# Patient Record
Sex: MALE | Race: WHITE | NOT HISPANIC OR LATINO | Employment: OTHER | URBAN - METROPOLITAN AREA
[De-identification: names, ages, dates, MRNs, and addresses within clinical notes are randomized per-mention and may not be internally consistent; named-entity substitution may affect disease eponyms.]

---

## 2017-03-09 ENCOUNTER — LAB (OUTPATIENT)
Dept: LAB | Facility: MEDICAL CENTER | Age: 77
End: 2017-03-09
Payer: MEDICARE

## 2017-03-09 ENCOUNTER — TRANSCRIBE ORDERS (OUTPATIENT)
Dept: ADMINISTRATIVE | Facility: HOSPITAL | Age: 77
End: 2017-03-09

## 2017-03-09 DIAGNOSIS — Z85.07 PERSONAL HISTORY OF MALIGNANT NEOPLASM OF PANCREAS: ICD-10-CM

## 2017-03-09 DIAGNOSIS — E11.9 DIABETES MELLITUS WITHOUT COMPLICATION (HCC): Primary | ICD-10-CM

## 2017-03-09 DIAGNOSIS — E11.9 DIABETES MELLITUS WITHOUT COMPLICATION (HCC): ICD-10-CM

## 2017-03-09 LAB
ALBUMIN SERPL BCP-MCNC: 3.5 G/DL (ref 3.5–5)
ALP SERPL-CCNC: 124 U/L (ref 46–116)
ALT SERPL W P-5'-P-CCNC: 20 U/L (ref 12–78)
ANION GAP SERPL CALCULATED.3IONS-SCNC: 6 MMOL/L (ref 4–13)
AST SERPL W P-5'-P-CCNC: 15 U/L (ref 5–45)
BILIRUB SERPL-MCNC: 0.61 MG/DL (ref 0.2–1)
BUN SERPL-MCNC: 29 MG/DL (ref 5–25)
CALCIUM SERPL-MCNC: 9.1 MG/DL (ref 8.3–10.1)
CHLORIDE SERPL-SCNC: 105 MMOL/L (ref 100–108)
CHOLEST SERPL-MCNC: 127 MG/DL (ref 50–200)
CO2 SERPL-SCNC: 29 MMOL/L (ref 21–32)
CREAT SERPL-MCNC: 1.38 MG/DL (ref 0.6–1.3)
EST. AVERAGE GLUCOSE BLD GHB EST-MCNC: 166 MG/DL
GFR SERPL CREATININE-BSD FRML MDRD: 50.2 ML/MIN/1.73SQ M
GLUCOSE SERPL-MCNC: 83 MG/DL (ref 65–140)
HBA1C MFR BLD: 7.4 % (ref 4.2–6.3)
HDLC SERPL-MCNC: 48 MG/DL (ref 40–60)
LDLC SERPL CALC-MCNC: 64 MG/DL (ref 0–100)
POTASSIUM SERPL-SCNC: 4.3 MMOL/L (ref 3.5–5.3)
PROT SERPL-MCNC: 6.5 G/DL (ref 6.4–8.2)
SODIUM SERPL-SCNC: 140 MMOL/L (ref 136–145)
TRIGL SERPL-MCNC: 75 MG/DL

## 2017-03-09 PROCEDURE — 80061 LIPID PANEL: CPT

## 2017-03-09 PROCEDURE — 80053 COMPREHEN METABOLIC PANEL: CPT

## 2017-03-09 PROCEDURE — 36415 COLL VENOUS BLD VENIPUNCTURE: CPT

## 2017-03-09 PROCEDURE — 83036 HEMOGLOBIN GLYCOSYLATED A1C: CPT

## 2017-03-27 ENCOUNTER — HOSPITAL ENCOUNTER (OUTPATIENT)
Dept: RADIOLOGY | Age: 77
Discharge: HOME | End: 2017-03-27
Payer: MEDICARE

## 2017-03-27 DIAGNOSIS — Z85.07 PERSONAL HISTORY OF MALIGNANT NEOPLASM OF PANCREAS: ICD-10-CM

## 2017-03-27 PROCEDURE — 74177 CT ABD & PELVIS W/CONTRAST: CPT

## 2017-03-27 RX ADMIN — IODIXANOL 100 ML: 320 INJECTION, SOLUTION INTRAVASCULAR at 09:40

## 2017-04-06 ENCOUNTER — TRANSCRIBE ORDERS (OUTPATIENT)
Dept: ADMINISTRATIVE | Facility: HOSPITAL | Age: 77
End: 2017-04-06

## 2017-04-06 ENCOUNTER — ALLSCRIPTS OFFICE VISIT (OUTPATIENT)
Dept: OTHER | Facility: OTHER | Age: 77
End: 2017-04-06

## 2017-04-06 DIAGNOSIS — Z85.07 PERSONAL HISTORY OF MALIGNANT NEOPLASM OF PANCREAS: Primary | ICD-10-CM

## 2017-04-06 DIAGNOSIS — N28.1 ACQUIRED CYST OF KIDNEY: ICD-10-CM

## 2017-04-11 ENCOUNTER — HOSPITAL ENCOUNTER (OUTPATIENT)
Dept: ULTRASOUND IMAGING | Facility: HOSPITAL | Age: 77
Discharge: HOME | End: 2017-04-11
Attending: SURGERY
Payer: MEDICARE

## 2017-04-11 DIAGNOSIS — N28.1 ACQUIRED CYST OF KIDNEY: ICD-10-CM

## 2017-04-11 PROCEDURE — 76770 US EXAM ABDO BACK WALL COMP: CPT

## 2017-04-12 ENCOUNTER — TRANSCRIBE ORDERS (OUTPATIENT)
Dept: ADMINISTRATIVE | Facility: HOSPITAL | Age: 77
End: 2017-04-12

## 2017-04-12 ENCOUNTER — ALLSCRIPTS OFFICE VISIT (OUTPATIENT)
Dept: OTHER | Facility: OTHER | Age: 77
End: 2017-04-12

## 2017-04-12 DIAGNOSIS — N28.1 ACQUIRED CYST OF KIDNEY: Primary | ICD-10-CM

## 2017-04-12 LAB
CLARITY UR: NORMAL
COLOR UR: YELLOW
GLUCOSE (HISTORICAL): NORMAL
HGB UR QL STRIP.AUTO: NORMAL
KETONES UR STRIP-MCNC: NORMAL MG/DL
LEUKOCYTE ESTERASE UR QL STRIP: NORMAL
NITRITE UR QL STRIP: NORMAL
PH UR STRIP.AUTO: 5 [PH]
PROT UR STRIP-MCNC: NORMAL MG/DL
SP GR UR STRIP.AUTO: 1.02

## 2017-06-13 ENCOUNTER — APPOINTMENT (EMERGENCY)
Dept: RADIOLOGY | Facility: HOSPITAL | Age: 77
DRG: 178 | End: 2017-06-13
Payer: MEDICARE

## 2017-06-13 ENCOUNTER — HOSPITAL ENCOUNTER (INPATIENT)
Facility: HOSPITAL | Age: 77
LOS: 3 days | Discharge: HOME | DRG: 178 | End: 2017-06-16
Attending: EMERGENCY MEDICINE | Admitting: GENERAL PRACTICE
Payer: MEDICARE

## 2017-06-13 DIAGNOSIS — J18.9 PNEUMONIA: Primary | ICD-10-CM

## 2017-06-13 DIAGNOSIS — R09.02 HYPOXIA: ICD-10-CM

## 2017-06-13 DIAGNOSIS — I48.20 CHRONIC ATRIAL FIBRILLATION (HCC): ICD-10-CM

## 2017-06-13 PROBLEM — N18.9 ACUTE-ON-CHRONIC KIDNEY INJURY (HCC): Status: ACTIVE | Noted: 2017-06-13

## 2017-06-13 PROBLEM — I10 HYPERTENSION: Status: ACTIVE | Noted: 2017-06-13

## 2017-06-13 PROBLEM — R06.02 SHORTNESS OF BREATH: Status: ACTIVE | Noted: 2017-06-13

## 2017-06-13 PROBLEM — E11.9 DIABETES MELLITUS (HCC): Status: ACTIVE | Noted: 2017-06-13

## 2017-06-13 PROBLEM — N17.9 ACUTE-ON-CHRONIC KIDNEY INJURY (HCC): Status: ACTIVE | Noted: 2017-06-13

## 2017-06-13 LAB
ALBUMIN SERPL BCP-MCNC: 3.5 G/DL (ref 3.5–5)
ALP SERPL-CCNC: 143 U/L (ref 46–116)
ALT SERPL W P-5'-P-CCNC: 14 U/L (ref 12–78)
ANION GAP SERPL CALCULATED.3IONS-SCNC: 9 MMOL/L (ref 4–13)
APTT PPP: 36 SECONDS (ref 23–35)
AST SERPL W P-5'-P-CCNC: 22 U/L (ref 5–45)
BASOPHILS # BLD AUTO: 0.03 THOUSANDS/ΜL (ref 0–0.1)
BASOPHILS NFR BLD AUTO: 0 % (ref 0–1)
BILIRUB SERPL-MCNC: 1 MG/DL (ref 0.2–1)
BUN SERPL-MCNC: 31 MG/DL (ref 5–25)
CALCIUM SERPL-MCNC: 9.2 MG/DL (ref 8.3–10.1)
CHLORIDE SERPL-SCNC: 101 MMOL/L (ref 100–108)
CO2 SERPL-SCNC: 28 MMOL/L (ref 21–32)
CREAT SERPL-MCNC: 1.69 MG/DL (ref 0.6–1.3)
EOSINOPHIL # BLD AUTO: 0.03 THOUSAND/ΜL (ref 0–0.61)
EOSINOPHIL NFR BLD AUTO: 0 % (ref 0–6)
ERYTHROCYTE [DISTWIDTH] IN BLOOD BY AUTOMATED COUNT: 12.9 % (ref 11.6–15.1)
GFR SERPL CREATININE-BSD FRML MDRD: 39.8 ML/MIN/1.73SQ M
GLUCOSE SERPL-MCNC: 182 MG/DL (ref 65–140)
GLUCOSE SERPL-MCNC: 203 MG/DL (ref 65–140)
HCT VFR BLD AUTO: 40.8 % (ref 36.5–49.3)
HGB BLD-MCNC: 13.1 G/DL (ref 12–17)
INR PPP: 1.13 (ref 0.86–1.16)
LACTATE SERPL-SCNC: 0.9 MMOL/L (ref 0.5–2)
LYMPHOCYTES # BLD AUTO: 0.65 THOUSANDS/ΜL (ref 0.6–4.47)
LYMPHOCYTES NFR BLD AUTO: 5 % (ref 14–44)
MCH RBC QN AUTO: 30.7 PG (ref 26.8–34.3)
MCHC RBC AUTO-ENTMCNC: 32.1 G/DL (ref 31.4–37.4)
MCV RBC AUTO: 96 FL (ref 82–98)
MONOCYTES # BLD AUTO: 1.42 THOUSAND/ΜL (ref 0.17–1.22)
MONOCYTES NFR BLD AUTO: 12 % (ref 4–12)
NEUTROPHILS # BLD AUTO: 10.05 THOUSANDS/ΜL (ref 1.85–7.62)
NEUTS SEG NFR BLD AUTO: 82 % (ref 43–75)
NRBC BLD AUTO-RTO: 0 /100 WBCS
PLATELET # BLD AUTO: 224 THOUSANDS/UL (ref 149–390)
PMV BLD AUTO: 9.7 FL (ref 8.9–12.7)
POTASSIUM SERPL-SCNC: 4.3 MMOL/L (ref 3.5–5.3)
PROT SERPL-MCNC: 6.9 G/DL (ref 6.4–8.2)
PROTHROMBIN TIME: 14.8 SECONDS (ref 12.1–14.4)
RBC # BLD AUTO: 4.27 MILLION/UL (ref 3.88–5.62)
SODIUM SERPL-SCNC: 138 MMOL/L (ref 136–145)
WBC # BLD AUTO: 12.22 THOUSAND/UL (ref 4.31–10.16)

## 2017-06-13 PROCEDURE — 82948 REAGENT STRIP/BLOOD GLUCOSE: CPT

## 2017-06-13 PROCEDURE — 87798 DETECT AGENT NOS DNA AMP: CPT | Performed by: NURSE PRACTITIONER

## 2017-06-13 PROCEDURE — 94640 AIRWAY INHALATION TREATMENT: CPT

## 2017-06-13 PROCEDURE — 71020 HB CHEST X-RAY 2VW FRONTAL&LATL: CPT

## 2017-06-13 PROCEDURE — 87205 SMEAR GRAM STAIN: CPT | Performed by: NURSE PRACTITIONER

## 2017-06-13 PROCEDURE — 85730 THROMBOPLASTIN TIME PARTIAL: CPT | Performed by: EMERGENCY MEDICINE

## 2017-06-13 PROCEDURE — 36415 COLL VENOUS BLD VENIPUNCTURE: CPT | Performed by: EMERGENCY MEDICINE

## 2017-06-13 PROCEDURE — 99285 EMERGENCY DEPT VISIT HI MDM: CPT

## 2017-06-13 PROCEDURE — 93005 ELECTROCARDIOGRAM TRACING: CPT

## 2017-06-13 PROCEDURE — 87081 CULTURE SCREEN ONLY: CPT | Performed by: NURSE PRACTITIONER

## 2017-06-13 PROCEDURE — 87040 BLOOD CULTURE FOR BACTERIA: CPT | Performed by: EMERGENCY MEDICINE

## 2017-06-13 PROCEDURE — 87070 CULTURE OTHR SPECIMN AEROBIC: CPT | Performed by: NURSE PRACTITIONER

## 2017-06-13 PROCEDURE — 85025 COMPLETE CBC W/AUTO DIFF WBC: CPT | Performed by: EMERGENCY MEDICINE

## 2017-06-13 PROCEDURE — 87184 SC STD DISK METHOD PER PLATE: CPT | Performed by: NURSE PRACTITIONER

## 2017-06-13 PROCEDURE — 80053 COMPREHEN METABOLIC PANEL: CPT | Performed by: EMERGENCY MEDICINE

## 2017-06-13 PROCEDURE — 87077 CULTURE AEROBIC IDENTIFY: CPT | Performed by: NURSE PRACTITIONER

## 2017-06-13 PROCEDURE — 87185 SC STD ENZYME DETCJ PER NZM: CPT | Performed by: NURSE PRACTITIONER

## 2017-06-13 PROCEDURE — 93005 ELECTROCARDIOGRAM TRACING: CPT | Performed by: EMERGENCY MEDICINE

## 2017-06-13 PROCEDURE — 85610 PROTHROMBIN TIME: CPT | Performed by: EMERGENCY MEDICINE

## 2017-06-13 PROCEDURE — 94760 N-INVAS EAR/PLS OXIMETRY 1: CPT

## 2017-06-13 PROCEDURE — 94664 DEMO&/EVAL PT USE INHALER: CPT

## 2017-06-13 PROCEDURE — 83605 ASSAY OF LACTIC ACID: CPT | Performed by: EMERGENCY MEDICINE

## 2017-06-13 RX ORDER — POTASSIUM CHLORIDE 600 MG/1
8 CAPSULE, EXTENDED RELEASE ORAL DAILY
Status: DISCONTINUED | OUTPATIENT
Start: 2017-06-14 | End: 2017-06-13 | Stop reason: ALTCHOICE

## 2017-06-13 RX ORDER — ASPIRIN 325 MG
325 TABLET ORAL DAILY
Status: DISCONTINUED | OUTPATIENT
Start: 2017-06-14 | End: 2017-06-16 | Stop reason: HOSPADM

## 2017-06-13 RX ORDER — AMPICILLIN TRIHYDRATE 250 MG
1000 CAPSULE ORAL DAILY
COMMUNITY

## 2017-06-13 RX ORDER — FAMOTIDINE 10 MG
10 TABLET ORAL DAILY
COMMUNITY
End: 2018-04-19 | Stop reason: SDUPTHER

## 2017-06-13 RX ORDER — PREDNISONE 1 MG/1
1 TABLET ORAL DAILY
Status: DISCONTINUED | OUTPATIENT
Start: 2017-06-14 | End: 2017-06-16 | Stop reason: HOSPADM

## 2017-06-13 RX ORDER — MELATONIN
2000 DAILY
COMMUNITY

## 2017-06-13 RX ORDER — MELATONIN
2000 DAILY
Status: DISCONTINUED | OUTPATIENT
Start: 2017-06-14 | End: 2017-06-16 | Stop reason: HOSPADM

## 2017-06-13 RX ORDER — AMPICILLIN TRIHYDRATE 250 MG
1000 CAPSULE ORAL DAILY
Status: DISCONTINUED | OUTPATIENT
Start: 2017-06-14 | End: 2017-06-14

## 2017-06-13 RX ORDER — ACETAMINOPHEN 325 MG/1
650 TABLET ORAL ONCE
Status: COMPLETED | OUTPATIENT
Start: 2017-06-13 | End: 2017-06-13

## 2017-06-13 RX ORDER — FAMOTIDINE 20 MG/1
10 TABLET, FILM COATED ORAL DAILY
Status: DISCONTINUED | OUTPATIENT
Start: 2017-06-14 | End: 2017-06-16 | Stop reason: HOSPADM

## 2017-06-13 RX ORDER — PRAVASTATIN SODIUM 40 MG
40 TABLET ORAL DAILY
Status: DISCONTINUED | OUTPATIENT
Start: 2017-06-14 | End: 2017-06-16 | Stop reason: HOSPADM

## 2017-06-13 RX ORDER — ASPIRIN 325 MG
325 TABLET ORAL DAILY
COMMUNITY
End: 2018-04-19 | Stop reason: SDUPTHER

## 2017-06-13 RX ORDER — HYDROXYCHLOROQUINE SULFATE 200 MG/1
200 TABLET, FILM COATED ORAL 2 TIMES DAILY
Status: DISCONTINUED | OUTPATIENT
Start: 2017-06-14 | End: 2017-06-16 | Stop reason: HOSPADM

## 2017-06-13 RX ORDER — POTASSIUM CHLORIDE 750 MG/1
10 TABLET, EXTENDED RELEASE ORAL DAILY
Status: DISCONTINUED | OUTPATIENT
Start: 2017-06-14 | End: 2017-06-16 | Stop reason: HOSPADM

## 2017-06-13 RX ORDER — INSULIN GLARGINE 100 [IU]/ML
41 INJECTION, SOLUTION SUBCUTANEOUS
Status: DISCONTINUED | OUTPATIENT
Start: 2017-06-13 | End: 2017-06-16 | Stop reason: HOSPADM

## 2017-06-13 RX ORDER — TORSEMIDE 10 MG/1
10 TABLET ORAL DAILY
Status: DISCONTINUED | OUTPATIENT
Start: 2017-06-14 | End: 2017-06-16 | Stop reason: HOSPADM

## 2017-06-13 RX ORDER — ALBUTEROL SULFATE 2.5 MG/3ML
5 SOLUTION RESPIRATORY (INHALATION) ONCE
Status: COMPLETED | OUTPATIENT
Start: 2017-06-13 | End: 2017-06-13

## 2017-06-13 RX ORDER — LOSARTAN POTASSIUM 50 MG/1
100 TABLET ORAL DAILY
Status: DISCONTINUED | OUTPATIENT
Start: 2017-06-14 | End: 2017-06-16 | Stop reason: HOSPADM

## 2017-06-13 RX ORDER — ALBUTEROL SULFATE 2.5 MG/3ML
2.5 SOLUTION RESPIRATORY (INHALATION) ONCE
Status: COMPLETED | OUTPATIENT
Start: 2017-06-13 | End: 2017-06-13

## 2017-06-13 RX ADMIN — ALBUTEROL SULFATE 5 MG: 2.5 SOLUTION RESPIRATORY (INHALATION) at 18:21

## 2017-06-13 RX ADMIN — ACETAMINOPHEN 650 MG: 325 TABLET ORAL at 23:43

## 2017-06-13 RX ADMIN — INSULIN GLARGINE 41 UNITS: 100 INJECTION, SOLUTION SUBCUTANEOUS at 23:42

## 2017-06-13 RX ADMIN — ALBUTEROL SULFATE 2.5 MG: 2.5 SOLUTION RESPIRATORY (INHALATION) at 23:26

## 2017-06-13 RX ADMIN — CEFTRIAXONE 1000 MG: 1 INJECTION, POWDER, FOR SOLUTION INTRAMUSCULAR; INTRAVENOUS at 21:14

## 2017-06-14 ENCOUNTER — APPOINTMENT (INPATIENT)
Dept: CT IMAGING | Facility: HOSPITAL | Age: 77
DRG: 178 | End: 2017-06-14
Payer: MEDICARE

## 2017-06-14 ENCOUNTER — APPOINTMENT (INPATIENT)
Dept: NON INVASIVE DIAGNOSTICS | Facility: HOSPITAL | Age: 77
DRG: 178 | End: 2017-06-14
Payer: MEDICARE

## 2017-06-14 PROBLEM — I48.91 A-FIB (HCC): Status: ACTIVE | Noted: 2017-06-14

## 2017-06-14 LAB
ATRIAL RATE: 107 BPM
ATRIAL RATE: 214 BPM
EST. AVERAGE GLUCOSE BLD GHB EST-MCNC: 131 MG/DL
FLUAV AG SPEC QL: NORMAL
FLUBV AG SPEC QL: NORMAL
GLUCOSE SERPL-MCNC: 150 MG/DL (ref 65–140)
GLUCOSE SERPL-MCNC: 158 MG/DL (ref 65–140)
GLUCOSE SERPL-MCNC: 185 MG/DL (ref 65–140)
GLUCOSE SERPL-MCNC: 200 MG/DL (ref 65–140)
HBA1C MFR BLD: 6.2 % (ref 4.2–6.3)
LACTATE SERPL-SCNC: 1.2 MMOL/L (ref 0.5–2)
QRS AXIS: 85 DEGREES
QRS AXIS: 87 DEGREES
QRSD INTERVAL: 106 MS
QRSD INTERVAL: 106 MS
QT INTERVAL: 352 MS
QT INTERVAL: 366 MS
QTC INTERVAL: 432 MS
QTC INTERVAL: 462 MS
RSV B RNA SPEC QL NAA+PROBE: NORMAL
T WAVE AXIS: 258 DEGREES
T WAVE AXIS: 264 DEGREES
VENTRICULAR RATE: 91 BPM
VENTRICULAR RATE: 96 BPM

## 2017-06-14 PROCEDURE — 94668 MNPJ CHEST WALL SBSQ: CPT

## 2017-06-14 PROCEDURE — 94640 AIRWAY INHALATION TREATMENT: CPT

## 2017-06-14 PROCEDURE — 83036 HEMOGLOBIN GLYCOSYLATED A1C: CPT | Performed by: NURSE PRACTITIONER

## 2017-06-14 PROCEDURE — 82948 REAGENT STRIP/BLOOD GLUCOSE: CPT

## 2017-06-14 PROCEDURE — 71250 CT THORAX DX C-: CPT

## 2017-06-14 PROCEDURE — 93306 TTE W/DOPPLER COMPLETE: CPT

## 2017-06-14 PROCEDURE — 94760 N-INVAS EAR/PLS OXIMETRY 1: CPT

## 2017-06-14 RX ORDER — GUAIFENESIN 600 MG
600 TABLET, EXTENDED RELEASE 12 HR ORAL EVERY 12 HOURS SCHEDULED
Status: DISCONTINUED | OUTPATIENT
Start: 2017-06-14 | End: 2017-06-16 | Stop reason: HOSPADM

## 2017-06-14 RX ORDER — LEVALBUTEROL 1.25 MG/.5ML
1.25 SOLUTION, CONCENTRATE RESPIRATORY (INHALATION)
Status: DISCONTINUED | OUTPATIENT
Start: 2017-06-14 | End: 2017-06-16 | Stop reason: HOSPADM

## 2017-06-14 RX ORDER — BENZONATATE 100 MG/1
100 CAPSULE ORAL 3 TIMES DAILY PRN
Status: DISCONTINUED | OUTPATIENT
Start: 2017-06-14 | End: 2017-06-16 | Stop reason: HOSPADM

## 2017-06-14 RX ORDER — ALBUTEROL SULFATE 2.5 MG/3ML
2.5 SOLUTION RESPIRATORY (INHALATION) EVERY 4 HOURS
Status: DISCONTINUED | OUTPATIENT
Start: 2017-06-14 | End: 2017-06-14

## 2017-06-14 RX ORDER — ALBUTEROL SULFATE 2.5 MG/3ML
2.5 SOLUTION RESPIRATORY (INHALATION) ONCE
Status: COMPLETED | OUTPATIENT
Start: 2017-06-14 | End: 2017-06-14

## 2017-06-14 RX ADMIN — INSULIN LISPRO 2 UNITS: 100 INJECTION, SOLUTION INTRAVENOUS; SUBCUTANEOUS at 00:49

## 2017-06-14 RX ADMIN — AZITHROMYCIN MONOHYDRATE 500 MG: 500 INJECTION, POWDER, LYOPHILIZED, FOR SOLUTION INTRAVENOUS at 11:00

## 2017-06-14 RX ADMIN — ALBUTEROL SULFATE 2.5 MG: 2.5 SOLUTION RESPIRATORY (INHALATION) at 00:57

## 2017-06-14 RX ADMIN — LEVALBUTEROL HYDROCHLORIDE 1.25 MG: 1.25 SOLUTION, CONCENTRATE RESPIRATORY (INHALATION) at 19:46

## 2017-06-14 RX ADMIN — GUAIFENESIN 600 MG: 600 TABLET, EXTENDED RELEASE ORAL at 08:13

## 2017-06-14 RX ADMIN — IPRATROPIUM BROMIDE 0.5 MG: 0.5 SOLUTION RESPIRATORY (INHALATION) at 09:15

## 2017-06-14 RX ADMIN — LEVALBUTEROL HYDROCHLORIDE 1.25 MG: 1.25 SOLUTION, CONCENTRATE RESPIRATORY (INHALATION) at 14:12

## 2017-06-14 RX ADMIN — IPRATROPIUM BROMIDE 0.5 MG: 0.5 SOLUTION RESPIRATORY (INHALATION) at 00:57

## 2017-06-14 RX ADMIN — INSULIN LISPRO 2 UNITS: 100 INJECTION, SOLUTION INTRAVENOUS; SUBCUTANEOUS at 22:07

## 2017-06-14 RX ADMIN — CEFTRIAXONE 1000 MG: 1 INJECTION, POWDER, FOR SOLUTION INTRAMUSCULAR; INTRAVENOUS at 09:58

## 2017-06-14 RX ADMIN — ALBUTEROL SULFATE 2.5 MG: 2.5 SOLUTION RESPIRATORY (INHALATION) at 04:29

## 2017-06-14 RX ADMIN — FAMOTIDINE 10 MG: 20 TABLET, FILM COATED ORAL at 08:09

## 2017-06-14 RX ADMIN — POTASSIUM CHLORIDE 10 MEQ: 750 TABLET, EXTENDED RELEASE ORAL at 08:10

## 2017-06-14 RX ADMIN — GUAIFENESIN 600 MG: 600 TABLET, EXTENDED RELEASE ORAL at 00:49

## 2017-06-14 RX ADMIN — GUAIFENESIN 600 MG: 600 TABLET, EXTENDED RELEASE ORAL at 20:39

## 2017-06-14 RX ADMIN — ALBUTEROL SULFATE 2.5 MG: 2.5 SOLUTION RESPIRATORY (INHALATION) at 09:15

## 2017-06-14 RX ADMIN — IPRATROPIUM BROMIDE 0.5 MG: 0.5 SOLUTION RESPIRATORY (INHALATION) at 19:46

## 2017-06-14 RX ADMIN — ASPIRIN 325 MG: 325 TABLET ORAL at 08:09

## 2017-06-14 RX ADMIN — PRAVASTATIN SODIUM 40 MG: 40 TABLET ORAL at 08:13

## 2017-06-14 RX ADMIN — PREDNISONE 1 MG: 1 TABLET ORAL at 08:13

## 2017-06-14 RX ADMIN — HYDROXYCHLOROQUINE SULFATE 200 MG: 200 TABLET, FILM COATED ORAL at 09:58

## 2017-06-14 RX ADMIN — HYDROXYCHLOROQUINE SULFATE 200 MG: 200 TABLET, FILM COATED ORAL at 18:16

## 2017-06-14 RX ADMIN — VITAMIN D, TAB 1000IU (100/BT) 2000 UNITS: 25 TAB at 08:10

## 2017-06-14 RX ADMIN — FLUTICASONE PROPIONATE AND SALMETEROL 1 PUFF: 50; 250 POWDER RESPIRATORY (INHALATION) at 20:33

## 2017-06-14 RX ADMIN — INSULIN GLARGINE 41 UNITS: 100 INJECTION, SOLUTION SUBCUTANEOUS at 22:07

## 2017-06-14 RX ADMIN — TORSEMIDE 10 MG: 10 TABLET ORAL at 08:08

## 2017-06-14 RX ADMIN — METOPROLOL TARTRATE 25 MG: 25 TABLET ORAL at 08:10

## 2017-06-14 RX ADMIN — LOSARTAN POTASSIUM 100 MG: 50 TABLET, FILM COATED ORAL at 08:10

## 2017-06-14 RX ADMIN — IPRATROPIUM BROMIDE 0.5 MG: 0.5 SOLUTION RESPIRATORY (INHALATION) at 14:12

## 2017-06-14 RX ADMIN — FLUTICASONE PROPIONATE AND SALMETEROL 1 PUFF: 50; 250 POWDER RESPIRATORY (INHALATION) at 16:13

## 2017-06-15 LAB
ANION GAP SERPL CALCULATED.3IONS-SCNC: 7 MMOL/L (ref 4–13)
BUN SERPL-MCNC: 33 MG/DL (ref 5–25)
CALCIUM SERPL-MCNC: 9.2 MG/DL (ref 8.3–10.1)
CHLORIDE SERPL-SCNC: 104 MMOL/L (ref 100–108)
CO2 SERPL-SCNC: 28 MMOL/L (ref 21–32)
CREAT SERPL-MCNC: 1.57 MG/DL (ref 0.6–1.3)
ERYTHROCYTE [DISTWIDTH] IN BLOOD BY AUTOMATED COUNT: 12.9 % (ref 11.6–15.1)
GFR SERPL CREATININE-BSD FRML MDRD: 43.3 ML/MIN/1.73SQ M
GLUCOSE SERPL-MCNC: 146 MG/DL (ref 65–140)
GLUCOSE SERPL-MCNC: 146 MG/DL (ref 65–140)
GLUCOSE SERPL-MCNC: 162 MG/DL (ref 65–140)
GLUCOSE SERPL-MCNC: 185 MG/DL (ref 65–140)
GLUCOSE SERPL-MCNC: 210 MG/DL (ref 65–140)
HCT VFR BLD AUTO: 35.2 % (ref 36.5–49.3)
HGB BLD-MCNC: 11.3 G/DL (ref 12–17)
MCH RBC QN AUTO: 31 PG (ref 26.8–34.3)
MCHC RBC AUTO-ENTMCNC: 32.1 G/DL (ref 31.4–37.4)
MCV RBC AUTO: 96 FL (ref 82–98)
MRSA NOSE QL CULT: NORMAL
PLATELET # BLD AUTO: 174 THOUSANDS/UL (ref 149–390)
PMV BLD AUTO: 9.5 FL (ref 8.9–12.7)
POTASSIUM SERPL-SCNC: 4.5 MMOL/L (ref 3.5–5.3)
RBC # BLD AUTO: 3.65 MILLION/UL (ref 3.88–5.62)
SODIUM SERPL-SCNC: 139 MMOL/L (ref 136–145)
WBC # BLD AUTO: 10.05 THOUSAND/UL (ref 4.31–10.16)

## 2017-06-15 PROCEDURE — 94668 MNPJ CHEST WALL SBSQ: CPT

## 2017-06-15 PROCEDURE — 82948 REAGENT STRIP/BLOOD GLUCOSE: CPT

## 2017-06-15 PROCEDURE — 94640 AIRWAY INHALATION TREATMENT: CPT

## 2017-06-15 PROCEDURE — 94760 N-INVAS EAR/PLS OXIMETRY 1: CPT

## 2017-06-15 PROCEDURE — 85027 COMPLETE CBC AUTOMATED: CPT | Performed by: FAMILY MEDICINE

## 2017-06-15 PROCEDURE — 80048 BASIC METABOLIC PNL TOTAL CA: CPT | Performed by: FAMILY MEDICINE

## 2017-06-15 RX ADMIN — HYDROXYCHLOROQUINE SULFATE 200 MG: 200 TABLET, FILM COATED ORAL at 18:21

## 2017-06-15 RX ADMIN — INSULIN GLARGINE 41 UNITS: 100 INJECTION, SOLUTION SUBCUTANEOUS at 22:00

## 2017-06-15 RX ADMIN — INSULIN LISPRO 1 UNITS: 100 INJECTION, SOLUTION INTRAVENOUS; SUBCUTANEOUS at 22:04

## 2017-06-15 RX ADMIN — LEVALBUTEROL HYDROCHLORIDE 1.25 MG: 1.25 SOLUTION, CONCENTRATE RESPIRATORY (INHALATION) at 14:05

## 2017-06-15 RX ADMIN — LEVALBUTEROL HYDROCHLORIDE 1.25 MG: 1.25 SOLUTION, CONCENTRATE RESPIRATORY (INHALATION) at 08:21

## 2017-06-15 RX ADMIN — TORSEMIDE 10 MG: 10 TABLET ORAL at 08:21

## 2017-06-15 RX ADMIN — FAMOTIDINE 10 MG: 20 TABLET, FILM COATED ORAL at 08:22

## 2017-06-15 RX ADMIN — PRAVASTATIN SODIUM 40 MG: 40 TABLET ORAL at 08:22

## 2017-06-15 RX ADMIN — IPRATROPIUM BROMIDE 0.5 MG: 0.5 SOLUTION RESPIRATORY (INHALATION) at 08:21

## 2017-06-15 RX ADMIN — FLUTICASONE PROPIONATE AND SALMETEROL 1 PUFF: 50; 250 POWDER RESPIRATORY (INHALATION) at 22:00

## 2017-06-15 RX ADMIN — FLUTICASONE PROPIONATE AND SALMETEROL 1 PUFF: 50; 250 POWDER RESPIRATORY (INHALATION) at 09:55

## 2017-06-15 RX ADMIN — LOSARTAN POTASSIUM 100 MG: 50 TABLET, FILM COATED ORAL at 08:21

## 2017-06-15 RX ADMIN — HYDROXYCHLOROQUINE SULFATE 200 MG: 200 TABLET, FILM COATED ORAL at 09:55

## 2017-06-15 RX ADMIN — VITAMIN D, TAB 1000IU (100/BT) 2000 UNITS: 25 TAB at 08:21

## 2017-06-15 RX ADMIN — GUAIFENESIN 600 MG: 600 TABLET, EXTENDED RELEASE ORAL at 08:21

## 2017-06-15 RX ADMIN — AZITHROMYCIN MONOHYDRATE 500 MG: 500 INJECTION, POWDER, LYOPHILIZED, FOR SOLUTION INTRAVENOUS at 10:44

## 2017-06-15 RX ADMIN — ASPIRIN 325 MG: 325 TABLET ORAL at 08:22

## 2017-06-15 RX ADMIN — IPRATROPIUM BROMIDE 0.5 MG: 0.5 SOLUTION RESPIRATORY (INHALATION) at 14:05

## 2017-06-15 RX ADMIN — LEVALBUTEROL HYDROCHLORIDE 1.25 MG: 1.25 SOLUTION, CONCENTRATE RESPIRATORY (INHALATION) at 20:32

## 2017-06-15 RX ADMIN — METOPROLOL TARTRATE 25 MG: 25 TABLET ORAL at 08:21

## 2017-06-15 RX ADMIN — CEFTRIAXONE 1000 MG: 1 INJECTION, POWDER, FOR SOLUTION INTRAMUSCULAR; INTRAVENOUS at 09:55

## 2017-06-15 RX ADMIN — POTASSIUM CHLORIDE 10 MEQ: 750 TABLET, EXTENDED RELEASE ORAL at 08:22

## 2017-06-15 RX ADMIN — GUAIFENESIN 600 MG: 600 TABLET, EXTENDED RELEASE ORAL at 22:00

## 2017-06-15 RX ADMIN — IPRATROPIUM BROMIDE 0.5 MG: 0.5 SOLUTION RESPIRATORY (INHALATION) at 20:32

## 2017-06-15 RX ADMIN — PREDNISONE 1 MG: 1 TABLET ORAL at 08:21

## 2017-06-16 VITALS
HEART RATE: 92 BPM | DIASTOLIC BLOOD PRESSURE: 70 MMHG | WEIGHT: 210.76 LBS | SYSTOLIC BLOOD PRESSURE: 144 MMHG | RESPIRATION RATE: 18 BRPM | BODY MASS INDEX: 26.21 KG/M2 | OXYGEN SATURATION: 96 % | TEMPERATURE: 97.8 F | HEIGHT: 75 IN

## 2017-06-16 PROBLEM — R06.02 SHORTNESS OF BREATH: Status: RESOLVED | Noted: 2017-06-13 | Resolved: 2017-06-16

## 2017-06-16 LAB
ANION GAP SERPL CALCULATED.3IONS-SCNC: 6 MMOL/L (ref 4–13)
BASOPHILS # BLD AUTO: 0.02 THOUSANDS/ΜL (ref 0–0.1)
BASOPHILS NFR BLD AUTO: 0 % (ref 0–1)
BUN SERPL-MCNC: 30 MG/DL (ref 5–25)
CALCIUM SERPL-MCNC: 9.1 MG/DL (ref 8.3–10.1)
CHLORIDE SERPL-SCNC: 102 MMOL/L (ref 100–108)
CO2 SERPL-SCNC: 30 MMOL/L (ref 21–32)
CREAT SERPL-MCNC: 1.38 MG/DL (ref 0.6–1.3)
EOSINOPHIL # BLD AUTO: 0.19 THOUSAND/ΜL (ref 0–0.61)
EOSINOPHIL NFR BLD AUTO: 2 % (ref 0–6)
ERYTHROCYTE [DISTWIDTH] IN BLOOD BY AUTOMATED COUNT: 13.1 % (ref 11.6–15.1)
GFR SERPL CREATININE-BSD FRML MDRD: 50.2 ML/MIN/1.73SQ M
GLUCOSE SERPL-MCNC: 111 MG/DL (ref 65–140)
GLUCOSE SERPL-MCNC: 125 MG/DL (ref 65–140)
HCT VFR BLD AUTO: 34.1 % (ref 36.5–49.3)
HGB BLD-MCNC: 11.1 G/DL (ref 12–17)
LYMPHOCYTES # BLD AUTO: 0.9 THOUSANDS/ΜL (ref 0.6–4.47)
LYMPHOCYTES NFR BLD AUTO: 9 % (ref 14–44)
MCH RBC QN AUTO: 31.3 PG (ref 26.8–34.3)
MCHC RBC AUTO-ENTMCNC: 32.6 G/DL (ref 31.4–37.4)
MCV RBC AUTO: 96 FL (ref 82–98)
MONOCYTES # BLD AUTO: 1.33 THOUSAND/ΜL (ref 0.17–1.22)
MONOCYTES NFR BLD AUTO: 13 % (ref 4–12)
NEUTROPHILS # BLD AUTO: 7.7 THOUSANDS/ΜL (ref 1.85–7.62)
NEUTS SEG NFR BLD AUTO: 76 % (ref 43–75)
NRBC BLD AUTO-RTO: 0 /100 WBCS
PLATELET # BLD AUTO: 180 THOUSANDS/UL (ref 149–390)
PMV BLD AUTO: 9.3 FL (ref 8.9–12.7)
POTASSIUM SERPL-SCNC: 4.3 MMOL/L (ref 3.5–5.3)
RBC # BLD AUTO: 3.55 MILLION/UL (ref 3.88–5.62)
SODIUM SERPL-SCNC: 138 MMOL/L (ref 136–145)
WBC # BLD AUTO: 10.19 THOUSAND/UL (ref 4.31–10.16)

## 2017-06-16 PROCEDURE — 94760 N-INVAS EAR/PLS OXIMETRY 1: CPT

## 2017-06-16 PROCEDURE — 82948 REAGENT STRIP/BLOOD GLUCOSE: CPT

## 2017-06-16 PROCEDURE — 94640 AIRWAY INHALATION TREATMENT: CPT

## 2017-06-16 PROCEDURE — 94668 MNPJ CHEST WALL SBSQ: CPT

## 2017-06-16 PROCEDURE — 85025 COMPLETE CBC W/AUTO DIFF WBC: CPT | Performed by: NURSE PRACTITIONER

## 2017-06-16 PROCEDURE — 80048 BASIC METABOLIC PNL TOTAL CA: CPT | Performed by: NURSE PRACTITIONER

## 2017-06-16 RX ORDER — BENZONATATE 100 MG/1
100 CAPSULE ORAL 3 TIMES DAILY PRN
Qty: 20 CAPSULE | Refills: 0 | Status: SHIPPED | OUTPATIENT
Start: 2017-06-16 | End: 2018-04-19 | Stop reason: SDUPTHER

## 2017-06-16 RX ORDER — LEVOFLOXACIN 500 MG/1
500 TABLET, FILM COATED ORAL DAILY
Qty: 7 TABLET | Refills: 0 | Status: SHIPPED | OUTPATIENT
Start: 2017-06-16 | End: 2017-06-23

## 2017-06-16 RX ORDER — GUAIFENESIN 600 MG
600 TABLET, EXTENDED RELEASE 12 HR ORAL EVERY 12 HOURS SCHEDULED
Qty: 30 TABLET | Refills: 0 | Status: SHIPPED | OUTPATIENT
Start: 2017-06-16 | End: 2018-04-19 | Stop reason: SDUPTHER

## 2017-06-16 RX ORDER — ALBUTEROL SULFATE 90 UG/1
2 AEROSOL, METERED RESPIRATORY (INHALATION) EVERY 6 HOURS PRN
Qty: 1 INHALER | Refills: 0 | Status: SHIPPED | OUTPATIENT
Start: 2017-06-16 | End: 2018-04-19 | Stop reason: SDUPTHER

## 2017-06-16 RX ADMIN — LEVALBUTEROL HYDROCHLORIDE 1.25 MG: 1.25 SOLUTION, CONCENTRATE RESPIRATORY (INHALATION) at 08:54

## 2017-06-16 RX ADMIN — HYDROXYCHLOROQUINE SULFATE 200 MG: 200 TABLET, FILM COATED ORAL at 08:17

## 2017-06-16 RX ADMIN — PRAVASTATIN SODIUM 40 MG: 40 TABLET ORAL at 08:19

## 2017-06-16 RX ADMIN — IPRATROPIUM BROMIDE 0.5 MG: 0.5 SOLUTION RESPIRATORY (INHALATION) at 08:54

## 2017-06-16 RX ADMIN — FAMOTIDINE 10 MG: 20 TABLET, FILM COATED ORAL at 08:17

## 2017-06-16 RX ADMIN — POTASSIUM CHLORIDE 10 MEQ: 750 TABLET, EXTENDED RELEASE ORAL at 08:19

## 2017-06-16 RX ADMIN — GUAIFENESIN 600 MG: 600 TABLET, EXTENDED RELEASE ORAL at 08:17

## 2017-06-16 RX ADMIN — AZITHROMYCIN MONOHYDRATE 500 MG: 500 INJECTION, POWDER, LYOPHILIZED, FOR SOLUTION INTRAVENOUS at 09:24

## 2017-06-16 RX ADMIN — FLUTICASONE PROPIONATE AND SALMETEROL 1 PUFF: 50; 250 POWDER RESPIRATORY (INHALATION) at 08:21

## 2017-06-16 RX ADMIN — LOSARTAN POTASSIUM 100 MG: 50 TABLET, FILM COATED ORAL at 08:19

## 2017-06-16 RX ADMIN — CEFTRIAXONE 1000 MG: 1 INJECTION, POWDER, FOR SOLUTION INTRAMUSCULAR; INTRAVENOUS at 08:26

## 2017-06-16 RX ADMIN — TORSEMIDE 10 MG: 10 TABLET ORAL at 08:17

## 2017-06-16 RX ADMIN — ASPIRIN 325 MG: 325 TABLET ORAL at 08:17

## 2017-06-16 RX ADMIN — VITAMIN D, TAB 1000IU (100/BT) 2000 UNITS: 25 TAB at 08:17

## 2017-06-16 RX ADMIN — PREDNISONE 1 MG: 1 TABLET ORAL at 08:19

## 2017-06-16 RX ADMIN — METOPROLOL TARTRATE 25 MG: 25 TABLET ORAL at 08:19

## 2017-06-18 LAB
BACTERIA BLD CULT: NORMAL
BACTERIA BLD CULT: NORMAL

## 2017-06-19 LAB
BACTERIA SPT RESP CULT: ABNORMAL
BACTERIA SPT RESP CULT: ABNORMAL
GRAM STN SPEC: ABNORMAL

## 2017-07-24 ENCOUNTER — TRANSCRIBE ORDERS (OUTPATIENT)
Dept: ADMINISTRATIVE | Facility: HOSPITAL | Age: 77
End: 2017-07-24

## 2017-07-24 ENCOUNTER — APPOINTMENT (OUTPATIENT)
Dept: LAB | Facility: HOSPITAL | Age: 77
End: 2017-07-24
Payer: MEDICARE

## 2017-07-24 DIAGNOSIS — E78.5 HYPERLIPIDEMIA, UNSPECIFIED HYPERLIPIDEMIA TYPE: ICD-10-CM

## 2017-07-24 DIAGNOSIS — E13.9 DIABETES MELLITUS OF OTHER TYPE WITHOUT COMPLICATION, UNSPECIFIED LONG TERM INSULIN USE STATUS: ICD-10-CM

## 2017-07-24 DIAGNOSIS — E13.9 DIABETES MELLITUS OF OTHER TYPE WITHOUT COMPLICATION, UNSPECIFIED LONG TERM INSULIN USE STATUS: Primary | ICD-10-CM

## 2017-07-24 LAB
ALBUMIN SERPL BCP-MCNC: 3.8 G/DL (ref 3.5–5)
ALP SERPL-CCNC: 133 U/L (ref 46–116)
ALT SERPL W P-5'-P-CCNC: 21 U/L (ref 12–78)
ANION GAP SERPL CALCULATED.3IONS-SCNC: 8 MMOL/L (ref 4–13)
AST SERPL W P-5'-P-CCNC: 22 U/L (ref 5–45)
BASOPHILS # BLD AUTO: 0.05 THOUSANDS/ΜL (ref 0–0.1)
BASOPHILS NFR BLD AUTO: 1 % (ref 0–1)
BILIRUB SERPL-MCNC: 0.9 MG/DL (ref 0.2–1)
BUN SERPL-MCNC: 31 MG/DL (ref 5–25)
CALCIUM SERPL-MCNC: 9.4 MG/DL (ref 8.3–10.1)
CHLORIDE SERPL-SCNC: 103 MMOL/L (ref 100–108)
CO2 SERPL-SCNC: 30 MMOL/L (ref 21–32)
CREAT SERPL-MCNC: 1.8 MG/DL (ref 0.6–1.3)
CREAT UR-MCNC: 169 MG/DL
EOSINOPHIL # BLD AUTO: 0.18 THOUSAND/ΜL (ref 0–0.61)
EOSINOPHIL NFR BLD AUTO: 2 % (ref 0–6)
ERYTHROCYTE [DISTWIDTH] IN BLOOD BY AUTOMATED COUNT: 13.7 % (ref 11.6–15.1)
EST. AVERAGE GLUCOSE BLD GHB EST-MCNC: 148 MG/DL
GFR SERPL CREATININE-BSD FRML MDRD: 36 ML/MIN/1.73SQ M
GLUCOSE P FAST SERPL-MCNC: 100 MG/DL (ref 65–99)
HBA1C MFR BLD: 6.8 % (ref 4.2–6.3)
HCT VFR BLD AUTO: 43.4 % (ref 36.5–49.3)
HGB BLD-MCNC: 13.6 G/DL (ref 12–17)
LYMPHOCYTES # BLD AUTO: 1.38 THOUSANDS/ΜL (ref 0.6–4.47)
LYMPHOCYTES NFR BLD AUTO: 17 % (ref 14–44)
MCH RBC QN AUTO: 30.4 PG (ref 26.8–34.3)
MCHC RBC AUTO-ENTMCNC: 31.3 G/DL (ref 31.4–37.4)
MCV RBC AUTO: 97 FL (ref 82–98)
MICROALBUMIN UR-MCNC: 63.1 MG/L (ref 0–20)
MICROALBUMIN/CREAT 24H UR: 37 MG/G CREATININE (ref 0–30)
MONOCYTES # BLD AUTO: 0.9 THOUSAND/ΜL (ref 0.17–1.22)
MONOCYTES NFR BLD AUTO: 11 % (ref 4–12)
NEUTROPHILS # BLD AUTO: 5.75 THOUSANDS/ΜL (ref 1.85–7.62)
NEUTS SEG NFR BLD AUTO: 69 % (ref 43–75)
NRBC BLD AUTO-RTO: 0 /100 WBCS
PLATELET # BLD AUTO: 205 THOUSANDS/UL (ref 149–390)
PMV BLD AUTO: 9.3 FL (ref 8.9–12.7)
POTASSIUM SERPL-SCNC: 5.2 MMOL/L (ref 3.5–5.3)
PROT SERPL-MCNC: 7.1 G/DL (ref 6.4–8.2)
RBC # BLD AUTO: 4.48 MILLION/UL (ref 3.88–5.62)
SODIUM SERPL-SCNC: 141 MMOL/L (ref 136–145)
WBC # BLD AUTO: 8.29 THOUSAND/UL (ref 4.31–10.16)

## 2017-07-24 PROCEDURE — 82043 UR ALBUMIN QUANTITATIVE: CPT | Performed by: INTERNAL MEDICINE

## 2017-07-24 PROCEDURE — 82570 ASSAY OF URINE CREATININE: CPT | Performed by: INTERNAL MEDICINE

## 2017-07-24 PROCEDURE — 36415 COLL VENOUS BLD VENIPUNCTURE: CPT

## 2017-07-24 PROCEDURE — 80053 COMPREHEN METABOLIC PANEL: CPT

## 2017-07-24 PROCEDURE — 83036 HEMOGLOBIN GLYCOSYLATED A1C: CPT

## 2017-07-24 PROCEDURE — 85025 COMPLETE CBC W/AUTO DIFF WBC: CPT

## 2017-09-26 ENCOUNTER — TRANSCRIBE ORDERS (OUTPATIENT)
Dept: ADMINISTRATIVE | Facility: HOSPITAL | Age: 77
End: 2017-09-26

## 2017-09-26 DIAGNOSIS — M85.80 OSTEOPENIA, UNSPECIFIED LOCATION: ICD-10-CM

## 2017-09-26 DIAGNOSIS — Z79.52 LONG TERM CURRENT USE OF SYSTEMIC STEROIDS: Primary | ICD-10-CM

## 2017-09-28 ENCOUNTER — LAB (OUTPATIENT)
Dept: LAB | Facility: MEDICAL CENTER | Age: 77
End: 2017-09-28
Payer: MEDICARE

## 2017-09-28 ENCOUNTER — HOSPITAL ENCOUNTER (OUTPATIENT)
Dept: RADIOLOGY | Facility: MEDICAL CENTER | Age: 77
Discharge: HOME | End: 2017-09-28
Payer: MEDICARE

## 2017-09-28 ENCOUNTER — TRANSCRIBE ORDERS (OUTPATIENT)
Dept: ADMINISTRATIVE | Facility: HOSPITAL | Age: 77
End: 2017-09-28

## 2017-09-28 DIAGNOSIS — M35.3 POLYMYALGIA RHEUMATICA (HCC): Primary | ICD-10-CM

## 2017-09-28 DIAGNOSIS — Z79.52 LONG TERM CURRENT USE OF SYSTEMIC STEROIDS: ICD-10-CM

## 2017-09-28 DIAGNOSIS — M85.80 OSTEOPENIA, UNSPECIFIED LOCATION: ICD-10-CM

## 2017-09-28 LAB — CRP SERPL QL: 6.5 MG/L

## 2017-09-28 PROCEDURE — 36415 COLL VENOUS BLD VENIPUNCTURE: CPT | Performed by: INTERNAL MEDICINE

## 2017-09-28 PROCEDURE — 77080 DXA BONE DENSITY AXIAL: CPT

## 2017-09-28 PROCEDURE — 86140 C-REACTIVE PROTEIN: CPT | Performed by: INTERNAL MEDICINE

## 2017-10-31 ENCOUNTER — HOSPITAL ENCOUNTER (OUTPATIENT)
Dept: ULTRASOUND IMAGING | Facility: HOSPITAL | Age: 77
Discharge: HOME | End: 2017-10-31
Attending: UROLOGY
Payer: MEDICARE

## 2017-10-31 DIAGNOSIS — N28.1 ACQUIRED CYST OF KIDNEY: ICD-10-CM

## 2017-10-31 PROCEDURE — 76770 US EXAM ABDO BACK WALL COMP: CPT

## 2017-11-08 ENCOUNTER — ALLSCRIPTS OFFICE VISIT (OUTPATIENT)
Dept: OTHER | Facility: OTHER | Age: 77
End: 2017-11-08

## 2017-11-09 NOTE — PROGRESS NOTES
Assessment    1  Renal cyst, acquired (593 2) (N28 1)    Plan  Renal cyst, acquired    · 1910 [facility] 157; Status:Hold For - Scheduling; Requestedfor:[id number]; Perform:[facility] Radiology; ISP:[id number];EWXWPPE;EQXM, acquired; Ordered By:Casey Carroll;   · Follow-up visit in 1 year Evaluation and Treatment  Follow-up  Status: Hold For -Scheduling  Requested for: [id number]   Ordered;Renal cyst, acquired; Ordered By: Emma Perez Performed:  Due: [id number]    Discussion/Summary  Discussion Summary:   My impression is stable right renal cysts  appear stable at this time  I do recommend repeating a retroperitoneal ultrasound in 1 year  If this demonstrates persistent stability we discussed discontinuing continued imaging  His last PSA from 2014 is noted to be 0 7  Based on his age and other medical comorbidities there is no indication to continue routine prostate cancer screening at this time based on AUA guidelines  Chief Complaint  Chief Complaint Free Text Note Form: Right renal Cyst      History of Present Illness  HPI: Yola Cuellar is a 81-year-old male who returns for follow-up regarding a right renal cyst  He previously had a CT scan of his abdomen and pelvis performed to ensure stability of his pancreas status post resection  He was found to have a proteinaceous cyst in the right kidney and this has been managed conservatively  On his recent CT scan a mildly hyperdense cyst adjacent to the main cyst has increased in size from 1 6 cm up to 2 4 cm  This CT scan was performed in March of 2016  He returns in follow-up today with a surveillance ultrasound of the kidneys which reveals stable right renal cyst at this time  He denies any lower urinary tract symptoms or hematuria  Review of Systems  Complete-Male Urology:  Constitutional: No fever or chills, feels well, no tiredness, no recent weight gain or weight loss    Respiratory: No complaints of shortness of breath, no wheezing, no cough, no SOB on exertion, no orthopnea or PND  Cardiovascular: No complaints of slow heart rate, no fast heart rate, no chest pain, no palpitations, no leg claudication, no lower extremity  Gastrointestinal: No complaints of abdominal pain, no constipation, no nausea or vomiting, no diarrhea or bloody stools  Genitourinary: stream varies,-- Empty sensation,-- feelings of urinary urgency-- and-- stream quality fair, but-- no urinary hesitancy,-- no hematuria-- and-- no incontinence--   The patient presents with complaints of nocturia (2x)  Musculoskeletal: No complaints of arthralgia, no myalgias, no joint swelling or stiffness, no limb pain or swelling  Integumentary: No complaints of skin rash or skin lesions, no itching, no skin wound, no dry skin  Hematologic/Lymphatic: No complaints of swollen glands, no swollen glands in the neck, does not bleed easily, no easy bruising  Neurological: No compliants of headache, no confusion, no convulsions, no numbness or tingling, no dizziness or fainting, no limb weakness, no difficulty walking  Active Problems  1  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)   2  Aneurysm of abdominal aorta (441 4) (I71 4)   3  Aneurysm of thoracic aorta (441 2) (I71 2)   4  Cardiac Neoplasm Location (239 89)   5  Chest pain (786 50) (R07 9)   6  Diabetes Mellitus   7  History of malignant neoplasm of pancreas (V10 09) (Z85 07)   8  Hyperlipidemia (272 4) (E78 5)   9  Hypertension (401 9) (I10)   10  Organic impotence (607 84) (N52 9)   11  Renal cyst, acquired (593 2) (N28 1)    Past Medical History    1  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)   2  History of Ankle Joint Swelling (719 07)   3  History of Arthritis (V13 4)   4  History of CT Pancreas Mass Body 2 cm   5  History of abdominal aortic aneurysm (V12 59) (Z86 79)   6  History of backache (V13 59) (Z87 39)   7  History of malignant neoplasm of pancreas (V10 09) (Z85 07)   8   History of rheumatic fever (V12 09) (Z86 79)   9  History of Inguinal hernia (550 90) (K40 90)   10  History of Nonspecific abnormal findings on radiological and examination of lung field  (793 19) (R91 8)   11  History of Reported A Previous Heart Murmur   12  History of Special screening examination for neoplasm of prostate (V76 44) (Z12 5)    Surgical History    1  History of Biopsy Pancreas   2  History of Cholecystectomy Laparoscopic   3  History of Heart Valve Replacement   4  History of Removal Of Lesion    Family History  Mother    1  Family history of Cancer   2  Family history of Carcinoma Of The Stomach (V16 0)   3  Family history of Colon Cancer (V16 0)   4  Family history of Heart Disease (V17 49)  Father    5  Family history of Heart Disease (V17 49)   6  Family history of Stroke Syndrome (V17 1)  Sister    7  Family history of Cancer   8  Family history of Colon Cancer (V16 0)   9  Family history of Diabetes Mellitus (V18 0)   10  Family history of Heart Disease (V17 49)  Brother    6  Family history of Stroke Syndrome (V17 1)    Social History     · Denied: History of Alcohol Use (History)   · Denied: History of Drug Use   · Former smoker (V15 82) (E64 797)   · Marital History - Currently     Current Meds   1  Aspirin 325 MG Oral Tablet; Take 1 tablet daily; Therapy: (Recorded:02Mar2015) to Recorded   2  [employer]; Therapy: 52YGT6537 to (Evaluate:16Oct2015)  Requested for: 81VEK5234 Recorded   3  Cara Microlet Lancets Miscellaneous; Therapy: 09IVP5708 to (Evaluate:16Oct2015)  Requested for: 91Yfs9145 Recorded   4  BD Pen Needle Mini U/F 31G X 5 MM Miscellaneous; Therapy: 86TAX6849 to (Evaluate:16Oct2015)  Requested for: 97Vir7309 Recorded   5  Cinnamon 500 MG Oral Capsule; Therapy: (Recorded:12Apr2017) to Recorded   6  [employer] Vitamin D 2000 UNIT CAPS; Therapy: (Recorded:12Apr2017) to Recorded   7  Lantus SoloStar SOPN; Therapy: 72KLO5735 to (KZNMIDYP:70CTI3145)  Requested for: 97PPR9532 Recorded   8  Losartan Potassium 100 MG Oral Tablet; 1 at hs; Therapy: (Recorded:42Rya3988) to Recorded   9  MetFORMIN HCl - 500 MG Oral Tablet; Therapy: 07KYS1569 to ()  Requested for: 39OOF4172 Recorded   10  Metoprolol Succinate ER 25 MG Oral Tablet Extended Release 24 Hour; Therapy: 53CQU9524 to ()  Requested for: 55ABG9012 Recorded   11  Multaq 400 MG Oral Tablet; Therapy: ([phone]) to Recorded   12  Potassium Chloride Sara ER 10 MEQ Oral Tablet Extended Release; Therapy: 43GZM0053 to ([facility])  Requested for: 21PPG5557 Recorded   13  Pravastatin Sodium 40 MG Oral Tablet; Therapy: 56CQS2451 to ()  Requested for: 96RRM0709 Recorded   14  Torsemide 20 MG Oral Tablet; Therapy: 16GAT0287 to ([facility])  Requested for: 88MDC9264 Recorded   15  Xarelto 20 MG Oral Tablet; Therapy: (Recorded:08Nov2017) to Recorded    Allergies  1  No Known Drug Allergies    Vitals  Vital Signs    Recorded: 91CQY2742 11:09AM   Heart Rate 88   Systolic 825   Diastolic 80   Height 6 ft 3 in   Weight 209 lb    BMI Calculated 26 12   BSA Calculated 2 24       Physical Exam   Additional Exam:  On examination he is in no acute distress  His abdomen is soft nontender nondistended   examination reveals no CVA tenderness  Results/Data  US KIDNEY AND BLADDER 31Oct2017 08:48AM Tucker Gonzalez     Test Name Result Flag Reference   US KIDNEY AND BLADDER (Report)       RENAL ULTRASOUND   INDICATION: Follow-up renal cyst    COMPARISON: Renal ultrasound 4/11/2017; CT abdomen pelvis 3/27/2017  TECHNIQUE:  Ultrasound of the retroperitoneum was performed with a curvilinear transducer utilizing volumetric sweeps and still imaging techniques  FINDINGS:   KIDNEYS:  Symmetric and normal size  Right kidney: 12 2 x 6 1 cm  The renal parenchyma is diffusely echogenic consistent with medical renal disease  No suspicious masses detected   A simple exophytic midpole cyst is stable measuring 2 5 cm  A 2nd adjacent cyst measuring 2 5 cm demonstrates a small complete septation  No hydronephrosis  No shadowing calculi  No perinephric fluid collections  Left kidney: 10 2 x 4 8 cm  The renal parenchyma is diffusely echogenic consistent with medical renal disease  No suspicious masses detected  No hydronephrosis  No shadowing calculi  No perinephric fluid collections  URETERS:  Nonvisualized  BLADDER:   Normally distended  No focal thickening or mass lesions  IMPRESSION:    1  Stable right renal cysts  2  Echogenic kidneys consistent with medical renal disease  Workstation performed: [id number]   Signed by:  Sybil Rebolledo MD  11/2/17     Future Appointments    Date/Time Provider Specialty Site   04/19/2018 09:30 AM ANABEL Flores   Surgical [facility] [address]       Signatures   Electronically signed by : Lindell Pallas, MD; Nov 8 2017 11:55AM EST                       (Author)

## 2017-11-24 ENCOUNTER — TRANSCRIBE ORDERS (OUTPATIENT)
Dept: ADMINISTRATIVE | Facility: HOSPITAL | Age: 77
End: 2017-11-24

## 2017-11-24 ENCOUNTER — LAB (OUTPATIENT)
Dept: LAB | Facility: MEDICAL CENTER | Age: 77
End: 2017-11-24
Payer: MEDICARE

## 2017-11-24 DIAGNOSIS — E11.9 TYPE 2 DIABETES MELLITUS WITHOUT COMPLICATION, UNSPECIFIED LONG TERM INSULIN USE STATUS: Primary | ICD-10-CM

## 2017-11-24 DIAGNOSIS — M89.9 DISORDER OF BONE AND CARTILAGE: ICD-10-CM

## 2017-11-24 DIAGNOSIS — M89.9 DISORDER OF BONE AND CARTILAGE: Primary | ICD-10-CM

## 2017-11-24 DIAGNOSIS — M94.9 DISORDER OF BONE AND CARTILAGE: Primary | ICD-10-CM

## 2017-11-24 DIAGNOSIS — E11.9 TYPE 2 DIABETES MELLITUS WITHOUT COMPLICATION, UNSPECIFIED LONG TERM INSULIN USE STATUS: ICD-10-CM

## 2017-11-24 DIAGNOSIS — M85.89 OSTEOPENIA OF MULTIPLE SITES: ICD-10-CM

## 2017-11-24 DIAGNOSIS — E78.2 MIXED HYPERLIPIDEMIA: ICD-10-CM

## 2017-11-24 DIAGNOSIS — M94.9 DISORDER OF BONE AND CARTILAGE: ICD-10-CM

## 2017-11-24 LAB
25(OH)D3 SERPL-MCNC: 41.1 NG/ML (ref 30–100)
ALBUMIN SERPL BCP-MCNC: 3.2 G/DL (ref 3.5–5)
ALP SERPL-CCNC: 144 U/L (ref 46–116)
ALT SERPL W P-5'-P-CCNC: 16 U/L (ref 12–78)
ANION GAP SERPL CALCULATED.3IONS-SCNC: 5 MMOL/L (ref 4–13)
AST SERPL W P-5'-P-CCNC: 17 U/L (ref 5–45)
BASOPHILS # BLD AUTO: 0.03 THOUSANDS/ΜL (ref 0–0.1)
BASOPHILS NFR BLD AUTO: 0 % (ref 0–1)
BILIRUB SERPL-MCNC: 0.64 MG/DL (ref 0.2–1)
BUN SERPL-MCNC: 31 MG/DL (ref 5–25)
CALCIUM SERPL-MCNC: 8.7 MG/DL (ref 8.3–10.1)
CHLORIDE SERPL-SCNC: 108 MMOL/L (ref 100–108)
CHOLEST SERPL-MCNC: 103 MG/DL (ref 50–200)
CO2 SERPL-SCNC: 27 MMOL/L (ref 21–32)
CREAT SERPL-MCNC: 1.68 MG/DL (ref 0.6–1.3)
EOSINOPHIL # BLD AUTO: 0.37 THOUSAND/ΜL (ref 0–0.61)
EOSINOPHIL NFR BLD AUTO: 5 % (ref 0–6)
ERYTHROCYTE [DISTWIDTH] IN BLOOD BY AUTOMATED COUNT: 13.2 % (ref 11.6–15.1)
EST. AVERAGE GLUCOSE BLD GHB EST-MCNC: 143 MG/DL
GFR SERPL CREATININE-BSD FRML MDRD: 39 ML/MIN/1.73SQ M
GLUCOSE P FAST SERPL-MCNC: 107 MG/DL (ref 65–99)
HBA1C MFR BLD: 6.6 % (ref 4.2–6.3)
HCT VFR BLD AUTO: 39.1 % (ref 36.5–49.3)
HDLC SERPL-MCNC: 44 MG/DL (ref 40–60)
HGB BLD-MCNC: 12.5 G/DL (ref 12–17)
LDLC SERPL CALC-MCNC: 48 MG/DL (ref 0–100)
LYMPHOCYTES # BLD AUTO: 0.98 THOUSANDS/ΜL (ref 0.6–4.47)
LYMPHOCYTES NFR BLD AUTO: 14 % (ref 14–44)
MCH RBC QN AUTO: 30.4 PG (ref 26.8–34.3)
MCHC RBC AUTO-ENTMCNC: 32 G/DL (ref 31.4–37.4)
MCV RBC AUTO: 95 FL (ref 82–98)
MONOCYTES # BLD AUTO: 0.92 THOUSAND/ΜL (ref 0.17–1.22)
MONOCYTES NFR BLD AUTO: 13 % (ref 4–12)
NEUTROPHILS # BLD AUTO: 4.93 THOUSANDS/ΜL (ref 1.85–7.62)
NEUTS SEG NFR BLD AUTO: 68 % (ref 43–75)
NRBC BLD AUTO-RTO: 0 /100 WBCS
PLATELET # BLD AUTO: 267 THOUSANDS/UL (ref 149–390)
PMV BLD AUTO: 9.2 FL (ref 8.9–12.7)
POTASSIUM SERPL-SCNC: 4.9 MMOL/L (ref 3.5–5.3)
PROT SERPL-MCNC: 6.6 G/DL (ref 6.4–8.2)
RBC # BLD AUTO: 4.11 MILLION/UL (ref 3.88–5.62)
SODIUM SERPL-SCNC: 140 MMOL/L (ref 136–145)
TESTOST SERPL-MCNC: 344.8 NG/DL (ref 241–827)
TRIGL SERPL-MCNC: 57 MG/DL
WBC # BLD AUTO: 7.25 THOUSAND/UL (ref 4.31–10.16)

## 2017-11-24 PROCEDURE — 36415 COLL VENOUS BLD VENIPUNCTURE: CPT | Performed by: INTERNAL MEDICINE

## 2017-11-24 PROCEDURE — 80061 LIPID PANEL: CPT

## 2017-11-24 PROCEDURE — 85025 COMPLETE CBC W/AUTO DIFF WBC: CPT

## 2017-11-24 PROCEDURE — 82306 VITAMIN D 25 HYDROXY: CPT

## 2017-11-24 PROCEDURE — 80053 COMPREHEN METABOLIC PANEL: CPT

## 2017-11-24 PROCEDURE — 84403 ASSAY OF TOTAL TESTOSTERONE: CPT

## 2017-11-24 PROCEDURE — 83036 HEMOGLOBIN GLYCOSYLATED A1C: CPT | Performed by: INTERNAL MEDICINE

## 2017-11-24 PROCEDURE — 84165 PROTEIN E-PHORESIS SERUM: CPT

## 2017-11-27 LAB
ALBUMIN SERPL ELPH-MCNC: 3.56 G/DL (ref 3.5–5)
ALBUMIN SERPL ELPH-MCNC: 58.4 % (ref 52–65)
ALPHA1 GLOB SERPL ELPH-MCNC: 0.32 G/DL (ref 0.1–0.4)
ALPHA1 GLOB SERPL ELPH-MCNC: 5.2 % (ref 2.5–5)
ALPHA2 GLOB SERPL ELPH-MCNC: 0.79 G/DL (ref 0.4–1.2)
ALPHA2 GLOB SERPL ELPH-MCNC: 13 % (ref 7–13)
BETA GLOB ABNORMAL SERPL ELPH-MCNC: 0.42 G/DL (ref 0.4–0.8)
BETA1 GLOB SERPL ELPH-MCNC: 6.9 % (ref 5–13)
BETA2 GLOB SERPL ELPH-MCNC: 5.3 % (ref 2–8)
BETA2+GAMMA GLOB SERPL ELPH-MCNC: 0.32 G/DL (ref 0.2–0.5)
GAMMA GLOB ABNORMAL SERPL ELPH-MCNC: 0.68 G/DL (ref 0.5–1.6)
GAMMA GLOB SERPL ELPH-MCNC: 11.2 % (ref 12–22)
IGG/ALB SER: 1.4 {RATIO} (ref 1.1–1.8)
PROT PATTERN SERPL ELPH-IMP: ABNORMAL
PROT SERPL-MCNC: 6.1 G/DL (ref 6.4–8.2)

## 2018-01-13 VITALS
WEIGHT: 209 LBS | HEART RATE: 88 BPM | BODY MASS INDEX: 25.99 KG/M2 | HEIGHT: 75 IN | SYSTOLIC BLOOD PRESSURE: 140 MMHG | DIASTOLIC BLOOD PRESSURE: 80 MMHG

## 2018-01-14 VITALS
WEIGHT: 216 LBS | HEART RATE: 81 BPM | BODY MASS INDEX: 26.86 KG/M2 | OXYGEN SATURATION: 97 % | SYSTOLIC BLOOD PRESSURE: 146 MMHG | TEMPERATURE: 97.6 F | DIASTOLIC BLOOD PRESSURE: 82 MMHG | HEIGHT: 75 IN | RESPIRATION RATE: 17 BRPM

## 2018-01-14 VITALS
BODY MASS INDEX: 26.61 KG/M2 | WEIGHT: 214 LBS | DIASTOLIC BLOOD PRESSURE: 80 MMHG | HEART RATE: 76 BPM | SYSTOLIC BLOOD PRESSURE: 164 MMHG | HEIGHT: 75 IN

## 2018-03-23 ENCOUNTER — TRANSCRIBE ORDERS (OUTPATIENT)
Dept: ADMINISTRATIVE | Facility: HOSPITAL | Age: 78
End: 2018-03-23

## 2018-03-23 ENCOUNTER — LAB (OUTPATIENT)
Dept: LAB | Facility: HOSPITAL | Age: 78
End: 2018-03-23
Attending: SURGERY
Payer: MEDICARE

## 2018-03-23 DIAGNOSIS — E13.9 DIABETES MELLITUS OF OTHER TYPE WITHOUT COMPLICATION, UNSPECIFIED LONG TERM INSULIN USE STATUS: ICD-10-CM

## 2018-03-23 DIAGNOSIS — M25.50 PAIN IN JOINT, MULTIPLE SITES: Primary | ICD-10-CM

## 2018-03-23 DIAGNOSIS — Z85.07 PERSONAL HISTORY OF MALIGNANT NEOPLASM OF PANCREAS: ICD-10-CM

## 2018-03-23 DIAGNOSIS — M25.50 PAIN IN JOINT, MULTIPLE SITES: ICD-10-CM

## 2018-03-23 LAB
BASOPHILS # BLD AUTO: 0.05 THOUSANDS/ΜL (ref 0–0.1)
BASOPHILS NFR BLD AUTO: 1 % (ref 0–1)
BUN SERPL-MCNC: 35 MG/DL (ref 5–25)
CREAT SERPL-MCNC: 2.04 MG/DL (ref 0.6–1.3)
EOSINOPHIL # BLD AUTO: 0.25 THOUSAND/ΜL (ref 0–0.61)
EOSINOPHIL NFR BLD AUTO: 3 % (ref 0–6)
ERYTHROCYTE [DISTWIDTH] IN BLOOD BY AUTOMATED COUNT: 13.5 % (ref 11.6–15.1)
ERYTHROCYTE [SEDIMENTATION RATE] IN BLOOD: 28 MM/HOUR (ref 0–10)
EST. AVERAGE GLUCOSE BLD GHB EST-MCNC: 151 MG/DL
GFR SERPL CREATININE-BSD FRML MDRD: 31 ML/MIN/1.73SQ M
HBA1C MFR BLD: 6.9 % (ref 4.2–6.3)
HCT VFR BLD AUTO: 40 % (ref 36.5–49.3)
HGB BLD-MCNC: 12.3 G/DL (ref 12–17)
LYMPHOCYTES # BLD AUTO: 1.28 THOUSANDS/ΜL (ref 0.6–4.47)
LYMPHOCYTES NFR BLD AUTO: 14 % (ref 14–44)
MCH RBC QN AUTO: 28.6 PG (ref 26.8–34.3)
MCHC RBC AUTO-ENTMCNC: 30.8 G/DL (ref 31.4–37.4)
MCV RBC AUTO: 93 FL (ref 82–98)
MONOCYTES # BLD AUTO: 0.98 THOUSAND/ΜL (ref 0.17–1.22)
MONOCYTES NFR BLD AUTO: 11 % (ref 4–12)
NEUTROPHILS # BLD AUTO: 6.34 THOUSANDS/ΜL (ref 1.85–7.62)
NEUTS SEG NFR BLD AUTO: 71 % (ref 43–75)
NRBC BLD AUTO-RTO: 0 /100 WBCS
PLATELET # BLD AUTO: 230 THOUSANDS/UL (ref 149–390)
PMV BLD AUTO: 8.9 FL (ref 8.9–12.7)
RBC # BLD AUTO: 4.3 MILLION/UL (ref 3.88–5.62)
WBC # BLD AUTO: 8.94 THOUSAND/UL (ref 4.31–10.16)

## 2018-03-23 PROCEDURE — 84520 ASSAY OF UREA NITROGEN: CPT

## 2018-03-23 PROCEDURE — 85025 COMPLETE CBC W/AUTO DIFF WBC: CPT

## 2018-03-23 PROCEDURE — 83036 HEMOGLOBIN GLYCOSYLATED A1C: CPT

## 2018-03-23 PROCEDURE — 36415 COLL VENOUS BLD VENIPUNCTURE: CPT

## 2018-03-23 PROCEDURE — 85652 RBC SED RATE AUTOMATED: CPT

## 2018-03-23 PROCEDURE — 82565 ASSAY OF CREATININE: CPT

## 2018-03-27 DIAGNOSIS — C25.9 MALIGNANT NEOPLASM OF PANCREAS, UNSPECIFIED LOCATION OF MALIGNANCY (HCC): Primary | ICD-10-CM

## 2018-04-02 DIAGNOSIS — Z85.07 PERSONAL HISTORY OF MALIGNANT NEOPLASM OF PANCREAS: ICD-10-CM

## 2018-04-06 ENCOUNTER — HOSPITAL ENCOUNTER (OUTPATIENT)
Dept: RADIOLOGY | Age: 78
Discharge: HOME | End: 2018-04-06
Attending: SURGERY
Payer: MEDICARE

## 2018-04-06 DIAGNOSIS — Z85.07 PERSONAL HISTORY OF MALIGNANT NEOPLASM OF PANCREAS: ICD-10-CM

## 2018-04-06 DIAGNOSIS — C25.9 MALIGNANT NEOPLASM OF PANCREAS, UNSPECIFIED LOCATION OF MALIGNANCY (HCC): ICD-10-CM

## 2018-04-06 PROCEDURE — 74176 CT ABD & PELVIS W/O CONTRAST: CPT

## 2018-04-19 ENCOUNTER — OFFICE VISIT (OUTPATIENT)
Dept: SURGICAL ONCOLOGY | Facility: CLINIC | Age: 78
End: 2018-04-19
Payer: MEDICARE

## 2018-04-19 VITALS
BODY MASS INDEX: 25.24 KG/M2 | HEART RATE: 62 BPM | HEIGHT: 75 IN | DIASTOLIC BLOOD PRESSURE: 64 MMHG | SYSTOLIC BLOOD PRESSURE: 150 MMHG | WEIGHT: 203 LBS | TEMPERATURE: 98.4 F | RESPIRATION RATE: 14 BRPM

## 2018-04-19 DIAGNOSIS — D3A.8 NEUROENDOCRINE TUMOR: Primary | ICD-10-CM

## 2018-04-19 PROCEDURE — 99214 OFFICE O/P EST MOD 30 MIN: CPT | Performed by: SURGERY

## 2018-04-19 RX ORDER — RIVAROXABAN 20 MG/1
TABLET, FILM COATED ORAL
Refills: 0 | COMMUNITY
Start: 2018-03-21

## 2018-04-19 RX ORDER — AMOXICILLIN 500 MG/1
2000 CAPSULE ORAL AS NEEDED
Refills: 0 | COMMUNITY
Start: 2018-03-16 | End: 2022-05-05 | Stop reason: ALTCHOICE

## 2018-04-19 RX ORDER — PEN NEEDLE, DIABETIC 31 GX5/16"
NEEDLE, DISPOSABLE MISCELLANEOUS
Refills: 0 | COMMUNITY
Start: 2018-03-11

## 2018-04-19 RX ORDER — DRONEDARONE 400 MG/1
TABLET, FILM COATED ORAL
Refills: 0 | COMMUNITY
Start: 2018-03-12 | End: 2022-05-05 | Stop reason: ALTCHOICE

## 2018-04-19 NOTE — PROGRESS NOTES
Surgical Oncology Follow Up       [facility] SURGICAL ONCOLOGY [facility] [address]  1940  [id number]  [facility] SURGICAL ONCOLOGY [facility] [address]  Diagnoses and all orders for this visit:    Neuroendocrine tumor  -     CT abdomen pelvis w contrast; Future  -     BUN; Future  -     Creatinine, serum; Future    Other orders  -     amoxicillin (AMOXIL) 500 mg capsule; Take 2,000 mg by mouth as needed 1 hour prior to dental appointment  -     Trivedi Lame 400 MG tablet;   -     EUSEBIO CONTOUR TEST test strip; 3 (three) times a day Test  -     B-D UF III MINI PEN NEEDLES 31G X 5 MM MISC;   -     XARELTO 20 MG tablet; Chief Complaint   Patient presents with    Follow-up     No issues, had CT scan done 2 weeks ago          No history exists  Diagnosis and Staging: 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   Treatment History: Distal pancreatectomy July 2011   Current Therapy: Observation   Disease Status: MOISES     History of Present Illness:   Patient returns in followup of his pancreatic endocrine tumor  He is doing well  No complaints  He denies any abdominal pain, nausea, vomiting, back pain, bone pain, headaches, or cough  There is no flushing, palpitations, or sweats  His blood sugars are under good control  He does have diarrhea every other day  He may have 2-3 bowel movements a day when this occurs  There is no sign of pancreas insufficiency  His most recent CT from April 6, 2018 revealed no evidence of recurrence  This was a noncontrast CT since his creatinine was now over 2  I personally reviewed his films  He is following up with his primary care physician regarding his rising creatinine           Review of Systems  Complete ROS Surg Onc:   Complete ROS Surg Onc:   Constitutional: The patient denies new or recent history of general fatigue, no recent weight loss, no change in appetite  Eyes: No complaints of visual problems, no scleral icterus  ENT: no complaints of ear pain, no hoarseness, no difficulty swallowing,  no tinnitus and no new masses in head, oral cavity, or neck  Cardiovascular: No complaints of chest pain, no palpitations, no ankle edema  Respiratory: No complaints of shortness of breath, no cough  Gastrointestinal: No complaints of jaundice, no bloody stools, no pale stools  Genitourinary: No complaints of dysuria, no hematuria, no nocturia, no frequent urination, no urethral discharge  Musculoskeletal: No complaints of weakness, paralysis, joint stiffness or arthralgias  Integumentary: No complaints of rash, no new lesions  Neurological: No complaints of convulsions, no seizures, no dizziness  Hematologic/Lymphatic: No complaints of easy bruising  Endocrine:  No hot or cold intolerance  No polydipsia, polyphagia, or polyuria  Allergy/immunology:  No environmental allergies  No food allergies  Not immunocompromised  Skin:  No pallor or rash  No wound  Patient Active Problem List   Diagnosis    Pneumonia    Diabetes mellitus ([place] [address] )    Hypertension    Acute-on-chronic kidney injury ([place] [address] )    A-fib ([place] [address] )    Aneurysm of thoracic aorta (HCC)    Aneurysm of abdominal aorta (HCC)    Hyperlipidemia    Inflammatory polyarthropathy (HCC)    Osteoarthrosis    Polymyalgia rheumatica (HCC)     Past Medical History:   Diagnosis Date    Cancer ([place] [address] )     Pancreatic    Diabetes mellitus ([facility] )     Hypertension      Past Surgical History:   Procedure Laterality Date    APPENDECTOMY      CARDIAC SURGERY      Aortic Valve Replacement, Ascending Aorta    GALLBLADDER SURGERY      PANCREATICODUODENECTOMY       No family history on file    Social History     Social History    Marital status: /Civil Union     Spouse name: N/A    Number of children: N/A    Years of education: N/A     Occupational History    Not on file       Social History Main Topics    Smoking status: Never Smoker    Smokeless tobacco: Not on file    Alcohol use No    Drug use: No    Sexual activity: Not on file     Other Topics Concern    Not on file     Social History Narrative    No narrative on file       Current Outpatient Prescriptions:     albuterol (PROVENTIL HFA,VENTOLIN HFA) 90 mcg/act inhaler, Inhale 2 puffs every 6 (six) hours as needed for wheezing, Disp: 1 Inhaler, Rfl: 0    amoxicillin (AMOXIL) 500 mg capsule, Take 2,000 mg by mouth as needed 1 hour prior to dental appointment, Disp: , Rfl: 0    aspirin 325 mg tablet, Take 325 mg by mouth daily, Disp: , Rfl:     B-D UF III MINI PEN NEEDLES 31G X 5 MM MISC, , Disp: , Rfl: 0    EUSEBIO CONTOUR TEST test strip, 3 (three) times a day Test, Disp: , Rfl: 9    benzonatate (TESSALON PERLES) 100 mg capsule, Take 1 capsule by mouth 3 (three) times a day as needed for cough, Disp: 20 capsule, Rfl: 0    cholecalciferol (VITAMIN D3) 1,000 units tablet, Take 2,000 Units by mouth daily, Disp: , Rfl:     Cinnamon 500 MG capsule, Take 1,000 mg by mouth daily, Disp: , Rfl:     famotidine (PEPCID) 10 mg tablet, Take 10 mg by mouth daily, Disp: , Rfl:     fluticasone-salmeterol (ADVAIR) 250-50 mcg/dose inhaler, Inhale 1 puff every 12 (twelve) hours, Disp: 1 Inhaler, Rfl: 0    guaiFENesin (MUCINEX) 600 mg 12 hr tablet, Take 1 tablet by mouth every 12 (twelve) hours, Disp: 30 tablet, Rfl: 0    hydroxychloroquine (PLAQUENIL) 200 mg tablet, Take 200 mg by mouth 2 (two) times a day, Disp: , Rfl:     insulin glargine (LANTUS) 100 units/mL subcutaneous injection, Inject 41 Units under the skin daily at bedtime  , Disp: , Rfl:     losartan (COZAAR) 100 MG tablet, Take 100 mg by mouth daily, Disp: , Rfl:     metFORMIN (GLUCOPHAGE) 500 mg tablet, Take 500 mg by mouth 2 (two) times a day with meals, Disp: , Rfl:     metoprolol tartrate (LOPRESSOR) 25 mg tablet, Take 25 mg by mouth daily  , Disp: , Rfl:     MULTAQ 400 MG tablet, , Disp: , Rfl: 0    potassium chloride (MICRO-K) 8 mEq CR capsule, Take 8 mEq by mouth daily  , Disp: , Rfl:     pravastatin (PRAVACHOL) 40 mg tablet, Take 40 mg by mouth daily, Disp: , Rfl:     predniSONE 2 5 mg tablet, Take 1 mg by mouth daily  , Disp: , Rfl:     torsemide (DEMADEX) 10 mg tablet, Take 10 mg by mouth daily, Disp: , Rfl:     XARELTO 20 MG tablet, , Disp: , Rfl: 0  No Known Allergies  There were no vitals filed for this visit  Physical Exam  Constitutional: General appearance: The Patient is well-developed and well-nourished who appears the stated age in no acute distress  Patient is pleasant and talkative  HEENT:  Normocephalic  Sclerae are anicteric  Mucous membranes are moist  Neck is supple without adenopathy  No JVD  Chest: The lungs are clear to auscultation  Cardiac: Heart is regular rate  Abdomen: Abdomen is soft, non-tender, non-distended and without masses  Extremities: There is no clubbing or cyanosis  There is no edema  Symmetric  Neuro: Grossly nonfocal  Gait is normal      Lymphatic: No evidence of cervical adenopathy bilaterally  No evidence of axillary adenopathy bilaterally  No evidence of inguinal adenopathy bilaterally  Skin: Warm, anicteric  Psych:  Patient is pleasant and talkative  Breasts:        Pathology:      Labs:      Imaging  Ct Abdomen Pelvis Wo Contrast    Result Date: 4/10/2018  Narrative: CT ABDOMEN AND PELVIS WITHOUT IV CONTRAST INDICATION:   Patient status post distal pancreatectomy for a nonfunctioning endocrine tumor  Follow-up examination  Patient also has a history of a right renal cyst  COMPARISON: CT abdomen pelvis 3/27/2017 and 3/29/2016  TECHNIQUE:  CT examination of the abdomen and pelvis was performed without intravenous contrast   Axial, sagittal, and coronal 2D reformatted images were created from the source data and submitted for interpretation   Radiation dose length product (DLP) for this visit:  725 mGy-cm   This examination, like all CT scans performed in the [facility], was performed utilizing techniques to minimize radiation dose exposure, including the use of iterative reconstruction and automated exposure control  Enteric contrast was administered  FINDINGS: ABDOMEN LOWER CHEST:  Stable scarring and right basilar rounded atelectasis with a chronic small right basilar pleural effusion unchanged since 3/29/2016  Mild bibasilar pulmonary scarring  No new lung base findings  Rosella Goldmann LIVER/BILIARY TREE:  The previously described caudate lobe hypodensity is not well visualized on this noncontrast study  Unremarkable appearance of the liver  GALLBLADDER:  Gallbladder is surgically absent  SPLEEN:  Unremarkable  PANCREAS:  Status post partial pancreatectomy  No signs of residual or recurrent pancreatic mass  ADRENAL GLANDS:  Unremarkable  KIDNEYS/URETERS:  Stable hyperdense partially exophytic 24 mm right leg renal lesion with the appearance of a benign cyst on prior ultrasound from 10/31/2017  Simple 28 mm exophytic upper pole right renal cyst  STOMACH AND BOWEL:  Advanced distal colonic diverticulosis  No acute bowel findings  Mild scarring adjacent to the proximal sigmoid colon stable from the prior study likely sequelae of prior diverticulitis  APPENDIX:  No findings to suggest appendicitis  ABDOMINOPELVIC CAVITY:  No ascites or free intraperitoneal air  No lymphadenopathy  VESSELS:  Stable atherosclerotic changes  PELVIS REPRODUCTIVE ORGANS:  Stable appearing prostamegaly  URINARY BLADDER:  Unremarkable  ABDOMINAL WALL/INGUINAL REGIONS:  Bilateral fat-containing inguinal hernias more prominent on the left  Anterior abdominal wall hernia repair clips  OSSEOUS STRUCTURES:  No acute fracture or destructive osseous lesion       Impression: No signs of recurrent, residual or metastatic malignancy in this patient status post distal pancreatectomy for nonfunctioning endocrine tumor  A stable hyperdense 2 4 cm predominantly exophytic right renal lesion has the appearance of a benign cyst on a prior ultrasound  Workstation performed: [id number]     I reviewed the above laboratory and imaging data  Discussion/Summary:   59-year-old male status post distal pancreatectomy for a nonfunctioning endocrine tumor of the pancreas  This was T1N0M0  Is clinically MOISES at nearly 7 years  His CT is stable  I will see him again in one year with a repeat CT  If he has any new abdominal symptomatology he will contact us  In regard to the kidney lesion, he will follow-up with Dr Shani Morocho  In regard to his rising creatinine, he will continue to follow up with his primary care physician or nephrology if needed  He is agreeable to this  All his questions were answered

## 2018-04-19 NOTE — LETTER
April 19, 2018     Dudley Pollard [address] [facility]    Patient: Michael Canada   YOB: 1940   Date of Visit: 4/19/2018       Dear Dr Igor Garcia:    Thank you for referring Michael Canada to me for evaluation  Below are my notes for this consultation  If you have questions, please do not hesitate to call me  I look forward to following your patient along with you  Sincerely,        Taty Metcalf MD        CC: Nowell Crigler, MD Nikki Shearer, MD  4/19/2018 10:05 AM  Sign at close encounter               Surgical Oncology Follow Up       [facility] Sue [address]  1940  [id number]  [facility]  [facility]GICAL ONCOL[facility], [place], operated by [facility] [id number]  Diagnoses and all orders for this visit:    Neuroendocrine tumor  -     CT abdomen pelvis w contrast; Future  -     BUN; Future  -     Creatinine, serum; Future    Other orders  -     amoxicillin (AMOXIL) 500 mg capsule; Take 2,000 mg by mouth as needed 1 hour prior to dental appointment  -     Ankur Cifuentes 400 MG tablet;   -     EUSEBIO CONTOUR TEST test strip; 3 (three) times a day Test  -     B-D UF III MINI PEN NEEDLES 31G X 5 MM MISC;   -     XARELTO 20 MG tablet; Chief Complaint   Patient presents with    Follow-up     No issues, had CT scan done 2 weeks ago          No history exists  Diagnosis and Staging: 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   Treatment History: Distal pancreatectomy July 2011   Current Therapy: Observation   Disease Status: MOISES     History of Present Illness:   Patient returns in followup of his pancreatic endocrine tumor  He is doing well  No complaints  He denies any abdominal pain, nausea, vomiting, back pain, bone pain, headaches, or cough  There is no flushing, palpitations, or sweats  His blood sugars are under good control   He does have diarrhea every other day  He may have 2-3 bowel movements a day when this occurs  There is no sign of pancreas insufficiency  His most recent CT from April 6, 2018 revealed no evidence of recurrence  This was a noncontrast CT since his creatinine was now over 2  I personally reviewed his films  He is following up with his primary care physician regarding his rising creatinine  Review of Systems  Complete ROS Surg Onc:   Complete ROS Surg Onc:   Constitutional: The patient denies new or recent history of general fatigue, no recent weight loss, no change in appetite  Eyes: No complaints of visual problems, no scleral icterus  ENT: no complaints of ear pain, no hoarseness, no difficulty swallowing,  no tinnitus and no new masses in head, oral cavity, or neck  Cardiovascular: No complaints of chest pain, no palpitations, no ankle edema  Respiratory: No complaints of shortness of breath, no cough  Gastrointestinal: No complaints of jaundice, no bloody stools, no pale stools  Genitourinary: No complaints of dysuria, no hematuria, no nocturia, no frequent urination, no urethral discharge  Musculoskeletal: No complaints of weakness, paralysis, joint stiffness or arthralgias  Integumentary: No complaints of rash, no new lesions  Neurological: No complaints of convulsions, no seizures, no dizziness  Hematologic/Lymphatic: No complaints of easy bruising  Endocrine:  No hot or cold intolerance  No polydipsia, polyphagia, or polyuria  Allergy/immunology:  No environmental allergies  No food allergies  Not immunocompromised  Skin:  No pallor or rash  No wound          Patient Active Problem List   Diagnosis    Pneumonia    Diabetes mellitus ([facility] )    Hypertension    Acute-on-chronic kidney injury ([facility] )    A-fib ([facility] )    Aneurysm of thoracic aorta ([facility] )    Aneurysm of abdominal aorta (HCC)    Hyperlipidemia    Inflammatory polyarthropathy (HCC)    Osteoarthrosis    Polymyalgia rheumatica ([facility] )     Past Medical History:   Diagnosis Date    Cancer [facility])     Pancreatic    Diabetes mellitus ([facility] )     Hypertension      Past Surgical History:   Procedure Laterality Date    APPENDECTOMY      CARDIAC SURGERY      Aortic Valve Replacement, Ascending Aorta    GALLBLADDER SURGERY      PANCREATICODUODENECTOMY       No family history on file  Social History     Social History    Marital status: /Civil Union     Spouse name: N/A    Number of children: N/A    Years of education: N/A     Occupational History    Not on file       Social History Main Topics    Smoking status: Never Smoker    Smokeless tobacco: Not on file    Alcohol use No    Drug use: No    Sexual activity: Not on file     Other Topics Concern    Not on file     Social History Narrative    No narrative on file       Current Outpatient Prescriptions:     albuterol (PROVENTIL HFA,VENTOLIN HFA) 90 mcg/act inhaler, Inhale 2 puffs every 6 (six) hours as needed for wheezing, Disp: 1 Inhaler, Rfl: 0    amoxicillin (AMOXIL) 500 mg capsule, Take 2,000 mg by mouth as needed 1 hour prior to dental appointment, Disp: , Rfl: 0    aspirin 325 mg tablet, Take 325 mg by mouth daily, Disp: , Rfl:     B-D UF III MINI PEN NEEDLES 31G X 5 MM MISC, , Disp: , Rfl: 0    EUSEBIO CONTOUR TEST test strip, 3 (three) times a day Test, Disp: , Rfl: 9    benzonatate (TESSALON PERLES) 100 mg capsule, Take 1 capsule by mouth 3 (three) times a day as needed for cough, Disp: 20 capsule, Rfl: 0    cholecalciferol (VITAMIN D3) 1,000 units tablet, Take 2,000 Units by mouth daily, Disp: , Rfl:     Cinnamon 500 MG capsule, Take 1,000 mg by mouth daily, Disp: , Rfl:     famotidine (PEPCID) 10 mg tablet, Take 10 mg by mouth daily, Disp: , Rfl:     fluticasone-salmeterol (ADVAIR) 250-50 mcg/dose inhaler, Inhale 1 puff every 12 (twelve) hours, Disp: 1 Inhaler, Rfl: 0    guaiFENesin (MUCINEX) 600 mg 12 hr tablet, Take 1 tablet by mouth every 12 (twelve) hours, Disp: 30 tablet, Rfl: 0    hydroxychloroquine (PLAQUENIL) 200 mg tablet, Take 200 mg by mouth 2 (two) times a day, Disp: , Rfl:     insulin glargine (LANTUS) 100 units/mL subcutaneous injection, Inject 41 Units under the skin daily at bedtime  , Disp: , Rfl:     losartan (COZAAR) 100 MG tablet, Take 100 mg by mouth daily, Disp: , Rfl:     metFORMIN (GLUCOPHAGE) 500 mg tablet, Take 500 mg by mouth 2 (two) times a day with meals, Disp: , Rfl:     metoprolol tartrate (LOPRESSOR) 25 mg tablet, Take 25 mg by mouth daily  , Disp: , Rfl:     MULTAQ 400 MG tablet, , Disp: , Rfl: 0    potassium chloride (MICRO-K) 8 mEq CR capsule, Take 8 mEq by mouth daily  , Disp: , Rfl:     pravastatin (PRAVACHOL) 40 mg tablet, Take 40 mg by mouth daily, Disp: , Rfl:     predniSONE 2 5 mg tablet, Take 1 mg by mouth daily  , Disp: , Rfl:     torsemide (DEMADEX) 10 mg tablet, Take 10 mg by mouth daily, Disp: , Rfl:     XARELTO 20 MG tablet, , Disp: , Rfl: 0  No Known Allergies  There were no vitals filed for this visit  Physical Exam  Constitutional: General appearance: The Patient is well-developed and well-nourished who appears the stated age in no acute distress  Patient is pleasant and talkative  HEENT:  Normocephalic  Sclerae are anicteric  Mucous membranes are moist  Neck is supple without adenopathy  No JVD  Chest: The lungs are clear to auscultation  Cardiac: Heart is regular rate  Abdomen: Abdomen is soft, non-tender, non-distended and without masses  Extremities: There is no clubbing or cyanosis  There is no edema  Symmetric  Neuro: Grossly nonfocal  Gait is normal      Lymphatic: No evidence of cervical adenopathy bilaterally  No evidence of axillary adenopathy bilaterally  No evidence of inguinal adenopathy bilaterally  Skin: Warm, anicteric  Psych:  Patient is pleasant and talkative    Breasts:        Pathology:      Labs:      Imaging  Ct Abdomen Pelvis Wo Contrast    Result Date: 4/10/2018  Narrative: CT ABDOMEN AND PELVIS WITHOUT IV CONTRAST INDICATION:   Patient status post distal pancreatectomy for a nonfunctioning endocrine tumor  Follow-up examination  Patient also has a history of a right renal cyst  COMPARISON: CT abdomen pelvis 3/27/2017 and 3/29/2016  TECHNIQUE:  CT examination of the abdomen and pelvis was performed without intravenous contrast   Axial, sagittal, and coronal 2D reformatted images were created from the source data and submitted for interpretation  Radiation dose length product (DLP) for this visit:  725 mGy-cm   This examination, like all CT scans performed in the [facility], was performed utilizing techniques to minimize radiation dose exposure, including the use of iterative reconstruction and automated exposure control  Enteric contrast was administered  FINDINGS: ABDOMEN LOWER CHEST:  Stable scarring and right basilar rounded atelectasis with a chronic small right basilar pleural effusion unchanged since 3/29/2016  Mild bibasilar pulmonary scarring  No new lung base findings  Lynita Ped LIVER/BILIARY TREE:  The previously described caudate lobe hypodensity is not well visualized on this noncontrast study  Unremarkable appearance of the liver  GALLBLADDER:  Gallbladder is surgically absent  SPLEEN:  Unremarkable  PANCREAS:  Status post partial pancreatectomy  No signs of residual or recurrent pancreatic mass  ADRENAL GLANDS:  Unremarkable  KIDNEYS/URETERS:  Stable hyperdense partially exophytic 24 mm right leg renal lesion with the appearance of a benign cyst on prior ultrasound from 10/31/2017  Simple 28 mm exophytic upper pole right renal cyst  STOMACH AND BOWEL:  Advanced distal colonic diverticulosis  No acute bowel findings  Mild scarring adjacent to the proximal sigmoid colon stable from the prior study likely sequelae of prior diverticulitis  APPENDIX:  No findings to suggest appendicitis   ABDOMINOPELVIC CAVITY: No ascites or free intraperitoneal air  No lymphadenopathy  VESSELS:  Stable atherosclerotic changes  PELVIS REPRODUCTIVE ORGANS:  Stable appearing prostamegaly  URINARY BLADDER:  Unremarkable  ABDOMINAL WALL/INGUINAL REGIONS:  Bilateral fat-containing inguinal hernias more prominent on the left  Anterior abdominal wall hernia repair clips  OSSEOUS STRUCTURES:  No acute fracture or destructive osseous lesion  Impression: No signs of recurrent, residual or metastatic malignancy in this patient status post distal pancreatectomy for nonfunctioning endocrine tumor  A stable hyperdense 2 4 cm predominantly exophytic right renal lesion has the appearance of a benign cyst on a prior ultrasound  Workstation performed: [id number]     I reviewed the above laboratory and imaging data  Discussion/Summary:   43-year-old male status post distal pancreatectomy for a nonfunctioning endocrine tumor of the pancreas  This was T1N0M0  Is clinically MOISES at nearly 7 years  His CT is stable  I will see him again in one year with a repeat CT  If he has any new abdominal symptomatology he will contact us  In regard to the kidney lesion, he will follow-up with Dr Hays Presume  In regard to his rising creatinine, he will continue to follow up with his primary care physician or nephrology if needed  He is agreeable to this  All his questions were answered

## 2018-05-10 ENCOUNTER — APPOINTMENT (EMERGENCY)
Dept: RADIOLOGY | Facility: HOSPITAL | Age: 78
End: 2018-05-10
Payer: MEDICARE

## 2018-05-10 ENCOUNTER — HOSPITAL ENCOUNTER (EMERGENCY)
Facility: HOSPITAL | Age: 78
Discharge: HOME | End: 2018-05-10
Attending: EMERGENCY MEDICINE | Admitting: EMERGENCY MEDICINE
Payer: MEDICARE

## 2018-05-10 VITALS
RESPIRATION RATE: 20 BRPM | WEIGHT: 213.6 LBS | SYSTOLIC BLOOD PRESSURE: 164 MMHG | DIASTOLIC BLOOD PRESSURE: 72 MMHG | OXYGEN SATURATION: 96 % | BODY MASS INDEX: 26.7 KG/M2 | HEART RATE: 62 BPM | TEMPERATURE: 98.6 F

## 2018-05-10 DIAGNOSIS — N28.9 RENAL INSUFFICIENCY: ICD-10-CM

## 2018-05-10 DIAGNOSIS — R79.89 ELEVATED BRAIN NATRIURETIC PEPTIDE (BNP) LEVEL: Primary | ICD-10-CM

## 2018-05-10 LAB
ALBUMIN SERPL BCP-MCNC: 3.7 G/DL (ref 3.5–5)
ALP SERPL-CCNC: 176 U/L (ref 46–116)
ALT SERPL W P-5'-P-CCNC: 20 U/L (ref 12–78)
ANION GAP SERPL CALCULATED.3IONS-SCNC: 7 MMOL/L (ref 4–13)
AST SERPL W P-5'-P-CCNC: 15 U/L (ref 5–45)
ATRIAL RATE: 394 BPM
BASOPHILS # BLD AUTO: 0.03 THOUSANDS/ΜL (ref 0–0.1)
BASOPHILS NFR BLD AUTO: 0 % (ref 0–1)
BILIRUB SERPL-MCNC: 0.8 MG/DL (ref 0.2–1)
BUN SERPL-MCNC: 30 MG/DL (ref 5–25)
CALCIUM SERPL-MCNC: 9.1 MG/DL (ref 8.3–10.1)
CHLORIDE SERPL-SCNC: 105 MMOL/L (ref 100–108)
CO2 SERPL-SCNC: 28 MMOL/L (ref 21–32)
CREAT SERPL-MCNC: 1.79 MG/DL (ref 0.6–1.3)
EOSINOPHIL # BLD AUTO: 0.14 THOUSAND/ΜL (ref 0–0.61)
EOSINOPHIL NFR BLD AUTO: 2 % (ref 0–6)
ERYTHROCYTE [DISTWIDTH] IN BLOOD BY AUTOMATED COUNT: 13.6 % (ref 11.6–15.1)
GFR SERPL CREATININE-BSD FRML MDRD: 36 ML/MIN/1.73SQ M
GLUCOSE SERPL-MCNC: 58 MG/DL (ref 65–140)
HCT VFR BLD AUTO: 38.6 % (ref 36.5–49.3)
HGB BLD-MCNC: 12 G/DL (ref 12–17)
INR PPP: 2.97 (ref 0.86–1.16)
LYMPHOCYTES # BLD AUTO: 1.34 THOUSANDS/ΜL (ref 0.6–4.47)
LYMPHOCYTES NFR BLD AUTO: 16 % (ref 14–44)
MCH RBC QN AUTO: 28.8 PG (ref 26.8–34.3)
MCHC RBC AUTO-ENTMCNC: 31.1 G/DL (ref 31.4–37.4)
MCV RBC AUTO: 93 FL (ref 82–98)
MONOCYTES # BLD AUTO: 0.93 THOUSAND/ΜL (ref 0.17–1.22)
MONOCYTES NFR BLD AUTO: 11 % (ref 4–12)
NEUTROPHILS # BLD AUTO: 5.74 THOUSANDS/ΜL (ref 1.85–7.62)
NEUTS SEG NFR BLD AUTO: 70 % (ref 43–75)
NRBC BLD AUTO-RTO: 0 /100 WBCS
NT-PROBNP SERPL-MCNC: 2308 PG/ML
PLATELET # BLD AUTO: 248 THOUSANDS/UL (ref 149–390)
PMV BLD AUTO: 8.9 FL (ref 8.9–12.7)
POTASSIUM SERPL-SCNC: 4.6 MMOL/L (ref 3.5–5.3)
PROT SERPL-MCNC: 7.1 G/DL (ref 6.4–8.2)
PROTHROMBIN TIME: 31.8 SECONDS (ref 12.1–14.4)
QRS AXIS: 60 DEGREES
QRSD INTERVAL: 98 MS
QT INTERVAL: 408 MS
QTC INTERVAL: 431 MS
RBC # BLD AUTO: 4.16 MILLION/UL (ref 3.88–5.62)
SODIUM SERPL-SCNC: 140 MMOL/L (ref 136–145)
T WAVE AXIS: 54 DEGREES
TROPONIN I SERPL-MCNC: <0.02 NG/ML
VENTRICULAR RATE: 67 BPM
WBC # BLD AUTO: 8.22 THOUSAND/UL (ref 4.31–10.16)

## 2018-05-10 PROCEDURE — 80053 COMPREHEN METABOLIC PANEL: CPT | Performed by: EMERGENCY MEDICINE

## 2018-05-10 PROCEDURE — 99285 EMERGENCY DEPT VISIT HI MDM: CPT

## 2018-05-10 PROCEDURE — 96360 HYDRATION IV INFUSION INIT: CPT

## 2018-05-10 PROCEDURE — 93005 ELECTROCARDIOGRAM TRACING: CPT

## 2018-05-10 PROCEDURE — 85025 COMPLETE CBC W/AUTO DIFF WBC: CPT | Performed by: EMERGENCY MEDICINE

## 2018-05-10 PROCEDURE — 71046 X-RAY EXAM CHEST 2 VIEWS: CPT

## 2018-05-10 PROCEDURE — 83880 ASSAY OF NATRIURETIC PEPTIDE: CPT | Performed by: EMERGENCY MEDICINE

## 2018-05-10 PROCEDURE — 84484 ASSAY OF TROPONIN QUANT: CPT | Performed by: EMERGENCY MEDICINE

## 2018-05-10 PROCEDURE — 85610 PROTHROMBIN TIME: CPT | Performed by: EMERGENCY MEDICINE

## 2018-05-10 PROCEDURE — 93010 ELECTROCARDIOGRAM REPORT: CPT | Performed by: INTERNAL MEDICINE

## 2018-05-10 PROCEDURE — 36415 COLL VENOUS BLD VENIPUNCTURE: CPT

## 2018-05-10 RX ADMIN — SODIUM CHLORIDE 500 ML: 0.9 INJECTION, SOLUTION INTRAVENOUS at 13:42

## 2018-05-10 NOTE — ED PROVIDER NOTES
History  Chief Complaint   Patient presents with    Shortness of Breath     pt c/o feeling SOB when walking since yesterday  states that his last kidney function tests were also bad  denies any chest pain     65-year-old male patient presents emergency department for evaluation of lack of urination  The patient is on torsemide, is supposed to take it daily, yesterday he did not take his torsemide because he had a long trip to taken did not want have to stop good the bathroom repetitively  Of note, the patient has no dyspnea on exertion, he does have his normal amount of peripheral edema, he states feeling otherwise well, he was concerned that he would have some sort of worsening renal function over the weekend and would require medical care would not be able to find because we can  Because the patient did miss a dose of his normal diuretic, the patient be evaluated with a differential diagnosis to include but not be limited to fluid overload, congestive heart failure, STEMI  EKG was done, reviewed, interpreted by me primarily, shows atrial fibrillation  EKG was done at 1244 hours  The patient's ventricular rate of 67 pain is controlled atrial fibrillation with no other noted ectopy  The patient and I reviewed his labs, I told him of my concerns, the patient was given the option of admission to the hospital but deferred admission  He will follow up his primary care physician          History provided by:  Patient   used: No    Fatigue   Severity:  Mild  Onset quality:  Gradual  Timing:  Constant  Progression:  Worsening  Chronicity:  New  Context: not alcohol use, not change in medication, not drug use and not pinched nerve    Relieved by:  Nothing  Worsened by:  Nothing  Ineffective treatments:  None tried  Associated symptoms: no arthralgias, no chest pain, no frequency, no hematochezia and no seizures        Prior to Admission Medications   Prescriptions Last Dose Informant Patient Reported? Taking? B-D UF III MINI PEN NEEDLES 31G X 5 MM MISC   Yes No   EUSEBIO CONTOUR TEST test strip   Yes No   Sig: 3 (three) times a day Test   Cinnamon 500 MG capsule   Yes No   Sig: Take 1,000 mg by mouth daily   MULTAQ 400 MG tablet   Yes No   XARELTO 20 MG tablet   Yes No   amoxicillin (AMOXIL) 500 mg capsule   Yes No   Sig: Take 2,000 mg by mouth as needed 1 hour prior to dental appointment   cholecalciferol (VITAMIN D3) 1,000 units tablet   Yes No   Sig: Take 2,000 Units by mouth daily   insulin glargine (LANTUS) 100 units/mL subcutaneous injection   Yes No   Sig: Inject 41 Units under the skin daily at bedtime     losartan (COZAAR) 100 MG tablet  Self Yes No   Sig: Take 100 mg by mouth daily   metFORMIN (GLUCOPHAGE) 500 mg tablet   Yes No   Sig: Take 500 mg by mouth 2 (two) times a day with meals   metoprolol tartrate (LOPRESSOR) 25 mg tablet   Yes No   Sig: Take 25 mg by mouth daily     potassium chloride (MICRO-K) 8 mEq CR capsule   Yes No   Sig: Take 8 mEq by mouth daily     pravastatin (PRAVACHOL) 40 mg tablet   Yes No   Sig: Take 40 mg by mouth daily   torsemide (DEMADEX) 10 mg tablet   Yes No   Sig: Take 10 mg by mouth daily      Facility-Administered Medications: None       Past Medical History:   Diagnosis Date    Cancer ([facility] )     Pancreatic    Diabetes mellitus ([facility] )     Hypertension        Past Surgical History:   Procedure Laterality Date    APPENDECTOMY      CARDIAC SURGERY      Aortic Valve Replacement, Ascending Aorta    GALLBLADDER SURGERY      PANCREATICODUODENECTOMY         History reviewed  No pertinent family history  I have reviewed and agree with the history as documented  Social History   Substance Use Topics    Smoking status: Never Smoker    Smokeless tobacco: Never Used    Alcohol use No        Review of Systems   Constitutional: Positive for fatigue  Cardiovascular: Negative for chest pain  Gastrointestinal: Negative for hematochezia     Genitourinary: Negative for frequency  Musculoskeletal: Negative for arthralgias  Neurological: Negative for seizures  All other systems reviewed and are negative  Physical Exam  ED Triage Vitals [05/10/18 1240]   Temperature Pulse Respirations Blood Pressure SpO2   98 6 °F (37 °C) 72 17 142/69 97 %      Temp Source Heart Rate Source Patient Position - Orthostatic VS BP Location FiO2 (%)   Oral Monitor Sitting Right arm --      Pain Score       No Pain           Orthostatic Vital Signs  Vitals:    05/10/18 1240 05/10/18 1345 05/10/18 1545   BP: 142/69 (!) 177/79 164/72   Pulse: 72 62 62   Patient Position - Orthostatic VS: Sitting Sitting        Physical Exam   Constitutional: He is oriented to person, place, and time  He appears well-developed and well-nourished  No distress  HENT:   Head: Normocephalic and atraumatic  Right Ear: External ear normal    Left Ear: External ear normal    Eyes: Conjunctivae and EOM are normal  Right eye exhibits no discharge  Left eye exhibits no discharge  No scleral icterus  Neck: Normal range of motion  Neck supple  No JVD present  No tracheal deviation present  No thyromegaly present  Cardiovascular: Normal rate and regular rhythm  Pulmonary/Chest: Effort normal and breath sounds normal  No stridor  No respiratory distress  He has no wheezes  He has no rales  Abdominal: Soft  Bowel sounds are normal  He exhibits no distension  There is no tenderness  Musculoskeletal: Normal range of motion  He exhibits no edema, tenderness or deformity  Neurological: He is alert and oriented to person, place, and time  No cranial nerve deficit  Coordination normal    Skin: Skin is warm and dry  He is not diaphoretic  Psychiatric: He has a normal mood and affect  His behavior is normal    Nursing note and vitals reviewed        ED Medications  Medications   sodium chloride 0 9 % bolus 500 mL (0 mL Intravenous Stopped 5/10/18 1442)       Diagnostic Studies  Results Reviewed     Procedure Component Value Units Date/Time    BNP [[id number]]  (Abnormal) Collected:  05/10/18 1252    Lab Status:  Final result Specimen:  Blood from Arm, Right Updated:  05/10/18 1437     NT-proBNP 2,308 (H) pg/mL     Protime-INR [[id number]]  (Abnormal) Collected:  05/10/18 1252    Lab Status:  Final result Specimen:  Blood from Arm, Right Updated:  05/10/18 1357     Protime 31 8 (H) seconds      INR 2 97 (H)    Troponin I [[id number]]  (Normal) Collected:  05/10/18 1252    Lab Status:  Final result Specimen:  Blood from Arm, Right Updated:  05/10/18 1355     Troponin I <0 02 ng/mL     Narrative:         [employer] Chemistry analyzer 99% cutoff is > 0 04 ng/mL in network labs    o cTnI 99% cutoff is useful only when applied to patients in the clinical setting of myocardial ischemia  o cTnI 99% cutoff should be interpreted in the context of clinical history, ECG findings and possibly cardiac imaging to establish correct diagnosis  o cTnI 99% cutoff may be suggestive but clearly not indicative of a coronary event without the clinical setting of myocardial ischemia  Comprehensive metabolic panel [[id number]]  (Abnormal) Collected:  05/10/18 1252    Lab Status:  Final result Specimen:  Blood from Arm, Right Updated:  05/10/18 1351     Sodium 140 mmol/L      Potassium 4 6 mmol/L      Chloride 105 mmol/L      CO2 28 mmol/L      Anion Gap 7 mmol/L      BUN 30 (H) mg/dL      Creatinine 1 79 (H) mg/dL      Glucose 58 (L) mg/dL      Calcium 9 1 mg/dL      AST 15 U/L      ALT 20 U/L      Alkaline Phosphatase 176 (H) U/L      Total Protein 7 1 g/dL      Albumin 3 7 g/dL      Total Bilirubin 0 80 mg/dL      eGFR 36 ml/min/1 73sq m     Narrative:         National Kidney Disease Education Program recommendations are as follows:  GFR calculation is accurate only with a steady state creatinine  Chronic Kidney disease less than 60 ml/min/1 73 sq  meters  Kidney failure less than 15 ml/min/1 73 sq  meters      CBC and differential [[id number]] (Abnormal) Collected:  05/10/18 1252    Lab Status:  Final result Specimen:  Blood from Arm, Right Updated:  05/10/18 1332     WBC 8 22 Thousand/uL      RBC 4 16 Million/uL      Hemoglobin 12 0 g/dL      Hematocrit 38 6 %      MCV 93 fL      MCH 28 8 pg      MCHC 31 1 (L) g/dL      RDW 13 6 %      MPV 8 9 fL      Platelets 044 Thousands/uL      nRBC 0 /100 WBCs      Neutrophils Relative 70 %      Lymphocytes Relative 16 %      Monocytes Relative 11 %      Eosinophils Relative 2 %      Basophils Relative 0 %      Neutrophils Absolute 5 74 Thousands/µL      Lymphocytes Absolute 1 34 Thousands/µL      Monocytes Absolute 0 93 Thousand/µL      Eosinophils Absolute 0 14 Thousand/µL      Basophils Absolute 0 03 Thousands/µL                  XR chest 2 views   Final Result by Grover Hobson DO (05/10 1401)      No acute cardiopulmonary disease  Scarring in the lungs bilaterally with pleural thickening at the right lung base unchanged from prior              Workstation performed: [id number]                    Procedures  Procedures       Phone Contacts  ED Phone Contact    ED Course                               MDM  Number of Diagnoses or Management Options  Elevated brain natriuretic peptide (BNP) level: new and requires workup  Renal insufficiency: new and requires workup     Amount and/or Complexity of Data Reviewed  Clinical lab tests: ordered and reviewed  Tests in the radiology section of CPT®: reviewed and ordered  Decide to obtain previous medical records or to obtain history from someone other than the patient: yes  Review and summarize past medical records: yes  Independent visualization of images, tracings, or specimens: yes    Patient Progress  Patient progress: stable    CritCare Time    Disposition  Final diagnoses:   Elevated brain natriuretic peptide (BNP) level   Renal insufficiency     Time reflects when diagnosis was documented in both MDM as applicable and the Disposition within this note Time User Action Codes Description Comment    5/10/2018  3:36 PM [facility] Add [R79 89] Elevated brain natriuretic peptide (BNP) level     5/10/2018  3:36 PM Catherine Lab Add [N28 9] Renal insufficiency       ED Disposition     ED Disposition Condition Comment    Discharge  Ascencion Rivero discharge to home/self care  Condition at discharge: Good        Follow-up Information     Follow up With Specialties Details Why Lonnie Mendenhall MD Internal Medicine   [address] [facility]  [phone]          Discharge Medication List as of 5/10/2018  3:37 PM      CONTINUE these medications which have NOT CHANGED    Details   amoxicillin (AMOXIL) 500 mg capsule Take 2,000 mg by mouth as needed 1 hour prior to dental appointment, Starting Fri 3/16/2018, Historical Med      B-D UF III MINI PEN NEEDLES 31G X 5 MM MISC Starting Sun 3/11/2018, Historical Med      EUSEBIO CONTOUR TEST test strip 3 (three) times a day Test, Starting Thu 2/1/2018, Historical Med      cholecalciferol (VITAMIN D3) 1,000 units tablet Take 2,000 Units by mouth daily, Historical Med      Cinnamon 500 MG capsule Take 1,000 mg by mouth daily, Historical Med      insulin glargine (LANTUS) 100 units/mL subcutaneous injection Inject 41 Units under the skin daily at bedtime  , Historical Med      losartan (COZAAR) 100 MG tablet Take 100 mg by mouth daily, Historical Med      metFORMIN (GLUCOPHAGE) 500 mg tablet Take 500 mg by mouth 2 (two) times a day with meals, Until Discontinued, Historical Med      metoprolol tartrate (LOPRESSOR) 25 mg tablet Take 25 mg by mouth daily  , Historical Med      MULTAQ 400 MG tablet Starting Mon 3/12/2018, Historical Med      potassium chloride (MICRO-K) 8 mEq CR capsule Take 8 mEq by mouth daily  , Historical Med      pravastatin (PRAVACHOL) 40 mg tablet Take 40 mg by mouth daily, Until Discontinued, Historical Med      torsemide (DEMADEX) 10 mg tablet Take 10 mg by mouth daily, Until Discontinued, Historical Med      XARELTO 20 MG tablet Starting Wed 3/21/2018, Historical Med           No discharge procedures on file      ED Provider  Electronically Signed by           Alisia Holloway DO  05/15/18 4173

## 2018-05-10 NOTE — DISCHARGE INSTRUCTIONS
Impaired Kidney Function   WHAT YOU NEED TO KNOW:   Impaired kidney function is when your kidneys are not working as well as they should  Normally, kidneys remove fluid, chemicals, and waste from your blood  These wastes are removed from your body in the urine made by your kidneys  If impaired kidney function is not treated or gets worse, it may lead to long-term kidney disease or kidney failure  DISCHARGE INSTRUCTIONS:   Return to the emergency department if:   · You have fluid buildup in your legs  · You have trouble breathing  · You urinate less than you normally do  · You have dark colored urine  Contact your healthcare provider if:   · You have a fever  · You have abdominal or low back pain  · Your skin is itchy or you have a rash  · You have nausea, vomit repeatedly, or have severe diarrhea  · You have fatigue or muscle weakness  · You have hiccups that will not stop  · You have questions or concerns about your condition or care  Follow up with your healthcare provider as directed: You will need to return for tests to find the cause of your impaired kidney function  Write down your questions so you remember to ask them during your visits  Support kidney function:   · Manage other health conditions  such as diabetes, high blood pressure, or heart disease  These conditions stress your kidneys  · Talk to your healthcare provider before you take over-the-counter-medicine  NSAIDs, stomach medicine, or laxatives may harm your kidneys  · Limit alcohol  Ask how much alcohol is safe for you to drink  A drink of alcohol is 12 ounces of beer, 5 ounces of wine, or 1½ ounces of liquor  · Do not smoke  Nicotine can damage blood vessels and make it more difficult to manage your impaired kidney function  Smoking also harms your kidneys  Do not use e-cigarettes or smokeless tobacco in place of cigarettes or to help you quit  They still contain nicotine   Ask your healthcare provider for information if you currently smoke and need help quitting  © 2017 [address] [employer] Information is for End User's use only and may not be sold, redistributed or otherwise used for commercial purposes  All illustrations and images included in CareNotes® are the copyrighted property of [employer]  or [employer]ola Bravo  The above information is an  only  It is not intended as medical advice for individual conditions or treatments  Talk to your doctor, nurse or pharmacist before following any medical regimen to see if it is safe and effective for you

## 2018-07-25 ENCOUNTER — LAB (OUTPATIENT)
Dept: LAB | Facility: MEDICAL CENTER | Age: 78
End: 2018-07-25
Payer: MEDICARE

## 2018-07-25 ENCOUNTER — TRANSCRIBE ORDERS (OUTPATIENT)
Dept: ADMINISTRATIVE | Facility: HOSPITAL | Age: 78
End: 2018-07-25

## 2018-07-25 DIAGNOSIS — E11.9 DIABETES MELLITUS WITHOUT COMPLICATION (HCC): Primary | ICD-10-CM

## 2018-07-25 LAB
ALBUMIN SERPL BCP-MCNC: 3.7 G/DL (ref 3.5–5)
ALP SERPL-CCNC: 175 U/L (ref 46–116)
ALT SERPL W P-5'-P-CCNC: 27 U/L (ref 12–78)
ANION GAP SERPL CALCULATED.3IONS-SCNC: 4 MMOL/L (ref 4–13)
AST SERPL W P-5'-P-CCNC: 22 U/L (ref 5–45)
BASOPHILS # BLD AUTO: 0.03 THOUSANDS/ΜL (ref 0–0.1)
BASOPHILS NFR BLD AUTO: 0 % (ref 0–1)
BILIRUB SERPL-MCNC: 0.86 MG/DL (ref 0.2–1)
BUN SERPL-MCNC: 33 MG/DL (ref 5–25)
CALCIUM SERPL-MCNC: 9.1 MG/DL (ref 8.3–10.1)
CHLORIDE SERPL-SCNC: 106 MMOL/L (ref 100–108)
CHOLEST SERPL-MCNC: 117 MG/DL (ref 50–200)
CO2 SERPL-SCNC: 28 MMOL/L (ref 21–32)
CREAT SERPL-MCNC: 1.9 MG/DL (ref 0.6–1.3)
EOSINOPHIL # BLD AUTO: 0.17 THOUSAND/ΜL (ref 0–0.61)
EOSINOPHIL NFR BLD AUTO: 2 % (ref 0–6)
ERYTHROCYTE [DISTWIDTH] IN BLOOD BY AUTOMATED COUNT: 14.2 % (ref 11.6–15.1)
EST. AVERAGE GLUCOSE BLD GHB EST-MCNC: 148 MG/DL
GFR SERPL CREATININE-BSD FRML MDRD: 33 ML/MIN/1.73SQ M
GLUCOSE P FAST SERPL-MCNC: 73 MG/DL (ref 65–99)
HBA1C MFR BLD: 6.8 % (ref 4.2–6.3)
HCT VFR BLD AUTO: 40.8 % (ref 36.5–49.3)
HDLC SERPL-MCNC: 41 MG/DL (ref 40–60)
HGB BLD-MCNC: 12.6 G/DL (ref 12–17)
IMM GRANULOCYTES # BLD AUTO: 0.04 THOUSAND/UL (ref 0–0.2)
IMM GRANULOCYTES NFR BLD AUTO: 0 % (ref 0–2)
LDLC SERPL CALC-MCNC: 62 MG/DL (ref 0–100)
LYMPHOCYTES # BLD AUTO: 1.34 THOUSANDS/ΜL (ref 0.6–4.47)
LYMPHOCYTES NFR BLD AUTO: 15 % (ref 14–44)
MCH RBC QN AUTO: 29.7 PG (ref 26.8–34.3)
MCHC RBC AUTO-ENTMCNC: 30.9 G/DL (ref 31.4–37.4)
MCV RBC AUTO: 96 FL (ref 82–98)
MONOCYTES # BLD AUTO: 0.87 THOUSAND/ΜL (ref 0.17–1.22)
MONOCYTES NFR BLD AUTO: 9 % (ref 4–12)
NEUTROPHILS # BLD AUTO: 6.82 THOUSANDS/ΜL (ref 1.85–7.62)
NEUTS SEG NFR BLD AUTO: 74 % (ref 43–75)
NONHDLC SERPL-MCNC: 76 MG/DL
NRBC BLD AUTO-RTO: 0 /100 WBCS
PLATELET # BLD AUTO: 245 THOUSANDS/UL (ref 149–390)
PMV BLD AUTO: 9.7 FL (ref 8.9–12.7)
POTASSIUM SERPL-SCNC: 4.9 MMOL/L (ref 3.5–5.3)
PROT SERPL-MCNC: 7.1 G/DL (ref 6.4–8.2)
RBC # BLD AUTO: 4.24 MILLION/UL (ref 3.88–5.62)
SODIUM SERPL-SCNC: 138 MMOL/L (ref 136–145)
TRIGL SERPL-MCNC: 72 MG/DL
WBC # BLD AUTO: 9.27 THOUSAND/UL (ref 4.31–10.16)

## 2018-07-25 PROCEDURE — 85025 COMPLETE CBC W/AUTO DIFF WBC: CPT | Performed by: INTERNAL MEDICINE

## 2018-07-25 PROCEDURE — 80061 LIPID PANEL: CPT | Performed by: INTERNAL MEDICINE

## 2018-07-25 PROCEDURE — 80053 COMPREHEN METABOLIC PANEL: CPT | Performed by: INTERNAL MEDICINE

## 2018-07-25 PROCEDURE — 36415 COLL VENOUS BLD VENIPUNCTURE: CPT | Performed by: INTERNAL MEDICINE

## 2018-07-25 PROCEDURE — 83036 HEMOGLOBIN GLYCOSYLATED A1C: CPT | Performed by: INTERNAL MEDICINE

## 2018-10-25 ENCOUNTER — HOSPITAL ENCOUNTER (OUTPATIENT)
Dept: ULTRASOUND IMAGING | Facility: HOSPITAL | Age: 78
Discharge: HOME | End: 2018-10-25
Attending: UROLOGY
Payer: MEDICARE

## 2018-10-25 DIAGNOSIS — N28.1 ACQUIRED CYST OF KIDNEY: ICD-10-CM

## 2018-10-25 PROCEDURE — 76770 US EXAM ABDO BACK WALL COMP: CPT

## 2018-11-08 ENCOUNTER — OFFICE VISIT (OUTPATIENT)
Dept: UROLOGY | Facility: CLINIC | Age: 78
End: 2018-11-08
Payer: MEDICARE

## 2018-11-08 VITALS
SYSTOLIC BLOOD PRESSURE: 138 MMHG | HEART RATE: 64 BPM | BODY MASS INDEX: 25.94 KG/M2 | WEIGHT: 208.6 LBS | HEIGHT: 75 IN | DIASTOLIC BLOOD PRESSURE: 74 MMHG

## 2018-11-08 DIAGNOSIS — N28.1 RENAL CYST: Primary | ICD-10-CM

## 2018-11-08 PROCEDURE — 99213 OFFICE O/P EST LOW 20 MIN: CPT | Performed by: PHYSICIAN ASSISTANT

## 2018-11-08 RX ORDER — TORSEMIDE 10 MG/1
TABLET ORAL DAILY
COMMUNITY
End: 2022-05-05 | Stop reason: ALTCHOICE

## 2018-11-08 NOTE — PROGRESS NOTES
1  Renal cyst  US kidney and bladder     Assessment and plan:       1  Right renal cyst - managed by Dr Irina Jj  - renal cysts continues to grow in size  We did discuss continued monitoring with a repeat ultrasound approximately 1 year's time  He is aware to contact us anytime in any urinary concerns  All questions answered  - routine prostate cancer screening safely discontinued at this time given age  Michael Duenas PA-C      Chief Complaint   Follow-up renal cyst    History of Present Illness     Coralee Postal is a 70 y o  male patient of Dr Irina Jj with a history of right renal cyst presenting for 1 year follow-up  Patient had been found to have a proteinaceous cyst of the right kidney  This is been managed conservatively  Patient's most recent ultrasound of the kidney and bladder does reveal increased size of the complex renal cyst, previously 2 7 cm in April 2018, and most recently 3 2 cm in October 20018  Patient is unable to receive IV contrast given renal function  Routine prostate cancer screening has been discontinued with his last PSA of 0 8 in 2015  Patient is overall comfortable with urination  He feels like has a fair stream, feels empty after urination, has nocturia 3 times nightly  Denies any dysuria, gross hematuria, hesitancy incontinence, suprapubic pressure, flank pain today pain, weight loss  Laboratory     Lab Results   Component Value Date    CREATININE 1 90 (H) 07/25/2018       Lab Results   Component Value Date    PSA 0 8 08/05/2015    PSA 0 7 07/31/2014     Review of Systems     Review of Systems   Constitutional: Negative for activity change, appetite change, chills, diaphoresis, fatigue, fever and unexpected weight change  Respiratory: Negative for chest tightness and shortness of breath  Cardiovascular: Negative for chest pain, palpitations and leg swelling     Gastrointestinal: Negative for abdominal distention, abdominal pain, constipation, diarrhea, nausea and vomiting  Genitourinary: Negative for decreased urine volume, difficulty urinating, dysuria, enuresis, flank pain, frequency, genital sores, hematuria and urgency  Musculoskeletal: Negative for back pain, gait problem and myalgias  Skin: Negative for color change, pallor, rash and wound  Psychiatric/Behavioral: Negative for behavioral problems  The patient is not nervous/anxious  Allergies     Allergies   Allergen Reactions    Other        Physical Exam     Physical Exam   Constitutional: He is oriented to person, place, and time  He appears well-developed and well-nourished  No distress  HENT:   Head: Normocephalic and atraumatic  Eyes: Conjunctivae are normal    Neck: Normal range of motion  No tracheal deviation present  Pulmonary/Chest: Effort normal    Musculoskeletal: Normal range of motion  He exhibits no edema or deformity  Neurological: He is alert and oriented to person, place, and time  Skin: Skin is warm and dry  No rash noted  He is not diaphoretic  No erythema  Psychiatric: He has a normal mood and affect   His behavior is normal          Vital Signs     Vitals:    11/08/18 1026   BP: 138/74   BP Location: Left arm   Patient Position: Sitting   Cuff Size: Standard   Pulse: 64   Weight: 94 6 kg (208 lb 9 6 oz)   Height: 6' 3" (1 905 m)         Current Medications       Current Outpatient Prescriptions:     amoxicillin (AMOXIL) 500 mg capsule, Take 2,000 mg by mouth as needed 1 hour prior to dental appointment, Disp: , Rfl: 0    B-D UF III MINI PEN NEEDLES 31G X 5 MM MISC, , Disp: , Rfl: 0    EUSEBIO CONTOUR TEST test strip, 3 (three) times a day Test, Disp: , Rfl: 9    cholecalciferol (VITAMIN D3) 1,000 units tablet, Take 2,000 Units by mouth daily, Disp: , Rfl:     Cinnamon 500 MG capsule, Take 1,000 mg by mouth daily, Disp: , Rfl:     insulin glargine (LANTUS) 100 units/mL subcutaneous injection, Inject 41 Units under the skin daily at bedtime  , Disp: , Rfl:     losartan (COZAAR) 100 MG tablet, Take 100 mg by mouth daily, Disp: , Rfl:     metFORMIN (GLUCOPHAGE) 500 mg tablet, Take 500 mg by mouth 2 (two) times a day with meals, Disp: , Rfl:     metoprolol tartrate (LOPRESSOR) 25 mg tablet, Take 25 mg by mouth daily  , Disp: , Rfl:     MULTAQ 400 MG tablet, , Disp: , Rfl: 0    potassium chloride (MICRO-K) 8 mEq CR capsule, Take 8 mEq by mouth daily  , Disp: , Rfl:     pravastatin (PRAVACHOL) 40 mg tablet, Take 40 mg by mouth daily, Disp: , Rfl:     torsemide (DEMADEX) 10 mg tablet, Take 10 mg by mouth daily, Disp: , Rfl:     XARELTO 20 MG tablet, , Disp: , Rfl: 0    torsemide (DEMADEX) 10 mg tablet, Daily, Disp: , Rfl:       Active Problems     Patient Active Problem List   Diagnosis    Pneumonia    Diabetes mellitus ([facility]    Hypertension    Acute-on-chronic kidney injury ([facility]    [facility]    Aneurysm of thoracic aorta ([facility]    Aneurysm of abdominal aorta ([facility]    Hyperlipidemia    Inflammatory polyarthropathy ([facility]    Osteoarthrosis    Polymyalgia rheumatica ([facility]       Past Medical History     Past Medical History:   Diagnosis Date    Cancer ([facility]     Pancreatic    Diabetes mellitus ([facility]     Hypertension        Surgical History     Past Surgical History:   Procedure Laterality Date    APPENDECTOMY      CARDIAC SURGERY      Aortic Valve Replacement, Ascending Aorta    GALLBLADDER SURGERY      PANCREATICODUODENECTOMY         Family History     History reviewed  No pertinent family history        Social History     Social History       Radiology

## 2018-11-30 ENCOUNTER — TRANSCRIBE ORDERS (OUTPATIENT)
Dept: ADMINISTRATIVE | Facility: HOSPITAL | Age: 78
End: 2018-11-30

## 2018-11-30 ENCOUNTER — LAB (OUTPATIENT)
Dept: LAB | Facility: MEDICAL CENTER | Age: 78
End: 2018-11-30
Payer: MEDICARE

## 2018-11-30 DIAGNOSIS — E11.9 DIABETES MELLITUS WITHOUT COMPLICATION (HCC): Primary | ICD-10-CM

## 2018-11-30 DIAGNOSIS — E78.2 MIXED HYPERLIPIDEMIA: ICD-10-CM

## 2018-11-30 DIAGNOSIS — I10 ESSENTIAL HYPERTENSION, BENIGN: ICD-10-CM

## 2018-11-30 LAB
ALBUMIN SERPL BCP-MCNC: 3.5 G/DL (ref 3.5–5)
ALP SERPL-CCNC: 159 U/L (ref 46–116)
ALT SERPL W P-5'-P-CCNC: 22 U/L (ref 12–78)
ANION GAP SERPL CALCULATED.3IONS-SCNC: 4 MMOL/L (ref 4–13)
AST SERPL W P-5'-P-CCNC: 16 U/L (ref 5–45)
BACTERIA UR QL AUTO: NORMAL /HPF
BASOPHILS # BLD AUTO: 0.03 THOUSANDS/ΜL (ref 0–0.1)
BASOPHILS NFR BLD AUTO: 0 % (ref 0–1)
BILIRUB SERPL-MCNC: 0.78 MG/DL (ref 0.2–1)
BILIRUB UR QL STRIP: NEGATIVE
BUN SERPL-MCNC: 34 MG/DL (ref 5–25)
CALCIUM SERPL-MCNC: 9.7 MG/DL (ref 8.3–10.1)
CHLORIDE SERPL-SCNC: 103 MMOL/L (ref 100–108)
CHOLEST SERPL-MCNC: 117 MG/DL (ref 50–200)
CLARITY UR: CLEAR
CO2 SERPL-SCNC: 27 MMOL/L (ref 21–32)
COLOR UR: YELLOW
CREAT SERPL-MCNC: 2.13 MG/DL (ref 0.6–1.3)
CREAT UR-MCNC: 141 MG/DL
EOSINOPHIL # BLD AUTO: 0.17 THOUSAND/ΜL (ref 0–0.61)
EOSINOPHIL NFR BLD AUTO: 2 % (ref 0–6)
ERYTHROCYTE [DISTWIDTH] IN BLOOD BY AUTOMATED COUNT: 13.2 % (ref 11.6–15.1)
EST. AVERAGE GLUCOSE BLD GHB EST-MCNC: 160 MG/DL
GFR SERPL CREATININE-BSD FRML MDRD: 29 ML/MIN/1.73SQ M
GLUCOSE P FAST SERPL-MCNC: 79 MG/DL (ref 65–99)
GLUCOSE UR STRIP-MCNC: NEGATIVE MG/DL
HBA1C MFR BLD: 7.2 % (ref 4.2–6.3)
HCT VFR BLD AUTO: 40.8 % (ref 36.5–49.3)
HDLC SERPL-MCNC: 39 MG/DL (ref 40–60)
HGB BLD-MCNC: 12.5 G/DL (ref 12–17)
HGB UR QL STRIP.AUTO: NEGATIVE
HYALINE CASTS #/AREA URNS LPF: NORMAL /LPF
IMM GRANULOCYTES # BLD AUTO: 0.03 THOUSAND/UL (ref 0–0.2)
IMM GRANULOCYTES NFR BLD AUTO: 0 % (ref 0–2)
KETONES UR STRIP-MCNC: NEGATIVE MG/DL
LDLC SERPL CALC-MCNC: 63 MG/DL (ref 0–100)
LEUKOCYTE ESTERASE UR QL STRIP: ABNORMAL
LYMPHOCYTES # BLD AUTO: 1.25 THOUSANDS/ΜL (ref 0.6–4.47)
LYMPHOCYTES NFR BLD AUTO: 14 % (ref 14–44)
MCH RBC QN AUTO: 30 PG (ref 26.8–34.3)
MCHC RBC AUTO-ENTMCNC: 30.6 G/DL (ref 31.4–37.4)
MCV RBC AUTO: 98 FL (ref 82–98)
MICROALBUMIN UR-MCNC: 68.8 MG/L (ref 0–20)
MICROALBUMIN/CREAT 24H UR: 49 MG/G CREATININE (ref 0–30)
MONOCYTES # BLD AUTO: 0.9 THOUSAND/ΜL (ref 0.17–1.22)
MONOCYTES NFR BLD AUTO: 10 % (ref 4–12)
NEUTROPHILS # BLD AUTO: 6.48 THOUSANDS/ΜL (ref 1.85–7.62)
NEUTS SEG NFR BLD AUTO: 74 % (ref 43–75)
NITRITE UR QL STRIP: NEGATIVE
NON-SQ EPI CELLS URNS QL MICRO: NORMAL /HPF
NONHDLC SERPL-MCNC: 78 MG/DL
NRBC BLD AUTO-RTO: 0 /100 WBCS
PH UR STRIP.AUTO: 6 [PH] (ref 4.5–8)
PLATELET # BLD AUTO: 213 THOUSANDS/UL (ref 149–390)
PMV BLD AUTO: 9.9 FL (ref 8.9–12.7)
POTASSIUM SERPL-SCNC: 5.3 MMOL/L (ref 3.5–5.3)
PROT SERPL-MCNC: 7 G/DL (ref 6.4–8.2)
PROT UR STRIP-MCNC: ABNORMAL MG/DL
RBC # BLD AUTO: 4.17 MILLION/UL (ref 3.88–5.62)
RBC #/AREA URNS AUTO: NORMAL /HPF
SODIUM SERPL-SCNC: 134 MMOL/L (ref 136–145)
SP GR UR STRIP.AUTO: 1.02 (ref 1–1.03)
TRIGL SERPL-MCNC: 77 MG/DL
TSH SERPL DL<=0.05 MIU/L-ACNC: 1.61 UIU/ML (ref 0.36–3.74)
UROBILINOGEN UR QL STRIP.AUTO: 0.2 E.U./DL
WBC # BLD AUTO: 8.86 THOUSAND/UL (ref 4.31–10.16)
WBC #/AREA URNS AUTO: NORMAL /HPF

## 2018-11-30 PROCEDURE — 81001 URINALYSIS AUTO W/SCOPE: CPT | Performed by: INTERNAL MEDICINE

## 2018-11-30 PROCEDURE — 80061 LIPID PANEL: CPT | Performed by: INTERNAL MEDICINE

## 2018-11-30 PROCEDURE — 82570 ASSAY OF URINE CREATININE: CPT | Performed by: INTERNAL MEDICINE

## 2018-11-30 PROCEDURE — 80053 COMPREHEN METABOLIC PANEL: CPT | Performed by: INTERNAL MEDICINE

## 2018-11-30 PROCEDURE — 82043 UR ALBUMIN QUANTITATIVE: CPT | Performed by: INTERNAL MEDICINE

## 2018-11-30 PROCEDURE — 36415 COLL VENOUS BLD VENIPUNCTURE: CPT | Performed by: INTERNAL MEDICINE

## 2018-11-30 PROCEDURE — 85025 COMPLETE CBC W/AUTO DIFF WBC: CPT | Performed by: INTERNAL MEDICINE

## 2018-11-30 PROCEDURE — 84443 ASSAY THYROID STIM HORMONE: CPT | Performed by: INTERNAL MEDICINE

## 2018-11-30 PROCEDURE — 83036 HEMOGLOBIN GLYCOSYLATED A1C: CPT | Performed by: INTERNAL MEDICINE

## 2019-03-28 ENCOUNTER — TELEPHONE (OUTPATIENT)
Dept: SURGICAL ONCOLOGY | Facility: CLINIC | Age: 79
End: 2019-03-28

## 2019-03-28 DIAGNOSIS — D3A.8 NEUROENDOCRINE TUMOR: Primary | ICD-10-CM

## 2019-03-28 NOTE — TELEPHONE ENCOUNTER
----- Message from Rosemary Jean RN sent at 3/28/2019  3:54 PM EDT -----  Please call pt and remind him to get labs done for his CT

## 2019-03-29 ENCOUNTER — TRANSCRIBE ORDERS (OUTPATIENT)
Dept: ADMINISTRATIVE | Facility: HOSPITAL | Age: 79
End: 2019-03-29

## 2019-03-29 ENCOUNTER — LAB (OUTPATIENT)
Dept: LAB | Facility: MEDICAL CENTER | Age: 79
End: 2019-03-29
Payer: MEDICARE

## 2019-03-29 DIAGNOSIS — E11.9 TYPE 2 DIABETES MELLITUS WITHOUT COMPLICATION, UNSPECIFIED WHETHER LONG TERM INSULIN USE (HCC): ICD-10-CM

## 2019-03-29 DIAGNOSIS — E11.9 TYPE 2 DIABETES MELLITUS WITHOUT COMPLICATION, UNSPECIFIED WHETHER LONG TERM INSULIN USE (HCC): Primary | ICD-10-CM

## 2019-03-29 DIAGNOSIS — E78.2 MIXED HYPERLIPIDEMIA: ICD-10-CM

## 2019-03-29 LAB
ALBUMIN SERPL BCP-MCNC: 3.9 G/DL (ref 3.5–5)
ALP SERPL-CCNC: 174 U/L (ref 46–116)
ALT SERPL W P-5'-P-CCNC: 16 U/L (ref 12–78)
ANION GAP SERPL CALCULATED.3IONS-SCNC: 1 MMOL/L (ref 4–13)
AST SERPL W P-5'-P-CCNC: 17 U/L (ref 5–45)
BASOPHILS # BLD AUTO: 0.04 THOUSANDS/ΜL (ref 0–0.1)
BASOPHILS NFR BLD AUTO: 1 % (ref 0–1)
BILIRUB SERPL-MCNC: 0.81 MG/DL (ref 0.2–1)
BUN SERPL-MCNC: 27 MG/DL (ref 5–25)
CALCIUM SERPL-MCNC: 9.3 MG/DL (ref 8.3–10.1)
CHLORIDE SERPL-SCNC: 104 MMOL/L (ref 100–108)
CHOLEST SERPL-MCNC: 130 MG/DL (ref 50–200)
CO2 SERPL-SCNC: 30 MMOL/L (ref 21–32)
CREAT SERPL-MCNC: 1.7 MG/DL (ref 0.6–1.3)
EOSINOPHIL # BLD AUTO: 0.19 THOUSAND/ΜL (ref 0–0.61)
EOSINOPHIL NFR BLD AUTO: 2 % (ref 0–6)
ERYTHROCYTE [DISTWIDTH] IN BLOOD BY AUTOMATED COUNT: 14.4 % (ref 11.6–15.1)
EST. AVERAGE GLUCOSE BLD GHB EST-MCNC: 151 MG/DL
GFR SERPL CREATININE-BSD FRML MDRD: 38 ML/MIN/1.73SQ M
GLUCOSE P FAST SERPL-MCNC: 127 MG/DL (ref 65–99)
HBA1C MFR BLD: 6.9 % (ref 4.2–6.3)
HCT VFR BLD AUTO: 40.6 % (ref 36.5–49.3)
HDLC SERPL-MCNC: 41 MG/DL (ref 40–60)
HGB BLD-MCNC: 12.3 G/DL (ref 12–17)
IMM GRANULOCYTES # BLD AUTO: 0.03 THOUSAND/UL (ref 0–0.2)
IMM GRANULOCYTES NFR BLD AUTO: 0 % (ref 0–2)
LDLC SERPL CALC-MCNC: 73 MG/DL (ref 0–100)
LYMPHOCYTES # BLD AUTO: 1.17 THOUSANDS/ΜL (ref 0.6–4.47)
LYMPHOCYTES NFR BLD AUTO: 15 % (ref 14–44)
MCH RBC QN AUTO: 30.1 PG (ref 26.8–34.3)
MCHC RBC AUTO-ENTMCNC: 30.3 G/DL (ref 31.4–37.4)
MCV RBC AUTO: 99 FL (ref 82–98)
MONOCYTES # BLD AUTO: 0.86 THOUSAND/ΜL (ref 0.17–1.22)
MONOCYTES NFR BLD AUTO: 11 % (ref 4–12)
NEUTROPHILS # BLD AUTO: 5.62 THOUSANDS/ΜL (ref 1.85–7.62)
NEUTS SEG NFR BLD AUTO: 71 % (ref 43–75)
NONHDLC SERPL-MCNC: 89 MG/DL
NRBC BLD AUTO-RTO: 0 /100 WBCS
PLATELET # BLD AUTO: 194 THOUSANDS/UL (ref 149–390)
PMV BLD AUTO: 9.5 FL (ref 8.9–12.7)
POTASSIUM SERPL-SCNC: 5.3 MMOL/L (ref 3.5–5.3)
PROT SERPL-MCNC: 6.9 G/DL (ref 6.4–8.2)
RBC # BLD AUTO: 4.09 MILLION/UL (ref 3.88–5.62)
SODIUM SERPL-SCNC: 135 MMOL/L (ref 136–145)
TRIGL SERPL-MCNC: 80 MG/DL
WBC # BLD AUTO: 7.91 THOUSAND/UL (ref 4.31–10.16)

## 2019-03-29 PROCEDURE — 36415 COLL VENOUS BLD VENIPUNCTURE: CPT

## 2019-03-29 PROCEDURE — 80053 COMPREHEN METABOLIC PANEL: CPT

## 2019-03-29 PROCEDURE — 85025 COMPLETE CBC W/AUTO DIFF WBC: CPT

## 2019-03-29 PROCEDURE — 83036 HEMOGLOBIN GLYCOSYLATED A1C: CPT

## 2019-03-29 PROCEDURE — 80061 LIPID PANEL: CPT

## 2019-04-08 ENCOUNTER — TRANSCRIBE ORDERS (OUTPATIENT)
Dept: ADMINISTRATIVE | Facility: HOSPITAL | Age: 79
End: 2019-04-08

## 2019-04-08 ENCOUNTER — APPOINTMENT (OUTPATIENT)
Dept: RADIOLOGY | Facility: MEDICAL CENTER | Age: 79
End: 2019-04-08
Payer: MEDICARE

## 2019-04-08 DIAGNOSIS — R05.9 COUGH: ICD-10-CM

## 2019-04-08 DIAGNOSIS — R05.9 COUGH: Primary | ICD-10-CM

## 2019-04-08 PROCEDURE — 71046 X-RAY EXAM CHEST 2 VIEWS: CPT

## 2019-04-15 ENCOUNTER — HOSPITAL ENCOUNTER (OUTPATIENT)
Dept: RADIOLOGY | Age: 79
Discharge: HOME | End: 2019-04-15
Payer: MEDICARE

## 2019-04-15 DIAGNOSIS — D3A.8 NEUROENDOCRINE TUMOR: ICD-10-CM

## 2019-04-15 PROCEDURE — 74176 CT ABD & PELVIS W/O CONTRAST: CPT

## 2019-04-22 ENCOUNTER — TELEPHONE (OUTPATIENT)
Dept: CARDIOLOGY CLINIC | Facility: CLINIC | Age: 79
End: 2019-04-22

## 2019-05-01 PROBLEM — D3A.8 PRIMARY PANCREATIC NEUROENDOCRINE TUMOR: Status: ACTIVE | Noted: 2019-05-01

## 2019-05-01 PROBLEM — D3A.8 PRIMARY PANCREATIC NEUROENDOCRINE TUMOR: Status: RESOLVED | Noted: 2019-05-01 | Resolved: 2019-05-01

## 2019-05-01 RX ORDER — PREGABALIN 75 MG/1
CAPSULE ORAL EVERY 12 HOURS
COMMUNITY
End: 2022-05-05 | Stop reason: ALTCHOICE

## 2019-05-01 RX ORDER — PREDNISONE 1 MG/1
4 TABLET ORAL DAILY
COMMUNITY

## 2019-05-01 RX ORDER — ASPIRIN 325 MG
TABLET ORAL DAILY
COMMUNITY
End: 2022-05-05 | Stop reason: ALTCHOICE

## 2019-05-01 RX ORDER — HYDROXYCHLOROQUINE SULFATE 200 MG/1
TABLET, FILM COATED ORAL EVERY 12 HOURS
COMMUNITY
End: 2022-05-05 | Stop reason: ALTCHOICE

## 2019-05-01 RX ORDER — FAMOTIDINE 10 MG
10 TABLET ORAL DAILY PRN
COMMUNITY
End: 2022-05-05 | Stop reason: ALTCHOICE

## 2019-05-02 ENCOUNTER — OFFICE VISIT (OUTPATIENT)
Dept: SURGICAL ONCOLOGY | Facility: CLINIC | Age: 79
End: 2019-05-02
Payer: MEDICARE

## 2019-05-02 VITALS
HEART RATE: 77 BPM | RESPIRATION RATE: 18 BRPM | BODY MASS INDEX: 25.36 KG/M2 | DIASTOLIC BLOOD PRESSURE: 90 MMHG | WEIGHT: 204 LBS | SYSTOLIC BLOOD PRESSURE: 124 MMHG | TEMPERATURE: 97.7 F | HEIGHT: 75 IN

## 2019-05-02 DIAGNOSIS — D3A.8 PRIMARY PANCREATIC NEUROENDOCRINE TUMOR: Primary | ICD-10-CM

## 2019-05-02 PROCEDURE — 99213 OFFICE O/P EST LOW 20 MIN: CPT | Performed by: SURGERY

## 2019-08-05 ENCOUNTER — LAB (OUTPATIENT)
Dept: LAB | Facility: MEDICAL CENTER | Age: 79
End: 2019-08-05
Payer: MEDICARE

## 2019-08-05 ENCOUNTER — TRANSCRIBE ORDERS (OUTPATIENT)
Dept: ADMINISTRATIVE | Facility: HOSPITAL | Age: 79
End: 2019-08-05

## 2019-08-05 DIAGNOSIS — E78.2 MIXED HYPERLIPIDEMIA: ICD-10-CM

## 2019-08-05 DIAGNOSIS — E11.9 TYPE 2 DIABETES MELLITUS WITHOUT COMPLICATION, WITH LONG-TERM CURRENT USE OF INSULIN (HCC): Primary | ICD-10-CM

## 2019-08-05 DIAGNOSIS — Z79.4 TYPE 2 DIABETES MELLITUS WITHOUT COMPLICATION, WITH LONG-TERM CURRENT USE OF INSULIN (HCC): Primary | ICD-10-CM

## 2019-08-05 LAB
ALBUMIN SERPL BCP-MCNC: 3.8 G/DL (ref 3.5–5)
ALP SERPL-CCNC: 145 U/L (ref 46–116)
ALT SERPL W P-5'-P-CCNC: 16 U/L (ref 12–78)
ANION GAP SERPL CALCULATED.3IONS-SCNC: 5 MMOL/L (ref 4–13)
AST SERPL W P-5'-P-CCNC: 19 U/L (ref 5–45)
BASOPHILS # BLD AUTO: 0.03 THOUSANDS/ΜL (ref 0–0.1)
BASOPHILS NFR BLD AUTO: 0 % (ref 0–1)
BILIRUB SERPL-MCNC: 0.73 MG/DL (ref 0.2–1)
BUN SERPL-MCNC: 33 MG/DL (ref 5–25)
CALCIUM SERPL-MCNC: 9.4 MG/DL (ref 8.3–10.1)
CHLORIDE SERPL-SCNC: 106 MMOL/L (ref 100–108)
CHOLEST SERPL-MCNC: 122 MG/DL (ref 50–200)
CO2 SERPL-SCNC: 30 MMOL/L (ref 21–32)
CREAT SERPL-MCNC: 1.8 MG/DL (ref 0.6–1.3)
EOSINOPHIL # BLD AUTO: 0.22 THOUSAND/ΜL (ref 0–0.61)
EOSINOPHIL NFR BLD AUTO: 3 % (ref 0–6)
ERYTHROCYTE [DISTWIDTH] IN BLOOD BY AUTOMATED COUNT: 13.7 % (ref 11.6–15.1)
EST. AVERAGE GLUCOSE BLD GHB EST-MCNC: 143 MG/DL
GFR SERPL CREATININE-BSD FRML MDRD: 36 ML/MIN/1.73SQ M
GLUCOSE P FAST SERPL-MCNC: 91 MG/DL (ref 65–99)
HBA1C MFR BLD: 6.6 % (ref 4.2–6.3)
HCT VFR BLD AUTO: 43.3 % (ref 36.5–49.3)
HDLC SERPL-MCNC: 44 MG/DL (ref 40–60)
HGB BLD-MCNC: 13.2 G/DL (ref 12–17)
IMM GRANULOCYTES # BLD AUTO: 0.01 THOUSAND/UL (ref 0–0.2)
IMM GRANULOCYTES NFR BLD AUTO: 0 % (ref 0–2)
LDLC SERPL CALC-MCNC: 64 MG/DL (ref 0–100)
LYMPHOCYTES # BLD AUTO: 1.27 THOUSANDS/ΜL (ref 0.6–4.47)
LYMPHOCYTES NFR BLD AUTO: 17 % (ref 14–44)
MCH RBC QN AUTO: 30.3 PG (ref 26.8–34.3)
MCHC RBC AUTO-ENTMCNC: 30.5 G/DL (ref 31.4–37.4)
MCV RBC AUTO: 100 FL (ref 82–98)
MONOCYTES # BLD AUTO: 0.88 THOUSAND/ΜL (ref 0.17–1.22)
MONOCYTES NFR BLD AUTO: 12 % (ref 4–12)
NEUTROPHILS # BLD AUTO: 5.27 THOUSANDS/ΜL (ref 1.85–7.62)
NEUTS SEG NFR BLD AUTO: 68 % (ref 43–75)
NONHDLC SERPL-MCNC: 78 MG/DL
NRBC BLD AUTO-RTO: 0 /100 WBCS
PLATELET # BLD AUTO: 204 THOUSANDS/UL (ref 149–390)
PMV BLD AUTO: 9.7 FL (ref 8.9–12.7)
POTASSIUM SERPL-SCNC: 5.1 MMOL/L (ref 3.5–5.3)
PROT SERPL-MCNC: 6.8 G/DL (ref 6.4–8.2)
RBC # BLD AUTO: 4.35 MILLION/UL (ref 3.88–5.62)
SODIUM SERPL-SCNC: 141 MMOL/L (ref 136–145)
TRIGL SERPL-MCNC: 68 MG/DL
WBC # BLD AUTO: 7.68 THOUSAND/UL (ref 4.31–10.16)

## 2019-08-05 PROCEDURE — 83036 HEMOGLOBIN GLYCOSYLATED A1C: CPT | Performed by: INTERNAL MEDICINE

## 2019-08-05 PROCEDURE — 85025 COMPLETE CBC W/AUTO DIFF WBC: CPT | Performed by: INTERNAL MEDICINE

## 2019-08-05 PROCEDURE — 80053 COMPREHEN METABOLIC PANEL: CPT | Performed by: INTERNAL MEDICINE

## 2019-08-05 PROCEDURE — 80061 LIPID PANEL: CPT | Performed by: INTERNAL MEDICINE

## 2019-08-05 PROCEDURE — 36415 COLL VENOUS BLD VENIPUNCTURE: CPT | Performed by: INTERNAL MEDICINE

## 2019-11-08 ENCOUNTER — HOSPITAL ENCOUNTER (OUTPATIENT)
Dept: RADIOLOGY | Facility: MEDICAL CENTER | Age: 79
Discharge: HOME | End: 2019-11-08
Payer: MEDICARE

## 2019-11-08 DIAGNOSIS — N28.1 RENAL CYST: ICD-10-CM

## 2019-11-08 PROCEDURE — 76770 US EXAM ABDO BACK WALL COMP: CPT

## 2019-12-03 ENCOUNTER — LAB (OUTPATIENT)
Dept: LAB | Facility: MEDICAL CENTER | Age: 79
End: 2019-12-03
Payer: MEDICARE

## 2019-12-03 ENCOUNTER — TRANSCRIBE ORDERS (OUTPATIENT)
Dept: ADMINISTRATIVE | Facility: HOSPITAL | Age: 79
End: 2019-12-03

## 2019-12-03 DIAGNOSIS — E78.2 MIXED HYPERLIPIDEMIA: ICD-10-CM

## 2019-12-03 DIAGNOSIS — E11.9 DIABETES MELLITUS WITHOUT COMPLICATION (HCC): Primary | ICD-10-CM

## 2019-12-03 LAB
ALBUMIN SERPL BCP-MCNC: 4.2 G/DL (ref 3.5–5)
ALP SERPL-CCNC: 130 U/L (ref 46–116)
ALT SERPL W P-5'-P-CCNC: 15 U/L (ref 12–78)
ANION GAP SERPL CALCULATED.3IONS-SCNC: 3 MMOL/L (ref 4–13)
AST SERPL W P-5'-P-CCNC: 16 U/L (ref 5–45)
BASOPHILS # BLD AUTO: 0.02 THOUSANDS/ΜL (ref 0–0.1)
BASOPHILS NFR BLD AUTO: 0 % (ref 0–1)
BILIRUB SERPL-MCNC: 0.98 MG/DL (ref 0.2–1)
BUN SERPL-MCNC: 33 MG/DL (ref 5–25)
CALCIUM SERPL-MCNC: 9.7 MG/DL (ref 8.3–10.1)
CHLORIDE SERPL-SCNC: 107 MMOL/L (ref 100–108)
CHOLEST SERPL-MCNC: 139 MG/DL (ref 50–200)
CO2 SERPL-SCNC: 31 MMOL/L (ref 21–32)
CREAT SERPL-MCNC: 1.68 MG/DL (ref 0.6–1.3)
EOSINOPHIL # BLD AUTO: 0.13 THOUSAND/ΜL (ref 0–0.61)
EOSINOPHIL NFR BLD AUTO: 2 % (ref 0–6)
ERYTHROCYTE [DISTWIDTH] IN BLOOD BY AUTOMATED COUNT: 13.5 % (ref 11.6–15.1)
EST. AVERAGE GLUCOSE BLD GHB EST-MCNC: 137 MG/DL
GFR SERPL CREATININE-BSD FRML MDRD: 39 ML/MIN/1.73SQ M
GLUCOSE P FAST SERPL-MCNC: 86 MG/DL (ref 65–99)
HBA1C MFR BLD: 6.4 % (ref 4.2–6.3)
HCT VFR BLD AUTO: 43.6 % (ref 36.5–49.3)
HDLC SERPL-MCNC: 49 MG/DL
HGB BLD-MCNC: 13.3 G/DL (ref 12–17)
IMM GRANULOCYTES # BLD AUTO: 0.04 THOUSAND/UL (ref 0–0.2)
IMM GRANULOCYTES NFR BLD AUTO: 1 % (ref 0–2)
LDLC SERPL CALC-MCNC: 78 MG/DL (ref 0–100)
LYMPHOCYTES # BLD AUTO: 1.14 THOUSANDS/ΜL (ref 0.6–4.47)
LYMPHOCYTES NFR BLD AUTO: 14 % (ref 14–44)
MCH RBC QN AUTO: 30.4 PG (ref 26.8–34.3)
MCHC RBC AUTO-ENTMCNC: 30.5 G/DL (ref 31.4–37.4)
MCV RBC AUTO: 100 FL (ref 82–98)
MONOCYTES # BLD AUTO: 0.83 THOUSAND/ΜL (ref 0.17–1.22)
MONOCYTES NFR BLD AUTO: 10 % (ref 4–12)
NEUTROPHILS # BLD AUTO: 5.83 THOUSANDS/ΜL (ref 1.85–7.62)
NEUTS SEG NFR BLD AUTO: 73 % (ref 43–75)
NONHDLC SERPL-MCNC: 90 MG/DL
NRBC BLD AUTO-RTO: 0 /100 WBCS
PLATELET # BLD AUTO: 201 THOUSANDS/UL (ref 149–390)
PMV BLD AUTO: 9.7 FL (ref 8.9–12.7)
POTASSIUM SERPL-SCNC: 4.5 MMOL/L (ref 3.5–5.3)
PROT SERPL-MCNC: 7.1 G/DL (ref 6.4–8.2)
RBC # BLD AUTO: 4.37 MILLION/UL (ref 3.88–5.62)
SODIUM SERPL-SCNC: 141 MMOL/L (ref 136–145)
TRIGL SERPL-MCNC: 58 MG/DL
WBC # BLD AUTO: 7.99 THOUSAND/UL (ref 4.31–10.16)

## 2019-12-03 PROCEDURE — 36415 COLL VENOUS BLD VENIPUNCTURE: CPT | Performed by: INTERNAL MEDICINE

## 2019-12-03 PROCEDURE — 80061 LIPID PANEL: CPT | Performed by: INTERNAL MEDICINE

## 2019-12-03 PROCEDURE — 80053 COMPREHEN METABOLIC PANEL: CPT | Performed by: INTERNAL MEDICINE

## 2019-12-03 PROCEDURE — 83036 HEMOGLOBIN GLYCOSYLATED A1C: CPT | Performed by: INTERNAL MEDICINE

## 2019-12-03 PROCEDURE — 85025 COMPLETE CBC W/AUTO DIFF WBC: CPT | Performed by: INTERNAL MEDICINE

## 2019-12-26 ENCOUNTER — OFFICE VISIT (OUTPATIENT)
Dept: UROLOGY | Facility: AMBULATORY SURGERY CENTER | Age: 79
End: 2019-12-26
Payer: MEDICARE

## 2019-12-26 VITALS
WEIGHT: 207 LBS | HEART RATE: 80 BPM | HEIGHT: 75 IN | SYSTOLIC BLOOD PRESSURE: 140 MMHG | BODY MASS INDEX: 25.74 KG/M2 | DIASTOLIC BLOOD PRESSURE: 82 MMHG

## 2019-12-26 DIAGNOSIS — N18.30 CKD (CHRONIC KIDNEY DISEASE) STAGE 3, GFR 30-59 ML/MIN (HCC): ICD-10-CM

## 2019-12-26 DIAGNOSIS — N28.1 RENAL CYST: Primary | ICD-10-CM

## 2019-12-26 PROCEDURE — 99213 OFFICE O/P EST LOW 20 MIN: CPT | Performed by: UROLOGY

## 2019-12-26 NOTE — PROGRESS NOTES
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, right renal cyst x2 (complex), chronic kidney disease, phimosis      Discussion  we discussed his recent creatinine of 1 68  I recommend a referral to Nephrology for evaluation of his chronic kidney disease and to help minimize potential future renal compromise  We discussed his last PSA level of 0 8 from 2015  Based on current AUA guidelines he does not require additional PSA testing  He does not have strong family history of prostate cancer  We discussed his renal cysts which appear stable  Follow-up in 1 year with a repeat retroperitoneal ultrasound  We discussed his phimosis and the patient defers circumcision or dorsal slit procedure at this time  History of Present Illness  70 y o  male with a history of pancreatic neuroendocrine tumor status post partial pancreatectomy  During his evaluation and surveillance of his pancreas he was found to have complex right renal cyst x2  He returns in follow-up today  A repeat retroperitoneal ultrasound shows that the cysts are stable without significant change in echo texture or size  His wife is present with him in the office today  He complains of difficulty retracting the foreskin  He denies any lower urinary tract symptoms  He has a recent creatinine of 1 68  He has not been evaluated by Nephrology  He believes that his elevated creatinine is secondary to prior administration of contrast           AUA Symptom Score  AUA SYMPTOM SCORE      Most Recent Value   AUA SYMPTOM SCORE   How often have you had a sensation of not emptying your bladder completely after you finished urinating? 1   How often have you had to urinate again less than two hours after you finished urinating? 4   How often have you found you stopped and started again several times when you urinate?   1   How often have you found it difficult to postpone urination? 3   How often have you had a weak urinary stream?  3   How often have you had to push or strain to begin urination? 2   How many times did you most typically get up to urinate from the time you went to bed at night until the time you got up in the morning? 3   Quality of Life: If you were to spend the rest of your life with your urinary condition just the way it is now, how would you feel about that?  2   AUA SYMPTOM SCORE  17          Review of Systems  Review of Systems   Constitutional: Negative  HENT: Negative  Eyes: Negative  Respiratory: Negative  Cardiovascular: Negative  Gastrointestinal: Negative  Endocrine: Negative  Genitourinary:        Per HPI   Musculoskeletal: Negative  Skin: Negative  Allergic/Immunologic: Negative  Neurological: Negative  Hematological: Negative  Psychiatric/Behavioral: Negative  Past Medical History  Past Medical History:   Diagnosis Date    [facility]     Hypertension        Past Social History  Past Surgical History:   Procedure Laterality Date    APPENDECTOMY      CARDIAC SURGERY      Aortic Valve Replacement, Ascending Aorta    GALLBLADDER SURGERY      PANCREATICODUODENECTOMY         Past Family History  History reviewed  No pertinent family history      Past Social history  Social History     Socioeconomic History    Marital status: /Civil Union     Spouse name: Not on file    Number of children: Not on file    Years of education: Not on file    Highest education level: Not on file   Occupational History    Not on file   Social Needs    Financial resource strain: Not on file    Food insecurity:     Worry: Not on file     Inability: Not on file    Transportation needs:     Medical: Not on file     Non-medical: Not on file   Tobacco Use    Smoking status: Never Smoker    Smokeless tobacco: Never Used   Substance and Sexual Activity    Alcohol use: No    Drug use: No    Sexual activity: Yes     Partners: Female   Lifestyle    Physical activity:     Days per week: Not on file     Minutes per session: Not on file    Stress: Not on file   Relationships    Social connections:     Talks on phone: Not on file     Gets together: Not on file     Attends Hoahaoism service: Not on file     Active member of club or organization: Not on file     Attends meetings of clubs or organizations: Not on file     Relationship status: Not on file    Intimate partner violence:     Fear of current or ex partner: Not on file     Emotionally abused: Not on file     Physically abused: Not on file     Forced sexual activity: Not on file   Other Topics Concern    Not on file   Social History Narrative    Not on file       Current Medications  Current Outpatient Medications   Medication Sig Dispense Refill    B-D UF III MINI PEN NEEDLES 31G X 5 MM MISC   0    EUSEBIO CONTOUR TEST test strip 3 (three) times a day Test  9    cholecalciferol (VITAMIN D3) 1,000 units tablet Take 2,000 Units by mouth daily      Cinnamon 500 MG capsule Take 1,000 mg by mouth daily      insulin glargine (LANTUS) 100 units/mL subcutaneous injection Inject 41 Units under the skin daily at bedtime        losartan (COZAAR) 100 MG tablet Take 100 mg by mouth daily      metFORMIN (GLUCOPHAGE) 500 mg tablet Take 500 mg by mouth 2 (two) times a day with meals      metoprolol tartrate (LOPRESSOR) 25 mg tablet Take 25 mg by mouth daily        MULTAQ 400 MG tablet   0    pravastatin (PRAVACHOL) 40 mg tablet Take 40 mg by mouth daily      torsemide (DEMADEX) 10 mg tablet Take 10 mg by mouth daily      XARELTO 20 MG tablet   0    amoxicillin (AMOXIL) 500 mg capsule Take 2,000 mg by mouth as needed 1 hour prior to dental appointment  0    apixaban (ELIQUIS) 5 mg Every 12 hours      aspirin 325 mg tablet Daily      famotidine (PEPCID) 10 mg tablet Take 10 mg by mouth daily as needed      hydroxychloroquine (PLAQUENIL) 200 mg tablet Every 12 hours      potassium chloride (MICRO-K) 8 mEq CR capsule Take 8 mEq by mouth daily        predniSONE 1 mg tablet prednisone   1 po qd      pregabalin (LYRICA) 75 mg capsule Every 12 hours      torsemide (DEMADEX) 10 mg tablet Daily       No current facility-administered medications for this visit  Allergies  Allergies   Allergen Reactions    Other        Past Medical History, Social History, Family History, medications and allergies were reviewed  Vitals  Vitals:    12/26/19 1003   BP: 140/82   BP Location: Left arm   Patient Position: Sitting   Cuff Size: Adult   Pulse: 80   Weight: 93 9 kg (207 lb)   Height: 6' 3" (1 905 m)       Physical Exam  Physical Exam    On examination he is in no acute distress  Gait normal   Affect normal   The patient defers examination of his foreskin which was offered today  Results  Lab Results   Component Value Date    PSA 0 8 08/05/2015    PSA 0 7 07/31/2014     Lab Results   Component Value Date    GLUCOSE 101 06/08/2015    CALCIUM 9 7 12/03/2019     06/08/2015    K 4 5 12/03/2019    CO2 31 12/03/2019     12/03/2019    BUN 33 (H) 12/03/2019    CREATININE 1 68 (H) 12/03/2019     Lab Results   Component Value Date    WBC 7 99 12/03/2019    HGB 13 3 12/03/2019    HCT 43 6 12/03/2019     (H) 12/03/2019     12/03/2019         Office Urine Dip  No results found for this or any previous visit (from the past 1 hour(s))  ]      Total visit time was 15 minutes of which over 50% was spent on counseling

## 2020-02-11 ENCOUNTER — TELEPHONE (OUTPATIENT)
Dept: NEPHROLOGY | Facility: CLINIC | Age: 80
End: 2020-02-11

## 2020-02-11 ENCOUNTER — TELEPHONE (OUTPATIENT)
Dept: UROLOGY | Facility: MEDICAL CENTER | Age: 80
End: 2020-02-11

## 2020-02-11 DIAGNOSIS — N28.1 RENAL CYST: Primary | ICD-10-CM

## 2020-02-11 DIAGNOSIS — N18.1 STAGE 1 CHRONIC KIDNEY DISEASE: Primary | ICD-10-CM

## 2020-02-11 NOTE — TELEPHONE ENCOUNTER
This is a patient of Dr Flory Clifton in [facility] called and said patient is canceling his appointment with them  He does not want to see a specialist and is going to be taken care of by his primary doctor  If there are any questions Lolita De La Vega can be reached at [phone]

## 2020-02-11 NOTE — TELEPHONE ENCOUNTER
Patient of Dr Marely Bonner called stating that he was canceling his appointment for Thursday 2/13/2020 Nephrology Consult ref by Dr Hayley Menard for CKD 3  The patient said that he is being treated by his PCP doctor and does not see the need to see a specialist  The patient did not want to reschedule at this time  I did call over to Dr Hayley Menard office and spoke to Adriel Wang and explained to her that the patient was being ref by Dr Hayley Menard and that he called to cancel his appointment and did not want to reschedule at this is time  Adriel Wang said that she was going to let Dr Hayley Menard know that the patient has cancel hs appointment    [place] Rowley,

## 2020-05-15 ENCOUNTER — LAB (OUTPATIENT)
Dept: LAB | Facility: MEDICAL CENTER | Age: 80
End: 2020-05-15
Payer: MEDICARE

## 2020-05-15 ENCOUNTER — TRANSCRIBE ORDERS (OUTPATIENT)
Dept: ADMINISTRATIVE | Facility: HOSPITAL | Age: 80
End: 2020-05-15

## 2020-05-15 DIAGNOSIS — E11.9 TYPE 2 DIABETES MELLITUS WITHOUT COMPLICATION, WITH LONG-TERM CURRENT USE OF INSULIN (HCC): ICD-10-CM

## 2020-05-15 DIAGNOSIS — E78.5 HYPERLIPIDEMIA, UNSPECIFIED HYPERLIPIDEMIA TYPE: Primary | ICD-10-CM

## 2020-05-15 DIAGNOSIS — Z79.4 TYPE 2 DIABETES MELLITUS WITHOUT COMPLICATION, WITH LONG-TERM CURRENT USE OF INSULIN (HCC): ICD-10-CM

## 2020-05-15 LAB
ALBUMIN SERPL BCP-MCNC: 3.9 G/DL (ref 3.5–5)
ALP SERPL-CCNC: 135 U/L (ref 46–116)
ALT SERPL W P-5'-P-CCNC: 20 U/L (ref 12–78)
ANION GAP SERPL CALCULATED.3IONS-SCNC: 2 MMOL/L (ref 4–13)
AST SERPL W P-5'-P-CCNC: 18 U/L (ref 5–45)
BASOPHILS # BLD AUTO: 0.04 THOUSANDS/ΜL (ref 0–0.1)
BASOPHILS NFR BLD AUTO: 1 % (ref 0–1)
BILIRUB SERPL-MCNC: 0.72 MG/DL (ref 0.2–1)
BUN SERPL-MCNC: 31 MG/DL (ref 5–25)
CALCIUM SERPL-MCNC: 9.2 MG/DL (ref 8.3–10.1)
CHLORIDE SERPL-SCNC: 103 MMOL/L (ref 100–108)
CHOLEST SERPL-MCNC: 137 MG/DL (ref 50–200)
CO2 SERPL-SCNC: 32 MMOL/L (ref 21–32)
CREAT SERPL-MCNC: 1.61 MG/DL (ref 0.6–1.3)
EOSINOPHIL # BLD AUTO: 0.19 THOUSAND/ΜL (ref 0–0.61)
EOSINOPHIL NFR BLD AUTO: 3 % (ref 0–6)
ERYTHROCYTE [DISTWIDTH] IN BLOOD BY AUTOMATED COUNT: 13.3 % (ref 11.6–15.1)
EST. AVERAGE GLUCOSE BLD GHB EST-MCNC: 131 MG/DL
GFR SERPL CREATININE-BSD FRML MDRD: 40 ML/MIN/1.73SQ M
GLUCOSE P FAST SERPL-MCNC: 74 MG/DL (ref 65–99)
HBA1C MFR BLD: 6.2 %
HCT VFR BLD AUTO: 43.6 % (ref 36.5–49.3)
HDLC SERPL-MCNC: 47 MG/DL
HGB BLD-MCNC: 13.4 G/DL (ref 12–17)
IMM GRANULOCYTES # BLD AUTO: 0.02 THOUSAND/UL (ref 0–0.2)
IMM GRANULOCYTES NFR BLD AUTO: 0 % (ref 0–2)
LDLC SERPL CALC-MCNC: 77 MG/DL (ref 0–100)
LYMPHOCYTES # BLD AUTO: 1.3 THOUSANDS/ΜL (ref 0.6–4.47)
LYMPHOCYTES NFR BLD AUTO: 19 % (ref 14–44)
MCH RBC QN AUTO: 31.2 PG (ref 26.8–34.3)
MCHC RBC AUTO-ENTMCNC: 30.7 G/DL (ref 31.4–37.4)
MCV RBC AUTO: 102 FL (ref 82–98)
MONOCYTES # BLD AUTO: 0.81 THOUSAND/ΜL (ref 0.17–1.22)
MONOCYTES NFR BLD AUTO: 12 % (ref 4–12)
NEUTROPHILS # BLD AUTO: 4.59 THOUSANDS/ΜL (ref 1.85–7.62)
NEUTS SEG NFR BLD AUTO: 65 % (ref 43–75)
NONHDLC SERPL-MCNC: 90 MG/DL
NRBC BLD AUTO-RTO: 0 /100 WBCS
PLATELET # BLD AUTO: 198 THOUSANDS/UL (ref 149–390)
PMV BLD AUTO: 9.8 FL (ref 8.9–12.7)
POTASSIUM SERPL-SCNC: 4.6 MMOL/L (ref 3.5–5.3)
PROT SERPL-MCNC: 6.8 G/DL (ref 6.4–8.2)
RBC # BLD AUTO: 4.29 MILLION/UL (ref 3.88–5.62)
SODIUM SERPL-SCNC: 137 MMOL/L (ref 136–145)
TRIGL SERPL-MCNC: 66 MG/DL
WBC # BLD AUTO: 6.95 THOUSAND/UL (ref 4.31–10.16)

## 2020-05-15 PROCEDURE — 80053 COMPREHEN METABOLIC PANEL: CPT | Performed by: INTERNAL MEDICINE

## 2020-05-15 PROCEDURE — 83036 HEMOGLOBIN GLYCOSYLATED A1C: CPT | Performed by: INTERNAL MEDICINE

## 2020-05-15 PROCEDURE — 85025 COMPLETE CBC W/AUTO DIFF WBC: CPT | Performed by: INTERNAL MEDICINE

## 2020-05-15 PROCEDURE — 36415 COLL VENOUS BLD VENIPUNCTURE: CPT | Performed by: INTERNAL MEDICINE

## 2020-05-15 PROCEDURE — 80061 LIPID PANEL: CPT | Performed by: INTERNAL MEDICINE

## 2020-06-09 ENCOUNTER — HOSPITAL ENCOUNTER (OUTPATIENT)
Dept: RADIOLOGY | Facility: MEDICAL CENTER | Age: 80
Discharge: HOME | End: 2020-06-09
Attending: SURGERY
Payer: MEDICARE

## 2020-06-09 DIAGNOSIS — D3A.8 PRIMARY PANCREATIC NEUROENDOCRINE TUMOR: ICD-10-CM

## 2020-06-09 PROCEDURE — 74176 CT ABD & PELVIS W/O CONTRAST: CPT

## 2020-06-16 ENCOUNTER — TELEPHONE (OUTPATIENT)
Dept: SURGICAL ONCOLOGY | Facility: CLINIC | Age: 80
End: 2020-06-16

## 2020-06-18 ENCOUNTER — OFFICE VISIT (OUTPATIENT)
Dept: SURGICAL ONCOLOGY | Facility: CLINIC | Age: 80
End: 2020-06-18
Payer: MEDICARE

## 2020-06-18 VITALS
SYSTOLIC BLOOD PRESSURE: 132 MMHG | HEIGHT: 75 IN | WEIGHT: 199.3 LBS | DIASTOLIC BLOOD PRESSURE: 90 MMHG | BODY MASS INDEX: 24.78 KG/M2 | HEART RATE: 64 BPM | TEMPERATURE: 98.1 F

## 2020-06-18 DIAGNOSIS — D3A.8 PRIMARY PANCREATIC NEUROENDOCRINE TUMOR: Primary | ICD-10-CM

## 2020-06-18 PROCEDURE — 99213 OFFICE O/P EST LOW 20 MIN: CPT | Performed by: SURGERY

## 2020-09-19 ENCOUNTER — LAB (OUTPATIENT)
Dept: LAB | Facility: MEDICAL CENTER | Age: 80
End: 2020-09-19
Payer: MEDICARE

## 2020-09-19 ENCOUNTER — TRANSCRIBE ORDERS (OUTPATIENT)
Dept: ADMINISTRATIVE | Facility: HOSPITAL | Age: 80
End: 2020-09-19

## 2020-09-19 DIAGNOSIS — E78.5 HYPERLIPIDEMIA, UNSPECIFIED HYPERLIPIDEMIA TYPE: ICD-10-CM

## 2020-09-19 DIAGNOSIS — R73.01 IMPAIRED FASTING GLUCOSE: ICD-10-CM

## 2020-09-19 DIAGNOSIS — R73.01 IMPAIRED FASTING GLUCOSE: Primary | ICD-10-CM

## 2020-09-19 LAB
ALBUMIN SERPL BCP-MCNC: 4 G/DL (ref 3.5–5)
ALP SERPL-CCNC: 116 U/L (ref 46–116)
ALT SERPL W P-5'-P-CCNC: 21 U/L (ref 12–78)
ANION GAP SERPL CALCULATED.3IONS-SCNC: 3 MMOL/L (ref 4–13)
AST SERPL W P-5'-P-CCNC: 17 U/L (ref 5–45)
BASOPHILS # BLD AUTO: 0.04 THOUSANDS/ΜL (ref 0–0.1)
BASOPHILS NFR BLD AUTO: 0 % (ref 0–1)
BILIRUB SERPL-MCNC: 0.98 MG/DL (ref 0.2–1)
BUN SERPL-MCNC: 40 MG/DL (ref 5–25)
CALCIUM SERPL-MCNC: 10 MG/DL (ref 8.3–10.1)
CHLORIDE SERPL-SCNC: 104 MMOL/L (ref 100–108)
CHOLEST SERPL-MCNC: 161 MG/DL (ref 50–200)
CO2 SERPL-SCNC: 31 MMOL/L (ref 21–32)
CREAT SERPL-MCNC: 1.7 MG/DL (ref 0.6–1.3)
EOSINOPHIL # BLD AUTO: 0.15 THOUSAND/ΜL (ref 0–0.61)
EOSINOPHIL NFR BLD AUTO: 1 % (ref 0–6)
ERYTHROCYTE [DISTWIDTH] IN BLOOD BY AUTOMATED COUNT: 13.2 % (ref 11.6–15.1)
EST. AVERAGE GLUCOSE BLD GHB EST-MCNC: 134 MG/DL
GFR SERPL CREATININE-BSD FRML MDRD: 38 ML/MIN/1.73SQ M
GLUCOSE P FAST SERPL-MCNC: 92 MG/DL (ref 65–99)
HBA1C MFR BLD: 6.3 %
HCT VFR BLD AUTO: 46.7 % (ref 36.5–49.3)
HDLC SERPL-MCNC: 56 MG/DL
HGB BLD-MCNC: 14.6 G/DL (ref 12–17)
IMM GRANULOCYTES # BLD AUTO: 0.07 THOUSAND/UL (ref 0–0.2)
IMM GRANULOCYTES NFR BLD AUTO: 1 % (ref 0–2)
LDLC SERPL CALC-MCNC: 95 MG/DL (ref 0–100)
LYMPHOCYTES # BLD AUTO: 1.5 THOUSANDS/ΜL (ref 0.6–4.47)
LYMPHOCYTES NFR BLD AUTO: 14 % (ref 14–44)
MCH RBC QN AUTO: 31.6 PG (ref 26.8–34.3)
MCHC RBC AUTO-ENTMCNC: 31.3 G/DL (ref 31.4–37.4)
MCV RBC AUTO: 101 FL (ref 82–98)
MONOCYTES # BLD AUTO: 1 THOUSAND/ΜL (ref 0.17–1.22)
MONOCYTES NFR BLD AUTO: 9 % (ref 4–12)
NEUTROPHILS # BLD AUTO: 8.17 THOUSANDS/ΜL (ref 1.85–7.62)
NEUTS SEG NFR BLD AUTO: 75 % (ref 43–75)
NONHDLC SERPL-MCNC: 105 MG/DL
NRBC BLD AUTO-RTO: 0 /100 WBCS
PLATELET # BLD AUTO: 210 THOUSANDS/UL (ref 149–390)
PMV BLD AUTO: 9.9 FL (ref 8.9–12.7)
POTASSIUM SERPL-SCNC: 5.2 MMOL/L (ref 3.5–5.3)
PROT SERPL-MCNC: 7 G/DL (ref 6.4–8.2)
RBC # BLD AUTO: 4.62 MILLION/UL (ref 3.88–5.62)
SODIUM SERPL-SCNC: 138 MMOL/L (ref 136–145)
TRIGL SERPL-MCNC: 52 MG/DL
WBC # BLD AUTO: 10.93 THOUSAND/UL (ref 4.31–10.16)

## 2020-09-19 PROCEDURE — 36415 COLL VENOUS BLD VENIPUNCTURE: CPT

## 2020-09-19 PROCEDURE — 80061 LIPID PANEL: CPT

## 2020-09-19 PROCEDURE — 85025 COMPLETE CBC W/AUTO DIFF WBC: CPT

## 2020-09-19 PROCEDURE — 80053 COMPREHEN METABOLIC PANEL: CPT

## 2020-09-19 PROCEDURE — 83036 HEMOGLOBIN GLYCOSYLATED A1C: CPT

## 2020-12-18 ENCOUNTER — TRANSCRIBE ORDERS (OUTPATIENT)
Dept: ADMINISTRATIVE | Facility: HOSPITAL | Age: 80
End: 2020-12-18

## 2020-12-18 ENCOUNTER — LAB (OUTPATIENT)
Dept: LAB | Facility: MEDICAL CENTER | Age: 80
End: 2020-12-18
Payer: MEDICARE

## 2020-12-18 DIAGNOSIS — E11.9 TYPE 2 DIABETES MELLITUS WITHOUT COMPLICATION, WITH LONG-TERM CURRENT USE OF INSULIN (HCC): ICD-10-CM

## 2020-12-18 DIAGNOSIS — Z79.4 TYPE 2 DIABETES MELLITUS WITHOUT COMPLICATION, WITH LONG-TERM CURRENT USE OF INSULIN (HCC): ICD-10-CM

## 2020-12-18 DIAGNOSIS — Z79.4 TYPE 2 DIABETES MELLITUS WITHOUT COMPLICATION, WITH LONG-TERM CURRENT USE OF INSULIN (HCC): Primary | ICD-10-CM

## 2020-12-18 DIAGNOSIS — E78.5 HYPERLIPIDEMIA, UNSPECIFIED HYPERLIPIDEMIA TYPE: ICD-10-CM

## 2020-12-18 DIAGNOSIS — E11.9 TYPE 2 DIABETES MELLITUS WITHOUT COMPLICATION, WITH LONG-TERM CURRENT USE OF INSULIN (HCC): Primary | ICD-10-CM

## 2020-12-18 LAB
ALBUMIN SERPL BCP-MCNC: 3.7 G/DL (ref 3.5–5)
ALP SERPL-CCNC: 134 U/L (ref 46–116)
ALT SERPL W P-5'-P-CCNC: 20 U/L (ref 12–78)
ANION GAP SERPL CALCULATED.3IONS-SCNC: 5 MMOL/L (ref 4–13)
AST SERPL W P-5'-P-CCNC: 19 U/L (ref 5–45)
BASOPHILS # BLD AUTO: 0.04 THOUSANDS/ΜL (ref 0–0.1)
BASOPHILS NFR BLD AUTO: 1 % (ref 0–1)
BILIRUB SERPL-MCNC: 0.98 MG/DL (ref 0.2–1)
BUN SERPL-MCNC: 34 MG/DL (ref 5–25)
CALCIUM SERPL-MCNC: 10 MG/DL (ref 8.3–10.1)
CHLORIDE SERPL-SCNC: 108 MMOL/L (ref 100–108)
CHOLEST SERPL-MCNC: 139 MG/DL (ref 50–200)
CO2 SERPL-SCNC: 28 MMOL/L (ref 21–32)
CREAT SERPL-MCNC: 1.69 MG/DL (ref 0.6–1.3)
EOSINOPHIL # BLD AUTO: 0.15 THOUSAND/ΜL (ref 0–0.61)
EOSINOPHIL NFR BLD AUTO: 2 % (ref 0–6)
ERYTHROCYTE [DISTWIDTH] IN BLOOD BY AUTOMATED COUNT: 13.2 % (ref 11.6–15.1)
EST. AVERAGE GLUCOSE BLD GHB EST-MCNC: 126 MG/DL
GFR SERPL CREATININE-BSD FRML MDRD: 38 ML/MIN/1.73SQ M
GLUCOSE P FAST SERPL-MCNC: 93 MG/DL (ref 65–99)
HBA1C MFR BLD: 6 %
HCT VFR BLD AUTO: 45.7 % (ref 36.5–49.3)
HDLC SERPL-MCNC: 48 MG/DL
HGB BLD-MCNC: 13.6 G/DL (ref 12–17)
IMM GRANULOCYTES # BLD AUTO: 0.03 THOUSAND/UL (ref 0–0.2)
IMM GRANULOCYTES NFR BLD AUTO: 0 % (ref 0–2)
LDLC SERPL CALC-MCNC: 78 MG/DL (ref 0–100)
LYMPHOCYTES # BLD AUTO: 1.17 THOUSANDS/ΜL (ref 0.6–4.47)
LYMPHOCYTES NFR BLD AUTO: 15 % (ref 14–44)
MCH RBC QN AUTO: 30.4 PG (ref 26.8–34.3)
MCHC RBC AUTO-ENTMCNC: 29.8 G/DL (ref 31.4–37.4)
MCV RBC AUTO: 102 FL (ref 82–98)
MONOCYTES # BLD AUTO: 0.79 THOUSAND/ΜL (ref 0.17–1.22)
MONOCYTES NFR BLD AUTO: 10 % (ref 4–12)
NEUTROPHILS # BLD AUTO: 5.86 THOUSANDS/ΜL (ref 1.85–7.62)
NEUTS SEG NFR BLD AUTO: 72 % (ref 43–75)
NONHDLC SERPL-MCNC: 91 MG/DL
NRBC BLD AUTO-RTO: 0 /100 WBCS
PLATELET # BLD AUTO: 185 THOUSANDS/UL (ref 149–390)
PMV BLD AUTO: 9.9 FL (ref 8.9–12.7)
POTASSIUM SERPL-SCNC: 5.1 MMOL/L (ref 3.5–5.3)
PROT SERPL-MCNC: 6.7 G/DL (ref 6.4–8.2)
RBC # BLD AUTO: 4.47 MILLION/UL (ref 3.88–5.62)
SODIUM SERPL-SCNC: 141 MMOL/L (ref 136–145)
TRIGL SERPL-MCNC: 67 MG/DL
WBC # BLD AUTO: 8.04 THOUSAND/UL (ref 4.31–10.16)

## 2020-12-18 PROCEDURE — 85025 COMPLETE CBC W/AUTO DIFF WBC: CPT

## 2020-12-18 PROCEDURE — 80053 COMPREHEN METABOLIC PANEL: CPT

## 2020-12-18 PROCEDURE — 83036 HEMOGLOBIN GLYCOSYLATED A1C: CPT

## 2020-12-18 PROCEDURE — 80061 LIPID PANEL: CPT

## 2020-12-18 PROCEDURE — 36415 COLL VENOUS BLD VENIPUNCTURE: CPT

## 2020-12-22 ENCOUNTER — HOSPITAL ENCOUNTER (OUTPATIENT)
Dept: RADIOLOGY | Facility: MEDICAL CENTER | Age: 80
Discharge: HOME | End: 2020-12-22
Payer: MEDICARE

## 2020-12-22 DIAGNOSIS — N28.1 RENAL CYST: ICD-10-CM

## 2020-12-22 PROCEDURE — 76770 US EXAM ABDO BACK WALL COMP: CPT

## 2020-12-29 NOTE — TELEPHONE ENCOUNTER
Patient called because he did not get a reminder call for his appointment and was checking to see if he was still on the schedule  I advised that Dr Perdomo Loss office called to cancel appointment as per his request he advised that he wanted to cancel their appointment not ours  Patient would like the results of US Kidney and Bladder  Please advise

## 2020-12-30 NOTE — TELEPHONE ENCOUNTER
Call placed to patient, advised US was stable  Per provider, patient can follow up in one year with US ptv as long as he is not symptomatic  Order placed in [address] [place] [address] Rd  Patient verbalized understanding and agrees with plan  Patient states he will call back to schedule for next year

## 2020-12-30 NOTE — TELEPHONE ENCOUNTER
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

## 2021-02-12 ENCOUNTER — TRANSCRIBE ORDERS (OUTPATIENT)
Dept: ADMINISTRATIVE | Facility: HOSPITAL | Age: 81
End: 2021-02-12

## 2021-02-12 ENCOUNTER — LAB (OUTPATIENT)
Dept: LAB | Facility: MEDICAL CENTER | Age: 81
End: 2021-02-12
Payer: COMMERCIAL

## 2021-02-12 DIAGNOSIS — M35.3 POLYMYALGIA RHEUMATICA (HCC): Primary | ICD-10-CM

## 2021-02-12 DIAGNOSIS — M35.3 POLYMYALGIA RHEUMATICA (HCC): ICD-10-CM

## 2021-02-12 LAB
CK SERPL-CCNC: 40 U/L (ref 39–308)
CRP SERPL QL: <3 MG/L
ERYTHROCYTE [SEDIMENTATION RATE] IN BLOOD: 10 MM/HOUR (ref 0–19)

## 2021-02-12 PROCEDURE — 85652 RBC SED RATE AUTOMATED: CPT | Performed by: INTERNAL MEDICINE

## 2021-02-12 PROCEDURE — 84165 PROTEIN E-PHORESIS SERUM: CPT | Performed by: PATHOLOGY

## 2021-02-12 PROCEDURE — 86430 RHEUMATOID FACTOR TEST QUAL: CPT

## 2021-02-12 PROCEDURE — 84165 PROTEIN E-PHORESIS SERUM: CPT

## 2021-02-12 PROCEDURE — 36415 COLL VENOUS BLD VENIPUNCTURE: CPT | Performed by: INTERNAL MEDICINE

## 2021-02-12 PROCEDURE — 86140 C-REACTIVE PROTEIN: CPT | Performed by: INTERNAL MEDICINE

## 2021-02-12 PROCEDURE — 82550 ASSAY OF CK (CPK): CPT | Performed by: INTERNAL MEDICINE

## 2021-02-15 LAB
ALBUMIN SERPL ELPH-MCNC: 4.47 G/DL (ref 3.5–5)
ALBUMIN SERPL ELPH-MCNC: 63.9 % (ref 52–65)
ALPHA1 GLOB SERPL ELPH-MCNC: 0.27 G/DL (ref 0.1–0.4)
ALPHA1 GLOB SERPL ELPH-MCNC: 3.9 % (ref 2.5–5)
ALPHA2 GLOB SERPL ELPH-MCNC: 0.76 G/DL (ref 0.4–1.2)
ALPHA2 GLOB SERPL ELPH-MCNC: 10.9 % (ref 7–13)
BETA GLOB ABNORMAL SERPL ELPH-MCNC: 0.45 G/DL (ref 0.4–0.8)
BETA1 GLOB SERPL ELPH-MCNC: 6.4 % (ref 5–13)
BETA2 GLOB SERPL ELPH-MCNC: 4.4 % (ref 2–8)
BETA2+GAMMA GLOB SERPL ELPH-MCNC: 0.31 G/DL (ref 0.2–0.5)
GAMMA GLOB ABNORMAL SERPL ELPH-MCNC: 0.74 G/DL (ref 0.5–1.6)
GAMMA GLOB SERPL ELPH-MCNC: 10.5 % (ref 12–22)
IGG/ALB SER: 1.77 {RATIO} (ref 1.1–1.8)
PROT PATTERN SERPL ELPH-IMP: ABNORMAL
PROT SERPL-MCNC: 7 G/DL (ref 6.4–8.2)
RHEUMATOID FACT SER QL LA: NEGATIVE

## 2021-04-20 ENCOUNTER — APPOINTMENT (OUTPATIENT)
Dept: LAB | Facility: MEDICAL CENTER | Age: 81
End: 2021-04-20
Payer: COMMERCIAL

## 2021-04-20 ENCOUNTER — TRANSCRIBE ORDERS (OUTPATIENT)
Dept: ADMINISTRATIVE | Facility: HOSPITAL | Age: 81
End: 2021-04-20

## 2021-04-20 DIAGNOSIS — E11.9 TYPE 2 DIABETES MELLITUS WITHOUT COMPLICATION, WITH LONG-TERM CURRENT USE OF INSULIN (HCC): ICD-10-CM

## 2021-04-20 DIAGNOSIS — E78.5 HYPERLIPIDEMIA, UNSPECIFIED HYPERLIPIDEMIA TYPE: ICD-10-CM

## 2021-04-20 DIAGNOSIS — M35.3 POLYMYALGIA RHEUMATICA (HCC): ICD-10-CM

## 2021-04-20 DIAGNOSIS — Z79.4 TYPE 2 DIABETES MELLITUS WITHOUT COMPLICATION, WITH LONG-TERM CURRENT USE OF INSULIN (HCC): ICD-10-CM

## 2021-04-20 DIAGNOSIS — M19.90 INFLAMMATORY ARTHRITIS: ICD-10-CM

## 2021-04-20 DIAGNOSIS — E11.9 TYPE 2 DIABETES MELLITUS WITHOUT COMPLICATION, WITH LONG-TERM CURRENT USE OF INSULIN (HCC): Primary | ICD-10-CM

## 2021-04-20 DIAGNOSIS — M19.90 INFLAMMATORY ARTHRITIS: Primary | ICD-10-CM

## 2021-04-20 DIAGNOSIS — Z79.4 TYPE 2 DIABETES MELLITUS WITHOUT COMPLICATION, WITH LONG-TERM CURRENT USE OF INSULIN (HCC): Primary | ICD-10-CM

## 2021-04-20 LAB
ALBUMIN SERPL BCP-MCNC: 3.7 G/DL (ref 3.5–5)
ALP SERPL-CCNC: 108 U/L (ref 46–116)
ALT SERPL W P-5'-P-CCNC: 20 U/L (ref 12–78)
ANION GAP SERPL CALCULATED.3IONS-SCNC: 2 MMOL/L (ref 4–13)
AST SERPL W P-5'-P-CCNC: 16 U/L (ref 5–45)
BASOPHILS # BLD AUTO: 0.04 THOUSANDS/ΜL (ref 0–0.1)
BASOPHILS NFR BLD AUTO: 1 % (ref 0–1)
BILIRUB SERPL-MCNC: 0.72 MG/DL (ref 0.2–1)
BUN SERPL-MCNC: 37 MG/DL (ref 5–25)
CALCIUM SERPL-MCNC: 10.1 MG/DL (ref 8.3–10.1)
CHLORIDE SERPL-SCNC: 108 MMOL/L (ref 100–108)
CHOLEST SERPL-MCNC: 142 MG/DL (ref 50–200)
CO2 SERPL-SCNC: 31 MMOL/L (ref 21–32)
CREAT SERPL-MCNC: 1.78 MG/DL (ref 0.6–1.3)
CRP SERPL QL: <3 MG/L
EOSINOPHIL # BLD AUTO: 0.21 THOUSAND/ΜL (ref 0–0.61)
EOSINOPHIL NFR BLD AUTO: 2 % (ref 0–6)
ERYTHROCYTE [DISTWIDTH] IN BLOOD BY AUTOMATED COUNT: 14.1 % (ref 11.6–15.1)
EST. AVERAGE GLUCOSE BLD GHB EST-MCNC: 148 MG/DL
GFR SERPL CREATININE-BSD FRML MDRD: 35 ML/MIN/1.73SQ M
GLUCOSE P FAST SERPL-MCNC: 95 MG/DL (ref 65–99)
HBA1C MFR BLD: 6.8 %
HCT VFR BLD AUTO: 42.4 % (ref 36.5–49.3)
HDLC SERPL-MCNC: 62 MG/DL
HGB BLD-MCNC: 13.3 G/DL (ref 12–17)
IMM GRANULOCYTES # BLD AUTO: 0.07 THOUSAND/UL (ref 0–0.2)
IMM GRANULOCYTES NFR BLD AUTO: 1 % (ref 0–2)
LDLC SERPL CALC-MCNC: 68 MG/DL (ref 0–100)
LYMPHOCYTES # BLD AUTO: 1.63 THOUSANDS/ΜL (ref 0.6–4.47)
LYMPHOCYTES NFR BLD AUTO: 19 % (ref 14–44)
MCH RBC QN AUTO: 32.1 PG (ref 26.8–34.3)
MCHC RBC AUTO-ENTMCNC: 31.4 G/DL (ref 31.4–37.4)
MCV RBC AUTO: 102 FL (ref 82–98)
MONOCYTES # BLD AUTO: 0.92 THOUSAND/ΜL (ref 0.17–1.22)
MONOCYTES NFR BLD AUTO: 11 % (ref 4–12)
NEUTROPHILS # BLD AUTO: 5.81 THOUSANDS/ΜL (ref 1.85–7.62)
NEUTS SEG NFR BLD AUTO: 66 % (ref 43–75)
NONHDLC SERPL-MCNC: 80 MG/DL
NRBC BLD AUTO-RTO: 0 /100 WBCS
PLATELET # BLD AUTO: 189 THOUSANDS/UL (ref 149–390)
PMV BLD AUTO: 9.9 FL (ref 8.9–12.7)
POTASSIUM SERPL-SCNC: 5.2 MMOL/L (ref 3.5–5.3)
PROT SERPL-MCNC: 6.6 G/DL (ref 6.4–8.2)
RBC # BLD AUTO: 4.14 MILLION/UL (ref 3.88–5.62)
SODIUM SERPL-SCNC: 141 MMOL/L (ref 136–145)
TRIGL SERPL-MCNC: 61 MG/DL
WBC # BLD AUTO: 8.68 THOUSAND/UL (ref 4.31–10.16)

## 2021-04-20 PROCEDURE — 85025 COMPLETE CBC W/AUTO DIFF WBC: CPT

## 2021-04-20 PROCEDURE — 80053 COMPREHEN METABOLIC PANEL: CPT

## 2021-04-20 PROCEDURE — 83036 HEMOGLOBIN GLYCOSYLATED A1C: CPT

## 2021-04-20 PROCEDURE — 86140 C-REACTIVE PROTEIN: CPT

## 2021-04-20 PROCEDURE — 86200 CCP ANTIBODY: CPT

## 2021-04-20 PROCEDURE — 80061 LIPID PANEL: CPT

## 2021-04-21 LAB — CCP AB SER IA-ACNC: 6.8

## 2021-06-17 ENCOUNTER — APPOINTMENT (OUTPATIENT)
Dept: RADIOLOGY | Facility: MEDICAL CENTER | Age: 81
End: 2021-06-17
Payer: COMMERCIAL

## 2021-06-17 DIAGNOSIS — R06.02 SHORTNESS OF BREATH: ICD-10-CM

## 2021-06-17 PROCEDURE — 71046 X-RAY EXAM CHEST 2 VIEWS: CPT

## 2021-06-22 ENCOUNTER — OFFICE VISIT (OUTPATIENT)
Dept: PULMONOLOGY | Facility: CLINIC | Age: 81
End: 2021-06-22
Payer: COMMERCIAL

## 2021-06-22 VITALS
OXYGEN SATURATION: 98 % | HEIGHT: 75 IN | WEIGHT: 197.4 LBS | RESPIRATION RATE: 18 BRPM | SYSTOLIC BLOOD PRESSURE: 158 MMHG | TEMPERATURE: 97.7 F | DIASTOLIC BLOOD PRESSURE: 90 MMHG | BODY MASS INDEX: 24.54 KG/M2 | HEART RATE: 85 BPM

## 2021-06-22 DIAGNOSIS — J84.9 ILD (INTERSTITIAL LUNG DISEASE) (HCC): Primary | ICD-10-CM

## 2021-06-22 PROCEDURE — 94618 PULMONARY STRESS TESTING: CPT | Performed by: INTERNAL MEDICINE

## 2021-06-22 PROCEDURE — 99204 OFFICE O/P NEW MOD 45 MIN: CPT | Performed by: INTERNAL MEDICINE

## 2021-06-22 RX ORDER — METOPROLOL SUCCINATE 25 MG/1
TABLET, EXTENDED RELEASE ORAL
COMMUNITY
Start: 2021-04-22

## 2021-06-22 NOTE — PROGRESS NOTES
Pulmonary Initial Visit  Radha Sheridan 80 y o  male MRN: [id number]  @ Encounter: [id number]      Impressions/Recommendations:     Patient is a 81-year-old male with past medical history significant for pancreatic cancer status post Whipple, history tobacco abuse who presents to establish pulmonary care  The patient reports that his breathing has worsened over the past year or so  He notes regular activities have become much more difficult  He has a significant cardiac history post intervention after his Whipple but this was approximately 10 years ago  He reports his breathing was doing well for many years post interventions and is only recently changed  Given his underlying autoimmune disorder of PMR,  Would recommend checking high-resolution CT scan  Patient also benefit from  Complete pulmonary function testing  Patient underwent in office walk test with no evidence of desaturation  Concern for ILD given hx of PMR  -  Check HRCT  -  Check complete pulmonary function testing    Cough  -  Unclear cause  -  Trial guaifenasin     Weight loss  -  F/u CT scan      Patient may follow up in 2 months  History of Present Illness   HPI:  Radha Sheridan is a 80 y o  male with pmhx sig for diabetes, HTN who presents to establish pulmonary care  The patient reports his breathing has gradually gotten worse  He notes difficulty taking care of his property given shortness of breath  He does try and exercise to keep control of his diabetes  He is on insulin  He feels his breathing is "half of what it was" last year  He will notice shortness of breath with bending over  He is unsure if his breathing was better on the higher dose steroids  He did trial albuterol in the past after his pneumonia with benefit  The patient reports that 5 years ago his breathing was perfect  He fully recovered from the surgery  He does not feel like his breathing has fully recovered from pneumonia      He denies history of sleep apnea  He quit smoking a pipe   He retired in   He has a history of pancreatic cancer s/p Whipple  The cancer was incidental finding on imaging  He has a history of aortic valve replacement in   The patient had multiple bouts with pneumonia in 2019  This was thought to be related to Multaq  He notes phlegm through out the day  He will cough throughout the day  He has lost 9lbs unintentionally  He is eating and drinking without difficulty  Review of systems:  Review of systems was completed and was otherwise negative except as listed in HPI      Historical Information   Past Medical History:   Diagnosis Date    Diabetes mellitus ([facility] [address] )     Hypertension      Past Surgical History:   Procedure Laterality Date    APPENDECTOMY      CARDIAC SURGERY      Aortic Valve Replacement, Ascending Aorta    GALLBLADDER SURGERY      PANCREATICODUODENECTOMY       Family History   Problem Relation Age of Onset    Cancer Mother     Stroke Father     Cancer Sister     Diabetes Sister     Stroke Brother      Social History     Socioeconomic History    Marital status: /Civil Union     Spouse name: None    Number of children: None    Years of education: None    Highest education level: None   Occupational History    None   Tobacco Use    Smoking status: Former Smoker     Years: 40 00     Types: Pipe     Quit date:      Years since quittin 4    Smokeless tobacco: Never Used   Vaping Use    Vaping Use: Never used   Substance and Sexual Activity    Alcohol use: No    Drug use: No    Sexual activity: Yes     Partners: Female   Other Topics Concern    None   Social History Narrative    None     Social Determinants of Health     Financial Resource Strain:     Difficulty of Paying Living Expenses:    Food Insecurity:     Worried About Running Out of Food in the Last Year:     Joseph of Food in the Last Year:    Transportation Needs:     Lack of Transportation (Medical):  Lack of Transportation (Non-Medical):    Physical Activity:     Days of Exercise per Week:     Minutes of Exercise per Session:    Stress:     Feeling of Stress :    Social Connections:     Frequency of Communication with Friends and Family:     Frequency of Social Gatherings with Friends and Family:     Attends Yarsanism Services:     Active Member of Clubs or Organizations:     Attends Club or Organization Meetings:     Marital Status:    Intimate Partner Violence:     Fear of Current or Ex-Partner:     Emotionally Abused:     Physically Abused:     Sexually Abused:        Meds/Allergies   No current facility-administered medications for this visit  (Not in a hospital admission)    Allergies   Allergen Reactions    Other        Vitals: Blood pressure 158/90, pulse 85, temperature 97 7 °F (36 5 °C), temperature source Tympanic, resp  rate 18, height 6' 3" (1 905 m), weight 89 5 kg (197 lb 6 4 oz), SpO2 98 % , RA, Body mass index is 24 67 kg/m²  Physical Exam  General: Pleasant, Awake alert and oriented x 3, conversant without conversational dyspnea, NAD, normal affect  HEENT:  PERRL, Sclera noninjected, nonicteric OU, Nares patent, no nasal flaring, no nasal drainage, Mucous membranes, moist, no oral lesions, normal dentition  NECK: Trachea midline, no accessory muscle use, no stridor, no cervical or supraclavicular adenopathy, JVP not elevated  CARDIAC: Reg, single s1/S2, no m/r/g  PULM: CTA b/l  CHEST: No gross deformities, equal chest expansion on inspiration bilaterally  ABD: Normoactive bowel sounds, soft nontender, nondistended, no rebound, no rigidity, no guarding  EXT: No cyanosis, no clubbing, no edema, normal capillary refill  SKIN:  No rashes, no lesions  NEURO: no focal neurologic deficits, AAOx3, moving all extremities appropriately    Labs: I have personally reviewed pertinent lab results    Lab Results   Component Value Date    SODIUM 141 04/20/2021 K 5 2 04/20/2021     04/20/2021    CO2 31 04/20/2021    BUN 37 (H) 04/20/2021    CREATININE 1 78 (H) 04/20/2021    GLUC 58 (L) 05/10/2018    CALCIUM 10 1 04/20/2021     Lab Results   Component Value Date    WBC 8 68 04/20/2021    HGB 13 3 04/20/2021    HCT 42 4 04/20/2021     (H) 04/20/2021     04/20/2021     Imaging and other studies: I have personally reviewed pertinent reports  and I have personally reviewed pertinent films in PACS  CXR 6/2021:  FINDINGS:  There are median sternotomy wires indicating prior cardiac surgery  Prosthetic aortic valve noted  Surgical clips are seen on the right below the clavicle  Stable cardiomediastinal silhouette  Aortic ectasia  No cardiomegaly  Bilateral pulmonary scarring  Chronic right pleural thickening  No evidence for effusion or pneumothorax  No acute osseous abnormalities  Pulmonary function testing:    No pulmonary function testing available for review  Echocardiogram: I have personally reviewed pertinent reports  and I have personally reviewed pertinent films in PACS  6/14/2017:  LEFT VENTRICLE: Size was normal  Systolic function was normal  Ejection fraction was estimated in the range of 55 % to 65 %  There were no regional wall motion abnormalities  Wall thickness was normal  DOPPLER: Features were consistent  with a pseudonormal left ventricular filling pattern, with concomitant abnormal relaxation and increased filling pressure (grade 2 diastolic dysfunction)  RIGHT VENTRICLE: The size was normal  There were no regional wall motion abnormalities  LEFT ATRIUM: Size was normal   RIGHT ATRIUM: Size was normal     EKG, Pathology, and Other Studies: I have personally reviewed pertinent reports  and I have personally reviewed pertinent films in PACS  5/10/2018 - Atrial fibrillation    [facility]

## 2021-06-22 NOTE — PATIENT INSTRUCTIONS
1   Chest CT scan - hopefully with the abdominal one   2   Check pulmonary function testing  3  Try the nebulizer 1-4x/daily - let me know how this works  4   For the cough, may take guaifenesin (mucinex)

## 2021-06-23 ENCOUNTER — APPOINTMENT (OUTPATIENT)
Dept: LAB | Facility: MEDICAL CENTER | Age: 81
End: 2021-06-23
Payer: COMMERCIAL

## 2021-06-23 ENCOUNTER — HOSPITAL ENCOUNTER (OUTPATIENT)
Dept: RADIOLOGY | Facility: MEDICAL CENTER | Age: 81
Discharge: HOME | End: 2021-06-23
Payer: COMMERCIAL

## 2021-06-23 DIAGNOSIS — D3A.8 PRIMARY PANCREATIC NEUROENDOCRINE TUMOR: ICD-10-CM

## 2021-06-23 DIAGNOSIS — J84.9 ILD (INTERSTITIAL LUNG DISEASE) (HCC): ICD-10-CM

## 2021-06-23 PROCEDURE — 74176 CT ABD & PELVIS W/O CONTRAST: CPT

## 2021-06-23 PROCEDURE — 71250 CT THORAX DX C-: CPT

## 2021-06-23 PROCEDURE — G1004 CDSM NDSC: HCPCS

## 2021-06-30 ENCOUNTER — TELEPHONE (OUTPATIENT)
Dept: PULMONOLOGY | Facility: CLINIC | Age: 81
End: 2021-06-30

## 2021-06-30 NOTE — TELEPHONE ENCOUNTER
----- Message from Miguelina Rdz MD sent at 6/30/2021  1:41 PM EDT -----  I have reviewed the results of your CT scan  They are similar to what we discussed the office based off previous imaging  There is no evidence of interstitial lung disease  Please follow-up as previously scheduled

## 2021-07-13 PROBLEM — Z85.9 HISTORY OF MALIGNANT NEUROENDOCRINE TUMOR: Status: ACTIVE | Noted: 2021-07-13

## 2021-07-15 ENCOUNTER — OFFICE VISIT (OUTPATIENT)
Dept: SURGICAL ONCOLOGY | Facility: CLINIC | Age: 81
End: 2021-07-15
Payer: COMMERCIAL

## 2021-07-15 VITALS
BODY MASS INDEX: 24.99 KG/M2 | WEIGHT: 201 LBS | TEMPERATURE: 97.5 F | OXYGEN SATURATION: 98 % | DIASTOLIC BLOOD PRESSURE: 100 MMHG | RESPIRATION RATE: 18 BRPM | SYSTOLIC BLOOD PRESSURE: 160 MMHG | HEART RATE: 68 BPM | HEIGHT: 75 IN

## 2021-07-15 DIAGNOSIS — Z85.9 HISTORY OF MALIGNANT NEUROENDOCRINE TUMOR: Primary | ICD-10-CM

## 2021-07-15 PROCEDURE — 99213 OFFICE O/P EST LOW 20 MIN: CPT | Performed by: SURGERY

## 2021-07-15 NOTE — PROGRESS NOTES
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   Treatment History: Distal pancreatectomy July 2011   Current Therapy: Observation   Disease Status: MOISES     History of Present Illness:  Patient returns in follow-up of his neuroendocrine tumor  He is doing well at this time with no complaints except for some worsening shortness of breath  He denies any abdominal pain, nausea or vomiting  No flushing, diarrhea, palpitations, headaches or sweats  CT from June 23rd reveals no evidence of recurrence  I personally reviewed the films  His wife states he has had a little bit of unintentional weight loss, but this may be due to his worsening shortness of breath and his rheumatoid condition  Review of Systems  Complete ROS Surg Onc:   Complete ROS Surg Onc:   Constitutional: The patient denies new or recent history of general fatigue, no recent weight loss, no change in appetite  Eyes: No complaints of visual problems, no scleral icterus  ENT: no complaints of ear pain, no hoarseness, no difficulty swallowing,  no tinnitus and no new masses in head, oral cavity, or neck  Cardiovascular: No complaints of chest pain, no palpitations, no ankle edema  Respiratory: No complaints of shortness of breath, no cough  Gastrointestinal: No complaints of jaundice, no bloody stools, no pale stools     Genitourinary: No complaints of dysuria, no hematuria, no nocturia, no frequent urination, no urethral discharge  Musculoskeletal: No complaints of weakness, paralysis, joint stiffness or arthralgias  Integumentary: No complaints of rash, no new lesions  Neurological: No complaints of convulsions, no seizures, no dizziness  Hematologic/Lymphatic: No complaints of easy bruising  Endocrine:  No hot or cold intolerance  No polydipsia, polyphagia, or polyuria  Allergy/immunology:  No environmental allergies  No food allergies  Not immunocompromised  Skin:  No pallor or rash  No wound          Patient Active Problem List   Diagnosis    Pneumonia    Diabetes mellitus ([place] [address] )    Hypertension    Acute-on-chronic kidney injury ([place] [address] )    A-fib ([place] [address] )    Aneurysm of thoracic aorta (HCC)    Aneurysm of abdominal aorta (HCC)    Hyperlipidemia    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[address] )    Osteoarthrosis    Polymyalgia rheumatica (HCC)    History of malignant neuroendocrine tumor     Past Medical History:   Diagnosis Date    Diabetes mellitus ([place] [address] )     Hypertension      Past Surgical History:   Procedure Laterality Date    APPENDECTOMY      CARDIAC SURGERY      Aortic Valve Replacement, Ascending Aorta    GALLBLADDER SURGERY      PANCREATICODUODENECTOMY       Family History   Problem Relation Age of Onset    Cancer Mother     Stroke Father     Cancer Sister     Diabetes Sister     Stroke Brother      Social History     Socioeconomic History    Marital status: /Civil Union     Spouse name: Not on file    Number of children: Not on file    Years of education: Not on file    Highest education level: Not on file   Occupational History    Not on file   Tobacco Use    Smoking status: Former Smoker     Years: 40 00     Types: Pipe     Quit date:      Years since quittin 5    Smokeless tobacco: Never Used   Vaping Use    Vaping Use: Never used   Substance and Sexual Activity    Alcohol use: No    Drug use: No    Sexual activity: Yes     Partners: Female   Other Topics Concern    Not on file   Social History Narrative    Not on file     Social Determinants of Health     Financial Resource Strain:     Difficulty of Paying Living Expenses:    Food Insecurity:     Worried About Running Out of Food in the Last Year:     [address] in the Last Year:    Transportation Needs:     Lack of Transportation (Medical):      Lack of Transportation (Non-Medical):    Physical Activity:     Days of Exercise per Week:     Minutes of Exercise per Session:    Stress:     Feeling of Stress :    Social Connections:     Frequency of Communication with Friends and Family:     Frequency of Social Gatherings with Friends and Family:     Attends Temple Services:     Active Member of Clubs or Organizations:     Attends Club or Organization Meetings:     Marital Status:    Intimate Partner Violence:     Fear of Current or Ex-Partner:     Emotionally Abused:     Physically Abused:     Sexually Abused:        Current Outpatient Medications:     B-D UF III MINI PEN NEEDLES 31G X 5 MM MISC, , Disp: , Rfl: 0    EUSEBIO CONTOUR TEST test strip, 3 (three) times a day Test, Disp: , Rfl: 9    cholecalciferol (VITAMIN D3) 1,000 units tablet, Take 2,000 Units by mouth daily, Disp: , Rfl:     Cinnamon 500 MG capsule, Take 1,000 mg by mouth daily, Disp: , Rfl:     insulin glargine (LANTUS) 100 units/mL subcutaneous injection, Inject 41 Units under the skin daily at bedtime  , Disp: , Rfl:     losartan (COZAAR) 100 MG tablet, Take 100 mg by mouth daily, Disp: , Rfl:     metFORMIN (GLUCOPHAGE) 500 mg tablet, Take 500 mg by mouth 2 (two) times a day with meals, Disp: , Rfl:     metoprolol succinate (TOPROL-XL) 25 mg 24 hr tablet, , Disp: , Rfl:     metoprolol tartrate (LOPRESSOR) 25 mg tablet, Take 25 mg by mouth daily  , Disp: , Rfl:     potassium chloride (MICRO-K) 8 mEq CR capsule, Take 8 mEq by mouth daily  , Disp: , Rfl:    pravastatin (PRAVACHOL) 40 mg tablet, Take 40 mg by mouth daily, Disp: , Rfl:     predniSONE 1 mg tablet, Take 4 mg by mouth daily , Disp: , Rfl:     torsemide (DEMADEX) 10 mg tablet, Take 10 mg by mouth daily, Disp: , Rfl:     XARELTO 20 MG tablet, , Disp: , Rfl: 0    amoxicillin (AMOXIL) 500 mg capsule, Take 2,000 mg by mouth as needed 1 hour prior to dental appointment (Patient not taking: Reported on 6/22/2021), Disp: , Rfl: 0    apixaban (ELIQUIS) 5 mg, Every 12 hours (Patient not taking: Reported on 6/22/2021), Disp: , Rfl:     aspirin 325 mg tablet, Daily (Patient not taking: Reported on 6/22/2021), Disp: , Rfl:     famotidine (PEPCID) 10 mg tablet, Take 10 mg by mouth daily as needed (Patient not taking: Reported on 6/22/2021), Disp: , Rfl:     hydroxychloroquine (PLAQUENIL) 200 mg tablet, Every 12 hours (Patient not taking: Reported on 6/22/2021), Disp: , Rfl:     MULTAQ 400 MG tablet, , Disp: , Rfl: 0    pregabalin (LYRICA) 75 mg capsule, Every 12 hours (Patient not taking: Reported on 6/22/2021), Disp: , Rfl:     torsemide (DEMADEX) 10 mg tablet, Daily, Disp: , Rfl:   Allergies   Allergen Reactions    Other      Vitals:    07/15/21 0947   BP: 160/100   Pulse: 68   Resp: 18   Temp: 97 5 °F (36 4 °C)   SpO2: 98%       Physical Exam  Constitutional: General appearance: The Patient is well-developed and well-nourished who appears the stated age in no acute distress  Patient is pleasant and talkative  HEENT:  Normocephalic  Sclerae are anicteric  Mucous membranes are moist  Neck is supple without adenopathy  No JVD  Chest: The lungs are clear to auscultation  Cardiac: Heart is regular rate  Abdomen: Abdomen is soft, non-tender, non-distended and without masses  Extremities: There is no clubbing or cyanosis  There is no edema  Symmetric  Neuro: Grossly nonfocal  Gait is normal      Lymphatic: No evidence of cervical adenopathy bilaterally     No evidence of axillary adenopathy bilaterally  Skin: Warm, anicteric  Psych:  Patient is pleasant and talkative  Breasts:        Pathology:  [unfilled]    Labs:      Imaging  CT abdomen pelvis wo contrast    Result Date: 6/30/2021  Narrative: CT ABDOMEN AND PELVIS WITHOUT IV CONTRAST INDICATION:   D3A 8: Other benign neuroendocrine tumors  COMPARISON:  CT dated 6/9/2020 TECHNIQUE:  CT examination of the abdomen and pelvis was performed without intravenous contrast   Axial, sagittal, and coronal 2D reformatted images were created from the source data and submitted for interpretation  Radiation dose length product (DLP) for this visit:  588 mGy-cm   This examination, like all CT scans performed in the [facility], was performed utilizing techniques to minimize radiation dose exposure, including the use of iterative reconstruction and automated exposure control  Enteric contrast was not administered in accordance with physician request  FINDINGS: ABDOMEN The lack of intravenous contrast limits evaluation of certain processes, particularly infectious, inflammatory and neoplastic processes  LOWER CHEST:  Again seen is pleural-based rounded density at the right lung base, which was previously characterized as round atelectasis  This has not significant changed since the previous study from 6/9/2020  Again seen is trace amount of right pleural effusion and thickening, also unchanged  LIVER/BILIARY TREE:  Unremarkable  GALLBLADDER:  Gallbladder is surgically absent  SPLEEN:  Unremarkable  PANCREAS:  Status post possible resection of the pancreas with resection of the pancreatic body and tail  ADRENAL GLANDS:  Unremarkable  KIDNEYS/URETERS:  Stable 3 9 cm exophytic right upper pole cyst, unchanged compared to the previous study    Stable appearing 2 7 cm hyperdense lesion at the right interpolar region, measuring higher in density than simple fluid and compatible with a hemorrhagic cyst or cyst containing proteinaceous material; this has not significantly changed since the previous study  Again seen are areas of cortical scarring in the left kidney, similar to previous study  No hydronephrosis  STOMACH AND BOWEL:  Diffuse colonic diverticulosis  APPENDIX:  No findings to suggest appendicitis  ABDOMINOPELVIC CAVITY:  No ascites  No pneumoperitoneum  No lymphadenopathy  VESSELS:  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 in the left hemiabdomen  This is unchanged  PELVIS REPRODUCTIVE ORGANS:  Unremarkable for patient's age  URINARY BLADDER:  Unremarkable  ABDOMINAL WALL/INGUINAL REGIONS:  Postsurgical changes of ventral abdominal wall status post hernia repair  OSSEOUS STRUCTURES:  No acute fracture or destructive osseous lesion  Impression: 1  Status post partial pancreectomy  No evidence of recurrent or metastatic disease in the abdomen or pelvis within the limit of evaluation of this unenhanced CT  2   Stable appearing rounded density at the right lung base, previously characterized as round atelectasis with trace right pleural effusion  3   Diffuse colonic diverticulosis  4   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  Workstation performed: [id number]     XR chest pa & lateral    Result Date: 6/17/2021  Narrative: CHEST INDICATION:   R06 02: Shortness of breath  COMPARISON:  4/8/2019 EXAM PERFORMED/VIEWS:  XR CHEST PA & LATERAL  The frontal view was performed utilizing dual energy radiographic technique  Images: 5 FINDINGS:  There are median sternotomy wires indicating prior cardiac surgery  Prosthetic aortic valve noted  Surgical clips are seen on the right below the clavicle  Stable cardiomediastinal silhouette  Aortic ectasia  No cardiomegaly  Bilateral pulmonary scarring  Chronic right pleural thickening  No evidence for effusion or pneumothorax  No acute osseous abnormalities  Impression: No acute cardiopulmonary disease   No significant interval change  Workstation performed: [id number]     CT chest high resolution    Result Date: 6/28/2021  Narrative: CT CHEST WITH HIGH RESOLUTION SLICES - WITHOUT CONTRAST INDICATION:   J84 9: Interstitial pulmonary disease, unspecified  History of polymyalgia rheumatica and pancreatic cancer status post Whipple procedure with worsening shortness of breath over the past year  Evaluate for interstitial lung disease  Former smoker  COMPARISON:  Chest CT from 6/14/2017, 2014, and 10/28/2009  TECHNIQUE: CT of the chest without intravenous contrast   Contiguous 1 25 mm axial images and 1 x 10 mm transverse images in supine position  Thin section images at 10 mm intervals, supine and prone in inspiration and supine in expiration  Sagittal and  coronal reformats  Transverse and coronal MIP  Coronal minIP  Radiation dose length product (DLP):  461 mGy-cm   Radiation dose exposure minimized using iterative reconstruction and automated exposure control  FINDINGS: LUNGS:  Nothing to suggest interstitial lung disease with no reticulation, traction bronchiectasis/atelectasis, groundglass, or honeycombing   (A focal cluster of honeycomb cysts in the paramediastinal right upper lobe (601/101) is due to parenchymal destruction at the site of pneumonia in 2017 )  No significant air trapping on expiration  Mild paraseptal emphysema  Chronic rounded atelectasis in the right lower lobe, present since 2009  Chronic benign bilateral scar  5 mm paramediastinal right upper lobe nodule (4/60), stable since at least 2017 and benign  Benign calcified granulomas  AIRWAYS: No significant filling defects  PLEURA:  Trace loculated right pleural effusion and right pleural thickening, stable since 2014  HEART/GREAT VESSELS:  Mild cardiomegaly  AVR and ascending aorta repair  MEDIASTINUM AND KHRIS:  Unremarkable  CHEST WALL AND LOWER NECK: Unremarkable  UPPER ABDOMEN:  Cholecystectomy and Whipple procedure   OSSEOUS STRUCTURES: Mild degenerative disease in the spine  Impression: No interstitial lung disease  Chronic rounded atelectasis in the right lower lobe with chronic trace loculated right pleural effusion and right pleural thickening  Mild emphysema  Mild bilateral scar  Workstation performed: [id number]     I reviewed the above laboratory and imaging data  Discussion/Summary: 68-year-old male status post distal pancreatectomy for a nonfunctioning endocrine tumor of the pancreas  This was T1N0M0  Is clinically MOISES at nearly 10 years  His CT is stable  Since he is 10 years out, he will contact us if he develops any new abdominal symptomatology, change in appetite, unintentional weight loss, flushing, diarrhea, palpitations, headaches, or sweats  He will monitor the weight loss   He is agreeable to this   All his questions were answered

## 2021-07-19 ENCOUNTER — HOSPITAL ENCOUNTER (OUTPATIENT)
Dept: PULMONOLOGY | Facility: HOSPITAL | Age: 81
Discharge: HOME | End: 2021-07-19
Attending: INTERNAL MEDICINE
Payer: COMMERCIAL

## 2021-07-19 DIAGNOSIS — J84.9 ILD (INTERSTITIAL LUNG DISEASE) (HCC): ICD-10-CM

## 2021-07-19 PROCEDURE — 94729 DIFFUSING CAPACITY: CPT

## 2021-07-19 PROCEDURE — 94727 GAS DIL/WSHOT DETER LNG VOL: CPT

## 2021-07-19 PROCEDURE — 94760 N-INVAS EAR/PLS OXIMETRY 1: CPT

## 2021-07-19 PROCEDURE — 94729 DIFFUSING CAPACITY: CPT | Performed by: INTERNAL MEDICINE

## 2021-07-19 PROCEDURE — 94060 EVALUATION OF WHEEZING: CPT

## 2021-07-19 PROCEDURE — 94727 GAS DIL/WSHOT DETER LNG VOL: CPT | Performed by: INTERNAL MEDICINE

## 2021-07-19 PROCEDURE — 94060 EVALUATION OF WHEEZING: CPT | Performed by: INTERNAL MEDICINE

## 2021-07-19 RX ORDER — ALBUTEROL SULFATE 2.5 MG/3ML
2.5 SOLUTION RESPIRATORY (INHALATION) ONCE
Status: COMPLETED | OUTPATIENT
Start: 2021-07-19 | End: 2021-07-19

## 2021-07-19 RX ADMIN — ALBUTEROL SULFATE 2.5 MG: 2.5 SOLUTION RESPIRATORY (INHALATION) at 08:53

## 2021-07-26 ENCOUNTER — TELEPHONE (OUTPATIENT)
Dept: PULMONOLOGY | Facility: HOSPITAL | Age: 81
End: 2021-07-26

## 2021-07-26 NOTE — TELEPHONE ENCOUNTER
----- Message from Arlen Deleon MD sent at 7/25/2021  1:53 PM EDT -----    I have reviewed the results of your pulmonary function testing  The results are consistent with severe obstruction  There was a significant response to bronchodilator therefore which trial Trelegy and a PRN albuterol inhaler to see if there is any clinical improvement in cough or shortness of breath  Please follow up in the clinic in the next 1-2 months or sooner as necessary

## 2021-08-14 ENCOUNTER — APPOINTMENT (OUTPATIENT)
Dept: LAB | Facility: MEDICAL CENTER | Age: 81
End: 2021-08-14
Payer: COMMERCIAL

## 2021-08-14 DIAGNOSIS — E11.9 TYPE 2 DIABETES MELLITUS WITHOUT COMPLICATION, WITH LONG-TERM CURRENT USE OF INSULIN (HCC): ICD-10-CM

## 2021-08-14 DIAGNOSIS — Z00.00 ROUTINE GENERAL MEDICAL EXAMINATION AT A HEALTH CARE FACILITY: ICD-10-CM

## 2021-08-14 DIAGNOSIS — Z79.4 TYPE 2 DIABETES MELLITUS WITHOUT COMPLICATION, WITH LONG-TERM CURRENT USE OF INSULIN (HCC): ICD-10-CM

## 2021-08-14 DIAGNOSIS — E78.5 HYPERLIPIDEMIA, UNSPECIFIED HYPERLIPIDEMIA TYPE: ICD-10-CM

## 2021-08-14 LAB
ALBUMIN SERPL BCP-MCNC: 3.6 G/DL (ref 3.5–5)
ALP SERPL-CCNC: 104 U/L (ref 46–116)
ALT SERPL W P-5'-P-CCNC: 24 U/L (ref 12–78)
ANION GAP SERPL CALCULATED.3IONS-SCNC: 5 MMOL/L (ref 4–13)
AST SERPL W P-5'-P-CCNC: 19 U/L (ref 5–45)
BASOPHILS # BLD AUTO: 0.05 THOUSANDS/ΜL (ref 0–0.1)
BASOPHILS NFR BLD AUTO: 1 % (ref 0–1)
BILIRUB SERPL-MCNC: 0.87 MG/DL (ref 0.2–1)
BUN SERPL-MCNC: 39 MG/DL (ref 5–25)
CALCIUM SERPL-MCNC: 9.8 MG/DL (ref 8.3–10.1)
CHLORIDE SERPL-SCNC: 104 MMOL/L (ref 100–108)
CHOLEST SERPL-MCNC: 156 MG/DL (ref 50–200)
CO2 SERPL-SCNC: 29 MMOL/L (ref 21–32)
CREAT SERPL-MCNC: 1.74 MG/DL (ref 0.6–1.3)
EOSINOPHIL # BLD AUTO: 0.22 THOUSAND/ΜL (ref 0–0.61)
EOSINOPHIL NFR BLD AUTO: 3 % (ref 0–6)
ERYTHROCYTE [DISTWIDTH] IN BLOOD BY AUTOMATED COUNT: 13.7 % (ref 11.6–15.1)
EST. AVERAGE GLUCOSE BLD GHB EST-MCNC: 140 MG/DL
GFR SERPL CREATININE-BSD FRML MDRD: 36 ML/MIN/1.73SQ M
GLUCOSE P FAST SERPL-MCNC: 99 MG/DL (ref 65–99)
HBA1C MFR BLD: 6.5 %
HCT VFR BLD AUTO: 45.3 % (ref 36.5–49.3)
HDLC SERPL-MCNC: 55 MG/DL
HGB BLD-MCNC: 13.9 G/DL (ref 12–17)
IMM GRANULOCYTES # BLD AUTO: 0.06 THOUSAND/UL (ref 0–0.2)
IMM GRANULOCYTES NFR BLD AUTO: 1 % (ref 0–2)
LDLC SERPL CALC-MCNC: 84 MG/DL (ref 0–100)
LYMPHOCYTES # BLD AUTO: 1.66 THOUSANDS/ΜL (ref 0.6–4.47)
LYMPHOCYTES NFR BLD AUTO: 19 % (ref 14–44)
MCH RBC QN AUTO: 31.7 PG (ref 26.8–34.3)
MCHC RBC AUTO-ENTMCNC: 30.7 G/DL (ref 31.4–37.4)
MCV RBC AUTO: 103 FL (ref 82–98)
MONOCYTES # BLD AUTO: 1.07 THOUSAND/ΜL (ref 0.17–1.22)
MONOCYTES NFR BLD AUTO: 12 % (ref 4–12)
NEUTROPHILS # BLD AUTO: 5.91 THOUSANDS/ΜL (ref 1.85–7.62)
NEUTS SEG NFR BLD AUTO: 64 % (ref 43–75)
NONHDLC SERPL-MCNC: 101 MG/DL
NRBC BLD AUTO-RTO: 0 /100 WBCS
PLATELET # BLD AUTO: 167 THOUSANDS/UL (ref 149–390)
PMV BLD AUTO: 9.9 FL (ref 8.9–12.7)
POTASSIUM SERPL-SCNC: 4.3 MMOL/L (ref 3.5–5.3)
PROT SERPL-MCNC: 6.6 G/DL (ref 6.4–8.2)
RBC # BLD AUTO: 4.38 MILLION/UL (ref 3.88–5.62)
SODIUM SERPL-SCNC: 138 MMOL/L (ref 136–145)
TRIGL SERPL-MCNC: 86 MG/DL
WBC # BLD AUTO: 8.97 THOUSAND/UL (ref 4.31–10.16)

## 2021-08-14 PROCEDURE — 80061 LIPID PANEL: CPT

## 2021-08-14 PROCEDURE — 36415 COLL VENOUS BLD VENIPUNCTURE: CPT

## 2021-08-14 PROCEDURE — 85025 COMPLETE CBC W/AUTO DIFF WBC: CPT

## 2021-08-14 PROCEDURE — 83036 HEMOGLOBIN GLYCOSYLATED A1C: CPT

## 2021-08-14 PROCEDURE — 80053 COMPREHEN METABOLIC PANEL: CPT

## 2021-08-26 RX ORDER — POTASSIUM CHLORIDE 600 MG/1
TABLET, FILM COATED, EXTENDED RELEASE ORAL
COMMUNITY
Start: 2021-07-21

## 2021-08-26 RX ORDER — INSULIN GLARGINE 100 [IU]/ML
INJECTION, SOLUTION SUBCUTANEOUS
COMMUNITY
Start: 2021-07-13

## 2021-08-27 ENCOUNTER — OFFICE VISIT (OUTPATIENT)
Dept: PULMONOLOGY | Facility: CLINIC | Age: 81
End: 2021-08-27
Payer: COMMERCIAL

## 2021-08-27 VITALS
RESPIRATION RATE: 18 BRPM | OXYGEN SATURATION: 94 % | DIASTOLIC BLOOD PRESSURE: 90 MMHG | WEIGHT: 201.6 LBS | TEMPERATURE: 96.8 F | HEIGHT: 75 IN | BODY MASS INDEX: 25.07 KG/M2 | HEART RATE: 63 BPM | SYSTOLIC BLOOD PRESSURE: 170 MMHG

## 2021-08-27 DIAGNOSIS — J43.2 CENTRILOBULAR EMPHYSEMA (HCC): Primary | ICD-10-CM

## 2021-08-27 DIAGNOSIS — R05.9 COUGH: ICD-10-CM

## 2021-08-27 DIAGNOSIS — M35.3 POLYMYALGIA RHEUMATICA (HCC): ICD-10-CM

## 2021-08-27 PROBLEM — J18.9 PNEUMONIA: Status: RESOLVED | Noted: 2017-06-13 | Resolved: 2021-08-27

## 2021-08-27 PROCEDURE — 99214 OFFICE O/P EST MOD 30 MIN: CPT | Performed by: INTERNAL MEDICINE

## 2021-08-27 RX ORDER — PREDNISONE 1 MG/1
5 TABLET ORAL DAILY
COMMUNITY
Start: 2021-08-24 | End: 2022-05-05 | Stop reason: ALTCHOICE

## 2021-08-27 RX ORDER — HYDROXYCHLOROQUINE SULFATE 200 MG/1
200 TABLET, FILM COATED ORAL 2 TIMES DAILY WITH MEALS
COMMUNITY

## 2021-08-27 NOTE — PROGRESS NOTES
Progress Note - Pulmonary   Mahad Care 80 y o  male MRN: [id number]   Encounter: [id number]      Assessment/Plan:  Patient is a 31-year-old male with past medical history significant for pancreatic cancer status post Whipple, history of tobacco abuse who presents for routine follow-up  The patient reports overall he is doing better as compared to last visit  He reports the Mucinex has significantly helped with his cough and congestion  The patient underwent pulmonary function testing which was consistent with severe obstruction and bronchodilator response  He was previously taking albuterol nebulizer without any significant benefit  He may trialed Trelegy for the next 4 weeks  If this helps his breathing, may provide prescription for him  For his PMR, he was started on Plaquenil and had his prednisone increased to 6 mg  He is tolerating this dose without difficulty  The patient had no evidence of ILD on his CT scan  He has received his COVID-19 booster vaccination  Severe COPD  -  trial trelegy; may call for prescription if improvement  -  PRN albuterol  -  No indication for lung cancer screening (age)     Cough  -  resolved  -  PRN guaifenasin      PMR  -  managed by rheum  -  Plaquenil/prednisone 6mg    Patient may follow up in 4 months or sooner as necessary  Orders:  -  Sample of trelegy provided    Subjective: The patient reports he is doing relatively well  He was recently started on plaquenil and is currently taking 6mg of prednisone which is an increase from 4mg for his PMR  He feels the mucinex has provided benefit  He is taking the medication twice daily with improvement in cough  He denies fevers, chills, nausea or vomiting  He has received his COVID-19 booster vaccination  He was using his albuterol inhaler but would not notice any benefit  He never smoked cigarettes but did smoke a pipe        Inhaler Regimen:  Albuterol nebulizer - 1-2x/week    Remainder of review of systems negative except as described in HPI  The following portions of the patient's history were reviewed and updated as appropriate: allergies, current medications, past family history, past medical history, past social history, past surgical history and problem list      Objective:   Vitals: Blood pressure 170/90, pulse 63, temperature (!) 96 8 °F (36 °C), temperature source Tympanic, resp  rate 18, height 6' 3" (1 905 m), weight 91 4 kg (201 lb 9 6 oz), SpO2 94 % , RA, Body mass index is 25 2 kg/m²  Physical Exam  Gen: Pleasant, awake, alert, oriented x 3, no acute distress  HEENT: Mucous membranes moist, no oral lesions, no thrush  NECK: No accessory muscle use, JVP not elevated  Cardiac: RRR, single S1, single S2, no murmurs, no rubs, no gallops  Lungs: CTA b/l  Abdomen: normoactive bowel sounds, soft nontender, nondistended, no rebound or rigidity, no guarding  Extremities: no cyanosis, no clubbing, no LE edema  MSK:  Strength equal in all extremities  Derm:  No rashes/lesions noted  Neuro:  Appropriate mood/affect    Labs: I have personally reviewed pertinent lab results  Lab Results   Component Value Date    WBC 8 97 08/14/2021    WBC 7 03 06/08/2015    HGB 13 9 08/14/2021    HGB 13 0 06/08/2015     08/14/2021     06/08/2015     Lab Results   Component Value Date    CREATININE 1 74 (H) 08/14/2021    CREATININE 1 90 (H) 08/20/2015        Imaging and other studies: I have personally reviewed pertinent reports  and I have personally reviewed pertinent films in PACS  HRCT 6/23/21  My interpretation:  Paraseptal emphysema      Radiology findings:  LUNGS:  Nothing to suggest interstitial lung disease with no reticulation, traction bronchiectasis/atelectasis, groundglass, or honeycombing   (A focal cluster of honeycomb cysts in the paramediastinal right upper lobe (601/101) is due to parenchymal   destruction at the site of pneumonia in 2017 )  No significant air trapping on expiration  Mild paraseptal emphysema  Chronic rounded atelectasis in the right lower lobe, present since 2009  Chronic benign bilateral scar  5 mm paramediastinal right upper lobe nodule (4/60), stable since at least 2017 and benign  Benign calcified   granulomas  AIRWAYS: No significant filling defects  PLEURA:  Trace loculated right pleural effusion and right pleural thickening, stable since 2014  HEART/GREAT VESSELS:  Mild cardiomegaly  AVR and ascending aorta repair  MEDIASTINUM AND KHRIS:  Unremarkable  Pulmonary Function Testing: I have personally reviewed pertinent reports  and I have personally reviewed pertinent films in PACS  7/19/21:  Severe obstruction with mild diffusion defect w/ + bronchodilator response  FEV1/FVC Ratio: 42 %  Forced Vital Capacity: 2 89 L    64 % predicted  FEV1: 1 22 L     37 % predicted     Lung volumes by N2 washout:   Total Lung Capacity 84 % predicted   Residual volume 121 % predicted     DLCO corrected for patients hemoglobin level: 63 %    Anshul Wilsonite, [facility]

## 2021-09-03 DIAGNOSIS — J43.2 CENTRILOBULAR EMPHYSEMA (HCC): Primary | ICD-10-CM

## 2021-09-03 RX ORDER — FLUTICASONE FUROATE, UMECLIDINIUM BROMIDE AND VILANTEROL TRIFENATATE 100; 62.5; 25 UG/1; UG/1; UG/1
1 POWDER RESPIRATORY (INHALATION) DAILY
Qty: 60 BLISTER | Refills: 0 | Status: SHIPPED | OUTPATIENT
Start: 2021-09-03 | End: 2021-09-08 | Stop reason: SDUPTHER

## 2021-09-08 DIAGNOSIS — J43.2 CENTRILOBULAR EMPHYSEMA (HCC): ICD-10-CM

## 2021-09-08 RX ORDER — FLUTICASONE FUROATE, UMECLIDINIUM BROMIDE AND VILANTEROL TRIFENATATE 100; 62.5; 25 UG/1; UG/1; UG/1
1 POWDER RESPIRATORY (INHALATION) DAILY
Qty: 180 BLISTER | Refills: 3 | Status: SHIPPED | OUTPATIENT
Start: 2021-09-08

## 2021-11-20 ENCOUNTER — APPOINTMENT (OUTPATIENT)
Dept: LAB | Facility: MEDICAL CENTER | Age: 81
End: 2021-11-20
Payer: COMMERCIAL

## 2021-11-20 DIAGNOSIS — Z00.00 ROUTINE GENERAL MEDICAL EXAMINATION AT A HEALTH CARE FACILITY: ICD-10-CM

## 2021-11-20 DIAGNOSIS — E78.5 HYPERLIPIDEMIA, UNSPECIFIED HYPERLIPIDEMIA TYPE: ICD-10-CM

## 2021-11-20 DIAGNOSIS — R73.01 IMPAIRED FASTING GLUCOSE: ICD-10-CM

## 2021-11-20 LAB
ALBUMIN SERPL BCP-MCNC: 3.7 G/DL (ref 3.5–5)
ALP SERPL-CCNC: 87 U/L (ref 46–116)
ALT SERPL W P-5'-P-CCNC: 28 U/L (ref 12–78)
ANION GAP SERPL CALCULATED.3IONS-SCNC: 3 MMOL/L (ref 4–13)
AST SERPL W P-5'-P-CCNC: 22 U/L (ref 5–45)
BASOPHILS # BLD AUTO: 0.04 THOUSANDS/ΜL (ref 0–0.1)
BASOPHILS NFR BLD AUTO: 1 % (ref 0–1)
BILIRUB SERPL-MCNC: 1.02 MG/DL (ref 0.2–1)
BUN SERPL-MCNC: 38 MG/DL (ref 5–25)
CALCIUM SERPL-MCNC: 9.9 MG/DL (ref 8.3–10.1)
CHLORIDE SERPL-SCNC: 105 MMOL/L (ref 100–108)
CHOLEST SERPL-MCNC: 150 MG/DL
CO2 SERPL-SCNC: 31 MMOL/L (ref 21–32)
CREAT SERPL-MCNC: 1.82 MG/DL (ref 0.6–1.3)
EOSINOPHIL # BLD AUTO: 0.16 THOUSAND/ΜL (ref 0–0.61)
EOSINOPHIL NFR BLD AUTO: 2 % (ref 0–6)
ERYTHROCYTE [DISTWIDTH] IN BLOOD BY AUTOMATED COUNT: 13.6 % (ref 11.6–15.1)
EST. AVERAGE GLUCOSE BLD GHB EST-MCNC: 134 MG/DL
GFR SERPL CREATININE-BSD FRML MDRD: 35 ML/MIN/1.73SQ M
GLUCOSE P FAST SERPL-MCNC: 85 MG/DL (ref 65–99)
HBA1C MFR BLD: 6.3 %
HCT VFR BLD AUTO: 43.6 % (ref 36.5–49.3)
HDLC SERPL-MCNC: 64 MG/DL
HGB BLD-MCNC: 13.2 G/DL (ref 12–17)
IMM GRANULOCYTES # BLD AUTO: 0.07 THOUSAND/UL (ref 0–0.2)
IMM GRANULOCYTES NFR BLD AUTO: 1 % (ref 0–2)
LDLC SERPL CALC-MCNC: 74 MG/DL (ref 0–100)
LYMPHOCYTES # BLD AUTO: 1.46 THOUSANDS/ΜL (ref 0.6–4.47)
LYMPHOCYTES NFR BLD AUTO: 17 % (ref 14–44)
MCH RBC QN AUTO: 31.6 PG (ref 26.8–34.3)
MCHC RBC AUTO-ENTMCNC: 30.3 G/DL (ref 31.4–37.4)
MCV RBC AUTO: 104 FL (ref 82–98)
MONOCYTES # BLD AUTO: 0.96 THOUSAND/ΜL (ref 0.17–1.22)
MONOCYTES NFR BLD AUTO: 11 % (ref 4–12)
NEUTROPHILS # BLD AUTO: 6.06 THOUSANDS/ΜL (ref 1.85–7.62)
NEUTS SEG NFR BLD AUTO: 68 % (ref 43–75)
NONHDLC SERPL-MCNC: 86 MG/DL
NRBC BLD AUTO-RTO: 0 /100 WBCS
PLATELET # BLD AUTO: 174 THOUSANDS/UL (ref 149–390)
PMV BLD AUTO: 9.6 FL (ref 8.9–12.7)
POTASSIUM SERPL-SCNC: 4.7 MMOL/L (ref 3.5–5.3)
PROT SERPL-MCNC: 6.7 G/DL (ref 6.4–8.2)
RBC # BLD AUTO: 4.18 MILLION/UL (ref 3.88–5.62)
SODIUM SERPL-SCNC: 139 MMOL/L (ref 136–145)
TRIGL SERPL-MCNC: 60 MG/DL
WBC # BLD AUTO: 8.75 THOUSAND/UL (ref 4.31–10.16)

## 2021-11-20 PROCEDURE — 85025 COMPLETE CBC W/AUTO DIFF WBC: CPT

## 2021-11-20 PROCEDURE — 80053 COMPREHEN METABOLIC PANEL: CPT

## 2021-11-20 PROCEDURE — 83036 HEMOGLOBIN GLYCOSYLATED A1C: CPT

## 2021-11-20 PROCEDURE — 80061 LIPID PANEL: CPT

## 2021-11-20 PROCEDURE — 36415 COLL VENOUS BLD VENIPUNCTURE: CPT

## 2021-12-22 ENCOUNTER — OFFICE VISIT (OUTPATIENT)
Dept: PULMONOLOGY | Facility: CLINIC | Age: 81
End: 2021-12-22
Payer: COMMERCIAL

## 2021-12-22 VITALS
WEIGHT: 202 LBS | OXYGEN SATURATION: 98 % | TEMPERATURE: 97.9 F | HEIGHT: 75 IN | DIASTOLIC BLOOD PRESSURE: 82 MMHG | BODY MASS INDEX: 25.12 KG/M2 | SYSTOLIC BLOOD PRESSURE: 140 MMHG | RESPIRATION RATE: 18 BRPM | HEART RATE: 78 BPM

## 2021-12-22 DIAGNOSIS — J43.2 CENTRILOBULAR EMPHYSEMA (HCC): Primary | ICD-10-CM

## 2021-12-22 DIAGNOSIS — M35.3 POLYMYALGIA RHEUMATICA (HCC): ICD-10-CM

## 2021-12-22 PROCEDURE — 99214 OFFICE O/P EST MOD 30 MIN: CPT | Performed by: INTERNAL MEDICINE

## 2021-12-22 RX ORDER — CEPHALEXIN 500 MG/1
CAPSULE ORAL EVERY 8 HOURS
COMMUNITY
Start: 2021-10-04 | End: 2022-05-05 | Stop reason: ALTCHOICE

## 2021-12-22 RX ORDER — ALBUTEROL SULFATE 90 UG/1
2 AEROSOL, METERED RESPIRATORY (INHALATION) EVERY 6 HOURS PRN
Qty: 18 G | Refills: 1 | Status: SHIPPED | OUTPATIENT
Start: 2021-12-22 | End: 2022-05-05 | Stop reason: SDUPTHER

## 2022-03-05 ENCOUNTER — APPOINTMENT (OUTPATIENT)
Dept: LAB | Facility: MEDICAL CENTER | Age: 82
End: 2022-03-05
Payer: COMMERCIAL

## 2022-03-05 DIAGNOSIS — E11.9 TYPE 2 DIABETES MELLITUS WITHOUT COMPLICATION, WITH LONG-TERM CURRENT USE OF INSULIN (HCC): ICD-10-CM

## 2022-03-05 DIAGNOSIS — Z00.00 ROUTINE GENERAL MEDICAL EXAMINATION AT A HEALTH CARE FACILITY: ICD-10-CM

## 2022-03-05 DIAGNOSIS — Z79.4 TYPE 2 DIABETES MELLITUS WITHOUT COMPLICATION, WITH LONG-TERM CURRENT USE OF INSULIN (HCC): ICD-10-CM

## 2022-03-05 DIAGNOSIS — E78.5 HYPERLIPIDEMIA, UNSPECIFIED HYPERLIPIDEMIA TYPE: ICD-10-CM

## 2022-03-05 LAB
ALBUMIN SERPL BCP-MCNC: 4.3 G/DL (ref 3.5–5)
ALP SERPL-CCNC: 111 U/L (ref 46–116)
ALT SERPL W P-5'-P-CCNC: 26 U/L (ref 12–78)
ANION GAP SERPL CALCULATED.3IONS-SCNC: 3 MMOL/L (ref 4–13)
AST SERPL W P-5'-P-CCNC: 16 U/L (ref 5–45)
BASOPHILS # BLD AUTO: 0.06 THOUSANDS/ΜL (ref 0–0.1)
BASOPHILS NFR BLD AUTO: 1 % (ref 0–1)
BILIRUB SERPL-MCNC: 0.69 MG/DL (ref 0.2–1)
BUN SERPL-MCNC: 33 MG/DL (ref 5–25)
CALCIUM SERPL-MCNC: 10.1 MG/DL (ref 8.3–10.1)
CHLORIDE SERPL-SCNC: 105 MMOL/L (ref 100–108)
CHOLEST SERPL-MCNC: 156 MG/DL
CO2 SERPL-SCNC: 31 MMOL/L (ref 21–32)
CREAT SERPL-MCNC: 1.75 MG/DL (ref 0.6–1.3)
EOSINOPHIL # BLD AUTO: 0.15 THOUSAND/ΜL (ref 0–0.61)
EOSINOPHIL NFR BLD AUTO: 2 % (ref 0–6)
ERYTHROCYTE [DISTWIDTH] IN BLOOD BY AUTOMATED COUNT: 13.6 % (ref 11.6–15.1)
EST. AVERAGE GLUCOSE BLD GHB EST-MCNC: 143 MG/DL
GFR SERPL CREATININE-BSD FRML MDRD: 35 ML/MIN/1.73SQ M
GLUCOSE P FAST SERPL-MCNC: 103 MG/DL (ref 65–99)
HBA1C MFR BLD: 6.6 %
HCT VFR BLD AUTO: 44.1 % (ref 36.5–49.3)
HDLC SERPL-MCNC: 59 MG/DL
HGB BLD-MCNC: 13.7 G/DL (ref 12–17)
IMM GRANULOCYTES # BLD AUTO: 0.06 THOUSAND/UL (ref 0–0.2)
IMM GRANULOCYTES NFR BLD AUTO: 1 % (ref 0–2)
LDLC SERPL CALC-MCNC: 84 MG/DL (ref 0–100)
LYMPHOCYTES # BLD AUTO: 1.39 THOUSANDS/ΜL (ref 0.6–4.47)
LYMPHOCYTES NFR BLD AUTO: 16 % (ref 14–44)
MCH RBC QN AUTO: 31.3 PG (ref 26.8–34.3)
MCHC RBC AUTO-ENTMCNC: 31.1 G/DL (ref 31.4–37.4)
MCV RBC AUTO: 101 FL (ref 82–98)
MONOCYTES # BLD AUTO: 0.95 THOUSAND/ΜL (ref 0.17–1.22)
MONOCYTES NFR BLD AUTO: 11 % (ref 4–12)
NEUTROPHILS # BLD AUTO: 6.1 THOUSANDS/ΜL (ref 1.85–7.62)
NEUTS SEG NFR BLD AUTO: 69 % (ref 43–75)
NONHDLC SERPL-MCNC: 97 MG/DL
NRBC BLD AUTO-RTO: 0 /100 WBCS
PLATELET # BLD AUTO: 177 THOUSANDS/UL (ref 149–390)
PMV BLD AUTO: 9.9 FL (ref 8.9–12.7)
POTASSIUM SERPL-SCNC: 4.5 MMOL/L (ref 3.5–5.3)
PROT SERPL-MCNC: 7.2 G/DL (ref 6.4–8.2)
RBC # BLD AUTO: 4.38 MILLION/UL (ref 3.88–5.62)
SODIUM SERPL-SCNC: 139 MMOL/L (ref 136–145)
TRIGL SERPL-MCNC: 65 MG/DL
WBC # BLD AUTO: 8.71 THOUSAND/UL (ref 4.31–10.16)

## 2022-03-05 PROCEDURE — 80053 COMPREHEN METABOLIC PANEL: CPT

## 2022-03-05 PROCEDURE — 83036 HEMOGLOBIN GLYCOSYLATED A1C: CPT

## 2022-03-05 PROCEDURE — 36415 COLL VENOUS BLD VENIPUNCTURE: CPT

## 2022-03-05 PROCEDURE — 80061 LIPID PANEL: CPT

## 2022-03-05 PROCEDURE — 85025 COMPLETE CBC W/AUTO DIFF WBC: CPT

## 2022-04-06 ENCOUNTER — HOSPITAL ENCOUNTER (OUTPATIENT)
Dept: RADIOLOGY | Facility: MEDICAL CENTER | Age: 82
Discharge: HOME | End: 2022-04-06

## 2022-04-06 DIAGNOSIS — Z79.52 LONG TERM (CURRENT) USE OF SYSTEMIC STEROIDS: ICD-10-CM

## 2022-04-06 DIAGNOSIS — M85.80 OSTEOPENIA, UNSPECIFIED LOCATION: ICD-10-CM

## 2022-04-07 ENCOUNTER — HOSPITAL ENCOUNTER (OUTPATIENT)
Dept: RADIOLOGY | Facility: MEDICAL CENTER | Age: 82
Discharge: HOME | End: 2022-04-07
Payer: COMMERCIAL

## 2022-04-07 DIAGNOSIS — Z13.820 SCREENING FOR OSTEOPOROSIS: ICD-10-CM

## 2022-04-07 DIAGNOSIS — Z79.52 LONG TERM (CURRENT) USE OF SYSTEMIC STEROIDS: ICD-10-CM

## 2022-04-07 DIAGNOSIS — M85.80 OSTEOPENIA, UNSPECIFIED LOCATION: ICD-10-CM

## 2022-04-07 PROCEDURE — 77080 DXA BONE DENSITY AXIAL: CPT

## 2022-05-05 ENCOUNTER — OFFICE VISIT (OUTPATIENT)
Dept: PULMONOLOGY | Facility: CLINIC | Age: 82
End: 2022-05-05
Payer: COMMERCIAL

## 2022-05-05 VITALS
WEIGHT: 203.8 LBS | HEART RATE: 78 BPM | DIASTOLIC BLOOD PRESSURE: 98 MMHG | HEIGHT: 75 IN | TEMPERATURE: 98.6 F | BODY MASS INDEX: 25.34 KG/M2 | OXYGEN SATURATION: 97 % | SYSTOLIC BLOOD PRESSURE: 160 MMHG

## 2022-05-05 DIAGNOSIS — J43.2 CENTRILOBULAR EMPHYSEMA (HCC): ICD-10-CM

## 2022-05-05 DIAGNOSIS — M35.3 POLYMYALGIA RHEUMATICA (HCC): Primary | ICD-10-CM

## 2022-05-05 PROCEDURE — 99213 OFFICE O/P EST LOW 20 MIN: CPT | Performed by: INTERNAL MEDICINE

## 2022-05-05 RX ORDER — ALBUTEROL SULFATE 90 UG/1
2 AEROSOL, METERED RESPIRATORY (INHALATION) EVERY 6 HOURS PRN
Qty: 18 G | Refills: 5 | Status: SHIPPED | OUTPATIENT
Start: 2022-05-05

## 2022-05-05 NOTE — PROGRESS NOTES
Progress Note - Pulmonary   [place] Lights [de-identified] y o  male MRN: [id number]   Encounter: [id number]      Assessment/Plan:  Patient is an 82-year-old male with past medical history significant for severe Chronic Obstructive Pulmonary Disease, polymyalgia rheumatica, history of tobacco abuse who presents for routine follow-up  The patient notes his cough has slowly improved  He reports his breathing is approximately stable  He notes he is being weaned off of his prednisone via his rheumatologist     Severe COPD  -  continue trelegy  -  may use albuterol prior to activity   -  Albuterol refilled     Tobacco abuse  -  Quit smoking in 2001  -  No indication for lung cancer screening     Cough  -  slowly improving   -  PRN guaifenasin       PMR  -  managed by rheum  -  Prednisone being tritrated - 3/4mg  -  Recently increased plaquenil to 200mg daily    1  Centrilobular emphysema (HCC)  -     albuterol (Ventolin HFA) 90 mcg/act inhaler; Inhale 2 puffs every 6 (six) hours as needed for wheezing    Patient may follow up in 12 months or sooner as necessary  Orders:  No orders of the defined types were placed in this encounter  Subjective: The patient notes that he is overall doing well  He has no specific concerns  He is being weaned off of his prednisone  He has no flares with the current wean  The cough has persisted but overall improved  It is directly affected by the weather  He is taking mucinex  He notes a small wheeze prior to taking trelegy  He notes he is getting exercise with spring chores  Inhaler Regimen:  Trelegy  Albuterol - 3x/week    Remainder of review of systems negative except as described in HPI        The following portions of the patient's history were reviewed and updated as appropriate: allergies, current medications, past family history, past medical history, past social history, past surgical history and problem list      Objective:   Vitals: Blood pressure 160/98, pulse 78, temperature 98 6 °F (37 °C), height 6' 3" (1 905 m), weight 92 4 kg (203 lb 12 8 oz), SpO2 97 % , RA, Body mass index is 25 47 kg/m²  Physical Exam  Gen: Pleasent, awake, alert, oriented x 3, no acute distress  HEENT: Mucous membranes moist, no oral lesions, no thrush  NECK: No accessory muscle use, JVP not elevated  Cardiac:RRR, single S1, single S2, no murmurs, no rubs, no gallops  Lungs: slightly decreased; no wheezing  Abdomen: normoactive bowel sounds, soft nontender, nondistended, no rebound or rigidity, no guarding  Extremities: no cyanosis, no clubbing, no LE edema  MSK:  Strength equal in all extremities; chronic skin changes  Derm:  No rashes/lesions noted  Neuro:  Appropriate mood/affect    Labs: I have personally reviewed pertinent lab results  Lab Results   Component Value Date    WBC 8 71 03/05/2022    WBC 7 03 06/08/2015    HGB 13 7 03/05/2022    HGB 13 0 06/08/2015     03/05/2022     06/08/2015     Lab Results   Component Value Date    CREATININE 1 75 (H) 03/05/2022    CREATININE 1 90 (H) 08/20/2015     Imaging and other studies: I have personally reviewed pertinent reports  HRCT 6/23/21  Radiology findings:  LUNGS:  Nothing to suggest interstitial lung disease with no reticulation, traction bronchiectasis/atelectasis, groundglass, or honeycombing   (A focal cluster of honeycomb cysts in the paramediastinal right upper lobe (601/101) is due to parenchymal destruction at the site of pneumonia in 2017 )  No significant air trapping on expiration  Mild paraseptal emphysema  Chronic rounded atelectasis in the right lower lobe, present since 2009  Chronic benign bilateral scar  5 mm paramediastinal right upper lobe nodule (4/60), stable since at least 2017 and benign  Benign calcified   granulomas  AIRWAYS: No significant filling defects  PLEURA:  Trace loculated right pleural effusion and right pleural thickening, stable since 2014    HEART/GREAT VESSELS:  Mild cardiomegaly  AVR and ascending aorta repair  MEDIASTINUM AND KHRIS:  Unremarkable  Pulmonary Function Testing: I have personally reviewed pertinent reports  and I have personally reviewed pertinent films in PACS  7/19/21:  Severe obstruction w/ mild diffusion defect  FEV1/FVC Ratio: 42 %  Forced Vital Capacity: 2 89 L    64 % predicted  FEV1: 1 22 L     37 % predicted  Lung volumes by N2 washout:   Total Lung Capacity 84 % predicted   Residual volume 121 % predicted  DLCO corrected for patients hemoglobin level: 63 %    Yanira Moise

## 2022-06-04 ENCOUNTER — APPOINTMENT (OUTPATIENT)
Dept: LAB | Facility: MEDICAL CENTER | Age: 82
End: 2022-06-04
Payer: COMMERCIAL

## 2022-06-04 DIAGNOSIS — E11.9 DIABETES MELLITUS WITHOUT COMPLICATION (HCC): ICD-10-CM

## 2022-06-04 DIAGNOSIS — Z00.8 HEALTH EXAMINATION IN POPULATION SURVEY: ICD-10-CM

## 2022-06-04 DIAGNOSIS — E78.5 HYPERLIPIDEMIA, UNSPECIFIED HYPERLIPIDEMIA TYPE: ICD-10-CM

## 2022-06-04 LAB
ALBUMIN SERPL BCP-MCNC: 3.9 G/DL (ref 3.5–5)
ALP SERPL-CCNC: 103 U/L (ref 46–116)
ALT SERPL W P-5'-P-CCNC: 30 U/L (ref 12–78)
ANION GAP SERPL CALCULATED.3IONS-SCNC: 4 MMOL/L (ref 4–13)
AST SERPL W P-5'-P-CCNC: 36 U/L (ref 5–45)
BILIRUB SERPL-MCNC: 0.89 MG/DL (ref 0.2–1)
BUN SERPL-MCNC: 36 MG/DL (ref 5–25)
CALCIUM SERPL-MCNC: 10.4 MG/DL (ref 8.3–10.1)
CHLORIDE SERPL-SCNC: 106 MMOL/L (ref 100–108)
CO2 SERPL-SCNC: 30 MMOL/L (ref 21–32)
CREAT SERPL-MCNC: 2.01 MG/DL (ref 0.6–1.3)
GFR SERPL CREATININE-BSD FRML MDRD: 30 ML/MIN/1.73SQ M
GLUCOSE P FAST SERPL-MCNC: 84 MG/DL (ref 65–99)
POTASSIUM SERPL-SCNC: 4.6 MMOL/L (ref 3.5–5.3)
PROT SERPL-MCNC: 6.7 G/DL (ref 6.4–8.2)
SODIUM SERPL-SCNC: 140 MMOL/L (ref 136–145)

## 2022-06-04 PROCEDURE — 80053 COMPREHEN METABOLIC PANEL: CPT

## 2022-07-02 ENCOUNTER — HOSPITAL ENCOUNTER (OUTPATIENT)
Facility: HOSPITAL | Age: 82
Setting detail: OBSERVATION
Discharge: HOME | End: 2022-07-03
Attending: EMERGENCY MEDICINE | Admitting: INTERNAL MEDICINE
Payer: COMMERCIAL

## 2022-07-02 ENCOUNTER — APPOINTMENT (EMERGENCY)
Dept: CT IMAGING | Facility: HOSPITAL | Age: 82
End: 2022-07-02
Payer: COMMERCIAL

## 2022-07-02 DIAGNOSIS — N18.9 CHRONIC KIDNEY DISEASE: ICD-10-CM

## 2022-07-02 DIAGNOSIS — J18.9 MULTIFOCAL PNEUMONIA: Primary | ICD-10-CM

## 2022-07-02 PROBLEM — J44.9 COPD (CHRONIC OBSTRUCTIVE PULMONARY DISEASE) (HCC): Status: ACTIVE | Noted: 2022-07-02

## 2022-07-02 PROBLEM — A41.9 SEPSIS DUE TO PNEUMONIA (HCC): Status: ACTIVE | Noted: 2022-07-02

## 2022-07-02 LAB
2HR DELTA HS TROPONIN: -2 NG/L
4HR DELTA HS TROPONIN: 0 NG/L
ALBUMIN SERPL BCP-MCNC: 3.4 G/DL (ref 3.5–5)
ALP SERPL-CCNC: 97 U/L (ref 46–116)
ALT SERPL W P-5'-P-CCNC: 20 U/L (ref 12–78)
ANION GAP SERPL CALCULATED.3IONS-SCNC: 7 MMOL/L (ref 4–13)
APTT PPP: 42 SECONDS (ref 23–37)
AST SERPL W P-5'-P-CCNC: 18 U/L (ref 5–45)
BACTERIA UR QL AUTO: ABNORMAL /HPF
BASE EX.OXY STD BLDV CALC-SCNC: 91.8 % (ref 60–80)
BASE EXCESS BLDV CALC-SCNC: 1.7 MMOL/L
BASOPHILS # BLD AUTO: 0.02 THOUSANDS/ΜL (ref 0–0.1)
BASOPHILS NFR BLD AUTO: 0 % (ref 0–1)
BETA-HYDROXYBUTYRATE: 0.2 MMOL/L
BILIRUB DIRECT SERPL-MCNC: 0.37 MG/DL (ref 0–0.2)
BILIRUB SERPL-MCNC: 1.47 MG/DL (ref 0.2–1)
BILIRUB UR QL STRIP: NEGATIVE
BUN SERPL-MCNC: 44 MG/DL (ref 5–25)
CALCIUM SERPL-MCNC: 9.5 MG/DL (ref 8.3–10.1)
CARDIAC TROPONIN I PNL SERPL HS: 22 NG/L
CARDIAC TROPONIN I PNL SERPL HS: 24 NG/L
CARDIAC TROPONIN I PNL SERPL HS: 24 NG/L
CHLORIDE SERPL-SCNC: 102 MMOL/L (ref 100–108)
CLARITY UR: CLEAR
CO2 SERPL-SCNC: 28 MMOL/L (ref 21–32)
COLOR UR: YELLOW
CREAT SERPL-MCNC: 2.02 MG/DL (ref 0.6–1.3)
EOSINOPHIL # BLD AUTO: 0.01 THOUSAND/ΜL (ref 0–0.61)
EOSINOPHIL NFR BLD AUTO: 0 % (ref 0–6)
ERYTHROCYTE [DISTWIDTH] IN BLOOD BY AUTOMATED COUNT: 13.4 % (ref 11.6–15.1)
FLUAV RNA RESP QL NAA+PROBE: NEGATIVE
FLUBV RNA RESP QL NAA+PROBE: NEGATIVE
GFR SERPL CREATININE-BSD FRML MDRD: 30 ML/MIN/1.73SQ M
GLUCOSE SERPL-MCNC: 180 MG/DL (ref 65–140)
GLUCOSE UR STRIP-MCNC: NEGATIVE MG/DL
HCO3 BLDV-SCNC: 25.6 MMOL/L (ref 24–30)
HCT VFR BLD AUTO: 41.4 % (ref 36.5–49.3)
HGB BLD-MCNC: 13.2 G/DL (ref 12–17)
HGB UR QL STRIP.AUTO: NEGATIVE
IMM GRANULOCYTES # BLD AUTO: 0.08 THOUSAND/UL (ref 0–0.2)
IMM GRANULOCYTES NFR BLD AUTO: 1 % (ref 0–2)
INR PPP: 2.28 (ref 0.84–1.19)
KETONES UR STRIP-MCNC: NEGATIVE MG/DL
LACTATE SERPL-SCNC: 1.3 MMOL/L (ref 0.5–2)
LEUKOCYTE ESTERASE UR QL STRIP: NEGATIVE
LYMPHOCYTES # BLD AUTO: 0.54 THOUSANDS/ΜL (ref 0.6–4.47)
LYMPHOCYTES NFR BLD AUTO: 3 % (ref 14–44)
MCH RBC QN AUTO: 31.7 PG (ref 26.8–34.3)
MCHC RBC AUTO-ENTMCNC: 31.9 G/DL (ref 31.4–37.4)
MCV RBC AUTO: 99 FL (ref 82–98)
MONOCYTES # BLD AUTO: 1.28 THOUSAND/ΜL (ref 0.17–1.22)
MONOCYTES NFR BLD AUTO: 8 % (ref 4–12)
NEUTROPHILS # BLD AUTO: 14.28 THOUSANDS/ΜL (ref 1.85–7.62)
NEUTS SEG NFR BLD AUTO: 88 % (ref 43–75)
NITRITE UR QL STRIP: NEGATIVE
NON-SQ EPI CELLS URNS QL MICRO: ABNORMAL /HPF
NRBC BLD AUTO-RTO: 0 /100 WBCS
O2 CT BLDV-SCNC: 18.1 ML/DL
PCO2 BLDV: 38 MM HG (ref 42–50)
PH BLDV: 7.45 [PH] (ref 7.3–7.4)
PH UR STRIP.AUTO: 5.5 [PH]
PLATELET # BLD AUTO: 147 THOUSANDS/UL (ref 149–390)
PMV BLD AUTO: 9.5 FL (ref 8.9–12.7)
PO2 BLDV: 67 MM HG (ref 35–45)
POTASSIUM SERPL-SCNC: 4.6 MMOL/L (ref 3.5–5.3)
PROCALCITONIN SERPL-MCNC: 0.43 NG/ML
PROT SERPL-MCNC: 6.3 G/DL (ref 6.4–8.2)
PROT UR STRIP-MCNC: ABNORMAL MG/DL
PROTHROMBIN TIME: 24 SECONDS (ref 11.6–14.5)
RBC # BLD AUTO: 4.17 MILLION/UL (ref 3.88–5.62)
RBC #/AREA URNS AUTO: ABNORMAL /HPF
RSV RNA RESP QL NAA+PROBE: NEGATIVE
SARS-COV-2 RNA RESP QL NAA+PROBE: NEGATIVE
SODIUM SERPL-SCNC: 137 MMOL/L (ref 136–145)
SP GR UR STRIP.AUTO: 1.02 (ref 1–1.03)
UROBILINOGEN UR QL STRIP.AUTO: 0.2 E.U./DL
WBC # BLD AUTO: 16.21 THOUSAND/UL (ref 4.31–10.16)
WBC #/AREA URNS AUTO: ABNORMAL /HPF

## 2022-07-02 PROCEDURE — 36415 COLL VENOUS BLD VENIPUNCTURE: CPT | Performed by: PHYSICIAN ASSISTANT

## 2022-07-02 PROCEDURE — 99285 EMERGENCY DEPT VISIT HI MDM: CPT

## 2022-07-02 PROCEDURE — 83605 ASSAY OF LACTIC ACID: CPT | Performed by: PHYSICIAN ASSISTANT

## 2022-07-02 PROCEDURE — 96365 THER/PROPH/DIAG IV INF INIT: CPT

## 2022-07-02 PROCEDURE — 84145 PROCALCITONIN (PCT): CPT | Performed by: PHYSICIAN ASSISTANT

## 2022-07-02 PROCEDURE — 80076 HEPATIC FUNCTION PANEL: CPT | Performed by: PHYSICIAN ASSISTANT

## 2022-07-02 PROCEDURE — 81001 URINALYSIS AUTO W/SCOPE: CPT | Performed by: PHYSICIAN ASSISTANT

## 2022-07-02 PROCEDURE — 82010 KETONE BODYS QUAN: CPT | Performed by: PHYSICIAN ASSISTANT

## 2022-07-02 PROCEDURE — 85610 PROTHROMBIN TIME: CPT | Performed by: PHYSICIAN ASSISTANT

## 2022-07-02 PROCEDURE — 82948 REAGENT STRIP/BLOOD GLUCOSE: CPT

## 2022-07-02 PROCEDURE — 87449 NOS EACH ORGANISM AG IA: CPT | Performed by: PHYSICIAN ASSISTANT

## 2022-07-02 PROCEDURE — 87040 BLOOD CULTURE FOR BACTERIA: CPT | Performed by: PHYSICIAN ASSISTANT

## 2022-07-02 PROCEDURE — 84484 ASSAY OF TROPONIN QUANT: CPT | Performed by: PHYSICIAN ASSISTANT

## 2022-07-02 PROCEDURE — 85730 THROMBOPLASTIN TIME PARTIAL: CPT | Performed by: PHYSICIAN ASSISTANT

## 2022-07-02 PROCEDURE — 85025 COMPLETE CBC W/AUTO DIFF WBC: CPT | Performed by: PHYSICIAN ASSISTANT

## 2022-07-02 PROCEDURE — 99220 PR INITIAL OBSERVATION CARE/DAY 70 MINUTES: CPT | Performed by: PHYSICIAN ASSISTANT

## 2022-07-02 PROCEDURE — 71250 CT THORAX DX C-: CPT

## 2022-07-02 PROCEDURE — 99285 EMERGENCY DEPT VISIT HI MDM: CPT | Performed by: PHYSICIAN ASSISTANT

## 2022-07-02 PROCEDURE — 93005 ELECTROCARDIOGRAM TRACING: CPT

## 2022-07-02 PROCEDURE — 80048 BASIC METABOLIC PNL TOTAL CA: CPT | Performed by: PHYSICIAN ASSISTANT

## 2022-07-02 PROCEDURE — 0241U HB NFCT DS VIR RESP RNA 4 TRGT: CPT | Performed by: PHYSICIAN ASSISTANT

## 2022-07-02 PROCEDURE — 96361 HYDRATE IV INFUSION ADD-ON: CPT

## 2022-07-02 PROCEDURE — 82805 BLOOD GASES W/O2 SATURATION: CPT | Performed by: PHYSICIAN ASSISTANT

## 2022-07-02 PROCEDURE — 74176 CT ABD & PELVIS W/O CONTRAST: CPT

## 2022-07-02 RX ORDER — INSULIN LISPRO 100 [IU]/ML
1-6 INJECTION, SOLUTION INTRAVENOUS; SUBCUTANEOUS
Status: DISCONTINUED | OUTPATIENT
Start: 2022-07-02 | End: 2022-07-03 | Stop reason: HOSPADM

## 2022-07-02 RX ORDER — PREDNISONE 1 MG/1
3 TABLET ORAL DAILY
Status: DISCONTINUED | OUTPATIENT
Start: 2022-07-03 | End: 2022-07-03 | Stop reason: HOSPADM

## 2022-07-02 RX ORDER — METOPROLOL SUCCINATE 25 MG/1
25 TABLET, EXTENDED RELEASE ORAL DAILY
Status: DISCONTINUED | OUTPATIENT
Start: 2022-07-03 | End: 2022-07-03 | Stop reason: HOSPADM

## 2022-07-02 RX ORDER — AZITHROMYCIN 500 MG/1
500 TABLET, FILM COATED ORAL EVERY 24 HOURS
Status: DISCONTINUED | OUTPATIENT
Start: 2022-07-02 | End: 2022-07-02

## 2022-07-02 RX ORDER — ACETAMINOPHEN 325 MG/1
650 TABLET ORAL EVERY 6 HOURS PRN
Status: DISCONTINUED | OUTPATIENT
Start: 2022-07-02 | End: 2022-07-03 | Stop reason: HOSPADM

## 2022-07-02 RX ORDER — ALBUTEROL SULFATE 90 UG/1
2 AEROSOL, METERED RESPIRATORY (INHALATION) EVERY 6 HOURS PRN
Status: DISCONTINUED | OUTPATIENT
Start: 2022-07-02 | End: 2022-07-03 | Stop reason: HOSPADM

## 2022-07-02 RX ORDER — DOXYCYCLINE HYCLATE 100 MG/1
100 CAPSULE ORAL EVERY 12 HOURS SCHEDULED
Status: DISCONTINUED | OUTPATIENT
Start: 2022-07-02 | End: 2022-07-03 | Stop reason: HOSPADM

## 2022-07-02 RX ORDER — PRAVASTATIN SODIUM 40 MG
40 TABLET ORAL DAILY
Status: DISCONTINUED | OUTPATIENT
Start: 2022-07-03 | End: 2022-07-03 | Stop reason: HOSPADM

## 2022-07-02 RX ORDER — SODIUM CHLORIDE, SODIUM GLUCONATE, SODIUM ACETATE, POTASSIUM CHLORIDE, MAGNESIUM CHLORIDE, SODIUM PHOSPHATE, DIBASIC, AND POTASSIUM PHOSPHATE .53; .5; .37; .037; .03; .012; .00082 G/100ML; G/100ML; G/100ML; G/100ML; G/100ML; G/100ML; G/100ML
75 INJECTION, SOLUTION INTRAVENOUS CONTINUOUS
Status: DISCONTINUED | OUTPATIENT
Start: 2022-07-02 | End: 2022-07-03 | Stop reason: HOSPADM

## 2022-07-02 RX ORDER — MELATONIN
2000 DAILY
Status: DISCONTINUED | OUTPATIENT
Start: 2022-07-03 | End: 2022-07-03 | Stop reason: HOSPADM

## 2022-07-02 RX ORDER — GUAIFENESIN 600 MG/1
600 TABLET, EXTENDED RELEASE ORAL 2 TIMES DAILY
Status: DISCONTINUED | OUTPATIENT
Start: 2022-07-02 | End: 2022-07-03 | Stop reason: HOSPADM

## 2022-07-02 RX ORDER — HYDROXYCHLOROQUINE SULFATE 200 MG/1
200 TABLET, FILM COATED ORAL
Status: DISCONTINUED | OUTPATIENT
Start: 2022-07-03 | End: 2022-07-03 | Stop reason: HOSPADM

## 2022-07-02 RX ORDER — INSULIN GLARGINE 100 [IU]/ML
40 INJECTION, SOLUTION SUBCUTANEOUS
Status: DISCONTINUED | OUTPATIENT
Start: 2022-07-02 | End: 2022-07-03 | Stop reason: HOSPADM

## 2022-07-02 RX ORDER — LOSARTAN POTASSIUM 50 MG/1
100 TABLET ORAL DAILY
Status: DISCONTINUED | OUTPATIENT
Start: 2022-07-03 | End: 2022-07-03 | Stop reason: HOSPADM

## 2022-07-02 RX ADMIN — DOXYCYCLINE 100 MG: 100 CAPSULE ORAL at 22:28

## 2022-07-02 RX ADMIN — INSULIN LISPRO 2 UNITS: 100 INJECTION, SOLUTION INTRAVENOUS; SUBCUTANEOUS at 22:32

## 2022-07-02 RX ADMIN — SODIUM CHLORIDE, SODIUM GLUCONATE, SODIUM ACETATE, POTASSIUM CHLORIDE, MAGNESIUM CHLORIDE, SODIUM PHOSPHATE, DIBASIC, AND POTASSIUM PHOSPHATE 75 ML/HR: .53; .5; .37; .037; .03; .012; .00082 INJECTION, SOLUTION INTRAVENOUS at 23:19

## 2022-07-02 RX ADMIN — SODIUM CHLORIDE 1000 ML: 0.9 INJECTION, SOLUTION INTRAVENOUS at 18:52

## 2022-07-02 RX ADMIN — CEFTRIAXONE SODIUM 1000 MG: 10 INJECTION, POWDER, FOR SOLUTION INTRAVENOUS at 19:54

## 2022-07-02 RX ADMIN — GUAIFENESIN 600 MG: 600 TABLET ORAL at 22:28

## 2022-07-02 NOTE — ED PROVIDER NOTES
History  Chief Complaint   Patient presents with    Flu Symptoms     Pt c/o body aches, cough and chills, states " I feel like I have COVID"     Dori Torres is an 82-year-old male with past medical history including atrial fibrillation on Xarelto, pancreatic cancer, insulin-dependent diabetes, polymyalgia rheumatica, chronic kidney disease, arriving to the emergency department for evaluation with chief complaint of URI symptoms  Patient reports the onset of "chest rattling" last night which was associated with dry cough and nasal congestion  He states that this was followed by upper abdominal pain and chills  He reports temperature of 99° F at home with similar reading upon hospital presentation  He reports that he became nauseous with multiple episodes of non-bloody, non-bilious emesis, stating that he felt better after vomiting  He presently denies nausea noting that he has since been able to tolerate oral intake today  He had eaten toast with peanut butter this morning without issue  He denies headache, sore throat, ear pain, chest pain, abdominal pain, or back pain  He does note prior history of pneumonia, finding symptoms are somewhat similar  He expresses concern for possibly having contracted COVID as he has been recently traveling  Denies any known sick contact  He is fully vaccinated and boosted  He offers no other complaints or concerns at this time  Prior to Admission Medications   Prescriptions Last Dose Informant Patient Reported? Taking?    B-D UF III MINI PEN NEEDLES 31G X 5 MM MISC  Self Yes No   EUSEBIO CONTOUR TEST test strip  Self Yes No   Sig: 3 (three) times a day Test   Cinnamon 500 MG capsule  Self Yes No   Sig: Take 1,000 mg by mouth daily   Lantus SoloStar 100 units/mL injection pen   Yes No   XARELTO 20 MG tablet  Self Yes No   albuterol (Ventolin HFA) 90 mcg/act inhaler   No No   Sig: Inhale 2 puffs every 6 (six) hours as needed for wheezing   cholecalciferol (VITAMIN D3) 1,000 units tablet  Self Yes No   Sig: Take 2,000 Units by mouth daily   fluticasone-umeclidinium-vilanterol (Trelegy Ellipta) 100-62 5-25 MCG/INH inhaler   No No   Sig: Inhale 1 puff daily Rinse mouth after use    hydroxychloroquine (PLAQUENIL) 200 mg tablet   Yes No   Sig: Take 200 mg by mouth 2 (two) times a day with meals   insulin glargine (LANTUS) 100 units/mL subcutaneous injection  Self Yes No   Sig: Inject 41 Units under the skin daily at bedtime     losartan (COZAAR) 100 MG tablet  Self Yes No   Sig: Take 100 mg by mouth daily   metFORMIN (GLUCOPHAGE) 500 mg tablet  Self Yes No   Sig: Take 500 mg by mouth 2 (two) times a day with meals   metoprolol succinate (TOPROL-XL) 25 mg 24 hr tablet   Yes No   potassium chloride (KLOR-CON) 8 MEQ tablet   Yes No   pravastatin (PRAVACHOL) 40 mg tablet  Self Yes No   Sig: Take 40 mg by mouth daily   predniSONE 1 mg tablet  Self Yes No   Sig: Take 4 mg by mouth daily Currently down to 3 1/2 mg daily 05/05/22    torsemide (DEMADEX) 10 mg tablet  Self Yes No   Sig: Take 10 mg by mouth daily      Facility-Administered Medications: None       Past Medical History:   Diagnosis Date    [facility]     Hypertension     Pneumonia 6/13/2017    Right middle and lower lobe        Past Surgical History:   Procedure Laterality Date    APPENDECTOMY      CARDIAC SURGERY      Aortic Valve Replacement, Ascending Aorta    GALLBLADDER SURGERY      PANCREATICODUODENECTOMY         Family History   Problem Relation Age of Onset    Cancer Mother     Stroke Father     Cancer Sister     Diabetes Sister     Stroke Brother      I have reviewed and agree with the history as documented      E-Cigarette/Vaping    E-Cigarette Use Never User      E-Cigarette/Vaping Substances    Nicotine No     THC No     CBD No     Flavoring No     Other No     Unknown No      Social History     Tobacco Use    Smoking status: Former Smoker     Years: 40 00     Types: Pipe     Quit date:      Years since quittin 5    Smokeless tobacco: Never Used   Vaping Use    Vaping Use: Never used   Substance Use Topics    Alcohol use: No    Drug use: No       Review of Systems   Constitutional: Positive for chills  HENT: Positive for congestion  Respiratory: Positive for cough and shortness of breath  Cardiovascular: Negative for chest pain  Gastrointestinal: Positive for nausea and vomiting  Negative for abdominal pain  Skin: Negative for rash  Neurological: Negative for dizziness, weakness and headaches  All other systems reviewed and are negative  Physical Exam  Physical Exam  Vitals and nursing note reviewed  Constitutional:       General: He is not in acute distress  Appearance: Normal appearance  He is well-developed  He is not ill-appearing, toxic-appearing or diaphoretic  HENT:      Head: Normocephalic and atraumatic  Right Ear: External ear normal       Left Ear: External ear normal    Eyes:      Conjunctiva/sclera: Conjunctivae normal    Cardiovascular:      Rate and Rhythm: Normal rate and regular rhythm  Pulses: Normal pulses  Pulmonary:      Effort: Pulmonary effort is normal  No respiratory distress  Breath sounds: Normal breath sounds  No wheezing, rhonchi or rales  Chest:      Chest wall: No tenderness  Abdominal:      General: There is no distension  Palpations: Abdomen is soft  Tenderness: There is no abdominal tenderness  There is no guarding or rebound  Musculoskeletal:         General: Normal range of motion  Cervical back: Normal range of motion and neck supple  Right lower leg: No edema  Left lower leg: No edema  Skin:     General: Skin is warm and dry  Capillary Refill: Capillary refill takes less than 2 seconds  Neurological:      Mental Status: He is alert  Motor: Motor function is intact  No weakness  Gait: Gait is intact     Psychiatric:         Mood and Affect: Mood normal  Vital Signs  ED Triage Vitals [07/02/22 1704]   Temperature Pulse Respirations Blood Pressure SpO2   99 5 °F (37 5 °C) 78 18 122/73 98 %      Temp Source Heart Rate Source Patient Position - Orthostatic VS BP Location FiO2 (%)   Tympanic Monitor Lying Right arm --      Pain Score       --           Vitals:    07/02/22 1704   BP: 122/73   Pulse: 78   Patient Position - Orthostatic VS: Lying         Visual Acuity      ED Medications  Medications - No data to display    Diagnostic Studies  Results Reviewed     Procedure Component Value Units Date/Time    HS Troponin I 2hr [[id number]]     Lab Status: No result Specimen: Blood     HS Troponin 0hr (reflex protocol) [[id number]]  (Normal) Collected: 07/02/22 1753    Lab Status: Final result Specimen: Blood from Arm, Right Updated: 07/02/22 1835     hs TnI 0hr 24 ng/L     Lactic acid [[id number]]  (Normal) Collected: 07/02/22 1753    Lab Status: Final result Specimen: Blood from Arm, Right Updated: 07/02/22 1830     LACTIC ACID 1 3 mmol/L     Narrative:      Result may be elevated if tourniquet was used during collection      Protime-INR [[id number]]  (Abnormal) Collected: 07/02/22 1753    Lab Status: Final result Specimen: Blood from Arm, Right Updated: 07/02/22 1830     Protime 24 0 seconds      INR 2 28    APTT [[id number]]  (Abnormal) Collected: 07/02/22 1753    Lab Status: Final result Specimen: Blood from Arm, Right Updated: 07/02/22 1830     PTT 42 seconds     Basic metabolic panel [[id number]]  (Abnormal) Collected: 07/02/22 1753    Lab Status: Final result Specimen: Blood from Arm, Right Updated: 07/02/22 1828     Sodium 137 mmol/L      Potassium 4 6 mmol/L      Chloride 102 mmol/L      CO2 28 mmol/L      ANION GAP 7 mmol/L      BUN 44 mg/dL      Creatinine 2 02 mg/dL      Glucose 180 mg/dL      Calcium 9 5 mg/dL      eGFR 30 ml/min/1 73sq m     Narrative:      [place] guidelines for Chronic Kidney Disease (CKD):     Stage 1 with normal or high GFR (GFR > 90 mL/min/1 73 square meters)    Stage 2 Mild CKD (GFR = 60-89 mL/min/1 73 square meters)    Stage 3A Moderate CKD (GFR = 45-59 mL/min/1 73 square meters)    Stage 3B Moderate CKD (GFR = 30-44 mL/min/1 73 square meters)    Stage 4 Severe CKD (GFR = 15-29 mL/min/1 73 square meters)    Stage 5 End Stage CKD (GFR <15 mL/min/1 73 square meters)  Note: GFR calculation is accurate only with a steady state creatinine    Hepatic function panel [[id number]]  (Abnormal) Collected: 07/02/22 1753    Lab Status: Final result Specimen: Blood from Arm, Right Updated: 07/02/22 1828     Total Bilirubin 1 47 mg/dL      Bilirubin, Direct 0 37 mg/dL      Alkaline Phosphatase 97 U/L      AST 18 U/L      ALT 20 U/L      Total Protein 6 3 g/dL      Albumin 3 4 g/dL     Beta Hydroxybutyrate [[id number]]  (Normal) Collected: 07/02/22 1753    Lab Status: Final result Specimen: Blood from Arm, Right Updated: 07/02/22 1814     BETA-HYDROXYBUTYRATE 0 2 mmol/L     CBC and differential [[id number]]  (Abnormal) Collected: 07/02/22 1753    Lab Status: Final result Specimen: Blood from Arm, Right Updated: 07/02/22 1812     WBC 16 21 Thousand/uL      RBC 4 17 Million/uL      Hemoglobin 13 2 g/dL      Hematocrit 41 4 %      MCV 99 fL      MCH 31 7 pg      MCHC 31 9 g/dL      RDW 13 4 %      MPV 9 5 fL      Platelets 092 Thousands/uL      nRBC 0 /100 WBCs      Neutrophils Relative 88 %      Immat GRANS % 1 %      Lymphocytes Relative 3 %      Monocytes Relative 8 %      Eosinophils Relative 0 %      Basophils Relative 0 %      Neutrophils Absolute 14 28 Thousands/µL      Immature Grans Absolute 0 08 Thousand/uL      Lymphocytes Absolute 0 54 Thousands/µL      Monocytes Absolute 1 28 Thousand/µL      Eosinophils Absolute 0 01 Thousand/µL      Basophils Absolute 0 02 Thousands/µL     Blood gas, venous [[id number]]  (Abnormal) Collected: 07/02/22 1753    Lab Status: Final result Specimen: Blood from Arm, Right Updated: 07/02/22 1810     pH, Antoni 7 447     pCO2, Antoni 38 0 mm Hg      pO2, Antoni 67 0 mm Hg      HCO3, Antoni 25 6 mmol/L      Base Excess, Antoni 1 7 mmol/L      O2 Content, Antoni 18 1 ml/dL      O2 HGB, VENOUS 91 8 %     COVID/FLU/RSV - 2 hour TAT [[id number]] Collected: 07/02/22 1753    Lab Status: In process Specimen: Nares from Nose Updated: 07/02/22 1806    Blood culture #2 [[id number]] Collected: 07/02/22 1753    Lab Status: In process Specimen: Blood from Arm, Right Updated: 07/02/22 1805    Blood culture #1 [[id number]] Collected: 07/02/22 1753    Lab Status: In process Specimen: Blood from Arm, Right Updated: 07/02/22 1805                 CT chest abdomen pelvis wo contrast   Final Result by Clementine Archuleta MD (07/02 1902)      Multifocal pneumonia most pronounced in the left lower lobe  Recommend follow-up to ensure resolution  No acute intra-abdominal pathology  Stable nonemergent findings as below  Workstation performed: [id number]                    Procedures  ECG 12 Lead Documentation Only    Date/Time: 7/2/2022 5:46 PM  Performed by: Dayron Tripathi PA-C  Authorized by: Dayron Tripathi PA-C     Indications / Diagnosis:  Sob  Patient location:  ED  Interpretation:     Interpretation: abnormal    Rate:     ECG rate:  79  Rhythm:     Rhythm: atrial fibrillation    Ectopy:     Ectopy: none    QRS:     QRS axis:  Normal  Conduction:     Conduction: abnormal    ST segments:     ST segments:  Non-specific  T waves:     T waves: non-specific               ED Course  ED Course as of 07/02/22 1924   Sat Jul 02, 2022   1848 Creatinine(!): 2 02  C/w baseline               Identification of Seniors at [facility] Most Recent Value   (ISAR) Identification of Seniors at Risk    Before the illness or injury that brought you to the Emergency, did you need someone to help you on a regular basis?  0 Filed at: 07/02/2022 1705   In the last 24 hours, have you needed more help than usual? 0 Filed at: 07/02/2022 1705   Have you been hospitalized for one or more nights during the past 6 months? 0 Filed at: 07/02/2022 1705   In general, do you see well? 0 Filed at: 07/02/2022 1705   In general, do you have serious problems with your memory? 0 Filed at: 07/02/2022 1705   Do you take more than three different medications every day? 1 Filed at: 07/02/2022 1705   ISAR Score 1 Filed at: 07/02/2022 1705                      SBIRT 22yo+    Flowsheet Row Most Recent Value   SBIRT (25 yo +)    In order to provide better care to our patients, we are screening all of our patients for alcohol and drug use  Would it be okay to ask you these screening questions? Yes Filed at: 07/02/2022 1759   Initial Alcohol Screen: US AUDIT-C     1  How often do you have a drink containing alcohol? 0 Filed at: 07/02/2022 1759   2  How many drinks containing alcohol do you have on a typical day you are drinking? 0 Filed at: 07/02/2022 1759   3a  Male UNDER 65: How often do you have five or more drinks on one occasion? 0 Filed at: 07/02/2022 1759   3b  FEMALE Any Age, or MALE 65+: How often do you have 4 or more drinks on one occassion? 0 Filed at: 07/02/2022 1759   Audit-C Score 0 Filed at: 07/02/2022 1759   ALEXEY: How many times in the past year have you    Used an illegal drug or used a prescription medication for non-medical reasons? Never Filed at: 07/02/2022 1759                    MDM  Number of Diagnoses or Management Options  Chronic kidney disease  Multifocal pneumonia: new and requires workup  Diagnosis management comments: This is an 82-year-old male presenting for evaluation of URI symptoms x 1 day  Reports chest congestion, dry cough, chills  Concerned for COVID      Differential diagnosis includes but is not limited to: pneumonia, copd, acs, arrhythmia, gerd, enteritis, colitis, viral uri, covid/flu, dka, TARA, electrolyte derangements, sepsis    Initial ED plan: labs, imaging, dispo pending    Final ED Assessment: Vital signs stable on ED presentation, examination as above  All labs and imaging independently reviewed with imaging interpreted by the Radiologist  Lab work demonstrate leukocytosis of 16  Renal function similar to prior values  CT reports multifocal pneumonia  PORT score Risk Class IV  Rocephin was started in the emergency department  All testing results reviewed with the patient and spouse at bedside  I had recommended hospital admission to continue iv antibiotics and close observation which patient and spouse are understanding of and agreeable with  The case was discussed with [facility], slim, patient accepted to the medicine service  He is stable for hospital admission  Bridging orders placed  Amount and/or Complexity of Data Reviewed  Clinical lab tests: ordered and reviewed  Tests in the radiology section of CPT®: ordered and reviewed  Review and summarize past medical records: yes  Independent visualization of images, tracings, or specimens: yes    Risk of Complications, Morbidity, and/or Mortality  Presenting problems: moderate  Diagnostic procedures: moderate  Management options: moderate    Patient Progress  Patient progress: stable      Disposition  Final diagnoses:   Multifocal pneumonia   Chronic kidney disease     Time reflects when diagnosis was documented in both MDM as applicable and the Disposition within this note     Time User Action Codes Description Comment    7/2/2022  8:17 PM Mariluz Solano Add [J18 9] Multifocal pneumonia     7/2/2022  8:18 PM Mariluz Solano Add [N18 9] Chronic kidney disease       ED Disposition     ED Disposition   Admit    Condition   Stable    Date/Time   Sat Jul 2, 2022  8:17 PM    Comment   Case was discussed with [facility] and the patient's admission status was agreed to be Admission Status: observation status to the service of Dr Nicole Bello              Follow-up Information    None         Patient's Medications   Discharge Prescriptions    No medications on file       No discharge procedures on file      PDMP Review     None          ED Provider  Electronically Signed by           Sade Rebolledo PA-C  07/03/22 0112

## 2022-07-03 VITALS
WEIGHT: 203 LBS | RESPIRATION RATE: 18 BRPM | OXYGEN SATURATION: 95 % | HEIGHT: 75 IN | TEMPERATURE: 97.9 F | DIASTOLIC BLOOD PRESSURE: 74 MMHG | BODY MASS INDEX: 25.24 KG/M2 | HEART RATE: 61 BPM | SYSTOLIC BLOOD PRESSURE: 142 MMHG

## 2022-07-03 LAB
ALBUMIN SERPL BCP-MCNC: 2.8 G/DL (ref 3.5–5)
ALP SERPL-CCNC: 78 U/L (ref 46–116)
ALT SERPL W P-5'-P-CCNC: 13 U/L (ref 12–78)
ANION GAP SERPL CALCULATED.3IONS-SCNC: 8 MMOL/L (ref 4–13)
AST SERPL W P-5'-P-CCNC: 17 U/L (ref 5–45)
ATRIAL RATE: 111 BPM
BASOPHILS # BLD AUTO: 0.02 THOUSANDS/ΜL (ref 0–0.1)
BASOPHILS NFR BLD AUTO: 0 % (ref 0–1)
BILIRUB SERPL-MCNC: 1.17 MG/DL (ref 0.2–1)
BUN SERPL-MCNC: 41 MG/DL (ref 5–25)
CALCIUM ALBUM COR SERPL-MCNC: 10.2 MG/DL (ref 8.3–10.1)
CALCIUM SERPL-MCNC: 9.2 MG/DL (ref 8.3–10.1)
CHLORIDE SERPL-SCNC: 105 MMOL/L (ref 100–108)
CO2 SERPL-SCNC: 28 MMOL/L (ref 21–32)
CREAT SERPL-MCNC: 1.93 MG/DL (ref 0.6–1.3)
EOSINOPHIL # BLD AUTO: 0.12 THOUSAND/ΜL (ref 0–0.61)
EOSINOPHIL NFR BLD AUTO: 1 % (ref 0–6)
ERYTHROCYTE [DISTWIDTH] IN BLOOD BY AUTOMATED COUNT: 13.4 % (ref 11.6–15.1)
GFR SERPL CREATININE-BSD FRML MDRD: 31 ML/MIN/1.73SQ M
GLUCOSE P FAST SERPL-MCNC: 95 MG/DL (ref 65–99)
GLUCOSE SERPL-MCNC: 206 MG/DL (ref 65–140)
GLUCOSE SERPL-MCNC: 214 MG/DL (ref 65–140)
GLUCOSE SERPL-MCNC: 280 MG/DL (ref 65–140)
GLUCOSE SERPL-MCNC: 91 MG/DL (ref 65–140)
GLUCOSE SERPL-MCNC: 95 MG/DL (ref 65–140)
HCT VFR BLD AUTO: 37.3 % (ref 36.5–49.3)
HGB BLD-MCNC: 11.7 G/DL (ref 12–17)
IMM GRANULOCYTES # BLD AUTO: 0.04 THOUSAND/UL (ref 0–0.2)
IMM GRANULOCYTES NFR BLD AUTO: 0 % (ref 0–2)
L PNEUMO1 AG UR QL IA.RAPID: NEGATIVE
LYMPHOCYTES # BLD AUTO: 0.84 THOUSANDS/ΜL (ref 0.6–4.47)
LYMPHOCYTES NFR BLD AUTO: 9 % (ref 14–44)
MCH RBC QN AUTO: 31.5 PG (ref 26.8–34.3)
MCHC RBC AUTO-ENTMCNC: 31.4 G/DL (ref 31.4–37.4)
MCV RBC AUTO: 101 FL (ref 82–98)
MONOCYTES # BLD AUTO: 1.01 THOUSAND/ΜL (ref 0.17–1.22)
MONOCYTES NFR BLD AUTO: 11 % (ref 4–12)
NEUTROPHILS # BLD AUTO: 7.15 THOUSANDS/ΜL (ref 1.85–7.62)
NEUTS SEG NFR BLD AUTO: 79 % (ref 43–75)
NRBC BLD AUTO-RTO: 0 /100 WBCS
PLATELET # BLD AUTO: 133 THOUSANDS/UL (ref 149–390)
PMV BLD AUTO: 9.8 FL (ref 8.9–12.7)
POTASSIUM SERPL-SCNC: 4.5 MMOL/L (ref 3.5–5.3)
PROCALCITONIN SERPL-MCNC: 0.46 NG/ML
PROT SERPL-MCNC: 5.4 G/DL (ref 6.4–8.2)
QRS AXIS: 74 DEGREES
QRSD INTERVAL: 104 MS
QT INTERVAL: 408 MS
QTC INTERVAL: 467 MS
RBC # BLD AUTO: 3.71 MILLION/UL (ref 3.88–5.62)
S PNEUM AG UR QL: NEGATIVE
SODIUM SERPL-SCNC: 141 MMOL/L (ref 136–145)
T WAVE AXIS: 168 DEGREES
VENTRICULAR RATE: 79 BPM
WBC # BLD AUTO: 9.18 THOUSAND/UL (ref 4.31–10.16)

## 2022-07-03 PROCEDURE — 82948 REAGENT STRIP/BLOOD GLUCOSE: CPT

## 2022-07-03 PROCEDURE — 93010 ELECTROCARDIOGRAM REPORT: CPT | Performed by: INTERNAL MEDICINE

## 2022-07-03 PROCEDURE — 85025 COMPLETE CBC W/AUTO DIFF WBC: CPT | Performed by: PHYSICIAN ASSISTANT

## 2022-07-03 PROCEDURE — 84145 PROCALCITONIN (PCT): CPT | Performed by: PHYSICIAN ASSISTANT

## 2022-07-03 PROCEDURE — 99217 PR OBSERVATION CARE DISCHARGE MANAGEMENT: CPT | Performed by: STUDENT IN AN ORGANIZED HEALTH CARE EDUCATION/TRAINING PROGRAM

## 2022-07-03 PROCEDURE — 80053 COMPREHEN METABOLIC PANEL: CPT | Performed by: PHYSICIAN ASSISTANT

## 2022-07-03 RX ORDER — GUAIFENESIN 600 MG/1
600 TABLET, EXTENDED RELEASE ORAL 2 TIMES DAILY
Qty: 28 TABLET | Refills: 0 | Status: SHIPPED | OUTPATIENT
Start: 2022-07-03 | End: 2022-07-17

## 2022-07-03 RX ORDER — CEFDINIR 300 MG/1
300 CAPSULE ORAL EVERY 12 HOURS SCHEDULED
Qty: 10 CAPSULE | Refills: 0 | Status: SHIPPED | OUTPATIENT
Start: 2022-07-03 | End: 2022-07-08

## 2022-07-03 RX ADMIN — PRAVASTATIN SODIUM 40 MG: 40 TABLET ORAL at 08:09

## 2022-07-03 RX ADMIN — HYDROXYCHLOROQUINE SULFATE 200 MG: 200 TABLET, FILM COATED ORAL at 08:15

## 2022-07-03 RX ADMIN — Medication 2000 UNITS: at 08:09

## 2022-07-03 RX ADMIN — GUAIFENESIN 600 MG: 600 TABLET ORAL at 08:08

## 2022-07-03 RX ADMIN — DOXYCYCLINE 100 MG: 100 CAPSULE ORAL at 08:09

## 2022-07-03 RX ADMIN — METOPROLOL SUCCINATE 25 MG: 25 TABLET, EXTENDED RELEASE ORAL at 08:10

## 2022-07-03 RX ADMIN — LOSARTAN POTASSIUM 100 MG: 50 TABLET, FILM COATED ORAL at 08:10

## 2022-07-03 RX ADMIN — INSULIN LISPRO 2 UNITS: 100 INJECTION, SOLUTION INTRAVENOUS; SUBCUTANEOUS at 11:38

## 2022-07-03 RX ADMIN — PREDNISONE 3 MG: 1 TABLET ORAL at 08:08

## 2022-07-03 RX ADMIN — RIVAROXABAN 15 MG: 15 TABLET, FILM COATED ORAL at 08:09

## 2022-07-03 NOTE — ASSESSMENT & PLAN NOTE
Lab Results   Component Value Date    HGBA1C 6 2 (H) 06/04/2022     · Well controlled   · Continue home regimen on discharge

## 2022-07-03 NOTE — RESPIRATORY THERAPY NOTE
RT Protocol Note  Lonnie Day [de-identified] y o  male MRN: [id number]  Unit/Bed#: -01 Encounter: [id number]    Assessment    Principal Problem:    Multifocal pneumonia  Active Problems:    Diabetes mellitus ([facility] )    Hypertension    Atrial fibrillation (HCC)    Hyperlipidemia    COPD (chronic obstructive pulmonary disease) (HCC)    Sepsis due to pneumonia (HCC)    CKD (chronic kidney disease)      Home Pulmonary Medications:    Home Devices/Therapy: (P) Other (Comment) (trelegy daily albuterol inh prn)    Past Medical History:   Diagnosis Date    Diabetes mellitus ([facility] 75 )     Hypertension     Pneumonia 2017    Right middle and lower lobe      Social History     Socioeconomic History    Marital status: /Civil Union     Spouse name: None    Number of children: None    Years of education: None    Highest education level: None   Occupational History    None   Tobacco Use    Smoking status: Former Smoker     Years: 40      Types: Pipe     Quit date:      Years since quittin 5    Smokeless tobacco: Never Used   Vaping Use    Vaping Use: Never used   Substance and Sexual Activity    Alcohol use: No    Drug use: No    Sexual activity: Yes     Partners: Female   Other Topics Concern    None   Social History Narrative    None     Social Determinants of Health     Financial Resource Strain: Not on file   Food Insecurity: Not on file   Transportation Needs: Not on file   Physical Activity: Not on file   Stress: Not on file   Social Connections: Not on file   Intimate Partner Violence: Not on file   Housing Stability: Not on file       Subjective         Objective    Physical Exam:   Assessment Type: (P) Assess only  General Appearance: (P) Awake, Alert  Respiratory Pattern: (P) Normal  Chest Assessment: (P) Chest expansion symmetrical  Bilateral Breath Sounds: (P) Clear    Vitals:  Blood pressure 152/82, pulse 76, temperature 97 7 °F (36 5 °C), resp   rate (!) 27, height 6' 3" (1 905 m), weight 92 1 kg (203 lb), SpO2 96 %  Imaging and other studies: I have personally reviewed pertinent reports  Plan    Respiratory Plan: (P) Home Bronchodilator Patient pathway        Resp Comments: (P) pt appeared comfortably no distress  lungs are clear spo2 97% on room air  pt is aware of prn inhaler

## 2022-07-03 NOTE — UTILIZATION REVIEW
Initial Clinical Review    Admission: Date/Time/Statement:   Admission Orders (From admission, onward)     Ordered        07/02/22 2017  Place in Observation  Once                      Orders Placed This Encounter   Procedures    Place in Observation     Standing Status:   Standing     Number of Occurrences:   1     Order Specific Question:   Level of Care     Answer:   Med Surg [16]     ED Arrival Information     Expected   -    Arrival   7/2/2022 16:55    Acuity   Urgent            Means of arrival   Walk-In    Escorted by   Spouse    Service   Hospitalist    Admission type   Urgent            Arrival complaint   Flu Like Symptoms & SOB           Chief Complaint   Patient presents with    Flu Symptoms     Pt c/o body aches, cough and chills, states " I feel like I have COVID"       Initial Presentation: [de-identified] y o  male PMH of T2DM, COPD, HTN, PMR who presents with shortness of breath and productive cough that began last evening  Had 6-8 episodes of vomiting today, mostly mucus  Denies chest pain or tightness  Symptoms improved today after vomiting  Patient had low grade fever of 99 5 PTA with chills  In ED + heart murmur, lungs rales,leukocytosis wbc 16 21, bun/cr 44/2 02, t bili 1 47, glucose 180, ine 2 28   CT chest abd pelvis showing multifocal pneumonia left lower lobe   Covid/flu  negative  continue empirically IV Ceftriaxone , add doxycycline ,IS guafenisin ,   CKD s/p 1L fluid bolus      Anticipated Length of Stay: Patient will be admitted on an observation basis with an anticipated length of stay of less than 2 midnights secondary to PNA      Date: 7/3/22   Day 2:     ED Triage Vitals   Temperature Pulse Respirations Blood Pressure SpO2   07/02/22 1704 07/02/22 1704 07/02/22 1704 07/02/22 1704 07/02/22 1704   99 5 °F (37 5 °C) 78 18 122/73 98 %      Temp Source Heart Rate Source Patient Position - Orthostatic VS BP Location FiO2 (%)   07/02/22 1704 07/02/22 1704 07/02/22 1704 07/02/22 1704 --   Tympanic Monitor Lying Right arm       Pain Score       07/02/22 2044       No Pain          Wt Readings from Last 1 Encounters:   07/02/22 92 1 kg (203 lb)     Additional Vital Signs:   07/03/22 07:13:47 97 9 °F (36 6 °C) 61 18 142/74 97 95 % None (Room air) --   07/02/22 23:26:12 98 °F (36 7 °C) 63 16 141/73 96 95 % -- Lying   07/02/22 20:49:45 97 7 °F (36 5 °C) 76 -- 152/82 105 96 % -- --   07/02/22 20:49:29 97 7 °F (36 5 °C) 82 -- 152/82 105 95 %         Pertinent Labs/Diagnostic Test Results:   CT chest abdomen pelvis wo contrast   Final Result by Fox Morrow MD (07/02 1902)      Multifocal pneumonia most pronounced in the left lower lobe  Recommend follow-up to ensure resolution  No acute intra-abdominal pathology  Stable nonemergent findings as below              Workstation performed: [id number]           Results from last 7 days   Lab Units 07/02/22  1753   SARS-COV-2  Negative     Results from last 7 days   Lab Units 07/03/22  0456 07/02/22  1753   WBC Thousand/uL 9 18 16 21*   HEMOGLOBIN g/dL 11 7* 13 2   HEMATOCRIT % 37 3 41 4   PLATELETS Thousands/uL 133* 147*   NEUTROS ABS Thousands/µL 7 15 14 28*     Results from last 7 days   Lab Units 07/03/22  0456 07/02/22  1753   SODIUM mmol/L 141 137   POTASSIUM mmol/L 4 5 4 6   CHLORIDE mmol/L 105 102   CO2 mmol/L 28 28   ANION GAP mmol/L 8 7   BUN mg/dL 41* 44*   CREATININE mg/dL 1 93* 2 02*   EGFR ml/min/1 73sq m 31 30   CALCIUM mg/dL 9 2 9 5     Results from last 7 days   Lab Units 07/03/22  0456 07/02/22  1753   AST U/L 17 18   ALT U/L 13 20   ALK PHOS U/L 78 97   TOTAL PROTEIN g/dL 5 4* 6 3*   ALBUMIN g/dL 2 8* 3 4*   TOTAL BILIRUBIN mg/dL 1 17* 1 47*   BILIRUBIN DIRECT mg/dL  --  0 37*     Results from last 7 days   Lab Units 07/03/22  0726 07/02/22 2231 07/02/22  2059   POC GLUCOSE mg/dl 91 214* 280*     Results from last 7 days   Lab Units 07/03/22  0456 07/02/22  1753   GLUCOSE RANDOM mg/dL 95 180*     BETA-HYDROXYBUTYRATE   Date Value Ref Range Status 07/02/2022 0 2 <0 6 mmol/L Final      Results from last 7 days   Lab Units 07/02/22  1753   PH VICTORINA  7 447*   PCO2 VICTORINA mm Hg 38 0*   PO2 VICTORINA mm Hg 67 0*   HCO3 VICTORINA mmol/L 25 6   BASE EXC VICTORINA mmol/L 1 7   O2 CONTENT VICTORINA ml/dL 18 1   O2 HGB, VENOUS % 91 8*     Results from last 7 days   Lab Units 07/02/22  2241 07/02/22 1954 07/02/22  1753   HS TNI 0HR ng/L  --   --  24   HS TNI 2HR ng/L  --  22  --    HSTNI D2 ng/L  --  -2  --    HS TNI 4HR ng/L 24  --   --    HSTNI D4 ng/L 0  --   --      Results from last 7 days   Lab Units 07/02/22  1753   PROTIME seconds 24 0*   INR  2 28*   PTT seconds 42*     Results from last 7 days   Lab Units 07/03/22  0456 07/02/22  2241   PROCALCITONIN ng/ml 0 46* 0 43*     Results from last 7 days   Lab Units 07/02/22  1753   LACTIC ACID mmol/L 1 3     Results from last 7 days   Lab Units 07/02/22 2120   CLARITY UA  Clear   COLOR UA  Yellow   SPEC GRAV UA  1 020   PH UA  5 5   GLUCOSE UA mg/dl Negative   KETONES UA mg/dl Negative   BLOOD UA  Negative   PROTEIN UA mg/dl Trace*   NITRITE UA  Negative   BILIRUBIN UA  Negative   UROBILINOGEN UA E U /dl 0 2   LEUKOCYTES UA  Negative   WBC UA /hpf 0-1*   RBC UA /hpf None Seen   BACTERIA UA /hpf None Seen   EPITHELIAL CELLS WET PREP /hpf None Seen     Results from last 7 days   Lab Units 07/02/22 2120 07/02/22 2116 07/02/22  1753   STREP PNEUMONIAE ANTIGEN, URINE  Negative  --   --    LEGIONELLA URINARY ANTIGEN   --  Negative  --    INFLUENZA A PCR   --   --  Negative   INFLUENZA B PCR   --   --  Negative   RSV PCR   --   --  Negative     Results from last 7 days   Lab Units 07/02/22  1753   BLOOD CULTURE  Received in Microbiology Lab  Culture in Progress  Received in Microbiology Lab  Culture in Progress         ED Treatment:   Medication Administration from 07/02/2022 1655 to 07/02/2022 2038       Date/Time Order Dose Route Action Action by Comments     07/02/2022 1852 sodium chloride 0 9 % bolus 1,000 mL 1,000 mL Intravenous New Bag Shanice Awad RN      07/02/2022 1954 ceftriaxone (ROCEPHIN) 1 g/50 mL in dextrose IVPB 1,000 mg Intravenous New Bag Shanice Awad, [place]         Past Medical History:   Diagnosis Date    Diabetes mellitus ([facility] [address] )     Hypertension     Pneumonia 6/13/2017    Right middle and lower lobe      Present on Admission:   Hyperlipidemia   Hypertension   Diabetes mellitus ([facility] [address] )   Polymyalgia rheumatica (HCC)      Admitting Diagnosis: Chronic kidney disease [N18 9]  Flu-like symptoms [R68 89]  Multifocal pneumonia [J18 9]  Age/Sex: [de-identified] y o  male  Admission Orders:  gmf  Sputum g scs  IS  I/O  Scheduled Medications:  cefTRIAXone, 1,000 mg, Intravenous, Q24H  cholecalciferol, 2,000 Units, Oral, Daily  doxycycline hyclate, 100 mg, Oral, Q12H [address]  fluticasone-vilanterol, 1 puff, Inhalation, Daily  guaiFENesin, 600 mg, Oral, BID  hydroxychloroquine, 200 mg, Oral, Daily With Breakfast  insulin glargine, 40 Units, Subcutaneous, HS  insulin lispro, 1-6 Units, Subcutaneous, 4x Daily (AC & HS)  losartan, 100 mg, Oral, Daily  metoprolol succinate, 25 mg, Oral, Daily  pravastatin, 40 mg, Oral, Daily  predniSONE, 3 mg, Oral, Daily  rivaroxaban, 15 mg, Oral, Daily With Breakfast  umeclidinium bromide, 1 puff, Inhalation, Daily      Continuous IV Infusions:  multi-electrolyte, 75 mL/hr, Intravenous, Continuous      PRN Meds:  acetaminophen, 650 mg, Oral, Q6H PRN  albuterol, 2 puff, Inhalation, Q6H PRN        None    Network Utilization Review Department  ATTENTION: Please call with any questions or concerns to [phone] and carefully listen to the prompts so that you are directed to the right person  All voicemails are confidential   Asim Iqbal all requests for admission clinical reviews, approved or denied determinations and any other requests to dedicated fax number below belonging to the campus where the patient is receiving treatment   List of dedicated fax numbers for the Facilities:  FACILITY NAME UR FAX NUMBER   ADMISSION DENIALS (Administrative/Medical Necessity) [phone]   [address] (Maternity/NICU/Pediatrics) 261 [facility] Floor [facility] 40 125 [facility]  [phone] [address] Medical [place] [address] [address] [place] [address] Margarita Serrano 14[address] [facility] [address] [facility] [phone]

## 2022-07-03 NOTE — DISCHARGE SUMMARY
[address] [facility]  Discharge- Jonodo Christiansonws 1940, [de-identified] y o  male MRN: [id number]  Unit/Bed#: -Minh Encounter: [id number]  Primary Care Provider: Bo Tan MD   Date and time admitted to hospital: 7/2/2022  5:22 PM    * Multifocal pneumonia  Assessment & Plan  · CXR shows multifocal pneumonia   · PORT class V assoc with 0-29% mortality risk  · COVID/RSV/flu negtive   · Given dose of ceftriaxone overnight  · Currently much improved, no oxygen requirements, afebrile, clinically non toxic appearing  · Transition to PO antibiotics, stable for discharge  · Will notify patient if any of the infectious work up results as +    CKD (chronic kidney disease)  Assessment & Plan  Cr 2 0, stable from 1m ago however prior baseline 1 7-1 8   · Likely progression of CKD rather than TARA  · Stable for discharge     COPD (chronic obstructive pulmonary disease) ([facility] [address] )  Assessment & Plan  · May have have mild exac due to PNA  currently not in exacerbation  · Continue home inhalers    Atrial fibrillation [facility])  Assessment & Plan  EKG shows afib, rate controlled   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  · Continue home metoprolol and xarelto (dose reduced to 15mg for GFR 30)   · Stable for discharge on home regimen    Diabetes mellitus [facility])  Assessment & Plan  Lab Results   Component Value Date    HGBA1C 6 2 (H) 06/04/2022     · Well controlled   · Continue home regimen on discharge         Medical Problems             Resolved Problems  Date Reviewed: 7/2/2022   None               Discharging Physician / Practitioner: Simone Lo MD  PCP: Bo Tan MD  Admission Date:   Admission Orders (From admission, onward)     Ordered        07/02/22 2017  Place in Observation  Once                      Discharge Date: 07/03/22    Consultations During Hospital Stay:  · None  ·     Procedures Performed:   · imaging    Significant Findings / Test Results:   Principal Problem: Multifocal pneumonia  Active Problems:    Diabetes mellitus ([facility] [address] )    Hypertension    Atrial fibrillation (HCC)    Hyperlipidemia    Polymyalgia rheumatica (HCC)    COPD (chronic obstructive pulmonary disease) (HCC)    Sepsis due to pneumonia (HCC)    CKD (chronic kidney disease)  Resolved Problems:    * No resolved hospital problems  *  ·   ·     Incidental Findings:   · See above      Test Results Pending at Discharge (will require follow up): · Remaining infectious work up- blood cultures, urine antigens      Outpatient Tests Requested:  · None     Complications:  None     Reason for Admission: shortness of breath     Hospital Course:   Candy Wong is a [de-identified] y o  male patient who originally presented to the hospital on 7/2/2022 due to shortness of breath secondary to suspected pneumonia, now much improved overnight and stable for discharge with PO antibiotics  Condition at Discharge: good    Discharge Day Visit / Exam:   * Please refer to separate progress note for these details *    Discussion with Family: Updated  (wife) at bedside  Discharge instructions/Information to patient and family:   See after visit summary for information provided to patient and family  Provisions for Follow-Up Care:  See after visit summary for information related to follow-up care and any pertinent home health orders  Disposition:   Home    Planned Readmission: none      Discharge Statement:  I spent 30+ minutes discharging the patient  This time was spent on the day of discharge  I had direct contact with the patient on the day of discharge  Greater than 50% of the total time was spent examining patient, answering all patient questions, arranging and discussing plan of care with patient as well as directly providing post-discharge instructions  Additional time then spent on discharge activities      Discharge Medications:  See after visit summary for reconciled discharge medications provided to patient and/or family        **Please Note: This note may have been constructed using a voice recognition system**

## 2022-07-03 NOTE — ASSESSMENT & PLAN NOTE
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

## 2022-07-03 NOTE — ASSESSMENT & PLAN NOTE
· CXR shows multifocal pneumonia   · PORT class V assoc with 0-29% mortality risk  · COVID/RSV/flu negtive   · Check sputum cx, urine strep and legionella studies  · Empirically started on IV ceftriaxone, will continue  · Add doxycycline for atypical coverage (qtc prolongation)  · IS, guafenisin, supportive care

## 2022-07-03 NOTE — H&P
[address] [facility]  H&P- Luisana [place] 1940, [de-identified] y o  male MRN: [id number]  Unit/Bed#: MS Junior-Minh Encounter: [id number]  Primary Care Provider: Armaan Langston MD   Date and time admitted to hospital: 7/2/2022  5:22 PM    * Multifocal pneumonia  Assessment & Plan  · CXR shows multifocal pneumonia   · PORT class V assoc with 0-29% mortality risk  · COVID/RSV/flu negtive   · Check sputum cx, urine strep and legionella studies  · Empirically started on IV ceftriaxone, will continue  · Add doxycycline for atypical coverage (qtc prolongation)  · IS, guafenisin, supportive care     CKD (chronic kidney disease)  Assessment & Plan  Cr 2 0, stable from 1m ago however prior baseline 1 7-1 8   · Hold home torsemide as appears hypovolemic   · continue gentle IVFs  · Avoid nephrotoxins, monitor I/O   · Trend bmp    Sepsis due to pneumonia [facility])  Assessment & Plan  Meeting sepsis criteria for leukocytosis, tachypnea in setting of sepsis   · LA normal, Obtain procalcitonin   · Blood cultures pending   · S/p 1L fluid bolus   · Empiric IV atb as above   · Monitor hemodynamics     COPD (chronic obstructive pulmonary disease) ([facility] [address] )  Assessment & Plan  · May have have mild exac due to PNA  currently not in exacerbation  · Continue home inhalers  · Respiratory protocol     Polymyalgia rheumatica ([facility] [address] )  Assessment & Plan  · continue prednisone po 3mg daily   · Continue plaquenil po 200mg daily     Hyperlipidemia  Assessment & Plan  · continue statin    Atrial fibrillation [facility])  Assessment & Plan  EKG shows afib, rate controlled   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  · Continue home metoprolol and xarelto (dose reduced to 15mg for GFR 30)   · Monitor HR, vitals q4hr    Hypertension  Assessment & Plan  · continue home losaratn, metoprolol     Diabetes mellitus [facility])  Assessment & Plan  Lab Results   Component Value Date    HGBA1C 6 2 (H) 06/04/2022     · Well controlled   · Hold home metformin, continue home lantus 40U qhs   · humalog SSI, glucochecks ac and hs   · Monitor for hypoglycemia   · CCD    VTE Pharmacologic Prophylaxis: VTE Score: 7 High Risk (Score >/= 5) - Pharmacological DVT Prophylaxis Ordered: rivaroxaban (Xarelto)  Sequential Compression Devices Ordered  Code Status: Level 1 - Full Code   Discussion with family: Patient declined call to   Anticipated Length of Stay: Patient will be admitted on an observation basis with an anticipated length of stay of less than 2 midnights secondary to PNA  Total Time for Visit, including Counseling / Coordination of Care: 60 minutes Greater than 50% of this total time spent on direct patient counseling and coordination of care  Chief Complaint:   Chief Complaint   Patient presents with    Flu Symptoms     Pt c/o body aches, cough and chills, states " I feel like I have COVID"         History of Present Illness:  Elle Hernandez is a [de-identified] y o  male with a PMH of T2DM, COPD, HTN, PMR who presents with shortness of breath and productive cough that began last evening  Had 6-8 episodes of vomiting today, mostly mucus  Denies chest pain or tightness  Symptoms improved today after vomiting  Patient had low grade fever of 99 5 PTA with chills  No exposure to sick contact  Vaccinated against COVID including 2 booster shots  Denies hx of DVT  No abdominal pain, dysuria, or diarrhea  Review of Systems:  A 10-point review of systems was obtained  Pertinent positives and negatives are outlined in the HPI above  Remainder of review of systems are otherwise negative         Past Medical and Surgical History:   Past Medical History:   Diagnosis Date    Diabetes mellitus ([facility] [address] )     Hypertension     Pneumonia 6/13/2017    Right middle and lower lobe        Past Surgical History:   Procedure Laterality Date    APPENDECTOMY      CARDIAC SURGERY      Aortic Valve Replacement, Ascending Aorta    GALLBLADDER SURGERY      PANCREATICODUODENECTOMY         Meds/Allergies:  Prior to Admission medications    Medication Sig Start Date End Date Taking? Authorizing Provider   albuterol (Ventolin HFA) 90 mcg/act inhaler Inhale 2 puffs every 6 (six) hours as needed for wheezing 5/5/22   Guadelupe Gaucher, MD B-D UF III MINI PEN NEEDLES 31G X 5 MM MISC  3/11/18   Historical Provider, MD   EUSEBIO CONTOUR TEST test strip 3 (three) times a day Test 2/1/18   Historical Provider, MD   cholecalciferol (VITAMIN D3) 1,000 units tablet Take 2,000 Units by mouth daily    Historical Provider, MD   Cinnamon 500 MG capsule Take 1,000 mg by mouth daily    Historical Provider, MD   fluticasone-umeclidinium-vilanterol (Trelegy Ellipta) 100-62 5-25 MCG/INH inhaler Inhale 1 puff daily Rinse mouth after use   9/8/21   Julius Alejo PA-C   hydroxychloroquine (PLAQUENIL) 200 mg tablet Take 200 mg by mouth 2 (two) times a day with meals    Historical Provider, MD   insulin glargine (LANTUS) 100 units/mL subcutaneous injection Inject 41 Units under the skin daily at bedtime      Historical Provider, MD   Lantus SoloStar 100 units/mL injection pen  7/13/21   Historical Provider, MD   losartan (COZAAR) 100 MG tablet Take 100 mg by mouth daily    Historical Provider, MD   metFORMIN (GLUCOPHAGE) 500 mg tablet Take 500 mg by mouth 2 (two) times a day with meals    Historical Provider, MD   metoprolol succinate (TOPROL-XL) 25 mg 24 hr tablet  4/22/21   Historical Provider, MD   potassium chloride (KLOR-CON) 8 MEQ tablet  7/21/21   Historical Provider, MD   pravastatin (PRAVACHOL) 40 mg tablet Take 40 mg by mouth daily    Historical Provider, MD   predniSONE 1 mg tablet Take 4 mg by mouth daily Currently down to 3 1/2 mg daily 05/05/22     Historical Provider, MD   torsemide (DEMADEX) 10 mg tablet Take 10 mg by mouth daily    Historical Provider, MD [place] 20 MG tablet  3/21/18   Historical Provider, MD SCHMIDT have reviewed home medications with patient personally  Allergies: Allergies   Allergen Reactions    Contrast Dye [Iodinated Diagnostic Agents] Other (See Comments)     Pt states kidney dysfunction  Paulette Day Other        Social History:  Marital Status: /Civil Union   Occupation:   Patient Pre-hospital Living Situation: Home, With spouse  Patient Pre-hospital Level of Mobility: walks  Patient Pre-hospital Diet Restrictions:   Substance Use History:   Social History     Substance and Sexual Activity   Alcohol Use No     Social History     Tobacco Use   Smoking Status Former Smoker    Years: 40 00    Types: Pipe    Quit date:     Years since quittin 5   Smokeless Tobacco Never Used     Social History     Substance and Sexual Activity   Drug Use No       Family History:  Family History   Problem Relation Age of Onset    Cancer Mother     Stroke Father     Cancer Sister     Diabetes Sister     Stroke Brother        Physical Exam:     Vitals:   Blood Pressure: 152/82 (22)  Pulse: 76 (22)  Temperature: 97 7 °F (36 5 °C) (22)  Temp Source: Tympanic (22)  Respirations: (!) 27 (22)  Height: 6' 3" (190 5 cm) (22)  Weight - Scale: 92 1 kg (203 lb) (22)  SpO2: 96 % (22)    Physical Exam  Vitals and nursing note reviewed  Constitutional:       Appearance: He is well-developed  HENT:      Head: Normocephalic and atraumatic  Eyes:      General: No scleral icterus  Extraocular Movements: Extraocular movements intact  Conjunctiva/sclera: Conjunctivae normal       Pupils: Pupils are equal, round, and reactive to light  Cardiovascular:      Rate and Rhythm: Normal rate and regular rhythm  Heart sounds: Murmur heard  No friction rub  Pulmonary:      Effort: Pulmonary effort is normal  No respiratory distress  Breath sounds: Rales present  Abdominal:      General: Abdomen is flat  There is no distension        Palpations: Abdomen is soft       Tenderness: There is no abdominal tenderness  Musculoskeletal:         General: Normal range of motion  Cervical back: Neck supple  Skin:     General: Skin is warm and dry  Neurological:      General: No focal deficit present  Mental Status: He is alert  Psychiatric:         Mood and Affect: Mood normal          Additional Data:     Lab Results:  Results from last 7 days   Lab Units 07/02/22  1753   WBC Thousand/uL 16 21*   HEMOGLOBIN g/dL 13 2   HEMATOCRIT % 41 4   PLATELETS Thousands/uL 147*   NEUTROS PCT % 88*   LYMPHS PCT % 3*   MONOS PCT % 8   EOS PCT % 0     Results from last 7 days   Lab Units 07/02/22  1753   SODIUM mmol/L 137   POTASSIUM mmol/L 4 6   CHLORIDE mmol/L 102   CO2 mmol/L 28   BUN mg/dL 44*   CREATININE mg/dL 2 02*   ANION GAP mmol/L 7   CALCIUM mg/dL 9 5   ALBUMIN g/dL 3 4*   TOTAL BILIRUBIN mg/dL 1 47*   ALK PHOS U/L 97   ALT U/L 20   AST U/L 18   GLUCOSE RANDOM mg/dL 180*     Results from last 7 days   Lab Units 07/02/22  1753   INR  2 28*             Results from last 7 days   Lab Units 07/02/22  1753   LACTIC ACID mmol/L 1 3       Imaging: Reviewed radiology reports from this admission including: chest CT scan  CT chest abdomen pelvis wo contrast   Final Result by Suzy Bateman MD (07/02 1902)      Multifocal pneumonia most pronounced in the left lower lobe  Recommend follow-up to ensure resolution  No acute intra-abdominal pathology  Stable nonemergent findings as below  Workstation performed: [id number]             EKG and Other Studies Reviewed on Admission:   · EKG: Atrial fibrillation  HR 79  qtc 467  ** Please Note: This note has been constructed using a voice recognition system   **

## 2022-07-03 NOTE — ASSESSMENT & PLAN NOTE
· CXR shows multifocal pneumonia   · PORT class V assoc with 0-29% mortality risk  · COVID/RSV/flu negtive   · Given dose of ceftriaxone overnight  · Currently much improved, no oxygen requirements, afebrile, clinically non toxic appearing  · Transition to PO antibiotics, stable for discharge  · Will notify patient if any of the infectious work up results as +

## 2022-07-03 NOTE — ASSESSMENT & PLAN NOTE
· May have have mild exac due to PNA  currently not in exacerbation     · Continue home inhalers  · Respiratory protocol

## 2022-07-03 NOTE — ASSESSMENT & PLAN NOTE
Cr 2 0, stable from 1m ago however prior baseline 1 7-1 8   · Hold home torsemide as appears hypovolemic   · continue gentle IVFs  · Avoid nephrotoxins, monitor I/O   · Trend bmp

## 2022-07-03 NOTE — ASSESSMENT & PLAN NOTE
Cr 2 0, stable from 1m ago however prior baseline 1 7-1 8   · Likely progression of CKD rather than TARA  · Stable for discharge

## 2022-07-03 NOTE — ASSESSMENT & PLAN NOTE
Meeting sepsis criteria for leukocytosis, tachypnea in setting of sepsis   · LA normal, Obtain procalcitonin   · Blood cultures pending   · S/p 1L fluid bolus   · Empiric IV atb as above   · Monitor hemodynamics

## 2022-07-03 NOTE — ASSESSMENT & PLAN NOTE
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

## 2022-07-03 NOTE — PLAN OF CARE
Problem: INFECTION - ADULT  Goal: Absence or prevention of progression during hospitalization  Description: INTERVENTIONS:  - Assess and monitor for signs and symptoms of infection  - Monitor lab/diagnostic results  - Monitor all insertion sites, i e  indwelling lines, tubes, and drains  - Monitor endotracheal if appropriate and nasal secretions for changes in amount and color  - [place] appropriate cooling/warming therapies per order  - Administer medications as ordered  - Instruct and encourage patient and family to use good hand hygiene technique  - Identify and instruct in appropriate isolation precautions for identified infection/condition  Outcome: Progressing  Goal: Absence of fever/infection during neutropenic period  Description: INTERVENTIONS:  - Monitor WBC    Outcome: Progressing

## 2022-07-03 NOTE — ASSESSMENT & PLAN NOTE
Lab Results   Component Value Date    HGBA1C 6 2 (H) 06/04/2022     · Well controlled   · Hold home metformin, continue home lantus 40U qhs   · humalog SSI, glucochecks ac and hs   · Monitor for hypoglycemia   · CCD

## 2022-07-08 LAB
BACTERIA BLD CULT: NORMAL
BACTERIA BLD CULT: NORMAL

## 2022-08-30 DIAGNOSIS — I73.9 CLAUDICATION (HCC): Primary | ICD-10-CM

## 2022-09-06 ENCOUNTER — APPOINTMENT (OUTPATIENT)
Dept: LAB | Facility: MEDICAL CENTER | Age: 82
End: 2022-09-06
Payer: COMMERCIAL

## 2022-09-15 ENCOUNTER — HOSPITAL ENCOUNTER (OUTPATIENT)
Dept: RADIOLOGY | Facility: MEDICAL CENTER | Age: 82
Discharge: HOME | End: 2022-09-15
Payer: COMMERCIAL

## 2022-09-15 DIAGNOSIS — J18.9 UNRESOLVED PNEUMONIA: ICD-10-CM

## 2022-09-15 PROCEDURE — 71250 CT THORAX DX C-: CPT

## 2022-09-15 PROCEDURE — G1004 CDSM NDSC: HCPCS

## 2022-09-21 ENCOUNTER — TELEPHONE (OUTPATIENT)
Dept: HEMATOLOGY ONCOLOGY | Facility: CLINIC | Age: 82
End: 2022-09-21

## 2022-09-21 NOTE — TELEPHONE ENCOUNTER
Scheduling Appointment SEND TO [facility]    Who Is Calling to Schedule wife   Doctor Marco Hernandez   Location [facility]   Date and Time 9/29 1:15PM   Reason for scheduling appointment Weight loss significant   Patient verbalized understanding    yes

## 2022-09-29 ENCOUNTER — OFFICE VISIT (OUTPATIENT)
Dept: SURGICAL ONCOLOGY | Facility: CLINIC | Age: 82
End: 2022-09-29
Payer: COMMERCIAL

## 2022-09-29 ENCOUNTER — TELEPHONE (OUTPATIENT)
Dept: GASTROENTEROLOGY | Facility: CLINIC | Age: 82
End: 2022-09-29

## 2022-09-29 VITALS
OXYGEN SATURATION: 97 % | DIASTOLIC BLOOD PRESSURE: 82 MMHG | HEIGHT: 75 IN | RESPIRATION RATE: 18 BRPM | WEIGHT: 198 LBS | SYSTOLIC BLOOD PRESSURE: 140 MMHG | HEART RATE: 77 BPM | TEMPERATURE: 97.3 F | BODY MASS INDEX: 24.62 KG/M2

## 2022-09-29 DIAGNOSIS — C25.2 MALIGNANT NEOPLASM OF TAIL OF PANCREAS (HCC): ICD-10-CM

## 2022-09-29 DIAGNOSIS — Z85.9 HISTORY OF MALIGNANT NEUROENDOCRINE TUMOR: Primary | ICD-10-CM

## 2022-09-29 PROCEDURE — 99214 OFFICE O/P EST MOD 30 MIN: CPT | Performed by: SURGERY

## 2022-09-29 RX ORDER — FUROSEMIDE 20 MG/1
20 TABLET ORAL DAILY
COMMUNITY
Start: 2022-09-08

## 2022-09-29 NOTE — TELEPHONE ENCOUNTER
----- Message from Lyudmila Armas MD sent at 9/29/2022  1:37 PM EDT -----  Please put him in to see either myself or Thuy Lowe as soon as possible for EGD for dysphagia and weight loss  ----- Message -----  From: Michael Andrade MD  Sent: 9/29/2022   1:30 PM EDT  To: Lyudmila Armas MD    Can you do an EGD on him  He is having some dysphagia, although this is longstanding  He has also had a new 10-15 lb weight loss over the last several months    Thanks,  Yanelis Alfaro

## 2022-09-29 NOTE — LETTER
September 29, 2022     Rex Oconnor MD  [address] [facility]    Patient: Jung Postal   YOB: 1940   Date of Visit: 9/29/2022       Dear Dr Cristhian Saez:    Thank you for referring Jung Postal to me for evaluation  Below are my notes for this consultation  If you have questions, please do not hesitate to call me  I look forward to following your patient along with you  Sincerely,        Janiya Taylor MD        CC: MD Lavern Ca MD Dottie Spring, MD  9/29/2022  1:38 PM  Incomplete               Surgical Oncology Follow Up       [facility] [address] [facility]  [address] [facility] [address]  [facility] [address]    Alejandra Postal  1940  [id number]  [facility] [address]  [facility] [place]  [address] [facility] [address]  [facility] [address] [address]  [phone]    Diagnoses and all orders for this visit:    History of malignant neuroendocrine tumor  -     Ambulatory referral to Gastroenterology; Future    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)    Other orders  -     furosemide (LASIX) 20 mg tablet; Take 20 mg by mouth daily        Chief Complaint   Patient presents with    Follow-up     Follow-up for significant weight loss       No follow-ups on file  Oncology History    No history exists  Diagnosis and Staging: 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   Treatment History: Distal pancreatectomy July 2011   Current Therapy: Observation   Disease Status: MOISES     History of Present Illness:  30-year-old male with a history of pancreatic neuroendocrine tumor  Over the last several months, he has lost between 10-15 lb  He has gained a small portion of this back  He is concerned about this weight loss  He also complains that food is getting stuck    He this occurs mostly with solids and occasionally with liquids  He states this has been going on for a couple years, without any improvement  Noncontrast CT of his chest, abdomen and pelvis on July 2, 2022 revealed multifocal pneumonia  There was no evidence of any intra-abdominal malignancy  CT of the chest on September 15, 2022 revealed that this had resolved  I personally reviewed the films  He is unable to get contrast because of his kidney disease  He also has significant claudication with leg swelling varicose veins  Review of Systems  Complete ROS Surg Onc:   Complete ROS Surg Onc:   Constitutional: The patient denies new or recent history of general fatigue, no recent weight loss, no change in appetite  Eyes: No complaints of visual problems, no scleral icterus  ENT: no complaints of ear pain, no hoarseness, no difficulty swallowing,  no tinnitus and no new masses in head, oral cavity, or neck  Cardiovascular: No complaints of chest pain, no palpitations, no ankle edema  Respiratory: No complaints of shortness of breath, no cough  Gastrointestinal: No complaints of jaundice, no bloody stools, no pale stools  Genitourinary: No complaints of dysuria, no hematuria, no nocturia, no frequent urination, no urethral discharge  Musculoskeletal: No complaints of weakness, paralysis, joint stiffness or arthralgias  Integumentary: No complaints of rash, no new lesions  Neurological: No complaints of convulsions, no seizures, no dizziness  Hematologic/Lymphatic: No complaints of easy bruising  Endocrine:  No hot or cold intolerance  No polydipsia, polyphagia, or polyuria  Allergy/immunology:  No environmental allergies  No food allergies  Not immunocompromised  Skin:  No pallor or rash  No wound          Patient Active Problem List   Diagnosis    Multifocal pneumonia    Diabetes mellitus ([address]    Hypertension    Acute-on-chronic kidney injury ([address] )    Atrial fibrillation ([address] )    Aneurysm of thoracic aorta ([address] )    Aneurysm of abdominal aorta (HCC)    Hyperlipidemia    Inflammatory polyarthropathy (HCC)    Osteoarthrosis    Polymyalgia rheumatica (HCC)    History of malignant neuroendocrine tumor    Centrilobular emphysema (HCC)    COPD (chronic obstructive pulmonary disease) (HCC)    Sepsis due to pneumonia (HCC)    CKD (chronic kidney disease)    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)     Past Medical History:   Diagnosis Date    [facility] mellitus [facility]     Hypertension     Pneumonia 2017    Right middle and lower lobe      Past Surgical History:   Procedure Laterality Date    APPENDECTOMY      CARDIAC SURGERY      Aortic Valve Replacement, Ascending Aorta    GALLBLADDER SURGERY      PANCREATICODUODENECTOMY       Family History   Problem Relation Age of Onset    Cancer Mother     Stroke Father     Cancer Sister     Diabetes Sister     Stroke Brother      Social History     Socioeconomic History    Marital status: /Civil Union     Spouse name: Not on file    Number of children: Not on file    Years of education: Not on file    Highest education level: Not on file   Occupational History    Not on file   Tobacco Use    Smoking status: Former Smoker     Years: 40 00     Types: Pipe     Quit date:      Years since quittin 7    Smokeless tobacco: Never Used   Vaping Use    Vaping Use: Never used   Substance and Sexual Activity    Alcohol use: Never    Drug use: No    Sexual activity: Yes     Partners: Female   Other Topics Concern    Not on file   Social History Narrative    Not on file     Social Determinants of Health     Financial Resource Strain: Not on file   Food Insecurity: Not on file   Transportation Needs: Not on file   Physical Activity: Not on file   Stress: Not on file   Social Connections: Not on file   Intimate Partner Violence: Not on file   Housing Stability: Not on file       Current Outpatient Medications:     albuterol (Ventolin HFA) 90 mcg/act inhaler, Inhale 2 puffs every 6 (six) hours as needed for wheezing, Disp: 18 g, Rfl: 5    B-D UF III MINI PEN NEEDLES 31G X 5 MM MISC, , Disp: , Rfl: 0    EUSEBIO CONTOUR TEST test strip, 3 (three) times a day Test, Disp: , Rfl: 9    cholecalciferol (VITAMIN D3) 1,000 units tablet, Take 2,000 Units by mouth daily, Disp: , Rfl:     Cinnamon 500 MG capsule, Take 1,000 mg by mouth daily, Disp: , Rfl:     fluticasone-umeclidinium-vilanterol (Trelegy Ellipta) 100-62 5-25 MCG/INH inhaler, Inhale 1 puff daily Rinse mouth after use , Disp: 180 blister, Rfl: 3    furosemide (LASIX) 20 mg tablet, Take 20 mg by mouth daily, Disp: , Rfl:     hydroxychloroquine (PLAQUENIL) 200 mg tablet, Take 200 mg by mouth 2 (two) times a day with meals, Disp: , Rfl:     insulin glargine (LANTUS) 100 units/mL subcutaneous injection, Inject 41 Units under the skin daily at bedtime  , Disp: , Rfl:     Lantus SoloStar 100 units/mL injection pen, , Disp: , Rfl:     losartan (COZAAR) 100 MG tablet, Take 100 mg by mouth daily, Disp: , Rfl:     metoprolol succinate (TOPROL-XL) 25 mg 24 hr tablet, , Disp: , Rfl:     potassium chloride (KLOR-CON) 8 MEQ tablet, , Disp: , Rfl:     pravastatin (PRAVACHOL) 40 mg tablet, Take 40 mg by mouth daily, Disp: , Rfl:     predniSONE 1 mg tablet, Take 4 mg by mouth daily Currently down to 3 1/2 mg daily 05/05/22 , Disp: , Rfl:     XARELTO 20 MG tablet, , Disp: , Rfl: 0    metFORMIN (GLUCOPHAGE) 500 mg tablet, Take 500 mg by mouth 2 (two) times a day with meals (Patient not taking: Reported on 9/29/2022), Disp: , Rfl:     torsemide (DEMADEX) 10 mg tablet, Take 10 mg by mouth daily, Disp: , Rfl:   Allergies   Allergen Reactions    Contrast Dye [Iodinated Diagnostic Agents] Other (See Comments)     Pt states kidney dysfunction        Other      Vitals:    09/29/22 1305   BP: 140/82   Pulse: 77   Resp: 18   Temp: (!) 97 3 °F (36 3 °C)   SpO2: 97%       Physical Exam  Constitutional: General appearance:  The Patient is well-developed and well-nourished who appears the stated age in no acute distress  Patient is pleasant and talkative  HEENT:  Normocephalic  Sclerae are anicteric  Mucous membranes are moist  Neck is supple without adenopathy  No JVD  Chest: The lungs are clear to auscultation  Cardiac: Heart is regular rate  Abdomen: Abdomen is soft, non-tender, non-distended and without masses  Extremities: There is no clubbing or cyanosis  There is no edema  Symmetric  Neuro: Grossly nonfocal  Gait is normal      Lymphatic: No evidence of cervical adenopathy bilaterally  No evidence of axillary adenopathy bilaterally  Skin: Warm, anicteric  Psych:  Patient is pleasant and talkative  Breasts:        Pathology:  [unfilled]    Labs:      Imaging  CT chest wo contrast    Result Date: 9/20/2022  Narrative: CT CHEST WITHOUT IV CONTRAST INDICATION:   J18 9: Pneumonia, unspecified organism  COMPARISON:  CT chest/abdomen/pelvis 7/2/2022  TECHNIQUE: CT examination of the chest was performed without intravenous contrast  Axial, sagittal, and coronal 2D reformatted images were created from the source data and submitted for interpretation  Radiation dose length product (DLP) for this visit:  291 68 mGy-cm   This examination, like all CT scans performed in the [facility], was performed utilizing techniques to minimize radiation dose exposure, including the use of iterative  reconstruction and automated exposure control  FINDINGS: LUNGS:  Mild emphysema  Interval resolution of previously seen multifocal pneumonia  Stable right lower lobe round atelectasis and bilateral linear scarring  No new areas of consolidation or suspicious pulmonary nodules  There is no tracheal or endobronchial lesion  PLEURA:  Small bilateral pleural effusions with stable chronic right pleural thickening and punctate calcifications  HEART/GREAT VESSELS: Stable mild cardiomegaly    Stable aortic valve replacement and ascending thoracic aorta repair  MEDIASTINUM AND KHRIS:  Unremarkable  CHEST WALL AND LOWER NECK:  Unremarkable  VISUALIZED STRUCTURES IN THE UPPER ABDOMEN:  Unremarkable  OSSEOUS STRUCTURES:  Spinal degenerative changes are noted  No acute fracture or destructive osseous lesion  Impression: 1  Resolution of previously seen multifocal pneumonia  No new consolidation  2   Small bilateral pleural effusions with stable chronic right pleural thickening and right lower lobe round atelectasis  Workstation performed: [id number]     I reviewed the above laboratory and imaging data  Discussion/Summary:  [de-identified] male 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  This was T1 N0 M0  I do not believe he has recurrence of this although his imaging was noncontrast   His weight loss, may just be related to age as well as his underlying heart and vascular disease  I am more concerned about his dysphagia  Coupled with weight loss this could be related to an underlying malignant process, although this has been somewhat longstanding  I have recommended that he have an EGD  We will get him set up with Gastroenterology locally  Assuming this shows no abnormality, he will follow-up as needed  If there is an underlying malignancy, I will see him back  He also asked for the names of vascular surgeons here, I have given him the name of Dr Sukhi Aguirre locally as well  All of his questions were answered            Nicholas Evans MD  9/29/2022  1:26 PM  Incomplete               Surgical Oncology Follow Up       [facility] [address] [facility] [address] [place] [address]  [facility] [address]    Juliann Padillas  1940  [id number]  [facility]  [facility] [place] [address]  [address]  [phone]    Diagnoses and all orders for this visit:    History of malignant neuroendocrine tumor        Chief Complaint   Patient presents with    Follow-up     Follow-up for significant weight loss       No follow-ups on file  Oncology History    No history exists  Diagnosis and Staging: 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   Treatment History: Distal pancreatectomy July 2011   Current Therapy: Observation   Disease Status: MOISES     History of Present Illness: *** Noncontrast CT of his chest, abdomen and pelvis on July 2, 2022 revealed multifocal pneumonia  There was no evidence of any intra-abdominal malignancy  CT of the chest on September 15, 2022 revealed that this had resolved  I personally reviewed the films  Review of Systems  Complete ROS Surg Onc:   Complete ROS Surg Onc:   Constitutional: The patient denies new or recent history of general fatigue, no recent weight loss, no change in appetite  Eyes: No complaints of visual problems, no scleral icterus  ENT: no complaints of ear pain, no hoarseness, no difficulty swallowing,  no tinnitus and no new masses in head, oral cavity, or neck  Cardiovascular: No complaints of chest pain, no palpitations, no ankle edema  Respiratory: No complaints of shortness of breath, no cough  Gastrointestinal: No complaints of jaundice, no bloody stools, no pale stools  Genitourinary: No complaints of dysuria, no hematuria, no nocturia, no frequent urination, no urethral discharge  Musculoskeletal: No complaints of weakness, paralysis, joint stiffness or arthralgias  Integumentary: No complaints of rash, no new lesions  Neurological: No complaints of convulsions, no seizures, no dizziness  Hematologic/Lymphatic: No complaints of easy bruising  Endocrine:  No hot or cold intolerance  No polydipsia, polyphagia, or polyuria  Allergy/immunology:  No environmental allergies  No food allergies  Not immunocompromised  Skin:  No pallor or rash    No wound          Patient Active Problem List   Diagnosis    Multifocal pneumonia    Diabetes mellitus ([facility] )    Hypertension    Acute-on-chronic kidney injury ([facility] )    Atrial fibrillation ([facility] )    Aneurysm of thoracic aorta (HCC)    Aneurysm of abdominal aorta (HCC)    Hyperlipidemia    Inflammatory polyarthropathy (HCC)    Osteoarthrosis    Polymyalgia rheumatica (HCC)    History of malignant neuroendocrine tumor    Centrilobular emphysema (HCC)    COPD (chronic obstructive pulmonary disease) (HCC)    Sepsis due to pneumonia ([facility] )    CKD (chronic kidney disease)     Past Medical History:   Diagnosis Date    Diabetes mellitus ([facility] )     Hypertension     Pneumonia 2017    Right middle and lower lobe      Past Surgical History:   Procedure Laterality Date    APPENDECTOMY      CARDIAC SURGERY      Aortic Valve Replacement, Ascending Aorta    GALLBLADDER SURGERY      PANCREATICODUODENECTOMY       Family History   Problem Relation Age of Onset    Cancer Mother     Stroke Father     Cancer Sister     Diabetes Sister     Stroke Brother      Social History     Socioeconomic History    Marital status: /Civil Union     Spouse name: Not on file    Number of children: Not on file    Years of education: Not on file    Highest education level: Not on file   Occupational History    Not on file   Tobacco Use    Smoking status: Former Smoker     Years: 40 00     Types: Pipe     Quit date:      Years since quittin 7    Smokeless tobacco: Never Used   Vaping Use    Vaping Use: Never used   Substance and Sexual Activity    Alcohol use: Never    Drug use: No    Sexual activity: Yes     Partners: Female   Other Topics Concern    Not on file   Social History Narrative    Not on file     Social Determinants of Health     Financial Resource Strain: Not on file   Food Insecurity: Not on file   Transportation Needs: Not on file   Physical Activity: Not on file   Stress: Not on file Social Connections: Not on file   Intimate Partner Violence: Not on file   Housing Stability: Not on file       Current Outpatient Medications:     albuterol (Ventolin HFA) 90 mcg/act inhaler, Inhale 2 puffs every 6 (six) hours as needed for wheezing, Disp: 18 g, Rfl: 5    B-D UF III MINI PEN NEEDLES 31G X 5 MM MISC, , Disp: , Rfl: 0    EUSEBIO CONTOUR TEST test strip, 3 (three) times a day Test, Disp: , Rfl: 9    cholecalciferol (VITAMIN D3) 1,000 units tablet, Take 2,000 Units by mouth daily, Disp: , Rfl:     Cinnamon 500 MG capsule, Take 1,000 mg by mouth daily, Disp: , Rfl:     fluticasone-umeclidinium-vilanterol (Trelegy Ellipta) 100-62 5-25 MCG/INH inhaler, Inhale 1 puff daily Rinse mouth after use , Disp: 180 blister, Rfl: 3    hydroxychloroquine (PLAQUENIL) 200 mg tablet, Take 200 mg by mouth 2 (two) times a day with meals, Disp: , Rfl:     insulin glargine (LANTUS) 100 units/mL subcutaneous injection, Inject 41 Units under the skin daily at bedtime  , Disp: , Rfl:     Lantus SoloStar 100 units/mL injection pen, , Disp: , Rfl:     losartan (COZAAR) 100 MG tablet, Take 100 mg by mouth daily, Disp: , Rfl:     metFORMIN (GLUCOPHAGE) 500 mg tablet, Take 500 mg by mouth 2 (two) times a day with meals, Disp: , Rfl:     metoprolol succinate (TOPROL-XL) 25 mg 24 hr tablet, , Disp: , Rfl:     potassium chloride (KLOR-CON) 8 MEQ tablet, , Disp: , Rfl:     pravastatin (PRAVACHOL) 40 mg tablet, Take 40 mg by mouth daily, Disp: , Rfl:     predniSONE 1 mg tablet, Take 4 mg by mouth daily Currently down to 3 1/2 mg daily 05/05/22 , Disp: , Rfl:     torsemide (DEMADEX) 10 mg tablet, Take 10 mg by mouth daily, Disp: , Rfl:     XARELTO 20 MG tablet, , Disp: , Rfl: 0  Allergies   Allergen Reactions    Contrast Dye [Iodinated Diagnostic Agents] Other (See Comments)     Pt states kidney dysfunction  Lise Cockayne Other      There were no vitals filed for this visit      Physical Exam  Constitutional: General appearance: The Patient is well-developed and well-nourished who appears the stated age in no acute distress  Patient is pleasant and talkative  HEENT:  Normocephalic  Sclerae are anicteric  Mucous membranes are moist  Neck is supple without adenopathy  No JVD  Chest: The lungs are clear to auscultation  Cardiac: Heart is regular rate  Abdomen: Abdomen is soft, non-tender, non-distended and without masses  Extremities: There is no clubbing or cyanosis  There is no edema  Symmetric  Neuro: Grossly nonfocal  Gait is normal      Lymphatic: No evidence of cervical adenopathy bilaterally  No evidence of axillary adenopathy bilaterally  No evidence of inguinal adenopathy bilaterally  Skin: Warm, anicteric  Psych:  Patient is pleasant and talkative  Breasts:        Pathology:  [unfilled]    Labs:      Imaging  CT chest wo contrast    Result Date: 9/20/2022  Narrative: CT CHEST WITHOUT IV CONTRAST INDICATION:   J18 9: Pneumonia, unspecified organism  COMPARISON:  CT chest/abdomen/pelvis 7/2/2022  TECHNIQUE: CT examination of the chest was performed without intravenous contrast  Axial, sagittal, and coronal 2D reformatted images were created from the source data and submitted for interpretation  Radiation dose length product (DLP) for this visit:  291 68 mGy-cm   This examination, like all CT scans performed in the [facility], was performed utilizing techniques to minimize radiation dose exposure, including the use of iterative  reconstruction and automated exposure control  FINDINGS: LUNGS:  Mild emphysema  Interval resolution of previously seen multifocal pneumonia  Stable right lower lobe round atelectasis and bilateral linear scarring  No new areas of consolidation or suspicious pulmonary nodules  There is no tracheal or endobronchial lesion  PLEURA:  Small bilateral pleural effusions with stable chronic right pleural thickening and punctate calcifications   HEART/GREAT VESSELS: Stable mild cardiomegaly  Stable aortic valve replacement and ascending thoracic aorta repair  MEDIASTINUM AND KHRIS:  Unremarkable  CHEST WALL AND LOWER NECK:  Unremarkable  VISUALIZED STRUCTURES IN THE UPPER ABDOMEN:  Unremarkable  OSSEOUS STRUCTURES:  Spinal degenerative changes are noted  No acute fracture or destructive osseous lesion  Impression: 1  Resolution of previously seen multifocal pneumonia  No new consolidation  2   Small bilateral pleural effusions with stable chronic right pleural thickening and right lower lobe round atelectasis  Workstation performed: [id number]     I reviewed the above laboratory and imaging data      Discussion/Summary: ***

## 2022-09-29 NOTE — LETTER
September 29, 2022     Sukhwinder Martin MD  [address] [facility]    Patient: Kusum Red   YOB: 1940   Date of Visit: 9/29/2022       Dear Dr Ivelisse Kaplan:    Thank you for referring Kusum Red to me for evaluation  Below are my notes for this consultation  If you have questions, please do not hesitate to call me  I look forward to following your patient along with you  Sincerely,        Brook Hammer MD        CC: MD Misti Villegas MD Kaylene Citizen, MD  9/29/2022  1:39 PM  Sign when Signing Visit               Surgical Oncology Follow Up       2018 [facility] [address] [place] [address] 68    Kusum Red  1940  [id number]  [facility] [address] [facility] [address] [place] [address]  [phone]    Diagnoses and all orders for this visit:    History of malignant neuroendocrine tumor  -     Ambulatory referral to Gastroenterology; Future    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)    Other orders  -     furosemide (LASIX) 20 mg tablet; Take 20 mg by mouth daily        Chief Complaint   Patient presents with    Follow-up     Follow-up for significant weight loss       No follow-ups on file  Oncology History    No history exists  Diagnosis and Staging: 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   Treatment History: Distal pancreatectomy July 2011   Current Therapy: Observation   Disease Status: MOISES     History of Present Illness:  81-year-old male with a history of pancreatic neuroendocrine tumor  Over the last several months, he has lost between 10-15 lb  He has gained a small portion of this back  He is concerned about this weight loss  He also complains that food is getting stuck    He this occurs mostly with solids and occasionally with liquids  He states this has been going on for a couple years, without any improvement  Noncontrast CT of his chest, abdomen and pelvis on July 2, 2022 revealed multifocal pneumonia  There was no evidence of any intra-abdominal malignancy  CT of the chest on September 15, 2022 revealed that this had resolved  I personally reviewed the films  He is unable to get contrast because of his kidney disease  He also has significant claudication with leg swelling varicose veins  Review of Systems  Complete ROS Surg Onc:   Complete ROS Surg Onc:   Constitutional: The patient denies new or recent history of general fatigue, no recent weight loss, no change in appetite  Eyes: No complaints of visual problems, no scleral icterus  ENT: no complaints of ear pain, no hoarseness, no difficulty swallowing,  no tinnitus and no new masses in head, oral cavity, or neck  Cardiovascular: No complaints of chest pain, no palpitations, no ankle edema  Respiratory: No complaints of shortness of breath, no cough  Gastrointestinal: No complaints of jaundice, no bloody stools, no pale stools  Genitourinary: No complaints of dysuria, no hematuria, no nocturia, no frequent urination, no urethral discharge  Musculoskeletal: No complaints of weakness, paralysis, joint stiffness or arthralgias  Integumentary: No complaints of rash, no new lesions  Neurological: No complaints of convulsions, no seizures, no dizziness  Hematologic/Lymphatic: No complaints of easy bruising  Endocrine:  No hot or cold intolerance  No polydipsia, polyphagia, or polyuria  Allergy/immunology:  No environmental allergies  No food allergies  Not immunocompromised  Skin:  No pallor or rash  No wound          Patient Active Problem List   Diagnosis    Multifocal pneumonia    Diabetes mellitus [facility]    Hypertension    Acute-on-chronic kidney injury ([facility] )    Atrial fibrillation ([facility] )    Aneurysm of thoracic aorta ([facility] )  Aneurysm of abdominal aorta (HCC)    Hyperlipidemia    Inflammatory polyarthropathy (HCC)    Osteoarthrosis    Polymyalgia rheumatica (HCC)    History of malignant neuroendocrine tumor    Centrilobular emphysema (HCC)    COPD (chronic obstructive pulmonary disease) (HCC)    Sepsis due to pneumonia (HCC)    CKD (chronic kidney disease)    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)     Past Medical History:   Diagnosis Date    [facility] [address] [facility]     Hypertension     Pneumonia 2017    Right middle and lower lobe      Past Surgical History:   Procedure Laterality Date    APPENDECTOMY      CARDIAC SURGERY      Aortic Valve Replacement, Ascending Aorta    GALLBLADDER SURGERY      PANCREATICODUODENECTOMY       Family History   Problem Relation Age of Onset    Cancer Mother     Stroke Father     Cancer Sister     Diabetes Sister     Stroke Brother      Social History     Socioeconomic History    Marital status: /Civil Union     Spouse name: Not on file    Number of children: Not on file    Years of education: Not on file    Highest education level: Not on file   Occupational History    Not on file   Tobacco Use    Smoking status: Former Smoker     Years: 40 00     Types: Pipe     Quit date:      Years since quittin 7    Smokeless tobacco: Never Used   Vaping Use    Vaping Use: Never used   Substance and Sexual Activity    Alcohol use: Never    Drug use: No    Sexual activity: Yes     Partners: Female   Other Topics Concern    Not on file   Social History Narrative    Not on file     Social Determinants of Health     Financial Resource Strain: Not on file   Food Insecurity: Not on file   Transportation Needs: Not on file   Physical Activity: Not on file   Stress: Not on file   Social Connections: Not on file   Intimate Partner Violence: Not on file   Housing Stability: Not on file       Current Outpatient Medications:     albuterol (Ventolin HFA) 90 mcg/act inhaler, Inhale 2 puffs every 6 (six) hours as needed for wheezing, Disp: 18 g, Rfl: 5    B-D UF III MINI PEN NEEDLES 31G X 5 MM MISC, , Disp: , Rfl: 0    EUSEBIO CONTOUR TEST test strip, 3 (three) times a day Test, Disp: , Rfl: 9    cholecalciferol (VITAMIN D3) 1,000 units tablet, Take 2,000 Units by mouth daily, Disp: , Rfl:     Cinnamon 500 MG capsule, Take 1,000 mg by mouth daily, Disp: , Rfl:     fluticasone-umeclidinium-vilanterol (Trelegy Ellipta) 100-62 5-25 MCG/INH inhaler, Inhale 1 puff daily Rinse mouth after use , Disp: 180 blister, Rfl: 3    furosemide (LASIX) 20 mg tablet, Take 20 mg by mouth daily, Disp: , Rfl:     hydroxychloroquine (PLAQUENIL) 200 mg tablet, Take 200 mg by mouth 2 (two) times a day with meals, Disp: , Rfl:     insulin glargine (LANTUS) 100 units/mL subcutaneous injection, Inject 41 Units under the skin daily at bedtime  , Disp: , Rfl:     Lantus SoloStar 100 units/mL injection pen, , Disp: , Rfl:     losartan (COZAAR) 100 MG tablet, Take 100 mg by mouth daily, Disp: , Rfl:     metoprolol succinate (TOPROL-XL) 25 mg 24 hr tablet, , Disp: , Rfl:     potassium chloride (KLOR-CON) 8 MEQ tablet, , Disp: , Rfl:     pravastatin (PRAVACHOL) 40 mg tablet, Take 40 mg by mouth daily, Disp: , Rfl:     predniSONE 1 mg tablet, Take 4 mg by mouth daily Currently down to 3 1/2 mg daily 05/05/22 , Disp: , Rfl:     XARELTO 20 MG tablet, , Disp: , Rfl: 0    metFORMIN (GLUCOPHAGE) 500 mg tablet, Take 500 mg by mouth 2 (two) times a day with meals (Patient not taking: Reported on 9/29/2022), Disp: , Rfl:     torsemide (DEMADEX) 10 mg tablet, Take 10 mg by mouth daily, Disp: , Rfl:   Allergies   Allergen Reactions    Contrast Dye [Iodinated Diagnostic Agents] Other (See Comments)     Pt states kidney dysfunction        Other      Vitals:    09/29/22 1305   BP: 140/82   Pulse: 77   Resp: 18   Temp: (!) 97 3 °F (36 3 °C)   SpO2: 97%       Physical Exam  Constitutional: General appearance: The Patient is well-developed and well-nourished who appears the stated age in no acute distress  Patient is pleasant and talkative  HEENT:  Normocephalic  Sclerae are anicteric  Mucous membranes are moist  Neck is supple without adenopathy  No JVD  Chest: The lungs are clear to auscultation  Cardiac: Heart is regular rate  Abdomen: Abdomen is soft, non-tender, non-distended and without masses  Extremities: There is no clubbing or cyanosis  There is no edema  Symmetric  Neuro: Grossly nonfocal  Gait is normal      Lymphatic: No evidence of cervical adenopathy bilaterally  No evidence of axillary adenopathy bilaterally  Skin: Warm, anicteric  Psych:  Patient is pleasant and talkative  Breasts:        Pathology:  [unfilled]    Labs:      Imaging  CT chest wo contrast    Result Date: 9/20/2022  Narrative: CT CHEST WITHOUT IV CONTRAST INDICATION:   J18 9: Pneumonia, unspecified organism  COMPARISON:  CT chest/abdomen/pelvis 7/2/2022  TECHNIQUE: CT examination of the chest was performed without intravenous contrast  Axial, sagittal, and coronal 2D reformatted images were created from the source data and submitted for interpretation  Radiation dose length product (DLP) for this visit:  291 68 mGy-cm   This examination, like all CT scans performed in the [facility], was performed utilizing techniques to minimize radiation dose exposure, including the use of iterative  reconstruction and automated exposure control  FINDINGS: LUNGS:  Mild emphysema  Interval resolution of previously seen multifocal pneumonia  Stable right lower lobe round atelectasis and bilateral linear scarring  No new areas of consolidation or suspicious pulmonary nodules  There is no tracheal or endobronchial lesion  PLEURA:  Small bilateral pleural effusions with stable chronic right pleural thickening and punctate calcifications  HEART/GREAT VESSELS: Stable mild cardiomegaly    Stable aortic valve replacement and ascending thoracic aorta repair  MEDIASTINUM AND KHRIS:  Unremarkable  CHEST WALL AND LOWER NECK:  Unremarkable  VISUALIZED STRUCTURES IN THE UPPER ABDOMEN:  Unremarkable  OSSEOUS STRUCTURES:  Spinal degenerative changes are noted  No acute fracture or destructive osseous lesion  Impression: 1  Resolution of previously seen multifocal pneumonia  No new consolidation  2   Small bilateral pleural effusions with stable chronic right pleural thickening and right lower lobe round atelectasis  Workstation performed: [id number]     I reviewed the above laboratory and imaging data  Discussion/Summary:  74-year-old male 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  This was T1 N0 M0  I do not believe he has recurrence of this although his imaging was noncontrast   His weight loss, may just be related to age as well as his underlying heart and vascular disease  I am more concerned about his dysphagia  Coupled with weight loss this could be related to an underlying malignant process, although this has been somewhat longstanding  I have recommended that he have an EGD  We will get him set up with Gastroenterology locally  Assuming this shows no abnormality, he will follow-up as needed  If there is an underlying malignancy, I will see him back  He also asked for the names of vascular surgeons here, I have given him the name of Dr Wilmer Arteaga locally as well  All of his questions were answered

## 2022-09-29 NOTE — PROGRESS NOTES
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)    Other orders  -     furosemide (LASIX) 20 mg tablet; Take 20 mg by mouth daily        Chief Complaint   Patient presents with    Follow-up     Follow-up for significant weight loss       No follow-ups on file  Oncology History    No history exists  Diagnosis and Staging: 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   Treatment History: Distal pancreatectomy July 2011   Current Therapy: Observation   Disease Status: MOISES     History of Present Illness:  73-year-old male with a history of pancreatic neuroendocrine tumor  Over the last several months, he has lost between 10-15 lb  He has gained a small portion of this back  He is concerned about this weight loss  He also complains that food is getting stuck  He this occurs mostly with solids and occasionally with liquids  He states this has been going on for a couple years, without any improvement  Noncontrast CT of his chest, abdomen and pelvis on July 2, 2022 revealed multifocal pneumonia  There was no evidence of any intra-abdominal malignancy  CT of the chest on September 15, 2022 revealed that this had resolved  I personally reviewed the films  He is unable to get contrast because of his kidney disease  He also has significant claudication with leg swelling varicose veins      Review of Systems  Complete ROS Surg Onc:   Complete ROS Surg Onc: Constitutional: The patient denies new or recent history of general fatigue, no recent weight loss, no change in appetite  Eyes: No complaints of visual problems, no scleral icterus  ENT: no complaints of ear pain, no hoarseness, no difficulty swallowing,  no tinnitus and no new masses in head, oral cavity, or neck  Cardiovascular: No complaints of chest pain, no palpitations, no ankle edema  Respiratory: No complaints of shortness of breath, no cough  Gastrointestinal: No complaints of jaundice, no bloody stools, no pale stools  Genitourinary: No complaints of dysuria, no hematuria, no nocturia, no frequent urination, no urethral discharge  Musculoskeletal: No complaints of weakness, paralysis, joint stiffness or arthralgias  Integumentary: No complaints of rash, no new lesions  Neurological: No complaints of convulsions, no seizures, no dizziness  Hematologic/Lymphatic: No complaints of easy bruising  Endocrine:  No hot or cold intolerance  No polydipsia, polyphagia, or polyuria  Allergy/immunology:  No environmental allergies  No food allergies  Not immunocompromised  Skin:  No pallor or rash  No wound          Patient Active Problem List   Diagnosis    Multifocal pneumonia    Diabetes mellitus ([facility] [address] [facility]    Hypertension    Acute-on-chronic kidney injury ([address] )    Atrial fibrillation ([address] )    Aneurysm of thoracic aorta (HCC)    Aneurysm of abdominal aorta (HCC)    Hyperlipidemia    Inflammatory polyarthropathy (HCC)    Osteoarthrosis    Polymyalgia rheumatica (HCC)    History of malignant neuroendocrine tumor    Centrilobular emphysema (HCC)    COPD (chronic obstructive pulmonary disease) ([address] )    Sepsis due to pneumonia (HCC)    CKD (chronic kidney disease)    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)     Past Medical History:   Diagnosis Date    Diabetes mellitus ([address] )     Hypertension     Pneumonia 6/13/2017    Right middle and lower lobe      Past Surgical History: Procedure Laterality Date    APPENDECTOMY      CARDIAC SURGERY      Aortic Valve Replacement, Ascending Aorta    GALLBLADDER SURGERY      PANCREATICODUODENECTOMY       Family History   Problem Relation Age of Onset    Cancer Mother     Stroke Father     Cancer Sister     Diabetes Sister     Stroke Brother      Social History     Socioeconomic History    Marital status: /Civil Union     Spouse name: Not on file    Number of children: Not on file    Years of education: Not on file    Highest education level: Not on file   Occupational History    Not on file   Tobacco Use    Smoking status: Former Smoker     Years: 40 00     Types: Pipe     Quit date:      Years since quittin 7    Smokeless tobacco: Never Used   Vaping Use    Vaping Use: Never used   Substance and Sexual Activity    Alcohol use: Never    Drug use: No    Sexual activity: Yes     Partners: Female   Other Topics Concern    Not on file   Social History Narrative    Not on file     Social Determinants of Health     Financial Resource Strain: Not on file   Food Insecurity: Not on file   Transportation Needs: Not on file   Physical Activity: Not on file   Stress: Not on file   Social Connections: Not on file   Intimate Partner Violence: Not on file   Housing Stability: Not on file       Current Outpatient Medications:     albuterol (Ventolin HFA) 90 mcg/act inhaler, Inhale 2 puffs every 6 (six) hours as needed for wheezing, Disp: 18 g, Rfl: 5    B-D UF III MINI PEN NEEDLES 31G X 5 MM MISC, , Disp: , Rfl: 0    EUSEBIO CONTOUR TEST test strip, 3 (three) times a day Test, Disp: , Rfl: 9    cholecalciferol (VITAMIN D3) 1,000 units tablet, Take 2,000 Units by mouth daily, Disp: , Rfl:     Cinnamon 500 MG capsule, Take 1,000 mg by mouth daily, Disp: , Rfl:     fluticasone-umeclidinium-vilanterol (Trelegy Ellipta) 100-62 5-25 MCG/INH inhaler, Inhale 1 puff daily Rinse mouth after use , Disp: 180 blister, Rfl: 3   furosemide (LASIX) 20 mg tablet, Take 20 mg by mouth daily, Disp: , Rfl:     hydroxychloroquine (PLAQUENIL) 200 mg tablet, Take 200 mg by mouth 2 (two) times a day with meals, Disp: , Rfl:     insulin glargine (LANTUS) 100 units/mL subcutaneous injection, Inject 41 Units under the skin daily at bedtime  , Disp: , Rfl:     Lantus SoloStar 100 units/mL injection pen, , Disp: , Rfl:     losartan (COZAAR) 100 MG tablet, Take 100 mg by mouth daily, Disp: , Rfl:     metoprolol succinate (TOPROL-XL) 25 mg 24 hr tablet, , Disp: , Rfl:     potassium chloride (KLOR-CON) 8 MEQ tablet, , Disp: , Rfl:     pravastatin (PRAVACHOL) 40 mg tablet, Take 40 mg by mouth daily, Disp: , Rfl:     predniSONE 1 mg tablet, Take 4 mg by mouth daily Currently down to 3 1/2 mg daily 05/05/22 , Disp: , Rfl:     XARELTO 20 MG tablet, , Disp: , Rfl: 0    metFORMIN (GLUCOPHAGE) 500 mg tablet, Take 500 mg by mouth 2 (two) times a day with meals (Patient not taking: Reported on 9/29/2022), Disp: , Rfl:     torsemide (DEMADEX) 10 mg tablet, Take 10 mg by mouth daily, Disp: , Rfl:   Allergies   Allergen Reactions    Contrast Dye [Iodinated Diagnostic Agents] Other (See Comments)     Pt states kidney dysfunction        Other      Vitals:    09/29/22 1305   BP: 140/82   Pulse: 77   Resp: 18   Temp: (!) 97 3 °F (36 3 °C)   SpO2: 97%       Physical Exam  Constitutional: General appearance: The Patient is well-developed and well-nourished who appears the stated age in no acute distress  Patient is pleasant and talkative  HEENT:  Normocephalic  Sclerae are anicteric  Mucous membranes are moist  Neck is supple without adenopathy  No JVD  Chest: The lungs are clear to auscultation  Cardiac: Heart is regular rate  Abdomen: Abdomen is soft, non-tender, non-distended and without masses  Extremities: There is no clubbing or cyanosis  There is no edema  Symmetric    Neuro: Grossly nonfocal  Gait is normal      Lymphatic: No evidence of cervical adenopathy bilaterally  No evidence of axillary adenopathy bilaterally  Skin: Warm, anicteric  Psych:  Patient is pleasant and talkative  Breasts:        Pathology:  [unfilled]    Labs:      Imaging  CT chest wo contrast    Result Date: 9/20/2022  Narrative: CT CHEST WITHOUT IV CONTRAST INDICATION:   J18 9: Pneumonia, unspecified organism  COMPARISON:  CT chest/abdomen/pelvis 7/2/2022  TECHNIQUE: CT examination of the chest was performed without intravenous contrast  Axial, sagittal, and coronal 2D reformatted images were created from the source data and submitted for interpretation  Radiation dose length product (DLP) for this visit:  291 68 mGy-cm   This examination, like all CT scans performed in the [facility], was performed utilizing techniques to minimize radiation dose exposure, including the use of iterative  reconstruction and automated exposure control  FINDINGS: LUNGS:  Mild emphysema  Interval resolution of previously seen multifocal pneumonia  Stable right lower lobe round atelectasis and bilateral linear scarring  No new areas of consolidation or suspicious pulmonary nodules  There is no tracheal or endobronchial lesion  PLEURA:  Small bilateral pleural effusions with stable chronic right pleural thickening and punctate calcifications  HEART/GREAT VESSELS: Stable mild cardiomegaly  Stable aortic valve replacement and ascending thoracic aorta repair  MEDIASTINUM AND KHRIS:  Unremarkable  CHEST WALL AND LOWER NECK:  Unremarkable  VISUALIZED STRUCTURES IN THE UPPER ABDOMEN:  Unremarkable  OSSEOUS STRUCTURES:  Spinal degenerative changes are noted  No acute fracture or destructive osseous lesion  Impression: 1  Resolution of previously seen multifocal pneumonia  No new consolidation  2   Small bilateral pleural effusions with stable chronic right pleural thickening and right lower lobe round atelectasis   Workstation performed: [id number]     I reviewed the above laboratory and imaging data  Discussion/Summary:  61-year-old male 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  This was T1 N0 M0  I do not believe he has recurrence of this although his imaging was noncontrast   His weight loss, may just be related to age as well as his underlying heart and vascular disease  I am more concerned about his dysphagia  Coupled with weight loss this could be related to an underlying malignant process, although this has been somewhat longstanding  I have recommended that he have an EGD  We will get him set up with Gastroenterology locally  Assuming this shows no abnormality, he will follow-up as needed  If there is an underlying malignancy, I will see him back  He also asked for the names of vascular surgeons here, I have given him the name of Dr Sukhi Aguirre locally as well  All of his questions were answered

## 2022-10-04 ENCOUNTER — TELEPHONE (OUTPATIENT)
Dept: GASTROENTEROLOGY | Facility: CLINIC | Age: 82
End: 2022-10-04

## 2022-10-04 ENCOUNTER — OFFICE VISIT (OUTPATIENT)
Dept: GASTROENTEROLOGY | Facility: CLINIC | Age: 82
End: 2022-10-04
Payer: COMMERCIAL

## 2022-10-04 VITALS
SYSTOLIC BLOOD PRESSURE: 142 MMHG | HEIGHT: 75 IN | BODY MASS INDEX: 24.74 KG/M2 | DIASTOLIC BLOOD PRESSURE: 90 MMHG | WEIGHT: 199 LBS

## 2022-10-04 DIAGNOSIS — Z86.010 HISTORY OF COLON POLYPS: ICD-10-CM

## 2022-10-04 DIAGNOSIS — R13.19 ESOPHAGEAL DYSPHAGIA: Primary | ICD-10-CM

## 2022-10-04 DIAGNOSIS — Z85.9 HISTORY OF MALIGNANT NEUROENDOCRINE TUMOR: ICD-10-CM

## 2022-10-04 PROCEDURE — 99204 OFFICE O/P NEW MOD 45 MIN: CPT | Performed by: INTERNAL MEDICINE

## 2022-10-04 NOTE — PATIENT INSTRUCTIONS
Scheduled date of EGD/colonoscopy (as of today): 12/6/22  Physician performing EGD/colonoscopy: Fady Gómez  Location of EGD/colonoscopy: [facility]  Desired bowel prep reviewed with patient: Miralax  Instructions reviewed with patient by: Kimberly TORRES  Clearances: Xarelto Hold

## 2022-10-04 NOTE — PROGRESS NOTES
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  He has a fairly significant cardiac history  He does not have any painful swallowing  Although he takes anticoagulants he has no melena or hematochezia  He has no change in the consistency of his skin although he says his skin has become much thinner  He does not have any Raynaud's  The patient denies any abdominal pain, change in bowel habits or stool caliber, hematochezia or rectal bleeding  No tenesmus  He had originally lost approximately 12 lb during this entire episode but has regained 6 or 7 lb at this point  According to the wife and the patient he has a history of significant colon polyps but has not had a colonoscopy in more than 10 years  The wife in the patient both request that I do this procedure as well  He has no other GI complaints at this point of any sort  He does have exertional shortness of breath  He is treated for polymyalgia rheumatica and has diffuse joint issues  He has no peripheral swelling  He has no other complaints         Review of Systems:   Constitutional: No fever or chills, feels well, no tiredness, no recent weight gain or weight loss  HENT: No complaints of earache, no hearing loss, no nosebleeds, no nasal discharge, no sore throat, no hoarseness  Eyes: No complaints of eye pain, no red eyes, no discharge from eyes, no itchy eyes    Cardiovascular: No complaints of slow heart rate, no fast heart rate, no chest pain, no palpitations, no leg claudication, no lower extremity edema  Respiratory: No complaints of shortness of breath, no wheezing, no cough, positive  SOB on exertion, no orthopnea  Gastrointestinal: As noted in HPI  Genitourinary: No complaints of dysuria, no incontinence, no hesitancy, no nocturia  Musculoskeletal:  Positive arthralgia, positive diffuse myalgias, no joint swelling or stiffness, no limb pain or swelling  Neurological: No complaints of headache, no confusion, no convulsions, no numbness or tingling, no dizziness or fainting, no limb weakness, no difficulty walking  Skin:  Thin skin, easy bruising  Hematological/Lymphatic: No complaints of swollen glands, does not bleed easy  Allergic/Immunologic: No immunocompromised state  Endocrine:  No complaints of polyuria, no polydipsia  Psychiatric/Behavioral: is not suicidal, no sleep disturbances, no anxiety or depression, no change in personality, no emotional problems         Historical Information   Past Medical History:   Diagnosis Date    Diabetes mellitus ([facility] [address] )     Hypertension     Pneumonia 2017    Right middle and lower lobe      Past Surgical History:   Procedure Laterality Date    APPENDECTOMY      CARDIAC SURGERY      Aortic Valve Replacement, Ascending Aorta    GALLBLADDER SURGERY      PANCREATICODUODENECTOMY       Social History   Social History     Substance and Sexual Activity   Alcohol Use Never     Social History     Substance and Sexual Activity   Drug Use No     Social History     Tobacco Use   Smoking Status Former Smoker    Years: 40 00    Types: Pipe    Quit date:     Years since quittin 7   Smokeless Tobacco Never Used     Family History   Problem Relation Age of Onset    Cancer Mother     Stroke Father     Cancer Sister     Diabetes Sister     Stroke Brother          Current Medications: has a current medication list which includes the following prescription(s): albuterol, b-d uf iii mini pen needles, nicolás contour test, cholecalciferol, cinnamon, trelegy ellipta, furosemide, hydroxychloroquine, insulin glargine, lantus solostar, losartan, metoprolol succinate, potassium chloride, pravastatin, prednisone, xarelto, metformin, and torsemide  Vital Signs: /90   Ht 6' 3" (1 905 m)   Wt 90 3 kg (199 lb)   BMI 24 87 kg/m²     Physical Exam:   Constitutional  General Appearance: No acute distress, well appearing and well nourished  Head  Normocephalic  Eyes  Conjunctivae and lids: No swelling, erythema, or discharge  Pupils and irises: Equal, round and reactive to light  Ears, Nose, Mouth, and Throat  External inspection of ears and nose: Normal  Nasal mucosa, septum and turbinates: Normal without edema or erythema/   Oropharynx: Normal with no erythema, edema, exudate or lesions  Neck  Normal range of motion  Neck supple  Cardiovascular  Auscultation of the heart: Normal rate and rhythm, irregularly irregular S1 and S2 without murmurs  Examination of the extremities for edema and/or varicosities: Normal  Pulmonary/Chest  Respiratory effort: No increased work of breathing or signs of respiratory distress  Auscultation of lungs: Clear to auscultation, equal breath sounds bilaterally, no wheezes, rales, no rhonchi  Abdomen  Abdomen: Non-tender, no masses  Liver and spleen: No hepatomegaly or splenomegaly  Musculoskeletal  Gait and station: normal   Digits and Nails: normal without clubbing or cyanosis  Inspection/palpation of joints, bones, and muscles: Normal  Neurological  No nystagmus or asterixis  Skin  Diffuse ecchymoses of the upper extremities  Lymphatic  Palpation of the lymph nodes in neck: No lymphadenopathy     Psychiatric  Orientation to person, place and time: Normal   Mood and affect: Normal          Labs:   Lab Results   Component Value Date    ALT 27 09/06/2022    AST 20 09/06/2022    BUN 28 (H) 09/06/2022    CALCIUM 9 6 09/06/2022  09/06/2022    CHOL 124 10/06/2015    CO2 30 09/06/2022    CREATININE 1 73 (H) 09/06/2022    HDL 53 09/06/2022    HCT 43 9 09/06/2022    HGB 13 4 09/06/2022    HGBA1C 5 9 (H) 09/06/2022     09/06/2022    K 4 8 09/06/2022    PSA 0 8 08/05/2015     06/08/2015    TRIG 52 09/06/2022    WBC 8 44 09/06/2022         X-Rays & Procedures:   No orders to display         ______________________________________________________________________      Assessment & Plan:      Diagnoses and all orders for this visit:    Esophageal dysphagia  -     EGD; Future    History of malignant neuroendocrine tumor  -     Ambulatory referral to Gastroenterology    History of colon polyps  -     Colonoscopy; Future          I have taken liberty of scheduling the patient for both EGD and colonoscopy  In the meanwhile he will have a mechanically soft food and may have glucerna ntermittently if needed  The fact that he has regained 7 lb of the 12 that he lost is good news  I will be happy to inform you of his results and further recommendations  I would like to thank you for allowing me to participate in his care            With warmest regards,    Donta Gorman MD, [facility]

## 2022-10-04 NOTE — LETTER
October 4, 2022     Daren Gordon MD  [address] [facility]  [address] [facility] [address] [facility]    Patient: Lubna Espinosa   YOB: 1940   Date of Visit: 10/4/2022       Dear Dr Ramesh Syed:    Thank you for referring Lubna Espinosa to me for evaluation  Below are my notes for this consultation  If you have questions, please do not hesitate to call me  I look forward to following your patient along with you  Sincerely,        Ankush Ambrocio MD        CC: No Recipients  Ankush Ambrocio MD  10/4/2022  2:28 PM  Incomplete  [facility]    Dear Hector Robertson,     I had the pleasure of seeing your patient Lubna Espinosa in the office today and I thank you for this kind referral        Chief Complaint:  Swallowing problems      HPI:  Lubna Espinosa is a [de-identified] y o  male who presents with swallowing issues going back 2 years  According to the patient he feels food gets stuck low behind the esophagus and then build up  He does not have any vomiting  It eventually goes down  He has no cough  No chest pain  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  He has a fairly significant cardiac history  He does not have any painful swallowing  Although he takes anticoagulants he has no melena or hematochezia  He has no change in the consistency of his skin although he says his skin has become much thinner  He does not have any Raynaud's  The patient denies any abdominal pain, change in bowel habits or stool caliber, hematochezia or rectal bleeding  No tenesmus  He had originally lost approximately 12 lb during this entire episode but has regained 6 or 7 lb at this point  According to the wife and the patient he has a history of significant colon polyps but has not had a colonoscopy in more than 10 years  The wife in the patient both request that I do this procedure as well  He has no other GI complaints at this point of any sort    He does have exertional shortness of breath  He is treated for polymyalgia rheumatica and has diffuse joint issues  He has no peripheral swelling  He has no other complaints         Review of Systems:   Constitutional: No fever or chills, feels well, no tiredness, no recent weight gain or weight loss  HENT: No complaints of earache, no hearing loss, no nosebleeds, no nasal discharge, no sore throat, no hoarseness  Eyes: No complaints of eye pain, no red eyes, no discharge from eyes, no itchy eyes  Cardiovascular: No complaints of slow heart rate, no fast heart rate, no chest pain, no palpitations, no leg claudication, no lower extremity edema  Respiratory: No complaints of shortness of breath, no wheezing, no cough, positive  SOB on exertion, no orthopnea  Gastrointestinal: As noted in HPI  Genitourinary: No complaints of dysuria, no incontinence, no hesitancy, no nocturia  Musculoskeletal:  Positive arthralgia, positive diffuse myalgias, no joint swelling or stiffness, no limb pain or swelling  Neurological: No complaints of headache, no confusion, no convulsions, no numbness or tingling, no dizziness or fainting, no limb weakness, no difficulty walking  Skin:  Thin skin, easy bruising  Hematological/Lymphatic: No complaints of swollen glands, does not bleed easy  Allergic/Immunologic: No immunocompromised state  Endocrine:  No complaints of polyuria, no polydipsia  Psychiatric/Behavioral: is not suicidal, no sleep disturbances, no anxiety or depression, no change in personality, no emotional problems         Historical Information   Past Medical History:   Diagnosis Date    Diabetes mellitus ([facility] [address] )     Hypertension     Pneumonia 6/13/2017    Right middle and lower lobe      Past Surgical History:   Procedure Laterality Date    APPENDECTOMY      CARDIAC SURGERY      Aortic Valve Replacement, Ascending Aorta    GALLBLADDER SURGERY      PANCREATICODUODENECTOMY       Social History   Social History     Substance and Sexual Activity   Alcohol Use Never     Social History     Substance and Sexual Activity   Drug Use No     Social History     Tobacco Use   Smoking Status Former Smoker    Years: 40 00    Types: Pipe    Quit date:     Years since quittin 7   Smokeless Tobacco Never Used     Family History   Problem Relation Age of Onset    Cancer Mother     Stroke Father     Cancer Sister     Diabetes Sister     Stroke Brother          Current Medications: has a current medication list which includes the following prescription(s): albuterol, b-d uf iii mini pen needles, nicolás contour test, cholecalciferol, cinnamon, trelegy ellipta, furosemide, hydroxychloroquine, insulin glargine, lantus solostar, losartan, metoprolol succinate, potassium chloride, pravastatin, prednisone, xarelto, metformin, and torsemide  Vital Signs: /90   Ht 6' 3" (1 905 m)   Wt 90 3 kg (199 lb)   BMI 24 87 kg/m²     Physical Exam:   Constitutional  General Appearance: No acute distress, well appearing and well nourished  Head  Normocephalic  Eyes  Conjunctivae and lids: No swelling, erythema, or discharge  Pupils and irises: Equal, round and reactive to light  Ears, Nose, Mouth, and Throat  External inspection of ears and nose: Normal  Nasal mucosa, septum and turbinates: Normal without edema or erythema/   Oropharynx: Normal with no erythema, edema, exudate or lesions  Neck  Normal range of motion  Neck supple  Cardiovascular  Auscultation of the heart: Normal rate and rhythm, irregularly irregular S1 and S2 without murmurs  Examination of the extremities for edema and/or varicosities: Normal  Pulmonary/Chest  Respiratory effort: No increased work of breathing or signs of respiratory distress  Auscultation of lungs: Clear to auscultation, equal breath sounds bilaterally, no wheezes, rales, no rhonchi  Abdomen  Abdomen: Non-tender, no masses  Liver and spleen: No hepatomegaly or splenomegaly  Musculoskeletal  Gait and station: normal   Digits and Nails: normal without clubbing or cyanosis  Inspection/palpation of joints, bones, and muscles: Normal  Neurological  No nystagmus or asterixis  Skin  Diffuse ecchymoses of the upper extremities  Lymphatic  Palpation of the lymph nodes in neck: No lymphadenopathy  Psychiatric  Orientation to person, place and time: Normal   Mood and affect: Normal          Labs:   Lab Results   Component Value Date    ALT 27 09/06/2022    AST 20 09/06/2022    BUN 28 (H) 09/06/2022    CALCIUM 9 6 09/06/2022     09/06/2022    CHOL 124 10/06/2015    CO2 30 09/06/2022    CREATININE 1 73 (H) 09/06/2022    HDL 53 09/06/2022    HCT 43 9 09/06/2022    HGB 13 4 09/06/2022    HGBA1C 5 9 (H) 09/06/2022     09/06/2022    K 4 8 09/06/2022    PSA 0 8 08/05/2015     06/08/2015    TRIG 52 09/06/2022    WBC 8 44 09/06/2022         X-Rays & Procedures:   No orders to display         ______________________________________________________________________      Assessment & Plan:      Diagnoses and all orders for this visit:    Esophageal dysphagia  -     EGD; Future    History of malignant neuroendocrine tumor  -     Ambulatory referral to Gastroenterology    History of colon polyps  -     Colonoscopy; Future          I have taken liberty of scheduling the patient for both EGD and colonoscopy  In the meanwhile he will have a mechanically soft food and may have glucerna ntermittently if needed  The fact that he has regained 7 lb of the 12 that he lost is good news  I will be happy to inform you of his results and further recommendations  I would like to thank you for allowing me to participate in his care            With warmest regards,    Pio Holland MD, [facility]

## 2022-10-11 PROBLEM — J18.9 SEPSIS DUE TO PNEUMONIA (HCC): Status: RESOLVED | Noted: 2022-07-02 | Resolved: 2022-10-11

## 2022-10-11 PROBLEM — A41.9 SEPSIS DUE TO PNEUMONIA (HCC): Status: RESOLVED | Noted: 2022-07-02 | Resolved: 2022-10-11

## 2022-10-11 PROBLEM — J18.9 MULTIFOCAL PNEUMONIA: Status: RESOLVED | Noted: 2017-06-13 | Resolved: 2022-10-11

## 2022-10-24 NOTE — TELEPHONE ENCOUNTER
ptn has been having mini strokes and was switched to eliquis because of it  Dr Lianet Baptiste wants him to continue taking it and remind him the morning of that he continued the Eliquis   I communicated this to the patient and he understood

## 2022-11-09 ENCOUNTER — APPOINTMENT (OUTPATIENT)
Dept: LAB | Facility: MEDICAL CENTER | Age: 82
End: 2022-11-09

## 2022-11-09 DIAGNOSIS — D51.1 MEGALOBLASTIC ANEMIA DUE TO VITAMIN B12 MALABSORPTION WITH PROTEINURIA: ICD-10-CM

## 2022-11-09 DIAGNOSIS — E51.9 THIAMIN DEFICIENCY: ICD-10-CM

## 2022-11-09 DIAGNOSIS — E03.9 HYPOTHYROIDISM, UNSPECIFIED TYPE: ICD-10-CM

## 2022-11-09 DIAGNOSIS — E53.8 BIOTIN-(PROPIONYL-COA-CARBOXYLASE) LIGASE DEFICIENCY: ICD-10-CM

## 2022-11-09 LAB — FOLATE SERPL-MCNC: 9.8 NG/ML (ref 3.1–17.5)

## 2022-11-11 DIAGNOSIS — J43.2 CENTRILOBULAR EMPHYSEMA (HCC): ICD-10-CM

## 2022-11-11 RX ORDER — FLUTICASONE FUROATE, UMECLIDINIUM BROMIDE AND VILANTEROL TRIFENATATE 100; 62.5; 25 UG/1; UG/1; UG/1
POWDER RESPIRATORY (INHALATION)
Qty: 360 BLISTER | Refills: 3 | Status: SHIPPED | OUTPATIENT
Start: 2022-11-11

## 2022-11-13 LAB — VIT B1 BLD-SCNC: 142.2 NMOL/L (ref 66.5–200)

## 2022-12-06 ENCOUNTER — ANESTHESIA (OUTPATIENT)
Dept: GASTROENTEROLOGY | Facility: HOSPITAL | Age: 82
End: 2022-12-06

## 2022-12-06 ENCOUNTER — HOSPITAL ENCOUNTER (OUTPATIENT)
Dept: GASTROENTEROLOGY | Facility: HOSPITAL | Age: 82
Setting detail: OUTPATIENT SURGERY
Discharge: HOME | End: 2022-12-06
Attending: INTERNAL MEDICINE

## 2022-12-06 ENCOUNTER — ANESTHESIA EVENT (OUTPATIENT)
Dept: GASTROENTEROLOGY | Facility: HOSPITAL | Age: 82
End: 2022-12-06

## 2022-12-06 VITALS
OXYGEN SATURATION: 96 % | TEMPERATURE: 97.4 F | BODY MASS INDEX: 22.94 KG/M2 | SYSTOLIC BLOOD PRESSURE: 142 MMHG | HEIGHT: 75 IN | HEART RATE: 75 BPM | RESPIRATION RATE: 18 BRPM | DIASTOLIC BLOOD PRESSURE: 84 MMHG | WEIGHT: 184.53 LBS

## 2022-12-06 DIAGNOSIS — Z86.010 HISTORY OF COLON POLYPS: ICD-10-CM

## 2022-12-06 DIAGNOSIS — R13.19 ESOPHAGEAL DYSPHAGIA: ICD-10-CM

## 2022-12-06 LAB — GLUCOSE SERPL-MCNC: 124 MG/DL (ref 65–140)

## 2022-12-06 RX ORDER — PROPOFOL 10 MG/ML
INJECTION, EMULSION INTRAVENOUS AS NEEDED
Status: DISCONTINUED | OUTPATIENT
Start: 2022-12-06 | End: 2022-12-06

## 2022-12-06 RX ORDER — AMLODIPINE BESYLATE 10 MG/1
10 TABLET ORAL DAILY
COMMUNITY

## 2022-12-06 RX ORDER — LIDOCAINE HYDROCHLORIDE 20 MG/ML
INJECTION, SOLUTION EPIDURAL; INFILTRATION; INTRACAUDAL; PERINEURAL AS NEEDED
Status: DISCONTINUED | OUTPATIENT
Start: 2022-12-06 | End: 2022-12-06

## 2022-12-06 RX ADMIN — PROPOFOL 20 MG: 10 INJECTION, EMULSION INTRAVENOUS at 08:07

## 2022-12-06 RX ADMIN — PROPOFOL 20 MG: 10 INJECTION, EMULSION INTRAVENOUS at 07:56

## 2022-12-06 RX ADMIN — PROPOFOL 60 MG: 10 INJECTION, EMULSION INTRAVENOUS at 07:50

## 2022-12-06 RX ADMIN — PROPOFOL 20 MG: 10 INJECTION, EMULSION INTRAVENOUS at 08:10

## 2022-12-06 RX ADMIN — PROPOFOL 20 MG: 10 INJECTION, EMULSION INTRAVENOUS at 08:13

## 2022-12-06 RX ADMIN — PROPOFOL 20 MG: 10 INJECTION, EMULSION INTRAVENOUS at 08:03

## 2022-12-06 RX ADMIN — PROPOFOL 20 MG: 10 INJECTION, EMULSION INTRAVENOUS at 07:58

## 2022-12-06 RX ADMIN — PROPOFOL 20 MG: 10 INJECTION, EMULSION INTRAVENOUS at 07:52

## 2022-12-06 RX ADMIN — PROPOFOL 20 MG: 10 INJECTION, EMULSION INTRAVENOUS at 08:01

## 2022-12-06 RX ADMIN — PROPOFOL 10 MG: 10 INJECTION, EMULSION INTRAVENOUS at 07:54

## 2022-12-06 RX ADMIN — PROPOFOL 20 MG: 10 INJECTION, EMULSION INTRAVENOUS at 07:51

## 2022-12-06 RX ADMIN — PROPOFOL 20 MG: 10 INJECTION, EMULSION INTRAVENOUS at 08:05

## 2022-12-06 RX ADMIN — LIDOCAINE HYDROCHLORIDE 100 MG: 20 INJECTION, SOLUTION EPIDURAL; INFILTRATION; INTRACAUDAL at 07:50

## 2022-12-06 NOTE — ANESTHESIA PREPROCEDURE EVALUATION
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)      /RENAL   (+) Acute-on-chronic kidney injury (HCC)   (+) CKD (chronic kidney disease)      MUSCULOSKELETAL   (+) Inflammatory polyarthropathy (HCC)   (+) Osteoarthrosis      NEURO/PSYCH   (+) History of malignant neuroendocrine tumor      PULMONARY   (+) COPD (chronic obstructive pulmonary disease) (HCC)   (+) Centrilobular emphysema (HCC)      Endocrine   (+) Diabetes mellitus ([address] )      Other   (+) Polymyalgia rheumatica (HCC)     On eliquis  Per GI:  Jono Schwab is a [de-identified] y o  male who presents with swallowing issues going back 2 years  According to the patient he feels food gets stuck low behind the esophagus and then build up  He does not have any vomiting  It eventually goes down  He has no cough  No chest pain  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  He has a fairly significant cardiac history  He does not have any painful swallowing  Although he takes anticoagulants he has no melena or hematochezia  He has no change in the consistency of his skin although he says his skin has become much thinner  He does not have any Raynaud's  The patient denies any abdominal pain, change in bowel habits or stool caliber, hematochezia or rectal bleeding  No tenesmus  He had originally lost approximately 12 lb during this entire episode but has regained 6 or 7 lb at this point  According to the wife and the patient he has a history of significant colon polyps but has not had a colonoscopy in more than 10 years  The wife in the patient both request that I do this procedure as well  He has no other GI complaints at this point of any sort  He does have exertional shortness of breath    He is treated for polymyalgia rheumatica and has diffuse joint issues  He has no peripheral swelling  He has no other complaints       Recent heart cath negative, no intervention per pt  Was done by Modesto Du, no records available for review  Physical Exam    Airway    Mallampati score: II  TM Distance: >3 FB  Neck ROM: full     Dental   Comment: Denies loose teeth,     Cardiovascular  Rhythm: irregular, Cardiovascular exam normal    Pulmonary  Pulmonary exam normal     Other Findings  Portions of exam deferred due to low yield and/or unknown COVID status      Anesthesia Plan  ASA Score- 3     Anesthesia Type- IV sedation with anesthesia with ASA Monitors  Additional Monitors:   Airway Plan:           Plan Factors-Exercise tolerance (METS): >4 METS  Chart reviewed  Existing labs reviewed  Patient summary reviewed  Patient is not a current smoker  Induction- intravenous  Postoperative Plan-     Informed Consent- Anesthetic plan and risks discussed with patient  I personally reviewed this patient with the CRNA  Discussed and agreed on the Anesthesia Plan with the CRNA  Justen Colby

## 2022-12-06 NOTE — ANESTHESIA POSTPROCEDURE EVALUATION
Post-Op Assessment Note    CV Status:  Stable  Pain Score: 0    Pain management: adequate     Mental Status:  Alert and awake   Hydration Status:  Euvolemic   PONV Controlled:  Controlled   Airway Patency:  Patent      Post Op Vitals Reviewed: Yes      Staff: CRNA         No notable events documented      /60 (12/06/22 0821)    Temp (!) 97 4 °F (36 3 °C) (12/06/22 0821)    Pulse 75 (12/06/22 0821)   Resp 20 (12/06/22 0821)    SpO2 96 % (12/06/22 0821)

## 2022-12-06 NOTE — H&P
History and Physical -  [facility]  Doroteo Toribio [de-identified] y o  male MRN: [id number]                  HPI: Doroteo Toribio is a [de-identified]y o  year old male who presents for EGD and colonoscopy for dysphagia, weight loss, history of neuroendocrine tumor, history of colon polyps  Last colonoscopy more than 10 years ago  REVIEW OF SYSTEMS: Per the HPI, and otherwise unremarkable  Historical Information   Past Medical History:   Diagnosis Date   • Diabetes mellitus ([facility] [address] )    • Hypertension    • Pneumonia 2017    Right middle and lower lobe      Past Surgical History:   Procedure Laterality Date   • APPENDECTOMY     • CARDIAC SURGERY      Aortic Valve Replacement, Ascending Aorta   • GALLBLADDER SURGERY     • PANCREATICODUODENECTOMY       Social History   Social History     Substance and Sexual Activity   Alcohol Use Never     Social History     Substance and Sexual Activity   Drug Use No     Social History     Tobacco Use   Smoking Status Former   • Types: Pipe   • Quit date:    • Years since quittin 9   Smokeless Tobacco Never     Family History   Problem Relation Age of Onset   • Cancer Mother    • Stroke Father    • Cancer Sister    • Diabetes Sister    • Stroke Brother        Meds/Allergies     (Not in a hospital admission)      Allergies   Allergen Reactions   • Contrast Dye [Iodinated Diagnostic Agents] Other (See Comments)     Pt states kidney dysfunction       • Other        Objective     Blood pressure (!) 172/90, pulse 95, temperature 97 5 °F (36 4 °C), temperature source Temporal, resp  rate 13, height 6' 3" (1 905 m), weight 83 7 kg (184 lb 8 4 oz), SpO2 95 %        PHYSICAL EXAM    Gen: NAD  CV: RRR  CHEST: Clear  ABD: soft, NT/ND  EXT: no edema  Neuro: AAO      ASSESSMENT/PLAN:  This is a [de-identified]y o  year old male here for dysphagia, weight loss history history polyps    PLAN:   Procedure:  EGD and colonoscopy

## 2022-12-09 ENCOUNTER — APPOINTMENT (OUTPATIENT)
Dept: LAB | Facility: MEDICAL CENTER | Age: 82
End: 2022-12-09

## 2022-12-09 DIAGNOSIS — Z00.00 ROUTINE GENERAL MEDICAL EXAMINATION AT A HEALTH CARE FACILITY: ICD-10-CM

## 2022-12-09 DIAGNOSIS — E11.9 DIABETES MELLITUS WITHOUT COMPLICATION (HCC): ICD-10-CM

## 2022-12-09 DIAGNOSIS — E78.2 MIXED HYPERLIPIDEMIA: ICD-10-CM

## 2022-12-09 LAB
ALBUMIN SERPL BCP-MCNC: 3.7 G/DL (ref 3.5–5)
ALP SERPL-CCNC: 163 U/L (ref 46–116)
ALT SERPL W P-5'-P-CCNC: 27 U/L (ref 12–78)
ANION GAP SERPL CALCULATED.3IONS-SCNC: 2 MMOL/L (ref 4–13)
AST SERPL W P-5'-P-CCNC: 28 U/L (ref 5–45)
BASOPHILS # BLD AUTO: 0.04 THOUSANDS/ÂΜL (ref 0–0.1)
BASOPHILS NFR BLD AUTO: 1 % (ref 0–1)
BILIRUB SERPL-MCNC: 1.13 MG/DL (ref 0.2–1)
BUN SERPL-MCNC: 22 MG/DL (ref 5–25)
CALCIUM SERPL-MCNC: 10 MG/DL (ref 8.3–10.1)
CHLORIDE SERPL-SCNC: 107 MMOL/L (ref 96–108)
CHOLEST SERPL-MCNC: 115 MG/DL
CK SERPL-CCNC: 60 U/L (ref 39–308)
CO2 SERPL-SCNC: 29 MMOL/L (ref 21–32)
CREAT SERPL-MCNC: 1.49 MG/DL (ref 0.6–1.3)
EOSINOPHIL # BLD AUTO: 0.18 THOUSAND/ÂΜL (ref 0–0.61)
EOSINOPHIL NFR BLD AUTO: 3 % (ref 0–6)
ERYTHROCYTE [DISTWIDTH] IN BLOOD BY AUTOMATED COUNT: 13.2 % (ref 11.6–15.1)
GFR SERPL CREATININE-BSD FRML MDRD: 43 ML/MIN/1.73SQ M
GLUCOSE P FAST SERPL-MCNC: 97 MG/DL (ref 65–99)
HCT VFR BLD AUTO: 42.3 % (ref 36.5–49.3)
HDLC SERPL-MCNC: 50 MG/DL
HGB BLD-MCNC: 13.2 G/DL (ref 12–17)
IMM GRANULOCYTES # BLD AUTO: 0.03 THOUSAND/UL (ref 0–0.2)
IMM GRANULOCYTES NFR BLD AUTO: 1 % (ref 0–2)
LDLC SERPL CALC-MCNC: 52 MG/DL (ref 0–100)
LYMPHOCYTES # BLD AUTO: 1.08 THOUSANDS/ÂΜL (ref 0.6–4.47)
LYMPHOCYTES NFR BLD AUTO: 16 % (ref 14–44)
MCH RBC QN AUTO: 30.7 PG (ref 26.8–34.3)
MCHC RBC AUTO-ENTMCNC: 31.2 G/DL (ref 31.4–37.4)
MCV RBC AUTO: 98 FL (ref 82–98)
MONOCYTES # BLD AUTO: 0.68 THOUSAND/ÂΜL (ref 0.17–1.22)
MONOCYTES NFR BLD AUTO: 10 % (ref 4–12)
NEUTROPHILS # BLD AUTO: 4.6 THOUSANDS/ÂΜL (ref 1.85–7.62)
NEUTS SEG NFR BLD AUTO: 69 % (ref 43–75)
NONHDLC SERPL-MCNC: 65 MG/DL
NRBC BLD AUTO-RTO: 0 /100 WBCS
PLATELET # BLD AUTO: 188 THOUSANDS/UL (ref 149–390)
PMV BLD AUTO: 9.5 FL (ref 8.9–12.7)
POTASSIUM SERPL-SCNC: 4.3 MMOL/L (ref 3.5–5.3)
PROT SERPL-MCNC: 6.6 G/DL (ref 6.4–8.4)
RBC # BLD AUTO: 4.3 MILLION/UL (ref 3.88–5.62)
SODIUM SERPL-SCNC: 138 MMOL/L (ref 135–147)
TRIGL SERPL-MCNC: 66 MG/DL
WBC # BLD AUTO: 6.61 THOUSAND/UL (ref 4.31–10.16)

## 2022-12-10 LAB
EST. AVERAGE GLUCOSE BLD GHB EST-MCNC: 126 MG/DL
HBA1C MFR BLD: 6 %

## 2022-12-12 LAB — ALDOLASE SERPL-CCNC: 6.1 U/L (ref 3.3–10.3)

## 2022-12-27 ENCOUNTER — TELEPHONE (OUTPATIENT)
Dept: GASTROENTEROLOGY | Facility: CLINIC | Age: 82
End: 2022-12-27

## 2022-12-27 NOTE — TELEPHONE ENCOUNTER
Patients GI provider:  Dr Kim[facility]    Number to return call: [phone]    Reason for call: Pt calling requesting to go over his results from his colonoscopy and EGD      Scheduled procedure/appointment date if applicable: N/A

## 2023-02-14 ENCOUNTER — HOSPITAL ENCOUNTER (EMERGENCY)
Facility: HOSPITAL | Age: 83
Discharge: HOME | End: 2023-02-14
Attending: EMERGENCY MEDICINE

## 2023-02-14 ENCOUNTER — APPOINTMENT (EMERGENCY)
Dept: CT IMAGING | Facility: HOSPITAL | Age: 83
End: 2023-02-14

## 2023-02-14 ENCOUNTER — HOSPITAL ENCOUNTER (INPATIENT)
Facility: HOSPITAL | Age: 83
LOS: 1 days | Discharge: HOME | End: 2023-02-16
Attending: EMERGENCY MEDICINE | Admitting: INTERNAL MEDICINE

## 2023-02-14 ENCOUNTER — APPOINTMENT (EMERGENCY)
Dept: RADIOLOGY | Facility: HOSPITAL | Age: 83
End: 2023-02-14

## 2023-02-14 VITALS
DIASTOLIC BLOOD PRESSURE: 81 MMHG | OXYGEN SATURATION: 99 % | HEART RATE: 79 BPM | RESPIRATION RATE: 20 BRPM | SYSTOLIC BLOOD PRESSURE: 161 MMHG | TEMPERATURE: 96.7 F

## 2023-02-14 DIAGNOSIS — R41.82 AMS (ALTERED MENTAL STATUS): Primary | ICD-10-CM

## 2023-02-14 DIAGNOSIS — R41.0 CONFUSION: ICD-10-CM

## 2023-02-14 DIAGNOSIS — R53.1 GENERALIZED WEAKNESS: ICD-10-CM

## 2023-02-14 DIAGNOSIS — Z79.52 ON CORTICOSTEROID THERAPY: ICD-10-CM

## 2023-02-14 DIAGNOSIS — J21.0 RSV (ACUTE BRONCHIOLITIS DUE TO RESPIRATORY SYNCYTIAL VIRUS): ICD-10-CM

## 2023-02-14 DIAGNOSIS — J18.9 PNEUMONIA OF RIGHT LUNG DUE TO INFECTIOUS ORGANISM, UNSPECIFIED PART OF LUNG: Primary | ICD-10-CM

## 2023-02-14 LAB
2HR DELTA HS TROPONIN: -2 NG/L
ANION GAP SERPL CALCULATED.3IONS-SCNC: 6 MMOL/L (ref 4–13)
ANION GAP SERPL CALCULATED.3IONS-SCNC: 6 MMOL/L (ref 4–13)
APTT PPP: 27 SECONDS (ref 23–37)
BASOPHILS # BLD AUTO: 0.02 THOUSANDS/ÂΜL (ref 0–0.1)
BASOPHILS # BLD AUTO: 0.03 THOUSANDS/ÂΜL (ref 0–0.1)
BASOPHILS NFR BLD AUTO: 0 % (ref 0–1)
BASOPHILS NFR BLD AUTO: 0 % (ref 0–1)
BUN SERPL-MCNC: 32 MG/DL (ref 5–25)
BUN SERPL-MCNC: 36 MG/DL (ref 5–25)
CALCIUM SERPL-MCNC: 10.7 MG/DL (ref 8.4–10.2)
CALCIUM SERPL-MCNC: 9.8 MG/DL (ref 8.4–10.2)
CARDIAC TROPONIN I PNL SERPL HS: 25 NG/L
CARDIAC TROPONIN I PNL SERPL HS: 27 NG/L
CARDIAC TROPONIN I PNL SERPL HS: 29 NG/L
CHLORIDE SERPL-SCNC: 101 MMOL/L (ref 96–108)
CHLORIDE SERPL-SCNC: 102 MMOL/L (ref 96–108)
CO2 SERPL-SCNC: 27 MMOL/L (ref 21–32)
CO2 SERPL-SCNC: 30 MMOL/L (ref 21–32)
CREAT SERPL-MCNC: 1.68 MG/DL (ref 0.6–1.3)
CREAT SERPL-MCNC: 1.81 MG/DL (ref 0.6–1.3)
EOSINOPHIL # BLD AUTO: 0.05 THOUSAND/ÂΜL (ref 0–0.61)
EOSINOPHIL # BLD AUTO: 0.14 THOUSAND/ÂΜL (ref 0–0.61)
EOSINOPHIL NFR BLD AUTO: 0 % (ref 0–6)
EOSINOPHIL NFR BLD AUTO: 1 % (ref 0–6)
ERYTHROCYTE [DISTWIDTH] IN BLOOD BY AUTOMATED COUNT: 13.4 % (ref 11.6–15.1)
ERYTHROCYTE [DISTWIDTH] IN BLOOD BY AUTOMATED COUNT: 13.5 % (ref 11.6–15.1)
FLUAV RNA RESP QL NAA+PROBE: NEGATIVE
FLUBV RNA RESP QL NAA+PROBE: NEGATIVE
GFR SERPL CREATININE-BSD FRML MDRD: 34 ML/MIN/1.73SQ M
GFR SERPL CREATININE-BSD FRML MDRD: 37 ML/MIN/1.73SQ M
GLUCOSE SERPL-MCNC: 135 MG/DL (ref 65–140)
GLUCOSE SERPL-MCNC: 151 MG/DL (ref 65–140)
GLUCOSE SERPL-MCNC: 207 MG/DL (ref 65–140)
HCT VFR BLD AUTO: 38.1 % (ref 36.5–49.3)
HCT VFR BLD AUTO: 40.6 % (ref 36.5–49.3)
HGB BLD-MCNC: 12.4 G/DL (ref 12–17)
HGB BLD-MCNC: 12.9 G/DL (ref 12–17)
IMM GRANULOCYTES # BLD AUTO: 0.12 THOUSAND/UL (ref 0–0.2)
IMM GRANULOCYTES # BLD AUTO: 0.22 THOUSAND/UL (ref 0–0.2)
IMM GRANULOCYTES NFR BLD AUTO: 1 % (ref 0–2)
IMM GRANULOCYTES NFR BLD AUTO: 2 % (ref 0–2)
INR PPP: 1.3 (ref 0.84–1.19)
LACTATE SERPL-SCNC: 0.9 MMOL/L (ref 0.5–2)
LYMPHOCYTES # BLD AUTO: 0.91 THOUSANDS/ÂΜL (ref 0.6–4.47)
LYMPHOCYTES # BLD AUTO: 1.2 THOUSANDS/ÂΜL (ref 0.6–4.47)
LYMPHOCYTES NFR BLD AUTO: 7 % (ref 14–44)
LYMPHOCYTES NFR BLD AUTO: 9 % (ref 14–44)
MCH RBC QN AUTO: 29.9 PG (ref 26.8–34.3)
MCH RBC QN AUTO: 30.8 PG (ref 26.8–34.3)
MCHC RBC AUTO-ENTMCNC: 31.8 G/DL (ref 31.4–37.4)
MCHC RBC AUTO-ENTMCNC: 32.5 G/DL (ref 31.4–37.4)
MCV RBC AUTO: 94 FL (ref 82–98)
MCV RBC AUTO: 95 FL (ref 82–98)
MONOCYTES # BLD AUTO: 0.78 THOUSAND/ÂΜL (ref 0.17–1.22)
MONOCYTES # BLD AUTO: 1.01 THOUSAND/ÂΜL (ref 0.17–1.22)
MONOCYTES NFR BLD AUTO: 6 % (ref 4–12)
MONOCYTES NFR BLD AUTO: 8 % (ref 4–12)
NEUTROPHILS # BLD AUTO: 10.46 THOUSANDS/ÂΜL (ref 1.85–7.62)
NEUTROPHILS # BLD AUTO: 11.84 THOUSANDS/ÂΜL (ref 1.85–7.62)
NEUTS SEG NFR BLD AUTO: 81 % (ref 43–75)
NEUTS SEG NFR BLD AUTO: 85 % (ref 43–75)
NRBC BLD AUTO-RTO: 0 /100 WBCS
NRBC BLD AUTO-RTO: 0 /100 WBCS
PLATELET # BLD AUTO: 195 THOUSANDS/UL (ref 149–390)
PLATELET # BLD AUTO: 220 THOUSANDS/UL (ref 149–390)
PMV BLD AUTO: 8.8 FL (ref 8.9–12.7)
PMV BLD AUTO: 9 FL (ref 8.9–12.7)
POTASSIUM SERPL-SCNC: 4.5 MMOL/L (ref 3.5–5.3)
POTASSIUM SERPL-SCNC: 4.9 MMOL/L (ref 3.5–5.3)
PROTHROMBIN TIME: 15.9 SECONDS (ref 11.6–14.5)
RBC # BLD AUTO: 4.03 MILLION/UL (ref 3.88–5.62)
RBC # BLD AUTO: 4.31 MILLION/UL (ref 3.88–5.62)
RSV RNA RESP QL NAA+PROBE: POSITIVE
SARS-COV-2 RNA RESP QL NAA+PROBE: NEGATIVE
SODIUM SERPL-SCNC: 135 MMOL/L (ref 135–147)
SODIUM SERPL-SCNC: 137 MMOL/L (ref 135–147)
WBC # BLD AUTO: 12.95 THOUSAND/UL (ref 4.31–10.16)
WBC # BLD AUTO: 13.83 THOUSAND/UL (ref 4.31–10.16)

## 2023-02-14 RX ORDER — INSULIN LISPRO 100 [IU]/ML
1-6 INJECTION, SOLUTION INTRAVENOUS; SUBCUTANEOUS
Status: DISCONTINUED | OUTPATIENT
Start: 2023-02-14 | End: 2023-02-16 | Stop reason: HOSPADM

## 2023-02-14 RX ORDER — FLUTICASONE FUROATE AND VILANTEROL 100; 25 UG/1; UG/1
1 POWDER RESPIRATORY (INHALATION) DAILY
Status: DISCONTINUED | OUTPATIENT
Start: 2023-02-15 | End: 2023-02-16 | Stop reason: HOSPADM

## 2023-02-14 RX ORDER — CEFUROXIME AXETIL 500 MG/1
500 TABLET ORAL EVERY 12 HOURS SCHEDULED
Qty: 10 TABLET | Refills: 0 | Status: SHIPPED | OUTPATIENT
Start: 2023-02-14 | End: 2023-02-19

## 2023-02-14 RX ORDER — CEFUROXIME AXETIL 250 MG/1
500 TABLET ORAL EVERY 12 HOURS SCHEDULED
Status: DISCONTINUED | OUTPATIENT
Start: 2023-02-15 | End: 2023-02-16 | Stop reason: HOSPADM

## 2023-02-14 RX ORDER — AZITHROMYCIN 500 MG/1
250 TABLET, FILM COATED ORAL EVERY 24 HOURS
Status: DISCONTINUED | OUTPATIENT
Start: 2023-02-15 | End: 2023-02-16 | Stop reason: HOSPADM

## 2023-02-14 RX ORDER — ACETAMINOPHEN 325 MG/1
650 TABLET ORAL EVERY 6 HOURS PRN
Status: DISCONTINUED | OUTPATIENT
Start: 2023-02-14 | End: 2023-02-16 | Stop reason: HOSPADM

## 2023-02-14 RX ORDER — FUROSEMIDE 40 MG/1
20 TABLET ORAL DAILY
Status: DISCONTINUED | OUTPATIENT
Start: 2023-02-15 | End: 2023-02-14

## 2023-02-14 RX ORDER — HYDROXYCHLOROQUINE SULFATE 200 MG/1
200 TABLET, FILM COATED ORAL 2 TIMES DAILY WITH MEALS
Status: DISCONTINUED | OUTPATIENT
Start: 2023-02-15 | End: 2023-02-16 | Stop reason: HOSPADM

## 2023-02-14 RX ORDER — INSULIN GLARGINE 100 [IU]/ML
16 INJECTION, SOLUTION SUBCUTANEOUS
Status: DISCONTINUED | OUTPATIENT
Start: 2023-02-14 | End: 2023-02-16 | Stop reason: HOSPADM

## 2023-02-14 RX ORDER — AZITHROMYCIN 250 MG/1
TABLET, FILM COATED ORAL
Qty: 6 TABLET | Refills: 0 | Status: SHIPPED | OUTPATIENT
Start: 2023-02-14 | End: 2023-02-18

## 2023-02-14 RX ORDER — PRAVASTATIN SODIUM 40 MG
40 TABLET ORAL DAILY
Status: DISCONTINUED | OUTPATIENT
Start: 2023-02-15 | End: 2023-02-16 | Stop reason: HOSPADM

## 2023-02-14 RX ORDER — OLMESARTAN MEDOXOMIL 20 MG/1
20 TABLET ORAL DAILY
COMMUNITY

## 2023-02-14 RX ORDER — ASPIRIN 81 MG/1
81 TABLET, CHEWABLE ORAL DAILY
Status: DISCONTINUED | OUTPATIENT
Start: 2023-02-15 | End: 2023-02-16 | Stop reason: HOSPADM

## 2023-02-14 RX ADMIN — CEFTRIAXONE SODIUM 1000 MG: 10 INJECTION, POWDER, FOR SOLUTION INTRAVENOUS at 14:28

## 2023-02-14 NOTE — ED PROVIDER NOTES
History  Chief Complaint   Patient presents with   • Altered Mental Status     Pt reports increased confusion/forgetfulness started approx 2 hours ago  Aaox4 at this time  Steady gait, no weakness  Dizziness this am  Reports speech changes started about an hour ago     24-year-old male, presents from home with wife with chief complaint of lightheadedness  Patient states started earlier today, he feels lightheaded like he is going to pass out and fall  Patient denies any fall or loss consciousness  Patient also states that he has had difficulty concentrating and remembering certain things  Denies any acute visual changes, chest pain, palpitations, or shortness of breath  Wife states patient appeared more confused and forgetful prior to arrival   Patient and wife both stated that his speech is at baseline  Patient reports recent congestion and cough, no known fevers  History provided by:  Patient and spouse   used: No    Altered Mental Status  Associated symptoms: light-headedness    Associated symptoms: no palpitations        Prior to Admission Medications   Prescriptions Last Dose Informant Patient Reported? Taking?    B-D UF III MINI PEN NEEDLES 31G X 5 MM MISC   Yes No   EUSEBIO CONTOUR TEST test strip   Yes No   Sig: 3 (three) times a day Test   Cinnamon 500 MG capsule   Yes No   Sig: Take 1,000 mg by mouth daily   Trelegy Ellipta 100-62 5-25 MCG/ACT inhaler   No No   Sig: USE 1 INHALATION DAILY RINSE MOUTH AFTER USE   albuterol (Ventolin HFA) 90 mcg/act inhaler   No No   Sig: Inhale 2 puffs every 6 (six) hours as needed for wheezing   amLODIPine (NORVASC) 10 mg tablet   Yes No   Sig: Take 10 mg by mouth daily   apixaban (Eliquis) 2 5 mg   Yes No   Sig: Take 2 5 mg by mouth 2 (two) times a day   cholecalciferol (VITAMIN D3) 1,000 units tablet   Yes No   Sig: Take 2,000 Units by mouth daily   furosemide (LASIX) 20 mg tablet   Yes No   Sig: Take 20 mg by mouth daily   hydroxychloroquine (PLAQUENIL) 200 mg tablet   Yes No   Sig: Take 200 mg by mouth 2 (two) times a day with meals   insulin glargine (LANTUS) 100 units/mL subcutaneous injection   Yes No   Sig: Inject 20 Units under the skin daily at bedtime   losartan (COZAAR) 100 MG tablet   Yes No   Sig: Take 100 mg by mouth daily   metFORMIN (GLUCOPHAGE) 500 mg tablet   Yes No   Sig: Take 500 mg by mouth 2 (two) times a day with meals   Patient not taking: No sig reported   potassium chloride (KLOR-CON) 8 MEQ tablet   Yes No   pravastatin (PRAVACHOL) 40 mg tablet   Yes No   Sig: Take 40 mg by mouth daily   predniSONE 1 mg tablet   Yes No   Sig: Take 4 mg by mouth daily Currently down to 3 1/2 mg daily 22    torsemide (DEMADEX) 10 mg tablet   Yes No   Sig: Take 10 mg by mouth daily      Facility-Administered Medications: None       Past Medical History:   Diagnosis Date   • Cancer ([facility] )     pancreatic   • Colon polyp    • Coronary artery disease    • Diabetes mellitus ([facility] )    • Hyperlipidemia    • Hypertension    • Pneumonia 2017    Right middle and lower lobe    • Stroke [facility])        Past Surgical History:   Procedure Laterality Date   • APPENDECTOMY     • CARDIAC SURGERY      Aortic Valve Replacement, Ascending Aorta   • COLONOSCOPY     • GALLBLADDER SURGERY     • PANCREATICODUODENECTOMY         Family History   Problem Relation Age of Onset   • Cancer Mother    • Stroke Father    • Cancer Sister    • Diabetes Sister    • Stroke Brother      I have reviewed and agree with the history as documented  E-Cigarette/Vaping   • E-Cigarette Use Never User      E-Cigarette/Vaping Substances   • Nicotine No    • THC No    • CBD No    • Flavoring No    • Other No    • Unknown No      Social History     Tobacco Use   • Smoking status: Former     Types: Pipe     Quit date:      Years since quittin 1   • Smokeless tobacco: Never   Vaping Use   • Vaping Use: Never used   Substance Use Topics   • Alcohol use: Never   • Drug use:  No Review of Systems   Constitutional: Negative  HENT: Positive for congestion  Eyes: Negative  Respiratory: Positive for cough  Negative for shortness of breath  Cardiovascular: Negative for chest pain and palpitations  Gastrointestinal: Negative  Musculoskeletal: Negative  Skin: Negative  Neurological: Positive for light-headedness  Negative for syncope  Hematological: Negative  Physical Exam  Physical Exam  Vitals and nursing note reviewed  Constitutional:       General: He is not in acute distress  HENT:      Head: Normocephalic and atraumatic  Mouth/Throat:      Mouth: Mucous membranes are moist       Pharynx: Oropharynx is clear  Eyes:      Extraocular Movements: Extraocular movements intact  Pupils: Pupils are equal, round, and reactive to light  Cardiovascular:      Rate and Rhythm: Normal rate  Rhythm irregular  Pulmonary:      Effort: Pulmonary effort is normal       Breath sounds: Normal breath sounds  Abdominal:      General: There is no distension  Palpations: Abdomen is soft  Tenderness: There is no abdominal tenderness  Musculoskeletal:         General: Normal range of motion  Cervical back: Normal range of motion and neck supple  Skin:     General: Skin is warm and dry  Neurological:      General: No focal deficit present  Mental Status: He is alert and oriented to person, place, and time        Comments: Normal speech, no facial weakness  5 out of 5 strength all 4 extremities, no drift  Normal gait         Vital Signs  ED Triage Vitals   Temperature Pulse Respirations Blood Pressure SpO2   02/14/23 1221 02/14/23 1221 02/14/23 1221 02/14/23 1221 02/14/23 1222   (!) 96 7 °F (35 9 °C) 66 19 (!) 201/91 92 %      Temp Source Heart Rate Source Patient Position - Orthostatic VS BP Location FiO2 (%)   02/14/23 1221 02/14/23 1330 -- -- --   Tympanic Monitor         Pain Score       --                  Vitals:    02/14/23 1221 02/14/23 1330 02/14/23 1430 02/14/23 1530   BP: (!) 201/91 (!) 194/83 (!) 188/82 161/81   Pulse: 66 86 83 79         Visual Acuity  Visual Acuity    Flowsheet Row Most Recent Value   L Pupil Size (mm) 3   R Pupil Size (mm) 3          ED Medications  Medications   ceftriaxone (ROCEPHIN) 1 g/50 mL in dextrose IVPB (1,000 mg Intravenous New Bag 2/14/23 1428)       Diagnostic Studies  Results Reviewed     Procedure Component Value Units Date/Time    HS Troponin I 2hr [[id number]]  (Normal) Collected: 02/14/23 1531    Lab Status: Final result Specimen: Blood from Arm, Right Updated: 02/14/23 1609     hs TnI 2hr 25 ng/L      Delta 2hr hsTnI -2 ng/L     HS Troponin I 4hr [[id number]]     Lab Status: No result Specimen: Blood     Lactic acid [[id number]]  (Normal) Collected: 02/14/23 1425    Lab Status: Final result Specimen: Blood from Hand, Right Updated: 02/14/23 1453     LACTIC ACID 0 9 mmol/L     Narrative:      Result may be elevated if tourniquet was used during collection  FLU/RSV/COVID - if FLU/RSV clinically relevant [[id number]]  (Abnormal) Collected: 02/14/23 1336    Lab Status: Final result Specimen: Nares from Nose Updated: 02/14/23 1442     SARS-CoV-2 Negative     INFLUENZA A PCR Negative     INFLUENZA B PCR Negative     RSV PCR Positive    Narrative:      FOR PEDIATRIC PATIENTS - copy/paste COVID Guidelines URL to browser: [email or web address]    SARS-CoV-2 assay is a Nucleic Acid Amplification assay intended for the  qualitative detection of nucleic acid from SARS-CoV-2 in nasopharyngeal  swabs  Results are for the presumptive identification of SARS-CoV-2 RNA  Positive results are indicative of infection with SARS-CoV-2, the virus  causing COVID-19, but do not rule out bacterial infection or co-infection  with other viruses   Laboratories within the [place] and its  territories are required to report all positive results to the appropriate  public health authorities  Negative results do not preclude SARS-CoV-2  infection and should not be used as the sole basis for treatment or other  patient management decisions  Negative results must be combined with  clinical observations, patient history, and epidemiological information  This test has not been FDA cleared or approved  This test has been authorized by FDA under an Emergency Use Authorization  (EUA)  This test is only authorized for the duration of time the  declaration that circumstances exist justifying the authorization of the  emergency use of an in vitro diagnostic tests for detection of SARS-CoV-2  virus and/or diagnosis of COVID-19 infection under section 564(b)(1) of  the Act, 21 U  S C  806HWC-9(F)(5), unless the authorization is terminated  or revoked sooner  The test has been validated but independent review by FDA  and CLIA is pending  Test performed using [employer] GeneXpert: This RT-PCR assay targets N2,  a region unique to SARS-CoV-2  A conserved region in the E-gene was chosen  for pan-Sarbecovirus detection which includes SARS-CoV-2  According to CMS-2020-01-R, this platform meets the definition of high-throughput technology  Blood culture #1 [[id number]] Collected: 02/14/23 1425    Lab Status: In process Specimen: Blood from Arm, Right Updated: 02/14/23 1431    Blood culture #2 [[id number]] Collected: 02/14/23 1425    Lab Status:  In process Specimen: Blood from Hand, Right Updated: 02/14/23 1431    HS Troponin 0hr (reflex protocol) [[id number]]  (Normal) Collected: 02/14/23 1336    Lab Status: Final result Specimen: Blood from Arm, Right Updated: 02/14/23 1411     hs TnI 0hr 27 ng/L     Basic metabolic panel [[id number]]  (Abnormal) Collected: 02/14/23 1336    Lab Status: Final result Specimen: Blood from Arm, Right Updated: 02/14/23 1404     Sodium 137 mmol/L      Potassium 4 9 mmol/L      Chloride 101 mmol/L      CO2 30 mmol/L      ANION GAP 6 mmol/L      BUN 32 mg/dL Creatinine 1 68 mg/dL      Glucose 151 mg/dL      Calcium 10 7 mg/dL      eGFR 37 ml/min/1 73sq m     Narrative:      National Kidney Disease Foundation guidelines for Chronic Kidney Disease (CKD):   •  Stage 1 with normal or high GFR (GFR > 90 mL/min/1 73 square meters)  •  Stage 2 Mild CKD (GFR = 60-89 mL/min/1 73 square meters)  •  Stage 3A Moderate CKD (GFR = 45-59 mL/min/1 73 square meters)  •  Stage 3B Moderate CKD (GFR = 30-44 mL/min/1 73 square meters)  •  Stage 4 Severe CKD (GFR = 15-29 mL/min/1 73 square meters)  •  Stage 5 End Stage CKD (GFR <15 mL/min/1 73 square meters)  Note: GFR calculation is accurate only with a steady state creatinine    CBC and differential [[id number]]  (Abnormal) Collected: 02/14/23 1336    Lab Status: Final result Specimen: Blood from Arm, Right Updated: 02/14/23 1347     WBC 13 83 Thousand/uL      RBC 4 31 Million/uL      Hemoglobin 12 9 g/dL      Hematocrit 40 6 %      MCV 94 fL      MCH 29 9 pg      MCHC 31 8 g/dL      RDW 13 4 %      MPV 8 8 fL      Platelets 213 Thousands/uL      nRBC 0 /100 WBCs      Neutrophils Relative 85 %      Immat GRANS % 2 %      Lymphocytes Relative 7 %      Monocytes Relative 6 %      Eosinophils Relative 0 %      Basophils Relative 0 %      Neutrophils Absolute 11 84 Thousands/µL      Immature Grans Absolute 0 22 Thousand/uL      Lymphocytes Absolute 0 91 Thousands/µL      Monocytes Absolute 0 78 Thousand/µL      Eosinophils Absolute 0 05 Thousand/µL      Basophils Absolute 0 03 Thousands/µL     Fingerstick Glucose (POCT) [[id number]]  (Normal) Collected: 02/14/23 1324    Lab Status: Final result Updated: 02/14/23 1325     POC Glucose 135 mg/dl                  CT head without contrast   Final Result by Nelda Heimlich, MD (02/14 1445)      No acute intracranial abnormality  Chronic microangiopathic changes  Sinus disease                 Workstation performed: [id number]         XR chest 1 view portable    (Results Pending) Procedures  ECG 12 Lead Documentation Only    Date/Time: 2/14/2023 1:37 PM  Performed by: Sally Moscoso MD  Authorized by: Sally Moscoso MD     ECG reviewed by me, the ED Provider: yes    Patient location:  ED  Previous ECG:     Previous ECG:  Compared to current    Similarity:  No change  Rate:     ECG rate assessment: normal    Rhythm:     Rhythm: atrial fibrillation    Ectopy:     Ectopy: none    QRS:     QRS axis:  Normal    QRS intervals: Wide  Conduction:     Conduction: abnormal      Abnormal conduction: non-specific intraventricular conduction delay    Comments:      Atrial fibrillation, ventricular rate 80, no acute changes             ED Course  ED Course as of 02/14/23 1622   e Feb 14, 2023   1403 Chest x-ray independently reviewed by myself, right middle lobe infiltrate  1405 Creatinine(!): 1 68  Elevated creatinine similar to previous lab values  1459 Patient given IV ceftriaxone for pneumonia  1620 Patient comfortable, states he has felt well in ED since arrival   Patient with normal mental status  Normal respiratory effort, speaking full sentences, oxygen saturation has remained 98 to 100% on room air  Discussed with patient and wife keeping in hospital for further monitoring and treatment, patient declines and states he would rather be treated at home to which wife agrees  We will treat with oral antibiotics for community-acquired pneumonia, discussed with patient wife to return to the emergency department for any worsening or new concerning symptoms  Patient will call primary care doctor for follow-up  SBIRT 22yo+    Flowsheet Row Most Recent Value   SBIRT (25 yo +)    In order to provide better care to our patients, we are screening all of our patients for alcohol and drug use  Would it be okay to ask you these screening questions? Yes Filed at: 02/14/2023 7639   Initial Alcohol Screen: US AUDIT-C     1   How often do you have a drink containing alcohol? 0 Filed at: 02/14/2023 1348   2  How many drinks containing alcohol do you have on a typical day you are drinking? 0 Filed at: 02/14/2023 1348   3a  Male UNDER 65: How often do you have five or more drinks on one occasion? 0 Filed at: 02/14/2023 1348   3b  FEMALE Any Age, or MALE 65+: How often do you have 4 or more drinks on one occassion? 0 Filed at: 02/14/2023 1348   Audit-C Score 0 Filed at: 02/14/2023 1348   ALEXEY: How many times in the past year have you    Used an illegal drug or used a prescription medication for non-medical reasons? Never Filed at: 02/14/2023 1348                    Medical Decision Making  24-year-old male, presenting with chief complaint of lightheadedness  Differential diagnosis includes arrhythmia, dehydration, infection, CVA among other diagnoses  Patient with no acute gross neurologic deficits on exam   EKG, radiologic studies and labs ordered  We will continue to monitor in ED and reevaluate  I have reviewed test results and diagnosis with patient  Follow-up plan reviewed  Precautions for acute return for re-evaluation are reviewed  Opportunity to ask questions was provided  Patient verbalizes understanding  Pneumonia of right lung due to infectious organism, unspecified part of lung: acute illness or injury  Amount and/or Complexity of Data Reviewed  Independent Historian: corina  External Data Reviewed: ECG and notes  Labs: ordered  Decision-making details documented in ED Course  Radiology: ordered and independent interpretation performed  Decision-making details documented in ED Course  ECG/medicine tests: ordered and independent interpretation performed  Decision-making details documented in ED Course  Risk  Prescription drug management  Decision regarding hospitalization            Disposition  Final diagnoses:   Pneumonia of right lung due to infectious organism, unspecified part of lung     Time reflects when diagnosis was documented in both MDM as applicable and the Disposition within this note     Time User Action Codes Description Comment    2/14/2023  4:13 PM Eugenio Braga Add [J18 [id number]] Pneumonia of right lung due to infectious organism, unspecified part of lung       ED Disposition     ED Disposition   Discharge    Condition   Stable    Date/Time   Tue Feb 14, 2023  4:13 PM    Comment   Yara Marquez discharge to home/self care  Follow-up Information     Follow up With Specialties Details Why Filiberto Tracy MD Internal Medicine Schedule an appointment as soon as possible for a visit   [phone]  [address] [facility] [address]  [phone]            Patient's Medications   Discharge Prescriptions    AZITHROMYCIN (ZITHROMAX Z-PK) 250 MG TABLET    Take 2 tablets today then 1 tablet daily x 4 days       Start Date: 2/14/2023 End Date: 2/18/2023       Order Dose: --       Quantity: 6 tablet    Refills: 0    CEFUROXIME (CEFTIN) 500 MG TABLET    Take 1 tablet (500 mg total) by mouth every 12 (twelve) hours for 5 days       Start Date: 2/14/2023 End Date: 2/19/2023       Order Dose: 500 mg       Quantity: 10 tablet    Refills: 0       No discharge procedures on file      PDMP Review     None          ED Provider  Electronically Signed by           Melanie Alonso MD  02/14/23 3558

## 2023-02-15 ENCOUNTER — APPOINTMENT (INPATIENT)
Dept: MRI IMAGING | Facility: HOSPITAL | Age: 83
End: 2023-02-15

## 2023-02-15 LAB
2HR DELTA HS TROPONIN: 2 NG/L
4HR DELTA HS TROPONIN: 0 NG/L
ALBUMIN SERPL BCP-MCNC: 3.6 G/DL (ref 3.5–5)
ALP SERPL-CCNC: 133 U/L (ref 34–104)
ALT SERPL W P-5'-P-CCNC: 28 U/L (ref 7–52)
ANION GAP SERPL CALCULATED.3IONS-SCNC: 6 MMOL/L (ref 4–13)
AST SERPL W P-5'-P-CCNC: 26 U/L (ref 13–39)
BILIRUB SERPL-MCNC: 0.67 MG/DL (ref 0.2–1)
BUN SERPL-MCNC: 37 MG/DL (ref 5–25)
CALCIUM SERPL-MCNC: 9.8 MG/DL (ref 8.4–10.2)
CARDIAC TROPONIN I PNL SERPL HS: 29 NG/L
CARDIAC TROPONIN I PNL SERPL HS: 31 NG/L
CHLORIDE SERPL-SCNC: 100 MMOL/L (ref 96–108)
CHOLEST SERPL-MCNC: 114 MG/DL
CO2 SERPL-SCNC: 28 MMOL/L (ref 21–32)
CREAT SERPL-MCNC: 1.8 MG/DL (ref 0.6–1.3)
ERYTHROCYTE [DISTWIDTH] IN BLOOD BY AUTOMATED COUNT: 13.6 % (ref 11.6–15.1)
EST. AVERAGE GLUCOSE BLD GHB EST-MCNC: 151 MG/DL
GFR SERPL CREATININE-BSD FRML MDRD: 34 ML/MIN/1.73SQ M
GLUCOSE SERPL-MCNC: 115 MG/DL (ref 65–140)
GLUCOSE SERPL-MCNC: 127 MG/DL (ref 65–140)
GLUCOSE SERPL-MCNC: 156 MG/DL (ref 65–140)
GLUCOSE SERPL-MCNC: 157 MG/DL (ref 65–140)
GLUCOSE SERPL-MCNC: 93 MG/DL (ref 65–140)
GLUCOSE SERPL-MCNC: 94 MG/DL (ref 65–140)
HBA1C MFR BLD: 6.9 %
HCT VFR BLD AUTO: 39.9 % (ref 36.5–49.3)
HDLC SERPL-MCNC: 42 MG/DL
HGB BLD-MCNC: 12.7 G/DL (ref 12–17)
LDLC SERPL CALC-MCNC: 53 MG/DL (ref 0–100)
MCH RBC QN AUTO: 30.5 PG (ref 26.8–34.3)
MCHC RBC AUTO-ENTMCNC: 31.8 G/DL (ref 31.4–37.4)
MCV RBC AUTO: 96 FL (ref 82–98)
PLATELET # BLD AUTO: 207 THOUSANDS/UL (ref 149–390)
PMV BLD AUTO: 8.9 FL (ref 8.9–12.7)
POTASSIUM SERPL-SCNC: 4 MMOL/L (ref 3.5–5.3)
PROT SERPL-MCNC: 6.3 G/DL (ref 6.4–8.4)
RBC # BLD AUTO: 4.17 MILLION/UL (ref 3.88–5.62)
SODIUM SERPL-SCNC: 134 MMOL/L (ref 135–147)
TRIGL SERPL-MCNC: 95 MG/DL
WBC # BLD AUTO: 14.34 THOUSAND/UL (ref 4.31–10.16)

## 2023-02-15 RX ORDER — MIRTAZAPINE 15 MG/1
15 TABLET, FILM COATED ORAL ONCE
Status: COMPLETED | OUTPATIENT
Start: 2023-02-15 | End: 2023-02-15

## 2023-02-15 RX ADMIN — CEFUROXIME AXETIL 500 MG: 250 TABLET ORAL at 21:00

## 2023-02-15 RX ADMIN — UMECLIDINIUM 1 PUFF: 62.5 AEROSOL, POWDER ORAL at 12:20

## 2023-02-15 RX ADMIN — PRAVASTATIN SODIUM 40 MG: 40 TABLET ORAL at 08:46

## 2023-02-15 RX ADMIN — CEFUROXIME AXETIL 500 MG: 250 TABLET ORAL at 08:47

## 2023-02-15 RX ADMIN — INSULIN GLARGINE 16 UNITS: 100 INJECTION, SOLUTION SUBCUTANEOUS at 22:30

## 2023-02-15 RX ADMIN — FLUTICASONE FUROATE AND VILANTEROL TRIFENATATE 1 PUFF: 100; 25 POWDER RESPIRATORY (INHALATION) at 12:20

## 2023-02-15 RX ADMIN — INSULIN GLARGINE 16 UNITS: 100 INJECTION, SOLUTION SUBCUTANEOUS at 00:37

## 2023-02-15 RX ADMIN — APIXABAN 2.5 MG: 2.5 TABLET, FILM COATED ORAL at 08:46

## 2023-02-15 RX ADMIN — HYDROXYCHLOROQUINE SULFATE 200 MG: 200 TABLET, FILM COATED ORAL at 08:46

## 2023-02-15 RX ADMIN — AZITHROMYCIN 250 MG: 250 TABLET, FILM COATED ORAL at 23:33

## 2023-02-15 RX ADMIN — HYDROXYCHLOROQUINE SULFATE 200 MG: 200 TABLET, FILM COATED ORAL at 16:56

## 2023-02-15 RX ADMIN — INSULIN LISPRO 1 UNITS: 100 INJECTION, SOLUTION INTRAVENOUS; SUBCUTANEOUS at 21:00

## 2023-02-15 RX ADMIN — MIRTAZAPINE 15 MG: 15 TABLET, FILM COATED ORAL at 04:55

## 2023-02-15 RX ADMIN — AZITHROMYCIN 250 MG: 250 TABLET, FILM COATED ORAL at 00:30

## 2023-02-15 RX ADMIN — APIXABAN 2.5 MG: 2.5 TABLET, FILM COATED ORAL at 00:30

## 2023-02-15 RX ADMIN — APIXABAN 2.5 MG: 2.5 TABLET, FILM COATED ORAL at 17:00

## 2023-02-15 NOTE — ED NOTES
Pt disconnected self and wandering around in room, pt reoriented to environment and encouraged to stay in bed at this time        Niyah Bose RN  02/15/23 2653

## 2023-02-15 NOTE — ASSESSMENT & PLAN NOTE
Lab Results   Component Value Date    HGBA1C 6 0 (H) 12/09/2022     · Continue Lantus 20U HS  · Start on SSI with accu checks  · Hypoglycemia protocol  · Diabetic diet

## 2023-02-15 NOTE — CONSULTS
[address] [facility]  Neurology consult- Arjun Cosme 1940, [de-identified] y o  male   MRN: [id number] Unit/Bed#: -01 Encounter: [id number]    Inpatient consult to Neurology  Consult performed by: ADEOLA Haskins  Consult ordered by: Parveen William PA-C        Reason for Consult / Principal Problem: Altered mental status, confusion  Hx and PE limited by: None  Review of previous medical records was completed  Family, specifically the patient's wife, was present at the bedside for history and examination    * AMS (altered mental status)  Assessment & Plan  Neurology is asked to see this 47-year-old right-hand-dominant gentleman who is noted to carry a diagnosis of vascular dementia without behavioral disturbances from a previous outside provider  He also has a PMH significant for type II DM, former tobacco abuse, atrial fibrillation on Xarelto, HTN, HLD, PMR on chronic prednisone and hydroxychloroquine, COPD and emphysema, OA, CKD IIIb, and prosthetic AVR and aortic arch repair  The patient presented yesterday to the hospital in the afternoon and as noted by other providers was diagnosed as having RSV  Because the patient had a rheumatology appointment today the family requested discharge home yesterday and the patient was discharged proximal before 5 PM   However as also noted by other providers he had confusional episodes on the way home where he thought his wife was slightly driving off of the road and he attempted to get the steering wheel to correct her driving  They were able to get home and she reports that despite him laying down and resting he still seemed confused so he was brought back here to the hospital at 8 PM or so last night  Neurology is asked to see and evaluate him for an altered mental status  His CAT scan is reviewed and an MRI brain is also noted from last year in the [facility] system    On my review of his CAT scan here he is noted to have significant small vessel disease and this is corroborated on the report from the [facility] MRI from October 2022: Multiple old bilateral chronic lacunar infarcts  Severe microvascular angiopathy  Multiple foci of gradient echo hypointensity suggesting amyloid angiopathy, multiple cavernous angiomas, versus chronic microhemorrhage  This patient does not have other CNS imaging available in our system other than the 2 CAT scans he had yesterday  His labs also indicate changes consistent with an infection and his RSV was positive  On exam today, done approximately 11 AM this morning he had a good neurologic exam on all parameters his wife, who is present notes that he was doing well this morning and that at that time he seemed to be back at baseline  I note the notes from throughout the night this patient was restless and confused and he had a virtual sitter present  I am unable to find previous neurologic notes from outside providers  At this time because he does have multiple risk factors for cerebrovascular event we have ordered an MRI brain without gadolinium to assess for any acute ischemic events  He remains on treatment for his RSV, and his other metabolic factors specifically his renal disease is being optimized by medicine as well  Looking at his CAT scan as well as MRI report this patient does have significant chronic small vessel disease  He likely has a poor cognitive reserve  When he had the acute RSV infection present early in the week the treatment was sought yesterday he was also given a dose of ceftriaxone, and this antibiotic can also produce some confusion and seniors  It is all of these events that may have produced the acute mental status changes seen yesterday  Given his small vessel disease he is at additional risk for having delirium as a geriatric patient  Delirium precautions would certainly be appropriate    At this time there is no further testing or treatments needed as an inpatient given that he did well on exam today  If he has confusional episodes putting him or others at risk this evening as he has increased neuro fatigue, it would be appropriate to use a low-dose as needed Zyprexa or Seroquel  As an outpatient this patient should have neurologic care both for his vascular disease as well as to assess his overall cognitive performance even though he did well on exam today  The patient's wife reports that they have seen a neurologist, an independent practitioner not associated with either [place] [id number] or [facility] in the [facility] area  They report that they follow with this neurologist for the neuropathy in his feet  They report that they have a follow-up appointment with this neurologist in a month or so  I do not have those records available  I have asked that they follow-up with this neurologist at their next regularly scheduled appointment of approximately a month or so  They are free to change her neurologists and they can be seen by our practice also in the [facility] region by any of our providers at that location  No med changes at this time however his MRI and the rest of his work-up is pending  He would benefit from outpatient cerebrovascular assessment, cog assessment  HPI: Connie Guy is a right handed  [de-identified] y o  male who neurology is asked to see after he was admitted yesterday briefly for RSV and as noted by his wife he had confusion etc  at home  He was given a dose of ceftriaxone after RSV was confirmed and they both wanted to be discharged as he had a rheumatology appointment today  Apparently on the way home he became more confused and then he remained confused for the next 2 to 3 hours at home despite a rest and so she brought him back here last evening on the 14th     Overnight according to the chart it appears that he was delirious being disoriented attempted to get up and walk around similar behavioral changes  He had a virtual sitter assigned    His wife who was present at the time of my evaluation around 11 or so this morning reports that now she finds him to be generally pretty much at baseline  They deny that he has had quite this severe episodes before but she does report that she has seen some behavioral changes or confusional episodes on this patient at intervals previously  They report to me that they have seen a neurologist as an outpatient        ROS: 12 system cued query: The patient reports that he knew that he was foggy or confused yesterday he reports he feels better today  He was unable to recall particulars about his confusion yesterday but he does remember or knows because he has been told that he remembers that he grabbed the steering wheel while they were driving  He reports that he feels as if he has generally good cognitive performance is outside of being sick  He denies any headache visual disturbance or complaints of pain at this time no chest pain shortness of breath  He does report that despite his diagnosis of emphysema and now having RSV his breathing feels comfortable  The remainder of his query is negative  Historical Information     Past Medical History:   Diagnosis Date   • Cancer [facility])     pancreatic   • Colon polyp    • Coronary artery disease    • Diabetes mellitus ([facility] [address] )    • Hyperlipidemia    • Hypertension    • Pneumonia 06/13/2017    Right middle and lower lobe    • Stroke [facility])      Past Surgical History:   Procedure Laterality Date   • APPENDECTOMY     • CARDIAC SURGERY      Aortic Valve Replacement, Ascending Aorta   • COLONOSCOPY     • GALLBLADDER SURGERY     • PANCREATICODUODENECTOMY         Social History : The patient is  this is his second marriage  He knows her anniversary they have been  since October 2001  He is a former smoker some alcohol no significant alcohol intake anymore  No recreational drugs          Family History:   Family History   Problem Relation Age of Onset   • Cancer Mother    • Stroke Father    • Cancer Sister    • Diabetes Sister    • Stroke Brother          Allergies   Allergen Reactions   • Contrast Dye [Iodinated Contrast Media] Other (See Comments)     Pt states kidney dysfunction       • Other      Meds:all current active meds have been reviewed and He reports that he is compliant with his    Scheduled Meds:  Medication Dose Route Frequency   • acetaminophen  650 mg Oral Q6H PRN   • apixaban  2 5 mg Oral BID   • aspirin  81 mg Oral Daily   • azithromycin  250 mg Oral Q24H   • cefuroxime  500 mg Oral Q12H [address]   • Fluticasone Furoate-Vilanterol  1 puff Inhalation Daily      • umeclidinium  1 puff Inhalation Daily   • hydroxychloroquine  200 mg Oral BID With Meals   • insulin glargine  16 Units Subcutaneous HS   • insulin lispro  1-6 Units Subcutaneous 4x Daily (AC & HS)   • pravastatin  40 mg Oral Daily     PRN Meds: •  acetaminophen      Physical Exam:   Objective   Vitals:Blood pressure 135/76, pulse 75, temperature 97 6 °F (36 4 °C), resp  rate 18, height 6' 3" (1 905 m), weight 82 1 kg (181 lb), SpO2 98 %  ,Body mass index is 22 62 kg/m²  Patient was examined in emergency department bed in a holdover bed, his wife is at the bedside  General: alert, thin gentleman, almost cachectic appears stated age and cooperative  Head: Normocephalic, without obvious abnormality, atraumatic  Oral exam: lips, mucosa, and tongue dry somewhat reddened;   Neck: no carotid bruit,   Lungs: clear to auscultation ant  bilaterally although I thought somewhat diminished  He did have a cough was nonproductive  Heart: sl irregular rate and rhythm, S1, S2 normal, no murmur appreciated,   Abdomen: soft, +BS    Extremities: atraumatic, no cyanosis or edema    Neurologic:   Mental status: Alert, oriented x4, his conversation was intact    Length without paraphasic errors or word substitutions    All of the cognitive test done at the bedside he did well with, specifically coin calculations, months of the year reversals, serial naming, reversal of presidents, going back to the first [place] without Jean between the 2 bushes, reading, picture identification, he was able to register 5 items and consistently over 10 to 15 minutes he always recalled independently 3 of the 5 items, and was able to recall the other 2 remaining items with a semantic not a phonetic cue  He followed cross body and 3 part commands  His thought content appropriate overall  He was able to write his name and the date accurately with good penmanship and he constructed a large clock and scored 4/4  At this time his mental status did well  CN Exam: AKUA, EOM's I, VF full I did not elicit a right upper quadrant deficit on today's exam which apparently had been noted by her previous examiner at a different date , Gaze conjugate No gross sensory or motor lateralizations (No PP on face), Hearing I B, CNIX-XII I B  Motor: Symmetrical and well preserved power given his age and muscle mass,  x 4 limbs  Sensory: Grossly intact  X 4 limbs, 4 mod inc lt, temp, vib, although it was clearly diminished in his bilateral lower extremities completely at the feet (not proprioception and PP tested on today's exam)  Cerebellar: no significant past pointing or drift from modified Romberg position, no ataxia w maneuvers,   DTR's: Diminished times; Plantars: Mute bilateral  Gait: I did not gait him in the ED at this time as the ED is quite crowded and congested  Lab Results:   I have personally reviewed pertinent reports    , CBC:   Results from last 7 days   Lab Units 02/15/23  0415 02/14/23  2157 02/14/23  1336   WBC Thousand/uL 14 34* 12 95* 13 83*   RBC Million/uL 4 17 4 03 4 31   HEMOGLOBIN g/dL 12 7 12 4 12 9   HEMATOCRIT % 39 9 38 1 40 6   MCV fL 96 95 94   PLATELETS Thousands/uL 207 195 220   , BMP/CMP:   Results from last 7 days   Lab Units 02/15/23  0415 02/14/23  2157 02/14/23  1336   SODIUM mmol/L 134* 135 137   POTASSIUM mmol/L 4 0 4 5 4 9 CHLORIDE mmol/L 100 102 101   CO2 mmol/L 28 27 30   BUN mg/dL 37* 36* 32*   CREATININE mg/dL 1 80* 1 81* 1 68*   CALCIUM mg/dL 9 8 9 8 10 7*   AST U/L 26  --   --    ALT U/L 28  --   --    ALK PHOS U/L 133*  --   --    EGFR ml/min/1 73sq m 34 34 37   , Vitamin B12:   , HgBA1C:   Results from last 7 days   Lab Units 02/15/23  0415   HEMOGLOBIN A1C % 6 9*   , TSH:   , Coagulation:   Results from last 7 days   Lab Units 02/14/23  2157   INR  1 30*   , Lipid Profile:   Results from last 7 days   Lab Units 02/15/23  0415   HDL mg/dL 42   LDL CALC mg/dL 53   TRIGLYCERIDES mg/dL 95        Imaging Studies: I have personally reviewed pertinent films in PACS and I have reviewed both of his CAT scans done here and available in our system they do have significant small vessel disease and I see chronic subcortical lacunar infarcts as noted in the MRI report that I have also reviewed from [facility] just recently done in October 2022  EEG, Echo, Pathology, and Other Studies: This patient just had a cardiac catheterization December 2022 with the [facility] system  He was found to have patent epicardial coronary arteries a mildly elevated LV end-diastolic pressure and no significant aortic valve gradient  Counseling / Coordination of Care  Total time spent today 50 minutes  Greater than 50% of total time was spent with the patient and / or family counseling and / or coordination of care  A description of the counseling / coordination of care: All of the above was discussed and reviewed with this patient at the bedside     I have also reviewed his record in chart as it was available at this time  Dictation voice to text software has been used in the creation of this document  Please consider this in light of any contextual or grammatical errors

## 2023-02-15 NOTE — ED NOTES
Pt disconnected self from monitor pt observed trying to put jacket on, when this nurse asked pt what he was doing pt reports I am going home, my testing was good  Pt reoriented to poc, pt agreeable to plan of care  Pt reconnected and back to room at this time        Maryam Park, JASPER  02/15/23 1198

## 2023-02-15 NOTE — ASSESSMENT & PLAN NOTE
Patient presenting to the ED for evaluation of worsening confusion and personality changes  Patient reports that symptoms started earlier this morning  He was evaluated by the ED and offered admission, but was discharged home due to not wanting to miss an appointment tomorrow morning  However, due to worsening symptoms he came back for reevaluation  He denies any dizziness, slurred speech, facial droop, weakness, numbness/tingling  · No focal deficits noted  · CT head negative  · Etiology: CVA vs infection vs recent steroid use  · Follow stroke pathway  · Continue asa, statin, eliquis  · Obtain MRI  · PT/OT evaluation  · Neuro checks  · Monitor RSV infection, continue on oral antibiotic regimen as below   Follow up infectious labs  · Monitor on telemetry  · Consult to Neurology, recommendations are appreciated

## 2023-02-15 NOTE — ASSESSMENT & PLAN NOTE
· Tested positive for RSV   Patient was seen earlier in the day by the ED for this  · Currently oxygenating well on RA  · Continue azithromycin and ceftin prescribed as outpatient  · Supportive care  · Monitor respiratory status  · Contact precautions

## 2023-02-15 NOTE — PLAN OF CARE
Problem: NEUROSENSORY - ADULT  Goal: Achieves stable or improved neurological status  Description: INTERVENTIONS  - Monitor and report changes in neurological status  - Monitor vital signs such as temperature, blood pressure, glucose, and any other labs ordered   - Initiate measures to prevent increased intracranial pressure  - Monitor for seizure activity and implement precautions if appropriate      Outcome: Progressing  Goal: Remains free of injury related to seizures activity  Description: INTERVENTIONS  - Maintain airway, patient safety  and administer oxygen as ordered  - Monitor patient for seizure activity, document and report duration and description of seizure to physician/advanced practitioner  - If seizure occurs,  ensure patient safety during seizure  - Reorient patient post seizure  - Seizure pads on all 4 side rails  - Instruct patient/family to notify RN of any seizure activity including if an aura is experienced  - Instruct patient/family to call for assistance with activity based on nursing assessment  - Administer anti-seizure medications if ordered    Outcome: Progressing  Goal: Achieves maximal functionality and self care  Description: INTERVENTIONS  - Monitor swallowing and airway patency with patient fatigue and changes in neurological status  - Encourage and assist patient to increase activity and self care  - Encourage visually impaired, hearing impaired and aphasic patients to use assistive/communication devices  Outcome: Progressing     Problem: Neurological Deficit  Goal: Neurological status is stable or improving  Description: Interventions:  - Monitor and assess patient's level of consciousness, motor function, sensory function, and level of assistance needed for ADLs  - Monitor and report changes from baseline  Collaborate with interdisciplinary team to initiate plan and implement interventions as ordered  - Provide and maintain a safe environment    - Consider seizure precautions  - Consider fall precautions  - Consider aspiration precautions  - Consider bleeding precautions  Outcome: Progressing     Problem: Activity Intolerance/Impaired Mobility  Goal: Mobility/activity is maintained at optimum level for patient  Description: Interventions:  - Assess and monitor patient  barriers to mobility and need for assistive/adaptive devices  - Assess patient's emotional response to limitations  - Collaborate with interdisciplinary team and initiate plans and interventions as ordered  - Encourage independent activity per ability   - Maintain proper body alignment  - Perform active/passive rom as tolerated/ordered  - Plan activities to conserve energy   - Turn patient as appropriate  Outcome: Progressing     Problem: Communication Impairment  Goal: Ability to express needs and understand communication  Description: Assess patient's communication skills and ability to understand information  Patient will demonstrate use of effective communication techniques, alternative methods of communication and understanding even if not able to speak  - Encourage communication and provide alternate methods of communication as needed  - Collaborate with case management/ for discharge needs  - Include patient/family/caregiver in decisions related to communication  Outcome: Progressing     Problem: Nutrition  Goal: Nutrition/Hydration status is improving  Description: Monitor and assess patient's nutrition/hydration status for malnutrition (ex- brittle hair, bruises, dry skin, pale skin and conjunctiva, muscle wasting, smooth red tongue, and disorientation)  Collaborate with interdisciplinary team and initiate plan and interventions as ordered  Monitor patient's weight and dietary intake as ordered or per policy  Utilize nutrition screening tool and intervene per policy  Determine patient's food preferences and provide high-protein, high-caloric foods as appropriate       - Assist patient with eating   - Allow adequate time for meals   - Encourage patient to take dietary supplement as ordered  - Collaborate with clinical nutritionist   - Include patient/family/caregiver in decisions related to nutrition    Outcome: Progressing

## 2023-02-15 NOTE — CASE MANAGEMENT
Case Management Assessment & Discharge Planning Note    Patient name Gemini Lozano  Location ED 24/ED 24 MRN [id number]  : 1940 Date 2/15/2023       Current Admission Date: 2023  Current Admission Diagnosis:AMS (altered mental status)   Patient Active Problem List    Diagnosis Date Noted   • RSV (acute bronchiolitis due to respiratory syncytial virus) 2023   • AMS (altered mental status) 2023   • Generalized weakness 2023   • Malignant neoplasm of tail of pancreas ([facility] ) 2022   • COPD (chronic obstructive pulmonary disease) ([facility] ) 2022   • CKD (chronic kidney disease) 2022   • Centrilobular emphysema ([facility] ) 2021   • History of malignant neuroendocrine tumor 2021   • Atrial fibrillation ([facility] ) 2017   • Diabetes mellitus ([facility] ) 2017   • Hypertension 2017   • Acute-on-chronic kidney injury ([facility] ) 2017   • Hyperlipidemia 2015   • Inflammatory polyarthropathy ([facility] ) 2014   • Osteoarthrosis 2014   • Polymyalgia rheumatica ([facility] ) 2014   • Aneurysm of thoracic aorta 2014   • Aneurysm of abdominal aorta 2014      LOS (days): 0  Geometric Mean LOS (GMLOS) (days):   Days to GMLOS:     OBJECTIVE:              Current admission status: Observation       Preferred Pharmacy:   [address] [place] [address] - Via Prakash Ayala [facility] Professor Cavazos [address] [place] [id number]  Phone: [phone] Fax: [phone] [address] [place] [address]  Phone: [phone] Fax: [phone]    Primary Care Provider: Keven Gaines MD    Primary Insurance: [facility]  Secondary Insurance:     ASSESSMENT:  [address] Representative - Spouse   Primary Phone: [phone] (Home)               Advance Directives  Does patient have a [address]?: No  Was patient offered paperwork?: Yes (Patient declined paperwork at this time )  Does patient currently have a Health Care decision maker?: Yes, please see Health Care Proxy section (Patient identified his wife as his health care representative )  Does patient have Advance Directives?: No  Was patient offered paperwork?: Yes (Patient declined paperwork at this time )  Primary Contact: Patient's Wife    Readmission Root Cause  30 Day Readmission: No    Patient Information  Admitted from[de-identified] Home  Mental Status: Alert  During Assessment patient was accompanied by: Not accompanied during assessment  Assessment information provided by[de-identified] Patient  Primary Caregiver: Self  Support Systems: Spouse/significant other, [address] of Residence: [place] [id number] do you live in?:  [address] entry access options   Select all that apply : Stairs  Number of steps to enter home : 2  Do the steps have railings?: Yes  Type of Current Residence: [address] [place] home  Upon entering residence, is there a bedroom on the main floor (no further steps)?: No  A bedroom is located on the following floor levels of residence (select all that apply):: 2nd Floor  Upon entering residence, is there a bathroom on the main floor (no further steps)?: Yes  Number of steps to 2nd floor from main floor: One Flight  In the last 12 months, was there a time when you were not able to pay the mortgage or rent on time?: No  In the last 12 months, how many places have you lived?: 1  In the last 12 months, was there a time when you did not have a steady place to sleep or slept in a shelter (including now)?: No  Homeless/housing insecurity resource given?: N/A  Living Arrangements: Lives w/ Spouse/significant other  Is patient a ?: No    Activities of Daily Living Prior to Admission  Functional Status: Independent  Completes ADLs independently?: Yes  Ambulates independently?: Yes  Does patient use assisted devices?: Yes  Assisted Devices (DME) used: Straight Cane  Does patient currently own DME?: Yes  What DME does the patient currently own?: Cralene Mccracken  Does patient have a history of Outpatient Therapy (PT/OT)?: Yes  Does the patient have a history of Short-Term Rehab?: No  Does patient have a history of HHC?: No  Does patient currently have [facility]?: No    Patient Information Continued  Income Source: Pension/half-way  Does patient have prescription coverage?: Yes (Patient confirmed that he uses [employer] in [place], and he denied any barriers to obtaining or affording prescriptions )  Within the past 12 months, you worried that your food would run out before you got the money to buy more : Never true  Within the past 12 months, the food you bought just didn't last and you didn't have money to get more : Never true  Food insecurity resource given?: N/A  Does patient receive dialysis treatments?: No  Does patient have a history of substance abuse?: No  Does patient have a history of Mental Health Diagnosis?: No    PHQ 2/9 Screening   Reviewed PHQ 2/9 Depression Screening Score?: No    Means of Transportation  Means of Transport to Appts[de-identified] Drives Self  In the past 12 months, has lack of transportation kept you from medical appointments or from getting medications?: No  In the past 12 months, has lack of transportation kept you from meetings, work, or from getting things needed for daily living?: No  Was application for public transport provided?: N/A    DISCHARGE DETAILS:    Discharge planning discussed with[de-identified] Patient  Freedom of Choice: Yes  Comments - Freedom of Choice: CM discussed freedom of choice as it pertains to discharge planning  Patient denied any needs at this time and asked CM to follow up with his wife  CM attempted to call her and left a message requesting a call back    CM contacted family/caregiver?: Yes (Left a voicemail)  Were Treatment Team discharge recommendations reviewed with patient/caregiver?: Yes  Did patient/caregiver verbalize understanding of patient care needs?: Yes  Were patient/caregiver advised of the risks associated with not following Treatment Team discharge recommendations?: Yes    [facility]         Is the patient interested in [place] [address] at discharge?: No    DME Referral Provided  Referral made for DME?: No    Other Referral/Resources/Interventions Provided:  Interventions: None Indicated    Would you like to participate in our [address] service program?  : No - Declined    Treatment Team Recommendation: Home  Discharge Destination Plan[de-identified] Home  Transport at Discharge : Family

## 2023-02-15 NOTE — PLAN OF CARE
Problem: OCCUPATIONAL THERAPY ADULT  Goal: Performs self-care activities at highest level of function for planned discharge setting  See evaluation for individualized goals  Description: Treatment Interventions: ADL retraining, Functional transfer training, Endurance training, Patient/family training, Equipment evaluation/education, Compensatory technique education          See flowsheet documentation for full assessment, interventions and recommendations  Note: Limitation: Decreased ADL status, Decreased endurance, Decreased self-care trans, Decreased high-level ADLs  Prognosis: Good  Assessment: Patient is a [de-identified] y o  male seen for OT evaluation s/p admit } on 2/14/2023 w/AMS (altered mental status)  Commorbidities affecting patient's functional performance at time of assessment include: RSV, CKD, COPD, A fib, HTN, DM  Orders placed for OT evaluation and treatment  Performed at least two patient identifiers during session including name and wristband  Prior to admission, Patient reports independent with ADLs/ IADLs, lives with family in a 2 story house, 2 SISSY  Patient was ambulatory with  Cane, PRN  Personal factors affecting patient at time of initial evaluation include: steps to enter, difficulty performing ADLs and difficulty performing IADLs  Upon evaluation, patient requires set up, contact guard and minimal  assist for UB ADLs, minimal  assist for LB ADLs, transfers and functional ambulation in room and bathroom with supervision and contact guard assist, without AD  Occupational performance is affected by the following deficits: dynamic sit/ stand balance deficit with poor standing tolerance time for self care and functional mobility and decreased activity tolerance  Patient to benefit from continued Occupational Therapy treatment while in the hospital to address deficits as defined above and maximize level of functional independence with ADLs and functional mobility   Occupational Performance areas to address include: bathing/ shower, dressing, transfer to all surfaces, functional mobility, emergency response, health maintenance, IADLs: safety procedures and Leisure Participation  From OT standpoint, recommendation at time of d/c would be Home with family support, [address] and Home OT       OT Discharge Recommendation: Home with outpatient rehabilitation

## 2023-02-15 NOTE — H&P
[facility]  H&P- [facility] 1940, [de-identified] y o  male MRN: [id number]  Unit/Bed#: ED 24 Encounter: [id number]  Primary Care Provider: Keila Raymond MD   Date and time admitted to hospital: 2/14/2023  8:58 PM    * AMS (altered mental status)  Assessment & Plan  Patient presenting to the ED for evaluation of worsening confusion and personality changes  Patient reports that symptoms started earlier this morning  He was evaluated by the ED and offered admission, but was discharged home due to not wanting to miss an appointment tomorrow morning  However, due to worsening symptoms he came back for reevaluation  He denies any dizziness, slurred speech, facial droop, weakness, numbness/tingling  · On physical exam patient with RU field cut; however, remainder of neuro examination unremarkable  · CT head negative  · Etiology: CVA vs infection vs recent steroid use  · Follow stroke pathway  · Continue asa, statin, eliquis  · Obtain MRI  · PT/OT evaluation  · Neuro checks  · Monitor RSV infection, continue on oral antibiotic regimen as below  Follow up infectious labs  · Monitor on telemetry  · Consult to Neurology, recommendations are appreciated    RSV (acute bronchiolitis due to respiratory syncytial virus)  Assessment & Plan  · Tested positive for RSV   Patient was seen earlier in the day by the ED for this  · Currently oxygenating well on RA  · Continue azithromycin and ceftin prescribed as outpatient  · Supportive care  · Monitor respiratory status  · Contact precautions    CKD (chronic kidney disease)  Assessment & Plan  Lab Results   Component Value Date    EGFR 34 02/14/2023    EGFR 37 02/14/2023    EGFR 43 12/09/2022    CREATININE 1 81 (H) 02/14/2023    CREATININE 1 68 (H) 02/14/2023    CREATININE 1 49 (H) 12/09/2022     · Creatinine stable at baseline  · Avoid hypotension and nephrotoxic agents  · Monitor BMP daily    COPD (chronic obstructive pulmonary disease) ([facility] )  Assessment & Plan  · No acute exacerbation  · Continue pre-hospital inhalers    Atrial fibrillation (HCC)  Assessment & Plan  · Continue AC with Eliquis  · Currently rate controlled    Hypertension  Assessment & Plan  · Holding home anti-hypertensive medications per stroke pathway  · Monitor vitals per routine    Diabetes mellitus ([facility] )  Assessment & Plan    Lab Results   Component Value Date    HGBA1C 6 0 (H) 12/09/2022     · Continue Lantus 20U HS  · Start on SSI with accu checks  · Hypoglycemia protocol  · Diabetic diet    VTE Prophylaxis: Apixaban (Eliquis)  / sequential compression device   Code Status: Level 1 code  Discussion with family: Update in the AM    Anticipated Length of Stay:  Patient will be admitted on an Observation basis with an anticipated length of stay of  Less than 2 midnights  Justification for Hospital Stay: AMS    Total Time for Visit, including Counseling / Coordination of Care: 90 minutes  Greater than 50% of this total time spent on direct patient counseling and coordination of care  Chief Complaint:   AMS    History of Present Illness:    Gera Worthy is a [de-identified] y o  male who has a past medical history significant for hypertension, atrial fibrillation, COPD, diabetes, CKD  Patient presenting to the ED for evaluation of worsening confusion and personality changes  Patient reports that symptoms started earlier this morning  He was evaluated by the ED and offered admission, but was discharged home due to not wanting to miss an appointment tomorrow morning  However, due to worsening symptoms he came back for reevaluation  He denies any dizziness, slurred speech, facial droop, weakness, numbness/tingling  Patient requiring medical admission for further work-up of altered mental status and neurology evaluation  All patient questions answered to the best of my ability  Review of Systems:    Review of Systems   Constitutional: Negative for chills and fever     HENT: Negative for ear pain and sore throat  Eyes: Negative for pain and visual disturbance  Respiratory: Positive for shortness of breath  Negative for cough  Cardiovascular: Negative for chest pain and palpitations  Gastrointestinal: Negative for abdominal pain and vomiting  Genitourinary: Negative for dysuria and hematuria  Musculoskeletal: Negative for arthralgias and back pain  Skin: Negative for color change and rash  Neurological: Negative for seizures and syncope  Psychiatric/Behavioral: Positive for confusion  All other systems reviewed and are negative  Past Medical and Surgical History:     Past Medical History:   Diagnosis Date   • Cancer [facility])     pancreatic   • Colon polyp    • Coronary artery disease    • Diabetes mellitus ([facility] )    • Hyperlipidemia    • Hypertension    • Pneumonia 06/13/2017    Right middle and lower lobe    • Stroke [facility])        Past Surgical History:   Procedure Laterality Date   • APPENDECTOMY     • CARDIAC SURGERY      Aortic Valve Replacement, Ascending Aorta   • COLONOSCOPY     • GALLBLADDER SURGERY     • PANCREATICODUODENECTOMY         Meds/Allergies:    Prior to Admission medications    Medication Sig Start Date End Date Taking?  Authorizing Provider   apixaban (ELIQUIS) 2 5 mg Take 2 5 mg by mouth 2 (two) times a day   Yes Historical Provider, MD   Calcium Citrate (CITRACAL PO) Take 650 mg by mouth in the morning   Yes Historical Provider, MD   cholecalciferol (VITAMIN D3) 1,000 units tablet Take 2,000 Units by mouth daily   Yes Historical Provider, MD   Cinnamon 500 MG capsule Take 1,000 mg by mouth daily   Yes Historical Provider, MD   furosemide (LASIX) 20 mg tablet Take 20 mg by mouth daily 9/8/22  Yes Historical Provider, MD   hydroxychloroquine (PLAQUENIL) 200 mg tablet Take 200 mg by mouth 2 (two) times a day with meals   Yes Historical Provider, MD   insulin glargine (LANTUS) 100 units/mL subcutaneous injection Inject 16 Units under the skin daily at bedtime   Yes Historical Provider, MD   potassium chloride (KLOR-CON) 8 MEQ tablet  7/21/21  Yes Historical Provider, MD   pravastatin (PRAVACHOL) 40 mg tablet Take 40 mg by mouth daily   Yes Historical Provider, MD Adeline Curry [id number] 3-31 MCG/ACT inhaler USE 1 INHALATION DAILY RINSE MOUTH AFTER USE 11/11/22  Yes Julius Alejo PA-C   azithromycin (Zithromax Z-Dante) 250 mg tablet Take 2 tablets today then 1 tablet daily x 4 days 2/14/23 2/18/23  Christophe Whitfield MD   B-D UF III MINI PEN NEEDLES 31G X 5 MM MISC  3/11/18   Historical Provider, MD   EUSEBIO CONTOUR TEST test strip 3 (three) times a day Test 2/1/18   Historical Provider, MD   cefuroxime (CEFTIN) 500 mg tablet Take 1 tablet (500 mg total) by mouth every 12 (twelve) hours for 5 days 2/14/23 2/19/23  Christophe Whitfield MD   metFORMIN (GLUCOPHAGE) 500 mg tablet Take 500 mg by mouth 2 (two) times a day with meals  Patient not taking: Reported on 9/29/2022    Historical Provider, MD   olmesartan (BENICAR) 20 mg tablet Take 20 mg by mouth daily    Historical Provider, MD   predniSONE 1 mg tablet Take 4 mg by mouth daily Currently down to 3 1/2 mg daily 05/05/22     Historical Provider, MD   torsemide (DEMADEX) 10 mg tablet Take 10 mg by mouth daily    Historical Provider, MD   albuterol (Ventolin HFA) 90 mcg/act inhaler Inhale 2 puffs every 6 (six) hours as needed for wheezing  Patient not taking: Reported on 2/14/2023 5/5/22 2/14/23  Filippo Coyle MD   amLODIPine (NORVASC) 10 mg tablet Take 10 mg by mouth daily  Patient not taking: Reported on 2/14/2023 2/14/23  Historical Provider, MD   losartan (COZAAR) 100 MG tablet Take 100 mg by mouth daily  2/14/23  Historical Provider, MD     I have reviewed home medications using allscripts  Allergies: Allergies   Allergen Reactions   • Contrast Dye [Iodinated Contrast Media] Other (See Comments)     Pt states kidney dysfunction       • Other        Social History:     Marital Status: /Civil Union Occupation: NA  Patient Pre-hospital Living Situation: Home  Patient Pre-hospital Level of Mobility: Walks  Patient Pre-hospital Diet Restrictions: Diabetic  Substance Use History:   Social History     Substance and Sexual Activity   Alcohol Use Never     Social History     Tobacco Use   Smoking Status Former   • Types: Pipe   • Quit date:    • Years since quittin 1   Smokeless Tobacco Never     Social History     Substance and Sexual Activity   Drug Use No       Family History:    Family History   Problem Relation Age of Onset   • Cancer Mother    • Stroke Father    • Cancer Sister    • Diabetes Sister    • Stroke Brother        Physical Exam:     Vitals:   Blood Pressure: 153/97 (23)  Pulse: 86 (23)  Temperature: 97 6 °F (36 4 °C) (23)  Temp Source: Tympanic (23)  Respirations: 20 (23)  Weight - Scale: 82 kg (180 lb 12 4 oz) (23)  SpO2: 94 % (23)    Physical Exam  Vitals and nursing note reviewed  Constitutional:       General: He is not in acute distress  Appearance: He is well-developed  HENT:      Head: Normocephalic and atraumatic  Mouth/Throat:      Pharynx: Oropharynx is clear  Eyes:      Conjunctiva/sclera: Conjunctivae normal    Cardiovascular:      Rate and Rhythm: Normal rate and regular rhythm  Heart sounds: No murmur heard  Pulmonary:      Effort: Pulmonary effort is normal  No respiratory distress  Breath sounds: Normal breath sounds  Abdominal:      Palpations: Abdomen is soft  Tenderness: There is no abdominal tenderness  Musculoskeletal:         General: No swelling  Cervical back: Neck supple  Skin:     General: Skin is warm and dry  Capillary Refill: Capillary refill takes less than 2 seconds  Neurological:      Mental Status: He is alert  Mental status is at baseline  Comments: RU field cut   Remainder of neuro examination unremarkable   Psychiatric: Mood and Affect: Mood normal          Additional Data:     Lab Results: I have personally reviewed pertinent reports  Results from last 7 days   Lab Units 02/14/23  2157   WBC Thousand/uL 12 95*   HEMOGLOBIN g/dL 12 4   HEMATOCRIT % 38 1   PLATELETS Thousands/uL 195   NEUTROS PCT % 81*   LYMPHS PCT % 9*   MONOS PCT % 8   EOS PCT % 1     Results from last 7 days   Lab Units 02/14/23  2157   SODIUM mmol/L 135   POTASSIUM mmol/L 4 5   CHLORIDE mmol/L 102   CO2 mmol/L 27   BUN mg/dL 36*   CREATININE mg/dL 1 81*   ANION GAP mmol/L 6   CALCIUM mg/dL 9 8   GLUCOSE RANDOM mg/dL 207*     Results from last 7 days   Lab Units 02/14/23  2157   INR  1 30*     Results from last 7 days   Lab Units 02/14/23  1324   POC GLUCOSE mg/dl 135         Results from last 7 days   Lab Units 02/14/23  1425   LACTIC ACID mmol/L 0 9       Imaging: I have personally reviewed pertinent reports  CT head without contrast    (Results Pending)   MRI Inpatient Order    (Results Pending)       EKG, Pathology, and Other Studies Reviewed on Admission:   · EKG: Reviewed    [facility] Yes     ** Please Note: This note has been constructed using a voice recognition system   **

## 2023-02-15 NOTE — ED NOTES
Pt observed going through garbage in room, pt reoriented at this time, pt assisted with finding belongings which again was in his pocket, hospitalist aware and to order virtual 1:1 for pt safety at this time        Winston Guzman RN  02/15/23 [id number]

## 2023-02-15 NOTE — PHYSICAL THERAPY NOTE
PHYSICAL THERAPY EVALUATION  NAME:  Violet Cruz  DATE: 02/15/23    AGE:   [de-identified] y o  Mrn:   [id number]  ADMIT DX:  AMS (altered mental status) [R41 [id number]]  Problem List:   Patient Active Problem List   Diagnosis    Diabetes mellitus ([facility] )    Hypertension    Acute-on-chronic kidney injury ([facility] )    Atrial fibrillation ([facility] )    Aneurysm of thoracic aorta    Aneurysm of abdominal aorta    Hyperlipidemia    Inflammatory polyarthropathy (HCC)    Osteoarthrosis    Polymyalgia rheumatica (HCC)    History of malignant neuroendocrine tumor    Centrilobular emphysema (HCC)    COPD (chronic obstructive pulmonary disease) (HCC)    CKD (chronic kidney disease)    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)    RSV (acute bronchiolitis due to respiratory syncytial virus)    AMS (altered mental status)    Generalized weakness       Past Medical History  Past Medical History:   Diagnosis Date    Cancer ([facility] )     pancreatic    Colon polyp     Coronary artery disease     Diabetes mellitus ([facility] )     Hyperlipidemia     Hypertension     Pneumonia 06/13/2017    Right middle and lower lobe     Stroke [facility])        Past Surgical History  Past Surgical History:   Procedure Laterality Date    APPENDECTOMY      CARDIAC SURGERY      Aortic Valve Replacement, Ascending Aorta    COLONOSCOPY      GALLBLADDER SURGERY      PANCREATICODUODENECTOMY         Length Of Stay: 0  Performed at least 2 patient identifiers during session: Name and Birthday       02/15/23 0811   PT Last Visit   PT Visit Date 02/15/23   Note Type   Note type Evaluation   Pain Assessment   Pain Assessment Tool 0-10   Pain Score No Pain   Restrictions/Precautions   Weight Bearing Precautions Per Order No   Braces or Orthoses Other (Comment)  (none per pt)   Other Precautions Cognitive; Fall Risk;Multiple lines;Telemetry;1:1  (virtual 1:1)   Home Living   Type of [address] [place] [address] Two level;Bed/bath upstairs;Stairs to enter with rails  (flight of steps to second floor   2 SISSY) Bathroom Shower/Tub Tub/shower unit   Bathroom Toilet Raised   Bathroom Equipment Grab bars in shower; Shower chair   Bathroom Accessibility Accessible   Home Equipment Cane   Additional Comments Pt reports prn use of SPC "I don't usually need it"   Prior Function   Level of [place] Independent with ADLs; Independent with functional mobility; Needs assistance with IADLS  (spouse completes cooking/cleaning)   Lives With Spouse   Receives Help From Family   IADLs Independent with driving; Independent with medication management  (spoues does the cooking)   Falls in the last 6 months 0  (pt denies falls)   General   Family/Caregiver Present No   Cognition   Overall Cognitive Status WFL   Arousal/Participation Cooperative   Attention Within functional limits   Orientation Level Oriented X4   Memory Within functional limits   Following Commands Follows one step commands without difficulty   Comments Pt agreeable to PT evaluation   Subjective   Subjective "I sometimes get dizzy"   RLE Assessment   RLE Assessment WFL   LLE Assessment   LLE Assessment WFL   Vision-Basic Assessment   Current Vision Wears glasses only for reading   Patient Visual Report Other (Comment)  (pt denies acute visual changes)   Coordination   Sensation X  (pt reports history of peripheral neuropathy)   Light Touch   RLE Light Touch Grossly intact   LLE Light Touch Grossly intact   Bed Mobility   Supine to Sit 5  Supervision   Additional items Assist x 1;HOB elevated; Increased time required   Additional Comments Vitals at start of session HR: 65bpm, Spo2: 98% on RA, BP: 149/74   Transfers   Sit to Stand 5  Supervision   Additional items Assist x 1; Increased time required;Verbal cues   Stand to Sit 5  Supervision   Additional items Assist x 1; Increased time required;Verbal cues   Stand pivot 5  Supervision   Additional items Assist x 1; Increased time required;Verbal cues   Additional Comments no AD used during transfers; no LOB observed Ambulation/Elevation   Gait pattern Improper Weight shift; Wide YAMILA   Gait Assistance 5  Supervision   Assistive Device None   Distance 200'   Stair Management Assistance 5  Supervision   Additional items Assist x 1;Verbal cues   Stair Management Technique With walker   Number of Stairs 5  (5 step up/down at 4" practice step with BUE support at RW)   Balance   Static Sitting Good   Dynamic Sitting Fair +   Static Standing Fair   Dynamic Standing [address],4, & 5 -   Activity Tolerance   Activity Tolerance Patient tolerated treatment well   Medical Staff Made Aware Co treatment with MAYANK Ribera secondary to complex medical condition of pt, possible A of 2 required to achieve and maintain transitional movements, requiring the need of skilled therapeutic intervention of 2 therapists to achieve delivery of services  Nurse Made Aware JASPER Yip confirmed pt appropriate for PT session and made aware of session outcomes   Assessment   Prognosis Good   Problem List Impaired balance;Decreased mobility; Decreased safety awareness; Impaired sensation   Assessment Pt is [de-identified] y o  male seen for moderate-complexity PT evaluation on 2/15/2023 s/p admit to [address] [place] [address] on 2/14/2023 w/ AMS (altered mental status)  PT was consulted to assess pt's functional mobility and d/c needs  Order placed for PT eval and tx, w/ up w/ A order  PTA, pt was living with his spouse in a two story home with flight of stairs to second floor and 2 SISSY  PT reports independence at baseline with ADLs and functional mobility without AD majority of the time; occ use of SPC  At time of eval, patient completed sup > sit supervision, STS and SPT supervision, and ambulated 200' supervision without AD  PT instructed patient in ascend/descend 5 step up/down at 4" practice step with BUE support and supervision   Upon evaluation, pt presenting with impaired functional mobility d/t decreased endurance, impaired balance, decreased mobility and decreased safety awareness  Pertinent PMHx and current co-morbidities affecting pt's physical performance at time of assessment include: pancreatic cancer, CAD, HTN  Personal factors affecting pt at time of eval include: lives in 2 story house, stairs to enter home and compliance  The following objective measures performed on IE also reveal limitations: AM-PAC 6-Clicks: 56/65  Pt's clinical presentation is currently evolving seen in pt's presentation of advanced age and need for supervision assist w/ all phases of mobility when usually mobilizing independently  Overall, pt's rehab potential and prognosis to return to PLOF is good as impacted by objective findings, warranting pt to receive further skilled PT interventions to address identified impairments, activity limitation(s), and participation restriction(s)  Goal for patient is to return home  Pt to benefit from continued PT tx to address deficits as defined above and maximize level of functional independent mobility and consistency in order for pt to increase safety and independnece  From PT/mobility standpoint, recommendation at time of d/c would be home with outpatient rehabilitation pending progress in order to facilitate return to PLOF  Goals   Patient Goals to feel better   [facility] Expiration Date 02/25/23   Short Term Goal #1 In 10 days: Perform all bed mobility tasks independently to decrease caregiver burden, Perform all transfers independently to improve independence, Ambulate > 300 ft  with least restrictive assistive device modified independent w/o LOB and w/ normalized gait pattern 100% of the time, Navigate 12 stairs modified independent with unilateral handrail to facilitate return to previous living environment and Increase all balance 1/2 grade to decrease risk for falls   PT Treatment Day 0   Plan   Treatment/Interventions Functional transfer training; Therapeutic exercise;Elevations; Endurance training;Bed mobility;Gait training;Patient/family training;Spoke to nursing;OT   PT Frequency 2-3x/wk   Recommendation   PT Discharge Recommendation Home with outpatient rehabilitation   AM-PAC Basic Mobility Inpatient   Turning in Flat Bed Without Bedrails 4   Lying on Back to Sitting on Edge of Flat Bed Without Bedrails 4   Moving Bed to Chair 3   Standing Up From Chair Using Arms 3   Walk in Room 3   Climb 3-5 Stairs With Railing 3   Basic Mobility Inpatient Raw Score 20   Basic Mobility Standardized Score 43 99   Highest Level Of Mobility   JH-HLM Goal 6: Walk 10 steps or more   JH-HLM Achieved 7: Walk 25 feet or more   End of Consult   Patient Position at End of Consult All needs within reach; Seated edge of bed   Time In: 0811  Time Out: 0831  Total Evaluation Minutes: Yary 21, PT

## 2023-02-15 NOTE — ASSESSMENT & PLAN NOTE
· Patient has increased difficulty with ambulation  · Likely in the setting of acute infection  · Fall precautions  · PT/OT evaluation

## 2023-02-15 NOTE — ASSESSMENT & PLAN NOTE
Patient presenting to the ED for evaluation of worsening confusion and personality changes  Patient reports that symptoms started earlier this morning  He was evaluated by the ED and offered admission, but was discharged home due to not wanting to miss an appointment tomorrow morning  However, due to worsening symptoms he came back for reevaluation  He denies any dizziness, slurred speech, facial droop, weakness, numbness/tingling  · On physical exam patient with RU field cut; however, remainder of neuro examination unremarkable  · CT head negative  · Etiology: CVA vs infection vs recent steroid use  · Follow stroke pathway  · Continue asa, statin, eliquis  · Obtain MRI  · PT/OT evaluation  · Neuro checks  · Monitor RSV infection, continue on oral antibiotic regimen as below   Follow up infectious labs  · Monitor on telemetry  · Consult to Neurology, recommendations are appreciated

## 2023-02-15 NOTE — ASSESSMENT & PLAN NOTE
Lab Results   Component Value Date    EGFR 34 02/14/2023    EGFR 37 02/14/2023    EGFR 43 12/09/2022    CREATININE 1 81 (H) 02/14/2023    CREATININE 1 68 (H) 02/14/2023    CREATININE 1 49 (H) 12/09/2022     · Creatinine stable at baseline  · Avoid hypotension and nephrotoxic agents  · Monitor BMP daily

## 2023-02-15 NOTE — ED NOTES
Pt wandering in hallway, reports he is looking for green bag, green bag and wallet found in pts pocket, Pt back to room at this time        Eduardo Acevedo RN  02/15/23 [id number]

## 2023-02-15 NOTE — ED NOTES
Pt ambulated to bathroom and back, gait steady pt assisted back to bed       Damari Salas, JASPER  02/15/23 [id number]

## 2023-02-15 NOTE — ASSESSMENT & PLAN NOTE
Lab Results   Component Value Date    HGBA1C 6 9 (H) 02/15/2023     · Continue Lantus 20U HS  · Start on SSI with accu checks  · Hypoglycemia protocol  · Diabetic diet

## 2023-02-15 NOTE — ASSESSMENT & PLAN NOTE
Lab Results   Component Value Date    EGFR 34 02/15/2023    EGFR 34 02/14/2023    EGFR 37 02/14/2023    CREATININE 1 80 (H) 02/15/2023    CREATININE 1 81 (H) 02/14/2023    CREATININE 1 68 (H) 02/14/2023     · Visit appears to be around 1 7-2  · Avoid hypotension and nephrotoxic agents  · Monitor BMP daily

## 2023-02-15 NOTE — PROGRESS NOTES
[facility]  Progress Note Kristel Vogt 1940, [de-identified] y o  male MRN: [id number]  Unit/Bed#: -01 Encounter: [id number]  Primary Care Provider: Alla Pimentel MD   Date and time admitted to hospital: 2/14/2023  8:58 PM    * AMS (altered mental status)  Assessment & Plan  Patient presenting to the ED for evaluation of worsening confusion and personality changes  Patient reports that symptoms started earlier this morning  He was evaluated by the ED and offered admission, but was discharged home due to not wanting to miss an appointment tomorrow morning  However, due to worsening symptoms he came back for reevaluation  He denies any dizziness, slurred speech, facial droop, weakness, numbness/tingling  · On physical exam patient with RU field cut; however, remainder of neuro examination unremarkable  · CT head negative  · Etiology: CVA vs infection vs recent steroid use  · Follow stroke pathway  · Continue asa, statin, eliquis  · Obtain MRI  · PT/OT evaluation  · Neuro checks  · Monitor RSV infection, continue on oral antibiotic regimen as below  Follow up infectious labs  · Monitor on telemetry  · Consult to Neurology, recommendations are appreciated    RSV (acute bronchiolitis due to respiratory syncytial virus)  Assessment & Plan  · Tested positive for RSV   Patient was seen earlier in the day by the ED for this  · Currently oxygenating well on RA  · Continue azithromycin and ceftin prescribed as outpatient  · Supportive care  · Monitor respiratory status  · Contact precautions    CKD (chronic kidney disease)  Assessment & Plan  Lab Results   Component Value Date    EGFR 34 02/15/2023    EGFR 34 02/14/2023    EGFR 37 02/14/2023    CREATININE 1 80 (H) 02/15/2023    CREATININE 1 81 (H) 02/14/2023    CREATININE 1 68 (H) 02/14/2023     · Visit appears to be around 1 7-2  · Avoid hypotension and nephrotoxic agents  · Monitor BMP daily    COPD (chronic obstructive pulmonary disease) [facility])  Assessment & Plan  · No acute exacerbation  · Continue pre-hospital inhalers    Atrial fibrillation (HCC)  Assessment & Plan  · Continue AC with Eliquis  · Currently rate controlled    Hypertension  Assessment & Plan  · Holding home anti-hypertensive medications per stroke pathway  · Monitor vitals per routine    Diabetes mellitus [facility])  Assessment & Plan    Lab Results   Component Value Date    HGBA1C 6 9 (H) 02/15/2023     · Continue Lantus 20U HS  · Start on SSI with accu checks  · Hypoglycemia protocol  · Diabetic diet        VTE Pharmacologic Prophylaxis: VTE Score: 5 High Risk (Score >/= 5) - Pharmacological DVT Prophylaxis Ordered: apixaban (Eliquis)  Sequential Compression Devices Ordered  Patient Centered Rounds: I performed bedside rounds with nursing staff today  Discussions with Specialists or Other Care Team Provider: Case management    Education and Discussions with Family / Patient: Updated  (wife) at bedside  Total Time Spent on Date of Encounter in care of patient: 45 minutes This time was spent on one or more of the following: performing physical exam; counseling and coordination of care; obtaining or reviewing history; documenting in the medical record; reviewing/ordering tests, medications or procedures; communicating with other healthcare professionals and discussing with patient's family/caregivers  Current Length of Stay: 0 day(s)  Current Patient Status: Inpatient   Certification Statement: The patient will continue to require additional inpatient hospital stay due to Rule out CVA  Discharge Plan: Anticipate discharge in 24-48 hrs to Anticipate home    Code Status: Level 1 - Full Code    Subjective:   Reports on and off confusion  Does not endorse any weakness, numbness or tingling  Wife at bedside stated close but still not back to his baseline  No other complaints endorsed      Objective:     Vitals:   Temp (24hrs), Av 9 °F (36 6 °C), Min:97 6 °F (36 4 °C), Max:98 1 °F (36 7 °C)    Temp:  [97 6 °F (36 4 °C)-98 1 °F (36 7 °C)] 98 1 °F (36 7 °C)  HR:  [66-86] 66  Resp:  [15-22] 20  BP: (117-171)/(56-97) 133/69  SpO2:  [94 %-99 %] 97 %  Body mass index is 22 62 kg/m²  Input and Output Summary (last 24 hours):   No intake or output data in the 24 hours ending 02/15/23 1513    Physical Exam:   Physical Exam  Vitals and nursing note reviewed  Constitutional:       General: He is not in acute distress  Appearance: He is not toxic-appearing  HENT:      Head: Normocephalic and atraumatic  Nose: Nose normal       Mouth/Throat:      Mouth: Mucous membranes are moist       Pharynx: Oropharynx is clear  Eyes:      Conjunctiva/sclera: Conjunctivae normal       Pupils: Pupils are equal, round, and reactive to light  Cardiovascular:      Rate and Rhythm: Normal rate  Pulses: Normal pulses  Pulmonary:      Effort: Pulmonary effort is normal       Breath sounds: Normal breath sounds  Abdominal:      Palpations: Abdomen is soft  Tenderness: There is no abdominal tenderness  There is no guarding  Musculoskeletal:         General: No swelling or tenderness  Normal range of motion  Cervical back: Normal range of motion and neck supple  Skin:     General: Skin is warm and dry  Neurological:      General: No focal deficit present  Mental Status: He is alert and oriented to person, place, and time  Motor: No weakness  Psychiatric:         Mood and Affect: Mood normal          Thought Content:  Thought content normal          Additional Data:     Labs:  Results from last 7 days   Lab Units 02/15/23  0415 02/14/23  2157   WBC Thousand/uL 14 34* 12 95*   HEMOGLOBIN g/dL 12 7 12 4   HEMATOCRIT % 39 9 38 1   PLATELETS Thousands/uL 207 195   NEUTROS PCT %  --  81*   LYMPHS PCT %  --  9*   MONOS PCT %  --  8   EOS PCT %  --  1     Results from last 7 days   Lab Units 02/15/23  0415   SODIUM mmol/L 134*   POTASSIUM mmol/L 4 0   CHLORIDE mmol/L 100   CO2 mmol/L 28   BUN mg/dL 37*   CREATININE mg/dL 1 80*   ANION GAP mmol/L 6   CALCIUM mg/dL 9 8   ALBUMIN g/dL 3 6   TOTAL BILIRUBIN mg/dL 0 67   ALK PHOS U/L 133*   ALT U/L 28   AST U/L 26   GLUCOSE RANDOM mg/dL 157*     Results from last 7 days   Lab Units 02/14/23  2157   INR  1 30*     Results from last 7 days   Lab Units 02/15/23  1158 02/15/23  0840 02/15/23  0035 02/14/23  1324   POC GLUCOSE mg/dl 94 93 115 135     Results from last 7 days   Lab Units 02/15/23  0415   HEMOGLOBIN A1C % 6 9*     Results from last 7 days   Lab Units 02/14/23  1425   LACTIC ACID mmol/L 0 9       Lines/Drains:  Invasive Devices     Peripheral Intravenous Line  Duration           Peripheral IV 02/14/23 Right Forearm <1 day                  Telemetry:  Telemetry Orders (From admission, onward)             48 Hour Telemetry Monitoring  Continuous x 48 hours        References:    Telemetry Guidelines   Question:  Reason for 48 Hour Telemetry  Answer:  Acute CVA (<24 hrs old, hemispheric strokes, selected brainstem strokes, cardiac arrhythmias)                 Telemetry Reviewed: Atrial fibrillation  HR averaging 70s  Indication for Continued Telemetry Use: Acute CVA             Imaging: Reviewed radiology reports from this admission including: chest xray and CT head    Recent Cultures (last 7 days):   Results from last 7 days   Lab Units 02/14/23  1425   BLOOD CULTURE  Received in Microbiology Lab  Culture in Progress  Received in Microbiology Lab  Culture in Progress         Last 24 Hours Medication List:   Current Facility-Administered Medications   Medication Dose Route Frequency Provider Last Rate   • acetaminophen  650 mg Oral Q6H PRN Vianey Jung PA-C     • apixaban  2 5 mg Oral BID Vianey Jung PA-C     • aspirin  81 mg Oral Daily Shae Ruth PA-C     • azithromycin  250 mg Oral Q24H Shae Ruth PA-C     • cefuroxime  500 mg Oral Q12H [facility] Shae Ruth PA-C     • Fluticasone Furoate-Vilanterol  1 puff Inhalation Daily Shae Ruth PA-C      And   • umeclidinium  1 puff Inhalation Daily Shae Ruth PA-C     • hydroxychloroquine  200 mg Oral BID With Meals Shae Ruth PA-C     • insulin glargine  16 Units Subcutaneous HS Shae Ruth PA-C     • insulin lispro  1-6 Units Subcutaneous 4x Daily (AC & HS) Shae Ruth PA-C     • pravastatin  40 mg Oral Daily Damon Rudolph PA-C          Today, Patient Was Seen By: Armando Villanueva MD    **Please Note: This note may have been constructed using a voice recognition system  **

## 2023-02-15 NOTE — PLAN OF CARE
Problem: PHYSICAL THERAPY ADULT  Goal: Performs mobility at highest level of function for planned discharge setting  See evaluation for individualized goals  Description: Treatment/Interventions: Functional transfer training, Therapeutic exercise, Elevations, Endurance training, Bed mobility, Gait training, Patient/family training, Spoke to nursing, OT          See flowsheet documentation for full assessment, interventions and recommendations  Note: Prognosis: Good  Problem List: Impaired balance, Decreased mobility, Decreased safety awareness, Impaired sensation  Assessment: Pt is [de-identified] y o  male seen for moderate-complexity PT evaluation on 2/15/2023 s/p admit to [address] on 2/14/2023 w/ AMS (altered mental status)  PT was consulted to assess pt's functional mobility and d/c needs  Order placed for PT eval and tx, w/ up w/ A order  PTA, pt was living with his spouse in a two story home with flight of stairs to second floor and 2 SISSY  PT reports independence at baseline with ADLs and functional mobility without AD majority of the time; occ use of SPC  At time of eval, patient completed sup > sit supervision, STS and SPT supervision, and ambulated 200' supervision without AD  PT instructed patient in ascend/descend 5 step up/down at 4" practice step with BUE support and supervision  Upon evaluation, pt presenting with impaired functional mobility d/t decreased endurance, impaired balance, decreased mobility and decreased safety awareness  Pertinent PMHx and current co-morbidities affecting pt's physical performance at time of assessment include: pancreatic cancer, CAD, HTN  Personal factors affecting pt at time of eval include: lives in 2 story house, stairs to enter home and compliance  The following objective measures performed on IE also reveal limitations: AM-PAC 6-Clicks: 18/26   Pt's clinical presentation is currently evolving seen in pt's presentation of advanced age and need for supervision assist w/ all phases of mobility when usually mobilizing independently  Overall, pt's rehab potential and prognosis to return to PLOF is good as impacted by objective findings, warranting pt to receive further skilled PT interventions to address identified impairments, activity limitation(s), and participation restriction(s)  Goal for patient is to return home  Pt to benefit from continued PT tx to address deficits as defined above and maximize level of functional independent mobility and consistency in order for pt to increase safety and independnece  From PT/mobility standpoint, recommendation at time of d/c would be home with outpatient rehabilitation pending progress in order to facilitate return to PLOF  PT Discharge Recommendation: Home with outpatient rehabilitation    See flowsheet documentation for full assessment     Janeth Rickyting; PT, SPT

## 2023-02-15 NOTE — ASSESSMENT & PLAN NOTE
Neurology is asked to see this 82-year-old right-hand-dominant gentleman who is noted to carry a diagnosis of vascular dementia without behavioral disturbances from a previous outside provider  He also has a PMH significant for type II DM, former tobacco abuse, atrial fibrillation on Xarelto, HTN, HLD, PMR on chronic prednisone and hydroxychloroquine, COPD and emphysema, OA, CKD IIIb, and prosthetic AVR and aortic arch repair  The patient presented yesterday to the hospital in the afternoon and as noted by other providers was diagnosed as having RSV  Because the patient had a rheumatology appointment today the family requested discharge home yesterday and the patient was discharged proximal before 5 PM   However as also noted by other providers he had confusional episodes on the way home where he thought his wife was slightly driving off of the road and he attempted to get the steering wheel to correct her driving  They were able to get home and she reports that despite him laying down and resting he still seemed confused so he was brought back here to the hospital at 8 PM or so last night  Neurology is asked to see and evaluate him for an altered mental status  His CAT scan is reviewed and an MRI brain is also noted from last year in the [facility] system  On my review of his CAT scan here he is noted to have significant small vessel disease and this is corroborated on the report from the [place] [facility] from October 2022: Multiple old bilateral chronic lacunar infarcts  Severe microvascular angiopathy  Multiple foci of gradient echo hypointensity suggesting amyloid angiopathy, multiple cavernous angiomas, versus chronic microhemorrhage  This patient does not have other CNS imaging available in our system other than the 2 CAT scans he had yesterday  His labs also indicate changes consistent with an infection and his RSV was positive    On exam today, done approximately 11 AM this morning he had a good neurologic exam on all parameters his wife, who is present notes that he was doing well this morning and that at that time he seemed to be back at baseline  I note the notes from throughout the night this patient was restless and confused and he had a virtual sitter present  I am unable to find previous neurologic notes from outside providers  At this time because he does have multiple risk factors for cerebrovascular event we have ordered an MRI brain without gadolinium to assess for any acute ischemic events  He remains on treatment for his RSV, and his other metabolic factors specifically his renal disease is being optimized by medicine as well  Looking at his CAT scan as well as MRI report this patient does have significant chronic small vessel disease  He likely has a poor cognitive reserve  When he had the acute RSV infection present early in the week the treatment was sought yesterday he was also given a dose of ceftriaxone, and this antibiotic can also produce some confusion and seniors  It is all of these events that may have produced the acute mental status changes seen yesterday  Given his small vessel disease he is at additional risk for having delirium as a geriatric patient  Delirium precautions would certainly be appropriate  At this time there is no further testing or treatments needed as an inpatient given that he did well on exam today  If he has confusional episodes putting him or others at risk this evening as he has increased neuro fatigue, it would be appropriate to use a low-dose as needed Zyprexa or Seroquel  As an outpatient this patient should have neurologic care both for his vascular disease as well as to assess his overall cognitive performance even though he did well on exam today

## 2023-02-15 NOTE — UTILIZATION REVIEW
Initial Clinical Review    Admission: Date/Time/Statement:     OBSERVATION 2-14 -23 CHANGED TO INPATIENT 2-15-23 FOR CONTINUED EVALUATION OF ALTERED MENTAL STATUS AND POSSIBLE STROKE  Admission Orders (From admission, onward)     Ordered        02/15/23 1450  Inpatient Admission  Once            02/14/23 2325  Place in Observation  Once                      Orders Placed This Encounter   Procedures   • Place in Observation     Standing Status:   Standing     Number of Occurrences:   1     Order Specific Question:   Level of Care     Answer:   Med Surg [16]   • Inpatient Admission     Standing Status:   Standing     Number of Occurrences:   1     Order Specific Question:   Level of Care     Answer:   Med Surg [16]     Order Specific Question:   Estimated length of stay     Answer:   More than 2 Midnights     Order Specific Question:   Certification     Answer:   I certify that inpatient services are medically necessary for this patient for a duration of greater than two midnights  See H&P and MD Progress Notes for additional information about the patient's course of treatment  ED Arrival Information     Expected   -    Arrival   2/14/2023 20:30    Acuity   Urgent            Means of arrival   Walk-In    Escorted by   Spouse    Service   Hospitalist    Admission type   Emergency            Arrival complaint   altered mental           Chief Complaint   Patient presents with   • Altered Mental Status     Pt recently discharged from ER  Patient states "I decided I now want to stay "        Initial Presentation: [de-identified] y o  male presents to ed for evaluation and treatment of altered mental status  Recently discharged from ed  now wants to stay because he is confused  He cannot recall how to use electronic devices  PMHXl: DM, HTN, STROKE PANCREATIC CA, CAD  Clinical assessment significant for RSV with  Intermittent confusion and personality changes, RU field cut     Oriented to person and place not year  Initially treated with po azithromax, po ceftin  Admit to observation  Date: 2-15-23   observation to inpatient  GCS decreased from 15 to 14  Plan to continue neuro checks, monitor on telemetry, obtain MRI  Continue asa, statin and eliquis  Obtain PT/Occupational Therapy evaluations and neurology consult  Continue azithromax and ceftin  Therapy recommends home with outpatient therapy  Date: 2-15-23  Inpatient med surg     GC S=14  Neurology consult completed for evaluation of altered mental status  MRI shows significant small vessel disease  Labs indicate symptoms consistent with RSV infection  One of two blood cultures positive for staph epidermis  Suspect contaminant  Patient is afebrile  Continue po azithromax and po ceftin      ED Triage Vitals   02/14/23 2033 02/14/23 2033 02/14/23 2033 02/14/23 2033 02/14/23 2033   97 6 °F (36 4 °C) 83 19 157/74 96 %      Tympanic Monitor         No Pain          02/15/23 82 1 kg (181 lb)     Additional Vital Signs:      Date/Time Temp Pulse Resp BP MAP (mmHg) SpO2 O2 Device   02/16/23 07:48:41 97 7 °F (36 5 °C) 67 -- 139/77 98 97 % --   02/16/23 07:46:32 97 7 °F (36 5 °C) 66 16 139/77 98 98 % --   02/16/23 0736 -- -- -- -- -- 96 % None (Room air)   02/16/23 05:11:49 97 9 °F (36 6 °C) 68 -- 134/76 95 97 % --   02/16/23 01:00:13 98 1 °F (36 7 °C) 63 -- 134/77 96 97 %        Date/Time Temp Pulse Resp BP MAP (mmHg) SpO2 O2 Device   02/15/23 15:37:28 97 6 °F (36 4 °C) 75 -- 135/76 96 98 % --   02/15/23 14:53:23 98 1 °F (36 7 °C) 66 18 133/69 90 97 % None (Room air)   02/15/23 13:15:56 98 °F (36 7 °C) 76 20 149/96 114 96 % None (Room air)   02/15/23 0500 -- 83 22 168/72 132 98 % None (Room air)   02/15/23 0415 -- 81 22 171/94   Abnormal  123 99 % None (Room air)   02/15/23 0400 -- 72 16 168/84 -- 97 % None (Room air)   02/14/23 2200 -- 75 18 132/91 108 97 % None (Room air)   02/14/23 2145 -- 76 18 148/78 -- 98 % None (Room air)   02/14/23 2130 -- 77 18 169/84 118 98 % None (Room air)   02/14/23 2115 -- 77 18 151/70 100 97 % None (Room air)   02/14/23 2033 97 6 °F (36 4 °C) 83 19 157/74 -- 96 % None (Room air)     Date and Time Eye Opening Best Verbal Response Best Motor Response Dominga Coma Scale Score   02/15/23 1315 4 4 6 14   02/15/23 1115 4 4 6 14   02/15/23 0900 4 4 6 14   02/15/23 0700 4 4 6 14   02/15/23 0600 4 4 6 14   02/15/23 0500 4 4 6 14   02/15/23 0400 4 4 6 14   02/15/23 0300 4 4 6 14   02/15/23 0200 4 5 6 15   02/15/23 0100 4 5 6 15   02/15/23 0000 4 5 6 15   02/14/23 2300 4 5 6 15   02/14/23 2230 4 5 6 15   02/14/23 2200 4 5 6 15   02/14/23 2145 4 5 6 15   02/14/23 2130 4 5 6 15   02/14/23 2115 4 5 6 15   02/14/23 1530 4 5 6 15   02/14/23 1430 4 5 6 15   02/14/23 1330 4 5 6 15     Pertinent Labs/Diagnostic Test Results:     MRI BRAIN WITHOUT CONTRAST   2-15-23   INDICATION: AMS, right field cut  No acute intracranial abnormality      Multiple chronic small infarcts in bilateral basal ganglia and bilateral cerebellum, worse in bilateral cerebellum      Multiple scattered peripheral predominant chronic microhemorrhages in bilateral cerebral hemispheres  Differential includes cerebral amyloid angiopathy, multiple cavernomas, prior embolic event, among other differentials      Severe chronic microangiopathy      Mild-to-moderate sinus disease with findings suggestive of acute sinusitis        CT head without contrast   Final  (02/14 2338)      No acute intracranial abnormality          Results from last 7 days   Lab Units 02/14/23  1336   SARS-COV-2  Negative     Results from last 7 days   Lab Units 02/15/23  0415 02/14/23  2157 02/14/23  1336   WBC Thousand/uL 14 34* 12 95* 13 83*   HEMOGLOBIN g/dL 12 7 12 4 12 9   HEMATOCRIT % 39 9 38 1 40 6   PLATELETS Thousands/uL 207 195 220   NEUTROS ABS Thousands/µL  --  10 46* 11 84*         Results from last 7 days   Lab Units 02/15/23  0415 02/14/23  2157 02/14/23  1336   SODIUM mmol/L 134* 135 137   POTASSIUM mmol/L 4 0 4 5 4 9   CHLORIDE mmol/L 100 102 101   CO2 mmol/L 28 27 30   ANION GAP mmol/L 6 6 6   BUN mg/dL 37* 36* 32*   CREATININE mg/dL 1 80* 1 81* 1 68*   EGFR ml/min/1 73sq m 34 34 37   CALCIUM mg/dL 9 8 9 8 10 7*     Results from last 7 days   Lab Units 02/15/23  0415   AST U/L 26   ALT U/L 28   ALK PHOS U/L 133*   TOTAL PROTEIN g/dL 6 3*   ALBUMIN g/dL 3 6   TOTAL BILIRUBIN mg/dL 0 67     Results from last 7 days   Lab Units 02/15/23  1158 02/15/23  0840 02/15/23  0035 02/14/23  1324   POC GLUCOSE mg/dl 94 93 115 135     Results from last 7 days   Lab Units 02/15/23  0415 02/14/23  2157 02/14/23  1336   GLUCOSE RANDOM mg/dL 157* 207* 151*         Results from last 7 days   Lab Units 02/15/23  0415   HEMOGLOBIN A1C % 6 9*   EAG mg/dl 151     BETA-HYDROXYBUTYRATE   Date Value Ref Range Status   07/02/2022 0 2 <0 6 mmol/L Final        Results from last 7 days   Lab Units 02/15/23  0249 02/15/23  0026 02/14/23 2157 02/14/23  1531 02/14/23  1336   HS TNI 0HR ng/L  --   --  29  --  27   HS TNI 2HR ng/L  --  31  --  25  --    HSTNI D2 ng/L  --  2  --  -2  --    HS TNI 4HR ng/L 29  --   --   --   --    HSTNI D4 ng/L 0  --   --   --   --          Results from last 7 days   Lab Units 02/14/23 2157   PROTIME seconds 15 9*   INR  1 30*   PTT seconds 27             Results from last 7 days   Lab Units 02/14/23  1425   LACTIC ACID mmol/L 0 9       Results from last 7 days   Lab Units 02/14/23  1336   INFLUENZA A PCR  Negative   INFLUENZA B PCR  Negative   RSV PCR  Positive*     Collected Updated Procedure Result Status    02/14/2023 1425 02/15/2023 2001 Blood culture #1 [[id number]]   Blood from Arm, Right    Preliminary result Component Value   Blood Culture No Growth at 24 hrs   P             02/14/2023 1425 02/16/2023 1015 Blood culture #2 [[id number]]   (Abnormal)   Blood from Hand, Right    Preliminary result Component Value   Gram Stain Result Gram positive cocci in clusters Abnormal  P             02/14/2023 1425 02/15/2023 2150 Blood Culture Identification Panel [[id number]]    (Abnormal)   Blood from Hand, Right    Preliminary result Component Value   Staphylococcus epidermidis Detected Abnormal           ED Treatment:   Medication Administration from 02/14/2023 2030 to 02/15/2023 1314       Date/Time Order Dose Route Action     02/15/2023 0846 EST apixaban (ELIQUIS) tablet 2 5 mg 2 5 mg Oral Given     02/15/2023 0030 EST apixaban (ELIQUIS) tablet 2 5 mg 2 5 mg Oral Given     02/15/2023 0030 EST azithromycin (ZITHROMAX) tablet 250 mg 250 mg Oral Given     02/15/2023 0847 EST cefuroxime (CEFTIN) tablet 500 mg 500 mg Oral Given     02/15/2023 0846 EST hydroxychloroquine (PLAQUENIL) tablet 200 mg 200 mg Oral Given     02/15/2023 0037 EST insulin glargine (LANTUS) subcutaneous injection 16 Units 0 16 mL 16 Units Subcutaneous Given     02/15/2023 0846 EST pravastatin (PRAVACHOL) tablet 40 mg 40 mg Oral Given     02/15/2023 1220 EST Fluticasone Furoate-Vilanterol 100-25 mcg/actuation 1 puff 1 puff Inhalation Given     02/15/2023 1220 EST umeclidinium 62 5 mcg/actuation inhaler AEPB 1 puff 1 puff Inhalation Given     02/15/2023 0455 EST mirtazapine (REMERON) tablet 15 mg 15 mg Oral Given        Past Medical History:   Diagnosis Date   • Cancer ([facility] )     pancreatic   • Colon polyp    • Coronary artery disease    • Diabetes mellitus ([facility] )    • Hyperlipidemia    • Hypertension    • Pneumonia 06/13/2017    Right middle and lower lobe    • Stroke [facility])      Present on Admission:  • Diabetes mellitus ([facility] )  • Hypertension  • Atrial fibrillation (HCC)  • COPD (chronic obstructive pulmonary disease) (HCC)  • CKD (chronic kidney disease)      Admitting Diagnosis:     onfusion [R41 0]  RSV (acute bronchiolitis due to respiratory syncytial virus) [J21 0]  On corticosteroid therapy [Z79 52]  AMS (altered mental status) [R41 82]    Age/Sex: [de-identified] y o  male    Scheduled Medications:  apixaban, 2 5 mg, Oral, BID  aspirin, 81 mg, Oral, Daily  azithromycin, 250 mg, Oral, Q24H  cefuroxime, 500 mg, Oral, Q12H YUNI  Fluticasone Furoate-Vilanterol, 1 puff, Inhalation, Daily   And  umeclidinium, 1 puff, Inhalation, Daily  hydroxychloroquine, 200 mg, Oral, BID With Meals  insulin glargine, 16 Units, Subcutaneous, HS  insulin lispro, 1-6 Units, Subcutaneous, 4x Daily (AC & HS)  pravastatin, 40 mg, Oral, Daily      Continuous IV Infusions:     PRN Meds:  acetaminophen, 650 mg, Oral, Q6H PRN        IP CONSULT TO NEUROLOGY    Network Utilization Review Department  ATTENTION: Please call with any questions or concerns to [phone] and carefully listen to the prompts so that you are directed to the right person  All voicemails are confidential   Demetri Kang all requests for admission clinical reviews, approved or denied determinations and any other requests to dedicated fax number below belonging to the campus where the patient is receiving treatment   List of dedicated fax numbers for the Facilities:  [address] DENIALS (Administrative/Medical Necessity) [phone]   [address] (Maternity/NICU/Pediatrics) [phone]   [address] [phone]   [facility] 77 [phone]   [address] [place] [address] [address] [phone]   [address] [facility] 134 815 [facility] [phone]

## 2023-02-15 NOTE — OCCUPATIONAL THERAPY NOTE
Occupational Therapy Evaluation        Patient Name: Delanna Gaucher KYSYA'G Date: 2/15/2023       02/15/23 0830   OT Last Visit   OT Visit Date 02/15/23   Note Type   Note type Evaluation   Additional Comments Vitals at start of session HR: 65bpm, Spo2: 98% on RA, BP: 149/74   Pain Assessment   Pain Assessment Tool 0-10   Pain Score No Pain   Restrictions/Precautions   Weight Bearing Precautions Per Order No   Braces or Orthoses Other (Comment)  (none per pt)   Other Precautions Cognitive; Fall Risk;Multiple lines;Telemetry;1:1  (virtual 1:1)   Home Living   Type of [address] [place] [address] Two level;Bed/bath upstairs;Stairs to enter with rails  (flight of steps to second floor  2 SISSY)   Bathroom Shower/Tub Tub/shower unit   [employer] Raised   Bathroom Equipment Grab bars in shower; Shower chair   Bathroom Accessibility Accessible   Home Equipment Cane   Additional Comments Pt reports prn use of SPC "I don't usually need it"   Prior Function   Level of [place] Independent with ADLs; Independent with functional mobility; Needs assistance with IADLS  (spouse completes cooking/cleaning)   Lives With Spouse   Receives Help From Family   IADLs Independent with driving; Independent with medication management  (spoues does the cooking)   Falls in the last 6 months 0  (pt denies falls)   Vocational Retired   Lifestyle   Autonomy Patient reports independent with ADLs/ IADLs, lives with family in a 2 story house, 2 SISSY  Patient was ambulatory with  Cane, PRN     Reciprocal Relationships Supportive Family   Service to Others Retired   General   Family/Caregiver Present No   ADL   Where Assessed   (Assist levels for self care tasks,  are based on functional assessment of performance skills and deficits observed during functional mobility assessement)   Eating Assistance 5  Supervision/Setup   Grooming Assistance 5  Supervision/Setup   UB Bathing Assistance 5  Supervision/Setup   LB [address] 4  Minimal Assistance   UB Dressing Assistance 5  Supervision/Setup   LB Dressing Assistance 4  Minimal Assistance   Toileting Assistance  5  Supervision/Setup   Functional Assistance 5  Supervision/Setup   Bed Mobility   Supine to Sit 5  Supervision   Additional items Assist x 1;HOB elevated;Verbal cues   Transfers   Sit to Stand 5  Supervision   Additional items Assist x 1;Verbal cues  (contact guard/ persistent c/o of dizziness)   Stand to Sit 5  Supervision   Additional items Assist x 1; Increased time required;Verbal cues  (contact guard)   Functional Mobility   Functional Mobility 5  Supervision   Additional Comments close contact guard secondary to persistent c/o of light dizziness  Additional items   (no AD)   Balance   Static Sitting Good   Dynamic Sitting Fair +   Static Standing Fair   Dynamic Standing Fair -   Activity Tolerance   Activity Tolerance Patient limited by fatigue; Other (Comment)  (persistent c/o ofo light dizziness)   Medical Staff Made Aware Pt seen as a co-eval with PT due to the patient's co-morbidities, clinically unstable presentation, and present impairments which are a regression from the patient's baseline  RUE Assessment   RUE Assessment WFL   LUE Assessment   LUE Assessment WFL   Hand Function   Gross Motor Coordination Functional   Fine Motor Coordination Functional   Sensation   Light Touch No apparent deficits  (BUEs)   Vision-Basic Assessment   Current Vision Wears glasses only for reading   Patient Visual Report Other (Comment)  (no significant changes reported)   Cognition   Overall Cognitive Status WFL   Arousal/Participation Alert; Responsive; Cooperative   Attention Within functional limits   Orientation Level Oriented X4   Memory Within functional limits   Following Commands Follows one step commands without difficulty   Assessment   Limitation Decreased ADL status; Decreased endurance;Decreased self-care trans;Decreased high-level ADLs   Prognosis Good   Assessment Patient is a [de-identified] y o  male seen for OT evaluation s/p admit to [address] [facility] on 2/14/2023 w/AMS (altered mental status)  Commorbidities affecting patient's functional performance at time of assessment include: RSV, CKD, COPD, A fib, HTN, DM  Orders placed for OT evaluation and treatment  Performed at least two patient identifiers during session including name and wristband  Prior to admission, Patient reports independent with ADLs/ IADLs, lives with family in a 2 story house, 2 SISSY  Patient was ambulatory with  Cane, PRN  Personal factors affecting patient at time of initial evaluation include: steps to enter, difficulty performing ADLs and difficulty performing IADLs  Upon evaluation, patient requires set up, contact guard and minimal  assist for UB ADLs, minimal  assist for LB ADLs, transfers and functional ambulation in room and bathroom with supervision and contact guard assist, without AD  Occupational performance is affected by the following deficits: dynamic sit/ stand balance deficit with poor standing tolerance time for self care and functional mobility and decreased activity tolerance  Patient to benefit from continued Occupational Therapy treatment while in the hospital to address deficits as defined above and maximize level of functional independence with ADLs and functional mobility  Occupational Performance areas to address include: bathing/ shower, dressing, transfer to all surfaces, functional mobility, emergency response, health maintenance, IADLs: safety procedures and Leisure Participation  From OT standpoint, recommendation at time of d/c would be Home with family support, [address] and Home OT  Plan   Treatment Interventions ADL retraining;Functional transfer training; Endurance training;Patient/family training;Equipment evaluation/education; Compensatory technique education   Goal Expiration Date 03/01/23   OT Frequency 2-3x/wk   Recommendation   OT Discharge Recommendation Home with outpatient rehabilitation   AM-PAC Daily Activity Inpatient   Lower Body Dressing 3   Bathing 3   Toileting 3   Upper Body Dressing 4   Grooming 4   Eating 4   Daily Activity Raw Score 21   Daily Activity Standardized Score (Calc for Raw Score >=11) 44 27   AM-PAC Applied Cognition Inpatient   Following a Speech/Presentation 4   Understanding Ordinary Conversation 4   Taking Medications 4   Remembering Where Things Are Placed or Put Away 4   Remembering List of 4-5 Errands 4   Taking Care of Complicated Tasks 4   Applied Cognition Raw Score 24   Applied Cognition Standardized Score 62 21   Barthel Index   Feeding 10   Bathing 0   Grooming Score 5   Dressing Score 5   Bladder Score 10   Bowels Score 10   Toilet Use Score 10   Transfers (Bed/Chair) Score 10   Mobility (Level Surface) Score 10   Stairs Score 5   Barthel Index Score 75         Occupational therapy Goals: In 2-4 days    1- Patient will verbalize and demonstrate use of energy conservation/ deep breathing technique and work simplification skills during functional activity with no verbal cues  2- Patient will verbalize and demonstrate good body mechanics and joint protection techniques during ADLs/ IADLs with no verbal cues   3- Patient will increase OOB/ sitting tolerance to 4-6 hours per day for increased participation in self care and leisure tasks with no s/s of exertion  4- Patient will identify s/s of exertion during ADL and functional mobility with no verbal cues    5-Patient will verbalize/ demonstrate compensatory strategies to recover from exertion with no verbal cues  6-Patient will increase standing tolerance time to 10 minutes with No UE support to complete sink level ADLs @ Mod I level   7- Patient will increase sitting tolerance at edge of bed to 30 minutes to complete UB ADLs @ Indep  level     8-- Patient/ Family will demonstrate competency with UE Home Exercise Program

## 2023-02-15 NOTE — ED PROVIDER NOTES
History  Chief Complaint   Patient presents with   • Altered Mental Status     Pt recently discharged from ER  Patient states "I decided I now want to stay "      47-year-old male presents back to the emergency room this morning after prior ER visit earlier today  Patient and his family affirms confusion since awakening this morning, describing episodes where the patient could not dial a phone and the patient states he cannot recall how to do any electronics, which she was previously able to do  Patient states he has been frustrated about not being able to do these things and his family affirms patient has been angry which is atypical for the patient  During a prior emergency department visit, patient was offered admission, which he declined as he had follow-up scheduled tomorrow with rheumatology  Patient's wife states that the patient attempted to grab the steering wheel on the ride home as he was concerned she was going to drive off the road, which was inaccurate  Patient denies any motor weakness or acute sensory loss  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  Patient denies any visual changes  Patient does recall the episodes  Patient and his family confirm the symptoms have been ongoing all day since the patient awoke  Patient family states that these episodes have been waxing and waning over the past few weeks but persistent throughout today though somewhat progressive  Patient affirms cough and during prior emergency department visit, was noted to be positive for RSV  Patient denies any fever  Impression and plan: Episodic intermittent confusion with possible personality changes  Symptoms seem atypical for central process, could be secondary to metabolic process from patient's known RSV  Patient also recently placed on prednisone as possible agent of inciting events    Regardless, patient well outside the window for any thrombolytics and symptoms would seem atypical for large vessel occlusion  Will repeat patient CT of his head, patient has listed contrast allergy from his underlying kidney disease  Discussed with on-call neurology, Blanka, who agreed with no alert, suggested obtaining MRI and they will evaluate in the morning  They will evaluate possibility of obtaining MRA considering patient's kidney disease  Will admit for continued monitoring and evaluation on stroke pathway  Prior to Admission Medications   Prescriptions Last Dose Informant Patient Reported? Taking?    B-D UF III MINI PEN NEEDLES 31G X 5 MM MISC   Yes No   EUSEBIO CONTOUR TEST test strip   Yes No   Sig: 3 (three) times a day Test   Calcium Citrate (CITRACAL PO) 2/14/2023 at 0900  Yes Yes   Sig: Take 650 mg by mouth in the morning   Cinnamon 500 MG capsule 2/14/2023  Yes Yes   Sig: Take 1,000 mg by mouth daily   Trelegy Ellipta 100-62 5-25 MCG/ACT inhaler 2/14/2023 at 0900  No Yes   Sig: USE 1 INHALATION DAILY RINSE MOUTH AFTER USE   apixaban (ELIQUIS) 2 5 mg 2/14/2023 at 0900  Yes Yes   Sig: Take 2 5 mg by mouth 2 (two) times a day   azithromycin (Zithromax Z-Dante) 250 mg tablet Unknown  No No   Sig: Take 2 tablets today then 1 tablet daily x 4 days   cefuroxime (CEFTIN) 500 mg tablet Unknown  No No   Sig: Take 1 tablet (500 mg total) by mouth every 12 (twelve) hours for 5 days   cholecalciferol (VITAMIN D3) 1,000 units tablet 2/13/2023  Yes Yes   Sig: Take 2,000 Units by mouth daily   furosemide (LASIX) 20 mg tablet 2/14/2023  Yes Yes   Sig: Take 20 mg by mouth daily   hydroxychloroquine (PLAQUENIL) 200 mg tablet 2/14/2023 at 0900  Yes Yes   Sig: Take 200 mg by mouth 2 (two) times a day with meals   insulin glargine (LANTUS) 100 units/mL subcutaneous injection 2/13/2023  Yes Yes   Sig: Inject 16 Units under the skin daily at bedtime   metFORMIN (GLUCOPHAGE) 500 mg tablet Not Taking  Yes No   Sig: Take 500 mg by mouth 2 (two) times a day with meals   Patient not taking: Reported on 2022   olmesartan (BENICAR) 20 mg tablet Unknown  Yes No   Sig: Take 20 mg by mouth daily   potassium chloride (KLOR-CON) 8 MEQ tablet 2023  Yes Yes   pravastatin (PRAVACHOL) 40 mg tablet 2023  Yes Yes   Sig: Take 40 mg by mouth daily   predniSONE 1 mg tablet Unknown  Yes No   Sig: Take 4 mg by mouth daily Currently down to 3 1/2 mg daily 22    torsemide (DEMADEX) 10 mg tablet   Yes No   Sig: Take 10 mg by mouth daily      Facility-Administered Medications: None       Past Medical History:   Diagnosis Date   • Cancer ([facility] )     pancreatic   • Colon polyp    • Coronary artery disease    • Diabetes mellitus ([facility] )    • Hyperlipidemia    • Hypertension    • Pneumonia 2017    Right middle and lower lobe    • Stroke [facility])        Past Surgical History:   Procedure Laterality Date   • APPENDECTOMY     • CARDIAC SURGERY      Aortic Valve Replacement, Ascending Aorta   • COLONOSCOPY     • GALLBLADDER SURGERY     • PANCREATICODUODENECTOMY         Family History   Problem Relation Age of Onset   • Cancer Mother    • Stroke Father    • Cancer Sister    • Diabetes Sister    • Stroke Brother      I have reviewed and agree with the history as documented  E-Cigarette/Vaping   • E-Cigarette Use Never User      E-Cigarette/Vaping Substances   • Nicotine No    • THC No    • CBD No    • Flavoring No    • Other No    • Unknown No      Social History     Tobacco Use   • Smoking status: Former     Types: Pipe     Quit date:      Years since quittin 1   • Smokeless tobacco: Never   Vaping Use   • Vaping Use: Never used   Substance Use Topics   • Alcohol use: Never   • Drug use: No       Review of Systems    Physical Exam  Physical Exam  Vitals reviewed  HENT:      Head: Atraumatic  Eyes:      General: Visual field deficit present  Pupils: Pupils are equal, round, and reactive to light  Cardiovascular:      Rate and Rhythm: Normal rate     Pulmonary:      Effort: Pulmonary effort is normal    Abdominal:      General: There is no distension  Palpations: Abdomen is soft  Tenderness: There is no abdominal tenderness  There is no guarding  Musculoskeletal:         General: No deformity  Cervical back: Neck supple  Skin:     General: Skin is warm and dry  Neurological:      Mental Status: He is alert  He is disoriented  Cranial Nerves: No cranial nerve deficit, dysarthria or facial asymmetry  Sensory: Sensory deficit (no acute deficits) present  Motor: No weakness, tremor, atrophy, abnormal muscle tone or pronator drift  Coordination: Coordination is intact  Finger-Nose-Finger Test and Heel to Patel Gila Test normal       Comments: Patient oriented to person and place  States 6354 as the year  Patient does however recognize me, as I did know the patient however I have not seen the patient in over a decade so there is intact long memory  Patient has chronic bilateral neuropathy of the ankles and feet which is at baseline and symmetric, otherwise no sensory deficit  There appears to be a right upper field cut of unclear chronicity     Psychiatric:         Mood and Affect: Mood normal          Vital Signs  ED Triage Vitals   Temperature Pulse Respirations Blood Pressure SpO2   02/14/23 2033 02/14/23 2033 02/14/23 2033 02/14/23 2033 02/14/23 2033   97 6 °F (36 4 °C) 83 19 157/74 96 %      Temp Source Heart Rate Source Patient Position - Orthostatic VS BP Location FiO2 (%)   02/14/23 2033 02/14/23 2115 02/14/23 2115 02/14/23 2115 --   Tympanic Monitor Sitting Right arm       Pain Score       02/15/23 0811       No Pain           Vitals:    02/15/23 2045 02/15/23 2319 02/16/23 0100 02/16/23 0511   BP: 130/74 133/74 134/77 134/76   Pulse: 66 66 63 68   Patient Position - Orthostatic VS: Lying            Visual Acuity  Visual Acuity    Flowsheet Row Most Recent Value   L Pupil Size (mm) 3   R Pupil Size (mm) 3   L Pupil Shape Round   R Pupil Shape Round ED Medications  Medications   apixaban (ELIQUIS) tablet 2 5 mg (2 5 mg Oral Given 2/15/23 1700)   azithromycin (ZITHROMAX) tablet 250 mg (250 mg Oral Given 2/15/23 2333)   cefuroxime (CEFTIN) tablet 500 mg (500 mg Oral Given 2/15/23 2100)   hydroxychloroquine (PLAQUENIL) tablet 200 mg (200 mg Oral Given 2/15/23 1656)   insulin glargine (LANTUS) subcutaneous injection 16 Units 0 16 mL (16 Units Subcutaneous Given 2/15/23 2230)   pravastatin (PRAVACHOL) tablet 40 mg (40 mg Oral Given 2/15/23 0846)   Fluticasone Furoate-Vilanterol 100-25 mcg/actuation 1 puff (1 puff Inhalation Given 2/15/23 1220)     And   umeclidinium 62 5 mcg/actuation inhaler AEPB 1 puff (1 puff Inhalation Given 2/15/23 1220)   acetaminophen (TYLENOL) tablet 650 mg (has no administration in time range)   aspirin chewable tablet 81 mg (81 mg Oral Not Given 2/15/23 0846)   insulin lispro (HumaLOG) 100 units/mL subcutaneous injection 1-6 Units (1 Units Subcutaneous Given 2/15/23 2100)   mirtazapine (REMERON) tablet 15 mg (15 mg Oral Given 2/15/23 0455)       Diagnostic Studies  Results Reviewed     Procedure Component Value Units Date/Time    Fingerstick Glucose (POCT) [[id number]]  (Normal) Collected: 02/15/23 1158    Lab Status: Final result Updated: 02/15/23 1159     POC Glucose 94 mg/dl     Fingerstick Glucose (POCT) [[id number]]  (Normal) Collected: 02/15/23 0840    Lab Status: Final result Updated: 02/15/23 0841     POC Glucose 93 mg/dl     Hemoglobin A1c w/EAG Estimation [[id number]]  (Abnormal) Collected: 02/15/23 0415    Lab Status: Final result Specimen: Blood from Arm, Right Updated: 02/15/23 0835     Hemoglobin A1C 6 9 %       mg/dl     Lipid Panel with Direct LDL reflex [[id number]]  (Normal) Collected: 02/15/23 0415    Lab Status: Final result Specimen: Blood from Arm, Right Updated: 02/15/23 0442     Cholesterol 114 mg/dL      Triglycerides 95 mg/dL      HDL, Direct 42 mg/dL      LDL Calculated 53 mg/dL     Comprehensive metabolic panel [[id number]]  (Abnormal) Collected: 02/15/23 0415    Lab Status: Final result Specimen: Blood from Arm, Right Updated: 02/15/23 0442     Sodium 134 mmol/L      Potassium 4 0 mmol/L      Chloride 100 mmol/L      CO2 28 mmol/L      ANION GAP 6 mmol/L      BUN 37 mg/dL      Creatinine 1 80 mg/dL      Glucose 157 mg/dL      Calcium 9 8 mg/dL      AST 26 U/L      ALT 28 U/L      Alkaline Phosphatase 133 U/L      Total Protein 6 3 g/dL      Albumin 3 6 g/dL      Total Bilirubin 0 67 mg/dL      eGFR 34 ml/min/1 73sq m     Narrative:      [facility] guidelines for Chronic Kidney Disease (CKD):   •  Stage 1 with normal or high GFR (GFR > 90 mL/min/1 73 square meters)  •  Stage 2 Mild CKD (GFR = 60-89 mL/min/1 73 square meters)  •  Stage 3A Moderate CKD (GFR = 45-59 mL/min/1 73 square meters)  •  Stage 3B Moderate CKD (GFR = 30-44 mL/min/1 73 square meters)  •  Stage 4 Severe CKD (GFR = 15-29 mL/min/1 73 square meters)  •  Stage 5 End Stage CKD (GFR <15 mL/min/1 73 square meters)  Note: GFR calculation is accurate only with a steady state creatinine    CBC (With Platelets) [[id number]]  (Abnormal) Collected: 02/15/23 0415    Lab Status: Final result Specimen: Blood from Arm, Right Updated: 02/15/23 0422     WBC 14 34 Thousand/uL      RBC 4 17 Million/uL      Hemoglobin 12 7 g/dL      Hematocrit 39 9 %      MCV 96 fL      MCH 30 5 pg      MCHC 31 8 g/dL      RDW 13 6 %      Platelets 663 Thousands/uL      MPV 8 9 fL     HS Troponin I 4hr [[id number]]  (Normal) Collected: 02/15/23 0249    Lab Status: Final result Specimen: Blood from Arm, Right Updated: 02/15/23 0326     hs TnI 4hr 29 ng/L      Delta 4hr hsTnI 0 ng/L     HS Troponin I 2hr [[id number]]  (Normal) Collected: 02/15/23 0026    Lab Status: Final result Specimen: Blood from Arm, Right Updated: 02/15/23 0107     hs TnI 2hr 31 ng/L      Delta 2hr hsTnI 2 ng/L     Fingerstick Glucose (POCT) [[id number]]  (Normal) Collected: 02/15/23 9327    Lab Status: Final result Updated: 02/15/23 0055     POC Glucose 115 mg/dl     HS Troponin 0hr (reflex protocol) [[id number]]  (Normal) Collected: 02/14/23 2157    Lab Status: Final result Specimen: Blood from Arm, Right Updated: 02/14/23 2232     hs TnI 0hr 29 ng/L     Basic metabolic panel [[id number]]  (Abnormal) Collected: 02/14/23 2157    Lab Status: Final result Specimen: Blood from Arm, Right Updated: 02/14/23 2223     Sodium 135 mmol/L      Potassium 4 5 mmol/L      Chloride 102 mmol/L      CO2 27 mmol/L      ANION GAP 6 mmol/L      BUN 36 mg/dL      Creatinine 1 81 mg/dL      Glucose 207 mg/dL      Calcium 9 8 mg/dL      eGFR 34 ml/min/1 73sq m     Narrative:      [facility] guidelines for Chronic Kidney Disease (CKD):   •  Stage 1 with normal or high GFR (GFR > 90 mL/min/1 73 square meters)  •  Stage 2 Mild CKD (GFR = 60-89 mL/min/1 73 square meters)  •  Stage 3A Moderate CKD (GFR = 45-59 mL/min/1 73 square meters)  •  Stage 3B Moderate CKD (GFR = 30-44 mL/min/1 73 square meters)  •  Stage 4 Severe CKD (GFR = 15-29 mL/min/1 73 square meters)  •  Stage 5 End Stage CKD (GFR <15 mL/min/1 73 square meters)  Note: GFR calculation is accurate only with a steady state creatinine    CBC and differential [[id number]]  (Abnormal) Collected: 02/14/23 2157    Lab Status: Final result Specimen: Blood from Arm, Right Updated: 02/14/23 2223     WBC 12 95 Thousand/uL      RBC 4 03 Million/uL      Hemoglobin 12 4 g/dL      Hematocrit 38 1 %      MCV 95 fL      MCH 30 8 pg      MCHC 32 5 g/dL      RDW 13 5 %      MPV 9 0 fL      Platelets 587 Thousands/uL      nRBC 0 /100 WBCs      Neutrophils Relative 81 %      Immat GRANS % 1 %      Lymphocytes Relative 9 %      Monocytes Relative 8 %      Eosinophils Relative 1 %      Basophils Relative 0 %      Neutrophils Absolute 10 46 Thousands/µL      Immature Grans Absolute 0 12 Thousand/uL      Lymphocytes Absolute 1 20 Thousands/µL      Monocytes Absolute 1 01 Thousand/µL      Eosinophils Absolute 0 14 Thousand/µL      Basophils Absolute 0 02 Thousands/µL     Protime-INR [[id number]]  (Abnormal) Collected: 02/14/23 2157    Lab Status: Final result Specimen: Blood from Arm, Right Updated: 02/14/23 2221     Protime 15 9 seconds      INR 1 30    APTT [[id number]]  (Normal) Collected: 02/14/23 2157    Lab Status: Final result Specimen: Blood from Arm, Right Updated: 02/14/23 2221     PTT 27 seconds                  MRI brain wo contrast   Final Result by Sagar Quan MD (02/15 1708)      No acute intracranial abnormality  Multiple chronic small infarcts in bilateral basal ganglia and bilateral cerebellum, worse in bilateral cerebellum  Multiple scattered peripheral predominant chronic microhemorrhages in bilateral cerebral hemispheres  Differential includes cerebral amyloid angiopathy, multiple cavernomas, prior embolic event, among other differentials  Severe chronic microangiopathy  Mild-to-moderate sinus disease with findings suggestive of acute sinusitis  Workstation performed: [id number]         CT head without contrast   Final Result by Coty Braxton MD (02/14 2338)      No acute intracranial abnormality  Workstation performed: [id number]                    Procedures  Procedures         ED Course                  Stroke Assessment     Row Name 02/14/23 2159             NIH Stroke Scale    Interval Baseline      Level of Consciousness (1a ) 0      LOC Questions (1b ) 0  Knows month and age    Year as noted stated 1921      LOC Commands (1c ) 0      Best Gaze (2 ) 0      Visual (3 ) 1      Facial Palsy (4 ) 0      Motor Arm, Left (5a ) 0      Motor Arm, Right (5b ) 0      Motor Leg, Left (6a ) 0      Motor Leg, Right (6b ) 0      Limb Ataxia (7 ) 0      Sensory (8 ) 0      Best Language (9 ) 0      Dysarthria (10 ) 0      Extinction and Inattention (11 ) (Formerly Neglect) 0      Total 1 Flowsheet Row Most Recent Value   Thrombolytic Decision Options    Thrombolytic Decision Patient not a candidate  Patient is not a candidate options Unclear time of onset outside appropriate time window                                    MDM    Disposition  Final diagnoses:   Confusion   RSV (acute bronchiolitis due to respiratory syncytial virus)   On corticosteroid therapy     Time reflects when diagnosis was documented in both MDM as applicable and the Disposition within this note     Time User Action Codes Description Comment    2/14/2023 11:23 PM Shaneez Bonner Add [R41 82] AMS (altered mental status)     2/15/2023 12:01 AM Lorenzo Torrance Add [R41 0] Confusion     2/15/2023 12:01 AM Lorenzo Butts Add [J21 0] RSV (acute bronchiolitis due to respiratory syncytial virus)     2/15/2023 12:01 AM Lorenzo Butts Add [Z79 52] On corticosteroid therapy       ED Disposition     ED Disposition   Admit    Condition   Stable    Date/Time   Wed Feb 15, 2023 12:01 AM    Comment   Case was discussed with ERIK and the patient's admission status was agreed to be Admission Status: observation status to the service of Dr Eli Tellez    None         Current Discharge Medication List      CONTINUE these medications which have NOT CHANGED    Details   apixaban (ELIQUIS) 2 5 mg Take 2 5 mg by mouth 2 (two) times a day      Calcium Citrate (CITRACAL PO) Take 650 mg by mouth in the morning      cholecalciferol (VITAMIN D3) 1,000 units tablet Take 2,000 Units by mouth daily      Cinnamon 500 MG capsule Take 1,000 mg by mouth daily      furosemide (LASIX) 20 mg tablet Take 20 mg by mouth daily      hydroxychloroquine (PLAQUENIL) 200 mg tablet Take 200 mg by mouth 2 (two) times a day with meals      insulin glargine (LANTUS) 100 units/mL subcutaneous injection Inject 16 Units under the skin daily at bedtime      potassium chloride (KLOR-CON) 8 MEQ tablet       pravastatin (PRAVACHOL) 40 mg tablet Take 40 mg by mouth daily      Trelegy Ellipta 100-62 5-25 MCG/ACT inhaler USE 1 INHALATION DAILY RINSE MOUTH AFTER USE  Qty: 360 blister, Refills: 3    Associated Diagnoses: Centrilobular emphysema (HCC)      azithromycin (Zithromax Z-Dante) 250 mg tablet Take 2 tablets today then 1 tablet daily x 4 days  Qty: 6 tablet, Refills: 0    Associated Diagnoses: Pneumonia of right lung due to infectious organism, unspecified part of lung      B-D UF III MINI PEN NEEDLES 31G X 5 MM MISC Refills: 0      EUSEBIO CONTOUR TEST test strip 3 (three) times a day Test  Refills: 9      cefuroxime (CEFTIN) 500 mg tablet Take 1 tablet (500 mg total) by mouth every 12 (twelve) hours for 5 days  Qty: 10 tablet, Refills: 0    Associated Diagnoses: Pneumonia of right lung due to infectious organism, unspecified part of lung      metFORMIN (GLUCOPHAGE) 500 mg tablet Take 500 mg by mouth 2 (two) times a day with meals      olmesartan (BENICAR) 20 mg tablet Take 20 mg by mouth daily      predniSONE 1 mg tablet Take 4 mg by mouth daily Currently down to 3 1/2 mg daily 05/05/22       torsemide (DEMADEX) 10 mg tablet Take 10 mg by mouth daily             No discharge procedures on file      PDMP Review     None          ED Provider  Electronically Signed by           Arsh Jones MD  02/16/23 8965

## 2023-02-16 VITALS
HEIGHT: 75 IN | WEIGHT: 181 LBS | OXYGEN SATURATION: 98 % | BODY MASS INDEX: 22.5 KG/M2 | DIASTOLIC BLOOD PRESSURE: 74 MMHG | HEART RATE: 76 BPM | RESPIRATION RATE: 16 BRPM | SYSTOLIC BLOOD PRESSURE: 130 MMHG | TEMPERATURE: 98 F

## 2023-02-16 PROBLEM — G93.40 ACUTE ENCEPHALOPATHY: Status: ACTIVE | Noted: 2023-02-14

## 2023-02-16 LAB
ANION GAP SERPL CALCULATED.3IONS-SCNC: 7 MMOL/L (ref 4–13)
BASOPHILS # BLD AUTO: 0.03 THOUSANDS/ÂΜL (ref 0–0.1)
BASOPHILS NFR BLD AUTO: 0 % (ref 0–1)
BUN SERPL-MCNC: 33 MG/DL (ref 5–25)
CALCIUM SERPL-MCNC: 9.4 MG/DL (ref 8.3–10.1)
CHLORIDE SERPL-SCNC: 102 MMOL/L (ref 96–108)
CO2 SERPL-SCNC: 28 MMOL/L (ref 21–32)
CREAT SERPL-MCNC: 1.54 MG/DL (ref 0.6–1.3)
EOSINOPHIL # BLD AUTO: 0.13 THOUSAND/ÂΜL (ref 0–0.61)
EOSINOPHIL NFR BLD AUTO: 1 % (ref 0–6)
ERYTHROCYTE [DISTWIDTH] IN BLOOD BY AUTOMATED COUNT: 13.6 % (ref 11.6–15.1)
FOLATE SERPL-MCNC: 8.8 NG/ML (ref 3.1–17.5)
GFR SERPL CREATININE-BSD FRML MDRD: 41 ML/MIN/1.73SQ M
GLUCOSE SERPL-MCNC: 103 MG/DL (ref 65–140)
GLUCOSE SERPL-MCNC: 134 MG/DL (ref 65–140)
GLUCOSE SERPL-MCNC: 78 MG/DL (ref 65–140)
HCT VFR BLD AUTO: 38.1 % (ref 36.5–49.3)
HGB BLD-MCNC: 12.3 G/DL (ref 12–17)
IMM GRANULOCYTES # BLD AUTO: 0.07 THOUSAND/UL (ref 0–0.2)
IMM GRANULOCYTES NFR BLD AUTO: 1 % (ref 0–2)
LYMPHOCYTES # BLD AUTO: 1.32 THOUSANDS/ÂΜL (ref 0.6–4.47)
LYMPHOCYTES NFR BLD AUTO: 14 % (ref 14–44)
MCH RBC QN AUTO: 30.6 PG (ref 26.8–34.3)
MCHC RBC AUTO-ENTMCNC: 32.3 G/DL (ref 31.4–37.4)
MCV RBC AUTO: 95 FL (ref 82–98)
MONOCYTES # BLD AUTO: 0.85 THOUSAND/ÂΜL (ref 0.17–1.22)
MONOCYTES NFR BLD AUTO: 9 % (ref 4–12)
NEUTROPHILS # BLD AUTO: 7.26 THOUSANDS/ÂΜL (ref 1.85–7.62)
NEUTS SEG NFR BLD AUTO: 75 % (ref 43–75)
NRBC BLD AUTO-RTO: 0 /100 WBCS
PLATELET # BLD AUTO: 154 THOUSANDS/UL (ref 149–390)
PMV BLD AUTO: 8.7 FL (ref 8.9–12.7)
POTASSIUM SERPL-SCNC: 4.3 MMOL/L (ref 3.5–5.3)
RBC # BLD AUTO: 4.02 MILLION/UL (ref 3.88–5.62)
SODIUM SERPL-SCNC: 137 MMOL/L (ref 135–147)
VIT B12 SERPL-MCNC: 1086 PG/ML (ref 100–900)
WBC # BLD AUTO: 9.66 THOUSAND/UL (ref 4.31–10.16)

## 2023-02-16 RX ADMIN — FLUTICASONE FUROATE AND VILANTEROL TRIFENATATE 1 PUFF: 100; 25 POWDER RESPIRATORY (INHALATION) at 08:00

## 2023-02-16 RX ADMIN — PRAVASTATIN SODIUM 40 MG: 40 TABLET ORAL at 08:00

## 2023-02-16 RX ADMIN — HYDROXYCHLOROQUINE SULFATE 200 MG: 200 TABLET, FILM COATED ORAL at 07:58

## 2023-02-16 RX ADMIN — UMECLIDINIUM 1 PUFF: 62.5 AEROSOL, POWDER ORAL at 08:00

## 2023-02-16 RX ADMIN — CEFUROXIME AXETIL 500 MG: 250 TABLET ORAL at 08:01

## 2023-02-16 RX ADMIN — APIXABAN 2.5 MG: 2.5 TABLET, FILM COATED ORAL at 08:01

## 2023-02-16 NOTE — ASSESSMENT & PLAN NOTE
Suspect toxic/metabolic encephalopathy 2/2 infection  · No focal deficits noted  · CT head negative  · MRI without acute CVA  · Continue on oral antibiotic regimen as below for RSV  · Stable for discharge, mentation back to baseline

## 2023-02-16 NOTE — PLAN OF CARE
Patient preparing for discharge  AAOx4  Confusion resolved  Gait steady  Spouse bedside  Patient and spouse verbalized understanding of discharge instructions including diagnosis, follow-up, medications and plan of care

## 2023-02-16 NOTE — PLAN OF CARE
Problem: NEUROSENSORY - ADULT  Goal: Achieves stable or improved neurological status  Description: INTERVENTIONS  - Monitor and report changes in neurological status  - Monitor vital signs such as temperature, blood pressure, glucose, and any other labs ordered   - Initiate measures to prevent increased intracranial pressure  - Monitor for seizure activity and implement precautions if appropriate      Outcome: Progressing  Goal: Remains free of injury related to seizures activity  Description: INTERVENTIONS  - Maintain airway, patient safety  and administer oxygen as ordered  - Monitor patient for seizure activity, document and report duration and description of seizure to physician/advanced practitioner  - If seizure occurs,  ensure patient safety during seizure  - Reorient patient post seizure  - Seizure pads on all 4 side rails  - Instruct patient/family to notify RN of any seizure activity including if an aura is experienced  - Instruct patient/family to call for assistance with activity based on nursing assessment  - Administer anti-seizure medications if ordered    Outcome: Progressing  Goal: Achieves maximal functionality and self care  Description: INTERVENTIONS  - Monitor swallowing and airway patency with patient fatigue and changes in neurological status  - Encourage and assist patient to increase activity and self care  - Encourage visually impaired, hearing impaired and aphasic patients to use assistive/communication devices  Outcome: Progressing     Problem: Neurological Deficit  Goal: Neurological status is stable or improving  Description: Interventions:  - Monitor and assess patient's level of consciousness, motor function, sensory function, and level of assistance needed for ADLs  - Monitor and report changes from baseline  Collaborate with interdisciplinary team to initiate plan and implement interventions as ordered  - Provide and maintain a safe environment    - Consider seizure precautions  - Consider fall precautions  - Consider aspiration precautions  - Consider bleeding precautions  Outcome: Progressing     Problem: Communication Impairment  Goal: Ability to express needs and understand communication  Description: Assess patient's communication skills and ability to understand information  Patient will demonstrate use of effective communication techniques, alternative methods of communication and understanding even if not able to speak  - Encourage communication and provide alternate methods of communication as needed  - Collaborate with case management/ for discharge needs  - Include patient/family/caregiver in decisions related to communication  Outcome: Progressing     Problem: Potential for Aspiration  Goal: Non-ventilated patient's risk of aspiration is minimized  Description: Assess and monitor vital signs, respiratory status, and labs (WBC)  Monitor for signs of aspiration (tachypnea, cough, rales, wheezing, cyanosis, fever)  - Assess and monitor patient's ability to swallow  - Place patient up in chair to eat if possible  - HOB up at 90 degrees to eat if unable to get patient up into chair   - Supervise patient during oral intake  - Instruct patient/ family to take small bites  - Instruct patient/ family to take small single sips when taking liquids  - Follow patient-specific strategies generated by speech pathologist   Outcome: Progressing  Goal: Ventilated patient's risk of aspiration is minimized  Description: Assess and monitor vital signs, respiratory status, airway cuff pressure, and labs (WBC)  Monitor for signs of aspiration (tachypnea, cough, rales, wheezing, cyanosis, fever)  - Elevate head of bed 30 degrees if patient has tube feeding   - Monitor tube feeding    Outcome: Progressing     Problem: Nutrition  Goal: Nutrition/Hydration status is improving  Description: Monitor and assess patient's nutrition/hydration status for malnutrition (ex- brittle hair, bruises, dry skin, pale skin and conjunctiva, muscle wasting, smooth red tongue, and disorientation)  Collaborate with interdisciplinary team and initiate plan and interventions as ordered  Monitor patient's weight and dietary intake as ordered or per policy  Utilize nutrition screening tool and intervene per policy  Determine patient's food preferences and provide high-protein, high-caloric foods as appropriate  - Assist patient with eating   - Allow adequate time for meals   - Encourage patient to take dietary supplement as ordered  - Collaborate with clinical nutritionist   - Include patient/family/caregiver in decisions related to nutrition    Outcome: Progressing

## 2023-02-16 NOTE — QUICK NOTE
Notified by lab patient has 1 out of 2 positive blood cultures for staph epidermis  On chart review, patient has been afebrile  He does have leukocytosis however this could be explained by his chronic prednisone use or recent diagnosis of RSV  Suspect this culture may be a contaminant  He is currently receiving oral antibiotics with cefuroxime mean and azithromycin  We will continue current antibiotics and monitor for fevers

## 2023-02-17 LAB
BACTERIA BLD CULT: ABNORMAL
GRAM STN SPEC: ABNORMAL
S EPIDERMIDIS DNA BLD POS QL NAA+NON-PRB: DETECTED

## 2023-02-18 LAB — 1,25(OH)2D3 SERPL-MCNC: 32.4 PG/ML (ref 24.8–81.5)

## 2023-02-18 NOTE — ASSESSMENT & PLAN NOTE
Lab Results   Component Value Date    EGFR 41 02/16/2023    EGFR 34 02/15/2023    EGFR 34 02/14/2023    CREATININE 1 54 (H) 02/16/2023    CREATININE 1 80 (H) 02/15/2023    CREATININE 1 81 (H) 02/14/2023     · Baseline appears to be around 1 7-2  · Avoid hypotension and nephrotoxic agents  · Follow-up with PCP, recommend monitoring BMP as outpatient

## 2023-02-18 NOTE — ASSESSMENT & PLAN NOTE
· Tested positive for RSV   Patient was seen earlier in the day by the ED for this  · Currently oxygenating well on RA  · Continue azithromycin and ceftin prescribed as outpatient  · Stable for discharge

## 2023-02-18 NOTE — DISCHARGE SUMMARY
[address] [facility]  Discharge- Keshia Master 1940, [de-identified] y o  male MRN: [id number]  Unit/Bed#: -01 Encounter: [id number]  Primary Care Provider: José Manuel Samuels MD   Date and time admitted to hospital: 2/14/2023  8:58 PM    * Acute encephalopathy  Assessment & Plan  Suspect toxic/metabolic encephalopathy 2/2 infection  · No focal deficits noted  · CT head negative  · MRI without acute CVA  · Continue on oral antibiotic regimen as below for RSV  · Stable for discharge, mentation back to baseline    RSV (acute bronchiolitis due to respiratory syncytial virus)  Assessment & Plan  · Tested positive for RSV   Patient was seen earlier in the day by the ED for this  · Currently oxygenating well on RA  · Continue azithromycin and ceftin prescribed as outpatient  · Stable for discharge    CKD (chronic kidney disease)  Assessment & Plan  Lab Results   Component Value Date    EGFR 41 02/16/2023    EGFR 34 02/15/2023    EGFR 34 02/14/2023    CREATININE 1 54 (H) 02/16/2023    CREATININE 1 80 (H) 02/15/2023    CREATININE 1 81 (H) 02/14/2023     · Baseline appears to be around 1 7-2  · Avoid hypotension and nephrotoxic agents  · Follow-up with PCP, recommend monitoring BMP as outpatient    COPD (chronic obstructive pulmonary disease) ([facility] [address] )  Assessment & Plan  · No acute exacerbation  · Continue pre-hospital inhalers    Atrial fibrillation ([facility] [address] )  Assessment & Plan  · Continue AC with Eliquis  · Currently rate controlled    Hypertension  Assessment & Plan  · Continue home meds    Diabetes mellitus [facility])  Assessment & Plan    Lab Results   Component Value Date    HGBA1C 6 9 (H) 02/15/2023     · Continue home regimen  · Follow-up with PCP/endocrinology outpatient      Medical Problems     Resolved Problems  Date Reviewed: 2/14/2023   None       Discharging Physician / Practitioner: Alejandra Martinez MD  PCP: José Manuel Samuels MD  Admission Date:   Admission Orders (From admission, onward)     Ordered 02/15/23 1450  Inpatient Admission  Once            02/14/23 2325  Place in Observation  Once                      Discharge Date: 02/16/23    Consultations During Hospital Stay:  · None    Procedures Performed:   · None    Significant Findings / Test Results:   XR chest 1 view portable  Result Date: 2/14/2023  Impression: No acute cardiopulmonary disease  Stable areas of pleural-parenchymal scarring bilaterally  Workstation performed: [id number]     MRI brain wo contrast  Result Date: 2/15/2023  Impression: No acute intracranial abnormality  Multiple chronic small infarcts in bilateral basal ganglia and bilateral cerebellum, worse in bilateral cerebellum  Multiple scattered peripheral predominant chronic microhemorrhages in bilateral cerebral hemispheres  Differential includes cerebral amyloid angiopathy, multiple cavernomas, prior embolic event, among other differentials  Severe chronic microangiopathy  Mild-to-moderate sinus disease with findings suggestive of acute sinusitis  Workstation performed: [id number]       Incidental Findings:   · NA    Test Results Pending at Discharge (will require follow up): · None     Outpatient Tests Requested:  · Follow-up with PCP for further outpatient testing    Complications:  NA    Reason for Admission: Shortness of breath, AMS    Hospital Course:   Gera Worthy is a [de-identified] y o  male patient who originally presented to the hospital on 2/14/2023 due to AMS, shortness of breath  Initially presented to the ED and was noted to have RSV  Was recommended admission however opted to go home  Back to the ED due to worsening of symptoms  Was seen and evaluated by neurology due to concern for possible CVA  CT of the head as well as MRI of the brain did not show acute pathology  Suspect change in mentation in the setting of infection  He was monitored closely throughout hospital stay  Encephalopathy resolved, mentation back to baseline on day of discharge    We will continue oral antibiotics versus RSV/suspect acute bronchitis  He will follow-up with his PCP as outpatient  Patient and wife was present at bedside amenable to discharge plans  Cleared for discharge today  Please see above list of diagnoses and related plan for additional information  Condition at Discharge: stable    Discharge Day Visit / Exam:   Subjective: Denies any chest pain or shortness of breath  Denies any abdominal pain  Appetite is good  No weakness or focal deficits endorsed  Imelda Jackson to go home today  Vitals: Blood Pressure: 130/74 (02/16/23 1100)  Pulse: 76 (02/16/23 1100)  Temperature: 98 °F (36 7 °C) (02/16/23 1100)  Temp Source: Oral (02/16/23 1100)  Respirations: 16 (02/16/23 1100)  Height: 6' 3" (190 5 cm) (02/15/23 1315)  Weight - Scale: 82 1 kg (181 lb) (02/15/23 1315)  SpO2: 98 % (02/16/23 1100)  Exam:   Physical Exam  Vitals and nursing note reviewed  Constitutional:       General: He is not in acute distress  Appearance: He is not toxic-appearing  HENT:      Head: Normocephalic and atraumatic  Nose: Nose normal       Mouth/Throat:      Mouth: Mucous membranes are moist       Pharynx: Oropharynx is clear  Eyes:      Conjunctiva/sclera: Conjunctivae normal       Pupils: Pupils are equal, round, and reactive to light  Cardiovascular:      Rate and Rhythm: Normal rate  Pulses: Normal pulses  Pulmonary:      Effort: Pulmonary effort is normal       Breath sounds: Normal breath sounds  Abdominal:      Palpations: Abdomen is soft  Tenderness: There is no abdominal tenderness  There is no guarding  Musculoskeletal:         General: No swelling or tenderness  Normal range of motion  Cervical back: Normal range of motion and neck supple  Skin:     General: Skin is warm and dry  Neurological:      General: No focal deficit present  Mental Status: He is alert and oriented to person, place, and time  Motor: No weakness     Psychiatric:         Mood and Affect: Mood normal          Thought Content: Thought content normal          Discussion with Family: Updated  (wife) at bedside  Discharge instructions/Information to patient and family:   See after visit summary for information provided to patient and family  Provisions for Follow-Up Care:  See after visit summary for information related to follow-up care and any pertinent home health orders  Disposition:   Home    Planned Readmission: NA     Discharge Statement:  I spent 45 minutes discharging the patient  This time was spent on the day of discharge  I had direct contact with the patient on the day of discharge  Greater than 50% of the total time was spent examining patient, answering all patient questions, arranging and discussing plan of care with patient as well as directly providing post-discharge instructions  Additional time then spent on discharge activities  Discharge Medications:  See after visit summary for reconciled discharge medications provided to patient and/or family        **Please Note: This note may have been constructed using a voice recognition system**

## 2023-02-18 NOTE — ASSESSMENT & PLAN NOTE
Lab Results   Component Value Date    HGBA1C 6 9 (H) 02/15/2023     · Continue home regimen  · Follow-up with PCP/endocrinology outpatient Detail Level: Simple Additional Notes: Advised patient to see rheumatologist to verify he does not have psoriatic arthritis. Patient was given [facility] information. \\nPatient will consider Taltz or Humira if diagnosis of psoriatic arthritis confirmed.

## 2023-02-19 LAB
ATRIAL RATE: 202 BPM
ATRIAL RATE: 468 BPM
BACTERIA BLD CULT: NORMAL
QRS AXIS: 82 DEGREES
QRS AXIS: 89 DEGREES
QRSD INTERVAL: 110 MS
QRSD INTERVAL: 120 MS
QT INTERVAL: 374 MS
QT INTERVAL: 398 MS
QTC INTERVAL: 431 MS
QTC INTERVAL: 438 MS
T WAVE AXIS: 116 DEGREES
T WAVE AXIS: 142 DEGREES
VENTRICULAR RATE: 73 BPM
VENTRICULAR RATE: 80 BPM

## 2023-02-25 ENCOUNTER — HOSPITAL ENCOUNTER (OUTPATIENT)
Facility: HOSPITAL | Age: 83
Setting detail: OBSERVATION
Discharge: NURSING, REHAB OR LONG-TERM CARE | End: 2023-02-27
Attending: EMERGENCY MEDICINE | Admitting: STUDENT IN AN ORGANIZED HEALTH CARE EDUCATION/TRAINING PROGRAM

## 2023-02-25 DIAGNOSIS — R41.82 ALTERED MENTAL STATUS: Primary | ICD-10-CM

## 2023-02-25 DIAGNOSIS — J18.9 LEFT LOWER LOBE PNEUMONIA: ICD-10-CM

## 2023-02-25 DIAGNOSIS — J44.9 CHRONIC OBSTRUCTIVE PULMONARY DISEASE, UNSPECIFIED COPD TYPE (HCC): ICD-10-CM

## 2023-02-25 LAB — GLUCOSE SERPL-MCNC: 94 MG/DL (ref 65–140)

## 2023-02-26 ENCOUNTER — APPOINTMENT (EMERGENCY)
Dept: CT IMAGING | Facility: HOSPITAL | Age: 83
End: 2023-02-26

## 2023-02-26 PROBLEM — J84.112 CHRONIC IDIOPATHIC PULMONARY FIBROSIS (HCC): Status: ACTIVE | Noted: 2018-03-23

## 2023-02-26 LAB
2HR DELTA HS TROPONIN: 2 NG/L
4HR DELTA HS TROPONIN: 6 NG/L
ALBUMIN SERPL BCP-MCNC: 3.5 G/DL (ref 3.5–5)
ALP SERPL-CCNC: 125 U/L (ref 46–116)
ALT SERPL W P-5'-P-CCNC: 34 U/L (ref 12–78)
AMMONIA PLAS-SCNC: <10 UMOL/L (ref 11–35)
ANION GAP SERPL CALCULATED.3IONS-SCNC: 6 MMOL/L (ref 4–13)
ANION GAP SERPL CALCULATED.3IONS-SCNC: 7 MMOL/L (ref 4–13)
APAP SERPL-MCNC: <2 UG/ML (ref 10–20)
AST SERPL W P-5'-P-CCNC: 25 U/L (ref 5–45)
BASOPHILS # BLD AUTO: 0.02 THOUSANDS/ÂΜL (ref 0–0.1)
BASOPHILS # BLD AUTO: 0.02 THOUSANDS/ÂΜL (ref 0–0.1)
BASOPHILS NFR BLD AUTO: 0 % (ref 0–1)
BASOPHILS NFR BLD AUTO: 0 % (ref 0–1)
BILIRUB SERPL-MCNC: 1.3 MG/DL (ref 0.2–1)
BUN SERPL-MCNC: 37 MG/DL (ref 5–25)
BUN SERPL-MCNC: 38 MG/DL (ref 5–25)
CALCIUM SERPL-MCNC: 10.1 MG/DL (ref 8.3–10.1)
CALCIUM SERPL-MCNC: 9.9 MG/DL (ref 8.3–10.1)
CARDIAC TROPONIN I PNL SERPL HS: 33 NG/L
CARDIAC TROPONIN I PNL SERPL HS: 35 NG/L
CARDIAC TROPONIN I PNL SERPL HS: 39 NG/L
CHLORIDE SERPL-SCNC: 103 MMOL/L (ref 96–108)
CHLORIDE SERPL-SCNC: 105 MMOL/L (ref 96–108)
CO2 SERPL-SCNC: 29 MMOL/L (ref 21–32)
CO2 SERPL-SCNC: 30 MMOL/L (ref 21–32)
CREAT SERPL-MCNC: 1.87 MG/DL (ref 0.6–1.3)
CREAT SERPL-MCNC: 1.95 MG/DL (ref 0.6–1.3)
EOSINOPHIL # BLD AUTO: 0.04 THOUSAND/ÂΜL (ref 0–0.61)
EOSINOPHIL # BLD AUTO: 0.12 THOUSAND/ÂΜL (ref 0–0.61)
EOSINOPHIL NFR BLD AUTO: 0 % (ref 0–6)
EOSINOPHIL NFR BLD AUTO: 1 % (ref 0–6)
ERYTHROCYTE [DISTWIDTH] IN BLOOD BY AUTOMATED COUNT: 13.8 % (ref 11.6–15.1)
ERYTHROCYTE [DISTWIDTH] IN BLOOD BY AUTOMATED COUNT: 13.9 % (ref 11.6–15.1)
ETHANOL SERPL-MCNC: <3 MG/DL (ref 0–3)
FLUAV RNA RESP QL NAA+PROBE: NEGATIVE
FLUBV RNA RESP QL NAA+PROBE: NEGATIVE
GFR SERPL CREATININE-BSD FRML MDRD: 31 ML/MIN/1.73SQ M
GFR SERPL CREATININE-BSD FRML MDRD: 32 ML/MIN/1.73SQ M
GLUCOSE P FAST SERPL-MCNC: 60 MG/DL (ref 65–99)
GLUCOSE SERPL-MCNC: 105 MG/DL (ref 65–140)
GLUCOSE SERPL-MCNC: 107 MG/DL (ref 65–140)
GLUCOSE SERPL-MCNC: 116 MG/DL (ref 65–140)
GLUCOSE SERPL-MCNC: 147 MG/DL (ref 65–140)
GLUCOSE SERPL-MCNC: 181 MG/DL (ref 65–140)
GLUCOSE SERPL-MCNC: 53 MG/DL (ref 65–140)
GLUCOSE SERPL-MCNC: 58 MG/DL (ref 65–140)
GLUCOSE SERPL-MCNC: 60 MG/DL (ref 65–140)
HCT VFR BLD AUTO: 41.2 % (ref 36.5–49.3)
HCT VFR BLD AUTO: 42 % (ref 36.5–49.3)
HGB BLD-MCNC: 13.2 G/DL (ref 12–17)
HGB BLD-MCNC: 13.6 G/DL (ref 12–17)
IMM GRANULOCYTES # BLD AUTO: 0.03 THOUSAND/UL (ref 0–0.2)
IMM GRANULOCYTES # BLD AUTO: 0.03 THOUSAND/UL (ref 0–0.2)
IMM GRANULOCYTES NFR BLD AUTO: 0 % (ref 0–2)
IMM GRANULOCYTES NFR BLD AUTO: 0 % (ref 0–2)
LYMPHOCYTES # BLD AUTO: 1.18 THOUSANDS/ÂΜL (ref 0.6–4.47)
LYMPHOCYTES # BLD AUTO: 1.4 THOUSANDS/ÂΜL (ref 0.6–4.47)
LYMPHOCYTES NFR BLD AUTO: 11 % (ref 14–44)
LYMPHOCYTES NFR BLD AUTO: 15 % (ref 14–44)
MCH RBC QN AUTO: 30.7 PG (ref 26.8–34.3)
MCH RBC QN AUTO: 30.9 PG (ref 26.8–34.3)
MCHC RBC AUTO-ENTMCNC: 32 G/DL (ref 31.4–37.4)
MCHC RBC AUTO-ENTMCNC: 32.4 G/DL (ref 31.4–37.4)
MCV RBC AUTO: 96 FL (ref 82–98)
MCV RBC AUTO: 96 FL (ref 82–98)
MONOCYTES # BLD AUTO: 0.82 THOUSAND/ÂΜL (ref 0.17–1.22)
MONOCYTES # BLD AUTO: 0.83 THOUSAND/ÂΜL (ref 0.17–1.22)
MONOCYTES NFR BLD AUTO: 8 % (ref 4–12)
MONOCYTES NFR BLD AUTO: 9 % (ref 4–12)
NEUTROPHILS # BLD AUTO: 6.84 THOUSANDS/ÂΜL (ref 1.85–7.62)
NEUTROPHILS # BLD AUTO: 8.51 THOUSANDS/ÂΜL (ref 1.85–7.62)
NEUTS SEG NFR BLD AUTO: 75 % (ref 43–75)
NEUTS SEG NFR BLD AUTO: 81 % (ref 43–75)
NRBC BLD AUTO-RTO: 0 /100 WBCS
NRBC BLD AUTO-RTO: 0 /100 WBCS
PLATELET # BLD AUTO: 141 THOUSANDS/UL (ref 149–390)
PLATELET # BLD AUTO: 186 THOUSANDS/UL (ref 149–390)
PMV BLD AUTO: 9.1 FL (ref 8.9–12.7)
PMV BLD AUTO: 9.4 FL (ref 8.9–12.7)
POTASSIUM SERPL-SCNC: 4.6 MMOL/L (ref 3.5–5.3)
POTASSIUM SERPL-SCNC: 4.9 MMOL/L (ref 3.5–5.3)
PROCALCITONIN SERPL-MCNC: <0.05 NG/ML
PROT SERPL-MCNC: 6 G/DL (ref 6.4–8.4)
RBC # BLD AUTO: 4.3 MILLION/UL (ref 3.88–5.62)
RBC # BLD AUTO: 4.4 MILLION/UL (ref 3.88–5.62)
RSV RNA RESP QL NAA+PROBE: NEGATIVE
SALICYLATES SERPL-MCNC: <3 MG/DL (ref 3–20)
SARS-COV-2 RNA RESP QL NAA+PROBE: NEGATIVE
SODIUM SERPL-SCNC: 140 MMOL/L (ref 135–147)
SODIUM SERPL-SCNC: 140 MMOL/L (ref 135–147)
TSH SERPL DL<=0.05 MIU/L-ACNC: 1 UIU/ML (ref 0.45–4.5)
WBC # BLD AUTO: 10.6 THOUSAND/UL (ref 4.31–10.16)
WBC # BLD AUTO: 9.24 THOUSAND/UL (ref 4.31–10.16)

## 2023-02-26 RX ORDER — VANCOMYCIN HYDROCHLORIDE 1 G/200ML
1000 INJECTION, SOLUTION INTRAVENOUS EVERY 24 HOURS
Status: DISCONTINUED | OUTPATIENT
Start: 2023-02-27 | End: 2023-02-27

## 2023-02-26 RX ORDER — HYDROXYCHLOROQUINE SULFATE 200 MG/1
200 TABLET, FILM COATED ORAL 2 TIMES DAILY WITH MEALS
Status: DISCONTINUED | OUTPATIENT
Start: 2023-02-26 | End: 2023-02-27 | Stop reason: HOSPADM

## 2023-02-26 RX ORDER — HALOPERIDOL 5 MG/ML
2.5 INJECTION INTRAMUSCULAR ONCE
Status: COMPLETED | OUTPATIENT
Start: 2023-02-26 | End: 2023-02-26

## 2023-02-26 RX ORDER — INSULIN GLARGINE 100 [IU]/ML
16 INJECTION, SOLUTION SUBCUTANEOUS
Status: DISCONTINUED | OUTPATIENT
Start: 2023-02-26 | End: 2023-02-26

## 2023-02-26 RX ORDER — FLUTICASONE FUROATE AND VILANTEROL 100; 25 UG/1; UG/1
1 POWDER RESPIRATORY (INHALATION) DAILY
Status: DISCONTINUED | OUTPATIENT
Start: 2023-02-26 | End: 2023-02-27 | Stop reason: HOSPADM

## 2023-02-26 RX ORDER — SODIUM CHLORIDE 9 MG/ML
75 INJECTION, SOLUTION INTRAVENOUS CONTINUOUS
Status: DISCONTINUED | OUTPATIENT
Start: 2023-02-26 | End: 2023-02-26

## 2023-02-26 RX ORDER — ACETAMINOPHEN 325 MG/1
650 TABLET ORAL EVERY 6 HOURS PRN
Status: DISCONTINUED | OUTPATIENT
Start: 2023-02-26 | End: 2023-02-27 | Stop reason: HOSPADM

## 2023-02-26 RX ORDER — SODIUM CHLORIDE 9 MG/ML
75 INJECTION, SOLUTION INTRAVENOUS CONTINUOUS
Status: DISPENSED | OUTPATIENT
Start: 2023-02-26 | End: 2023-02-27

## 2023-02-26 RX ORDER — INSULIN LISPRO 100 [IU]/ML
1-6 INJECTION, SOLUTION INTRAVENOUS; SUBCUTANEOUS
Status: DISCONTINUED | OUTPATIENT
Start: 2023-02-26 | End: 2023-02-27 | Stop reason: HOSPADM

## 2023-02-26 RX ORDER — QUETIAPINE FUMARATE 25 MG/1
12.5 TABLET, FILM COATED ORAL
Status: DISCONTINUED | OUTPATIENT
Start: 2023-02-26 | End: 2023-02-27 | Stop reason: HOSPADM

## 2023-02-26 RX ORDER — PRAVASTATIN SODIUM 40 MG
40 TABLET ORAL
Status: DISCONTINUED | OUTPATIENT
Start: 2023-02-26 | End: 2023-02-27 | Stop reason: HOSPADM

## 2023-02-26 RX ORDER — PREDNISONE 1 MG/1
4 TABLET ORAL DAILY
Status: DISCONTINUED | OUTPATIENT
Start: 2023-02-26 | End: 2023-02-27 | Stop reason: HOSPADM

## 2023-02-26 RX ORDER — HALOPERIDOL 5 MG/ML
2 INJECTION INTRAMUSCULAR ONCE
Status: COMPLETED | OUTPATIENT
Start: 2023-02-26 | End: 2023-02-26

## 2023-02-26 RX ORDER — LOSARTAN POTASSIUM 50 MG/1
50 TABLET ORAL DAILY
Status: DISCONTINUED | OUTPATIENT
Start: 2023-02-26 | End: 2023-02-27 | Stop reason: HOSPADM

## 2023-02-26 RX ORDER — FUROSEMIDE 20 MG/1
20 TABLET ORAL DAILY
Status: DISCONTINUED | OUTPATIENT
Start: 2023-02-26 | End: 2023-02-27 | Stop reason: HOSPADM

## 2023-02-26 RX ADMIN — HALOPERIDOL LACTATE 2 MG: 5 INJECTION, SOLUTION INTRAMUSCULAR at 22:50

## 2023-02-26 RX ADMIN — HYDROXYCHLOROQUINE SULFATE 200 MG: 200 TABLET, FILM COATED ORAL at 08:08

## 2023-02-26 RX ADMIN — APIXABAN 2.5 MG: 2.5 TABLET, FILM COATED ORAL at 17:00

## 2023-02-26 RX ADMIN — HYDROXYCHLOROQUINE SULFATE 200 MG: 200 TABLET, FILM COATED ORAL at 16:55

## 2023-02-26 RX ADMIN — PRAVASTATIN SODIUM 40 MG: 40 TABLET ORAL at 16:55

## 2023-02-26 RX ADMIN — UMECLIDINIUM 1 PUFF: 62.5 AEROSOL, POWDER ORAL at 08:05

## 2023-02-26 RX ADMIN — PREDNISONE 4 MG: 1 TABLET ORAL at 08:04

## 2023-02-26 RX ADMIN — FLUTICASONE FUROATE AND VILANTEROL TRIFENATATE 1 PUFF: 100; 25 POWDER RESPIRATORY (INHALATION) at 08:05

## 2023-02-26 RX ADMIN — CEFTRIAXONE SODIUM 1000 MG: 10 INJECTION, POWDER, FOR SOLUTION INTRAVENOUS at 13:00

## 2023-02-26 RX ADMIN — VANCOMYCIN HYDROCHLORIDE 2000 MG: 1 INJECTION, POWDER, LYOPHILIZED, FOR SOLUTION INTRAVENOUS at 14:22

## 2023-02-26 RX ADMIN — SODIUM CHLORIDE 75 ML/HR: 0.9 INJECTION, SOLUTION INTRAVENOUS at 14:27

## 2023-02-26 RX ADMIN — LOSARTAN POTASSIUM 50 MG: 50 TABLET, FILM COATED ORAL at 08:05

## 2023-02-26 RX ADMIN — INSULIN LISPRO 1 UNITS: 100 INJECTION, SOLUTION INTRAVENOUS; SUBCUTANEOUS at 16:56

## 2023-02-26 RX ADMIN — APIXABAN 2.5 MG: 2.5 TABLET, FILM COATED ORAL at 08:04

## 2023-02-26 RX ADMIN — FUROSEMIDE 20 MG: 20 TABLET ORAL at 08:04

## 2023-02-26 RX ADMIN — SODIUM CHLORIDE 1000 ML: 0.9 INJECTION, SOLUTION INTRAVENOUS at 00:39

## 2023-02-26 RX ADMIN — SODIUM CHLORIDE 100 ML/HR: 0.9 INJECTION, SOLUTION INTRAVENOUS at 05:16

## 2023-02-26 RX ADMIN — SODIUM CHLORIDE 75 ML/HR: 0.9 INJECTION, SOLUTION INTRAVENOUS at 19:41

## 2023-02-26 RX ADMIN — HALOPERIDOL LACTATE 2.5 MG: 5 INJECTION, SOLUTION INTRAMUSCULAR at 00:38
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
## 2023-02-26 NOTE — PHYSICAL THERAPY NOTE
Physical Therapy Evaluation     Patient's Name: Pradip Ramirez    Admitting Diagnosis  Altered mental status [R41 [id number]]    Problem List  Patient Active Problem List   Diagnosis    Diabetes mellitus ([facility] )    Hypertension    Acute-on-chronic kidney injury ([facility] )    Atrial fibrillation ([facility] )    Aneurysm of thoracic aorta    Aneurysm of abdominal aorta    Hyperlipidemia    Inflammatory polyarthropathy (HCC)    Osteoarthrosis    Polymyalgia rheumatica (HCC)    History of malignant neuroendocrine tumor    Centrilobular emphysema (HCC)    COPD (chronic obstructive pulmonary disease) ([facility] )    Left lower lobe pneumonia    CKD (chronic kidney disease)    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)    RSV (acute bronchiolitis due to respiratory syncytial virus)    Acute encephalopathy    Generalized weakness    Chronic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 ([facility] )    Neoplasm of other specified site       Past Medical History  Past Medical History:   Diagnosis Date    Cancer ([facility] )     pancreatic    Colon polyp     Coronary artery disease     Diabetes mellitus ([facility] )     Hyperlipidemia     Hypertension     Pneumonia 06/13/2017    Right middle and lower lobe     Stroke [facility])        Past Surgical History  Past Surgical History:   Procedure Laterality Date    APPENDECTOMY      CARDIAC SURGERY      Aortic Valve Replacement, Ascending Aorta    COLONOSCOPY      GALLBLADDER SURGERY      PANCREATICODUODENECTOMY            02/26/23 1224   PT Last Visit   PT Visit Date 02/26/23   Note Type   Note type Evaluation   Pain Assessment   Pain Assessment Tool 0-10   Pain Score No Pain   Restrictions/Precautions   Weight Bearing Precautions Per Order No   Braces or Orthoses   (none reported)   Other Precautions Cognitive; Chair Alarm; Bed Alarm;Multiple lines; Fall Risk   Home Living   Type of [address] [place] [address] Multi-level;Stairs to enter with rails; Performs ADLs on one level  (2 SISSY via garage   FOS to 2nd floor bed/bathroom)   Bathroom Shower/Tub Walk-in shower   Bathroom Toilet Standard   Bathroom Equipment Grab bars in shower;Built-in shower seat   [address]  (no AD used at baseline)   Prior Function   Level of [place] Independent with ADLs; Independent with functional mobility; Needs assistance with IADLS   Lives With Spouse   Receives Help From Family   IADLs Family/Friend/Other provides meals; Family/Friend/Other provides transportation; Family/Friend/Other provides medication management   Falls in the last 6 months 1 to 4  (2 falls outdoors)   Vocational Retired   Comments family at bedside, assisted with confirmation of information d/t cognitive deficits   General   Family/Caregiver Present Yes   Cognition   Overall Cognitive Status Impaired   Arousal/Participation Arousable   Attention Difficulty attending to directions   Orientation Level Oriented to person;Disoriented to place; Disoriented to time;Disoriented to situation  (requires multiple cues to recall birthday)   Memory Decreased short term memory;Decreased recall of recent events;Decreased recall of precautions   Following Commands Follows one step commands inconsistently   RLE Assessment   RLE Assessment   (unable to formally MMT d/t cognitive deficits, at least grossly 3+/5 observed during functional assessment)   LLE Assessment   LLE Assessment   (unable to formally MMT d/t cognitive deficits, at least grossly 3+/5 observed during functional assessment; recommend continued formal strength assessment as able)   Coordination   Movements are Fluid and Coordinated   (unable to formally assess d/t cognitive deficits, continue to assess as able in subsequent sessions)   Sensation   (unable to formally assess d/t cognitive deficits, continue to assess as able in subsequent sessions)   Bed Mobility   Rolling R 2  Maximal assistance   Additional items Assist x 2;Bedrails; Increased time required;Verbal cues;LE management   Rolling L 2  Maximal assistance Additional items Assist x 2;Bedrails; Increased time required;Verbal cues;LE management   Supine to Sit 2  Maximal assistance   Additional items Assist x 3;HOB elevated; Bedrails; Increased time required;Verbal cues;LE management  (Pt achieved ~50% of sitting at EOB but unable to fully achieve d/t pt being resistive with poor command following)   Additional Comments Attempted to have pt sit EOB multiple times but pt resistive and verbalized fear of falling  Pt requires frequent re-direction, significantly increased time for processing, and repetition of directions  Endurance Deficit   Endurance Deficit Yes   Activity Tolerance   Activity Tolerance Patient limited by fatigue;Treatment limited secondary to medical complications (Comment)  (Impaired cognition, becoming resistant to assessment)   Medical Staff Made Aware Pt seen as a co-eval with MAYANK Clement due to the patient's co-morbidities, clinically unstable presentation, and present impairments which are a regression from the patient's baseline  Nurse Made Aware RN Baylee   Assessment   Prognosis Guarded   Problem List Decreased strength;Decreased endurance; Impaired balance;Decreased mobility; Decreased cognition;Decreased safety awareness   Assessment Pt is 82 y o  male seen for high-complexity PT evaluation on 2/26/2023 s/p admit to [facility] on 2/25/2023 w/ Acute encephalopathy  PT was consulted to assess pt's functional mobility and d/c needs  Order placed for PT eval and tx  PTA, pt resides with wife in multi level home, 2 SISSY; ambulatory without AD, +fall history  At time of eval, pt requiring max A x3 for supine to sit attempt, only able to achieve 50% of transfer  Upon evaluation, pt presenting with impaired functional mobility d/t decreased strength, decreased endurance, impaired balance, decreased mobility, decreased cognition, decreased safety awareness and activity intolerance   Pertinent PMHx and current co-morbidities affecting pt's physical performance at time of assessment include: acute encephalopathy, DM, HTN, A fib, COPD, LLL pneumonia, CKD, aneurysm, OA, polymyalgia rheumatica, centrilobular emphysema, RSV, generalized weakness, neoplasm, chronic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  Personal factors affecting pt at time of eval include: inaccessible home environment, stairs to enter home, inability to navigate level surfaces w/o external assistance, decreased cognition, limited home support, positive fall history and decreased initiation and engagement  The following objective measures performed on IE also reveal limitations: Barthel Index: 5/100, Modified [place]: 5 (severe disability) and AM-PAC 6-Clicks: 7/52  Pt's clinical presentation is currently unstable/unpredictable seen in pt's presentation of advanced age, abnormal lab value(s), need for input for task focus and mobility technique and ongoing medical assessment  Overall, pt's rehab potential and prognosis to return to PLOF is guarded as impacted by objective findings, warranting pt to receive further skilled PT interventions to address identified impairments, activity limitation(s), and participation restriction(s)  Pt to benefit from continued PT tx to address deficits as defined above and maximize level of functional independent mobility  From PT/mobility standpoint, recommendation at time of d/c would be post acute rehabilitation services pending progress in order to facilitate return to PLOF     Barriers to Discharge Inaccessible home environment;Decreased caregiver support   Goals   Patient Goals to get better   STG Expiration Date 03/08/23   Short Term Goal #1 In 7-10 days: Increase bilateral LE strength 1/2 grade to facilitate independent mobility, Perform all bed mobility tasks with mod A of 1 to decrease caregiver burden, Improve Barthel Index score to 20 or greater to facilitate independence and PT to see and establish goals for transfers, sitting/standing/ambulatory balance, ambulation, elevations/stairs when appropriate  Continued formal strength, sensation, coordination testing  PT Treatment Day 0   Plan   Treatment/Interventions LE strengthening/ROM; Therapeutic exercise; Endurance training;Patient/family training;Equipment eval/education; Bed mobility;Continued evaluation;Spoke to nursing;OT;Family   PT Frequency 3-5x/wk   Recommendation   PT Discharge Recommendation Post acute rehabilitation services   Equipment Recommended   (TBD)   AM-PAC Basic Mobility Inpatient   Turning in Flat Bed Without Bedrails 1   Lying on Back to Sitting on Edge of Flat Bed Without Bedrails 1   Moving Bed to Chair 1   Standing Up From Chair Using Arms 1   Walk in Room 1   Climb 3-5 Stairs With Railing 1   Basic Mobility Inpatient Raw Score 6   Turning Head Towards Sound 3   Follow Simple Instructions 2   Low Function Basic Mobility Raw Score 11   Low Function Basic Mobility Standardized Score 16 55   Highest Level Of Mobility   -[facility] Goal 2: Bed activities/Dependent transfer   -HL Achieved 2: Bed activities/Dependent transfer   Modified [place] Scale   Modified [place] Scale 5   Barthel Index   Feeding 5   Bathing 0   Grooming Score 0   Dressing Score 0   Bladder Score 0   Bowels Score 0   Toilet Use Score 0   Transfers (Bed/Chair) Score 0   Mobility (Level Surface) Score 0   Stairs Score 0   Barthel Index Score 5         Odessia Lis, PT, DPT

## 2023-02-26 NOTE — PROGRESS NOTES
[facility]  Progress Note Kd Proffer 1940, 82 y o  male MRN: [id number]  Unit/Bed#: -01 Encounter: [id number]  Primary Care Provider: Adali Lee MD   Date and time admitted to hospital: 2/25/2023 10:56 PM    CKD (chronic kidney disease)  Assessment & Plan  Lab Results   Component Value Date    EGFR 32 02/26/2023    EGFR 31 02/26/2023    EGFR 41 02/16/2023    CREATININE 1 87 (H) 02/26/2023    CREATININE 1 95 (H) 02/26/2023    CREATININE 1 54 (H) 02/16/2023     · Creatinine 1 95, improving  · Recent baseline around 1 7  · Received 1L NS bolus in ED, continue IVF infusion  · Avoid nephrotoxic agents  · AM BMP    Left lower lobe pneumonia  Assessment & Plan  Ct c/a/p Mild patchy nodular infiltrate through the left lower lobe in keeping with left lower lobe pneumonia  · Will start on IV ceftriaxone   · Urine legionella and strep pending  · Sputum culture pending  · Monitor fever curve and wbc count     COPD (chronic obstructive pulmonary disease) (HCC)  Assessment & Plan  Does not appear to be in acute exacerbation  · Continue home inhaler- trelegy not on formulary so breo ellipta and incruse ellipta ordered  · Continue home daily prednisone  · Monitor respiratory status    Atrial fibrillation ([facility] )  Assessment & Plan  · HR appears rate controlled while in ED   · Not currently on outpatient rate control medication  · Continue home Eliquis    Hypertension  Assessment & Plan  · Continue home Lasix and olmesartan (not on formulary so losartan ordered)  · Monitor BP per unit protocol    Diabetes mellitus ([facility] )  Assessment & Plan    Lab Results   Component Value Date    HGBA1C 6 9 (H) 02/15/2023     · on home Lantus 16 unit nightly, noted to be hypoglycemic  Will hold for now  · Correctional SSI  · Hypoglycemia protocol  · Diabetic diet    * Acute encephalopathy  Assessment & Plan  P/w disoriented with altered mentation per family as noted by outbursts with family members  · Recent diagnosis of vascular dementia with behavioral disturbance  · Recent discharge on 2/16 following a stay for acute encephalopathy in the setting of RSV and dementia  · Recently finished outpatient azithromycin and Ceftin course  · Per family, after 2mg IM haldol in ED patient is now currently at baseline mentation which is oriented x1 (to place)  · CT head negative for acute process  · CT abd pelvis does not show any acute or infectious process  · Not hypoglycemic; no major electrolyte abnormalities  · Ammonia not elevated; coma panel negative   · UA pending collection; f/u w/ results  · Workup not overtly suggestive of infection so far, patient not meeting SIRS, will monitor off abx for now; procal ordered, f/u w/ results  · TSH pending   · Consider checking for vitamin deficiencies such as vit B12, folate, vit B1 as pt has had poor po intake   · Consider MRI brain to check for any further vascular injury causing AMS in setting of vascular dementia   · Unsure of cause currently; possibly due to worsening dementia? vs chronic steroid use? · High suspicion of poor po intake as noted by significant dehydration on exam causing altered mentation   · Monitor mentation  · Delirium precautions   · Case management consulted       VTE Pharmacologic Prophylaxis: VTE Score: 3 Moderate Risk (Score 3-4) - Pharmacological DVT Prophylaxis Ordered: heparin  Patient Centered Rounds: I performed bedside rounds with nursing staff today  Discussions with Specialists or Other Care Team Provider: FARNAZ    Education and Discussions with Family / Patient: Attempted to update  (wife) via phone  Left voicemail       Total Time Spent on Date of Encounter in care of patient: 65 minutes This time was spent on one or more of the following: performing physical exam; counseling and coordination of care; obtaining or reviewing history; documenting in the medical record; reviewing/ordering tests, medications or procedures; communicating with other healthcare professionals and discussing with patient's family/caregivers  Current Length of Stay: 0 day(s)  Current Patient Status: Observation   Certification Statement: The patient will continue to require additional inpatient hospital stay due to PT/OT evaluation pending  Discharge Plan: Anticipate discharge in 24-48 hrs to discharge location to be determined pending rehab evaluations  Code Status: Level 1 - Full Code    Subjective:   Pt appears to be at baseline  Is able to tell me his name  Denies chest pain, sob, fevers or chills  Has no acute complaints  Objective:     Vitals:   Temp (24hrs), Av 6 °F (36 4 °C), Min:97 4 °F (36 3 °C), Max:97 9 °F (36 6 °C)    Temp:  [97 4 °F (36 3 °C)-97 9 °F (36 6 °C)] 97 4 °F (36 3 °C)  HR:  [70-83] 83  Resp:  [19-25] 19  BP: (121-166)/(71-93) 160/87  SpO2:  [94 %-100 %] 97 %  Body mass index is 21 63 kg/m²  Input and Output Summary (last 24 hours): Intake/Output Summary (Last 24 hours) at 2023 1216  Last data filed at 2023 3793  Gross per 24 hour   Intake 1000 ml   Output 100 ml   Net 900 ml       Physical Exam:   Physical Exam  Vitals reviewed  Constitutional:       General: He is not in acute distress  Appearance: He is well-developed  He is not diaphoretic  HENT:      Head: Normocephalic and atraumatic  Mouth/Throat:      Pharynx: No oropharyngeal exudate  Eyes:      General: No scleral icterus  Extraocular Movements: Extraocular movements intact  Conjunctiva/sclera: Conjunctivae normal    Neck:      Vascular: No JVD  Trachea: No tracheal deviation  Cardiovascular:      Rate and Rhythm: Normal rate and regular rhythm  Heart sounds: No murmur heard  No friction rub  No gallop  Pulmonary:      Effort: Pulmonary effort is normal  No respiratory distress  Breath sounds: No stridor  No wheezing  Abdominal:      General: There is no distension        Palpations: Abdomen is soft  There is no mass  Tenderness: There is no abdominal tenderness  There is no right CVA tenderness or left CVA tenderness  Musculoskeletal:         General: No tenderness  Normal range of motion  Right lower leg: No edema  Left lower leg: No edema  Skin:     General: Skin is warm and dry  Coloration: Skin is not pale  Findings: No erythema  Neurological:      Mental Status: He is alert  Comments: Alert and oriented to self   Psychiatric:         Behavior: Behavior normal          Thought Content:  Thought content normal          Additional Data:     Labs:  Results from last 7 days   Lab Units 02/26/23  0512   WBC Thousand/uL 9 24   HEMOGLOBIN g/dL 13 2   HEMATOCRIT % 41 2   PLATELETS Thousands/uL 141*   NEUTROS PCT % 75   LYMPHS PCT % 15   MONOS PCT % 9   EOS PCT % 1     Results from last 7 days   Lab Units 02/26/23  0512 02/26/23  0026   SODIUM mmol/L 140 140   POTASSIUM mmol/L 4 9 4 6   CHLORIDE mmol/L 105 103   CO2 mmol/L 29 30   BUN mg/dL 38* 37*   CREATININE mg/dL 1 87* 1 95*   ANION GAP mmol/L 6 7   CALCIUM mg/dL 9 9 10 1   ALBUMIN g/dL  --  3 5   TOTAL BILIRUBIN mg/dL  --  1 30*   ALK PHOS U/L  --  125*   ALT U/L  --  34   AST U/L  --  25   GLUCOSE RANDOM mg/dL 60* 105         Results from last 7 days   Lab Units 02/26/23  1055 02/26/23  0757 02/26/23  0702 02/26/23  0700 02/25/23  2300   POC GLUCOSE mg/dl 147* 116 53* 58* 94               Lines/Drains:  Invasive Devices     Peripheral Intravenous Line  Duration           Peripheral IV 02/25/23 Left;Ventral (anterior) Forearm <1 day    Peripheral IV 02/26/23 Right;Ventral (anterior) Forearm <1 day          Drain  Duration           External Urinary Catheter Medium <1 day                      Imaging: Reviewed radiology reports from this admission including: CT head    Recent Cultures (last 7 days):         Last 24 Hours Medication List:   Current Facility-Administered Medications   Medication Dose Route Frequency Provider Last Rate   • acetaminophen  650 mg Oral Q6H PRN Excell Horn, DO     • apixaban  2 5 mg Oral BID Excell Horn, DO     • cefTRIAXone  1,000 mg Intravenous Q24H Josefasandro Pa, DO     • Fluticasone Furoate-Vilanterol  1 puff Inhalation Daily Diane Rice, DO      And   • umeclidinium  1 puff Inhalation Daily Diane Rice, DO     • furosemide  20 mg Oral Daily Diane Rice, DO     • hydroxychloroquine  200 mg Oral BID With Meals Excell Horn, DO     • insulin lispro  1-6 Units Subcutaneous 4x Daily (AC & HS) Excell Horn, DO     • losartan  50 mg Oral Daily Diane Rice, DO     • pravastatin  40 mg Oral Daily With stiQRd, DO     • predniSONE  4 mg Oral Daily Diane Rice, DO     • sodium chloride  75 mL/hr Intravenous Continuous Josefa Luciana Pa, DO 75 mL/hr (02/26/23 0912)        Today, Patient Was Seen By: Chato Lindsay DO    **Please Note: This note may have been constructed using a voice recognition system  **

## 2023-02-26 NOTE — PLAN OF CARE
Problem: OCCUPATIONAL THERAPY ADULT  Goal: Performs self-care activities at highest level of function for planned discharge setting  See evaluation for individualized goals  Description: Treatment Interventions: ADL retraining, Visual perceptual retraining, Functional transfer training, UE strengthening/ROM, Endurance training, Cognitive reorientation, Patient/family training, Equipment evaluation/education, Compensatory technique education, Activityengagement          See flowsheet documentation for full assessment, interventions and recommendations  Note: Limitation: Decreased ADL status, Decreased UE strength, Decreased Safe judgement during ADL, Decreased cognition, Decreased endurance, Decreased fine motor control, Decreased self-care trans, Decreased high-level ADLs, Visual deficit  Prognosis: Good  Assessment: Patient is a 82 y o  male seen for OT evaluation s/p admit to [id number] [place] [address]  on 2/25/2023 w/Acute encephalopathy  Commorbidities affecting patient's functional performance at time of assessment include: DM, HTN, A-fib, COPD, CKD, and left lower lobe pneumonia  Orders placed for OT evaluation and treatment and up and OOB as tolerated   Performed at least two patient identifiers during session including name and wristband  Prior to admission, At baseline, pt is independent with ADLs, ambulatory with no AD, and lives with wife  Personal factors affecting patient at time of initial evaluation include: steps to enter, difficulty performing ADLs and difficulty performing IADLs  Upon evaluation, patient requires maximal assist for UB ADLs, total assist for LB ADLs  Patient is oriented to name,, disoriented to time,, disoriented to place, and disoriented to situation, and presents with impaired judgement, inability to make safe decisions    Occupational performance is affected by the following deficits: orientation, attention span, decreased muscle strength, impaired gross motor coordination, impaired fine motor coordination, decreased activity tolerance, impaired memory, impaired problem solving, decreased safety awareness and delayed righting and equilibrium reactions  Patient to benefit from continued Occupational Therapy treatment while in the hospital to address deficits as defined above and maximize level of functional independence with ADLs and functional mobility  Occupational Performance areas to address include: eating, grooming , bathing/ shower, dressing, toilet hygiene, transfer to all surfaces and functional ambulation  From OT standpoint, recommendation at time of d/c would be Post acute rehabilitation services       OT Discharge Recommendation: Post acute rehabilitation services        Lars Kumar OTD, OTR/L

## 2023-02-26 NOTE — PLAN OF CARE
Problem: PHYSICAL THERAPY ADULT  Goal: Performs mobility at highest level of function for planned discharge setting  See evaluation for individualized goals  Description: Treatment/Interventions: LE strengthening/ROM, Therapeutic exercise, Endurance training, Patient/family training, Equipment eval/education, Bed mobility, Continued evaluation, Spoke to nursing, OT, Family  Equipment Recommended:  (TBD)       See flowsheet documentation for full assessment, interventions and recommendations  2/26/2023 1401 by Layo Kerr PT  Note: Prognosis: Guarded  Problem List: Decreased strength, Decreased endurance, Impaired balance, Decreased mobility, Decreased cognition, Decreased safety awareness  Assessment: Pt is 82 y o  male seen for high-complexity PT evaluation on 2/26/2023 s/p admit to [facility] on 2/25/2023 w/ Acute encephalopathy  PT was consulted to assess pt's functional mobility and d/c needs  Order placed for PT eval and tx  PTA, pt resides with wife in multi level home, 2 SISSY; ambulatory without AD, +fall history  At time of eval, pt requiring max A x3 for supine to sit attempt, only able to achieve 50% of transfer  Upon evaluation, pt presenting with impaired functional mobility d/t decreased strength, decreased endurance, impaired balance, decreased mobility, decreased cognition, decreased safety awareness and activity intolerance  Pertinent PMHx and current co-morbidities affecting pt's physical performance at time of assessment include: acute encephalopathy, DM, HTN, A fib, COPD, LLL pneumonia, CKD, aneurysm, OA, polymyalgia rheumatica, centrilobular emphysema, RSV, generalized weakness, neoplasm, chronic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   Personal factors affecting pt at time of eval include: inaccessible home environment, stairs to enter home, inability to navigate level surfaces w/o external assistance, decreased cognition, limited home support, positive fall history and decreased initiation and engagement  The following objective measures performed on IE also reveal limitations: Barthel Index: 5/100, Modified [place]: 5 (severe disability) and AM-PAC 6-Clicks: 9/83  Pt's clinical presentation is currently unstable/unpredictable seen in pt's presentation of advanced age, abnormal lab value(s), need for input for task focus and mobility technique and ongoing medical assessment  Overall, pt's rehab potential and prognosis to return to PLOF is guarded as impacted by objective findings, warranting pt to receive further skilled PT interventions to address identified impairments, activity limitation(s), and participation restriction(s)  Pt to benefit from continued PT tx to address deficits as defined above and maximize level of functional independent mobility  From PT/mobility standpoint, recommendation at time of d/c would be post acute rehabilitation services pending progress in order to facilitate return to PLOF  Barriers to Discharge: Inaccessible home environment, Decreased caregiver support     PT Discharge Recommendation: Post acute rehabilitation services    See flowsheet documentation for full assessment  2/26/2023 1401 by Vidhi Issa, PT  Note: Prognosis: Guarded  Problem List: Decreased strength, Decreased endurance, Impaired balance, Decreased mobility, Decreased cognition, Decreased safety awareness  Assessment: Pt is 82 y o  male seen for high-complexity PT evaluation on 2/26/2023 s/p admit to [facility] on 2/25/2023 w/ Acute encephalopathy  PT was consulted to assess pt's functional mobility and d/c needs  Order placed for PT eval and tx  PTA, pt resides with wife in multi level home, 2 SISSY; ambulatory without AD, +fall history  At time of eval, pt requiring max A x3 for supine to sit attempt, only able to achieve 50% of transfer   Upon evaluation, pt presenting with impaired functional mobility d/t decreased strength, decreased endurance, impaired balance, decreased mobility, decreased cognition, decreased safety awareness and activity intolerance  Pertinent PMHx and current co-morbidities affecting pt's physical performance at time of assessment include: acute encephalopathy, DM, HTN, A fib, COPD, LLL pneumonia, CKD, aneurysm, OA, polymyalgia rheumatica, centrilobular emphysema, RSV, generalized weakness, neoplasm, chronic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  Personal factors affecting pt at time of eval include: inaccessible home environment, stairs to enter home, inability to navigate level surfaces w/o external assistance, decreased cognition, limited home support, positive fall history and decreased initiation and engagement  The following objective measures performed on IE also reveal limitations: Barthel Index: 5/100, Modified [place]: 5 (severe disability) and AM-PAC 6-Clicks: 5/37  Pt's clinical presentation is currently unstable/unpredictable seen in pt's presentation of advanced age, abnormal lab value(s), need for input for task focus and mobility technique and ongoing medical assessment  Overall, pt's rehab potential and prognosis to return to PLOF is guarded as impacted by objective findings, warranting pt to receive further skilled PT interventions to address identified impairments, activity limitation(s), and participation restriction(s)  Pt to benefit from continued PT tx to address deficits as defined above and maximize level of functional independent mobility  From PT/mobility standpoint, recommendation at time of d/c would be post acute rehabilitation services pending progress in order to facilitate return to PLOF  Barriers to Discharge: Inaccessible home environment, Decreased caregiver support     PT Discharge Recommendation: Post acute rehabilitation services    See flowsheet documentation for full assessment

## 2023-02-26 NOTE — ED PROVIDER NOTES
History  Chief Complaint   Patient presents with   • Altered Mental Status     Pt arrived via ems with c/o altered mental status  Pt was dx last week with alzheimer's and per family, has quickly declined  Patient is an 44-year-old male with a past medical history significant for diabetes, hypertension, hyperlipidemia, previous CVA, coronary artery disease, vascular dementia presenting to the emergency department for evaluation of altered mental status  Per patient's family, they were just informed of diagnosis of vascular dementia last week  He was recently hospitalized for RSV and pneumonia  Since being discharged from the hospital, patient's mental status has acutely declined progressively over the last several days  It is to the point now where he has been having violent outbursts and family is fearful at home  Patient has not been eating or drinking for the last several days  He has been coughing  He has not been having any fevers  He was reporting a headache earlier in the day  Upon arrival, patient completely disoriented and unable to provide any history  History was obtained via patient's family members due to altered mental status  Patient has been experiencing forgetfulness over the past several months, however over the last 2 weeks patient's mental status has declined significantly  Prior to Admission Medications   Prescriptions Last Dose Informant Patient Reported? Taking?    B-D UF III MINI PEN NEEDLES 31G X 5 MM MISC   Yes No   EUSEBIO CONTOUR TEST test strip   Yes No   Sig: 3 (three) times a day Test   Calcium Citrate (CITRACAL PO) 2/25/2023  Yes Yes   Sig: Take 650 mg by mouth in the morning   Cinnamon 500 MG capsule 2/25/2023  Yes Yes   Sig: Take 1,000 mg by mouth daily   Trelegy Ellipta 100-62 5-25 MCG/ACT inhaler 2/25/2023  No Yes   Sig: USE 1 INHALATION DAILY RINSE MOUTH AFTER USE   apixaban (ELIQUIS) 2 5 mg 2/25/2023  Yes Yes   Sig: Take 2 5 mg by mouth 2 (two) times a day cholecalciferol (VITAMIN D3) 1,000 units tablet 2023  Yes Yes   Sig: Take 2,000 Units by mouth daily   furosemide (LASIX) 20 mg tablet 2023  Yes Yes   Sig: Take 20 mg by mouth daily   hydroxychloroquine (PLAQUENIL) 200 mg tablet 2023  Yes Yes   Sig: Take 200 mg by mouth 2 (two) times a day with meals   insulin glargine (LANTUS) 100 units/mL subcutaneous injection 2023  Yes Yes   Sig: Inject 16 Units under the skin daily at bedtime   olmesartan (BENICAR) 20 mg tablet 2023  Yes Yes   Sig: Take 20 mg by mouth daily   potassium chloride (KLOR-CON) 8 MEQ tablet 2023  Yes Yes   pravastatin (PRAVACHOL) 40 mg tablet 2023  Yes Yes   Sig: Take 40 mg by mouth daily   predniSONE 1 mg tablet 2023  Yes Yes   Sig: Take 10 mg by mouth daily Currently down to 3 1/2 mg daily 22      Facility-Administered Medications: None       Past Medical History:   Diagnosis Date   • Cancer [facility])     pancreatic   • Colon polyp    • Coronary artery disease    • Diabetes mellitus ([facility] )    • Hyperlipidemia    • Hypertension    • Pneumonia 2017    Right middle and lower lobe    • Stroke [facility])        Past Surgical History:   Procedure Laterality Date   • APPENDECTOMY     • CARDIAC SURGERY      Aortic Valve Replacement, Ascending Aorta   • COLONOSCOPY     • GALLBLADDER SURGERY     • PANCREATICODUODENECTOMY         Family History   Problem Relation Age of Onset   • Cancer Mother    • Stroke Father    • Cancer Sister    • Diabetes Sister    • Stroke Brother      I have reviewed and agree with the history as documented      E-Cigarette/Vaping   • E-Cigarette Use Never User      E-Cigarette/Vaping Substances   • Nicotine No    • THC No    • CBD No    • Flavoring No    • Other No    • Unknown No      Social History     Tobacco Use   • Smoking status: Former     Types: Pipe     Quit date:      Years since quittin    • Smokeless tobacco: Never   Vaping Use   • Vaping Use: Never used   Substance Use Topics   • Alcohol use: Never   • Drug use: No       Review of Systems   Unable to perform ROS: Dementia   Constitutional: Negative for fever  Respiratory: Positive for cough  Neurological: Positive for headaches  Physical Exam  Physical Exam  Vitals reviewed  Constitutional:       General: He is not in acute distress  Appearance: Normal appearance  He is normal weight  He is ill-appearing  He is not toxic-appearing or diaphoretic  HENT:      Head: Normocephalic and atraumatic  Right Ear: External ear normal       Left Ear: External ear normal       Mouth/Throat:      Mouth: Mucous membranes are dry  Pharynx: Oropharynx is clear  No oropharyngeal exudate or posterior oropharyngeal erythema  Eyes:      General: No scleral icterus  Right eye: No discharge  Left eye: No discharge  Extraocular Movements: Extraocular movements intact  Conjunctiva/sclera: Conjunctivae normal    Cardiovascular:      Rate and Rhythm: Normal rate and regular rhythm  Pulses: Normal pulses  Heart sounds: Normal heart sounds  No murmur heard  No friction rub  No gallop  Pulmonary:      Effort: Pulmonary effort is normal  No respiratory distress  Breath sounds: Normal breath sounds  No stridor  No wheezing, rhonchi or rales  Abdominal:      General: Abdomen is flat  Palpations: Abdomen is soft  Tenderness: There is no abdominal tenderness  There is no guarding or rebound  Musculoskeletal:         General: Normal range of motion  Cervical back: Normal range of motion and neck supple  Right lower leg: No edema  Left lower leg: No edema  Skin:     General: Skin is warm and dry  Capillary Refill: Capillary refill takes less than 2 seconds  Neurological:      General: No focal deficit present  Mental Status: He is alert  He is disoriented and confused  Cranial Nerves: No dysarthria or facial asymmetry        Sensory: Sensation is intact  No sensory deficit  Psychiatric:         Mood and Affect: Mood normal          Behavior: Behavior normal          Vital Signs  ED Triage Vitals   Temperature Pulse Respirations Blood Pressure SpO2   02/25/23 2303 02/25/23 2304 02/26/23 0122 02/25/23 2304 02/25/23 2304   97 9 °F (36 6 °C) 82 (!) 25 147/93 98 %      Temp Source Heart Rate Source Patient Position - Orthostatic VS BP Location FiO2 (%)   02/25/23 2303 02/26/23 0330 02/26/23 0330 02/26/23 0330 --   Temporal Monitor Lying Right arm       Pain Score       02/26/23 0432       No Pain           Vitals:    02/27/23 0741 02/27/23 0802 02/27/23 0906 02/27/23 1523   BP: 140/82 124/74 124/74 119/74   Pulse:    84   Patient Position - Orthostatic VS:    Lying         Visual Acuity  Visual Acuity    Flowsheet Row Most Recent Value   L Pupil Size (mm) 3   R Pupil Size (mm) 3   L Pupil Shape Round   R Pupil Shape Round          ED Medications  Medications   sodium chloride 0 9 % infusion (0 mL/hr Intravenous Stopped 2/27/23 0231)   sodium chloride 0 9 % bolus 1,000 mL (0 mL Intravenous Stopped 2/26/23 0139)   haloperidol lactate (HALDOL) injection 2 5 mg (2 5 mg Intramuscular Given 2/26/23 0038)   vancomycin (VANCOCIN) 2,000 mg in sodium chloride 0 9 % 500 mL IVPB (2,000 mg Intravenous New Bag 2/26/23 1422)   haloperidol lactate (HALDOL) injection 2 mg (2 mg Intramuscular Given 2/26/23 2250)       Diagnostic Studies  Results Reviewed     Procedure Component Value Units Date/Time    TSH, 3rd generation [[id number]]  (Normal) Collected: 02/26/23 0026    Lab Status: Final result Specimen: Blood from Arm, Left Updated: 02/26/23 0834     TSH 3RD GENERATON 1 001 uIU/mL     Narrative:      Patients undergoing fluorescein dye angiography may retain small amounts of fluorescein in the body for 48-72 hours post procedure  Samples containing fluorescein can produce falsely depressed TSH values   If the patient had this procedure,a specimen should be resubmitted post fluorescein clearance  HS Troponin I 4hr [[id number]]  (Normal) Collected: 02/26/23 0512    Lab Status: Final result Specimen: Blood from Arm, Right Updated: 02/26/23 0544     hs TnI 4hr 39 ng/L      Delta 4hr hsTnI 6 ng/L     HS Troponin I 2hr [[id number]]  (Normal) Collected: 02/26/23 0238    Lab Status: Final result Specimen: Blood from Arm, Left Updated: 02/26/23 0311     hs TnI 2hr 35 ng/L      Delta 2hr hsTnI 2 ng/L     FLU/RSV/COVID - if FLU/RSV clinically relevant [[id number]]  (Normal) Collected: 02/26/23 0034    Lab Status: Final result Specimen: Nares from Nose Updated: 02/26/23 0305     SARS-CoV-2 Negative     INFLUENZA A PCR Negative     INFLUENZA B PCR Negative     RSV PCR Negative    Narrative:      FOR PEDIATRIC PATIENTS - copy/paste COVID Guidelines URL to browser: [email or web address]    SARS-CoV-2 assay is a Nucleic Acid Amplification assay intended for the  qualitative detection of nucleic acid from SARS-CoV-2 in nasopharyngeal  swabs  Results are for the presumptive identification of SARS-CoV-2 RNA  Positive results are indicative of infection with SARS-CoV-2, the virus  causing COVID-19, but do not rule out bacterial infection or co-infection  with other viruses  Laboratories within the [place] and its  territories are required to report all positive results to the appropriate  public health authorities  Negative results do not preclude SARS-CoV-2  infection and should not be used as the sole basis for treatment or other  patient management decisions  Negative results must be combined with  clinical observations, patient history, and epidemiological information  This test has not been FDA cleared or approved  This test has been authorized by FDA under an Emergency Use Authorization  (EUA)   This test is only authorized for the duration of time the  declaration that circumstances exist justifying the authorization of the  emergency use of an in vitro diagnostic tests for detection of SARS-CoV-2  virus and/or diagnosis of COVID-19 infection under section 564(b)(1) of  the Act, 21 U  S C  477ATF-2(D)(2), unless the authorization is terminated  or revoked sooner  The test has been validated but independent review by FDA  and CLIA is pending  Test performed using [employer] Gene[employer]pert: This RT-PCR assay targets N2,  a region unique to SARS-CoV-2  A conserved region in the E-gene was chosen  for pan-Sarbecovirus detection which includes SARS-CoV-2  According to CMS-2020-01-R, this platform meets the definition of high-throughput technology  Ammonia [[id number]]  (Abnormal) Collected: 02/26/23 0026    Lab Status: Final result Specimen: Blood from Arm, Left Updated: 02/26/23 0114     Ammonia <10 umol/L     Ethanol [[id number]]  (Normal) Collected: 02/26/23 0026    Lab Status: Final result Specimen: Blood from Arm, Left Updated: 02/26/23 0102     Ethanol Lvl <3 mg/dL     HS Troponin 0hr (reflex protocol) [[id number]]  (Normal) Collected: 02/26/23 0026    Lab Status: Final result Specimen: Blood from Arm, Left Updated: 02/26/23 0059     hs TnI 0hr 33 ng/L     Salicylate level [[id number]]  (Abnormal) Collected: 02/26/23 0026    Lab Status: Final result Specimen: Blood from Arm, Left Updated: 90/77/96 4732     Salicylate Lvl <3 mg/dL     Acetaminophen level-If concentration is detectable, please discuss with medical  on call   [[id number]]  (Abnormal) Collected: 02/26/23 0026    Lab Status: Final result Specimen: Blood from Arm, Left Updated: 02/26/23 0052     Acetaminophen Level <2 0 ug/mL     Comprehensive metabolic panel [[id number]]  (Abnormal) Collected: 02/26/23 0026    Lab Status: Final result Specimen: Blood from Arm, Left Updated: 02/26/23 0051     Sodium 140 mmol/L      Potassium 4 6 mmol/L      Chloride 103 mmol/L      CO2 30 mmol/L      ANION GAP 7 mmol/L      BUN 37 mg/dL      Creatinine 1 95 mg/dL      Glucose 105 mg/dL      Calcium 10 1 mg/dL      AST 25 U/L      ALT 34 U/L      Alkaline Phosphatase 125 U/L      Total Protein 6 0 g/dL      Albumin 3 5 g/dL      Total Bilirubin 1 30 mg/dL      eGFR 31 ml/min/1 73sq m     Narrative:      [place] guidelines for Chronic Kidney Disease (CKD):   •  Stage 1 with normal or high GFR (GFR > 90 mL/min/1 73 square meters)  •  Stage 2 Mild CKD (GFR = 60-89 mL/min/1 73 square meters)  •  Stage 3A Moderate CKD (GFR = 45-59 mL/min/1 73 square meters)  •  Stage 3B Moderate CKD (GFR = 30-44 mL/min/1 73 square meters)  •  Stage 4 Severe CKD (GFR = 15-29 mL/min/1 73 square meters)  •  Stage 5 End Stage CKD (GFR <15 mL/min/1 73 square meters)  Note: GFR calculation is accurate only with a steady state creatinine    CBC and differential [[id number]]  (Abnormal) Collected: 02/26/23 0026    Lab Status: Final result Specimen: Blood from Arm, Left Updated: 02/26/23 0035     WBC 10 60 Thousand/uL      RBC 4 40 Million/uL      Hemoglobin 13 6 g/dL      Hematocrit 42 0 %      MCV 96 fL      MCH 30 9 pg      MCHC 32 4 g/dL      RDW 13 9 %      MPV 9 4 fL      Platelets 824 Thousands/uL      nRBC 0 /100 WBCs      Neutrophils Relative 81 %      Immat GRANS % 0 %      Lymphocytes Relative 11 %      Monocytes Relative 8 %      Eosinophils Relative 0 %      Basophils Relative 0 %      Neutrophils Absolute 8 51 Thousands/µL      Immature Grans Absolute 0 03 Thousand/uL      Lymphocytes Absolute 1 18 Thousands/µL      Monocytes Absolute 0 82 Thousand/µL      Eosinophils Absolute 0 04 Thousand/µL      Basophils Absolute 0 02 Thousands/µL     Fingerstick Glucose (POCT) [[id number]]  (Normal) Collected: 02/25/23 2300    Lab Status: Final result Updated: 02/25/23 2301     POC Glucose 94 mg/dl                  RADIOLOGY RESULTS   Final Result by  (02/28 1393)      CT head without contrast   Final Result by Marta Harkins MD (02/26 7011)      1  No acute intracranial abnormality     2  Chronic microangiopathic ischemic changes similar to prior study  Findings are consistent with the preliminary report from [facility] which was provided shortly after completion of the exam                   Workstation performed: [id number]         CT chest abdomen pelvis wo contrast   Final Result by Raquel Mehta MD (02/26 1007)      Mild patchy nodular infiltrate through the left lower lobe in keeping with left lower lobe pneumonia #4/94  Unchanged area of right lower lobe round atelectasis  Findings are consistent with the preliminary report from [facility] which was provided shortly after completion of the exam  The additional finding of  left lower lobe pneumonia   will now be communicated with patient's clinical team by our    radiology liaison  The study was marked in [facility] for immediate notification  Workstation performed: [id number]         XR chest pa & lateral    (Results Pending)              Procedures  Procedures         ED Course                               SBIRT 20yo+    Flowsheet Row Most Recent Value   SBIRT (25 yo +)    In order to provide better care to our patients, we are screening all of our patients for alcohol and drug use  Would it be okay to ask you these screening questions? Yes Filed at: 02/25/2023 2300   Initial Alcohol Screen: US AUDIT-C     1  How often do you have a drink containing alcohol? 0 Filed at: 02/25/2023 2300   2  How many drinks containing alcohol do you have on a typical day you are drinking? 0 Filed at: 02/25/2023 2300   3b  FEMALE Any Age, or MALE 65+: How often do you have 4 or more drinks on one occassion? 0 Filed at: 02/25/2023 2300   Audit-C Score 0 Filed at: 02/25/2023 2300   ALEXEY: How many times in the past year have you    Used an illegal drug or used a prescription medication for non-medical reasons?  Never Filed at: 02/25/2023 2300                    Medical Decision Making  Patient presenting for evaluation of altered mental status  He is accompanied by family who provides history as patient is completely disoriented upon arrival to the hospital   He is chronically ill-appearing  He does not appear to be in any acute distress  Initial vital signs are unremarkable  On exam patient's mucous membranes are dry  He is disoriented and confused  There does not appear to be any focal neurologic deficits  Labs with creatinine slightly above patient's baseline  This is likely from decreased p o  intake over the last several days  Remainder of laboratory evaluation is unremarkable  CT head without acute abnormalities  CT chest, abdomen, pelvis was read by [facility] and did not reveal any acute pathology  When our staff radiologist read the CT scan they visualize a left lower lobe pneumonia  Given delayed read, patient was not treated for pneumonia in the emergency department, however findings were communicated with internal medicine team   I believe patient's symptoms likely secondary to dementia exacerbated by infection  Patient returned to baseline after 1 dose of Haldol in the emergency department  He was admitted to medicine for further evaluation and management  Currently in stable condition  Altered mental status: acute illness or injury  Amount and/or Complexity of Data Reviewed  Labs: ordered  Risk  Prescription drug management  Decision regarding hospitalization  Disposition  Final diagnoses:    Altered mental status     Time reflects when diagnosis was documented in both MDM as applicable and the Disposition within this note     Time User Action Codes Description Comment    2/26/2023  3:39 AM Carey Pickering Add [R41 82] Altered mental status     2/26/2023  1:36 PM Yi Kohler Add [J44 9] Chronic obstructive pulmonary disease, unspecified COPD type ([facility] )     2/26/2023  1:36 PM Carbolivar Elders [J18 9] Left lower lobe pneumonia       ED Disposition     ED Disposition Admit    Condition   Stable    Date/Time   Sun Feb 26, 2023  3:39 AM    Comment   Case was discussed with ERIK and the patient's admission status was agreed to be Admission Status: observation status to the service of Dr Falguni Burris MD Documentation    [address] Name, [address] [employer] and [address]) [facility]      RN Documentation    [address]vez Name, [address] [facility] [address] (Company and Unit #) [facility]      Follow-up Information    None         Discharge Medication List as of 2/27/2023  6:25 PM      START taking these medications    Details   amoxicillin-clavulanate (AUGMENTIN) 875-125 mg per tablet Take 1 tablet by mouth every 12 (twelve) hours for 5 days, Starting Mon 2/27/2023, Until Sat 3/4/2023, No Print      QUEtiapine (SEROquel) 25 mg tablet Take 0 5 tablets (12 5 mg total) by mouth daily at bedtime, Starting Mon 2/27/2023, No Print         CONTINUE these medications which have NOT CHANGED    Details   apixaban (ELIQUIS) 2 5 mg Take 2 5 mg by mouth 2 (two) times a day, Historical Med      Calcium Citrate (CITRACAL PO) Take 650 mg by mouth in the morning, Historical Med      cholecalciferol (VITAMIN D3) 1,000 units tablet Take 2,000 Units by mouth daily, Historical Med      Cinnamon 500 MG capsule Take 1,000 mg by mouth daily, Historical Med      furosemide (LASIX) 20 mg tablet Take 20 mg by mouth daily, Starting Thu 9/8/2022, Historical Med      hydroxychloroquine (PLAQUENIL) 200 mg tablet Take 200 mg by mouth 2 (two) times a day with meals, Historical Med      olmesartan (BENICAR) 20 mg tablet Take 20 mg by mouth daily, Historical Med      potassium chloride (KLOR-CON) 8 MEQ tablet Starting Wed 7/21/2021, Historical Med      pravastatin (PRAVACHOL) 40 mg tablet Take 40 mg by mouth daily, Historical Med      predniSONE 1 mg tablet Take 4 mg by mouth daily Currently down to 3 1/2 mg daily 05/05/22 , Historical Med      Trelegy Ellipta 100-62 5-25 MCG/ACT inhaler USE 1 INHALATION DAILY RINSE MOUTH AFTER USE, Normal      B-D UF III MINI PEN NEEDLES 31G X 5 MM MISC Starting Sun 3/11/2018, Historical Med      EUSEBIO CONTOUR TEST test strip 3 (three) times a day Test, Starting Thu 2/1/2018, Historical Med         STOP taking these medications       insulin glargine (LANTUS) 100 units/mL subcutaneous injection Comments:   Reason for Stopping:               Outpatient Discharge Orders   XR chest pa & lateral   Standing Status: Future Standing Exp  Date: 03/27/23     TARA Pathway:  Medications to avoid: phosphate or magnesium based laxatives, NSAIDs     TARA Pathway: Maintain SBP between 120-140 mm/Hg     TARA Pathway: Call Nursing Home MD for decreased oral intake     TARA Pathway: Daily Weights     TARA Pathway: Strict I&O     TARA Pathway:  Follow up bloodwork       PDMP Review     None          ED Provider  Electronically Signed by           Patel York PA-C  03/02/23 4329

## 2023-02-26 NOTE — ASSESSMENT & PLAN NOTE
· Does not appear to be in acute exacerbation  · Continue home inhaler- trelegy not on formulary so breo ellipta and incruse ellipta ordered  · Continue home daily prednisone  · Monitor respiratory status

## 2023-02-26 NOTE — CASE MANAGEMENT
Case Management Discharge Planning Note    Patient name Dinah Gowers  Location [address] [id number] MRN [id number]  : 1940 Date 2023       Current Admission Date: 2023  Current Admission Diagnosis:Acute encephalopathy   Patient Active Problem List    Diagnosis Date Noted   • RSV (acute bronchiolitis due to respiratory syncytial virus) 2023   • Acute encephalopathy 2023   • Generalized weakness 2023   • Malignant neoplasm of tail of pancreas ([facility] [address] ) 2022   • COPD (chronic obstructive pulmonary disease) ([facility] ) 2022   • Left lower lobe pneumonia 2022   • CKD (chronic kidney disease) 2022   • Centrilobular emphysema ([facility] ) 2021   • History of malignant neuroendocrine tumor 2021   • [facility]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 [facility] ) 2018   • Atrial fibrillation ([facility] ) 2017   • Diabetes mellitus ([facility] ) 2017   • Hypertension 2017   • Acute-on-chronic kidney injury ([facility] ) 2017   • Hyperlipidemia 2015   • 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   • Osteoarthrosis 2014   • Polymyalgia rheumatica ([facility] ) 2014   • Aneurysm of thoracic aorta 2014   • Aneurysm of abdominal aorta 2014   • Neoplasm of other specified site 2014      LOS (days): 0  Geometric Mean LOS (GMLOS) (days):   Days to GMLOS:     OBJECTIVE:            Current admission status: Observation   Preferred Pharmacy:   [address] [place] [address] Prakash Cavazos [address] [place] [id number]  Phone: [phone] Fax: [phone] [address] [place] [address]  Phone: [phone] Fax: [phone]    Primary Care Provider: Erlinda Gonzalez MD    Primary Insurance: [employer] [facility]  Secondary Insurance:     DISCHARGE DETAILS:    Discharge planning discussed with[de-identified] Patient and wife  Freedom of Choice: Yes  Comments - Freedom of Choice: Choice is for STR @ [facility]  CM contacted family/caregiver?: Yes  Were Treatment Team discharge recommendations reviewed with patient/caregiver?: Yes  Did patient/caregiver verbalize understanding of patient care needs?: N/A- going to facility  Were patient/caregiver advised of the risks associated with not following Treatment Team discharge recommendations?: Yes    Contacts  Patient Contacts: Praveen Jensen  Relationship to Patient[de-identified] Family  Contact Method: In [facility]         Is the patient interested in [facility] at discharge?: No    Other Referral/Resources/Interventions Provided:  Interventions: Short Term Rehab  Referral Comments: CM confirmed with Cassie Walker @ [facility] that facility is able to accept  PT/OT documentation pending  Maria Luz to submit for auth as soon as notes available      Would you like to participate in our [address] service program?  : No - Declined    Treatment Team Recommendation: Short Term Rehab  Discharge Destination Plan[de-identified] Short Term Rehab  Transport at Discharge : [facility] Ambulance

## 2023-02-26 NOTE — ASSESSMENT & PLAN NOTE
P/w disoriented with altered mentation per family as noted by outbursts with family members  · Recent diagnosis of vascular dementia with behavioral disturbance  · Recent discharge on 2/16 following a stay for acute encephalopathy in the setting of RSV and dementia  · Recently finished outpatient azithromycin and Ceftin course  · Per family, after 2mg IM haldol in ED patient is now currently at baseline mentation which is oriented x1 (to place)  · CT head negative for acute process  · Not hypoglycemic; no major electrolyte abnormalities  · Ammonia not elevated; coma panel negative   · UA pending collection; f/u w/ results  · Workup not overtly suggestive of infection so far, patient not meeting SIRS, will monitor off abx for now; procal ordered, f/u w/ results  · TSH pending   · Consider checking for vitamin deficiencies such as vit B12, folate, vit B1 as pt has had poor po intake   · Consider MRI brain to check for any further vascular injury causing AMS in setting of vascular dementia   · Unsure of cause currently; possibly due to worsening dementia? vs chronic steroid use?    · High suspicion of poor po intake as noted by significant dehydration on exam causing altered mentation   · Monitor mentation  · Delirium precautions  · Case management consulted

## 2023-02-26 NOTE — ASSESSMENT & PLAN NOTE
· HR appears rate controlled while in ED   · Not currently on outpatient rate control medication  · Continue home Eliquis

## 2023-02-26 NOTE — CASE MANAGEMENT
Case Management Assessment & Discharge Planning Note    Patient name Violet Anthony  Location [address] [id number]/-[id number] MRN [id number]  : 1940 Date 2023       Current Admission Date: 2023  Current Admission Diagnosis:Acute encephalopathy   Patient Active Problem List    Diagnosis Date Noted   • RSV (acute bronchiolitis due to respiratory syncytial virus) 2023   • Acute encephalopathy 2023   • Generalized weakness 2023   • Malignant neoplasm of tail of pancreas ([facility] [address] ) 2022   • COPD (chronic obstructive pulmonary disease) ([facility] [address] ) 2022   • CKD (chronic kidney disease) 2022   • Centrilobular emphysema ([facility] [address] ) 2021   • History of malignant neuroendocrine tumor 2021   • Chronic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 ([facility] [address] ) 2018   • Atrial fibrillation ([facility] [address] ) 2017   • Diabetes mellitus ([facility] [address] ) 2017   • Hypertension 2017   • Acute-on-chronic kidney injury ([facility] [address] ) 2017   • Hyperlipidemia 2015   • Inflammatory polyarthropathy ([facility] [address] ) 2014   • Osteoarthrosis 2014   • Polymyalgia rheumatica ([facility] [address] ) 2014   • Aneurysm of thoracic aorta 2014   • Aneurysm of abdominal aorta 2014   • Neoplasm of other specified site 2014      LOS (days): 0  Geometric Mean LOS (GMLOS) (days):   Days to GMLOS:     OBJECTIVE:              Current admission status: Observation       Preferred Pharmacy:   [address] [place] [address] [address]  [address] [address]  Phone: [phone] Fax: [phone] [address] [place] [address] Drive [address]  Phone: [phone] Fax: [phone]    Primary Care Provider: Adali Lee MD    Primary Insurance: [facility]  Secondary Insurance:     ASSESSMENT:  [place], [address] Representative - Spouse   Primary Phone: [phone] (Home) Advance Directives  Does patient have a [address]?: No  Was patient offered paperwork?: Yes (declined)  Does patient currently have a Health Care decision maker?: Yes, please see Health Care Proxy section  Does patient have Advance Directives?: No  Was patient offered paperwork?: Yes (declined)  Primary Contact: wife Lisandro Khan    Obs Notice Signed: 02/26/23    Readmission Root Cause  30 Day Readmission: No    Patient Information  Admitted from[de-identified] Home  Mental Status: Confused  During Assessment patient was accompanied by: Not accompanied during assessment  Assessment information provided by[de-identified] Spouse  Primary Caregiver: Family  Caregiver's Name[de-identified] Kateryna Walter Relationship to Patient[de-identified] Family Member  Caregiver's Telephone Number[de-identified] [phone]  Support Systems: Spouse/significant other  [facility] of Residence: [place] [address] do you live in?:  [email or web address] entry access options   Select all that apply : Stairs  Number of steps to enter home : 2  Do the steps have railings?: Yes  Type of Current Residence: 2 story home  Upon entering residence, is there a bedroom on the main floor (no further steps)?: No  A bedroom is located on the following floor levels of residence (select all that apply):: 2nd Floor  Upon entering residence, is there a bathroom on the main floor (no further steps)?: Yes  Number of steps to 2nd floor from main floor: One Flight  In the last 12 months, was there a time when you were not able to pay the mortgage or rent on time?: No  In the last 12 months, how many places have you lived?: 1  In the last 12 months, was there a time when you did not have a steady place to sleep or slept in a shelter (including now)?: No  Homeless/housing insecurity resource given?: No  Living Arrangements: Lives w/ Spouse/significant other  Is patient a ?: No    Activities of Daily Living Prior to Admission  Functional Status: Assistance  Completes ADLs independently?: No  Level of ADL dependence: Assistance  Ambulates independently?: No  Level of ambulatory dependence: Assistance  Does patient use assisted devices?: Yes  Assisted Devices (DME) used: Straight Cane  Does patient currently own DME?: Yes  What DME does the patient currently own?: Straight Cane  Does patient have a history of Outpatient Therapy (PT/OT)?: Yes  Does the patient have a history of Short-Term Rehab?: No  Does patient have a history of HHC?: No  Does patient currently have [facility]?: No         Patient Information Continued  Income Source: Pension/detention  Does patient have prescription coverage?: Yes  Within the past 12 months, you worried that your food would run out before you got the money to buy more : Never true  Within the past 12 months, the food you bought just didn't last and you didn't have money to get more : Never true  Food insecurity resource given?: No  Does patient receive dialysis treatments?: No  Does patient have a history of substance abuse?: No  Does patient have a history of Mental Health Diagnosis?: No    PHQ 2/9 Screening   Reviewed PHQ 2/9 Depression Screening Score?: No    Means of Transportation  Means of Transport to Appts[de-identified] Family transport  In the past 12 months, has lack of transportation kept you from medical appointments or from getting medications?: No  In the past 12 months, has lack of transportation kept you from meetings, work, or from getting things needed for daily living?: No  Was application for public transport provided?: No        DISCHARGE DETAILS:    Discharge planning discussed with[de-identified] wife Demetris Links of Choice: Yes  Comments - Freedom of Choice: CM discussed dcp and wife is interested in [facility] with [facility] or home with VNA services  CM will place referral to [facility] to check on bed availability and will also place referrals to [facility] for aide, nursing,and PT  CM asked Rashmi Iyer to see pt and give her recommendations    Pt is also scheduled to start services for private duty aides through [employer] for -5 days a week for 6 hrs per day  CM contacted family/caregiver?: Yes  Were Treatment Team discharge recommendations reviewed with patient/caregiver?: Yes  Did patient/caregiver verbalize understanding of patient care needs?: Yes  Were patient/caregiver advised of the risks associated with not following Treatment Team discharge recommendations?: Yes    Contacts  Patient Contacts: Guadlupe Osler  Relationship to Patient[de-identified] Family  Contact Method: Phone  Phone Number: on facesheet  Reason/Outcome: Continuity of Care, Discharge [address]         Is the patient interested in [place] [address] at discharge?: Yes  [address] requested[de-identified] [address]  Name[de-identified] [facility] Provider[de-identified] PCP  Home Health Services Needed[de-identified] Evaluate Functional Status and Safety, Gait/ADL Training, Strengthening/Theraputic Exercises to Improve Function  Homebound Criteria Met[de-identified] Uses an Assist Device (i e  cane, walker, etc), Requires the Assistance of Another Person for Safe Ambulation or to Leave the Home  Supporting Clincal Findings[de-identified] Fatigues Easliy in Short Distances, Limited Endurance    DME Referral Provided  Referral made for DME?: No    Other Referral/Resources/Interventions Provided:  Interventions: Short Term Rehab, C  Referral Comments: CM discussed dcp and wife is interested in STR with [facility] or home with VNA services  CM will place referral to [facility] to check on bed availability and will also place referrals to [place] [address] for aide, nursing,and PT  CM asked Mcallen Stairs to see pt and give her recommendations  Pt is also scheduled to start services for private duty aides through [employer] for -5 days a week for 6 hrs per day      Would you like to participate in our [address] service program?  : No - Declined

## 2023-02-26 NOTE — PLAN OF CARE
Problem: MOBILITY - ADULT  Goal: Maintain or return to baseline ADL function  Description: INTERVENTIONS:  -  Assess patient's ability to carry out ADLs; assess patient's baseline for ADL function and identify physical deficits which impact ability to perform ADLs (bathing, care of mouth/teeth, toileting, grooming, dressing, etc )  - Assess/evaluate cause of self-care deficits   - Assess range of motion  - Assess patient's mobility; develop plan if impaired  - Assess patient's need for assistive devices and provide as appropriate  - Encourage maximum independence but intervene and supervise when necessary  - Involve family in performance of ADLs  - Assess for home care needs following discharge   - Consider OT consult to assist with ADL evaluation and planning for discharge  - Provide patient education as appropriate  Outcome: Progressing  Goal: Maintains/Returns to pre admission functional level  Description: INTERVENTIONS:  - Perform BMAT or MOVE assessment daily    - Set and communicate daily mobility goal to care team and patient/family/caregiver  - Collaborate with rehabilitation services on mobility goals if consulted  - Perform Range of Motion  times a day  - Reposition patient every  hours    - Dangle patient  times a day  - Stand patient  times a day  - Ambulate patient  times a day  - Out of bed to chair  times a day   - Out of bed for meals  times a day  - Out of bed for toileting  - Record patient progress and toleration of activity level   Outcome: Progressing     Problem: PAIN - ADULT  Goal: Verbalizes/displays adequate comfort level or baseline comfort level  Description: Interventions:  - Encourage patient to monitor pain and request assistance  - Assess pain using appropriate pain scale  - Administer analgesics based on type and severity of pain and evaluate response  - Implement non-pharmacological measures as appropriate and evaluate response  - Consider cultural and social influences on pain and pain management  - Notify physician/advanced practitioner if interventions unsuccessful or patient reports new pain  Outcome: Progressing     Problem: DISCHARGE PLANNING  Goal: Discharge to home or other facility with appropriate resources  Description: INTERVENTIONS:  - Identify barriers to discharge w/patient and caregiver  - Arrange for needed discharge resources and transportation as appropriate  - Identify discharge learning needs (meds, wound care, etc )  - Arrange for interpretive services to assist at discharge as needed  - Refer to Case Management Department for coordinating discharge planning if the patient needs post-hospital services based on physician/advanced practitioner order or complex needs related to functional status, cognitive ability, or social support system  Outcome: Progressing     Problem: Knowledge Deficit  Goal: Patient/family/caregiver demonstrates understanding of disease process, treatment plan, medications, and discharge instructions  Description: Complete learning assessment and assess knowledge base    Interventions:  - Provide teaching at level of understanding  - Provide teaching via preferred learning methods  Outcome: Progressing     Problem: NEUROSENSORY - ADULT  Goal: Achieves stable or improved neurological status  Description: INTERVENTIONS  - Monitor and report changes in neurological status  - Monitor vital signs such as temperature, blood pressure, glucose, and any other labs ordered   - Initiate measures to prevent increased intracranial pressure  - Monitor for seizure activity and implement precautions if appropriate      Outcome: Progressing     Problem: RESPIRATORY - ADULT  Goal: Achieves optimal ventilation and oxygenation  Description: INTERVENTIONS:  - Assess for changes in respiratory status  - Assess for changes in mentation and behavior  - Position to facilitate oxygenation and minimize respiratory effort  - Oxygen administered by appropriate delivery if ordered  - Initiate smoking cessation education as indicated  - Encourage broncho-pulmonary hygiene including cough, deep breathe, Incentive Spirometry  - Assess the need for suctioning and aspirate as needed  - Assess and instruct to report SOB or any respiratory difficulty  - Respiratory Therapy support as indicated  Outcome: Progressing     Problem: METABOLIC, FLUID AND ELECTROLYTES - ADULT  Goal: Electrolytes maintained within normal limits  Description: INTERVENTIONS:  - Monitor labs and assess patient for signs and symptoms of electrolyte imbalances  - Administer electrolyte replacement as ordered  - Monitor response to electrolyte replacements, including repeat lab results as appropriate  - Instruct patient on fluid and nutrition as appropriate  Outcome: Progressing  Goal: Fluid balance maintained  Description: INTERVENTIONS:  - Monitor labs   - Monitor I/O and WT  - Instruct patient on fluid and nutrition as appropriate  - Assess for signs & symptoms of volume excess or deficit  Outcome: Progressing     Problem: HEMATOLOGIC - ADULT  Goal: Maintains hematologic stability  Description: INTERVENTIONS  - Assess for signs and symptoms of bleeding or hemorrhage  - Monitor labs  - Administer supportive blood products/factors as ordered and appropriate  Outcome: Progressing     Problem: MOBILITY - ADULT  Goal: Maintain or return to baseline ADL function  Description: INTERVENTIONS:  -  Assess patient's ability to carry out ADLs; assess patient's baseline for ADL function and identify physical deficits which impact ability to perform ADLs (bathing, care of mouth/teeth, toileting, grooming, dressing, etc )  - Assess/evaluate cause of self-care deficits   - Assess range of motion  - Assess patient's mobility; develop plan if impaired  - Assess patient's need for assistive devices and provide as appropriate  - Encourage maximum independence but intervene and supervise when necessary  - Involve family in performance of ADLs  - Assess for home care needs following discharge   - Consider OT consult to assist with ADL evaluation and planning for discharge  - Provide patient education as appropriate  Outcome: Progressing  Goal: Maintains/Returns to pre admission functional level  Description: INTERVENTIONS:  - Perform BMAT or MOVE assessment daily    - Set and communicate daily mobility goal to care team and patient/family/caregiver  - Collaborate with rehabilitation services on mobility goals if consulted  - Perform Range of Motion  times a day  - Reposition patient every  hours    - Dangle patient  times a day  - Stand patient  times a day  - Ambulate patient  times a day  - Out of bed to chair  times a day   - Out of bed for meals  times a day  - Out of bed for toileting  - Record patient progress and toleration of activity level   Outcome: Progressing     Problem: PAIN - ADULT  Goal: Verbalizes/displays adequate comfort level or baseline comfort level  Description: Interventions:  - Encourage patient to monitor pain and request assistance  - Assess pain using appropriate pain scale  - Administer analgesics based on type and severity of pain and evaluate response  - Implement non-pharmacological measures as appropriate and evaluate response  - Consider cultural and social influences on pain and pain management  - Notify physician/advanced practitioner if interventions unsuccessful or patient reports new pain  Outcome: Progressing     Problem: DISCHARGE PLANNING  Goal: Discharge to home or other facility with appropriate resources  Description: INTERVENTIONS:  - Identify barriers to discharge w/patient and caregiver  - Arrange for needed discharge resources and transportation as appropriate  - Identify discharge learning needs (meds, wound care, etc )  - Arrange for interpretive services to assist at discharge as needed  - Refer to Case Management Department for coordinating discharge planning if the patient needs post-hospital services based on physician/advanced practitioner order or complex needs related to functional status, cognitive ability, or social support system  Outcome: Progressing     Problem: Knowledge Deficit  Goal: Patient/family/caregiver demonstrates understanding of disease process, treatment plan, medications, and discharge instructions  Description: Complete learning assessment and assess knowledge base    Interventions:  - Provide teaching at level of understanding  - Provide teaching via preferred learning methods  Outcome: Progressing     Problem: NEUROSENSORY - ADULT  Goal: Achieves stable or improved neurological status  Description: INTERVENTIONS  - Monitor and report changes in neurological status  - Monitor vital signs such as temperature, blood pressure, glucose, and any other labs ordered   - Initiate measures to prevent increased intracranial pressure  - Monitor for seizure activity and implement precautions if appropriate      Outcome: Progressing     Problem: RESPIRATORY - ADULT  Goal: Achieves optimal ventilation and oxygenation  Description: INTERVENTIONS:  - Assess for changes in respiratory status  - Assess for changes in mentation and behavior  - Position to facilitate oxygenation and minimize respiratory effort  - Oxygen administered by appropriate delivery if ordered  - Initiate smoking cessation education as indicated  - Encourage broncho-pulmonary hygiene including cough, deep breathe, Incentive Spirometry  - Assess the need for suctioning and aspirate as needed  - Assess and instruct to report SOB or any respiratory difficulty  - Respiratory Therapy support as indicated  Outcome: Progressing     Problem: METABOLIC, FLUID AND ELECTROLYTES - ADULT  Goal: Electrolytes maintained within normal limits  Description: INTERVENTIONS:  - Monitor labs and assess patient for signs and symptoms of electrolyte imbalances  - Administer electrolyte replacement as ordered  - Monitor response to electrolyte replacements, including repeat lab results as appropriate  - Instruct patient on fluid and nutrition as appropriate  Outcome: Progressing  Goal: Fluid balance maintained  Description: INTERVENTIONS:  - Monitor labs   - Monitor I/O and WT  - Instruct patient on fluid and nutrition as appropriate  - Assess for signs & symptoms of volume excess or deficit  Outcome: Progressing     Problem: HEMATOLOGIC - ADULT  Goal: Maintains hematologic stability  Description: INTERVENTIONS  - Assess for signs and symptoms of bleeding or hemorrhage  - Monitor labs  - Administer supportive blood products/factors as ordered and appropriate  Outcome: Progressing

## 2023-02-26 NOTE — ASSESSMENT & PLAN NOTE
Lab Results   Component Value Date    EGFR 41 02/16/2023    EGFR 34 02/15/2023    EGFR 34 02/14/2023    CREATININE 1 54 (H) 02/16/2023    CREATININE 1 80 (H) 02/15/2023    CREATININE 1 81 (H) 02/14/2023     · Creatinine 1 95  · Recent baseline around 1 7  · Received 1L NS bolus in ED, continue IVF infusion  · Avoid nephrotoxic agents  · AM BMP

## 2023-02-26 NOTE — ASSESSMENT & PLAN NOTE
Lab Results   Component Value Date    HGBA1C 6 9 (H) 02/15/2023     · Continue home Lantus 16 unit nightly  · Correctional SSI  · Hypoglycemia protocol  · Diabetic diet

## 2023-02-26 NOTE — ASSESSMENT & PLAN NOTE
Does not appear to be in acute exacerbation  · Continue home inhaler- trelegy not on formulary so breo ellipta and incruse ellipta ordered  · Continue home daily prednisone  · Monitor respiratory status

## 2023-02-26 NOTE — PROGRESS NOTES
Igor Delaney is a 82 y o  male who is currently ordered Vancomycin IV with management by the Pharmacy Consult service  Relevant clinical data and objective / subjective history reviewed  Vancomycin Assessment:  Indication and Goal AUC/Trough: Pneumonia (goal -600, trough >10)  Clinical Status: stable  Micro:         Renal Function:  SCr: 1 87 mg/dL  CrCl: 31 2 mL/min  Renal replacement: Not on dialysis  Days of Therapy: 1  Current Dose: 2 grams IV x 1 loading dose  Vancomycin Plan:  New Dosin gram IV q24 hrs  Estimated AUC: 540 mcg*hr/mL  Estimated Trough: 18 mcg/mL  Next Level: Random Level with AM Labs on   Renal Function Monitoring: Daily BMP and [place] will continue to follow closely for s/sx of nephrotoxicity, infusion reactions and appropriateness of therapy  BMP and CBC will be ordered per protocol  We will continue to follow the patient’s culture results and clinical progress daily      Christiane Dorsey, PharmD, BCPP - Clinical Pharmacist

## 2023-02-26 NOTE — OCCUPATIONAL THERAPY NOTE
Occupational Therapy Evaluation      Arjun Cosme    2/26/2023    Patient Active Problem List   Diagnosis    Diabetes mellitus ([facility] )    Hypertension    Acute-on-chronic kidney injury ([facility] )    Atrial fibrillation ([facility] )    Aneurysm of thoracic aorta    Aneurysm of abdominal aorta    Hyperlipidemia    Inflammatory polyarthropathy (HCC)    Osteoarthrosis    Polymyalgia rheumatica (HCC)    History of malignant neuroendocrine tumor    Centrilobular emphysema (HCC)    COPD (chronic obstructive pulmonary disease) (HCC)    Left lower lobe pneumonia    CKD (chronic kidney disease)    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)    RSV (acute bronchiolitis due to respiratory syncytial virus)    Acute encephalopathy    Generalized weakness    Chronic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 ([facility] )    Neoplasm of other specified site       Past Medical History:   Diagnosis Date    Cancer ([facility] )     pancreatic    Colon polyp     Coronary artery disease     Diabetes mellitus ([facility] )     Hyperlipidemia     Hypertension     Pneumonia 06/13/2017    Right middle and lower lobe     Stroke [facility])        Past Surgical History:   Procedure Laterality Date    APPENDECTOMY      CARDIAC SURGERY      Aortic Valve Replacement, Ascending Aorta    COLONOSCOPY      GALLBLADDER SURGERY      PANCREATICODUODENECTOMY          02/26/23 1255   OT Last Visit   OT Visit Date 02/26/23   Note Type   Note type Evaluation   Additional Comments Pt agreeable to OT eval  Upon arrival pt supine in bed with HOB elevated  Pain Assessment   Pain Assessment Tool 0-10   Pain Score No Pain   Restrictions/Precautions   Weight Bearing Precautions Per Order No   Braces or Orthoses   (none reported)   Other Precautions Cognitive; Chair Alarm; Bed Alarm;Multiple lines; Fall Risk   Home Living   Type of [address] [place] [address] Two level;Bed/bath upstairs;Stairs to enter with rails  (2 SISSY; FOS to 2nd floor)   Bathroom Shower/Tub Walk-in shower   Bathroom Toilet Standard   Bathroom Equipment Grab bars in shower;Built-in shower seat   [address]  (no AD used at baseline)   Prior Function   Level of [place] Independent with ADLs; Independent with functional mobility; Needs assistance with IADLS   Lives With Spouse   Receives Help From Family   IADLs Family/Friend/Other provides meals; Family/Friend/Other provides transportation; Family/Friend/Other provides medication management   Falls in the last 6 months 1 to 4  (2 falls outdoors)   Vocational Retired   Comments Family at bedside assisted with home living and [employer] info d/t cognitive deficits   Lifestyle   Autonomy At baseline, pt is independent with ADLs, ambulatory with no AD, and lives with wife   Reciprocal Relationships Supportive family   Service to Others Retired   General   Family/Caregiver Present Yes  (Wife and children present during eval)   ADL   Eating Assistance 3  Moderate Assistance   Grooming Assistance 3  Moderate Assistance   UB Bathing Assistance 2  Maximal Assistance   LB [address] 1  Total Assistance   [address] 2  Maximal Assistance    [facility] 1  [address]  1  Total Assistance   Bed Mobility   Rolling R 2  Maximal assistance   Additional items Assist x 2;Bedrails; Increased time required;Verbal cues;LE management   Rolling L 2  Maximal assistance   Additional items Assist x 2;Bedrails; Increased time required;Verbal cues;LE management   Supine to Sit 2  Maximal assistance   Additional items Assist x 3;HOB elevated; Bedrails; Increased time required;Verbal cues;LE management  (Pt achieved ~50% of sitting at EOB but unable to fully achieve d/t pt being resistive with poor command following)   Additional Comments Attempted to have pt sit EOB multiple times but pt resistive and verbalized fear of falling  Pt requires frequent re-direction, significantly increased time for processing, and repetition of directions  Activity Tolerance   Activity Tolerance Treatment limited secondary to medical complications (Comment); Patient limited by fatigue  (Impaired cognition)   Medical Staff Made Aware Pt seen as a co-eval with PT Minor Sanchez due to the patient's co-morbidities, clinically unstable presentation, and present impairments which are a regression from the patient's baseline  RUE Assessment   RUE Assessment WFL  (full AAROM; grossly 3+/5 MMT based on functional assessment)   LUE Assessment   LUE Assessment WFL  (full AAROM; grossly 3+/5 MMT based on functional assessment)   Hand Function   Gross Motor Coordination Impaired   Fine Motor Coordination Impaired   Vision-Basic Assessment   Patient Visual Report   (Pt reports "seeing double"- recommend continued evaluation of visual perceptual skills to assist with safe d/c)   Vision - Complex Assessment   Acuity   (pt able to read whiteboard on wall)   Cognition   Overall Cognitive Status Impaired   Arousal/Participation Alert; Responsive   Attention Difficulty attending to directions   Orientation Level Oriented to person;Disoriented to place; Disoriented to time;Disoriented to situation  (requires multiple cues to recall birthday)   Memory Decreased short term memory;Decreased recall of recent events;Decreased recall of precautions   Following Commands Follows one step commands inconsistently   Assessment   Limitation Decreased ADL status; Decreased UE strength;Decreased Safe judgement during ADL;Decreased cognition;Decreased endurance;Decreased fine motor control;Decreased self-care trans;Decreased high-level ADLs; Visual deficit   Prognosis Good   Assessment Patient is a 82 y o  male seen for OT evaluation s/p admit to [address] [place] [address]  on 2/25/2023 w/Acute encephalopathy  Commorbidities affecting patient's functional performance at time of assessment include: DM, HTN, A-fib, COPD, CKD, and left lower lobe pneumonia   Orders placed for OT evaluation and treatment and up and OOB as tolerated   Performed at least two patient identifiers during session including name and wristband  Prior to admission, At baseline, pt is independent with ADLs, ambulatory with no AD, and lives with wife  Personal factors affecting patient at time of initial evaluation include: steps to enter, difficulty performing ADLs and difficulty performing IADLs  Upon evaluation, patient requires maximal assist for UB ADLs, total assist for LB ADLs  Patient is oriented to name,, disoriented to time,, disoriented to place, and disoriented to situation, and presents with impaired judgement, inability to make safe decisions  Occupational performance is affected by the following deficits: orientation, attention span, decreased muscle strength, impaired gross motor coordination, impaired fine motor coordination, decreased activity tolerance, impaired memory, impaired problem solving, decreased safety awareness and delayed righting and equilibrium reactions  Patient to benefit from continued Occupational Therapy treatment while in the hospital to address deficits as defined above and maximize level of functional independence with ADLs and functional mobility  Occupational Performance areas to address include: eating, grooming , bathing/ shower, dressing, toilet hygiene, transfer to all surfaces and functional ambulation  From OT standpoint, recommendation at time of d/c would be Post acute rehabilitation services  Goals   Patient Goals to get better   Plan   Treatment Interventions ADL retraining;Visual perceptual retraining;Functional transfer training;UE strengthening/ROM; Endurance training;Cognitive reorientation;Patient/family training;Equipment evaluation/education; Compensatory technique education; Activityengagement   Goal Expiration Date 03/13/23   OT Treatment Day 0   OT Frequency 3-5x/wk   Recommendation   OT Discharge Recommendation Post acute rehabilitation services   Additional Comments  The patient's raw score on the AM-PAC Daily Activity Inpatient Short Form is 11  A raw score of less than 19 suggests the patient may benefit from discharge to post-acute rehabilitation services  Please refer to the recommendation of the Occupational Therapist for safe discharge planning  AM-PAC Daily Activity Inpatient   Lower Body Dressing 1   Bathing 1   Toileting 1   Upper Body Dressing 2   Grooming 3   Eating 3   Daily Activity Raw Score 11   Daily Activity Standardized Score (Calc for Raw Score >=11) 29 04   AM-PAC Applied Cognition Inpatient   Following a Speech/Presentation 1   Understanding Ordinary Conversation 2   Taking Medications 1   Remembering Where Things Are Placed or Put Away 1   Remembering List of 4-5 Errands 1   Taking Care of Complicated Tasks 1   Applied Cognition Raw Score 7   Applied Cognition Standardized Score 15 17   Modified Milano Scale   Modified [place] Scale 5   End of Consult   Patient Position at End of Consult Supine;Bed/Chair alarm activated; All needs within reach   Nurse Communication Nurse aware of consult     GOALS:    *ADL transfers with (S) for inc'd independence with ADLs/purposeful tasks    *UB ADL with (S) for inc'd independence with self cares    *LB ADL with CGA using AE prn for inc'd independence with self cares    *Toileting with CGA for clothing management and hygiene for return to PLOF with personal care    *Increase stand tolerance x 3  m for inc'd tolerance with standing purposeful tasks    *Participate in 10m UE therex to increase overall stamina/activity tolerance for purposeful tasks    *Bed mobility- (S) for inc'd independence to manage own comfort and initiate EOB & OOB purposeful tasks    *Patient will verbalize 3 safety awareness/ principles to prevent falls in the home setting  *Patient will increase OOB/sitting tolerance to 2-4 hours per day to increase participation in self-care and leisure tasks with no s/s of exertion       *Patient will engage in ongoing cognitive assessment to assist with safe discharge planning/recommendations        *Pt will demonstrate use of long handled AE during 100% of tx sessions for increased ADL safety and independence following D/C     ZEENAT Dumont, OTR/L

## 2023-02-26 NOTE — RESPIRATORY THERAPY NOTE
RT Protocol Note  Nina Woodard 82 y o  male MRN: [id number]  Unit/Bed#: -01 Encounter: [id number]    Assessment    Principal Problem:    Acute encephalopathy  Active Problems:    Diabetes mellitus ([facility] )    Hypertension    Atrial fibrillation (HCC)    COPD (chronic obstructive pulmonary disease) (HCC)    Left lower lobe pneumonia    CKD (chronic kidney disease)      Home Pulmonary Medications:  inhaler       Past Medical History:   Diagnosis Date    Cancer ([place] [address] )     pancreatic    Colon polyp     Coronary artery disease     Diabetes mellitus ([place] [address] )     Hyperlipidemia     Hypertension     Pneumonia 2017    Right middle and lower lobe     Stroke [facility])      Social History     Socioeconomic History    Marital status: /Civil Union     Spouse name: None    Number of children: None    Years of education: None    Highest education level: None   Occupational History    None   Tobacco Use    Smoking status: Former     Types: Pipe     Quit date:      Years since quittin 1    Smokeless tobacco: Never   Vaping Use    Vaping Use: Never used   Substance and Sexual Activity    Alcohol use: Never    Drug use: No    Sexual activity: Yes     Partners: Female   Other Topics Concern    None   Social History Narrative    None     Social Determinants of Health     Financial Resource Strain: Not on file   Food Insecurity: No Food Insecurity    Worried About Running Out of Food in the Last Year: Never true    Ran Out of Food in the Last Year: Never true   Transportation Needs: No Transportation Needs    Lack of Transportation (Medical): No    Lack of Transportation (Non-Medical):  No   Physical Activity: Not on file   Stress: Not on file   Social Connections: Not on file   Intimate Partner Violence: Not on file   Housing Stability: Low Risk     Unable to Pay for Housing in the Last Year: No    Number of Places Lived in the Last Year: 1    Unstable Housing in the Last Year: No       Subjective Objective    Physical Exam:   Assessment Type: (P) Assess only  General Appearance: (P) Awake  Respiratory Pattern: (P) Normal  Chest Assessment: (P) Chest expansion symmetrical  Bilateral Breath Sounds: (P) Diminished  Cough: (P) None  O2 Device: (P) RA    Vitals:  Blood pressure 160/87, pulse (P) 84, temperature (!) 97 4 °F (36 3 °C), temperature source Axillary, resp  rate (P) 18, height 6' 3" (1 905 m), weight 78 5 kg (173 lb 1 oz), SpO2 (P) 96 %  Imaging and other studies: I have personally reviewed pertinent reports  O2 Device: (P) RA     Plan    Respiratory Plan: (P) Home Bronchodilator Patient pathway        Resp Comments: (P) Pt w/ PMH of COPD and uses Trelegy at home  In no distress at this time  Admitted w/ acute encephalopathy  Will cont w/ home regimen

## 2023-02-26 NOTE — ASSESSMENT & PLAN NOTE
Ct c/a/p Mild patchy nodular infiltrate through the left lower lobe in keeping with left lower lobe pneumonia     · Will start on IV ceftriaxone   · Urine legionella and strep pending  · Sputum culture pending  · Monitor fever curve and wbc count

## 2023-02-26 NOTE — ASSESSMENT & PLAN NOTE
Lab Results   Component Value Date    EGFR 32 02/26/2023    EGFR 31 02/26/2023    EGFR 41 02/16/2023    CREATININE 1 87 (H) 02/26/2023    CREATININE 1 95 (H) 02/26/2023    CREATININE 1 54 (H) 02/16/2023     · Creatinine 1 95, improving  · Recent baseline around 1 7  · Received 1L NS bolus in ED, continue IVF infusion  · Avoid nephrotoxic agents  · AM BMP

## 2023-02-26 NOTE — ASSESSMENT & PLAN NOTE
Lab Results   Component Value Date    HGBA1C 6 9 (H) 02/15/2023     · on home Lantus 16 unit nightly, noted to be hypoglycemic   Will hold for now  · Correctional SSI  · Hypoglycemia protocol  · Diabetic diet

## 2023-02-26 NOTE — ASSESSMENT & PLAN NOTE
P/w disoriented with altered mentation per family as noted by outbursts with family members  · Recent diagnosis of vascular dementia with behavioral disturbance  · Recent discharge on 2/16 following a stay for acute encephalopathy in the setting of RSV and dementia  · Recently finished outpatient azithromycin and Ceftin course  · Per family, after 2mg IM haldol in ED patient is now currently at baseline mentation which is oriented x1 (to place)  · CT head negative for acute process  · CT abd pelvis does not show any acute or infectious process  · Not hypoglycemic; no major electrolyte abnormalities  · Ammonia not elevated; coma panel negative   · UA pending collection; f/u w/ results  · Workup not overtly suggestive of infection so far, patient not meeting SIRS, will monitor off abx for now; procal ordered, f/u w/ results  · TSH pending   · Consider checking for vitamin deficiencies such as vit B12, folate, vit B1 as pt has had poor po intake   · Consider MRI brain to check for any further vascular injury causing AMS in setting of vascular dementia   · Unsure of cause currently; possibly due to worsening dementia? vs chronic steroid use?    · High suspicion of poor po intake as noted by significant dehydration on exam causing altered mentation   · Monitor mentation  · Delirium precautions   · Case management consulted

## 2023-02-26 NOTE — CASE MANAGEMENT
Case Management Discharge Planning Note    Patient name Connie Guy  Location [address] [id number] MRN [id number]  : 1940 Date 2023       Current Admission Date: 2023  Current Admission Diagnosis:Acute encephalopathy   Patient Active Problem List    Diagnosis Date Noted   • RSV (acute bronchiolitis due to respiratory syncytial virus) 2023   • Acute encephalopathy 2023   • Generalized weakness 2023   • Malignant neoplasm of tail of pancreas ([facility] ) 2022   • COPD (chronic obstructive pulmonary disease) ([facility] ) 2022   • Left lower lobe pneumonia 2022   • CKD (chronic kidney disease) 2022   • Centrilobular emphysema ([facility] ) 2021   • History of malignant neuroendocrine tumor 2021   • Chronic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 ([facility] ) 2018   • Atrial fibrillation ([facility] ) 2017   • Diabetes mellitus ([facility] ) 2017   • Hypertension 2017   • Acute-on-chronic kidney injury ([facility] ) 2017   • Hyperlipidemia 2015   • Inflammatory polyarthropathy ([facility] ) 2014   • Osteoarthrosis 2014   • Polymyalgia rheumatica ([facility] ) 2014   • Aneurysm of thoracic aorta 2014   • Aneurysm of abdominal aorta 2014   • Neoplasm of other specified site 2014      LOS (days): 0  Geometric Mean LOS (GMLOS) (days):   Days to GMLOS:     OBJECTIVE:            Current admission status: Observation   Preferred Pharmacy:   [facility] [address]  Margarita Professor Cavazos [address] [place] [id number]  Phone: [phone] Fax: [phone] [address] [place] [address]  Phone: [phone] Fax: [phone]    Primary Care Provider: Bharat De La Rosa MD    Primary Insurance: [employer] [facility] RE[facility]  Secondary Insurance:     DISCHARGE DETAILS:    Discharge planning discussed with[de-identified] wife Lisandro Khan and family members at bedside       Comments - Freedom of Choice: CM had a long discussion with family regarding d/c options:  private duty/private pay, STR (family agreeable to [facility]), hospice care in the home, locked dementia unit, [facility], long term care  PT saw pt and is recommending STR  Family hoping he can be accepted at [facility] and insurance [place] will be started  CM will continue to follow  CM contacted family/caregiver?: Yes  Were Treatment Team discharge recommendations reviewed with patient/caregiver?: Yes  Did patient/caregiver verbalize understanding of patient care needs?: Yes  Were patient/caregiver advised of the risks associated with not following Treatment Team discharge recommendations?: Yes    Contacts  Patient Contacts: Ruddy Hinkle and children  Relationship to Patient[de-identified] Family  Contact Method: In Person  Reason/Outcome: Discharge Planning              Other Referral/Resources/Interventions Provided:  Interventions: Prescription Price Check  Referral Comments: Family hoping that pt can be discharged when medically cleared to [facility]  Insurance [place] will be started    CM to f/u with family    Would you like to participate in our [address] service program?  : No - Declined    Treatment Team Recommendation: Short Term Rehab  Discharge Destination Plan[de-identified] Short Term Rehab  Transport at Discharge : [facility]

## 2023-02-26 NOTE — ASSESSMENT & PLAN NOTE
· Continue home Lasix and olmesartan (not on formulary so losartan ordered)  · Monitor BP per unit protocol

## 2023-02-27 ENCOUNTER — TELEPHONE (OUTPATIENT)
Dept: OTHER | Facility: OTHER | Age: 83
End: 2023-02-27

## 2023-02-27 VITALS
OXYGEN SATURATION: 95 % | RESPIRATION RATE: 19 BRPM | DIASTOLIC BLOOD PRESSURE: 74 MMHG | BODY MASS INDEX: 20.83 KG/M2 | HEIGHT: 75 IN | SYSTOLIC BLOOD PRESSURE: 119 MMHG | HEART RATE: 84 BPM | WEIGHT: 167.55 LBS | TEMPERATURE: 98.3 F

## 2023-02-27 PROBLEM — K22.2 ESOPHAGEAL STRICTURE: Status: ACTIVE | Noted: 2023-02-27

## 2023-02-27 LAB
ANION GAP SERPL CALCULATED.3IONS-SCNC: 4 MMOL/L (ref 4–13)
BUN SERPL-MCNC: 28 MG/DL (ref 5–25)
CALCIUM SERPL-MCNC: 9.7 MG/DL (ref 8.3–10.1)
CHLORIDE SERPL-SCNC: 105 MMOL/L (ref 96–108)
CO2 SERPL-SCNC: 30 MMOL/L (ref 21–32)
CREAT SERPL-MCNC: 1.68 MG/DL (ref 0.6–1.3)
ERYTHROCYTE [DISTWIDTH] IN BLOOD BY AUTOMATED COUNT: 13.8 % (ref 11.6–15.1)
GFR SERPL CREATININE-BSD FRML MDRD: 37 ML/MIN/1.73SQ M
GLUCOSE P FAST SERPL-MCNC: 100 MG/DL (ref 65–99)
GLUCOSE SERPL-MCNC: 100 MG/DL (ref 65–140)
GLUCOSE SERPL-MCNC: 168 MG/DL (ref 65–140)
GLUCOSE SERPL-MCNC: 213 MG/DL (ref 65–140)
GLUCOSE SERPL-MCNC: 69 MG/DL (ref 65–140)
HCT VFR BLD AUTO: 38.8 % (ref 36.5–49.3)
HGB BLD-MCNC: 12.4 G/DL (ref 12–17)
L PNEUMO1 AG UR QL IA.RAPID: NEGATIVE
MCH RBC QN AUTO: 30.6 PG (ref 26.8–34.3)
MCHC RBC AUTO-ENTMCNC: 32 G/DL (ref 31.4–37.4)
MCV RBC AUTO: 96 FL (ref 82–98)
PLATELET # BLD AUTO: 134 THOUSANDS/UL (ref 149–390)
PMV BLD AUTO: 9.2 FL (ref 8.9–12.7)
POTASSIUM SERPL-SCNC: 4.1 MMOL/L (ref 3.5–5.3)
PROCALCITONIN SERPL-MCNC: <0.05 NG/ML
RBC # BLD AUTO: 4.05 MILLION/UL (ref 3.88–5.62)
S PNEUM AG UR QL: NEGATIVE
SODIUM SERPL-SCNC: 139 MMOL/L (ref 135–147)
VANCOMYCIN SERPL-MCNC: 30.5 UG/ML (ref 10–20)
WBC # BLD AUTO: 7.1 THOUSAND/UL (ref 4.31–10.16)

## 2023-02-27 RX ORDER — AMOXICILLIN AND CLAVULANATE POTASSIUM 875; 125 MG/1; MG/1
1 TABLET, FILM COATED ORAL EVERY 12 HOURS SCHEDULED
Status: DISCONTINUED | OUTPATIENT
Start: 2023-02-27 | End: 2023-02-27 | Stop reason: HOSPADM

## 2023-02-27 RX ORDER — QUETIAPINE FUMARATE 25 MG/1
12.5 TABLET, FILM COATED ORAL
Qty: 30 TABLET | Refills: 0
Start: 2023-02-27

## 2023-02-27 RX ORDER — AMOXICILLIN AND CLAVULANATE POTASSIUM 875; 125 MG/1; MG/1
1 TABLET, FILM COATED ORAL EVERY 12 HOURS SCHEDULED
Qty: 10 TABLET | Refills: 0
Start: 2023-02-27 | End: 2023-03-03

## 2023-02-27 RX ADMIN — INSULIN LISPRO 1 UNITS: 100 INJECTION, SOLUTION INTRAVENOUS; SUBCUTANEOUS at 17:20

## 2023-02-27 RX ADMIN — LOSARTAN POTASSIUM 50 MG: 50 TABLET, FILM COATED ORAL at 09:07

## 2023-02-27 RX ADMIN — HYDROXYCHLOROQUINE SULFATE 200 MG: 200 TABLET, FILM COATED ORAL at 09:07

## 2023-02-27 RX ADMIN — VANCOMYCIN HYDROCHLORIDE 1000 MG: 1 INJECTION, SOLUTION INTRAVENOUS at 02:07

## 2023-02-27 RX ADMIN — FUROSEMIDE 20 MG: 20 TABLET ORAL at 09:06

## 2023-02-27 RX ADMIN — APIXABAN 2.5 MG: 2.5 TABLET, FILM COATED ORAL at 17:21

## 2023-02-27 RX ADMIN — UMECLIDINIUM 1 PUFF: 62.5 AEROSOL, POWDER ORAL at 09:08

## 2023-02-27 RX ADMIN — FLUTICASONE FUROATE AND VILANTEROL TRIFENATATE 1 PUFF: 100; 25 POWDER RESPIRATORY (INHALATION) at 09:08

## 2023-02-27 RX ADMIN — PRAVASTATIN SODIUM 40 MG: 40 TABLET ORAL at 17:21

## 2023-02-27 RX ADMIN — APIXABAN 2.5 MG: 2.5 TABLET, FILM COATED ORAL at 09:06

## 2023-02-27 RX ADMIN — PREDNISONE 4 MG: 1 TABLET ORAL at 09:06

## 2023-02-27 RX ADMIN — INSULIN LISPRO 2 UNITS: 100 INJECTION, SOLUTION INTRAVENOUS; SUBCUTANEOUS at 12:02

## 2023-02-27 RX ADMIN — AMOXICILLIN AND CLAVULANATE POTASSIUM 1 TABLET: 875; 125 TABLET, FILM COATED ORAL at 12:07

## 2023-02-27 NOTE — ASSESSMENT & PLAN NOTE
· HR remains controlled throughout admission   · Not currently on outpatient rate control medication  · Continue home Eliquis

## 2023-02-27 NOTE — ASSESSMENT & PLAN NOTE
Lab Results   Component Value Date    EGFR 37 02/27/2023    EGFR 32 02/26/2023    EGFR 31 02/26/2023    CREATININE 1 68 (H) 02/27/2023    CREATININE 1 87 (H) 02/26/2023    CREATININE 1 95 (H) 02/26/2023     · Creatinine 1 68 2/27, improving  · Recent baseline around 1 7  · Received 1L NS bolus in ED; continue on IVF until 2/26  · IVF now discontinued monitor off fluids   · Avoid nephrotoxic agents  · AM BMP

## 2023-02-27 NOTE — PLAN OF CARE
Problem: MOBILITY - ADULT  Goal: Maintain or return to baseline ADL function  Description: INTERVENTIONS:  -  Assess patient's ability to carry out ADLs; assess patient's baseline for ADL function and identify physical deficits which impact ability to perform ADLs (bathing, care of mouth/teeth, toileting, grooming, dressing, etc )  - Assess/evaluate cause of self-care deficits   - Assess range of motion  - Assess patient's mobility; develop plan if impaired  - Assess patient's need for assistive devices and provide as appropriate  - Encourage maximum independence but intervene and supervise when necessary  - Involve family in performance of ADLs  - Assess for home care needs following discharge   - Consider OT consult to assist with ADL evaluation and planning for discharge  - Provide patient education as appropriate  Outcome: Progressing  Goal: Maintains/Returns to pre admission functional level  Description: INTERVENTIONS:  - Perform BMAT or MOVE assessment daily    - Set and communicate daily mobility goal to care team and patient/family/caregiver  - Collaborate with rehabilitation services on mobility goals if consulted  - Perform Range of Motion  times a day  - Reposition patient every  hours    - Dangle patient  times a day  - Stand patient  times a day  - Ambulate patient  times a day  - Out of bed to chair times a day   - Out of bed for meal times a day  - Out of bed for toileting  - Record patient progress and toleration of activity level   Outcome: Progressing     Problem: PAIN - ADULT  Goal: Verbalizes/displays adequate comfort level or baseline comfort level  Description: Interventions:  - Encourage patient to monitor pain and request assistance  - Assess pain using appropriate pain scale  - Administer analgesics based on type and severity of pain and evaluate response  - Implement non-pharmacological measures as appropriate and evaluate response  - Consider cultural and social influences on pain and pain management  - Notify physician/advanced practitioner if interventions unsuccessful or patient reports new pain  Outcome: Progressing     Problem: DISCHARGE PLANNING  Goal: Discharge to home or other facility with appropriate resources  Description: INTERVENTIONS:  - Identify barriers to discharge w/patient and caregiver  - Arrange for needed discharge resources and transportation as appropriate  - Identify discharge learning needs (meds, wound care, etc )  - Arrange for interpretive services to assist at discharge as needed  - Refer to Case Management Department for coordinating discharge planning if the patient needs post-hospital services based on physician/advanced practitioner order or complex needs related to functional status, cognitive ability, or social support system  Outcome: Progressing     Problem: Knowledge Deficit  Goal: Patient/family/caregiver demonstrates understanding of disease process, treatment plan, medications, and discharge instructions  Description: Complete learning assessment and assess knowledge base    Interventions:  - Provide teaching at level of understanding  - Provide teaching via preferred learning methods  Outcome: Progressing     Problem: NEUROSENSORY - ADULT  Goal: Achieves stable or improved neurological status  Description: INTERVENTIONS  - Monitor and report changes in neurological status  - Monitor vital signs such as temperature, blood pressure, glucose, and any other labs ordered   - Initiate measures to prevent increased intracranial pressure  - Monitor for seizure activity and implement precautions if appropriate      Outcome: Progressing     Problem: RESPIRATORY - ADULT  Goal: Achieves optimal ventilation and oxygenation  Description: INTERVENTIONS:  - Assess for changes in respiratory status  - Assess for changes in mentation and behavior  - Position to facilitate oxygenation and minimize respiratory effort  - Oxygen administered by appropriate delivery if ordered  - Initiate smoking cessation education as indicated  - Encourage broncho-pulmonary hygiene including cough, deep breathe, Incentive Spirometry  - Assess the need for suctioning and aspirate as needed  - Assess and instruct to report SOB or any respiratory difficulty  - Respiratory Therapy support as indicated  Outcome: Progressing     Problem: METABOLIC, FLUID AND ELECTROLYTES - ADULT  Goal: Electrolytes maintained within normal limits  Description: INTERVENTIONS:  - Monitor labs and assess patient for signs and symptoms of electrolyte imbalances  - Administer electrolyte replacement as ordered  - Monitor response to electrolyte replacements, including repeat lab results as appropriate  - Instruct patient on fluid and nutrition as appropriate  Outcome: Progressing  Goal: Fluid balance maintained  Description: INTERVENTIONS:  - Monitor labs   - Monitor I/O and WT  - Instruct patient on fluid and nutrition as appropriate  - Assess for signs & symptoms of volume excess or deficit  Outcome: Progressing     Problem: HEMATOLOGIC - ADULT  Goal: Maintains hematologic stability  Description: INTERVENTIONS  - Assess for signs and symptoms of bleeding or hemorrhage  - Monitor labs  - Administer supportive blood products/factors as ordered and appropriate  Outcome: Progressing

## 2023-02-27 NOTE — PLAN OF CARE
Problem: MOBILITY - ADULT  Goal: Maintain or return to baseline ADL function  Description: INTERVENTIONS:  -  Assess patient's ability to carry out ADLs; assess patient's baseline for ADL function and identify physical deficits which impact ability to perform ADLs (bathing, care of mouth/teeth, toileting, grooming, dressing, etc )  - Assess/evaluate cause of self-care deficits   - Assess range of motion  - Assess patient's mobility; develop plan if impaired  - Assess patient's need for assistive devices and provide as appropriate  - Encourage maximum independence but intervene and supervise when necessary  - Involve family in performance of ADLs  - Assess for home care needs following discharge   - Consider OT consult to assist with ADL evaluation and planning for discharge  - Provide patient education as appropriate  Outcome: Progressing  Goal: Maintains/Returns to pre admission functional level  Description: INTERVENTIONS:  - Perform BMAT or MOVE assessment daily    - Set and communicate daily mobility goal to care team and patient/family/caregiver  - Collaborate with rehabilitation services on mobility goals if consulted  - Perform Range of Motion  times a day  - Reposition patient every  hours    - Dangle patient  times a day  - Stand patient  times a day  - Ambulate patient  times a day  - Out of bed to chair  times a day   - Out of bed for meals  times a day  - Out of bed for toileting  - Record patient progress and toleration of activity level   Outcome: Progressing     Problem: PAIN - ADULT  Goal: Verbalizes/displays adequate comfort level or baseline comfort level  Description: Interventions:  - Encourage patient to monitor pain and request assistance  - Assess pain using appropriate pain scale  - Administer analgesics based on type and severity of pain and evaluate response  - Implement non-pharmacological measures as appropriate and evaluate response  - Consider cultural and social influences on pain and pain management  - Notify physician/advanced practitioner if interventions unsuccessful or patient reports new pain  Outcome: Progressing     Problem: DISCHARGE PLANNING  Goal: Discharge to home or other facility with appropriate resources  Description: INTERVENTIONS:  - Identify barriers to discharge w/patient and caregiver  - Arrange for needed discharge resources and transportation as appropriate  - Identify discharge learning needs (meds, wound care, etc )  - Arrange for interpretive services to assist at discharge as needed  - Refer to Case Management Department for coordinating discharge planning if the patient needs post-hospital services based on physician/advanced practitioner order or complex needs related to functional status, cognitive ability, or social support system  Outcome: Progressing     Problem: Knowledge Deficit  Goal: Patient/family/caregiver demonstrates understanding of disease process, treatment plan, medications, and discharge instructions  Description: Complete learning assessment and assess knowledge base    Interventions:  - Provide teaching at level of understanding  - Provide teaching via preferred learning methods  Outcome: Progressing     Problem: NEUROSENSORY - ADULT  Goal: Achieves stable or improved neurological status  Description: INTERVENTIONS  - Monitor and report changes in neurological status  - Monitor vital signs such as temperature, blood pressure, glucose, and any other labs ordered   - Initiate measures to prevent increased intracranial pressure  - Monitor for seizure activity and implement precautions if appropriate      Outcome: Progressing     Problem: RESPIRATORY - ADULT  Goal: Achieves optimal ventilation and oxygenation  Description: INTERVENTIONS:  - Assess for changes in respiratory status  - Assess for changes in mentation and behavior  - Position to facilitate oxygenation and minimize respiratory effort  - Oxygen administered by appropriate delivery if ordered  - Initiate smoking cessation education as indicated  - Encourage broncho-pulmonary hygiene including cough, deep breathe, Incentive Spirometry  - Assess the need for suctioning and aspirate as needed  - Assess and instruct to report SOB or any respiratory difficulty  - Respiratory Therapy support as indicated  Outcome: Progressing     Problem: METABOLIC, FLUID AND ELECTROLYTES - ADULT  Goal: Electrolytes maintained within normal limits  Description: INTERVENTIONS:  - Monitor labs and assess patient for signs and symptoms of electrolyte imbalances  - Administer electrolyte replacement as ordered  - Monitor response to electrolyte replacements, including repeat lab results as appropriate  - Instruct patient on fluid and nutrition as appropriate  Outcome: Progressing  Goal: Fluid balance maintained  Description: INTERVENTIONS:  - Monitor labs   - Monitor I/O and WT  - Instruct patient on fluid and nutrition as appropriate  - Assess for signs & symptoms of volume excess or deficit  Outcome: Progressing     Problem: HEMATOLOGIC - ADULT  Goal: Maintains hematologic stability  Description: INTERVENTIONS  - Assess for signs and symptoms of bleeding or hemorrhage  - Monitor labs  - Administer supportive blood products/factors as ordered and appropriate  Outcome: Progressing

## 2023-02-27 NOTE — RESPIRATORY THERAPY NOTE
RT Protocol Note  Yola Corona 82 y o  male MRN: [id number]  Unit/Bed#: -01 Encounter: [id number]    Assessment    Principal Problem:    Acute encephalopathy  Active Problems:    Diabetes mellitus ([facility] )    Hypertension    Atrial fibrillation ([facility] )    COPD (chronic obstructive pulmonary disease) ([facility] )    Left lower lobe pneumonia    CKD (chronic kidney disease)      Home Pulmonary Medications:     23 2300   Respiratory Protocol   Protocol Initiated? Yes   Protocol Selection Respiratory   Language Barrier? No   Medical & Social History Reviewed? Yes   Diagnostic Studies Reviewed? Yes   Physical Assessment Performed? Yes   Respiratory Plan Home Bronchodilator Patient pathway   Respiratory Assessment   Assessment Type Assess only   General Appearance Alert   Respiratory Pattern Normal   Chest Assessment Chest expansion symmetrical   Bilateral Breath Sounds Diminished   Location Specific No   Cough None   Resp Comments Resp protocol completed   Will continue with current tx plan   Additional Assessments   Pulse 88   Respirations 18   SpO2 96 %            Past Medical History:   Diagnosis Date    Cancer ([facility] )     pancreatic    Colon polyp     Coronary artery disease     Diabetes mellitus ([place] [address] )     Hyperlipidemia     Hypertension     Pneumonia 2017    Right middle and lower lobe     Stroke [facility])      Social History     Socioeconomic History    Marital status: /Civil Union     Spouse name: None    Number of children: None    Years of education: None    Highest education level: None   Occupational History    None   Tobacco Use    Smoking status: Former     Types: Pipe     Quit date:      Years since quittin     Smokeless tobacco: Never   Vaping Use    Vaping Use: Never used   Substance and Sexual Activity    Alcohol use: Never    Drug use: No    Sexual activity: Yes     Partners: Female   Other Topics Concern    None   Social History Narrative    None     Social Determinants of Health Financial Resource Strain: Not on file   Food Insecurity: No Food Insecurity    Worried About [facility] in the Last Year: Never true    Ran Out of Food in the Last Year: Never true   Transportation Needs: No Transportation Needs    Lack of Transportation (Medical): No    Lack of Transportation (Non-Medical): No   Physical Activity: Not on file   Stress: Not on file   Social Connections: Not on file   Intimate Partner Violence: Not on file   Housing Stability: Low Risk     Unable to Pay for Housing in the Last Year: No    Number of Places Lived in the Last Year: 1    Unstable Housing in the Last Year: No       Subjective         Objective    Physical Exam:   Assessment Type: Assess only  General Appearance: Alert  Respiratory Pattern: Normal  Chest Assessment: Chest expansion symmetrical  Bilateral Breath Sounds: Diminished  Location Specific: No  Cough: None  O2 Device: RA    Vitals:  Blood pressure 162/92, pulse 88, temperature 97 9 °F (36 6 °C), resp  rate 18, height 6' 3" (1 905 m), weight 78 5 kg (173 lb 1 oz), SpO2 96 %  Imaging and other studies: I have personally reviewed pertinent reports  O2 Device: RA     Plan    Respiratory Plan: Home Bronchodilator Patient pathway        Resp Comments: Resp protocol completed   Will continue with current tx plan

## 2023-02-27 NOTE — SPEECH THERAPY NOTE
Speech-Language Pathology Bedside Swallow Evaluation        Patient Name: Yulia Hemphill  KEVKQ'[id number] Date: 2/27/2023     Problem List  Principal Problem:    Acute encephalopathy  Active Problems:    Diabetes mellitus ([facility] )    Hypertension    Atrial fibrillation ([facility])    COPD (chronic obstructive pulmonary disease) ([facility])    Left lower lobe pneumonia    CKD (chronic kidney disease)    Esophageal stricture       Past Medical History  Past Medical History:   Diagnosis Date   • Cancer [facility])     pancreatic   • Colon polyp    • Coronary artery disease    • Diabetes mellitus ([facility] )    • Hyperlipidemia    • Hypertension    • Pneumonia 06/13/2017    Right middle and lower lobe    • Stroke [facility])        Past Surgical History  Past Surgical History:   Procedure Laterality Date   • APPENDECTOMY     • CARDIAC SURGERY      Aortic Valve Replacement, Ascending Aorta   • COLONOSCOPY     • GALLBLADDER SURGERY     • PANCREATICODUODENECTOMY         Summary/Impressions:    Pt presents with grossly intact oropharyngeal swallow as per bedside observations  Self-feeds all trials with no overt s/s of aspiration or distress  Reports occasional symptoms of food/liquid "going down and coming back up" while pointing to mid-chest   Reports most recent event to have been a few weeks prior  Note, details in pt report appears to vary; cannot r/o retrograde flow  Patient also states he often struggles to swallow larger pills with water  Symptoms are reportedly sporadic and inconsistent  Will continue to monitor given information noted in H&P (see below)          Recommendations:   Diet: regular diet, thin liquids and consider softer textures    Meds: whole with puree; cut/crush larger if medically cleared    Feeding assistance: tray set up w/ assist as needed  Frequent Oral care: 2-4x/day  Aspiration precautions and compensatory swallowing strategies: upright posture, only feed when fully alert, slow rate of feeding, small bites/sips and reflux precautions  Other Recommendations/ considerations: ST follow up x1-2 to assess improvement w/ modification of medication administration and understanding of aspiration/reflux precautions  May consider GI consult (?outpatient as symptoms vary)    Current Medical Status  Pt is a 82 y o  male who presented to [facility] with altered mental status  Per patient's family, they were just informed of diagnosis of vascular dementia last week  He was recently hospitalized for RSV and pneumonia  Since being discharged from the hospital, patient's mental status has acutely declined progressively over the last several days  It is to the point now where he has been having violent outbursts and family is fearful at home  Patient has not been eating or drinking for the last several days  He has been coughing  Past medical history:  Please see H&P for details    Special Studies:  2/26/23 CT chest:  Mild patchy nodular infiltrate through the left lower lobe in keeping with left lower lobe pneumonia #4/94  Unchanged area of right lower lobe round atelectasis  Findings are consistent with the preliminary report from [facility] which was provided shortly after completion of the exam  The additional finding of  left lower lobe pneumonia     2/26/23 CT head:  1  No acute intracranial abnormality    2   Chronic microangiopathic ischemic changes similar to prior study      Social/Education/Vocational Hx:  Pt lives with family    Swallow Information   Current Risks for Dysphagia & Aspiration: AMS  Current Symptoms/Concerns: coughing with po and pneumonia   Current Diet: regular diet and thin liquids   Baseline Diet: regular diet and thin liquids (choose softer)     Baseline Assessment   Behavior/Cognition: alert  Speech/Language Status: able to participate in basic conversation  Patient Positioning: upright in chair    Swallow Mechanism Exam   Facial: symmetrical  Labial: WFL  Lingual: WFL  Velum: symmetrical  Mandible: adequate ROM  Dentition: adequate  Vocal quality:clear/adequate   Volitional Cough: strong/productive   Respiratory: RA    Consistencies Assessed and Performance   Consistencies Administered: thin liquids, puree, soft solids and hard solids    Oral Stage: Adequate bolus acceptance and containment  Mastication appears complete  Transfer is prompt  No oral residue  Pharyngeal Stage: Laryngeal rise noted upon palpation  Swallow initiation appears slightly delayed  No overt s/s of aspiration or distress  Cannot r/o episodic retrograde flow/aspiration as per pt reports of symptoms  Esophageal Concerns: globus with material "coming back up"      Results Reviewed with: patient and RN     Plan  Will continue to follow for 1-2x    Dysphagia Goals: pt will tolerate regular solids with thin liquids without s/s of aspiration x1-3   Patient will follow aspiration/reflux precautions and guidelines         Discharge recommendation: likely no follow up needed for swallowing    Speech Therapy Prognosis   Prognosis: good  Prognosis Considerations: medical status        Kamille Haas [address], [address] [facility]  Speech-Language Pathologist  [place] #[id number]  [place] #[id number]

## 2023-02-27 NOTE — PLAN OF CARE
Recommendations:   Diet: regular diet, thin liquids and consider softer textures    Meds: whole with puree; cut/crush larger if medically cleared    Feeding assistance: tray set up w/ assist as needed  Frequent Oral care: 2-4x/day  Aspiration precautions and compensatory swallowing strategies: upright posture, only feed when fully alert, slow rate of feeding, small bites/sips and reflux precautions  Other Recommendations/ considerations: ST follow up x1-2 to assess improvement w/ modification of medication administration and understanding of aspiration/reflux precautions    May consider GI consult (?outpatient as symptoms vary

## 2023-02-27 NOTE — PHYSICAL THERAPY NOTE
Physical Therapy Treatment Note    Patient Name: Igor Delaney    Diagnosis: Altered mental status [R41 [id number]]     02/27/23 0735   PT Last Visit   PT Visit Date 02/27/23   Note Type   Note Type Treatment   Pain Assessment   Pain Assessment Tool 0-10   Pain Score No Pain   Restrictions/Precautions   Weight Bearing Precautions Per Order No   Other Precautions Cognitive; Chair Alarm; Bed Alarm;Multiple lines; Fall Risk   General   Chart Reviewed Yes   Additional Pertinent History Pt with history of neuropathy on bilateral feet and LE strength grossly assessed at 4-/5 bilateral   Response to Previous Treatment Patient unable to report, no changes reported from family or staff   Family/Caregiver Present No   Cognition   Overall Cognitive Status   (questionable)   Arousal/Participation Alert; Responsive   Attention Within functional limits   Orientation Level Oriented to person;Oriented to place;Oriented to time;Disoriented to situation   Memory Decreased recall of recent events   Following Commands Follows multistep commands with increased time or repetition   Comments Pt agreeable to PT  Subjective   Subjective "I want to get out of bed "   Bed Mobility   Supine to Sit 4  Minimal assistance   Additional items Assist x 1;HOB elevated; Bedrails; Increased time required;Verbal cues;LE management   Transfers   Sit to Stand 4  Minimal assistance   Additional items Assist x 1; Increased time required;Verbal cues   Stand to Sit 4  Minimal assistance   Additional items Assist x 1; Armrests; Increased time required;Verbal cues   Additional Comments 1 LOB as patient with sitting in recliner requiring min assist of two to recover   Ambulation/Elevation   Gait pattern Decreased toe off;Decreased heel strike;Decreased hip extension; Short stride; Shuffling   Gait Assistance 4  Minimal assist   Additional items Assist x 2;Verbal cues   Assistive Device Rolling walker   Distance 80 feet   Balance   Static Sitting Fair +   Dynamic Sitting Fair Static Standing Poor +   Dynamic Standing Poor   Ambulatory Poor   Endurance Deficit   Endurance Deficit Yes   Endurance Deficit Description decreased activity tolerance   Activity Tolerance   Activity Tolerance Patient limited by fatigue   Medical Staff Made Aware OT [place]   Exercises   Hip Flexion Sitting;20 reps;AROM; Bilateral   Hip Abduction Sitting;20 reps;AROM; Bilateral  (long sitting)   Knee AROM Long Arc Quad Sitting;20 reps;AROM; Bilateral   Ankle Pumps Sitting;20 reps;AROM; Bilateral   Balance training  static/dynamic standing for ~2 minutes during self care   Assessment   Prognosis Good   Problem List Decreased strength;Decreased endurance; Impaired balance;Decreased mobility; Decreased cognition   Assessment Chart reviewed  Patient was received supine in bed in NAD and agreeable to PT session  Today's PT treatment session consisted of therapeutic activity for facilitation of transitional movements and safe performance of correct technique for bed mobility and sit to stand transfers, therapeutic exercise to increase lower extremity muscle strength, and gait training to promote safe and functional ambulation on level surfaces  In comparison to the previous session the patient has made progress as evident by requiring decreased assistance with all functional mobility  Pt was able to stand and ambulate this session  When ambulating pt exhibits decreased celia, decreased stride length, and decreased heel strike  Pt with one posterior loss of balance when returning to sitting in recliner and required minimal assist of two to recover  Pt tolerated all therapeutic exercise well without complaints  Overall, patient tolerated today's session well and continues to be making progress towards achieving his STG's  Pt's STG's were updated this session  Patient's prognosis for achieving their STG's is good as evident by pt's motivation and good family support   Co-treatment was performed with OT secondary to complex medical condition of pt and regression of functional status from baseline  PT/OT goals were addressed separately  PT intervention continues to be appropriate as the patient continues to be limited by decreased lower extremity strength, impaired balance, decreased endurance, gait deviations, and decreased functional mobility  Continue to recommend STR vs  HHPT pending progress  PT to continue to see patient in order to address the deficits listed above and provide interventions consistent with the POC in order to achieve STG's and optimize the patient's independence with functional mobility  Barriers to Discharge Inaccessible home environment   Goals   STG Expiration Date 03/09/23   Short Term Goal #1 In 10 days: Increase bilateral LE strength 1/2 grade to facilitate independent mobility, Perform all bed mobility tasks with close supervision to decrease caregiver burden, Perform all transfers with close supervision to improve independence, Ambulate > 150 ft  with least restrictive assistive device with close supervision w/o LOB and w/ normalized gait pattern 100% of the time, Navigate 12 stairs with close supervision with unilateral handrail to facilitate return to previous living environment and Increase all balance 1/2 grade to decrease risk for falls   PT Treatment Day 1   Plan   Treatment/Interventions Functional transfer training;LE strengthening/ROM; Elevations; Therapeutic exercise; Endurance training;Cognitive reorientation;Patient/family training;Bed mobility;Gait training;OT   Progress Progressing toward goals   PT Frequency 3-5x/wk   Recommendation   PT Discharge Recommendation Post acute rehabilitation services  (vs  HHPT pending progress)   AM-PAC Basic Mobility Inpatient   Turning in Flat Bed Without Bedrails 3   Lying on Back to Sitting on Edge of Flat Bed Without Bedrails 3   Moving Bed to Chair 2   Standing Up From Chair Using Arms 3   Walk in Room 2   Climb 3-5 Stairs With Railing 2   Basic Mobility Inpatient Raw Score 15   Basic Mobility Standardized Score 36 97   Highest Level Of Mobility   -[facility] Goal 4: Move to chair/commode   -HLM Achieved 7: Walk 25 feet or more   Education   Education Provided Mobility training;Home exercise program;Assistive device   Patient Reinforcement needed;Demonstrates acceptance/verbal understanding   End of Consult   Patient Position at End of Consult Bedside chair;Bed/Chair alarm activated; All needs within reach     Xander Rooney, PT, DPT    Time of PT treatment session: [id number]  39 minutes

## 2023-02-27 NOTE — PROGRESS NOTES
Gemini Lozano is a 82 y o  male who is currently ordered Vancomycin IV with management by the Pharmacy Consult service  Relevant clinical data and objective / subjective history reviewed  Vancomycin Assessment:  Indication and Goal AUC/Trough: Pneumonia (goal -600, trough >10)  Clinical Status: stable  Micro: in process    Renal Function:  SCr: 1 68 mg/dL  CrCl: 38 1 mL/min  Renal replacement: Not on dialysis  Days of Therapy: 2  Current Dose: 1000mg IV q24h  Vancomycin Plan:  New Dosinmg IV q24h  Estimated AUC: 448 mcg*hr/mL  Estimated Trough: 14 4 mcg/mL  Next Level: 23 level AM draw    Renal Function Monitoring: Daily BMP and [place] will continue to follow closely for s/sx of nephrotoxicity, infusion reactions and appropriateness of therapy  BMP and CBC will be ordered per protocol  We will continue to follow the patient’s culture results and clinical progress daily      Colleen Robledo, Pharmacist

## 2023-02-27 NOTE — OCCUPATIONAL THERAPY NOTE
Occupational Therapy Treatment Note     Patient Name: Gemini Lozano  GZNMA'A Date: 2/27/2023  Problem List  Principal Problem:    Acute encephalopathy  Active Problems:    Diabetes mellitus ([address] )    Hypertension    Atrial fibrillation (HCC)    COPD (chronic obstructive pulmonary disease) (HCC)    Left lower lobe pneumonia    CKD (chronic kidney disease)    Esophageal stricture        02/27/23 0803   OT Last Visit   OT Visit Date 02/27/23   Note Type   Note Type Treatment   Pain Assessment   Pain Assessment Tool 0-10   Pain Score No Pain   Restrictions/Precautions   Weight Bearing Precautions Per Order No   Other Precautions Cognitive; Chair Alarm; Bed Alarm;Multiple lines; Fall Risk   Lifestyle   Autonomy At baseline, pt is independent with ADLs, ambulatory with no AD, and lives with wife   Reciprocal Relationships Supportive family   Service to Others Retired   ADL   Where Assessed Sitting at [place] [id number]  Supervision/Setup   Grooming Deficit Setup;Steadying;Verbal cueing;Supervision/safety; Increased time to complete   Grooming Comments Pt completed oral hygiene while standing at sink   UB Bathing Assistance 5  Supervision/Setup   UB Bathing Deficit Setup;Verbal cueing;Supervision/safety; Increased time to complete   LB Bathing Assistance 4  Minimal Assistance   LB Bathing Deficit Setup;Steadying;Verbal cueing;Supervision/safety; Increased time to complete;Right lower leg including foot; Left lower leg including foot   UB Dressing Assistance 4  Minimal Assistance   UB Dressing Deficit Setup;Verbal cueing;Supervision/safety; Increased time to complete;Pull around back; Fasteners   UB Dressing Comments Pt donned hospital gown   LB Dressing Assistance 3  Moderate Assistance   LB Dressing Deficit Don/doff R sock; Don/doff L sock   Functional Standing Tolerance   Time ~2 minutes   Activity Pt stood at sink with CGA to complete oral hyigene   Pt demonstrated good sequencing of oral hygiene tasks with occasional cues for safety  Bed Mobility   Supine to Sit 4  Minimal assistance   Additional items Assist x 1;HOB elevated; Bedrails; Increased time required;Verbal cues;LE management   Sit to Supine   (DNT: pt seated OOB in recliner at end of session)   Transfers   Sit to Stand 4  Minimal assistance   Additional items Assist x 1;Bedrails; Increased time required;Verbal cues   Stand to Sit 4  Minimal assistance   Additional items Assist x 2;Armrests; Increased time required;Verbal cues  (Pt noted to have LOB prior to sitting in recliner- assist of 2 to regain balance prior to sitting)   Functional Mobility   Functional Mobility 4  Minimal assistance  (Assist of 1-2)   Additional Comments Pt ambulated short household distance in hallway with no overt SOB  Pt grossly uinsteady with fluccuating assist levels for stability & safety  Additional items Rolling walker   Toilet Transfers   Toilet Transfer From [employer] Transfer Type To and from   Toilet Transfer to Standard bedside commode   Toilet Transfer Technique Ambulating   Toilet Transfers Minimal assistance   Therapeutic Exercise - ROM   UE-ROM Yes  (to increase strength and endurance)   ROM- Right Upper Extremities   R Shoulder AROM; Flexion; Extension   R Elbow AROM;Elbow flexion;Elbow extension   R Position Seated;Against gravity   R Weight/Reps/Sets 1 set x 15 reps each   ROM - Left Upper Extremities    L Shoulder AROM; Flexion; Extension   L Elbow AROM;Elbow flexion;Elbow extension   L Position Seated;Against gravity   L Weight/Reps/Sets 1 set x 15 reps each   Cognition   Overall Cognitive Status   (Questionable)   Arousal/Participation Alert; Responsive   Attention Within functional limits   Orientation Level Oriented to person;Oriented to place; Disoriented to situation;Oriented to time   Memory Decreased recall of recent events;Decreased recall of precautions   Following Commands Follows multistep commands with increased time or repetition   Comments Mini-Cog assessment performed today  Version 1 was given  Patient able to recall 0/3 words  Patient able to draw a normal clock with the correct time  Total score of test was 2/5 indicating  further screening of cognition is recommended  Activity Tolerance   Activity Tolerance Patient limited by fatigue   Medical Staff Made Aware Pt seen as a co-treat with PT [place] due to the patient's co-morbidities, clinically unstable presentation from OT IE, and present impairments which are a regression from the patient's baseline  Assessment   Assessment Patient participated in Skilled OT session this date with interventions consisting of ADL re training with the use of correct body mechnaics, therapeutic exercise to: increase functional use of BUEs, increase BUE muscle strength ,  therapeutic activities to: increase activity tolerance and increase dynamic sit/ stand balance during functional activity   Patient agreeable to OT treatment session, upon arrival patient was found supine in bed and in no apparent distress  Patient completed bed mobility and functional transfers with min assist of 1  Pt ambulated to/from bathroom and in hallway with min assist of 1-2 with RW  Pt noted to have 1 significant LOB and required assist of 2 to regain balance  Pt completed commode transfer with min assist of 2  While seated on commode, pt required sup-min assist for UB ADLs and min-mod assist for LB ADLs  Pt stood at sink for ~2 mins with CGA to complete oral hygiene  Once seated in recliner, pt completed 1 set x 15 reps of BUE exercises to increase strength and endurance  In comparison to previous session, patient with improvements in engagement, orientation, bed mobility and functional transfers  Patient requiring frequent rest periods and ocassional safety reminders  Patient continues to be functioning below baseline level, occupational performance remains limited secondary to factors listed above and increased risk for falls and injury     From OT standpoint, recommendation at time of d/c would be Post acute rehabilitation services  Patient to benefit from continued Occupational Therapy treatment while in the hospital to address deficits as defined above and maximize level of functional independence with ADLs and functional mobility  Plan   Treatment Interventions ADL retraining;Functional transfer training;UE strengthening/ROM; Endurance training;Patient/family training; Compensatory technique education   Goal Expiration Date 03/13/23   OT Treatment Day 1   OT Frequency 3-5x/wk   Recommendation   OT Discharge Recommendation Post acute rehabilitation services   Additional Comments  The patient's raw score on the AM-PAC Daily Activity Inpatient Short Form is 16  A raw score of less than 19 suggests the patient may benefit from discharge to post-acute rehabilitation services  Please refer to the recommendation of the Occupational Therapist for safe discharge planning  AM-PAC Daily Activity Inpatient   Lower Body Dressing 2   Bathing 3   Toileting 2   Upper Body Dressing 3   Grooming 3   Eating 3   Daily Activity Raw Score 16   Daily Activity Standardized Score (Calc for Raw Score >=11) 35 96   AM-[facility] Applied Cognition Inpatient   Following a Speech/Presentation 3   Understanding Ordinary Conversation 4   Taking Medications 2   Remembering Where Things Are Placed or Put Away 2   Remembering List of 4-5 Errands 2   Taking Care of Complicated Tasks 2   Applied Cognition Raw Score 15   Applied Cognition Standardized Score 33 54   Mini-Cog Assessment   Word Version Version 1: Banana, Sunrise, Chair   Clock Draw (CDT) 2   Word Recall 0   Mini-Cog Score 2   Mini-Cog Results Positive screen for dementia   Modified [place] Scale   Modified [place] Scale 4   End of Consult   Patient Position at End of Consult Bedside chair;Bed/Chair alarm activated; All needs within reach   Nurse Communication Nurse aware of consult     Sravanthi Gallo OTD, OTR/L

## 2023-02-27 NOTE — ASSESSMENT & PLAN NOTE
P/w disoriented with altered mentation per family as noted by outbursts with family members     · Recent diagnosis of vascular dementia with behavioral disturbance  · Recent discharge on 2/16 following a stay for acute encephalopathy in the setting of RSV and dementia  · Recently finished outpatient azithromycin and Ceftin course  · Per family, after 2mg IM haldol in ED patient is now currently at baseline mentation which is oriented x1 (to place)  · CT head negative for acute process; CT abd pelvis does not show any acute or infectious process  · Not hypoglycemic; ammonia not elevated; coma panel negative; no major electrolyte abnormalities; UA negative; procal negative; TSH negative   · Patient not meeting SIRS  · MRI brain 2/15: no intracranial abnormality   · High suspicion of poor po intake as noted by significant dehydration on exam causing altered mentation   · Monitor mentation   · Delirium precautions   · Case management consulted   · seroquel 12 5 daily at bedtime

## 2023-02-27 NOTE — UTILIZATION REVIEW
Initial Clinical Review    Admission: Date/Time/Statement:   Admission Orders (From admission, onward)     Ordered        02/26/23 0339  Place in Observation  Once                      Orders Placed This Encounter   Procedures   • Place in Observation     Standing Status:   Standing     Number of Occurrences:   1     Order Specific Question:   Level of Care     Answer:   Med Surg [16]     ED Arrival Information     Expected   -    Arrival   2/25/2023 22:55    Acuity   Urgent            Means of arrival   Ambulance    Escorted by   Bob Gonzalez [facility])    Service   Hospitalist    Admission type   Emergency            Arrival complaint   altermental status           Chief Complaint   Patient presents with   • Altered Mental Status     Pt arrived via ems with c/o altered mental status  Pt was dx last week with alzheimer's and per family, has quickly declined  Initial Presentation: 82 y o  male to the ED from home via EMS with complaints of change in mental status  Admitted under observation for acute encephalopathy  H/O DM, HTN, vascular dementia, PMR, HLD, pancreatic cancer, COPD, IPF  Having violent outbursts  Arrives tachypneic  Admitted from  2/14/23-2/16 or acute encephalopathy, RSV and has completed abx  Given IM haldol in the ED with improved mentation  Ct c/a/p Mild patchy nodular infiltrate through the left lower lobe in keeping with left lower lobe pneumonia  Iv abx  Started  Sputum culture pending  Start inhaler  Creat 1 95 on arrival, improving  PT/OT recommending post acute rehab  Case management working on safe dc plan      Date:     Day 2:      ED Triage Vitals   Temperature Pulse Respirations Blood Pressure SpO2   02/25/23 2303 02/25/23 2304 02/26/23 0122 02/25/23 2304 02/25/23 2304   97 9 °F (36 6 °C) 82 (!) 25 147/93 98 %      Temp Source Heart Rate Source Patient Position - Orthostatic VS BP Location FiO2 (%)   02/25/23 2303 02/26/23 0330 02/26/23 0330 02/26/23 0330 --   Temporal Monitor Lying Right arm       Pain Score       02/26/23 0432       No Pain          Wt Readings from Last 1 Encounters:   02/27/23 78 1 kg (172 lb 2 9 oz)     Additional Vital Signs:   Date/Time Temp Pulse Resp BP MAP (mmHg) SpO2 O2 Device Patient Position - Orthostatic VS   02/27/23 08:02:10 -- -- -- 124/74 91 -- -- --   02/27/23 07:41:41 97 7 °F (36 5 °C) -- 18 140/82 101 97 % -- --   02/26/23 2300 -- 88 18 -- -- 96 % -- --   02/26/23 22:36:43 97 9 °F (36 6 °C) -- -- 162/92 115 -- -- --   02/26/23 1539 -- -- -- 164/94 117 -- -- --   02/26/23 1426 -- 84 18 -- -- 96 % None (Room air) --   02/26/23 0606 97 4 °F (36 3 °C) Abnormal  83 19 160/87 -- 97 % None (Room air) Lying   02/26/23 04:32:22 97 4 °F (36 3 °C) Abnormal  83 20 166/92 117 100 % None (Room air) --   02/26/23 0330 -- 70 22 121/71 91 94 % None (Room air) Lying   02/26/23 0122 -- 80 25 Abnormal  142/83 106 94 % -- --   02/25/23 2304 -- 82 -- 147/93 115 98 % -- --   02/25/23 2303 97 9 °F (36 6 °C) -- -- -- -- -- -- --       Pertinent Labs/Diagnostic Test Results:     CT head without contrast   Final Result by Lubna Menon MD (02/26 4606)      1  No acute intracranial abnormality  2   Chronic microangiopathic ischemic changes similar to prior study  Findings are consistent with the preliminary report from [facility] which was provided shortly after completion of the exam                   Workstation performed: [id number]         CT chest abdomen pelvis wo contrast   Final Result by Marco Antonio Loza MD (02/26 1007)      Mild patchy nodular infiltrate through the left lower lobe in keeping with left lower lobe pneumonia #4/94  Unchanged area of right lower lobe round atelectasis        Findings are consistent with the preliminary report from [facility] which was provided shortly after completion of the exam  The additional finding of  left lower lobe pneumonia   will now be communicated with patient's clinical team by our radiology liaison  The study was marked in [facility] for immediate notification               Workstation performed: [id number]           Results from last 7 days   Lab Units 02/26/23  0034   SARS-COV-2  Negative     Results from last 7 days   Lab Units 02/27/23  0512 02/26/23  0512 02/26/23  0026   WBC Thousand/uL 7 10 9 24 10 60*   HEMOGLOBIN g/dL 12 4 13 2 13 6   HEMATOCRIT % 38 8 41 2 42 0   PLATELETS Thousands/uL 134* 141* 186   NEUTROS ABS Thousands/µL  --  6 84 8 51*         Results from last 7 days   Lab Units 02/27/23  0512 02/26/23  0512 02/26/23  0026   SODIUM mmol/L 139 140 140   POTASSIUM mmol/L 4 1 4 9 4 6   CHLORIDE mmol/L 105 105 103   CO2 mmol/L 30 29 30   ANION GAP mmol/L 4 6 7   BUN mg/dL 28* 38* 37*   CREATININE mg/dL 1 68* 1 87* 1 95*   EGFR ml/min/1 73sq m 37 32 31   CALCIUM mg/dL 9 7 9 9 10 1     Results from last 7 days   Lab Units 02/26/23  0026   AST U/L 25   ALT U/L 34   ALK PHOS U/L 125*   TOTAL PROTEIN g/dL 6 0*   ALBUMIN g/dL 3 5   TOTAL BILIRUBIN mg/dL 1 30*   AMMONIA umol/L <10*     Results from last 7 days   Lab Units 02/27/23  0739 02/26/23  2037 02/26/23  1617 02/26/23  1055 02/26/23  0757 02/26/23  0702 02/26/23  0700 02/25/23  2300   POC GLUCOSE mg/dl 69 107 181* 147* 116 53* 58* 94     Results from last 7 days   Lab Units 02/27/23  0512 02/26/23  0512 02/26/23  0026   GLUCOSE RANDOM mg/dL 100 60* 105       BETA-HYDROXYBUTYRATE   Date Value Ref Range Status   07/02/2022 0 2 <0 6 mmol/L Final        Results from last 7 days   Lab Units 02/26/23  0512 02/26/23  0238 02/26/23  0026   HS TNI 0HR ng/L  --   --  33   HS TNI 2HR ng/L  --  35  --    HSTNI D2 ng/L  --  2  --    HS TNI 4HR ng/L 39  --   --    HSTNI D4 ng/L 6  --   --              Results from last 7 days   Lab Units 02/26/23  0026   TSH 3RD GENERATON uIU/mL 1 001     Results from last 7 days   Lab Units 02/27/23  0512 02/26/23  1356   PROCALCITONIN ng/ml <0 05 <0 05       Results from last 7 days   Lab Units 02/26/23  1358 02/26/23  0034   STREP PNEUMONIAE ANTIGEN, URINE  Negative  --    LEGIONELLA URINARY ANTIGEN  Negative  --    INFLUENZA A PCR   --  Negative   INFLUENZA B PCR   --  Negative   RSV PCR   --  Negative       Results from last 7 days   Lab Units 02/26/23  0026   ETHANOL LVL mg/dL <3   ACETAMINOPHEN LVL ug/mL <0 2*   SALICYLATE LVL mg/dL <3*       ED Treatment:   Medication Administration from 02/25/2023 2255 to 02/26/2023 0430       Date/Time Order Dose Route Action Action by Comments     02/26/2023 0039 EST sodium chloride 0 9 % bolus 1,000 mL 1,000 mL Intravenous New Bag Rajesh Santana RN --     02/26/2023 0038 EST haloperidol lactate (HALDOL) injection 2 5 mg 2 5 mg Intramuscular Given Rajesh Santana RN --        Past Medical History:   Diagnosis Date   • Cancer [facility])     pancreatic   • Colon polyp    • Coronary artery disease    • Diabetes mellitus ([facility] )    • Hyperlipidemia    • Hypertension    • Pneumonia 06/13/2017    Right middle and lower lobe    • Stroke [facility])        Admitting Diagnosis: Altered mental status [R41 82]  Age/Sex: 82 y o  male  Admission Orders:  Sputum culture and gram stain  Scheduled Medications:  apixaban, 2 5 mg, Oral, BID  cefTRIAXone, 1,000 mg, Intravenous, Q24H  Fluticasone Furoate-Vilanterol, 1 puff, Inhalation, Daily   And  umeclidinium, 1 puff, Inhalation, Daily  furosemide, 20 mg, Oral, Daily  hydroxychloroquine, 200 mg, Oral, BID With Meals  insulin lispro, 1-6 Units, Subcutaneous, 4x Daily (AC & HS)  losartan, 50 mg, Oral, Daily  pravastatin, 40 mg, Oral, Daily With Dinner  predniSONE, 4 mg, Oral, Daily  QUEtiapine, 12 5 mg, Oral, HS  vancomycin, 1,000 mg, Intravenous, Q24H      Continuous IV Infusions:     PRN Meds:  acetaminophen, 650 mg, Oral, Q6H PRN        IP CONSULT TO CASE MANAGEMENT  IP CONSULT TO PHARMACY    Network Utilization Review Department  ATTENTION: Please call with any questions or concerns to [phone] and carefully listen to the prompts so that you are directed to the right person  All voicemails are confidential   Hilda Rosa all requests for admission clinical reviews, approved or denied determinations and any other requests to dedicated fax number below belonging to the campus where the patient is receiving treatment   List of dedicated fax numbers for the Facilities:  [address] DENIALS (Administrative/Medical Necessity) [phone]   [address] (Maternity/NICU/Pediatrics) [phone]   [address] [phone]   [facility]a  [phone]   [address] [address] [address]ill [facility] 28 [phone]   15[address] [facility] Lamine Palacios [facility] 134 815 [facility] [phone]

## 2023-02-27 NOTE — PLAN OF CARE
Problem: OCCUPATIONAL THERAPY ADULT  Goal: Performs self-care activities at highest level of function for planned discharge setting  See evaluation for individualized goals  Description: Treatment Interventions: ADL retraining, Functional transfer training, UE strengthening/ROM, Endurance training, Patient/family training, Compensatory technique education          See flowsheet documentation for full assessment, interventions and recommendations  Note: Limitation: Decreased ADL status, Decreased UE strength, Decreased Safe judgement during ADL, Decreased cognition, Decreased endurance, Decreased fine motor control, Decreased self-care trans, Decreased high-level ADLs, Visual deficit  Prognosis: Good  Assessment: Patient participated in Skilled OT session this date with interventions consisting of ADL re training with the use of correct body mechnaics, therapeutic exercise to: increase functional use of BUEs, increase BUE muscle strength ,  therapeutic activities to: increase activity tolerance and increase dynamic sit/ stand balance during functional activity   Patient agreeable to OT treatment session, upon arrival patient was found supine in bed and in no apparent distress  Patient completed bed mobility and functional transfers with min assist of 1  Pt ambulated to/from bathroom and in hallway with min assist of 1-2 with RW  Pt noted to have 1 significant LOB and required assist of 2 to regain balance  Pt completed commode transfer with min assist of 2  While seated on commode, pt required sup-min assist for UB ADLs and min-mod assist for LB ADLs  Pt stood at sink for ~2 mins with CGA to complete oral hygiene  Once seated in recliner, pt completed 1 set x 15 reps of BUE exercises to increase strength and endurance  In comparison to previous session, patient with improvements in engagement, orientation, bed mobility and functional transfers   Patient requiring frequent rest periods and ocassional safety reminders  Patient continues to be functioning below baseline level, occupational performance remains limited secondary to factors listed above and increased risk for falls and injury  From OT standpoint, recommendation at time of d/c would be Post acute rehabilitation services  Patient to benefit from continued Occupational Therapy treatment while in the hospital to address deficits as defined above and maximize level of functional independence with ADLs and functional mobility       OT Discharge Recommendation: Post acute rehabilitation services     [facility] OTD, OTR/L

## 2023-02-27 NOTE — CASE MANAGEMENT
Case Management Discharge Planning Note    Patient name Paula Ma  Location [address] [id number]/-[id number] MRN [id number]  : 1940 Date 2023       Current Admission Date: 2023  Current Admission Diagnosis:Acute encephalopathy   Patient Active Problem List    Diagnosis Date Noted   • Esophageal stricture 2023   • RSV (acute bronchiolitis due to respiratory syncytial virus) 2023   • Acute encephalopathy 2023   • Generalized weakness 2023   • Malignant neoplasm of tail of pancreas ([facility] ) 2022   • COPD (chronic obstructive pulmonary disease) ([facility] ) 2022   • Left lower lobe pneumonia 2022   • CKD (chronic kidney disease) 2022   • Centrilobular emphysema ([facility] ) 2021   • History of malignant neuroendocrine tumor 2021   • Chronic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 ([facility] ) 2018   • Atrial fibrillation ([facility] ) 2017   • Diabetes mellitus ([facility] ) 2017   • Hypertension 2017   • Acute-on-chronic kidney injury ([facility] ) 2017   • Hyperlipidemia 2015   • Inflammatory polyarthropathy ([facility] ) 2014   • Osteoarthrosis 2014   • Polymyalgia rheumatica ([facility] ) 2014   • Aneurysm of thoracic aorta 2014   • Aneurysm of abdominal aorta 2014   • Neoplasm of other specified site 2014      LOS (days): 0  Geometric Mean LOS (GMLOS) (days):   Days to GMLOS:     OBJECTIVE:            Current admission status: Observation   Preferred Pharmacy:   [address] [place] [address] [address]  [facility] Professor Cavazos [address] [place] [address]  Phone: [phone] Fax: [phone] [address] [place] [address]  Phone: [phone] Fax: [phone]    Primary Care Provider: Era Gardner MD    Primary Insurance: [facility]  Secondary Insurance:     DISCHARGE DETAILS:    Other Referral/Resources/Interventions Provided:  Interventions: Short Term Rehab  Referral Comments: CM received a TT from [id number] with [address] reporting that Kelechi Gins has been obtained  SLIM spoke to patient's family who is agreeable with discharging patient to [facility] [address] today  CM completed the medical necessity and placed in patient's binder with a face sheet  Would you like to participate in our [address] service program?  : No - Declined    Treatment Team Recommendation: Short Term Rehab  Discharge Destination Plan[de-identified] Short Term Rehab  Transport at Discharge : S Ambulance  Dispatcher Contacted: Yes  Number/Name of Dispatcher: CM submitted a referral via [facility] requesting BLS transport for 3:30 PM or later today  [facility] to schedule and contact CM with confirmed transportation time       Accepting Facility Name, [facility] : [address]   Receiving Facility/Agency Phone Number: [phone]  Facility/Agency Fax Number: [phone]

## 2023-02-27 NOTE — CASE MANAGEMENT
Case Management Discharge Planning Note    Patient name Marta Crescent  Location [address] 234/-10 MRN [id number]  : 1940 Date 2023       Current Admission Date: 2023  Current Admission Diagnosis:Acute encephalopathy   Patient Active Problem List    Diagnosis Date Noted   • Esophageal stricture 2023   • RSV (acute bronchiolitis due to respiratory syncytial virus) 2023   • Acute encephalopathy 2023   • Generalized weakness 2023   • Malignant neoplasm of tail of pancreas ([facility] ) 2022   • COPD (chronic obstructive pulmonary disease) ([facility] ) 2022   • Left lower lobe pneumonia 2022   • CKD (chronic kidney disease) 2022   • Centrilobular emphysema ([facility] ) 2021   • History of malignant neuroendocrine tumor 2021   • Chronic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 ([facility] ) 2018   • Atrial fibrillation ([facility] ) 2017   • Diabetes mellitus ([facility] ) 2017   • Hypertension 2017   • Acute-on-chronic kidney injury ([facility] ) 2017   • Hyperlipidemia 2015   • Inflammatory polyarthropathy ([facility] ) 2014   • Osteoarthrosis 2014   • Polymyalgia rheumatica ([facility] ) 2014   • Aneurysm of thoracic aorta 2014   • Aneurysm of abdominal aorta 2014   • Neoplasm of other specified site 2014      LOS (days): 0  Geometric Mean LOS (GMLOS) (days):   Days to GMLOS:     OBJECTIVE:            Current admission status: Observation   Preferred Pharmacy:   [address] [place] [address] Via [address] Professor Cavazos [address] [place] [address]  Phone: [phone] Fax: [phone] [address] [place] [address]  Phone: [phone] Fax: [phone]    Primary Care Provider: Nathalie Del Rosario MD    Primary Insurance: [facility]  Secondary Insurance:     DISCHARGE DETAILS:    Other Referral/Resources/Interventions Provided:  Interventions: Transportation  Referral Comments: CM received a TT from [facility] reporting that [facility] will transport at 7 pm tonight  CM informed all parties of the transport time      Transport at Discharge : BLS Ambulance  Dispatcher Contacted: Yes  Number/Name of Dispatcher: [facility]  Transported by [employer] and Unit #): [address] Paige of Transport (Date): 02/27/23  ETA of Transport (Time): 1900

## 2023-02-27 NOTE — ASSESSMENT & PLAN NOTE
· Continue home Lasix and olmesartan (not on formulary so losartan ordered)  · Monitor BP per unit protocol; remains controlled

## 2023-02-27 NOTE — PLAN OF CARE
Problem: PHYSICAL THERAPY ADULT  Goal: Performs mobility at highest level of function for planned discharge setting  See evaluation for individualized goals  Description: Treatment/Interventions: LE strengthening/ROM, Therapeutic exercise, Endurance training, Patient/family training, Equipment eval/education, Bed mobility, Continued evaluation, Spoke to nursing, OT, Family  Equipment Recommended:  (TBD)       See flowsheet documentation for full assessment, interventions and recommendations  Outcome: Progressing  Note: Prognosis: Good  Problem List: Decreased strength, Decreased endurance, Impaired balance, Decreased mobility, Decreased cognition  Assessment: Chart reviewed  Patient was received supine in bed in NAD and agreeable to PT session  Today's PT treatment session consisted of therapeutic activity for facilitation of transitional movements and safe performance of correct technique for bed mobility and sit to stand transfers, therapeutic exercise to increase lower extremity muscle strength, and gait training to promote safe and functional ambulation on level surfaces  In comparison to the previous session the patient has made progress as evident by requiring decreased assistance with all functional mobility  Pt was able to stand and ambulate this session  When ambulating pt exhibits decreased celia, decreased stride length, and decreased heel strike  Pt with one posterior loss of balance when returning to sitting in recliner and required minimal assist of two to recover  Pt tolerated all therapeutic exercise well without complaints  Overall, patient tolerated today's session well and continues to be making progress towards achieving his STG's  Pt's STG's were updated this session  Patient's prognosis for achieving their STG's is good as evident by pt's motivation and good family support   Co-treatment was performed with OT secondary to complex medical condition of pt and regression of functional status from baseline  PT/OT goals were addressed separately  PT intervention continues to be appropriate as the patient continues to be limited by decreased lower extremity strength, impaired balance, decreased endurance, gait deviations, and decreased functional mobility  Continue to recommend STR vs  HHPT pending progress  PT to continue to see patient in order to address the deficits listed above and provide interventions consistent with the POC in order to achieve STG's and optimize the patient's independence with functional mobility  Barriers to Discharge: Inaccessible home environment     PT Discharge Recommendation: Post acute rehabilitation services (vs  HHPT pending progress)    See flowsheet documentation for full assessment

## 2023-02-27 NOTE — ASSESSMENT & PLAN NOTE
Lab Results   Component Value Date    HGBA1C 6 9 (H) 02/15/2023     · Lantus 16 unit nightly outpatient; currently on hold d/t hypoglycemia  Discontinue on discharge     · Continue correctional SSI; algorithm 3   · Hypoglycemia protocol  · Diabetic diet

## 2023-02-27 NOTE — ASSESSMENT & PLAN NOTE
Ct c/a/p Mild patchy nodular infiltrate through the left lower lobe in keeping with left lower lobe pneumonia     · procal negative x 2, will switch to po augmentin x 5 days   · Urine legionella and strep negative  · Sputum culture pending  · Patient remains afebrile and without leukocytosis

## 2023-02-27 NOTE — ASSESSMENT & PLAN NOTE
· S/p EGD 10/4/2022: possible mild stricture at GE junction  · CT c/abd/p 2/26: Mild patchy nodular infiltrate through the left lower lobe in keeping with left lower lobe pneumonia    · Speech and swallow consulted- regular diet

## 2023-02-28 ENCOUNTER — APPOINTMENT (EMERGENCY)
Dept: RADIOLOGY | Facility: HOSPITAL | Age: 83
End: 2023-02-28

## 2023-02-28 ENCOUNTER — NURSING HOME VISIT (OUTPATIENT)
Dept: GERIATRICS | Facility: OTHER | Age: 83
End: 2023-02-28

## 2023-02-28 ENCOUNTER — APPOINTMENT (EMERGENCY)
Dept: CT IMAGING | Facility: HOSPITAL | Age: 83
End: 2023-02-28

## 2023-02-28 ENCOUNTER — HOSPITAL ENCOUNTER (INPATIENT)
Facility: HOSPITAL | Age: 83
LOS: 2 days | Discharge: HOME HEALTH | End: 2023-03-03
Attending: EMERGENCY MEDICINE | Admitting: INTERNAL MEDICINE

## 2023-02-28 ENCOUNTER — TELEPHONE (OUTPATIENT)
Dept: OTHER | Facility: OTHER | Age: 83
End: 2023-02-28

## 2023-02-28 DIAGNOSIS — R63.8 DECREASED ORAL INTAKE: ICD-10-CM

## 2023-02-28 DIAGNOSIS — I48.91 ATRIAL FIBRILLATION, UNSPECIFIED TYPE (HCC): ICD-10-CM

## 2023-02-28 DIAGNOSIS — I10 PRIMARY HYPERTENSION: ICD-10-CM

## 2023-02-28 DIAGNOSIS — E86.0 DEHYDRATION: ICD-10-CM

## 2023-02-28 DIAGNOSIS — J18.9 PNEUMONIA OF LEFT LOWER LOBE DUE TO INFECTIOUS ORGANISM: ICD-10-CM

## 2023-02-28 DIAGNOSIS — G93.40 ACUTE ENCEPHALOPATHY: Primary | ICD-10-CM

## 2023-02-28 DIAGNOSIS — R53.1 GENERALIZED WEAKNESS: ICD-10-CM

## 2023-02-28 DIAGNOSIS — J84.112 CHRONIC IDIOPATHIC PULMONARY FIBROSIS (HCC): ICD-10-CM

## 2023-02-28 DIAGNOSIS — R29.6 FREQUENT FALLS: ICD-10-CM

## 2023-02-28 DIAGNOSIS — J44.9 CHRONIC OBSTRUCTIVE PULMONARY DISEASE, UNSPECIFIED COPD TYPE (HCC): ICD-10-CM

## 2023-02-28 DIAGNOSIS — J18.9 PNEUMONIA: ICD-10-CM

## 2023-02-28 DIAGNOSIS — F03.90 DEMENTIA (HCC): ICD-10-CM

## 2023-02-28 DIAGNOSIS — F01.518 VASCULAR DEMENTIA WITH BEHAVIOR DISTURBANCE (HCC): ICD-10-CM

## 2023-02-28 DIAGNOSIS — R41.0 CONFUSION: ICD-10-CM

## 2023-02-28 DIAGNOSIS — M35.3 POLYMYALGIA RHEUMATICA (HCC): ICD-10-CM

## 2023-02-28 DIAGNOSIS — R41.82 ALTERED MENTAL STATUS: Primary | ICD-10-CM

## 2023-02-28 LAB
ALBUMIN SERPL BCP-MCNC: 3.9 G/DL (ref 3.5–5)
ALP SERPL-CCNC: 128 U/L (ref 34–104)
ALT SERPL W P-5'-P-CCNC: 22 U/L (ref 7–52)
ANION GAP SERPL CALCULATED.3IONS-SCNC: 8 MMOL/L (ref 4–13)
AST SERPL W P-5'-P-CCNC: 26 U/L (ref 13–39)
BASOPHILS # BLD AUTO: 0.03 THOUSANDS/ÂΜL (ref 0–0.1)
BASOPHILS NFR BLD AUTO: 0 % (ref 0–1)
BILIRUB SERPL-MCNC: 1.43 MG/DL (ref 0.2–1)
BUN SERPL-MCNC: 33 MG/DL (ref 5–25)
CALCIUM SERPL-MCNC: 11 MG/DL (ref 8.4–10.2)
CHLORIDE SERPL-SCNC: 100 MMOL/L (ref 96–108)
CO2 SERPL-SCNC: 29 MMOL/L (ref 21–32)
CREAT SERPL-MCNC: 1.99 MG/DL (ref 0.6–1.3)
EOSINOPHIL # BLD AUTO: 0.23 THOUSAND/ÂΜL (ref 0–0.61)
EOSINOPHIL NFR BLD AUTO: 2 % (ref 0–6)
ERYTHROCYTE [DISTWIDTH] IN BLOOD BY AUTOMATED COUNT: 14.1 % (ref 11.6–15.1)
GFR SERPL CREATININE-BSD FRML MDRD: 30 ML/MIN/1.73SQ M
GLUCOSE SERPL-MCNC: 185 MG/DL (ref 65–140)
GLUCOSE SERPL-MCNC: 188 MG/DL (ref 65–140)
HCT VFR BLD AUTO: 43.8 % (ref 36.5–49.3)
HGB BLD-MCNC: 13.6 G/DL (ref 12–17)
IMM GRANULOCYTES # BLD AUTO: 0.05 THOUSAND/UL (ref 0–0.2)
IMM GRANULOCYTES NFR BLD AUTO: 1 % (ref 0–2)
LYMPHOCYTES # BLD AUTO: 1.02 THOUSANDS/ÂΜL (ref 0.6–4.47)
LYMPHOCYTES NFR BLD AUTO: 10 % (ref 14–44)
MCH RBC QN AUTO: 29.9 PG (ref 26.8–34.3)
MCHC RBC AUTO-ENTMCNC: 31.1 G/DL (ref 31.4–37.4)
MCV RBC AUTO: 96 FL (ref 82–98)
MONOCYTES # BLD AUTO: 0.96 THOUSAND/ÂΜL (ref 0.17–1.22)
MONOCYTES NFR BLD AUTO: 10 % (ref 4–12)
NEUTROPHILS # BLD AUTO: 7.83 THOUSANDS/ÂΜL (ref 1.85–7.62)
NEUTS SEG NFR BLD AUTO: 77 % (ref 43–75)
NRBC BLD AUTO-RTO: 0 /100 WBCS
PLATELET # BLD AUTO: 148 THOUSANDS/UL (ref 149–390)
PMV BLD AUTO: 9.6 FL (ref 8.9–12.7)
POTASSIUM SERPL-SCNC: 4.6 MMOL/L (ref 3.5–5.3)
PROT SERPL-MCNC: 6.4 G/DL (ref 6.4–8.4)
RBC # BLD AUTO: 4.55 MILLION/UL (ref 3.88–5.62)
SODIUM SERPL-SCNC: 137 MMOL/L (ref 135–147)
WBC # BLD AUTO: 10.12 THOUSAND/UL (ref 4.31–10.16)

## 2023-02-28 RX ORDER — LORAZEPAM 2 MG/ML
INJECTION INTRAMUSCULAR
Status: COMPLETED
Start: 2023-02-28 | End: 2023-02-28

## 2023-02-28 RX ORDER — HALOPERIDOL 5 MG/ML
2 INJECTION INTRAMUSCULAR ONCE
Status: COMPLETED | OUTPATIENT
Start: 2023-02-28 | End: 2023-02-28

## 2023-02-28 RX ORDER — HYDROXYCHLOROQUINE SULFATE 200 MG/1
200 TABLET, FILM COATED ORAL 2 TIMES DAILY
Status: DISCONTINUED | OUTPATIENT
Start: 2023-03-01 | End: 2023-03-01

## 2023-02-28 RX ORDER — QUETIAPINE FUMARATE 25 MG/1
12.5 TABLET, FILM COATED ORAL ONCE
Status: DISCONTINUED | OUTPATIENT
Start: 2023-02-28 | End: 2023-02-28

## 2023-02-28 RX ORDER — AMOXICILLIN AND CLAVULANATE POTASSIUM 875; 125 MG/1; MG/1
1 TABLET, FILM COATED ORAL ONCE
Status: DISCONTINUED | OUTPATIENT
Start: 2023-02-28 | End: 2023-02-28

## 2023-02-28 RX ORDER — POLYETHYLENE GLYCOL 3350 17 G/17G
17 POWDER, FOR SOLUTION ORAL DAILY PRN
Status: DISCONTINUED | OUTPATIENT
Start: 2023-02-28 | End: 2023-03-03 | Stop reason: HOSPADM

## 2023-02-28 RX ORDER — SODIUM CHLORIDE 9 MG/ML
125 INJECTION, SOLUTION INTRAVENOUS CONTINUOUS
Status: DISCONTINUED | OUTPATIENT
Start: 2023-02-28 | End: 2023-03-02

## 2023-02-28 RX ORDER — INSULIN LISPRO 100 [IU]/ML
2-12 INJECTION, SOLUTION INTRAVENOUS; SUBCUTANEOUS
Status: DISCONTINUED | OUTPATIENT
Start: 2023-03-01 | End: 2023-03-01

## 2023-02-28 RX ORDER — FUROSEMIDE 40 MG/1
20 TABLET ORAL ONCE
Status: DISCONTINUED | OUTPATIENT
Start: 2023-02-28 | End: 2023-02-28

## 2023-02-28 RX ORDER — PRAVASTATIN SODIUM 40 MG
40 TABLET ORAL
Status: DISCONTINUED | OUTPATIENT
Start: 2023-03-01 | End: 2023-03-01

## 2023-02-28 RX ORDER — QUETIAPINE FUMARATE 25 MG/1
25 TABLET, FILM COATED ORAL
Status: DISCONTINUED | OUTPATIENT
Start: 2023-02-28 | End: 2023-03-01

## 2023-02-28 RX ADMIN — HALOPERIDOL LACTATE 2 MG: 5 INJECTION, SOLUTION INTRAMUSCULAR at 23:09

## 2023-02-28 RX ADMIN — LORAZEPAM 0.5 MG: 2 INJECTION INTRAMUSCULAR; INTRAVENOUS at 21:33

## 2023-02-28 RX ADMIN — SODIUM CHLORIDE 1000 ML: 0.9 INJECTION, SOLUTION INTRAVENOUS at 21:05

## 2023-02-28 NOTE — PROGRESS NOTES
[address]  [address] [place] [address] post acute SNF 31  History and Physical    NAME: Keshia Pichardo  AGE: 82 y o  SEX: male [id number]    DATE OF ENCOUNTER: 2/28/2023    Code status:  CPR    Assessment and Plan     1  Acute encephalopathy  - improving  - due to medications and metabolic  - xanax was discontinued    2  Atrial fibrillation, unspecified type ([facility] )  - rate controlled  - cont Apixaban 2 5 mg po bid    3  Chronic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 (HCC)  - cont Trelegy Ellipta inh daily    4  Pneumonia of left lower lobe due to infectious organism  - cont Augmentin 875-125 mg po bid x 5 days    5  Polymyalgia rheumatica (HCC)  - stable  - cont prednisone 4 mg po qd    6  Primary hypertension  - controlled  - cont Olmesartan 20 mg po qd  - cont Furosemide 20 mg po qd    7  Generalized weakness  - PT/OT ordered  - Fall precautions in place    8  Chronic obstructive pulmonary disease, unspecified COPD type ([facility] )  - cont Trelegy Ellipta inh daily      All medications and routine orders were reviewed and updated as needed  Plan discussed with: patient and staff    Chief Complaint     Seen for admission at [address] [place] [address]    History of Present Illness     Keshia Pichardo, a 83 y/o male with PMH of DM2, HTN, A  Fib, COPD, CKD got admitted to the hospital with AMS  CT chest shows new left lobe pneumonia  He was treated with IV Vanco, changed to PO Augmentin x 5 days  His overall condition improved, got discharged to NH post acute for subacute rehab  He was seen and examined at bedside, stable  He is unable to give any history due to dementia  Says he lives at home, and uses cane  Staff have no concerns at this time      HISTORY:  Past Medical History:   Diagnosis Date   • Cancer [facility])     pancreatic   • Colon polyp    • Coronary artery disease    • Diabetes mellitus ([email or web address] )    • Hyperlipidemia    • Hypertension    • Pneumonia 06/13/2017    Right middle and lower lobe • Stroke [facility])      Family History   Problem Relation Age of Onset   • Cancer Mother    • Stroke Father    • Cancer Sister    • Diabetes Sister    • Stroke Brother      Social History     Socioeconomic History   • Marital status: /Civil Union     Spouse name: None   • Number of children: None   • Years of education: None   • Highest education level: None   Occupational History   • None   Tobacco Use   • Smoking status: Former     Types: Pipe     Quit date:      Years since quittin 1   • Smokeless tobacco: Never   Vaping Use   • Vaping Use: Never used   Substance and Sexual Activity   • Alcohol use: Never   • Drug use: No   • Sexual activity: Yes     Partners: Female   Other Topics Concern   • None   Social History Narrative   • None     Social Determinants of Health     Financial Resource Strain: Not on file   Food Insecurity: No Food Insecurity   • Worried About Running Out of Food in the Last Year: Never true   • Ran Out of Food in the Last Year: Never true   Transportation Needs: No Transportation Needs   • Lack of Transportation (Medical): No   • Lack of Transportation (Non-Medical): No   Physical Activity: Not on file   Stress: Not on file   Social Connections: Not on file   Intimate Partner Violence: Not on file   Housing Stability: Low Risk    • Unable to Pay for Housing in the Last Year: No   • Number of Places Lived in the Last Year: 1   • Unstable Housing in the Last Year: No       Allergies: Allergies   Allergen Reactions   • Contrast Dye [Iodinated Contrast Media] Other (See Comments)     Pt states kidney dysfunction       • Other        Review of Systems     Review of Systems   Constitutional: Positive for activity change and fatigue  HENT: Positive for hearing loss  Negative for dental problem and trouble swallowing  Eyes: Negative  Respiratory: Negative  Cardiovascular: Negative  Gastrointestinal: Negative  Genitourinary: Negative      Musculoskeletal: Positive for gait problem  Negative for arthralgias  Neurological: Positive for weakness  Psychiatric/Behavioral: Positive for confusion  As in HPI  Medications and orders     All medications reviewed and updated in Nursing Home EMR  Objective     Vitals: T: 98 1, P: 81, R: 16, BP: 151/88, 98% on RA, Wt: 168 5 lbs    Physical Exam  Vitals and nursing note reviewed  Constitutional:       General: He is not in acute distress  Appearance: Normal appearance  He is well-developed  He is not diaphoretic  HENT:      Head: Normocephalic and atraumatic  Nose: Nose normal       Mouth/Throat:      Mouth: Mucous membranes are moist       Pharynx: Oropharynx is clear  No oropharyngeal exudate  Eyes:      General: No scleral icterus  Right eye: No discharge  Left eye: No discharge  Extraocular Movements: Extraocular movements intact  Conjunctiva/sclera: Conjunctivae normal    Cardiovascular:      Rate and Rhythm: Normal rate and regular rhythm  Heart sounds: Murmur heard  Pulmonary:      Effort: Pulmonary effort is normal  No respiratory distress  Breath sounds: Normal breath sounds  No wheezing  Chest:      Chest wall: No tenderness  Abdominal:      General: Bowel sounds are normal  There is no distension  Palpations: Abdomen is soft  Tenderness: There is no abdominal tenderness  There is no guarding  Musculoskeletal:         General: No tenderness or deformity  Normal range of motion  Cervical back: Normal range of motion  Right lower leg: No edema  Left lower leg: No edema  Skin:     General: Skin is warm and dry  Neurological:      Mental Status: He is alert  Cranial Nerves: No cranial nerve deficit  Motor: No abnormal muscle tone  Coordination: Coordination normal       Comments: Oriented to self  Not oriented to place, month or year  Able to follow simple commands  Verbal, not goal oriented     Psychiatric: Behavior: Behavior normal       Comments: Confused  Pertinent Laboratory/Diagnostic Studies: The following labs/studies were reviewed please see chart or hospital paperwork for details  Ref Range & Units 2/27/23 0512 2/26/23 0512 2/26/23 0026 2/16/23 0517 2/15/23 0415 2/14/23 2157 2/14/23 1336    Sodium 135 - 147 mmol/L 139  140  140  137  134 Low   135  137    Potassium 3 5 - 5 3 mmol/L 4 1  4 9 CM  4 6  4 3  4 0  4 5  4 9    Chloride 96 - 108 mmol/L 105  105  103  102  100  102  101    CO2 21 - 32 mmol/L 30  29  30  28  28  27  30    ANION GAP 4 - 13 mmol/L 4  6  7  7  6  6  6    BUN 5 - 25 mg/dL 28 High   38 High   37 High   33 High   37 High   36 High   32 High     Creatinine 0 60 - 1 30 mg/dL 1 68 High   1 87 High  CM  1 95 High  CM  1 54 High  CM  1 80 High  CM  1 81 High  CM  1 68 High  CM    Comment: Standardized to IDMS reference method   Glucose 65 - 140 mg/dL 100  60 Low  CM  105 CM  103 CM  157 High  CM  207 High  CM  151 High  CM       Glucose, Fasting 65 - 99 mg/dL 100 High   60 Low  CM            Calcium 8 3 - 10 1 mg/dL 9 7  9 9  10 1  9 4  9 8 R  9 8 R  10 7 High  R    eGFR ml/min/1 73sq m 37  32  31          Ref Range & Units 2/27/23 0512 2/26/23 0512 2/26/23 0026 2/16/23 0517 2/15/23 0415 2/14/23 2157 2/14/23 1336    WBC 4 31 - 10 16 Thousand/uL 7 10  9 24  10 60 High   9 66  14 34 High   12 95 High   13 83 High     Comment: This is an appended report  Magan Soaers results have been appended to a previously preliminary verified report  RBC 3 88 - 5 62 Million/uL 4 05  4 30  4 40  4 02  4 17  4 03  4 31    Comment: This is an appended report  Magan Soares results have been appended to a previously preliminary verified report  Hemoglobin 12 0 - 17 0 g/dL 12 4  13 2  13 6  12 3  12 7  12 4  12 9    Comment: This is an appended report  Magan Soares results have been appended to a previously preliminary verified report  Hematocrit 36 5 - 49 3 % 38 8  41 2  42 0  38 1  39 9  38 1  40 6    Comment:  This is an appended report  Debo Pena results have been appended to a previously preliminary verified report  MCV 82 - 98 fL 96  96  96  95  96  95  94    Comment: This is an appended report  Debo Pena results have been appended to a previously preliminary verified report     MCH 26 8 - 34 3 pg 30 6  30 7  30 9  30 6  30 5  30 8  29 9       MCHC 31 4 - 37 4 g/dL 32 0  32 0  32 4  32 3  31 8  32 5  31 8       RDW 11 6 - 15 1 % 13 8  13 8  13 9  13 6  13 6  13 5  13 4       Platelets 337 - 812 Thousands/uL 134 Low   141 Low   186  154  207  195  220    MPV 8 9 - 12 7 fL 9 2  9 1  9 4         - Counseling Documentation: patient was counseled regarding: prognosis

## 2023-02-28 NOTE — TELEPHONE ENCOUNTER
Herlinda allen/ [facility] called to inform that patient fell again and had a seizure  States they will send patient to the hospital   TC message sent out

## 2023-02-28 NOTE — DISCHARGE SUMMARY
[address] [facility]  Discharge Summary - Severiano Net 1940, 82 y o  male MRN: [id number]  Unit/Bed#: -01 Encounter: [id number]  Primary Care Provider: Charo Schafer MD   Date and time admitted to hospital: 2/25/2023 10:56 PM    Esophageal stricture  Assessment & Plan  · S/p EGD 10/4/2022: possible mild stricture at GE junction  · CT c/abd/p 2/26: Mild patchy nodular infiltrate through the left lower lobe in keeping with left lower lobe pneumonia  · Speech and swallow consulted- regular diet    CKD (chronic kidney disease)  Assessment & Plan  Lab Results   Component Value Date    EGFR 37 02/27/2023    EGFR 32 02/26/2023    EGFR 31 02/26/2023    CREATININE 1 68 (H) 02/27/2023    CREATININE 1 87 (H) 02/26/2023    CREATININE 1 95 (H) 02/26/2023     · Creatinine 1 68 2/27, improving  · Recent baseline around 1 7  · Received 1L NS bolus in ED; continue on IVF until 2/26  · IVF now discontinued monitor off fluids   · Avoid nephrotoxic agents  · AM BMP    Left lower lobe pneumonia  Assessment & Plan  Ct c/a/p Mild patchy nodular infiltrate through the left lower lobe in keeping with left lower lobe pneumonia     · procal negative x 2, will switch to po augmentin x 5 days   · Urine legionella and strep negative  · Sputum culture pending  · Patient remains afebrile and without leukocytosis     COPD (chronic obstructive pulmonary disease) (HCC)  Assessment & Plan  Does not appear to be in acute exacerbation  · Continue home inhaler- trelegy not on formulary so breo ellipta and incruse ellipta ordered  · Continue home daily prednisone  · Monitor respiratory status    Atrial fibrillation ([facility] )  Assessment & Plan  · HR remains controlled throughout admission   · Not currently on outpatient rate control medication  · Continue home Eliquis    Hypertension  Assessment & Plan  · Continue home Lasix and olmesartan (not on formulary so losartan ordered)  · Monitor BP per unit protocol; remains controlled Diabetes mellitus [facility])  Assessment & Plan    Lab Results   Component Value Date    HGBA1C 6 9 (H) 02/15/2023     · Lantus 16 unit nightly outpatient; currently on hold d/t hypoglycemia  Discontinue on discharge  · Continue correctional SSI; algorithm 3   · Hypoglycemia protocol  · Diabetic diet    * Acute encephalopathy  Assessment & Plan  P/w disoriented with altered mentation per family as noted by outbursts with family members     · Recent diagnosis of vascular dementia with behavioral disturbance  · Recent discharge on 2/16 following a stay for acute encephalopathy in the setting of RSV and dementia  · Recently finished outpatient azithromycin and Ceftin course  · Per family, after 2mg IM haldol in ED patient is now currently at baseline mentation which is oriented x1 (to place)  · CT head negative for acute process; CT abd pelvis does not show any acute or infectious process  · Not hypoglycemic; ammonia not elevated; coma panel negative; no major electrolyte abnormalities; UA negative; procal negative; TSH negative   · Patient not meeting SIRS  · MRI brain 2/15: no intracranial abnormality   · High suspicion of poor po intake as noted by significant dehydration on exam causing altered mentation   · Monitor mentation   · Delirium precautions   · Case management consulted   · seroquel 12 5 daily at bedtime         Medical Problems     Resolved Problems  Date Reviewed: 2/27/2023   None       Discharging Physician / Practitioner: Booker Farnsworth DO  PCP: Franchesca Cordova MD  Admission Date:   Admission Orders (From admission, onward)     Ordered        02/26/23 0339  Place in Observation  Once                      Discharge Date: 02/27/23    Consultations During Hospital Stay:  · none    Procedures Performed:   · none    Significant Findings / Test Results:   · LLL pneumonia    Incidental Findings:   · None   · I reviewed the above mentioned incidental findings with the patient and/or family and they expressed understanding  Test Results Pending at Discharge (will require follow up): · None     Outpatient Tests Requested:  · Repeat chest xray in 6 weeks    Complications:  None     Reason for Admission: confusion    Hospital Course:   Jennifer Day is a 82 y o  male patient who originally presented to the hospital on 2/25/2023 due to [address]  Recent admission for RSV  Pt noted to be more confused per family, agitated and manic which is not baseline  Admitted for AMS workup  Thought to be 2/2 to medications as well as metabolic  Recently started on xanax prn and seroquel 25 mg bid  Chest CT showing new LL pneumonia  Mental status improved with IV abx and po seroquel 12 5 mg daily  Discontinued xanax  Switched to po augmentin x 5 days  Mental status improved to baseline per family  Alert and oriented x 2  Stable on day of discharge  PT/OT recommended rehab, pt accepted to [facility]  Please see above list of diagnoses and related plan for additional information  Condition at Discharge: good    Discharge Day Visit / Exam:   Subjective: pt has no acute complaints  Denies chest pain, sob, nausea, vomiting, fevers or chills  All other ros negative  Vitals: Blood Pressure: 119/74 (02/27/23 1523)  Pulse: 84 (02/27/23 1523)  Temperature: 98 3 °F (36 8 °C) (02/27/23 1523)  Temp Source: Oral (02/27/23 1523)  Respirations: 19 (02/27/23 1523)  Height: 6' 3" (190 5 cm) (02/26/23 0432)  Weight - Scale: 76 kg (167 lb 8 8 oz) (02/27/23 1442)  SpO2: 95 % (02/27/23 1531)  Exam:   Physical Exam  Vitals reviewed  Constitutional:       General: He is not in acute distress  Appearance: He is well-developed  He is not diaphoretic  HENT:      Head: Normocephalic and atraumatic  Mouth/Throat:      Pharynx: No oropharyngeal exudate  Eyes:      General: No scleral icterus  Extraocular Movements: Extraocular movements intact  Conjunctiva/sclera: Conjunctivae normal    Neck:      Vascular: No JVD  Trachea: No tracheal deviation  Cardiovascular:      Rate and Rhythm: Normal rate and regular rhythm  Heart sounds: No murmur heard  No friction rub  No gallop  Pulmonary:      Effort: Pulmonary effort is normal  No respiratory distress  Breath sounds: No stridor  No wheezing  Abdominal:      General: There is no distension  Palpations: Abdomen is soft  There is no mass  Tenderness: There is no abdominal tenderness  There is no right CVA tenderness or left CVA tenderness  Musculoskeletal:         General: No tenderness  Right lower leg: No edema  Left lower leg: No edema  Comments: Generalized weakness   Skin:     General: Skin is warm and dry  Coloration: Skin is not pale  Findings: No erythema  Neurological:      Mental Status: He is alert and oriented to person, place, and time  Psychiatric:         Behavior: Behavior normal          Thought Content: Thought content normal           Discussion with Family: Updated  (wife, son and daughter) at bedside  Discharge instructions/Information to patient and family:   See after visit summary for information provided to patient and family  Provisions for Follow-Up Care:  See after visit summary for information related to follow-up care and any pertinent home health orders  Disposition:   Acute Rehab at [facility]    Planned Readmission: none     Discharge Statement:  I spent 35 minutes discharging the patient  This time was spent on the day of discharge  I had direct contact with the patient on the day of discharge  Greater than 50% of the total time was spent examining patient, answering all patient questions, arranging and discussing plan of care with patient as well as directly providing post-discharge instructions  Additional time then spent on discharge activities      Discharge Medications:  See after visit summary for reconciled discharge medications provided to patient and/or family        **Please Note: This note may have been constructed using a voice recognition system**

## 2023-03-01 PROBLEM — N18.32 STAGE 3B CHRONIC KIDNEY DISEASE (HCC): Status: ACTIVE | Noted: 2023-03-01

## 2023-03-01 PROBLEM — F01.518 VASCULAR DEMENTIA WITH BEHAVIOR DISTURBANCE (HCC): Status: ACTIVE | Noted: 2023-03-01

## 2023-03-01 PROBLEM — E83.52 HYPERCALCEMIA: Status: ACTIVE | Noted: 2023-03-01

## 2023-03-01 LAB
ALBUMIN SERPL BCP-MCNC: 3.3 G/DL (ref 3.5–5)
ALP SERPL-CCNC: 105 U/L (ref 34–104)
ALT SERPL W P-5'-P-CCNC: 18 U/L (ref 7–52)
ANION GAP SERPL CALCULATED.3IONS-SCNC: 5 MMOL/L (ref 4–13)
AST SERPL W P-5'-P-CCNC: 21 U/L (ref 13–39)
BACTERIA UR QL AUTO: NORMAL /HPF
BASOPHILS # BLD AUTO: 0.02 THOUSANDS/ÂΜL (ref 0–0.1)
BASOPHILS NFR BLD AUTO: 0 % (ref 0–1)
BILIRUB DIRECT SERPL-MCNC: 0.29 MG/DL (ref 0–0.2)
BILIRUB SERPL-MCNC: 1.2 MG/DL (ref 0.2–1)
BILIRUB UR QL STRIP: NEGATIVE
BUN SERPL-MCNC: 28 MG/DL (ref 5–25)
CALCIUM ALBUM COR SERPL-MCNC: 10.4 MG/DL (ref 8.3–10.1)
CALCIUM SERPL-MCNC: 9.8 MG/DL (ref 8.4–10.2)
CHLORIDE SERPL-SCNC: 103 MMOL/L (ref 96–108)
CK SERPL-CCNC: 56 U/L (ref 39–308)
CLARITY UR: CLEAR
CO2 SERPL-SCNC: 30 MMOL/L (ref 21–32)
COLOR UR: ABNORMAL
CREAT SERPL-MCNC: 1.83 MG/DL (ref 0.6–1.3)
EOSINOPHIL # BLD AUTO: 0.23 THOUSAND/ÂΜL (ref 0–0.61)
EOSINOPHIL NFR BLD AUTO: 3 % (ref 0–6)
ERYTHROCYTE [DISTWIDTH] IN BLOOD BY AUTOMATED COUNT: 14.1 % (ref 11.6–15.1)
ERYTHROCYTE [SEDIMENTATION RATE] IN BLOOD: 6 MM/HOUR (ref 0–19)
FLUAV RNA RESP QL NAA+PROBE: NEGATIVE
FLUBV RNA RESP QL NAA+PROBE: NEGATIVE
GFR SERPL CREATININE-BSD FRML MDRD: 33 ML/MIN/1.73SQ M
GLUCOSE P FAST SERPL-MCNC: 106 MG/DL (ref 65–99)
GLUCOSE SERPL-MCNC: 106 MG/DL (ref 65–140)
GLUCOSE SERPL-MCNC: 109 MG/DL (ref 65–140)
GLUCOSE SERPL-MCNC: 112 MG/DL (ref 65–140)
GLUCOSE SERPL-MCNC: 124 MG/DL (ref 65–140)
GLUCOSE SERPL-MCNC: 135 MG/DL (ref 65–140)
GLUCOSE SERPL-MCNC: 192 MG/DL (ref 65–140)
GLUCOSE UR STRIP-MCNC: ABNORMAL MG/DL
HCT VFR BLD AUTO: 41 % (ref 36.5–49.3)
HGB BLD-MCNC: 13 G/DL (ref 12–17)
HGB UR QL STRIP.AUTO: NEGATIVE
IMM GRANULOCYTES # BLD AUTO: 0.04 THOUSAND/UL (ref 0–0.2)
IMM GRANULOCYTES NFR BLD AUTO: 1 % (ref 0–2)
KETONES UR STRIP-MCNC: NEGATIVE MG/DL
LEUKOCYTE ESTERASE UR QL STRIP: NEGATIVE
LYMPHOCYTES # BLD AUTO: 1.05 THOUSANDS/ÂΜL (ref 0.6–4.47)
LYMPHOCYTES NFR BLD AUTO: 15 % (ref 14–44)
MAGNESIUM SERPL-MCNC: 2.2 MG/DL (ref 1.9–2.7)
MCH RBC QN AUTO: 30.7 PG (ref 26.8–34.3)
MCHC RBC AUTO-ENTMCNC: 31.7 G/DL (ref 31.4–37.4)
MCV RBC AUTO: 97 FL (ref 82–98)
MONOCYTES # BLD AUTO: 0.64 THOUSAND/ÂΜL (ref 0.17–1.22)
MONOCYTES NFR BLD AUTO: 9 % (ref 4–12)
NEUTROPHILS # BLD AUTO: 4.87 THOUSANDS/ÂΜL (ref 1.85–7.62)
NEUTS SEG NFR BLD AUTO: 72 % (ref 43–75)
NITRITE UR QL STRIP: NEGATIVE
NON-SQ EPI CELLS URNS QL MICRO: NORMAL /HPF
NRBC BLD AUTO-RTO: 0 /100 WBCS
PH UR STRIP.AUTO: 5.5 [PH]
PLATELET # BLD AUTO: 129 THOUSANDS/UL (ref 149–390)
PMV BLD AUTO: 9.8 FL (ref 8.9–12.7)
POTASSIUM SERPL-SCNC: 4.4 MMOL/L (ref 3.5–5.3)
PROCALCITONIN SERPL-MCNC: 0.06 NG/ML
PROT SERPL-MCNC: 5.7 G/DL (ref 6.4–8.4)
PROT UR STRIP-MCNC: ABNORMAL MG/DL
PTH-INTACT SERPL-MCNC: 76.3 PG/ML (ref 18.4–80.1)
RBC # BLD AUTO: 4.24 MILLION/UL (ref 3.88–5.62)
RBC #/AREA URNS AUTO: NORMAL /HPF
RSV RNA RESP QL NAA+PROBE: NEGATIVE
SARS-COV-2 RNA RESP QL NAA+PROBE: NEGATIVE
SODIUM SERPL-SCNC: 138 MMOL/L (ref 135–147)
SP GR UR STRIP.AUTO: 1.01 (ref 1–1.03)
UROBILINOGEN UR STRIP-ACNC: <2 MG/DL
WBC # BLD AUTO: 6.85 THOUSAND/UL (ref 4.31–10.16)
WBC #/AREA URNS AUTO: NORMAL /HPF

## 2023-03-01 RX ORDER — PRAVASTATIN SODIUM 40 MG
40 TABLET ORAL DAILY
Status: DISCONTINUED | OUTPATIENT
Start: 2023-03-01 | End: 2023-03-03 | Stop reason: HOSPADM

## 2023-03-01 RX ORDER — INSULIN LISPRO 100 [IU]/ML
1-5 INJECTION, SOLUTION INTRAVENOUS; SUBCUTANEOUS
Status: DISCONTINUED | OUTPATIENT
Start: 2023-03-01 | End: 2023-03-03 | Stop reason: HOSPADM

## 2023-03-01 RX ORDER — POTASSIUM CHLORIDE 750 MG/1
10 TABLET, EXTENDED RELEASE ORAL DAILY
Status: DISCONTINUED | OUTPATIENT
Start: 2023-03-01 | End: 2023-03-01

## 2023-03-01 RX ORDER — ONDANSETRON 2 MG/ML
4 INJECTION INTRAMUSCULAR; INTRAVENOUS EVERY 6 HOURS PRN
Status: DISCONTINUED | OUTPATIENT
Start: 2023-03-01 | End: 2023-03-03 | Stop reason: HOSPADM

## 2023-03-01 RX ORDER — FLUTICASONE FUROATE AND VILANTEROL 100; 25 UG/1; UG/1
1 POWDER RESPIRATORY (INHALATION) DAILY
Status: DISCONTINUED | OUTPATIENT
Start: 2023-03-01 | End: 2023-03-03 | Stop reason: HOSPADM

## 2023-03-01 RX ORDER — POLYETHYLENE GLYCOL 3350 17 G/17G
17 POWDER, FOR SOLUTION ORAL DAILY
Status: DISCONTINUED | OUTPATIENT
Start: 2023-03-01 | End: 2023-03-03 | Stop reason: HOSPADM

## 2023-03-01 RX ORDER — HYDROXYCHLOROQUINE SULFATE 200 MG/1
200 TABLET, FILM COATED ORAL 2 TIMES DAILY WITH MEALS
Status: DISCONTINUED | OUTPATIENT
Start: 2023-03-01 | End: 2023-03-03 | Stop reason: HOSPADM

## 2023-03-01 RX ORDER — INSULIN LISPRO 100 [IU]/ML
1-6 INJECTION, SOLUTION INTRAVENOUS; SUBCUTANEOUS
Status: DISCONTINUED | OUTPATIENT
Start: 2023-03-01 | End: 2023-03-03 | Stop reason: HOSPADM

## 2023-03-01 RX ORDER — QUETIAPINE FUMARATE 25 MG/1
25 TABLET, FILM COATED ORAL
Status: DISCONTINUED | OUTPATIENT
Start: 2023-03-01 | End: 2023-03-03 | Stop reason: HOSPADM

## 2023-03-01 RX ORDER — FUROSEMIDE 20 MG/1
20 TABLET ORAL DAILY
Status: DISCONTINUED | OUTPATIENT
Start: 2023-03-01 | End: 2023-03-01

## 2023-03-01 RX ORDER — POTASSIUM CHLORIDE 600 MG/1
8 TABLET, FILM COATED, EXTENDED RELEASE ORAL DAILY
Status: DISCONTINUED | OUTPATIENT
Start: 2023-03-01 | End: 2023-03-01

## 2023-03-01 RX ORDER — SENNOSIDES 8.6 MG
1 TABLET ORAL DAILY
Status: DISCONTINUED | OUTPATIENT
Start: 2023-03-01 | End: 2023-03-03 | Stop reason: HOSPADM

## 2023-03-01 RX ORDER — QUETIAPINE FUMARATE 25 MG/1
12.5 TABLET, FILM COATED ORAL
Status: DISCONTINUED | OUTPATIENT
Start: 2023-03-01 | End: 2023-03-01

## 2023-03-01 RX ORDER — ACETAMINOPHEN 325 MG/1
650 TABLET ORAL EVERY 6 HOURS PRN
Status: DISCONTINUED | OUTPATIENT
Start: 2023-03-01 | End: 2023-03-03 | Stop reason: HOSPADM

## 2023-03-01 RX ADMIN — APIXABAN 2.5 MG: 2.5 TABLET, FILM COATED ORAL at 10:22

## 2023-03-01 RX ADMIN — QUETIAPINE FUMARATE 25 MG: 25 TABLET, FILM COATED ORAL at 22:20

## 2023-03-01 RX ADMIN — HYDROXYCHLOROQUINE SULFATE 200 MG: 200 TABLET, FILM COATED ORAL at 10:21

## 2023-03-01 RX ADMIN — HYDROXYCHLOROQUINE SULFATE 200 MG: 200 TABLET, FILM COATED ORAL at 17:37

## 2023-03-01 RX ADMIN — APIXABAN 2.5 MG: 2.5 TABLET, FILM COATED ORAL at 17:36

## 2023-03-01 RX ADMIN — Medication 1000 MG: at 02:20

## 2023-03-01 RX ADMIN — INSULIN LISPRO 2 UNITS: 100 INJECTION, SOLUTION INTRAVENOUS; SUBCUTANEOUS at 12:57

## 2023-03-01 RX ADMIN — SODIUM CHLORIDE 125 ML/HR: 0.9 INJECTION, SOLUTION INTRAVENOUS at 01:30

## 2023-03-01 RX ADMIN — SODIUM CHLORIDE 125 ML/HR: 0.9 INJECTION, SOLUTION INTRAVENOUS at 10:53

## 2023-03-01 RX ADMIN — UMECLIDINIUM 1 PUFF: 62.5 AEROSOL, POWDER ORAL at 10:28

## 2023-03-01 RX ADMIN — FLUTICASONE FUROATE AND VILANTEROL TRIFENATATE 1 PUFF: 100; 25 POWDER RESPIRATORY (INHALATION) at 10:28

## 2023-03-01 RX ADMIN — PRAVASTATIN SODIUM 40 MG: 40 TABLET ORAL at 10:22

## 2023-03-01 NOTE — PLAN OF CARE
Problem: SAFETY,RESTRAINT: NV/NON-SELF DESTRUCTIVE BEHAVIOR  Goal: Remains free of harm/injury (restraint for non violent/non self-detsructive behavior)  Description: INTERVENTIONS:  - Instruct patient/family regarding restraint use   - Assess and monitor physiologic and psychological status   - Provide interventions and comfort measures to meet assessed patient needs   - Identify and implement measures to help patient regain control  - Assess readiness for release of restraint   3/1/2023 0401 by [facility] Victor M Maher RN  Outcome: Progressing  3/1/2023 0401 by Rosa Santiago RN  Outcome: Progressing  Goal: Returns to optimal restraint-free functioning  Description: INTERVENTIONS:  - Assess the patient's behavior and symptoms that indicate continued need for restraint  - Identify and implement measures to help patient regain control  - Assess readiness for release of restraint   3/1/2023 0401 by Rosa Santiago RN  Outcome: Progressing  3/1/2023 0401 by [facility] Victor M Maher RN  Outcome: Progressing     Problem: MOBILITY - ADULT  Goal: Maintain or return to baseline ADL function  Description: INTERVENTIONS:  -  Assess patient's ability to carry out ADLs; assess patient's baseline for ADL function and identify physical deficits which impact ability to perform ADLs (bathing, care of mouth/teeth, toileting, grooming, dressing, etc )  - Assess/evaluate cause of self-care deficits   - Assess range of motion  - Assess patient's mobility; develop plan if impaired  - Assess patient's need for assistive devices and provide as appropriate  - Encourage maximum independence but intervene and supervise when necessary  - Involve family in performance of ADLs  - Assess for home care needs following discharge   - Consider OT consult to assist with ADL evaluation and planning for discharge  - Provide patient education as appropriate  3/1/2023 0401 by Rosa Santiago RN  Outcome: Progressing  3/1/2023 0401 by Joaquin Maher RN  Outcome: Progressing  Goal: Maintains/Returns to pre admission functional level  Description: INTERVENTIONS:  - Perform BMAT or MOVE assessment daily    - Set and communicate daily mobility goal to care team and patient/family/caregiver  - Collaborate with rehabilitation services on mobility goals if consulted  - Perform Range of Motion 3 times a day  - Reposition patient every 3 hours    - Dangle patient 3 times a day  - Stand patient 3 times a day  - Ambulate patient 6 times a day  - Out of bed to chair 6 times a day   - Out of bed for meals 6 times a day  - Out of bed for toileting  - Record patient progress and toleration of activity level   3/1/2023 0401 by Isreal Maher RN  Outcome: Progressing  3/1/2023 0401 by Deven Coates RN  Outcome: Progressing     Problem: SAFETY ADULT  Goal: Maintain or return to baseline ADL function  Description: INTERVENTIONS:  -  Assess patient's ability to carry out ADLs; assess patient's baseline for ADL function and identify physical deficits which impact ability to perform ADLs (bathing, care of mouth/teeth, toileting, grooming, dressing, etc )  - Assess/evaluate cause of self-care deficits   - Assess range of motion  - Assess patient's mobility; develop plan if impaired  - Assess patient's need for assistive devices and provide as appropriate  - Encourage maximum independence but intervene and supervise when necessary  - Involve family in performance of ADLs  - Assess for home care needs following discharge   - Consider OT consult to assist with ADL evaluation and planning for discharge  - Provide patient education as appropriate  3/1/2023 0401 by Deven Coates RN  Outcome: Progressing  3/1/2023 0401 by Deven Coates RN  Outcome: Progressing  Goal: Maintains/Returns to pre admission functional level  Description: INTERVENTIONS:  - Perform BMAT or MOVE assessment daily    - Set and communicate daily mobility goal to care team and patient/family/caregiver  - Collaborate with rehabilitation services on mobility goals if consulted  - Perform Range of Motion 3 times a day  - Reposition patient every 3 hours    - Dangle patient 3 times a day  - Stand patient 3 times a day  - Ambulate patient 6 times a day  - Out of bed to chair 6 times a day   - Out of bed for meals 6 times a day  - Out of bed for toileting  - Record patient progress and toleration of activity level   3/1/2023 0401 by Jareth Wagner RN  Outcome: Progressing  3/1/2023 0401 by Jareth Wagner RN  Outcome: Progressing  Goal: Patient will remain free of falls  Description: INTERVENTIONS:  - Educate patient/family on patient safety including physical limitations  - Instruct patient to call for assistance with activity   - Consult OT/PT to assist with strengthening/mobility   - Keep Call bell within reach  - Keep bed low and locked with side rails adjusted as appropriate  - Keep care items and personal belongings within reach  - Initiate and maintain comfort rounds  - Make Fall Risk Sign visible to staff  - Offer Toileting every  Hours, in advance of need  - Initiate/Maintain alarm  - Obtain necessary fall risk management equipment:  - Apply yellow socks and bracelet for high fall risk patients  - Consider moving patient to room near nurses station  Outcome: Progressing

## 2023-03-01 NOTE — ASSESSMENT & PLAN NOTE
Lab Results   Component Value Date    HGBA1C 6 9 (H) 02/15/2023       Recent Labs     02/27/23  0739 02/27/23  1114 02/27/23  1616 02/28/23  1906   POCGLU 69 213* 168* 185*       Blood Sugar Average: Last 72 hrs:  (P) 185   Home medications used to include Lantus 16 U HS, however during last hospitalization thsi was discontinued due to hypoglycemia     Plan-   Insulin Sliding Scale  Hypoglycemia Protocol

## 2023-03-01 NOTE — ASSESSMENT & PLAN NOTE
10/22 EGD demonstrated possible mild stricture at the GE junction, status post dilation  Family reports patient has not been having difficulty swallowing, denies coughing after foods  Patient is ultimately just refusing p o  intake    Plan-  Speech and swallow evaluation

## 2023-03-01 NOTE — H&P
[facility]  H&P- Arjun Cosme 1940, 82 y o  male MRN: [id number]  Unit/Bed#:  W -01 Encounter: [id number]  Primary Care Provider: Lebron Larson MD   Date and time admitted to hospital: 2/28/2023  6:44 PM    * Acute encephalopathy  Assessment & Plan  Baseline mentation-  AOx1, Waxes and wanes, progressively worsening within past week, worst today   Med Review for potential causes/contributors shows   DDx including but not limited to: metabolic abnormality, intracranial etiology, cardiac etiology, substance abuse, infectious etiology including UTI, thyroid disease, hyperammonemia, delirium, dementia, overmedication  Recent TSH normal   Previous hospitalization for AMS and PNA tx with ABX  Ca- 11  CT Head- Neg   Low suspicion for infection    Plan:  UA- Pending  Procal- Pending   PTH- Pending  Will keep patient NPO for now until mental status improves   Serial Mental Status Exams      Vascular dementia with behavior disturbance  Assessment & Plan  Newly diagnosed vascular dementia in 10/22  During recent hospitalization patient was recently started on Seroquel HS  Patient was given Haldol in ED   QTc 488    Plan-  Geriatrics consult, appreciate recommendations  Delirium precautions    Stage 3b chronic kidney disease [facility])  Assessment & Plan  Lab Results   Component Value Date    EGFR 30 02/28/2023    EGFR 37 02/27/2023    EGFR 32 02/26/2023    CREATININE 1 99 (H) 02/28/2023    CREATININE 1 68 (H) 02/27/2023    CREATININE 1 87 (H) 02/26/2023   Cr baseline around 1 7  Likely in the setting of poor po intake, dehydration     Plan-   Hold Olmesartan   IVF NS 125ml    Hypercalcemia  Assessment & Plan  Home medications include Citracal  Likely in the setting of dehydration     Plan-   PTH- Pending   Monitor CMP     Esophageal stricture  Assessment & Plan  10/22 EGD demonstrated possible mild stricture at the GE junction, status post dilation  Family reports patient has not been having difficulty swallowing, denies coughing after foods  Patient is ultimately just refusing p o  intake    Plan-  Speech and swallow evaluation    Polymyalgia rheumatica ([facility] [address] )  Assessment & Plan  Home medications include prednisone 4 Mg QD and Plaquenil    Plan-  Hold prednisone due to acute encephalopathy  Continue Plaquenil when patients mentation improves    Atrial fibrillation (HCC)  Assessment & Plan  Home medications include Eliquis 2 5mg BID   HR controlled 95    Plan-   Continue home regime       Hypertension  Assessment & Plan  /95  Home medications include Lasix and Olmesartan     Plan-   Hold Olmesartan for now in the setting of elevated Cr  Continue Lasix QD     Diabetes mellitus [facility])  Assessment & Plan  Lab Results   Component Value Date    HGBA1C 6 9 (H) 02/15/2023       Recent Labs     02/27/23  0739 02/27/23  1114 02/27/23  1616 02/28/23  1906   POCGLU 69 213* 168* 185*       Blood Sugar Average: Last 72 hrs:  (P) 185   Home medications used to include Lantus 16 U HS, however during last hospitalization thsi was discontinued due to hypoglycemia     Plan-   Insulin Sliding Scale  Hypoglycemia Protocol     VTE Pharmacologic Prophylaxis: VTE Score: 12 High Risk (Score >/= 5) - Pharmacological DVT Prophylaxis Ordered: apixaban (Eliquis)  Sequential Compression Devices Ordered  Code Status: Level 1 - Full Code   Discussion with family: Updated  (wife and son) at bedside  Anticipated Length of Stay: Patient will be admitted on an observation basis with an anticipated length of stay of less than 2 midnights secondary to Acute encephalopathy  Chief Complaint: AMS    History of Present Illness:  Gera Worthy is a 82 y o  male with a PMH of vascular dementia, CVA, PNA, DM type II, hypertension, polymyalgia rheumatica, A-fib, COPD who presents with progressively worsening altered mental status    Patient's family is at bedside who reported he has been progressively worsening over the past week however today was the worst its been  Patient has been acting out, using vulgarity, and having an overabundance of energy pacing floors and doing random tasks  Patient's family reports that he was recently hospitalized due to altered mental status and was found to have pneumonia and was treated with antibiotics  Of note patient also was having difficulty swallowing, had an EGD in October 2022 where there was mild stricture therefore required dilatation  He was recently discharged on the 27th where he went to [facility] and quickly returned to ED due to fall and AMS  Facility and patient's family denies patient hitting head, LOC, fevers, chills, cough, dysphagia, abdominal pain, nvd, or any other sx at this time  ED-  Vitals Afebrile, 143/95, 98% on room air, HR 95  CBC thrombocytopenia 148, chronic  CMP BUN/Cr 33/1 99, baseline around 1 7, T  Bili  1 43  Calcium 11  Chest x-ray improving left lobe pneumonia  CT head negative  ED gave 1 L NS bolus and Haldol  Admission for Acute Encephalopathy     Review of Systems:  Review of Systems   Unable to perform ROS: Mental status change       Past Medical and Surgical History:   Past Medical History:   Diagnosis Date   • Cancer [facility])     pancreatic   • Colon polyp    • Coronary artery disease    • Diabetes mellitus ([facility] [address] )    • Hyperlipidemia    • Hypertension    • Pneumonia 06/13/2017    Right middle and lower lobe    • Stroke [facility])        Past Surgical History:   Procedure Laterality Date   • APPENDECTOMY     • CARDIAC SURGERY      Aortic Valve Replacement, Ascending Aorta   • COLONOSCOPY     • GALLBLADDER SURGERY     • PANCREATICODUODENECTOMY         Meds/Allergies:  Prior to Admission medications    Medication Sig Start Date End Date Taking?  Authorizing Provider   apixaban (ELIQUIS) 2 5 mg Take 2 5 mg by mouth 2 (two) times a day   Yes Historical Provider, MD BAJWA UF III MINI PEN NEEDLES 31G X 5 MM MISC  3/11/18  Yes Historical Provider, MD   EUSEBIO CONTOUR TEST test strip 3 (three) times a day Test 2/1/18  Yes Historical Provider, MD   Calcium Citrate (CITRACAL PO) Take 650 mg by mouth in the morning   Yes Historical Provider, MD   cholecalciferol (VITAMIN D3) 1,000 units tablet Take 2,000 Units by mouth daily   Yes Historical Provider, MD   Cinnamon 500 MG capsule Take 1,000 mg by mouth daily   Yes Historical Provider, MD   furosemide (LASIX) 20 mg tablet Take 20 mg by mouth daily 9/8/22  Yes Historical Provider, MD   hydroxychloroquine (PLAQUENIL) 200 mg tablet Take 200 mg by mouth 2 (two) times a day with meals   Yes Historical Provider, MD   olmesartan (BENICAR) 20 mg tablet Take 20 mg by mouth daily   Yes Historical Provider, MD   potassium chloride (KLOR-CON) 8 MEQ tablet  7/21/21  Yes Historical Provider, MD   pravastatin (PRAVACHOL) 40 mg tablet Take 40 mg by mouth daily   Yes Historical Provider, MD   predniSONE 1 mg tablet Take 10 mg by mouth daily Currently down to 3 1/2 mg daily 05/05/22   Yes Historical Provider, MD   QUEtiapine (SEROquel) 25 mg tablet Take 0 5 tablets (12 5 mg total) by mouth daily at bedtime 2/27/23  Yes DO Luh Realgy Ellipta 100-62 5-25 MCG/ACT inhaler USE 1 INHALATION DAILY RINSE MOUTH AFTER USE 11/11/22  Yes Julius Alejo PA-C   amoxicillin-clavulanate (AUGMENTIN) 875-125 mg per tablet Take 1 tablet by mouth every 12 (twelve) hours for 5 days 2/27/23 3/4/23  Josefa Hickman DO     I have reviewed home medications with patient family member  Allergies: Allergies   Allergen Reactions   • Contrast Dye [Iodinated Contrast Media] Other (See Comments)     Pt states kidney dysfunction       • Other        Social History:  Marital Status: /Civil Union   Occupation: Retired  Patient Pre-hospital Living Situation: [facility]: [address], Previously Home   Patient Pre-hospital Level of Mobility: walks  Patient Pre-hospital Diet Restrictions: None  Substance Use History:   Social History Substance and Sexual Activity   Alcohol Use Never     Social History     Tobacco Use   Smoking Status Former   • Types: Pipe   • Quit date:    • Years since quittin 1   Smokeless Tobacco Never     Social History     Substance and Sexual Activity   Drug Use No       Family History:  Family History   Problem Relation Age of Onset   • Cancer Mother    • Stroke Father    • Cancer Sister    • Diabetes Sister    • Stroke Brother        Physical Exam:     Vitals:   Blood Pressure: 153/86 (23)  Pulse: 84 (23)  Temperature: 98 °F (36 7 °C) (23 1849)  Respirations: 17 (23)  SpO2: 96 % (23)    Physical Exam  Constitutional:       Appearance: He is ill-appearing  HENT:      Head: Normocephalic and atraumatic  Mouth/Throat:      Mouth: Mucous membranes are dry  Eyes:      Pupils: Pupils are equal, round, and reactive to light  Cardiovascular:      Rate and Rhythm: Normal rate and regular rhythm  Pulses: Normal pulses  Heart sounds: Normal heart sounds  No murmur heard  No friction rub  No gallop  Pulmonary:      Effort: Pulmonary effort is normal  No respiratory distress  Breath sounds: Normal breath sounds  No wheezing, rhonchi or rales  Chest:      Chest wall: No tenderness  Abdominal:      General: Bowel sounds are normal  There is no distension  Palpations: There is no mass  Tenderness: There is no abdominal tenderness  There is no guarding or rebound  Musculoskeletal:         General: No tenderness  Normal range of motion  Cervical back: Normal range of motion  Right lower leg: No edema  Left lower leg: No edema  Skin:     General: Skin is warm and dry  Neurological:      Mental Status: He is alert  He is disoriented and confused        Comments: AOx1, unable to follow commands, intermittent bilateral leg tremors          Additional Data:     Lab Results:  Results from last 7 days   Lab Units 02/28/23  1914   WBC Thousand/uL 10 12   HEMOGLOBIN g/dL 13 6   HEMATOCRIT % 43 8   PLATELETS Thousands/uL 148*   NEUTROS PCT % 77*   LYMPHS PCT % 10*   MONOS PCT % 10   EOS PCT % 2     Results from last 7 days   Lab Units 02/28/23  1914   SODIUM mmol/L 137   POTASSIUM mmol/L 4 6   CHLORIDE mmol/L 100   CO2 mmol/L 29   BUN mg/dL 33*   CREATININE mg/dL 1 99*   ANION GAP mmol/L 8   CALCIUM mg/dL 11 0*   ALBUMIN g/dL 3 9   TOTAL BILIRUBIN mg/dL 1 43*   ALK PHOS U/L 128*   ALT U/L 22   AST U/L 26   GLUCOSE RANDOM mg/dL 188*         Results from last 7 days   Lab Units 02/28/23  1906 02/27/23  1616 02/27/23  1114 02/27/23  0739 02/26/23  2037 02/26/23  1617 02/26/23  1055 02/26/23  0757 02/26/23  0702 02/26/23  0700 02/25/23  2300   POC GLUCOSE mg/dl 185* 168* 213* 69 107 181* 147* 116 53* 58* 94         Results from last 7 days   Lab Units 02/27/23  0512 02/26/23  1356   PROCALCITONIN ng/ml <0 05 <0 05       Lines/Drains:  Invasive Devices     Peripheral Intravenous Line  Duration           Peripheral IV 02/28/23 Distal;Left;Ventral (anterior) Forearm <1 day                    Imaging: Reviewed radiology reports from this admission including: chest xray and CT head  CT head without contrast   Final Result by Alina Randle DO (02/28 2046)      No acute intracranial abnormality  Workstation performed: [id number]         XR chest 1 view portable   ED Interpretation by Lianna Honeycutt PA-C (02/28 1936)   Improvement of LLL pneumonia          EKG and Other Studies Reviewed on Admission:   · EKG: No EKG obtained  ** Please Note: This note has been constructed using a voice recognition system   **

## 2023-03-01 NOTE — ASSESSMENT & PLAN NOTE
Baseline mentation-  AOx1, Waxes and wanes, progressively worsening within past week, worst today   Med Review for potential causes/contributors shows   DDx including but not limited to: metabolic abnormality, intracranial etiology, cardiac etiology, substance abuse, infectious etiology including UTI, thyroid disease, hyperammonemia, delirium, dementia, overmedication  Recent TSH normal   Previous hospitalization for AMS and PNA tx with ABX  Ca- 11  CT Head- Neg   CXR demonstrated right lower lobe pneumonia but w/o sx there is low suspicion for infection  Pt assessed as A&O x 3, stable and at baseline mental status    Plan:   UA- Pending  Procal- Pending   PTH- Pending  Serial Mental Status Exam

## 2023-03-01 NOTE — ED PROVIDER NOTES
History  Chief Complaint   Patient presents with   • Altered Mental Status     Pt to ED via EMS from [facility] with c/o AMS, per [place] care "mental status has been changing over last 10 days " EMS states facility was concerned for seizure like activity     82year old male presenting from [facility] with concerns of two falls in which he did not hit his head and altered mental status  Patient's family believes that he has had a dramatic mental status change in the last two weeks  Incident today patient was lowered to the ground and did not respond to verbal stimuli a minute or so  Patient hx of vascular dementia, DMII, pneumonia currently on antibiotic  Patient takes eliquis 2 5 BID  Patient denying any symptoms at this time, however, ROS limited due to dementia  Prior to Admission Medications   Prescriptions Last Dose Informant Patient Reported? Taking?    B-D UF III MINI PEN NEEDLES 31G X 5 MM MISC 2/27/2023  Yes Yes   EUSEBIO CONTOUR TEST test strip 2/27/2023  Yes Yes   Sig: 3 (three) times a day Test   Calcium Citrate (CITRACAL PO) 2/27/2023  Yes Yes   Sig: Take 650 mg by mouth in the morning   Cinnamon 500 MG capsule 2/27/2023  Yes Yes   Sig: Take 1,000 mg by mouth daily   QUEtiapine (SEROquel) 25 mg tablet 2/27/2023  No Yes   Sig: Take 0 5 tablets (12 5 mg total) by mouth daily at bedtime   Trelegy Ellipta 100-62 5-25 MCG/ACT inhaler 2/27/2023  No Yes   Sig: USE 1 INHALATION DAILY RINSE MOUTH AFTER USE   amoxicillin-clavulanate (AUGMENTIN) 875-125 mg per tablet Unknown  No No   Sig: Take 1 tablet by mouth every 12 (twelve) hours for 5 days   apixaban (ELIQUIS) 2 5 mg 2/27/2023  Yes Yes   Sig: Take 2 5 mg by mouth 2 (two) times a day   cholecalciferol (VITAMIN D3) 1,000 units tablet 2/27/2023  Yes Yes   Sig: Take 2,000 Units by mouth daily   dextromethorphan-guaifenesin (MUCINEX DM)  MG per 12 hr tablet   Yes Yes   Sig: Take 1 tablet by mouth daily   furosemide (LASIX) 20 mg tablet 2/27/2023  Yes Yes   Sig: Take 20 mg by mouth daily   hydroxychloroquine (PLAQUENIL) 200 mg tablet 2023  Yes Yes   Sig: Take 200 mg by mouth 2 (two) times a day with meals   olmesartan (BENICAR) 20 mg tablet 2023  Yes Yes   Sig: Take 20 mg by mouth daily   potassium chloride (KLOR-CON) 8 MEQ tablet 2023  Yes Yes   pravastatin (PRAVACHOL) 40 mg tablet 2023  Yes Yes   Sig: Take 40 mg by mouth daily   predniSONE 1 mg tablet 2023  Yes Yes   Sig: Take 10 mg by mouth daily Currently down to 3 1/2 mg daily 22      Facility-Administered Medications: None       Past Medical History:   Diagnosis Date   • Cancer [facility])     pancreatic   • Colon polyp    • Coronary artery disease    • Diabetes mellitus ([facility] [address] )    • Hyperlipidemia    • Hypertension    • Pneumonia 2017    Right middle and lower lobe    • Stroke [facility])        Past Surgical History:   Procedure Laterality Date   • APPENDECTOMY     • CARDIAC SURGERY      Aortic Valve Replacement, Ascending Aorta   • COLONOSCOPY     • GALLBLADDER SURGERY     • PANCREATICODUODENECTOMY         Family History   Problem Relation Age of Onset   • Cancer Mother    • Stroke Father    • Cancer Sister    • Diabetes Sister    • Stroke Brother      I have reviewed and agree with the history as documented  E-Cigarette/Vaping   • E-Cigarette Use Never User      E-Cigarette/Vaping Substances   • Nicotine No    • THC No    • CBD No    • Flavoring No    • Other No    • Unknown No      Social History     Tobacco Use   • Smoking status: Former     Types: Pipe     Quit date:      Years since quittin    • Smokeless tobacco: Never   Vaping Use   • Vaping Use: Never used   Substance Use Topics   • Alcohol use: Never   • Drug use: No       Review of Systems   Unable to perform ROS: Dementia   Constitutional: Negative for chills and fever  HENT: Negative for ear pain and sore throat  Eyes: Negative for pain and visual disturbance     Respiratory: Negative for cough and shortness of breath  Cardiovascular: Negative for chest pain and palpitations  Gastrointestinal: Negative for abdominal pain and vomiting  Genitourinary: Negative for dysuria and hematuria  Musculoskeletal: Negative for arthralgias and back pain  Skin: Negative for color change and rash  Neurological: Negative for seizures and syncope  All other systems reviewed and are negative  Physical Exam  Physical Exam  Vitals and nursing note reviewed  Constitutional:       General: He is not in acute distress  Appearance: He is well-developed  He is not ill-appearing  HENT:      Head: Normocephalic and atraumatic  Eyes:      General: No visual field deficit  Conjunctiva/sclera: Conjunctivae normal    Cardiovascular:      Rate and Rhythm: Normal rate and regular rhythm  Heart sounds: No murmur heard  Pulmonary:      Effort: Pulmonary effort is normal  No respiratory distress  Breath sounds: Normal breath sounds  No stridor  No wheezing, rhonchi or rales  Chest:      Chest wall: No tenderness  Abdominal:      General: Bowel sounds are normal  There is no distension  Palpations: Abdomen is soft  There is no mass  Tenderness: There is no abdominal tenderness  There is no guarding  Musculoskeletal:         General: No swelling or tenderness  Normal range of motion  Cervical back: Neck supple  Skin:     General: Skin is warm and dry  Capillary Refill: Capillary refill takes less than 2 seconds  Coloration: Skin is pale  Skin is not cyanotic  Findings: No erythema or rash  Neurological:      Mental Status: He is alert  He is disoriented and confused  GCS: GCS eye subscore is 4  GCS verbal subscore is 4  GCS motor subscore is 4     Psychiatric:         Mood and Affect: Mood normal          Vital Signs  ED Triage Vitals   Temperature Pulse Respirations Blood Pressure SpO2   02/28/23 1849 02/28/23 1849 02/28/23 1849 02/28/23 1849 02/28/23 1849 98 °F (36 7 °C) 95 18 143/95 98 %      Temp Source Heart Rate Source Patient Position - Orthostatic VS BP Location FiO2 (%)   03/01/23 2135 02/28/23 2224 02/28/23 2224 02/28/23 2224 --   Oral Monitor Lying Right arm       Pain Score       03/01/23 0354       No Pain           Vitals:    03/01/23 1549 03/01/23 1550 03/01/23 2135 03/02/23 0745   BP:  117/70 140/82 136/85   Pulse: 68 68 80 78   Patient Position - Orthostatic VS:   Lying          Visual Acuity  Visual Acuity    Flowsheet Row Most Recent Value   L Pupil Size (mm) 4   R Pupil Size (mm) 4          ED Medications  Medications   ceftriaxone (ROCEPHIN) 1 g/50 mL in dextrose IVPB (1,000 mg Intravenous New Bag 3/2/23 0032)   polyethylene glycol (MIRALAX) packet 17 g (has no administration in time range)   insulin lispro (HumaLOG) 100 units/mL subcutaneous injection 1-5 Units (1 Units Subcutaneous Not Given 3/1/23 2139)   insulin lispro (HumaLOG) 100 units/mL subcutaneous injection 1-6 Units (1 Units Subcutaneous Not Given 3/2/23 0815)   acetaminophen (TYLENOL) tablet 650 mg (has no administration in time range)   polyethylene glycol (MIRALAX) packet 17 g (17 g Oral Given 3/2/23 0817)   senna (SENOKOT) tablet 8 6 mg (8 6 mg Oral Given 3/2/23 0817)   ondansetron (ZOFRAN) injection 4 mg (has no administration in time range)   apixaban (ELIQUIS) tablet 2 5 mg (2 5 mg Oral Given 3/2/23 0817)   hydroxychloroquine (PLAQUENIL) tablet 200 mg (200 mg Oral Given 3/2/23 0817)   pravastatin (PRAVACHOL) tablet 40 mg (40 mg Oral Given 3/2/23 0817)   Fluticasone Furoate-Vilanterol 100-25 mcg/actuation 1 puff (1 puff Inhalation Given 3/2/23 0817)     And   umeclidinium 62 5 mcg/actuation inhaler AEPB 1 puff (1 puff Inhalation Given 3/2/23 0817)   QUEtiapine (SEROquel) tablet 25 mg (25 mg Oral Given 3/1/23 2220)   predniSONE tablet 10 mg (10 mg Oral Given 3/2/23 1029)   furosemide (LASIX) tablet 20 mg (20 mg Oral Given 3/2/23 1029)   sodium chloride 0 9 % bolus 1,000 mL (0 mL Intravenous Stopped 2/28/23 2255)   LORazepam (ATIVAN) 2 mg/mL injection **ADS Override Pull** (0 5 mg  Given 2/28/23 2443)   haloperidol lactate (HALDOL) injection 2 mg (2 mg Intravenous Given 2/28/23 2309)       Diagnostic Studies  Results Reviewed     Procedure Component Value Units Date/Time    UA w Reflex to Microscopic w Reflex to Culture [[id number]]  (Abnormal) Collected: 03/01/23 1450    Lab Status: Final result Specimen: Urine, Clean Catch Updated: 03/01/23 1520     Color, UA Light Yellow     Clarity, UA Clear     Specific Gravity, UA 1 013     pH, UA 5 5     Leukocytes, UA Negative     Nitrite, UA Negative     Protein, UA Trace mg/dl      Glucose,  (1/5%) mg/dl      Ketones, UA Negative mg/dl      Urobilinogen, UA <2 0 mg/dl      Bilirubin, UA Negative     Occult Blood, UA Negative    PTH, intact [[id number]]  (Normal) Collected: 03/01/23 0537    Lab Status: Final result Specimen: Blood from Arm, Left Updated: 03/01/23 1031     PTH 76 3 pg/mL     COVID/FLU/RSV [[id number]]  (Normal) Collected: 03/01/23 0810    Lab Status: Final result Specimen: Nares from Nose Updated: 03/01/23 0919     SARS-CoV-2 Negative     INFLUENZA A PCR Negative     INFLUENZA B PCR Negative     RSV PCR Negative    Narrative:      FOR PEDIATRIC PATIENTS - copy/paste COVID Guidelines URL to browser: [email or web address]    SARS-CoV-2 assay is a Nucleic Acid Amplification assay intended for the  qualitative detection of nucleic acid from SARS-CoV-2 in nasopharyngeal  swabs  Results are for the presumptive identification of SARS-CoV-2 RNA  Positive results are indicative of infection with SARS-CoV-2, the virus  causing COVID-19, but do not rule out bacterial infection or co-infection  with other viruses  Laboratories within the [place] and its  territories are required to report all positive results to the appropriate  public health authorities   Negative results do not preclude SARS-CoV-2  infection and should not be used as the sole basis for treatment or other  patient management decisions  Negative results must be combined with  clinical observations, patient history, and epidemiological information  This test has not been FDA cleared or approved  This test has been authorized by FDA under an Emergency Use Authorization  (EUA)  This test is only authorized for the duration of time the  declaration that circumstances exist justifying the authorization of the  emergency use of an in vitro diagnostic tests for detection of SARS-CoV-2  virus and/or diagnosis of COVID-19 infection under section 564(b)(1) of  the Act, 21 U  S C  462FPK-1(G)(9), unless the authorization is terminated  or revoked sooner  The test has been validated but independent review by FDA  and CLIA is pending  Test performed using [employer] Gene[employer]pert: This RT-PCR assay targets N2,  a region unique to SARS-CoV-2  A conserved region in the E-gene was chosen  for pan-Sarbecovirus detection which includes SARS-CoV-2  According to CMS-2020-01-R, this platform meets the definition of high-throughput technology      Procalcitonin [[id number]]  (Normal) Collected: 03/01/23 0537    Lab Status: Final result Specimen: Blood from Arm, Left Updated: 03/01/23 0654     Procalcitonin 0 06 ng/ml     CBC and differential [[id number]]  (Abnormal) Collected: 03/01/23 0537    Lab Status: Final result Specimen: Blood from Arm, Left Updated: 03/01/23 0646     WBC 6 85 Thousand/uL      RBC 4 24 Million/uL      Hemoglobin 13 0 g/dL      Hematocrit 41 0 %      MCV 97 fL      MCH 30 7 pg      MCHC 31 7 g/dL      RDW 14 1 %      MPV 9 8 fL      Platelets 927 Thousands/uL      nRBC 0 /100 WBCs      Neutrophils Relative 72 %      Immat GRANS % 1 %      Lymphocytes Relative 15 %      Monocytes Relative 9 %      Eosinophils Relative 3 %      Basophils Relative 0 %      Neutrophils Absolute 4 87 Thousands/µL      Immature Grans Absolute 0 04 Thousand/uL      Lymphocytes Absolute 1 05 Thousands/µL      Monocytes Absolute 0 64 Thousand/µL      Eosinophils Absolute 0 23 Thousand/µL      Basophils Absolute 0 02 Thousands/µL     Comprehensive metabolic panel [[id number]]  (Abnormal) Collected: 03/01/23 0537    Lab Status: Final result Specimen: Blood from Arm, Left Updated: 03/01/23 0645     Sodium 138 mmol/L      Potassium 4 4 mmol/L      Chloride 103 mmol/L      CO2 30 mmol/L      ANION GAP 5 mmol/L      BUN 28 mg/dL      Creatinine 1 83 mg/dL      Glucose 106 mg/dL      Glucose, Fasting 106 mg/dL      Calcium 9 8 mg/dL      Corrected Calcium 10 4 mg/dL      AST 21 U/L      ALT 18 U/L      Alkaline Phosphatase 105 U/L      Total Protein 5 7 g/dL      Albumin 3 3 g/dL      Total Bilirubin 1 20 mg/dL      eGFR 33 ml/min/1 73sq m     Narrative:      [place] guidelines for Chronic Kidney Disease (CKD):   •  Stage 1 with normal or high GFR (GFR > 90 mL/min/1 73 square meters)  •  Stage 2 Mild CKD (GFR = 60-89 mL/min/1 73 square meters)  •  Stage 3A Moderate CKD (GFR = 45-59 mL/min/1 73 square meters)  •  Stage 3B Moderate CKD (GFR = 30-44 mL/min/1 73 square meters)  •  Stage 4 Severe CKD (GFR = 15-29 mL/min/1 73 square meters)  •  Stage 5 End Stage CKD (GFR <15 mL/min/1 73 square meters)  Note: GFR calculation is accurate only with a steady state creatinine    Bilirubin, direct [[id number]]  (Abnormal) Collected: 03/01/23 0537    Lab Status: Final result Specimen: Blood from Arm, Left Updated: 03/01/23 0645     Bilirubin, Direct 0 29 mg/dL     CK (with reflex to MB) [[id number]]  (Normal) Collected: 03/01/23 0537    Lab Status: Final result Specimen: Blood from Arm, Left Updated: 03/01/23 0645     Total CK 56 U/L     Magnesium [[id number]]  (Normal) Collected: 03/01/23 0537    Lab Status: Final result Specimen: Blood from Arm, Left Updated: 03/01/23 0645     Magnesium 2 2 mg/dL     Comprehensive metabolic panel [[id number]]  (Abnormal) Collected: 02/28/23 1914    Lab Status: Final result Specimen: Blood from Arm, Left Updated: 02/28/23 1956     Sodium 137 mmol/L      Potassium 4 6 mmol/L      Chloride 100 mmol/L      CO2 29 mmol/L      ANION GAP 8 mmol/L      BUN 33 mg/dL      Creatinine 1 99 mg/dL      Glucose 188 mg/dL      Calcium 11 0 mg/dL      AST 26 U/L      ALT 22 U/L      Alkaline Phosphatase 128 U/L      Total Protein 6 4 g/dL      Albumin 3 9 g/dL      Total Bilirubin 1 43 mg/dL      eGFR 30 ml/min/1 73sq m     Narrative:      National Kidney Disease Foundation guidelines for Chronic Kidney Disease (CKD):   •  Stage 1 with normal or high GFR (GFR > 90 mL/min/1 73 square meters)  •  Stage 2 Mild CKD (GFR = 60-89 mL/min/1 73 square meters)  •  Stage 3A Moderate CKD (GFR = 45-59 mL/min/1 73 square meters)  •  Stage 3B Moderate CKD (GFR = 30-44 mL/min/1 73 square meters)  •  Stage 4 Severe CKD (GFR = 15-29 mL/min/1 73 square meters)  •  Stage 5 End Stage CKD (GFR <15 mL/min/1 73 square meters)  Note: GFR calculation is accurate only with a steady state creatinine    CBC and differential [[id number]]  (Abnormal) Collected: 02/28/23 1914    Lab Status: Final result Specimen: Blood from Arm, Left Updated: 02/28/23 1939     WBC 10 12 Thousand/uL      RBC 4 55 Million/uL      Hemoglobin 13 6 g/dL      Hematocrit 43 8 %      MCV 96 fL      MCH 29 9 pg      MCHC 31 1 g/dL      RDW 14 1 %      MPV 9 6 fL      Platelets 685 Thousands/uL      nRBC 0 /100 WBCs      Neutrophils Relative 77 %      Immat GRANS % 1 %      Lymphocytes Relative 10 %      Monocytes Relative 10 %      Eosinophils Relative 2 %      Basophils Relative 0 %      Neutrophils Absolute 7 83 Thousands/µL      Immature Grans Absolute 0 05 Thousand/uL      Lymphocytes Absolute 1 02 Thousands/µL      Monocytes Absolute 0 96 Thousand/µL      Eosinophils Absolute 0 23 Thousand/µL      Basophils Absolute 0 03 Thousands/µL     Fingerstick Glucose (POCT) [[id number]]  (Abnormal) Collected: 02/28/23 1906    Lab Status: Final result Updated: 02/28/23 1907     POC Glucose 185 mg/dl                  CT head without contrast   Final Result by Siobhan Yeager DO (02/28 2046)      No acute intracranial abnormality  Workstation performed: [id number]         XR chest 1 view portable   ED Interpretation by Shaka Silverio PA-C (02/28 1936)   Improvement of LLL pneumonia      Final Result by Cameron Burks MD (03/01 4780)      Development of groundglass opacity at right lung base suspicious for early infiltrate  Findings otherwise stable status post aortic valve replacement                  Workstation performed: [id number]                    Procedures  Procedures         ED Course  ED Course as of 03/02/23 1149   Tue Feb 28, 2023 2003 Alkaline Phosphatase(!): 128   2003 TOTAL BILIRUBIN(!): 1 43  Similar to previous, no RUQ guarding or tenderness   2003 Creatinine(!): 1 99  Elevated from prior (1 6 prior)   2103 Spoke to IM, was informed that he does not meet TARA criteria and that PT/OT would recommend he go to rehab  Family refusing to take patient back to [facility]  82year old male presenting from [address] [place] [address] after 2 falls today  Both of which he was lowered to the ground, did not hit head  Patient denying any pains anywhere, ROS limited due to dementia  Will CT head due to concerns for trauma that may have been unwitnessed  CXR to monitor pneumonia  CBC, CMP, UA  POCT glucose slightly elevated  Physical exam is relatively benign, patient GCS 13      Spoke to inpatient team, and was informed that patient does not meet any inpatient admission criteria  Family stating that they do not wish to send patient back to [address] [place] [address] because they do not believe he is getting the care that he needs  Wife has medical power of   Would like him placed somewhere else   Informed wife and son that they can get placed elsewhere when he goes back to [facility], however, family states that they would like him to stay here instead of going back to [facility] for change of placement  Wife believes that we are abandoning him, however, informed her that we are not because we are offering to keep him in the ED in order to place him somewhere else  Family asking to speak to attending  I asked Dr Carter Tobin to talk to family  Discussed with attending disposition planning, and it was agreed that due to patient's likely refusal to eat or drink, it was likely that patient had underlying depression, worsening patient's oral intake  Patient will be admitted for dehydration and psych consultation    Altered mental status: acute illness or injury  Confusion: acute illness or injury  Decreased oral intake: acute illness or injury  Dehydration: acute illness or injury  Dementia ([facility] ): acute illness or injury  Frequent falls: acute illness or injury  Amount and/or Complexity of Data Reviewed  Labs: ordered  Decision-making details documented in ED Course  Radiology: ordered and independent interpretation performed  Risk  Prescription drug management  Decision regarding hospitalization  Disposition  Final diagnoses:    Altered mental status   Confusion   Dementia ([facility] )   Frequent falls   Dehydration   Decreased oral intake     Time reflects when diagnosis was documented in both MDM as applicable and the Disposition within this note     Time User Action Codes Description Comment    2/28/2023  8:02 PM Jose Zachary Add [R41 82] Altered mental status     2/28/2023  8:02 PM Jose Zachary Add [R41 0] Confusion     2/28/2023  8:02 PM Jose Zachary Add [F03 90] Dementia ([facility] [address] )     2/28/2023 10:25 PM Jose Zachary Add [R29 6] Frequent falls     2/28/2023 10:54 PM Ethyl Payment A Add [E86 0] Dehydration     2/28/2023 10:55 PM Ethyl Payment A Add [R63 8] Decreased oral intake       ED Disposition     ED Disposition   Admit    Condition   Stable    Date/Time   Tue Feb 28, 2023 10:54 PM    Comment   Case was discussed with SHAKILA Lopes and the patient's admission status was agreed to be Admission Status: observation status to the service of Dr Michelle Corrales   Follow-up Information    None         Current Discharge Medication List      CONTINUE these medications which have NOT CHANGED    Details   apixaban (ELIQUIS) 2 5 mg Take 2 5 mg by mouth 2 (two) times a day      B-D UF III MINI PEN NEEDLES 31G X 5 MM MISC Refills: 0      EUSBEIO CONTOUR TEST test strip 3 (three) times a day Test  Refills: 9      Calcium Citrate (CITRACAL PO) Take 650 mg by mouth in the morning      cholecalciferol (VITAMIN D3) 1,000 units tablet Take 2,000 Units by mouth daily      Cinnamon 500 MG capsule Take 1,000 mg by mouth daily      dextromethorphan-guaifenesin (MUCINEX DM)  MG per 12 hr tablet Take 1 tablet by mouth daily      furosemide (LASIX) 20 mg tablet Take 20 mg by mouth daily      hydroxychloroquine (PLAQUENIL) 200 mg tablet Take 200 mg by mouth 2 (two) times a day with meals      olmesartan (BENICAR) 20 mg tablet Take 20 mg by mouth daily      potassium chloride (KLOR-CON) 8 MEQ tablet       pravastatin (PRAVACHOL) 40 mg tablet Take 40 mg by mouth daily      predniSONE 1 mg tablet Take 10 mg by mouth daily Currently down to 3 1/2 mg daily 05/05/22      QUEtiapine (SEROquel) 25 mg tablet Take 0 5 tablets (12 5 mg total) by mouth daily at bedtime  Qty: 30 tablet, Refills: 0    Associated Diagnoses:  Altered mental status      Trelegy Ellipta 100-62 5-25 MCG/ACT inhaler USE 1 INHALATION DAILY RINSE MOUTH AFTER USE  Qty: 360 blister, Refills: 3    Associated Diagnoses: Centrilobular emphysema (HCC)      amoxicillin-clavulanate (AUGMENTIN) 875-125 mg per tablet Take 1 tablet by mouth every 12 (twelve) hours for 5 days  Qty: 10 tablet, Refills: 0    Associated Diagnoses: Left lower lobe pneumonia             No discharge procedures on file      PDMP Review     None          ED Provider  Electronically Signed by           Amanda Al PA-C  03/02/23 9836

## 2023-03-01 NOTE — ASSESSMENT & PLAN NOTE
Newly diagnosed vascular dementia in 10/22  During recent hospitalization patient was recently started on Seroquel HS  Patient was given Haldol in ED   QTc 488    Plan-  Geriatrics consult, appreciate recommendations  Delirium precautions

## 2023-03-01 NOTE — ASSESSMENT & PLAN NOTE
Lab Results   Component Value Date    EGFR 30 02/28/2023    EGFR 37 02/27/2023    EGFR 32 02/26/2023    CREATININE 1 99 (H) 02/28/2023    CREATININE 1 68 (H) 02/27/2023    CREATININE 1 87 (H) 02/26/2023   Cr baseline around 1 7  Likely in the setting of poor po intake, dehydration     Plan-   Hold Olmesartan   IVF NS 125ml

## 2023-03-01 NOTE — SPEECH THERAPY NOTE
Speech-Language Pathology Bedside Swallow Evaluation        Patient Name: Deidre Barajas    LLWYE'Y Date: 3/1/2023     Problem List  Principal Problem:    Acute encephalopathy  Active Problems:    Diabetes mellitus ([facility] )    Hypertension    Atrial fibrillation ([facility] )    Polymyalgia rheumatica ([facility] )    Esophageal stricture    Hypercalcemia    Stage 3b chronic kidney disease ([facility] )    Vascular dementia with behavior disturbance         Past Medical History  Past Medical History:   Diagnosis Date   • Cancer ([facility] )     pancreatic   • Colon polyp    • Coronary artery disease    • Diabetes mellitus ([facility] )    • Hyperlipidemia    • Hypertension    • Pneumonia 06/13/2017    Right middle and lower lobe    • Stroke [facility])        Past Surgical History  Past Surgical History:   Procedure Laterality Date   • APPENDECTOMY     • CARDIAC SURGERY      Aortic Valve Replacement, Ascending Aorta   • COLONOSCOPY     • GALLBLADDER SURGERY     • PANCREATICODUODENECTOMY         Summary    Pt presents with normal oral and pharyngeal stages of swallowing  No overt s/s aspiration noted  Pt denied food dysphagia symptoms  Recommendations:   Diet: regular diet and thin liquids   Meds: whole with liquid   Frequent Oral care: 2x/day  Aspiration precautions  Other Recommendations/ considerations: no further follow up tx needed  Current Medical Status  Pt is a 82 y o  male who presented to [address] [place] [address]  with acute encephalopathy  Pt  presents with progressively worsening altered mental status  Patient's family is at bedside who reported he has been progressively worsening over the past week however today was the worst its been  Patient has been acting out, using vulgarity, and having an overabundance of energy pacing floors and doing random tasks  Patient's family reports that he was recently hospitalized due to altered mental status and was found to have pneumonia and was treated with antibiotics    Of note patient also was having difficulty swallowing, had an EGD in October 2022 where there was mild stricture therefore required dilatation  He was recently discharged on the 27th where he went to [facility] and quickly returned to ED due to fall and AMS  Past medical history:   Please see H&P for details    Special Studies:  CT-head: 2/28/23  No acute intracranial abnormality  CXR: 2/28/23 Development of groundglass opacity at right lung base suspicious for early infiltrate  Social/Education/Vocational Hx:  Pt lives [employer]   Current Risks for Dysphagia & Aspiration: AMS  Current Symptoms/Concerns: AMS  Current Diet: regular diet and thin liquids -recently advanced   Baseline Diet: regular diet and thin liquids  Takes pills- whole w/ water     Baseline Assessment   Behavior/Cognition: alert  Speech/Language Status: able to participate in basic conversation and able to follow commands  Patient Positioning: upright in bed     Swallow Mechanism Exam   Facial: symmetrical  Labial: WFL  Lingual: WFL  Velum: unable to visualize  Mandible: adequate ROM  Dentition: adequate  Vocal quality:clear/adequate   Volitional Cough: strong/productive   Respiratory: RA    Consistencies Assessed and Performance   Consistencies Administered: thin liquids, puree and soft solids  (applesauce, toast, gingerale by straw and several meds w/ water)     Oral Stage: pt able to bite toast, mastication/manipulation and transfer were WNL  Pt w/ good oral control of liquids by straw  Timely transfer noted  Pharyngeal Stage: swallow initiation was prompt and complete with no overt s/s aspiration         Esophageal Concerns: none reported      Results Reviewed with: patient and RN   Dysphagia Goals: none at this time      April Teagan Laird CCC-SLP  Speech Pathologist  [place] license # [id number]  [place] license # [id number]  Available via [email or web address]

## 2023-03-01 NOTE — ASSESSMENT & PLAN NOTE
/95  Home medications include Lasix and Olmesartan     Plan-   Hold Olmesartan for now in the setting of elevated Cr  Continue Lasix QD

## 2023-03-01 NOTE — CONSULTS
Consultation - Geriatric Medicine   Francisco López 82 y o  male MRN: [id number]  Unit/Bed#: W -01 Encounter: [id number]        Inpatient consult to Gerontology  Consult performed by: Shalonda Hardy MD  Consult ordered by: Rick Mendoza MD            Assessment/Plan   1  Acute Encephalopathy  Since beginning of February with an admission for pneumonia, patient has had declining cognitive function  Neuro consult from 2/15/23 recommendations: "At this time there is no further testing or treatments needed as an inpatient given that he did well on exam today  If he has confusional episodes putting him or others at risk this evening as he has increased neuro fatigue, it would be appropriate to use a low-dose as needed Zyprexa or Seroquel  As an outpatient this patient should have neurologic care both for his vascular disease as well as to assess his overall cognitive performance even though he did well on exam today "  Patient was confused and agitated  Family states that this has been getting worse over the past 2 weeks  Patient has never been like this before  Family recently found out that the patient was diagnosed with vascular dementia  During the most recent admission patient was started on Seroquel 12 5 mg at bedtime and Xanax as needed  Xanax was discontinued  Patient was brought to the ED this time due to 2 falls at [facility] and possible seizure-like activity  Seizure was ruled out in the ED  Patient is most likely exhibiting signs of delirium  Patient is at risk for delirium due to vascular dementia, recent falls, recent admissions, and polypharmacy  Pt was able to give a great history and only lost track of his thoughts 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     Fall precautions  - Assess patient frequently for physical needs, encourage use of assistant devices as needed and directed by PT/OT  -  Identify cognitive and physical deficits and behaviors that affect risk of falls  -  [place] fall precautions   - Educate patient/family on patient safety including physical limitations and importance of using call bell for assistance   - Modify environment to reduce risk of injury including cap IV and disconnect from pole when not in use, ensuring adequate lighting in room and restroom, ensuring that path to restroom is clear and free of trip hazards  - PT/OT consulted to assist with strengthening/mobility and assist with discharge planning to appropriate level of care  Delirium precautions  -Patient is high risk of delirium due to vascular dementia  -Initiate delirium precautions  -maintain normal sleep/wake cycle  -minimize overnight interruptions, group overnight vitals/labs/nursing checks as possible  -dim lights, close blinds and turn off tv to minimize stimulation and encourage sleep environment in evenings  -ensure that pain is well controlled consider Tylenol 975mg Q8H scheduled if not already ordered   -monitor for fecal and urinary retention which may precipitate delirium  -encourage early mobilization and ambulation  -provide frequent reorientation and redirection  -encourage family and friends at the bedside to help help calm patient if anxious  -avoid medications which may precipitate or worsen delirium such as tramadol, benzodiazepine, anticholinergics, and benadryl  -encourage hydration and nutrition   -redirect unwanted behaviors as first line, avoid physical restraints, use chemical restraint only if all other attempts have been unsuccessful, monitor for orthostatic hypotension and QTc prolongation with repeat dosing, recommend lowest dose possible for shortest duration possible     2  Vascular dementia  Vascular dementia was recently diagnosed in 10/22  Patient was recently started on Seroquel at bedtime  Most recent QTc was 489    Recent MRI on 2/15/2023 showed multiple chronic small infarcts and severe chronic microangiopathy which is a contributing factor to his vascular dementia  3   Polymyalgia rheumatica  Patient is followed closely by rheumatology outpatient  Patient is currently taking prednisone 10 mg daily and hydroxychloroquine 200 mg twice daily  Patient has been taking steroids for 5 years  Patient reported having shoulder, hip, knee pain and stiffness  Patient would occasionally have jaw pain as well  Patient had a one-sided headache while having jaw claudication at 1 time  Rheumatologist Dr Eliel Banda will be called to discuss PMR medications as well as the possibility of temporal arteritis  Latest ESR was 16 and CRP was 14 3 on 1- 27-23  ESR was ordered  4   Hypertension  Pt currently takes Olmesartan 20 mg daily and furosemide 20 daily as well  While admitted, the primary team is recommending holding Olmesartan and continuing Lasix  Pt follows with cardiology outpatient  5   Hypercalcemia  Pt has had a history of high calcium with most recent high at 11  Most recent values show a decline in Ca at 9 8 with albumin at 3 3  However, corrected Ca is 10 4  PTH today was 76 3  Recommend discontinuing calcium supplement  6   Diabetes mellitus  Patient has dealt with diabetes for quite some time  Patient currently takes Lantus 16 units at bedtime  Patient monitors his blood sugars with a brian at home as well as a meter  Patient has tried metformin in the past but had GI issues and cognition issues  Most recent hemoglobin A1c was 6 9  Patient is currently on the inpatient Humalog SS regimen based on fingerstick blood glucose levels  7   History of pancreatic cancer  Patient had a distal pacretectomy in 2011 due to malignant neoplasm of his pancreas found incidentally on CT  patient has since been in remission due to removal of cancer  8   Status post aortic valve replacement  Patient had an aortic valve replacement as well as an aortic arch repair due to an aneurysm  Patient follows with cardiology outpatient      9   Chronic kidney disease stage IIIb  Patient saw urologist in 2020 and was recommended to see nephrology  Patient did not follow-up with nephrology and stated that his PCP was treating him  Recommend use against diuretics due to patient's kidney disease  Patient's most recent GFR was 33     10   Atrial fibrillation  Recent EKGs show that patient is currently in A-fib  Patient reports no flutter, palpitations, or chest pain  Patient's rhythm sounded regular on exam   Pt takes Eliquis 2 5 mg twice daily  Patient is on this dose due to serum creatinine above 1 5, and age greater than [de-identified] years  patient follows with cardiology outpatient  Consider an inpatient consult with cardiology  11   Esophageal stricture  EGD in 10/22 demonstrated possible mild stricture at the GE junction and is status post dilation  Patient reported he only has difficulty swallowing when his mouth is dry but other than that he can swallow fine  Speech therapy was consulted to assess patient's ability to swallow  Pt swallowed pills, applesauce, toast while we were in the room with speech therapy  12   COPD  Continue with Trelegy Ellipta elation daily  Patient follows with pulmonology  15   Previous CVA  Patient had a stroke  Recent MRI reveals multiple infarcts most significant ones in the cerebellum  Patient's wife reports no problems with balance  Patient does not have any deficits since  Patient is on Eliquis 2 5 mg twice daily       Polypharmacy (Outpatient Medications)  Furosemide 20 mg AM  Potassium chloride 8 meq AM  Eliquis 2 5 mg AM/PM  Hydroxychloroquine 200 mg AM  Cinnamon 1000mg AM  Citracal 650 mg AM  Trelegy Ellipta 100 mcg AM  Olmesartan 20 mg AM  Prednisone 10 mg AM  Pravastatin 40 mg PM  Lantus 16 U PM  Vitamin D3- 50 mcg 2000 IU PM     Recommendations:  - Discontinuing calcium supplement  - Fall and delirium precautions  - Be cautious with Seroquel due to pt's QT recently increasing to 489  - Recommend continuing olmesartan and reducing furosemide  - Consider consulting cardiology due to afib on EKG        History of Present Illness   Physician Requesting Consult: Damian Mendiola MD  Reason for Consult / Principal Problem: Acute encephalopathy   Hx and PE limited by: None  Additional history obtained from: Wife, son, and nephew      HPI: Kiya Arizmendi is a 82y o  year old male with PMHx of diabetes mellitus, HTN, atrial fibrillation, polymyalgia rheumatica, esophageal stricture, hypercalcemia, CKD3b, vascular dementia, h/o pancreatic cancer, previous CVA, hyperlipidemia, CAD, COPD, and previous acute encephalopathy who presents with acute encephalopathy  Pt had two recent admissions at [facility]  2/14/23-2/16/23:  Pt was recently admitted in the beginning of February with RSV and pneumonia  Pt was admitted for 2 days and discharged home stable with antibiotics  Neuro was consulted during this admission and there recommendations are in the assessment  2/26/23-2/27/23:  After discharge became more confused and agitated per family  Pt was brought back to ED on 2/26/23  Pt reported he is usually very friendly and that is not his baseline  His baseline orientation is AO x3  Pt was started on Seroquel 12 5 mg at bedtime which seemed to help  He was also started on Xanax PRN but has since been discontinued  Pt was stable for discharge and mental status improved to AO x2  Pt was discharged to [address] [place] [address] on 2/27/23  Current Admission:  Pt was sent to the ED yesterday (2/28/23) due to recent falls in the nursing home and declining mental status  Pt's family reported he has had a dramatic cognitive decline  They are concerned about his aggressive and belligerent outbursts  Pt was diagnosed with vascular dementia in 10/22 which his family recently found out  Review of Systems   Constitutional: Negative for appetite change, chills, fatigue and fever     HENT: Positive for hearing loss and trouble swallowing (when mouth is dry)  Negative for dental problem and sore throat  Eyes: Positive for visual disturbance (uses glasses)  Dry eyes   Respiratory: Negative for chest tightness and shortness of breath  Cardiovascular: Negative for chest pain and palpitations  Gastrointestinal: Negative for abdominal pain, constipation, diarrhea, nausea and vomiting  Genitourinary: Positive for frequency (nocturia x3)  Negative for difficulty urinating  Musculoskeletal: Positive for arthralgias (especially left knee pain)  Neurological: Negative for dizziness, light-headedness and headaches  Psychiatric/Behavioral: Positive for confusion and sleep disturbance  The patient is not nervous/anxious          Memory/Cognitive screening:  Some memory loss  Mobility:  Walks well; has a cane but doesn't use it  Falls:  2 recently at [employer]:  [facility] but doesn't use it  Fraility: usually managing well  Nutrition/weight loss/grocery shopping/meal preparation: able to go before with wife  Vision impairment: pt wears glasses  Hearing impairment: wears hearing aids  Incontinence: denies  Delirium: not until now  Polypharmacy:   Furosemide 20 mg AM  Pt chloride 8 meq AM  Eliquis 2 5 mg AM/PM  Hydroxychloroquine 200 mg AM  Cinnamon 1000mg AM  Citracal 650 mg AM  Trelegy Ellipta 100 mcg AM  Olmesartan 20 mg AM  Prednisone 10 mg AM  Pravastatin 40 mg PM  Lantus 16 U PM  Vitamin D3- 50 mcg 2000 IU PM  Patients primary residence: recently living in [address]; was living in his private home with wife  iADL's:  Pt was able to perform iADLs; wife mainly does the bills and helps with medications to make sure he is taking the right ones  ADL's:  Pt performs basic ADLs on his own     Historical Information   Past medical history:   Past Medical History:   Diagnosis Date   • Cancer [facility])     pancreatic   • Colon polyp    • Coronary artery disease    • Diabetes mellitus ([facility] )    • Hyperlipidemia    • Hypertension    • Pneumonia 2017    Right middle and lower lobe    • Stroke [facility])      Past surgical history:   Past Surgical History:   Procedure Laterality Date   • APPENDECTOMY     • CARDIAC SURGERY      Aortic Valve Replacement, Ascending Aorta   • COLONOSCOPY     • GALLBLADDER SURGERY     • PANCREATICODUODENECTOMY         Social history:   Social History     Tobacco Use   • Smoking status: Former     Types: Pipe     Quit date:      Years since quittin 1   • Smokeless tobacco: Never   Vaping Use   • Vaping Use: Never used   Substance Use Topics   • Alcohol use: Never   • Drug use: No     Family history:   Family History   Problem Relation Age of Onset   • Cancer Mother    • Stroke Father    • Cancer Sister    • Diabetes Sister    • Stroke Brother        Meds/Allergies   All current active meds have been reviewed  Allergies   Allergen Reactions   • Contrast Dye [Iodinated Contrast Media] Other (See Comments)     Pt states kidney dysfunction       • Other        Objective   Vitals:    23 0807   BP: 163/98   Pulse: 74   Resp: 20   Temp: (!) 97 2 °F (36 2 °C)   SpO2: 95%       Intake/Output Summary (Last 24 hours) at 3/1/2023 0901  Last data filed at 3/1/2023 0600  Gross per 24 hour   Intake 562 5 ml   Output 415 ml   Net 147 5 ml     Invasive Devices     Peripheral Intravenous Line  Duration           Peripheral IV 23 Distal;Left;Ventral (anterior) Forearm <1 day                Physical Exam  Constitutional:       General: He is not in acute distress  Appearance: Normal appearance  He is normal weight  He is not ill-appearing  HENT:      Head: Atraumatic  Nose: Nose normal       Mouth/Throat:      Mouth: Mucous membranes are dry  Pharynx: Oropharynx is clear  Eyes:      Conjunctiva/sclera: Conjunctivae normal    Cardiovascular:      Rate and Rhythm: Normal rate and regular rhythm  Pulses: Normal pulses  Heart sounds: Normal heart sounds  No murmur heard  No friction rub  Pulmonary:      Effort: Pulmonary effort is normal  No respiratory distress  Breath sounds: Normal breath sounds  No wheezing or rhonchi  Abdominal:      General: Bowel sounds are normal  There is no distension  Palpations: Abdomen is soft  There is no mass  Tenderness: There is no abdominal tenderness  Musculoskeletal:      Cervical back: Neck supple  Right lower leg: No edema  Left lower leg: No edema  Skin:     General: Skin is warm  Findings: Bruising present  Neurological:      General: No focal deficit present  Mental Status: He is alert and oriented to person, place, and time  Mental status is at baseline  Psychiatric:         Mood and Affect: Mood normal          Behavior: Behavior normal          Thought Content: Thought content normal          Judgment: Judgment normal          Lab Results:   I have personally reviewed pertinent lab and imaging results  CT head w/o contrast 2/28/23  Impression:  Decreased attenuation is noted in periventricular and subcortical white matter demonstrating an appearance that is statistically most likely to represent advanced microangiopathic change; this appearance is similar when compared to most   recent prior examination  No acute intracranial abnormality  MRI brain w/o contrast 2/15/23  Impression:  No acute intracranial abnormality    Multiple chronic small infarcts in bilateral basal ganglia and bilateral cerebellum, worse in bilateral cerebellum    Multiple scattered peripheral predominant chronic microhemorrhages in bilateral cerebral hemispheres  Differential includes cerebral amyloid angiopathy, multiple cavernomas, prior embolic event, among other differentials    Severe chronic microangiopathy    Mild-to-moderate sinus disease with findings suggestive of acute sinusitis  CXR 2/28/23  Impression:   Development of groundglass opacity at right lung base suspicious for early infiltrate    Findings otherwise stable status post aortic valve replacement    CT Chest 2/26/23  Impression:  Slight increased size of 3 2 cm of rounded atelectasis in the posteromedial right lung base    EKG 3/1/23  Impression:  Atrial fibrillation  Cannot rule out inferior infarct  ST and T wave abnormality, consider anterolateral ischemia  LBBB  QT/QTc 400/489 MS    VTE Prophylaxis: Sequential compression device (Venodyne) and Eliquis     Code Status: Level 1 - Full Code  Advance Directive and Living Will:      Power of :    POLST:      Family and Social Support: Daughter, son, and wife have been very involved in the pt  Living Arrangements: Lives w/ Spouse/significant other  Support Systems: Family members; Spouse/significant other  Assistance Needed: na  Type of Current Residence: Private residence  [address]: No

## 2023-03-01 NOTE — OCCUPATIONAL THERAPY NOTE
Occupational Therapy Evaluation     Patient Name: Georgette Hare  [id number] Date: 3/1/2023  Problem List  Principal Problem:    Acute encephalopathy  Active Problems:    Diabetes mellitus ([facility] )    Hypertension    Atrial fibrillation ([facility] )    Polymyalgia rheumatica ([facility] )    Esophageal stricture    Hypercalcemia    Stage 3b chronic kidney disease ([facility] )    Vascular dementia with behavior disturbance    Past Medical History  Past Medical History:   Diagnosis Date    Cancer ([place] [address] )     pancreatic    Colon polyp     Coronary artery disease     Diabetes mellitus ([place] [address] )     Hyperlipidemia     Hypertension     Pneumonia 06/13/2017    Right middle and lower lobe     Stroke [facility])      Past Surgical History  Past Surgical History:   Procedure Laterality Date    APPENDECTOMY      CARDIAC SURGERY      Aortic Valve Replacement, Ascending Aorta    COLONOSCOPY      GALLBLADDER SURGERY      PANCREATICODUODENECTOMY               03/01/23 0907   OT Last Visit   OT Visit Date 03/01/23   Note Type   Note type Evaluation   Pain Assessment   Pain Assessment Tool FLACC   Pain Location/Orientation Orientation: Left; Location: Leg  (calf)   Hospital Pain Intervention(s) Repositioned; Ambulation/increased activity   Pain Rating: FLACC (Rest) - Face 0   Pain Rating: FLACC (Rest) - Legs 0   Pain Rating: FLACC (Rest) - Activity 0   Pain Rating: FLACC (Rest) - Cry 1   Pain Rating: FLACC (Rest) - Consolability 0   Score: FLACC (Rest) 1   Pain Rating: FLACC (Activity) - Face 0   Pain Rating: FLACC (Activity) - Legs 0   Pain Rating: FLACC (Activity) - Activity 0   Pain Rating: FLACC (Activity) - Cry 0   Pain Rating: FLACC (Activity) - Consolability 0   Score: FLACC (Activity) 0   Restrictions/Precautions   Weight Bearing Precautions Per Order No   Other Precautions Cognitive; Chair Alarm; Bed Alarm; Fall Risk;Restraints;Multiple lines  (B wrist restraints upon arrival)   Home Living   Type of Home (S)  House  (Admit from [facility]: has been there <1 day)   Home Layout Two level;Bed/bath upstairs;Stairs to enter with rails  (2 SISSY, + full bath on 1st floor)   Bathroom Shower/Tub Walk-in shower  (pt reports walk-in on 1st floor, tub/shower on 2nd floor)   Bathroom Toilet Raised   Bathroom Equipment Grab bars in shower; Shower chair   [address] Cane;Walker   Additional Comments Pt reports intermittent use of SPC in home   Prior Function   Level of [place] Needs assistance with ADLs  ((A) with LB dressing, reports (I) with all other ADLs)   Lives With Spouse  Afshin Rojo)   Receives Help From Family   IADLs Family/Friend/Other provides meals; Family/Friend/Other provides transportation; Family/Friend/Other provides medication management   Falls in the last 6 months 1 to 4  (1 at home 2 at rehab)   Vocational Retired  (manager in a lab)   Comments Pt is a questionable historian at times, requires assist from family for home set up information   Lifestyle   Autonomy PTA pt at [facility], pt was there <1 day   Prior to rehab stay pt was living with wife in [facility], pt requiring (A) with ADLs and IADLs, (+)falls, (-)drives, intermittent use of SPC at baseline   Reciprocal Relationships supportive wife and children   Service 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   [address] enjoys watching tv   General   Family/Caregiver Present Yes  (wife + family)   Subjective   Subjective "I used to put a sticky note on her (wife's) head to remember my dance steps"   ADL   Eating Assistance 5  [address] 3  Moderate Assistance   LB Bathing Deficit Increased time to complete;Supervision/safety;Verbal cueing  (pt able to wash nicholas-area and thighs)   [address] 4  Minimal Assistance   UB Dressing Deficit   (don/doffing gown)   LB Dressing Assistance 1  Total Assistance   LB Dressing Deficit Don/doff R sock; Don/doff L sock   Toileting Assistance  2  Maximal Assistance   Toileting Deficit Perineal hygiene  (incontinent of urine)   Additional Comments While unable to assess eating, grooming and UB bathing at time of evaluation, with use of clinical reasoning, pt's performance throughout evaluation indicates that pt may be able to perform these tasks at the levels listed above   Bed Mobility   Supine to Sit 5  Supervision   Additional items Increased time required;Verbal cues   Sit to Supine 4  Minimal assistance   Additional items Assist x 1; Increased time required;Verbal cues;LE management   Transfers   Sit to Stand 4  Minimal assistance   Additional items Assist x 1; Increased time required   Stand to Sit 4  Minimal assistance   Additional items Assist x 1; Increased time required;Verbal cues   Additional Comments x2 trials sit >< stand  Use of RW   Functional Mobility   Functional Mobility 4  Minimal assistance   Additional Comments Ax1, functional household distance, requiring A for steadying   Additional items Rolling walker   Balance   Static Sitting Good   Dynamic Sitting Fair +   Static Standing Fair -   Dynamic Standing Poor +   Ambulatory Poor +   Activity Tolerance   Activity Tolerance Patient tolerated treatment well   Medical Staff Made Aware PT Briana, Resident JASPER Calhoun   RUE Assessment   RUE Assessment WFL   LUE Assessment   LUE Assessment WFL   Hand Function   Gross Motor Coordination Functional   Fine Motor Coordination Functional   Vision-Basic Assessment   Current Vision Wears glasses all the time   Cognition   Overall Cognitive Status Impaired   Arousal/Participation Alert; Cooperative   Attention Attends with cues to redirect   Orientation Level Oriented to person;Oriented to place; Disoriented to time;Disoriented to situation  (Pt does state that his birthday is [birth year] not [birth year])   Memory Decreased short term memory;Decreased recall of recent events;Decreased recall of precautions Following Commands Follows one step commands with increased time or repetition   Comments pleasant and cooperative, limited insight into situation  Repetative conversation at times, requires VC due to tangential conversation  Per pt's family this is not pt's cognitive baseline  Would benefit from formal cognitive evaluation   Assessment   Limitation Decreased ADL status; Decreased Safe judgement during ADL;Decreased UE strength;Decreased cognition;Decreased endurance;Decreased high-level ADLs; Decreased self-care trans   Prognosis Good   Assessment Pt is a 82 y o  male seen for OT evaluation s/p admission to [address] [place] [address] on 2/28/2023 due to AMS  Pt diagnosed with Acute encephalopathy  Pt has a significant PMH impacting occupational performance including: DM, vascular dementia, a fib, HTN, CKD, polymyalgia rheumatica  Pt with 4 recent admissions in the last 2 months  PTA pt at [facility], pt was there <1 day  Prior to rehab stay pt was living with wife in [facility], pt requiring (A) with ADLs and IADLs, (+)falls, (-)drives, intermittent use of SPC at baseline  Pt is motivated to return to home  Personal and environmental factors supporting pt at time of IE include social support and attitude towards recovery  Personal and environmental factors inhibiting engagement in occupations include advanced age, inaccessible home environment, difficulty completing ADLs and difficulty completing IADLs  During evaluation pt performed as is outlined above in flowsheet  Pt required VC for safety and VC for attention to task  Standardized assessments used to assist in identifying performance deficits include AMPAC 6-Clicks and Barthel ADL Index   Performance deficits that affect the pt’s occupational performance during the initial evaluation include impaired balance, functional mobility, endurance, activity tolerance, forward functional reach, trunk control, functional standing tolerance and overall strength, attention to task, safety awareness, insight into deficits, problem solving and learning/remembering new tasks  Based on pt’s functional performance and deficits the following occupations will be addressed in OT treatments in order to maximize pt’s independence and overall occupational performance: grooming, bathing/showering, toileting and toilet hygiene, dressing and functional mobility  Goals are listed below  Upon discharge from acute care setting recommend d/c to [address] [place] [address]  Goals   Patient Goals to go dancing   LTG Time Frame 10-14   Long Term Goal see goals listed below   Plan   Treatment Interventions ADL retraining;Functional transfer training; Endurance training;Cognitive reorientation;Patient/family training;Equipment evaluation/education; Compensatory technique education; Activityengagement; Energy conservation   Goal Expiration Date 03/11/23   OT Treatment Day 0   OT Frequency 3-5x/wk   Recommendation   OT Discharge Recommendation Post acute rehabilitation services  (vs home with HHOT pending progress with therapy)   AM-PAC Daily Activity Inpatient   Lower Body Dressing 2   Bathing 2   Toileting 2   Upper Body Dressing 3   Grooming 3   Eating 3   Daily Activity Raw Score 15   Daily Activity Standardized Score (Calc for Raw Score >=11) 34 69   AM-PAC Applied Cognition Inpatient   Following a Speech/Presentation 2   Understanding Ordinary Conversation 3   Taking Medications 1   Remembering Where Things Are Placed or Put Away 1   Remembering List of 4-5 Errands 1   Taking Care of Complicated Tasks 1   Applied Cognition Raw Score 9   Applied Cognition Standardized Score 22 48   Mini-Cog Assessment   Unable to Assess Known dementia   Barthel Index   Feeding 5   Bathing 0   Grooming Score 0   Dressing Score 5   Bladder Score 5   Bowels Score 10   Toilet Use Score 5   Transfers (Bed/Chair) Score 10   Mobility (Level Surface) Score 0   Stairs Score 0   Barthel Index Score 40   End of Consult   Patient Position at End of Consult Supine;Bed/Chair alarm activated; All needs within reach  (wrist restraints not on, RN Titi Aguirre okay and aware)       GOALS:      -Patient will perform grooming tasks standing at sink with overall Mod I in order to increase overall independence    -Patient will be Mod I with UB ADLs using AE and AD as needed in order to increase (I) with ADLs    -Patient will be Min A  with LB ADLs with use of AE and AD as needed in order to increase (I) with ADLs    -Patient will complete toileting w/ Supervision w/ G hygiene/thoroughness in order to reduce caregiver burden    -Patient will demonstrate Mod I with bed mobility for ability to manage own comfort and initiate OOB tasks      -Patient will perform functional transfers with Mod I to/from all surfaces using DME as needed in order to increase (I) with functional tasks    -Patient will be Mod I with functional mobility to/from bathroom for increased independence with toileting tasks    -Patient will tolerate therapeutic activities for greater than 30 min, in order to increase tolerance for functional activities      -Patient will increase OOB/sitting tolerance to 2-4 hours per day to increase participation in self-care and leisure tasks with no s/s of exertion      -Patient will engage in ongoing cognitive assessment in order to assist with safe discharge planning/recommendations  The patient's raw score on the AM-PAC Daily Activity Inpatient Short Form is 15  A raw score of less than 19 suggests the patient may benefit from discharge to post-acute rehabilitation services  Please refer to the recommendation of the Occupational Therapist for safe discharge planning  This session, pt required and most appropriately benefited from skilled OT/PT co-eval due to extensive physical assistance of SKILLED therapists, significant regression from functional baseline, and decreased activity tolerance  OT and PT goals were addressed separately as seen in documentation  Kolton Diaz MS, OTR/L

## 2023-03-01 NOTE — UTILIZATION REVIEW
Initial Clinical Review    Admission: Date/Time/Statement:   Admission Orders (From admission, onward)     Ordered        02/28/23 9419  Place in Observation  Once                      Orders Placed This Encounter   Procedures   • Place in Observation     Standing Status:   Standing     Number of Occurrences:   1     Order Specific Question:   Level of Care     Answer:   Med Surg [16]     ED Arrival Information     Expected   -    Arrival   2/28/2023 18:44    Acuity   Urgent            Means of arrival   Ambulance    Escorted by   [facility]    Service   Hospitalist    Admission type   Emergency            Arrival complaint   EMS/ AMS           Chief Complaint   Patient presents with   • Altered Mental Status     Pt to ED via EMS from [facility] with c/o AMS, per [facility] "mental status has been changing over last 10 days " EMS states facility was concerned for seizure like activity       Initial Presentation: 82 y o  male with PMH of  vascular dementia, CVA, PNA, DM type II, hypertension, polymyalgia rheumatica, A-fib, COPD presented to the ED from nursing home via EMS d/t worsening AMS  Per pt's family, pt has been has been progressively worsening over the past week, worse today  He has been acting out, using vulgarity, and having an overabundance of energy pacing floors and doing random tasks  He is aaox1, waxes and wanes at baseline  DDx including but not limited to: metabolic abnormality, intracranial etiology, cardiac etiology, substance abuse, infectious etiology including UTI, thyroid disease, hyperammonemia, delirium, dementia, overmedication  In the ED, CT head neg  Cr 1 99 today, baseline 1 7  Ca- 11  On exam, aaox1, disoriented, confused, unable to follow commands, ill appearing, MM dry, intermittent b/l leg tremors  Given 1L IVF bolus, IV Haldol, Ativan      Admit as observation level of care for acute encephalopathy, vascular dementia w/ behavior disturbance, hypercalcemia, elevated creatinine: UA- Pending  Procal- Pending   PTH- Pending  Keep NPO until MS improves  Serial MS exams  Geriatric consult  Mon CMP  IVF      03/01 Geriatric Consult: Acute encephalopathy: Pt's cog function has been declining since Feb admission for PNA  Pt was Dx w/ Vascular dementia on 10/22  Recently started on seroquel at HS  He has been confused and agitated, worsening x 2 weeks  He was never like this before  Pt's family reported he has had a dramatic cognitive decline  They are concerned about his aggressive and belligerent outbursts  Recommendations: Discontinuing calcium supplement  Fall and delirium precautions  Be cautious with Seroquel due to pt's QT recently increasing to 489  Recommend continuing olmesartan and reducing furosemide  Consider consulting cardiology due to afib on EKG  ED Triage Vitals   Temperature Pulse Respirations Blood Pressure SpO2   02/28/23 1849 02/28/23 1849 02/28/23 1849 02/28/23 1849 02/28/23 1849   98 °F (36 7 °C) 95 18 143/95 98 %      Temp src Heart Rate Source Patient Position - Orthostatic VS BP Location FiO2 (%)   -- 02/28/23 2224 02/28/23 2224 02/28/23 2224 --    Monitor Lying Right arm       Pain Score       03/01/23 0354       No Pain          Wt Readings from Last 1 Encounters:   02/27/23 76 kg (167 lb 8 8 oz)     Additional Vital Signs:   Date/Time Temp Pulse Resp BP MAP (mmHg) SpO2 O2 Device Patient Position - Orthostatic VS   03/01/23 1057 -- -- -- -- -- 99 % None (Room air) --   03/01/23 08:07:27 97 2 °F (36 2 °C) Abnormal  74 20 163/98 120 95 % -- --   03/01/23 01:02:41 98 4 °F (36 9 °C) 81 20 164/91 115 97 % -- --   02/28/23 2224 -- 84 17 153/86 -- 96 % None (Room air) Lying       Pertinent Labs/Diagnostic Test Results:   CT head without contrast   Final Result by Iggy Fontana DO (02/28 2046)      No acute intracranial abnormality                    Workstation performed: [id number]         XR chest 1 view portable   ED Interpretation by Unique Ambriz PA-C (02/28 1936)   Improvement of LLL pneumonia      Final Result by Daniela Carpenter MD (03/01 3162)      Development of groundglass opacity at right lung base suspicious for early infiltrate    Findings otherwise stable status post aortic valve replacement                  Workstation performed: [id number]           Results from last 7 days   Lab Units 03/01/23  0810 02/26/23  0034   SARS-COV-2  Negative Negative     Results from last 7 days   Lab Units 03/01/23  0537 02/28/23 1914 02/27/23  0512 02/26/23  0512 02/26/23  0026   WBC Thousand/uL 6 85 10 12 7 10 9 24 10 60*   HEMOGLOBIN g/dL 13 0 13 6 12 4 13 2 13 6   HEMATOCRIT % 41 0 43 8 38 8 41 2 42 0   PLATELETS Thousands/uL 129* 148* 134* 141* 186   NEUTROS ABS Thousands/µL 4 87 7 83*  --  6 84 8 51*         Results from last 7 days   Lab Units 03/01/23  0537 02/28/23 1914 02/27/23  0512 02/26/23  0512 02/26/23  0026   SODIUM mmol/L 138 137 139 140 140   POTASSIUM mmol/L 4 4 4 6 4 1 4 9 4 6   CHLORIDE mmol/L 103 100 105 105 103   CO2 mmol/L 30 29 30 29 30   ANION GAP mmol/L 5 8 4 6 7   BUN mg/dL 28* 33* 28* 38* 37*   CREATININE mg/dL 1 83* 1 99* 1 68* 1 87* 1 95*   EGFR ml/min/1 73sq m 33 30 37 32 31   CALCIUM mg/dL 9 8 11 0* 9 7 9 9 10 1   MAGNESIUM mg/dL 2 2  --   --   --   --      Results from last 7 days   Lab Units 03/01/23  0537 02/28/23 1914 02/26/23  0026   AST U/L 21 26 25   ALT U/L 18 22 34   ALK PHOS U/L 105* 128* 125*   TOTAL PROTEIN g/dL 5 7* 6 4 6 0*   ALBUMIN g/dL 3 3* 3 9 3 5   TOTAL BILIRUBIN mg/dL 1 20* 1 43* 1 30*   BILIRUBIN DIRECT mg/dL 0 29*  --   --    AMMONIA umol/L  --   --  <10*     Results from last 7 days   Lab Units 03/01/23  1208 03/01/23  0808 03/01/23  0249 02/28/23  1906 02/27/23  1616 02/27/23  1114 02/27/23  0739 02/26/23  2037 02/26/23  1617 02/26/23  1055 02/26/23  0757 02/26/23  0702   POC GLUCOSE mg/dl 192* 109 112 185* 168* 213* 69 107 181* 147* 116 53*     Results from last 7 days   Lab Units 03/01/23  0537 02/28/23  1914 02/27/23  0512 02/26/23  0512 02/26/23  0026   GLUCOSE RANDOM mg/dL 106 188* 100 60* 105             BETA-HYDROXYBUTYRATE   Date Value Ref Range Status   07/02/2022 0 2 <0 6 mmol/L Final        Results from last 7 days   Lab Units 03/01/23  0537   CK TOTAL U/L 56     Results from last 7 days   Lab Units 02/26/23  0512 02/26/23  0238 02/26/23  0026   HS TNI 0HR ng/L  --   --  33   HS TNI 2HR ng/L  --  35  --    HSTNI D2 ng/L  --  2  --    HS TNI 4HR ng/L 39  --   --    HSTNI D4 ng/L 6  --   --              Results from last 7 days   Lab Units 02/26/23  0026   TSH 3RD GENERATON uIU/mL 1 001     Results from last 7 days   Lab Units 03/01/23  0537 02/27/23  0512 02/26/23  1356   PROCALCITONIN ng/ml 0 06 <0 05 <0 05       Results from last 7 days   Lab Units 03/01/23  0537   SED RATE mm/hour 6                 Results from last 7 days   Lab Units 03/01/23  0810 02/26/23  1358 02/26/23  0034   STREP PNEUMONIAE ANTIGEN, URINE   --  Negative  --    LEGIONELLA URINARY ANTIGEN   --  Negative  --    INFLUENZA A PCR  Negative  --  Negative   INFLUENZA B PCR  Negative  --  Negative   RSV PCR  Negative  --  Negative             Results from last 7 days   Lab Units 02/26/23  0026   ETHANOL LVL mg/dL <3   ACETAMINOPHEN LVL ug/mL <5 3*   SALICYLATE LVL mg/dL <3*     ED Treatment:   Medication Administration from 02/28/2023 1844 to 03/01/2023 0040       Date/Time Order Dose Route Action     02/28/2023 2255 EST sodium chloride 0 9 % bolus 1,000 mL 0 mL Intravenous Stopped     02/28/2023 2105 EST sodium chloride 0 9 % bolus 1,000 mL 1,000 mL Intravenous New Bag     02/28/2023 2133 EST LORazepam (ATIVAN) 2 mg/mL injection **ADS Override Pull** 0 5 mg  Given     02/28/2023 2309 EST haloperidol lactate (HALDOL) injection 2 mg 2 mg Intravenous Given        Past Medical History:   Diagnosis Date   • Cancer [facility])     pancreatic   • Colon polyp    • Coronary artery disease    • Diabetes mellitus ([facility] )    • Hyperlipidemia    • Hypertension    • Pneumonia 06/13/2017    Right middle and lower lobe    • Stroke [facility])      Present on Admission:  • Acute encephalopathy  • Diabetes mellitus ([address] )  • Hypertension  • Atrial fibrillation (HCC)  • Polymyalgia rheumatica (HCC)  • Esophageal stricture      Admitting Diagnosis: Dehydration [E86 0]  Confusion [R41 0]  Altered mental status [R41 82]  Dementia (HCC) [F03 90]  Decreased oral intake [R63 8]  Frequent falls [R29 6]  AMS (altered mental status) [R41 82]  Age/Sex: 82 y o  male  Admission Orders:  Scheduled Medications:  apixaban, 2 5 mg, Oral, BID  cefTRIAXone, 1,000 mg, Intravenous, Q24H  Fluticasone Furoate-Vilanterol, 1 puff, Inhalation, Daily   And  umeclidinium, 1 puff, Inhalation, Daily  hydroxychloroquine, 200 mg, Oral, BID With Meals  insulin lispro, 1-5 Units, Subcutaneous, HS  insulin lispro, 1-6 Units, Subcutaneous, TID AC  polyethylene glycol, 17 g, Oral, Daily  pravastatin, 40 mg, Oral, Daily  QUEtiapine, 25 mg, Oral, HS  senna, 1 tablet, Oral, Daily      Continuous IV Infusions:  sodium chloride, 125 mL/hr, Intravenous, Continuous      PRN Meds:  acetaminophen, 650 mg, Oral, Q6H PRN  ondansetron, 4 mg, Intravenous, Q6H PRN  polyethylene glycol, 17 g, Oral, Daily PRN        IP CONSULT TO GERONTOLOGY    Network Utilization Review Department  ATTENTION: Please call with any questions or concerns to [phone] and carefully listen to the prompts so that you are directed to the right person  All voicemails are confidential   Hilda Rosa all requests for admission clinical reviews, approved or denied determinations and any other requests to dedicated fax number below belonging to the campus where the patient is receiving treatment   List of dedicated fax numbers for the Facilities:  [address] DENIALS (Administrative/Medical Necessity) [phone]   [address] (Maternity/NICU/Pediatrics) [phone] [address] [phone] [address] [phone]   [address] [place] [address] [place]  [facility] 310 [facility] 134 938 [facility] [phone]

## 2023-03-01 NOTE — RESTRAINT FACE TO FACE
Restraint Face to Face   Marta Ghosh 82 y o  male MRN: [id number]  Unit/Bed#: ED-36 Encounter: [id number]      Physical Evaluation: Patient alternating between resting completely still and becoming agitated pulling at extremities and telling people present to leave  No respiratory distress  Purpose for Restraints/ Seclusion High risk for self harm, High risk for causing significant disruption of treatment environment , High risk for harm to others, high risk for flight and falls  Patient's reaction to the intervention: Upset though no longer attempting to exit bed  Patient's medical condition stable    Appropriate space in positioning of restraints & appropriate amount of tension applied to each  Patient's Behavioral condition: Waxing & waning mentation - at times w/ clarity & other times very agitated  Restraints to be Continued

## 2023-03-01 NOTE — ASSESSMENT & PLAN NOTE
Home medications include prednisone 4 Mg QD and Plaquenil    Plan-  Hold prednisone due to acute encephalopathy  Continue Plaquenil when patients mentation improves

## 2023-03-01 NOTE — PHYSICAL THERAPY NOTE
PHYSICAL THERAPY EVALUATION NOTE          Patient Name: Marie Louis  JOVCT'A Date: 3/1/2023      AGE:   82 y o  Mrn:   [id number]  ADMIT DX:  Dehydration [E86 0]  Confusion [R41 0]  Altered mental status [R41 82]  Dementia ([facility] ) [F03 90]  Decreased oral intake [R63 8]  Frequent falls [R29 6]  AMS (altered mental status) [R41 82]    Past Medical History:  Past Medical History:   Diagnosis Date    Cancer ([place] [id number] )     pancreatic    Colon polyp     Coronary artery disease     Diabetes mellitus ([place] [id number] )     Hyperlipidemia     Hypertension     Pneumonia 2017    Right middle and lower lobe     Stroke [facility])        Past Surgical History:  Past Surgical History:   Procedure Laterality Date    APPENDECTOMY      CARDIAC SURGERY      Aortic Valve Replacement, Ascending Aorta    COLONOSCOPY      GALLBLADDER SURGERY      PANCREATICODUODENECTOMY       Length Of Stay: 0        PHYSICAL THERAPY EVALUATION:    Patient's identity confirmed via 2 patient identifiers (full name and ) at start of session       23 0842   PT Last Visit   PT Visit Date 23   Note Type   Note type Evaluation   Pain Assessment   Pain Assessment Tool FLACC   Pain Location/Orientation Orientation: Left; Location: Leg  (calf)   Pain Onset/Description Frequency: Intermittent   Hospital Pain Intervention(s) Repositioned; Ambulation/increased activity   Pain Rating: FLACC (Rest) - Face 0   Pain Rating: FLACC (Rest) - Legs 0   Pain Rating: FLACC (Rest) - Activity 0   Pain Rating: FLACC (Rest) - Cry 1   Pain Rating: FLACC (Rest) - Consolability 0   Score: FLACC (Rest) 1   Pain Rating: FLACC (Activity) - Face 0   Pain Rating: FLACC (Activity) - Legs 0   Pain Rating: FLACC (Activity) - Activity 0   Pain Rating: FLACC (Activity) - Cry 0   Pain Rating: FLACC (Activity) - Consolability 0   Score: FLACC (Activity) 0   Restrictions/Precautions   Weight Bearing Precautions Per Order No   Other Precautions Cognitive; Bed Alarm; Chair Alarm; Fall Risk;Multiple lines; Restraints  (bilateral soft wrist restraints upon arrival)   Home Living   Type of [address] [place] [address] Two level;Bed/bath upstairs;Stairs to enter with rails  (2 SISSY)   Bathroom Shower/Tub Walk-in shower  (pt reports walk-in on 1st floor, tub/shower on 2nd floor)   Bathroom Toilet Raised   Bathroom Equipment Grab bars in shower; Shower chair   [address]  ([facility] prn)   Additional Comments Pt currently admitted from [address] [place] [address] (was admitted at rehab for approx 24 hrs prior to current hospital admission)  At baseline, pt lives w/ his wife   Prior Function   Level of [place] Independent with functional mobility; Needs assistance with IADLS;Needs assistance with ADLs  (intermittently requires assistance w/ ADLs)   Lives With Spouse   Receives Help From Family   IADLs Family/Friend/Other provides meals   Falls in the last 6 months 1 to 4  (pt and family report 3, 1 at home and 2 at rehab PTA)   Comments At baseline, pt amb ind w/ SPC prn   General   Family/Caregiver Present Yes  (pt's wife and family)   Cognition   Overall Cognitive Status Impaired   Arousal/Participation Cooperative   Orientation Level Oriented to person;Oriented to place; Disoriented to situation;Disoriented to time   Memory Decreased recall of recent events;Decreased short term memory   Following Commands Follows one step commands without difficulty   Comments Pt ID via name and ; pt agreeable to PT eval and mobility  Pt pleasant throughout   Strength RLE   RLE Overall Strength 3+/5  (grossly assessed w/ functional mobility)   Strength LLE   LLE Overall Strength 3+/5  (grossly assessed w/ functional mobility)   Vision-Basic Assessment   Current Vision Wears glasses all the time   Bed Mobility   Supine to Sit 5  Supervision   Additional items Assist x 1; Increased time required;Verbal cues   Sit to Supine 4  Minimal assistance   Additional items Assist x 1;HOB elevated; Bedrails; Increased time required;Verbal cues;LE management   Additional Comments pt able to maintain sitting balance at EOB w/ supervision   Transfers   Sit to Stand 4  Minimal assistance   Additional items Assist x 1; Increased time required;Verbal cues   Stand to Sit 4  Minimal assistance   Additional items Assist x 1; Increased time required;Verbal cues   Additional Comments 2 sit<>stand transfers, VC for hand placement   Ambulation/Elevation   Gait pattern Improper Weight shift;Decreased foot clearance; Inconsistent celia; Short stride   Gait Assistance 4  Minimal assist   Additional items Assist x 1;Verbal cues  (CGA/steadying assist)   Assistive Device Rolling walker   Distance 79'   Ambulation/Elevation Additional Comments pt ambulated into hallway w/ RW; pt reports feeling as if his L knee is buckling and going to give out, no L knee buckling observed   Balance   Static Sitting Good   Dynamic Sitting Fair +   Static Standing Fair -  (w/ RW)   Dynamic Standing Poor +   Ambulatory Poor +  (w/ RW)   Activity Tolerance   Activity Tolerance Patient tolerated treatment well   Medical Staff Made Aware Care coordination w/ OT Verónica Smaller for portion of eval due to pt's medical complexity, regression from mobility baseline, and cognitive/safety awareness deficits requiring two skilled clinicians, individual items assessed and goals addressed; Resident Carlos Walker regearleing pt's c/o L calf pain and L knee buckling   Nurse Made Aware JASPER Maciel   Assessment   Prognosis Fair   Problem List Decreased strength; Impaired balance;Decreased mobility; Decreased cognition   Assessment Yasir Brito is a 82 y o  Male who [facility] on 2/28/23 due to AMS w/ fall(s) and diagnosis of acute encephalopathy   Orders for PT eval and treat received, w/ activity orders of up w/ assist  Comorbidities affecting pt at time of evaluation include: DM, HTN, CKD, vascular dementia w/ behavioral disturbance, osteoarthritis, COPD  Personal factors affecting DC include: ambulating w/ assistive device, stairs to enter home and positive fall history  At baseline, pt mobilizes independently w/ SPC prn, and w/ 3 fall(s) in the previous 6 months  Upon evaluation, pt presents w/ the following deficits: weakness, impaired balance and gait deviations  Pt currently requires supervision-min Ax1 for bed mobility, min Ax1 for transfers, and min Ax1 w/ RW for ambulation  Pt's clinical presentation is unstable/unpredictable due to need for assist w/ all phases of mobility when usually mobilizing independently, need for input for mobility technique, recent drastic decline in mobility compared to baseline, recent readmission (within 30 days) and recent history of falls  From a PT/mobility standpoint, given the above findings, DC recommendation is: Post-acute inpatient rehabilitation (return to rehab)  During current admission, pt will benefit from continued skilled inpatient PT in the acute care setting in order to address the above deficits and to maximize function and mobility prior to DC from acute care  Barriers to Discharge Inaccessible home environment   Goals   Patient Goals to go home   STG Expiration Date 03/11/23   Short Term Goal #1 Pt will: perform bed mobility w/ mod I to decrease pt's burden of care and increase pt's independence w/ repositioning in bed; perform transfers w/ mod I to increase pt's OOB mobility; ambulate at least 200' w/ LRAD and mod I to increase pt's ambulatory endurance/tolerance; negotiate at least 12 stair(s) w/ UE support and supervision to facilitate pt returning to previous living environment; increase all balance ratings by at least 1 grade to decrease pt's risk of falls   PT Treatment Day 0   Plan   Treatment/Interventions Functional transfer training;LE strengthening/ROM; Elevations; Therapeutic exercise;Cognitive reorientation;Patient/family training;Equipment eval/education; Bed mobility;Gait training   PT Frequency 3-5x/wk   Recommendation   PT Discharge Recommendation Post acute rehabilitation services  (return to rehab)   Equipment Recommended [facility] Recommended Wheeled walker   Change/add to [place]? No   Additional Comments Pt may progress to HHPT pending pt ambulating at least 76' +/- AD and negotiating at least 12 stairs w/ UE support, both at supervision-->mod I level   AM-PAC Basic Mobility Inpatient   Turning in Flat Bed Without Bedrails 3   Lying on Back to Sitting on Edge of Flat Bed Without Bedrails 3   Moving Bed to Chair 3   Standing Up From Chair Using Arms 3   Walk in Room 3   Climb 3-5 Stairs With Railing 1   Basic Mobility Inpatient Raw Score 16   Basic Mobility Standardized Score 38 32   Highest Level Of Mobility   -HL Goal 5: Stand one or more mins   -HLM Achieved 7: Walk 25 feet or more   End of Consult   Patient Position at End of Consult Supine;Bed/Chair alarm activated; All needs within reach  (pt's wife and family present in room)   End of Consult Comments RN confirmed pt ok to be unrestrainted as long as family is present in room, familty notified       The patient's AM-PAC Basic Mobility Inpatient Short Form Raw Score is 16  A Raw score of less than or equal to 16 suggests the patient may benefit from discharge to post-acute rehabilitation services  Please also refer to the recommendation of the Physical Therapist for safe discharge planning      Pt will benefit from skilled inpatient PT during this admission in order to facilitate progress towards goals and to maximize functional independence prior to [place] [facility] [facility] rec: post acute rehab (return to rehab)        Terese Hardin, PT, DPT  03/01/23

## 2023-03-01 NOTE — PLAN OF CARE
Problem: PHYSICAL THERAPY ADULT  Goal: Performs mobility at highest level of function for planned discharge setting  See evaluation for individualized goals  Description: Treatment/Interventions: Functional transfer training, LE strengthening/ROM, Elevations, Therapeutic exercise, Cognitive reorientation, Patient/family training, Equipment eval/education, Bed mobility, Gait training  Equipment Recommended: Mehdi Miles       See flowsheet documentation for full assessment, interventions and recommendations  3/1/2023 1118 by Oh William PT  Note: Prognosis: Fair  Problem List: Decreased strength, Impaired balance, Decreased mobility, Decreased cognition  Assessment: Violet Cruz is a 82 y o  Male who presents to [facility] on 2/28/23 due to AMS w/ fall(s) and diagnosis of acute encephalopathy  Orders for PT eval and treat received, w/ activity orders of up w/ assist  Comorbidities affecting pt at time of evaluation include: DM, HTN, CKD, vascular dementia w/ behavioral disturbance, osteoarthritis, COPD  Personal factors affecting DC include: ambulating w/ assistive device, stairs to enter home and positive fall history  At baseline, pt mobilizes independently w/ SPC prn, and w/ 3 fall(s) in the previous 6 months  Upon evaluation, pt presents w/ the following deficits: weakness, impaired balance and gait deviations  Pt currently requires supervision-min Ax1 for bed mobility, min Ax1 for transfers, and min Ax1 w/ RW for ambulation  Pt's clinical presentation is unstable/unpredictable due to need for assist w/ all phases of mobility when usually mobilizing independently, need for input for mobility technique, recent drastic decline in mobility compared to baseline, recent readmission (within 30 days) and recent history of falls  From a PT/mobility standpoint, given the above findings, DC recommendation is: Post-acute inpatient rehabilitation (return to rehab)   During current admission, pt will benefit from continued skilled inpatient PT in the acute care setting in order to address the above deficits and to maximize function and mobility prior to DC from acute care  Barriers to Discharge: Inaccessible home environment     PT Discharge Recommendation: Post acute rehabilitation services (return to rehab)    See flowsheet documentation for full assessment

## 2023-03-01 NOTE — ED ATTENDING ATTESTATION
2/28/2023  IAshley MD, saw and evaluated the patient  I have discussed the patient with the resident/non-physician practitioner and agree with the resident's/non-physician practitioner's findings, Plan of Care, and MDM as documented in the resident's/non-physician practitioner's note, except where noted  All available labs and Radiology studies were reviewed  I was present for key portions of any procedure(s) performed by the resident/non-physician practitioner and I was immediately available to provide assistance  At this point I agree with the current assessment done in the Emergency Department  I have conducted an independent evaluation of this patient a history and physical is as follows:    Patient is an 73-year-old male who presents to the emergency department from [place] [address] where he was receiving skilled nursing care  He entered the facility yesterday after short hospitalization at [facility] where he was treated for  Encephalopathy  He presents today after having had a couple of falls  Family additionally reports that he has not been eating or drinking  He did not eat or drink during last hospital stay until a small amount yesterday per their report  He had had another recent hospitalization for RSV infection with encephalopathy (2/14-16)  Family reports he has had significant change in mentation over these last couple of weeks/ability to participate in self-care  Wife notes that while in the hospital it was mentioned that his PCP note from October included a diagnosis of dementia  Patient and son relate that they were not previously aware of this diagnosis  Wife and son relate concern as to how facility can care for him if they are not able to provide IV fluids and medications given his lack of oral intake  They expressed concern that he may have blood on his mouth    Upon close inspection this is identified to be a pink substance  Patient recalls having taken something which he thinks was a medication earlier today that had pink coloring  When offered fluids here he indicated that he wanted something other than water and was receptive to trying ginger ale  He swallowed a small sip and then a second 1 and then told me that his (very dry) mouth felt better  He then made the statement that he could not swallow  When I questioned him why he could not swallow he denied having pain & stated "I can't tell you "  Notes reviewed  He did have evaluation by speech yesterday during which thin fluids as well as soft foods were advised  Son notes that at times patient will gesture and seemingly pretended to eat but not swallow any of his food  During my encounter patient expressed urgent need to void, indicated he wanted to use a urinal and then refused to allow his pants to be fully taken down  He did not void and was then agreeable to returning pants to their prior position  He stated "I am dead "  He them mumbled "the whole kit & caboodle " Patient intermittently very upset/agitated seeming with family as they were discussing various portions of his care including recent hospitalizations  He became especially angry upon mentioning "dementia "  Wife notes that this is an enormous change in personality as he is normally very kind  I did raise the suspicion that he might not be eating or drinking because he did not want to -depressed over current situation  Son indicates that he thinks his father might not be eating and drinking at times by preference as well  He has made the statement "I'm dead" on other recent occasions though alternately stated he wants to live to 82 the age his uncle lived to  Patient has no history of depression  I reviewed DC summaries from last 2 admissions, [facility] ED note from 2/20 & Speech & OT inocencio  On exam he is intermittently agitated  His mucous membranes are quite dry    He requires much assistance with position changes including ultimately to having a urinal put in place  He does intermittently forcefully tremble both legs  On 1 occasion this was before he abruptly vocalized that he needed to void  He recognizes family though at times was insisting a 3rd family member (nephew) was present  Creatinine has bumped from 1 68-1 99 in 1 day  Concern for dehydration  I do feel that he would benefit from psychiatry consultation with regard to depression being a possible factor in his decreased/minimal to no p o  intake  If he does not become more receptive to p o  consideration for placement of PEG tube may be necessary  Family would like to pursue/exhaust other options first     I reviewed concerns with MARTA Bautista  Patient will be brought in for observation status to Dr Neil Howard service  ED Course      ED Course as of 03/01/23 0051 Tue Feb 28, 2023 2324 Patient repeatedly tried exiting bed  He indicated that he wanted to go home  Family spoke with him at length trying to persuade him to move up in the stretcher indicating that he needed to stay here  His wife explained and he briefly seemed receptive to the fact that she was not able to care for him at home  He alternated between relaxing &gently hugging her while at the edge of the bed and attempting to rise/ leave  IV Haldol was ordered  Family demonstrated understanding why this was being performed  Security was called to the bedside, pt  Was lifted up in bed and soft restraints applied  He gripped the hands/ arms of multiple staff tightly as well as stretcher as this was occurring  He did gradually relax during the process but additionally make the statement "I am dead "  Wife intends to stay with patient overnight  Nursing supervisor aware           Critical Care Time  Procedures

## 2023-03-01 NOTE — ASSESSMENT & PLAN NOTE
Home medications include Citracal  Likely in the setting of dehydration     Plan-   PTH- Pending   Monitor CMP

## 2023-03-01 NOTE — PLAN OF CARE
Problem: SAFETY,RESTRAINT: NV/NON-SELF DESTRUCTIVE BEHAVIOR  Goal: Remains free of harm/injury (restraint for non violent/non self-detsructive behavior)  Description: INTERVENTIONS:  - Instruct patient/family regarding restraint use   - Assess and monitor physiologic and psychological status   - Provide interventions and comfort measures to meet assessed patient needs   - Identify and implement measures to help patient regain control  - Assess readiness for release of restraint   Outcome: Progressing  Goal: Returns to optimal restraint-free functioning  Description: INTERVENTIONS:  - Assess the patient's behavior and symptoms that indicate continued need for restraint  - Identify and implement measures to help patient regain control  - Assess readiness for release of restraint   Outcome: Progressing     Problem: Prexisting or High Potential for Compromised Skin Integrity  Goal: Skin integrity is maintained or improved  Description: INTERVENTIONS:  - Identify patients at risk for skin breakdown  - Assess and monitor skin integrity  - Assess and monitor nutrition and hydration status  - Monitor labs   - Assess for incontinence   - Turn and reposition patient  - Assist with mobility/ambulation  - Relieve pressure over bony prominences  - Avoid friction and shearing  - Provide appropriate hygiene as needed including keeping skin clean and dry  - Evaluate need for skin moisturizer/barrier cream  - Collaborate with interdisciplinary team   - Patient/family teaching  - Consider wound care consult   Outcome: Progressing     Problem: MOBILITY - ADULT  Goal: Maintain or return to baseline ADL function  Description: INTERVENTIONS:  -  Assess patient's ability to carry out ADLs; assess patient's baseline for ADL function and identify physical deficits which impact ability to perform ADLs (bathing, care of mouth/teeth, toileting, grooming, dressing, etc )  - Assess/evaluate cause of self-care deficits   - Assess range of motion  - Assess patient's mobility; develop plan if impaired  - Assess patient's need for assistive devices and provide as appropriate  - Encourage maximum independence but intervene and supervise when necessary  - Involve family in performance of ADLs  - Assess for home care needs following discharge   - Consider OT consult to assist with ADL evaluation and planning for discharge  - Provide patient education as appropriate  Outcome: Progressing  Goal: Maintains/Returns to pre admission functional level  Description: INTERVENTIONS:  - Perform BMAT or MOVE assessment daily    - Set and communicate daily mobility goal to care team and patient/family/caregiver  - Collaborate with rehabilitation services on mobility goals if consulted  - Perform Range of Motion 2 times a day  - Reposition patient every 2 hours    - Dangle patient 2 times a day  - Stand patient 2 times a day  - Ambulate patient 2 times a day  - Out of bed to chair 2 times a day   - Out of bed for meals 2 times a day  - Out of bed for toileting  - Record patient progress and toleration of activity level   Outcome: Progressing     Problem: PAIN - ADULT  Goal: Verbalizes/displays adequate comfort level or baseline comfort level  Description: Interventions:  - Encourage patient to monitor pain and request assistance  - Assess pain using appropriate pain scale  - Administer analgesics based on type and severity of pain and evaluate response  - Implement non-pharmacological measures as appropriate and evaluate response  - Consider cultural and social influences on pain and pain management  - Notify physician/advanced practitioner if interventions unsuccessful or patient reports new pain  Outcome: Progressing     Problem: INFECTION - ADULT  Goal: Absence or prevention of progression during hospitalization  Description: INTERVENTIONS:  - Assess and monitor for signs and symptoms of infection  - Monitor lab/diagnostic results  - Monitor all insertion sites, i e  indwelling lines, tubes, and drains  - Monitor endotracheal if appropriate and nasal secretions for changes in amount and color  - [place] appropriate cooling/warming therapies per order  - Administer medications as ordered  - Instruct and encourage patient and family to use good hand hygiene technique  - Identify and instruct in appropriate isolation precautions for identified infection/condition  Outcome: Progressing  Goal: Absence of fever/infection during neutropenic period  Description: INTERVENTIONS:  - Monitor WBC    Outcome: Progressing     Problem: SAFETY ADULT  Goal: Maintain or return to baseline ADL function  Description: INTERVENTIONS:  -  Assess patient's ability to carry out ADLs; assess patient's baseline for ADL function and identify physical deficits which impact ability to perform ADLs (bathing, care of mouth/teeth, toileting, grooming, dressing, etc )  - Assess/evaluate cause of self-care deficits   - Assess range of motion  - Assess patient's mobility; develop plan if impaired  - Assess patient's need for assistive devices and provide as appropriate  - Encourage maximum independence but intervene and supervise when necessary  - Involve family in performance of ADLs  - Assess for home care needs following discharge   - Consider OT consult to assist with ADL evaluation and planning for discharge  - Provide patient education as appropriate  Outcome: Progressing  Goal: Maintains/Returns to pre admission functional level  Description: INTERVENTIONS:  - Perform BMAT or MOVE assessment daily    - Set and communicate daily mobility goal to care team and patient/family/caregiver  - Collaborate with rehabilitation services on mobility goals if consulted  - Perform Range of Motion 2 times a day  - Reposition patient every 2 hours    - Dangle patient 2 times a day  - Stand patient 2 times a day  - Ambulate patient 2 times a day  - Out of bed to chair 2 times a day   - Out of bed for meals 2 times a day  - Out of bed for toileting  - Record patient progress and toleration of activity level   Outcome: Progressing  Goal: Patient will remain free of falls  Description: INTERVENTIONS:  - Educate patient/family on patient safety including physical limitations  - Instruct patient to call for assistance with activity   - Consult OT/PT to assist with strengthening/mobility   - Keep Call bell within reach  - Keep bed low and locked with side rails adjusted as appropriate  - Keep care items and personal belongings within reach  - Initiate and maintain comfort rounds  - Make Fall Risk Sign visible to staff  - Offer Toileting every 2 Hours, in advance of need  - Initiate/Maintain bed alarm  - Obtain necessary fall risk management equipment:   - Apply yellow socks and bracelet for high fall risk patients  - Consider moving patient to room near nurses station  Outcome: Progressing     Problem: DISCHARGE PLANNING  Goal: Discharge to home or other facility with appropriate resources  Description: INTERVENTIONS:  - Identify barriers to discharge w/patient and caregiver  - Arrange for needed discharge resources and transportation as appropriate  - Identify discharge learning needs (meds, wound care, etc )  - Arrange for interpretive services to assist at discharge as needed  - Refer to Case Management Department for coordinating discharge planning if the patient needs post-hospital services based on physician/advanced practitioner order or complex needs related to functional status, cognitive ability, or social support system  Outcome: Progressing     Problem: Knowledge Deficit  Goal: Patient/family/caregiver demonstrates understanding of disease process, treatment plan, medications, and discharge instructions  Description: Complete learning assessment and assess knowledge base    Interventions:  - Provide teaching at level of understanding  - Provide teaching via preferred learning methods  Outcome: Progressing

## 2023-03-01 NOTE — ASSESSMENT & PLAN NOTE
Baseline mentation-  AOx1, Waxes and wanes, progressively worsening within past week, worst today   Med Review for potential causes/contributors shows   DDx including but not limited to: metabolic abnormality, intracranial etiology, cardiac etiology, substance abuse, infectious etiology including UTI, thyroid disease, hyperammonemia, delirium, dementia, overmedication  Recent TSH normal   Previous hospitalization for AMS and PNA tx with ABX  Ca- 11  CT Head- Neg   Low suspicion for infection    Plan:  UA- Pending  Procal- Pending   PTH- Pending  Will keep patient NPO for now until mental status improves   Serial Mental Status Exams

## 2023-03-02 LAB
ALBUMIN SERPL BCP-MCNC: 2.8 G/DL (ref 3.5–5)
ALP SERPL-CCNC: 93 U/L (ref 34–104)
ALT SERPL W P-5'-P-CCNC: 15 U/L (ref 7–52)
ANION GAP SERPL CALCULATED.3IONS-SCNC: 5 MMOL/L (ref 4–13)
AST SERPL W P-5'-P-CCNC: 17 U/L (ref 13–39)
ATRIAL RATE: 91 BPM
BASOPHILS # BLD AUTO: 0.02 THOUSANDS/ÂΜL (ref 0–0.1)
BASOPHILS NFR BLD AUTO: 0 % (ref 0–1)
BILIRUB SERPL-MCNC: 0.78 MG/DL (ref 0.2–1)
BUN SERPL-MCNC: 27 MG/DL (ref 5–25)
CALCIUM ALBUM COR SERPL-MCNC: 9.8 MG/DL (ref 8.3–10.1)
CALCIUM SERPL-MCNC: 8.8 MG/DL (ref 8.4–10.2)
CHLORIDE SERPL-SCNC: 108 MMOL/L (ref 96–108)
CO2 SERPL-SCNC: 25 MMOL/L (ref 21–32)
CREAT SERPL-MCNC: 1.48 MG/DL (ref 0.6–1.3)
EOSINOPHIL # BLD AUTO: 0.21 THOUSAND/ÂΜL (ref 0–0.61)
EOSINOPHIL NFR BLD AUTO: 4 % (ref 0–6)
ERYTHROCYTE [DISTWIDTH] IN BLOOD BY AUTOMATED COUNT: 14.2 % (ref 11.6–15.1)
GFR SERPL CREATININE-BSD FRML MDRD: 43 ML/MIN/1.73SQ M
GLUCOSE SERPL-MCNC: 102 MG/DL (ref 65–140)
GLUCOSE SERPL-MCNC: 119 MG/DL (ref 65–140)
GLUCOSE SERPL-MCNC: 125 MG/DL (ref 65–140)
GLUCOSE SERPL-MCNC: 176 MG/DL (ref 65–140)
GLUCOSE SERPL-MCNC: 215 MG/DL (ref 65–140)
HCT VFR BLD AUTO: 35.2 % (ref 36.5–49.3)
HGB BLD-MCNC: 11.2 G/DL (ref 12–17)
IMM GRANULOCYTES # BLD AUTO: 0.03 THOUSAND/UL (ref 0–0.2)
IMM GRANULOCYTES NFR BLD AUTO: 1 % (ref 0–2)
LYMPHOCYTES # BLD AUTO: 0.98 THOUSANDS/ÂΜL (ref 0.6–4.47)
LYMPHOCYTES NFR BLD AUTO: 17 % (ref 14–44)
MCH RBC QN AUTO: 30.8 PG (ref 26.8–34.3)
MCHC RBC AUTO-ENTMCNC: 31.8 G/DL (ref 31.4–37.4)
MCV RBC AUTO: 97 FL (ref 82–98)
MONOCYTES # BLD AUTO: 0.55 THOUSAND/ÂΜL (ref 0.17–1.22)
MONOCYTES NFR BLD AUTO: 10 % (ref 4–12)
MRSA NOSE QL CULT: NORMAL
NEUTROPHILS # BLD AUTO: 3.93 THOUSANDS/ÂΜL (ref 1.85–7.62)
NEUTS SEG NFR BLD AUTO: 68 % (ref 43–75)
NRBC BLD AUTO-RTO: 0 /100 WBCS
PLATELET # BLD AUTO: 102 THOUSANDS/UL (ref 149–390)
PMV BLD AUTO: 9.9 FL (ref 8.9–12.7)
POTASSIUM SERPL-SCNC: 4 MMOL/L (ref 3.5–5.3)
PROT SERPL-MCNC: 4.7 G/DL (ref 6.4–8.4)
QRS AXIS: 78 DEGREES
QRSD INTERVAL: 102 MS
QT INTERVAL: 392 MS
QTC INTERVAL: 455 MS
RBC # BLD AUTO: 3.64 MILLION/UL (ref 3.88–5.62)
SODIUM SERPL-SCNC: 138 MMOL/L (ref 135–147)
T WAVE AXIS: 178 DEGREES
VENTRICULAR RATE: 81 BPM
WBC # BLD AUTO: 5.72 THOUSAND/UL (ref 4.31–10.16)

## 2023-03-02 RX ORDER — FUROSEMIDE 20 MG/1
20 TABLET ORAL DAILY
Status: DISCONTINUED | OUTPATIENT
Start: 2023-03-02 | End: 2023-03-03 | Stop reason: HOSPADM

## 2023-03-02 RX ORDER — PREDNISONE 10 MG/1
10 TABLET ORAL DAILY
Status: DISCONTINUED | OUTPATIENT
Start: 2023-03-02 | End: 2023-03-03 | Stop reason: HOSPADM

## 2023-03-02 RX ORDER — GUAIFENESIN/DEXTROMETHORPHAN 100-10MG/5
5 SYRUP ORAL EVERY 12 HOURS
Status: DISCONTINUED | OUTPATIENT
Start: 2023-03-02 | End: 2023-03-03 | Stop reason: HOSPADM

## 2023-03-02 RX ADMIN — Medication 1000 MG: at 23:59

## 2023-03-02 RX ADMIN — QUETIAPINE FUMARATE 25 MG: 25 TABLET, FILM COATED ORAL at 21:05

## 2023-03-02 RX ADMIN — FUROSEMIDE 20 MG: 20 TABLET ORAL at 10:29

## 2023-03-02 RX ADMIN — PRAVASTATIN SODIUM 40 MG: 40 TABLET ORAL at 08:17

## 2023-03-02 RX ADMIN — HYDROXYCHLOROQUINE SULFATE 200 MG: 200 TABLET, FILM COATED ORAL at 08:17

## 2023-03-02 RX ADMIN — APIXABAN 2.5 MG: 2.5 TABLET, FILM COATED ORAL at 08:17

## 2023-03-02 RX ADMIN — GUAIFENESIN AND DEXTROMETHORPHAN 5 ML: 100; 10 SYRUP ORAL at 21:06

## 2023-03-02 RX ADMIN — UMECLIDINIUM 1 PUFF: 62.5 AEROSOL, POWDER ORAL at 08:17

## 2023-03-02 RX ADMIN — Medication 1000 MG: at 00:32

## 2023-03-02 RX ADMIN — POLYETHYLENE GLYCOL 3350 17 G: 17 POWDER, FOR SOLUTION ORAL at 08:17

## 2023-03-02 RX ADMIN — INSULIN LISPRO 1 UNITS: 100 INJECTION, SOLUTION INTRAVENOUS; SUBCUTANEOUS at 21:13

## 2023-03-02 RX ADMIN — INSULIN LISPRO 2 UNITS: 100 INJECTION, SOLUTION INTRAVENOUS; SUBCUTANEOUS at 16:42

## 2023-03-02 RX ADMIN — APIXABAN 2.5 MG: 2.5 TABLET, FILM COATED ORAL at 16:42

## 2023-03-02 RX ADMIN — PREDNISONE 10 MG: 10 TABLET ORAL at 10:29

## 2023-03-02 RX ADMIN — STANDARDIZED SENNA CONCENTRATE 8.6 MG: 8.6 TABLET ORAL at 08:17

## 2023-03-02 RX ADMIN — SODIUM CHLORIDE 125 ML/HR: 0.9 INJECTION, SOLUTION INTRAVENOUS at 02:33

## 2023-03-02 RX ADMIN — FLUTICASONE FUROATE AND VILANTEROL TRIFENATATE 1 PUFF: 100; 25 POWDER RESPIRATORY (INHALATION) at 08:17

## 2023-03-02 NOTE — PROGRESS NOTES
Progress Note - Geriatric Medicine   Augie Hidalgo 82 y o  male MRN: [id number]  Unit/Bed#: W -01 Encounter: [id number]      Assessment/Plan:  1  Acute Encephalopathy  Since beginning of February with an admission for pneumonia, patient has had declining cognitive function  Neuro consult from 2/15/23 recommendations: "At this time there is no further testing or treatments needed as an inpatient given that he did well on exam today   If he has confusional episodes putting him or others at risk this evening as he has increased neuro fatigue, it would be appropriate to use a low-dose as needed Zyprexa or Seroquel  As an outpatient this patient should have neurologic care both for his vascular disease as well as to assess his overall cognitive performance even though he did well on exam today "  Patient was confused and agitated  Family states that this has been getting worse over the past 2 weeks  Patient has never been like this before  Family recently found out that the patient was diagnosed with vascular dementia  During the most recent admission patient was started on Seroquel 12 5 mg at bedtime and Xanax as needed  Xanax was discontinued  Patient was brought to the ED this time due to 2 falls at [facility] and possible seizure-like activity  Seizure was ruled out in the ED  Patient is most likely exhibiting signs of delirium  Patient is at risk for delirium due to vascular dementia, recent falls, recent admissions, and polypharmacy  Pt was able to give a great history and only lost track of his thoughts 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     Fall Precautions  - Assess patient frequently for physical needs, encourage use of assistant devices as needed and directed by PT/OT  -  Identify cognitive and physical deficits and behaviors that affect risk of falls  - consider moving patient closer to nursing station to monitor more closely for impulsive behavior which may increase risk of falls  -  [place] fall precautions   - Educate patient/family on patient safety including physical limitations and importance of using call bell for assistance   - Modify environment to reduce risk of injury including cap IV and disconnect from pole when not in use, ensuring adequate lighting in room and restroom, ensuring that path to restroom is clear and free of trip hazards  - PT/OT consulted to assist with strengthening/mobility and assist with discharge planning to appropriate level of care    2  Vascular dementia  Vascular dementia was recently diagnosed in 10/22  Patient was recently started on Seroquel at bedtime  Most recent QTc was 489  Recent MRI on 2/15/2023 showed multiple chronic small infarcts and severe chronic microangiopathy which is a contributing factor to his vascular dementia    Delirium precautions  -Patient is high risk of delirium due to vascular dementia and recent altered mental status  -Initiate delirium precautions  -maintain normal sleep/wake cycle  -minimize overnight interruptions, group overnight vitals/labs/nursing checks as possible  -dim lights, close blinds and turn off tv to minimize stimulation and encourage sleep environment in evenings  -ensure that pain is well controlled consider Tylenol 975mg Q8H scheduled if not already ordered   -monitor for fecal and urinary retention which may precipitate delirium  -encourage early mobilization and ambulation  -provide frequent reorientation and redirection  -encourage family and friends at the bedside to help help calm patient if anxious  -avoid medications which may precipitate or worsen delirium such as tramadol, benzodiazepine, anticholinergics, and benadryl  -encourage hydration and nutrition   -redirect unwanted behaviors as first line, avoid physical restraints, use chemical restraint only if all other attempts have been unsuccessful, monitor for orthostatic hypotension and QTc prolongation with repeat dosing, recommend lowest dose possible for shortest duration possible     3  Polymyalgia rheumatica  Patient is followed closely by rheumatology outpatient  Patient is currently taking prednisone 10 mg daily and hydroxychloroquine 200 mg twice daily  Patient has been taking steroids for 5 years  Patient reported having shoulder, hip, knee pain and stiffness  Patient would occasionally have jaw pain as well  Patient had a one-sided headache while having jaw claudication at 1 time  Rheumatologist was called to discuss PMR medications as well as the possibility of temporal arteritis  Pt will be seen by a new rheumatologist in the practice because Dr Rob Christensen is retiring  Dr Rob Christensen' notes indicate that he was going to substitute MTX for plaquenil  He felt the pt might have elements of RA  4  Hypertension  Pt currently takes Olmesartan 20 mg daily and furosemide 20 daily as well  While admitted, the primary team is recommending holding Olmesartan and continuing Lasix  Pt follows with cardiology outpatient       5  Hypercalcemia  Pt's calcium levels have decreased this morning to normal levels  Calcium was 8 8, albumin was 2 8, and corrected calcium was 9 8  Pt has had a history of high calcium with most recent high at 11  PTH yesterday was 76 3  Recommended discontinuing calcium supplement  6  Type 2 diabetes mellitus  Patient currently takes Lantus 16 units at bedtime  Patient monitors his blood sugars with a brian at home as well as a meter  Patient has tried metformin in the past but had GI issues and cognition issues  Most recent hemoglobin A1c was 6 9  Patient is currently on the inpatient Humalog SS regimen based on fingerstick blood glucose levels  7   History of pancreatic cancer  Patient had a distal pacretectomy in 2011 due to malignant neoplasm of his pancreas found incidentally on CT   Patient has since been in remission due to removal of cancer      8   Status post aortic valve replacement  Patient had an aortic valve replacement as well as an aortic arch repair due to an aneurysm  Patient follows with cardiology outpatient  9   Chronic kidney disease stage IIIb  Patient saw urologist in 2020 and was recommended to see nephrology  Patient did not follow-up with nephrology and stated that his PCP was treating him  Patient's most recent GFR was 43     10   Atrial fibrillation  EKGs showed pt was in afib  Patient reports no flutter, palpitations, or chest pain  Pt takes Eliquis 2 5 mg twice daily  Patient is on this dose due to serum creatinine above 1 5, and age greater than [de-identified] years  Patient follows with cardiology outpatient  11   Esophageal stricture  EGD in 10/22 demonstrated possible mild stricture at the GE junction and is status post dilation  Patient reported he only has difficulty swallowing when his mouth is dry but other than that he can swallow fine  Speech therapy was consulted to assess patient's ability to swallow  Speech recommended regular diet and thin liquids  12   COPD  Continue with Trelegy Ellipta elation daily  Patient follows with pulmonology      13  Previous CVA  Patient had a stroke  Recent MRI reveals multiple infarcts most significant ones in the cerebellum  Patient's wife reports no problems with balance  Patient does not have any deficits since  Patient is on Eliquis 2 5 mg twice daily  Polypharmacy (Outpatient Medications)  Furosemide 20 mg AM  Potassium chloride 8 meq AM  Eliquis 2 5 mg AM/PM  Hydroxychloroquine 200 mg AM  Cinnamon 1000mg AM  Citracal 650 mg AM  Trelegy Ellipta 100 mcg AM  Olmesartan 20 mg AM  Prednisone 10 mg AM  Pravastatin 40 mg PM  Lantus 16 U PM  Vitamin D3- 50 mcg 2000 IU PM    Recommendations:  - Discontinued calcium supplement  - Fall and delirium precautions  - Be cautious with Seroquel due to pt's QT recently increasing to 489    Subjective:   Pt stated he is feeling much better than before   Pt stated he is happy that this happened because now he can remember things he used to not and his family got closer  Pt stated he had a BM early this morning that was not as watery as before  Pt stated he walked around his room last night  Pt is waiting to walk today around the halls with PT  Review of Systems   Constitutional: Negative for appetite change, diaphoresis and fatigue  HENT: Negative for trouble swallowing  Respiratory: Positive for cough (alleviated with mucinex)  Negative for chest tightness and shortness of breath  Cardiovascular: Negative for chest pain and palpitations  Gastrointestinal: Negative for abdominal pain, constipation, diarrhea and nausea  Genitourinary: Positive for frequency  Negative for urgency  Musculoskeletal: Negative for arthralgias  Neurological: Negative for dizziness, light-headedness and headaches  Psychiatric/Behavioral: Positive for confusion (improved)  Negative for behavioral problems and sleep disturbance  The patient is not nervous/anxious  Objective:     Vitals: Blood pressure 136/85, pulse 78, temperature 97 8 °F (36 6 °C), resp  rate 16, SpO2 97 %  ,There is no height or weight on file to calculate BMI  Intake/Output Summary (Last 24 hours) at 3/2/2023 1233  Last data filed at 3/2/2023 0258  Gross per 24 hour   Intake 2420 ml   Output 650 ml   Net 1770 ml       Current Medications: Reviewed    Physical Exam:   Physical Exam  Vitals reviewed  Constitutional:       Appearance: Normal appearance  He is normal weight  HENT:      Nose: Nose normal       Mouth/Throat:      Pharynx: Oropharynx is clear  Eyes:      Conjunctiva/sclera: Conjunctivae normal    Cardiovascular:      Rate and Rhythm: Normal rate  Rhythm irregular  Pulses: Normal pulses  Heart sounds: Normal heart sounds  No murmur heard  No friction rub  Pulmonary:      Effort: Pulmonary effort is normal  No respiratory distress  Breath sounds: Normal breath sounds  No wheezing or rhonchi  Abdominal:      General: Bowel sounds are normal  There is no distension  Palpations: Abdomen is soft  There is no mass  Tenderness: There is no abdominal tenderness  Musculoskeletal:      Cervical back: Neck supple  Right lower leg: No edema  Left lower leg: No edema  Skin:     General: Skin is warm  Neurological:      Mental Status: He is alert and oriented to person, place, and time  Psychiatric:         Mood and Affect: Mood normal          Behavior: Behavior normal          Thought Content: Thought content normal          Judgment: Judgment normal           Invasive Devices     Peripheral Intravenous Line  Duration           Peripheral IV 02/28/23 Distal;Left;Ventral (anterior) Forearm 1 day                Lab, Imaging and other studies: I have personally reviewed pertinent reports  Adrian ELIZABETH transcribed this note and examined the pt with Dr Waldo Pimentel

## 2023-03-02 NOTE — PROGRESS NOTES
[facility]  Progress Note Gilda Room 1940, 82 y o  male MRN: [id number]  Unit/Bed#:  W -01 Encounter: [id number]  Primary Care Provider: Renata Gonzalez MD   Date and time admitted to hospital: 2/28/2023  6:44 PM    * Acute encephalopathy  Assessment & Plan  Baseline mentation-  AOx1, Waxes and wanes, progressively worsening within past week, worst today   Med Review for potential causes/contributors shows   DDx including but not limited to: metabolic abnormality, intracranial etiology, cardiac etiology, substance abuse, infectious etiology including UTI, thyroid disease, hyperammonemia, delirium, dementia, overmedication  Recent TSH normal   Previous hospitalization for AMS and PNA tx with ABX  Ca- 11  CT Head- Neg   CXR demonstrated right lower lobe pneumonia but w/o sx there is low suspicion for infection  Pt assessed as A&O x 3, stable and at baseline mental status    Plan:   UA- Pending  Procal- Pending   PTH- Pending  Serial Mental Status Exam    Vascular dementia with behavior disturbance  Assessment & Plan  Newly diagnosed vascular dementia in 10/22  During recent hospitalization patient was recently started on Seroquel HS  Patient was given Haldol in ED   QTc 488     Plan-  Geriatrics consult, appreciate recommendations  Delirium precautions    Stage 3b chronic kidney disease [facility])  Assessment & Plan  Lab Results   Component Value Date    EGFR 43 03/02/2023    EGFR 33 03/01/2023    EGFR 30 02/28/2023    CREATININE 1 48 (H) 03/02/2023    CREATININE 1 83 (H) 03/01/2023    CREATININE 1 99 (H) 02/28/2023   Cr baseline around 1 7  Likely in the setting of poor po intake, dehydration      Plan-   Hold Olmesartan   IVF NS 125ml    Hypercalcemia  Assessment & Plan  Home medications include Citracal  Likely in the setting of dehydration      Plan-   PTH- Pending   Monitor CMP     Esophageal stricture  Assessment & Plan  10/22 EGD demonstrated possible mild strict[facility] [address] [place] [address] [facility] status post [facility]  Family reports patient has not been having difficulty swallowing, denies coughing after foods  Patient is ultimately just refusing p o  intake     Plan-  Speech and swallow evaluation    Polymyalgia rheumatica ([address] )  Assessment & Plan  Home medications include prednisone 4 Mg QD and Plaquenil     Plan-  Hold prednisone due to acute encephalopathy  Continue Plaquenil when patients mentation improves    Atrial fibrillation (HCC)  Assessment & Plan  Home medications include Eliquis 2 5mg BID   HR controlled 95     Plan-   Continue home regime     Hypertension  Assessment & Plan  /95   Home medications include Lasix and Olmesartan     Plan-   Hold Olmesartan for now in the setting of elevated Cr  Continue Lasix QD     Diabetes mellitus [facility])  Assessment & Plan  Lab Results   Component Value Date    HGBA1C 6 9 (H) 02/15/2023     Recent Labs     03/01/23  1604 03/01/23  2134 03/02/23  0743 03/02/23  1122   POCGLU 124 135 102 125     Blood Sugar Average: Last 72 hrs:  (P) 135 5   Home medications used to include Lantus 16 U HS, however during last hospitalization thsi was discontinued due to hypoglycemia      Plan-   Insulin Sliding Scale  Hypoglycemia Protocol       VTE Pharmacologic Prophylaxis: VTE Score: 12 High Risk (Score >/= 5) - Pharmacological DVT Prophylaxis Ordered: apixaban (Eliquis)  Sequential Compression Devices Ordered  Patient Centered Rounds: I performed bedside rounds with nursing staff today  Discussions with Specialists or Other Care Team Provider: Gerontology    Education and Discussions with Family / Patient: Updated  (wife) via phone  Current Length of Stay: 1 day(s)  Current Patient Status: Inpatient   Discharge Plan: Anticipate discharge tomorrow to rehab facility  Code Status: Level 1 - Full Code    Subjective:   Pt was seen and evaluated at bedside  Pt was dressed with his glasses on and was able to interact and communicate properly  Pt has memory of yesterday  Pt is eating well and has regained appetite  Pt was seen by speech and was approved for regular diet and PO intake  Geriatrics also saw the pt and recommended discontinuing calcium supplements, fall and delirium precautions, continuation of olmesartan and a cardiology consultation d/t a-fib on EKG and seroquel with increased QT at 489  Pt is agreeable with discharge according to the plan that his family decides  Objective:     Vitals:   Temp (24hrs), Av 6 °F (36 4 °C), Min:97 2 °F (36 2 °C), Max:97 8 °F (36 6 °C)    Temp:  [97 2 °F (36 2 °C)-97 8 °F (36 6 °C)] 97 2 °F (36 2 °C)  HR:  [68-80] 77  Resp:  [16-19] 17  BP: (117-140)/(70-85) 137/81  SpO2:  [97 %-98 %] 98 %  There is no height or weight on file to calculate BMI  Input and Output Summary (last 24 hours): Intake/Output Summary (Last 24 hours) at 3/2/2023 1516  Last data filed at 3/2/2023 0258  Gross per 24 hour   Intake 2180 ml   Output 650 ml   Net 1530 ml       Physical Exam:   Physical Exam  Constitutional:       General: He is not in acute distress  Appearance: Normal appearance  He is normal weight  He is not ill-appearing  HENT:      Head: Atraumatic  Nose: Nose normal       Mouth/Throat:      Mouth: Mucous membranes are dry  Pharynx: Oropharynx is clear  Eyes:      Conjunctiva/sclera: Conjunctivae normal    Cardiovascular:      Rate and Rhythm: Normal rate and regular rhythm  Pulses: Normal pulses  Heart sounds: Normal heart sounds  No murmur heard  No friction rub  Pulmonary:      Effort: Pulmonary effort is normal  No respiratory distress  Breath sounds: Normal breath sounds  No wheezing or rhonchi  Abdominal:      General: Bowel sounds are normal  There is no distension  Palpations: Abdomen is soft  There is no mass  Tenderness: There is no abdominal tenderness  Musculoskeletal:      Cervical back: Neck supple  Right lower leg: No edema        Left lower leg: No edema  Skin:     General: Skin is warm  Findings: Bruising present  Neurological:      General: No focal deficit present  Mental Status: He is alert and oriented to person, place, and time  Mental status is at baseline  Psychiatric:         Mood and Affect: Mood normal          Behavior: Behavior normal          Thought Content:  Thought content normal          Judgment: Judgment normal           Additional Data:     Labs:  Results from last 7 days   Lab Units 03/02/23  0544   WBC Thousand/uL 5 72   HEMOGLOBIN g/dL 11 2*   HEMATOCRIT % 35 2*   PLATELETS Thousands/uL 102*   NEUTROS PCT % 68   LYMPHS PCT % 17   MONOS PCT % 10   EOS PCT % 4     Results from last 7 days   Lab Units 03/02/23  0544   SODIUM mmol/L 138   POTASSIUM mmol/L 4 0   CHLORIDE mmol/L 108   CO2 mmol/L 25   BUN mg/dL 27*   CREATININE mg/dL 1 48*   ANION GAP mmol/L 5   CALCIUM mg/dL 8 8   ALBUMIN g/dL 2 8*   TOTAL BILIRUBIN mg/dL 0 78   ALK PHOS U/L 93   ALT U/L 15   AST U/L 17   GLUCOSE RANDOM mg/dL 119         Results from last 7 days   Lab Units 03/02/23  1122 03/02/23  0743 03/01/23  2134 03/01/23  1604 03/01/23  1208 03/01/23  0808 03/01/23  0249 02/28/23  1906 02/27/23  1616 02/27/23  1114 02/27/23  0739 02/26/23  2037   POC GLUCOSE mg/dl 125 102 135 124 192* 109 112 185* 168* 213* 69 107         Results from last 7 days   Lab Units 03/01/23  0537 02/27/23  0512 02/26/23  1356   PROCALCITONIN ng/ml 0 06 <0 05 <0 05       Lines/Drains:  Invasive Devices       Peripheral Intravenous Line  Duration             Peripheral IV 02/28/23 Distal;Left;Ventral (anterior) Forearm 1 day                  Imaging: Reviewed radiology reports from this admission including: chest xray and CT head    Recent Cultures (last 7 days):   Results from last 7 days   Lab Units 02/26/23  1358   LEGIONELLA URINARY ANTIGEN  Negative       Last 24 Hours Medication List:   Current Facility-Administered Medications   Medication Dose Route Frequency Provider Last Rate    acetaminophen  650 mg Oral Q6H PRN Saroj Marmolejo MD      apixaban  2 5 mg Oral BID Skip Benítez MD      cefTRIAXone  1,000 mg Intravenous Q24H Saroj Marmolejo MD 1,000 mg (03/02/23 0032)    dextromethorphan-guaiFENesin  5 mL Oral Q12H Gia Reese MD      Fluticasone Furoate-Vilanterol  1 puff Inhalation Daily Skip Benítez MD      And    umeclidinium  1 puff Inhalation Daily Skip Benítez MD      furosemide  20 mg Oral Daily Alfie Saini MD      hydroxychloroquine  200 mg Oral BID With Meals Skip Benítez MD      insulin lispro  1-5 Units Subcutaneous HS Almita Francois MD      insulin lispro  1-6 Units Subcutaneous TID AC Saroj Marmolejo MD      ondansetron  4 mg Intravenous Q6H PRN Skip Benítez MD      polyethylene glycol  17 g Oral Daily PRN Saroj Marmolejo MD      polyethylene glycol  17 g Oral Daily Skip Benítez MD      pravastatin  40 mg Oral Daily Skip Benítez MD      predniSONE  10 mg Oral Daily Alfie Saini MD      QUEtiapine  25 mg Oral HS Skip Benítez MD      senna  1 tablet Oral Daily Skip Benítez MD          Today, Patient Was Seen By: Lisa Booth DO    **Please Note: This note may have been constructed using a voice recognition system  **

## 2023-03-02 NOTE — ASSESSMENT & PLAN NOTE
Lab Results   Component Value Date    EGFR 43 03/02/2023    EGFR 33 03/01/2023    EGFR 30 02/28/2023    CREATININE 1 48 (H) 03/02/2023    CREATININE 1 83 (H) 03/01/2023    CREATININE 1 99 (H) 02/28/2023   Cr baseline around 1 7  Likely in the setting of poor po intake, dehydration      Plan-   Hold Olmesartan   IVF NS 125ml

## 2023-03-02 NOTE — PLAN OF CARE
Problem: MOBILITY - ADULT  Goal: Maintain or return to baseline ADL function  Description: INTERVENTIONS:  -  Assess patient's ability to carry out ADLs; assess patient's baseline for ADL function and identify physical deficits which impact ability to perform ADLs (bathing, care of mouth/teeth, toileting, grooming, dressing, etc )  - Assess/evaluate cause of self-care deficits   - Assess range of motion  - Assess patient's mobility; develop plan if impaired  - Assess patient's need for assistive devices and provide as appropriate  - Encourage maximum independence but intervene and supervise when necessary  - Involve family in performance of ADLs  - Assess for home care needs following discharge   - Consider OT consult to assist with ADL evaluation and planning for discharge  - Provide patient education as appropriate  Outcome: Progressing  Goal: Maintains/Returns to pre admission functional level  Description: INTERVENTIONS:  - Perform BMAT or MOVE assessment daily    - Set and communicate daily mobility goal to care team and patient/family/caregiver  - Collaborate with rehabilitation services on mobility goals if consulted  - Perform Range of Motion 3 times a day  - Reposition patient every 3 hours    - Dangle patient 3 times a day  - Stand patient 3 times a day  - Ambulate patient 6 times a day  - Out of bed to chair 6 times a day   - Out of bed for meals 6 times a day  - Out of bed for toileting  - Record patient progress and toleration of activity level   Outcome: Progressing     Problem: PAIN - ADULT  Goal: Verbalizes/displays adequate comfort level or baseline comfort level  Description: Interventions:  - Encourage patient to monitor pain and request assistance  - Assess pain using appropriate pain scale  - Administer analgesics based on type and severity of pain and evaluate response  - Implement non-pharmacological measures as appropriate and evaluate response  - Consider cultural and social influences on pain and pain management  - Notify physician/advanced practitioner if interventions unsuccessful or patient reports new pain  Outcome: Progressing       Problem: SAFETY ADULT  Goal: Maintain or return to baseline ADL function  Description: INTERVENTIONS:  -  Assess patient's ability to carry out ADLs; assess patient's baseline for ADL function and identify physical deficits which impact ability to perform ADLs (bathing, care of mouth/teeth, toileting, grooming, dressing, etc )  - Assess/evaluate cause of self-care deficits   - Assess range of motion  - Assess patient's mobility; develop plan if impaired  - Assess patient's need for assistive devices and provide as appropriate  - Encourage maximum independence but intervene and supervise when necessary  - Involve family in performance of ADLs  - Assess for home care needs following discharge   - Consider OT consult to assist with ADL evaluation and planning for discharge  - Provide patient education as appropriate  Outcome: Progressing  Goal: Maintains/Returns to pre admission functional level  Description: INTERVENTIONS:  - Perform BMAT or MOVE assessment daily    - Set and communicate daily mobility goal to care team and patient/family/caregiver  - Collaborate with rehabilitation services on mobility goals if consulted  - Perform Range of Motion 3 times a day  - Reposition patient every 3 hours    - Dangle patient 3 times a day  - Stand patient 3 times a day  - Ambulate patient 6 times a day  - Out of bed to chair 6 times a day   - Out of bed for meals 6 times a day  - Out of bed for toileting  - Record patient progress and toleration of activity level   Outcome: Progressing  Goal: Patient will remain free of falls  Description: INTERVENTIONS:  - Educate patient/family on patient safety including physical limitations  - Instruct patient to call for assistance with activity   - Consult OT/PT to assist with strengthening/mobility   - Keep Call bell within reach  - Keep bed low and locked with side rails adjusted as appropriate  - Keep care items and personal belongings within reach  - Initiate and maintain comfort rounds  - Make Fall Risk Sign visible to staff  - Offer Toileting every  Hours, in advance of need  - Initiate/Maintain alarm  - Obtain necessary fall risk management equipment:  - Apply yellow socks and bracelet for high fall risk patients  - Consider moving patient to room near nurses station  Outcome: Progressing

## 2023-03-02 NOTE — UTILIZATION REVIEW
Initial Inpatient Review        OBSERVATION  2/28/23 @2328 CONVERTED TO INPATIENT ADMISSION 3/1/23 @1657 DUE TO CONTINUED STAY REQUIRED TO EVALUATE AND TREAT PATIENT WITH ACUTE ENCEPHALOPATHY, VASCULAR DEMENTIA W/BEHAVIOR DISTURBANCE, HYPERCALCEMIA, ELEVATED CREAT  Recent PNA with GGO R LUNG BASE on ADMISSION CXR  IV ABX   IVF      Admission Orders (From admission, onward)     Ordered        03/01/23 1657  Inpatient Admission  Once            02/28/23 2328  Place in Observation  Once                      Orders Placed This Encounter   Procedures   • Inpatient Admission     Standing Status:   Standing     Number of Occurrences:   1     Order Specific Question:   Level of Care     Answer:   Med Surg [16]     Order Specific Question:   Estimated length of stay     Answer:   More than 2 Midnights     Order Specific Question:   Certification     Answer:   I certify that inpatient services are medically necessary for this patient for a duration of greater than two midnights  See H&P and MD Progress Notes for additional information about the patient's course of treatment  HPI:83 y o  male initially admitted on 2/223 as OBS converted to IP 3/1/23  Assessment/Plan:  date: 3/2/23  Day 2   Pt has been off restraints x 24 hrs  Calcium normalized, creat down to 1 48 with baseline around 1 7  IVF d/c'ed this am  IV ceftriaxone continues   Per nsg, pt oriented x 4, alert, follows commands  Is forgetful  Lungs diminished, on RA with sat 97 %    GCS 14    CBC, CMP in am     PT/OT recommend post acute rehab(return to rehab )    Vital Signs:   Date/Time Temp Pulse Resp BP MAP (mmHg) SpO2   03/02/23 0745 97 8 °F (36 6 °C) 78 16 136/85 102 97 %   03/01/23 21:35:25 97 7 °F (36 5 °C) 80 19 140/82 101 98 %   03/01/23 15:50:35 97 6 °F (36 4 °C) 68 18 117/70 86 98 %   03/01/23 15:49:14 97 6 °F (36 4 °C) 68 17 -- -- 98 %   03/01/23 1057 -- -- -- -- -- 99 %       Pertinent Labs/Diagnostic Results:   Results from last 7 days   Lab Units 03/01/23  0810 02/26/23  0034   SARS-COV-2  Negative Negative     Results from last 7 days   Lab Units 03/01/23  0537 02/28/23 1914 02/27/23  0512 02/26/23  0512 02/26/23  0026   WBC Thousand/uL 6 85 10 12 7 10 9 24 10 60*   HEMOGLOBIN g/dL 13 0 13 6 12 4 13 2 13 6   HEMATOCRIT % 41 0 43 8 38 8 41 2 42 0   PLATELETS Thousands/uL 129* 148* 134* 141* 186   NEUTROS ABS Thousands/µL 4 87 7 83*  --  6 84 8 51*         Results from last 7 days   Lab Units 03/02/23  0544 03/01/23  0537 02/28/23 1914 02/27/23  0512 02/26/23  0512   SODIUM mmol/L 138 138 137 139 140   POTASSIUM mmol/L 4 0 4 4 4 6 4 1 4 9   CHLORIDE mmol/L 108 103 100 105 105   CO2 mmol/L 25 30 29 30 29   ANION GAP mmol/L 5 5 8 4 6   BUN mg/dL 27* 28* 33* 28* 38*   CREATININE mg/dL 1 48* 1 83* 1 99* 1 68* 1 87*   EGFR ml/min/1 73sq m 43 33 30 37 32   CALCIUM mg/dL 8 8 9 8 11 0* 9 7 9 9   MAGNESIUM mg/dL  --  2 2  --   --   --      Results from last 7 days   Lab Units 03/02/23  0544 03/01/23  0537 02/28/23 1914 02/26/23  0026   AST U/L 17 21 26 25   ALT U/L 15 18 22 34   ALK PHOS U/L 93 105* 128* 125*   TOTAL PROTEIN g/dL 4 7* 5 7* 6 4 6 0*   ALBUMIN g/dL 2 8* 3 3* 3 9 3 5   TOTAL BILIRUBIN mg/dL 0 78 1 20* 1 43* 1 30*   BILIRUBIN DIRECT mg/dL  --  0 29*  --   --    AMMONIA umol/L  --   --   --  <10*     Results from last 7 days   Lab Units 03/01/23  2134 03/01/23  1604 03/01/23  1208 03/01/23  0808 03/01/23  0249 02/28/23  1906 02/27/23  1616 02/27/23  1114 02/27/23  0739 02/26/23  2037 02/26/23  1617 02/26/23  1055   POC GLUCOSE mg/dl 135 124 192* 109 112 185* 168* 213* 69 107 181* 147*     Results from last 7 days   Lab Units 03/02/23  0544 03/01/23  0537 02/28/23  1914 02/27/23  0512 02/26/23  0512 02/26/23  0026   GLUCOSE RANDOM mg/dL 119 106 188* 100 60* 105             BETA-HYDROXYBUTYRATE   Date Value Ref Range Status   07/02/2022 0 2 <0 6 mmol/L Final                  Results from last 7 days   Lab Units 03/01/23  0537   CK TOTAL U/L 56 Results from last 7 days   Lab Units 02/26/23  0512 02/26/23  0238 02/26/23  0026   HS TNI 0HR ng/L  --   --  33   HS TNI 2HR ng/L  --  35  --    HSTNI D2 ng/L  --  2  --    HS TNI 4HR ng/L 39  --   --    HSTNI D4 ng/L 6  --   --              Results from last 7 days   Lab Units 02/26/23  0026   TSH 3RD GENERATON uIU/mL 1 001     Results from last 7 days   Lab Units 03/01/23  0537 02/27/23  0512 02/26/23  1356   PROCALCITONIN ng/ml 0 06 <0 05 <0 05                                 Results from last 7 days   Lab Units 03/01/23  0537   SED RATE mm/hour 6             Results from last 7 days   Lab Units 03/01/23  1450   CLARITY UA  Clear   COLOR UA  Light Yellow   SPEC GRAV UA  1 013   PH UA  5 5   GLUCOSE UA mg/dl 200 (1/5%)*   KETONES UA mg/dl Negative   BLOOD UA  Negative   PROTEIN UA mg/dl Trace*   NITRITE UA  Negative   BILIRUBIN UA  Negative   UROBILINOGEN UA (BE) mg/dl <2 0   LEUKOCYTES UA  Negative   WBC UA /hpf 1-2   RBC UA /hpf None Seen   BACTERIA UA /hpf None Seen   EPITHELIAL CELLS WET PREP /hpf Occasional     Results from last 7 days   Lab Units 03/01/23  0810 02/26/23  1358 02/26/23  0034   STREP PNEUMONIAE ANTIGEN, URINE   --  Negative  --    LEGIONELLA URINARY ANTIGEN   --  Negative  --    INFLUENZA A PCR  Negative  --  Negative   INFLUENZA B PCR  Negative  --  Negative   RSV PCR  Negative  --  Negative             Results from last 7 days   Lab Units 02/26/23  0026   ETHANOL LVL mg/dL <3   ACETAMINOPHEN LVL ug/mL <4 9*   SALICYLATE LVL mg/dL <3*           Medications:   Scheduled Medications:  apixaban, 2 5 mg, Oral, BID  cefTRIAXone, 1,000 mg, Intravenous, Q24H  Fluticasone Furoate-Vilanterol, 1 puff, Inhalation, Daily   And  umeclidinium, 1 puff, Inhalation, Daily  hydroxychloroquine, 200 mg, Oral, BID With Meals  insulin lispro, 1-5 Units, Subcutaneous, HS  insulin lispro, 1-6 Units, Subcutaneous, TID AC  polyethylene glycol, 17 g, Oral, Daily  pravastatin, 40 mg, Oral, Daily  QUEtiapine, 25 mg, Oral, HS  senna, 1 tablet, Oral, Daily      Continuous IV Infusions:  sodium chloride, 125 mL/hr, Intravenous, Continuous End: 03/02/23 0929      PRN Meds:  acetaminophen, 650 mg, Oral, Q6H PRN  ondansetron, 4 mg, Intravenous, Q6H PRN  polyethylene glycol, 17 g, Oral, Daily PRN        Discharge Plan: D    Network Utilization Review Department  ATTENTION: Please call with any questions or concerns to [phone] and carefully listen to the prompts so that you are directed to the right person  All voicemails are confidential   Estelle Gutierrez all requests for admission clinical reviews, approved or denied determinations and any other requests to dedicated fax number below belonging to the campus where the patient is receiving treatment   List of dedicated fax numbers for the Facilities:  [address] DENIALS (Administrative/Medical Necessity) [phone]   [address] (Maternity/NICU/Pediatrics) [phone]   [address] [phone]   [facility] [phone]   [address] [address] [address] [address] [address] [phone]   [address] [place] [address] [facility] [phone]

## 2023-03-02 NOTE — CASE MANAGEMENT
Case Management Assessment & Discharge Planning Note    Patient name Yulia Hemphill  Location W /W -71 MRN [id number]  : 1940 Date 3/2/2023       Current Admission Date: 2023  Current Admission Diagnosis:Acute encephalopathy   Patient Active Problem List    Diagnosis Date Noted   • Hypercalcemia 2023   • Stage 3b chronic kidney disease ([facility] ) 2023   • Vascular dementia with behavior disturbance 2023   • Esophageal stricture 2023   • RSV (acute bronchiolitis due to respiratory syncytial virus) 2023   • Acute encephalopathy 2023   • Generalized weakness 2023   • Malignant neoplasm of tail of pancreas ([facility] ) 2022   • COPD (chronic obstructive pulmonary disease) ([facility] ) 2022   • Left lower lobe pneumonia 2022   • CKD (chronic kidney disease) 2022   • Centrilobular emphysema ([facility] ) 2021   • History of malignant neuroendocrine tumor 2021   • Chronic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 ([facility] ) 2018   • Atrial fibrillation [facility] ) 2017   • Diabetes mellitus ([facility] ) 2017   • Hypertension 2017   • Acute-on-chronic kidney injury ([facility] ) 2017   • Hyperlipidemia 2015   • Inflammatory polyarthropathy ([facility] ) 2014   • Osteoarthrosis 2014   • Polymyalgia rheumatica ([facility] ) 2014   • Aneurysm of thoracic aorta 2014   • Aneurysm of abdominal aorta 2014   • Neoplasm of other specified site 2014      LOS (days): 1  Geometric Mean LOS (GMLOS) (days): 3 40  Days to GMLOS:2 5     OBJECTIVE:  PATIENT READMITTED TO HOSPITAL  Risk of Unplanned Readmission Score: 37 89         Current admission status: Inpatient       [facility]erred Pharm[facility]:   [id number] [address]  [place] [id number]  Phone: [phone] Fax: [phone] [address], [address] [place] [address]  Phone: [phone] Fax: [phone]    Primary Care Provider: Duke Bro MD    Primary Insurance: [employer] [facility]  Secondary Insurance:     ASSESSMENT:  Shayaura, Francesco [address] Representative - Spouse   Primary Phone: [phone] (Mobile)  Home Phone: [phone]               Readmission Root Cause  30 Day Readmission: Yes  Who directed you to return to the hospital?: Other (comment) (facility)  Did you understand whom to contact if you had questions or problems?: Yes  Did you get your prescriptions before you left the hospital?: Yes (sent to facility)  Were you able to get your prescriptions filled when you left the hospital?: Yes  Did you take your medications as prescribed?: Yes  Were you able to get to your follow-up appointments?:  (N/A)  During previous admission, was a post-acute recommendation made?: Yes  What post-acute resources were offered?: STR  Patient was readmitted due to: Acute encephalopathy  Action Plan: d/c to STR    Patient Information  Admitted from[de-identified] Facility  Mental Status: Alert  During Assessment patient was accompanied by: Spouse, Son  Assessment information provided by[de-identified] Patient, Spouse  Primary Caregiver: Self  Support Systems: Family members, Spouse/significant other  [place] of Residence: [place] [address] do you live in?:  [address] entry access options   Select all that apply : Stairs  Number of steps to enter home : 2  Do the steps have railings?: Yes  Type of Current Residence: [address] home  Upon entering residence, is there a bedroom on the main floor (no further steps)?: Yes  Upon entering residence, is there a bathroom on the main floor (no further steps)?: Yes  In the last 12 months, was there a time when you were not able to pay the mortgage or rent on time?: No  In the last 12 months, how many places have you lived?: 1  In the last 12 months, was there a time when you did not have a steady place to sleep or slept in a shelter (including now)?: No  Homeless/housing insecurity resource given?: N/A  Living Arrangements: Lives w/ Spouse/significant other    Activities of Daily Living Prior to Admission  Functional Status: Independent  Completes ADLs independently?: Yes  Level of ADL dependence: Assistance  Ambulates independently?: Yes  Does patient use assisted devices?: Yes  Assisted Devices (DME) used: [employer]  Does patient currently own DME?: Yes  What DME does the patient currently own?: [employer]  Does patient have a history of Outpatient Therapy (PT/OT)?: No  Does the patient have a history of Short-Term Rehab?: Yes  Does patient have a history of HHC?: No  Does patient currently have [address]?: No         Patient Information Continued  Income Source: Pension/FCI  Does patient have prescription coverage?: Yes  Within the past 12 months, you worried that your food would run out before you got the money to buy more : Never true  Within the past 12 months, the food you bought just didn't last and you didn't have money to get more : Never true  Food insecurity resource given?: N/A    PHQ 2/9 Screening   Reviewed PHQ 2/9 Depression Screening Score?: No    Means of Transportation  Means of Transport to Appts[de-identified] Family transport  In the past 12 months, has lack of transportation kept you from medical appointments or from getting medications?: No  In the past 12 months, has lack of transportation kept you from meetings, work, or from getting things needed for daily living?: No  Was application for public transport provided?: N/A        DISCHARGE DETAILS:    Discharge planning discussed with[de-identified] patient and family at bedside  Freedom of Choice: Yes  Comments - Freedom of Choice: re: STR  CM contacted family/caregiver?: Yes  Were Treatment Team discharge recommendations reviewed with patient/caregiver?: Yes  Did patient/caregiver verbalize understanding of patient care needs?: N/A- going to facility  Were patient/caregiver advised of the risks associated with not following Treatment Team discharge recommendations?: Yes    Contacts  Patient Contacts: [facility] and children  Relationship to Patient[de-identified] Family  Contact Method: In Person  Reason/Outcome: Discharge Planning, Referral    Requested [address]         Is the patient interested in [place] [address] at discharge?: No    DME Referral Provided  Referral made for DME?: No    Other Referral/Resources/Interventions Provided:  Interventions: Short Term Rehab  Referral Comments: CM met with patient and family at bedside to discuss dispo planning  Patient was admitted from [address] [place] [address] rehab, was only there over night  Per patient and family they did not like the care patient got there  Patient lives with his wife in a 2 story home, steps to enter with railings and can reside on the first floor if needed  Patient owns and uses a walker and cane  Per patient and family patient would like to go to [address] upon d/c- CM spoke with facility and they are able to accept patient- CM provided all needed information/documentation to facility and reserved in [address]  Request made for insurance auth to be initiated  CM to follow up as able to continue with dispo planning      Would you like to participate in our [address] service program?  : No - Declined    Treatment Team Recommendation: Short Term Rehab  Discharge Destination Plan[de-identified] Short Term Rehab  Transport at Discharge : Wheelchair Leeann chamberlain

## 2023-03-02 NOTE — PLAN OF CARE
Problem: SAFETY,RESTRAINT: NV/NON-SELF DESTRUCTIVE BEHAVIOR  Goal: Remains free of harm/injury (restraint for non violent/non self-detsructive behavior)  Description: INTERVENTIONS:  - Instruct patient/family regarding restraint use   - Assess and monitor physiologic and psychological status   - Provide interventions and comfort measures to meet assessed patient needs   - Identify and implement measures to help patient regain control  - Assess readiness for release of restraint   Outcome: Progressing  Goal: Returns to optimal restraint-free functioning  Description: INTERVENTIONS:  - Assess the patient's behavior and symptoms that indicate continued need for restraint  - Identify and implement measures to help patient regain control  - Assess readiness for release of restraint   Outcome: Progressing     Problem: Prexisting or High Potential for Compromised Skin Integrity  Goal: Skin integrity is maintained or improved  Description: INTERVENTIONS:  - Identify patients at risk for skin breakdown  - Assess and monitor skin integrity  - Assess and monitor nutrition and hydration status  - Monitor labs   - Assess for incontinence   - Turn and reposition patient  - Assist with mobility/ambulation  - Relieve pressure over bony prominences  - Avoid friction and shearing  - Provide appropriate hygiene as needed including keeping skin clean and dry  - Evaluate need for skin moisturizer/barrier cream  - Collaborate with interdisciplinary team   - Patient/family teaching  - Consider wound care consult   Outcome: Progressing     Problem: MOBILITY - ADULT  Goal: Maintain or return to baseline ADL function  Description: INTERVENTIONS:  -  Assess patient's ability to carry out ADLs; assess patient's baseline for ADL function and identify physical deficits which impact ability to perform ADLs (bathing, care of mouth/teeth, toileting, grooming, dressing, etc )  - Assess/evaluate cause of self-care deficits   - Assess range of motion  - Assess patient's mobility; develop plan if impaired  - Assess patient's need for assistive devices and provide as appropriate  - Encourage maximum independence but intervene and supervise when necessary  - Involve family in performance of ADLs  - Assess for home care needs following discharge   - Consider OT consult to assist with ADL evaluation and planning for discharge  - Provide patient education as appropriate  Outcome: Progressing  Goal: Maintains/Returns to pre admission functional level  Description: INTERVENTIONS:  - Perform BMAT or MOVE assessment daily    - Set and communicate daily mobility goal to care team and patient/family/caregiver  - Collaborate with rehabilitation services on mobility goals if consulted  - Perform Range of Motion  times a day  - Reposition patient every  hours    - Dangle patient  times a day  - Stand patient  times a day  - Ambulate patient  times a day  - Out of bed to chair  times a day   - Out of bed for meals  times a day  - Out of bed for toileting  - Record patient progress and toleration of activity level   Outcome: Progressing     Problem: PAIN - ADULT  Goal: Verbalizes/displays adequate comfort level or baseline comfort level  Description: Interventions:  - Encourage patient to monitor pain and request assistance  - Assess pain using appropriate pain scale  - Administer analgesics based on type and severity of pain and evaluate response  - Implement non-pharmacological measures as appropriate and evaluate response  - Consider cultural and social influences on pain and pain management  - Notify physician/advanced practitioner if interventions unsuccessful or patient reports new pain  Outcome: Progressing     Problem: INFECTION - ADULT  Goal: Absence or prevention of progression during hospitalization  Description: INTERVENTIONS:  - Assess and monitor for signs and symptoms of infection  - Monitor lab/diagnostic results  - Monitor all insertion sites, i e  indwelling lines, tubes, and drains  - Monitor endotracheal if appropriate and nasal secretions for changes in amount and color  - [place] appropriate cooling/warming therapies per order  - Administer medications as ordered  - Instruct and encourage patient and family to use good hand hygiene technique  - Identify and instruct in appropriate isolation precautions for identified infection/condition  Outcome: Progressing  Goal: Absence of fever/infection during neutropenic period  Description: INTERVENTIONS:  - Monitor WBC    Outcome: Progressing     Problem: SAFETY ADULT  Goal: Maintain or return to baseline ADL function  Description: INTERVENTIONS:  -  Assess patient's ability to carry out ADLs; assess patient's baseline for ADL function and identify physical deficits which impact ability to perform ADLs (bathing, care of mouth/teeth, toileting, grooming, dressing, etc )  - Assess/evaluate cause of self-care deficits   - Assess range of motion  - Assess patient's mobility; develop plan if impaired  - Assess patient's need for assistive devices and provide as appropriate  - Encourage maximum independence but intervene and supervise when necessary  - Involve family in performance of ADLs  - Assess for home care needs following discharge   - Consider OT consult to assist with ADL evaluation and planning for discharge  - Provide patient education as appropriate  Outcome: Progressing  Goal: Maintains/Returns to pre admission functional level  Description: INTERVENTIONS:  - Perform BMAT or MOVE assessment daily    - Set and communicate daily mobility goal to care team and patient/family/caregiver  - Collaborate with rehabilitation services on mobility goals if consulted  - Perform Range of Motion  times a day  - Reposition patient every  hours    - Dangle patient  times a day  - Stand patient  times a day  - Ambulate patient  times a day  - Out of bed to chair  times a day   - Out of bed for meals  times a day  - Out of bed for toileting  - Record patient progress and toleration of activity level   Outcome: Progressing  Goal: Patient will remain free of falls  Description: INTERVENTIONS:  - Educate patient/family on patient safety including physical limitations  - Instruct patient to call for assistance with activity   - Consult OT/PT to assist with strengthening/mobility   - Keep Call bell within reach  - Keep bed low and locked with side rails adjusted as appropriate  - Keep care items and personal belongings within reach  - Initiate and maintain comfort rounds  - Make Fall Risk Sign visible to staff  - Offer Toileting every  Hours, in advance of need  - Initiate/Maintain alarm  - Obtain necessary fall risk management equipment:   - Apply yellow socks and bracelet for high fall risk patients  - Consider moving patient to room near nurses station  Outcome: Progressing     Problem: DISCHARGE PLANNING  Goal: Discharge to home or other facility with appropriate resources  Description: INTERVENTIONS:  - Identify barriers to discharge w/patient and caregiver  - Arrange for needed discharge resources and transportation as appropriate  - Identify discharge learning needs (meds, wound care, etc )  - Arrange for interpretive services to assist at discharge as needed  - Refer to Case Management Department for coordinating discharge planning if the patient needs post-hospital services based on physician/advanced practitioner order or complex needs related to functional status, cognitive ability, or social support system  Outcome: Progressing     Problem: Knowledge Deficit  Goal: Patient/family/caregiver demonstrates understanding of disease process, treatment plan, medications, and discharge instructions  Description: Complete learning assessment and assess knowledge base    Interventions:  - Provide teaching at level of understanding  - Provide teaching via preferred learning methods  Outcome: Progressing     Problem: Nutrition/Hydration-ADULT  Goal: Nutrient/Hydration intake appropriate for improving, restoring or maintaining nutritional needs  Description: Monitor and assess patient's nutrition/hydration status for malnutrition  Collaborate with interdisciplinary team and initiate plan and interventions as ordered  Monitor patient's weight and dietary intake as ordered or per policy  Utilize nutrition screening tool and intervene as necessary  Determine patient's food preferences and provide high-protein, high-caloric foods as appropriate       INTERVENTIONS:  - Monitor oral intake, urinary output, labs, and treatment plans  - Assess nutrition and hydration status and recommend course of action  - Evaluate amount of meals eaten  - Assist patient with eating if necessary   - Allow adequate time for meals  - Recommend/ encourage appropriate diets, oral nutritional supplements, and vitamin/mineral supplements  - Order, calculate, and assess calorie counts as needed  - Recommend, monitor, and adjust tube feedings and TPN/PPN based on assessed needs  - Assess need for intravenous fluids  - Provide specific nutrition/hydration education as appropriate  - Include patient/family/caregiver in decisions related to nutrition  Outcome: Progressing

## 2023-03-02 NOTE — CASE MANAGEMENT
[facility] received request for authorization from Care Manager    Authorization request for: SNF  Facility Name: [facility]   NPI: [id number]  Facility MD:   Shantel Samano  NPI: [id number]  Authorization initiated by contacting insurance: [facility]  Via: Portal  Pending Reference #:[id number]  Clinicals submitted via: Maximino Tavares

## 2023-03-02 NOTE — ASSESSMENT & PLAN NOTE
Lab Results   Component Value Date    HGBA1C 6 9 (H) 02/15/2023     Recent Labs     03/01/23  1604 03/01/23  2134 03/02/23  0743 03/02/23  1122   POCGLU 124 135 102 125     Blood Sugar Average: Last 72 hrs:  (P) 135 5   Home medications used to include Lantus 16 U HS, however during last hospitalization thsi was discontinued due to hypoglycemia      Plan-   Insulin Sliding Scale  Hypoglycemia Protocol

## 2023-03-03 ENCOUNTER — HOME HEALTH ADMISSION (OUTPATIENT)
Dept: HOME HEALTH SERVICES | Facility: HOME HEALTHCARE | Age: 83
End: 2023-03-03

## 2023-03-03 VITALS
RESPIRATION RATE: 18 BRPM | TEMPERATURE: 97.3 F | HEART RATE: 68 BPM | BODY MASS INDEX: 22.4 KG/M2 | SYSTOLIC BLOOD PRESSURE: 157 MMHG | DIASTOLIC BLOOD PRESSURE: 84 MMHG | OXYGEN SATURATION: 98 % | WEIGHT: 179.2 LBS

## 2023-03-03 PROBLEM — E86.0 DEHYDRATION: Status: ACTIVE | Noted: 2023-03-03

## 2023-03-03 LAB
ALBUMIN SERPL BCP-MCNC: 3.1 G/DL (ref 3.5–5)
ALP SERPL-CCNC: 97 U/L (ref 34–104)
ALT SERPL W P-5'-P-CCNC: 19 U/L (ref 7–52)
ANION GAP SERPL CALCULATED.3IONS-SCNC: 3 MMOL/L (ref 4–13)
AST SERPL W P-5'-P-CCNC: 18 U/L (ref 13–39)
ATRIAL RATE: 90 BPM
BASOPHILS # BLD AUTO: 0.01 THOUSANDS/ÂΜL (ref 0–0.1)
BASOPHILS NFR BLD AUTO: 0 % (ref 0–1)
BILIRUB SERPL-MCNC: 0.79 MG/DL (ref 0.2–1)
BUN SERPL-MCNC: 30 MG/DL (ref 5–25)
CALCIUM ALBUM COR SERPL-MCNC: 10 MG/DL (ref 8.3–10.1)
CALCIUM SERPL-MCNC: 9.3 MG/DL (ref 8.4–10.2)
CHLORIDE SERPL-SCNC: 106 MMOL/L (ref 96–108)
CO2 SERPL-SCNC: 28 MMOL/L (ref 21–32)
CREAT SERPL-MCNC: 1.66 MG/DL (ref 0.6–1.3)
DME PARACHUTE DELIVERY DATE ACTUAL: NORMAL
DME PARACHUTE DELIVERY DATE REQUESTED: NORMAL
DME PARACHUTE ITEM DESCRIPTION: NORMAL
DME PARACHUTE ORDER STATUS: NORMAL
DME PARACHUTE SUPPLIER NAME: NORMAL
DME PARACHUTE SUPPLIER PHONE: NORMAL
EOSINOPHIL # BLD AUTO: 0.23 THOUSAND/ÂΜL (ref 0–0.61)
EOSINOPHIL NFR BLD AUTO: 4 % (ref 0–6)
ERYTHROCYTE [DISTWIDTH] IN BLOOD BY AUTOMATED COUNT: 14.2 % (ref 11.6–15.1)
GFR SERPL CREATININE-BSD FRML MDRD: 38 ML/MIN/1.73SQ M
GLUCOSE SERPL-MCNC: 117 MG/DL (ref 65–140)
GLUCOSE SERPL-MCNC: 125 MG/DL (ref 65–140)
GLUCOSE SERPL-MCNC: 138 MG/DL (ref 65–140)
HCT VFR BLD AUTO: 37 % (ref 36.5–49.3)
HGB BLD-MCNC: 11.6 G/DL (ref 12–17)
IMM GRANULOCYTES # BLD AUTO: 0.03 THOUSAND/UL (ref 0–0.2)
IMM GRANULOCYTES NFR BLD AUTO: 1 % (ref 0–2)
LYMPHOCYTES # BLD AUTO: 0.92 THOUSANDS/ÂΜL (ref 0.6–4.47)
LYMPHOCYTES NFR BLD AUTO: 14 % (ref 14–44)
MCH RBC QN AUTO: 30.5 PG (ref 26.8–34.3)
MCHC RBC AUTO-ENTMCNC: 31.4 G/DL (ref 31.4–37.4)
MCV RBC AUTO: 97 FL (ref 82–98)
MONOCYTES # BLD AUTO: 0.57 THOUSAND/ÂΜL (ref 0.17–1.22)
MONOCYTES NFR BLD AUTO: 9 % (ref 4–12)
NEUTROPHILS # BLD AUTO: 4.78 THOUSANDS/ÂΜL (ref 1.85–7.62)
NEUTS SEG NFR BLD AUTO: 72 % (ref 43–75)
NRBC BLD AUTO-RTO: 0 /100 WBCS
PLATELET # BLD AUTO: 111 THOUSANDS/UL (ref 149–390)
PMV BLD AUTO: 10 FL (ref 8.9–12.7)
POTASSIUM SERPL-SCNC: 4.3 MMOL/L (ref 3.5–5.3)
PROT SERPL-MCNC: 5.1 G/DL (ref 6.4–8.4)
QRS AXIS: 66 DEGREES
QRSD INTERVAL: 108 MS
QT INTERVAL: 418 MS
QTC INTERVAL: 488 MS
RBC # BLD AUTO: 3.8 MILLION/UL (ref 3.88–5.62)
SODIUM SERPL-SCNC: 137 MMOL/L (ref 135–147)
T WAVE AXIS: 224 DEGREES
VENTRICULAR RATE: 82 BPM
WBC # BLD AUTO: 6.54 THOUSAND/UL (ref 4.31–10.16)

## 2023-03-03 RX ORDER — QUETIAPINE FUMARATE 25 MG/1
12.5 TABLET, FILM COATED ORAL
Qty: 15 TABLET | Refills: 0 | Status: SHIPPED | OUTPATIENT
Start: 2023-03-03 | End: 2023-04-04 | Stop reason: ALTCHOICE

## 2023-03-03 RX ORDER — CEFDINIR 300 MG/1
300 CAPSULE ORAL EVERY 12 HOURS SCHEDULED
Qty: 4 CAPSULE | Refills: 0 | Status: SHIPPED | OUTPATIENT
Start: 2023-03-04 | End: 2023-03-03 | Stop reason: SDUPTHER

## 2023-03-03 RX ORDER — CEFDINIR 300 MG/1
300 CAPSULE ORAL EVERY 12 HOURS SCHEDULED
Qty: 4 CAPSULE | Refills: 0 | Status: SHIPPED | OUTPATIENT
Start: 2023-03-04 | End: 2023-03-06

## 2023-03-03 RX ADMIN — POLYETHYLENE GLYCOL 3350 17 G: 17 POWDER, FOR SOLUTION ORAL at 08:55

## 2023-03-03 RX ADMIN — HYDROXYCHLOROQUINE SULFATE 200 MG: 200 TABLET, FILM COATED ORAL at 08:54

## 2023-03-03 RX ADMIN — PREDNISONE 10 MG: 10 TABLET ORAL at 08:56

## 2023-03-03 RX ADMIN — UMECLIDINIUM 1 PUFF: 62.5 AEROSOL, POWDER ORAL at 11:07

## 2023-03-03 RX ADMIN — FLUTICASONE FUROATE AND VILANTEROL TRIFENATATE 1 PUFF: 100; 25 POWDER RESPIRATORY (INHALATION) at 11:08

## 2023-03-03 RX ADMIN — APIXABAN 2.5 MG: 2.5 TABLET, FILM COATED ORAL at 08:56

## 2023-03-03 RX ADMIN — PRAVASTATIN SODIUM 40 MG: 40 TABLET ORAL at 08:56

## 2023-03-03 RX ADMIN — STANDARDIZED SENNA CONCENTRATE 8.6 MG: 8.6 TABLET ORAL at 08:56

## 2023-03-03 RX ADMIN — FUROSEMIDE 20 MG: 20 TABLET ORAL at 08:56

## 2023-03-03 RX ADMIN — GUAIFENESIN AND DEXTROMETHORPHAN 5 ML: 100; 10 SYRUP ORAL at 08:56

## 2023-03-03 NOTE — ASSESSMENT & PLAN NOTE
Lab Results   Component Value Date    HGBA1C 6 9 (H) 02/15/2023     Recent Labs     03/02/23  1601 03/02/23  2111 03/03/23  0734 03/03/23  1109   POCGLU 215* 176* 117 138   Blood Sugar Average: Last 72 hrs:   (P) 482 [id number]   Home medications used to include Lantus 16 U HS, however during last hospitalization thsi was discontinued due to hypoglycemia      Plan-   Insulin Sliding Scale  Hypoglycemia Protocol

## 2023-03-03 NOTE — ASSESSMENT & PLAN NOTE
Lab Results   Component Value Date    EGFR 38 03/03/2023    EGFR 43 03/02/2023    EGFR 33 03/01/2023    CREATININE 1 66 (H) 03/03/2023    CREATININE 1 48 (H) 03/02/2023    CREATININE 1 83 (H) 03/01/2023   Cr baseline around 1 7  Likely in the setting of poor po intake, dehydration   Patient at baseline   Plan-   Hold Olmesartan   IVF NS 125ml

## 2023-03-03 NOTE — OCCUPATIONAL THERAPY NOTE
Occupational Therapy Progress Note     Patient Name: Yasir Brito  [id number] Date: 3/3/2023  Problem List  Principal Problem:    Acute encephalopathy  Active Problems:    Diabetes mellitus ([facility] )    Hypertension    Atrial fibrillation ([facility] )    Polymyalgia rheumatica (HCC)    Esophageal stricture    Hypercalcemia    Stage 3b chronic kidney disease ([facility] )    Vascular dementia with behavior disturbance            03/03/23 1040   OT Last Visit   OT Visit Date 03/03/23   Note Type   Note Type Treatment   Pain Assessment   Pain Assessment Tool 0-10   Pain Score No Pain   Lifestyle   Reciprocal Relationships supportive wife and sons   ADL   Grooming Assistance 6  Modified Independent   Grooming Deficit Increased time to complete   Grooming Comments standing at sink to wash face, hands, and shave   UB Bathing Assistance 6  Modified Independent   UB Bathing Comments standing at sink to complete   LB Bathing Assistance 6  Modified Independent   LB Bathing Deficit Increased time to complete   LB Bathing Comments standing at sink to complete, able to balance on one leg during task   UB Dressing Assistance 7  Independent   UB Dressing Comments standing to doff/ximena shirt   LB Dressing Assistance 4  Minimal Assistance   LB Dressing Deficit Increased time to complete   LB Dressing Comments sitting to doff underwear and socks, and don underwear, pants, socks and shoes   A only for donning socks, otherwise (I)   Functional Standing Tolerance   Time 25 min   Activity grooming and bathing tasks standing at sink   Comments no UE support for tasks   Transfers   Sit to Stand 6  Modified independent   Stand to Sit 6  Modified independent   Additional Comments increased time, no use of AD   Functional Mobility   Functional Mobility 6  Modified independent   Additional Comments functional household distance, no use of AD   Subjective   Subjective "I feel so much better today"   Cognition   Overall Cognitive Status Impaired Arousal/Participation Alert; Cooperative   Attention Within functional limits   Orientation Level Oriented X4   Memory Decreased short term memory   Following Commands Follows all commands and directions without difficulty   Activity Tolerance   Activity Tolerance Patient tolerated treatment well   Medical Staff Made Aware EMERALD Cam, PT Redd Warren Splinter   Assessment   Assessment Pt agreeable to participate in skilled OT treatment session to address ADL tasks, functional activity tolerance  Pt demonstrating vastly improved activity tolerance able to stand at sink for 25 min for grooming/dressing tasks  Pt with improved forward functional reach, standing balance as seen with standing at sink for LB bathing  Pt continues to require assist with don/doffing socks however this is patient's baseline  Pt is making vast progress towards goals and demonstrating improved functional mobility, able to get to/from bathroom without AD, and demonstrating improved cognition from previous session  Pt's improved performance would allow him to safely d/c home with family support  Will cont to follow and see for skilled OT treatment to continue progress towards goals     Plan   Goal Expiration Date 03/11/23   OT Treatment Day 1   OT Frequency 3-5x/wk   Recommendation   OT Discharge Recommendation No rehabilitation needs  (no OT needs, recommend home with family support)   AM-PAC Daily Activity Inpatient   Lower Body Dressing 3   Bathing 4   Toileting 4   Upper Body Dressing 4   Grooming 4   Eating 4   Daily Activity Raw Score 23   Daily Activity Standardized Score (Calc for Raw Score >=11) 51 12   AM-PAC Applied Cognition Inpatient   Following a Speech/Presentation 2   Understanding Ordinary Conversation 3   Taking Medications 2   Remembering Where Things Are Placed or Put Away 2   Remembering List of 4-5 Errands 2   Taking Care of Complicated Tasks 2   Applied Cognition Raw Score 13   Applied Cognition Standardized Score 30 46   End of Consult   Patient Position at End of Consult   (standing in hallway with PT)       The patient's raw score on the AM-PAC Daily Activity Inpatient Short Form is 23  A raw score of greater than or equal to 19 suggests the patient may benefit from discharge to home  Please refer to the recommendation of the Occupational Therapist for safe discharge planning      Manjula Colón MS, OTR/L

## 2023-03-03 NOTE — CASE MANAGEMENT
Received notfication from [employer] to cancel patients auth to [facility] [address] spoke to [employer] has been cancelled

## 2023-03-03 NOTE — DISCHARGE INSTR - AVS FIRST PAGE
Dear Gera Worthy,     It was our pleasure to care for you here at [facility]  It is our hope that we were always able to exceed the expected standards for your care during your stay  You were hospitalized due to Altered Mental Status in the setting of acute encephalopathy secondary to pneumonia  You were cared for on the [address] [place] [address] 4th floor by Ashtyn Jean DO under the service of Nilson Whittaker MD with the [facility] who covers for your primary care physician (PCP), Mary Walter MD, while you were hospitalized  If you have any questions or concerns related to this hospitalization, you may contact us at [phone]  For follow up as well as any medication refills, we recommend that you follow up with your primary care physician  A registered nurse will reach out to you by phone within a few days after your discharge to answer any additional questions that you may have after going home  However, at this time we provide for you here, the most important instructions / recommendations at discharge:     Notable Medication Adjustments -   Take 1 capsule (300 mg total) by mouth every 12 (twelve) hours for 2 days Do not start before March 4, 2023  Testing Required after Discharge -   None  Important follow up information -   Please follow up with your PCP within 7- 10 days  Other Instructions -   None  Please review this entire after visit summary as additional general instructions including medication list, appointments, activity, diet, any pertinent wound care, and other additional recommendations from your care team that may be provided for you      Sincerely,     Ashtyn Jean DO

## 2023-03-03 NOTE — ASSESSMENT & PLAN NOTE
Dehydration, POA, in the setting of poor po intake, as evidenced by elevated creatinine, hypercalcemia, dry mucous membranes, treating with IV fluids, speech c/s, and monitoring of labs    Findings: 2/28 creat 1 99 from 1 68 on 2/27, pt refusing po intake at facility, Ca 11 0, T  bili increased at 1 43

## 2023-03-03 NOTE — PROGRESS NOTES
Progress Note - Geriatric Medicine   [place] 82 y o  male MRN: [id number]  Unit/Bed#: W -01 Encounter: [id number]      Assessment/Plan:  1  Acute Encephalopathy   Patient's cognition has improved since admission on 2/28/2023  Patient is able to converse himself  Patient has not exhibited aggressive behavior since day of admission and restraints have since been discontinued  Pt is AO x 3   Patient was most likely exhibiting signs of delirium   Patient is at risk for delirium due to vascular dementia, recent falls, recent admissions, and polypharmacy  2   Vascular dementia  Vascular dementia was recently diagnosed in 10/22  Denise Yancey was started on Seroquel at bedtime   Most recent QTc was 489   Recent MRI on 2/15/2023 showed multiple chronic small infarcts and severe chronic microangiopathy which is a contributing factor to his vascular dementia    Delirium precautions  -Patient is high risk of delirium due to vascular dementia and recent altered mental status  -Initiate delirium precautions  -maintain normal sleep/wake cycle  -minimize overnight interruptions, group overnight vitals/labs/nursing checks as possible  -dim lights, close blinds and turn off tv to minimize stimulation and encourage sleep environment in evenings  -ensure that pain is well controlled consider Tylenol 975mg Q8H scheduled if not already ordered   -monitor for fecal and urinary retention which may precipitate delirium  -encourage early mobilization and ambulation  -provide frequent reorientation and redirection  -encourage family and friends at the bedside to help help calm patient if anxious  -avoid medications which may precipitate or worsen delirium such as tramadol, benzodiazepine, anticholinergics, and benadryl  -encourage hydration and nutrition   -redirect unwanted behaviors as first line, avoid physical restraints, use chemical restraint only if all other attempts have been unsuccessful, monitor for orthostatic hypotension and QTc prolongation with repeat dosing, recommend lowest dose possible for shortest duration possible     3  Polymyalgia rheumatica  Sed rate on 3/12/2023 was within normal range at 6  Patient is followed closely by rheumatology outpatient  Narendra Bellamy is currently taking prednisone 10 mg daily and hydroxychloroquine 200 mg twice daily  Narendra Bellamy has been taking steroids for 5 years  Narendra Bellamy reported having shoulder, hip, knee pain and stiffness   Patient would occasionally have jaw pain as well   Patient had a one-sided headache while having jaw claudication at 1 time   Rheumatologist was called to discuss PMR medications as well as the possibility of temporal arteritis  Pt will be seen by a new rheumatologist in the practice because Dr Chiki Neves is retiring  Dr Chiki Neves' notes indicate that he was going to substitute MTX for plaquenil  He felt the pt might have elements of RA  Patient will follow with rheumatologist once discharged  4  Hypertension  Pt currently takes Olmesartan 20 mg daily and furosemide 20 daily as well  While admitted, the primary team is recommending holding Olmesartan and continuing Lasix  Pt follows with cardiology outpatient  5  Hypercalcemia  Pt's calcium levels have slightly increased this morning but are still WNL  Calcium was 9 3, albumin was 3 1, and corrected calcium was 10 0  Pt has had a history of high calcium with most recent high at 11  PTH yesterday was 76 3   Recommended discontinuing calcium supplement       6  Type 2 diabetes mellitus  Patient currently takes Lantus 16 units at bedtime   Patient monitors his blood sugars with a brian at home as well as a meter   Patient has tried metformin in the past but had GI issues and cognition issues   Most recent hemoglobin A1c was 6 9   Patient is currently on the inpatient Humalog SS regimen based on fingerstick blood glucose levels      7   History of pancreatic cancer  Patient had a distal pacretectomy in 2011 due to malignant neoplasm of his pancreas found incidentally on CT  Patient has since been in remission due to removal of cancer      8   Status post aortic valve replacement  Patient had an aortic valve replacement as well as an aortic arch repair due to an aneurysm   Patient follows with cardiology outpatient      9   Chronic kidney disease stage IIIb  Patient saw urologist in 2020 and was recommended to see nephrology   Patient did not follow-up with nephrology and stated that his PCP was treating him   Patient's most recent GFR was 38      10   Atrial fibrillation  EKGs showed pt was in afib   Patient reports no flutter, palpitations, or chest pain  Pt takes Eliquis 2 5 mg twice daily   Patient is on this dose due to serum creatinine above 1 5, and age greater than 80 years   Patient follows with cardiology outpatient        11   Esophageal stricture  EGD in 10/22 demonstrated possible mild stricture at the [place] is status post dilation  Patient reported he only has difficulty swallowing when his mouth is dry but other than that he can swallow fine   Speech therapy was consulted to assess patient's ability to swallow  Speech recommended regular diet and thin liquids       12   COPD  Continue with Trelegy Ellipta elation daily   Patient follows with pulmonology  13   Previous CVA  Patient had a stroke  Recent MRI reveals multiple infarcts most significant ones in the cerebellum   Patient's wife reports no problems with balance   Patient does not have any deficits since   Patient is on Eliquis 2 5 mg twice daily      14   Polypharmacy (Outpatient Medications)  Furosemide 20 mg AM  Potassium chloride 8 meq AM  Eliquis 2 5 mg AM/PM  Hydroxychloroquine 200 mg AM  Cinnamon 1000mg AM  Citracal 650 mg AM- recommended discontinuing  Trelegy Ellipta 100 mcg AM  Olmesartan 20 mg AM  Prednisone 10 mg AM  Pravastatin 40 mg PM  Lantus 16 U PM  Vitamin D3- 50 mcg 2000 IU PM    Recommendations:  - Be cautious with Seroquel due to pt's QT recently increasing to 489    Subjective:   Pt is eager to leave and go to rehab  Pt stated he ate all of his breakfast this morning and slept great last night  Pt stated he got up once to urinate  Pt stated he has been walking around his room  Pt stated he had a BM last night and this morning  Pt stated he has been more conscious of taking his time when he sits and stands up to ensure he doesn't fall  Review of Systems   Constitutional: Negative for appetite change, fatigue and fever  HENT: Negative for trouble swallowing  Respiratory: Negative for chest tightness and shortness of breath  Cardiovascular: Negative for chest pain and palpitations  Gastrointestinal: Negative for abdominal pain, constipation and diarrhea  Genitourinary: Positive for frequency  Negative for dysuria and urgency  Neurological: Negative for dizziness, light-headedness and headaches  Psychiatric/Behavioral: Positive for confusion (improved)  Negative for behavioral problems and sleep disturbance  The patient is not nervous/anxious  Objective:     Vitals: Blood pressure 157/84, pulse 68, temperature (!) 97 3 °F (36 3 °C), resp  rate 18, SpO2 98 %  Intake/Output Summary (Last 24 hours) at 3/3/2023 0941  Last data filed at 3/3/2023 0515  Gross per 24 hour   Intake --   Output 450 ml   Net -450 ml       Current Medications: Reviewed    Physical Exam:   Physical Exam  Vitals reviewed  Constitutional:       General: He is not in acute distress  Appearance: Normal appearance  He is normal weight  He is not ill-appearing  HENT:      Head: Normocephalic  Nose: Nose normal       Mouth/Throat:      Mouth: Mucous membranes are moist       Pharynx: Oropharynx is clear  Eyes:      Conjunctiva/sclera: Conjunctivae normal    Cardiovascular:      Rate and Rhythm: Normal rate and regular rhythm  Pulses: Normal pulses  Heart sounds: No murmur heard  No friction rub     Pulmonary: Effort: Pulmonary effort is normal  No respiratory distress  Breath sounds: Normal breath sounds  No wheezing or rhonchi  Abdominal:      General: Bowel sounds are normal  There is no distension  Palpations: Abdomen is soft  There is no mass  Tenderness: There is no abdominal tenderness  Musculoskeletal:         General: No tenderness  Cervical back: Neck supple  Right lower leg: No edema  Left lower leg: No edema  Skin:     General: Skin is warm  Neurological:      Mental Status: He is alert and oriented to person, place, and time  Psychiatric:         Mood and Affect: Mood normal          Behavior: Behavior normal          Thought Content: Thought content normal          Judgment: Judgment normal           Invasive Devices     Peripheral Intravenous Line  Duration           Peripheral IV 02/28/23 Distal;Left;Ventral (anterior) Forearm 2 days                Lab, Imaging and other studies: I have personally reviewed pertinent reports  Juan ELIZABETH transcribed this note and examined the pt with Dr Aniket Barton

## 2023-03-03 NOTE — PROGRESS NOTES
-- Patient: Jose Elias Beasley  -- MRN: [id number]  -- Aidin Request ID: [id number]  -- Level of care reserved: [address] [place] [address]  -- Partner Reserved: [facility], [place], [address] [phone]  -- Clinical needs requested:  -- Geography searched: [phone]  -- Start of Service:  -- Request sent: 11:28am EST on 3/3/2023 by Nando Martins  -- Partner reserved: 12:30pm EST on 3/3/2023 by Nando Martins  -- Choice list shared: 12:10pm EST on 3/3/2023 by Nando Martins

## 2023-03-03 NOTE — DISCHARGE SUMMARY
[facility]  Discharge- Marie Louis 1940, 82 y o  male MRN: [id number]  Unit/Bed#: W -01 Encounter: [id number]  Primary Care Provider: Phoebe Moraes MD   Date and time admitted to hospital: 2/28/2023  6:44 PM    * Acute encephalopathy  Assessment & Plan  Baseline mentation-  AOx1, Waxes and wanes, progressively worsening within past week, worst today   Med Review for potential causes/contributors shows   DDx including but not limited to: metabolic abnormality, intracranial etiology, cardiac etiology, substance abuse, infectious etiology including UTI, thyroid disease, hyperammonemia, delirium, dementia, overmedication  Recent TSH normal   Previous hospitalization for AMS and PNA tx with ABX  Ca- 11  CT Head- Neg   CXR demonstrated right lower lobe pneumonia but w/o sx there is low suspicion for infection  Pt assessed as A&O x 3, stable and at baseline mental status  Acute metabolic encephalopathy, in the setting of poor po intake and abnormal labs, as evidenced by confusion, agitation, vulgarities, treated with CT of head, Haldol, geriatrics consult, and correction of metabolic derangements  Plan:   UA- Pending  Procal- Pending   PTH- Pending  Serial Mental Status Exam    Dehydration  Assessment & Plan  Dehydration, POA, in the setting of poor po intake, as evidenced by elevated creatinine, hypercalcemia, dry mucous membranes, treating with IV fluids, speech c/s, and monitoring of labs    Findings: 2/28 creat 1 99 from 1 68 on 2/27, pt refusing po intake at facility, Ca 11 0, T  bili increased at 1 43     Vascular dementia with behavior disturbance  Assessment & Plan  Newly diagnosed vascular dementia in 10/22  During recent hospitalization patient was recently started on Seroquel HS  Patient was given Haldol in ED   QTc 488     Plan-  Geriatrics consult, appreciate recommendations  Delirium precautions    Stage 3b chronic kidney disease [facility])  Assessment & Plan  Lab Results Component Value Date    EGFR 38 03/03/2023    EGFR 43 03/02/2023    EGFR 33 03/01/2023    CREATININE 1 66 (H) 03/03/2023    CREATININE 1 48 (H) 03/02/2023    CREATININE 1 83 (H) 03/01/2023   Cr baseline around 1 7  Likely in the setting of poor po intake, dehydration   Patient at baseline   Plan-   Hold Olmesartan   IVF NS 125ml    Hypercalcemia  Assessment & Plan  Home medications include Citracal  Likely in the setting of dehydration      Plan-   PTH- Pending   Monitor CMP     Esophageal stricture  Assessment & Plan  10/22 EGD demonstrated possible mild stricture at the GE junction, status post dilation  Family reports patient has not been having difficulty swallowing, denies coughing after foods  Patient is ultimately just refusing p o  intake     Plan-  Speech and swallow evaluation    Polymyalgia rheumatica ([facility] [address] )  Assessment & Plan  Home medications include prednisone 4 Mg QD and Plaquenil     Plan-  Hold prednisone due to acute encephalopathy  Continue Plaquenil when patients mentation improves    Atrial fibrillation ([facility] [address] )  Assessment & Plan  Home medications include Eliquis 2 5mg BID   HR controlled 95    Plan-   Continue home regime     Hypertension  Assessment & Plan  /95   Home medications include Lasix and Olmesartan     Plan-   Hold Olmesartan for now in the setting of elevated Cr  Continue Lasix QD     Diabetes mellitus [facility])  Assessment & Plan  Lab Results   Component Value Date    HGBA1C 6 9 (H) 02/15/2023     Recent Labs     03/02/23  1601 03/02/23  2111 03/03/23  0734 03/03/23  1109   POCGLU 215* 176* 117 138   Blood Sugar Average: Last 72 hrs:   (P) 443 [id number]   Home medications used to include Lantus 16 U HS, however during last hospitalization thsi was discontinued due to hypoglycemia      Plan-   Insulin Sliding Scale  Hypoglycemia Protocol     Medical Problems     Resolved Problems  Date Reviewed: 3/1/2023   None       Discharging Resident: Tamiko Gillis, DO  Discharging Attending: Mary Alice Boogie MD  PCP: Phoebe Moraes MD  Admission Date:   Admission Orders (From admission, onward)     Ordered        03/01/23 1657  Inpatient Admission  Once            02/28/23 2328  Place in Observation  Once                      Discharge Date: 03/03/23    Consultations During Hospital Stay:  · Gerontology    Procedures Performed:   · EKG    Significant Findings / Test Results:   Development of groundglass opacity at right lung base suspicious for early infiltrate  Findings otherwise stable status post aortic valve replacement    Incidental Findings:   · N/A  · I reviewed the above mentioned incidental findings with the patient and/or family and they expressed understanding  Test Results Pending at Discharge (will require follow up):  · N/A     Outpatient Tests Requested:  · N/A    Complications:  N/A    Reason for Admission: Altered Mental Status and Falls    Hospital Course:   Marie Louis is a 82 y o  male patient who originally presented to the hospital on 2/28/2023 due to altered mental status and two recent falls at his acute rehab center  Family also states that he has had a dramatic mental status change in the last two weeks and had recent RSV infection with encephalopathy  GCS score was 4 in the ED and pt was disoriented and confused  CTH showed no acute intracranial abnormality and CXR showed resolved left lower lobe pneumonia from past hospitalization (2/25) and new ground glass opacity in the right lung base suspicious for early infiltrate  UA showed glycosuria and trace proteinuria, CBC showed thrombocytopenia, CMP showed TARA (2/2 to dehydration from poor PO intake), and POCT showed elevated glucose  EKG shows a fib with pacemaker and ST/T wave abnormality possible from lateral ischemia  Labs demonstrated leukopenia and elevated blood glucose and TARA   Pt was agitated and required Haldol and Lorazepam and 4-point restraints before transfer to Med/Surg floors (3/1)  Pt was given fluids, Quetiapine and transferred to Med/Surg floors  On the floors, pt was given ceftriaxone, quetiapine and lispro sliding scale  Pt's TARA improved  Gerontology consulted and med recs recognized  Home meds were restarted and pt showed good PO intake and restraints were discontinued  PT/OT recs changed to no rehab needs and pt agreeable to home discharge with medical aide  Please see above list of diagnoses and related plan for additional information  Condition at Discharge: good    Discharge Day Visit / Exam:   Subjective:  Pt dressed and at baseline mental status  Pt's family concerned with past diagnosis of vascular dementia and discharge to home without help  With , family became agreeable to home discharge with aide  Vitals: Blood Pressure: 157/84 (03/03/23 0736)  Pulse: 68 (03/03/23 0736)  Temperature: (!) 97 3 °F (36 3 °C) (03/03/23 0736)  Temp Source: Oral (03/02/23 2112)  Respirations: 18 (03/02/23 2112)  Weight - Scale: 81 3 kg (179 lb 3 2 oz) (03/03/23 1113)  SpO2: 98 % (03/03/23 0736)  Exam:   Physical Exam  Vitals reviewed  Constitutional:       General: He is not in acute distress  Appearance: Normal appearance  He is normal weight  He is not ill-appearing  HENT:      Head: Normocephalic  Nose: Nose normal       Mouth/Throat:      Mouth: Mucous membranes are moist       Pharynx: Oropharynx is clear  Eyes:      Conjunctiva/sclera: Conjunctivae normal    Cardiovascular:      Rate and Rhythm: Normal rate and regular rhythm  Pulses: Normal pulses  Heart sounds: No murmur heard  No friction rub  Pulmonary:      Effort: Pulmonary effort is normal  No respiratory distress  Breath sounds: Normal breath sounds  No wheezing or rhonchi  Abdominal:      General: Bowel sounds are normal  There is no distension  Palpations: Abdomen is soft  There is no mass  Tenderness: There is no abdominal tenderness     Musculoskeletal: General: No tenderness  Cervical back: Neck supple  Right lower leg: No edema  Left lower leg: No edema  Skin:     General: Skin is warm  Neurological:      Mental Status: He is alert and oriented to person, place, and time  Psychiatric:         Mood and Affect: Mood normal          Behavior: Behavior normal          Thought Content: Thought content normal          Judgment: Judgment normal           Discussion with Family: Updated  (wife) at bedside  Discharge instructions/Information to patient and family:   See after visit summary for information provided to patient and family  Provisions for Follow-Up Care:  See after visit summary for information related to follow-up care and any pertinent home health orders  Disposition:   Home with [facility] Services (Reminder: Complete face to face encounter)    Planned Readmission: N/A    Discharge Medications:  See after visit summary for reconciled discharge medications provided to patient and/or family        **Please Note: This note may have been constructed using a voice recognition system**

## 2023-03-03 NOTE — PLAN OF CARE
Problem: OCCUPATIONAL THERAPY ADULT  Goal: Performs self-care activities at highest level of function for planned discharge setting  See evaluation for individualized goals  Description: Treatment Interventions: ADL retraining, Functional transfer training, Endurance training, Cognitive reorientation, Patient/family training, Equipment evaluation/education, Compensatory technique education, Activityengagement, Energy conservation          See flowsheet documentation for full assessment, interventions and recommendations  Outcome: Progressing  Note: Limitation: Decreased ADL status, Decreased Safe judgement during ADL, Decreased UE strength, Decreased cognition, Decreased endurance, Decreased high-level ADLs, Decreased self-care trans  Prognosis: Good  Assessment: Pt agreeable to participate in skilled OT treatment session to address ADL tasks, functional activity tolerance  Pt demonstrating vastly improved activity tolerance able to stand at sink for 25 min for grooming/dressing tasks  Pt with improved forward functional reach, standing balance as seen with standing at sink for LB bathing  Pt continues to require assist with don/doffing socks however this is patient's baseline  Pt is making vast progress towards goals and demonstrating improved functional mobility, able to get to/from bathroom without AD, and demonstrating improved cognition from previous session  Pt's improved performance would allow him to safely d/c home with family support  Will cont to follow and see for skilled OT treatment to continue progress towards goals       OT Discharge Recommendation: No rehabilitation needs (no OT needs, recommend home with family support)

## 2023-03-03 NOTE — CASE MANAGEMENT
Case Management Progress Note    Patient name Severiano Rdz  Location W /W -66 MRN [id number]  : 1940 Date 3/3/2023       LOS (days): 2  Geometric Mean LOS (GMLOS) (days): 3 40  Days to GMLOS:1 5        OBJECTIVE:        Current admission status: Inpatient  Preferred Pharmacy:   [address] [place] [address] #[id number] Sukhwinder Sanchez [address] [place] [id number]  Phone: [phone] Fax: [phone] [address] [place] [address]  Phone: [phone] Fax: [phone]    Primary Care Provider: Charo Schafer MD    Primary Insurance: Jelly Strauss [facility]  Secondary Insurance:     PROGRESS NOTE:  Zollie Bevel delivered to patient at bedside- no copay

## 2023-03-03 NOTE — PLAN OF CARE
Problem: PHYSICAL THERAPY ADULT  Goal: Performs mobility at highest level of function for planned discharge setting  See evaluation for individualized goals  Description: Treatment/Interventions: Functional transfer training, LE strengthening/ROM, Elevations, Therapeutic exercise, Cognitive reorientation, Patient/family training, Equipment eval/education, Bed mobility, Gait training  Equipment Recommended: Monika Perales       See flowsheet documentation for full assessment, interventions and recommendations  Note: Prognosis: Good  Problem List: Decreased cognition, Decreased strength  Assessment: Pt seen for PT treatment session today w/ pt agreeable to participate  PT interventions provided include: transfer, balance, endurance, gait, and stair training in order to challenge pt's activity tolerance and to maximize pt independence w/ functional mobility  Education provided on AD management  In comparison to previous session, pt improved ambulatory endurance, tolerance, and balance  Pt demonstrated ability to ambulate multiple trials w/ and w/o AD w/o evidence of LOB  Additionally, pt able to negotiate 2 flights of stairs w/ unilateral UE support  Pt will continue to benefit from skilled PT intervention to promote pt's independence w/ functional mobility and progress towards set goals  Due to pt's progress w/ functional mobility, DC rec updated to PT  Barriers to Discharge: Inaccessible home environment     PT Discharge Recommendation: Home with home health rehabilitation    See flowsheet documentation for full assessment

## 2023-03-03 NOTE — PHYSICAL THERAPY NOTE
PHYSICAL THERAPY TREATMENT NOTE          Patient Name: Ange Delgado  PSGKT'D Date: 3/3/2023           03/03/23 1049   PT Last Visit   PT Visit Date 23   Note Type   Note Type Treatment   Pain Assessment   Pain Assessment Tool 0-10   Pain Score No Pain   Restrictions/Precautions   Weight Bearing Precautions Per Order No   Other Precautions Cognitive; Chair Alarm; Bed Alarm; Fall Risk   General   Response to Previous Treatment Patient with no complaints from previous session  Family/Caregiver Present Yes  (pt's wife and family)   Cognition   Overall Cognitive Status Impaired   Arousal/Participation Alert; Cooperative   Attention Within functional limits   Orientation Level Oriented X4   Memory Decreased short term memory   Following Commands Follows multistep commands with increased time or repetition   Comments Pt ID via name and ; pt agreeable to PT tx and ambulating in the hallway   Bed Mobility   Supine to Sit Unable to assess   Sit to Supine Unable to assess   Additional Comments pt ambulating w/ OT upon arrival and OOB in recliner chair at end of session   Transfers   Sit to Stand 6  Modified independent   Additional items Armrests; Increased time required   Stand to Sit 6  Modified independent   Additional items Armrests; Increased time required   Additional Comments use of bilateral armrests   Ambulation/Elevation   Gait pattern Decreased foot clearance; Short stride;Decreased hip extension; Improper Weight shift   Gait Assistance 5  Supervision   Additional items Assist x 1   Assistive Device Rolling walker   Distance 742'+211'+22'  (approx 60' w/o AD)   Stair Management Assistance 5  Supervision   Additional items Assist x 1;Verbal cues   Stair Management Technique One rail L;Alternating pattern   Number of Stairs 26  (2 flights of stairs)   Ambulation/Elevation Additional Comments Pt demonstrated ability to ambulate into hallway w/ and w/o RW, able to negotiate multiple turns and obstacles w/o evidence of LOB  Pt able to negotiate 2 flights of stairs using unilateral UE support and alternating pattern  Pt required 1 seated rest break in the hallway during mobility   Balance   Static Sitting Good   Dynamic Sitting Fair +   Static Standing Fair   Dynamic Standing Shan Montanez [id number]   Activity Tolerance   Activity Tolerance Patient tolerated treatment well   Medical Staff Made Aware MAYANK Costa, Resident Gerardo Lopez   Nurse Made Aware LPN Herlett   Assessment   Prognosis Good   Problem List Decreased cognition;Decreased strength   Assessment Pt seen for PT treatment session today w/ pt agreeable to participate  PT interventions provided include: transfer, balance, endurance, gait, and stair training in order to challenge pt's activity tolerance and to maximize pt independence w/ functional mobility  Education provided on AD management  In comparison to previous session, pt improved ambulatory endurance, tolerance, and balance  Pt demonstrated ability to ambulate multiple trials w/ and w/o AD w/o evidence of LOB  Additionally, pt able to negotiate 2 flights of stairs w/ unilateral UE support  Pt will continue to benefit from skilled PT intervention to promote pt's independence w/ functional mobility and progress towards set goals  Due to pt's progress w/ functional mobility, DC rec updated to HHPT     Goals   Patient Goals to go home   STG Expiration Date 03/13/23   Short Term Goal #1 Pt will: perform bed mobility independently to decrease pt's burden of care and increase pt's independence w/ repositioning in bed; perform transfers independently to increase pt's OOB mobility; ambulate at least 360' w/ LRAD and mod I to increase pt's ambulatory endurance/tolerance; negotiate at least 20 stair(s) w/ UE support and mod I to facilitate pt returning to previous living environment; increase all balance ratings by at least 1 grade to decrease pt's risk of falls   PT Treatment Day 1   Plan   Treatment/Interventions Functional transfer training;Elevations;LE strengthening/ROM; Therapeutic exercise;Cognitive reorientation;Patient/family training;Equipment eval/education; Bed mobility;Gait training   PT Frequency 1-2x/wk   Recommendation   PT Discharge Recommendation Home with home health rehabilitation   Equipment Recommended [facility] Recommended Wheeled walker   Change/add to [place]? No   AM-PAC Basic Mobility Inpatient   Turning in Flat Bed Without Bedrails 4   Lying on Back to Sitting on Edge of Flat Bed Without Bedrails 4   Moving Bed to Chair 3   Standing Up From Chair Using Arms 4   Walk in Room 3   Climb 3-5 Stairs With Railing 3   Basic Mobility Inpatient Raw Score 21   Basic Mobility Standardized Score 45 55   Highest Level Of Mobility   JH-HLM Goal 6: Walk 10 steps or more   JH-HLM Achieved 7: Walk 25 feet or more   Education   Education Provided Mobility training;Assistive device   Patient Demonstrates acceptance/verbal understanding   End of Consult   Patient Position at End of Consult Bedside chair;Bed/Chair alarm activated; All needs within reach  (LPN and pt's family present in room)         The patient's AM-PAC Basic Mobility Inpatient Short Form Raw Score is 21  A Raw score of greater than 16 suggests the patient may benefit from discharge to home  Please also refer to the recommendation of the Physical Therapist for safe discharge planning  Pt will continue to benefit from skilled inpatient PT during this admission in order to facilitate progress towards goals and to maximize pt's independence w/ functional mobility      DC rec: Billie Hobbs, PT, DPT  03/03/23

## 2023-03-03 NOTE — ASSESSMENT & PLAN NOTE
Baseline mentation-  AOx1, Waxes and wanes, progressively worsening within past week, worst today   Med Review for potential causes/contributors shows   DDx including but not limited to: metabolic abnormality, intracranial etiology, cardiac etiology, substance abuse, infectious etiology including UTI, thyroid disease, hyperammonemia, delirium, dementia, overmedication  Recent TSH normal   Previous hospitalization for AMS and PNA tx with ABX  Ca- 11  CT Head- Neg   CXR demonstrated right lower lobe pneumonia but w/o sx there is low suspicion for infection  Pt assessed as A&O x 3, stable and at baseline mental status  Acute metabolic encephalopathy, in the setting of poor po intake and abnormal labs, as evidenced by confusion, agitation, vulgarities, treated with CT of head, Haldol, geriatrics consult, and correction of metabolic derangements      Plan:   UA- Pending  Procal- Pending   PTH- Pending  Serial Mental Status Exam

## 2023-03-03 NOTE — CASE MANAGEMENT
Case Management Discharge Planning Note    Patient name Jo Ann Doss  Location W /W -38 MRN [id number]  : 1940 Date 3/3/2023       Current Admission Date: 2023  Current Admission Diagnosis:Acute encephalopathy   Patient Active Problem List    Diagnosis Date Noted   • Hypercalcemia 2023   • Stage 3b chronic kidney disease ([facility] ) 2023   • Vascular dementia with behavior disturbance 2023   • Esophageal stricture 2023   • RSV (acute bronchiolitis due to respiratory syncytial virus) 2023   • Acute encephalopathy 2023   • Generalized weakness 2023   • Malignant neoplasm of tail of pancreas ([facility] ) 2022   • COPD (chronic obstructive pulmonary disease) ([facility] ) 2022   • Left lower lobe pneumonia 2022   • CKD (chronic kidney disease) 2022   • Centrilobular emphysema ([facility] ) 2021   • History of malignant neuroendocrine tumor 2021   • Chronic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 ([facility] ) 2018   • Atrial fibrillation ([facility] ) 2017   • Diabetes mellitus ([facility] ) 2017   • Hypertension 2017   • Acute-on-chronic kidney injury ([facility] ) 2017   • Hyperlipidemia 2015   • Inflammatory polyarthropathy ([facility] ) 2014   • Osteoarthrosis 2014   • Polymyalgia rheumatica ([facility] ) 2014   • Aneurysm of thoracic aorta 2014   • Aneurysm of abdominal aorta 2014   • Neoplasm of other specified site 2014      LOS (days): 2  Geometric Mean LOS (GMLOS) (days): 3 40  Days to GMLOS:1 6     OBJECTIVE:  Risk of Unplanned Readmission Score: 38 3         Current admission status: Inpatient   Preferred Pharmacy:   [address] [place] [address] [id number] SHAKILA Ruiz [place] [address]  Phone: [phone] Fax: [phone] [address], [address] [place] [address] [address] [address]  Phone: [phone] Fax: [phone] primary Care Provider: Irina Lowry MD    Primary Insurance: [facility]  Secondary Insurance:     DISCHARGE DETAILS:    Discharge planning discussed with[de-identified] patient and family at bedside  Freedom of Choice: Yes  Comments - Freedom of Choice: re: STR vs HHC, DME- agreeable to [facility]  CM contacted family/caregiver?: Yes  Were Treatment Team discharge recommendations reviewed with patient/caregiver?: Yes  Did patient/caregiver verbalize understanding of patient care needs?: Yes  Were patient/caregiver advised of the risks associated with not following Treatment Team discharge recommendations?: Yes    Contacts  Patient Contacts: Parth Rose and children  Relationship to Patient[de-identified] Family  Contact Method: In Person  Reason/Outcome: Discharge Planning, Referral    Requested [address]         Is the patient interested in [address] at discharge?: No    DME Referral Provided  Referral made for DME?: Yes  DME referral completed for the following items[de-identified] Ofelia Shana  DME Supplier Name[de-identified] [employer]    Other Referral/Resources/Interventions Provided:  Interventions: HHC  Referral Comments: CM met with patient and family at bedside  After debating STR vs HHC, family ultimately deciding they would like for patient to go home with [address] instead of STR after working with therapy today  Referrals were send for [address] in [facility], [address] notified patient and family that they cannot guarantee/ do not anticipate VNA being able to open patient this weekend- patient and family expressed understanding of this  Patient choice list provided- would like to accept [facility]- reserved in [facility] and added to follow- up providers, CM notified family that VNA typically will reach out within 48hrs of d/c  Family requesting PT provide information on home exercises for patient- TT send to PT notifying of this  Patient and family requesting a walker- order placed in [place]   CM notified Richard Siegel in [facility] of changed plan of patient going home and notified d/c support via TT to cancel request for insurance auth  No further CM needs anticipated at this time  Treatment Team Recommendation: Home with [facility]  Discharge Destination Plan[de-identified] Home with Jolie at Discharge : Family      IMM Given (Date):: 03/03/23  IMM Given to[de-identified] Patient  IMM reviewed with patient, patient agrees with discharge determination

## 2023-03-05 ENCOUNTER — HOME CARE VISIT (OUTPATIENT)
Dept: HOME HEALTH SERVICES | Facility: HOME HEALTHCARE | Age: 83
End: 2023-03-05

## 2023-03-05 VITALS
SYSTOLIC BLOOD PRESSURE: 122 MMHG | DIASTOLIC BLOOD PRESSURE: 80 MMHG | OXYGEN SATURATION: 95 % | HEART RATE: 58 BPM | TEMPERATURE: 97.8 F | RESPIRATION RATE: 17 BRPM

## 2023-03-05 LAB
ATRIAL RATE: 91 BPM
QRS AXIS: 78 DEGREES
QRSD INTERVAL: 102 MS
QT INTERVAL: 392 MS
QTC INTERVAL: 455 MS
T WAVE AXIS: 178 DEGREES
VENTRICULAR RATE: 81 BPM

## 2023-03-05 NOTE — CASE COMMUNICATION
[facility] has admitted your patient to [address] [place] [address] service with the following disciplines:    SN PT OT  Primary focus of home health care Neuro  Patient stated goals of care Gain strength  Anticipated visit pattern and next visit date 3 8 23 2x w x6w  Significant clinical findings na  Potential barriers to goal achievement na  Other pertinent information na    Thank you for allowing us to participate in the care of your patient

## 2023-03-06 NOTE — UTILIZATION REVIEW
NOTIFICATION OF ADMISSION DISCHARGE   This is a Notification of Discharge from [address]  Please be advised that this patient has been discharge from our facility  Below you will find the admission and discharge date and time including the patient’s disposition  UTILIZATION REVIEW CONTACT:  Edwar Pisano MA  Utilization   Network Utilization Review Department  Phone: [phone] x carefully listen to the prompts  All voicemails are confidential   Email: [email or web address]     ADMISSION INFORMATION  PRESENTATION DATE: 2/28/2023  6:44 PM  OBERVATION ADMISSION DATE:   INPATIENT ADMISSION DATE: 3/1/23  4:57 PM   DISCHARGE DATE: 3/3/2023  5:00 PM   DISPOSITION:Home with Home Health Care    IMPORTANT INFORMATION:  Send all requests for admission clinical reviews, approved or denied determinations and any other requests to dedicated fax number below belonging to the campus where the patient is receiving treatment   List of dedicated fax numbers:  [address] DENIALS (Administrative/Medical Necessity) [phone]   [address] (Maternity/NICU/Pediatrics) [phone]   [facility] [phone]   [facility] [phone]   [address] [phone]   220 [facility][note 3] [phone]   90 [facility] [phone]   [address] [place], [place] [address] [address] [phone]   [facility]  [phone]   [facility] [phone]   412 [facility] 850 [facility] [phone]

## 2023-03-08 ENCOUNTER — HOME CARE VISIT (OUTPATIENT)
Dept: HOME HEALTH SERVICES | Facility: HOME HEALTHCARE | Age: 83
End: 2023-03-08

## 2023-03-08 VITALS
OXYGEN SATURATION: 99 % | DIASTOLIC BLOOD PRESSURE: 69 MMHG | RESPIRATION RATE: 18 BRPM | SYSTOLIC BLOOD PRESSURE: 148 MMHG | HEART RATE: 83 BPM

## 2023-03-09 ENCOUNTER — HOME CARE VISIT (OUTPATIENT)
Dept: HOME HEALTH SERVICES | Facility: HOME HEALTHCARE | Age: 83
End: 2023-03-09

## 2023-03-09 VITALS
RESPIRATION RATE: 16 BRPM | DIASTOLIC BLOOD PRESSURE: 80 MMHG | HEART RATE: 66 BPM | SYSTOLIC BLOOD PRESSURE: 140 MMHG | OXYGEN SATURATION: 93 %

## 2023-03-10 ENCOUNTER — HOME CARE VISIT (OUTPATIENT)
Dept: HOME HEALTH SERVICES | Facility: HOME HEALTHCARE | Age: 83
End: 2023-03-10

## 2023-03-13 ENCOUNTER — APPOINTMENT (OUTPATIENT)
Dept: LAB | Facility: CLINIC | Age: 83
End: 2023-03-13

## 2023-03-13 ENCOUNTER — HOME CARE VISIT (OUTPATIENT)
Dept: HOME HEALTH SERVICES | Facility: HOME HEALTHCARE | Age: 83
End: 2023-03-13

## 2023-03-13 DIAGNOSIS — M35.3 POLYMYALGIA RHEUMATICA (HCC): ICD-10-CM

## 2023-03-13 DIAGNOSIS — E78.5 HYPERLIPIDEMIA, UNSPECIFIED HYPERLIPIDEMIA TYPE: ICD-10-CM

## 2023-03-13 DIAGNOSIS — E11.9 DIABETES MELLITUS WITHOUT COMPLICATION (HCC): ICD-10-CM

## 2023-03-13 DIAGNOSIS — Z00.00 ROUTINE GENERAL MEDICAL EXAMINATION AT A HEALTH CARE FACILITY: ICD-10-CM

## 2023-03-13 LAB
ALBUMIN SERPL BCP-MCNC: 3.4 G/DL (ref 3.5–5)
ALP SERPL-CCNC: 109 U/L (ref 46–116)
ALT SERPL W P-5'-P-CCNC: 33 U/L (ref 12–78)
ANION GAP SERPL CALCULATED.3IONS-SCNC: 4 MMOL/L (ref 4–13)
AST SERPL W P-5'-P-CCNC: 24 U/L (ref 5–45)
BASOPHILS # BLD AUTO: 0.06 THOUSANDS/ÂΜL (ref 0–0.1)
BASOPHILS NFR BLD AUTO: 1 % (ref 0–1)
BILIRUB SERPL-MCNC: 0.75 MG/DL (ref 0.2–1)
BUN SERPL-MCNC: 28 MG/DL (ref 5–25)
CALCIUM ALBUM COR SERPL-MCNC: 10.5 MG/DL (ref 8.3–10.1)
CALCIUM SERPL-MCNC: 10 MG/DL (ref 8.3–10.1)
CHLORIDE SERPL-SCNC: 112 MMOL/L (ref 96–108)
CHOLEST SERPL-MCNC: 135 MG/DL
CO2 SERPL-SCNC: 27 MMOL/L (ref 21–32)
CREAT SERPL-MCNC: 1.52 MG/DL (ref 0.6–1.3)
CRP SERPL QL: <3 MG/L
EOSINOPHIL # BLD AUTO: 0.18 THOUSAND/ÂΜL (ref 0–0.61)
EOSINOPHIL NFR BLD AUTO: 2 % (ref 0–6)
ERYTHROCYTE [DISTWIDTH] IN BLOOD BY AUTOMATED COUNT: 14.4 % (ref 11.6–15.1)
ERYTHROCYTE [SEDIMENTATION RATE] IN BLOOD: 4 MM/HOUR (ref 0–19)
EST. AVERAGE GLUCOSE BLD GHB EST-MCNC: 148 MG/DL
GFR SERPL CREATININE-BSD FRML MDRD: 42 ML/MIN/1.73SQ M
GLUCOSE P FAST SERPL-MCNC: 90 MG/DL (ref 65–99)
HBA1C MFR BLD: 6.8 %
HCT VFR BLD AUTO: 40.4 % (ref 36.5–49.3)
HDLC SERPL-MCNC: 71 MG/DL
HGB BLD-MCNC: 12.3 G/DL (ref 12–17)
IMM GRANULOCYTES # BLD AUTO: 0.05 THOUSAND/UL (ref 0–0.2)
IMM GRANULOCYTES NFR BLD AUTO: 1 % (ref 0–2)
LDLC SERPL CALC-MCNC: 54 MG/DL (ref 0–100)
LYMPHOCYTES # BLD AUTO: 0.98 THOUSANDS/ÂΜL (ref 0.6–4.47)
LYMPHOCYTES NFR BLD AUTO: 11 % (ref 14–44)
MCH RBC QN AUTO: 30.4 PG (ref 26.8–34.3)
MCHC RBC AUTO-ENTMCNC: 30.4 G/DL (ref 31.4–37.4)
MCV RBC AUTO: 100 FL (ref 82–98)
MONOCYTES # BLD AUTO: 0.82 THOUSAND/ÂΜL (ref 0.17–1.22)
MONOCYTES NFR BLD AUTO: 9 % (ref 4–12)
NEUTROPHILS # BLD AUTO: 6.93 THOUSANDS/ÂΜL (ref 1.85–7.62)
NEUTS SEG NFR BLD AUTO: 76 % (ref 43–75)
NONHDLC SERPL-MCNC: 64 MG/DL
NRBC BLD AUTO-RTO: 0 /100 WBCS
PLATELET # BLD AUTO: 153 THOUSANDS/UL (ref 149–390)
PMV BLD AUTO: 10.2 FL (ref 8.9–12.7)
POTASSIUM SERPL-SCNC: 4.1 MMOL/L (ref 3.5–5.3)
PROT SERPL-MCNC: 5.7 G/DL (ref 6.4–8.4)
RBC # BLD AUTO: 4.05 MILLION/UL (ref 3.88–5.62)
SODIUM SERPL-SCNC: 143 MMOL/L (ref 135–147)
TRIGL SERPL-MCNC: 51 MG/DL
WBC # BLD AUTO: 9.02 THOUSAND/UL (ref 4.31–10.16)

## 2023-03-14 ENCOUNTER — HOME CARE VISIT (OUTPATIENT)
Dept: HOME HEALTH SERVICES | Facility: HOME HEALTHCARE | Age: 83
End: 2023-03-14

## 2023-03-14 VITALS
WEIGHT: 174 LBS | BODY MASS INDEX: 21.75 KG/M2 | RESPIRATION RATE: 20 BRPM | DIASTOLIC BLOOD PRESSURE: 76 MMHG | OXYGEN SATURATION: 92 % | HEART RATE: 63 BPM | TEMPERATURE: 96.4 F | SYSTOLIC BLOOD PRESSURE: 148 MMHG

## 2023-03-15 ENCOUNTER — HOME CARE VISIT (OUTPATIENT)
Dept: HOME HEALTH SERVICES | Facility: HOME HEALTHCARE | Age: 83
End: 2023-03-15

## 2023-03-16 ENCOUNTER — HOME CARE VISIT (OUTPATIENT)
Dept: HOME HEALTH SERVICES | Facility: HOME HEALTHCARE | Age: 83
End: 2023-03-16

## 2023-03-21 ENCOUNTER — HOME CARE VISIT (OUTPATIENT)
Dept: HOME HEALTH SERVICES | Facility: HOME HEALTHCARE | Age: 83
End: 2023-03-21

## 2023-03-23 ENCOUNTER — HOME CARE VISIT (OUTPATIENT)
Dept: HOME HEALTH SERVICES | Facility: HOME HEALTHCARE | Age: 83
End: 2023-03-23

## 2023-03-23 VITALS
SYSTOLIC BLOOD PRESSURE: 138 MMHG | OXYGEN SATURATION: 97 % | HEART RATE: 76 BPM | DIASTOLIC BLOOD PRESSURE: 72 MMHG | RESPIRATION RATE: 18 BRPM | TEMPERATURE: 97 F

## 2023-03-28 ENCOUNTER — HOME CARE VISIT (OUTPATIENT)
Dept: HOME HEALTH SERVICES | Facility: HOME HEALTHCARE | Age: 83
End: 2023-03-28

## 2023-03-28 VITALS
HEART RATE: 76 BPM | DIASTOLIC BLOOD PRESSURE: 80 MMHG | TEMPERATURE: 97.2 F | OXYGEN SATURATION: 99 % | SYSTOLIC BLOOD PRESSURE: 132 MMHG | RESPIRATION RATE: 18 BRPM

## 2023-03-30 RX ORDER — METOPROLOL SUCCINATE 25 MG/1
TABLET, EXTENDED RELEASE ORAL
COMMUNITY
Start: 2023-01-14 | End: 2023-04-04 | Stop reason: ALTCHOICE

## 2023-03-30 RX ORDER — AMLODIPINE BESYLATE 10 MG/1
10 TABLET ORAL DAILY
COMMUNITY
Start: 2022-12-03 | End: 2023-04-04 | Stop reason: ALTCHOICE

## 2023-03-30 RX ORDER — BENZONATATE 100 MG/1
CAPSULE ORAL
COMMUNITY
Start: 2023-02-06 | End: 2023-04-04 | Stop reason: ALTCHOICE

## 2023-03-30 RX ORDER — INSULIN GLARGINE 100 [IU]/ML
INJECTION, SOLUTION SUBCUTANEOUS
COMMUNITY
Start: 2023-03-23

## 2023-03-30 RX ORDER — ALPRAZOLAM 0.25 MG/1
TABLET ORAL
COMMUNITY
Start: 2023-02-23 | End: 2023-04-04 | Stop reason: ALTCHOICE

## 2023-04-04 ENCOUNTER — OFFICE VISIT (OUTPATIENT)
Dept: PULMONOLOGY | Facility: CLINIC | Age: 83
End: 2023-04-04

## 2023-04-04 VITALS
BODY MASS INDEX: 22.5 KG/M2 | RESPIRATION RATE: 18 BRPM | HEART RATE: 70 BPM | DIASTOLIC BLOOD PRESSURE: 98 MMHG | WEIGHT: 181 LBS | OXYGEN SATURATION: 99 % | SYSTOLIC BLOOD PRESSURE: 140 MMHG | HEIGHT: 75 IN | TEMPERATURE: 97 F

## 2023-04-04 DIAGNOSIS — M35.3 POLYMYALGIA RHEUMATICA (HCC): ICD-10-CM

## 2023-04-04 DIAGNOSIS — J43.2 CENTRILOBULAR EMPHYSEMA (HCC): ICD-10-CM

## 2023-04-04 DIAGNOSIS — C25.2 MALIGNANT NEOPLASM OF TAIL OF PANCREAS (HCC): Primary | ICD-10-CM

## 2023-04-04 PROBLEM — N17.9 ACUTE-ON-CHRONIC KIDNEY INJURY (HCC): Status: RESOLVED | Noted: 2017-06-13 | Resolved: 2023-04-04

## 2023-04-04 PROBLEM — J21.0 RSV (ACUTE BRONCHIOLITIS DUE TO RESPIRATORY SYNCYTIAL VIRUS): Status: RESOLVED | Noted: 2023-02-14 | Resolved: 2023-04-04

## 2023-04-04 PROBLEM — E86.0 DEHYDRATION: Status: RESOLVED | Noted: 2023-03-03 | Resolved: 2023-04-04

## 2023-04-04 PROBLEM — N18.9 ACUTE-ON-CHRONIC KIDNEY INJURY (HCC): Status: RESOLVED | Noted: 2017-06-13 | Resolved: 2023-04-04

## 2023-04-04 PROBLEM — J18.9 LEFT LOWER LOBE PNEUMONIA: Status: RESOLVED | Noted: 2022-07-02 | Resolved: 2023-04-04

## 2023-04-04 PROBLEM — G93.40 ACUTE ENCEPHALOPATHY: Status: RESOLVED | Noted: 2023-02-14 | Resolved: 2023-04-04

## 2023-04-04 NOTE — PROGRESS NOTES
"Progress Note - Pulmonary   Jaime Al 82 y o  male MRN: [id number]   Encounter: [id number]      Assessment/Plan:  Patient is an 82-year-old male with past medical history significant for severe COPD, history of tobacco abuse who presents after recent hospitalization  Patient had severe delirium likely in setting of RSV and pneumonia  This is fully resolved and per his wife he is back to his baseline  Patient reports his breathing is doing quite well  He stopped the Trelegy in setting of 2 pneumonias possibly associated with the ICS component  Would recommend avoiding ICS, may use Anoro if an inhaler is needed  He has nor required recently,therefore will hold at this time  Severe COPD  -  Stopped trelegy   -  May resume Anoro if necessary  -  may use albuterol prior to activity              -  Albuterol PRN     Tobacco abuse  -  Quit smoking in 2001  -  No indication for lung cancer screening     Cough  -  slowly improving   -  Worsening in setting of allergies  -  PRN mucinex      PMR  -  managed by rheum  -  Prednisone being tritrated down - currently on 7 5mg daily  -  currenlty 200mg daily of plaquenil    1  Malignant neoplasm of tail of pancreas ([facility] )    2  Centrilobular emphysema ([facility] )    3  [facility] rheumatica [facility] )      Patient may follow up in 12 months or sooner as necessary  Orders:  No orders of the defined types were placed in this encounter  Subjective: The patient was in and out of hospitals for the month of February  He was thought it was initially vascular dementia  It turned out to be delirium caused by brain bleeds, RSV and pneumonia  He was initially admitted to [facility] then sent to [facility] and then returned to [facility]  He has returned to \"99%\" of his baseline per his wife  He notes his breathing has returned to baseline  He felt better when his metoprolol was changed to olmesartan his breathing felt better        He was transitioned from metformin to " "insulin  He stopped taking trelegy  He has pneumonia    Inhaler Regimen:  Trelegy - stopped  Albuterol - 3x/week    Remainder of review of systems negative except as described in HPI  The following portions of the patient's history were reviewed and updated as appropriate: allergies, current medications, past family history, past medical history, past social history, past surgical history and problem list      Objective:   Vitals: Blood pressure 140/98, pulse 70, temperature (!) 97 °F (36 1 °C), temperature source Tympanic, resp  rate 18, height 6' 3\" (1 905 m), weight 82 1 kg (181 lb), SpO2 99 % , RA, Body mass index is 22 62 kg/m²  Physical Exam  Gen: Pleasant, awake, alert, oriented x 3, no acute distress  HEENT: Mucous membranes moist, no oral lesions, no thrush  NECK: No accessory muscle use, JVP not elevated  Cardiac: RRR, single S1, single S2, no murmurs, no rubs, no gallops  Lungs: CTA b/l  Abdomen: normoactive bowel sounds, soft nontender, nondistended, no rebound or rigidity, no guarding  Extremities: no cyanosis, no clubbing, no LE edema  MSK:  Strength equal in all extremities  Derm:  No rashes/lesions noted  Neuro:  Appropriate mood/affect    Labs: I have personally reviewed pertinent lab results  Lab Results   Component Value Date    WBC 9 02 03/13/2023    WBC 7 03 06/08/2015    HGB 12 3 03/13/2023    HGB 13 0 06/08/2015     03/13/2023     06/08/2015     Lab Results   Component Value Date    CREATININE 1 52 (H) 03/13/2023    CREATININE 1 90 (H) 08/20/2015        Imaging and other studies: I have personally reviewed pertinent reports  and I have personally reviewed pertinent films in PACS  CT Chest 2/26/2023  My interpretation:  Left lower lobe infiltrate    Radiology findings:  LUNGS:  Scattered areas of pulmonary scarring  New nodular infiltrate through the deep left lower lobe #4/94 suspicious for pneumonia    Unchanged right basilar rounded atelectasis #2/92  PLEURA:  " Trace right pleural effusion with chronic pleural thickening and pleural calcifications  HEART/GREAT VESSELS: Cardiomegaly  Aortic valve replacement and ascending thoracic aortic repair  Coronary artery calcifications  No significant pericardial effusion  MEDIASTINUM AND KHRIS:  Small hiatal hernia  Pulmonary Function Testing: I have personally reviewed pertinent reports  and I have personally reviewed pertinent films in PACS  7/19/2021:  Severe obstruction  FEV1/FVC Ratio: 42 %  Forced Vital Capacity: 2 89 L    64 % predicted  FEV1: 1 22 L     37 % predicted  Lung volumes by N2 washout:   Total Lung Capacity 84 % predicted   Residual volume 121 % predicted  DLCO corrected for patients hemoglobin level: 63 %    Yanira Mcdaniel

## 2023-05-08 ENCOUNTER — APPOINTMENT (OUTPATIENT)
Dept: LAB | Facility: CLINIC | Age: 83
End: 2023-05-08

## 2023-05-08 DIAGNOSIS — M35.3 POLYMYALGIA RHEUMATICA (HCC): ICD-10-CM

## 2023-05-08 LAB
CRP SERPL QL: <3 MG/L
ERYTHROCYTE [SEDIMENTATION RATE] IN BLOOD: 9 MM/HOUR (ref 0–19)

## 2023-06-05 ENCOUNTER — APPOINTMENT (OUTPATIENT)
Dept: LAB | Facility: CLINIC | Age: 83
End: 2023-06-05
Payer: COMMERCIAL

## 2023-06-05 DIAGNOSIS — M35.3 POLYMYALGIA RHEUMATICA (HCC): ICD-10-CM

## 2023-06-05 LAB
CRP SERPL QL: 4.7 MG/L
ERYTHROCYTE [SEDIMENTATION RATE] IN BLOOD: 11 MM/HOUR (ref 0–19)

## 2023-06-05 PROCEDURE — 85652 RBC SED RATE AUTOMATED: CPT

## 2023-06-05 PROCEDURE — 36415 COLL VENOUS BLD VENIPUNCTURE: CPT

## 2023-06-05 PROCEDURE — 86140 C-REACTIVE PROTEIN: CPT

## 2023-06-16 ENCOUNTER — APPOINTMENT (OUTPATIENT)
Dept: LAB | Facility: CLINIC | Age: 83
End: 2023-06-16
Payer: COMMERCIAL

## 2023-06-16 DIAGNOSIS — E11.9 DIABETES MELLITUS WITHOUT COMPLICATION (HCC): ICD-10-CM

## 2023-06-16 DIAGNOSIS — Z00.00 ROUTINE GENERAL MEDICAL EXAMINATION AT A HEALTH CARE FACILITY: ICD-10-CM

## 2023-06-16 LAB
ALBUMIN SERPL BCP-MCNC: 3.7 G/DL (ref 3.5–5)
ALP SERPL-CCNC: 138 U/L (ref 46–116)
ALT SERPL W P-5'-P-CCNC: 23 U/L (ref 12–78)
ANION GAP SERPL CALCULATED.3IONS-SCNC: 3 MMOL/L (ref 4–13)
AST SERPL W P-5'-P-CCNC: 16 U/L (ref 5–45)
BILIRUB SERPL-MCNC: 1.01 MG/DL (ref 0.2–1)
BUN SERPL-MCNC: 34 MG/DL (ref 5–25)
CALCIUM SERPL-MCNC: 9.9 MG/DL (ref 8.3–10.1)
CHLORIDE SERPL-SCNC: 104 MMOL/L (ref 96–108)
CHOLEST SERPL-MCNC: 144 MG/DL
CO2 SERPL-SCNC: 29 MMOL/L (ref 21–32)
CREAT SERPL-MCNC: 1.67 MG/DL (ref 0.6–1.3)
GFR SERPL CREATININE-BSD FRML MDRD: 37 ML/MIN/1.73SQ M
GLUCOSE P FAST SERPL-MCNC: 146 MG/DL (ref 65–99)
HDLC SERPL-MCNC: 59 MG/DL
LDLC SERPL CALC-MCNC: 71 MG/DL (ref 0–100)
NONHDLC SERPL-MCNC: 85 MG/DL
POTASSIUM SERPL-SCNC: 4.5 MMOL/L (ref 3.5–5.3)
PROT SERPL-MCNC: 6.7 G/DL (ref 6.4–8.4)
SODIUM SERPL-SCNC: 136 MMOL/L (ref 135–147)
TRIGL SERPL-MCNC: 69 MG/DL

## 2023-06-16 PROCEDURE — 83036 HEMOGLOBIN GLYCOSYLATED A1C: CPT

## 2023-06-16 PROCEDURE — 80053 COMPREHEN METABOLIC PANEL: CPT

## 2023-06-16 PROCEDURE — 80061 LIPID PANEL: CPT

## 2023-06-16 PROCEDURE — 36415 COLL VENOUS BLD VENIPUNCTURE: CPT

## 2023-06-17 LAB
EST. AVERAGE GLUCOSE BLD GHB EST-MCNC: 151 MG/DL
HBA1C MFR BLD: 6.9 %

## 2023-08-03 ENCOUNTER — APPOINTMENT (OUTPATIENT)
Dept: LAB | Facility: CLINIC | Age: 83
End: 2023-08-03
Payer: COMMERCIAL

## 2023-08-03 DIAGNOSIS — M35.3 POLYMYALGIA RHEUMATICA (HCC): ICD-10-CM

## 2023-08-03 LAB
CRP SERPL QL: <3 MG/L
ERYTHROCYTE [SEDIMENTATION RATE] IN BLOOD: 7 MM/HOUR (ref 0–19)

## 2023-08-03 PROCEDURE — 86140 C-REACTIVE PROTEIN: CPT

## 2023-08-03 PROCEDURE — 85652 RBC SED RATE AUTOMATED: CPT

## 2023-08-03 PROCEDURE — 36415 COLL VENOUS BLD VENIPUNCTURE: CPT

## 2023-09-12 NOTE — ASSESSMENT & PLAN NOTE
Home medications include Citracal  Likely in the setting of dehydration      Plan-   PTH- Pending   Monitor CMP I did try to look up some stuff over the weekend if there are any other options. Most experts would agree that discipline needs to be at the time of the incident and that there should be lots of positive reinforcement for good behaviors.  I agree with changing .  We need them to intervene when she bites and if they are not willing to do that then they are not helping the process.  Mansi bites at  and not at home so any discipline that they do at home will have zero impact on her at .  Time-outs, distraction, positive reinforcement of good behaviors are all effective forms of discipline.  The previous  doesn't seem to understand that or the normal development of a child Mansi's age.    Biting is a normal developmental problem, just like hitting, kicking, etc.  It is a phase and she will outgrow it, but that doesn't mean that we don't try to change the behavior or encourage/reinforce different responses to frustration, but it needs to be done at the place where the behaviors that we are trying to change are done.

## 2023-09-22 NOTE — PLAN OF CARE
Problem: OCCUPATIONAL THERAPY ADULT  Goal: Performs self-care activities at highest level of function for planned discharge setting  See evaluation for individualized goals  Description: Treatment Interventions: ADL retraining, Functional transfer training, Endurance training, Cognitive reorientation, Patient/family training, Equipment evaluation/education, Compensatory technique education, Activityengagement, Energy conservation          See flowsheet documentation for full assessment, interventions and recommendations  Note: Limitation: Decreased ADL status, Decreased Safe judgement during ADL, Decreased UE strength, Decreased cognition, Decreased endurance, Decreased high-level ADLs, Decreased self-care trans  Prognosis: Good  Assessment: Pt is a 82 y o  male seen for OT evaluation s/p admission to [address] [place] [address] on 2/28/2023 due to AMS  Pt diagnosed with Acute encephalopathy  Pt has a significant PMH impacting occupational performance including: DM, vascular dementia, a fib, HTN, CKD, polymyalgia rheumatica  Pt with 4 recent admissions in the last 2 months  PTA pt at [facility], pt was there <1 day  Prior to rehab stay pt was living with wife in [facility], pt requiring (A) with ADLs and IADLs, (+)falls, (-)drives, intermittent use of SPC at baseline  Pt is motivated to return to home  Personal and environmental factors supporting pt at time of IE include social support and attitude towards recovery  Personal and environmental factors inhibiting engagement in occupations include advanced age, inaccessible home environment, difficulty completing ADLs and difficulty completing IADLs  During evaluation pt performed as is outlined above in flowsheet  Pt required VC for safety and VC for attention to task  Standardized assessments used to assist in identifying performance deficits include AMPAC 6-Clicks and Barthel ADL Index   Performance deficits that affect the pt’s occupational performance during the initial evaluation include impaired balance, functional mobility, endurance, activity tolerance, forward functional reach, trunk control, functional standing tolerance and overall strength, attention to task, safety awareness, insight into deficits, problem solving and learning/remembering new tasks  Based on pt’s functional performance and deficits the following occupations will be addressed in OT treatments in order to maximize pt’s independence and overall occupational performance: grooming, bathing/showering, toileting and toilet hygiene, dressing and functional mobility  Goals are listed below  Upon discharge from acute care setting recommend d/c to [address] [place] [address]       OT Discharge Recommendation: Post acute rehabilitation services (vs home with [facility] pending progress with therapy) Aaron Goldsmith(Attending)

## 2023-09-23 ENCOUNTER — APPOINTMENT (OUTPATIENT)
Dept: LAB | Facility: CLINIC | Age: 83
End: 2023-09-23
Payer: COMMERCIAL

## 2023-09-23 DIAGNOSIS — E11.00 TYPE 2 DIABETES MELLITUS WITH HYPEROSMOLARITY WITHOUT COMA, UNSPECIFIED WHETHER LONG TERM INSULIN USE (HCC): ICD-10-CM

## 2023-09-23 DIAGNOSIS — M35.3 POLYMYALGIA RHEUMATICA (HCC): ICD-10-CM

## 2023-09-23 DIAGNOSIS — E78.5 HYPERLIPIDEMIA, UNSPECIFIED HYPERLIPIDEMIA TYPE: ICD-10-CM

## 2023-09-23 DIAGNOSIS — Z00.00 ROUTINE GENERAL MEDICAL EXAMINATION AT A HEALTH CARE FACILITY: ICD-10-CM

## 2023-09-23 LAB
ALBUMIN SERPL BCP-MCNC: 3.9 G/DL (ref 3.5–5)
ALP SERPL-CCNC: 122 U/L (ref 34–104)
ALT SERPL W P-5'-P-CCNC: 20 U/L (ref 7–52)
ANION GAP SERPL CALCULATED.3IONS-SCNC: 6 MMOL/L
AST SERPL W P-5'-P-CCNC: 21 U/L (ref 13–39)
BASOPHILS # BLD AUTO: 0.05 THOUSANDS/ÂΜL (ref 0–0.1)
BASOPHILS NFR BLD AUTO: 1 % (ref 0–1)
BILIRUB SERPL-MCNC: 0.98 MG/DL (ref 0.2–1)
BUN SERPL-MCNC: 31 MG/DL (ref 5–25)
CALCIUM SERPL-MCNC: 9.7 MG/DL (ref 8.4–10.2)
CHLORIDE SERPL-SCNC: 104 MMOL/L (ref 96–108)
CHOLEST SERPL-MCNC: 123 MG/DL
CO2 SERPL-SCNC: 30 MMOL/L (ref 21–32)
CREAT SERPL-MCNC: 1.94 MG/DL (ref 0.6–1.3)
CRP SERPL QL: 2.2 MG/L
EOSINOPHIL # BLD AUTO: 0.12 THOUSAND/ÂΜL (ref 0–0.61)
EOSINOPHIL NFR BLD AUTO: 1 % (ref 0–6)
ERYTHROCYTE [DISTWIDTH] IN BLOOD BY AUTOMATED COUNT: 14.7 % (ref 11.6–15.1)
ERYTHROCYTE [SEDIMENTATION RATE] IN BLOOD: 12 MM/HOUR (ref 0–19)
EST. AVERAGE GLUCOSE BLD GHB EST-MCNC: 171 MG/DL
GFR SERPL CREATININE-BSD FRML MDRD: 31 ML/MIN/1.73SQ M
GLUCOSE P FAST SERPL-MCNC: 123 MG/DL (ref 65–99)
HBA1C MFR BLD: 7.6 %
HCT VFR BLD AUTO: 40.2 % (ref 36.5–49.3)
HDLC SERPL-MCNC: 51 MG/DL
HGB BLD-MCNC: 12.9 G/DL (ref 12–17)
IMM GRANULOCYTES # BLD AUTO: 0.04 THOUSAND/UL (ref 0–0.2)
IMM GRANULOCYTES NFR BLD AUTO: 1 % (ref 0–2)
LDLC SERPL CALC-MCNC: 62 MG/DL (ref 0–100)
LYMPHOCYTES # BLD AUTO: 0.82 THOUSANDS/ÂΜL (ref 0.6–4.47)
LYMPHOCYTES NFR BLD AUTO: 9 % (ref 14–44)
MCH RBC QN AUTO: 31.9 PG (ref 26.8–34.3)
MCHC RBC AUTO-ENTMCNC: 32.1 G/DL (ref 31.4–37.4)
MCV RBC AUTO: 99 FL (ref 82–98)
MONOCYTES # BLD AUTO: 0.78 THOUSAND/ÂΜL (ref 0.17–1.22)
MONOCYTES NFR BLD AUTO: 9 % (ref 4–12)
NEUTROPHILS # BLD AUTO: 6.87 THOUSANDS/ÂΜL (ref 1.85–7.62)
NEUTS SEG NFR BLD AUTO: 79 % (ref 43–75)
NONHDLC SERPL-MCNC: 72 MG/DL
NRBC BLD AUTO-RTO: 0 /100 WBCS
PLATELET # BLD AUTO: 194 THOUSANDS/UL (ref 149–390)
PMV BLD AUTO: 9.6 FL (ref 8.9–12.7)
POTASSIUM SERPL-SCNC: 5.1 MMOL/L (ref 3.5–5.3)
PROT SERPL-MCNC: 6.2 G/DL (ref 6.4–8.4)
RBC # BLD AUTO: 4.05 MILLION/UL (ref 3.88–5.62)
SODIUM SERPL-SCNC: 140 MMOL/L (ref 135–147)
TRIGL SERPL-MCNC: 48 MG/DL
WBC # BLD AUTO: 8.68 THOUSAND/UL (ref 4.31–10.16)

## 2023-09-23 PROCEDURE — 80061 LIPID PANEL: CPT

## 2023-09-23 PROCEDURE — 80053 COMPREHEN METABOLIC PANEL: CPT

## 2023-09-23 PROCEDURE — 85652 RBC SED RATE AUTOMATED: CPT

## 2023-09-23 PROCEDURE — 86140 C-REACTIVE PROTEIN: CPT

## 2023-09-23 PROCEDURE — 83036 HEMOGLOBIN GLYCOSYLATED A1C: CPT

## 2023-09-23 PROCEDURE — 36415 COLL VENOUS BLD VENIPUNCTURE: CPT

## 2023-09-23 PROCEDURE — 85025 COMPLETE CBC W/AUTO DIFF WBC: CPT

## 2023-12-30 ENCOUNTER — APPOINTMENT (OUTPATIENT)
Dept: LAB | Facility: MEDICAL CENTER | Age: 83
End: 2023-12-30
Payer: COMMERCIAL

## 2023-12-30 DIAGNOSIS — M35.3 POLYMYALGIA RHEUMATICA (HCC): ICD-10-CM

## 2023-12-30 DIAGNOSIS — Z00.8 HEALTH EXAMINATION IN POPULATION SURVEY: ICD-10-CM

## 2023-12-30 DIAGNOSIS — E11.9 DIABETES MELLITUS WITHOUT COMPLICATION (HCC): ICD-10-CM

## 2023-12-30 LAB
ALBUMIN SERPL BCP-MCNC: 4 G/DL (ref 3.5–5)
ALP SERPL-CCNC: 135 U/L (ref 34–104)
ALT SERPL W P-5'-P-CCNC: 20 U/L (ref 7–52)
ANION GAP SERPL CALCULATED.3IONS-SCNC: 7 MMOL/L
AST SERPL W P-5'-P-CCNC: 24 U/L (ref 13–39)
BILIRUB SERPL-MCNC: 0.82 MG/DL (ref 0.2–1)
BUN SERPL-MCNC: 39 MG/DL (ref 5–25)
CALCIUM SERPL-MCNC: 10.5 MG/DL (ref 8.4–10.2)
CHLORIDE SERPL-SCNC: 105 MMOL/L (ref 96–108)
CO2 SERPL-SCNC: 30 MMOL/L (ref 21–32)
CREAT SERPL-MCNC: 1.74 MG/DL (ref 0.6–1.3)
CRP SERPL QL: 2.1 MG/L
ERYTHROCYTE [SEDIMENTATION RATE] IN BLOOD: 11 MM/HOUR (ref 0–19)
GFR SERPL CREATININE-BSD FRML MDRD: 35 ML/MIN/1.73SQ M
GLUCOSE P FAST SERPL-MCNC: 127 MG/DL (ref 65–99)
POTASSIUM SERPL-SCNC: 4.8 MMOL/L (ref 3.5–5.3)
PROT SERPL-MCNC: 6.5 G/DL (ref 6.4–8.4)
SODIUM SERPL-SCNC: 142 MMOL/L (ref 135–147)

## 2023-12-30 PROCEDURE — 80053 COMPREHEN METABOLIC PANEL: CPT

## 2023-12-30 PROCEDURE — 85652 RBC SED RATE AUTOMATED: CPT

## 2023-12-30 PROCEDURE — 86140 C-REACTIVE PROTEIN: CPT

## 2023-12-30 PROCEDURE — 36415 COLL VENOUS BLD VENIPUNCTURE: CPT

## 2024-01-01 ENCOUNTER — APPOINTMENT (INPATIENT)
Dept: RADIOLOGY | Facility: HOSPITAL | Age: 84
DRG: 329 | End: 2024-01-01
Payer: COMMERCIAL

## 2024-01-01 ENCOUNTER — HOSPITAL ENCOUNTER (INPATIENT)
Facility: HOSPITAL | Age: 84
LOS: 24 days | DRG: 329 | End: 2024-07-27
Attending: SURGERY | Admitting: SURGERY
Payer: COMMERCIAL

## 2024-01-01 ENCOUNTER — APPOINTMENT (INPATIENT)
Dept: RADIOLOGY | Facility: HOSPITAL | Age: 84
DRG: 329 | End: 2024-01-01
Attending: INTERNAL MEDICINE
Payer: COMMERCIAL

## 2024-01-01 VITALS
HEIGHT: 74 IN | TEMPERATURE: 97.3 F | OXYGEN SATURATION: 70 % | SYSTOLIC BLOOD PRESSURE: 124 MMHG | BODY MASS INDEX: 26.48 KG/M2 | HEART RATE: 51 BPM | WEIGHT: 206.35 LBS | DIASTOLIC BLOOD PRESSURE: 77 MMHG | RESPIRATION RATE: 20 BRPM

## 2024-01-01 DIAGNOSIS — F01.50 VASCULAR DEMENTIA, UNSPECIFIED DEMENTIA SEVERITY, UNSPECIFIED WHETHER BEHAVIORAL, PSYCHOTIC, OR MOOD DISTURBANCE OR ANXIETY (HCC): ICD-10-CM

## 2024-01-01 DIAGNOSIS — R13.10 DYSPHAGIA, UNSPECIFIED TYPE: ICD-10-CM

## 2024-01-01 DIAGNOSIS — K57.20 COLONIC DIVERTICULAR ABSCESS: ICD-10-CM

## 2024-01-01 DIAGNOSIS — J44.1 CHRONIC OBSTRUCTIVE PULMONARY DISEASE WITH ACUTE EXACERBATION (HCC): ICD-10-CM

## 2024-01-01 DIAGNOSIS — K40.90 LEFT INGUINAL HERNIA: Primary | ICD-10-CM

## 2024-01-01 DIAGNOSIS — R26.2 AMBULATORY DYSFUNCTION: ICD-10-CM

## 2024-01-01 DIAGNOSIS — J96.21 ACUTE ON CHRONIC RESPIRATORY FAILURE WITH HYPOXIA (HCC): ICD-10-CM

## 2024-01-01 DIAGNOSIS — F01.518 VASCULAR DEMENTIA WITH OTHER BEHAVIORAL DISTURBANCE, UNSPECIFIED DEMENTIA SEVERITY (HCC): ICD-10-CM

## 2024-01-01 DIAGNOSIS — Z97.8 FOLEY CATHETER IN PLACE: ICD-10-CM

## 2024-01-01 DIAGNOSIS — I63.9 CEREBROVASCULAR ACCIDENT (CVA), UNSPECIFIED MECHANISM (HCC): ICD-10-CM

## 2024-01-01 LAB
ABO GROUP BLD BPU: NORMAL
ABO GROUP BLD BPU: NORMAL
ABO GROUP BLD: NORMAL
ABO GROUP BLD: NORMAL
ALBUMIN SERPL BCG-MCNC: 2.8 G/DL (ref 3.5–5)
ALP SERPL-CCNC: 86 U/L (ref 34–104)
ALT SERPL W P-5'-P-CCNC: 14 U/L (ref 7–52)
ANION GAP SERPL CALCULATED.3IONS-SCNC: 0 MMOL/L (ref 4–13)
ANION GAP SERPL CALCULATED.3IONS-SCNC: 14 MMOL/L (ref 4–13)
ANION GAP SERPL CALCULATED.3IONS-SCNC: 2 MMOL/L (ref 4–13)
ANION GAP SERPL CALCULATED.3IONS-SCNC: 2 MMOL/L (ref 4–13)
ANION GAP SERPL CALCULATED.3IONS-SCNC: 3 MMOL/L (ref 4–13)
ANION GAP SERPL CALCULATED.3IONS-SCNC: 4 MMOL/L (ref 4–13)
ANION GAP SERPL CALCULATED.3IONS-SCNC: 5 MMOL/L (ref 4–13)
ANION GAP SERPL CALCULATED.3IONS-SCNC: 5 MMOL/L (ref 4–13)
ANION GAP SERPL CALCULATED.3IONS-SCNC: 6 MMOL/L (ref 4–13)
ANION GAP SERPL CALCULATED.3IONS-SCNC: 7 MMOL/L (ref 4–13)
ANISOCYTOSIS BLD QL SMEAR: PRESENT
ANISOCYTOSIS BLD QL SMEAR: PRESENT
APTT PPP: 102 SECONDS (ref 23–37)
APTT PPP: 37 SECONDS (ref 23–37)
APTT PPP: 51 SECONDS (ref 23–37)
APTT PPP: 53 SECONDS (ref 23–37)
APTT PPP: 60 SECONDS (ref 23–37)
APTT PPP: 71 SECONDS (ref 23–37)
APTT PPP: 80 SECONDS (ref 23–37)
APTT PPP: 82 SECONDS (ref 23–37)
APTT PPP: 82 SECONDS (ref 23–37)
ARTERIAL PATENCY WRIST A: YES
AST SERPL W P-5'-P-CCNC: 21 U/L (ref 13–39)
ATRIAL RATE: 120 BPM
ATRIAL RATE: 202 BPM
BACTERIA BLD CULT: NORMAL
BACTERIA BLD CULT: NORMAL
BACTERIA SPEC ANAEROBE CULT: ABNORMAL
BACTERIA TISS AEROBE CULT: ABNORMAL
BACTERIA TISS AEROBE CULT: NO GROWTH
BACTERIA UR QL AUTO: ABNORMAL /HPF
BACTERIA UR QL AUTO: ABNORMAL /HPF
BASE EXCESS BLDA CALC-SCNC: 2 MMOL/L (ref -2–3)
BASE EXCESS BLDA CALC-SCNC: 4 MMOL/L (ref -2–3)
BASE EXCESS BLDA CALC-SCNC: 5.2 MMOL/L
BASE EXCESS BLDA CALC-SCNC: 9 MMOL/L (ref -2–3)
BASOPHILS # BLD AUTO: 0.03 THOUSANDS/ÂΜL (ref 0–0.1)
BASOPHILS # BLD AUTO: 0.03 THOUSANDS/ÂΜL (ref 0–0.1)
BASOPHILS # BLD AUTO: 0.04 THOUSANDS/ÂΜL (ref 0–0.1)
BASOPHILS # BLD AUTO: 0.04 THOUSANDS/ÂΜL (ref 0–0.1)
BASOPHILS # BLD AUTO: 0.05 THOUSANDS/ÂΜL (ref 0–0.1)
BASOPHILS # BLD AUTO: 0.06 THOUSANDS/ÂΜL (ref 0–0.1)
BASOPHILS # BLD AUTO: 0.06 THOUSANDS/ÂΜL (ref 0–0.1)
BASOPHILS # BLD AUTO: 0.07 THOUSANDS/ÂΜL (ref 0–0.1)
BASOPHILS # BLD AUTO: 0.08 THOUSANDS/ÂΜL (ref 0–0.1)
BASOPHILS # BLD AUTO: 0.09 THOUSANDS/ÂΜL (ref 0–0.1)
BASOPHILS # BLD MANUAL: 0 THOUSAND/UL (ref 0–0.1)
BASOPHILS NFR BLD AUTO: 0 % (ref 0–1)
BASOPHILS NFR BLD AUTO: 1 % (ref 0–1)
BASOPHILS NFR MAR MANUAL: 0 % (ref 0–1)
BILIRUB SERPL-MCNC: 1.04 MG/DL (ref 0.2–1)
BILIRUB UR QL STRIP: NEGATIVE
BILIRUB UR QL STRIP: NEGATIVE
BLD GP AB SCN SERPL QL: NEGATIVE
BPU ID: NORMAL
BPU ID: NORMAL
BUN SERPL-MCNC: 19 MG/DL (ref 5–25)
BUN SERPL-MCNC: 20 MG/DL (ref 5–25)
BUN SERPL-MCNC: 21 MG/DL (ref 5–25)
BUN SERPL-MCNC: 22 MG/DL (ref 5–25)
BUN SERPL-MCNC: 22 MG/DL (ref 5–25)
BUN SERPL-MCNC: 23 MG/DL (ref 5–25)
BUN SERPL-MCNC: 25 MG/DL (ref 5–25)
BUN SERPL-MCNC: 26 MG/DL (ref 5–25)
BUN SERPL-MCNC: 27 MG/DL (ref 5–25)
BUN SERPL-MCNC: 27 MG/DL (ref 5–25)
BUN SERPL-MCNC: 30 MG/DL (ref 5–25)
BUN SERPL-MCNC: 30 MG/DL (ref 5–25)
BUN SERPL-MCNC: 31 MG/DL (ref 5–25)
BUN SERPL-MCNC: 31 MG/DL (ref 5–25)
BUN SERPL-MCNC: 34 MG/DL (ref 5–25)
BUN SERPL-MCNC: 35 MG/DL (ref 5–25)
BUN SERPL-MCNC: 36 MG/DL (ref 5–25)
BUN SERPL-MCNC: 38 MG/DL (ref 5–25)
BUN SERPL-MCNC: 40 MG/DL (ref 5–25)
CA-I BLD-SCNC: 1.49 MMOL/L (ref 1.12–1.32)
CA-I BLD-SCNC: 1.62 MMOL/L (ref 1.12–1.32)
CA-I BLD-SCNC: 1.68 MMOL/L (ref 1.12–1.32)
CALCIUM ALBUM COR SERPL-MCNC: 9.7 MG/DL (ref 8.3–10.1)
CALCIUM SERPL-MCNC: 10 MG/DL (ref 8.4–10.2)
CALCIUM SERPL-MCNC: 10 MG/DL (ref 8.4–10.2)
CALCIUM SERPL-MCNC: 10.1 MG/DL (ref 8.4–10.2)
CALCIUM SERPL-MCNC: 10.2 MG/DL (ref 8.4–10.2)
CALCIUM SERPL-MCNC: 8.3 MG/DL (ref 8.4–10.2)
CALCIUM SERPL-MCNC: 8.7 MG/DL (ref 8.4–10.2)
CALCIUM SERPL-MCNC: 8.8 MG/DL (ref 8.4–10.2)
CALCIUM SERPL-MCNC: 9.3 MG/DL (ref 8.4–10.2)
CALCIUM SERPL-MCNC: 9.3 MG/DL (ref 8.4–10.2)
CALCIUM SERPL-MCNC: 9.5 MG/DL (ref 8.4–10.2)
CALCIUM SERPL-MCNC: 9.5 MG/DL (ref 8.4–10.2)
CALCIUM SERPL-MCNC: 9.6 MG/DL (ref 8.4–10.2)
CALCIUM SERPL-MCNC: 9.7 MG/DL (ref 8.4–10.2)
CALCIUM SERPL-MCNC: 9.7 MG/DL (ref 8.4–10.2)
CALCIUM SERPL-MCNC: 9.8 MG/DL (ref 8.4–10.2)
CALCIUM SERPL-MCNC: 9.9 MG/DL (ref 8.4–10.2)
CAOX CRY URNS QL MICRO: ABNORMAL /HPF
CHLORIDE SERPL-SCNC: 101 MMOL/L (ref 96–108)
CHLORIDE SERPL-SCNC: 101 MMOL/L (ref 96–108)
CHLORIDE SERPL-SCNC: 102 MMOL/L (ref 96–108)
CHLORIDE SERPL-SCNC: 106 MMOL/L (ref 96–108)
CHLORIDE SERPL-SCNC: 107 MMOL/L (ref 96–108)
CHLORIDE SERPL-SCNC: 108 MMOL/L (ref 96–108)
CHLORIDE SERPL-SCNC: 109 MMOL/L (ref 96–108)
CHLORIDE SERPL-SCNC: 110 MMOL/L (ref 96–108)
CHLORIDE SERPL-SCNC: 111 MMOL/L (ref 96–108)
CHLORIDE SERPL-SCNC: 96 MMOL/L (ref 96–108)
CLARITY UR: ABNORMAL
CLARITY UR: CLEAR
CO2 SERPL-SCNC: 25 MMOL/L (ref 21–32)
CO2 SERPL-SCNC: 25 MMOL/L (ref 21–32)
CO2 SERPL-SCNC: 27 MMOL/L (ref 21–32)
CO2 SERPL-SCNC: 28 MMOL/L (ref 21–32)
CO2 SERPL-SCNC: 29 MMOL/L (ref 21–32)
CO2 SERPL-SCNC: 30 MMOL/L (ref 21–32)
CO2 SERPL-SCNC: 31 MMOL/L (ref 21–32)
CO2 SERPL-SCNC: 32 MMOL/L (ref 21–32)
CO2 SERPL-SCNC: 33 MMOL/L (ref 21–32)
CO2 SERPL-SCNC: 33 MMOL/L (ref 21–32)
CO2 SERPL-SCNC: 34 MMOL/L (ref 21–32)
CO2 SERPL-SCNC: 35 MMOL/L (ref 21–32)
COLOR UR: ABNORMAL
COLOR UR: YELLOW
CREAT SERPL-MCNC: 1.08 MG/DL (ref 0.6–1.3)
CREAT SERPL-MCNC: 1.1 MG/DL (ref 0.6–1.3)
CREAT SERPL-MCNC: 1.11 MG/DL (ref 0.6–1.3)
CREAT SERPL-MCNC: 1.12 MG/DL (ref 0.6–1.3)
CREAT SERPL-MCNC: 1.12 MG/DL (ref 0.6–1.3)
CREAT SERPL-MCNC: 1.13 MG/DL (ref 0.6–1.3)
CREAT SERPL-MCNC: 1.13 MG/DL (ref 0.6–1.3)
CREAT SERPL-MCNC: 1.15 MG/DL (ref 0.6–1.3)
CREAT SERPL-MCNC: 1.18 MG/DL (ref 0.6–1.3)
CREAT SERPL-MCNC: 1.19 MG/DL (ref 0.6–1.3)
CREAT SERPL-MCNC: 1.2 MG/DL (ref 0.6–1.3)
CREAT SERPL-MCNC: 1.22 MG/DL (ref 0.6–1.3)
CREAT SERPL-MCNC: 1.24 MG/DL (ref 0.6–1.3)
CREAT SERPL-MCNC: 1.25 MG/DL (ref 0.6–1.3)
CREAT SERPL-MCNC: 1.26 MG/DL (ref 0.6–1.3)
CREAT SERPL-MCNC: 1.31 MG/DL (ref 0.6–1.3)
CREAT SERPL-MCNC: 1.33 MG/DL (ref 0.6–1.3)
CREAT SERPL-MCNC: 1.34 MG/DL (ref 0.6–1.3)
CREAT SERPL-MCNC: 1.35 MG/DL (ref 0.6–1.3)
CREAT SERPL-MCNC: 1.39 MG/DL (ref 0.6–1.3)
CREAT SERPL-MCNC: 1.44 MG/DL (ref 0.6–1.3)
CREAT SERPL-MCNC: 1.45 MG/DL (ref 0.6–1.3)
CREAT SERPL-MCNC: 1.59 MG/DL (ref 0.6–1.3)
CROSSMATCH: NORMAL
CROSSMATCH: NORMAL
EOSINOPHIL # BLD AUTO: 0 THOUSAND/ÂΜL (ref 0–0.61)
EOSINOPHIL # BLD AUTO: 0.04 THOUSAND/ÂΜL (ref 0–0.61)
EOSINOPHIL # BLD AUTO: 0.05 THOUSAND/ÂΜL (ref 0–0.61)
EOSINOPHIL # BLD AUTO: 0.07 THOUSAND/ÂΜL (ref 0–0.61)
EOSINOPHIL # BLD AUTO: 0.09 THOUSAND/ÂΜL (ref 0–0.61)
EOSINOPHIL # BLD AUTO: 0.1 THOUSAND/ÂΜL (ref 0–0.61)
EOSINOPHIL # BLD AUTO: 0.11 THOUSAND/ÂΜL (ref 0–0.61)
EOSINOPHIL # BLD AUTO: 0.11 THOUSAND/ÂΜL (ref 0–0.61)
EOSINOPHIL # BLD AUTO: 0.12 THOUSAND/ÂΜL (ref 0–0.61)
EOSINOPHIL # BLD AUTO: 0.12 THOUSAND/ÂΜL (ref 0–0.61)
EOSINOPHIL # BLD AUTO: 0.13 THOUSAND/ÂΜL (ref 0–0.61)
EOSINOPHIL # BLD AUTO: 0.13 THOUSAND/ÂΜL (ref 0–0.61)
EOSINOPHIL # BLD AUTO: 0.15 THOUSAND/ÂΜL (ref 0–0.61)
EOSINOPHIL # BLD AUTO: 0.15 THOUSAND/ÂΜL (ref 0–0.61)
EOSINOPHIL # BLD AUTO: 0.18 THOUSAND/ÂΜL (ref 0–0.61)
EOSINOPHIL # BLD AUTO: 0.19 THOUSAND/ÂΜL (ref 0–0.61)
EOSINOPHIL # BLD AUTO: 0.19 THOUSAND/ÂΜL (ref 0–0.61)
EOSINOPHIL # BLD MANUAL: 0.19 THOUSAND/UL (ref 0–0.4)
EOSINOPHIL NFR BLD AUTO: 0 % (ref 0–6)
EOSINOPHIL NFR BLD AUTO: 1 % (ref 0–6)
EOSINOPHIL NFR BLD AUTO: 2 % (ref 0–6)
EOSINOPHIL NFR BLD MANUAL: 2 % (ref 0–6)
ERYTHROCYTE [DISTWIDTH] IN BLOOD BY AUTOMATED COUNT: 15.2 % (ref 11.6–15.1)
ERYTHROCYTE [DISTWIDTH] IN BLOOD BY AUTOMATED COUNT: 15.3 % (ref 11.6–15.1)
ERYTHROCYTE [DISTWIDTH] IN BLOOD BY AUTOMATED COUNT: 15.3 % (ref 11.6–15.1)
ERYTHROCYTE [DISTWIDTH] IN BLOOD BY AUTOMATED COUNT: 15.4 % (ref 11.6–15.1)
ERYTHROCYTE [DISTWIDTH] IN BLOOD BY AUTOMATED COUNT: 15.5 % (ref 11.6–15.1)
ERYTHROCYTE [DISTWIDTH] IN BLOOD BY AUTOMATED COUNT: 15.6 % (ref 11.6–15.1)
ERYTHROCYTE [DISTWIDTH] IN BLOOD BY AUTOMATED COUNT: 15.6 % (ref 11.6–15.1)
ERYTHROCYTE [DISTWIDTH] IN BLOOD BY AUTOMATED COUNT: 15.7 % (ref 11.6–15.1)
ERYTHROCYTE [DISTWIDTH] IN BLOOD BY AUTOMATED COUNT: 15.8 % (ref 11.6–15.1)
ERYTHROCYTE [DISTWIDTH] IN BLOOD BY AUTOMATED COUNT: 15.8 % (ref 11.6–15.1)
ERYTHROCYTE [DISTWIDTH] IN BLOOD BY AUTOMATED COUNT: 16.3 % (ref 11.6–15.1)
ERYTHROCYTE [DISTWIDTH] IN BLOOD BY AUTOMATED COUNT: 16.8 % (ref 11.6–15.1)
ERYTHROCYTE [DISTWIDTH] IN BLOOD BY AUTOMATED COUNT: 17.3 % (ref 11.6–15.1)
ERYTHROCYTE [DISTWIDTH] IN BLOOD BY AUTOMATED COUNT: 17.4 % (ref 11.6–15.1)
ERYTHROCYTE [DISTWIDTH] IN BLOOD BY AUTOMATED COUNT: 18.1 % (ref 11.6–15.1)
ERYTHROCYTE [DISTWIDTH] IN BLOOD BY AUTOMATED COUNT: 18.5 % (ref 11.6–15.1)
ERYTHROCYTE [DISTWIDTH] IN BLOOD BY AUTOMATED COUNT: 18.6 % (ref 11.6–15.1)
ERYTHROCYTE [DISTWIDTH] IN BLOOD BY AUTOMATED COUNT: 18.6 % (ref 11.6–15.1)
ERYTHROCYTE [DISTWIDTH] IN BLOOD BY AUTOMATED COUNT: 19.1 % (ref 11.6–15.1)
ERYTHROCYTE [DISTWIDTH] IN BLOOD BY AUTOMATED COUNT: 19.9 % (ref 11.6–15.1)
ERYTHROCYTE [DISTWIDTH] IN BLOOD BY AUTOMATED COUNT: 19.9 % (ref 11.6–15.1)
GFR SERPL CREATININE-BSD FRML MDRD: 39 ML/MIN/1.73SQ M
GFR SERPL CREATININE-BSD FRML MDRD: 44 ML/MIN/1.73SQ M
GFR SERPL CREATININE-BSD FRML MDRD: 44 ML/MIN/1.73SQ M
GFR SERPL CREATININE-BSD FRML MDRD: 46 ML/MIN/1.73SQ M
GFR SERPL CREATININE-BSD FRML MDRD: 48 ML/MIN/1.73SQ M
GFR SERPL CREATININE-BSD FRML MDRD: 48 ML/MIN/1.73SQ M
GFR SERPL CREATININE-BSD FRML MDRD: 49 ML/MIN/1.73SQ M
GFR SERPL CREATININE-BSD FRML MDRD: 50 ML/MIN/1.73SQ M
GFR SERPL CREATININE-BSD FRML MDRD: 52 ML/MIN/1.73SQ M
GFR SERPL CREATININE-BSD FRML MDRD: 53 ML/MIN/1.73SQ M
GFR SERPL CREATININE-BSD FRML MDRD: 53 ML/MIN/1.73SQ M
GFR SERPL CREATININE-BSD FRML MDRD: 54 ML/MIN/1.73SQ M
GFR SERPL CREATININE-BSD FRML MDRD: 55 ML/MIN/1.73SQ M
GFR SERPL CREATININE-BSD FRML MDRD: 56 ML/MIN/1.73SQ M
GFR SERPL CREATININE-BSD FRML MDRD: 57 ML/MIN/1.73SQ M
GFR SERPL CREATININE-BSD FRML MDRD: 58 ML/MIN/1.73SQ M
GFR SERPL CREATININE-BSD FRML MDRD: 60 ML/MIN/1.73SQ M
GFR SERPL CREATININE-BSD FRML MDRD: 61 ML/MIN/1.73SQ M
GFR SERPL CREATININE-BSD FRML MDRD: 62 ML/MIN/1.73SQ M
GFR SERPL CREATININE-BSD FRML MDRD: 63 ML/MIN/1.73SQ M
GLUCOSE SERPL-MCNC: 100 MG/DL (ref 65–140)
GLUCOSE SERPL-MCNC: 101 MG/DL (ref 65–140)
GLUCOSE SERPL-MCNC: 105 MG/DL (ref 65–140)
GLUCOSE SERPL-MCNC: 105 MG/DL (ref 65–140)
GLUCOSE SERPL-MCNC: 106 MG/DL (ref 65–140)
GLUCOSE SERPL-MCNC: 107 MG/DL (ref 65–140)
GLUCOSE SERPL-MCNC: 107 MG/DL (ref 65–140)
GLUCOSE SERPL-MCNC: 109 MG/DL (ref 65–140)
GLUCOSE SERPL-MCNC: 111 MG/DL (ref 65–140)
GLUCOSE SERPL-MCNC: 113 MG/DL (ref 65–140)
GLUCOSE SERPL-MCNC: 114 MG/DL (ref 65–140)
GLUCOSE SERPL-MCNC: 114 MG/DL (ref 65–140)
GLUCOSE SERPL-MCNC: 116 MG/DL (ref 65–140)
GLUCOSE SERPL-MCNC: 116 MG/DL (ref 65–140)
GLUCOSE SERPL-MCNC: 118 MG/DL (ref 65–140)
GLUCOSE SERPL-MCNC: 118 MG/DL (ref 65–140)
GLUCOSE SERPL-MCNC: 120 MG/DL (ref 65–140)
GLUCOSE SERPL-MCNC: 120 MG/DL (ref 65–140)
GLUCOSE SERPL-MCNC: 121 MG/DL (ref 65–140)
GLUCOSE SERPL-MCNC: 122 MG/DL (ref 65–140)
GLUCOSE SERPL-MCNC: 123 MG/DL (ref 65–140)
GLUCOSE SERPL-MCNC: 123 MG/DL (ref 65–140)
GLUCOSE SERPL-MCNC: 124 MG/DL (ref 65–140)
GLUCOSE SERPL-MCNC: 125 MG/DL (ref 65–140)
GLUCOSE SERPL-MCNC: 127 MG/DL (ref 65–140)
GLUCOSE SERPL-MCNC: 127 MG/DL (ref 65–140)
GLUCOSE SERPL-MCNC: 128 MG/DL (ref 65–140)
GLUCOSE SERPL-MCNC: 129 MG/DL (ref 65–140)
GLUCOSE SERPL-MCNC: 130 MG/DL (ref 65–140)
GLUCOSE SERPL-MCNC: 131 MG/DL (ref 65–140)
GLUCOSE SERPL-MCNC: 132 MG/DL (ref 65–140)
GLUCOSE SERPL-MCNC: 133 MG/DL (ref 65–140)
GLUCOSE SERPL-MCNC: 133 MG/DL (ref 65–140)
GLUCOSE SERPL-MCNC: 134 MG/DL (ref 65–140)
GLUCOSE SERPL-MCNC: 134 MG/DL (ref 65–140)
GLUCOSE SERPL-MCNC: 135 MG/DL (ref 65–140)
GLUCOSE SERPL-MCNC: 136 MG/DL (ref 65–140)
GLUCOSE SERPL-MCNC: 137 MG/DL (ref 65–140)
GLUCOSE SERPL-MCNC: 138 MG/DL (ref 65–140)
GLUCOSE SERPL-MCNC: 138 MG/DL (ref 65–140)
GLUCOSE SERPL-MCNC: 139 MG/DL (ref 65–140)
GLUCOSE SERPL-MCNC: 139 MG/DL (ref 65–140)
GLUCOSE SERPL-MCNC: 140 MG/DL (ref 65–140)
GLUCOSE SERPL-MCNC: 140 MG/DL (ref 65–140)
GLUCOSE SERPL-MCNC: 141 MG/DL (ref 65–140)
GLUCOSE SERPL-MCNC: 143 MG/DL (ref 65–140)
GLUCOSE SERPL-MCNC: 144 MG/DL (ref 65–140)
GLUCOSE SERPL-MCNC: 145 MG/DL (ref 65–140)
GLUCOSE SERPL-MCNC: 146 MG/DL (ref 65–140)
GLUCOSE SERPL-MCNC: 147 MG/DL (ref 65–140)
GLUCOSE SERPL-MCNC: 148 MG/DL (ref 65–140)
GLUCOSE SERPL-MCNC: 149 MG/DL (ref 65–140)
GLUCOSE SERPL-MCNC: 149 MG/DL (ref 65–140)
GLUCOSE SERPL-MCNC: 150 MG/DL (ref 65–140)
GLUCOSE SERPL-MCNC: 152 MG/DL (ref 65–140)
GLUCOSE SERPL-MCNC: 154 MG/DL (ref 65–140)
GLUCOSE SERPL-MCNC: 156 MG/DL (ref 65–140)
GLUCOSE SERPL-MCNC: 158 MG/DL (ref 65–140)
GLUCOSE SERPL-MCNC: 158 MG/DL (ref 65–140)
GLUCOSE SERPL-MCNC: 159 MG/DL (ref 65–140)
GLUCOSE SERPL-MCNC: 160 MG/DL (ref 65–140)
GLUCOSE SERPL-MCNC: 163 MG/DL (ref 65–140)
GLUCOSE SERPL-MCNC: 164 MG/DL (ref 65–140)
GLUCOSE SERPL-MCNC: 164 MG/DL (ref 65–140)
GLUCOSE SERPL-MCNC: 165 MG/DL (ref 65–140)
GLUCOSE SERPL-MCNC: 167 MG/DL (ref 65–140)
GLUCOSE SERPL-MCNC: 167 MG/DL (ref 65–140)
GLUCOSE SERPL-MCNC: 173 MG/DL (ref 65–140)
GLUCOSE SERPL-MCNC: 175 MG/DL (ref 65–140)
GLUCOSE SERPL-MCNC: 176 MG/DL (ref 65–140)
GLUCOSE SERPL-MCNC: 179 MG/DL (ref 65–140)
GLUCOSE SERPL-MCNC: 181 MG/DL (ref 65–140)
GLUCOSE SERPL-MCNC: 183 MG/DL (ref 65–140)
GLUCOSE SERPL-MCNC: 188 MG/DL (ref 65–140)
GLUCOSE SERPL-MCNC: 190 MG/DL (ref 65–140)
GLUCOSE SERPL-MCNC: 191 MG/DL (ref 65–140)
GLUCOSE SERPL-MCNC: 227 MG/DL (ref 65–140)
GLUCOSE SERPL-MCNC: 67 MG/DL (ref 65–140)
GLUCOSE SERPL-MCNC: 74 MG/DL (ref 65–140)
GLUCOSE SERPL-MCNC: 75 MG/DL (ref 65–140)
GLUCOSE SERPL-MCNC: 75 MG/DL (ref 65–140)
GLUCOSE SERPL-MCNC: 78 MG/DL (ref 65–140)
GLUCOSE SERPL-MCNC: 78 MG/DL (ref 65–140)
GLUCOSE SERPL-MCNC: 85 MG/DL (ref 65–140)
GLUCOSE SERPL-MCNC: 85 MG/DL (ref 65–140)
GLUCOSE SERPL-MCNC: 95 MG/DL (ref 65–140)
GLUCOSE SERPL-MCNC: 98 MG/DL (ref 65–140)
GLUCOSE UR STRIP-MCNC: NEGATIVE MG/DL
GLUCOSE UR STRIP-MCNC: NEGATIVE MG/DL
GRAM STN SPEC: ABNORMAL
HCO3 BLDA-SCNC: 30.3 MMOL/L (ref 22–28)
HCO3 BLDA-SCNC: 30.6 MMOL/L (ref 22–28)
HCO3 BLDA-SCNC: 31.6 MMOL/L (ref 22–28)
HCO3 BLDA-SCNC: 36 MMOL/L (ref 22–28)
HCT VFR BLD AUTO: 23.2 % (ref 36.5–49.3)
HCT VFR BLD AUTO: 23.3 % (ref 36.5–49.3)
HCT VFR BLD AUTO: 23.4 % (ref 36.5–49.3)
HCT VFR BLD AUTO: 23.8 % (ref 36.5–49.3)
HCT VFR BLD AUTO: 24 % (ref 36.5–49.3)
HCT VFR BLD AUTO: 24.1 % (ref 36.5–49.3)
HCT VFR BLD AUTO: 24.9 % (ref 36.5–49.3)
HCT VFR BLD AUTO: 25.3 % (ref 36.5–49.3)
HCT VFR BLD AUTO: 25.5 % (ref 36.5–49.3)
HCT VFR BLD AUTO: 25.6 % (ref 36.5–49.3)
HCT VFR BLD AUTO: 25.9 % (ref 36.5–49.3)
HCT VFR BLD AUTO: 25.9 % (ref 36.5–49.3)
HCT VFR BLD AUTO: 26.2 % (ref 36.5–49.3)
HCT VFR BLD AUTO: 26.7 % (ref 36.5–49.3)
HCT VFR BLD AUTO: 26.9 % (ref 36.5–49.3)
HCT VFR BLD AUTO: 27.1 % (ref 36.5–49.3)
HCT VFR BLD AUTO: 27.3 % (ref 36.5–49.3)
HCT VFR BLD AUTO: 27.6 % (ref 36.5–49.3)
HCT VFR BLD AUTO: 27.6 % (ref 36.5–49.3)
HCT VFR BLD AUTO: 27.8 % (ref 36.5–49.3)
HCT VFR BLD AUTO: 28 % (ref 36.5–49.3)
HCT VFR BLD AUTO: 28.5 % (ref 36.5–49.3)
HCT VFR BLD AUTO: 30.4 % (ref 36.5–49.3)
HCT VFR BLD AUTO: 33.2 % (ref 36.5–49.3)
HCT VFR BLD AUTO: 33.5 % (ref 36.5–49.3)
HCT VFR BLD CALC: 21 % (ref 36.5–49.3)
HCT VFR BLD CALC: 24 % (ref 36.5–49.3)
HCT VFR BLD CALC: 25 % (ref 36.5–49.3)
HGB BLD-MCNC: 10.6 G/DL (ref 12–17)
HGB BLD-MCNC: 10.6 G/DL (ref 12–17)
HGB BLD-MCNC: 7 G/DL (ref 12–17)
HGB BLD-MCNC: 7.1 G/DL (ref 12–17)
HGB BLD-MCNC: 7.1 G/DL (ref 12–17)
HGB BLD-MCNC: 7.2 G/DL (ref 12–17)
HGB BLD-MCNC: 7.3 G/DL (ref 12–17)
HGB BLD-MCNC: 7.3 G/DL (ref 12–17)
HGB BLD-MCNC: 7.5 G/DL (ref 12–17)
HGB BLD-MCNC: 7.5 G/DL (ref 12–17)
HGB BLD-MCNC: 7.8 G/DL (ref 12–17)
HGB BLD-MCNC: 7.9 G/DL (ref 12–17)
HGB BLD-MCNC: 8 G/DL (ref 12–17)
HGB BLD-MCNC: 8 G/DL (ref 12–17)
HGB BLD-MCNC: 8.1 G/DL (ref 12–17)
HGB BLD-MCNC: 8.1 G/DL (ref 12–17)
HGB BLD-MCNC: 8.4 G/DL (ref 12–17)
HGB BLD-MCNC: 8.5 G/DL (ref 12–17)
HGB BLD-MCNC: 8.6 G/DL (ref 12–17)
HGB BLD-MCNC: 8.8 G/DL (ref 12–17)
HGB BLD-MCNC: 8.8 G/DL (ref 12–17)
HGB BLD-MCNC: 8.9 G/DL (ref 12–17)
HGB BLD-MCNC: 9.4 G/DL (ref 12–17)
HGB BLDA-MCNC: 7.1 G/DL (ref 12–17)
HGB BLDA-MCNC: 8.2 G/DL (ref 12–17)
HGB BLDA-MCNC: 8.5 G/DL (ref 12–17)
HGB UR QL STRIP.AUTO: ABNORMAL
HGB UR QL STRIP.AUTO: ABNORMAL
HYPERCHROMIA BLD QL SMEAR: PRESENT
IMM GRANULOCYTES # BLD AUTO: 0.04 THOUSAND/UL (ref 0–0.2)
IMM GRANULOCYTES # BLD AUTO: 0.04 THOUSAND/UL (ref 0–0.2)
IMM GRANULOCYTES # BLD AUTO: 0.06 THOUSAND/UL (ref 0–0.2)
IMM GRANULOCYTES # BLD AUTO: 0.07 THOUSAND/UL (ref 0–0.2)
IMM GRANULOCYTES # BLD AUTO: 0.07 THOUSAND/UL (ref 0–0.2)
IMM GRANULOCYTES # BLD AUTO: 0.09 THOUSAND/UL (ref 0–0.2)
IMM GRANULOCYTES # BLD AUTO: 0.1 THOUSAND/UL (ref 0–0.2)
IMM GRANULOCYTES # BLD AUTO: 0.11 THOUSAND/UL (ref 0–0.2)
IMM GRANULOCYTES # BLD AUTO: 0.11 THOUSAND/UL (ref 0–0.2)
IMM GRANULOCYTES # BLD AUTO: 0.12 THOUSAND/UL (ref 0–0.2)
IMM GRANULOCYTES # BLD AUTO: 0.18 THOUSAND/UL (ref 0–0.2)
IMM GRANULOCYTES # BLD AUTO: 0.19 THOUSAND/UL (ref 0–0.2)
IMM GRANULOCYTES # BLD AUTO: 0.19 THOUSAND/UL (ref 0–0.2)
IMM GRANULOCYTES # BLD AUTO: 0.22 THOUSAND/UL (ref 0–0.2)
IMM GRANULOCYTES # BLD AUTO: 0.31 THOUSAND/UL (ref 0–0.2)
IMM GRANULOCYTES NFR BLD AUTO: 0 % (ref 0–2)
IMM GRANULOCYTES NFR BLD AUTO: 1 % (ref 0–2)
IMM GRANULOCYTES NFR BLD AUTO: 2 % (ref 0–2)
INR PPP: 1.61 (ref 0.84–1.19)
IPAP: 18
KETONES UR STRIP-MCNC: NEGATIVE MG/DL
KETONES UR STRIP-MCNC: NEGATIVE MG/DL
LACTATE SERPL-SCNC: 1.3 MMOL/L (ref 0.5–2)
LEUKOCYTE ESTERASE UR QL STRIP: ABNORMAL
LEUKOCYTE ESTERASE UR QL STRIP: ABNORMAL
LYMPHOCYTES # BLD AUTO: 0.35 THOUSANDS/ÂΜL (ref 0.6–4.47)
LYMPHOCYTES # BLD AUTO: 0.38 THOUSAND/UL (ref 0.6–4.47)
LYMPHOCYTES # BLD AUTO: 0.5 THOUSANDS/ÂΜL (ref 0.6–4.47)
LYMPHOCYTES # BLD AUTO: 0.54 THOUSANDS/ÂΜL (ref 0.6–4.47)
LYMPHOCYTES # BLD AUTO: 0.64 THOUSANDS/ÂΜL (ref 0.6–4.47)
LYMPHOCYTES # BLD AUTO: 0.67 THOUSANDS/ÂΜL (ref 0.6–4.47)
LYMPHOCYTES # BLD AUTO: 0.7 THOUSANDS/ÂΜL (ref 0.6–4.47)
LYMPHOCYTES # BLD AUTO: 0.72 THOUSANDS/ÂΜL (ref 0.6–4.47)
LYMPHOCYTES # BLD AUTO: 0.73 THOUSANDS/ÂΜL (ref 0.6–4.47)
LYMPHOCYTES # BLD AUTO: 0.74 THOUSANDS/ÂΜL (ref 0.6–4.47)
LYMPHOCYTES # BLD AUTO: 0.75 THOUSANDS/ÂΜL (ref 0.6–4.47)
LYMPHOCYTES # BLD AUTO: 0.76 THOUSANDS/ÂΜL (ref 0.6–4.47)
LYMPHOCYTES # BLD AUTO: 0.81 THOUSANDS/ÂΜL (ref 0.6–4.47)
LYMPHOCYTES # BLD AUTO: 0.83 THOUSANDS/ÂΜL (ref 0.6–4.47)
LYMPHOCYTES # BLD AUTO: 0.83 THOUSANDS/ÂΜL (ref 0.6–4.47)
LYMPHOCYTES # BLD AUTO: 0.84 THOUSANDS/ÂΜL (ref 0.6–4.47)
LYMPHOCYTES # BLD AUTO: 0.89 THOUSANDS/ÂΜL (ref 0.6–4.47)
LYMPHOCYTES # BLD AUTO: 0.92 THOUSANDS/ÂΜL (ref 0.6–4.47)
LYMPHOCYTES # BLD AUTO: 0.96 THOUSANDS/ÂΜL (ref 0.6–4.47)
LYMPHOCYTES # BLD AUTO: 1.45 THOUSANDS/ÂΜL (ref 0.6–4.47)
LYMPHOCYTES # BLD AUTO: 4 % (ref 14–44)
LYMPHOCYTES NFR BLD AUTO: 10 % (ref 14–44)
LYMPHOCYTES NFR BLD AUTO: 10 % (ref 14–44)
LYMPHOCYTES NFR BLD AUTO: 12 % (ref 14–44)
LYMPHOCYTES NFR BLD AUTO: 13 % (ref 14–44)
LYMPHOCYTES NFR BLD AUTO: 13 % (ref 14–44)
LYMPHOCYTES NFR BLD AUTO: 2 % (ref 14–44)
LYMPHOCYTES NFR BLD AUTO: 4 % (ref 14–44)
LYMPHOCYTES NFR BLD AUTO: 5 % (ref 14–44)
LYMPHOCYTES NFR BLD AUTO: 5 % (ref 14–44)
LYMPHOCYTES NFR BLD AUTO: 6 % (ref 14–44)
LYMPHOCYTES NFR BLD AUTO: 6 % (ref 14–44)
LYMPHOCYTES NFR BLD AUTO: 7 % (ref 14–44)
LYMPHOCYTES NFR BLD AUTO: 8 % (ref 14–44)
LYMPHOCYTES NFR BLD AUTO: 9 % (ref 14–44)
MACROCYTES BLD QL AUTO: PRESENT
MAGNESIUM SERPL-MCNC: 1.7 MG/DL (ref 1.9–2.7)
MAGNESIUM SERPL-MCNC: 2 MG/DL (ref 1.9–2.7)
MAGNESIUM SERPL-MCNC: 2.1 MG/DL (ref 1.9–2.7)
MAGNESIUM SERPL-MCNC: 2.2 MG/DL (ref 1.9–2.7)
MAGNESIUM SERPL-MCNC: 2.2 MG/DL (ref 1.9–2.7)
MCH RBC QN AUTO: 31 PG (ref 26.8–34.3)
MCH RBC QN AUTO: 31 PG (ref 26.8–34.3)
MCH RBC QN AUTO: 31.2 PG (ref 26.8–34.3)
MCH RBC QN AUTO: 31.3 PG (ref 26.8–34.3)
MCH RBC QN AUTO: 31.4 PG (ref 26.8–34.3)
MCH RBC QN AUTO: 31.6 PG (ref 26.8–34.3)
MCH RBC QN AUTO: 31.6 PG (ref 26.8–34.3)
MCH RBC QN AUTO: 31.7 PG (ref 26.8–34.3)
MCH RBC QN AUTO: 31.8 PG (ref 26.8–34.3)
MCH RBC QN AUTO: 31.8 PG (ref 26.8–34.3)
MCH RBC QN AUTO: 32 PG (ref 26.8–34.3)
MCH RBC QN AUTO: 32 PG (ref 26.8–34.3)
MCH RBC QN AUTO: 32.1 PG (ref 26.8–34.3)
MCH RBC QN AUTO: 32.2 PG (ref 26.8–34.3)
MCH RBC QN AUTO: 32.6 PG (ref 26.8–34.3)
MCH RBC QN AUTO: 32.6 PG (ref 26.8–34.3)
MCH RBC QN AUTO: 32.7 PG (ref 26.8–34.3)
MCH RBC QN AUTO: 32.9 PG (ref 26.8–34.3)
MCHC RBC AUTO-ENTMCNC: 29.2 G/DL (ref 31.4–37.4)
MCHC RBC AUTO-ENTMCNC: 29.4 G/DL (ref 31.4–37.4)
MCHC RBC AUTO-ENTMCNC: 29.5 G/DL (ref 31.4–37.4)
MCHC RBC AUTO-ENTMCNC: 29.5 G/DL (ref 31.4–37.4)
MCHC RBC AUTO-ENTMCNC: 29.6 G/DL (ref 31.4–37.4)
MCHC RBC AUTO-ENTMCNC: 29.9 G/DL (ref 31.4–37.4)
MCHC RBC AUTO-ENTMCNC: 30.1 G/DL (ref 31.4–37.4)
MCHC RBC AUTO-ENTMCNC: 30.1 G/DL (ref 31.4–37.4)
MCHC RBC AUTO-ENTMCNC: 30.3 G/DL (ref 31.4–37.4)
MCHC RBC AUTO-ENTMCNC: 30.3 G/DL (ref 31.4–37.4)
MCHC RBC AUTO-ENTMCNC: 30.6 G/DL (ref 31.4–37.4)
MCHC RBC AUTO-ENTMCNC: 30.7 G/DL (ref 31.4–37.4)
MCHC RBC AUTO-ENTMCNC: 30.9 G/DL (ref 31.4–37.4)
MCHC RBC AUTO-ENTMCNC: 31 G/DL (ref 31.4–37.4)
MCHC RBC AUTO-ENTMCNC: 31.1 G/DL (ref 31.4–37.4)
MCHC RBC AUTO-ENTMCNC: 31.4 G/DL (ref 31.4–37.4)
MCHC RBC AUTO-ENTMCNC: 31.6 G/DL (ref 31.4–37.4)
MCHC RBC AUTO-ENTMCNC: 31.9 G/DL (ref 31.4–37.4)
MCHC RBC AUTO-ENTMCNC: 31.9 G/DL (ref 31.4–37.4)
MCHC RBC AUTO-ENTMCNC: 32.2 G/DL (ref 31.4–37.4)
MCV RBC AUTO: 101 FL (ref 82–98)
MCV RBC AUTO: 102 FL (ref 82–98)
MCV RBC AUTO: 104 FL (ref 82–98)
MCV RBC AUTO: 105 FL (ref 82–98)
MCV RBC AUTO: 105 FL (ref 82–98)
MCV RBC AUTO: 108 FL (ref 82–98)
MCV RBC AUTO: 108 FL (ref 82–98)
MCV RBC AUTO: 109 FL (ref 82–98)
MCV RBC AUTO: 111 FL (ref 82–98)
MCV RBC AUTO: 112 FL (ref 82–98)
MCV RBC AUTO: 97 FL (ref 82–98)
MCV RBC AUTO: 99 FL (ref 82–98)
MONOCYTES # BLD AUTO: 0.48 THOUSAND/UL (ref 0–1.22)
MONOCYTES # BLD AUTO: 0.85 THOUSAND/ÂΜL (ref 0.17–1.22)
MONOCYTES # BLD AUTO: 0.86 THOUSAND/ÂΜL (ref 0.17–1.22)
MONOCYTES # BLD AUTO: 0.95 THOUSAND/ÂΜL (ref 0.17–1.22)
MONOCYTES # BLD AUTO: 0.97 THOUSAND/ÂΜL (ref 0.17–1.22)
MONOCYTES # BLD AUTO: 1.02 THOUSAND/ÂΜL (ref 0.17–1.22)
MONOCYTES # BLD AUTO: 1.02 THOUSAND/ÂΜL (ref 0.17–1.22)
MONOCYTES # BLD AUTO: 1.06 THOUSAND/ÂΜL (ref 0.17–1.22)
MONOCYTES # BLD AUTO: 1.08 THOUSAND/ÂΜL (ref 0.17–1.22)
MONOCYTES # BLD AUTO: 1.13 THOUSAND/ÂΜL (ref 0.17–1.22)
MONOCYTES # BLD AUTO: 1.17 THOUSAND/ÂΜL (ref 0.17–1.22)
MONOCYTES # BLD AUTO: 1.25 THOUSAND/ÂΜL (ref 0.17–1.22)
MONOCYTES # BLD AUTO: 1.31 THOUSAND/ÂΜL (ref 0.17–1.22)
MONOCYTES # BLD AUTO: 1.32 THOUSAND/ÂΜL (ref 0.17–1.22)
MONOCYTES # BLD AUTO: 1.38 THOUSAND/ÂΜL (ref 0.17–1.22)
MONOCYTES # BLD AUTO: 1.42 THOUSAND/ÂΜL (ref 0.17–1.22)
MONOCYTES # BLD AUTO: 1.53 THOUSAND/ÂΜL (ref 0.17–1.22)
MONOCYTES # BLD AUTO: 1.56 THOUSAND/ÂΜL (ref 0.17–1.22)
MONOCYTES # BLD AUTO: 1.57 THOUSAND/ÂΜL (ref 0.17–1.22)
MONOCYTES # BLD AUTO: 1.9 THOUSAND/ÂΜL (ref 0.17–1.22)
MONOCYTES NFR BLD AUTO: 10 % (ref 4–12)
MONOCYTES NFR BLD AUTO: 11 % (ref 4–12)
MONOCYTES NFR BLD AUTO: 12 % (ref 4–12)
MONOCYTES NFR BLD AUTO: 13 % (ref 4–12)
MONOCYTES NFR BLD AUTO: 14 % (ref 4–12)
MONOCYTES NFR BLD AUTO: 17 % (ref 4–12)
MONOCYTES NFR BLD AUTO: 9 % (ref 4–12)
MONOCYTES NFR BLD AUTO: 9 % (ref 4–12)
MONOCYTES NFR BLD: 5 % (ref 4–12)
MUCOUS THREADS UR QL AUTO: ABNORMAL
MYELOCYTE ABSOLUTE CT: 0.1 THOUSAND/UL (ref 0–0.1)
MYELOCYTES NFR BLD MANUAL: 1 % (ref 0–1)
NEUTROPHILS # BLD AUTO: 10.39 THOUSANDS/ÂΜL (ref 1.85–7.62)
NEUTROPHILS # BLD AUTO: 11.17 THOUSANDS/ÂΜL (ref 1.85–7.62)
NEUTROPHILS # BLD AUTO: 11.7 THOUSANDS/ÂΜL (ref 1.85–7.62)
NEUTROPHILS # BLD AUTO: 12.77 THOUSANDS/ÂΜL (ref 1.85–7.62)
NEUTROPHILS # BLD AUTO: 5.06 THOUSANDS/ÂΜL (ref 1.85–7.62)
NEUTROPHILS # BLD AUTO: 5.29 THOUSANDS/ÂΜL (ref 1.85–7.62)
NEUTROPHILS # BLD AUTO: 5.66 THOUSANDS/ÂΜL (ref 1.85–7.62)
NEUTROPHILS # BLD AUTO: 5.94 THOUSANDS/ÂΜL (ref 1.85–7.62)
NEUTROPHILS # BLD AUTO: 6.12 THOUSANDS/ÂΜL (ref 1.85–7.62)
NEUTROPHILS # BLD AUTO: 6.3 THOUSANDS/ÂΜL (ref 1.85–7.62)
NEUTROPHILS # BLD AUTO: 7.45 THOUSANDS/ÂΜL (ref 1.85–7.62)
NEUTROPHILS # BLD AUTO: 7.59 THOUSANDS/ÂΜL (ref 1.85–7.62)
NEUTROPHILS # BLD AUTO: 8.34 THOUSANDS/ÂΜL (ref 1.85–7.62)
NEUTROPHILS # BLD AUTO: 8.5 THOUSANDS/ÂΜL (ref 1.85–7.62)
NEUTROPHILS # BLD AUTO: 8.64 THOUSANDS/ÂΜL (ref 1.85–7.62)
NEUTROPHILS # BLD AUTO: 9.19 THOUSANDS/ÂΜL (ref 1.85–7.62)
NEUTROPHILS # BLD AUTO: 9.57 THOUSANDS/ÂΜL (ref 1.85–7.62)
NEUTROPHILS # BLD AUTO: 9.69 THOUSANDS/ÂΜL (ref 1.85–7.62)
NEUTROPHILS # BLD AUTO: 9.8 THOUSANDS/ÂΜL (ref 1.85–7.62)
NEUTROPHILS # BLD MANUAL: 8.46 THOUSAND/UL (ref 1.85–7.62)
NEUTS BAND NFR BLD MANUAL: 5 % (ref 0–8)
NEUTS SEG NFR BLD AUTO: 70 % (ref 43–75)
NEUTS SEG NFR BLD AUTO: 71 % (ref 43–75)
NEUTS SEG NFR BLD AUTO: 72 % (ref 43–75)
NEUTS SEG NFR BLD AUTO: 72 % (ref 43–75)
NEUTS SEG NFR BLD AUTO: 73 % (ref 43–75)
NEUTS SEG NFR BLD AUTO: 74 % (ref 43–75)
NEUTS SEG NFR BLD AUTO: 75 % (ref 43–75)
NEUTS SEG NFR BLD AUTO: 75 % (ref 43–75)
NEUTS SEG NFR BLD AUTO: 77 % (ref 43–75)
NEUTS SEG NFR BLD AUTO: 78 % (ref 43–75)
NEUTS SEG NFR BLD AUTO: 80 % (ref 43–75)
NEUTS SEG NFR BLD AUTO: 81 % (ref 43–75)
NEUTS SEG NFR BLD AUTO: 82 % (ref 43–75)
NEUTS SEG NFR BLD AUTO: 82 % (ref 43–75)
NEUTS SEG NFR BLD AUTO: 83 % (ref 43–75)
NEUTS SEG NFR BLD AUTO: 83 % (ref 43–75)
NEUTS SEG NFR BLD AUTO: 85 % (ref 43–75)
NITRITE UR QL STRIP: NEGATIVE
NITRITE UR QL STRIP: NEGATIVE
NON VENT- BIPAP: ABNORMAL
NON-SQ EPI CELLS URNS QL MICRO: ABNORMAL /HPF
NON-SQ EPI CELLS URNS QL MICRO: ABNORMAL /HPF
NRBC BLD AUTO-RTO: 0 /100 WBCS
O2 CT BLDA-SCNC: 10.7 ML/DL (ref 16–23)
OXYHGB MFR BLDA: 96.7 % (ref 94–97)
PCO2 BLD: 32 MMOL/L (ref 21–32)
PCO2 BLD: 34 MMOL/L (ref 21–32)
PCO2 BLD: 38 MMOL/L (ref 21–32)
PCO2 BLD: 57.8 MM HG (ref 36–44)
PCO2 BLD: 63.7 MM HG (ref 36–44)
PCO2 BLD: 81.6 MM HG (ref 36–44)
PCO2 BLDA: 50.4 MM HG (ref 36–44)
PEEP MAX SETTING VENT: 6 CM[H2O]
PH BLD: 7.2 [PH] (ref 7.35–7.45)
PH BLD: 7.33 [PH] (ref 7.35–7.45)
PH BLD: 7.36 [PH] (ref 7.35–7.45)
PH BLDA: 7.4 [PH] (ref 7.35–7.45)
PH UR STRIP.AUTO: 5.5 [PH]
PH UR STRIP.AUTO: 6 [PH]
PHOSPHATE SERPL-MCNC: 2.3 MG/DL (ref 2.3–4.1)
PHOSPHATE SERPL-MCNC: 2.4 MG/DL (ref 2.3–4.1)
PHOSPHATE SERPL-MCNC: 2.6 MG/DL (ref 2.3–4.1)
PHOSPHATE SERPL-MCNC: 2.9 MG/DL (ref 2.3–4.1)
PHOSPHATE SERPL-MCNC: 3 MG/DL (ref 2.3–4.1)
PHOSPHATE SERPL-MCNC: 3.1 MG/DL (ref 2.3–4.1)
PHOSPHATE SERPL-MCNC: 3.3 MG/DL (ref 2.3–4.1)
PHOSPHATE SERPL-MCNC: 3.8 MG/DL (ref 2.3–4.1)
PLATELET # BLD AUTO: 112 THOUSANDS/UL (ref 149–390)
PLATELET # BLD AUTO: 113 THOUSANDS/UL (ref 149–390)
PLATELET # BLD AUTO: 117 THOUSANDS/UL (ref 149–390)
PLATELET # BLD AUTO: 120 THOUSANDS/UL (ref 149–390)
PLATELET # BLD AUTO: 121 THOUSANDS/UL (ref 149–390)
PLATELET # BLD AUTO: 123 THOUSANDS/UL (ref 149–390)
PLATELET # BLD AUTO: 126 THOUSANDS/UL (ref 149–390)
PLATELET # BLD AUTO: 129 THOUSANDS/UL (ref 149–390)
PLATELET # BLD AUTO: 130 THOUSANDS/UL (ref 149–390)
PLATELET # BLD AUTO: 130 THOUSANDS/UL (ref 149–390)
PLATELET # BLD AUTO: 133 THOUSANDS/UL (ref 149–390)
PLATELET # BLD AUTO: 134 THOUSANDS/UL (ref 149–390)
PLATELET # BLD AUTO: 139 THOUSANDS/UL (ref 149–390)
PLATELET # BLD AUTO: 139 THOUSANDS/UL (ref 149–390)
PLATELET # BLD AUTO: 144 THOUSANDS/UL (ref 149–390)
PLATELET # BLD AUTO: 150 THOUSANDS/UL (ref 149–390)
PLATELET # BLD AUTO: 155 THOUSANDS/UL (ref 149–390)
PLATELET # BLD AUTO: 159 THOUSANDS/UL (ref 149–390)
PLATELET # BLD AUTO: 162 THOUSANDS/UL (ref 149–390)
PLATELET # BLD AUTO: 170 THOUSANDS/UL (ref 149–390)
PLATELET # BLD AUTO: 174 THOUSANDS/UL (ref 149–390)
PLATELET # BLD AUTO: 181 THOUSANDS/UL (ref 149–390)
PLATELET # BLD AUTO: 188 THOUSANDS/UL (ref 149–390)
PLATELET # BLD AUTO: 194 THOUSANDS/UL (ref 149–390)
PLATELET # BLD AUTO: 82 THOUSANDS/UL (ref 149–390)
PLATELET BLD QL SMEAR: ABNORMAL
PLATELET BLD QL SMEAR: ADEQUATE
PMV BLD AUTO: 10 FL (ref 8.9–12.7)
PMV BLD AUTO: 10 FL (ref 8.9–12.7)
PMV BLD AUTO: 10.2 FL (ref 8.9–12.7)
PMV BLD AUTO: 9.1 FL (ref 8.9–12.7)
PMV BLD AUTO: 9.3 FL (ref 8.9–12.7)
PMV BLD AUTO: 9.4 FL (ref 8.9–12.7)
PMV BLD AUTO: 9.5 FL (ref 8.9–12.7)
PMV BLD AUTO: 9.6 FL (ref 8.9–12.7)
PMV BLD AUTO: 9.7 FL (ref 8.9–12.7)
PMV BLD AUTO: 9.8 FL (ref 8.9–12.7)
PMV BLD AUTO: 9.8 FL (ref 8.9–12.7)
PMV BLD AUTO: 9.9 FL (ref 8.9–12.7)
PO2 BLD: 193 MM HG (ref 75–129)
PO2 BLD: 23 MM HG (ref 75–129)
PO2 BLD: 81 MM HG (ref 75–129)
PO2 BLDA: 119.2 MM HG (ref 75–129)
POTASSIUM BLD-SCNC: 4.3 MMOL/L (ref 3.5–5.3)
POTASSIUM BLD-SCNC: 5.1 MMOL/L (ref 3.5–5.3)
POTASSIUM BLD-SCNC: 5.4 MMOL/L (ref 3.5–5.3)
POTASSIUM SERPL-SCNC: 4 MMOL/L (ref 3.5–5.3)
POTASSIUM SERPL-SCNC: 4 MMOL/L (ref 3.5–5.3)
POTASSIUM SERPL-SCNC: 4.1 MMOL/L (ref 3.5–5.3)
POTASSIUM SERPL-SCNC: 4.1 MMOL/L (ref 3.5–5.3)
POTASSIUM SERPL-SCNC: 4.2 MMOL/L (ref 3.5–5.3)
POTASSIUM SERPL-SCNC: 4.3 MMOL/L (ref 3.5–5.3)
POTASSIUM SERPL-SCNC: 4.3 MMOL/L (ref 3.5–5.3)
POTASSIUM SERPL-SCNC: 4.4 MMOL/L (ref 3.5–5.3)
POTASSIUM SERPL-SCNC: 4.5 MMOL/L (ref 3.5–5.3)
POTASSIUM SERPL-SCNC: 4.5 MMOL/L (ref 3.5–5.3)
POTASSIUM SERPL-SCNC: 4.6 MMOL/L (ref 3.5–5.3)
POTASSIUM SERPL-SCNC: 4.7 MMOL/L (ref 3.5–5.3)
POTASSIUM SERPL-SCNC: 4.8 MMOL/L (ref 3.5–5.3)
POTASSIUM SERPL-SCNC: 4.8 MMOL/L (ref 3.5–5.3)
POTASSIUM SERPL-SCNC: 4.9 MMOL/L (ref 3.5–5.3)
POTASSIUM SERPL-SCNC: 5 MMOL/L (ref 3.5–5.3)
POTASSIUM SERPL-SCNC: 5.3 MMOL/L (ref 3.5–5.3)
POTASSIUM SERPL-SCNC: 5.3 MMOL/L (ref 3.5–5.3)
POTASSIUM SERPL-SCNC: 5.6 MMOL/L (ref 3.5–5.3)
PROT SERPL-MCNC: 4.6 G/DL (ref 6.4–8.4)
PROT UR STRIP-MCNC: ABNORMAL MG/DL
PROT UR STRIP-MCNC: ABNORMAL MG/DL
PROTHROMBIN TIME: 18.9 SECONDS (ref 11.6–14.5)
QRS AXIS: 21 DEGREES
QRS AXIS: 74 DEGREES
QRSD INTERVAL: 100 MS
QRSD INTERVAL: 98 MS
QT INTERVAL: 336 MS
QT INTERVAL: 346 MS
QTC INTERVAL: 384 MS
QTC INTERVAL: 454 MS
RBC # BLD AUTO: 2.2 MILLION/UL (ref 3.88–5.62)
RBC # BLD AUTO: 2.21 MILLION/UL (ref 3.88–5.62)
RBC # BLD AUTO: 2.23 MILLION/UL (ref 3.88–5.62)
RBC # BLD AUTO: 2.27 MILLION/UL (ref 3.88–5.62)
RBC # BLD AUTO: 2.3 MILLION/UL (ref 3.88–5.62)
RBC # BLD AUTO: 2.33 MILLION/UL (ref 3.88–5.62)
RBC # BLD AUTO: 2.39 MILLION/UL (ref 3.88–5.62)
RBC # BLD AUTO: 2.42 MILLION/UL (ref 3.88–5.62)
RBC # BLD AUTO: 2.47 MILLION/UL (ref 3.88–5.62)
RBC # BLD AUTO: 2.47 MILLION/UL (ref 3.88–5.62)
RBC # BLD AUTO: 2.48 MILLION/UL (ref 3.88–5.62)
RBC # BLD AUTO: 2.49 MILLION/UL (ref 3.88–5.62)
RBC # BLD AUTO: 2.52 MILLION/UL (ref 3.88–5.62)
RBC # BLD AUTO: 2.53 MILLION/UL (ref 3.88–5.62)
RBC # BLD AUTO: 2.55 MILLION/UL (ref 3.88–5.62)
RBC # BLD AUTO: 2.6 MILLION/UL (ref 3.88–5.62)
RBC # BLD AUTO: 2.71 MILLION/UL (ref 3.88–5.62)
RBC # BLD AUTO: 2.74 MILLION/UL (ref 3.88–5.62)
RBC # BLD AUTO: 2.75 MILLION/UL (ref 3.88–5.62)
RBC # BLD AUTO: 2.78 MILLION/UL (ref 3.88–5.62)
RBC # BLD AUTO: 2.84 MILLION/UL (ref 3.88–5.62)
RBC # BLD AUTO: 2.99 MILLION/UL (ref 3.88–5.62)
RBC # BLD AUTO: 3.39 MILLION/UL (ref 3.88–5.62)
RBC # BLD AUTO: 3.42 MILLION/UL (ref 3.88–5.62)
RBC #/AREA URNS AUTO: ABNORMAL /HPF
RBC #/AREA URNS AUTO: ABNORMAL /HPF
RBC MORPH BLD: PRESENT
RBC MORPH BLD: PRESENT
RH BLD: POSITIVE
RH BLD: POSITIVE
SAO2 % BLD FROM PO2: 100 % (ref 60–85)
SAO2 % BLD FROM PO2: 35 % (ref 60–85)
SAO2 % BLD FROM PO2: 92 % (ref 60–85)
SODIUM BLD-SCNC: 141 MMOL/L (ref 136–145)
SODIUM BLD-SCNC: 142 MMOL/L (ref 136–145)
SODIUM BLD-SCNC: 143 MMOL/L (ref 136–145)
SODIUM SERPL-SCNC: 130 MMOL/L (ref 135–147)
SODIUM SERPL-SCNC: 133 MMOL/L (ref 135–147)
SODIUM SERPL-SCNC: 134 MMOL/L (ref 135–147)
SODIUM SERPL-SCNC: 138 MMOL/L (ref 135–147)
SODIUM SERPL-SCNC: 139 MMOL/L (ref 135–147)
SODIUM SERPL-SCNC: 141 MMOL/L (ref 135–147)
SODIUM SERPL-SCNC: 142 MMOL/L (ref 135–147)
SODIUM SERPL-SCNC: 142 MMOL/L (ref 135–147)
SODIUM SERPL-SCNC: 143 MMOL/L (ref 135–147)
SODIUM SERPL-SCNC: 144 MMOL/L (ref 135–147)
SODIUM SERPL-SCNC: 145 MMOL/L (ref 135–147)
SODIUM SERPL-SCNC: 146 MMOL/L (ref 135–147)
SODIUM SERPL-SCNC: 147 MMOL/L (ref 135–147)
SODIUM SERPL-SCNC: 147 MMOL/L (ref 135–147)
SODIUM SERPL-SCNC: 148 MMOL/L (ref 135–147)
SP GR UR STRIP.AUTO: 1.02 (ref 1–1.03)
SP GR UR STRIP.AUTO: 1.02 (ref 1–1.03)
SPECIMEN EXPIRATION DATE: NORMAL
SPECIMEN SOURCE: ABNORMAL
T WAVE AXIS: 163 DEGREES
T WAVE AXIS: 214 DEGREES
UNIT DISPENSE STATUS: NORMAL
UNIT DISPENSE STATUS: NORMAL
UNIT PRODUCT CODE: NORMAL
UNIT PRODUCT CODE: NORMAL
UNIT PRODUCT VOLUME: 350 ML
UNIT PRODUCT VOLUME: 350 ML
UNIT RH: NORMAL
UNIT RH: NORMAL
UROBILINOGEN UR STRIP-ACNC: <2 MG/DL
UROBILINOGEN UR STRIP-ACNC: <2 MG/DL
VENT BIPAP FIO2: 40 %
VENTRICULAR RATE: 110 BPM
VENTRICULAR RATE: 74 BPM
VIT B12 SERPL-MCNC: 1276 PG/ML (ref 180–914)
WBC # BLD AUTO: 10.15 THOUSAND/UL (ref 4.31–10.16)
WBC # BLD AUTO: 10.59 THOUSAND/UL (ref 4.31–10.16)
WBC # BLD AUTO: 10.59 THOUSAND/UL (ref 4.31–10.16)
WBC # BLD AUTO: 11 THOUSAND/UL (ref 4.31–10.16)
WBC # BLD AUTO: 11.43 THOUSAND/UL (ref 4.31–10.16)
WBC # BLD AUTO: 11.55 THOUSAND/UL (ref 4.31–10.16)
WBC # BLD AUTO: 11.86 THOUSAND/UL (ref 4.31–10.16)
WBC # BLD AUTO: 11.93 THOUSAND/UL (ref 4.31–10.16)
WBC # BLD AUTO: 12.05 THOUSAND/UL (ref 4.31–10.16)
WBC # BLD AUTO: 12.27 THOUSAND/UL (ref 4.31–10.16)
WBC # BLD AUTO: 13.31 THOUSAND/UL (ref 4.31–10.16)
WBC # BLD AUTO: 13.34 THOUSAND/UL (ref 4.31–10.16)
WBC # BLD AUTO: 14.26 THOUSAND/UL (ref 4.31–10.16)
WBC # BLD AUTO: 15.12 THOUSAND/UL (ref 4.31–10.16)
WBC # BLD AUTO: 6.82 THOUSAND/UL (ref 4.31–10.16)
WBC # BLD AUTO: 7.41 THOUSAND/UL (ref 4.31–10.16)
WBC # BLD AUTO: 7.71 THOUSAND/UL (ref 4.31–10.16)
WBC # BLD AUTO: 7.91 THOUSAND/UL (ref 4.31–10.16)
WBC # BLD AUTO: 8.47 THOUSAND/UL (ref 4.31–10.16)
WBC # BLD AUTO: 8.54 THOUSAND/UL (ref 4.31–10.16)
WBC # BLD AUTO: 9.12 THOUSAND/UL (ref 4.31–10.16)
WBC # BLD AUTO: 9.61 THOUSAND/UL (ref 4.31–10.16)
WBC # BLD AUTO: 9.61 THOUSAND/UL (ref 4.31–10.16)
WBC # BLD AUTO: 9.98 THOUSAND/UL (ref 4.31–10.16)
WBC #/AREA URNS AUTO: ABNORMAL /HPF
WBC #/AREA URNS AUTO: ABNORMAL /HPF

## 2024-01-01 PROCEDURE — 11008 RMV PRSTC MTRL/MESH ABD WALL: CPT | Performed by: SURGERY

## 2024-01-01 PROCEDURE — 93005 ELECTROCARDIOGRAM TRACING: CPT

## 2024-01-01 PROCEDURE — 82947 ASSAY GLUCOSE BLOOD QUANT: CPT

## 2024-01-01 PROCEDURE — 80048 BASIC METABOLIC PNL TOTAL CA: CPT

## 2024-01-01 PROCEDURE — 85025 COMPLETE CBC W/AUTO DIFF WBC: CPT

## 2024-01-01 PROCEDURE — 0WPF0JZ REMOVAL OF SYNTHETIC SUBSTITUTE FROM ABDOMINAL WALL, OPEN APPROACH: ICD-10-PCS | Performed by: SURGERY

## 2024-01-01 PROCEDURE — 82948 REAGENT STRIP/BLOOD GLUCOSE: CPT

## 2024-01-01 PROCEDURE — 99024 POSTOP FOLLOW-UP VISIT: CPT | Performed by: STUDENT IN AN ORGANIZED HEALTH CARE EDUCATION/TRAINING PROGRAM

## 2024-01-01 PROCEDURE — 85025 COMPLETE CBC W/AUTO DIFF WBC: CPT | Performed by: STUDENT IN AN ORGANIZED HEALTH CARE EDUCATION/TRAINING PROGRAM

## 2024-01-01 PROCEDURE — 94640 AIRWAY INHALATION TREATMENT: CPT

## 2024-01-01 PROCEDURE — 97112 NEUROMUSCULAR REEDUCATION: CPT

## 2024-01-01 PROCEDURE — 94668 MNPJ CHEST WALL SBSQ: CPT

## 2024-01-01 PROCEDURE — 84100 ASSAY OF PHOSPHORUS: CPT

## 2024-01-01 PROCEDURE — 94760 N-INVAS EAR/PLS OXIMETRY 1: CPT

## 2024-01-01 PROCEDURE — 92526 ORAL FUNCTION THERAPY: CPT

## 2024-01-01 PROCEDURE — 93010 ELECTROCARDIOGRAM REPORT: CPT | Performed by: INTERNAL MEDICINE

## 2024-01-01 PROCEDURE — 83605 ASSAY OF LACTIC ACID: CPT | Performed by: STUDENT IN AN ORGANIZED HEALTH CARE EDUCATION/TRAINING PROGRAM

## 2024-01-01 PROCEDURE — NC001 PR NO CHARGE: Performed by: NURSE PRACTITIONER

## 2024-01-01 PROCEDURE — 94664 DEMO&/EVAL PT USE INHALER: CPT

## 2024-01-01 PROCEDURE — NC001 PR NO CHARGE: Performed by: INTERNAL MEDICINE

## 2024-01-01 PROCEDURE — 97167 OT EVAL HIGH COMPLEX 60 MIN: CPT

## 2024-01-01 PROCEDURE — 83735 ASSAY OF MAGNESIUM: CPT

## 2024-01-01 PROCEDURE — 97530 THERAPEUTIC ACTIVITIES: CPT

## 2024-01-01 PROCEDURE — 94669 MECHANICAL CHEST WALL OSCILL: CPT

## 2024-01-01 PROCEDURE — 99233 SBSQ HOSP IP/OBS HIGH 50: CPT | Performed by: INTERNAL MEDICINE

## 2024-01-01 PROCEDURE — 84100 ASSAY OF PHOSPHORUS: CPT | Performed by: STUDENT IN AN ORGANIZED HEALTH CARE EDUCATION/TRAINING PROGRAM

## 2024-01-01 PROCEDURE — 85049 AUTOMATED PLATELET COUNT: CPT | Performed by: STUDENT IN AN ORGANIZED HEALTH CARE EDUCATION/TRAINING PROGRAM

## 2024-01-01 PROCEDURE — 99232 SBSQ HOSP IP/OBS MODERATE 35: CPT | Performed by: INTERNAL MEDICINE

## 2024-01-01 PROCEDURE — 99024 POSTOP FOLLOW-UP VISIT: CPT | Performed by: SURGERY

## 2024-01-01 PROCEDURE — 85025 COMPLETE CBC W/AUTO DIFF WBC: CPT | Performed by: PHYSICIAN ASSISTANT

## 2024-01-01 PROCEDURE — 85007 BL SMEAR W/DIFF WBC COUNT: CPT

## 2024-01-01 PROCEDURE — 92611 MOTION FLUOROSCOPY/SWALLOW: CPT

## 2024-01-01 PROCEDURE — 85730 THROMBOPLASTIN TIME PARTIAL: CPT | Performed by: PHYSICIAN ASSISTANT

## 2024-01-01 PROCEDURE — 88307 TISSUE EXAM BY PATHOLOGIST: CPT | Performed by: PATHOLOGY

## 2024-01-01 PROCEDURE — 81001 URINALYSIS AUTO W/SCOPE: CPT

## 2024-01-01 PROCEDURE — 85014 HEMATOCRIT: CPT

## 2024-01-01 PROCEDURE — 99222 1ST HOSP IP/OBS MODERATE 55: CPT | Performed by: NURSE PRACTITIONER

## 2024-01-01 PROCEDURE — 99497 ADVNCD CARE PLAN 30 MIN: CPT | Performed by: STUDENT IN AN ORGANIZED HEALTH CARE EDUCATION/TRAINING PROGRAM

## 2024-01-01 PROCEDURE — NC001 PR NO CHARGE: Performed by: EMERGENCY MEDICINE

## 2024-01-01 PROCEDURE — 99223 1ST HOSP IP/OBS HIGH 75: CPT | Performed by: INTERNAL MEDICINE

## 2024-01-01 PROCEDURE — 85027 COMPLETE CBC AUTOMATED: CPT | Performed by: PHYSICIAN ASSISTANT

## 2024-01-01 PROCEDURE — 74230 X-RAY XM SWLNG FUNCJ C+: CPT

## 2024-01-01 PROCEDURE — 97535 SELF CARE MNGMENT TRAINING: CPT

## 2024-01-01 PROCEDURE — 85730 THROMBOPLASTIN TIME PARTIAL: CPT | Performed by: SURGERY

## 2024-01-01 PROCEDURE — 71045 X-RAY EXAM CHEST 1 VIEW: CPT

## 2024-01-01 PROCEDURE — 99418 PROLNG IP/OBS E/M EA 15 MIN: CPT | Performed by: STUDENT IN AN ORGANIZED HEALTH CARE EDUCATION/TRAINING PROGRAM

## 2024-01-01 PROCEDURE — 99233 SBSQ HOSP IP/OBS HIGH 50: CPT | Performed by: STUDENT IN AN ORGANIZED HEALTH CARE EDUCATION/TRAINING PROGRAM

## 2024-01-01 PROCEDURE — 86901 BLOOD TYPING SEROLOGIC RH(D): CPT | Performed by: STUDENT IN AN ORGANIZED HEALTH CARE EDUCATION/TRAINING PROGRAM

## 2024-01-01 PROCEDURE — 87205 SMEAR GRAM STAIN: CPT | Performed by: SURGERY

## 2024-01-01 PROCEDURE — 87070 CULTURE OTHR SPECIMN AEROBIC: CPT | Performed by: SURGERY

## 2024-01-01 PROCEDURE — 99223 1ST HOSP IP/OBS HIGH 75: CPT | Performed by: SURGERY

## 2024-01-01 PROCEDURE — 87040 BLOOD CULTURE FOR BACTERIA: CPT

## 2024-01-01 PROCEDURE — 84132 ASSAY OF SERUM POTASSIUM: CPT

## 2024-01-01 PROCEDURE — 97163 PT EVAL HIGH COMPLEX 45 MIN: CPT

## 2024-01-01 PROCEDURE — 85027 COMPLETE CBC AUTOMATED: CPT

## 2024-01-01 PROCEDURE — 92610 EVALUATE SWALLOWING FUNCTION: CPT

## 2024-01-01 PROCEDURE — 82805 BLOOD GASES W/O2 SATURATION: CPT

## 2024-01-01 PROCEDURE — 97164 PT RE-EVAL EST PLAN CARE: CPT

## 2024-01-01 PROCEDURE — 83735 ASSAY OF MAGNESIUM: CPT | Performed by: STUDENT IN AN ORGANIZED HEALTH CARE EDUCATION/TRAINING PROGRAM

## 2024-01-01 PROCEDURE — 80048 BASIC METABOLIC PNL TOTAL CA: CPT | Performed by: PHYSICIAN ASSISTANT

## 2024-01-01 PROCEDURE — NC001 PR NO CHARGE: Performed by: PHYSICIAN ASSISTANT

## 2024-01-01 PROCEDURE — 83735 ASSAY OF MAGNESIUM: CPT | Performed by: PHYSICIAN ASSISTANT

## 2024-01-01 PROCEDURE — 82803 BLOOD GASES ANY COMBINATION: CPT

## 2024-01-01 PROCEDURE — NC001 PR NO CHARGE: Performed by: SURGERY

## 2024-01-01 PROCEDURE — 36600 WITHDRAWAL OF ARTERIAL BLOOD: CPT

## 2024-01-01 PROCEDURE — 97605 NEG PRS WND THER DME<=50SQCM: CPT

## 2024-01-01 PROCEDURE — 87176 TISSUE HOMOGENIZATION CULTR: CPT | Performed by: SURGERY

## 2024-01-01 PROCEDURE — 70450 CT HEAD/BRAIN W/O DYE: CPT

## 2024-01-01 PROCEDURE — 44143 PARTIAL REMOVAL OF COLON: CPT | Performed by: SURGERY

## 2024-01-01 PROCEDURE — 99232 SBSQ HOSP IP/OBS MODERATE 35: CPT | Performed by: NURSE PRACTITIONER

## 2024-01-01 PROCEDURE — 99223 1ST HOSP IP/OBS HIGH 75: CPT | Performed by: PHYSICIAN ASSISTANT

## 2024-01-01 PROCEDURE — 11005 DBRDMT SKIN ABDOMINAL WALL: CPT | Performed by: SURGERY

## 2024-01-01 PROCEDURE — 0JBC0ZZ EXCISION OF PELVIC REGION SUBCUTANEOUS TISSUE AND FASCIA, OPEN APPROACH: ICD-10-PCS | Performed by: SURGERY

## 2024-01-01 PROCEDURE — 0D1N0Z4 BYPASS SIGMOID COLON TO CUTANEOUS, OPEN APPROACH: ICD-10-PCS | Performed by: SURGERY

## 2024-01-01 PROCEDURE — 86923 COMPATIBILITY TEST ELECTRIC: CPT

## 2024-01-01 PROCEDURE — 97168 OT RE-EVAL EST PLAN CARE: CPT

## 2024-01-01 PROCEDURE — 84100 ASSAY OF PHOSPHORUS: CPT | Performed by: PHYSICIAN ASSISTANT

## 2024-01-01 PROCEDURE — 99233 SBSQ HOSP IP/OBS HIGH 50: CPT | Performed by: PHYSICIAN ASSISTANT

## 2024-01-01 PROCEDURE — 84295 ASSAY OF SERUM SODIUM: CPT

## 2024-01-01 PROCEDURE — 99498 ADVNCD CARE PLAN ADDL 30 MIN: CPT | Performed by: STUDENT IN AN ORGANIZED HEALTH CARE EDUCATION/TRAINING PROGRAM

## 2024-01-01 PROCEDURE — 80048 BASIC METABOLIC PNL TOTAL CA: CPT | Performed by: NURSE PRACTITIONER

## 2024-01-01 PROCEDURE — 94660 CPAP INITIATION&MGMT: CPT

## 2024-01-01 PROCEDURE — 86900 BLOOD TYPING SEROLOGIC ABO: CPT | Performed by: STUDENT IN AN ORGANIZED HEALTH CARE EDUCATION/TRAINING PROGRAM

## 2024-01-01 PROCEDURE — 87075 CULTR BACTERIA EXCEPT BLOOD: CPT | Performed by: SURGERY

## 2024-01-01 PROCEDURE — 87181 SC STD AGAR DILUTION PER AGT: CPT | Performed by: SURGERY

## 2024-01-01 PROCEDURE — 0T9B70Z DRAINAGE OF BLADDER WITH DRAINAGE DEVICE, VIA NATURAL OR ARTIFICIAL OPENING: ICD-10-PCS | Performed by: UROLOGY

## 2024-01-01 PROCEDURE — 82330 ASSAY OF CALCIUM: CPT

## 2024-01-01 PROCEDURE — 82607 VITAMIN B-12: CPT | Performed by: STUDENT IN AN ORGANIZED HEALTH CARE EDUCATION/TRAINING PROGRAM

## 2024-01-01 PROCEDURE — 87077 CULTURE AEROBIC IDENTIFY: CPT | Performed by: SURGERY

## 2024-01-01 PROCEDURE — 85610 PROTHROMBIN TIME: CPT | Performed by: PHYSICIAN ASSISTANT

## 2024-01-01 PROCEDURE — 80048 BASIC METABOLIC PNL TOTAL CA: CPT | Performed by: STUDENT IN AN ORGANIZED HEALTH CARE EDUCATION/TRAINING PROGRAM

## 2024-01-01 PROCEDURE — 85025 COMPLETE CBC W/AUTO DIFF WBC: CPT | Performed by: NURSE PRACTITIONER

## 2024-01-01 PROCEDURE — 70496 CT ANGIOGRAPHY HEAD: CPT

## 2024-01-01 PROCEDURE — 85018 HEMOGLOBIN: CPT

## 2024-01-01 PROCEDURE — 74177 CT ABD & PELVIS W/CONTRAST: CPT

## 2024-01-01 PROCEDURE — 97110 THERAPEUTIC EXERCISES: CPT

## 2024-01-01 PROCEDURE — 71260 CT THORAX DX C+: CPT

## 2024-01-01 PROCEDURE — 99418 PROLNG IP/OBS E/M EA 15 MIN: CPT | Performed by: PHYSICIAN ASSISTANT

## 2024-01-01 PROCEDURE — 99497 ADVNCD CARE PLAN 30 MIN: CPT | Performed by: INTERNAL MEDICINE

## 2024-01-01 PROCEDURE — 80053 COMPREHEN METABOLIC PANEL: CPT | Performed by: STUDENT IN AN ORGANIZED HEALTH CARE EDUCATION/TRAINING PROGRAM

## 2024-01-01 PROCEDURE — 86850 RBC ANTIBODY SCREEN: CPT | Performed by: STUDENT IN AN ORGANIZED HEALTH CARE EDUCATION/TRAINING PROGRAM

## 2024-01-01 PROCEDURE — 0DTN0ZZ RESECTION OF SIGMOID COLON, OPEN APPROACH: ICD-10-PCS | Performed by: SURGERY

## 2024-01-01 RX ORDER — ACETAMINOPHEN 325 MG/1
650 TABLET ORAL EVERY 4 HOURS PRN
Status: DISCONTINUED | OUTPATIENT
Start: 2024-01-01 | End: 2024-01-01

## 2024-01-01 RX ORDER — ALBUTEROL SULFATE 90 UG/1
2 AEROSOL, METERED RESPIRATORY (INHALATION) EVERY 4 HOURS PRN
Status: DISCONTINUED | OUTPATIENT
Start: 2024-01-01 | End: 2024-01-01 | Stop reason: HOSPADM

## 2024-01-01 RX ORDER — HYDROMORPHONE HCL/PF 1 MG/ML
0.5 SYRINGE (ML) INJECTION EVERY 4 HOURS PRN
Status: DISCONTINUED | OUTPATIENT
Start: 2024-01-01 | End: 2024-01-01

## 2024-01-01 RX ORDER — LORAZEPAM 2 MG/ML
0.5 INJECTION INTRAMUSCULAR
Status: DISCONTINUED | OUTPATIENT
Start: 2024-01-01 | End: 2024-01-01 | Stop reason: HOSPADM

## 2024-01-01 RX ORDER — LORAZEPAM 2 MG/ML
0.5 INJECTION INTRAMUSCULAR EVERY 4 HOURS PRN
Status: DISCONTINUED | OUTPATIENT
Start: 2024-01-01 | End: 2024-01-01

## 2024-01-01 RX ORDER — PANTOPRAZOLE SODIUM 40 MG/1
40 TABLET, DELAYED RELEASE ORAL
Status: DISCONTINUED | OUTPATIENT
Start: 2024-01-01 | End: 2024-01-01 | Stop reason: HOSPADM

## 2024-01-01 RX ORDER — SODIUM CHLORIDE, SODIUM GLUCONATE, SODIUM ACETATE, POTASSIUM CHLORIDE, MAGNESIUM CHLORIDE, SODIUM PHOSPHATE, DIBASIC, AND POTASSIUM PHOSPHATE .53; .5; .37; .037; .03; .012; .00082 G/100ML; G/100ML; G/100ML; G/100ML; G/100ML; G/100ML; G/100ML
100 INJECTION, SOLUTION INTRAVENOUS CONTINUOUS
Status: DISCONTINUED | OUTPATIENT
Start: 2024-01-01 | End: 2024-01-01

## 2024-01-01 RX ORDER — SODIUM CHLORIDE, SODIUM LACTATE, POTASSIUM CHLORIDE, CALCIUM CHLORIDE 600; 310; 30; 20 MG/100ML; MG/100ML; MG/100ML; MG/100ML
100 INJECTION, SOLUTION INTRAVENOUS CONTINUOUS
Status: DISCONTINUED | OUTPATIENT
Start: 2024-01-01 | End: 2024-01-01

## 2024-01-01 RX ORDER — BISACODYL 10 MG
10 SUPPOSITORY, RECTAL RECTAL ONCE
Status: COMPLETED | OUTPATIENT
Start: 2024-01-01 | End: 2024-01-01

## 2024-01-01 RX ORDER — DEXTROSE, SODIUM CHLORIDE, SODIUM LACTATE, POTASSIUM CHLORIDE, AND CALCIUM CHLORIDE 5; .6; .31; .03; .02 G/100ML; G/100ML; G/100ML; G/100ML; G/100ML
100 INJECTION, SOLUTION INTRAVENOUS CONTINUOUS
Status: DISCONTINUED | OUTPATIENT
Start: 2024-01-01 | End: 2024-01-01

## 2024-01-01 RX ORDER — MAGNESIUM HYDROXIDE 1200 MG/15ML
LIQUID ORAL AS NEEDED
Status: DISCONTINUED | OUTPATIENT
Start: 2024-01-01 | End: 2024-01-01 | Stop reason: HOSPADM

## 2024-01-01 RX ORDER — PREDNISONE 1 MG/1
1 TABLET ORAL DAILY
Status: DISCONTINUED | OUTPATIENT
Start: 2024-01-01 | End: 2024-01-01

## 2024-01-01 RX ORDER — HYDROMORPHONE HCL/PF 1 MG/ML
0.5 SYRINGE (ML) INJECTION
Status: DISCONTINUED | OUTPATIENT
Start: 2024-01-01 | End: 2024-01-01 | Stop reason: HOSPADM

## 2024-01-01 RX ORDER — HYDROMORPHONE HCL IN WATER/PF 6 MG/30 ML
0.2 PATIENT CONTROLLED ANALGESIA SYRINGE INTRAVENOUS
Status: DISCONTINUED | OUTPATIENT
Start: 2024-01-01 | End: 2024-01-01

## 2024-01-01 RX ORDER — DEXTROSE MONOHYDRATE, SODIUM CHLORIDE, AND POTASSIUM CHLORIDE 50; 1.49; 4.5 G/1000ML; G/1000ML; G/1000ML
50 INJECTION, SOLUTION INTRAVENOUS CONTINUOUS
Status: DISCONTINUED | OUTPATIENT
Start: 2024-01-01 | End: 2024-01-01

## 2024-01-01 RX ORDER — HEPARIN SODIUM 10000 [USP'U]/100ML
3-20 INJECTION, SOLUTION INTRAVENOUS
Status: DISCONTINUED | OUTPATIENT
Start: 2024-01-01 | End: 2024-01-01

## 2024-01-01 RX ORDER — OXYCODONE HCL 5 MG/5 ML
5 SOLUTION, ORAL ORAL EVERY 4 HOURS PRN
Status: DISCONTINUED | OUTPATIENT
Start: 2024-01-01 | End: 2024-01-01 | Stop reason: HOSPADM

## 2024-01-01 RX ORDER — AMOXICILLIN 250 MG
1 CAPSULE ORAL
Status: DISCONTINUED | OUTPATIENT
Start: 2024-01-01 | End: 2024-01-01

## 2024-01-01 RX ORDER — DEXTROSE MONOHYDRATE AND SODIUM CHLORIDE 5; .45 G/100ML; G/100ML
75 INJECTION, SOLUTION INTRAVENOUS CONTINUOUS
Status: DISCONTINUED | OUTPATIENT
Start: 2024-01-01 | End: 2024-01-01

## 2024-01-01 RX ORDER — BUDESONIDE 0.5 MG/2ML
0.5 INHALANT ORAL
Status: DISCONTINUED | OUTPATIENT
Start: 2024-01-01 | End: 2024-01-01

## 2024-01-01 RX ORDER — INSULIN LISPRO 100 [IU]/ML
1-5 INJECTION, SOLUTION INTRAVENOUS; SUBCUTANEOUS
Status: DISCONTINUED | OUTPATIENT
Start: 2024-01-01 | End: 2024-01-01 | Stop reason: HOSPADM

## 2024-01-01 RX ORDER — BISACODYL 10 MG
10 SUPPOSITORY, RECTAL RECTAL DAILY
Status: DISCONTINUED | OUTPATIENT
Start: 2024-01-01 | End: 2024-01-01

## 2024-01-01 RX ORDER — GLYCOPYRROLATE 0.2 MG/ML
0.1 INJECTION INTRAMUSCULAR; INTRAVENOUS EVERY 4 HOURS PRN
Status: DISCONTINUED | OUTPATIENT
Start: 2024-01-01 | End: 2024-01-01 | Stop reason: HOSPADM

## 2024-01-01 RX ORDER — ONDANSETRON 2 MG/ML
4 INJECTION INTRAMUSCULAR; INTRAVENOUS ONCE
Status: DISCONTINUED | OUTPATIENT
Start: 2024-01-01 | End: 2024-01-01

## 2024-01-01 RX ORDER — LORAZEPAM 2 MG/ML
1 INJECTION INTRAMUSCULAR
Status: DISCONTINUED | OUTPATIENT
Start: 2024-01-01 | End: 2024-01-01 | Stop reason: HOSPADM

## 2024-01-01 RX ORDER — OXYCODONE HCL 5 MG/5 ML
2.5 SOLUTION, ORAL ORAL EVERY 4 HOURS PRN
Status: DISCONTINUED | OUTPATIENT
Start: 2024-01-01 | End: 2024-01-01 | Stop reason: HOSPADM

## 2024-01-01 RX ORDER — ALBUMIN, HUMAN INJ 5% 5 %
25 SOLUTION INTRAVENOUS ONCE
Status: COMPLETED | OUTPATIENT
Start: 2024-01-01 | End: 2024-01-01

## 2024-01-01 RX ORDER — SODIUM CHLORIDE, SODIUM GLUCONATE, SODIUM ACETATE, POTASSIUM CHLORIDE, MAGNESIUM CHLORIDE, SODIUM PHOSPHATE, DIBASIC, AND POTASSIUM PHOSPHATE .53; .5; .37; .037; .03; .012; .00082 G/100ML; G/100ML; G/100ML; G/100ML; G/100ML; G/100ML; G/100ML
75 INJECTION, SOLUTION INTRAVENOUS CONTINUOUS
Status: DISCONTINUED | OUTPATIENT
Start: 2024-01-01 | End: 2024-01-01

## 2024-01-01 RX ORDER — ALBUTEROL SULFATE 2.5 MG/3ML
2.5 SOLUTION RESPIRATORY (INHALATION)
Status: DISCONTINUED | OUTPATIENT
Start: 2024-01-01 | End: 2024-01-01

## 2024-01-01 RX ORDER — QUETIAPINE FUMARATE 25 MG/1
12.5 TABLET, FILM COATED ORAL
Status: DISCONTINUED | OUTPATIENT
Start: 2024-01-01 | End: 2024-01-01 | Stop reason: HOSPADM

## 2024-01-01 RX ORDER — LEVALBUTEROL INHALATION SOLUTION 1.25 MG/3ML
1.25 SOLUTION RESPIRATORY (INHALATION)
Status: DISCONTINUED | OUTPATIENT
Start: 2024-01-01 | End: 2024-01-01

## 2024-01-01 RX ORDER — HEPARIN SODIUM 5000 [USP'U]/ML
5000 INJECTION, SOLUTION INTRAVENOUS; SUBCUTANEOUS EVERY 8 HOURS SCHEDULED
Status: DISCONTINUED | OUTPATIENT
Start: 2024-01-01 | End: 2024-01-01

## 2024-01-01 RX ORDER — METOPROLOL SUCCINATE 25 MG/1
25 TABLET, EXTENDED RELEASE ORAL DAILY
Status: DISCONTINUED | OUTPATIENT
Start: 2024-01-01 | End: 2024-01-01 | Stop reason: HOSPADM

## 2024-01-01 RX ORDER — ALBUTEROL SULFATE 2.5 MG/3ML
2.5 SOLUTION RESPIRATORY (INHALATION) EVERY 4 HOURS PRN
Status: DISCONTINUED | OUTPATIENT
Start: 2024-01-01 | End: 2024-01-01

## 2024-01-01 RX ORDER — AMOXICILLIN 250 MG
1 CAPSULE ORAL 2 TIMES DAILY
Status: DISCONTINUED | OUTPATIENT
Start: 2024-01-01 | End: 2024-01-01 | Stop reason: HOSPADM

## 2024-01-01 RX ORDER — DIVALPROEX SODIUM 125 MG/1
500 CAPSULE, COATED PELLETS ORAL EVERY 8 HOURS SCHEDULED
Status: DISCONTINUED | OUTPATIENT
Start: 2024-01-01 | End: 2024-01-01 | Stop reason: HOSPADM

## 2024-01-01 RX ORDER — OXYCODONE HYDROCHLORIDE 5 MG/1
5 TABLET ORAL EVERY 4 HOURS PRN
Status: DISCONTINUED | OUTPATIENT
Start: 2024-01-01 | End: 2024-01-01

## 2024-01-01 RX ORDER — LANOLIN ALCOHOL/MO/W.PET/CERES
3 CREAM (GRAM) TOPICAL
Status: DISCONTINUED | OUTPATIENT
Start: 2024-01-01 | End: 2024-01-01

## 2024-01-01 RX ORDER — FORMOTEROL FUMARATE DIHYDRATE 20 UG/2ML
20 SOLUTION RESPIRATORY (INHALATION)
Status: DISCONTINUED | OUTPATIENT
Start: 2024-01-01 | End: 2024-01-01

## 2024-01-01 RX ORDER — BISACODYL 10 MG
10 SUPPOSITORY, RECTAL RECTAL DAILY PRN
Status: DISCONTINUED | OUTPATIENT
Start: 2024-01-01 | End: 2024-01-01 | Stop reason: HOSPADM

## 2024-01-01 RX ORDER — TAMSULOSIN HYDROCHLORIDE 0.4 MG/1
0.4 CAPSULE ORAL
Status: DISCONTINUED | OUTPATIENT
Start: 2024-01-01 | End: 2024-01-01 | Stop reason: HOSPADM

## 2024-01-01 RX ORDER — MIRTAZAPINE 15 MG/1
15 TABLET, FILM COATED ORAL
Status: DISCONTINUED | OUTPATIENT
Start: 2024-01-01 | End: 2024-01-01 | Stop reason: HOSPADM

## 2024-01-01 RX ORDER — HYDROMORPHONE HCL/PF 1 MG/ML
0.5 SYRINGE (ML) INJECTION
Status: DISCONTINUED | OUTPATIENT
Start: 2024-01-01 | End: 2024-01-01

## 2024-01-01 RX ORDER — DEXTROSE MONOHYDRATE AND SODIUM CHLORIDE 5; .45 G/100ML; G/100ML
50 INJECTION, SOLUTION INTRAVENOUS CONTINUOUS
Status: DISCONTINUED | OUTPATIENT
Start: 2024-01-01 | End: 2024-01-01

## 2024-01-01 RX ORDER — MIRTAZAPINE 15 MG/1
7.5 TABLET, FILM COATED ORAL
Status: DISCONTINUED | OUTPATIENT
Start: 2024-01-01 | End: 2024-01-01

## 2024-01-01 RX ORDER — ACETAMINOPHEN 10 MG/ML
1000 INJECTION, SOLUTION INTRAVENOUS EVERY 6 HOURS SCHEDULED
Status: DISCONTINUED | OUTPATIENT
Start: 2024-01-01 | End: 2024-01-01

## 2024-01-01 RX ORDER — CHLORHEXIDINE GLUCONATE ORAL RINSE 1.2 MG/ML
15 SOLUTION DENTAL EVERY 12 HOURS SCHEDULED
Status: DISCONTINUED | OUTPATIENT
Start: 2024-01-01 | End: 2024-01-01 | Stop reason: HOSPADM

## 2024-01-01 RX ORDER — ACETAMINOPHEN 160 MG/5ML
650 SUSPENSION ORAL EVERY 4 HOURS PRN
Status: DISCONTINUED | OUTPATIENT
Start: 2024-01-01 | End: 2024-01-01 | Stop reason: HOSPADM

## 2024-01-01 RX ORDER — INSULIN LISPRO 100 [IU]/ML
1-5 INJECTION, SOLUTION INTRAVENOUS; SUBCUTANEOUS EVERY 6 HOURS SCHEDULED
Status: DISCONTINUED | OUTPATIENT
Start: 2024-01-01 | End: 2024-01-01

## 2024-01-01 RX ORDER — SODIUM CHLORIDE, SODIUM GLUCONATE, SODIUM ACETATE, POTASSIUM CHLORIDE, MAGNESIUM CHLORIDE, SODIUM PHOSPHATE, DIBASIC, AND POTASSIUM PHOSPHATE .53; .5; .37; .037; .03; .012; .00082 G/100ML; G/100ML; G/100ML; G/100ML; G/100ML; G/100ML; G/100ML
125 INJECTION, SOLUTION INTRAVENOUS CONTINUOUS
Status: DISCONTINUED | OUTPATIENT
Start: 2024-01-01 | End: 2024-01-01

## 2024-01-01 RX ORDER — MICONAZOLE NITRATE 20 MG/G
CREAM TOPICAL 2 TIMES DAILY
Status: DISCONTINUED | OUTPATIENT
Start: 2024-01-01 | End: 2024-01-01 | Stop reason: HOSPADM

## 2024-01-01 RX ORDER — PHENAZOPYRIDINE HYDROCHLORIDE 100 MG/1
100 TABLET, FILM COATED ORAL ONCE
Status: COMPLETED | OUTPATIENT
Start: 2024-01-01 | End: 2024-01-01

## 2024-01-01 RX ORDER — DEXTROSE MONOHYDRATE 25 G/50ML
25 INJECTION, SOLUTION INTRAVENOUS ONCE AS NEEDED
Status: COMPLETED | OUTPATIENT
Start: 2024-01-01 | End: 2024-01-01

## 2024-01-01 RX ORDER — PANTOPRAZOLE SODIUM 40 MG/10ML
40 INJECTION, POWDER, LYOPHILIZED, FOR SOLUTION INTRAVENOUS
Status: DISCONTINUED | OUTPATIENT
Start: 2024-01-01 | End: 2024-01-01

## 2024-01-01 RX ORDER — MAGNESIUM SULFATE HEPTAHYDRATE 40 MG/ML
2 INJECTION, SOLUTION INTRAVENOUS ONCE
Status: COMPLETED | OUTPATIENT
Start: 2024-01-01 | End: 2024-01-01

## 2024-01-01 RX ORDER — ACETAMINOPHEN 10 MG/ML
1000 INJECTION, SOLUTION INTRAVENOUS EVERY 6 HOURS PRN
Status: DISCONTINUED | OUTPATIENT
Start: 2024-01-01 | End: 2024-01-01

## 2024-01-01 RX ORDER — SODIUM CHLORIDE, SODIUM GLUCONATE, SODIUM ACETATE, POTASSIUM CHLORIDE, MAGNESIUM CHLORIDE, SODIUM PHOSPHATE, DIBASIC, AND POTASSIUM PHOSPHATE .53; .5; .37; .037; .03; .012; .00082 G/100ML; G/100ML; G/100ML; G/100ML; G/100ML; G/100ML; G/100ML
500 INJECTION, SOLUTION INTRAVENOUS ONCE
Status: COMPLETED | OUTPATIENT
Start: 2024-01-01 | End: 2024-01-01

## 2024-01-01 RX ORDER — ALBUTEROL SULFATE 2.5 MG/3ML
2.5 SOLUTION RESPIRATORY (INHALATION) EVERY 4 HOURS PRN
Status: DISCONTINUED | OUTPATIENT
Start: 2024-01-01 | End: 2024-01-01 | Stop reason: HOSPADM

## 2024-01-01 RX ORDER — FENTANYL CITRATE/PF 50 MCG/ML
50 SYRINGE (ML) INJECTION
Status: CANCELLED | OUTPATIENT
Start: 2024-01-01

## 2024-01-01 RX ORDER — ACETAMINOPHEN 325 MG/1
975 TABLET ORAL EVERY 6 HOURS PRN
Status: DISCONTINUED | OUTPATIENT
Start: 2024-01-01 | End: 2024-01-01

## 2024-01-01 RX ORDER — DEXTROSE MONOHYDRATE, SODIUM CHLORIDE, AND POTASSIUM CHLORIDE 50; 1.49; 4.5 G/1000ML; G/1000ML; G/1000ML
75 INJECTION, SOLUTION INTRAVENOUS CONTINUOUS
Status: DISCONTINUED | OUTPATIENT
Start: 2024-01-01 | End: 2024-01-01

## 2024-01-01 RX ORDER — HYDROMORPHONE HCL IN WATER/PF 6 MG/30 ML
0.2 PATIENT CONTROLLED ANALGESIA SYRINGE INTRAVENOUS
Status: CANCELLED | OUTPATIENT
Start: 2024-01-01

## 2024-01-01 RX ORDER — FUROSEMIDE 40 MG/1
40 TABLET ORAL ONCE
Status: COMPLETED | OUTPATIENT
Start: 2024-01-01 | End: 2024-01-01

## 2024-01-01 RX ORDER — FUROSEMIDE 10 MG/ML
20 INJECTION INTRAMUSCULAR; INTRAVENOUS ONCE
Status: COMPLETED | OUTPATIENT
Start: 2024-01-01 | End: 2024-01-01

## 2024-01-01 RX ORDER — ONDANSETRON 2 MG/ML
4 INJECTION INTRAMUSCULAR; INTRAVENOUS ONCE AS NEEDED
Status: CANCELLED | OUTPATIENT
Start: 2024-01-01

## 2024-01-01 RX ADMIN — APIXABAN 5 MG: 5 TABLET, FILM COATED ORAL at 08:35

## 2024-01-01 RX ADMIN — NYSTATIN 500000 UNITS: 100000 SUSPENSION ORAL at 21:18

## 2024-01-01 RX ADMIN — VALPROATE SODIUM 500 MG: 100 INJECTION, SOLUTION INTRAVENOUS at 18:01

## 2024-01-01 RX ADMIN — MICONAZOLE NITRATE: 20 CREAM TOPICAL at 08:00

## 2024-01-01 RX ADMIN — CHLORHEXIDINE GLUCONATE 0.12% ORAL RINSE 15 ML: 1.2 LIQUID ORAL at 21:22

## 2024-01-01 RX ADMIN — DEXTROSE AND SODIUM CHLORIDE 75 ML/HR: 5; .45 INJECTION, SOLUTION INTRAVENOUS at 06:29

## 2024-01-01 RX ADMIN — DIVALPROEX SODIUM 500 MG: 125 CAPSULE, COATED PELLETS ORAL at 06:00

## 2024-01-01 RX ADMIN — ACETAMINOPHEN 1000 MG: 10 INJECTION INTRAVENOUS at 17:32

## 2024-01-01 RX ADMIN — OXYCODONE HYDROCHLORIDE 5 MG: 5 SOLUTION ORAL at 18:11

## 2024-01-01 RX ADMIN — DIVALPROEX SODIUM 500 MG: 125 CAPSULE, COATED PELLETS ORAL at 13:01

## 2024-01-01 RX ADMIN — NYSTATIN 500000 UNITS: 100000 SUSPENSION ORAL at 22:15

## 2024-01-01 RX ADMIN — HYDROMORPHONE HYDROCHLORIDE 0.5 MG: 1 INJECTION, SOLUTION INTRAMUSCULAR; INTRAVENOUS; SUBCUTANEOUS at 19:16

## 2024-01-01 RX ADMIN — QUETIAPINE FUMARATE 12.5 MG: 25 TABLET ORAL at 21:28

## 2024-01-01 RX ADMIN — HYDROMORPHONE HYDROCHLORIDE 0.5 MG: 1 INJECTION, SOLUTION INTRAMUSCULAR; INTRAVENOUS; SUBCUTANEOUS at 17:36

## 2024-01-01 RX ADMIN — BISACODYL 10 MG: 10 SUPPOSITORY RECTAL at 20:45

## 2024-01-01 RX ADMIN — DIVALPROEX SODIUM 500 MG: 125 CAPSULE, COATED PELLETS ORAL at 14:00

## 2024-01-01 RX ADMIN — NYSTATIN 500000 UNITS: 100000 SUSPENSION ORAL at 17:27

## 2024-01-01 RX ADMIN — METOPROLOL SUCCINATE 25 MG: 25 TABLET, EXTENDED RELEASE ORAL at 08:14

## 2024-01-01 RX ADMIN — DIVALPROEX SODIUM 500 MG: 125 CAPSULE, COATED PELLETS ORAL at 21:19

## 2024-01-01 RX ADMIN — FORMOTEROL FUMARATE DIHYDRATE 20 MCG: 20 SOLUTION RESPIRATORY (INHALATION) at 19:00

## 2024-01-01 RX ADMIN — CHLORHEXIDINE GLUCONATE 0.12% ORAL RINSE 15 ML: 1.2 LIQUID ORAL at 21:17

## 2024-01-01 RX ADMIN — ACETAMINOPHEN 1000 MG: 10 INJECTION INTRAVENOUS at 23:24

## 2024-01-01 RX ADMIN — SODIUM CHLORIDE, SODIUM LACTATE, POTASSIUM CHLORIDE, AND CALCIUM CHLORIDE 100 ML/HR: .6; .31; .03; .02 INJECTION, SOLUTION INTRAVENOUS at 17:32

## 2024-01-01 RX ADMIN — DIVALPROEX SODIUM 500 MG: 125 CAPSULE, COATED PELLETS ORAL at 21:10

## 2024-01-01 RX ADMIN — Medication 3 MG: at 21:03

## 2024-01-01 RX ADMIN — NYSTATIN 500000 UNITS: 100000 SUSPENSION ORAL at 23:13

## 2024-01-01 RX ADMIN — FUROSEMIDE 40 MG: 40 TABLET ORAL at 08:41

## 2024-01-01 RX ADMIN — APIXABAN 5 MG: 5 TABLET, FILM COATED ORAL at 18:09

## 2024-01-01 RX ADMIN — INSULIN LISPRO 1 UNITS: 100 INJECTION, SOLUTION INTRAVENOUS; SUBCUTANEOUS at 08:53

## 2024-01-01 RX ADMIN — PIPERACILLIN SODIUM AND TAZOBACTAM SODIUM 4.5 G: 36; 4.5 INJECTION, POWDER, LYOPHILIZED, FOR SOLUTION INTRAVENOUS at 17:43

## 2024-01-01 RX ADMIN — Medication 12.5 MG: at 08:20

## 2024-01-01 RX ADMIN — METOPROLOL SUCCINATE 25 MG: 25 TABLET, EXTENDED RELEASE ORAL at 09:48

## 2024-01-01 RX ADMIN — DIVALPROEX SODIUM 500 MG: 125 CAPSULE, COATED PELLETS ORAL at 13:31

## 2024-01-01 RX ADMIN — FORMOTEROL FUMARATE DIHYDRATE 20 MCG: 20 SOLUTION RESPIRATORY (INHALATION) at 07:27

## 2024-01-01 RX ADMIN — HEPARIN SODIUM 5000 UNITS: 5000 INJECTION INTRAVENOUS; SUBCUTANEOUS at 21:17

## 2024-01-01 RX ADMIN — DIVALPROEX SODIUM 500 MG: 125 CAPSULE, COATED PELLETS ORAL at 10:20

## 2024-01-01 RX ADMIN — INSULIN LISPRO 1 UNITS: 100 INJECTION, SOLUTION INTRAVENOUS; SUBCUTANEOUS at 17:44

## 2024-01-01 RX ADMIN — MICONAZOLE NITRATE: 20 CREAM TOPICAL at 08:58

## 2024-01-01 RX ADMIN — DIVALPROEX SODIUM 500 MG: 125 CAPSULE, COATED PELLETS ORAL at 13:00

## 2024-01-01 RX ADMIN — OXYCODONE HYDROCHLORIDE 5 MG: 5 SOLUTION ORAL at 21:30

## 2024-01-01 RX ADMIN — FORMOTEROL FUMARATE DIHYDRATE 20 MCG: 20 SOLUTION RESPIRATORY (INHALATION) at 08:23

## 2024-01-01 RX ADMIN — Medication 3 MG: at 21:46

## 2024-01-01 RX ADMIN — DIVALPROEX SODIUM 500 MG: 125 CAPSULE, COATED PELLETS ORAL at 15:00

## 2024-01-01 RX ADMIN — TAMSULOSIN HYDROCHLORIDE 0.4 MG: 0.4 CAPSULE ORAL at 17:40

## 2024-01-01 RX ADMIN — BUDESONIDE 0.5 MG: 0.5 INHALANT ORAL at 19:19

## 2024-01-01 RX ADMIN — TAMSULOSIN HYDROCHLORIDE 0.4 MG: 0.4 CAPSULE ORAL at 18:00

## 2024-01-01 RX ADMIN — DIVALPROEX SODIUM 500 MG: 125 CAPSULE, COATED PELLETS ORAL at 14:42

## 2024-01-01 RX ADMIN — DIVALPROEX SODIUM 500 MG: 125 CAPSULE, COATED PELLETS ORAL at 21:07

## 2024-01-01 RX ADMIN — METOPROLOL SUCCINATE 25 MG: 25 TABLET, EXTENDED RELEASE ORAL at 09:15

## 2024-01-01 RX ADMIN — ACYCLOVIR SODIUM 400 MG: 50 INJECTION, SOLUTION INTRAVENOUS at 13:52

## 2024-01-01 RX ADMIN — CHLORHEXIDINE GLUCONATE 0.12% ORAL RINSE 15 ML: 1.2 LIQUID ORAL at 08:14

## 2024-01-01 RX ADMIN — OXYCODONE HYDROCHLORIDE 5 MG: 5 SOLUTION ORAL at 15:13

## 2024-01-01 RX ADMIN — BUDESONIDE 0.5 MG: 0.5 INHALANT ORAL at 19:12

## 2024-01-01 RX ADMIN — DEXTROSE, SODIUM CHLORIDE, SODIUM LACTATE, POTASSIUM CHLORIDE, AND CALCIUM CHLORIDE 100 ML/HR: 5; .6; .31; .03; .02 INJECTION, SOLUTION INTRAVENOUS at 11:37

## 2024-01-01 RX ADMIN — APIXABAN 5 MG: 5 TABLET, FILM COATED ORAL at 17:46

## 2024-01-01 RX ADMIN — SODIUM CHLORIDE, SODIUM LACTATE, POTASSIUM CHLORIDE, AND CALCIUM CHLORIDE 100 ML/HR: .6; .31; .03; .02 INJECTION, SOLUTION INTRAVENOUS at 02:28

## 2024-01-01 RX ADMIN — PANTOPRAZOLE SODIUM 40 MG: 40 TABLET, DELAYED RELEASE ORAL at 05:09

## 2024-01-01 RX ADMIN — PANTOPRAZOLE SODIUM 40 MG: 40 INJECTION, POWDER, FOR SOLUTION INTRAVENOUS at 08:29

## 2024-01-01 RX ADMIN — DIVALPROEX SODIUM 500 MG: 125 CAPSULE, COATED PELLETS ORAL at 05:12

## 2024-01-01 RX ADMIN — BISACODYL 10 MG: 10 SUPPOSITORY RECTAL at 10:00

## 2024-01-01 RX ADMIN — QUETIAPINE FUMARATE 12.5 MG: 25 TABLET ORAL at 21:03

## 2024-01-01 RX ADMIN — MICONAZOLE NITRATE: 20 CREAM TOPICAL at 17:55

## 2024-01-01 RX ADMIN — DIVALPROEX SODIUM 500 MG: 125 CAPSULE, COATED PELLETS ORAL at 14:09

## 2024-01-01 RX ADMIN — TAMSULOSIN HYDROCHLORIDE 0.4 MG: 0.4 CAPSULE ORAL at 16:57

## 2024-01-01 RX ADMIN — INSULIN LISPRO 1 UNITS: 100 INJECTION, SOLUTION INTRAVENOUS; SUBCUTANEOUS at 00:08

## 2024-01-01 RX ADMIN — HYDROMORPHONE HYDROCHLORIDE 0.2 MG: 0.2 INJECTION, SOLUTION INTRAMUSCULAR; INTRAVENOUS; SUBCUTANEOUS at 17:33

## 2024-01-01 RX ADMIN — TAMSULOSIN HYDROCHLORIDE 0.4 MG: 0.4 CAPSULE ORAL at 17:54

## 2024-01-01 RX ADMIN — DIVALPROEX SODIUM 500 MG: 125 CAPSULE, COATED PELLETS ORAL at 05:13

## 2024-01-01 RX ADMIN — APIXABAN 5 MG: 5 TABLET, FILM COATED ORAL at 17:13

## 2024-01-01 RX ADMIN — APIXABAN 5 MG: 5 TABLET, FILM COATED ORAL at 09:16

## 2024-01-01 RX ADMIN — PANTOPRAZOLE SODIUM 40 MG: 40 TABLET, DELAYED RELEASE ORAL at 05:04

## 2024-01-01 RX ADMIN — TAMSULOSIN HYDROCHLORIDE 0.4 MG: 0.4 CAPSULE ORAL at 18:06

## 2024-01-01 RX ADMIN — DIVALPROEX SODIUM 500 MG: 125 CAPSULE, COATED PELLETS ORAL at 21:24

## 2024-01-01 RX ADMIN — PANTOPRAZOLE SODIUM 40 MG: 40 INJECTION, POWDER, FOR SOLUTION INTRAVENOUS at 08:52

## 2024-01-01 RX ADMIN — METOPROLOL SUCCINATE 25 MG: 25 TABLET, EXTENDED RELEASE ORAL at 08:53

## 2024-01-01 RX ADMIN — MICONAZOLE NITRATE: 20 CREAM TOPICAL at 08:37

## 2024-01-01 RX ADMIN — DIVALPROEX SODIUM 500 MG: 125 CAPSULE, COATED PELLETS ORAL at 06:01

## 2024-01-01 RX ADMIN — APIXABAN 5 MG: 5 TABLET, FILM COATED ORAL at 11:03

## 2024-01-01 RX ADMIN — BUDESONIDE 0.5 MG: 0.5 INHALANT ORAL at 20:07

## 2024-01-01 RX ADMIN — QUETIAPINE FUMARATE 12.5 MG: 25 TABLET ORAL at 21:50

## 2024-01-01 RX ADMIN — LEVALBUTEROL HYDROCHLORIDE 1.25 MG: 1.25 SOLUTION RESPIRATORY (INHALATION) at 08:20

## 2024-01-01 RX ADMIN — FORMOTEROL FUMARATE DIHYDRATE 20 MCG: 20 SOLUTION RESPIRATORY (INHALATION) at 08:46

## 2024-01-01 RX ADMIN — NYSTATIN 500000 UNITS: 100000 SUSPENSION ORAL at 17:18

## 2024-01-01 RX ADMIN — SENNOSIDES AND DOCUSATE SODIUM 1 TABLET: 50; 8.6 TABLET ORAL at 08:00

## 2024-01-01 RX ADMIN — DEXTROSE AND SODIUM CHLORIDE 50 ML/HR: 5; .45 INJECTION, SOLUTION INTRAVENOUS at 18:28

## 2024-01-01 RX ADMIN — METOPROLOL SUCCINATE 25 MG: 25 TABLET, EXTENDED RELEASE ORAL at 11:03

## 2024-01-01 RX ADMIN — HEPARIN SODIUM 12 UNITS/KG/HR: 10000 INJECTION, SOLUTION INTRAVENOUS at 11:00

## 2024-01-01 RX ADMIN — APIXABAN 5 MG: 5 TABLET, FILM COATED ORAL at 08:41

## 2024-01-01 RX ADMIN — MICONAZOLE NITRATE: 20 CREAM TOPICAL at 09:19

## 2024-01-01 RX ADMIN — Medication 3 MG: at 21:17

## 2024-01-01 RX ADMIN — APIXABAN 5 MG: 5 TABLET, FILM COATED ORAL at 17:40

## 2024-01-01 RX ADMIN — MAGNESIUM SULFATE HEPTAHYDRATE 2 G: 40 INJECTION, SOLUTION INTRAVENOUS at 18:11

## 2024-01-01 RX ADMIN — DEXTROSE, SODIUM CHLORIDE, AND POTASSIUM CHLORIDE 75 ML/HR: 5; .45; .15 INJECTION INTRAVENOUS at 09:54

## 2024-01-01 RX ADMIN — BUDESONIDE 0.5 MG: 0.5 INHALANT ORAL at 19:00

## 2024-01-01 RX ADMIN — CHLORHEXIDINE GLUCONATE 0.12% ORAL RINSE 15 ML: 1.2 LIQUID ORAL at 21:24

## 2024-01-01 RX ADMIN — HYDROMORPHONE HYDROCHLORIDE 0.2 MG: 0.2 INJECTION, SOLUTION INTRAMUSCULAR; INTRAVENOUS; SUBCUTANEOUS at 17:13

## 2024-01-01 RX ADMIN — CHLORHEXIDINE GLUCONATE 0.12% ORAL RINSE 15 ML: 1.2 LIQUID ORAL at 08:09

## 2024-01-01 RX ADMIN — QUETIAPINE FUMARATE 12.5 MG: 25 TABLET ORAL at 23:13

## 2024-01-01 RX ADMIN — DIVALPROEX SODIUM 500 MG: 125 CAPSULE, COATED PELLETS ORAL at 12:41

## 2024-01-01 RX ADMIN — APIXABAN 5 MG: 5 TABLET, FILM COATED ORAL at 08:57

## 2024-01-01 RX ADMIN — DIVALPROEX SODIUM 500 MG: 125 CAPSULE, COATED PELLETS ORAL at 21:55

## 2024-01-01 RX ADMIN — DIVALPROEX SODIUM 500 MG: 125 CAPSULE, COATED PELLETS ORAL at 17:25

## 2024-01-01 RX ADMIN — MICONAZOLE NITRATE: 20 CREAM TOPICAL at 17:28

## 2024-01-01 RX ADMIN — CHLORHEXIDINE GLUCONATE 0.12% ORAL RINSE 15 ML: 1.2 LIQUID ORAL at 08:20

## 2024-01-01 RX ADMIN — CHLORHEXIDINE GLUCONATE 0.12% ORAL RINSE 15 ML: 1.2 LIQUID ORAL at 21:35

## 2024-01-01 RX ADMIN — DIVALPROEX SODIUM 500 MG: 125 CAPSULE, COATED PELLETS ORAL at 05:36

## 2024-01-01 RX ADMIN — DIVALPROEX SODIUM 500 MG: 125 CAPSULE, COATED PELLETS ORAL at 23:14

## 2024-01-01 RX ADMIN — DIVALPROEX SODIUM 500 MG: 125 CAPSULE, COATED PELLETS ORAL at 21:41

## 2024-01-01 RX ADMIN — DIVALPROEX SODIUM 750 MG: 250 TABLET, DELAYED RELEASE ORAL at 22:32

## 2024-01-01 RX ADMIN — DIVALPROEX SODIUM 500 MG: 125 CAPSULE, COATED PELLETS ORAL at 00:09

## 2024-01-01 RX ADMIN — CEFTRIAXONE SODIUM 1000 MG: 10 INJECTION, POWDER, FOR SOLUTION INTRAVENOUS at 17:42

## 2024-01-01 RX ADMIN — APIXABAN 5 MG: 5 TABLET, FILM COATED ORAL at 18:00

## 2024-01-01 RX ADMIN — DEXTROSE, SODIUM CHLORIDE, AND POTASSIUM CHLORIDE 50 ML/HR: 5; .45; .15 INJECTION INTRAVENOUS at 00:38

## 2024-01-01 RX ADMIN — METOPROLOL SUCCINATE 25 MG: 25 TABLET, EXTENDED RELEASE ORAL at 09:29

## 2024-01-01 RX ADMIN — SENNOSIDES AND DOCUSATE SODIUM 1 TABLET: 50; 8.6 TABLET ORAL at 17:16

## 2024-01-01 RX ADMIN — APIXABAN 5 MG: 5 TABLET, FILM COATED ORAL at 17:20

## 2024-01-01 RX ADMIN — APIXABAN 5 MG: 5 TABLET, FILM COATED ORAL at 17:44

## 2024-01-01 RX ADMIN — BUDESONIDE 0.5 MG: 0.5 INHALANT ORAL at 07:33

## 2024-01-01 RX ADMIN — LEVALBUTEROL HYDROCHLORIDE 1.25 MG: 1.25 SOLUTION RESPIRATORY (INHALATION) at 13:59

## 2024-01-01 RX ADMIN — NYSTATIN 500000 UNITS: 100000 SUSPENSION ORAL at 12:30

## 2024-01-01 RX ADMIN — NYSTATIN 500000 UNITS: 100000 SUSPENSION ORAL at 08:58

## 2024-01-01 RX ADMIN — OXYCODONE HYDROCHLORIDE 5 MG: 5 SOLUTION ORAL at 09:04

## 2024-01-01 RX ADMIN — PANTOPRAZOLE SODIUM 40 MG: 40 TABLET, DELAYED RELEASE ORAL at 05:13

## 2024-01-01 RX ADMIN — DIVALPROEX SODIUM 500 MG: 125 CAPSULE, COATED PELLETS ORAL at 21:02

## 2024-01-01 RX ADMIN — BUDESONIDE 0.5 MG: 0.5 INHALANT ORAL at 20:08

## 2024-01-01 RX ADMIN — Medication 3 MG: at 21:29

## 2024-01-01 RX ADMIN — DIVALPROEX SODIUM 500 MG: 125 CAPSULE, COATED PELLETS ORAL at 13:56

## 2024-01-01 RX ADMIN — CHLORHEXIDINE GLUCONATE 0.12% ORAL RINSE 15 ML: 1.2 LIQUID ORAL at 20:33

## 2024-01-01 RX ADMIN — SENNOSIDES AND DOCUSATE SODIUM 1 TABLET: 50; 8.6 TABLET ORAL at 08:35

## 2024-01-01 RX ADMIN — MICONAZOLE NITRATE: 20 CREAM TOPICAL at 09:10

## 2024-01-01 RX ADMIN — SODIUM CHLORIDE, SODIUM GLUCONATE, SODIUM ACETATE, POTASSIUM CHLORIDE, MAGNESIUM CHLORIDE, SODIUM PHOSPHATE, DIBASIC, AND POTASSIUM PHOSPHATE 75 ML/HR: .53; .5; .37; .037; .03; .012; .00082 INJECTION, SOLUTION INTRAVENOUS at 10:21

## 2024-01-01 RX ADMIN — DEXTROSE AND SODIUM CHLORIDE 50 ML/HR: 5; .45 INJECTION, SOLUTION INTRAVENOUS at 10:01

## 2024-01-01 RX ADMIN — MICONAZOLE NITRATE: 20 CREAM TOPICAL at 21:00

## 2024-01-01 RX ADMIN — HYDROMORPHONE HYDROCHLORIDE 0.2 MG: 0.2 INJECTION, SOLUTION INTRAMUSCULAR; INTRAVENOUS; SUBCUTANEOUS at 21:17

## 2024-01-01 RX ADMIN — MICONAZOLE NITRATE: 20 CREAM TOPICAL at 08:14

## 2024-01-01 RX ADMIN — DIVALPROEX SODIUM 500 MG: 125 CAPSULE, COATED PELLETS ORAL at 15:33

## 2024-01-01 RX ADMIN — QUETIAPINE FUMARATE 12.5 MG: 25 TABLET ORAL at 22:06

## 2024-01-01 RX ADMIN — INSULIN LISPRO 1 UNITS: 100 INJECTION, SOLUTION INTRAVENOUS; SUBCUTANEOUS at 12:12

## 2024-01-01 RX ADMIN — APIXABAN 5 MG: 5 TABLET, FILM COATED ORAL at 09:25

## 2024-01-01 RX ADMIN — APIXABAN 5 MG: 5 TABLET, FILM COATED ORAL at 18:13

## 2024-01-01 RX ADMIN — Medication 3 MG: at 22:15

## 2024-01-01 RX ADMIN — APIXABAN 5 MG: 5 TABLET, FILM COATED ORAL at 17:16

## 2024-01-01 RX ADMIN — MICONAZOLE NITRATE: 20 CREAM TOPICAL at 09:20

## 2024-01-01 RX ADMIN — PIPERACILLIN SODIUM AND TAZOBACTAM SODIUM 4.5 G: 36; 4.5 INJECTION, POWDER, LYOPHILIZED, FOR SOLUTION INTRAVENOUS at 10:07

## 2024-01-01 RX ADMIN — MIRTAZAPINE 7.5 MG: 15 TABLET, FILM COATED ORAL at 21:28

## 2024-01-01 RX ADMIN — APIXABAN 5 MG: 5 TABLET, FILM COATED ORAL at 09:57

## 2024-01-01 RX ADMIN — CHLORHEXIDINE GLUCONATE 0.12% ORAL RINSE 15 ML: 1.2 LIQUID ORAL at 08:00

## 2024-01-01 RX ADMIN — MIRTAZAPINE 15 MG: 15 TABLET, FILM COATED ORAL at 21:03

## 2024-01-01 RX ADMIN — PANTOPRAZOLE SODIUM 40 MG: 40 INJECTION, POWDER, FOR SOLUTION INTRAVENOUS at 08:09

## 2024-01-01 RX ADMIN — MICONAZOLE NITRATE: 20 CREAM TOPICAL at 17:20

## 2024-01-01 RX ADMIN — PIPERACILLIN SODIUM AND TAZOBACTAM SODIUM 4.5 G: 36; 4.5 INJECTION, POWDER, LYOPHILIZED, FOR SOLUTION INTRAVENOUS at 18:41

## 2024-01-01 RX ADMIN — APIXABAN 5 MG: 5 TABLET, FILM COATED ORAL at 13:13

## 2024-01-01 RX ADMIN — QUETIAPINE FUMARATE 12.5 MG: 25 TABLET ORAL at 21:07

## 2024-01-01 RX ADMIN — Medication 3 MG: at 21:50

## 2024-01-01 RX ADMIN — MICONAZOLE NITRATE: 20 CREAM TOPICAL at 17:19

## 2024-01-01 RX ADMIN — APIXABAN 5 MG: 5 TABLET, FILM COATED ORAL at 10:38

## 2024-01-01 RX ADMIN — PANTOPRAZOLE SODIUM 40 MG: 40 INJECTION, POWDER, FOR SOLUTION INTRAVENOUS at 09:06

## 2024-01-01 RX ADMIN — DIVALPROEX SODIUM 500 MG: 125 CAPSULE, COATED PELLETS ORAL at 05:29

## 2024-01-01 RX ADMIN — BISACODYL 10 MG: 10 SUPPOSITORY RECTAL at 08:11

## 2024-01-01 RX ADMIN — PANTOPRAZOLE SODIUM 40 MG: 40 TABLET, DELAYED RELEASE ORAL at 05:59

## 2024-01-01 RX ADMIN — BUDESONIDE 0.5 MG: 0.5 INHALANT ORAL at 07:10

## 2024-01-01 RX ADMIN — FORMOTEROL FUMARATE DIHYDRATE 20 MCG: 20 SOLUTION RESPIRATORY (INHALATION) at 08:10

## 2024-01-01 RX ADMIN — MICONAZOLE NITRATE: 20 CREAM TOPICAL at 08:55

## 2024-01-01 RX ADMIN — SODIUM CHLORIDE 500 ML: 0.9 INJECTION, SOLUTION INTRAVENOUS at 15:26

## 2024-01-01 RX ADMIN — DIVALPROEX SODIUM 500 MG: 125 CAPSULE, COATED PELLETS ORAL at 18:12

## 2024-01-01 RX ADMIN — FORMOTEROL FUMARATE DIHYDRATE 20 MCG: 20 SOLUTION RESPIRATORY (INHALATION) at 19:53

## 2024-01-01 RX ADMIN — ALBUTEROL SULFATE 2.5 MG: 2.5 SOLUTION RESPIRATORY (INHALATION) at 17:23

## 2024-01-01 RX ADMIN — NYSTATIN 500000 UNITS: 100000 SUSPENSION ORAL at 18:13

## 2024-01-01 RX ADMIN — OXYCODONE HYDROCHLORIDE 2.5 MG: 5 SOLUTION ORAL at 20:33

## 2024-01-01 RX ADMIN — FORMOTEROL FUMARATE DIHYDRATE 20 MCG: 20 SOLUTION RESPIRATORY (INHALATION) at 19:48

## 2024-01-01 RX ADMIN — CHLORHEXIDINE GLUCONATE 0.12% ORAL RINSE 15 ML: 1.2 LIQUID ORAL at 09:16

## 2024-01-01 RX ADMIN — CHLORHEXIDINE GLUCONATE 0.12% ORAL RINSE 15 ML: 1.2 LIQUID ORAL at 08:21

## 2024-01-01 RX ADMIN — QUETIAPINE FUMARATE 12.5 MG: 25 TABLET ORAL at 21:05

## 2024-01-01 RX ADMIN — MIRTAZAPINE 7.5 MG: 15 TABLET, FILM COATED ORAL at 18:21

## 2024-01-01 RX ADMIN — MIRTAZAPINE 7.5 MG: 15 TABLET, FILM COATED ORAL at 22:06

## 2024-01-01 RX ADMIN — NYSTATIN 500000 UNITS: 100000 SUSPENSION ORAL at 17:45

## 2024-01-01 RX ADMIN — OXYCODONE HYDROCHLORIDE 5 MG: 5 SOLUTION ORAL at 09:14

## 2024-01-01 RX ADMIN — TAMSULOSIN HYDROCHLORIDE 0.4 MG: 0.4 CAPSULE ORAL at 16:17

## 2024-01-01 RX ADMIN — HEPARIN SODIUM 14 UNITS/KG/HR: 10000 INJECTION, SOLUTION INTRAVENOUS at 09:51

## 2024-01-01 RX ADMIN — MIRTAZAPINE 15 MG: 15 TABLET, FILM COATED ORAL at 23:13

## 2024-01-01 RX ADMIN — PIPERACILLIN SODIUM AND TAZOBACTAM SODIUM 4.5 G: 36; 4.5 INJECTION, POWDER, LYOPHILIZED, FOR SOLUTION INTRAVENOUS at 17:25

## 2024-01-01 RX ADMIN — VALPROATE SODIUM 500 MG: 100 INJECTION, SOLUTION INTRAVENOUS at 01:03

## 2024-01-01 RX ADMIN — MICONAZOLE NITRATE: 20 CREAM TOPICAL at 17:45

## 2024-01-01 RX ADMIN — TAMSULOSIN HYDROCHLORIDE 0.4 MG: 0.4 CAPSULE ORAL at 18:21

## 2024-01-01 RX ADMIN — TAMSULOSIN HYDROCHLORIDE 0.4 MG: 0.4 CAPSULE ORAL at 17:44

## 2024-01-01 RX ADMIN — Medication 3 MG: at 21:18

## 2024-01-01 RX ADMIN — BUDESONIDE 0.5 MG: 0.5 INHALANT ORAL at 19:25

## 2024-01-01 RX ADMIN — INSULIN LISPRO 2 UNITS: 100 INJECTION, SOLUTION INTRAVENOUS; SUBCUTANEOUS at 23:15

## 2024-01-01 RX ADMIN — FORMOTEROL FUMARATE DIHYDRATE 20 MCG: 20 SOLUTION RESPIRATORY (INHALATION) at 20:07

## 2024-01-01 RX ADMIN — ALBUTEROL SULFATE 2.5 MG: 2.5 SOLUTION RESPIRATORY (INHALATION) at 15:55

## 2024-01-01 RX ADMIN — SENNOSIDES AND DOCUSATE SODIUM 1 TABLET: 50; 8.6 TABLET ORAL at 22:06

## 2024-01-01 RX ADMIN — DIVALPROEX SODIUM 500 MG: 125 CAPSULE, COATED PELLETS ORAL at 05:30

## 2024-01-01 RX ADMIN — OXYCODONE HYDROCHLORIDE 5 MG: 5 SOLUTION ORAL at 03:16

## 2024-01-01 RX ADMIN — CHLORHEXIDINE GLUCONATE 0.12% ORAL RINSE 15 ML: 1.2 LIQUID ORAL at 08:11

## 2024-01-01 RX ADMIN — PANTOPRAZOLE SODIUM 40 MG: 40 TABLET, DELAYED RELEASE ORAL at 08:41

## 2024-01-01 RX ADMIN — SENNOSIDES AND DOCUSATE SODIUM 1 TABLET: 50; 8.6 TABLET ORAL at 08:55

## 2024-01-01 RX ADMIN — CHLORHEXIDINE GLUCONATE 0.12% ORAL RINSE 15 ML: 1.2 LIQUID ORAL at 08:58

## 2024-01-01 RX ADMIN — MICONAZOLE NITRATE 1 APPLICATION: 20 CREAM TOPICAL at 15:00

## 2024-01-01 RX ADMIN — SENNOSIDES AND DOCUSATE SODIUM 1 TABLET: 50; 8.6 TABLET ORAL at 20:00

## 2024-01-01 RX ADMIN — DIVALPROEX SODIUM 500 MG: 125 CAPSULE, COATED PELLETS ORAL at 13:39

## 2024-01-01 RX ADMIN — METOPROLOL SUCCINATE 25 MG: 25 TABLET, EXTENDED RELEASE ORAL at 08:41

## 2024-01-01 RX ADMIN — INSULIN LISPRO 1 UNITS: 100 INJECTION, SOLUTION INTRAVENOUS; SUBCUTANEOUS at 13:02

## 2024-01-01 RX ADMIN — ACETAMINOPHEN 1000 MG: 10 INJECTION INTRAVENOUS at 11:02

## 2024-01-01 RX ADMIN — METOPROLOL SUCCINATE 25 MG: 25 TABLET, EXTENDED RELEASE ORAL at 08:55

## 2024-01-01 RX ADMIN — INSULIN LISPRO 1 UNITS: 100 INJECTION, SOLUTION INTRAVENOUS; SUBCUTANEOUS at 09:16

## 2024-01-01 RX ADMIN — MIRTAZAPINE 15 MG: 15 TABLET, FILM COATED ORAL at 21:42

## 2024-01-01 RX ADMIN — APIXABAN 5 MG: 5 TABLET, FILM COATED ORAL at 18:22

## 2024-01-01 RX ADMIN — SENNOSIDES AND DOCUSATE SODIUM 1 TABLET: 50; 8.6 TABLET ORAL at 18:22

## 2024-01-01 RX ADMIN — CHLORHEXIDINE GLUCONATE 0.12% ORAL RINSE 15 ML: 1.2 LIQUID ORAL at 21:36

## 2024-01-01 RX ADMIN — BUDESONIDE 0.5 MG: 0.5 INHALANT ORAL at 07:58

## 2024-01-01 RX ADMIN — MIRTAZAPINE 7.5 MG: 15 TABLET, FILM COATED ORAL at 22:15

## 2024-01-01 RX ADMIN — INSULIN LISPRO 1 UNITS: 100 INJECTION, SOLUTION INTRAVENOUS; SUBCUTANEOUS at 18:13

## 2024-01-01 RX ADMIN — DIVALPROEX SODIUM 500 MG: 125 CAPSULE, COATED PELLETS ORAL at 21:36

## 2024-01-01 RX ADMIN — CHLORHEXIDINE GLUCONATE 0.12% ORAL RINSE 15 ML: 1.2 LIQUID ORAL at 21:28

## 2024-01-01 RX ADMIN — CHLORHEXIDINE GLUCONATE 0.12% ORAL RINSE 15 ML: 1.2 LIQUID ORAL at 20:54

## 2024-01-01 RX ADMIN — LEVALBUTEROL HYDROCHLORIDE 1.25 MG: 1.25 SOLUTION RESPIRATORY (INHALATION) at 19:55

## 2024-01-01 RX ADMIN — NYSTATIN 500000 UNITS: 100000 SUSPENSION ORAL at 10:39

## 2024-01-01 RX ADMIN — DIVALPROEX SODIUM 500 MG: 125 CAPSULE, COATED PELLETS ORAL at 13:30

## 2024-01-01 RX ADMIN — PANTOPRAZOLE SODIUM 40 MG: 40 TABLET, DELAYED RELEASE ORAL at 05:36

## 2024-01-01 RX ADMIN — METOPROLOL SUCCINATE 25 MG: 25 TABLET, EXTENDED RELEASE ORAL at 08:35

## 2024-01-01 RX ADMIN — HEPARIN SODIUM 5000 UNITS: 5000 INJECTION INTRAVENOUS; SUBCUTANEOUS at 22:32

## 2024-01-01 RX ADMIN — PANTOPRAZOLE SODIUM 40 MG: 40 TABLET, DELAYED RELEASE ORAL at 06:00

## 2024-01-01 RX ADMIN — MICONAZOLE NITRATE: 20 CREAM TOPICAL at 18:15

## 2024-01-01 RX ADMIN — DIVALPROEX SODIUM 500 MG: 125 CAPSULE, COATED PELLETS ORAL at 05:11

## 2024-01-01 RX ADMIN — LEVALBUTEROL HYDROCHLORIDE 1.25 MG: 1.25 SOLUTION RESPIRATORY (INHALATION) at 07:21

## 2024-01-01 RX ADMIN — LEVALBUTEROL HYDROCHLORIDE 1.25 MG: 1.25 SOLUTION RESPIRATORY (INHALATION) at 14:44

## 2024-01-01 RX ADMIN — Medication 3 MG: at 21:53

## 2024-01-01 RX ADMIN — LORAZEPAM 0.5 MG: 2 INJECTION INTRAMUSCULAR; INTRAVENOUS at 19:26

## 2024-01-01 RX ADMIN — TAMSULOSIN HYDROCHLORIDE 0.4 MG: 0.4 CAPSULE ORAL at 22:05

## 2024-01-01 RX ADMIN — NYSTATIN 500000 UNITS: 100000 SUSPENSION ORAL at 08:55

## 2024-01-01 RX ADMIN — PHENAZOPYRIDINE 100 MG: 100 TABLET ORAL at 22:06

## 2024-01-01 RX ADMIN — MICONAZOLE NITRATE: 20 CREAM TOPICAL at 09:30

## 2024-01-01 RX ADMIN — MICONAZOLE NITRATE: 20 CREAM TOPICAL at 17:42

## 2024-01-01 RX ADMIN — SENNOSIDES AND DOCUSATE SODIUM 1 TABLET: 50; 8.6 TABLET ORAL at 18:13

## 2024-01-01 RX ADMIN — DIVALPROEX SODIUM 500 MG: 125 CAPSULE, COATED PELLETS ORAL at 22:06

## 2024-01-01 RX ADMIN — HEPARIN SODIUM 12 UNITS/KG/HR: 10000 INJECTION, SOLUTION INTRAVENOUS at 11:37

## 2024-01-01 RX ADMIN — NYSTATIN 500000 UNITS: 100000 SUSPENSION ORAL at 21:28

## 2024-01-01 RX ADMIN — CHLORHEXIDINE GLUCONATE 0.12% ORAL RINSE 15 ML: 1.2 LIQUID ORAL at 08:55

## 2024-01-01 RX ADMIN — MICONAZOLE NITRATE: 20 CREAM TOPICAL at 17:47

## 2024-01-01 RX ADMIN — NYSTATIN 500000 UNITS: 100000 SUSPENSION ORAL at 08:35

## 2024-01-01 RX ADMIN — BUDESONIDE 0.5 MG: 0.5 INHALANT ORAL at 19:09

## 2024-01-01 RX ADMIN — APIXABAN 5 MG: 5 TABLET, FILM COATED ORAL at 08:20

## 2024-01-01 RX ADMIN — APIXABAN 5 MG: 5 TABLET, FILM COATED ORAL at 08:55

## 2024-01-01 RX ADMIN — MICONAZOLE NITRATE: 20 CREAM TOPICAL at 10:00

## 2024-01-01 RX ADMIN — NYSTATIN 500000 UNITS: 100000 SUSPENSION ORAL at 21:36

## 2024-01-01 RX ADMIN — DEXTROSE, SODIUM CHLORIDE, AND POTASSIUM CHLORIDE 75 ML/HR: 5; .45; .15 INJECTION INTRAVENOUS at 10:46

## 2024-01-01 RX ADMIN — OXYCODONE HYDROCHLORIDE 5 MG: 5 SOLUTION ORAL at 13:59

## 2024-01-01 RX ADMIN — ALBUTEROL SULFATE 2.5 MG: 2.5 SOLUTION RESPIRATORY (INHALATION) at 13:21

## 2024-01-01 RX ADMIN — CHLORHEXIDINE GLUCONATE 0.12% ORAL RINSE 15 ML: 1.2 LIQUID ORAL at 21:52

## 2024-01-01 RX ADMIN — Medication 3 MG: at 21:22

## 2024-01-01 RX ADMIN — BUDESONIDE 0.5 MG: 0.5 INHALANT ORAL at 19:23

## 2024-01-01 RX ADMIN — NYSTATIN 500000 UNITS: 100000 SUSPENSION ORAL at 21:35

## 2024-01-01 RX ADMIN — CHLORHEXIDINE GLUCONATE 0.12% ORAL RINSE 15 ML: 1.2 LIQUID ORAL at 09:26

## 2024-01-01 RX ADMIN — CHLORHEXIDINE GLUCONATE 0.12% ORAL RINSE 15 ML: 1.2 LIQUID ORAL at 11:03

## 2024-01-01 RX ADMIN — INSULIN LISPRO 1 UNITS: 100 INJECTION, SOLUTION INTRAVENOUS; SUBCUTANEOUS at 17:45

## 2024-01-01 RX ADMIN — MICONAZOLE NITRATE: 20 CREAM TOPICAL at 08:15

## 2024-01-01 RX ADMIN — DIVALPROEX SODIUM 500 MG: 125 CAPSULE, COATED PELLETS ORAL at 21:28

## 2024-01-01 RX ADMIN — NYSTATIN 500000 UNITS: 100000 SUSPENSION ORAL at 09:15

## 2024-01-01 RX ADMIN — MICONAZOLE NITRATE: 20 CREAM TOPICAL at 21:56

## 2024-01-01 RX ADMIN — PANTOPRAZOLE SODIUM 40 MG: 40 TABLET, DELAYED RELEASE ORAL at 05:35

## 2024-01-01 RX ADMIN — METOPROLOL SUCCINATE 25 MG: 25 TABLET, EXTENDED RELEASE ORAL at 08:21

## 2024-01-01 RX ADMIN — INSULIN LISPRO 1 UNITS: 100 INJECTION, SOLUTION INTRAVENOUS; SUBCUTANEOUS at 21:18

## 2024-01-01 RX ADMIN — CHLORHEXIDINE GLUCONATE 0.12% ORAL RINSE 15 ML: 1.2 LIQUID ORAL at 21:18

## 2024-01-01 RX ADMIN — MICONAZOLE NITRATE: 20 CREAM TOPICAL at 18:23

## 2024-01-01 RX ADMIN — HEPARIN SODIUM 5000 UNITS: 5000 INJECTION INTRAVENOUS; SUBCUTANEOUS at 06:05

## 2024-01-01 RX ADMIN — APIXABAN 5 MG: 5 TABLET, FILM COATED ORAL at 08:11

## 2024-01-01 RX ADMIN — MICONAZOLE NITRATE: 20 CREAM TOPICAL at 20:56

## 2024-01-01 RX ADMIN — DIVALPROEX SODIUM 500 MG: 125 CAPSULE, COATED PELLETS ORAL at 05:43

## 2024-01-01 RX ADMIN — LEVALBUTEROL HYDROCHLORIDE 1.25 MG: 1.25 SOLUTION RESPIRATORY (INHALATION) at 14:11

## 2024-01-01 RX ADMIN — FORMOTEROL FUMARATE DIHYDRATE 20 MCG: 20 SOLUTION RESPIRATORY (INHALATION) at 08:53

## 2024-01-01 RX ADMIN — APIXABAN 5 MG: 5 TABLET, FILM COATED ORAL at 17:45

## 2024-01-01 RX ADMIN — BUDESONIDE 0.5 MG: 0.5 INHALANT ORAL at 19:28

## 2024-01-01 RX ADMIN — PIPERACILLIN SODIUM AND TAZOBACTAM SODIUM 4.5 G: 36; 4.5 INJECTION, POWDER, LYOPHILIZED, FOR SOLUTION INTRAVENOUS at 02:33

## 2024-01-01 RX ADMIN — SODIUM CHLORIDE, SODIUM GLUCONATE, SODIUM ACETATE, POTASSIUM CHLORIDE, MAGNESIUM CHLORIDE, SODIUM PHOSPHATE, DIBASIC, AND POTASSIUM PHOSPHATE 100 ML/HR: .53; .5; .37; .037; .03; .012; .00082 INJECTION, SOLUTION INTRAVENOUS at 14:59

## 2024-01-01 RX ADMIN — APIXABAN 5 MG: 5 TABLET, FILM COATED ORAL at 16:57

## 2024-01-01 RX ADMIN — OXYCODONE HYDROCHLORIDE 5 MG: 5 SOLUTION ORAL at 11:30

## 2024-01-01 RX ADMIN — DIVALPROEX SODIUM 500 MG: 125 CAPSULE, COATED PELLETS ORAL at 21:03

## 2024-01-01 RX ADMIN — APIXABAN 5 MG: 5 TABLET, FILM COATED ORAL at 09:15

## 2024-01-01 RX ADMIN — INSULIN LISPRO 1 UNITS: 100 INJECTION, SOLUTION INTRAVENOUS; SUBCUTANEOUS at 00:03

## 2024-01-01 RX ADMIN — MICONAZOLE NITRATE: 20 CREAM TOPICAL at 09:17

## 2024-01-01 RX ADMIN — SODIUM CHLORIDE, SODIUM GLUCONATE, SODIUM ACETATE, POTASSIUM CHLORIDE, MAGNESIUM CHLORIDE, SODIUM PHOSPHATE, DIBASIC, AND POTASSIUM PHOSPHATE 125 ML/HR: .53; .5; .37; .037; .03; .012; .00082 INJECTION, SOLUTION INTRAVENOUS at 09:03

## 2024-01-01 RX ADMIN — TAMSULOSIN HYDROCHLORIDE 0.4 MG: 0.4 CAPSULE ORAL at 17:26

## 2024-01-01 RX ADMIN — INSULIN LISPRO 1 UNITS: 100 INJECTION, SOLUTION INTRAVENOUS; SUBCUTANEOUS at 18:00

## 2024-01-01 RX ADMIN — DEXTROSE, SODIUM CHLORIDE, SODIUM LACTATE, POTASSIUM CHLORIDE, AND CALCIUM CHLORIDE 100 ML/HR: 5; .6; .31; .03; .02 INJECTION, SOLUTION INTRAVENOUS at 22:38

## 2024-01-01 RX ADMIN — CHLORHEXIDINE GLUCONATE 0.12% ORAL RINSE 15 ML: 1.2 LIQUID ORAL at 21:41

## 2024-01-01 RX ADMIN — NYSTATIN 500000 UNITS: 100000 SUSPENSION ORAL at 11:00

## 2024-01-01 RX ADMIN — MICONAZOLE NITRATE: 20 CREAM TOPICAL at 21:12

## 2024-01-01 RX ADMIN — TAMSULOSIN HYDROCHLORIDE 0.4 MG: 0.4 CAPSULE ORAL at 17:20

## 2024-01-01 RX ADMIN — HEPARIN SODIUM 14 UNITS/KG/HR: 10000 INJECTION, SOLUTION INTRAVENOUS at 09:01

## 2024-01-01 RX ADMIN — FUROSEMIDE 20 MG: 10 INJECTION, SOLUTION INTRAMUSCULAR; INTRAVENOUS at 13:50

## 2024-01-01 RX ADMIN — PIPERACILLIN SODIUM AND TAZOBACTAM SODIUM 4.5 G: 36; 4.5 INJECTION, POWDER, LYOPHILIZED, FOR SOLUTION INTRAVENOUS at 11:29

## 2024-01-01 RX ADMIN — PIPERACILLIN SODIUM AND TAZOBACTAM SODIUM 4.5 G: 36; 4.5 INJECTION, POWDER, LYOPHILIZED, FOR SOLUTION INTRAVENOUS at 02:44

## 2024-01-01 RX ADMIN — NYSTATIN 500000 UNITS: 100000 SUSPENSION ORAL at 08:42

## 2024-01-01 RX ADMIN — SODIUM CHLORIDE, SODIUM LACTATE, POTASSIUM CHLORIDE, AND CALCIUM CHLORIDE 100 ML/HR: .6; .31; .03; .02 INJECTION, SOLUTION INTRAVENOUS at 08:15

## 2024-01-01 RX ADMIN — DIVALPROEX SODIUM 500 MG: 125 CAPSULE, COATED PELLETS ORAL at 15:42

## 2024-01-01 RX ADMIN — MICONAZOLE NITRATE: 20 CREAM TOPICAL at 17:40

## 2024-01-01 RX ADMIN — FORMOTEROL FUMARATE DIHYDRATE 20 MCG: 20 SOLUTION RESPIRATORY (INHALATION) at 19:09

## 2024-01-01 RX ADMIN — DEXTROSE, SODIUM CHLORIDE, AND POTASSIUM CHLORIDE 75 ML/HR: 5; .45; .15 INJECTION INTRAVENOUS at 21:20

## 2024-01-01 RX ADMIN — CHLORHEXIDINE GLUCONATE 0.12% ORAL RINSE 15 ML: 1.2 LIQUID ORAL at 08:42

## 2024-01-01 RX ADMIN — BUDESONIDE 0.5 MG: 0.5 INHALANT ORAL at 08:45

## 2024-01-01 RX ADMIN — NYSTATIN 500000 UNITS: 100000 SUSPENSION ORAL at 08:00

## 2024-01-01 RX ADMIN — IOHEXOL 100 ML: 350 INJECTION, SOLUTION INTRAVENOUS at 10:13

## 2024-01-01 RX ADMIN — Medication 3 MG: at 21:09

## 2024-01-01 RX ADMIN — NYSTATIN 500000 UNITS: 100000 SUSPENSION ORAL at 21:57

## 2024-01-01 RX ADMIN — IOHEXOL 85 ML: 350 INJECTION, SOLUTION INTRAVENOUS at 09:01

## 2024-01-01 RX ADMIN — METOPROLOL SUCCINATE 25 MG: 25 TABLET, EXTENDED RELEASE ORAL at 08:11

## 2024-01-01 RX ADMIN — METOPROLOL SUCCINATE 25 MG: 25 TABLET, EXTENDED RELEASE ORAL at 13:22

## 2024-01-01 RX ADMIN — NYSTATIN 500000 UNITS: 100000 SUSPENSION ORAL at 16:57

## 2024-01-01 RX ADMIN — DIVALPROEX SODIUM 500 MG: 125 CAPSULE, COATED PELLETS ORAL at 05:59

## 2024-01-01 RX ADMIN — METOPROLOL SUCCINATE 25 MG: 25 TABLET, EXTENDED RELEASE ORAL at 10:39

## 2024-01-01 RX ADMIN — QUETIAPINE FUMARATE 12.5 MG: 25 TABLET ORAL at 21:39

## 2024-01-01 RX ADMIN — BUDESONIDE 0.5 MG: 0.5 INHALANT ORAL at 08:28

## 2024-01-01 RX ADMIN — DEXTROSE AND SODIUM CHLORIDE 50 ML/HR: 5; .45 INJECTION, SOLUTION INTRAVENOUS at 03:03

## 2024-01-01 RX ADMIN — NYSTATIN 500000 UNITS: 100000 SUSPENSION ORAL at 13:50

## 2024-01-01 RX ADMIN — Medication 12.5 MG: at 22:32

## 2024-01-01 RX ADMIN — DIVALPROEX SODIUM 500 MG: 125 CAPSULE, COATED PELLETS ORAL at 06:19

## 2024-01-01 RX ADMIN — INSULIN LISPRO 1 UNITS: 100 INJECTION, SOLUTION INTRAVENOUS; SUBCUTANEOUS at 18:09

## 2024-01-01 RX ADMIN — LEVALBUTEROL HYDROCHLORIDE 1.25 MG: 1.25 SOLUTION RESPIRATORY (INHALATION) at 07:20

## 2024-01-01 RX ADMIN — CHLORHEXIDINE GLUCONATE 0.12% ORAL RINSE 15 ML: 1.2 LIQUID ORAL at 21:09

## 2024-01-01 RX ADMIN — SODIUM CHLORIDE, SODIUM GLUCONATE, SODIUM ACETATE, POTASSIUM CHLORIDE, MAGNESIUM CHLORIDE, SODIUM PHOSPHATE, DIBASIC, AND POTASSIUM PHOSPHATE 100 ML/HR: .53; .5; .37; .037; .03; .012; .00082 INJECTION, SOLUTION INTRAVENOUS at 09:39

## 2024-01-01 RX ADMIN — SENNOSIDES AND DOCUSATE SODIUM 1 TABLET: 50; 8.6 TABLET ORAL at 17:44

## 2024-01-01 RX ADMIN — GLYCOPYRROLATE 0.1 MG: 0.2 INJECTION, SOLUTION INTRAMUSCULAR; INTRAVENOUS at 02:19

## 2024-01-01 RX ADMIN — FORMOTEROL FUMARATE DIHYDRATE 20 MCG: 20 SOLUTION RESPIRATORY (INHALATION) at 20:08

## 2024-01-01 RX ADMIN — PANTOPRAZOLE SODIUM 40 MG: 40 TABLET, DELAYED RELEASE ORAL at 06:19

## 2024-01-01 RX ADMIN — CHLORHEXIDINE GLUCONATE 0.12% ORAL RINSE 15 ML: 1.2 LIQUID ORAL at 22:00

## 2024-01-01 RX ADMIN — LEVALBUTEROL HYDROCHLORIDE 1.25 MG: 1.25 SOLUTION RESPIRATORY (INHALATION) at 20:00

## 2024-01-01 RX ADMIN — Medication 3 MG: at 21:41

## 2024-01-01 RX ADMIN — CHLORHEXIDINE GLUCONATE 0.12% ORAL RINSE 15 ML: 1.2 LIQUID ORAL at 10:39

## 2024-01-01 RX ADMIN — FORMOTEROL FUMARATE DIHYDRATE 20 MCG: 20 SOLUTION RESPIRATORY (INHALATION) at 19:19

## 2024-01-01 RX ADMIN — DEXTROSE, SODIUM CHLORIDE, AND POTASSIUM CHLORIDE 75 ML/HR: 5; .45; .15 INJECTION INTRAVENOUS at 12:28

## 2024-01-01 RX ADMIN — MIRTAZAPINE 15 MG: 15 TABLET, FILM COATED ORAL at 21:06

## 2024-01-01 RX ADMIN — SENNOSIDES AND DOCUSATE SODIUM 1 TABLET: 50; 8.6 TABLET ORAL at 08:20

## 2024-01-01 RX ADMIN — LEVALBUTEROL HYDROCHLORIDE 1.25 MG: 1.25 SOLUTION RESPIRATORY (INHALATION) at 19:52

## 2024-01-01 RX ADMIN — DIVALPROEX SODIUM 500 MG: 125 CAPSULE, COATED PELLETS ORAL at 21:47

## 2024-01-01 RX ADMIN — PANTOPRAZOLE SODIUM 40 MG: 40 INJECTION, POWDER, FOR SOLUTION INTRAVENOUS at 08:20

## 2024-01-01 RX ADMIN — SENNOSIDES AND DOCUSATE SODIUM 1 TABLET: 50; 8.6 TABLET ORAL at 10:39

## 2024-01-01 RX ADMIN — DIVALPROEX SODIUM 500 MG: 125 CAPSULE, COATED PELLETS ORAL at 13:12

## 2024-01-01 RX ADMIN — TAMSULOSIN HYDROCHLORIDE 0.4 MG: 0.4 CAPSULE ORAL at 15:33

## 2024-01-01 RX ADMIN — LEVALBUTEROL HYDROCHLORIDE 1.25 MG: 1.25 SOLUTION RESPIRATORY (INHALATION) at 19:49

## 2024-01-01 RX ADMIN — PIPERACILLIN SODIUM AND TAZOBACTAM SODIUM 4.5 G: 36; 4.5 INJECTION, POWDER, LYOPHILIZED, FOR SOLUTION INTRAVENOUS at 02:28

## 2024-01-01 RX ADMIN — GLYCOPYRROLATE 0.1 MG: 0.2 INJECTION, SOLUTION INTRAMUSCULAR; INTRAVENOUS at 16:50

## 2024-01-01 RX ADMIN — APIXABAN 5 MG: 5 TABLET, FILM COATED ORAL at 08:14

## 2024-01-01 RX ADMIN — TAMSULOSIN HYDROCHLORIDE 0.4 MG: 0.4 CAPSULE ORAL at 17:11

## 2024-01-01 RX ADMIN — NYSTATIN 500000 UNITS: 100000 SUSPENSION ORAL at 08:21

## 2024-01-01 RX ADMIN — NYSTATIN 500000 UNITS: 100000 SUSPENSION ORAL at 09:16

## 2024-01-01 RX ADMIN — FORMOTEROL FUMARATE DIHYDRATE 20 MCG: 20 SOLUTION RESPIRATORY (INHALATION) at 07:33

## 2024-01-01 RX ADMIN — DIVALPROEX SODIUM 500 MG: 125 CAPSULE, COATED PELLETS ORAL at 13:13

## 2024-01-01 RX ADMIN — CHLORHEXIDINE GLUCONATE 0.12% ORAL RINSE 15 ML: 1.2 LIQUID ORAL at 09:15

## 2024-01-01 RX ADMIN — MICONAZOLE NITRATE: 20 CREAM TOPICAL at 10:40

## 2024-01-01 RX ADMIN — MIRTAZAPINE 7.5 MG: 15 TABLET, FILM COATED ORAL at 21:51

## 2024-01-01 RX ADMIN — TAMSULOSIN HYDROCHLORIDE 0.4 MG: 0.4 CAPSULE ORAL at 19:52

## 2024-01-01 RX ADMIN — APIXABAN 5 MG: 5 TABLET, FILM COATED ORAL at 22:05

## 2024-01-01 RX ADMIN — FORMOTEROL FUMARATE DIHYDRATE 20 MCG: 20 SOLUTION RESPIRATORY (INHALATION) at 19:23

## 2024-01-01 RX ADMIN — CHLORHEXIDINE GLUCONATE 0.12% ORAL RINSE 15 ML: 1.2 LIQUID ORAL at 08:35

## 2024-01-01 RX ADMIN — METOPROLOL SUCCINATE 25 MG: 25 TABLET, EXTENDED RELEASE ORAL at 08:20

## 2024-01-01 RX ADMIN — SODIUM CHLORIDE, SODIUM GLUCONATE, SODIUM ACETATE, POTASSIUM CHLORIDE, MAGNESIUM CHLORIDE, SODIUM PHOSPHATE, DIBASIC, AND POTASSIUM PHOSPHATE 100 ML/HR: .53; .5; .37; .037; .03; .012; .00082 INJECTION, SOLUTION INTRAVENOUS at 01:07

## 2024-01-01 RX ADMIN — DIVALPROEX SODIUM 500 MG: 125 CAPSULE, COATED PELLETS ORAL at 05:08

## 2024-01-01 RX ADMIN — POTASSIUM PHOSPHATE, MONOBASIC POTASSIUM PHOSPHATE, DIBASIC 9 MMOL: 224; 236 INJECTION, SOLUTION, CONCENTRATE INTRAVENOUS at 10:23

## 2024-01-01 RX ADMIN — NYSTATIN 500000 UNITS: 100000 SUSPENSION ORAL at 14:00

## 2024-01-01 RX ADMIN — PIPERACILLIN SODIUM AND TAZOBACTAM SODIUM 4.5 G: 36; 4.5 INJECTION, POWDER, LYOPHILIZED, FOR SOLUTION INTRAVENOUS at 09:31

## 2024-01-01 RX ADMIN — OXYCODONE HYDROCHLORIDE 5 MG: 5 SOLUTION ORAL at 10:00

## 2024-01-01 RX ADMIN — NYSTATIN 500000 UNITS: 100000 SUSPENSION ORAL at 08:14

## 2024-01-01 RX ADMIN — CHLORHEXIDINE GLUCONATE 0.12% ORAL RINSE 15 ML: 1.2 LIQUID ORAL at 21:46

## 2024-01-01 RX ADMIN — CHLORHEXIDINE GLUCONATE 0.12% ORAL RINSE 15 ML: 1.2 LIQUID ORAL at 21:05

## 2024-01-01 RX ADMIN — INSULIN LISPRO 1 UNITS: 100 INJECTION, SOLUTION INTRAVENOUS; SUBCUTANEOUS at 17:30

## 2024-01-01 RX ADMIN — PIPERACILLIN SODIUM AND TAZOBACTAM SODIUM 4.5 G: 36; 4.5 INJECTION, POWDER, LYOPHILIZED, FOR SOLUTION INTRAVENOUS at 02:46

## 2024-01-01 RX ADMIN — TAMSULOSIN HYDROCHLORIDE 0.4 MG: 0.4 CAPSULE ORAL at 18:13

## 2024-01-01 RX ADMIN — CHLORHEXIDINE GLUCONATE 0.12% ORAL RINSE 15 ML: 1.2 LIQUID ORAL at 08:53

## 2024-01-01 RX ADMIN — APIXABAN 5 MG: 5 TABLET, FILM COATED ORAL at 17:27

## 2024-01-01 RX ADMIN — BISACODYL 10 MG: 10 SUPPOSITORY RECTAL at 14:46

## 2024-01-01 RX ADMIN — INSULIN LISPRO 1 UNITS: 100 INJECTION, SOLUTION INTRAVENOUS; SUBCUTANEOUS at 12:30

## 2024-01-01 RX ADMIN — BISACODYL 10 MG: 10 SUPPOSITORY RECTAL at 10:39

## 2024-01-01 RX ADMIN — DEXTROSE MONOHYDRATE 25 ML: 25 INJECTION, SOLUTION INTRAVENOUS at 08:06

## 2024-01-01 RX ADMIN — MICONAZOLE NITRATE: 20 CREAM TOPICAL at 09:40

## 2024-01-01 RX ADMIN — PIPERACILLIN SODIUM AND TAZOBACTAM SODIUM 4.5 G: 36; 4.5 INJECTION, POWDER, LYOPHILIZED, FOR SOLUTION INTRAVENOUS at 11:37

## 2024-01-01 RX ADMIN — SENNOSIDES AND DOCUSATE SODIUM 1 TABLET: 50; 8.6 TABLET ORAL at 09:16

## 2024-01-01 RX ADMIN — CHLORHEXIDINE GLUCONATE 0.12% ORAL RINSE 15 ML: 1.2 LIQUID ORAL at 09:08

## 2024-01-01 RX ADMIN — BUDESONIDE 0.5 MG: 0.5 INHALANT ORAL at 07:27

## 2024-01-01 RX ADMIN — Medication 3 MG: at 21:30

## 2024-01-01 RX ADMIN — PIPERACILLIN SODIUM AND TAZOBACTAM SODIUM 4.5 G: 36; 4.5 INJECTION, POWDER, LYOPHILIZED, FOR SOLUTION INTRAVENOUS at 03:17

## 2024-01-01 RX ADMIN — INSULIN LISPRO 1 UNITS: 100 INJECTION, SOLUTION INTRAVENOUS; SUBCUTANEOUS at 12:24

## 2024-01-01 RX ADMIN — SENNOSIDES AND DOCUSATE SODIUM 1 TABLET: 50; 8.6 TABLET ORAL at 09:15

## 2024-01-01 RX ADMIN — PANTOPRAZOLE SODIUM 40 MG: 40 TABLET, DELAYED RELEASE ORAL at 06:02

## 2024-01-01 RX ADMIN — ACETAMINOPHEN 1000 MG: 10 INJECTION INTRAVENOUS at 00:14

## 2024-01-01 RX ADMIN — SODIUM CHLORIDE, SODIUM GLUCONATE, SODIUM ACETATE, POTASSIUM CHLORIDE, MAGNESIUM CHLORIDE, SODIUM PHOSPHATE, DIBASIC, AND POTASSIUM PHOSPHATE 100 ML/HR: .53; .5; .37; .037; .03; .012; .00082 INJECTION, SOLUTION INTRAVENOUS at 11:48

## 2024-01-01 RX ADMIN — MICONAZOLE NITRATE: 20 CREAM TOPICAL at 16:57

## 2024-01-01 RX ADMIN — ACETAMINOPHEN 1000 MG: 10 INJECTION INTRAVENOUS at 05:14

## 2024-01-01 RX ADMIN — CHLORHEXIDINE GLUCONATE 0.12% ORAL RINSE 15 ML: 1.2 LIQUID ORAL at 20:19

## 2024-01-01 RX ADMIN — FORMOTEROL FUMARATE DIHYDRATE 20 MCG: 20 SOLUTION RESPIRATORY (INHALATION) at 19:12

## 2024-01-01 RX ADMIN — DEXTROSE, SODIUM CHLORIDE, AND POTASSIUM CHLORIDE 75 ML/HR: 5; .45; .15 INJECTION INTRAVENOUS at 15:36

## 2024-01-01 RX ADMIN — NYSTATIN 500000 UNITS: 100000 SUSPENSION ORAL at 17:16

## 2024-01-01 RX ADMIN — ALBUMIN (HUMAN) 25 G: 12.5 INJECTION, SOLUTION INTRAVENOUS at 06:58

## 2024-01-01 RX ADMIN — DIVALPROEX SODIUM 750 MG: 250 TABLET, DELAYED RELEASE ORAL at 08:20

## 2024-01-01 RX ADMIN — BISACODYL 10 MG: 10 SUPPOSITORY RECTAL at 09:00

## 2024-01-01 RX ADMIN — APIXABAN 5 MG: 5 TABLET, FILM COATED ORAL at 08:21

## 2024-01-01 RX ADMIN — INSULIN LISPRO 1 UNITS: 100 INJECTION, SOLUTION INTRAVENOUS; SUBCUTANEOUS at 18:22

## 2024-01-01 RX ADMIN — BUDESONIDE 0.5 MG: 0.5 INHALANT ORAL at 08:53

## 2024-01-01 RX ADMIN — TAMSULOSIN HYDROCHLORIDE 0.4 MG: 0.4 CAPSULE ORAL at 15:56

## 2024-01-01 RX ADMIN — NYSTATIN 500000 UNITS: 100000 SUSPENSION ORAL at 21:07

## 2024-01-01 RX ADMIN — PIPERACILLIN SODIUM AND TAZOBACTAM SODIUM 4.5 G: 36; 4.5 INJECTION, POWDER, LYOPHILIZED, FOR SOLUTION INTRAVENOUS at 17:33

## 2024-01-01 RX ADMIN — INSULIN LISPRO 1 UNITS: 100 INJECTION, SOLUTION INTRAVENOUS; SUBCUTANEOUS at 12:41

## 2024-01-01 RX ADMIN — NYSTATIN 500000 UNITS: 100000 SUSPENSION ORAL at 17:40

## 2024-01-01 RX ADMIN — CHLORHEXIDINE GLUCONATE 0.12% ORAL RINSE 15 ML: 1.2 LIQUID ORAL at 21:03

## 2024-01-01 RX ADMIN — OXYCODONE HYDROCHLORIDE 5 MG: 5 SOLUTION ORAL at 14:05

## 2024-01-01 RX ADMIN — SENNOSIDES AND DOCUSATE SODIUM 1 TABLET: 50; 8.6 TABLET ORAL at 16:57

## 2024-01-01 RX ADMIN — SENNOSIDES AND DOCUSATE SODIUM 1 TABLET: 50; 8.6 TABLET ORAL at 08:11

## 2024-01-01 RX ADMIN — FORMOTEROL FUMARATE DIHYDRATE 20 MCG: 20 SOLUTION RESPIRATORY (INHALATION) at 08:28

## 2024-01-01 RX ADMIN — PIPERACILLIN SODIUM AND TAZOBACTAM SODIUM 4.5 G: 36; 4.5 INJECTION, POWDER, LYOPHILIZED, FOR SOLUTION INTRAVENOUS at 22:33

## 2024-01-01 RX ADMIN — OXYCODONE HYDROCHLORIDE 5 MG: 5 SOLUTION ORAL at 08:22

## 2024-01-01 RX ADMIN — BUDESONIDE 0.5 MG: 0.5 INHALANT ORAL at 08:00

## 2024-01-01 RX ADMIN — SODIUM CHLORIDE, SODIUM GLUCONATE, SODIUM ACETATE, POTASSIUM CHLORIDE, MAGNESIUM CHLORIDE, SODIUM PHOSPHATE, DIBASIC, AND POTASSIUM PHOSPHATE 100 ML/HR: .53; .5; .37; .037; .03; .012; .00082 INJECTION, SOLUTION INTRAVENOUS at 23:48

## 2024-01-01 RX ADMIN — INSULIN LISPRO 1 UNITS: 100 INJECTION, SOLUTION INTRAVENOUS; SUBCUTANEOUS at 11:09

## 2024-01-01 RX ADMIN — HYDROMORPHONE HYDROCHLORIDE 0.5 MG: 1 INJECTION, SOLUTION INTRAMUSCULAR; INTRAVENOUS; SUBCUTANEOUS at 22:03

## 2024-01-01 RX ADMIN — NYSTATIN 500000 UNITS: 100000 SUSPENSION ORAL at 21:51

## 2024-01-01 RX ADMIN — PANTOPRAZOLE SODIUM 40 MG: 40 TABLET, DELAYED RELEASE ORAL at 05:12

## 2024-01-01 RX ADMIN — NYSTATIN 500000 UNITS: 100000 SUSPENSION ORAL at 12:41

## 2024-01-01 RX ADMIN — CHLORHEXIDINE GLUCONATE 0.12% ORAL RINSE 15 ML: 1.2 LIQUID ORAL at 08:29

## 2024-01-01 RX ADMIN — OXYCODONE HYDROCHLORIDE 5 MG: 5 SOLUTION ORAL at 13:17

## 2024-01-01 RX ADMIN — DIVALPROEX SODIUM 500 MG: 125 CAPSULE, COATED PELLETS ORAL at 06:20

## 2024-01-01 RX ADMIN — DIVALPROEX SODIUM 500 MG: 125 CAPSULE, COATED PELLETS ORAL at 22:17

## 2024-01-01 RX ADMIN — DIVALPROEX SODIUM 500 MG: 125 CAPSULE, COATED PELLETS ORAL at 21:53

## 2024-01-01 RX ADMIN — NYSTATIN 500000 UNITS: 100000 SUSPENSION ORAL at 17:54

## 2024-01-01 RX ADMIN — NYSTATIN 500000 UNITS: 100000 SUSPENSION ORAL at 09:26

## 2024-01-01 RX ADMIN — SODIUM CHLORIDE, SODIUM GLUCONATE, SODIUM ACETATE, POTASSIUM CHLORIDE, MAGNESIUM CHLORIDE, SODIUM PHOSPHATE, DIBASIC, AND POTASSIUM PHOSPHATE 125 ML/HR: .53; .5; .37; .037; .03; .012; .00082 INJECTION, SOLUTION INTRAVENOUS at 00:32

## 2024-01-01 RX ADMIN — DIVALPROEX SODIUM 500 MG: 125 CAPSULE, COATED PELLETS ORAL at 13:50

## 2024-01-01 RX ADMIN — SODIUM CHLORIDE, SODIUM GLUCONATE, SODIUM ACETATE, POTASSIUM CHLORIDE, MAGNESIUM CHLORIDE, SODIUM PHOSPHATE, DIBASIC, AND POTASSIUM PHOSPHATE 125 ML/HR: .53; .5; .37; .037; .03; .012; .00082 INJECTION, SOLUTION INTRAVENOUS at 07:23

## 2024-01-01 RX ADMIN — SENNOSIDES AND DOCUSATE SODIUM 1 TABLET: 50; 8.6 TABLET ORAL at 17:13

## 2024-01-01 RX ADMIN — BUDESONIDE 0.5 MG: 0.5 INHALANT ORAL at 08:32

## 2024-01-01 RX ADMIN — INSULIN LISPRO 1 UNITS: 100 INJECTION, SOLUTION INTRAVENOUS; SUBCUTANEOUS at 00:47

## 2024-01-01 RX ADMIN — METOPROLOL SUCCINATE 25 MG: 25 TABLET, EXTENDED RELEASE ORAL at 08:00

## 2024-01-01 RX ADMIN — SENNOSIDES AND DOCUSATE SODIUM 1 TABLET: 50; 8.6 TABLET ORAL at 17:27

## 2024-01-01 RX ADMIN — APIXABAN 5 MG: 5 TABLET, FILM COATED ORAL at 17:54

## 2024-01-01 RX ADMIN — NYSTATIN 500000 UNITS: 100000 SUSPENSION ORAL at 13:56

## 2024-01-01 RX ADMIN — HEPARIN SODIUM 5000 UNITS: 5000 INJECTION INTRAVENOUS; SUBCUTANEOUS at 05:15

## 2024-01-01 RX ADMIN — CHLORHEXIDINE GLUCONATE 0.12% ORAL RINSE 15 ML: 1.2 LIQUID ORAL at 10:00

## 2024-01-01 RX ADMIN — PANTOPRAZOLE SODIUM 40 MG: 40 TABLET, DELAYED RELEASE ORAL at 06:20

## 2024-01-01 RX ADMIN — OXYCODONE HYDROCHLORIDE 5 MG: 5 SOLUTION ORAL at 07:39

## 2024-01-01 RX ADMIN — Medication 3 MG: at 21:05

## 2024-01-01 RX ADMIN — DIVALPROEX SODIUM 500 MG: 125 CAPSULE, COATED PELLETS ORAL at 05:46

## 2024-01-01 RX ADMIN — GLYCOPYRROLATE 0.1 MG: 0.2 INJECTION, SOLUTION INTRAMUSCULAR; INTRAVENOUS at 21:27

## 2024-01-01 RX ADMIN — OXYCODONE HYDROCHLORIDE 5 MG: 5 SOLUTION ORAL at 14:15

## 2024-01-01 RX ADMIN — MIRTAZAPINE 7.5 MG: 15 TABLET, FILM COATED ORAL at 21:05

## 2024-01-01 RX ADMIN — NYSTATIN 500000 UNITS: 100000 SUSPENSION ORAL at 21:22

## 2024-01-01 RX ADMIN — HYDROMORPHONE HYDROCHLORIDE 0.2 MG: 0.2 INJECTION, SOLUTION INTRAMUSCULAR; INTRAVENOUS; SUBCUTANEOUS at 11:38

## 2024-01-01 RX ADMIN — TAMSULOSIN HYDROCHLORIDE 0.4 MG: 0.4 CAPSULE ORAL at 17:46

## 2024-01-01 RX ADMIN — NYSTATIN 500000 UNITS: 100000 SUSPENSION ORAL at 13:15

## 2024-01-01 RX ADMIN — METOPROLOL SUCCINATE 25 MG: 25 TABLET, EXTENDED RELEASE ORAL at 08:58

## 2024-01-01 RX ADMIN — APIXABAN 5 MG: 5 TABLET, FILM COATED ORAL at 17:18

## 2024-01-01 RX ADMIN — VALPROATE SODIUM 500 MG: 100 INJECTION, SOLUTION INTRAVENOUS at 17:33

## 2024-01-01 RX ADMIN — DEXTROSE, SODIUM CHLORIDE, AND POTASSIUM CHLORIDE 75 ML/HR: 5; .45; .15 INJECTION INTRAVENOUS at 13:07

## 2024-01-01 RX ADMIN — NYSTATIN 500000 UNITS: 100000 SUSPENSION ORAL at 12:32

## 2024-01-01 RX ADMIN — INSULIN LISPRO 1 UNITS: 100 INJECTION, SOLUTION INTRAVENOUS; SUBCUTANEOUS at 13:01

## 2024-01-01 RX ADMIN — BUDESONIDE 0.5 MG: 0.5 INHALANT ORAL at 08:23

## 2024-01-01 RX ADMIN — METOPROLOL SUCCINATE 25 MG: 25 TABLET, EXTENDED RELEASE ORAL at 09:16

## 2024-01-01 RX ADMIN — DIVALPROEX SODIUM 500 MG: 125 CAPSULE, COATED PELLETS ORAL at 05:05

## 2024-01-01 RX ADMIN — SENNOSIDES AND DOCUSATE SODIUM 1 TABLET: 50; 8.6 TABLET ORAL at 18:09

## 2024-01-01 RX ADMIN — BUDESONIDE 0.5 MG: 0.5 INHALANT ORAL at 19:53

## 2024-01-01 RX ADMIN — MIRTAZAPINE 7.5 MG: 15 TABLET, FILM COATED ORAL at 21:30

## 2024-01-01 RX ADMIN — TAMSULOSIN HYDROCHLORIDE 0.4 MG: 0.4 CAPSULE ORAL at 17:18

## 2024-01-01 RX ADMIN — VALPROATE SODIUM 500 MG: 100 INJECTION, SOLUTION INTRAVENOUS at 09:24

## 2024-01-01 RX ADMIN — FORMOTEROL FUMARATE DIHYDRATE 20 MCG: 20 SOLUTION RESPIRATORY (INHALATION) at 07:09

## 2024-01-01 RX ADMIN — HYDROMORPHONE HYDROCHLORIDE 0.2 MG: 0.2 INJECTION, SOLUTION INTRAMUSCULAR; INTRAVENOUS; SUBCUTANEOUS at 09:52

## 2024-01-01 RX ADMIN — CHLORHEXIDINE GLUCONATE 0.12% ORAL RINSE 15 ML: 1.2 LIQUID ORAL at 09:52

## 2024-01-01 RX ADMIN — INSULIN LISPRO 1 UNITS: 100 INJECTION, SOLUTION INTRAVENOUS; SUBCUTANEOUS at 11:47

## 2024-01-01 RX ADMIN — BUDESONIDE 0.5 MG: 0.5 INHALANT ORAL at 08:10

## 2024-01-01 RX ADMIN — PIPERACILLIN SODIUM AND TAZOBACTAM SODIUM 4.5 G: 36; 4.5 INJECTION, POWDER, LYOPHILIZED, FOR SOLUTION INTRAVENOUS at 18:42

## 2024-01-01 RX ADMIN — DIVALPROEX SODIUM 500 MG: 125 CAPSULE, COATED PELLETS ORAL at 22:52

## 2024-01-01 RX ADMIN — Medication 3 MG: at 21:54

## 2024-01-01 RX ADMIN — SODIUM CHLORIDE, SODIUM GLUCONATE, SODIUM ACETATE, POTASSIUM CHLORIDE, MAGNESIUM CHLORIDE, SODIUM PHOSPHATE, DIBASIC, AND POTASSIUM PHOSPHATE 500 ML: .53; .5; .37; .037; .03; .012; .00082 INJECTION, SOLUTION INTRAVENOUS at 08:02

## 2024-01-01 RX ADMIN — FORMOTEROL FUMARATE DIHYDRATE 20 MCG: 20 SOLUTION RESPIRATORY (INHALATION) at 07:58

## 2024-01-01 RX ADMIN — OXYCODONE HYDROCHLORIDE 5 MG: 5 SOLUTION ORAL at 11:10

## 2024-01-01 RX ADMIN — SODIUM CHLORIDE, SODIUM LACTATE, POTASSIUM CHLORIDE, AND CALCIUM CHLORIDE 100 ML/HR: .6; .31; .03; .02 INJECTION, SOLUTION INTRAVENOUS at 23:43

## 2024-01-01 RX ADMIN — APIXABAN 5 MG: 5 TABLET, FILM COATED ORAL at 08:00

## 2024-01-01 RX ADMIN — DEXTROSE AND SODIUM CHLORIDE 50 ML/HR: 5; .45 INJECTION, SOLUTION INTRAVENOUS at 16:59

## 2024-01-01 RX ADMIN — BUDESONIDE 0.5 MG: 0.5 INHALANT ORAL at 19:48

## 2024-02-22 ENCOUNTER — APPOINTMENT (EMERGENCY)
Dept: CT IMAGING | Facility: HOSPITAL | Age: 84
End: 2024-02-22
Payer: COMMERCIAL

## 2024-02-22 ENCOUNTER — HOSPITAL ENCOUNTER (EMERGENCY)
Facility: HOSPITAL | Age: 84
Discharge: HOME | End: 2024-02-22
Attending: EMERGENCY MEDICINE
Payer: COMMERCIAL

## 2024-02-22 VITALS
RESPIRATION RATE: 17 BRPM | DIASTOLIC BLOOD PRESSURE: 66 MMHG | SYSTOLIC BLOOD PRESSURE: 137 MMHG | TEMPERATURE: 97.6 F | OXYGEN SATURATION: 97 % | HEART RATE: 50 BPM

## 2024-02-22 DIAGNOSIS — J90 PLEURAL EFFUSION: ICD-10-CM

## 2024-02-22 DIAGNOSIS — R11.2 NAUSEA VOMITING AND DIARRHEA: Primary | ICD-10-CM

## 2024-02-22 DIAGNOSIS — R19.7 NAUSEA VOMITING AND DIARRHEA: Primary | ICD-10-CM

## 2024-02-22 LAB
ALBUMIN SERPL BCP-MCNC: 4 G/DL (ref 3.5–5)
ALP SERPL-CCNC: 161 U/L (ref 34–104)
ALT SERPL W P-5'-P-CCNC: 22 U/L (ref 7–52)
ANION GAP SERPL CALCULATED.3IONS-SCNC: 5 MMOL/L
AST SERPL W P-5'-P-CCNC: 28 U/L (ref 13–39)
BASOPHILS # BLD AUTO: 0.03 THOUSANDS/ÂΜL (ref 0–0.1)
BASOPHILS NFR BLD AUTO: 0 % (ref 0–1)
BILIRUB SERPL-MCNC: 0.85 MG/DL (ref 0.2–1)
BILIRUB UR QL STRIP: NEGATIVE
BUN SERPL-MCNC: 29 MG/DL (ref 5–25)
CALCIUM SERPL-MCNC: 9.5 MG/DL (ref 8.4–10.2)
CHLORIDE SERPL-SCNC: 103 MMOL/L (ref 96–108)
CLARITY UR: CLEAR
CO2 SERPL-SCNC: 28 MMOL/L (ref 21–32)
COLOR UR: NORMAL
CREAT SERPL-MCNC: 1.53 MG/DL (ref 0.6–1.3)
EOSINOPHIL # BLD AUTO: 0.18 THOUSAND/ÂΜL (ref 0–0.61)
EOSINOPHIL NFR BLD AUTO: 3 % (ref 0–6)
ERYTHROCYTE [DISTWIDTH] IN BLOOD BY AUTOMATED COUNT: 13.3 % (ref 11.6–15.1)
FLUAV RNA RESP QL NAA+PROBE: NEGATIVE
FLUBV RNA RESP QL NAA+PROBE: NEGATIVE
GFR SERPL CREATININE-BSD FRML MDRD: 42 ML/MIN/1.73SQ M
GLUCOSE SERPL-MCNC: 128 MG/DL (ref 65–140)
GLUCOSE UR STRIP-MCNC: NEGATIVE MG/DL
HCT VFR BLD AUTO: 39.7 % (ref 36.5–49.3)
HGB BLD-MCNC: 12.5 G/DL (ref 12–17)
HGB UR QL STRIP.AUTO: NEGATIVE
IMM GRANULOCYTES # BLD AUTO: 0.03 THOUSAND/UL (ref 0–0.2)
IMM GRANULOCYTES NFR BLD AUTO: 0 % (ref 0–2)
KETONES UR STRIP-MCNC: NEGATIVE MG/DL
LACTATE SERPL-SCNC: 0.9 MMOL/L (ref 0.5–2)
LEUKOCYTE ESTERASE UR QL STRIP: NEGATIVE
LIPASE SERPL-CCNC: 11 U/L (ref 11–82)
LYMPHOCYTES # BLD AUTO: 0.89 THOUSANDS/ÂΜL (ref 0.6–4.47)
LYMPHOCYTES NFR BLD AUTO: 12 % (ref 14–44)
MAGNESIUM SERPL-MCNC: 2 MG/DL (ref 1.9–2.7)
MCH RBC QN AUTO: 30.7 PG (ref 26.8–34.3)
MCHC RBC AUTO-ENTMCNC: 31.5 G/DL (ref 31.4–37.4)
MCV RBC AUTO: 98 FL (ref 82–98)
MONOCYTES # BLD AUTO: 0.83 THOUSAND/ÂΜL (ref 0.17–1.22)
MONOCYTES NFR BLD AUTO: 11 % (ref 4–12)
NEUTROPHILS # BLD AUTO: 5.36 THOUSANDS/ÂΜL (ref 1.85–7.62)
NEUTS SEG NFR BLD AUTO: 74 % (ref 43–75)
NITRITE UR QL STRIP: NEGATIVE
NRBC BLD AUTO-RTO: 0 /100 WBCS
PH UR STRIP.AUTO: 5.5 [PH]
PLATELET # BLD AUTO: 168 THOUSANDS/UL (ref 149–390)
PMV BLD AUTO: 9.1 FL (ref 8.9–12.7)
POTASSIUM SERPL-SCNC: 4 MMOL/L (ref 3.5–5.3)
PROT SERPL-MCNC: 6.4 G/DL (ref 6.4–8.4)
PROT UR STRIP-MCNC: NEGATIVE MG/DL
RBC # BLD AUTO: 4.07 MILLION/UL (ref 3.88–5.62)
RSV RNA RESP QL NAA+PROBE: NEGATIVE
SARS-COV-2 RNA RESP QL NAA+PROBE: NEGATIVE
SODIUM SERPL-SCNC: 136 MMOL/L (ref 135–147)
SP GR UR STRIP.AUTO: 1.01 (ref 1–1.03)
UROBILINOGEN UR STRIP-ACNC: <2 MG/DL
WBC # BLD AUTO: 7.32 THOUSAND/UL (ref 4.31–10.16)

## 2024-02-22 PROCEDURE — 74176 CT ABD & PELVIS W/O CONTRAST: CPT

## 2024-02-22 PROCEDURE — 83690 ASSAY OF LIPASE: CPT

## 2024-02-22 PROCEDURE — 96374 THER/PROPH/DIAG INJ IV PUSH: CPT

## 2024-02-22 PROCEDURE — 85025 COMPLETE CBC W/AUTO DIFF WBC: CPT

## 2024-02-22 PROCEDURE — 0241U HB NFCT DS VIR RESP RNA 4 TRGT: CPT

## 2024-02-22 PROCEDURE — 81003 URINALYSIS AUTO W/O SCOPE: CPT

## 2024-02-22 PROCEDURE — 71250 CT THORAX DX C-: CPT

## 2024-02-22 PROCEDURE — 96361 HYDRATE IV INFUSION ADD-ON: CPT

## 2024-02-22 PROCEDURE — 99284 EMERGENCY DEPT VISIT MOD MDM: CPT

## 2024-02-22 PROCEDURE — 83605 ASSAY OF LACTIC ACID: CPT

## 2024-02-22 PROCEDURE — 80053 COMPREHEN METABOLIC PANEL: CPT

## 2024-02-22 PROCEDURE — 83735 ASSAY OF MAGNESIUM: CPT

## 2024-02-22 PROCEDURE — 36415 COLL VENOUS BLD VENIPUNCTURE: CPT

## 2024-02-22 RX ORDER — DICYCLOMINE HCL 20 MG
20 TABLET ORAL 2 TIMES DAILY
Qty: 20 TABLET | Refills: 0 | Status: SHIPPED | OUTPATIENT
Start: 2024-02-22

## 2024-02-22 RX ORDER — ONDANSETRON 4 MG/1
4 TABLET, ORALLY DISINTEGRATING ORAL EVERY 6 HOURS PRN
Qty: 20 TABLET | Refills: 0 | Status: SHIPPED | OUTPATIENT
Start: 2024-02-22

## 2024-02-22 RX ORDER — FUROSEMIDE 10 MG/ML
40 INJECTION INTRAMUSCULAR; INTRAVENOUS ONCE
Status: COMPLETED | OUTPATIENT
Start: 2024-02-22 | End: 2024-02-22

## 2024-02-22 RX ADMIN — FUROSEMIDE 40 MG: 10 INJECTION, SOLUTION INTRAMUSCULAR; INTRAVENOUS at 11:09

## 2024-02-22 RX ADMIN — SODIUM CHLORIDE 1000 ML: 0.9 INJECTION, SOLUTION INTRAVENOUS at 10:09

## 2024-02-22 NOTE — ED PROVIDER NOTES
History  Chief Complaint   Patient presents with    Abdominal Pain     Patient reports that he's had vomiting and diarrhea since Sunday.  Tmax 99.8 at home.     82 y/o male patient presents to the ER for evaluation of abdominal pain. PMH: CKD, CAD, diabetes mellitus, hypertension, hyperlipidemia, stroke, history of pancreatic cancer.  Patient stated that he has been having five days of nausea, vomiting, and diarrhea. Patient stated that vomiting is described as non-bloody, non-bilious. He stated that he has not vomited in the last two days. He has been having diarrhea, non-bloody. He describes it as yellow-seedy colored, and loose. Patient stated that he has been having severe nausea that has not allowed him to eat. Patient stated that he has not had any change in medications. Hx of pancreatic cancer. History of hernias, and gallbladder removals. No noted fever, chills, chest pain, dizziness. Patient stated that he did have shortness of breath when he was vomiting and dry heaving. He does not feel short of breath here. He does have diabetes and blood sugar this morning was in the 80s. No noted fruity breath or Kussmaul breathing. No recent travel outside of the country, suspicious food intake, or sick contacts.       History provided by:  Patient      Prior to Admission Medications   Prescriptions Last Dose Informant Patient Reported? Taking?   B-D UF III MINI PEN NEEDLES 31G X 5 MM MISC  Self Yes No   EUSEBIO CONTOUR TEST test strip  Self Yes No   Sig: 3 (three) times a day Test   Patient not taking: Reported on 4/4/2023   Calcium Citrate (CITRACAL PO)  Self Yes No   Sig: Take 650 mg by mouth in the morning   Cinnamon 500 MG capsule  Self Yes No   Sig: Take 1,000 mg by mouth daily   Lantus SoloStar 100 units/mL SOPN  Self Yes No   apixaban (ELIQUIS) 2.5 mg  Self Yes No   Sig: Take 2.5 mg by mouth 2 (two) times a day   cholecalciferol (VITAMIN D3) 1,000 units tablet  Self Yes No   Sig: Take 2,000 Units by mouth daily    furosemide (LASIX) 20 mg tablet  Self Yes No   Sig: Take 20 mg by mouth daily   hydroxychloroquine (PLAQUENIL) 200 mg tablet  Self Yes No   Sig: Take 200 mg by mouth in the morning   olmesartan (BENICAR) 20 mg tablet  Self Yes No   Sig: Take 20 mg by mouth daily   potassium chloride (KLOR-CON) 8 MEQ tablet  Self Yes No   pravastatin (PRAVACHOL) 40 mg tablet  Self Yes No   Sig: Take 40 mg by mouth daily   predniSONE 1 mg tablet  Self Yes No   Sig: Take 10 mg by mouth daily Currently down to 3 1/2 mg daily 22      Facility-Administered Medications: None       Past Medical History:   Diagnosis Date    Acute encephalopathy 2023    Acute-on-chronic kidney injury  2017    Cancer (HCC)     pancreatic    Colon polyp     Coronary artery disease     Dehydration 3/3/2023    Diabetes mellitus (HCC)     Hyperlipidemia     Hypertension     Left lower lobe pneumonia 2022    Pneumonia 2017    Right middle and lower lobe     Stroke (HCC)        Past Surgical History:   Procedure Laterality Date    APPENDECTOMY      CARDIAC SURGERY      Aortic Valve Replacement, Ascending Aorta    COLONOSCOPY      GALLBLADDER SURGERY      PANCREATICODUODENECTOMY         Family History   Problem Relation Age of Onset    Cancer Mother     Stroke Father     Cancer Sister     Diabetes Sister     Stroke Brother      I have reviewed and agree with the history as documented.    E-Cigarette/Vaping    E-Cigarette Use Never User      E-Cigarette/Vaping Substances    Nicotine No     THC No     CBD No     Flavoring No     Other No     Unknown No      Social History     Tobacco Use    Smoking status: Former     Types: Pipe     Quit date:      Years since quittin.1    Smokeless tobacco: Never   Vaping Use    Vaping status: Never Used   Substance Use Topics    Alcohol use: Never    Drug use: No       Review of Systems   Constitutional:  Positive for fatigue. Negative for chills and fever.   Respiratory:  Positive for cough and  shortness of breath.    Cardiovascular:  Negative for chest pain and palpitations.   Gastrointestinal:  Positive for abdominal pain, diarrhea, nausea and vomiting.   Genitourinary:  Negative for dysuria and hematuria.   Musculoskeletal:  Negative for arthralgias and back pain.   Skin:  Negative for color change and rash.   Neurological:  Negative for dizziness, seizures, syncope and headaches.   All other systems reviewed and are negative.      Physical Exam  Physical Exam  Vitals and nursing note reviewed.   Constitutional:       General: He is not in acute distress.     Appearance: He is well-developed.   HENT:      Head: Normocephalic and atraumatic.   Eyes:      Conjunctiva/sclera: Conjunctivae normal.   Cardiovascular:      Rate and Rhythm: Normal rate and regular rhythm.      Heart sounds: Normal heart sounds.   Pulmonary:      Effort: Pulmonary effort is normal. No respiratory distress.      Breath sounds: Normal breath sounds.   Abdominal:      General: A surgical scar is present. There is no distension.      Palpations: Abdomen is soft.      Tenderness: There is no abdominal tenderness. There is no right CVA tenderness or left CVA tenderness.      Hernia: A hernia is present. Hernia is present in the umbilical area.   Musculoskeletal:         General: No swelling.      Cervical back: Neck supple.   Skin:     General: Skin is warm and dry.      Capillary Refill: Capillary refill takes less than 2 seconds.   Neurological:      Mental Status: He is alert and oriented to person, place, and time.   Psychiatric:         Mood and Affect: Mood normal.         Behavior: Behavior normal.         Vital Signs  ED Triage Vitals [02/22/24 0932]   Temperature Pulse Respirations Blood Pressure SpO2   97.6 °F (36.4 °C) 75 16 (!) 176/84 99 %      Temp Source Heart Rate Source Patient Position - Orthostatic VS BP Location FiO2 (%)   Oral Monitor Sitting Left arm --      Pain Score       2           Vitals:    02/22/24 1030  02/22/24 1100 02/22/24 1130 02/22/24 1200   BP: 150/70 153/67 136/64 137/66   Pulse: 61 (!) 52 (!) 53 (!) 50   Patient Position - Orthostatic VS:             Visual Acuity      ED Medications  Medications   sodium chloride 0.9 % bolus 1,000 mL (0 mL Intravenous Stopped 2/22/24 1149)   furosemide (LASIX) injection 40 mg (40 mg Intravenous Given 2/22/24 1109)       Diagnostic Studies  Results Reviewed       Procedure Component Value Units Date/Time    UA w Reflex to Microscopic w Reflex to Culture [[id number]] Collected: 02/22/24 1147    Lab Status: Final result Specimen: Urine, Clean Catch Updated: 02/22/24 1217     Color, UA Light Yellow     Clarity, UA Clear     Specific Gravity, UA 1.010     pH, UA 5.5     Leukocytes, UA Negative     Nitrite, UA Negative     Protein, UA Negative mg/dl      Glucose, UA Negative mg/dl      Ketones, UA Negative mg/dl      Urobilinogen, UA <2.0 mg/dl      Bilirubin, UA Negative     Occult Blood, UA Negative    FLU/RSV/COVID - if FLU/RSV clinically relevant [[id number]]  (Normal) Collected: 02/22/24 1008    Lab Status: Final result Specimen: Nares from Nose Updated: 02/22/24 1059     SARS-CoV-2 Negative     INFLUENZA A PCR Negative     INFLUENZA B PCR Negative     RSV PCR Negative    Narrative:      FOR PEDIATRIC PATIENTS - copy/paste COVID Guidelines URL to browser: [email or web address]    SARS-CoV-2 assay is a Nucleic Acid Amplification assay intended for the  qualitative detection of nucleic acid from SARS-CoV-2 in nasopharyngeal  swabs. Results are for the presumptive identification of SARS-CoV-2 RNA.    Positive results are indicative of infection with SARS-CoV-2, the virus  causing COVID-19, but do not rule out bacterial infection or co-infection  with other viruses. Laboratories within the [place] and its  territories are required to report all positive results to the appropriate  public health authorities. Negative  results do not preclude SARS-CoV-2  infection and should not be used as the sole basis for treatment or other  patient management decisions. Negative results must be combined with  clinical observations, patient history, and epidemiological information.  This test has not been FDA cleared or approved.    This test has been authorized by FDA under an Emergency Use Authorization  (EUA). This test is only authorized for the duration of time the  declaration that circumstances exist justifying the authorization of the  emergency use of an in vitro diagnostic tests for detection of SARS-CoV-2  virus and/or diagnosis of COVID-19 infection under section 564(b)(1) of  the Act, 21 U.S.C. 360bbb-3(b)(1), unless the authorization is terminated  or revoked sooner. The test has been validated but independent review by FDA  and CLIA is pending.    Test performed using [employer]: This RT-PCR assay targets N2,  a region unique to SARS-CoV-2. A conserved region in the E-gene was chosen  for pan-Sarbecovirus detection which includes SARS-CoV-2.    According to CMS-2020-01-R, this platform meets the definition of high-throughput technology.    Lactic acid, plasma (w/reflex if result > 2.0) [[id number]]  (Normal) Collected: 02/22/24 1008    Lab Status: Final result Specimen: Blood from Arm, Left Updated: 02/22/24 1038     LACTIC ACID 0.9 mmol/L     Narrative:      Result may be elevated if tourniquet was used during collection.    Comprehensive metabolic panel [[id number]]  (Abnormal) Collected: 02/22/24 1008    Lab Status: Final result Specimen: Blood from Arm, Left Updated: 02/22/24 1035     Sodium 136 mmol/L      Potassium 4.0 mmol/L      Chloride 103 mmol/L      CO2 28 mmol/L      ANION GAP 5 mmol/L      BUN 29 mg/dL      Creatinine 1.53 mg/dL      Glucose 128 mg/dL      Calcium 9.5 mg/dL      AST 28 U/L      ALT 22 U/L      Alkaline Phosphatase 161 U/L      Total Protein 6.4 g/dL      Albumin 4.0 g/dL      Total Bilirubin  0.85 mg/dL      eGFR 42 ml/min/1.73sq m     Narrative:      National Kidney Disease Foundation guidelines for Chronic Kidney Disease (CKD):     Stage 1 with normal or high GFR (GFR > 90 mL/min/1.73 square meters)    Stage 2 Mild CKD (GFR = 60-89 mL/min/1.73 square meters)    Stage 3A Moderate CKD (GFR = 45-59 mL/min/1.73 square meters)    Stage 3B Moderate CKD (GFR = 30-44 mL/min/1.73 square meters)    Stage 4 Severe CKD (GFR = 15-29 mL/min/1.73 square meters)    Stage 5 End Stage CKD (GFR <15 mL/min/1.73 square meters)  Note: GFR calculation is accurate only with a steady state creatinine    Magnesium [[id number]]  (Normal) Collected: 02/22/24 1008    Lab Status: Final result Specimen: Blood from Arm, Left Updated: 02/22/24 1035     Magnesium 2.0 mg/dL     Lipase [[id number]]  (Normal) Collected: 02/22/24 1008    Lab Status: Final result Specimen: Blood from Arm, Left Updated: 02/22/24 1035     Lipase 11 u/L     CBC and differential [[id number]]  (Abnormal) Collected: 02/22/24 1008    Lab Status: Final result Specimen: Blood from Arm, Left Updated: 02/22/24 1016     WBC 7.32 Thousand/uL      RBC 4.07 Million/uL      Hemoglobin 12.5 g/dL      Hematocrit 39.7 %      MCV 98 fL      MCH 30.7 pg      MCHC 31.5 g/dL      RDW 13.3 %      MPV 9.1 fL      Platelets 168 Thousands/uL      nRBC 0 /100 WBCs      Neutrophils Relative 74 %      Immat GRANS % 0 %      Lymphocytes Relative 12 %      Monocytes Relative 11 %      Eosinophils Relative 3 %      Basophils Relative 0 %      Neutrophils Absolute 5.36 Thousands/µL      Immature Grans Absolute 0.03 Thousand/uL      Lymphocytes Absolute 0.89 Thousands/µL      Monocytes Absolute 0.83 Thousand/µL      Eosinophils Absolute 0.18 Thousand/µL      Basophils Absolute 0.03 Thousands/µL                    CT chest abdomen pelvis wo contrast   Final Result by David Gonsalez MD (02/22 1041)   1. Small basilar pleural effusions with rounded atelectasis right lung base and scattered pulmonary  scarring. No acute pulmonary consolidation. Previously noted left basilar patchy consolidation is now obscured by pleural fluid. Interval increase in    right pleural fluid now loculated to the level of the right pulmonary apex.   2. Stable renal cysts and cortical scarring.   3. Diverticular disease without diverticulitis.   4. Prostatomegaly.                  Workstation performed: [id number]                    Procedures  Procedures         ED Course  ED Course as of 02/22/24 1252   Thu Feb 22, 2024   1048 Comprehensive metabolic panel(!)  Creatinine 1.53- baseline   1050 CT chest abdomen pelvis wo contrast  Reviewed.   1105 FLU/RSV/COVID - if FLU/RSV clinically relevant  Negative for viral syndrome                               SBIRT 22yo+      Flowsheet Row Most Recent Value   Initial Alcohol Screen: US AUDIT-C     1. How often do you have a drink containing alcohol? 0 Filed at: 02/22/2024 1013   2. How many drinks containing alcohol do you have on a typical day you are drinking?  0 Filed at: 02/22/2024 1013   3a. Male UNDER 65: How often do you have five or more drinks on one occasion? 0 Filed at: 02/22/2024 1013   Audit-C Score 0 Filed at: 02/22/2024 1013   ALEXEY: How many times in the past year have you...    Used an illegal drug or used a prescription medication for non-medical reasons? Never Filed at: 02/22/2024 1013                      Medical Decision Making  This patient with nausea and vomiting most likely secondary to benign infectious cause.  Considered but low risk for small bowel obstruction, no signs of DKA.  Patient's CMP with normal electrolytes and no signs of dehydration causing prerenal TARA.  Low suspicion for gastric or esophageal dysmotility mobility.  Patient with no chest pain, unremarkable EKG so doubt ACS.  Based on history, exam and workup low suspicion for pancreatitis, appendicitis, biliary pathology.  Patient given a liter of normal saline due to his nausea.  Patient had a new  trace pleural effusion.  Patient does take Lasix daily.  Advised patient to increase his dose for the next few days until he sees primary care provider.  Patient does not show volume overload so doubt heart failure.  Urinalysis negative for UTI.  Return to the ER if symptoms worsens or questions or concerns arise at home.    Amount and/or Complexity of Data Reviewed  Labs: ordered. Decision-making details documented in ED Course.  Radiology: ordered. Decision-making details documented in ED Course.    Risk  Prescription drug management.             Disposition  Final diagnoses:   Nausea vomiting and diarrhea   Pleural effusion     Time reflects when diagnosis was documented in both MDM as applicable and the Disposition within this note       Time User Action Codes Description Comment    2/22/2024 11:08 AM Farideh Alvarez Add [R11.2,  R19.7] Nausea vomiting and diarrhea     2/22/2024 11:08 AM Farideh Alvarez Add [J90] Pleural effusion           ED Disposition       ED Disposition   Discharge    Condition   Stable    Date/Time   Thu Feb 22, 2024 1052    Comment   Hal Miller discharge to home/self care.                   Follow-up Information       Follow up With Specialties Details Why Contact Info Additional Information    Kyaw Monreal MD Internal Medicine   [address] [place] [id number]  [phone]       [facility]   [facility] [address] [facility] [address]  [phone] [facility] [place], [facility] [address] [place], [place], [address]  [phone]             Discharge Medication List as of 2/22/2024 12:21 PM        START taking these medications    Details   dicyclomine (BENTYL) 20 mg tablet Take 1 tablet (20 mg total) by mouth 2 (two) times a day, Starting Thu 2/22/2024, Normal      ondansetron (ZOFRAN-ODT) 4 mg disintegrating tablet Take 1 tablet (4 mg  total) by mouth every 6 (six) hours as needed for nausea or vomiting, Starting Thu 2/22/2024, Normal           CONTINUE these medications which have NOT CHANGED    Details   apixaban (ELIQUIS) 2.5 mg Take 2.5 mg by mouth 2 (two) times a day, Historical Med      B-D UF III MINI PEN NEEDLES 31G X 5 MM MISC Starting Sun 3/11/2018, Historical Med      EUSEBIO CONTOUR TEST test strip 3 (three) times a day Test, Starting Thu 2/1/2018, Historical Med      Calcium Citrate (CITRACAL PO) Take 650 mg by mouth in the morning, Historical Med      cholecalciferol (VITAMIN D3) 1,000 units tablet Take 2,000 Units by mouth daily, Historical Med      Cinnamon 500 MG capsule Take 1,000 mg by mouth daily, Historical Med      furosemide (LASIX) 20 mg tablet Take 20 mg by mouth daily, Starting Thu 9/8/2022, Historical Med      hydroxychloroquine (PLAQUENIL) 200 mg tablet Take 200 mg by mouth in the morning, Historical Med      Lantus SoloStar 100 units/mL SOPN Starting Thu 3/23/2023, Historical Med      olmesartan (BENICAR) 20 mg tablet Take 20 mg by mouth daily, Historical Med      potassium chloride (KLOR-CON) 8 MEQ tablet Starting Wed 7/21/2021, Historical Med      pravastatin (PRAVACHOL) 40 mg tablet Take 40 mg by mouth daily, Historical Med      predniSONE 1 mg tablet Take 10 mg by mouth daily Currently down to 3 1/2 mg daily 05/05/22, Historical Med             No discharge procedures on file.    PDMP Review       None            ED Provider  Electronically Signed by             ADEOLA Rodriguez  02/22/24 2852

## 2024-02-22 NOTE — DISCHARGE INSTRUCTIONS
Zofran as need for nausea every 6 hours as needed   Bentyl twice a day as needed for abdominal pain/cramping    Follow up with primary care provider for pleural effusion- may increase lasix   Follow up with GI as needed  Return to ER if symptoms worsen

## 2024-04-09 ENCOUNTER — APPOINTMENT (OUTPATIENT)
Dept: LAB | Facility: MEDICAL CENTER | Age: 84
End: 2024-04-09
Payer: COMMERCIAL

## 2024-05-01 ENCOUNTER — HOSPITAL ENCOUNTER (OUTPATIENT)
Dept: RADIOLOGY | Facility: MEDICAL CENTER | Age: 84
Discharge: HOME | End: 2024-05-01
Payer: COMMERCIAL

## 2024-05-01 DIAGNOSIS — M81.0 AGE-RELATED OSTEOPOROSIS WITHOUT CURRENT PATHOLOGICAL FRACTURE: ICD-10-CM

## 2024-05-01 DIAGNOSIS — Z13.820 SCREENING FOR OSTEOPOROSIS: ICD-10-CM

## 2024-05-01 PROCEDURE — 77080 DXA BONE DENSITY AXIAL: CPT

## 2024-06-05 ENCOUNTER — APPOINTMENT (OUTPATIENT)
Dept: LAB | Facility: MEDICAL CENTER | Age: 84
End: 2024-06-05
Payer: COMMERCIAL

## 2024-06-05 DIAGNOSIS — N39.0 URINARY TRACT INFECTION WITHOUT HEMATURIA, SITE UNSPECIFIED: ICD-10-CM

## 2024-06-05 DIAGNOSIS — M35.3 POLYMYALGIA RHEUMATICA (HCC): ICD-10-CM

## 2024-06-05 LAB
CRP SERPL QL: 2.6 MG/L
ERYTHROCYTE [SEDIMENTATION RATE] IN BLOOD: 3 MM/HOUR (ref 0–19)

## 2024-06-05 PROCEDURE — 87086 URINE CULTURE/COLONY COUNT: CPT

## 2024-06-05 PROCEDURE — 85652 RBC SED RATE AUTOMATED: CPT

## 2024-06-05 PROCEDURE — 86140 C-REACTIVE PROTEIN: CPT

## 2024-06-05 PROCEDURE — 36415 COLL VENOUS BLD VENIPUNCTURE: CPT

## 2024-06-06 LAB — BACTERIA UR CULT: NORMAL

## 2024-06-07 ENCOUNTER — HOSPITAL ENCOUNTER (OUTPATIENT)
Facility: HOSPITAL | Age: 84
Setting detail: OBSERVATION
Discharge: HOME | End: 2024-06-08
Attending: EMERGENCY MEDICINE | Admitting: INTERNAL MEDICINE
Payer: COMMERCIAL

## 2024-06-07 ENCOUNTER — APPOINTMENT (EMERGENCY)
Dept: RADIOLOGY | Facility: HOSPITAL | Age: 84
End: 2024-06-07
Payer: COMMERCIAL

## 2024-06-07 DIAGNOSIS — N18.9 CKD (CHRONIC KIDNEY DISEASE): ICD-10-CM

## 2024-06-07 DIAGNOSIS — B37.0 THRUSH: ICD-10-CM

## 2024-06-07 DIAGNOSIS — R45.1 AGITATION: ICD-10-CM

## 2024-06-07 DIAGNOSIS — R06.00 DYSPNEA: Primary | ICD-10-CM

## 2024-06-07 DIAGNOSIS — J69.0 ASPIRATION PNEUMONIA (HCC): ICD-10-CM

## 2024-06-07 DIAGNOSIS — F01.50 VASCULAR DEMENTIA (HCC): ICD-10-CM

## 2024-06-07 PROBLEM — E11.9 TYPE 2 DIABETES MELLITUS (HCC): Status: ACTIVE | Noted: 2024-06-07

## 2024-06-07 LAB
ALBUMIN SERPL BCP-MCNC: 3.6 G/DL (ref 3.5–5)
ALP SERPL-CCNC: 114 U/L (ref 34–104)
ALT SERPL W P-5'-P-CCNC: 36 U/L (ref 7–52)
ANION GAP SERPL CALCULATED.3IONS-SCNC: 4 MMOL/L (ref 4–13)
AST SERPL W P-5'-P-CCNC: 25 U/L (ref 13–39)
BASOPHILS # BLD MANUAL: 0 THOUSAND/UL (ref 0–0.1)
BASOPHILS NFR MAR MANUAL: 0 % (ref 0–1)
BILIRUB SERPL-MCNC: 1.12 MG/DL (ref 0.2–1)
BUN SERPL-MCNC: 47 MG/DL (ref 5–25)
CALCIUM SERPL-MCNC: 9.4 MG/DL (ref 8.4–10.2)
CHLORIDE SERPL-SCNC: 100 MMOL/L (ref 96–108)
CO2 SERPL-SCNC: 30 MMOL/L (ref 21–32)
CREAT SERPL-MCNC: 1.82 MG/DL (ref 0.6–1.3)
EOSINOPHIL # BLD MANUAL: 0 THOUSAND/UL (ref 0–0.4)
EOSINOPHIL NFR BLD MANUAL: 0 % (ref 0–6)
ERYTHROCYTE [DISTWIDTH] IN BLOOD BY AUTOMATED COUNT: 14 % (ref 11.6–15.1)
EST. AVERAGE GLUCOSE BLD GHB EST-MCNC: 166 MG/DL
GFR SERPL CREATININE-BSD FRML MDRD: 33 ML/MIN/1.73SQ M
GLUCOSE SERPL-MCNC: 251 MG/DL (ref 65–140)
GLUCOSE SERPL-MCNC: 270 MG/DL (ref 65–140)
GLUCOSE SERPL-MCNC: 285 MG/DL (ref 65–140)
GLUCOSE SERPL-MCNC: 340 MG/DL (ref 65–140)
HBA1C MFR BLD: 7.4 %
HCT VFR BLD AUTO: 38.5 % (ref 36.5–49.3)
HGB BLD-MCNC: 12.2 G/DL (ref 12–17)
LYMPHOCYTES # BLD AUTO: 0.38 THOUSAND/UL (ref 0.6–4.47)
LYMPHOCYTES # BLD AUTO: 3 % (ref 14–44)
MCH RBC QN AUTO: 31.2 PG (ref 26.8–34.3)
MCHC RBC AUTO-ENTMCNC: 31.7 G/DL (ref 31.4–37.4)
MCV RBC AUTO: 99 FL (ref 82–98)
MONOCYTES # BLD AUTO: 1.14 THOUSAND/UL (ref 0–1.22)
MONOCYTES NFR BLD: 9 % (ref 4–12)
NEUTROPHILS # BLD MANUAL: 11.16 THOUSAND/UL (ref 1.85–7.62)
NEUTS BAND NFR BLD MANUAL: 2 % (ref 0–8)
NEUTS SEG NFR BLD AUTO: 86 % (ref 43–75)
PLATELET # BLD AUTO: 151 THOUSANDS/UL (ref 149–390)
PLATELET BLD QL SMEAR: ADEQUATE
PMV BLD AUTO: 9.2 FL (ref 8.9–12.7)
POTASSIUM SERPL-SCNC: 4.4 MMOL/L (ref 3.5–5.3)
PROT SERPL-MCNC: 5.9 G/DL (ref 6.4–8.4)
RBC # BLD AUTO: 3.91 MILLION/UL (ref 3.88–5.62)
RBC MORPH BLD: NORMAL
SODIUM SERPL-SCNC: 134 MMOL/L (ref 135–147)
WBC # BLD AUTO: 12.68 THOUSAND/UL (ref 4.31–10.16)

## 2024-06-07 PROCEDURE — 94664 DEMO&/EVAL PT USE INHALER: CPT

## 2024-06-07 PROCEDURE — 36415 COLL VENOUS BLD VENIPUNCTURE: CPT

## 2024-06-07 PROCEDURE — 99285 EMERGENCY DEPT VISIT HI MDM: CPT

## 2024-06-07 PROCEDURE — 85027 COMPLETE CBC AUTOMATED: CPT

## 2024-06-07 PROCEDURE — 82948 REAGENT STRIP/BLOOD GLUCOSE: CPT

## 2024-06-07 PROCEDURE — 94640 AIRWAY INHALATION TREATMENT: CPT

## 2024-06-07 PROCEDURE — 99223 1ST HOSP IP/OBS HIGH 75: CPT | Performed by: INTERNAL MEDICINE

## 2024-06-07 PROCEDURE — 96360 HYDRATION IV INFUSION INIT: CPT

## 2024-06-07 PROCEDURE — 83036 HEMOGLOBIN GLYCOSYLATED A1C: CPT | Performed by: INTERNAL MEDICINE

## 2024-06-07 PROCEDURE — 93005 ELECTROCARDIOGRAM TRACING: CPT

## 2024-06-07 PROCEDURE — 85007 BL SMEAR W/DIFF WBC COUNT: CPT

## 2024-06-07 PROCEDURE — 71045 X-RAY EXAM CHEST 1 VIEW: CPT

## 2024-06-07 PROCEDURE — 80053 COMPREHEN METABOLIC PANEL: CPT

## 2024-06-07 RX ORDER — QUETIAPINE FUMARATE 25 MG/1
25 TABLET, FILM COATED ORAL AS NEEDED
Status: ON HOLD | COMMUNITY

## 2024-06-07 RX ORDER — LANOLIN ALCOHOL/MO/W.PET/CERES
1 CREAM (GRAM) TOPICAL
Status: DISCONTINUED | OUTPATIENT
Start: 2024-06-08 | End: 2024-06-08 | Stop reason: HOSPADM

## 2024-06-07 RX ORDER — HYDROXYCHLOROQUINE SULFATE 200 MG/1
200 TABLET, FILM COATED ORAL DAILY
Status: DISCONTINUED | OUTPATIENT
Start: 2024-06-08 | End: 2024-06-08 | Stop reason: HOSPADM

## 2024-06-07 RX ORDER — DILTIAZEM HYDROCHLORIDE 180 MG/1
180 CAPSULE, COATED, EXTENDED RELEASE ORAL DAILY
Status: ON HOLD | COMMUNITY

## 2024-06-07 RX ORDER — INSULIN LISPRO 100 [IU]/ML
1-6 INJECTION, SOLUTION INTRAVENOUS; SUBCUTANEOUS
Status: DISCONTINUED | OUTPATIENT
Start: 2024-06-07 | End: 2024-06-08 | Stop reason: HOSPADM

## 2024-06-07 RX ORDER — IPRATROPIUM BROMIDE AND ALBUTEROL SULFATE 2.5; .5 MG/3ML; MG/3ML
3 SOLUTION RESPIRATORY (INHALATION)
Status: DISCONTINUED | OUTPATIENT
Start: 2024-06-07 | End: 2024-06-07

## 2024-06-07 RX ORDER — ALBUTEROL SULFATE 90 UG/1
2 AEROSOL, METERED RESPIRATORY (INHALATION) EVERY 4 HOURS PRN
Status: DISCONTINUED | OUTPATIENT
Start: 2024-06-07 | End: 2024-06-08 | Stop reason: HOSPADM

## 2024-06-07 RX ORDER — LOSARTAN POTASSIUM 50 MG/1
50 TABLET ORAL DAILY
Status: DISCONTINUED | OUTPATIENT
Start: 2024-06-08 | End: 2024-06-08 | Stop reason: HOSPADM

## 2024-06-07 RX ORDER — ACETAMINOPHEN 325 MG/1
650 TABLET ORAL EVERY 6 HOURS PRN
Status: DISCONTINUED | OUTPATIENT
Start: 2024-06-07 | End: 2024-06-08 | Stop reason: HOSPADM

## 2024-06-07 RX ORDER — CEFDINIR 300 MG/1
300 CAPSULE ORAL EVERY 12 HOURS SCHEDULED
COMMUNITY
End: 2024-06-08

## 2024-06-07 RX ORDER — PRAVASTATIN SODIUM 40 MG
40 TABLET ORAL DAILY
Status: DISCONTINUED | OUTPATIENT
Start: 2024-06-08 | End: 2024-06-08 | Stop reason: HOSPADM

## 2024-06-07 RX ORDER — DILTIAZEM HYDROCHLORIDE 180 MG/1
180 CAPSULE, COATED, EXTENDED RELEASE ORAL DAILY
Status: DISCONTINUED | OUTPATIENT
Start: 2024-06-08 | End: 2024-06-08 | Stop reason: HOSPADM

## 2024-06-07 RX ORDER — PREDNISONE 5 MG/1
5 TABLET ORAL DAILY
Status: DISCONTINUED | OUTPATIENT
Start: 2024-06-08 | End: 2024-06-08 | Stop reason: HOSPADM

## 2024-06-07 RX ORDER — LOSARTAN POTASSIUM 25 MG/1
25 TABLET ORAL DAILY
Status: DISCONTINUED | OUTPATIENT
Start: 2024-06-08 | End: 2024-06-07

## 2024-06-07 RX ORDER — QUETIAPINE FUMARATE 25 MG/1
25 TABLET, FILM COATED ORAL
Status: DISCONTINUED | OUTPATIENT
Start: 2024-06-07 | End: 2024-06-08 | Stop reason: HOSPADM

## 2024-06-07 RX ORDER — INSULIN GLARGINE 100 [IU]/ML
12 INJECTION, SOLUTION SUBCUTANEOUS
Status: DISCONTINUED | OUTPATIENT
Start: 2024-06-07 | End: 2024-06-08 | Stop reason: HOSPADM

## 2024-06-07 RX ORDER — ONDANSETRON 2 MG/ML
4 INJECTION INTRAMUSCULAR; INTRAVENOUS EVERY 6 HOURS PRN
Status: DISCONTINUED | OUTPATIENT
Start: 2024-06-07 | End: 2024-06-08 | Stop reason: HOSPADM

## 2024-06-07 RX ORDER — LANOLIN ALCOHOL/MO/W.PET/CERES
6 CREAM (GRAM) TOPICAL
Status: DISCONTINUED | OUTPATIENT
Start: 2024-06-07 | End: 2024-06-08 | Stop reason: HOSPADM

## 2024-06-07 RX ADMIN — IPRATROPIUM BROMIDE AND ALBUTEROL SULFATE 3 ML: 2.5; .5 SOLUTION RESPIRATORY (INHALATION) at 14:09

## 2024-06-07 RX ADMIN — APIXABAN 2.5 MG: 2.5 TABLET, FILM COATED ORAL at 18:36

## 2024-06-07 RX ADMIN — Medication 6 MG: at 21:55

## 2024-06-07 RX ADMIN — NYSTATIN 500000 UNITS: 100000 SUSPENSION ORAL at 21:55

## 2024-06-07 RX ADMIN — NYSTATIN 500000 UNITS: 100000 SUSPENSION ORAL at 18:36

## 2024-06-07 RX ADMIN — SODIUM CHLORIDE 1000 ML: 0.9 INJECTION, SOLUTION INTRAVENOUS at 13:22

## 2024-06-07 RX ADMIN — QUETIAPINE FUMARATE 25 MG: 25 TABLET ORAL at 21:55

## 2024-06-07 RX ADMIN — CEFTRIAXONE SODIUM 1000 MG: 10 INJECTION, POWDER, FOR SOLUTION INTRAVENOUS at 19:12

## 2024-06-07 RX ADMIN — NYSTATIN 500000 UNITS: 100000 SUSPENSION ORAL at 14:29

## 2024-06-07 RX ADMIN — INSULIN GLARGINE 12 UNITS: 100 INJECTION, SOLUTION SUBCUTANEOUS at 21:55

## 2024-06-07 NOTE — ASSESSMENT & PLAN NOTE
Started Abx for pneumonia outpt by PCP. Will start IV ceftriaxone  F/u cxr read  Reports coughing with food. Will appreciate dysphagia eval

## 2024-06-07 NOTE — ASSESSMENT & PLAN NOTE
Wife reported sleeplessness and agitation at night for three days. H/o delirium and aggressive behavior last year when he had RSV infection and pneumonia. Likely AMS due to pneumonia and dehydration   Recently started Quetiapine as needed by  PCP. Will give 25 mg every  night. Add melatonin for sleep  Treat pneumonia  Neuro check.  Check TSH and vit B12  Delirium precaution

## 2024-06-07 NOTE — H&P
Formerly [facility]  H&P  Name: Hal Miller 84 y.o. male I MRN: [id number]  Unit/Bed#: W -01 I Date of Admission: 6/7/2024   Date of Service: 6/7/2024 I Hospital Day: 0      Assessment & Plan   * Metabolic encephalopathy in the setting of vascular dementia  Assessment & Plan  Wife reported sleeplessness and agitation at night for three days. H/o delirium and aggressive behavior last year when he had RSV infection and pneumonia. Likely AMS due to pneumonia and dehydration   Recently started Quetiapine as needed by  PCP. Will give 25 mg every  night. Add melatonin for sleep  Treat pneumonia  Neuro check.  Check TSH and vit B12  Delirium precaution         Concern for aspiration pneumonia (HCC)  Assessment & Plan  Started Abx for pneumonia outpt by PCP. Will start IV ceftriaxone  F/u cxr read  Reports coughing with food. Will appreciate dysphagia eval       Atrial fibrillation (HCC)  Assessment & Plan  Rate controlled. Continue Eliquis     Polymyalgia rheumatica (HCC)  Assessment & Plan  On chronic prednisone currently on 5 mg.     Severe COPD with no acute exacerbation  Assessment & Plan  No wheeze or hypoxia. No resp distress  Continue home inhalers     Type 2 diabetes mellitus (HCC)  Assessment & Plan  Lab Results   Component Value Date    HGBA1C 7.5 (H) 04/09/2024       Recent Labs     06/07/24  1303   POCGLU 340*       Blood Sugar Average: Last 72 hrs:  (P) 340    Continue home dose lantus 12 units Q hs. SSI        VTE Prophylaxis: Apixaban (Eliquis)  / sequential compression device   Code Status: Full code  POLST: POLST information provided but not completed  Discussion with family: Yes    Anticipated Length of Stay:  Patient will be admitted on an Observation basis with an anticipated length of stay of  < 2 midnights.   Justification for Hospital Stay: Above      Chief Complaint:   Agitation     History of Present Illness:    Hal Miller is a 84 y.o. male with PMHx of severe COPD not on  home oxygen, Afib on Eliquis, vascular dementia and type II DM who presents with increasing agitation and sleeplessness at night for three days. He started productive cough recently and was started on Cefdinir by PCP for pneumonia. Reports coughing with eating at times. Family reports decreased appetite and recent weight loss. He was also started on Quetiapine as needed at night only two days ago. .    Review of Systems:    Review of Systems   Constitutional:  Positive for appetite change. Negative for fever.   Respiratory:  Positive for cough. Negative for wheezing.    Cardiovascular:  Negative for chest pain.   Gastrointestinal:  Negative for abdominal pain and constipation.   Neurological:  Negative for dizziness and syncope.   All other systems reviewed and are negative.      Past Medical and Surgical History:     Past Medical History:   Diagnosis Date    Acute encephalopathy 2/14/2023    Acute-on-chronic kidney injury  (HCC) 6/13/2017    Cancer (HCC)     pancreatic    Colon polyp     Coronary artery disease     Dehydration 3/3/2023    Diabetes mellitus (HCC)     Hyperlipidemia     Hypertension     Left lower lobe pneumonia 7/2/2022    Pneumonia 06/13/2017    Right middle and lower lobe     Stroke (HCC)        Past Surgical History:   Procedure Laterality Date    APPENDECTOMY      CARDIAC SURGERY      Aortic Valve Replacement, Ascending Aorta    COLONOSCOPY      GALLBLADDER SURGERY      PANCREATICODUODENECTOMY         Meds/Allergies:    Prior to Admission medications    Medication Sig Start Date End Date Taking? Authorizing Provider   apixaban (ELIQUIS) 2.5 mg Take 2.5 mg by mouth 2 (two) times a day   Yes Historical Provider, MD BAJWA UF III MINI PEN NEEDLES 31G X 5 MM MISC  3/11/18  Yes Historical Provider, MD   Calcium Citrate (CITRACAL PO) Take 650 mg by mouth in the morning   Yes Historical Provider, MD   cefdinir (OMNICEF) 300 mg capsule Take 300 mg by mouth every 12 (twelve) hours   Yes Historical  Provider, MD   cholecalciferol (VITAMIN D3) 1,000 units tablet Take 2,000 Units by mouth daily   Yes Historical Provider, MD   Cinnamon 500 MG capsule Take 1,000 mg by mouth daily   Yes Historical Provider, MD   diltiazem (CARDIZEM CD) 180 mg 24 hr capsule Take 180 mg by mouth daily   Yes Historical Provider, MD   furosemide (LASIX) 20 mg tablet Take 20 mg by mouth daily 9/8/22  Yes Historical Provider, MD   hydroxychloroquine (PLAQUENIL) 200 mg tablet Take 200 mg by mouth in the morning   Yes Historical Provider, MD   Lantus SoloStar 100 units/mL SOPN  3/23/23  Yes Historical Provider, MD   nystatin (MYCOSTATIN) 500,000 units/5 mL suspension Apply 5 mL to the mouth or throat 4 (four) times a day   Yes Historical Provider, MD   olmesartan (BENICAR) 20 mg tablet Take 20 mg by mouth daily   Yes Historical Provider, MD   potassium chloride (KLOR-CON) 8 MEQ tablet  7/21/21  Yes Historical Provider, MD   pravastatin (PRAVACHOL) 40 mg tablet Take 40 mg by mouth daily   Yes Historical Provider, MD   predniSONE 1 mg tablet Take 10 mg by mouth daily Currently down to 3 1/2 mg daily 05/05/22   Yes Historical Provider, MD   QUEtiapine (SEROquel) 25 mg tablet Take 25 mg by mouth if needed (for agitation) Take 1/2 to 1 tablet by mouth as needed   Yes Historical Provider, MD   EUSEBIO CONTOUR TEST test strip 3 (three) times a day Test  Patient not taking: Reported on 4/4/2023 2/1/18   Historical Provider, MD   dicyclomine (BENTYL) 20 mg tablet Take 1 tablet (20 mg total) by mouth 2 (two) times a day  Patient not taking: Reported on 6/7/2024 2/22/24   ADEOLA Rodriguez   ondansetron (ZOFRAN-ODT) 4 mg disintegrating tablet Take 1 tablet (4 mg total) by mouth every 6 (six) hours as needed for nausea or vomiting 2/22/24   ADEOLA Rodriguez         Allergies:   Allergies   Allergen Reactions    Contrast Dye [Iodinated Contrast Media] Other (See Comments)     Pt states kidney dysfunction..     Other        Social History:     Marital  "Status:   Occupation:   Patient Pre-hospital Living Situation: Home with wife  Patient Pre-hospital Level of Mobility: Ambulatory     Social History     Substance and Sexual Activity   Alcohol Use Never     Social History     Tobacco Use   Smoking Status Former    Types: Pipe    Quit date:     Years since quittin.4   Smokeless Tobacco Never     Social History     Substance and Sexual Activity   Drug Use No       Family History:    non-contributory    Physical Exam:     Vitals:   Blood Pressure: 148/74 (24 1615)  Pulse: 69 (24 161)  Temperature: 97.6 °F (36.4 °C) (24 161)  Temp Source: Oral (24 1305)  Respirations: 16 (24 161)  Height: 6' 2\" (188 cm) (24 161)  Weight - Scale: 76 kg (167 lb 8.8 oz) (24)  SpO2: 98 % (24)    Physical Exam  Constitutional:       General: He is not in acute distress.     Appearance: He is not ill-appearing, toxic-appearing or diaphoretic.   Eyes:      General:         Right eye: No discharge.         Left eye: No discharge.   Cardiovascular:      Rate and Rhythm: Normal rate.   Pulmonary:      Effort: Pulmonary effort is normal.      Breath sounds: Rales present. No wheezing.   Abdominal:      General: Bowel sounds are normal.      Palpations: Abdomen is soft.   Musculoskeletal:         General: No swelling.   Neurological:      Mental Status: He is alert and oriented to person, place, and time.      Motor: Motor function is intact.         Additional Data:     Lab Results: I have personally reviewed pertinent reports.      Results from last 7 days   Lab Units 24  1322 24  1100   WBC Thousand/uL 12.68* 17.90*   HEMOGLOBIN g/dL 12.2 13.3   HEMATOCRIT % 38.5 42.4   PLATELETS Thousands/uL 151 201   BANDS PCT % 2  --    SEGS PCT %  --  79*   LYMPHO PCT % 3* 8*   MONO PCT % 9 11   EOS PCT % 0 1     Results from last 7 days   Lab Units 24  1322   SODIUM mmol/L 134*   POTASSIUM mmol/L 4.4   CHLORIDE " mmol/L 100   CO2 mmol/L 30   BUN mg/dL 47*   CREATININE mg/dL 1.82*   ANION GAP mmol/L 4   CALCIUM mg/dL 9.4   ALBUMIN g/dL 3.6   TOTAL BILIRUBIN mg/dL 1.12*   ALK PHOS U/L 114*   ALT U/L 36   AST U/L 25   GLUCOSE RANDOM mg/dL 285*         Results from last 7 days   Lab Units 06/07/24  1303   POC GLUCOSE mg/dl 340*               Imaging:     XR chest 1 view portable   ED Interpretation by Harry Mooney PA-C (06/07 1345)   Bilateral pleural effusions, right worse than left.  Cardiomegaly.  No pneumothorax.  Lung scarring.  Vascular congestion.          EKG, Pathology, and Other Studies Reviewed on Admission:   EKG:     Allscripts / Epic Records Reviewed: Yes     ** Please Note: This note has been constructed using a voice recognition system. **

## 2024-06-07 NOTE — ASSESSMENT & PLAN NOTE
Lab Results   Component Value Date    HGBA1C 7.5 (H) 04/09/2024       Recent Labs     06/07/24  1303   POCGLU 340*       Blood Sugar Average: Last 72 hrs:  (P) 340    Continue home dose lantus 12 units Q hs. SSI

## 2024-06-08 VITALS
BODY MASS INDEX: 21.5 KG/M2 | HEIGHT: 74 IN | TEMPERATURE: 98 F | DIASTOLIC BLOOD PRESSURE: 74 MMHG | SYSTOLIC BLOOD PRESSURE: 138 MMHG | OXYGEN SATURATION: 97 % | RESPIRATION RATE: 16 BRPM | HEART RATE: 63 BPM | WEIGHT: 167.55 LBS

## 2024-06-08 PROBLEM — N17.9 AKI (ACUTE KIDNEY INJURY) (HCC): Status: ACTIVE | Noted: 2023-03-01

## 2024-06-08 LAB
ANION GAP SERPL CALCULATED.3IONS-SCNC: 4 MMOL/L (ref 4–13)
BASE EX.OXY STD BLDV CALC-SCNC: 46 % (ref 60–80)
BASE EXCESS BLDV CALC-SCNC: 3.9 MMOL/L
BASOPHILS # BLD AUTO: 0.01 THOUSANDS/ÂΜL (ref 0–0.1)
BASOPHILS NFR BLD AUTO: 0 % (ref 0–1)
BUN SERPL-MCNC: 34 MG/DL (ref 5–25)
CALCIUM SERPL-MCNC: 9.5 MG/DL (ref 8.4–10.2)
CHLORIDE SERPL-SCNC: 103 MMOL/L (ref 96–108)
CO2 SERPL-SCNC: 32 MMOL/L (ref 21–32)
CREAT SERPL-MCNC: 1.59 MG/DL (ref 0.6–1.3)
EOSINOPHIL # BLD AUTO: 0.12 THOUSAND/ÂΜL (ref 0–0.61)
EOSINOPHIL NFR BLD AUTO: 1 % (ref 0–6)
ERYTHROCYTE [DISTWIDTH] IN BLOOD BY AUTOMATED COUNT: 14.4 % (ref 11.6–15.1)
GFR SERPL CREATININE-BSD FRML MDRD: 39 ML/MIN/1.73SQ M
GLUCOSE P FAST SERPL-MCNC: 76 MG/DL (ref 65–99)
GLUCOSE SERPL-MCNC: 125 MG/DL (ref 65–140)
GLUCOSE SERPL-MCNC: 76 MG/DL (ref 65–140)
GLUCOSE SERPL-MCNC: 80 MG/DL (ref 65–140)
HCO3 BLDV-SCNC: 30.2 MMOL/L (ref 24–30)
HCT VFR BLD AUTO: 38.2 % (ref 36.5–49.3)
HGB BLD-MCNC: 12.5 G/DL (ref 12–17)
IMM GRANULOCYTES # BLD AUTO: 0.04 THOUSAND/UL (ref 0–0.2)
IMM GRANULOCYTES NFR BLD AUTO: 0 % (ref 0–2)
LYMPHOCYTES # BLD AUTO: 0.95 THOUSANDS/ÂΜL (ref 0.6–4.47)
LYMPHOCYTES NFR BLD AUTO: 9 % (ref 14–44)
MCH RBC QN AUTO: 31.9 PG (ref 26.8–34.3)
MCHC RBC AUTO-ENTMCNC: 32.7 G/DL (ref 31.4–37.4)
MCV RBC AUTO: 97 FL (ref 82–98)
MONOCYTES # BLD AUTO: 0.84 THOUSAND/ÂΜL (ref 0.17–1.22)
MONOCYTES NFR BLD AUTO: 8 % (ref 4–12)
NEUTROPHILS # BLD AUTO: 9.06 THOUSANDS/ÂΜL (ref 1.85–7.62)
NEUTS SEG NFR BLD AUTO: 82 % (ref 43–75)
NRBC BLD AUTO-RTO: 0 /100 WBCS
O2 CT BLDV-SCNC: 8.5 ML/DL
PCO2 BLDV: 52.3 MM HG (ref 42–50)
PH BLDV: 7.38 [PH] (ref 7.3–7.4)
PLATELET # BLD AUTO: 139 THOUSANDS/UL (ref 149–390)
PMV BLD AUTO: 9.7 FL (ref 8.9–12.7)
PO2 BLDV: 26.8 MM HG (ref 35–45)
POTASSIUM SERPL-SCNC: 4.5 MMOL/L (ref 3.5–5.3)
RBC # BLD AUTO: 3.92 MILLION/UL (ref 3.88–5.62)
SODIUM SERPL-SCNC: 139 MMOL/L (ref 135–147)
TSH SERPL DL<=0.05 MIU/L-ACNC: 1.79 UIU/ML (ref 0.45–4.5)
VIT B12 SERPL-MCNC: 493 PG/ML (ref 180–914)
WBC # BLD AUTO: 11.02 THOUSAND/UL (ref 4.31–10.16)

## 2024-06-08 PROCEDURE — 99239 HOSP IP/OBS DSCHRG MGMT >30: CPT | Performed by: INTERNAL MEDICINE

## 2024-06-08 PROCEDURE — 82948 REAGENT STRIP/BLOOD GLUCOSE: CPT

## 2024-06-08 PROCEDURE — 82607 VITAMIN B-12: CPT | Performed by: INTERNAL MEDICINE

## 2024-06-08 PROCEDURE — 82805 BLOOD GASES W/O2 SATURATION: CPT | Performed by: INTERNAL MEDICINE

## 2024-06-08 PROCEDURE — 80048 BASIC METABOLIC PNL TOTAL CA: CPT | Performed by: INTERNAL MEDICINE

## 2024-06-08 PROCEDURE — 84443 ASSAY THYROID STIM HORMONE: CPT | Performed by: INTERNAL MEDICINE

## 2024-06-08 PROCEDURE — 85025 COMPLETE CBC W/AUTO DIFF WBC: CPT | Performed by: INTERNAL MEDICINE

## 2024-06-08 RX ORDER — CEFPODOXIME PROXETIL 200 MG/1
200 TABLET, FILM COATED ORAL 2 TIMES DAILY
Qty: 10 TABLET | Refills: 0 | Status: SHIPPED | OUTPATIENT
Start: 2024-06-08 | End: 2024-06-13

## 2024-06-08 RX ADMIN — PREDNISONE 5 MG: 5 TABLET ORAL at 08:25

## 2024-06-08 RX ADMIN — NYSTATIN 500000 UNITS: 100000 SUSPENSION ORAL at 08:25

## 2024-06-08 RX ADMIN — HYDROXYCHLOROQUINE SULFATE 200 MG: 200 TABLET, FILM COATED ORAL at 08:25

## 2024-06-08 RX ADMIN — DILTIAZEM HYDROCHLORIDE 180 MG: 180 CAPSULE, COATED, EXTENDED RELEASE ORAL at 08:25

## 2024-06-08 RX ADMIN — LOSARTAN POTASSIUM 50 MG: 50 TABLET, FILM COATED ORAL at 08:25

## 2024-06-08 RX ADMIN — Medication 1 TABLET: at 08:25

## 2024-06-08 RX ADMIN — APIXABAN 2.5 MG: 2.5 TABLET, FILM COATED ORAL at 08:25

## 2024-06-08 RX ADMIN — PRAVASTATIN SODIUM 40 MG: 40 TABLET ORAL at 08:25

## 2024-06-08 NOTE — ED PROVIDER NOTES
"History  Chief Complaint   Patient presents with    Abnormal Lab     Pt presents with abnormal labs from Wednesday. Increased WBC, hypoglycemia and dehydration. Was told to come for further eval.      84-year-old male presents today with concerns of abnormal labs.  States that his white blood cell count was increased, he had low blood sugar and \"was dehydrated\" on labs.  States that he came in for further testing today.  States that he just feels \"weak\" and is having some agitation occasionally.  Was recently diagnosed with vascular dementia, [facility] but was pulled out due to not tolerating it well.  Currently lives with wife who is concerned as he has been increasingly agitated is concerned about his prednisone use for his Radha myalgia rheumatica.  Patient denies any chest pain.  Mild shortness of breath.  Some leg swelling, which is normal for patient.  Denies any other symptoms at this time.        Prior to Admission Medications   Prescriptions Last Dose Informant Patient Reported? Taking?   B-D UF III MINI PEN NEEDLES 31G X 5 MM MISC 6/7/2024 Self Yes Yes   EUSEBIO CONTOUR TEST test strip  Self Yes No   Sig: 3 (three) times a day Test   Patient not taking: Reported on 4/4/2023   Calcium Citrate (CITRACAL PO) 6/7/2024 Self Yes Yes   Sig: Take 650 mg by mouth in the morning   Cinnamon 500 MG capsule 6/7/2024 Self Yes Yes   Sig: Take 1,000 mg by mouth daily   Lantus SoloStar 100 units/mL SOPN 6/6/2024 Self Yes Yes   QUEtiapine (SEROquel) 25 mg tablet 6/7/2024  Yes Yes   Sig: Take 25 mg by mouth if needed (for agitation) Take 1/2 to 1 tablet by mouth as needed   apixaban (ELIQUIS) 2.5 mg 6/7/2024 Self Yes Yes   Sig: Take 2.5 mg by mouth 2 (two) times a day   cefdinir (OMNICEF) 300 mg capsule 6/7/2024  Yes Yes   Sig: Take 300 mg by mouth every 12 (twelve) hours   cholecalciferol (VITAMIN D3) 1,000 units tablet 6/6/2024 Self Yes Yes   Sig: Take 2,000 Units by mouth daily "   dicyclomine (BENTYL) 20 mg tablet Not Taking  No No   Sig: Take 1 tablet (20 mg total) by mouth 2 (two) times a day   Patient not taking: Reported on 6/7/2024   diltiazem (CARDIZEM CD) 180 mg 24 hr capsule 6/7/2024  Yes Yes   Sig: Take 180 mg by mouth daily   furosemide (LASIX) 20 mg tablet 6/7/2024 Self Yes Yes   Sig: Take 20 mg by mouth daily   hydroxychloroquine (PLAQUENIL) 200 mg tablet 6/7/2024 Self Yes Yes   Sig: Take 200 mg by mouth in the morning   nystatin (MYCOSTATIN) 500,000 units/5 mL suspension 6/7/2024  Yes Yes   Sig: Apply 5 mL to the mouth or throat 4 (four) times a day   olmesartan (BENICAR) 20 mg tablet 6/7/2024 Self Yes Yes   Sig: Take 20 mg by mouth daily   ondansetron (ZOFRAN-ODT) 4 mg disintegrating tablet   No No   Sig: Take 1 tablet (4 mg total) by mouth every 6 (six) hours as needed for nausea or vomiting   potassium chloride (KLOR-CON) 8 MEQ tablet 6/7/2024 Self Yes Yes   pravastatin (PRAVACHOL) 40 mg tablet 6/6/2024 Self Yes Yes   Sig: Take 40 mg by mouth daily   predniSONE 1 mg tablet 6/7/2024 Self Yes Yes   Sig: Take 10 mg by mouth daily Currently down to 3 1/2 mg daily 05/05/22      Facility-Administered Medications: None       Past Medical History:   Diagnosis Date    Acute encephalopathy 2/14/2023    Acute-on-chronic kidney injury  (HCC) 6/13/2017    Cancer (HCC)     pancreatic    Colon polyp     Coronary artery disease     Dehydration 3/3/2023    Diabetes mellitus (HCC)     Hyperlipidemia     Hypertension     Left lower lobe pneumonia 7/2/2022    Pneumonia 06/13/2017    Right middle and lower lobe     Stroke (HCC)        Past Surgical History:   Procedure Laterality Date    APPENDECTOMY      CARDIAC SURGERY      Aortic Valve Replacement, Ascending Aorta    COLONOSCOPY      GALLBLADDER SURGERY      PANCREATICODUODENECTOMY         Family History   Problem Relation Age of Onset    Cancer Mother     Stroke Father     Cancer Sister     Diabetes Sister     Stroke Brother      I have  reviewed and agree with the history as documented.    E-Cigarette/Vaping    E-Cigarette Use Never User      E-Cigarette/Vaping Substances    Nicotine No     THC No     CBD No     Flavoring No     Other No     Unknown No      Social History     Tobacco Use    Smoking status: Former     Types: Pipe     Quit date:      Years since quittin.4    Smokeless tobacco: Never   Vaping Use    Vaping status: Never Used   Substance Use Topics    Alcohol use: Never    Drug use: No       Review of Systems   Constitutional:  Negative for chills and fever.   HENT:  Negative for ear pain and sore throat.    Eyes:  Negative for pain and visual disturbance.   Respiratory:  Positive for shortness of breath. Negative for apnea, cough, choking, chest tightness, wheezing and stridor.    Cardiovascular:  Negative for chest pain and palpitations.   Gastrointestinal:  Negative for abdominal pain and vomiting.   Genitourinary:  Negative for dysuria and hematuria.   Musculoskeletal:  Negative for arthralgias and back pain.   Skin:  Negative for color change and rash.   Neurological:  Negative for dizziness, seizures, syncope, weakness, light-headedness and headaches.   All other systems reviewed and are negative.      Physical Exam  Physical Exam  Vitals and nursing note reviewed.   Constitutional:       General: He is not in acute distress.     Appearance: He is ill-appearing (chronically so).   HENT:      Head: Normocephalic and atraumatic.   Eyes:      General: No scleral icterus.     Conjunctiva/sclera: Conjunctivae normal.      Pupils: Pupils are equal, round, and reactive to light.   Cardiovascular:      Rate and Rhythm: Normal rate and regular rhythm.      Pulses: Normal pulses.      Heart sounds: Normal heart sounds. No murmur heard.     No friction rub. No gallop.   Pulmonary:      Effort: Pulmonary effort is normal. No respiratory distress.      Breath sounds: No stridor. Wheezing present. No rhonchi or rales.   Chest:       Chest wall: No tenderness.   Abdominal:      Tenderness: There is no abdominal tenderness.   Musculoskeletal:         General: Normal range of motion.      Cervical back: Normal range of motion.      Right lower leg: No edema.      Left lower leg: No edema.   Skin:     General: Skin is warm and dry.      Capillary Refill: Capillary refill takes less than 2 seconds.      Coloration: Skin is not jaundiced.      Findings: No lesion or rash.   Neurological:      Mental Status: He is alert and oriented to person, place, and time.         Vital Signs  ED Triage Vitals [06/07/24 1305]   Temperature Pulse Respirations Blood Pressure SpO2   97.5 °F (36.4 °C) 66 20 141/65 98 %      Temp Source Heart Rate Source Patient Position - Orthostatic VS BP Location FiO2 (%)   Oral Monitor Sitting Right arm --      Pain Score       No Pain           Vitals:    06/07/24 1400 06/07/24 1615 06/07/24 2207 06/08/24 0743   BP: 144/67 148/74 138/73 138/74   Pulse: 61 69 55 63   Patient Position - Orthostatic VS:   Lying          Visual Acuity      ED Medications  Medications   sodium chloride 0.9 % bolus 1,000 mL (0 mL Intravenous Stopped 6/7/24 1411)       Diagnostic Studies  Results Reviewed       Procedure Component Value Units Date/Time    RBC Morphology Reflex Test [[id number]] Collected: 06/07/24 1322    Lab Status: Final result Specimen: Blood from Arm, Right Updated: 06/07/24 1501    CBC and differential [[id number]]  (Abnormal) Collected: 06/07/24 1322    Lab Status: Final result Specimen: Blood from Arm, Right Updated: 06/07/24 1420     WBC 12.68 Thousand/uL      RBC 3.91 Million/uL      Hemoglobin 12.2 g/dL      Hematocrit 38.5 %      MCV 99 fL      MCH 31.2 pg      MCHC 31.7 g/dL      RDW 14.0 %      MPV 9.2 fL      Platelets 151 Thousands/uL     Manual Differential(PHLEBS Do Not Order) [[id number]]  (Abnormal) Collected: 06/07/24 1322    Lab Status: Final result Specimen: Blood from Arm, Right Updated: 06/07/24 1420     Segmented %  86 %      Bands % 2 %      Lymphocytes % 3 %      Monocytes % 9 %      Eosinophils % 0 %      Basophils % 0 %      Absolute Neutrophils 11.16 Thousand/uL      Absolute Lymphocytes 0.38 Thousand/uL      Absolute Monocytes 1.14 Thousand/uL      Absolute Eosinophils 0.00 Thousand/uL      Absolute Basophils 0.00 Thousand/uL      Total Counted --     RBC Morphology Normal     Platelet Estimate Adequate    Comprehensive metabolic panel [[id number]]  (Abnormal) Collected: 06/07/24 1322    Lab Status: Final result Specimen: Blood from Arm, Right Updated: 06/07/24 1345     Sodium 134 mmol/L      Potassium 4.4 mmol/L      Chloride 100 mmol/L      CO2 30 mmol/L      ANION GAP 4 mmol/L      BUN 47 mg/dL      Creatinine 1.82 mg/dL      Glucose 285 mg/dL      Calcium 9.4 mg/dL      AST 25 U/L      ALT 36 U/L      Alkaline Phosphatase 114 U/L      Total Protein 5.9 g/dL      Albumin 3.6 g/dL      Total Bilirubin 1.12 mg/dL      eGFR 33 ml/min/1.73sq m     Narrative:      National Kidney Disease Foundation guidelines for Chronic Kidney Disease (CKD):     Stage 1 with normal or high GFR (GFR > 90 mL/min/1.73 square meters)    Stage 2 Mild CKD (GFR = 60-89 mL/min/1.73 square meters)    Stage 3A Moderate CKD (GFR = 45-59 mL/min/1.73 square meters)    Stage 3B Moderate CKD (GFR = 30-44 mL/min/1.73 square meters)    Stage 4 Severe CKD (GFR = 15-29 mL/min/1.73 square meters)    Stage 5 End Stage CKD (GFR <15 mL/min/1.73 square meters)  Note: GFR calculation is accurate only with a steady state creatinine    Fingerstick Glucose (POCT) [[id number]]  (Abnormal) Collected: 06/07/24 1303    Lab Status: Final result Specimen: Blood Updated: 06/07/24 1304     POC Glucose 340 mg/dl                    XR chest 1 view portable   ED Interpretation by Harry Mooney PA-C (06/07 1345)   Bilateral pleural effusions, right worse than left.  Cardiomegaly.  No pneumothorax.  Lung scarring.  Vascular congestion.      Final Result by Zena Wiley,  MD (06/08 0747)      No acute disease with scar and chronic small loculated pleural effusions.            Workstation performed: [id number]                    Procedures  Procedures         ED Course  ED Course as of 06/09/24 1603   Fri Jun 07, 2024   1351 Creatinine(!): 1.82  Improved from previous   1420 Platelet Count: 151   1420 Hemoglobin: 12.2   1420 WBC(!): 12.68  Less than previous lab workup. Reassuring.   1421 Bands %: 2                               SBIRT 22yo+      Flowsheet Row Most Recent Value   Initial Alcohol Screen: US AUDIT-C     1. How often do you have a drink containing alcohol? 0 Filed at: 06/07/2024 1320   2. How many drinks containing alcohol do you have on a typical day you are drinking?  0 Filed at: 06/07/2024 1320   3a. Male UNDER 65: How often do you have five or more drinks on one occasion? 0 Filed at: 06/07/2024 1320   3b. FEMALE Any Age, or MALE 65+: How often do you have 4 or more drinks on one occassion? 0 Filed at: 06/07/2024 1320   Audit-C Score 0 Filed at: 06/07/2024 1320   ALEXEY: How many times in the past year have you...    Used an illegal drug or used a prescription medication for non-medical reasons? Never Filed at: 06/07/2024 1320                      Medical Decision Making  84 year old male presenting today with concerns of abnormal labs. Only symptoms are increased agitation and mild shortness of breath. Wheezing on exam. Duoneb. Beet red tongue, consistent with thrush, possibly secondary to chronic steroid use. Nystatin.  Repeat labs.,  POCT Glucose over 300, much higher than previous labs which showed hypoglycemia. Reassuring.  Kidney function improved. WBC lower. Patient has been on chronic steroids, doubt infection. Likely increased secondary to steroid usage.  Lab workup reassuring.  Discussed with family and internal medicine team, it was decided that patient should stay for acute rehab vs placement for proper care.   Patient at time of admission was  stable.    Amount and/or Complexity of Data Reviewed  Labs:  Decision-making details documented in ED Course.  Radiology: ordered and independent interpretation performed.    Risk  Decision regarding hospitalization.             Disposition  Final diagnoses:   Dyspnea   Agitation   Vascular dementia (HCC) - history of   CKD (chronic kidney disease)     Time reflects when diagnosis was documented in both MDM as applicable and the Disposition within this note       Time User Action Codes Description Comment    6/7/2024  1:47 PM Harry Mooney Add [R06.00] Dyspnea     6/7/2024  3:18 PM Harry Mooney Add [R45.1] Agitation     6/7/2024  3:18 PM Harry Mooney Add [F01.50] Vascular dementia (HCC)     6/7/2024  3:18 PM Harry Mooney Modify [F01.50] Vascular dementia (HCC) history of    6/7/2024  3:18 PM Harry Mooney Add [N18.9] CKD (chronic kidney disease)     6/8/2024 10:55 AM Wil Marsh Add [J69.0] Concern for aspiration pneumonia (HCC)     6/8/2024  6:57 PM Wil Marsh Add [B37.0] Thrush           ED Disposition       ED Disposition   Admit    Condition   Stable    Date/Time   Fri Jun 7, 2024 1524    Comment   Case was discussed with Dr. Hoff and the patient's admission status was agreed to be Admission Status: observation status to the service of Dr. Hoff .               Follow-up Information       Follow up With Specialties Details Why Contact Info Additional Information    [facility] Emergency Department Emergency Medicine Go to  If symptoms worsen [address] [facility] [address]  [phone] [facility] Emergency Department, [facility] [place], [place], [address]    Kyaw Monreal MD Internal Medicine Schedule an appointment as soon as possible for a visit  As needed [facility] [address] [phone]               Discharge Medication List as of 6/8/2024 12:21 PM        START taking these medications    Details    cefpodoxime (VANTIN) 200 mg tablet Take 1 tablet (200 mg total) by mouth 2 (two) times a day for 5 days, Starting Sat 6/8/2024, Until Thu 6/13/2024, Normal           CONTINUE these medications which have NOT CHANGED    Details   apixaban (ELIQUIS) 2.5 mg Take 2.5 mg by mouth 2 (two) times a day, Historical Med      B-D UF III MINI PEN NEEDLES 31G X 5 MM MISC Starting Sun 3/11/2018, Historical Med      Calcium Citrate (CITRACAL PO) Take 650 mg by mouth in the morning, Historical Med      Cinnamon 500 MG capsule Take 1,000 mg by mouth daily, Historical Med      diltiazem (CARDIZEM CD) 180 mg 24 hr capsule Take 180 mg by mouth daily, Historical Med      hydroxychloroquine (PLAQUENIL) 200 mg tablet Take 200 mg by mouth in the morning, Historical Med      Lantus SoloStar 100 units/mL SOPN Starting Thu 3/23/2023, Historical Med      olmesartan (BENICAR) 20 mg tablet Take 20 mg by mouth daily, Historical Med      potassium chloride (KLOR-CON) 8 MEQ tablet Starting Wed 7/21/2021, Historical Med      pravastatin (PRAVACHOL) 40 mg tablet Take 40 mg by mouth daily, Historical Med      predniSONE 1 mg tablet Take 10 mg by mouth daily Currently down to 3 1/2 mg daily 05/05/22, Historical Med      QUEtiapine (SEROquel) 25 mg tablet Take 25 mg by mouth if needed (for agitation) Take 1/2 to 1 tablet by mouth as needed, Historical Med      nystatin (MYCOSTATIN) 500,000 units/5 mL suspension Apply 5 mL to the mouth or throat 4 (four) times a day, Historical Med      ondansetron (ZOFRAN-ODT) 4 mg disintegrating tablet Take 1 tablet (4 mg total) by mouth every 6 (six) hours as needed for nausea or vomiting, Starting Thu 2/22/2024, Normal           STOP taking these medications       EUSEBIO CONTOUR TEST test strip Comments:   Reason for Stopping:         cefdinir (OMNICEF) 300 mg capsule Comments:   Reason for Stopping:         cholecalciferol (VITAMIN D3) 1,000 units tablet Comments:   Reason for Stopping:         dicyclomine (BENTYL)  20 mg tablet Comments:   Reason for Stopping:         furosemide (LASIX) 20 mg tablet Comments:   Reason for Stopping:               No discharge procedures on file.    PDMP Review       None            ED Provider  Electronically Signed by             Harry Mooney PA-C  06/09/24 6445

## 2024-06-08 NOTE — DISCHARGE SUMMARY
[facility]  Discharge- Hal Miller 1940, 84 y.o. male MRN: [id number]  Unit/Bed#: ROMANA HYATT 404-01 Encounter: [id number]  Primary Care Provider: Kyaw Monreal MD   Date and time admitted to hospital: 6/7/2024 12:59 PM    * Metabolic encephalopathy in the setting of vascular dementia  Assessment & Plan  Wife reported sleeplessness and agitation at night for three days. H/o delirium and aggressive behavior last year when he had RSV infection and pneumonia. Likely AMS due to dehydration/acute kidney injury  Has since resolved with IV fluids  Mental status back to baseline         Type 2 diabetes mellitus ([facility])  Assessment & Plan  Continue Lantus 12 units daily on discharge    TARA (acute kidney injury) ([facility])  Assessment & Plan  Creatinine noted to be approximately 2 couple days ago  Has since improved with IV fluids  Creatinine 1.5  Encouraged oral hydration    Severe COPD with no acute exacerbation  Assessment & Plan  Not in acute exacerbation  Continue home inhalers    Polymyalgia rheumatica ([facility])  Assessment & Plan  On chronic prednisone currently on 5 mg.     Atrial fibrillation ([facility])  Assessment & Plan  Continue Cardizem  Currently rate controlled  Continue Eliquis    Discharging Physician / Practitioner: Wil Marsh MD  PCP: Kyaw Monreal MD  Admission Date:   Admission Orders (From admission, onward)       Ordered        06/07/24 1524  Place in Observation  Once                          Discharge Date: 06/08/24    Medical Problems       Resolved Problems  Date Reviewed: 6/8/2024   None         Consultations During Hospital Stay:  none    Procedures Performed:   none    Significant Findings / Test Results:   XR chest 1 view portable    Result Date: 6/8/2024  Impression: No acute disease with scar and chronic small loculated pleural effusions. Workstation performed: [id number]      Incidental Findings:   none     Test Results Pending at Discharge (will require follow up):   none    "  Outpatient Tests Requested:  none    Complications:  none    Reason for Admission: Metabolic encephalopathy  Hospital Course:     Hal Miller is a 84 y.o. male patient who originally presented to the hospital on 6/7/2024 with past medical history significant dementia, COPD, atrial fibrillation on Eliquis who initially presented with confusion likely secondary to acute kidney injury resolved with IV fluids.  Mental status now back to baseline  Medical cleared for discharge will follow-up outpatient with primary care doctor instructed importance of oral hydration      Please see above list of diagnoses and related plan for additional information.     Condition at Discharge: stable     Discharge Day Visit / Exam:     Subjective:   Resting comfortably.  Denies chest pain, shortness of breath, fevers, chills or any other complaints  Vitals: Blood Pressure: 138/74 (06/08/24 0743)  Pulse: 63 (06/08/24 0743)  Temperature: 98 °F (36.7 °C) (06/08/24 0743)  Temp Source: Oral (06/07/24 2207)  Respirations: 16 (06/08/24 0743)  Height: 6' 2\" (188 cm) (06/07/24 1615)  Weight - Scale: 76 kg (167 lb 8.8 oz) (06/07/24 1615)  SpO2: 97 % (06/08/24 0743)  Exam:   Physical Exam  Constitutional:       General: He is not in acute distress.     Appearance: He is well-developed. He is not diaphoretic.   HENT:      Head: Normocephalic and atraumatic.      Nose: Nose normal.      Mouth/Throat:      Pharynx: No oropharyngeal exudate.   Eyes:      General: No scleral icterus.     Conjunctiva/sclera: Conjunctivae normal.   Cardiovascular:      Rate and Rhythm: Normal rate and regular rhythm.      Heart sounds: Normal heart sounds. No murmur heard.     No friction rub. No gallop.   Pulmonary:      Effort: Pulmonary effort is normal. No respiratory distress.      Breath sounds: Normal breath sounds. No wheezing or rales.   Chest:      Chest wall: No tenderness.   Abdominal:      General: Bowel sounds are normal. There is no distension.      " Palpations: Abdomen is soft.      Tenderness: There is no abdominal tenderness. There is no guarding.   Musculoskeletal:         General: No tenderness, deformity or edema. Normal range of motion.      Cervical back: Normal range of motion and neck supple.   Skin:     General: Skin is warm and dry.      Findings: No erythema.   Neurological:      Mental Status: He is alert. Mental status is at baseline.   Psychiatric:         Mood and Affect: Mood and affect normal.       (   Discussion with Family: pt    Discharge instructions/Information to patient and family:   See after visit summary for information provided to patient and family.      Provisions for Follow-Up Care:  See after visit summary for information related to follow-up care and any pertinent home health orders.      Disposition:     Home    For Discharges to [facility] SNF:   Not Applicable to this Patient - Not Applicable to this Patient    Planned Readmission: none     Discharge Statement:  I spent 60 minutes discharging the patient. This time was spent on the day of discharge. I had direct contact with the patient on the day of discharge. Greater than 50% of the total time was spent examining patient, answering all patient questions, arranging and discussing plan of care with patient as well as directly providing post-discharge instructions.  Additional time then spent on discharge activities.    Discharge Medications:  See after visit summary for reconciled discharge medications provided to patient and family.      ** Please Note: This note has been constructed using a voice recognition system **

## 2024-06-08 NOTE — UTILIZATION REVIEW
Initial Clinical Review    Admission: Date/Time/Statement:   Admission Orders (From admission, onward)       Ordered        06/07/24 1524  Place in Observation  Once                          Orders Placed This Encounter   Procedures    Place in Observation     Standing Status:   Standing     Number of Occurrences:   1     Order Specific Question:   Level of Care     Answer:   Med Surg [16]     ED Arrival Information       Expected   -    Arrival   6/7/2024 12:55    Acuity   Urgent              Means of arrival   Wheelchair    Escorted by   Spouse    Service   Hospitalist    Admission type   Emergency              Arrival complaint   abnormal bloodwork from wednesday             Chief Complaint   Patient presents with    Abnormal Lab     Pt presents with abnormal labs from Wednesday. Increased WBC, hypoglycemia and dehydration. Was told to come for further eval.        Initial Presentation: 84 y.o. male with PMHx of severe COPD not on home oxygen, Afib on Eliquis, vascular dementia and type II DM who presents with increasing agitation and sleeplessness at night for three days. He started productive cough recently and was started on Cefdinir by PCP for pneumonia. Reports coughing with eating at times. Family reports decreased appetite and recent weight loss. He was also started on Quetiapine as needed at night only two days ago. Plan: Observation for metabolic encephalopathy, concern for aspiration pneumonia, Afib, polymyalgia rheumatica, COPD, DM: continue Quetiapine and add melatonin, neuro checks, TSH and Vit B12, delirium precautions, IV ceftriaxone, continue Eliquis, continue home Lantus.     ED Triage Vitals [06/07/24 1305]   Temperature Pulse Respirations Blood Pressure SpO2   97.5 °F (36.4 °C) 66 20 141/65 98 %      Temp Source Heart Rate Source Patient Position - Orthostatic VS BP Location FiO2 (%)   Oral Monitor Sitting Right arm --      Pain Score       No Pain          Wt Readings from Last 1 Encounters:    06/07/24 76 kg (167 lb 8.8 oz)     Additional Vital Signs:     Date/Time Temp Pulse Resp BP MAP (mmHg) SpO2 O2 Device   06/08/24 07:43:55 98 °F (36.7 °C) 63 16 138/74 95 97 % --   06/07/24 22:07:17 97.3 °F (36.3 °C) Abnormal  55 16 138/73 95 96 % None (Room air)   06/07/24 16:15:49 97.6 °F (36.4 °C) 69 16 148/74 99 98 % --   06/07/24 1400 -- 61 -- 144/67 96 98 % --   06/07/24 1330 -- 62 -- 129/66 92 98 % --   06/07/24 1315 -- 63 -- 141/65 93 -- --     Pertinent Labs/Diagnostic Test Results:   XR chest 1 view portable   ED Interpretation by Harry Mooney PA-C (06/07 1345)   Bilateral pleural effusions, right worse than left.  Cardiomegaly.  No pneumothorax.  Lung scarring.  Vascular congestion.      Final Result by Zena Wiley MD (06/08 0747)      No acute disease with scar and chronic small loculated pleural effusions.            Workstation performed: [id number]               Results from last 7 days   Lab Units 06/08/24  0645 06/07/24  1322 06/05/24  1100   WBC Thousand/uL 11.02* 12.68* 17.90*   HEMOGLOBIN g/dL 12.5 12.2 13.3   HEMATOCRIT % 38.2 38.5 42.4   PLATELETS Thousands/uL 139* 151 201   TOTAL NEUT ABS Thousands/µL 9.06*  --  14.23*   BANDS PCT %  --  2  --          Results from last 7 days   Lab Units 06/08/24  0645 06/07/24  1322 06/05/24  1100   SODIUM mmol/L 139 134* 137   POTASSIUM mmol/L 4.5 4.4 4.9   CHLORIDE mmol/L 103 100 96   CO2 mmol/L 32 30 29   ANION GAP mmol/L 4 4 12   BUN mg/dL 34* 47* 58*   CREATININE mg/dL 1.59* 1.82* 2.07*   EGFR ml/min/1.73sq m 39 33 28   CALCIUM mg/dL 9.5 9.4 10.4*     Results from last 7 days   Lab Units 06/07/24  1322 06/05/24  1100   AST U/L 25 52*   ALT U/L 36 55*   ALK PHOS U/L 114* 123*   TOTAL PROTEIN g/dL 5.9* 6.3*   ALBUMIN g/dL 3.6 4.2   TOTAL BILIRUBIN mg/dL 1.12* 1.48*     Results from last 7 days   Lab Units 06/08/24  0744 06/07/24  2050 06/07/24  1908 06/07/24  1303   POC GLUCOSE mg/dl 80 270* 251* 340*     Results from last 7 days   Lab Units  06/08/24  0645 06/07/24  1322   GLUCOSE RANDOM mg/dL 76 285*         Results from last 7 days   Lab Units 06/07/24  1909   HEMOGLOBIN A1C % 7.4*   EAG mg/dl 166     BETA-HYDROXYBUTYRATE   Date Value Ref Range Status   07/02/2022 0.2 <0.6 mmol/L Final          Results from last 7 days   Lab Units 06/08/24  0645   PH VICTORINA  7.379   PCO2 VICTORINA mm Hg 52.3*   PO2 VICTORINA mm Hg 26.8*   HCO3 VICTORINA mmol/L 30.2*   BASE EXC VICTORINA mmol/L 3.9   O2 CONTENT VICTORINA ml/dL 8.5   O2 HGB, VENOUS % 46.0*           Results from last 7 days   Lab Units 06/08/24  0645 06/05/24  1100   TSH 3RD GENERATON uIU/mL 1.790 1.745           Results from last 7 days   Lab Units 06/05/24  1100   CRP mg/L 2.6   SED RATE mm/hour 3           Results from last 7 days   Lab Units 06/05/24  1100   URINE CULTURE  No Growth <1000 cfu/mL         ED Treatment:   Medication Administration from 06/07/2024 1254 to 06/07/2024 1612         Date/Time Order Dose Route Action     06/07/2024 1322 EDT sodium chloride 0.9 % bolus 1,000 mL 1,000 mL Intravenous New Bag     06/07/2024 1429 EDT nystatin (MYCOSTATIN) oral suspension 500,000 Units 500,000 Units Swish & Swallow Given     06/07/2024 1409 EDT ipratropium-albuterol (DUO-NEB) 0.5-2.5 mg/3 mL inhalation solution 3 mL 3 mL Nebulization Given          Past Medical History:   Diagnosis Date    Acute encephalopathy 2/14/2023    Acute-on-chronic kidney injury  (HCC) 6/13/2017    Cancer (HCC)     pancreatic    Colon polyp     Coronary artery disease     Dehydration 3/3/2023    Diabetes mellitus (HCC)     Hyperlipidemia     Hypertension     Left lower lobe pneumonia 7/2/2022    Pneumonia 06/13/2017    Right middle and lower lobe     Stroke (HCC)      Present on Admission:   Metabolic encephalopathy in the setting of vascular dementia   Atrial fibrillation (HCC)   Polymyalgia rheumatica (HCC)   Severe COPD with no acute exacerbation      Admitting Diagnosis: Vascular dementia (HCC) [F01.50]  Agitation [R45.1]  Dyspnea [R06.00]  CKD  (chronic kidney disease) [N18.9]  Age/Sex: 84 y.o. male  Admission Orders:  Scheduled Medications:  apixaban, 2.5 mg, Oral, BID  calcium carbonate-vitamin D, 1 tablet, Oral, Daily With Breakfast  cefTRIAXone, 1,000 mg, Intravenous, Q24H  diltiazem, 180 mg, Oral, Daily  hydroxychloroquine, 200 mg, Oral, Daily  insulin glargine, 12 Units, Subcutaneous, HS  insulin lispro, 1-6 Units, Subcutaneous, TID AC  losartan, 50 mg, Oral, Daily  melatonin, 6 mg, Oral, HS  nystatin, 500,000 Units, Swish & Swallow, 4x Daily  pravastatin, 40 mg, Oral, Daily  predniSONE, 5 mg, Oral, Daily  QUEtiapine, 25 mg, Oral, HS      Continuous IV Infusions:     PRN Meds:  acetaminophen, 650 mg, Oral, Q6H PRN  albuterol, 2 puff, Inhalation, Q4H PRN  ondansetron, 4 mg, Intravenous, Q6H PRN        None    Network Utilization Review Department  ATTENTION: Please call with any questions or concerns to [phone] and carefully listen to the prompts so that you are directed to the right person. All voicemails are confidential.   For Discharge needs, contact Care Management DC Support Team at [phone] opt. 2  Send all requests for admission clinical reviews, approved or denied determinations and any other requests to dedicated fax number below belonging to the campus where the patient is receiving treatment. List of dedicated fax numbers for the Facilities:  FACILITY NAME UR FAX NUMBER   ADMISSION DENIALS (Administrative/Medical Necessity) [phone]   DISCHARGE SUPPORT TEAM (NETWORK) [phone]   PARENT CHILD HEALTH (Maternity/NICU/Pediatrics) [phone]   [facility] [phone]   [facility] [phone]   [facility] [phone]   [facility] [phone]   [facility] [phone]   [facility] [phone]   [facility] [phone]   [facility]  [facility] [phone]   [facility] [phone]   [facility] [phone]   [facility] [phone]   [facility] [phone]

## 2024-06-08 NOTE — PLAN OF CARE
Problem: Potential for Falls  Goal: Patient will remain free of falls  Description: INTERVENTIONS:  - Educate patient/family on patient safety including physical limitations  - Instruct patient to call for assistance with activity   - Consult OT/PT to assist with strengthening/mobility   - Keep Call bell within reach  - Keep bed low and locked with side rails adjusted as appropriate  - Keep care items and personal belongings within reach  - Initiate and maintain comfort rounds  - Make Fall Risk Sign visible to staff  - Offer Toileting every Hours, in advance of need  - Initiate/Maintain alarm  - Obtain necessary fall risk management equipment:   - Apply yellow socks and bracelet for high fall risk patients  - Consider moving patient to room near nurses station  Outcome: Progressing     Problem: Prexisting or High Potential for Compromised Skin Integrity  Goal: Skin integrity is maintained or improved  Description: INTERVENTIONS:  - Identify patients at risk for skin breakdown  - Assess and monitor skin integrity  - Assess and monitor nutrition and hydration status  - Monitor labs   - Assess for incontinence   - Turn and reposition patient  - Assist with mobility/ambulation  - Relieve pressure over bony prominences  - Avoid friction and shearing  - Provide appropriate hygiene as needed including keeping skin clean and dry  - Evaluate need for skin moisturizer/barrier cream  - Collaborate with interdisciplinary team   - Patient/family teaching  - Consider wound care consult   Outcome: Progressing

## 2024-06-08 NOTE — ASSESSMENT & PLAN NOTE
Wife reported sleeplessness and agitation at night for three days. H/o delirium and aggressive behavior last year when he had RSV infection and pneumonia. Likely AMS due to dehydration/acute kidney injury  Has since resolved with IV fluids  Mental status back to baseline

## 2024-06-08 NOTE — SPEECH THERAPY NOTE
Request for evaluation received. Noted order to d/c home (pt already dressed and family in room to take pt home). Pt screened (had finished most of lunch including pulled pork sandwich).  No s/s aspiration with bite of baked potato or thin liquid. If instrumental exam desired, pelase order VBS/MBS as an OP.

## 2024-06-08 NOTE — ASSESSMENT & PLAN NOTE
Creatinine noted to be approximately 2 couple days ago  Has since improved with IV fluids  Creatinine 1.5  Encouraged oral hydration

## 2024-06-09 LAB
ATRIAL RATE: 312 BPM
QRS AXIS: 89 DEGREES
QRSD INTERVAL: 112 MS
QT INTERVAL: 430 MS
QTC INTERVAL: 430 MS
T WAVE AXIS: 179 DEGREES
VENTRICULAR RATE: 60 BPM

## 2024-06-09 PROCEDURE — 93010 ELECTROCARDIOGRAM REPORT: CPT | Performed by: INTERNAL MEDICINE

## 2024-06-13 ENCOUNTER — TELEPHONE (OUTPATIENT)
Dept: NEUROLOGY | Facility: CLINIC | Age: 84
End: 2024-06-13

## 2024-06-13 NOTE — TELEPHONE ENCOUNTER
Pts PCP office called to set up an appt with neurology for vascular dementia. Pt was recently admitted for this issue but not seen by Neurology.   I made the PCP office aware that I needed to call the pt to triage. Called pt LMOM asking him to call back to start intake process.     PCP office is faxing over a referral.

## 2024-06-16 ENCOUNTER — APPOINTMENT (EMERGENCY)
Dept: CT IMAGING | Facility: HOSPITAL | Age: 84
DRG: 391 | End: 2024-06-16
Payer: COMMERCIAL

## 2024-06-16 ENCOUNTER — APPOINTMENT (EMERGENCY)
Dept: RADIOLOGY | Facility: HOSPITAL | Age: 84
DRG: 391 | End: 2024-06-16
Payer: COMMERCIAL

## 2024-06-16 ENCOUNTER — HOSPITAL ENCOUNTER (INPATIENT)
Facility: HOSPITAL | Age: 84
LOS: 9 days | DRG: 391 | End: 2024-06-26
Attending: EMERGENCY MEDICINE | Admitting: INTERNAL MEDICINE
Payer: COMMERCIAL

## 2024-06-16 DIAGNOSIS — R62.7 FAILURE TO THRIVE IN ADULT: ICD-10-CM

## 2024-06-16 DIAGNOSIS — K57.20 COLONIC DIVERTICULAR ABSCESS: Primary | ICD-10-CM

## 2024-06-16 DIAGNOSIS — I63.9 CVA (CEREBRAL VASCULAR ACCIDENT) (HCC): ICD-10-CM

## 2024-06-16 DIAGNOSIS — J44.1 CHRONIC OBSTRUCTIVE PULMONARY DISEASE WITH ACUTE EXACERBATION (HCC): ICD-10-CM

## 2024-06-16 DIAGNOSIS — B02.9 SHINGLES: ICD-10-CM

## 2024-06-16 DIAGNOSIS — I50.9 ACUTE HEART FAILURE, UNSPECIFIED HEART FAILURE TYPE (HCC): ICD-10-CM

## 2024-06-16 DIAGNOSIS — R42 DIZZINESS: ICD-10-CM

## 2024-06-16 LAB
2HR DELTA HS TROPONIN: -10 NG/L
ALBUMIN SERPL BCG-MCNC: 3.8 G/DL (ref 3.5–5)
ALP SERPL-CCNC: 129 U/L (ref 34–104)
ALT SERPL W P-5'-P-CCNC: 21 U/L (ref 7–52)
ANION GAP SERPL CALCULATED.3IONS-SCNC: 2 MMOL/L (ref 4–13)
AST SERPL W P-5'-P-CCNC: 18 U/L (ref 13–39)
BASOPHILS # BLD AUTO: 0.02 THOUSANDS/ÂΜL (ref 0–0.1)
BASOPHILS NFR BLD AUTO: 0 % (ref 0–1)
BILIRUB SERPL-MCNC: 1.12 MG/DL (ref 0.2–1)
BUN SERPL-MCNC: 35 MG/DL (ref 5–25)
CALCIUM SERPL-MCNC: 10.2 MG/DL (ref 8.4–10.2)
CARDIAC TROPONIN I PNL SERPL HS: 46 NG/L
CARDIAC TROPONIN I PNL SERPL HS: 56 NG/L
CHLORIDE SERPL-SCNC: 102 MMOL/L (ref 96–108)
CO2 SERPL-SCNC: 30 MMOL/L (ref 21–32)
CREAT SERPL-MCNC: 1.54 MG/DL (ref 0.6–1.3)
EOSINOPHIL # BLD AUTO: 0.07 THOUSAND/ÂΜL (ref 0–0.61)
EOSINOPHIL NFR BLD AUTO: 1 % (ref 0–6)
ERYTHROCYTE [DISTWIDTH] IN BLOOD BY AUTOMATED COUNT: 14.2 % (ref 11.6–15.1)
GFR SERPL CREATININE-BSD FRML MDRD: 41 ML/MIN/1.73SQ M
GLUCOSE SERPL-MCNC: 110 MG/DL (ref 65–140)
GLUCOSE SERPL-MCNC: 125 MG/DL (ref 65–140)
HCT VFR BLD AUTO: 37.6 % (ref 36.5–49.3)
HGB BLD-MCNC: 11.8 G/DL (ref 12–17)
IMM GRANULOCYTES # BLD AUTO: 0.02 THOUSAND/UL (ref 0–0.2)
IMM GRANULOCYTES NFR BLD AUTO: 0 % (ref 0–2)
LACTATE SERPL-SCNC: 0.9 MMOL/L (ref 0.5–2)
LYMPHOCYTES # BLD AUTO: 0.74 THOUSANDS/ÂΜL (ref 0.6–4.47)
LYMPHOCYTES NFR BLD AUTO: 10 % (ref 14–44)
MAGNESIUM SERPL-MCNC: 2.2 MG/DL (ref 1.9–2.7)
MCH RBC QN AUTO: 31 PG (ref 26.8–34.3)
MCHC RBC AUTO-ENTMCNC: 31.4 G/DL (ref 31.4–37.4)
MCV RBC AUTO: 99 FL (ref 82–98)
MONOCYTES # BLD AUTO: 0.78 THOUSAND/ÂΜL (ref 0.17–1.22)
MONOCYTES NFR BLD AUTO: 10 % (ref 4–12)
NEUTROPHILS # BLD AUTO: 6.09 THOUSANDS/ÂΜL (ref 1.85–7.62)
NEUTS SEG NFR BLD AUTO: 79 % (ref 43–75)
NRBC BLD AUTO-RTO: 0 /100 WBCS
PHOSPHATE SERPL-MCNC: 2.1 MG/DL (ref 2.3–4.1)
PLATELET # BLD AUTO: 123 THOUSANDS/UL (ref 149–390)
PMV BLD AUTO: 10 FL (ref 8.9–12.7)
POTASSIUM SERPL-SCNC: 5 MMOL/L (ref 3.5–5.3)
PROT SERPL-MCNC: 6.2 G/DL (ref 6.4–8.4)
RBC # BLD AUTO: 3.81 MILLION/UL (ref 3.88–5.62)
SODIUM SERPL-SCNC: 134 MMOL/L (ref 135–147)
WBC # BLD AUTO: 7.72 THOUSAND/UL (ref 4.31–10.16)

## 2024-06-16 PROCEDURE — 85025 COMPLETE CBC W/AUTO DIFF WBC: CPT | Performed by: EMERGENCY MEDICINE

## 2024-06-16 PROCEDURE — 83605 ASSAY OF LACTIC ACID: CPT

## 2024-06-16 PROCEDURE — 36415 COLL VENOUS BLD VENIPUNCTURE: CPT

## 2024-06-16 PROCEDURE — 99285 EMERGENCY DEPT VISIT HI MDM: CPT

## 2024-06-16 PROCEDURE — 84100 ASSAY OF PHOSPHORUS: CPT | Performed by: EMERGENCY MEDICINE

## 2024-06-16 PROCEDURE — 71045 X-RAY EXAM CHEST 1 VIEW: CPT

## 2024-06-16 PROCEDURE — 83735 ASSAY OF MAGNESIUM: CPT | Performed by: EMERGENCY MEDICINE

## 2024-06-16 PROCEDURE — 94640 AIRWAY INHALATION TREATMENT: CPT

## 2024-06-16 PROCEDURE — 80053 COMPREHEN METABOLIC PANEL: CPT | Performed by: EMERGENCY MEDICINE

## 2024-06-16 PROCEDURE — 84484 ASSAY OF TROPONIN QUANT: CPT | Performed by: EMERGENCY MEDICINE

## 2024-06-16 PROCEDURE — 74176 CT ABD & PELVIS W/O CONTRAST: CPT

## 2024-06-16 PROCEDURE — 82948 REAGENT STRIP/BLOOD GLUCOSE: CPT

## 2024-06-16 PROCEDURE — 93005 ELECTROCARDIOGRAM TRACING: CPT

## 2024-06-16 RX ORDER — METRONIDAZOLE 500 MG/100ML
500 INJECTION, SOLUTION INTRAVENOUS EVERY 8 HOURS
Status: DISCONTINUED | OUTPATIENT
Start: 2024-06-17 | End: 2024-06-21

## 2024-06-17 ENCOUNTER — APPOINTMENT (INPATIENT)
Dept: CT IMAGING | Facility: HOSPITAL | Age: 84
DRG: 391 | End: 2024-06-17
Payer: COMMERCIAL

## 2024-06-17 PROBLEM — K57.20 COLONIC DIVERTICULAR ABSCESS: Status: ACTIVE | Noted: 2024-06-17

## 2024-06-17 PROBLEM — B02.9 ZOSTER: Status: ACTIVE | Noted: 2024-06-17

## 2024-06-17 PROBLEM — J44.1 CHRONIC OBSTRUCTIVE PULMONARY DISEASE WITH ACUTE EXACERBATION (HCC): Status: ACTIVE | Noted: 2022-07-02

## 2024-06-17 PROBLEM — F01.50 VASCULAR DEMENTIA (HCC): Status: ACTIVE | Noted: 2024-06-17

## 2024-06-17 PROBLEM — R93.5 ABNORMAL CT OF THE ABDOMEN: Status: ACTIVE | Noted: 2024-06-17

## 2024-06-17 LAB
ALBUMIN SERPL BCG-MCNC: 3.4 G/DL (ref 3.5–5)
ALP SERPL-CCNC: 116 U/L (ref 34–104)
ALT SERPL W P-5'-P-CCNC: 18 U/L (ref 7–52)
ANION GAP SERPL CALCULATED.3IONS-SCNC: 6 MMOL/L (ref 4–13)
AST SERPL W P-5'-P-CCNC: 18 U/L (ref 13–39)
ATRIAL RATE: 115 BPM
BASOPHILS # BLD MANUAL: 0 THOUSAND/UL (ref 0–0.1)
BASOPHILS NFR MAR MANUAL: 0 % (ref 0–1)
BILIRUB SERPL-MCNC: 1.2 MG/DL (ref 0.2–1)
BUN SERPL-MCNC: 32 MG/DL (ref 5–25)
CALCIUM ALBUM COR SERPL-MCNC: 10.1 MG/DL (ref 8.3–10.1)
CALCIUM SERPL-MCNC: 9.6 MG/DL (ref 8.4–10.2)
CHLORIDE SERPL-SCNC: 102 MMOL/L (ref 96–108)
CO2 SERPL-SCNC: 25 MMOL/L (ref 21–32)
CREAT SERPL-MCNC: 1.38 MG/DL (ref 0.6–1.3)
EOSINOPHIL # BLD MANUAL: 0 THOUSAND/UL (ref 0–0.4)
EOSINOPHIL NFR BLD MANUAL: 0 % (ref 0–6)
ERYTHROCYTE [DISTWIDTH] IN BLOOD BY AUTOMATED COUNT: 14.1 % (ref 11.6–15.1)
GFR SERPL CREATININE-BSD FRML MDRD: 47 ML/MIN/1.73SQ M
GLUCOSE SERPL-MCNC: 100 MG/DL (ref 65–140)
GLUCOSE SERPL-MCNC: 105 MG/DL (ref 65–140)
GLUCOSE SERPL-MCNC: 126 MG/DL (ref 65–140)
GLUCOSE SERPL-MCNC: 134 MG/DL (ref 65–140)
GLUCOSE SERPL-MCNC: 156 MG/DL (ref 65–140)
GLUCOSE SERPL-MCNC: 183 MG/DL (ref 65–140)
GLUCOSE SERPL-MCNC: 209 MG/DL (ref 65–140)
GLUCOSE SERPL-MCNC: 389 MG/DL (ref 65–140)
HCT VFR BLD AUTO: 35.5 % (ref 36.5–49.3)
HGB BLD-MCNC: 11.2 G/DL (ref 12–17)
INR PPP: 1.23 (ref 0.84–1.19)
LYMPHOCYTES # BLD AUTO: 0.29 THOUSAND/UL (ref 0.6–4.47)
LYMPHOCYTES # BLD AUTO: 5 % (ref 14–44)
MAGNESIUM SERPL-MCNC: 2.1 MG/DL (ref 1.9–2.7)
MCH RBC QN AUTO: 30.9 PG (ref 26.8–34.3)
MCHC RBC AUTO-ENTMCNC: 31.5 G/DL (ref 31.4–37.4)
MCV RBC AUTO: 98 FL (ref 82–98)
MONOCYTES # BLD AUTO: 0.29 THOUSAND/UL (ref 0–1.22)
MONOCYTES NFR BLD: 5 % (ref 4–12)
NEUTROPHILS # BLD MANUAL: 5.22 THOUSAND/UL (ref 1.85–7.62)
NEUTS BAND NFR BLD MANUAL: 3 % (ref 0–8)
NEUTS SEG NFR BLD AUTO: 87 % (ref 43–75)
OVALOCYTES BLD QL SMEAR: PRESENT
PHOSPHATE SERPL-MCNC: 2.1 MG/DL (ref 2.3–4.1)
PLATELET # BLD AUTO: 112 THOUSANDS/UL (ref 149–390)
PLATELET BLD QL SMEAR: ABNORMAL
PMV BLD AUTO: 9.9 FL (ref 8.9–12.7)
POTASSIUM SERPL-SCNC: 4.8 MMOL/L (ref 3.5–5.3)
PROT SERPL-MCNC: 5.7 G/DL (ref 6.4–8.4)
PROTHROMBIN TIME: 16.2 SECONDS (ref 11.6–14.5)
QRS AXIS: 105 DEGREES
QRSD INTERVAL: 102 MS
QT INTERVAL: 344 MS
QTC INTERVAL: 425 MS
RBC # BLD AUTO: 3.62 MILLION/UL (ref 3.88–5.62)
RBC MORPH BLD: PRESENT
SODIUM SERPL-SCNC: 133 MMOL/L (ref 135–147)
T WAVE AXIS: -74 DEGREES
VENTRICULAR RATE: 92 BPM
WBC # BLD AUTO: 5.8 THOUSAND/UL (ref 4.31–10.16)

## 2024-06-17 PROCEDURE — 99285 EMERGENCY DEPT VISIT HI MDM: CPT | Performed by: EMERGENCY MEDICINE

## 2024-06-17 PROCEDURE — 74176 CT ABD & PELVIS W/O CONTRAST: CPT

## 2024-06-17 PROCEDURE — 99223 1ST HOSP IP/OBS HIGH 75: CPT | Performed by: STUDENT IN AN ORGANIZED HEALTH CARE EDUCATION/TRAINING PROGRAM

## 2024-06-17 PROCEDURE — 82948 REAGENT STRIP/BLOOD GLUCOSE: CPT

## 2024-06-17 PROCEDURE — 94664 DEMO&/EVAL PT USE INHALER: CPT

## 2024-06-17 PROCEDURE — 83735 ASSAY OF MAGNESIUM: CPT | Performed by: NURSE PRACTITIONER

## 2024-06-17 PROCEDURE — C9113 INJ PANTOPRAZOLE SODIUM, VIA: HCPCS | Performed by: NURSE PRACTITIONER

## 2024-06-17 PROCEDURE — 85610 PROTHROMBIN TIME: CPT | Performed by: NURSE PRACTITIONER

## 2024-06-17 PROCEDURE — 84100 ASSAY OF PHOSPHORUS: CPT | Performed by: NURSE PRACTITIONER

## 2024-06-17 PROCEDURE — 36415 COLL VENOUS BLD VENIPUNCTURE: CPT | Performed by: NURSE PRACTITIONER

## 2024-06-17 PROCEDURE — 80053 COMPREHEN METABOLIC PANEL: CPT | Performed by: NURSE PRACTITIONER

## 2024-06-17 PROCEDURE — 94760 N-INVAS EAR/PLS OXIMETRY 1: CPT

## 2024-06-17 PROCEDURE — 85007 BL SMEAR W/DIFF WBC COUNT: CPT | Performed by: NURSE PRACTITIONER

## 2024-06-17 PROCEDURE — 85027 COMPLETE CBC AUTOMATED: CPT | Performed by: NURSE PRACTITIONER

## 2024-06-17 PROCEDURE — 99447 NTRPROF PH1/NTRNET/EHR 11-20: CPT | Performed by: RADIOLOGY

## 2024-06-17 PROCEDURE — 93010 ELECTROCARDIOGRAM REPORT: CPT | Performed by: INTERNAL MEDICINE

## 2024-06-17 PROCEDURE — 99232 SBSQ HOSP IP/OBS MODERATE 35: CPT | Performed by: SURGERY

## 2024-06-17 RX ORDER — LIDOCAINE 40 MG/G
CREAM TOPICAL 4 TIMES DAILY PRN
Status: DISCONTINUED | OUTPATIENT
Start: 2024-06-17 | End: 2024-06-26 | Stop reason: HOSPADM

## 2024-06-17 RX ORDER — QUETIAPINE FUMARATE 25 MG/1
25 TABLET, FILM COATED ORAL
Status: DISCONTINUED | OUTPATIENT
Start: 2024-06-17 | End: 2024-06-18

## 2024-06-17 RX ORDER — PRAVASTATIN SODIUM 40 MG
40 TABLET ORAL EVERY EVENING
Status: DISCONTINUED | OUTPATIENT
Start: 2024-06-18 | End: 2024-06-20

## 2024-06-17 RX ORDER — HYDROMORPHONE HCL IN WATER/PF 6 MG/30 ML
0.2 PATIENT CONTROLLED ANALGESIA SYRINGE INTRAVENOUS EVERY 4 HOURS PRN
Status: DISCONTINUED | OUTPATIENT
Start: 2024-06-17 | End: 2024-06-26 | Stop reason: HOSPADM

## 2024-06-17 RX ORDER — LEVALBUTEROL INHALATION SOLUTION 1.25 MG/3ML
1.25 SOLUTION RESPIRATORY (INHALATION)
Status: DISCONTINUED | OUTPATIENT
Start: 2024-06-17 | End: 2024-06-20

## 2024-06-17 RX ORDER — INSULIN LISPRO 100 [IU]/ML
1-6 INJECTION, SOLUTION INTRAVENOUS; SUBCUTANEOUS EVERY 6 HOURS SCHEDULED
Status: DISCONTINUED | OUTPATIENT
Start: 2024-06-17 | End: 2024-06-17

## 2024-06-17 RX ORDER — HEPARIN SODIUM 5000 [USP'U]/ML
5000 INJECTION, SOLUTION INTRAVENOUS; SUBCUTANEOUS EVERY 8 HOURS SCHEDULED
Status: DISCONTINUED | OUTPATIENT
Start: 2024-06-17 | End: 2024-06-17

## 2024-06-17 RX ORDER — INSULIN LISPRO 100 [IU]/ML
1-5 INJECTION, SOLUTION INTRAVENOUS; SUBCUTANEOUS
Status: DISCONTINUED | OUTPATIENT
Start: 2024-06-17 | End: 2024-06-19

## 2024-06-17 RX ORDER — PREDNISONE 10 MG/1
10 TABLET ORAL DAILY
Status: DISCONTINUED | OUTPATIENT
Start: 2024-06-18 | End: 2024-06-18

## 2024-06-17 RX ORDER — SODIUM CHLORIDE 9 MG/ML
75 INJECTION, SOLUTION INTRAVENOUS CONTINUOUS
Status: DISCONTINUED | OUTPATIENT
Start: 2024-06-17 | End: 2024-06-19

## 2024-06-17 RX ORDER — VALACYCLOVIR HYDROCHLORIDE 500 MG/1
1000 TABLET, FILM COATED ORAL 2 TIMES DAILY
Status: DISPENSED | OUTPATIENT
Start: 2024-06-17 | End: 2024-06-24

## 2024-06-17 RX ORDER — METHYLPREDNISOLONE SODIUM SUCCINATE 125 MG/2ML
80 INJECTION, POWDER, LYOPHILIZED, FOR SOLUTION INTRAMUSCULAR; INTRAVENOUS ONCE
Status: COMPLETED | OUTPATIENT
Start: 2024-06-17 | End: 2024-06-17

## 2024-06-17 RX ORDER — PANTOPRAZOLE SODIUM 40 MG/10ML
40 INJECTION, POWDER, LYOPHILIZED, FOR SOLUTION INTRAVENOUS
Status: DISCONTINUED | OUTPATIENT
Start: 2024-06-17 | End: 2024-06-21

## 2024-06-17 RX ORDER — DILTIAZEM HYDROCHLORIDE 180 MG/1
180 CAPSULE, COATED, EXTENDED RELEASE ORAL DAILY
Status: DISCONTINUED | OUTPATIENT
Start: 2024-06-17 | End: 2024-06-22

## 2024-06-17 RX ORDER — SODIUM CHLORIDE FOR INHALATION 0.9 %
3 VIAL, NEBULIZER (ML) INHALATION
Status: DISCONTINUED | OUTPATIENT
Start: 2024-06-17 | End: 2024-06-17

## 2024-06-17 RX ORDER — LOSARTAN POTASSIUM 50 MG/1
50 TABLET ORAL DAILY
Status: DISCONTINUED | OUTPATIENT
Start: 2024-06-17 | End: 2024-06-26 | Stop reason: HOSPADM

## 2024-06-17 RX ORDER — ACETAMINOPHEN 325 MG/1
650 TABLET ORAL EVERY 6 HOURS PRN
Status: DISCONTINUED | OUTPATIENT
Start: 2024-06-17 | End: 2024-06-18

## 2024-06-17 RX ORDER — HYDROXYCHLOROQUINE SULFATE 200 MG/1
200 TABLET, FILM COATED ORAL DAILY
Status: DISCONTINUED | OUTPATIENT
Start: 2024-06-18 | End: 2024-06-18

## 2024-06-17 RX ORDER — METHYLPREDNISOLONE SODIUM SUCCINATE 40 MG/ML
40 INJECTION, POWDER, LYOPHILIZED, FOR SOLUTION INTRAMUSCULAR; INTRAVENOUS EVERY 8 HOURS SCHEDULED
Status: DISCONTINUED | OUTPATIENT
Start: 2024-06-17 | End: 2024-06-17

## 2024-06-17 RX ADMIN — METRONIDAZOLE 500 MG: 500 INJECTION, SOLUTION INTRAVENOUS at 09:24

## 2024-06-17 RX ADMIN — METHYLPREDNISOLONE SODIUM SUCCINATE 80 MG: 125 INJECTION, POWDER, FOR SOLUTION INTRAMUSCULAR; INTRAVENOUS at 02:40

## 2024-06-17 RX ADMIN — IPRATROPIUM BROMIDE 0.5 MG: 0.5 SOLUTION RESPIRATORY (INHALATION) at 21:29

## 2024-06-17 RX ADMIN — VALACYCLOVIR HYDROCHLORIDE 1000 MG: 500 TABLET, FILM COATED ORAL at 09:23

## 2024-06-17 RX ADMIN — LIDOCAINE 4% 1 APPLICATION: 4 CREAM TOPICAL at 12:31

## 2024-06-17 RX ADMIN — NYSTATIN 500000 UNITS: 100000 SUSPENSION ORAL at 21:08

## 2024-06-17 RX ADMIN — IPRATROPIUM BROMIDE 0.5 MG: 0.5 SOLUTION RESPIRATORY (INHALATION) at 14:52

## 2024-06-17 RX ADMIN — SODIUM CHLORIDE 75 ML/HR: 0.9 INJECTION, SOLUTION INTRAVENOUS at 02:40

## 2024-06-17 RX ADMIN — NYSTATIN 500000 UNITS: 100000 SUSPENSION ORAL at 09:23

## 2024-06-17 RX ADMIN — NYSTATIN 500000 UNITS: 100000 SUSPENSION ORAL at 12:31

## 2024-06-17 RX ADMIN — CEFTRIAXONE SODIUM 1000 MG: 10 INJECTION, POWDER, FOR SOLUTION INTRAVENOUS at 00:39

## 2024-06-17 RX ADMIN — VALACYCLOVIR HYDROCHLORIDE 1000 MG: 500 TABLET, FILM COATED ORAL at 21:07

## 2024-06-17 RX ADMIN — PANTOPRAZOLE SODIUM 40 MG: 40 INJECTION, POWDER, FOR SOLUTION INTRAVENOUS at 09:20

## 2024-06-17 RX ADMIN — IPRATROPIUM BROMIDE 0.5 MG: 0.5 SOLUTION RESPIRATORY (INHALATION) at 09:00

## 2024-06-17 RX ADMIN — LEVALBUTEROL HYDROCHLORIDE 1.25 MG: 1.25 SOLUTION RESPIRATORY (INHALATION) at 21:29

## 2024-06-17 RX ADMIN — IOHEXOL 100 ML: 240 INJECTION, SOLUTION INTRATHECAL; INTRAVASCULAR; INTRAVENOUS; ORAL at 22:31

## 2024-06-17 RX ADMIN — LEVALBUTEROL HYDROCHLORIDE 1.25 MG: 1.25 SOLUTION RESPIRATORY (INHALATION) at 14:52

## 2024-06-17 RX ADMIN — LEVALBUTEROL HYDROCHLORIDE 1.25 MG: 1.25 SOLUTION RESPIRATORY (INHALATION) at 09:00

## 2024-06-17 RX ADMIN — CEFTRIAXONE SODIUM 1000 MG: 10 INJECTION, POWDER, FOR SOLUTION INTRAVENOUS at 23:46

## 2024-06-17 RX ADMIN — INSULIN LISPRO 1 UNITS: 100 INJECTION, SOLUTION INTRAVENOUS; SUBCUTANEOUS at 13:57

## 2024-06-17 RX ADMIN — METHYLPREDNISOLONE SODIUM SUCCINATE 40 MG: 40 INJECTION, POWDER, FOR SOLUTION INTRAMUSCULAR; INTRAVENOUS at 12:31

## 2024-06-17 RX ADMIN — DILTIAZEM HYDROCHLORIDE 180 MG: 180 CAPSULE, COATED, EXTENDED RELEASE ORAL at 09:23

## 2024-06-17 RX ADMIN — METRONIDAZOLE 500 MG: 500 INJECTION, SOLUTION INTRAVENOUS at 15:43

## 2024-06-17 RX ADMIN — LOSARTAN POTASSIUM 50 MG: 50 TABLET, FILM COATED ORAL at 09:23

## 2024-06-17 RX ADMIN — INSULIN LISPRO 4 UNITS: 100 INJECTION, SOLUTION INTRAVENOUS; SUBCUTANEOUS at 16:51

## 2024-06-17 RX ADMIN — METRONIDAZOLE 500 MG: 500 INJECTION, SOLUTION INTRAVENOUS at 01:45

## 2024-06-17 NOTE — ASSESSMENT & PLAN NOTE
PTA regimen hydroxychloroquine on hold, prednisone transitioned to solumedrol for COPD exacerbation

## 2024-06-17 NOTE — ED ATTENDING ATTESTATION
6/16/2024  I, Randy Garza MD, saw and evaluated the patient. I have discussed the patient with the resident/non-physician practitioner and agree with the resident's/non-physician practitioner's findings, Plan of Care, and MDM as documented in the resident's/non-physician practitioner's note, except where noted. All available labs and Radiology studies were reviewed.  I was present for key portions of any procedure(s) performed by the resident/non-physician practitioner and I was immediately available to provide assistance.       At this point I agree with the current assessment done in the Emergency Department.  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  Appreciate general surgery consultation, recommend admission to [facility], IV antibiotics, consideration of IR drainage.    Results Reviewed       Procedure Component Value Units Date/Time    HS Troponin I 2hr [[id number]]  (Normal) Collected: 06/16/24 2019    Lab Status: Final result Specimen: Blood from Arm, Right Updated: 06/16/24 2050     hs TnI 2hr 46 ng/L      Delta 2hr hsTnI -10 ng/L     Lactic acid, plasma (w/reflex if result > 2.0) [[id number]]  (Normal) Collected: 06/16/24 1900    Lab Status: Final result Specimen: Blood from Arm, Right Updated: 06/16/24 1927     LACTIC ACID 0.9 mmol/L     Narrative:      Result may be elevated if tourniquet was used during collection.    Magnesium [[id number]]  (Normal) Collected: 06/16/24 1819    Lab Status: Final result Specimen: Blood from Arm, Right Updated: 06/16/24 1917     Magnesium 2.2 mg/dL     Phosphorus [[id number]]  (Abnormal) Collected: 06/16/24 1819    Lab Status: Final result Specimen: Blood from Arm, Right Updated: 06/16/24 1917     Phosphorus 2.1 mg/dL     HS Troponin 0hr (reflex protocol) [[id number]]  (Abnormal) Collected: 06/16/24 1819     Lab Status: Final result Specimen: Blood from Arm, Right Updated: 06/16/24 1856     hs TnI 0hr 56 ng/L     CBC and differential [[id number]]  (Abnormal) Collected: 06/16/24 1819    Lab Status: Final result Specimen: Blood from Arm, Right Updated: 06/16/24 1853     WBC 7.72 Thousand/uL      RBC 3.81 Million/uL      Hemoglobin 11.8 g/dL      Hematocrit 37.6 %      MCV 99 fL      MCH 31.0 pg      MCHC 31.4 g/dL      RDW 14.2 %      MPV 10.0 fL      Platelets 123 Thousands/uL      nRBC 0 /100 WBCs      Segmented % 79 %      Immature Grans % 0 %      Lymphocytes % 10 %      Monocytes % 10 %      Eosinophils Relative 1 %      Basophils Relative 0 %      Absolute Neutrophils 6.09 Thousands/µL      Absolute Immature Grans 0.02 Thousand/uL      Absolute Lymphocytes 0.74 Thousands/µL      Absolute Monocytes 0.78 Thousand/µL      Eosinophils Absolute 0.07 Thousand/µL      Basophils Absolute 0.02 Thousands/µL     Comprehensive metabolic panel [[id number]]  (Abnormal) Collected: 06/16/24 1819    Lab Status: Final result Specimen: Blood from Arm, Right Updated: 06/16/24 1848     Sodium 134 mmol/L      Potassium 5.0 mmol/L      Chloride 102 mmol/L      CO2 30 mmol/L      ANION GAP 2 mmol/L      BUN 35 mg/dL      Creatinine 1.54 mg/dL      Glucose 125 mg/dL      Calcium 10.2 mg/dL      AST 18 U/L      ALT 21 U/L      Alkaline Phosphatase 129 U/L      Total Protein 6.2 g/dL      Albumin 3.8 g/dL      Total Bilirubin 1.12 mg/dL      eGFR 41 ml/min/1.73sq m     Narrative:      [employer] guidelines for Chronic Kidney Disease (CKD):     Stage 1 with normal or high GFR (GFR > 90 mL/min/1.73 square meters)    Stage 2 Mild CKD (GFR = 60-89 mL/min/1.73 square meters)    Stage 3A Moderate CKD (GFR = 45-59 mL/min/1.73 square meters)    Stage 3B Moderate CKD (GFR = 30-44 mL/min/1.73 square meters)    Stage 4 Severe CKD (GFR = 15-29 mL/min/1.73 square meters)    Stage 5 End Stage CKD (GFR <15 mL/min/1.73 square  meters)  Note: GFR calculation is accurate only with a steady state creatinine    Fingerstick Glucose (POCT) [[id number]]  (Normal) Collected: 06/16/24 1811    Lab Status: Final result Specimen: Blood Updated: 06/16/24 1812     POC Glucose 110 mg/dl           CT abdomen pelvis wo contrast   Final Result by Jose Shultz MD (06/16 2108)      Enlarging gas and fluid collection extending from the antimesenteric border of the sigmoid colon, infiltrating the left inguinal region soft tissue and coursing under and along the lateral margin of the left inguinal canal. This has increased in size    since 2/26/2024 with new surrounding inflammatory change suggesting a chronic diverticular abscess with a more recent acute component      22 mm cystic retroperitoneal lesion between the aorta and IVC which is increased in size since 2/20/2023. No evidence of soft tissue component. A benign etiology is suspected such as retroperitoneal lymphatic malformation or lymphocele. Initial    evaluation in 3 months with contrast-enhanced CT abdomen/pelvis is recommended. Considerations related to the patient's age and/or comorbidities may be used to alter these recommendations.      Workstation performed: [id number]         XR chest portable   Final Result by Zena Wiley MD (06/16 2311)      No definite acute disease with small chronic loculated pleural effusions, rounded atelectasis in the right lower lobe, and bilateral scar.            Workstation performed: [id number]               ED Course         Critical Care Time  Procedures

## 2024-06-17 NOTE — ED NOTES
"Patient requests another Fingerstick as he feels \"confused\" and states that his glucose drops quickly when he \"hasn't eaten all day\". Patient A&O x4. Re-educated on glucose and diet - repeats back understanding. PASCUAL Velasquez made aware.      Eloina Conrad RN  06/17/24 3605    "

## 2024-06-17 NOTE — ED PROVIDER NOTES
"History  Chief Complaint   Patient presents with    Shortness of Breath     Pt presenting with SOB that has been off and on, also c/o generalized weakness and fatigue. Denies CP, fevers, chills. Pt has open shingles lesions on his chest that started a few days ago (+) drainage      HPI    Patient is an 85 yo M with multiple chronic issues presenting with generalized weakness. He says that he has had a chronic cough on and off for months that has persisted, recently had PFTs with [facility] as well as chest CT but has not heard results on this yet. Denies fevers, chills, congestion.   Also states he feels some worsening abdominal discomfort in the last week. He notes some worsening groin swelling and firmness and is concerned for a hernia. Last BM was earlier today and was hard, small, non-bloody.   He states he has not been eating or drinking well at home however because everything he eats \"tastes like cardboard\". This has been the case ever since he was treated for oral thrush not long ago.   With this multitude of issues, he has now become so weak globally that he feels unsteady and lightheaded on his feet and no longer feels safe at home in his current condition.     Prior to Admission Medications   Prescriptions Last Dose Informant Patient Reported? Taking?   B-D UF III MINI PEN NEEDLES 31G X 5 MM MISC  Self Yes No   Calcium Citrate (CITRACAL PO)  Self Yes No   Sig: Take 650 mg by mouth in the morning   Cinnamon 500 MG capsule  Self Yes No   Sig: Take 1,000 mg by mouth daily   Lantus SoloStar 100 units/mL SOPN  Self Yes No   QUEtiapine (SEROquel) 25 mg tablet   Yes No   Sig: Take 25 mg by mouth if needed (for agitation) Take 1/2 to 1 tablet by mouth as needed   apixaban (ELIQUIS) 2.5 mg  Self Yes No   Sig: Take 2.5 mg by mouth 2 (two) times a day   diltiazem (CARDIZEM CD) 180 mg 24 hr capsule   Yes No   Sig: Take 180 mg by mouth daily   hydroxychloroquine (PLAQUENIL) 200 mg tablet  Self Yes No   Sig: Take 200 " mg by mouth in the morning   nystatin (MYCOSTATIN) 500,000 units/5 mL suspension   No No   Sig: Apply 5 mL (500,000 Units total) to the mouth or throat 4 (four) times a day   olmesartan (BENICAR) 20 mg tablet  Self Yes No   Sig: Take 20 mg by mouth daily   ondansetron (ZOFRAN-ODT) 4 mg disintegrating tablet   No No   Sig: Take 1 tablet (4 mg total) by mouth every 6 (six) hours as needed for nausea or vomiting   potassium chloride (KLOR-CON) 8 MEQ tablet  Self Yes No   pravastatin (PRAVACHOL) 40 mg tablet  Self Yes No   Sig: Take 40 mg by mouth daily   predniSONE 1 mg tablet  Self Yes No   Sig: Take 10 mg by mouth daily Currently down to 3 1/2 mg daily 22      Facility-Administered Medications: None       Past Medical History:   Diagnosis Date    Acute encephalopathy 2023    Acute-on-chronic kidney injury  (HCC) 2017    Cancer (HCC)     pancreatic    Colon polyp     Coronary artery disease     Dehydration 3/3/2023    Diabetes mellitus (HCC)     Hyperlipidemia     Hypertension     Left lower lobe pneumonia 2022    Pneumonia 2017    Right middle and lower lobe     Stroke (HCC)        Past Surgical History:   Procedure Laterality Date    APPENDECTOMY      CARDIAC SURGERY      Aortic Valve Replacement, Ascending Aorta    COLONOSCOPY      GALLBLADDER SURGERY      PANCREATICODUODENECTOMY         Family History   Problem Relation Age of Onset    Cancer Mother     Stroke Father     Cancer Sister     Diabetes Sister     Stroke Brother      I have reviewed and agree with the history as documented.    E-Cigarette/Vaping    E-Cigarette Use Never User      E-Cigarette/Vaping Substances    Nicotine No     THC No     CBD No     Flavoring No     Other No     Unknown No      Social History     Tobacco Use    Smoking status: Former     Types: Pipe     Quit date:      Years since quittin.4    Smokeless tobacco: Never   Vaping Use    Vaping status: Never Used   Substance Use Topics    Alcohol use:  Never    Drug use: No        Review of Systems   Constitutional:  Positive for activity change, appetite change and fatigue. Negative for chills and fever.   HENT:  Negative for ear pain and sore throat.    Eyes:  Negative for pain and visual disturbance.   Respiratory:  Positive for cough and shortness of breath.    Cardiovascular:  Negative for chest pain and palpitations.   Gastrointestinal:  Positive for abdominal pain and constipation. Negative for vomiting.        Groin swelling   Genitourinary:  Negative for dysuria and hematuria.   Musculoskeletal:  Negative for arthralgias and back pain.   Skin:  Positive for rash. Negative for color change.   Neurological:  Negative for seizures and syncope.   All other systems reviewed and are negative.      Physical Exam  ED Triage Vitals [06/16/24 1800]   Temperature Pulse Respirations Blood Pressure SpO2   98.2 °F (36.8 °C) 93 20 147/82 98 %      Temp Source Heart Rate Source Patient Position - Orthostatic VS BP Location FiO2 (%)   Oral Monitor Sitting Right arm --      Pain Score       --             Orthostatic Vital Signs  Vitals:    06/16/24 2030 06/16/24 2100 06/16/24 2130 06/16/24 2300   BP: 159/87 135/73 109/73 163/84   Pulse: 83 85 81 82   Patient Position - Orthostatic VS: Sitting Sitting Sitting Sitting       Physical Exam  Vitals and nursing note reviewed.   Constitutional:       General: He is not in acute distress.     Appearance: He is ill-appearing.   HENT:      Head: Normocephalic and atraumatic.   Eyes:      Conjunctiva/sclera: Conjunctivae normal.   Cardiovascular:      Rate and Rhythm: Normal rate and regular rhythm.      Heart sounds: No murmur heard.  Pulmonary:      Effort: Pulmonary effort is normal. No respiratory distress.      Breath sounds: Normal breath sounds.   Abdominal:      Palpations: Abdomen is soft. There is mass.      Tenderness: There is abdominal tenderness.      Comments: Swelling, tenderness in LLQ/groin area suspicious for  hernia   Musculoskeletal:         General: No swelling.      Cervical back: Neck supple.   Skin:     General: Skin is warm and dry.      Findings: Rash present.      Comments: Vesicular rash on right chest    Neurological:      General: No focal deficit present.      Mental Status: He is alert and oriented to person, place, and time.   Psychiatric:         Mood and Affect: Mood normal.         ED Medications  Medications   ceftriaxone (ROCEPHIN) 1 g/50 mL in dextrose IVPB (has no administration in time range)   metroNIDAZOLE (FLAGYL) IVPB (premix) 500 mg 100 mL (has no administration in time range)       Diagnostic Studies  Results Reviewed       Procedure Component Value Units Date/Time    HS Troponin I 2hr [[id number]]  (Normal) Collected: 06/16/24 2019    Lab Status: Final result Specimen: Blood from Arm, Right Updated: 06/16/24 2050     hs TnI 2hr 46 ng/L      Delta 2hr hsTnI -10 ng/L     Lactic acid, plasma (w/reflex if result > 2.0) [[id number]]  (Normal) Collected: 06/16/24 1900    Lab Status: Final result Specimen: Blood from Arm, Right Updated: 06/16/24 1927     LACTIC ACID 0.9 mmol/L     Narrative:      Result may be elevated if tourniquet was used during collection.    Magnesium [[id number]]  (Normal) Collected: 06/16/24 1819    Lab Status: Final result Specimen: Blood from Arm, Right Updated: 06/16/24 1917     Magnesium 2.2 mg/dL     Phosphorus [[id number]]  (Abnormal) Collected: 06/16/24 1819    Lab Status: Final result Specimen: Blood from Arm, Right Updated: 06/16/24 1917     Phosphorus 2.1 mg/dL     HS Troponin 0hr (reflex protocol) [[id number]]  (Abnormal) Collected: 06/16/24 1819    Lab Status: Final result Specimen: Blood from Arm, Right Updated: 06/16/24 1856     hs TnI 0hr 56 ng/L     CBC and differential [[id number]]  (Abnormal) Collected: 06/16/24 1819    Lab Status: Final result Specimen: Blood from Arm, Right Updated: 06/16/24 1853     WBC 7.72 Thousand/uL      RBC 3.81 Million/uL      Hemoglobin  11.8 g/dL      Hematocrit 37.6 %      MCV 99 fL      MCH 31.0 pg      MCHC 31.4 g/dL      RDW 14.2 %      MPV 10.0 fL      Platelets 123 Thousands/uL      nRBC 0 /100 WBCs      Segmented % 79 %      Immature Grans % 0 %      Lymphocytes % 10 %      Monocytes % 10 %      Eosinophils Relative 1 %      Basophils Relative 0 %      Absolute Neutrophils 6.09 Thousands/µL      Absolute Immature Grans 0.02 Thousand/uL      Absolute Lymphocytes 0.74 Thousands/µL      Absolute Monocytes 0.78 Thousand/µL      Eosinophils Absolute 0.07 Thousand/µL      Basophils Absolute 0.02 Thousands/µL     Comprehensive metabolic panel [[id number]]  (Abnormal) Collected: 06/16/24 1819    Lab Status: Final result Specimen: Blood from Arm, Right Updated: 06/16/24 1848     Sodium 134 mmol/L      Potassium 5.0 mmol/L      Chloride 102 mmol/L      CO2 30 mmol/L      ANION GAP 2 mmol/L      BUN 35 mg/dL      Creatinine 1.54 mg/dL      Glucose 125 mg/dL      Calcium 10.2 mg/dL      AST 18 U/L      ALT 21 U/L      Alkaline Phosphatase 129 U/L      Total Protein 6.2 g/dL      Albumin 3.8 g/dL      Total Bilirubin 1.12 mg/dL      eGFR 41 ml/min/1.73sq m     Narrative:      National Kidney Disease Foundation guidelines for Chronic Kidney Disease (CKD):     Stage 1 with normal or high GFR (GFR > 90 mL/min/1.73 square meters)    Stage 2 Mild CKD (GFR = 60-89 mL/min/1.73 square meters)    Stage 3A Moderate CKD (GFR = 45-59 mL/min/1.73 square meters)    Stage 3B Moderate CKD (GFR = 30-44 mL/min/1.73 square meters)    Stage 4 Severe CKD (GFR = 15-29 mL/min/1.73 square meters)    Stage 5 End Stage CKD (GFR <15 mL/min/1.73 square meters)  Note: GFR calculation is accurate only with a steady state creatinine    Fingerstick Glucose (POCT) [[id number]]  (Normal) Collected: 06/16/24 1811    Lab Status: Final result Specimen: Blood Updated: 06/16/24 1812     POC Glucose 110 mg/dl                    CT abdomen pelvis wo contrast   Final Result by Jose Shultz MD  (06/16 2108)      Enlarging gas and fluid collection extending from the antimesenteric border of the sigmoid colon, infiltrating the left inguinal region soft tissue and coursing under and along the lateral margin of the left inguinal canal. This has increased in size    since 2/26/2024 with new surrounding inflammatory change suggesting a chronic diverticular abscess with a more recent acute component      22 mm cystic retroperitoneal lesion between the aorta and IVC which is increased in size since 2/20/2023. No evidence of soft tissue component. A benign etiology is suspected such as retroperitoneal lymphatic malformation or lymphocele. Initial    evaluation in 3 months with contrast-enhanced CT abdomen/pelvis is recommended. Considerations related to the patient's age and/or comorbidities may be used to alter these recommendations.      Workstation performed: [id number]         XR chest portable   Final Result by Zena Wiley MD (06/16 2311)      No definite acute disease with small chronic loculated pleural effusions, rounded atelectasis in the right lower lobe, and bilateral scar.            Workstation performed: [id number]               Procedures  ECG 12 Lead Documentation Only    Date/Time: 6/17/2024 11:19 PM    Performed by: Monika Rowland DO  Authorized by: Monika Rowland DO    Indications / Diagnosis:  Weakness  ECG reviewed by me, the ED Provider: yes    Patient location:  ED  Previous ECG:     Previous ECG:  Compared to current    Similarity:  Changes noted  Interpretation:     Interpretation: abnormal    Rate:     ECG rate:  92    ECG rate assessment: tachycardic    Rhythm:     Rhythm: sinus tachycardia    Ectopy:     Ectopy: none    QRS:     QRS axis:  Right  Conduction:     Conduction: abnormal      Abnormal conduction: non-specific intraventricular conduction delay    ST segments:     ST segments:  Non-specific    Depression:  V5  T waves:     T waves:  inverted    Q waves:     Q waves:  V4, V5 and V6        ED Course                                       Medical Decision Making  85 yo M patient presenting with generalized fatigue. On initial evaluation, patient was ill appearing but not in acute distress. Physical examination was significant for a firm mass in the groin that was tender to palpation concerning for a hernia, possibly incarcerated.  He appears underweight and frail.  He also had a notable vesicular rash on the right side of the chest only consistent with shingles.  Otherwise, he had no acute exam findings.  Lab workup was drawn and showed mildly elevated troponin but without significant ischemic ST/T wave abnormality.  Otherwise, labs appear to be at his baseline.  Given concern for hernia, CT scan of the abdomen was done and showed findings suspicious for diverticular colonic abscess.  At that point, surgery was consulted.  They evaluated the patient and determined that with patient would best be served being admitted to medicine service, but they would consult and reach out to IR for possible drainage of abscess.  This was also discussed with [facility] who accepted the patient in stable condition for further workup and management of what in our opinion it feels to be a generalized failure to thrive in adult.     Problems Addressed:  Colonic diverticular abscess: acute illness or injury  Failure to thrive in adult: acute illness or injury    Amount and/or Complexity of Data Reviewed  Labs: ordered.  Radiology: ordered.    Risk  Decision regarding hospitalization.          Disposition  Final diagnoses:   Colonic diverticular abscess   Failure to thrive in adult     Time reflects when diagnosis was documented in both MDM as applicable and the Disposition within this note       Time User Action Codes Description Comment    6/16/2024 10:26 PM Monika Rowland Add [K57.20] Colonic diverticular abscess     6/17/2024 12:07 AM Monika Rowland Add [R62.51]  Failure to thrive (child)     6/17/2024 12:07 AM Monika Rowland Remove [R62.51] Failure to thrive (child)     6/17/2024 12:07 AM Monika Rowland Add [R62.7] Failure to thrive in adult           ED Disposition       ED Disposition   Admit    Condition   Stable    Date/Time   Mon Jun 17, 2024 12:07 AM    Comment   Case was discussed with Margy Velasquez and the patient's admission status was agreed to be Admission Status: inpatient status to the service of Dr. Lackey .               Follow-up Information    None         Patient's Medications   Discharge Prescriptions    No medications on file     No discharge procedures on file.    PDMP Review       None             ED Provider  Attending physically available and evaluated Hal Miller. I managed the patient along with the ED Attending.    Electronically Signed by           Monika Rowland DO  06/17/24 0025

## 2024-06-17 NOTE — CASE MANAGEMENT
Case Management Assessment    Patient name Hal Miller  Location ED-07/ED-07 MRN [id number]  : 1940 Date 2024       Current Admission Date: 2024  Current Admission Diagnosis:Colonic diverticular abscess   Patient Active Problem List    Diagnosis Date Noted Date Diagnosed    Abnormal CT of the abdomen 2024     Zoster 2024     Colonic diverticular abscess 2024     Vascular dementia (HCC) 2024     Concern for aspiration pneumonia (HCC) 2024     Type 2 diabetes mellitus (HCC) 2024     Hypercalcemia 2023     TARA (acute kidney injury) (HCC) 2023     Esophageal stricture 2023     Metabolic encephalopathy in the setting of vascular dementia 2023     Generalized weakness 2023     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     Chronic obstructive pulmonary disease with acute exacerbation (HCC) 2022     CKD (chronic kidney disease) 2022     Centrilobular emphysema (HCC) 2021     History of malignant neuroendocrine tumor 2021     Atrial fibrillation (HCC) 2017     Diabetes mellitus (HCC) 2017     Shortness of breath 2017     Hypertension 2017     Hyperlipidemia 2015     Inflammatory polyarthropathy (HCC) 2014     Osteoarthrosis 2014     Polymyalgia rheumatica (HCC) 2014     Aneurysm of thoracic aorta (HCC) 2014     Aneurysm of abdominal aorta (HCC) 2014     Neoplasm of other specified site 2014       LOS (days): 0  Geometric Mean LOS (GMLOS) (days):   Days to GMLOS:     OBJECTIVE:    Risk of Unplanned Readmission Score: 28.97         Current admission status: Inpatient       Preferred Pharmacy:   [id number] - [place] - [facility] [place] [address]  [facility] [place] [address]  Phone: [phone] Fax: [phone]    [facility] - [place], [place] - [address]  [address]  [place] [address]  Phone: [phone] Fax: [phone]    [facility] - [place] - [address]  [address]  [place] [address]  Phone: [phone] Fax: [phone]    Primary Care Provider: Kyaw Monreal MD    Primary Insurance: WAKU WAKU ???? [facility]  Secondary Insurance:     ASSESSMENT:  Active Health Care Proxies       Ann Miller Health Care Representative - Spouse   Primary Phone: [phone] (Mobile)  Home Phone: [phone]                           Readmission Root Cause  30 Day Readmission: No    Patient Information  Admitted from:: Home  Mental Status: Alert  During Assessment patient was accompanied by: Not accompanied during assessment  Assessment information provided by:: Spouse  Support Systems: Spouse/significant other  Home entry access options. Select all that apply.: Stairs  Number of steps to enter home.: 2  Do the steps have railings?: Yes  Type of Current Residence: [address] [place] home  Upon entering residence, is there a bedroom on the main floor (no further steps)?: Yes  Upon entering residence, is there a bathroom on the main floor (no further steps)?: Yes  Living Arrangements: Lives w/ Spouse/significant other    Activities of Daily Living Prior to Admission  Functional Status: Independent  Completes ADLs independently?: Yes  Ambulates independently?: Yes  Does patient use assisted devices?: Yes  Assisted Devices (DME) used: Home Oxygen concentrator, Straight Cane, Walker, Bedside Commode, Nebulizer  Does patient currently own DME?: Yes  What DME does the patient currently own?: Home Oxygen concentrator, Nebulizer, Bedside Commode, Straight Cane, Walker  Does patient have a history of Outpatient Therapy (PT/OT)?: Yes  Does the patient have a history of Short-Term Rehab?: Yes ([facility]-doesnt want to go [facility])  Does patient have a history of HHC?: Yes  Does patient currently have HHC?: No         Patient Information Continued  Income Source:  Pension/[employer]  Does patient have prescription coverage?: Yes  Does patient receive dialysis treatments?: No  Does patient have a history of substance abuse?: No  Does patient have a history of Mental Health Diagnosis?: No         Means of Transportation  Means of Transport to Appts:: Family transport      Social Determinants of Health (SDOH)      Flowsheet Row Most Recent Value   Housing Stability    In the last 12 months, was there a time when you were not able to pay the mortgage or rent on time? N   At any time in the past 12 months, were you homeless or living in a shelter (including now)? N   Transportation Needs    In the past 12 months, has lack of transportation kept you from medical appointments or from getting medications? no   In the past 12 months, has lack of transportation kept you from meetings, work, or from getting things needed for daily living? No   Food Insecurity    Within the past 12 months, you worried that your food would run out before you got the money to buy more. Never true   Within the past 12 months, the food you bought just didn't last and you didn't have money to get more. Never true   Utilities    In the past 12 months has the electric, gas, oil, or water company threatened to shut off services in your home? No

## 2024-06-17 NOTE — ASSESSMENT & PLAN NOTE
Lab Results   Component Value Date    HGBA1C 7.4 (H) 06/07/2024       Recent Labs     06/16/24  1811 06/17/24  0300 06/17/24  0329   POCGLU 110 100 105       Blood Sugar Average: Last 72 hrs:  (P) 105  PTA lantus on hold while npo  SSI with accuchecks  Hypoglycemia protocol

## 2024-06-17 NOTE — ASSESSMENT & PLAN NOTE
Presents with dyspnea, nonproductive cough with post tussive vomiting  Solumedrol with transition to prednisone ( takes 2.5  mg daily )  Nebulized bronchodilators

## 2024-06-17 NOTE — ASSESSMENT & PLAN NOTE
Presents with left groin pain, abd pain   Enlarging gas and fluid collection extending from the antimesenteric border of the sigmoid colon, infiltrating the left inguinal region soft tissue and coursing under and along the lateral margin of the left inguinal canal. This has increased in size since 2/26/2024 with new surrounding inflammatory change suggesting a chronic diverticular abscess with a more recent acute component  General surgery consult recommending IR consultation   NPO  Empiric ceftriaxone and metronidazole  IV hydration

## 2024-06-17 NOTE — ASSESSMENT & PLAN NOTE
Zoster lesions on right chest wall and right upper back   Valtrex TID  Pain control   Contact isolation

## 2024-06-17 NOTE — ASSESSMENT & PLAN NOTE
Lab Results   Component Value Date    HGBA1C 7.4 (H) 06/07/2024       Recent Labs     06/16/24  1811 06/17/24  0300 06/17/24  0329   POCGLU 110 100 105       Blood Sugar Average: Last 72 hrs:  (P) 105

## 2024-06-17 NOTE — ED NOTES
"Patient requested Fingerstick as he has not eaten or received insulin in \"awhile\". Patient denies complaints/symptoms at this time. Educated on NPO status and insulin - repeats back understanding. Comfort and safety measures provided. Informed will monitor glucose in one hour - NP aware. Patient agrees and repeats back understanding.      Eloina Conrad RN  06/17/24 2504       Eloina Conrad RN  06/17/24 3849    "

## 2024-06-17 NOTE — ASSESSMENT & PLAN NOTE
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 No evidence of soft tissue component. A benign etiology is suspected such as retroperitoneal lymphatic malformation or lymphocele. Initial   evaluation in 3 months with contrast-enhanced CT abdomen/pelvis is recommended.  Outpt followup

## 2024-06-17 NOTE — CONSULTS
[facility] Electronic Health Record Consult  IR has been consulted to evaluate the patient, determine the appropriate procedure, and whether or not a procedure can and should be performed regarding the care of Hal Miller.    We were consulted by surgery concerning possible abscess, and to possibly perform a drainage if medically appropriate for the patient. The patient is aware that a specialty consultation is taking place, and agrees to the IR Consult on their behalf.     Assessment/Plan:   84 year old male with colonic diverticulitis. Noted enlarging lump in left groin. CT performed showed a primarily air collection in the left inguinal region, that had little/no fluid component, appears like a contained perforation with currently no fluid collection. IV contrast could be performed to further evaluate, but doubtful this would change current management. Would not recommend drain at this time due to small/no fluid collection.    I spent 15 minutes in medical consultative time evaluating the medical record and imaging of Hal Miller.    Thank you for allowing [facility] to participate in the care of Hal Miller. Please don't hesitate to call or TigerText us with any questions.     Abner Vasquez MD

## 2024-06-17 NOTE — CONSULTS
Consultation - General Surgery  Hal Miller 84 y.o. male MRN: [id number]  Unit/Bed#: ED-07 Encounter: [id number]        Assessment & Plan     Assessment:  Hal Miller is a 84 y.o. male with PMH of severe COPD (not on home oxygen), Afib (Eliquis), vascular dementia, T2DM (insulin dependent), appendectomy, whipple (2011), Bentall, cholecystectomy, and R inguinal hernia repair who presents with a variety of complaints including left groin pain, CT imaging notable for diverticulitis abscess extending into a left inguinal hernia    Plan:  Will start IV antibiotics  Please keep n.p.o.  Please hold Eliquis  Will discuss with interventional radiology whether there is a safe window for drainage    History of Present Illness     HPI:  Hal Miller is a 84 y.o. male with the above past medical history who presents with a variety of complaints from home.  He lives at home with his wife.  He states he is able to ambulate with some difficulty.  He uses a cane and sometimes a walker at home.  He says that he has been having whole body pain as well as shortness of breath as well as decreased appetite.  He also complains of constipation.  On further prompting he states he is has pain in his left groin which has been going on for around 6 months.    He is not obstructed.  Having bowel movements.  Last 1 yesterday.  Some nausea no vomiting.    On Eliquis blood thinners as above.  Past surgical history as above.  He has a scar in his right groin but on his left groin.    Review of Systems   Constitutional:  Negative for chills and fever.   HENT:  Negative for ear pain and sore throat.    Eyes:  Negative for pain and visual disturbance.   Respiratory:  Negative for cough and shortness of breath.    Cardiovascular:  Negative for chest pain and palpitations.   Gastrointestinal:  Positive for abdominal pain. Negative for vomiting.   Genitourinary:  Negative for dysuria and hematuria.   Musculoskeletal:  Negative for arthralgias and back  pain.   Skin:  Negative for color change and rash.   Neurological:  Negative for seizures and syncope.   All other systems reviewed and are negative.        Historical Information   Past Medical History:   Diagnosis Date    Acute encephalopathy 2023    Acute-on-chronic kidney injury  (HCC) 2017    Cancer (HCC)     pancreatic    Colon polyp     Coronary artery disease     Dehydration 3/3/2023    Diabetes mellitus (HCC)     Hyperlipidemia     Hypertension     Left lower lobe pneumonia 2022    Pneumonia 2017    Right middle and lower lobe     Stroke (HCC)      Past Surgical History:   Procedure Laterality Date    APPENDECTOMY      CARDIAC SURGERY      Aortic Valve Replacement, Ascending Aorta    COLONOSCOPY      GALLBLADDER SURGERY      PANCREATICODUODENECTOMY       Social History   Social History     Substance and Sexual Activity   Alcohol Use Never     Social History     Substance and Sexual Activity   Drug Use No     Social History     Tobacco Use   Smoking Status Former    Types: Pipe    Quit date:     Years since quittin.4   Smokeless Tobacco Never     Family History:   Family History   Problem Relation Age of Onset    Cancer Mother     Stroke Father     Cancer Sister     Diabetes Sister     Stroke Brother        Meds/Allergies   all medications and allergies reviewed  Allergies   Allergen Reactions    Contrast Dye [Iodinated Contrast Media] Other (See Comments)     Pt states kidney dysfunction..     Other        Objective   First Vitals:   Blood Pressure: 147/82 (24 1800)  Pulse: 93 (24 1800)  Temperature: 98.2 °F (36.8 °C) (24 1800)  Temp Source: Oral (24 1800)  Respirations: 20 (24 1800)  SpO2: 98 % (24 1800)    Current Vitals:   Blood Pressure: 163/84 (24 2300)  Pulse: 82 (24 2300)  Temperature: 98.2 °F (36.8 °C) (24 1800)  Temp Source: Oral (24 1800)  Respirations: 16 (24 2300)  SpO2: 96 % (24 2300)    No  intake or output data in the 24 hours ending 06/17/24 0000    Invasive Devices       Peripheral Intravenous Line  Duration             Peripheral IV 06/16/24 Right Forearm <1 day                    Physical Exam  Constitutional:       General: He is not in acute distress.     Appearance: Normal appearance. He is not ill-appearing or toxic-appearing.   HENT:      Head: Normocephalic and atraumatic.      Right Ear: External ear normal.      Left Ear: External ear normal.      Nose: Nose normal.      Mouth/Throat:      Mouth: Mucous membranes are moist.   Eyes:      Pupils: Pupils are equal, round, and reactive to light.   Cardiovascular:      Rate and Rhythm: Normal rate.      Pulses: Normal pulses.      Heart sounds: No murmur heard.  Pulmonary:      Effort: Pulmonary effort is normal. No respiratory distress.   Abdominal:      General: Abdomen is flat. There is no distension.      Tenderness: There is no abdominal tenderness.      Hernia: A hernia is present.      Comments: Left inguinal hernia, incarcerated, no skin changes   Musculoskeletal:         General: No swelling or tenderness. Normal range of motion.      Cervical back: Normal range of motion. No rigidity.   Skin:     General: Skin is warm and dry.      Capillary Refill: Capillary refill takes less than 2 seconds.   Neurological:      General: No focal deficit present.      Mental Status: He is alert and oriented to person, place, and time.   Psychiatric:         Mood and Affect: Mood normal.         Behavior: Behavior normal.           Lab Results: I have personally reviewed pertinent lab results.    Imaging: I have personally reviewed pertinent reports.    EKG, Pathology, and Other Studies: I have personally reviewed pertinent reports.      Code Status: Prior  Advance Directive and Living Will:      Power of :    POLST:

## 2024-06-17 NOTE — ASSESSMENT & PLAN NOTE
Lab Results   Component Value Date    EGFR 41 06/16/2024    EGFR 39 06/08/2024    EGFR 33 06/07/2024    CREATININE 1.54 (H) 06/16/2024    CREATININE 1.59 (H) 06/08/2024    CREATININE 1.82 (H) 06/07/2024   CKD 3 baseline  Avoid nephrotoxic agents and renally dose medications  Monitor CR

## 2024-06-17 NOTE — H&P
[facility]  H&P  Name: Hal Miller 84 y.o. male I MRN: [id number]  Unit/Bed#: ED-07 I Date of Admission: 6/16/2024   Date of Service: 6/17/2024 I Hospital Day: 0      Assessment & Plan   * Colonic diverticular abscess  Assessment & Plan  Presents with left groin pain, abd pain   Enlarging gas and fluid collection extending from the antimesenteric border of the sigmoid colon, infiltrating the left inguinal region soft tissue and coursing under and along the lateral margin of the left inguinal canal. This has increased in size since 2/26/2024 with new surrounding inflammatory change suggesting a chronic diverticular abscess with a more recent acute component  General surgery consult recommending IR consultation   NPO  Empiric ceftriaxone and metronidazole  IV hydration     Vascular dementia (HCC)  Assessment & Plan  Oriented and appropriate at time of admission   Previous admissions developed encephalopathy  Monitor for delirium     Zoster  Assessment & Plan  Zoster lesions on right chest wall and right upper back   Valtrex TID  Pain control   Contact isolation     Abnormal CT of the abdomen  Assessment & Plan  22 mm cystic retroperitoneal lesion between the aorta and IVC which is increased in size since 2/20/2023. No evidence of soft tissue component. A benign etiology is suspected such as retroperitoneal lymphatic malformation or lymphocele. Initial   evaluation in 3 months with contrast-enhanced CT abdomen/pelvis is recommended.  Outpt followup     Type 2 diabetes mellitus (HCC)  Assessment & Plan  Lab Results   Component Value Date    HGBA1C 7.4 (H) 06/07/2024       Recent Labs     06/16/24  1811 06/17/24  0300 06/17/24  0329   POCGLU 110 100 105       Blood Sugar Average: Last 72 hrs:  (P) 105      CKD (chronic kidney disease)  Assessment & Plan  Lab Results   Component Value Date    EGFR 41 06/16/2024    EGFR 39 06/08/2024    EGFR 33 06/07/2024    CREATININE 1.54 (H) 06/16/2024     CREATININE 1.59 (H) 06/08/2024    CREATININE 1.82 (H) 06/07/2024   CKD 3 baseline  Avoid nephrotoxic agents and renally dose medications  Monitor CR    Inflammatory polyarthropathy (HCC)  Assessment & Plan  PTA regimen hydroxychloroquine on hold, prednisone transitioned to solumedrol for COPD exacerbation     Atrial fibrillation (HCC)  Assessment & Plan  Rate controlled  Continue diltiazem   Eliquis on hold pending IR consultation     Shortness of breath  Assessment & Plan  Presents with dyspnea, nonproductive cough with post tussive vomiting  Solumedrol with transition to prednisone ( takes 2.5  mg daily )  Nebulized bronchodilators     Diabetes mellitus (HCC)  Assessment & Plan  Lab Results   Component Value Date    HGBA1C 7.4 (H) 06/07/2024       Recent Labs     06/16/24  1811 06/17/24  0300 06/17/24  0329   POCGLU 110 100 105       Blood Sugar Average: Last 72 hrs:  (P) 105  PTA lantus on hold while npo  SSI with accuchecks  Hypoglycemia protocol         VTE Pharmacologic Prophylaxis:   High Risk (Score >/= 5) - Pharmacological DVT Prophylaxis Contraindicated. Sequential Compression Devices Ordered.  Code Status: Level 1 - Full Code     Anticipated Length of Stay: Patient will be admitted on an inpatient basis with an anticipated length of stay of greater than 2 midnights secondary to diverticular abscess.    Total Time Spent on Date of Encounter in care of patient: 45 mins. This time was spent on one or more of the following: performing physical exam; counseling and coordination of care; obtaining or reviewing history; documenting in the medical record; reviewing/ordering tests, medications or procedures; communicating with other healthcare professionals and discussing with patient's family/caregivers.    Chief Complaint: abdominal pain and dyspnea    History of Present Illness:  Hal Miller is a 84 y.o. male with a PMH of vascular dementia, CAD, CKD, IDDM, HTN, CVA, afib on eliquis, PMR on prednisone who  presents with abd pain, dyspnea, shingles painful rash. New left groin swelling and tenderness, CT abd finding diverticular abscess and presented to medical service.     Review of Systems:  Review of Systems   Constitutional:  Positive for activity change, appetite change and fatigue. Negative for chills and fever.   HENT:  Negative for ear pain and sore throat.    Eyes:  Negative for pain and visual disturbance.   Respiratory:  Positive for cough and shortness of breath.    Cardiovascular:  Negative for chest pain and palpitations.   Gastrointestinal:  Positive for abdominal pain and vomiting.   Genitourinary:  Negative for dysuria and hematuria.        Groin swelling and pain    Musculoskeletal:  Negative for arthralgias and back pain.   Skin:  Negative for color change and rash.   Neurological:  Positive for weakness. Negative for seizures and syncope.   All other systems reviewed and are negative.      Past Medical and Surgical History:   Past Medical History:   Diagnosis Date    Acute encephalopathy 2/14/2023    Acute-on-chronic kidney injury  (HCC) 6/13/2017    Cancer (HCC)     pancreatic    Colon polyp     Coronary artery disease     Dehydration 3/3/2023    Diabetes mellitus (HCC)     Hyperlipidemia     Hypertension     Left lower lobe pneumonia 7/2/2022    Pneumonia 06/13/2017    Right middle and lower lobe     Stroke (HCC)        Past Surgical History:   Procedure Laterality Date    APPENDECTOMY      CARDIAC SURGERY      Aortic Valve Replacement, Ascending Aorta    COLONOSCOPY      GALLBLADDER SURGERY      PANCREATICODUODENECTOMY         Meds/Allergies:  Prior to Admission medications    Medication Sig Start Date End Date Taking? Authorizing Provider   apixaban (ELIQUIS) 2.5 mg Take 2.5 mg by mouth 2 (two) times a day    Historical Provider, MD BAJWA UF III MINI PEN NEEDLES 31G X 5 MM MISC  3/11/18   Historical Provider, MD   Calcium Citrate (CITRACAL PO) Take 650 mg by mouth in the morning    Historical  Provider, MD   Cinnamon 500 MG capsule Take 1,000 mg by mouth daily    Historical Provider, MD   diltiazem (CARDIZEM CD) 180 mg 24 hr capsule Take 180 mg by mouth daily    Historical Provider, MD   hydroxychloroquine (PLAQUENIL) 200 mg tablet Take 200 mg by mouth in the morning    Historical Provider, MD   Lankhurram SoloStar 100 units/mL SOPN  3/23/23   Historical Provider, MD   nystatin (MYCOSTATIN) 500,000 units/5 mL suspension Apply 5 mL (500,000 Units total) to the mouth or throat 4 (four) times a day 24   Wil Marsh MD   olmesartan (BENICAR) 20 mg tablet Take 20 mg by mouth daily    Historical Provider, MD   ondansetron (ZOFRAN-ODT) 4 mg disintegrating tablet Take 1 tablet (4 mg total) by mouth every 6 (six) hours as needed for nausea or vomiting 24   ADEOLA Rodriguez   potassium chloride (KLOR-CON) 8 MEQ tablet  21   Historical Provider, MD   pravastatin (PRAVACHOL) 40 mg tablet Take 40 mg by mouth daily    Historical Provider, MD   predniSONE 1 mg tablet Take 10 mg by mouth daily Currently down to 3 1/2 mg daily 22    Historical Provider, MD   QUEtiapine (SEROquel) 25 mg tablet Take 25 mg by mouth if needed (for agitation) Take 1/2 to 1 tablet by mouth as needed    Historical Provider, MD     I have reviewed home medications using recent Epic encounter.    Allergies:   Allergies   Allergen Reactions    Contrast Dye [Iodinated Contrast Media] Other (See Comments)     Pt states kidney dysfunction..     Other        Social History:  Marital Status: /Civil Union   Occupation: retired  Patient Pre-hospital Living Situation: Home  Patient Pre-hospital Level of Mobility: unable to be assessed at time of evaluation  Patient Pre-hospital Diet Restrictions: none    Substance Use History:   Social History     Substance and Sexual Activity   Alcohol Use Never     Social History     Tobacco Use   Smoking Status Former    Types: Pipe    Quit date:     Years since quittin.4   Smokeless  Tobacco Never     Social History     Substance and Sexual Activity   Drug Use No       Family History:  Family History   Problem Relation Age of Onset    Cancer Mother     Stroke Father     Cancer Sister     Diabetes Sister     Stroke Brother        Physical Exam:     Vitals:   Blood Pressure: 153/89 (06/17/24 0400)  Pulse: 81 (06/17/24 0400)  Temperature: 98.2 °F (36.8 °C) (06/16/24 1800)  Temp Source: Oral (06/16/24 1800)  Respirations: 16 (06/17/24 0400)  SpO2: 96 % (06/17/24 0400)    Physical Exam  Vitals and nursing note reviewed.   Constitutional:       General: He is not in acute distress.     Appearance: He is ill-appearing.   HENT:      Head: Normocephalic and atraumatic.      Mouth/Throat:      Mouth: Mucous membranes are dry.   Eyes:      Conjunctiva/sclera: Conjunctivae normal.   Cardiovascular:      Rate and Rhythm: Normal rate and regular rhythm.      Heart sounds: No murmur heard.  Pulmonary:      Effort: Pulmonary effort is normal. No respiratory distress.      Breath sounds: Wheezing present.   Abdominal:      General: There is distension.      Palpations: Abdomen is soft.      Tenderness: There is abdominal tenderness.   Musculoskeletal:         General: No swelling.      Cervical back: Neck supple.   Skin:     General: Skin is warm and dry.      Capillary Refill: Capillary refill takes less than 2 seconds.   Neurological:      General: No focal deficit present.      Mental Status: He is alert and oriented to person, place, and time.   Psychiatric:         Mood and Affect: Mood normal.         Behavior: Behavior normal.          Additional Data:     Lab Results:  Results from last 7 days   Lab Units 06/16/24  1819   WBC Thousand/uL 7.72   HEMOGLOBIN g/dL 11.8*   HEMATOCRIT % 37.6   PLATELETS Thousands/uL 123*   SEGS PCT % 79*   LYMPHO PCT % 10*   MONO PCT % 10   EOS PCT % 1     Results from last 7 days   Lab Units 06/16/24  1819   SODIUM mmol/L 134*   POTASSIUM mmol/L 5.0   CHLORIDE mmol/L 102    CO2 mmol/L 30   BUN mg/dL 35*   CREATININE mg/dL 1.54*   ANION GAP mmol/L 2*   CALCIUM mg/dL 10.2   ALBUMIN g/dL 3.8   TOTAL BILIRUBIN mg/dL 1.12*   ALK PHOS U/L 129*   ALT U/L 21   AST U/L 18   GLUCOSE RANDOM mg/dL 125         Results from last 7 days   Lab Units 06/17/24  0329 06/17/24  0300 06/16/24  1811   POC GLUCOSE mg/dl 105 100 110     Lab Results   Component Value Date    HGBA1C 7.4 (H) 06/07/2024    HGBA1C 7.5 (H) 04/09/2024    HGBA1C 7.2 (H) 12/30/2023     Results from last 7 days   Lab Units 06/16/24  1900   LACTIC ACID mmol/L 0.9       Lines/Drains:  Invasive Devices       Peripheral Intravenous Line  Duration             Peripheral IV 06/16/24 Right Forearm <1 day                        Imaging: Reviewed radiology reports from this admission including: abdominal/pelvic CT  CT abdomen pelvis wo contrast   Final Result by Jose Shultz MD (06/16 2108)      Enlarging gas and fluid collection extending from the antimesenteric border of the sigmoid colon, infiltrating the left inguinal region soft tissue and coursing under and along the lateral margin of the left inguinal canal. This has increased in size    since 2/26/2024 with new surrounding inflammatory change suggesting a chronic diverticular abscess with a more recent acute component      22 mm cystic retroperitoneal lesion between the aorta and IVC which is increased in size since 2/20/2023. No evidence of soft tissue component. A benign etiology is suspected such as retroperitoneal lymphatic malformation or lymphocele. Initial    evaluation in 3 months with contrast-enhanced CT abdomen/pelvis is recommended. Considerations related to the patient's age and/or comorbidities may be used to alter these recommendations.      Workstation performed: [id number]         XR chest portable   Final Result by Zena Wiley MD (06/16 4980)      No definite acute disease with small chronic loculated pleural effusions, rounded atelectasis in the right  lower lobe, and bilateral scar.            Workstation performed: [id number]             EKG and Other Studies Reviewed on Admission:   EKG: Atrial fibrillation. .    ** Please Note: This note has been constructed using a voice recognition system. **

## 2024-06-17 NOTE — ASSESSMENT & PLAN NOTE
Oriented and appropriate at time of admission   Previous admissions developed encephalopathy  Monitor for delirium

## 2024-06-18 ENCOUNTER — APPOINTMENT (INPATIENT)
Dept: CT IMAGING | Facility: HOSPITAL | Age: 84
DRG: 391 | End: 2024-06-18
Payer: COMMERCIAL

## 2024-06-18 ENCOUNTER — APPOINTMENT (INPATIENT)
Dept: RADIOLOGY | Facility: HOSPITAL | Age: 84
DRG: 391 | End: 2024-06-18
Payer: COMMERCIAL

## 2024-06-18 PROBLEM — E43 SEVERE PROTEIN-CALORIE MALNUTRITION (HCC): Status: ACTIVE | Noted: 2024-06-18

## 2024-06-18 PROBLEM — R26.2 AMBULATORY DYSFUNCTION: Status: ACTIVE | Noted: 2024-06-18

## 2024-06-18 PROBLEM — G47.00 INSOMNIA: Status: ACTIVE | Noted: 2024-06-18

## 2024-06-18 PROBLEM — R42 DIZZINESS: Status: ACTIVE | Noted: 2024-06-18

## 2024-06-18 LAB
ALBUMIN SERPL BCG-MCNC: 3.6 G/DL (ref 3.5–5)
ALP SERPL-CCNC: 110 U/L (ref 34–104)
ALT SERPL W P-5'-P-CCNC: 21 U/L (ref 7–52)
ANION GAP SERPL CALCULATED.3IONS-SCNC: 8 MMOL/L (ref 4–13)
AST SERPL W P-5'-P-CCNC: 22 U/L (ref 13–39)
BASE EX.OXY STD BLDV CALC-SCNC: 94.2 % (ref 60–80)
BASE EXCESS BLDV CALC-SCNC: -5.7 MMOL/L
BASOPHILS # BLD AUTO: 0.01 THOUSANDS/ÂΜL (ref 0–0.1)
BASOPHILS NFR BLD AUTO: 0 % (ref 0–1)
BILIRUB SERPL-MCNC: 1 MG/DL (ref 0.2–1)
BUN SERPL-MCNC: 45 MG/DL (ref 5–25)
CALCIUM SERPL-MCNC: 9.6 MG/DL (ref 8.4–10.2)
CARDIAC TROPONIN I PNL SERPL HS: 34 NG/L
CHLORIDE SERPL-SCNC: 100 MMOL/L (ref 96–108)
CO2 SERPL-SCNC: 20 MMOL/L (ref 21–32)
CREAT SERPL-MCNC: 1.69 MG/DL (ref 0.6–1.3)
EOSINOPHIL # BLD AUTO: 0 THOUSAND/ÂΜL (ref 0–0.61)
EOSINOPHIL NFR BLD AUTO: 0 % (ref 0–6)
ERYTHROCYTE [DISTWIDTH] IN BLOOD BY AUTOMATED COUNT: 14.2 % (ref 11.6–15.1)
GFR SERPL CREATININE-BSD FRML MDRD: 37 ML/MIN/1.73SQ M
GLUCOSE SERPL-MCNC: 201 MG/DL (ref 65–140)
GLUCOSE SERPL-MCNC: 206 MG/DL (ref 65–140)
GLUCOSE SERPL-MCNC: 236 MG/DL (ref 65–140)
GLUCOSE SERPL-MCNC: 257 MG/DL (ref 65–140)
GLUCOSE SERPL-MCNC: 280 MG/DL (ref 65–140)
GLUCOSE SERPL-MCNC: 297 MG/DL (ref 65–140)
GLUCOSE SERPL-MCNC: 310 MG/DL (ref 65–140)
HCO3 BLDV-SCNC: 18.9 MMOL/L (ref 24–30)
HCT VFR BLD AUTO: 35.5 % (ref 36.5–49.3)
HGB BLD-MCNC: 11.4 G/DL (ref 12–17)
IMM GRANULOCYTES # BLD AUTO: 0.06 THOUSAND/UL (ref 0–0.2)
IMM GRANULOCYTES NFR BLD AUTO: 1 % (ref 0–2)
LACTATE SERPL-SCNC: 2.8 MMOL/L (ref 0.5–2)
LYMPHOCYTES # BLD AUTO: 0.52 THOUSANDS/ÂΜL (ref 0.6–4.47)
LYMPHOCYTES NFR BLD AUTO: 4 % (ref 14–44)
MCH RBC QN AUTO: 31 PG (ref 26.8–34.3)
MCHC RBC AUTO-ENTMCNC: 32.1 G/DL (ref 31.4–37.4)
MCV RBC AUTO: 97 FL (ref 82–98)
MONOCYTES # BLD AUTO: 0.82 THOUSAND/ÂΜL (ref 0.17–1.22)
MONOCYTES NFR BLD AUTO: 7 % (ref 4–12)
NEUTROPHILS # BLD AUTO: 10.83 THOUSANDS/ÂΜL (ref 1.85–7.62)
NEUTS SEG NFR BLD AUTO: 88 % (ref 43–75)
NRBC BLD AUTO-RTO: 0 /100 WBCS
O2 CT BLDV-SCNC: 16.8 ML/DL
PCO2 BLDV: 34.3 MM HG (ref 42–50)
PH BLDV: 7.36 [PH] (ref 7.3–7.4)
PLATELET # BLD AUTO: 170 THOUSANDS/UL (ref 149–390)
PMV BLD AUTO: 9.4 FL (ref 8.9–12.7)
PO2 BLDV: 85.5 MM HG (ref 35–45)
POTASSIUM SERPL-SCNC: 5.4 MMOL/L (ref 3.5–5.3)
PROT SERPL-MCNC: 5.9 G/DL (ref 6.4–8.4)
RBC # BLD AUTO: 3.68 MILLION/UL (ref 3.88–5.62)
SODIUM SERPL-SCNC: 128 MMOL/L (ref 135–147)
WBC # BLD AUTO: 12.24 THOUSAND/UL (ref 4.31–10.16)

## 2024-06-18 PROCEDURE — 74176 CT ABD & PELVIS W/O CONTRAST: CPT

## 2024-06-18 PROCEDURE — 94640 AIRWAY INHALATION TREATMENT: CPT

## 2024-06-18 PROCEDURE — 70450 CT HEAD/BRAIN W/O DYE: CPT

## 2024-06-18 PROCEDURE — 82948 REAGENT STRIP/BLOOD GLUCOSE: CPT

## 2024-06-18 PROCEDURE — 99232 SBSQ HOSP IP/OBS MODERATE 35: CPT

## 2024-06-18 PROCEDURE — 82805 BLOOD GASES W/O2 SATURATION: CPT | Performed by: EMERGENCY MEDICINE

## 2024-06-18 PROCEDURE — C9113 INJ PANTOPRAZOLE SODIUM, VIA: HCPCS | Performed by: NURSE PRACTITIONER

## 2024-06-18 PROCEDURE — 85025 COMPLETE CBC W/AUTO DIFF WBC: CPT | Performed by: EMERGENCY MEDICINE

## 2024-06-18 PROCEDURE — 94760 N-INVAS EAR/PLS OXIMETRY 1: CPT

## 2024-06-18 PROCEDURE — 80053 COMPREHEN METABOLIC PANEL: CPT | Performed by: EMERGENCY MEDICINE

## 2024-06-18 PROCEDURE — 73080 X-RAY EXAM OF ELBOW: CPT

## 2024-06-18 PROCEDURE — 99232 SBSQ HOSP IP/OBS MODERATE 35: CPT | Performed by: SURGERY

## 2024-06-18 PROCEDURE — 99232 SBSQ HOSP IP/OBS MODERATE 35: CPT | Performed by: INTERNAL MEDICINE

## 2024-06-18 PROCEDURE — 99222 1ST HOSP IP/OBS MODERATE 55: CPT | Performed by: FAMILY MEDICINE

## 2024-06-18 PROCEDURE — 72125 CT NECK SPINE W/O DYE: CPT

## 2024-06-18 PROCEDURE — 84484 ASSAY OF TROPONIN QUANT: CPT | Performed by: EMERGENCY MEDICINE

## 2024-06-18 PROCEDURE — 83605 ASSAY OF LACTIC ACID: CPT | Performed by: EMERGENCY MEDICINE

## 2024-06-18 RX ORDER — ACETAMINOPHEN 325 MG/1
975 TABLET ORAL 3 TIMES DAILY
Status: DISCONTINUED | OUTPATIENT
Start: 2024-06-18 | End: 2024-06-20

## 2024-06-18 RX ORDER — INSULIN GLARGINE 100 [IU]/ML
10 INJECTION, SOLUTION SUBCUTANEOUS
Status: DISCONTINUED | OUTPATIENT
Start: 2024-06-18 | End: 2024-06-19

## 2024-06-18 RX ORDER — QUETIAPINE FUMARATE 25 MG/1
25 TABLET, FILM COATED ORAL
Status: DISCONTINUED | OUTPATIENT
Start: 2024-06-18 | End: 2024-06-19

## 2024-06-18 RX ADMIN — LEVALBUTEROL HYDROCHLORIDE 1.25 MG: 1.25 SOLUTION RESPIRATORY (INHALATION) at 07:43

## 2024-06-18 RX ADMIN — LOSARTAN POTASSIUM 50 MG: 50 TABLET, FILM COATED ORAL at 12:45

## 2024-06-18 RX ADMIN — IPRATROPIUM BROMIDE 0.5 MG: 0.5 SOLUTION RESPIRATORY (INHALATION) at 19:39

## 2024-06-18 RX ADMIN — LEVALBUTEROL HYDROCHLORIDE 1.25 MG: 1.25 SOLUTION RESPIRATORY (INHALATION) at 13:37

## 2024-06-18 RX ADMIN — ACETAMINOPHEN 975 MG: 325 TABLET, FILM COATED ORAL at 17:13

## 2024-06-18 RX ADMIN — NYSTATIN 500000 UNITS: 100000 SUSPENSION ORAL at 17:13

## 2024-06-18 RX ADMIN — VALACYCLOVIR HYDROCHLORIDE 1000 MG: 500 TABLET, FILM COATED ORAL at 17:14

## 2024-06-18 RX ADMIN — INSULIN LISPRO 2 UNITS: 100 INJECTION, SOLUTION INTRAVENOUS; SUBCUTANEOUS at 14:19

## 2024-06-18 RX ADMIN — METRONIDAZOLE 500 MG: 500 INJECTION, SOLUTION INTRAVENOUS at 00:39

## 2024-06-18 RX ADMIN — NYSTATIN 500000 UNITS: 100000 SUSPENSION ORAL at 23:22

## 2024-06-18 RX ADMIN — PANTOPRAZOLE SODIUM 40 MG: 40 INJECTION, POWDER, FOR SOLUTION INTRAVENOUS at 13:14

## 2024-06-18 RX ADMIN — INSULIN GLARGINE 10 UNITS: 100 INJECTION, SOLUTION SUBCUTANEOUS at 22:29

## 2024-06-18 RX ADMIN — ACETAMINOPHEN 975 MG: 325 TABLET, FILM COATED ORAL at 23:21

## 2024-06-18 RX ADMIN — INSULIN LISPRO 2 UNITS: 100 INJECTION, SOLUTION INTRAVENOUS; SUBCUTANEOUS at 17:14

## 2024-06-18 RX ADMIN — QUETIAPINE FUMARATE 25 MG: 25 TABLET ORAL at 23:21

## 2024-06-18 RX ADMIN — PREDNISONE 10 MG: 10 TABLET ORAL at 12:44

## 2024-06-18 RX ADMIN — LEVALBUTEROL HYDROCHLORIDE 1.25 MG: 1.25 SOLUTION RESPIRATORY (INHALATION) at 19:39

## 2024-06-18 RX ADMIN — METRONIDAZOLE 500 MG: 500 INJECTION, SOLUTION INTRAVENOUS at 09:33

## 2024-06-18 RX ADMIN — PRAVASTATIN SODIUM 40 MG: 40 TABLET ORAL at 17:14

## 2024-06-18 RX ADMIN — IPRATROPIUM BROMIDE 0.5 MG: 0.5 SOLUTION RESPIRATORY (INHALATION) at 07:43

## 2024-06-18 RX ADMIN — METRONIDAZOLE 500 MG: 500 INJECTION, SOLUTION INTRAVENOUS at 17:18

## 2024-06-18 RX ADMIN — HYDROXYCHLOROQUINE SULFATE 200 MG: 200 TABLET ORAL at 12:17

## 2024-06-18 RX ADMIN — IPRATROPIUM BROMIDE 0.5 MG: 0.5 SOLUTION RESPIRATORY (INHALATION) at 13:37

## 2024-06-18 RX ADMIN — DILTIAZEM HYDROCHLORIDE 180 MG: 180 CAPSULE, COATED, EXTENDED RELEASE ORAL at 12:15

## 2024-06-18 RX ADMIN — VALACYCLOVIR HYDROCHLORIDE 1000 MG: 500 TABLET, FILM COATED ORAL at 12:45

## 2024-06-18 NOTE — ASSESSMENT & PLAN NOTE
-General surgery follows. Surgery not recommending any interventions at this time. IR not planning abscess drainage at this time.   -Continue PO hydration and eating. Eating regular diet.  -Continue Metronidazole 500 mg PO Q8H.  -Continue Ciprofloxacin 500 mg PO Q12H.  -Outpatient follow-up recommended.

## 2024-06-18 NOTE — QUICK NOTE
General Surgery Quick Note:    Recommend CT AP w/PO and rectal contrast. Patient had refused IV contrast for concerns of impact on renal function. I personally discussed with the patient the risks, benefits, and alternatives to the use of PO and rectal contrast. Patient expressed understanding and provided consent to proceed with PO and rectal contrast CT scan. I informed primary team as well. Order placed.

## 2024-06-18 NOTE — PLAN OF CARE
Problem: PAIN - ADULT  Goal: Verbalizes/displays adequate comfort level or baseline comfort level  Description: Interventions:  - Encourage patient to monitor pain and request assistance  - Assess pain using appropriate pain scale  - Administer analgesics based on type and severity of pain and evaluate response  - Implement non-pharmacological measures as appropriate and evaluate response  - Consider cultural and social influences on pain and pain management  - Notify physician/advanced practitioner if interventions unsuccessful or patient reports new pain  Outcome: Progressing     Problem: INFECTION - ADULT  Goal: Absence or prevention of progression during hospitalization  Description: INTERVENTIONS:  - Assess and monitor for signs and symptoms of infection  - Monitor lab/diagnostic results  - Monitor all insertion sites, i.e. indwelling lines, tubes, and drains  - Monitor endotracheal if appropriate and nasal secretions for changes in amount and color  - [place] appropriate cooling/warming therapies per order  - Administer medications as ordered  - Instruct and encourage patient and family to use good hand hygiene technique  - Identify and instruct in appropriate isolation precautions for identified infection/condition  Outcome: Progressing  Goal: Absence of fever/infection during neutropenic period  Description: INTERVENTIONS:  - Monitor WBC    Outcome: Progressing     Problem: SAFETY ADULT  Goal: Patient will remain free of falls  Description: INTERVENTIONS:  - Educate patient/family on patient safety including physical limitations  - Instruct patient to call for assistance with activity   - Consult OT/PT to assist with strengthening/mobility   - Keep Call bell within reach  - Keep bed low and locked with side rails adjusted as appropriate  - Keep care items and personal belongings within reach  - Initiate and maintain comfort rounds  - Make Fall Risk Sign visible to staff  - Initiate/Maintain bed alarm  -  Apply yellow socks and bracelet for high fall risk patients  - Consider moving patient to room near nurses station  Outcome: Progressing  Goal: Maintain or return to baseline ADL function  Description: INTERVENTIONS:  -  Assess patient's ability to carry out ADLs; assess patient's baseline for ADL function and identify physical deficits which impact ability to perform ADLs (bathing, care of mouth/teeth, toileting, grooming, dressing, etc.)  - Assess/evaluate cause of self-care deficits   - Assess range of motion  - Assess patient's mobility; develop plan if impaired  - Assess patient's need for assistive devices and provide as appropriate  - Encourage maximum independence but intervene and supervise when necessary  - Involve family in performance of ADLs  - Assess for home care needs following discharge   - Consider OT consult to assist with ADL evaluation and planning for discharge  - Provide patient education as appropriate  Outcome: Progressing  Goal: Maintains/Returns to pre admission functional level  Description: INTERVENTIONS:  - Perform AM-PAC 6 Click Basic Mobility/ Daily Activity assessment daily.  - Set and communicate daily mobility goal to care team and patient/family/caregiver.   - Collaborate with rehabilitation services on mobility goals if consulted  - Record patient progress and toleration of activity level   Outcome: Progressing     Problem: DISCHARGE PLANNING  Goal: Discharge to home or other facility with appropriate resources  Description: INTERVENTIONS:  - Identify barriers to discharge w/patient and caregiver  - Arrange for needed discharge resources and transportation as appropriate  - Identify discharge learning needs (meds, wound care, etc.)  - Arrange for interpretive services to assist at discharge as needed  - Refer to Case Management Department for coordinating discharge planning if the patient needs post-hospital services based on physician/advanced practitioner order or complex needs  related to functional status, cognitive ability, or social support system  Outcome: Progressing     Problem: Knowledge Deficit  Goal: Patient/family/caregiver demonstrates understanding of disease process, treatment plan, medications, and discharge instructions  Description: Complete learning assessment and assess knowledge base.  Interventions:  - Provide teaching at level of understanding  - Provide teaching via preferred learning methods  Outcome: Progressing     Problem: Nutrition/Hydration-ADULT  Goal: Nutrient/Hydration intake appropriate for improving, restoring or maintaining nutritional needs  Description: Monitor and assess patient's nutrition/hydration status for malnutrition. Collaborate with interdisciplinary team and initiate plan and interventions as ordered.  Monitor patient's weight and dietary intake as ordered or per policy. Utilize nutrition screening tool and intervene as necessary. Determine patient's food preferences and provide high-protein, high-caloric foods as appropriate.     INTERVENTIONS:  - Monitor oral intake, urinary output, labs, and treatment plans  - Assess nutrition and hydration status and recommend course of action  - Evaluate amount of meals eaten  - Assist patient with eating if necessary   - Allow adequate time for meals  - Recommend/ encourage appropriate diets, oral nutritional supplements, and vitamin/mineral supplements  - Order, calculate, and assess calorie counts as needed  - Recommend, monitor, and adjust tube feedings and TPN/PPN based on assessed needs  - Assess need for intravenous fluids  - Provide specific nutrition/hydration education as appropriate  - Include patient/family/caregiver in decisions related to nutrition  Outcome: Progressing

## 2024-06-18 NOTE — QUICK NOTE
Patient comfortable.  No complaints of pain.  No guarding or rebound.  Continue with intravenous antibiotic regimen.  Update to family requested.    Will request that the Plaquenil is discontinued as this is an immunosuppressant.

## 2024-06-18 NOTE — ASSESSMENT & PLAN NOTE
-Currently rate controlled.  -Was switched from Cardizem to Metoprolol succinate XL.  -Continue Toprol XL in setting of low EF.  -Continue Eliquis 2.5 mg BID.

## 2024-06-18 NOTE — CONSULTS
Consultation - Geriatric Medicine   Hal Miller 84 y.o. male MRN: [id number]  Unit/Bed#: W -01 Encounter: [id number]      Assessment & Plan     Ambulatory dysfunction  Assessment & Plan  - Patient was walking independently prior to admission to hospital.   - Patient now requires assistance upon his current state.  - Consult PT/OT for treatment.  - Check/monitor orthostatic vitals.    Insomnia  Assessment & Plan  - Due to neuropathic right thorax pain as result of Shingles rash.  - Patient's sleep was controlled at home on Seroquel 25 mg QHS.  - May increase dosage to Seroquel 25 mg BID PRN.    Dizziness  Assessment & Plan  - Check/monitor orthostatic vital signs.  - Maintain hydration status.  - Fall precautions.    Vascular dementia (HCC)  Assessment & Plan  Currently stable, intermittent behaviors noted  Will continue to provide supportive care, reorient as needed.  Patient is at high risk for delirium, will monitor closely and place on delirium precautions.  Maintain sleep/wake cycle.  Optimize pain regimen.  Monitor for constipation and urinary retention and manage as needed.  TSH within normal limits, check B12 level  Encourage family to visit.  Encourage to wear glasses and hearing aids while awake.  Encourage po intake, assist with feeding if needed.     Zoster  Assessment & Plan  - Zoster lesions on right chest wall and right upper back.   - Vesicles noted on lateral side of back.   - Valtrex 1000 mg twice daily  - Pain control.   - Pain uncontrolled now.  -Start Tylenol 975 mg p.o. 3 times daily, consider gabapentin 100 mg nightly if pain not well-controlled  - Contact isolation.    Type 2 diabetes mellitus (HCC)  Assessment & Plan  Lab Results   Component Value Date    HGBA1C 7.4 (H) 06/07/2024       Recent Labs     06/18/24  0155 06/18/24  0728 06/18/24  1111 06/18/24  1418   POCGLU 297* 280* 310* 257*       Blood Sugar Average: Last 72 hrs:  (P) [id number]    Continue insulin  regimen  Avoid hypoglycemia, patient is currently n.p.o.      Acute encephalopathy  Assessment & Plan    -Patient is high risk of delirium due to dementia at baseline, pain, hospitalization, insomnia, metabolic imbalance  -delirium precautions  -maintain normal sleep/wake cycle  -minimize overnight interruptions, group overnight vitals/labs/nursing checks as possible  -dim lights, close blinds and turn off tv to minimize stimulation and encourage sleep environment in evenings  -ensure that pain is well controlled, consider Tylenol 975mg Q8H scheduled   -monitor for fecal and urinary retention which may precipitate delirium  -encourage early mobilization and ambulation  -provide frequent reorientation and redirection  -encourage family and friends at the bedside to help calm patient if anxious  -avoid medications which may precipitate or worsen delirium such as tramadol, benzodiazepine, anticholinergics, and antihistaminics  -encourage hydration and nutrition , assist with feeding if needed  -redirect unwanted behaviors as first line, avoid physical restraints.   -Continue Seroquel 25 mg every 12 hours as needed for agitation. Resume seroquel  25 mg nightly.  Monitor QTc  -Consider resuming steroids    CKD (chronic kidney disease)  Assessment & Plan  Lab Results   Component Value Date    EGFR 47 06/17/2024    EGFR 41 06/16/2024    EGFR 39 06/08/2024    CREATININE 1.38 (H) 06/17/2024    CREATININE 1.54 (H) 06/16/2024    CREATININE 1.59 (H) 06/08/2024     Creatinine stable.  Will avoid nephrotoxic medication.  Encourage po hydration.  Will monitor BMP.     Inflammatory polyarthropathy (HCC)  Assessment & Plan  Hydroxychloroquine on hold  Prednisone switched to Solu-Medrol for COPD exacerbation    Atrial fibrillation (HCC)  Assessment & Plan  Currently rate controlled, continue Cardizem  Eliquis on hold due to possible IR intervention    Shortness of breath  Assessment & Plan  - Presents with dyspnea, nonproductive  "cough with post tussive vomiting.  -Patient has history of COPD  - Takes Prednisone 1 mg QD at home for arthritis.  -Received a dose of Solu-Medrol on admission, continue resuming steroids  - Nebulized bronchodilators.    * Colonic diverticular abscess  Assessment & Plan  General surgery follows  Continue to be n.p.o. and IV hydration  Continue metronidazole and ceftriaxone  CT abdomen and pelvis from 6/18 showed Oral contrast is now seen to the level of the transverse colon, the contrast still has not reached the portion of the colon adjacent to the suspected diverticular abscess (axial image 122, series 301). Rectal tube has been intervally removed.               History of Present Illness   Physician Requesting Consult: Rachana Malone MD  Reason for Consult / Principal Problem: Dementia/Change in mental status  Hx and PE limited by: Dementia/Change in mental status  Additional history obtained from: wife and nursing staff      HPI: Hal Miller is a 84 y.o. year old male who presents with acute encephalopathy and was seen for a geriatric consult. Patient is accompanied by his wife who dictates the majority of the visit.  Wife reports that there was similar event in February 2023 where patient had nonspecific complaints, such as cough and abdominal pain, that lead to his initial diagnosis of delirium imposed by pneumonia and RSV. At this time, he was given antibiotics which seemed to \"resolve everything,\" and the patient was discharged in March 2023. After this discharge, the patient was completing PT/OT at home. His wife reports that the patient was able to shower, bathe, drive, and was responsible for his finances and all IADLs prior to this acute event. Patient was walking independently prior to admission to hospital.   The wife reports that the patient's Shingles rash was discovered on Thursday. They went to their PCP who prescribed a \"cream\" that they are unsure the name of. Patient and wife report that the " "patient is still experiencing right trunk pain due to the location of the rash.  Wife states the patient has thrush which impairs his ability to eat due to impaired taste sensation. The wife reports an unintentional weight loss on the patient's behalf of ~10-15 lbs in 2 weeks where he dropped from 178 lbs to 164 lbs; she attributes this to his lack of appetite and eating.  Patient reports he did not sleep last night due to his pain and \"being forced to stare at the ceiling tiles\" the entire duration of the night.   Patient reports he has been severely dizzy where he has almost experienced a loss of consciousness due to his dizziness.  The patient's wife reports the patient has been experiencing extreme fatigue, malaise, weight loss, and loss of appetite. Wife reports the patient is [place] and does not like to chew due to pain. The wife reports dysphagia for the past 1-2 years and a loss of taste sensation. Wife admits to an \"incredible\" cough with extreme mucus production; the mucus is clear in color. Patient's wife reports that any small activity (i.e., walking up a singular flight of steps) causes SOB for the patient. The wife reports diarrhea and constipation as well as vomiting when food is distasteful to the patient. Patient's wife reports that the patient suffers from myalgias for the past several years. She reports the patient takes Prednisone 1 mg QHS for polymyalgia rheumatica and sees a rheumatologist for the condition Q3 months. The wife reports that the patient experiences insomnia if the patient is in pain, but otherwise he \"sleeps like a rock.\" Wife states that the patient has been experiencing anxiety due to the Shingles rash in addition to his other complaints.    Inpatient consult to Gerontology  Consult performed by: Katy Sykes MD  Consult ordered by: Rachana Malone MD          Review of Systems   Unable to perform ROS: Dementia   Respiratory:  Positive for cough and shortness of breath.  "   Gastrointestinal:  Positive for abdominal pain.   Musculoskeletal:  Positive for gait problem.   Neurological:  Positive for dizziness.   Psychiatric/Behavioral:  Positive for sleep disturbance.            Historical Information   Past Medical History:   Diagnosis Date    Acute encephalopathy 2023    Acute-on-chronic kidney injury  (HCC) 2017    Cancer (HCC)     pancreatic    Colon polyp     Coronary artery disease     Dehydration 3/3/2023    Diabetes mellitus (HCC)     Hyperlipidemia     Hypertension     Left lower lobe pneumonia 2022    Pneumonia 2017    Right middle and lower lobe     Stroke (HCC)      Past Surgical History:   Procedure Laterality Date    APPENDECTOMY      CARDIAC SURGERY      Aortic Valve Replacement, Ascending Aorta    COLONOSCOPY      GALLBLADDER SURGERY      PANCREATICODUODENECTOMY       Social History   Social History     Substance and Sexual Activity   Alcohol Use Never     Social History     Substance and Sexual Activity   Drug Use No     Social History     Tobacco Use   Smoking Status Former    Types: Pipe    Quit date:     Years since quittin.4   Smokeless Tobacco Never     Family History:   Family History   Problem Relation Age of Onset    Cancer Mother     Stroke Father     Cancer Sister     Diabetes Sister     Stroke Brother        Meds/Allergies   current meds:   Current Facility-Administered Medications   Medication Dose Route Frequency    acetaminophen (TYLENOL) tablet 975 mg  975 mg Oral TID    ceftriaxone (ROCEPHIN) 1 g/50 mL in dextrose IVPB  1,000 mg Intravenous Q24H    diltiazem (CARDIZEM CD) 24 hr capsule 180 mg  180 mg Oral Daily    HYDROmorphone HCl (DILAUDID) injection 0.2 mg  0.2 mg Intravenous Q4H PRN    insulin glargine (LANTUS) subcutaneous injection 10 Units 0.1 mL  10 Units Subcutaneous HS    insulin lispro (HumALOG/ADMELOG) 100 units/mL subcutaneous injection 1-5 Units  1-5 Units Subcutaneous TID AC    insulin regular (HumuLIN  R,NovoLIN R) 100 units/mL injection **ADS Override Pull**        ipratropium (ATROVENT) 0.02 % inhalation solution 0.5 mg  0.5 mg Nebulization TID    levalbuterol (XOPENEX) inhalation solution 1.25 mg  1.25 mg Nebulization TID    lidocaine (LMX) 4 % cream   Topical 4x Daily PRN    losartan (COZAAR) tablet 50 mg  50 mg Oral Daily    metroNIDAZOLE (FLAGYL) IVPB (premix) 500 mg 100 mL  500 mg Intravenous Q8H    nystatin (MYCOSTATIN) oral suspension 500,000 Units  500,000 Units Swish & Swallow 4x Daily    pantoprazole (PROTONIX) injection 40 mg  40 mg Intravenous Q24H YUNI    pravastatin (PRAVACHOL) tablet 40 mg  40 mg Oral QPM    QUEtiapine (SEROquel) tablet 25 mg  25 mg Oral HS    sodium chloride 0.9 % infusion  75 mL/hr Intravenous Continuous    valACYclovir (VALTREX) tablet 1,000 mg  1,000 mg Oral BID   Patient's wife reports that the patient takes Diltiazem  mg QAM, Cinnamon 1000 mg (capsules) QAM, Eliquis 2.5 mg BID QAM/QHS, Hydroxychloroquine 200 mg QAM, Citracal (Ca2+) 650 mg QAM, Olmesartan 20 mg QAM, Pravastatin 40 mg QHS, Lantus Insulin 16 units QHS, Prednisone 1 mg QAM, and Seroquel 25 mg QHS.    Allergies   Allergen Reactions    Contrast Dye [Iodinated Contrast Media] Other (See Comments)     Pt states kidney dysfunction..     Other        Objective     Intake/Output Summary (Last 24 hours) at 6/18/2024 1649  Last data filed at 6/18/2024 0922  Gross per 24 hour   Intake 2265 ml   Output 400 ml   Net 1865 ml     Invasive Devices       Peripheral Intravenous Line  Duration             Peripheral IV 06/16/24 Right Forearm 1 day                    Physical Exam  Vitals and nursing note reviewed.   Constitutional:       General: He is not in acute distress.     Appearance: He is well-developed.      Comments: Frail looking   HENT:      Head: Normocephalic and atraumatic.      Ears:      Comments: [place]     Mouth/Throat:      Mouth: Mucous membranes are dry.   Eyes:      Conjunctiva/sclera: Conjunctivae  normal.   Cardiovascular:      Rate and Rhythm: Regular rhythm. Tachycardia present.      Heart sounds: No murmur heard.  Pulmonary:      Effort: Pulmonary effort is normal. No respiratory distress.      Breath sounds: Normal breath sounds.   Abdominal:      Palpations: Abdomen is soft.      Tenderness: There is abdominal tenderness.   Musculoskeletal:         General: No swelling.      Cervical back: Neck supple.      Right lower leg: No edema.      Left lower leg: No edema.   Skin:     General: Skin is warm and dry.      Capillary Refill: Capillary refill takes less than 2 seconds.      Findings: Bruising and rash present.   Neurological:      Mental Status: He is alert. He is disoriented.      Comments: AAOx2-3   Psychiatric:      Comments: Agitated at times         Lab Results:   I have personally reviewed pertinent lab results including the following:  - CBC cMP TSH    I have personally reviewed the following imaging study reports in PACS:  - CT abdomen and pelvis      Therapies:   PT: evaluation pending    VTE Prophylaxis: SCD    Code Status: Level 1 - Full Code  Advance Directive and Living Will:      Power of :    POLST:      Family and Social Support:   Living Arrangements: Lives w/ Spouse/significant other  Support Systems: Spouse/significant other  Type of Current Residence: 2 story home      Goals of Care: return home    Thank you for allowing me to participate in your patients' care. Please do not hesitate to call with any additional questions.  Katy Sykes MD

## 2024-06-18 NOTE — PROGRESS NOTES
Progress Note - Hal Miller 84 y.o. male MRN: [id number]    Unit/Bed#: W -01 Encounter: [id number]      Assessment:  Hal Miller is a 84 y.o. male who p/w L inguinal hernia w/diverticular abscess, possible acute on chronic component    VSS afebrile  WBC 5.8    Plan:  Monitor L groin  Trend WBC/fever curve  IR consulted - no drainage to be performed  Continue IV abx  Recommend DVT ppx and hold immunosuppression as able (prednisone, plaquenil)      Subjective:   Patient seen and examined bedside.  No interval changes  L groin nontender this morning.  Denies fevers, n/v    Objective:     Vitals: Blood pressure 123/72, pulse 75, temperature (!) 97.3 °F (36.3 °C), resp. rate 18, SpO2 98%.,There is no height or weight on file to calculate BMI.      Intake/Output Summary (Last 24 hours) at 6/18/2024 0938  Last data filed at 6/18/2024 0922  Gross per 24 hour   Intake 2265 ml   Output 400 ml   Net 1865 ml       Physical Exam:   General - no acute distress, responsive and cooperative  CV - warm, regular rate  Pulm - normal work of breathing, no respiratory distress  Abd - soft, nondistended, left groin with palpable subq emphysema, nontender  Neuro - m/s grossly intact, cn grossly intact  Ext - moving all extremities       Invasive Devices       Peripheral Intravenous Line  Duration             Peripheral IV 06/16/24 Right Forearm 1 day                    Lab, Imaging and other studies: I have personally reviewed pertinent reports.

## 2024-06-18 NOTE — UTILIZATION REVIEW
Initial Clinical Review    Admission: Date/Time/Statement:   Admission Orders (From admission, onward)       Ordered        06/17/24 0008  INPATIENT ADMISSION  Once                          Orders Placed This Encounter   Procedures    INPATIENT ADMISSION     Standing Status:   Standing     Number of Occurrences:   1     Order Specific Question:   Level of Care     Answer:   Med Surg [16]     Order Specific Question:   Estimated length of stay     Answer:   More than 2 Midnights     Order Specific Question:   Certification     Answer:   I certify that inpatient services are medically necessary for this patient for a duration of greater than two midnights. See H&P and MD Progress Notes for additional information about the patient's course of treatment.     ED Arrival Information       Expected   -    Arrival   6/16/2024 17:07    Acuity   Urgent              Means of arrival   Walk-In    Escorted by   Family Member    Service   Hospitalist    Admission type   Emergency              Arrival complaint   Weakness/SOB             Chief Complaint   Patient presents with    Shortness of Breath     Pt presenting with SOB that has been off and on, also c/o generalized weakness and fatigue. Denies CP, fevers, chills. Pt has open shingles lesions on his chest that started a few days ago (+) drainage        Initial Presentation: 84 y.o. male  PMH of vascular dementia, CAD, CKD, IDDM, HTN, CVA, afib on eliquis, PMR on prednisone who presents to ED 6/16/24  from home with abd pain, dyspnea, nonproductive cough with post tussive vomiting , shingles painful rash. New left groin swelling and tenderness.    On exam, swelling, tenderness in LLQ/groin area suspicious for hernia, has  abdominal tenderness with distention, wheezing noted . Dry mucous membranes .Vesicular rash on right chest , R upper back.   Labs - Creat 1.54 , at baseline  CT A/P with concern for diverticular abscess. Pt given IV abx , IVF, IV steroid, neb in ED . Pt  admitted as Inpatient 6/17 with colonic diverticular abscess.SOB. Herpes Zoster. Plan- general sx and IR consults, NPO, IV abx - Empiric ceftriaxone and flagyl. IVF . Valtrex TID. Contact isolation . Hold home Eliquis pending IR consult. Transition to po prednisone from IV Solumedrol. Nebs .   Anticipated Length of Stay/Certification Statement: Patient will be admitted on an inpatient basis with an anticipated length of stay of greater than 2 midnights secondary to diverticular abscess     Date 6/17 day 2   General sx consult- increasing lump in the left groin. On exam has a slight bulge in the left groin. Does feel as if there is some air within it. No overlying skin changes. Appears to be a diverticular abscess emanating into the left inguinal hernia on CT . Plan- IV abx , IR consult for percutaneous drainage .  Keep NPO. Hold Eliquis. Recommend CT AP w/PO and rectal contrast. Patient had refused IV contrast for concerns of impact on renal function.   IR consult- CT performed showed a primarily air collection in the left inguinal region, that had little/no fluid component, appears like a contained perforation with currently no fluid collection. IV contrast could be performed to further evaluate, but doubtful this would change current management. Would not recommend drain at this time due to small/no fluid collection.     Date: 6/18   Day 3: Has surpassed a 2nd midnight with active treatments and services. L inguinal hernia w/diverticular abscess, possible acute on chronic component . WBC WNL. No interval changes in L groin .L groin with palpable subq emphysema, nontender this am. Pt continues on IV Ceftriaxone, IV Flagyl . Per general sx , recommend DVT ppx and hold immunosuppression as able (prednisone, plaquenil) . CT A/P yesterday similar in appearance to prior CT, contrast hadn't reached area of suspected abscess . Pt ordered repeat CT A/P today .         ED Triage Vitals   Temperature Pulse Respirations Blood  Pressure SpO2 Pain Score   06/16/24 1800 06/16/24 1800 06/16/24 1800 06/16/24 1800 06/16/24 1800 06/17/24 0700   98.2 °F (36.8 °C) 93 20 147/82 98 % No Pain     Weight (last 2 days)       None            Vital Signs (last 3 days)       Date/Time Temp Pulse Resp BP MAP (mmHg) SpO2 O2 Device Patient Position - Orthostatic VS [place] Coma Scale Score Pain    06/18/24 0744 -- -- -- -- -- 98 % -- -- -- --    06/18/24 07:30:19 97.3 °F (36.3 °C) 75 -- 123/72 89 100 % -- -- -- --    06/18/24 07:29:25 97.3 °F (36.3 °C) 70 -- 123/72 89 100 % -- -- -- --    06/18/24 00:00:01 98 °F (36.7 °C) 84 18 125/61 94 94 % -- -- -- --    06/17/24 2100 -- -- -- -- -- 99 % None (Room air) -- -- --    06/17/24 2055 -- -- -- -- -- -- None (Room air) -- 15 3    06/17/24 2011 -- -- -- -- -- -- -- -- -- 2    06/17/24 1951 -- 59 18 146/72 100 100 % Nasal cannula Standing -- --    06/17/24 1807 -- 76 16 135/60 87 99 % None (Room air) Lying -- No Pain    06/17/24 1607 -- 73 16 136/65 94 100 % None (Room air) Lying -- No Pain    06/17/24 1500 98 °F (36.7 °C) 74 16 139/64 92 100 % Aerosol mask Lying -- No Pain    06/17/24 1100 -- 88 17 145/78 104 95 % None (Room air) Lying -- --    06/17/24 1000 -- 80 17 157/72 104 97 % None (Room air) Lying -- --    06/17/24 0900 -- 78 18 166/80 114 97 % None (Room air) Lying -- --    06/17/24 0800 -- 79 18 180/86 121 97 % None (Room air) Lying -- --    06/17/24 0700 97.8 °F (36.6 °C) 80 16 162/86 115 97 % None (Room air) Lying -- No Pain    06/17/24 0600 -- 81 16 168/82 117 95 % None (Room air) Lying -- --    06/17/24 0500 -- 79 16 147/80 108 93 % None (Room air) Lying -- --    06/17/24 0400 -- 81 16 153/89 117 96 % None (Room air) Lying -- --    06/17/24 0300 -- 79 16 145/93 115 98 % None (Room air) Lying -- --    06/16/24 2300 -- 82 16 163/84 113 96 % None (Room air) Sitting -- --    06/16/24 2130 -- 81 16 109/73 85 96 % None (Room air) Sitting -- --    06/16/24 2100 -- 85 18 135/73 98 96 % None (Room air)  Sitting -- --    06/16/24 2030 -- 83 18 159/87 114 97 % None (Room air) Sitting -- --    06/16/24 1920 -- -- -- -- -- -- -- -- 15 --    06/16/24 1800 98.2 °F (36.8 °C) 93 20 147/82 106 98 % None (Room air) Sitting -- --              Pertinent Labs/Diagnostic Test Results:   Radiology:  CT abdomen pelvis wo contrast   Final Interpretation by Kyaw Bucio DO (06/18 0635)      Oral contrast is now seen to the level of the transverse colon, the contrast still has not reached the portion of the colon adjacent to the suspected diverticular abscess (axial image 122, series 301). Rectal tube has been intervally removed.      Other findings as above without significant interval change.      Workstation performed: [id number]         CT abdomen pelvis wo contrast   Final Interpretation by Kyaw Bucio DO (06/17 2320)      Oral and rectal contrast administered for this exam. There is redemonstration of a large complex collection adjacent to the proximal sigmoid colon containing air and tracking into the left inguinal region, similar in appearance to the prior and    suspicious for acute on chronic diverticular abscess, as described (axial image 166, series 301, for example). No contrast opacifies the colon in the region of or extends into the suspected abscess.      Rounded atelectasis in the right lower lobe with small dependent pleural effusions, left greater than right, similar in appearance to the prior.      Air trapping in the bilateral lungs with paraseptal emphysematous changes.      Heart appears enlarged. Low-density of the blood pool is noted raising suspicion for anemia.      Enlarged prostate, renal cysts, and other findings as above.         Workstation performed: [id number]         CT abdomen pelvis wo contrast   Final Interpretation by Jose Shultz MD (06/16 2108)      Enlarging gas and fluid collection extending from the antimesenteric border of the sigmoid colon, infiltrating the  left inguinal region soft tissue and coursing under and along the lateral margin of the left inguinal canal. This has increased in size    since 2/26/2024 with new surrounding inflammatory change suggesting a chronic diverticular abscess with a more recent acute component      22 mm cystic retroperitoneal lesion between the aorta and IVC which is increased in size since 2/20/2023. No evidence of soft tissue component. A benign etiology is suspected such as retroperitoneal lymphatic malformation or lymphocele. Initial    evaluation in 3 months with contrast-enhanced CT abdomen/pelvis is recommended. Considerations related to the patient's age and/or comorbidities may be used to alter these recommendations.      Workstation performed: [id number]         XR chest portable   Final Interpretation by Zena Wiley MD (06/16 2311)      No definite acute disease with small chronic loculated pleural effusions, rounded atelectasis in the right lower lobe, and bilateral scar.            Workstation performed: [id number]           Cardiology:  6/16/24  ECGAtrial fibrillation  Rightward axis  ST & T wave abnormality, consider inferolateral ischemia    GI:  No orders to display           Results from last 7 days   Lab Units 06/17/24  0717 06/16/24  1819   WBC Thousand/uL 5.80 7.72   HEMOGLOBIN g/dL 11.2* 11.8*   HEMATOCRIT % 35.5* 37.6   PLATELETS Thousands/uL 112* 123*   TOTAL NEUT ABS Thousands/µL  --  6.09   BANDS PCT % 3  --          Results from last 7 days   Lab Units 06/17/24  0717 06/16/24  1819   SODIUM mmol/L 133* 134*   POTASSIUM mmol/L 4.8 5.0   CHLORIDE mmol/L 102 102   CO2 mmol/L 25 30   ANION GAP mmol/L 6 2*   BUN mg/dL 32* 35*   CREATININE mg/dL 1.38* 1.54*   EGFR ml/min/1.73sq m 47 41   CALCIUM mg/dL 9.6 10.2   MAGNESIUM mg/dL 2.1 2.2   PHOSPHORUS mg/dL 2.1* 2.1*     Results from last 7 days   Lab Units 06/17/24  0717 06/16/24  1819   AST U/L 18 18   ALT U/L 18 21   ALK PHOS U/L 116* 129*   TOTAL PROTEIN  g/dL 5.7* 6.2*   ALBUMIN g/dL 3.4* 3.8   TOTAL BILIRUBIN mg/dL 1.20* 1.12*     Results from last 7 days   Lab Units 06/18/24  1111 06/18/24  0728 06/18/24  0155 06/17/24  1646 06/17/24  1345 06/17/24  0846 06/17/24  0603 06/17/24  0438 06/17/24  0329 06/17/24  0300 06/16/24  1811   POC GLUCOSE mg/dl 310* 280* 297* 389* 209* 183* 134 126 105 100 110     Results from last 7 days   Lab Units 06/17/24  0717 06/16/24  1819   GLUCOSE RANDOM mg/dL 156* 125             BETA-HYDROXYBUTYRATE   Date Value Ref Range Status   07/02/2022 0.2 <0.6 mmol/L Final            Results from last 7 days   Lab Units 06/16/24  2019 06/16/24  1819   HS TNI 0HR ng/L  --  56*   HS TNI 2HR ng/L 46  --    HSTNI D2 ng/L -10  --          Results from last 7 days   Lab Units 06/17/24  0717   PROTIME seconds 16.2*   INR  1.23*             Results from last 7 days   Lab Units 06/16/24  1900   LACTIC ACID mmol/L 0.9             ED Treatment-Medication Administration from 06/16/2024 1707 to 06/17/2024 2006         Date/Time Order Dose Route Action     06/17/2024 0039 ceftriaxone (ROCEPHIN) 1 g/50 mL in dextrose IVPB 1,000 mg Intravenous New Bag     06/17/2024 0145 metroNIDAZOLE (FLAGYL) IVPB (premix) 500 mg 100 mL 500 mg Intravenous New Bag     06/17/2024 0924 metroNIDAZOLE (FLAGYL) IVPB (premix) 500 mg 100 mL 500 mg Intravenous New Bag     06/17/2024 1543 metroNIDAZOLE (FLAGYL) IVPB (premix) 500 mg 100 mL 500 mg Intravenous New Bag     06/17/2024 0240 sodium chloride 0.9 % infusion 75 mL/hr Intravenous New Bag     06/17/2024 0920 pantoprazole (PROTONIX) injection 40 mg 40 mg Intravenous Given     06/17/2024 0900 levalbuterol (XOPENEX) inhalation solution 1.25 mg 1.25 mg Nebulization Given     06/17/2024 1452 levalbuterol (XOPENEX) inhalation solution 1.25 mg 1.25 mg Nebulization Given     06/17/2024 0900 ipratropium (ATROVENT) 0.02 % inhalation solution 0.5 mg 0.5 mg Nebulization Given     06/17/2024 1452 ipratropium (ATROVENT) 0.02 % inhalation  solution 0.5 mg 0.5 mg Nebulization Given     06/17/2024 0240 methylPREDNISolone sodium succinate (Solu-MEDROL) injection 80 mg 80 mg Intravenous Given     06/17/2024 0923 valACYclovir (VALTREX) tablet 1,000 mg 1,000 mg Oral Given     06/17/2024 1231 methylPREDNISolone sodium succinate (Solu-MEDROL) injection 40 mg 40 mg Intravenous Given     06/17/2024 0923 diltiazem (CARDIZEM CD) 24 hr capsule 180 mg 180 mg Oral Given     06/17/2024 0923 nystatin (MYCOSTATIN) oral suspension 500,000 Units 500,000 Units Swish & Swallow Given     06/17/2024 1231 nystatin (MYCOSTATIN) oral suspension 500,000 Units 500,000 Units Swish & Swallow Given     06/17/2024 0923 losartan (COZAAR) tablet 50 mg 50 mg Oral Given     06/17/2024 1231 lidocaine (LMX) 4 % cream 1 Application Topical Given     06/17/2024 1357 insulin lispro (HumALOG/ADMELOG) 100 units/mL subcutaneous injection 1-5 Units 1 Units Subcutaneous Given     06/17/2024 1651 insulin lispro (HumALOG/ADMELOG) 100 units/mL subcutaneous injection 1-5 Units 4 Units Subcutaneous Given            Past Medical History:   Diagnosis Date    Acute encephalopathy 2/14/2023    Acute-on-chronic kidney injury  (HCC) 6/13/2017    Cancer (HCC)     pancreatic    Colon polyp     Coronary artery disease     Dehydration 3/3/2023    Diabetes mellitus (HCC)     Hyperlipidemia     Hypertension     Left lower lobe pneumonia 7/2/2022    Pneumonia 06/13/2017    Right middle and lower lobe     Stroke (HCC)      Present on Admission:   Type 2 diabetes mellitus (HCC)   Atrial fibrillation (HCC)   Diabetes mellitus (HCC)   Inflammatory polyarthropathy (HCC)   Shortness of breath   CKD (chronic kidney disease)      Admitting Diagnosis: Shingles [B02.9]  SOB (shortness of breath) [R06.02]  Failure to thrive in adult [R62.7]  Chronic obstructive pulmonary disease with acute exacerbation (HCC) [J44.1]  Colonic diverticular abscess [K57.20]  Age/Sex: 84 y.o. male  Admission Orders:  Scheduled  Medications:  cefTRIAXone, 1,000 mg, Intravenous, Q24H  diltiazem, 180 mg, Oral, Daily  hydroxychloroquine, 200 mg, Oral, Daily  insulin lispro, 1-5 Units, Subcutaneous, TID AC  ipratropium, 0.5 mg, Nebulization, TID  levalbuterol, 1.25 mg, Nebulization, TID  losartan, 50 mg, Oral, Daily  metroNIDAZOLE, 500 mg, Intravenous, Q8H  nystatin, 500,000 Units, Swish & Swallow, 4x Daily  pantoprazole, 40 mg, Intravenous, Q24H YUNI  pravastatin, 40 mg, Oral, QPM  predniSONE, 10 mg, Oral, Daily  valACYclovir, 1,000 mg, Oral, BID      Continuous IV Infusions:  sodium chloride, 75 mL/hr, Intravenous, Continuous      PRN Meds:  acetaminophen, 650 mg, Oral, Q6H PRN  HYDROmorphone, 0.2 mg, Intravenous, Q4H PRN  lidocaine, , Topical, 4x Daily PRN  QUEtiapine, 25 mg, Oral, HS PRN      Contact isolation    NPO 6/18 @ 0106   OOB as katy   SCD        IP CONSULT TO ACUTE CARE SURGERY  INPATIENT CONSULT TO IR  IP CONSULT TO CASE MANAGEMENT    Network Utilization Review Department  ATTENTION: Please call with any questions or concerns to [phone] and carefully listen to the prompts so that you are directed to the right person. All voicemails are confidential.   For Discharge needs, contact Care Management DC Support Team at [phone] opt. 2  Send all requests for admission clinical reviews, approved or denied determinations and any other requests to dedicated fax number below belonging to the campus where the patient is receiving treatment. List of dedicated fax numbers for the Facilities:  FACILITY NAME UR FAX NUMBER   ADMISSION DENIALS (Administrative/Medical Necessity) [phone]   DISCHARGE SUPPORT TEAM (NETWORK) [phone]   PARENT CHILD HEALTH (Maternity/NICU/Pediatrics) [phone]   [facility] [phone]   [facility] [phone]   [facility] [phone]   [facility] [phone]   [facility]  [place] [phone]   [facility] [phone]   [facility] [phone]   [facility] [phone]   [facility] [phone]   [facility] [phone]   [facility] [phone]   [facility] [phone]

## 2024-06-18 NOTE — ASSESSMENT & PLAN NOTE
-Patient has history of COPD.  -Received steroids on admission.  -Nebulized bronchodilators.  -Continue encouraging incentive spirometry.

## 2024-06-18 NOTE — ASSESSMENT & PLAN NOTE
Lab Results   Component Value Date    HGBA1C 7.6 (H) 06/19/2024       Recent Labs     06/25/24  1615 06/25/24  2115 06/26/24  0733 06/26/24  1015   POCGLU 161* 167* 101 131       Blood Sugar Average: Last 72 hrs:  (P) 132.875    -Glucose currently controlled.  -Continue insulin regimen.  -Avoid hypoglycemia.

## 2024-06-18 NOTE — ASSESSMENT & PLAN NOTE
Lab Results   Component Value Date    HGBA1C 7.4 (H) 06/07/2024       Recent Labs     06/18/24  0728 06/18/24  1111 06/18/24  1418 06/18/24  1554   POCGLU 280* 310* 257* 236*       Blood Sugar Average: Last 72 hrs:  (P) [id number]

## 2024-06-18 NOTE — ASSESSMENT & PLAN NOTE
-Patient is AAO x 2-3. At his baseline.  -Patient is high risk of delirium due to recent episode of acute encephalopathy, dementia at baseline, hospitalization, insomnia, metabolic imbalance, diverticulitis.Continue delirium precautions.  -Maintain normal sleep/wake cycle.  -Minimize overnight interruptions, group overnight vitals/labs/nursing checks as possible.  -Dim lights, close blinds and turn off tv to minimize stimulation and encourage sleep environment in evenings.  -Continue monitoring for fecal and urinary retention which may precipitate delirium.   -Patient reports a large bowel movement last night as well as urinating 2x each time he gets up. Continue Senna 2 tabs BID.  -Encourage early mobilization and ambulation. Continue working with PT to assist in returning to baseline ambulation status.  -Provide frequent reorientation and redirection as needed.  -Encourage family and friends at the bedside to help calm patient if anxious.  -Avoid medications which may precipitate or worsen delirium such as tramadol, benzodiazepine, anticholinergics, and antihistaminics.  -Encourage hydration and nutrition; assist with feeding if needed.  -Redirect unwanted behaviors as first line, avoid physical restraints.   -MRI results as follows:  Small acute infarct in the left parietal periventricular white matter with an associated small focus of susceptibility that may represent associated petechial hemorrhage.  Redemonstrated foci of susceptibility, including new foci, nonspecific and could be seen with cerebral amyloid angiopathy, multiple cavernomas and/or prior embolic events, among other considerations.  -Spot EEG interpretation as follows:  Excessive theta activity during wakefulness suggest mild nonspecific diffuse cerebral dysfunction.  No epileptiform discharges or seizures are present.  -Neurology following.  -Appreciate PT/OT recommendation of [facility].  -Appreciate speech pathologist evaluation.  -Continue  controlling pain with Tylenol 975 mg Q8H.  -Continue Depakote 500 mg Q8H and monitor level.  -Continue Melatonin 3 mg QHS

## 2024-06-18 NOTE — ASSESSMENT & PLAN NOTE
-Patient continues to be AAO x 2-3. At his baseline.  -Continue to provide supportive care and reorient as needed.  -Patient is at high risk for delirium. Continue to monitor closely and stay on delirium precautions.  -Maintain sleep/wake cycle.  -Monitor for constipation and urinary retention and manage as needed.  -Continue encouraging family to visit.  -Continue encouraging patient to wear glasses and hearing aids while awake.  -Monitor TSH/B12 levels. TSH and B12 WNL last time checked.  -Continue encouraging PO intake and assist with feeding if needed.

## 2024-06-18 NOTE — ASSESSMENT & PLAN NOTE
-Zoster lesions are crusted over on right chest wall and right upper back.  -Completed Valtrex.  -Continue pain regimen with Tylenol 975 mg PO TID. Currently patient denies any present pain.

## 2024-06-18 NOTE — ASSESSMENT & PLAN NOTE
-Continue to hold Hydroxychloroquine with last dose administered 6/18.  -Continue Prednisone 1 mg QD.

## 2024-06-18 NOTE — ASSESSMENT & PLAN NOTE
-Patient was walking independently prior to admission to hospital.   -Patient continues to require assistance upon his current state, though he is improving.  -Continue PT/OT in attempt to return to baseline.   -Patient reports walking 90 steps with walker yesterday  -Patient reports looking down at his feet while walking. He wants to start looking upward with time and confidence.  -Continue to monitor orthostatic vitals.  -Continue fall precautions.

## 2024-06-18 NOTE — ASSESSMENT & PLAN NOTE
-Continue to monitor orthostatic vital signs.  -Maintain hydration status.  -Continue fall precautions.

## 2024-06-18 NOTE — MALNUTRITION/BMI
This medical record reflects one or more clinical indicators suggestive of malnutrition and/or morbid obesity.    Malnutrition Findings:   Adult Malnutrition type: Acute illness  Adult Degree of Malnutrition: Other severe protein calorie malnutrition  Malnutrition Characteristics: Weight loss, Inadequate energy              360 Statement: Malnutrition related to inadequate energy intake as evidenced by 12#(7%) weight loss from 5/15/# to 6/7/# and <50% energy intake compared to estimated needs >5 days.    Advance diet when medically stable.     BMI Findings:           Body mass index is 21.51 kg/m².     See Nutrition note dated 6/18/2024 for additional details.  Completed nutrition assessment is viewable in the nutrition documentation.

## 2024-06-18 NOTE — ASSESSMENT & PLAN NOTE
-Continue to hold Seroquel 25 mg QHS with last dose administered 6/18.   -Patient's sleep was controlled at home on Seroquel 25 mg QHS- currently on hold.  -Patient reports sleeping poorly last night due to agitation/anxiety awaiting discharge. He is ready to go home.  -Encourage good sleep hygiene.  -Avoid sedative hypnotics.  -Continue Melatonin 3 mg QHS.

## 2024-06-18 NOTE — PLAN OF CARE
Problem: PAIN - ADULT  Goal: Verbalizes/displays adequate comfort level or baseline comfort level  Description: Interventions:  - Encourage patient to monitor pain and request assistance  - Assess pain using appropriate pain scale  - Administer analgesics based on type and severity of pain and evaluate response  - Implement non-pharmacological measures as appropriate and evaluate response  - Consider cultural and social influences on pain and pain management  - Notify physician/advanced practitioner if interventions unsuccessful or patient reports new pain  Outcome: Progressing     Problem: INFECTION - ADULT  Goal: Absence or prevention of progression during hospitalization  Description: INTERVENTIONS:  - Assess and monitor for signs and symptoms of infection  - Monitor lab/diagnostic results  - Monitor all insertion sites, i.e. indwelling lines, tubes, and drains  - Monitor endotracheal if appropriate and nasal secretions for changes in amount and color  - [place] appropriate cooling/warming therapies per order  - Administer medications as ordered  - Instruct and encourage patient and family to use good hand hygiene technique  - Identify and instruct in appropriate isolation precautions for identified infection/condition  Outcome: Progressing  Goal: Absence of fever/infection during neutropenic period  Description: INTERVENTIONS:  - Monitor WBC    Outcome: Progressing     Problem: SAFETY ADULT  Goal: Patient will remain free of falls  Description: INTERVENTIONS:  - Educate patient/family on patient safety including physical limitations  - Instruct patient to call for assistance with activity   - Consult OT/PT to assist with strengthening/mobility   - Keep Call bell within reach  - Keep bed low and locked with side rails adjusted as appropriate  - Keep care items and personal belongings within reach  - Initiate and maintain comfort rounds  - Make Fall Risk Sign visible to staff  - Offer Toileting every  Hours,  in advance of need  - Initiate/Maintain alarm  - Obtain necessary fall risk management equipment:   - Apply yellow socks and bracelet for high fall risk patients  - Consider moving patient to room near nurses station  Outcome: Progressing  Goal: Maintain or return to baseline ADL function  Description: INTERVENTIONS:  -  Assess patient's ability to carry out ADLs; assess patient's baseline for ADL function and identify physical deficits which impact ability to perform ADLs (bathing, care of mouth/teeth, toileting, grooming, dressing, etc.)  - Assess/evaluate cause of self-care deficits   - Assess range of motion  - Assess patient's mobility; develop plan if impaired  - Assess patient's need for assistive devices and provide as appropriate  - Encourage maximum independence but intervene and supervise when necessary  - Involve family in performance of ADLs  - Assess for home care needs following discharge   - Consider OT consult to assist with ADL evaluation and planning for discharge  - Provide patient education as appropriate  Outcome: Progressing  Goal: Maintains/Returns to pre admission functional level  Description: INTERVENTIONS:  - Perform AM-PAC 6 Click Basic Mobility/ Daily Activity assessment daily.  - Set and communicate daily mobility goal to care team and patient/family/caregiver.   - Collaborate with rehabilitation services on mobility goals if consulted  - Perform Range of Motion  times a day.  - Reposition patient every  hours.  - Dangle patient times a day  - Stand patient  times a day  - Ambulate patient  times a day  - Out of bed to chair  times a day   - Out of bed for meals times a day  - Out of bed for toileting  - Record patient progress and toleration of activity level   Outcome: Progressing     Problem: DISCHARGE PLANNING  Goal: Discharge to home or other facility with appropriate resources  Description: INTERVENTIONS:  - Identify barriers to discharge w/patient and caregiver  - Arrange for  needed discharge resources and transportation as appropriate  - Identify discharge learning needs (meds, wound care, etc.)  - Arrange for interpretive services to assist at discharge as needed  - Refer to Case Management Department for coordinating discharge planning if the patient needs post-hospital services based on physician/advanced practitioner order or complex needs related to functional status, cognitive ability, or social support system  Outcome: Progressing     Problem: Knowledge Deficit  Goal: Patient/family/caregiver demonstrates understanding of disease process, treatment plan, medications, and discharge instructions  Description: Complete learning assessment and assess knowledge base.  Interventions:  - Provide teaching at level of understanding  - Provide teaching via preferred learning methods  Outcome: Progressing

## 2024-06-18 NOTE — ASSESSMENT & PLAN NOTE
Lab Results   Component Value Date    EGFR 48 06/26/2024    EGFR 54 06/25/2024    EGFR 48 06/24/2024    CREATININE 1.35 (H) 06/26/2024    CREATININE 1.23 06/25/2024    CREATININE 1.34 (H) 06/24/2024     -Baseline CKD Stage 3.  -Continue to monitor Creatinine, although it is stable. Cr 1.34 today.  -Avoid nephrotoxic medication and renally dose all medications.  -Encourage PO hydration.  -Continue monitoring BMP.

## 2024-06-19 ENCOUNTER — APPOINTMENT (INPATIENT)
Dept: NEUROLOGY | Facility: HOSPITAL | Age: 84
DRG: 391 | End: 2024-06-19
Payer: COMMERCIAL

## 2024-06-19 PROBLEM — Z90.410 HISTORY OF WHIPPLE PROCEDURE: Status: ACTIVE | Noted: 2024-06-19

## 2024-06-19 PROBLEM — R56.9 SEIZURE (HCC): Status: ACTIVE | Noted: 2024-06-19

## 2024-06-19 PROBLEM — J96.20 ACUTE ON CHRONIC RESPIRATORY FAILURE (HCC): Status: ACTIVE | Noted: 2024-06-19

## 2024-06-19 PROBLEM — Z90.49 HISTORY OF WHIPPLE PROCEDURE: Status: ACTIVE | Noted: 2024-06-19

## 2024-06-19 LAB
2HR DELTA HS TROPONIN: -1 NG/L
4HR DELTA HS TROPONIN: -2 NG/L
ALBUMIN SERPL BCG-MCNC: 2.9 G/DL (ref 3.5–5)
ALBUMIN SERPL BCG-MCNC: 3.2 G/DL (ref 3.5–5)
ALP SERPL-CCNC: 91 U/L (ref 34–104)
ALP SERPL-CCNC: 97 U/L (ref 34–104)
ALT SERPL W P-5'-P-CCNC: 18 U/L (ref 7–52)
ALT SERPL W P-5'-P-CCNC: 20 U/L (ref 7–52)
AMMONIA PLAS-SCNC: 27 UMOL/L (ref 18–72)
ANION GAP SERPL CALCULATED.3IONS-SCNC: 2 MMOL/L (ref 4–13)
ANION GAP SERPL CALCULATED.3IONS-SCNC: 3 MMOL/L (ref 4–13)
APTT PPP: 33 SECONDS (ref 23–37)
APTT PPP: 82 SECONDS (ref 23–37)
ARTERIAL PATENCY WRIST A: ABNORMAL
AST SERPL W P-5'-P-CCNC: 20 U/L (ref 13–39)
AST SERPL W P-5'-P-CCNC: 20 U/L (ref 13–39)
BASE EX.OXY STD BLDV CALC-SCNC: 92.7 % (ref 60–80)
BASE EXCESS BLDA CALC-SCNC: -3 MMOL/L (ref -2–3)
BASE EXCESS BLDV CALC-SCNC: -3.4 MMOL/L
BASOPHILS # BLD AUTO: 0.01 THOUSANDS/ÂΜL (ref 0–0.1)
BASOPHILS NFR BLD AUTO: 0 % (ref 0–1)
BILIRUB SERPL-MCNC: 0.73 MG/DL (ref 0.2–1)
BILIRUB SERPL-MCNC: 0.85 MG/DL (ref 0.2–1)
BILIRUB UR QL STRIP: NEGATIVE
BUN SERPL-MCNC: 42 MG/DL (ref 5–25)
BUN SERPL-MCNC: 45 MG/DL (ref 5–25)
CA-I BLD-SCNC: 1.41 MMOL/L (ref 1.12–1.32)
CALCIUM ALBUM COR SERPL-MCNC: 9.8 MG/DL (ref 8.3–10.1)
CALCIUM ALBUM COR SERPL-MCNC: 9.9 MG/DL (ref 8.3–10.1)
CALCIUM SERPL-MCNC: 9 MG/DL (ref 8.4–10.2)
CALCIUM SERPL-MCNC: 9.2 MG/DL (ref 8.4–10.2)
CARDIAC TROPONIN I PNL SERPL HS: 32 NG/L
CARDIAC TROPONIN I PNL SERPL HS: 33 NG/L
CHLORIDE SERPL-SCNC: 104 MMOL/L (ref 96–108)
CHLORIDE SERPL-SCNC: 105 MMOL/L (ref 96–108)
CLARITY UR: CLEAR
CO2 SERPL-SCNC: 23 MMOL/L (ref 21–32)
CO2 SERPL-SCNC: 25 MMOL/L (ref 21–32)
COLOR UR: ABNORMAL
CREAT SERPL-MCNC: 1.54 MG/DL (ref 0.6–1.3)
CREAT SERPL-MCNC: 1.65 MG/DL (ref 0.6–1.3)
EOSINOPHIL # BLD AUTO: 0 THOUSAND/ÂΜL (ref 0–0.61)
EOSINOPHIL NFR BLD AUTO: 0 % (ref 0–6)
ERYTHROCYTE [DISTWIDTH] IN BLOOD BY AUTOMATED COUNT: 14.1 % (ref 11.6–15.1)
ERYTHROCYTE [DISTWIDTH] IN BLOOD BY AUTOMATED COUNT: 14.2 % (ref 11.6–15.1)
EST. AVERAGE GLUCOSE BLD GHB EST-MCNC: 171 MG/DL
FIO2 GAS DIL.REBREATH: 21 L
FOLATE SERPL-MCNC: 6.3 NG/ML
GFR SERPL CREATININE-BSD FRML MDRD: 38 ML/MIN/1.73SQ M
GFR SERPL CREATININE-BSD FRML MDRD: 41 ML/MIN/1.73SQ M
GLUCOSE SERPL-MCNC: 125 MG/DL (ref 65–140)
GLUCOSE SERPL-MCNC: 152 MG/DL (ref 65–140)
GLUCOSE SERPL-MCNC: 159 MG/DL (ref 65–140)
GLUCOSE SERPL-MCNC: 167 MG/DL (ref 65–140)
GLUCOSE SERPL-MCNC: 169 MG/DL (ref 65–140)
GLUCOSE SERPL-MCNC: 169 MG/DL (ref 65–140)
GLUCOSE SERPL-MCNC: 179 MG/DL (ref 65–140)
GLUCOSE SERPL-MCNC: 197 MG/DL (ref 65–140)
GLUCOSE UR STRIP-MCNC: ABNORMAL MG/DL
HBA1C MFR BLD: 7.6 %
HCO3 BLDA-SCNC: 22 MMOL/L (ref 22–28)
HCO3 BLDV-SCNC: 21.5 MMOL/L (ref 24–30)
HCT VFR BLD AUTO: 31.2 % (ref 36.5–49.3)
HCT VFR BLD AUTO: 32.5 % (ref 36.5–49.3)
HCT VFR BLD CALC: 32 % (ref 36.5–49.3)
HGB BLD-MCNC: 10.1 G/DL (ref 12–17)
HGB BLD-MCNC: 10.5 G/DL (ref 12–17)
HGB BLDA-MCNC: 10.9 G/DL (ref 12–17)
HGB UR QL STRIP.AUTO: NEGATIVE
IMM GRANULOCYTES # BLD AUTO: 0.02 THOUSAND/UL (ref 0–0.2)
IMM GRANULOCYTES NFR BLD AUTO: 0 % (ref 0–2)
INR PPP: 1.27 (ref 0.84–1.19)
KETONES UR STRIP-MCNC: NEGATIVE MG/DL
LACTATE SERPL-SCNC: 1 MMOL/L (ref 0.5–2)
LACTATE SERPL-SCNC: 1.1 MMOL/L (ref 0.5–2)
LEUKOCYTE ESTERASE UR QL STRIP: NEGATIVE
LYMPHOCYTES # BLD AUTO: 0.58 THOUSANDS/ÂΜL (ref 0.6–4.47)
LYMPHOCYTES NFR BLD AUTO: 10 % (ref 14–44)
MAGNESIUM SERPL-MCNC: 2.2 MG/DL (ref 1.9–2.7)
MCH RBC QN AUTO: 31.1 PG (ref 26.8–34.3)
MCH RBC QN AUTO: 31.4 PG (ref 26.8–34.3)
MCHC RBC AUTO-ENTMCNC: 32.3 G/DL (ref 31.4–37.4)
MCHC RBC AUTO-ENTMCNC: 32.4 G/DL (ref 31.4–37.4)
MCV RBC AUTO: 96 FL (ref 82–98)
MCV RBC AUTO: 97 FL (ref 82–98)
MONOCYTES # BLD AUTO: 0.44 THOUSAND/ÂΜL (ref 0.17–1.22)
MONOCYTES NFR BLD AUTO: 8 % (ref 4–12)
NEUTROPHILS # BLD AUTO: 4.51 THOUSANDS/ÂΜL (ref 1.85–7.62)
NEUTS SEG NFR BLD AUTO: 82 % (ref 43–75)
NITRITE UR QL STRIP: NEGATIVE
NRBC BLD AUTO-RTO: 0 /100 WBCS
O2 CT BLDV-SCNC: 15.3 ML/DL
PCO2 BLD: 23 MMOL/L (ref 21–32)
PCO2 BLD: 37.3 MM HG (ref 36–44)
PCO2 BLDV: 38.2 MM HG (ref 42–50)
PH BLD: 7.38 [PH] (ref 7.35–7.45)
PH BLDV: 7.37 [PH] (ref 7.3–7.4)
PH UR STRIP.AUTO: 5 [PH]
PHOSPHATE SERPL-MCNC: 2.8 MG/DL (ref 2.3–4.1)
PLATELET # BLD AUTO: 126 THOUSANDS/UL (ref 149–390)
PLATELET # BLD AUTO: 139 THOUSANDS/UL (ref 149–390)
PMV BLD AUTO: 9.6 FL (ref 8.9–12.7)
PMV BLD AUTO: 9.8 FL (ref 8.9–12.7)
PO2 BLD: 80 MM HG (ref 75–129)
PO2 BLDV: 73 MM HG (ref 35–45)
POTASSIUM BLD-SCNC: 4.7 MMOL/L (ref 3.5–5.3)
POTASSIUM SERPL-SCNC: 4.6 MMOL/L (ref 3.5–5.3)
POTASSIUM SERPL-SCNC: 5.3 MMOL/L (ref 3.5–5.3)
PROCALCITONIN SERPL-MCNC: 0.11 NG/ML
PROT SERPL-MCNC: 4.8 G/DL (ref 6.4–8.4)
PROT SERPL-MCNC: 5.2 G/DL (ref 6.4–8.4)
PROT UR STRIP-MCNC: NEGATIVE MG/DL
PROTHROMBIN TIME: 16.6 SECONDS (ref 11.6–14.5)
RBC # BLD AUTO: 3.22 MILLION/UL (ref 3.88–5.62)
RBC # BLD AUTO: 3.38 MILLION/UL (ref 3.88–5.62)
SAMPLE SITE: ABNORMAL
SAO2 % BLD FROM PO2: 96 % (ref 60–85)
SODIUM BLD-SCNC: 132 MMOL/L (ref 136–145)
SODIUM SERPL-SCNC: 130 MMOL/L (ref 135–147)
SODIUM SERPL-SCNC: 132 MMOL/L (ref 135–147)
SP GR UR STRIP.AUTO: 1.01 (ref 1–1.03)
SPECIMEN SOURCE: ABNORMAL
UROBILINOGEN UR STRIP-ACNC: <2 MG/DL
VIT B12 SERPL-MCNC: 531 PG/ML (ref 180–914)
WBC # BLD AUTO: 5.56 THOUSAND/UL (ref 4.31–10.16)
WBC # BLD AUTO: 6.77 THOUSAND/UL (ref 4.31–10.16)

## 2024-06-19 PROCEDURE — 85027 COMPLETE CBC AUTOMATED: CPT

## 2024-06-19 PROCEDURE — 82948 REAGENT STRIP/BLOOD GLUCOSE: CPT

## 2024-06-19 PROCEDURE — NC001 PR NO CHARGE

## 2024-06-19 PROCEDURE — 82330 ASSAY OF CALCIUM: CPT

## 2024-06-19 PROCEDURE — 83036 HEMOGLOBIN GLYCOSYLATED A1C: CPT

## 2024-06-19 PROCEDURE — 94640 AIRWAY INHALATION TREATMENT: CPT

## 2024-06-19 PROCEDURE — 82947 ASSAY GLUCOSE BLOOD QUANT: CPT

## 2024-06-19 PROCEDURE — 85025 COMPLETE CBC W/AUTO DIFF WBC: CPT | Performed by: INTERNAL MEDICINE

## 2024-06-19 PROCEDURE — 99223 1ST HOSP IP/OBS HIGH 75: CPT | Performed by: PSYCHIATRY & NEUROLOGY

## 2024-06-19 PROCEDURE — 36600 WITHDRAWAL OF ARTERIAL BLOOD: CPT

## 2024-06-19 PROCEDURE — 82803 BLOOD GASES ANY COMBINATION: CPT

## 2024-06-19 PROCEDURE — 83735 ASSAY OF MAGNESIUM: CPT

## 2024-06-19 PROCEDURE — 99291 CRITICAL CARE FIRST HOUR: CPT | Performed by: STUDENT IN AN ORGANIZED HEALTH CARE EDUCATION/TRAINING PROGRAM

## 2024-06-19 PROCEDURE — 99232 SBSQ HOSP IP/OBS MODERATE 35: CPT | Performed by: INTERNAL MEDICINE

## 2024-06-19 PROCEDURE — 85610 PROTHROMBIN TIME: CPT

## 2024-06-19 PROCEDURE — 94760 N-INVAS EAR/PLS OXIMETRY 1: CPT

## 2024-06-19 PROCEDURE — 95816 EEG AWAKE AND DROWSY: CPT

## 2024-06-19 PROCEDURE — 84100 ASSAY OF PHOSPHORUS: CPT

## 2024-06-19 PROCEDURE — 85730 THROMBOPLASTIN TIME PARTIAL: CPT

## 2024-06-19 PROCEDURE — 80053 COMPREHEN METABOLIC PANEL: CPT | Performed by: INTERNAL MEDICINE

## 2024-06-19 PROCEDURE — 99232 SBSQ HOSP IP/OBS MODERATE 35: CPT | Performed by: FAMILY MEDICINE

## 2024-06-19 PROCEDURE — 84145 PROCALCITONIN (PCT): CPT

## 2024-06-19 PROCEDURE — 83605 ASSAY OF LACTIC ACID: CPT | Performed by: EMERGENCY MEDICINE

## 2024-06-19 PROCEDURE — 82746 ASSAY OF FOLIC ACID SERUM: CPT

## 2024-06-19 PROCEDURE — 84295 ASSAY OF SERUM SODIUM: CPT

## 2024-06-19 PROCEDURE — 84484 ASSAY OF TROPONIN QUANT: CPT | Performed by: EMERGENCY MEDICINE

## 2024-06-19 PROCEDURE — 85014 HEMATOCRIT: CPT

## 2024-06-19 PROCEDURE — 84132 ASSAY OF SERUM POTASSIUM: CPT

## 2024-06-19 PROCEDURE — NC001 PR NO CHARGE: Performed by: STUDENT IN AN ORGANIZED HEALTH CARE EDUCATION/TRAINING PROGRAM

## 2024-06-19 PROCEDURE — 82805 BLOOD GASES W/O2 SATURATION: CPT

## 2024-06-19 PROCEDURE — 80053 COMPREHEN METABOLIC PANEL: CPT

## 2024-06-19 PROCEDURE — 99232 SBSQ HOSP IP/OBS MODERATE 35: CPT | Performed by: SURGERY

## 2024-06-19 PROCEDURE — C9113 INJ PANTOPRAZOLE SODIUM, VIA: HCPCS | Performed by: NURSE PRACTITIONER

## 2024-06-19 PROCEDURE — 82607 VITAMIN B-12: CPT

## 2024-06-19 PROCEDURE — 82140 ASSAY OF AMMONIA: CPT

## 2024-06-19 PROCEDURE — 85730 THROMBOPLASTIN TIME PARTIAL: CPT | Performed by: STUDENT IN AN ORGANIZED HEALTH CARE EDUCATION/TRAINING PROGRAM

## 2024-06-19 PROCEDURE — 87040 BLOOD CULTURE FOR BACTERIA: CPT

## 2024-06-19 PROCEDURE — 83605 ASSAY OF LACTIC ACID: CPT

## 2024-06-19 RX ORDER — SODIUM CHLORIDE, SODIUM GLUCONATE, SODIUM ACETATE, POTASSIUM CHLORIDE, MAGNESIUM CHLORIDE, SODIUM PHOSPHATE, DIBASIC, AND POTASSIUM PHOSPHATE .53; .5; .37; .037; .03; .012; .00082 G/100ML; G/100ML; G/100ML; G/100ML; G/100ML; G/100ML; G/100ML
500 INJECTION, SOLUTION INTRAVENOUS ONCE
Status: COMPLETED | OUTPATIENT
Start: 2024-06-19 | End: 2024-06-19

## 2024-06-19 RX ORDER — CIPROFLOXACIN 2 MG/ML
400 INJECTION, SOLUTION INTRAVENOUS EVERY 12 HOURS
Status: DISCONTINUED | OUTPATIENT
Start: 2024-06-19 | End: 2024-06-21

## 2024-06-19 RX ORDER — HEPARIN SODIUM 1000 [USP'U]/ML
3200 INJECTION, SOLUTION INTRAVENOUS; SUBCUTANEOUS EVERY 6 HOURS PRN
Status: DISCONTINUED | OUTPATIENT
Start: 2024-06-19 | End: 2024-06-21

## 2024-06-19 RX ORDER — LEVETIRACETAM 500 MG/5ML
500 INJECTION, SOLUTION, CONCENTRATE INTRAVENOUS EVERY 12 HOURS SCHEDULED
Status: DISCONTINUED | OUTPATIENT
Start: 2024-06-19 | End: 2024-06-19

## 2024-06-19 RX ORDER — INSULIN LISPRO 100 [IU]/ML
1-6 INJECTION, SOLUTION INTRAVENOUS; SUBCUTANEOUS EVERY 6 HOURS SCHEDULED
Status: DISCONTINUED | OUTPATIENT
Start: 2024-06-19 | End: 2024-06-20

## 2024-06-19 RX ORDER — ASPIRIN 81 MG/1
81 TABLET, CHEWABLE ORAL DAILY
Status: DISCONTINUED | OUTPATIENT
Start: 2024-06-19 | End: 2024-06-20

## 2024-06-19 RX ORDER — LORAZEPAM 2 MG/ML
2 INJECTION INTRAMUSCULAR ONCE
Status: COMPLETED | OUTPATIENT
Start: 2024-06-19 | End: 2024-06-19

## 2024-06-19 RX ORDER — PREDNISONE 1 MG/1
1 TABLET ORAL DAILY
Status: DISCONTINUED | OUTPATIENT
Start: 2024-06-19 | End: 2024-06-26 | Stop reason: HOSPADM

## 2024-06-19 RX ORDER — LORAZEPAM 2 MG/ML
INJECTION INTRAMUSCULAR
Status: COMPLETED
Start: 2024-06-19 | End: 2024-06-19

## 2024-06-19 RX ORDER — ATORVASTATIN CALCIUM 40 MG/1
40 TABLET, FILM COATED ORAL EVERY EVENING
Status: DISCONTINUED | OUTPATIENT
Start: 2024-06-19 | End: 2024-06-20

## 2024-06-19 RX ORDER — HEPARIN SODIUM 10000 [USP'U]/100ML
3-30 INJECTION, SOLUTION INTRAVENOUS
Status: DISCONTINUED | OUTPATIENT
Start: 2024-06-19 | End: 2024-06-21

## 2024-06-19 RX ORDER — HEPARIN SODIUM 1000 [USP'U]/ML
6400 INJECTION, SOLUTION INTRAVENOUS; SUBCUTANEOUS EVERY 6 HOURS PRN
Status: DISCONTINUED | OUTPATIENT
Start: 2024-06-19 | End: 2024-06-21

## 2024-06-19 RX ORDER — LEVETIRACETAM 500 MG/5ML
1000 INJECTION, SOLUTION, CONCENTRATE INTRAVENOUS ONCE
Status: COMPLETED | OUTPATIENT
Start: 2024-06-19 | End: 2024-06-19

## 2024-06-19 RX ADMIN — LORAZEPAM 2 MG: 2 INJECTION INTRAMUSCULAR; INTRAVENOUS at 14:25

## 2024-06-19 RX ADMIN — CEFTRIAXONE SODIUM 1000 MG: 10 INJECTION, POWDER, FOR SOLUTION INTRAVENOUS at 00:18

## 2024-06-19 RX ADMIN — SODIUM CHLORIDE, SODIUM GLUCONATE, SODIUM ACETATE, POTASSIUM CHLORIDE, MAGNESIUM CHLORIDE, SODIUM PHOSPHATE, DIBASIC, AND POTASSIUM PHOSPHATE 500 ML: .53; .5; .37; .037; .03; .012; .00082 INJECTION, SOLUTION INTRAVENOUS at 15:51

## 2024-06-19 RX ADMIN — VALPROATE SODIUM 500 MG: 100 INJECTION, SOLUTION INTRAVENOUS at 21:50

## 2024-06-19 RX ADMIN — THIAMINE HYDROCHLORIDE: 100 INJECTION, SOLUTION INTRAMUSCULAR; INTRAVENOUS at 16:48

## 2024-06-19 RX ADMIN — LORAZEPAM 2 MG: 2 INJECTION INTRAMUSCULAR at 14:25

## 2024-06-19 RX ADMIN — METRONIDAZOLE 500 MG: 500 INJECTION, SOLUTION INTRAVENOUS at 08:11

## 2024-06-19 RX ADMIN — LEVALBUTEROL HYDROCHLORIDE 1.25 MG: 1.25 SOLUTION RESPIRATORY (INHALATION) at 08:28

## 2024-06-19 RX ADMIN — LEVETIRACETAM 1000 MG: 100 INJECTION, SOLUTION INTRAVENOUS at 14:16

## 2024-06-19 RX ADMIN — HEPARIN SODIUM 18 UNITS/KG/HR: 10000 INJECTION, SOLUTION INTRAVENOUS at 15:47

## 2024-06-19 RX ADMIN — INSULIN LISPRO 1 UNITS: 100 INJECTION, SOLUTION INTRAVENOUS; SUBCUTANEOUS at 08:10

## 2024-06-19 RX ADMIN — METRONIDAZOLE 500 MG: 500 INJECTION, SOLUTION INTRAVENOUS at 23:47

## 2024-06-19 RX ADMIN — METRONIDAZOLE 500 MG: 500 INJECTION, SOLUTION INTRAVENOUS at 15:55

## 2024-06-19 RX ADMIN — SODIUM CHLORIDE 75 ML/HR: 0.9 INJECTION, SOLUTION INTRAVENOUS at 12:05

## 2024-06-19 RX ADMIN — NYSTATIN 500000 UNITS: 100000 SUSPENSION ORAL at 21:50

## 2024-06-19 RX ADMIN — IPRATROPIUM BROMIDE 0.5 MG: 0.5 SOLUTION RESPIRATORY (INHALATION) at 08:28

## 2024-06-19 RX ADMIN — IPRATROPIUM BROMIDE 0.5 MG: 0.5 SOLUTION RESPIRATORY (INHALATION) at 13:57

## 2024-06-19 RX ADMIN — LEVALBUTEROL HYDROCHLORIDE 1.25 MG: 1.25 SOLUTION RESPIRATORY (INHALATION) at 13:57

## 2024-06-19 RX ADMIN — PANTOPRAZOLE SODIUM 40 MG: 40 INJECTION, POWDER, FOR SOLUTION INTRAVENOUS at 08:11

## 2024-06-19 RX ADMIN — CIPROFLOXACIN 400 MG: 2 INJECTION, SOLUTION INTRAVENOUS at 17:40

## 2024-06-19 RX ADMIN — METRONIDAZOLE 500 MG: 500 INJECTION, SOLUTION INTRAVENOUS at 01:15

## 2024-06-19 NOTE — RAPID RESPONSE
Rapid Response Note  Hal Miller 84 y.o. male MRN: [id number]  Unit/Bed#: W -01 Encounter: [id number]    Rapid Response Notification(s):   Response called date/time:  6/18/2024 9:21 PM  Response team arrival date/time:  6/18/2024 9:24 PM  Response end date/time:  6/18/2024 9:40 PM  Level of care:  [facility]  Rapid response location:  [facility] unit  Primary reason for rapid response call:  Fall    Rapid Response Intervention(s):   Airway:  None  Breathing:  None  Circulation:  None  Fluids administered:  None  Medications administered:  None       Assessment:   Rapid response called for unwitnessed fall and lethargy. On arrival to the room pt on the floor, blood splattered throughout the room all over the floor. He is lethargic but responding to repeated verbal commands. Slowly became more and more responsive. Oriented to person, place, endorsing generalized pain. Unsure If he hit his head or not.   VS: Afebrile, HR - 86, RR 17, BP - 152/77, 97% on room air   POCT glucose - 206    Plan:   STAT CTH, CT c-spine  Maintain C-collar until after CT imaging  Follow up labs drawn during rapid response  Rec: XR left elbow (abrasion present).  Fall precautions  Rec: either soft restraints, lap belt or virtual 1:1 sitter     Rapid Response Outcome:   Transfer:  Remain on floor  Code Status: Level 1 (Full Code)      Family notified: Primary team to notify Family Member       Background/Situation:   Hal Miller is a 84 y.o. male with a PMH of vascular dementia, CAD, CKD, IDDM, HTN, CVA, afib on eliquis, PMR on prednisone who presents with abd pain, dyspnea, shingles painful rash. New left groin swelling and tenderness, CT abd finding diverticular abscess and presented to medical service.     Review of Systems    Objective:   Vitals:    06/18/24 2126 06/18/24 2129 06/18/24 2132 06/18/24 2135   BP:       BP Location:       Pulse: 72 89 80 80   Resp:       Temp:       TempSrc:       SpO2: 98% 100% 98% 97%   Weight:        Height:         Physical Exam  Vitals and nursing note reviewed.   Constitutional:       General: He is not in acute distress.     Appearance: He is normal weight. He is ill-appearing.      Interventions: Cervical collar in place.   HENT:      Head: Atraumatic.      Mouth/Throat:      Mouth: Mucous membranes are dry.   Eyes:      Pupils: Pupils are equal, round, and reactive to light.   Cardiovascular:      Rate and Rhythm: Normal rate and regular rhythm.   Pulmonary:      Effort: Pulmonary effort is normal.   Abdominal:      General: There is no distension.      Palpations: Abdomen is soft.      Tenderness: There is no abdominal tenderness.   Skin:     General: Skin is dry.      Capillary Refill: Capillary refill takes less than 2 seconds.   Neurological:      Mental Status: He is oriented to person, place, and time. He is lethargic.      GCS: GCS eye subscore is 4. GCS verbal subscore is 5. GCS motor subscore is 6.      Motor: Motor function is intact.

## 2024-06-19 NOTE — PROGRESS NOTES
Progress Note - General Surgery  Hal Miller 84 y.o. male MRN: [id number]  Unit/Bed#: W -01 Encounter: [id number]      Assessment:  84 y.o. male who p/w L inguinal hernia with likely perforated diverticulum. Afebrile, HDS.      WBC 5.5 from 12  Hgb 10.1 from 11.4  Lactate 1.1 from 2.8    Plan:  - Advance diet as tolerated  - Continue IV antibiotics   - IVF per medicine team  - Geriatrics consulted, appreciate recs  - Plan to manage collection non-operatively with antibiotic course. IR consulted and collection not amenable to drainage.   - Remainder of care per medicine        Subjective: Rapid response overnight for fall. CTH negative. Confused overnight. This morning refusing to answer questions or allow for an abdominal exam.      Objective:   Temp:  [96.3 °F (35.7 °C)-98.1 °F (36.7 °C)] 96.3 °F (35.7 °C)  HR:  [55-97] 55  Resp:  [15-20] 15  BP: (100-152)/(65-79) 120/66    Physical Exam:  General: No acute distress,  CV: Well perfused, regular rate and rhythm  Lungs: Normal work of breathing, no increased respiratory effort  Abdomen: Patient refused  Extremities: No clubbing or cyanosis  Skin: Warm, dry      I/O         06/17 0701  06/18 0700 06/18 0701  06/19 0700 06/19 0701  06/20 0700    P.O. 100      I.V. (mL/kg) 1665      IV Piggyback 500      Total Intake(mL/kg) 2265      Urine (mL/kg/hr) 600 975 (0.5)     Total Output 600 975     Net +1665 -975                    Lab, Imaging and other studies: I have personally reviewed pertinent reports.  , CBC with diff:   Lab Results   Component Value Date    WBC 5.56 06/19/2024    HGB 10.1 (L) 06/19/2024    HCT 31.2 (L) 06/19/2024    MCV 97 06/19/2024     (L) 06/19/2024    RBC 3.22 (L) 06/19/2024    MCH 31.4 06/19/2024    MCHC 32.4 06/19/2024    RDW 14.1 06/19/2024    MPV 9.6 06/19/2024    NRBC 0 06/19/2024   , BMP/CMP:   Lab Results   Component Value Date    SODIUM 130 (L) 06/19/2024    K 5.3 06/19/2024     06/19/2024    CO2 23 06/19/2024    BUN 45  (H) 06/19/2024    CREATININE 1.65 (H) 06/19/2024    CALCIUM 9.0 06/19/2024    AST 20 06/19/2024    ALT 18 06/19/2024    ALKPHOS 91 06/19/2024    EGFR 38 06/19/2024           Ravi Lopez MD  General Surgery   06/19/24

## 2024-06-19 NOTE — ASSESSMENT & PLAN NOTE
Hal Miller is a 84 y.o. male with CAD, s/p AVR, CKD, AF on eliquis, DM, HTN, HLD, COPD, prior stroke, vascular dementia, pancreatic cancer s/p whipple, PMR on prednisone and current shingles infection on valtrex admitted 6/16/24 with abdominal pain and SOB. CT abdomen revealed diverticular abscess; medically managed with IV abx. Eliquis held and Heparin gtt started     Hospital course complicated by acute encephalopathy with waxing and waning mental status.  RRT 6/18 for fall; CTH negative. RRT 6/19 for episodes of unresponsiveness with head turned to the left and downward gaze as well as rhythmic movements of his hands concerning for possible seizure. He received keppra 1gm and ativan 2mg iv x1 with return of conjugate gaze and incomprehensible sounds.He was transferred to ICU and started on stroke pathway with neurology consult.     At present, patient appears back to baseline. No reported overnight events or recurrent episodes of seizure like activity.     Neuroimaging/work-up:  6/18/24 CTH: No acute intracranial abnormality. Similar-appearing chronic severe microangiopathic changes.   Ammonia, B12, folate WNL  Lipid panel: TC 94, TG 54, HDL 39, LDL 44  6/8/24:TSH 1.7  6/7/24: A1c 7.4  Recommendations:  Acute encephalopathy in the setting of underlying dementia and acute infection; possible seizure  - MRI brain w/wo contrast pending  - Routine EEG completed; report pending  - Continue depacon 500mg q8hr for now  - Given no hx of prior seizure, if eeg and mri unrevealing would consider stopping depakote  - Seizure precautions, Administer Ativan prn seizure-like activity    - No indication for LP at this time  - Continue telemetry  - PT/OT  - [employer] form to be completed if patient drives  - Continue to monitor and notify Neurology with any changes.  - Medical management and correction of any metabolic or infectious disturbances per ICU.  - Conservative management of delirium: minimize noise, maintain day/night  cycle, provide frequent redirection, avoid restraints if possible,avoid deleterious/sedating medications  etc.

## 2024-06-19 NOTE — ASSESSMENT & PLAN NOTE
Currently rate controlled  Continue home diltiazem   Holding home eliquis right now due to not being able to take PO  Start Heparin gtt

## 2024-06-19 NOTE — PLAN OF CARE
Problem: PAIN - ADULT  Goal: Verbalizes/displays adequate comfort level or baseline comfort level  Description: Interventions:  - Encourage patient to monitor pain and request assistance  - Assess pain using appropriate pain scale  - Administer analgesics based on type and severity of pain and evaluate response  - Implement non-pharmacological measures as appropriate and evaluate response  - Consider cultural and social influences on pain and pain management  - Notify physician/advanced practitioner if interventions unsuccessful or patient reports new pain  Outcome: Progressing     Problem: INFECTION - ADULT  Goal: Absence or prevention of progression during hospitalization  Description: INTERVENTIONS:  - Assess and monitor for signs and symptoms of infection  - Monitor lab/diagnostic results  - Monitor all insertion sites, i.e. indwelling lines, tubes, and drains  - Monitor endotracheal if appropriate and nasal secretions for changes in amount and color  - [place] appropriate cooling/warming therapies per order  - Administer medications as ordered  - Instruct and encourage patient and family to use good hand hygiene technique  - Identify and instruct in appropriate isolation precautions for identified infection/condition  Outcome: Progressing  Goal: Absence of fever/infection during neutropenic period  Description: INTERVENTIONS:  - Monitor WBC    Outcome: Progressing     Problem: SAFETY ADULT  Goal: Patient will remain free of falls  Description: INTERVENTIONS:  - Educate patient/family on patient safety including physical limitations  - Instruct patient to call for assistance with activity   - Consult OT/PT to assist with strengthening/mobility   - Keep Call bell within reach  - Keep bed low and locked with side rails adjusted as appropriate  - Keep care items and personal belongings within reach  - Initiate and maintain comfort rounds  - Make Fall Risk Sign visible to staff  - Offer Toileting every  Hours,  in advance of need  - Initiate/Maintain alarm  - Obtain necessary fall risk management equipment:   - Apply yellow socks and bracelet for high fall risk patients  - Consider moving patient to room near nurses station  Outcome: Progressing  Goal: Maintain or return to baseline ADL function  Description: INTERVENTIONS:  -  Assess patient's ability to carry out ADLs; assess patient's baseline for ADL function and identify physical deficits which impact ability to perform ADLs (bathing, care of mouth/teeth, toileting, grooming, dressing, etc.)  - Assess/evaluate cause of self-care deficits   - Assess range of motion  - Assess patient's mobility; develop plan if impaired  - Assess patient's need for assistive devices and provide as appropriate  - Encourage maximum independence but intervene and supervise when necessary  - Involve family in performance of ADLs  - Assess for home care needs following discharge   - Consider OT consult to assist with ADL evaluation and planning for discharge  - Provide patient education as appropriate  Outcome: Progressing  Goal: Maintains/Returns to pre admission functional level  Description: INTERVENTIONS:  - Perform AM-PAC 6 Click Basic Mobility/ Daily Activity assessment daily.  - Set and communicate daily mobility goal to care team and patient/family/caregiver.   - Collaborate with rehabilitation services on mobility goals if consulted  - Perform Range of Motion  times a day.  - Reposition patient every  hours.  - Dangle patient  times a day  - Stand patient  times a day  - Ambulate patient  times a day  - Out of bed to chair times a day   - Out of bed for meals times a day  - Out of bed for toileting  - Record patient progress and toleration of activity level   Outcome: Progressing     Problem: DISCHARGE PLANNING  Goal: Discharge to home or other facility with appropriate resources  Description: INTERVENTIONS:  - Identify barriers to discharge w/patient and caregiver  - Arrange for  needed discharge resources and transportation as appropriate  - Identify discharge learning needs (meds, wound care, etc.)  - Arrange for interpretive services to assist at discharge as needed  - Refer to Case Management Department for coordinating discharge planning if the patient needs post-hospital services based on physician/advanced practitioner order or complex needs related to functional status, cognitive ability, or social support system  Outcome: Progressing     Problem: Knowledge Deficit  Goal: Patient/family/caregiver demonstrates understanding of disease process, treatment plan, medications, and discharge instructions  Description: Complete learning assessment and assess knowledge base.  Interventions:  - Provide teaching at level of understanding  - Provide teaching via preferred learning methods  Outcome: Progressing     Problem: Nutrition/Hydration-ADULT  Goal: Nutrient/Hydration intake appropriate for improving, restoring or maintaining nutritional needs  Description: Monitor and assess patient's nutrition/hydration status for malnutrition. Collaborate with interdisciplinary team and initiate plan and interventions as ordered.  Monitor patient's weight and dietary intake as ordered or per policy. Utilize nutrition screening tool and intervene as necessary. Determine patient's food preferences and provide high-protein, high-caloric foods as appropriate.     INTERVENTIONS:  - Monitor oral intake, urinary output, labs, and treatment plans  - Assess nutrition and hydration status and recommend course of action  - Evaluate amount of meals eaten  - Assist patient with eating if necessary   - Allow adequate time for meals  - Recommend/ encourage appropriate diets, oral nutritional supplements, and vitamin/mineral supplements  - Order, calculate, and assess calorie counts as needed  - Recommend, monitor, and adjust tube feedings and TPN/PPN based on assessed needs  - Assess need for intravenous fluids  -  Provide specific nutrition/hydration education as appropriate  - Include patient/family/caregiver in decisions related to nutrition  Outcome: Progressing     Problem: Prexisting or High Potential for Compromised Skin Integrity  Goal: Skin integrity is maintained or improved  Description: INTERVENTIONS:  - Identify patients at risk for skin breakdown  - Assess and monitor skin integrity  - Assess and monitor nutrition and hydration status  - Monitor labs   - Assess for incontinence   - Turn and reposition patient  - Assist with mobility/ambulation  - Relieve pressure over bony prominences  - Avoid friction and shearing  - Provide appropriate hygiene as needed including keeping skin clean and dry  - Evaluate need for skin moisturizer/barrier cream  - Collaborate with interdisciplinary team   - Patient/family teaching  - Consider wound care consult   Outcome: Progressing

## 2024-06-19 NOTE — ASSESSMENT & PLAN NOTE
Initially a rapid response on 6/19 for AMS  Upon arrival to ICU, became unresponsive with downward gaze deviation, rigidity, rhythmic hand movements, and head turning to L unresponsive to pain  Ativan 2mg given and pt began to localize to pain with mumbling words  Empirically given Keppra 1g  Neuro consulted, recs  Depakote 500mg IV q8hrs  Spot EEG  MRI brain w and w/o  Check ammonia level

## 2024-06-19 NOTE — RAPID RESPONSE
Rapid Response Note  Hal Miller 84 y.o. male MRN: [id number]  Unit/Bed#: W -01 Encounter: [id number]    Rapid Response Notification(s):   Response called date/time:  6/19/2024 11:41 AM  Response team arrival date/time:  6/19/2024 11:46 AM  Response end date/time:  6/19/2024 12:03 PM  Level of care:  [facility]  Rapid response location:  [facility] unit  Primary reason for rapid response call:  Acute change in neuro status    Rapid Response Intervention(s):   Airway:  Other (comment) (NC)  Breathing:  Oxygen  Circulation:  None  Fluids administered:  None  Medications administered:  None       Assessment:   83y/o M hx of vascular dementia, CAD, CKD, IDDM, HTN, CVA, afib on eliquis, PMR on prednisone who was admitted for suspicious of colonic abscess secondary to right inguinal hernia who had an acute change in mental status secondary to delirium vs. Encephalitis vs. Hypercarbia vs. Dementia vs. Stroke. He was a rapid response overnight for a fall. Had negative CT head. Acute change in mental status likely due to delirium/baseline vascular dementia with contributing existing infection.     Plan:   Transfer to Step down level 2 on SLIM service, will be in the ICU to monitor mental status and respiratory/hemodynamic status.     Rapid Response Outcome:   Transfer:  Transfer to stepdown 2  Primary service notified of transfer: Yes    Code Status: Level 1 (Full Code)      Family notified: Primary team to notify Family Member       Background/Situation:   Hal Miller is a 84 y.o. male who a rapid response for an acute change in mental status in which he was having episodes of hypoactivity and unresponsiveness. Notably, he was a rapid response overnight for a fall, had a negative CT head. He reportedly had periods of similar waxing and waning mental status early this morning. Upon arrival to the room he was laying in bed, occasionally mumbling words with his eyes closed. He would transiently become unresponsive to voice,  but would react to pain. Change in mental status could have been due to stroke vs. Hypoglycemia vs. Hypercarbia vs. Encephalitis vs. Delirium vs. Baseline dementia vs. Infection vs. Uremia     Delirium vs. Dementia most likely given periods of hyperactivity and waxing waning mental status without focal deficit considering baseline vascular dementia*  There is a concern for encephalitis given current shingles infection although he has been receiving valtrex. Will need to follow-up and consider LP.    Uremia could be considered given increasing TARA, although BUN is 45  Hypoglycemia unlikely Glucose 169  Hypercarbia unlikely Pco2 on ABG I-STAT 37  Infection unlikely WBC decreasing to 5.56 from 12/24 yesterday, has been on rocephin during this admission   Stroke unlikely given repeated waxing and waning of mental status without focal deficit, he was not following commands globally. CT head overnight negative.    Ultimately, it was decided he would be transferred to step down 2 on SLIM service.       Review of Systems   Constitutional:  Positive for activity change.   HENT: Negative.     Eyes: Negative.    Respiratory:  Positive for apnea.    Cardiovascular: Negative.    Gastrointestinal: Negative.    Endocrine: Negative.    Genitourinary: Negative.    Musculoskeletal: Negative.    Allergic/Immunologic: Negative.    Neurological:  Positive for speech difficulty.   Hematological: Negative.    Psychiatric/Behavioral:  Positive for confusion. The patient is hyperactive.        Objective:   Vitals:    06/19/24 0809 06/19/24 1116 06/19/24 1141 06/19/24 1156   BP: 120/66 136/81 144/88 143/76   BP Location:  Left arm     Pulse: 55 84 84 91   Resp: 15 (!) 24     Temp: (!) 96.3 °F (35.7 °C) 97.5 °F (36.4 °C)     TempSrc:  Axillary     SpO2: 96%  97% 99%   Weight:       Height:         Physical Exam  Constitutional:       Comments: Waxing and waning mental status, alternating between hyperactivity and hypoactivity    HENT:       Head: Normocephalic.   Eyes:      Pupils: Pupils are equal, round, and reactive to light.   Cardiovascular:      Rate and Rhythm: Normal rate and regular rhythm.   Pulmonary:      Effort: Pulmonary effort is normal.      Breath sounds: Normal breath sounds.   Abdominal:      General: Abdomen is flat. There is no distension.      Palpations: Abdomen is soft.   Musculoskeletal:         General: No swelling, tenderness or signs of injury.   Skin:     General: Skin is warm and dry.   Neurological:      Mental Status: He is disoriented.

## 2024-06-19 NOTE — CONSULTS
Consultation - Neurology   Hal Miller 84 y.o. male MRN: [id number]  Unit/Bed#: ICU 07 Encounter: [id number]      Assessment & Plan     * Acute encephalopathy  Assessment & Plan  Hal Miller is a 84 y.o. male with CAD, s/p AVR, CKD, AF on eliquis, DM, HTN, HLD, COPD, prior stroke, vascular dementia, pancreatic cancer s/p whipple, PMR on prednisone and current shingles infection on valtrex admitted 6/16/24 with abdominal pain and SOB. CT abdomen revealed diverticular abscess. Started on IV abx and Eliquis held pending possible IR intervention..  Hospital course complicated by acute encephalopathy with waxing and waning mental status.  RRT 6/18 for fall; CTH negative. RRT 6/19 for acute change in mental status with episodes of downward gaze, BUE shaking and transient unresponsiveness concerning for possible seizure. He received keppra 1gm and ativan 2mg iv x1 with return of conjugate gaze and incomprehensible sounds.Transferred to ICU and started on stroke pathway with neurology consult.   At present, patient remains somnolent with limited participation in exam.    Neuroimaging/work-up:  6/18/24 CTH: No acute intracranial abnormality. Similar-appearing chronic severe microangiopathic changes.   Lactate WNL  6/8/24: B12 493, TSH 1.7  6/7/24: A1c 7.4  Recommendations:  Acute encephalopathy in the setting of underlying dementia and acute infection; possible seizure  - MRI brain w/wo contrast   - Routine EEG   - start depacon 500mg bid  - Administer Ativan prn seizure-like activity    - if recurrent seizure like episodes or persistent encephalopathy, consider LP  - Continue telemetry  - Continue seizure precautions   - [employer] form to be completed if patient drives  - Continue to monitor and notify Neurology with any changes.  - Medical management and correction of any metabolic or infectious disturbances per ICU.  - Conservative management of delirium: minimize noise, maintain day/night cycle, provide frequent  "redirection, avoid restraints if possible,avoid deleterious/sedating medications  etc.         Recommendations for outpatient neurological follow up have yet to be determined.    Reason for Consult / Principal Problem: \"Dizziness. Shingles\"  Hx and PE limited by: AMS  HPI: Hal Miller is a 84 y.o. male with CAD, s/p AVR, CKD, AF on eliquis, DM, HTN, HLD, COPD, prior stroke, vascular dementia, pancreatic cancer s/p whipple, PMR on prednisone and current shingles infection on valtrex admitted 6/16/24 with abdominal pain and SOB. CT abdomen revealed diverticular abscess. Started on IV abx and Eliquis held pending possible IR intervention. .    Hospital course complicated by acute encephalopathy with waxing and waning mental status.  RRT 6/18 for fall; CTH negative. RRT 6/19 for acute change in mental status with episodes of downward gaze, BUE shaking and transient unresponsiveness concerning for possible seizure. He received 1gm keppra and 2mg ativan iv x1 with return of conjugate gaze and incomprehensible sounds.Transferred to ICU and started on stroke pathway with neurology consult. At present, patient remains somnolent with limited participation in exam.    Inpatient consult to Neurology  Consult performed by: ADEOLA Esteban  Consult ordered by: Rachana Malone MD      Inpatient consult to Neurology  Consult performed by: ADEOLA Esteban  Consult ordered by: Rachana Malone MD      Review of Systems   Unable to perform ROS: Mental status change     Historical Information   Past Medical History:   Diagnosis Date    Acute encephalopathy 2/14/2023    Acute-on-chronic kidney injury  (HCC) 6/13/2017    Cancer (HCC)     pancreatic    Colon polyp     Coronary artery disease     Dehydration 3/3/2023    Diabetes mellitus (HCC)     Hyperlipidemia     Hypertension     Left lower lobe pneumonia 7/2/2022    Pneumonia 06/13/2017    Right middle and lower lobe     Stroke (HCC)      Past Surgical History:   Procedure " "Laterality Date    APPENDECTOMY      CARDIAC SURGERY      Aortic Valve Replacement, Ascending Aorta    COLONOSCOPY      GALLBLADDER SURGERY      PANCREATICODUODENECTOMY       Social History   Social History     Substance and Sexual Activity   Alcohol Use Never     Social History     Substance and Sexual Activity   Drug Use No     E-Cigarette/Vaping    E-Cigarette Use Never User      E-Cigarette/Vaping Substances    Nicotine No     THC No     CBD No     Flavoring No     Other No     Unknown No      Social History     Tobacco Use   Smoking Status Former    Types: Pipe    Quit date:     Years since quittin.4   Smokeless Tobacco Never     Family History:   Family History   Problem Relation Age of Onset    Cancer Mother     Stroke Father     Cancer Sister     Diabetes Sister     Stroke Brother      Review of previous medical records was completed.     Meds/Allergies   all current active meds have been reviewed    Allergies   Allergen Reactions    Contrast Dye [Iodinated Contrast Media] Other (See Comments)     Pt states kidney dysfunction..     Other      Objective   Vitals:Blood pressure 123/64, pulse 73, temperature 97.5 °F (36.4 °C), temperature source Axillary, resp. rate (!) 25, height 6' 2\" (1.88 m), weight 82.2 kg (181 lb 3.5 oz), SpO2 99%.,Body mass index is 23.27 kg/m².    Intake/Output Summary (Last 24 hours) at 2024 1556  Last data filed at 2024 1547  Gross per 24 hour   Intake 269.75 ml   Output 775 ml   Net -505.25 ml     Invasive Devices:   Invasive Devices       Peripheral Intravenous Line  Duration             Peripheral IV 24 Right Forearm 2 days    Peripheral IV 24 Left;Proximal;Ventral (anterior) Forearm <1 day    Peripheral IV 24 Proximal;Right;Ventral (anterior) Forearm <1 day                  Physical Exam  Vitals and nursing note reviewed.   Constitutional:       Appearance: He is ill-appearing.   HENT:      Head: Normocephalic and atraumatic.       Neurologic " Exam     Mental Status   Limited exam  Opens eyes to voice  Tracks objects  Barely obeys simple command  No purposeful movement  Grimace to pinprick bue/ble/face  DTRs absent  +corneal  Recognizes wife at bedside     Gait, Coordination, and Reflexes     Reflexes   Right plantar: upgoing  Left plantar: upgoing    Lab Results: I have personally reviewed pertinent reports.      Imaging Studies: I have personally reviewed pertinent reports.   and I have personally reviewed pertinent films in PACS    EKG, Pathology, and Other Studies: I have personally reviewed pertinent reports.      Code Status: Level 1 - Full Code    Counseling / Coordination of Care  Assessment, images and plan reviewed with Dr. Craig. Plan discussed with patient, family at bedside and ICU team. Please refer to attending attestation for additional recommendations

## 2024-06-19 NOTE — ASSESSMENT & PLAN NOTE
Presented with chief complaint of shortness of breath with post-tussive vomiting  Currently on 3L NC (baseline 3L O2 at home)  CXR on 6/16: No definite acute disease with small chronic loculated pleural effusions, rounded atelectasis in the right lower lobe, and bilateral scar.   Continue wean oxygen

## 2024-06-19 NOTE — PROGRESS NOTES
[facility]  Progress Note  Name: Hal Miller I  MRN: [id number]  Unit/Bed#: W -01 I Date of Admission: 6/16/2024   Date of Service: 6/18/2024 I Hospital Day: 1    Assessment & Plan   Ambulatory dysfunction  Assessment & Plan  Needs PT  OT    Dizziness  Assessment & Plan  Resolved,however not ambulating     Vascular dementia (HCC)  Assessment & Plan  Oriented and appropriate at time of admission   Previous admissions developed encephalopathy  Monitor for delirium     Zoster  Assessment & Plan  Zoster lesions on right chest wall and right upper back   Valtrex TID  Pain control   Contact isolation     Abnormal CT of the abdomen  Assessment & Plan  22 mm cystic retroperitoneal lesion between the aorta and IVC which is increased in size since 2/20/2023. No evidence of soft tissue component. A benign etiology is suspected such as retroperitoneal lymphatic malformation or lymphocele. Initial   evaluation in 3 months with contrast-enhanced CT abdomen/pelvis is recommended.  Outpt followup     Type 2 diabetes mellitus (HCC)  Assessment & Plan  Lab Results   Component Value Date    HGBA1C 7.4 (H) 06/07/2024       Recent Labs     06/18/24  0728 06/18/24  1111 06/18/24  1418 06/18/24  1554   POCGLU 280* 310* 257* 236*         Blood Sugar Average: Last 72 hrs:  (P) [id number]      Acute encephalopathy  Assessment & Plan  Patient is no longer confused  Possible hospital acquired /icu related  Steroids possibly also contributing  Discussed with patient and wife and this happened on prior admission at which time geriatrics were very helpful  Will consult geriatrics and discussed with them     CKD (chronic kidney disease)  Assessment & Plan  Lab Results   Component Value Date    EGFR 47 06/17/2024    EGFR 41 06/16/2024    EGFR 39 06/08/2024    CREATININE 1.38 (H) 06/17/2024    CREATININE 1.54 (H) 06/16/2024    CREATININE 1.59 (H) 06/08/2024   CKD 3 baseline  Avoid nephrotoxic agents and  renally dose medications  Monitor CR    Inflammatory polyarthropathy (HCC)  Assessment & Plan  PTA regimen hydroxychloroquine on hold, prednisone transitioned to solumedrol for COPD exacerbation     Atrial fibrillation (HCC)  Assessment & Plan  Rate controlled  Continue diltiazem   Eliquis on hold pending IR consultation     Shortness of breath  Assessment & Plan  Presents with dyspnea, nonproductive cough with post tussive vomiting  Solumedrol with transition to prednisone ( takes 2.5  mg daily )  Nebulized bronchodilators     * Colonic diverticular abscess  Assessment & Plan  Presents with left groin pain, abd pain   Enlarging gas and fluid collection extending from the antimesenteric border of the sigmoid colon, infiltrating the left inguinal region soft tissue and coursing under and along the lateral margin of the left inguinal canal. This has increased in size since 2/26/2024 with new surrounding inflammatory change suggesting a chronic diverticular abscess with a more recent acute component  Discussed with general surgery   Awaiting CT scan today and then likely diet           VTE Pharmacologic Prophylaxis:   Low Risk (Score 0-2) - Encourage Ambulation.    Mobility:   Basic Mobility Inpatient Raw Score: 24  JH-HLM Goal: 8: Walk 250 feet or more  JH-HLM Achieved: 5: Stand (1 or more minutes)      Patient Centered Rounds: I performed bedside rounds with nursing staff today.   Discussions with Specialists or Other Care Team Provider: Discussed with surgery     Education and Discussions with Family / Patient: Updated  (wife) at bedside.    Total Time Spent on Date of Encounter in care of patient: 30 mins. This time was spent on one or more of the following: performing physical exam; counseling and coordination of care; obtaining or reviewing history; documenting in the medical record; reviewing/ordering tests, medications or procedures; communicating with other healthcare professionals and discussing  "with patient's family/caregivers.    Current Length of Stay: 1 day(s)  Current Patient Status: Inpatient   Certification Statement: The patient will continue to require additional inpatient hospital stay due to confusion and  surgery input.  Discharge Plan: Anticipate discharge tomorrow to home.    Code Status: Level 1 - Full Code    Subjective:   Patient seen and examined   Feeling \"fine\"    Objective:     Vitals:   Temp (24hrs), Av.7 °F (36.5 °C), Min:97.3 °F (36.3 °C), Max:98.1 °F (36.7 °C)    Temp:  [97.3 °F (36.3 °C)-98.1 °F (36.7 °C)] 98.1 °F (36.7 °C)  HR:  [70-97] 97  Resp:  [16-18] 16  BP: (100-141)/(61-78) 129/65  SpO2:  [94 %-100 %] 100 %  Body mass index is 23.27 kg/m².     Input and Output Summary (last 24 hours):     Intake/Output Summary (Last 24 hours) at 20246  Last data filed at 2024 1936  Gross per 24 hour   Intake 1165 ml   Output 550 ml   Net 615 ml       Physical Exam:   Physical Exam  Constitutional:       General: He is not in acute distress.     Appearance: He is not ill-appearing, toxic-appearing or diaphoretic.   Eyes:      General: No scleral icterus.        Right eye: No discharge.         Left eye: No discharge.      Pupils: Pupils are equal, round, and reactive to light.   Cardiovascular:      Rate and Rhythm: Normal rate.      Heart sounds: No murmur heard.     No friction rub. No gallop.   Abdominal:      General: There is no distension.      Palpations: There is no mass.      Tenderness: There is no abdominal tenderness. There is no right CVA tenderness, left CVA tenderness, guarding or rebound.      Hernia: No hernia is present.   Musculoskeletal:      Right lower leg: No edema.      Left lower leg: No edema.   Skin:     Coloration: Skin is not jaundiced or pale.      Findings: No bruising, erythema, lesion or rash.   Neurological:      Mental Status: He is alert.      Cranial Nerves: No cranial nerve deficit.      Sensory: No sensory deficit.      Motor: No " weakness.      Coordination: Coordination normal.      Gait: Gait normal.      Deep Tendon Reflexes: Reflexes normal.   Psychiatric:         Mood and Affect: Mood normal.        Additional Data:     Labs:  Results from last 7 days   Lab Units 06/17/24  0717 06/16/24  1819   WBC Thousand/uL 5.80 7.72   HEMOGLOBIN g/dL 11.2* 11.8*   HEMATOCRIT % 35.5* 37.6   PLATELETS Thousands/uL 112* 123*   BANDS PCT % 3  --    SEGS PCT %  --  79*   LYMPHO PCT % 5* 10*   MONO PCT % 5 10   EOS PCT % 0 1     Results from last 7 days   Lab Units 06/17/24  0717   SODIUM mmol/L 133*   POTASSIUM mmol/L 4.8   CHLORIDE mmol/L 102   CO2 mmol/L 25   BUN mg/dL 32*   CREATININE mg/dL 1.38*   ANION GAP mmol/L 6   CALCIUM mg/dL 9.6   ALBUMIN g/dL 3.4*   TOTAL BILIRUBIN mg/dL 1.20*   ALK PHOS U/L 116*   ALT U/L 18   AST U/L 18   GLUCOSE RANDOM mg/dL 156*     Results from last 7 days   Lab Units 06/17/24  0717   INR  1.23*     Results from last 7 days   Lab Units 06/18/24  2124 06/18/24  1554 06/18/24  1418 06/18/24  1111 06/18/24  0728 06/18/24  0155 06/17/24  1646 06/17/24  1345 06/17/24  0846 06/17/24  0603 06/17/24  0438 06/17/24  0329   POC GLUCOSE mg/dl 206* 236* 257* 310* 280* 297* 389* 209* 183* 134 126 105         Results from last 7 days   Lab Units 06/16/24  1900   LACTIC ACID mmol/L 0.9       Lines/Drains:  Invasive Devices       Peripheral Intravenous Line  Duration             Peripheral IV 06/16/24 Right Forearm 2 days                          Imaging: No pertinent imaging reviewed.    Recent Cultures (last 7 days):         Last 24 Hours Medication List:   Current Facility-Administered Medications   Medication Dose Route Frequency Provider Last Rate   • acetaminophen  975 mg Oral TID Katy Sykes MD     • cefTRIAXone  1,000 mg Intravenous Q24H Hal Lorenzo MD 1,000 mg (06/17/24 3484)   • diltiazem  180 mg Oral Daily ADEOLA Knox     • HYDROmorphone  0.2 mg Intravenous Q4H PRN ADEOLA Knox     • insulin glargine   10 Units Subcutaneous HS Rachana Malone MD     • insulin lispro  1-5 Units Subcutaneous TID AC Vinny Landon MD     • insulin regular          • ipratropium  0.5 mg Nebulization TID Margy S Eva, CRNP     • levalbuterol  1.25 mg Nebulization TID Margy S Eva, CRNP     • lidocaine   Topical 4x Daily PRN Vinny Landon MD     • losartan  50 mg Oral Daily Margy S Eva, CRNP     • metroNIDAZOLE  500 mg Intravenous Q8H Hal Lorenzo  mg (06/18/24 1115)   • nystatin  500,000 Units Swish & Swallow 4x Daily Margy S Eva, CRNP     • pantoprazole  40 mg Intravenous Q24H YUNI Margy S Eva, CRNP     • pravastatin  40 mg Oral QPM Vinny Landon MD     • QUEtiapine  25 mg Oral HS Katy Sykes MD     • sodium chloride  75 mL/hr Intravenous Continuous Margy S Eva, CRNP 75 mL/hr (06/18/24 9830)   • valACYclovir  1,000 mg Oral BID Margy S Eva, CRNP          Today, Patient Was Seen By: Rachana Malone MD    **Please Note: This note may have been constructed using a voice recognition system.**

## 2024-06-19 NOTE — ASSESSMENT & PLAN NOTE
Presents with left groin pain, abd pain   Enlarging gas and fluid collection extending from the antimesenteric border of the sigmoid colon, infiltrating the left inguinal region soft tissue and coursing under and along the lateral margin of the left inguinal canal. This has increased in size since 2/26/2024 with new surrounding inflammatory change suggesting a chronic diverticular abscess with a more recent acute component  Discussed with general surgery   Awaiting CT scan today and then likely diet

## 2024-06-19 NOTE — ASSESSMENT & PLAN NOTE
Lab Results   Component Value Date    EGFR 41 06/19/2024    EGFR 38 06/19/2024    EGFR 37 06/18/2024    CREATININE 1.54 (H) 06/19/2024    CREATININE 1.65 (H) 06/19/2024    CREATININE 1.69 (H) 06/18/2024   CKD 3 baseline  Avoid nephrotoxic agents and renally dose medications  Monitor CR- this is stable.

## 2024-06-19 NOTE — ASSESSMENT & PLAN NOTE
"Has had waxing and waning mental status over the last 48 hours as per family.  On my assessment yesterday he was awake alert and oriented .  Recognize all his family members was able to discuss current events with me including who the president is upcoming elections etc.  He did not know the date and month and year and as well fully aware of where he was.    Family had been concerned that he had been confused earlier on that morning however given his improvement no further testing was performed.  Fortunately overnight he had a fall and it is unclear what happened during that time whether he had just received any medications or interventions.    This morning he was not responding when spoken to, not responding to sternal rub initially and then all of a sudden perked up painful stimulation of both toes, he then opened his eyes and said \" that's it I've had enough\" \"I am getting out of here\".  It had to be redirected.    I was concerned for brief seizures in the setting of his zoster and possible encephalitis so I reached out to neurology who agreed to see the patient.  He remained awake and was reorienting and also redirectable when explained that he should remain in bed and remain in hospital.  Unfortunately soon after he became unresponsive again and a rapid response was called attended this along with critical care and the patient was upgraded to level 2 stepdown plan for stat CT head, I again in person discussed this case with neurology JADA who will see the patient shortly.    I remain concerned regarding seizure activity.  The patient has further episodes critical care will take over care.    Differential diagnosis includes stroke in setting chadsvasc 6 and holding eliquis versus seizure in the setting of zoster and possible encephalitis.  Discussed this directly with the critical care AP during rapid response.   "

## 2024-06-19 NOTE — PROGRESS NOTES
[facility]  ICU ACCEPTANCE NOTE  Name: Hal Miller I  MRN: [id number]  Unit/Bed#: ICU 07 I Date of Admission: 6/16/2024   Date of Service: 6/19/2024 I Hospital Day: 2    Assessment & Plan   Seizure (HCC)  Assessment & Plan  Initially a rapid response on 6/19 for AMS  Upon arrival to ICU, became unresponsive with downward gaze deviation, rigidity, rhythmic hand movements, and head turning to L unresponsive to pain  Ativan 2mg given and pt began to localize to pain with mumbling words  Empirically given Keppra 1g  Neuro consulted, recs  Depakote 500mg IV q8hrs  Spot EEG  MRI brain w and w/o  Check ammonia level     Colonic diverticular abscess  Assessment & Plan  Presents with left groin pain, abd pain   CT Abdomen pelvis: Enlarging gas and fluid collection extending from the antimesenteric border of the sigmoid colon, infiltrating the left inguinal region soft tissue and coursing under and along the lateral margin of the left inguinal canal. This has increased in size since 2/26/2024 with new surrounding inflammatory change suggesting a chronic diverticular abscess with a more recent acute component  General surgery consult recommends medical management with abx, IR does not think there is any indication for intervention  Ceftriaxone switched to Cipro I/s/o seizure  Continue flagyl  NPO    * Acute encephalopathy  Assessment & Plan  Pt was a rapid response on the floor for acute change in mental status having periods of hypo and hyperactivity   Upon arrival to the ICU he had a persistent period of unresponsiveness in which his eyes had a downward deviation, his head turned left, he had rhythmic movements of this hands, and he was unresponsive to painful stimuli  Ativan 2mg given and he began to mumble words and appeared to break the episode  Neurology consulted, see plan under seizure     History of Whipple procedure  Assessment & Plan  Hx of pancreatic cancer s/p whipple procedure  2011  Start thiamine, folate     Ambulatory dysfunction  Assessment & Plan  Patient was walking independently prior to admission to hospital.   Patient now requires assistance upon his current state.  Consult PT/OT for treatment.  Check/monitor orthostatic vitals.  Fall precautions.    Insomnia  Assessment & Plan  Due to neuropathic right thorax pain as result of Shingles rash.  Patient's sleep was controlled at home on Seroquel 25 mg QHS.    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 No evidence of soft tissue component. A benign etiology is suspected such as retroperitoneal lymphatic malformation or lymphocele. Initial evaluation in 3 months with contrast-enhanced CT abdomen/pelvis is recommended.  Outpt followup     Type 2 diabetes mellitus (HCC)  Assessment & Plan  Lab Results   Component Value Date    HGBA1C 7.4 (H) 06/07/2024       Recent Labs     06/18/24  1554 06/18/24  2124 06/19/24  0808 06/19/24  1113   POCGLU 236* 206* 169* 179*       Blood Sugar Average: Last 72 hrs:  (P) 205.625      Glargine 10U  SSI with accuchecks  Hypoglycemia protocol     CKD (chronic kidney disease)  Assessment & Plan  Lab Results   Component Value Date    EGFR 41 06/19/2024    EGFR 38 06/19/2024    EGFR 37 06/18/2024    CREATININE 1.54 (H) 06/19/2024    CREATININE 1.65 (H) 06/19/2024    CREATININE 1.69 (H) 06/18/2024     CKD 3 baseline  Avoid nephrotoxic agents and renally dose medications  Trend Cr and BUN    Inflammatory polyarthropathy (HCC)  Assessment & Plan  Continue to hold outpatient hydroxychloroquine  Continue prednisone 1mg every other  day     Atrial fibrillation (HCC)  Assessment & Plan  Currently rate controlled  Continue home diltiazem   Holding home eliquis right now due to not being able to take PO  Start Heparin gtt    Shortness of breath  Assessment & Plan  Presented with chief complaint of shortness of breath with post-tussive vomiting  Currently on 3L NC (baseline 3L O2 at home)  CXR on 6/16: No definite acute disease with small chronic loculated pleural effusions, rounded atelectasis in the right lower lobe, and bilateral scar.   Continue wean oxygen              Disposition: Stepdown Level 1    ICU Core Measures     A: Assess, Prevent, and Manage Pain Has pain been assessed? Yes  Need for changes to pain regimen? No   B: Both SAT/SAT  N/A   C: Choice of Sedation RASS Goal: 0 Alert and Calm  Need for changes to sedation or analgesia regimen? No   D: Delirium CAM-ICU: Negative   E: Early Mobility  Plan for early mobility? Yes   F: Family Engagement Plan for family engagement today? Yes       Antibiotic Review: Switched from ceftriaxone to ciprofloxacin given seizure, continue flagyl       Prophylaxis:  VTE    Stress Ulcer  covered bypantoprazole (PROTONIX) injection 40 mg [[id number]]         Significant 24hr Events     24hr events: 83 y/o M hx of CVA, pancreatic ca s/p whipple (2011), Afiab on eliquis, T2DM, HTN, and PMR who was initially admitted for SOB and colonic abscess secondary to inguinal hernia. Notably, the pt was a rapid response on 6/18 for fall and had a negative CT head/c spine. On 6/19, pt. Was a rapid response on the floor for AMS, was found to be waxing and waning with hypo and hyperactivity. Brought to ICU Step down 2 on SLIM service, but became increasingly unresponsive with seizure like activity. Ativan 2mg and Keppra 1g given and seizure activity broken. Neuro consulted and agreed he likely had a seizure in the setting of previous CVA, current infection, and basle ine vascular dementia. They recommend moving forward  with Depakote 500 gm q8hrs, spot EEG, and MRI brain with and without. Regarding his colonic abscess, ceftriaxone was switched to Cipro and continued flagyl. Pt upgraded to step down 1 on the medical service.          Subjective   Review of Systems: Review of Systems   Constitutional:  Positive for activity change.   HENT: Negative.     Eyes: Negative.    Respiratory:  Positive for apnea.    Cardiovascular: Negative.    Gastrointestinal: Negative.    Endocrine: Negative.    Genitourinary: Negative.    Musculoskeletal: Negative.    Allergic/Immunologic: Negative.    Neurological:  Positive for dizziness and seizures.   Hematological: Negative.    Psychiatric/Behavioral:  Positive for agitation and confusion. The patient is hyperactive.         Objective                            Vitals I/O      Most Recent Min/Max in 24hrs   Temp 97.5 °F (36.4 °C) Temp  Min: 96.3 °F (35.7 °C)  Max: 98.1 °F (36.7 °C)   Pulse 73 Pulse  Min: 55  Max: 97   Resp (!) 25 Resp  Min: 15  Max: 33   /64 BP  Min: 100/76  Max: 166/92   O2 Sat 99 % SpO2  Min: 96 %  Max: 100 %      Intake/Output Summary (Last 24 hours) at 6/19/2024 1516  Last data filed at 6/19/2024 1400  Gross per 24 hour   Intake 149.75 ml   Output 575 ml   Net -425.25 ml       Diet NPO    Invasive Monitoring           Physical Exam   Physical Exam  Eyes:      Pupils: Pupils are equal, round, and reactive to light.   Skin:     General: Skin is warm and dry.   HENT:      Head: Normocephalic.   Cardiovascular:      Rate and Rhythm: Normal rate and regular rhythm.      Pulses: Normal pulses.      Heart sounds: Normal heart sounds.   Musculoskeletal:      Right lower leg: No edema.      Left lower leg: No edema.   Abdominal: General: There is no distension.      Palpations: Abdomen is soft.      Hernia: A hernia is present.   Constitutional:       Appearance: He is well-developed. He is ill-appearing.   Pulmonary:      Effort: Pulmonary effort is normal.      Breath sounds:  Normal breath sounds.   Neurological:      Mental Status: He is somnolent, unresponsive and agitated.            Diagnostic Studies      EKG:   Imaging:  I have personally reviewed pertinent reports.   and I have personally reviewed pertinent films in PACS     Medications:  Scheduled PRN   acetaminophen, 975 mg, TID  aspirin, 81 mg, Daily  atorvastatin, 40 mg, QPM  ciprofloxacin, 400 mg, Q12H  diltiazem, 180 mg, Daily  folic acid 1 mg, thiamine (VITAMIN B1) 100 mg in sodium chloride 0.9 % 100 mL IV piggyback, , Daily  insulin lispro, 1-6 Units, Q6H YUNI  ipratropium, 0.5 mg, TID  levalbuterol, 1.25 mg, TID  losartan, 50 mg, Daily  metroNIDAZOLE, 500 mg, Q8H  multi-electrolyte, 500 mL, Once  nystatin, 500,000 Units, 4x Daily  pantoprazole, 40 mg, Q24H YUNI  pravastatin, 40 mg, QPM  predniSONE, 1 mg, Daily  valACYclovir, 1,000 mg, BID  valproate sodium, 500 mg, Q8H YUNI      HYDROmorphone, 0.2 mg, Q4H PRN  lidocaine, , 4x Daily PRN       Continuous    sodium chloride, 75 mL/hr, Last Rate: 75 mL/hr (06/19/24 1205)         Labs:    CBC    Recent Labs     06/19/24  0712 06/19/24  1153 06/19/24  1156   WBC 5.56 6.77  --    HGB 10.1* 10.5* 10.9*   HCT 31.2* 32.5* 32*   * 139*  --      BMP    Recent Labs     06/19/24  0549 06/19/24  1153 06/19/24  1156   SODIUM 130* 132*  --    K 5.3 4.6  --     105  --    CO2 23 25 23   AGAP 3* 2*  --    BUN 45* 42*  --    CREATININE 1.65* 1.54*  --    CALCIUM 9.0 9.2  --        Coags    No recent results     Additional Electrolytes  Recent Labs     06/19/24  1156   CAIONIZED 1.41*          Blood Gas    No recent results  Recent Labs     06/19/24  1331   PHVEN 7.368   PPO3IJV 38.2*   PO2VEN 73.0*   MIR3OQR 21.5*   BEVEN -3.4   G9GWEOZ 92.7*    LFTs  Recent Labs     06/19/24  0549 06/19/24  1153   ALT 18 20   AST 20 20   ALKPHOS 91 97   ALB 2.9* 3.2*   TBILI 0.73 0.85       Infectious  Recent Labs     06/19/24  1153   PROCALCITONI 0.11     Glucose  Recent Labs     06/18/24  2131  06/19/24  0549 06/19/24  1153   GLUC 201* 197* 167*               Yuir Sarah PA-C

## 2024-06-19 NOTE — ASSESSMENT & PLAN NOTE
Presents with left groin pain, abd pain   Enlarging gas and fluid collection extending from the antimesenteric border of the sigmoid colon, infiltrating the left inguinal region soft tissue and coursing under and along the lateral margin of the left inguinal canal. This has increased in size since 2/26/2024 with new surrounding inflammatory change suggesting a chronic diverticular abscess with a more recent acute component  Discussed with general surgery   No plan for surgery today - advancing diet and they will get back to us regarding abx duration and choice.

## 2024-06-19 NOTE — ASSESSMENT & PLAN NOTE
Oriented and appropriate at time of admission   Previous admissions developed encephalopathy  Monitor for delirium   Of note he was very lucid with me yesterday

## 2024-06-19 NOTE — ASSESSMENT & PLAN NOTE
Presents with left groin pain, abd pain   CT Abdomen pelvis: Enlarging gas and fluid collection extending from the antimesenteric border of the sigmoid colon, infiltrating the left inguinal region soft tissue and coursing under and along the lateral margin of the left inguinal canal. This has increased in size since 2/26/2024 with new surrounding inflammatory change suggesting a chronic diverticular abscess with a more recent acute component  General surgery consult recommends medical management with abx, IR does not think there is any indication for intervention  Ceftriaxone switched to Cipro I/s/o seizure  Continue flagyl  NPO

## 2024-06-19 NOTE — SEPSIS NOTE
Sepsis Note   Hal Miller 84 y.o. male MRN: [id number]  Unit/Bed#: ICU 07 Encounter: [id number]       Initial Sepsis Screening       Row Name 06/19/24 1506                Is the patient's history suggestive of a new or worsening infection? Yes (Proceed)  -SS        Suspected source of infection acute abdominal infection  -SS        Indicate SIRS criteria Tachypnea > 20 resp per min;Leukocytosis (WBC > 59147 IJL) OR Leukopenia (WBC <4000 IJL) OR Bandemia (WBC >10% bands)  -SS        Are two or more of the above signs & symptoms of infection both present and new to the patient? Yes (Proceed)  -SS        Assess for evidence of organ dysfunction: Are any of the below criteria present within 6 hours of suspected infection and SIRS criteria that are NOT considered to be chronic conditions? --                  User Key  (r) = Recorded By, (t) = Taken By, (c) = Cosigned By      Initials Name Provider Type    SS ADEOLA Hobbs Nurse Practitioner                        Body mass index is 23.27 kg/m².  Wt Readings from Last 1 Encounters:   06/18/24 82.2 kg (181 lb 3.5 oz)     IBW (Ideal Body Weight): 82.2 kg    Ideal body weight: 82.2 kg (181 lb 3.5 oz)      Noted to be hypothermic and Tachypnea.   On ABX treating L inguinal hernia with likely perforated diverticulum   Will send cultures.   LA WNL   WBC lower on blood work from rapid.

## 2024-06-19 NOTE — NURSING NOTE
Patient transferred to ICU. Upon admission seizure like activity was noted. Rigid extremities, downward gaze, and head turned to right. CCAP notified and at bedside.

## 2024-06-19 NOTE — ASSESSMENT & PLAN NOTE
Lab Results   Component Value Date    HGBA1C 7.4 (H) 06/07/2024       Recent Labs     06/18/24  1554 06/18/24  2124 06/19/24  0808 06/19/24  1113   POCGLU 236* 206* 169* 179*         Blood Sugar Average: Last 72 hrs:  (P) 205.625

## 2024-06-19 NOTE — PROGRESS NOTES
"[facility]  Progress Note  Name: Hal Miller I  MRN: [id number]  Unit/Bed#: ICU 07 I Date of Admission: 6/16/2024   Date of Service: 6/19/2024 I Hospital Day: 2    Assessment & Plan   * Acute encephalopathy  Assessment & Plan  Has had waxing and waning mental status over the last 48 hours as per family.  On my assessment yesterday he was awake alert and oriented .  Recognize all his family members was able to discuss current events with me including who the president is upcoming elections etc.  He did not know the date and month and year and as well fully aware of where he was.    Family had been concerned that he had been confused earlier on that morning however given his improvement no further testing was performed.  Fortunately overnight he had a fall and it is unclear what happened during that time whether he had just received any medications or interventions.    This morning he was not responding when spoken to, not responding to sternal rub initially and then all of a sudden perked up painful stimulation of both toes, he then opened his eyes and said \" that's it I've had enough\" \"I am getting out of here\".  It had to be redirected.    I was concerned for brief seizures in the setting of his zoster and possible encephalitis so I reached out to neurology who agreed to see the patient.  He remained awake and was reorienting and also redirectable when explained that he should remain in bed and remain in hospital.  Unfortunately soon after he became unresponsive again and a rapid response was called attended this along with critical care and the patient was upgraded to level 2 stepdown plan for stat CT head, I again in person discussed this case with neurology JADA who will see the patient shortly.    I remain concerned regarding seizure activity.  The patient has further episodes critical care will take over care.    Differential diagnosis includes stroke in setting chadsvasc 6 and " holding eliquis versus seizure in the setting of zoster and possible encephalitis.  Discussed this directly with the critical care AP during rapid response.     Ambulatory dysfunction  Assessment & Plan  Needs PT  OT    Vascular dementia (HCC)  Assessment & Plan  Oriented and appropriate at time of admission   Previous admissions developed encephalopathy  Monitor for delirium   Of note he was very lucid with me yesterday    Colonic diverticular abscess  Assessment & Plan  Presents with left groin pain, abd pain   Enlarging gas and fluid collection extending from the antimesenteric border of the sigmoid colon, infiltrating the left inguinal region soft tissue and coursing under and along the lateral margin of the left inguinal canal. This has increased in size since 2/26/2024 with new surrounding inflammatory change suggesting a chronic diverticular abscess with a more recent acute component  Discussed with general surgery   No plan for surgery today - advancing diet and they will get back to us regarding abx duration and choice.     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 No evidence of soft tissue component. A benign etiology is suspected such as retroperitoneal lymphatic malformation or lymphocele. Initial   evaluation in 3 months with contrast-enhanced CT abdomen/pelvis is recommended.  Outpt followup     Type 2 diabetes mellitus (HCC)  Assessment & Plan  Lab Results   Component Value Date    HGBA1C 7.4 (H) 06/07/2024       Recent Labs     06/18/24  1554 06/18/24  2124 06/19/24  0808 06/19/24  1113   POCGLU 236* 206* 169* 179*         Blood Sugar Average: Last 72 hrs:  (P) 205.625      CKD (chronic kidney disease)  Assessment & Plan  Lab Results   Component Value Date     EGFR 41 06/19/2024    EGFR 38 06/19/2024    EGFR 37 06/18/2024    CREATININE 1.54 (H) 06/19/2024    CREATININE 1.65 (H) 06/19/2024    CREATININE 1.69 (H) 06/18/2024   CKD 3 baseline  Avoid nephrotoxic agents and renally dose medications  Monitor CR- this is stable.     Inflammatory polyarthropathy (HCC)  Assessment & Plan  PTA regimen hydroxychloroquine on hold, prednisone transitioned to solumedrol for COPD exacerbation     Atrial fibrillation (HCC)  Assessment & Plan  Rate controlled  Continue diltiazem   Eliquis has been on hold since admission in setting of ? Surgical versus IR intervention  Plan to potentially start today however patient had fall overnight and has acute change in mental status  Will await further work up prior to staring   May need heparin bridging if prolonged holding of AC with high chadsvasc, however would hold off for now with acute work up pending .               VTE Pharmacologic Prophylaxis:   Low Risk (Score 0-2) - Encourage Ambulation.    Mobility:   Basic Mobility Inpatient Raw Score: 19  -HLM Goal: 6: Walk 10 steps or more  -HLM Achieved: 2: Bed activities/Dependent transfer      Patient Centered Rounds: I performed bedside rounds with nursing staff today.   Discussions with Specialists or Other Care Team Provider: Discussed with CCM as above .    Education and Discussions with Family / Patient: Updated  (daughter) at bedside.    Total Time Spent on Date of Encounter in care of patient: 30 mins. This time was spent on one or more of the following: performing physical exam; counseling and coordination of care; obtaining or reviewing history; documenting in the medical record; reviewing/ordering tests, medications or procedures; communicating with other healthcare professionals and discussing with patient's family/caregivers.    Current Length of Stay: 2 day(s)  Current Patient Status: Inpatient   Certification Statement: The patient will continue to require  "additional inpatient hospital stay due to   ams.   Discharge Plan:  not medically stable for DC.     Code Status: Level 1 - Full Code    Subjective:   Patient seen and examined   Feeling \"okay\"    Objective:     Vitals:   Temp (24hrs), Av.3 °F (36.3 °C), Min:96.3 °F (35.7 °C), Max:98.1 °F (36.7 °C)    Temp:  [96.3 °F (35.7 °C)-98.1 °F (36.7 °C)] 97.5 °F (36.4 °C)  HR:  [55-97] 73  Resp:  [15-33] 25  BP: (100-166)/(57-92) 123/64  SpO2:  [96 %-100 %] 99 %  Body mass index is 23.27 kg/m².     Input and Output Summary (last 24 hours):     Intake/Output Summary (Last 24 hours) at 2024 1457  Last data filed at 2024 1400  Gross per 24 hour   Intake 149.75 ml   Output 575 ml   Net -425.25 ml       Physical Exam:   Physical Exam  Constitutional:       General: He is not in acute distress.     Appearance: He is ill-appearing. He is not toxic-appearing or diaphoretic.   HENT:      Head: Normocephalic.   Cardiovascular:      Rate and Rhythm: Normal rate. Rhythm irregular.   Pulmonary:      Effort: No respiratory distress.      Breath sounds: No stridor. No wheezing, rhonchi or rales.   Chest:      Chest wall: No tenderness.   Abdominal:      General: There is no distension.      Palpations: There is no mass.      Tenderness: There is no abdominal tenderness. There is no right CVA tenderness, left CVA tenderness, guarding or rebound.      Hernia: No hernia is present.   Musculoskeletal:      Right lower leg: No edema.      Left lower leg: No edema.   Skin:     Coloration: Skin is pale. Skin is not jaundiced.      Findings: No bruising, erythema, lesion or rash.   Neurological:      Mental Status: He is alert.      Sensory: Sensory deficit present.      Motor: Weakness (possible Left LE weakness) present.      Comments: Waxing and waning mental status. Intermittent periods of not responding.    Psychiatric:         Mood and Affect: Mood normal.          Additional Data:     Labs:  Results from last 7 days   Lab " Units 06/19/24  1156 06/19/24  1153 06/19/24  0712 06/18/24  2131 06/17/24  0717   WBC Thousand/uL  --  6.77 5.56   < > 5.80   HEMOGLOBIN g/dL  --  10.5* 10.1*   < > 11.2*   I STAT HEMOGLOBIN g/dl 10.9*  --   --   --   --    HEMATOCRIT %  --  32.5* 31.2*   < > 35.5*   HEMATOCRIT, ISTAT % 32*  --   --   --   --    PLATELETS Thousands/uL  --  139* 126*   < > 112*   BANDS PCT %  --   --   --   --  3   SEGS PCT %  --   --  82*   < >  --    LYMPHO PCT %  --   --  10*   < > 5*   MONO PCT %  --   --  8   < > 5   EOS PCT %  --   --  0   < > 0    < > = values in this interval not displayed.     Results from last 7 days   Lab Units 06/19/24  1156 06/19/24  1153   SODIUM mmol/L  --  132*   POTASSIUM mmol/L  --  4.6   CHLORIDE mmol/L  --  105   CO2 mmol/L  --  25   CO2, I-STAT mmol/L 23  --    BUN mg/dL  --  42*   CREATININE mg/dL  --  1.54*   ANION GAP mmol/L  --  2*   CALCIUM mg/dL  --  9.2   ALBUMIN g/dL  --  3.2*   TOTAL BILIRUBIN mg/dL  --  0.85   ALK PHOS U/L  --  97   ALT U/L  --  20   AST U/L  --  20   GLUCOSE RANDOM mg/dL  --  167*     Results from last 7 days   Lab Units 06/17/24  0717   INR  1.23*     Results from last 7 days   Lab Units 06/19/24  1113 06/19/24  0808 06/18/24  2124 06/18/24  1554 06/18/24  1418 06/18/24  1111 06/18/24  0728 06/18/24  0155 06/17/24  1646 06/17/24  1345 06/17/24  0846 06/17/24  0603   POC GLUCOSE mg/dl 179* 169* 206* 236* 257* 310* 280* 297* 389* 209* 183* 134         Results from last 7 days   Lab Units 06/19/24  1153 06/19/24  0016 06/18/24  2131 06/16/24  1900   LACTIC ACID mmol/L 1.0 1.1 2.8* 0.9   PROCALCITONIN ng/ml 0.11  --   --   --        Lines/Drains:  Invasive Devices       Peripheral Intravenous Line  Duration             Peripheral IV 06/16/24 Right Forearm 2 days    Peripheral IV 06/19/24 Left;Proximal;Ventral (anterior) Forearm <1 day    Peripheral IV 06/19/24 Proximal;Right;Ventral (anterior) Forearm <1 day                          Imaging: No pertinent imaging  reviewed.    Recent Cultures (last 7 days):         Last 24 Hours Medication List:   Current Facility-Administered Medications   Medication Dose Route Frequency Provider Last Rate    acetaminophen  975 mg Oral TID ADEOLA Hobbs      ampicillin-sulbactam  3 g Intravenous Q6H ADEOLA Hobbs      aspirin  81 mg Oral Daily ADEOLA Hobbs      atorvastatin  40 mg Oral QPM ADEOLA Hobbs      diltiazem  180 mg Oral Daily ADEOAL Hobbs      HYDROmorphone  0.2 mg Intravenous Q4H PRN ADEOLA Hobbs      insulin glargine  10 Units Subcutaneous HS ADEOLA Hobbs      insulin lispro  1-5 Units Subcutaneous TID AC ADEOLA Hobbs      ipratropium  0.5 mg Nebulization TID ADEOLA Hobbs      levalbuterol  1.25 mg Nebulization TID ADEOLA Hobbs      lidocaine   Topical 4x Daily PRN ADEOLA Hobbs      losartan  50 mg Oral Daily ADEOLA Hobbs      metroNIDAZOLE  500 mg Intravenous Q8H JOANNE HobbsNP 500 mg (06/19/24 0811)    nystatin  500,000 Units Swish & Swallow 4x Daily ADEOLA Hobbs      pantoprazole  40 mg Intravenous Q24H [facility] ADEOLA Hobbs      pravastatin  40 mg Oral QPM ADEOLA Hobbs      predniSONE  1 mg Oral Daily ADEOLA Hobbs      sodium chloride  75 mL/hr Intravenous Continuous ADEOLA Hobbs 75 mL/hr (06/19/24 1205)    valACYclovir  1,000 mg Oral BID ADEOLA Hobbs      valproate sodium  500 mg Intravenous Q8H UYNI ADEOLA Hobbs          Today, Patient Was Seen By: Rachana Malone MD    **Please Note: This note may have been constructed using a voice recognition system.**

## 2024-06-19 NOTE — ASSESSMENT & PLAN NOTE
Due to neuropathic right thorax pain as result of Shingles rash.  Patient's sleep was controlled at home on Seroquel 25 mg QHS.

## 2024-06-19 NOTE — PROGRESS NOTES
Critical Care Attending Note; John Harris   Note Date: 24  Note Time: 2:14 PM    Patient: Hal Miller  Age, : 84 y.o., 1940 MRN: [id number] Code Status: Level 1 - Full Code Patient Location: ICU 07/ICU 07   Hospital LOS:2 days  ]   Patient seen and examined, medical record reviewed, discussed with house staff and nursing staff.     HPI   CC: AMS  84M with a PMH of Dementia, CVA, PMR, HTN, DM, Afib, COPD, history of pancreatic cancer status post Whipple surgery  who presented with dyspnea and colonic abscess c/b fall and RRT for AMS concerning for seizure.       Main ICU Plans:       #Neuro  AMS - Seizure like activity - patient with a history of dementia, ICH which could make him more prone to seizure as well as a cephalosporin lowing seizure threshold. CT Head negative for acute process  - Neurology consult  - Depakote   - MRI Brain   - Ativan PRN  - Spot EEG    Vascular Dementia  - ASA/Statin  - Holding home Seroquel    #Card  Afib   - Diltiazem  - Hep gtt     #Pulm  COPD  - Continue albuterol    Hypoxic Respiratory Failure - likely atelectasis  - Wean FiO2 - Maintain O2 Sat >90%    #Renal  CKD  - renally dose medications    #GI  Pancreatic Cancer Hx - s/p whipple  - Thiamine, Folate    #ID   Diverticular Abscess  - Transition to Cipro/Flagyl   - Resend infectious workup     Herpes Zoster  - Valtrex     #Endo  DM  - Glargine 10   - ISS    #Rheum  PMR   - Continue prednisone      #DVT/GI ppx  Hep gtt    #Lines/Tubes/Drains:   Invasive Devices       Peripheral Intravenous Line  Duration             Peripheral IV 24 Right Forearm 2 days                    #Nutrition:   Diet NPO        #Code Status:   Level 1 - Full Code    #Dispo:   ICU        John Harris MD  Pulmonary, Critical Care    Critical care time, excluding procedures, teaching, family meetings, and excludes any prior time recorded by the AP/resident, 35 minutes. Upon my evaluation, this patient has a high probability of  imminent or life-threatening deterioration due to above problems which required my direct attention, intervention, and personal management.   Impression/Active Problems:    PMR   CVA   Dementia   Pancreatic Cx   Afib   AVR   HTN   DM        Physical Exam:     Vital Signs:   Weight: 82.2 kg (181 lb 3.5 oz)  IBW: Ideal body weight: 82.2 kg (181 lb 3.5 oz)  Temp:  [96.3 °F (35.7 °C)-98.1 °F (36.7 °C)] 97.5 °F (36.4 °C)  HR:  [55-97] 91  Resp:  [15-24] 24  BP: (100-152)/(65-88) 143/76  General: NAD  Neuro: Responds to voice  Heart: IRR  Lungs: CTAB  Abdomen: Soft   Extremities: No edema                Ventilator Settings:         Results from last 7 days   Lab Units 06/19/24  1331 06/19/24  1156 06/18/24  2136   ISTAT PH ART   --  7.378  --    PO2 VICTORINA mm Hg 73.0*  --  85.5*     Radiologic Images Reviewed:   CT Head   No acute intracranial abnormality. Similar-appearing chronic severe microangiopathic changes.     Input / Output:     Intake/Output Summary (Last 24 hours) at 6/19/2024 1414  Last data filed at 6/19/2024 0900  Gross per 24 hour   Intake 0 ml   Output 575 ml   Net -575 ml            Infusions:  sodium chloride, 75 mL/hr, Last Rate: 75 mL/hr (06/19/24 1205)      Scheduled Medications:  Current Facility-Administered Medications   Medication Dose Route Frequency Provider Last Rate    acetaminophen  975 mg Oral TID ADEOLA Hobbs      ampicillin-sulbactam  3 g Intravenous Q6H ADEOLA Hobbs      aspirin  81 mg Oral Daily ADEOLA Hobbs      atorvastatin  40 mg Oral QPM ADEOLA Hobbs      diltiazem  180 mg Oral Daily ADEOLA Hobbs      HYDROmorphone  0.2 mg Intravenous Q4H PRN ADEOLA Hobbs      insulin glargine  10 Units Subcutaneous HS ADEOLA Hobbs      insulin lispro  1-5 Units Subcutaneous TID AC ADEOLA Hobbs      ipratropium  0.5 mg Nebulization TID ADEOLA Hobbs      levalbuterol  1.25 mg  Nebulization TID ADEOLA Hobbs      levETIRAcetam  1,000 mg Intravenous Once Angela Funes, ADEOLA      levETIRAcetam  500 mg Intravenous Q12H [facility] ADEOLA Hobbs      lidocaine   Topical 4x Daily PRN ADEOLA Hobbs      LORazepam           LORazepam  2 mg Intravenous Once ADEOLA Hobbs      losartan  50 mg Oral Daily Angela Funes, ADEOLA      metroNIDAZOLE  500 mg Intravenous Q8H Angela Funes, JOANNENP 500 mg (06/19/24 0811)    nystatin  500,000 Units Swish & Swallow 4x Daily ADEOLA Hobbs      pantoprazole  40 mg Intravenous Q24H [facility] ADEOLA Hobbs      pravastatin  40 mg Oral QPM Angela uFnes, ADEOLA      predniSONE  1 mg Oral Daily Angela Funes, ADEOLA      QUEtiapine  25 mg Oral HS ADEOLA Hobbs      sodium chloride  75 mL/hr Intravenous Continuous ADEOLA Hobbs 75 mL/hr (06/19/24 1205)    valACYclovir  1,000 mg Oral BID ADEOLA Hobbs         PRN Medications:    HYDROmorphone    lidocaine    LORazepam    Labs Reviewed:  Results from last 7 days   Lab Units 06/19/24  1156 06/19/24  1153 06/19/24  0712 06/18/24  2131   WBC Thousand/uL  --  6.77 5.56 12.24*   HEMOGLOBIN g/dL  --  10.5* 10.1* 11.4*   I STAT HEMOGLOBIN g/dl 10.9*  --   --   --    HEMATOCRIT %  --  32.5* 31.2* 35.5*   HEMATOCRIT, ISTAT % 32*  --   --   --    PLATELETS Thousands/uL  --  139* 126* 170      Results from last 7 days   Lab Units 06/19/24  1156 06/19/24  1153 06/19/24  0549 06/18/24  2131 06/17/24  0717 06/16/24  1819   SODIUM mmol/L  --  132* 130* 128* 133* 134*   CO2 mmol/L  --  25 23 20* 25 30   CO2, I-STAT mmol/L 23  --   --   --   --   --    BUN mg/dL  --  42* 45* 45* 32* 35*   GLUCOSE, ISTAT mg/dl 169*  --   --   --   --   --    CALCIUM mg/dL  --  9.2 9.0 9.6 9.6 10.2   MAGNESIUM mg/dL  --   --   --   --  2.1 2.2   PHOSPHORUS mg/dL  --   --   --   --  2.1* 2.1*         Invalid input(s):  "\"ASTSGOT\", \"ALTSGPT\"LABRCNTIP@ ,alkphos:3,tbilirubin:3,dbilirubin:3)@  Results from last 7 days   Lab Units 06/17/24  0717   INR  1.23*           Invalid input(s): \"TROPT\", \"PBNP\"             I have personally seen and examined the patient on (06/19/24 between [id number]). I discussed the patient with the AP/resident including, but not limited to, verifying findings; reviewing labs and x-rays; discussing with consultants; developing the plan of care with the bedside nurse; and discussing treatment plan with patient or surrogate.  I have reviewed the note and assessment performed by the AP/resident and agree with the AP/resident’s documented findings and plan of care with the above additions/exceptions. Please see my comments for details and adjustments.                 "

## 2024-06-19 NOTE — ASSESSMENT & PLAN NOTE
Lab Results   Component Value Date    EGFR 41 06/19/2024    EGFR 38 06/19/2024    EGFR 37 06/18/2024    CREATININE 1.54 (H) 06/19/2024    CREATININE 1.65 (H) 06/19/2024    CREATININE 1.69 (H) 06/18/2024     CKD 3 baseline  Avoid nephrotoxic agents and renally dose medications  Trend Cr and BUN

## 2024-06-19 NOTE — ASSESSMENT & PLAN NOTE
Lab Results   Component Value Date    HGBA1C 7.4 (H) 06/07/2024       Recent Labs     06/18/24  1554 06/18/24  2124 06/19/24  0808 06/19/24  1113   POCGLU 236* 206* 169* 179*       Blood Sugar Average: Last 72 hrs:  (P) 205.625      Glargine 10U  SSI with accuchecks  Hypoglycemia protocol

## 2024-06-19 NOTE — ASSESSMENT & PLAN NOTE
Patient is no longer confused  Possible hospital acquired /icu related  Steroids possibly also contributing  Discussed with patient and wife and this happened on prior admission at which time geriatrics were very helpful  Will consult geriatrics and discussed with them

## 2024-06-19 NOTE — PLAN OF CARE
Problem: PAIN - ADULT  Goal: Verbalizes/displays adequate comfort level or baseline comfort level  Description: Interventions:  - Encourage patient to monitor pain and request assistance  - Assess pain using appropriate pain scale  - Administer analgesics based on type and severity of pain and evaluate response  - Implement non-pharmacological measures as appropriate and evaluate response  - Consider cultural and social influences on pain and pain management  - Notify physician/advanced practitioner if interventions unsuccessful or patient reports new pain  Outcome: Progressing     Problem: INFECTION - ADULT  Goal: Absence or prevention of progression during hospitalization  Description: INTERVENTIONS:  - Assess and monitor for signs and symptoms of infection  - Monitor lab/diagnostic results  - Monitor all insertion sites, i.e. indwelling lines, tubes, and drains  - Monitor endotracheal if appropriate and nasal secretions for changes in amount and color  - [place] appropriate cooling/warming therapies per order  - Administer medications as ordered  - Instruct and encourage patient and family to use good hand hygiene technique  - Identify and instruct in appropriate isolation precautions for identified infection/condition  Outcome: Progressing  Goal: Absence of fever/infection during neutropenic period  Description: INTERVENTIONS:  - Monitor WBC    Outcome: Progressing     Problem: SAFETY ADULT  Goal: Patient will remain free of falls  Description: INTERVENTIONS:  - Educate patient/family on patient safety including physical limitations  - Instruct patient to call for assistance with activity   - Consult OT/PT to assist with strengthening/mobility   - Keep Call bell within reach  - Keep bed low and locked with side rails adjusted as appropriate  - Keep care items and personal belongings within reach  - Initiate and maintain comfort rounds  - Make Fall Risk Sign visible to staff  - Offer Toileting every  Hours,  in advance of need  - Initiate/Maintain alarm  - Obtain necessary fall risk management equipment:   - Apply yellow socks and bracelet for high fall risk patients  - Consider moving patient to room near nurses station  Outcome: Progressing  Goal: Maintain or return to baseline ADL function  Description: INTERVENTIONS:  -  Assess patient's ability to carry out ADLs; assess patient's baseline for ADL function and identify physical deficits which impact ability to perform ADLs (bathing, care of mouth/teeth, toileting, grooming, dressing, etc.)  - Assess/evaluate cause of self-care deficits   - Assess range of motion  - Assess patient's mobility; develop plan if impaired  - Assess patient's need for assistive devices and provide as appropriate  - Encourage maximum independence but intervene and supervise when necessary  - Involve family in performance of ADLs  - Assess for home care needs following discharge   - Consider OT consult to assist with ADL evaluation and planning for discharge  - Provide patient education as appropriate  Outcome: Progressing  Goal: Maintains/Returns to pre admission functional level  Description: INTERVENTIONS:  - Perform AM-PAC 6 Click Basic Mobility/ Daily Activity assessment daily.  - Set and communicate daily mobility goal to care team and patient/family/caregiver.   - Collaborate with rehabilitation services on mobility goals if consulted  - Perform Range of Motion 3 times a day.  - Reposition patient every 3 hours.  - Dangle patient 3 times a day  - Stand patient 3 times a day  - Ambulate patient 3 times a day  - Out of bed to chair 3 times a day   - Out of bed for meals 3 times a day  - Out of bed for toileting  - Record patient progress and toleration of activity level   Outcome: Progressing     Problem: DISCHARGE PLANNING  Goal: Discharge to home or other facility with appropriate resources  Description: INTERVENTIONS:  - Identify barriers to discharge w/patient and caregiver  -  Arrange for needed discharge resources and transportation as appropriate  - Identify discharge learning needs (meds, wound care, etc.)  - Arrange for interpretive services to assist at discharge as needed  - Refer to Case Management Department for coordinating discharge planning if the patient needs post-hospital services based on physician/advanced practitioner order or complex needs related to functional status, cognitive ability, or social support system  Outcome: Progressing

## 2024-06-19 NOTE — PROGRESS NOTES
Progress Note - Geriatric Medicine   Hal Miller 84 y.o. male MRN: [id number]  Unit/Bed#: W -01 Encounter: [id number]      Assessment/Plan:  Ambulatory dysfunction  Assessment & Plan  - Patient was walking independently prior to admission to hospital.   - Patient now requires assistance upon his current state.  - Consult PT/OT for treatment.  - Check/monitor orthostatic vitals.  - Fall precautions.    Insomnia  Assessment & Plan  - Due to neuropathic right thorax pain as result of Shingles rash.  - Patient's sleep was controlled at home on Seroquel 25 mg QHS.  - May increase dosage to Seroquel 25 mg BID PRN.    Dizziness  Assessment & Plan  - Check/monitor orthostatic vital signs.  - Maintain hydration status.  - Fall precautions.    Vascular dementia (HCC)  Assessment & Plan  Currently stable, intermittent behaviors noted  Will continue to provide supportive care, reorient as needed.  Patient is at high risk for delirium, will monitor closely and place on delirium precautions.  Maintain sleep/wake cycle.  Optimize pain regimen.  Monitor for constipation and urinary retention and manage as needed.  TSH within normal limits, check B12 level  Encourage family to visit.  Encourage to wear glasses and hearing aids while awake.  Encourage po intake, assist with feeding if needed.     Zoster  Assessment & Plan  - Zoster lesions on right chest wall and right upper back.   - Vesicles noted on lateral side of back.   - Valtrex 1000 mg twice daily  - Pain control.   - Pain uncontrolled now.  -Start Tylenol 975 mg p.o. 3 times daily, consider gabapentin 100 mg nightly if pain not well-controlled  - Contact isolation.    Type 2 diabetes mellitus (HCC)  Assessment & Plan  Lab Results   Component Value Date    HGBA1C 7.4 (H) 06/07/2024       Recent Labs     06/18/24  0155 06/18/24  0728 06/18/24  1111 06/18/24  1418   POCGLU 297* 280* 310* 257*       Blood Sugar Average: Last 72 hrs:  (P) 208.5121909689309570    Continue  insulin regimen  Avoid hypoglycemia, patient is currently n.p.o.      Acute encephalopathy  Assessment & Plan  - Acute challenging mental status  -Patient was agitated all night and he had a fall, CT head was negative at that time for acute pathology  -During the encounter patient was lethargic then suddenly was trying to get out of bed  -Did to have episodes of staring into space  -He was not able to answer any orientation questions  -Patient is high risk of delirium due to dementia at baseline, pain, hospitalization, insomnia, metabolic imbalance, shingles, diverticulitis  -delirium precautions  -maintain normal sleep/wake cycle  -minimize overnight interruptions, group overnight vitals/labs/nursing checks as possible  -dim lights, close blinds and turn off tv to minimize stimulation and encourage sleep environment in evenings  -ensure that pain is well controlled, consider Tylenol 975mg Q8H scheduled   -monitor for fecal and urinary retention which may precipitate delirium  -encourage early mobilization and ambulation  -provide frequent reorientation and redirection  -encourage family and friends at the bedside to help calm patient if anxious  -avoid medications which may precipitate or worsen delirium such as tramadol, benzodiazepine, anticholinergics, and antihistaminics  -encourage hydration and nutrition , assist with feeding if needed  -redirect unwanted behaviors as first line, avoid physical restraints.   -Bladder scan 217 mL  -Neurology consulted  -Repeat CT head pending    CKD (chronic kidney disease)  Assessment & Plan  Lab Results   Component Value Date    EGFR 47 06/17/2024    EGFR 41 06/16/2024    EGFR 39 06/08/2024    CREATININE 1.38 (H) 06/17/2024    CREATININE 1.54 (H) 06/16/2024    CREATININE 1.59 (H) 06/08/2024     Creatinine stable.  Will avoid nephrotoxic medication.  Encourage po hydration.  Will monitor BMP.     Inflammatory polyarthropathy (HCC)  Assessment & Plan  Hydroxychloroquine on  "hold  Prednisone switched to Solu-Medrol for COPD exacerbation    Atrial fibrillation (HCC)  Assessment & Plan  Currently rate controlled, continue Cardizem  Eliquis on hold due to possible IR intervention    Shortness of breath  Assessment & Plan  - Presents with dyspnea, nonproductive cough with post tussive vomiting.  -Patient has history of COPD  - Takes Prednisone 1 mg QD at home for arthritis.  -Received a dose of Solu-Medrol on admission, continue resuming steroids  - Nebulized bronchodilators.    * Colonic diverticular abscess  Assessment & Plan  General surgery follows  Continue to be n.p.o. and IV hydration  Continue metronidazole and ceftriaxone  CT abdomen and pelvis from 6/18 showed Oral contrast is now seen to the level of the transverse colon, the contrast still has not reached the portion of the colon adjacent to the suspected diverticular abscess (axial image 122, series 301). Rectal tube has been intervally removed.        Subjective:   An 84-year-old male presents for a geriatric progress note after a fall this morning. Earlier this morning patient was  shouting, crying, and emotional bursts.   Patient seen and examined at bedside with Dr. Malone.  At the time of encounter patient was lethargic.  He woke up shortly and he was trying to get out of bed.  Per nursing staff he was agitated all night and he had a fall.  He did not take any of his po medication today and he did not eat anything    Review of Systems   Unable to perform ROS: Mental status change     No ROS were assessed due to delirious state.    Objective:     Vitals: Blood pressure 136/81, pulse 84, temperature 97.5 °F (36.4 °C), temperature source Axillary, resp. rate (!) 24, height 6' 2\" (1.88 m), weight 82.2 kg (181 lb 3.5 oz), SpO2 96%.,Body mass index is 23.27 kg/m².      Intake/Output Summary (Last 24 hours) at 6/19/2024 1136  Last data filed at 6/19/2024 0301  Gross per 24 hour   Intake --   Output 575 ml   Net -575 ml "       Current Medications: Reviewed    Physical Exam:   Physical Exam  Vitals and nursing note reviewed.   Constitutional:       General: He is not in acute distress.     Appearance: He is well-developed.   HENT:      Head: Normocephalic and atraumatic.      Ears:      Comments: [place]     Mouth/Throat:      Mouth: Mucous membranes are dry.   Eyes:      Conjunctiva/sclera: Conjunctivae normal.   Cardiovascular:      Rate and Rhythm: Normal rate and regular rhythm.      Heart sounds: No murmur heard.  Pulmonary:      Effort: Pulmonary effort is normal. No respiratory distress.      Breath sounds: Normal breath sounds.   Abdominal:      Palpations: Abdomen is soft.      Tenderness: There is no abdominal tenderness.   Musculoskeletal:         General: No swelling.      Cervical back: Neck supple.      Right lower leg: No edema.      Left lower leg: No edema.   Skin:     General: Skin is warm and dry.      Capillary Refill: Capillary refill takes less than 2 seconds.      Findings: Bruising present.      Comments: Skin tear   Neurological:      Mental Status: He is alert.      Comments: AAOx0, fluctuates from lethargic to agitated        Invasive Devices       Peripheral Intravenous Line  Duration             Peripheral IV 06/16/24 Right Forearm 2 days                    Lab, Imaging and other studies: I have personally reviewed pertinent reports.

## 2024-06-19 NOTE — ASSESSMENT & PLAN NOTE
Lab Results   Component Value Date    EGFR 47 06/17/2024    EGFR 41 06/16/2024    EGFR 39 06/08/2024    CREATININE 1.38 (H) 06/17/2024    CREATININE 1.54 (H) 06/16/2024    CREATININE 1.59 (H) 06/08/2024   CKD 3 baseline  Avoid nephrotoxic agents and renally dose medications  Monitor CR

## 2024-06-19 NOTE — ASSESSMENT & PLAN NOTE
Lab Results   Component Value Date    HGBA1C 7.4 (H) 06/07/2024       Recent Labs     06/18/24  0728 06/18/24  1111 06/18/24  1418 06/18/24  1554   POCGLU 280* 310* 257* 236*         Blood Sugar Average: Last 72 hrs:  (P) [id number]

## 2024-06-19 NOTE — ASSESSMENT & PLAN NOTE
Patient was walking independently prior to admission to hospital.   Patient now requires assistance upon his current state.  Consult PT/OT for treatment.  Check/monitor orthostatic vitals.  Fall precautions.   see mdm for attending note

## 2024-06-19 NOTE — ASSESSMENT & PLAN NOTE
Rate controlled  Continue diltiazem   Eliquis has been on hold since admission in setting of ? Surgical versus IR intervention  Plan to potentially start today however patient had fall overnight and has acute change in mental status  Will await further work up prior to staring   May need heparin bridging if prolonged holding of AC with high chadsvasc, however would hold off for now with acute work up pending .

## 2024-06-19 NOTE — ASSESSMENT & PLAN NOTE
Pt was a rapid response on the floor for acute change in mental status having periods of hypo and hyperactivity   Upon arrival to the ICU he had a persistent period of unresponsiveness in which his eyes had a downward deviation, his head turned left, he had rhythmic movements of this hands, and he was unresponsive to painful stimuli  Ativan 2mg given and he began to mumble words and appeared to break the episode  Neurology consulted, see plan under seizure

## 2024-06-20 ENCOUNTER — APPOINTMENT (INPATIENT)
Dept: NON INVASIVE DIAGNOSTICS | Facility: HOSPITAL | Age: 84
DRG: 391 | End: 2024-06-20
Payer: COMMERCIAL

## 2024-06-20 ENCOUNTER — HOSPITAL ENCOUNTER (INPATIENT)
Dept: VASCULAR ULTRASOUND | Facility: HOSPITAL | Age: 84
Discharge: HOME | DRG: 391 | End: 2024-06-20
Payer: COMMERCIAL

## 2024-06-20 ENCOUNTER — APPOINTMENT (INPATIENT)
Dept: MRI IMAGING | Facility: HOSPITAL | Age: 84
DRG: 391 | End: 2024-06-20
Payer: COMMERCIAL

## 2024-06-20 PROBLEM — I63.9 STROKE (CEREBRUM) (HCC): Status: ACTIVE | Noted: 2024-06-20

## 2024-06-20 PROBLEM — Z86.73 HISTORY OF STROKE: Status: ACTIVE | Noted: 2024-06-20

## 2024-06-20 LAB
ALBUMIN SERPL BCG-MCNC: 3.1 G/DL (ref 3.5–5)
ALP SERPL-CCNC: 94 U/L (ref 34–104)
ALT SERPL W P-5'-P-CCNC: 21 U/L (ref 7–52)
ANION GAP SERPL CALCULATED.3IONS-SCNC: 2 MMOL/L (ref 4–13)
APTT PPP: 103 SECONDS (ref 23–37)
APTT PPP: 106 SECONDS (ref 23–37)
APTT PPP: 51 SECONDS (ref 23–37)
AST SERPL W P-5'-P-CCNC: 25 U/L (ref 13–39)
BASOPHILS # BLD AUTO: 0.01 THOUSANDS/ÂΜL (ref 0–0.1)
BASOPHILS NFR BLD AUTO: 0 % (ref 0–1)
BILIRUB SERPL-MCNC: 0.87 MG/DL (ref 0.2–1)
BUN SERPL-MCNC: 32 MG/DL (ref 5–25)
CALCIUM ALBUM COR SERPL-MCNC: 9.7 MG/DL (ref 8.3–10.1)
CALCIUM SERPL-MCNC: 9 MG/DL (ref 8.4–10.2)
CHLORIDE SERPL-SCNC: 106 MMOL/L (ref 96–108)
CHOLEST SERPL-MCNC: 94 MG/DL
CO2 SERPL-SCNC: 28 MMOL/L (ref 21–32)
CREAT SERPL-MCNC: 1.39 MG/DL (ref 0.6–1.3)
EOSINOPHIL # BLD AUTO: 0.11 THOUSAND/ÂΜL (ref 0–0.61)
EOSINOPHIL NFR BLD AUTO: 2 % (ref 0–6)
ERYTHROCYTE [DISTWIDTH] IN BLOOD BY AUTOMATED COUNT: 14.4 % (ref 11.6–15.1)
GFR SERPL CREATININE-BSD FRML MDRD: 46 ML/MIN/1.73SQ M
GLUCOSE SERPL-MCNC: 119 MG/DL (ref 65–140)
GLUCOSE SERPL-MCNC: 119 MG/DL (ref 65–140)
GLUCOSE SERPL-MCNC: 121 MG/DL (ref 65–140)
GLUCOSE SERPL-MCNC: 186 MG/DL (ref 65–140)
GLUCOSE SERPL-MCNC: 188 MG/DL (ref 65–140)
GLUCOSE SERPL-MCNC: 84 MG/DL (ref 65–140)
HCT VFR BLD AUTO: 33.8 % (ref 36.5–49.3)
HDLC SERPL-MCNC: 39 MG/DL
HGB BLD-MCNC: 10.5 G/DL (ref 12–17)
IMM GRANULOCYTES # BLD AUTO: 0.02 THOUSAND/UL (ref 0–0.2)
IMM GRANULOCYTES NFR BLD AUTO: 0 % (ref 0–2)
LDLC SERPL CALC-MCNC: 44 MG/DL (ref 0–100)
LYMPHOCYTES # BLD AUTO: 0.67 THOUSANDS/ÂΜL (ref 0.6–4.47)
LYMPHOCYTES NFR BLD AUTO: 11 % (ref 14–44)
MAGNESIUM SERPL-MCNC: 2.2 MG/DL (ref 1.9–2.7)
MCH RBC QN AUTO: 30.7 PG (ref 26.8–34.3)
MCHC RBC AUTO-ENTMCNC: 31.1 G/DL (ref 31.4–37.4)
MCV RBC AUTO: 99 FL (ref 82–98)
MONOCYTES # BLD AUTO: 0.49 THOUSAND/ÂΜL (ref 0.17–1.22)
MONOCYTES NFR BLD AUTO: 8 % (ref 4–12)
NEUTROPHILS # BLD AUTO: 4.66 THOUSANDS/ÂΜL (ref 1.85–7.62)
NEUTS SEG NFR BLD AUTO: 79 % (ref 43–75)
NRBC BLD AUTO-RTO: 0 /100 WBCS
PHOSPHATE SERPL-MCNC: 2.6 MG/DL (ref 2.3–4.1)
PLATELET # BLD AUTO: 123 THOUSANDS/UL (ref 149–390)
PMV BLD AUTO: 9 FL (ref 8.9–12.7)
POTASSIUM SERPL-SCNC: 4.9 MMOL/L (ref 3.5–5.3)
PROCALCITONIN SERPL-MCNC: 0.08 NG/ML
PROT SERPL-MCNC: 4.9 G/DL (ref 6.4–8.4)
RBC # BLD AUTO: 3.42 MILLION/UL (ref 3.88–5.62)
SODIUM SERPL-SCNC: 136 MMOL/L (ref 135–147)
TRIGL SERPL-MCNC: 54 MG/DL
WBC # BLD AUTO: 5.96 THOUSAND/UL (ref 4.31–10.16)

## 2024-06-20 PROCEDURE — 99232 SBSQ HOSP IP/OBS MODERATE 35: CPT | Performed by: SURGERY

## 2024-06-20 PROCEDURE — 93880 EXTRACRANIAL BILAT STUDY: CPT

## 2024-06-20 PROCEDURE — 93880 EXTRACRANIAL BILAT STUDY: CPT | Performed by: SURGERY

## 2024-06-20 PROCEDURE — 92610 EVALUATE SWALLOWING FUNCTION: CPT

## 2024-06-20 PROCEDURE — 95819 EEG AWAKE AND ASLEEP: CPT | Performed by: PSYCHIATRY & NEUROLOGY

## 2024-06-20 PROCEDURE — 85025 COMPLETE CBC W/AUTO DIFF WBC: CPT

## 2024-06-20 PROCEDURE — 80053 COMPREHEN METABOLIC PANEL: CPT

## 2024-06-20 PROCEDURE — 94664 DEMO&/EVAL PT USE INHALER: CPT

## 2024-06-20 PROCEDURE — 93306 TTE W/DOPPLER COMPLETE: CPT

## 2024-06-20 PROCEDURE — 97167 OT EVAL HIGH COMPLEX 60 MIN: CPT

## 2024-06-20 PROCEDURE — 70551 MRI BRAIN STEM W/O DYE: CPT

## 2024-06-20 PROCEDURE — C9113 INJ PANTOPRAZOLE SODIUM, VIA: HCPCS

## 2024-06-20 PROCEDURE — 82948 REAGENT STRIP/BLOOD GLUCOSE: CPT

## 2024-06-20 PROCEDURE — 99232 SBSQ HOSP IP/OBS MODERATE 35: CPT | Performed by: STUDENT IN AN ORGANIZED HEALTH CARE EDUCATION/TRAINING PROGRAM

## 2024-06-20 PROCEDURE — 94640 AIRWAY INHALATION TREATMENT: CPT

## 2024-06-20 PROCEDURE — 93306 TTE W/DOPPLER COMPLETE: CPT | Performed by: INTERNAL MEDICINE

## 2024-06-20 PROCEDURE — NC001 PR NO CHARGE: Performed by: STUDENT IN AN ORGANIZED HEALTH CARE EDUCATION/TRAINING PROGRAM

## 2024-06-20 PROCEDURE — 84145 PROCALCITONIN (PCT): CPT

## 2024-06-20 PROCEDURE — 83735 ASSAY OF MAGNESIUM: CPT

## 2024-06-20 PROCEDURE — 97535 SELF CARE MNGMENT TRAINING: CPT

## 2024-06-20 PROCEDURE — 84100 ASSAY OF PHOSPHORUS: CPT

## 2024-06-20 PROCEDURE — 94760 N-INVAS EAR/PLS OXIMETRY 1: CPT

## 2024-06-20 PROCEDURE — 85730 THROMBOPLASTIN TIME PARTIAL: CPT | Performed by: INTERNAL MEDICINE

## 2024-06-20 PROCEDURE — 99232 SBSQ HOSP IP/OBS MODERATE 35: CPT | Performed by: FAMILY MEDICINE

## 2024-06-20 PROCEDURE — 80061 LIPID PANEL: CPT

## 2024-06-20 PROCEDURE — 85730 THROMBOPLASTIN TIME PARTIAL: CPT | Performed by: STUDENT IN AN ORGANIZED HEALTH CARE EDUCATION/TRAINING PROGRAM

## 2024-06-20 PROCEDURE — 99233 SBSQ HOSP IP/OBS HIGH 50: CPT | Performed by: STUDENT IN AN ORGANIZED HEALTH CARE EDUCATION/TRAINING PROGRAM

## 2024-06-20 RX ORDER — DIVALPROEX SODIUM 500 MG/1
500 TABLET, DELAYED RELEASE ORAL EVERY 8 HOURS SCHEDULED
Status: DISCONTINUED | OUTPATIENT
Start: 2024-06-20 | End: 2024-06-20

## 2024-06-20 RX ORDER — ACETAMINOPHEN 325 MG/1
650 TABLET ORAL EVERY 4 HOURS PRN
Status: DISCONTINUED | OUTPATIENT
Start: 2024-06-20 | End: 2024-06-26 | Stop reason: HOSPADM

## 2024-06-20 RX ORDER — ALBUTEROL SULFATE 90 UG/1
2 AEROSOL, METERED RESPIRATORY (INHALATION) EVERY 4 HOURS PRN
Status: DISCONTINUED | OUTPATIENT
Start: 2024-06-20 | End: 2024-06-26 | Stop reason: HOSPADM

## 2024-06-20 RX ORDER — ATORVASTATIN CALCIUM 40 MG/1
40 TABLET, FILM COATED ORAL
Status: DISCONTINUED | OUTPATIENT
Start: 2024-06-20 | End: 2024-06-20

## 2024-06-20 RX ORDER — INSULIN LISPRO 100 [IU]/ML
1-5 INJECTION, SOLUTION INTRAVENOUS; SUBCUTANEOUS
Status: DISCONTINUED | OUTPATIENT
Start: 2024-06-20 | End: 2024-06-26 | Stop reason: HOSPADM

## 2024-06-20 RX ORDER — ATORVASTATIN CALCIUM 40 MG/1
40 TABLET, FILM COATED ORAL
Status: DISCONTINUED | OUTPATIENT
Start: 2024-06-21 | End: 2024-06-26 | Stop reason: HOSPADM

## 2024-06-20 RX ORDER — DIVALPROEX SODIUM 500 MG/1
500 TABLET, DELAYED RELEASE ORAL EVERY 8 HOURS SCHEDULED
Status: DISCONTINUED | OUTPATIENT
Start: 2024-06-20 | End: 2024-06-26 | Stop reason: HOSPADM

## 2024-06-20 RX ORDER — LANOLIN ALCOHOL/MO/W.PET/CERES
3 CREAM (GRAM) TOPICAL
Status: DISCONTINUED | OUTPATIENT
Start: 2024-06-20 | End: 2024-06-21

## 2024-06-20 RX ADMIN — PREDNISONE 1 MG: 1 TABLET ORAL at 08:11

## 2024-06-20 RX ADMIN — DILTIAZEM HYDROCHLORIDE 180 MG: 180 CAPSULE, COATED, EXTENDED RELEASE ORAL at 08:11

## 2024-06-20 RX ADMIN — THIAMINE HYDROCHLORIDE: 100 INJECTION, SOLUTION INTRAMUSCULAR; INTRAVENOUS at 09:51

## 2024-06-20 RX ADMIN — VALACYCLOVIR HYDROCHLORIDE 1000 MG: 500 TABLET, FILM COATED ORAL at 08:12

## 2024-06-20 RX ADMIN — HEPARIN SODIUM 3200 UNITS: 1000 INJECTION INTRAVENOUS; SUBCUTANEOUS at 23:51

## 2024-06-20 RX ADMIN — VALACYCLOVIR HYDROCHLORIDE 1000 MG: 500 TABLET, FILM COATED ORAL at 17:15

## 2024-06-20 RX ADMIN — INSULIN LISPRO 1 UNITS: 100 INJECTION, SOLUTION INTRAVENOUS; SUBCUTANEOUS at 12:16

## 2024-06-20 RX ADMIN — VALPROATE SODIUM 500 MG: 100 INJECTION, SOLUTION INTRAVENOUS at 06:03

## 2024-06-20 RX ADMIN — LEVALBUTEROL HYDROCHLORIDE 1.25 MG: 1.25 SOLUTION RESPIRATORY (INHALATION) at 08:07

## 2024-06-20 RX ADMIN — INSULIN LISPRO 1 UNITS: 100 INJECTION, SOLUTION INTRAVENOUS; SUBCUTANEOUS at 17:13

## 2024-06-20 RX ADMIN — ASPIRIN 81 MG CHEWABLE TABLET 81 MG: 81 TABLET CHEWABLE at 08:11

## 2024-06-20 RX ADMIN — DIVALPROEX SODIUM 500 MG: 500 TABLET, DELAYED RELEASE ORAL at 23:49

## 2024-06-20 RX ADMIN — METRONIDAZOLE 500 MG: 500 INJECTION, SOLUTION INTRAVENOUS at 08:14

## 2024-06-20 RX ADMIN — ACETAMINOPHEN 975 MG: 325 TABLET, FILM COATED ORAL at 08:10

## 2024-06-20 RX ADMIN — CIPROFLOXACIN 400 MG: 2 INJECTION, SOLUTION INTRAVENOUS at 17:13

## 2024-06-20 RX ADMIN — NYSTATIN 500000 UNITS: 100000 SUSPENSION ORAL at 23:48

## 2024-06-20 RX ADMIN — PANTOPRAZOLE SODIUM 40 MG: 40 INJECTION, POWDER, FOR SOLUTION INTRAVENOUS at 08:12

## 2024-06-20 RX ADMIN — LOSARTAN POTASSIUM 50 MG: 50 TABLET, FILM COATED ORAL at 08:12

## 2024-06-20 RX ADMIN — METRONIDAZOLE 500 MG: 500 INJECTION, SOLUTION INTRAVENOUS at 18:30

## 2024-06-20 RX ADMIN — METRONIDAZOLE 500 MG: 500 INJECTION, SOLUTION INTRAVENOUS at 23:59

## 2024-06-20 RX ADMIN — NYSTATIN 500000 UNITS: 100000 SUSPENSION ORAL at 08:11

## 2024-06-20 RX ADMIN — IPRATROPIUM BROMIDE 0.5 MG: 0.5 SOLUTION RESPIRATORY (INHALATION) at 08:07

## 2024-06-20 RX ADMIN — NYSTATIN 500000 UNITS: 100000 SUSPENSION ORAL at 17:17

## 2024-06-20 RX ADMIN — CIPROFLOXACIN 400 MG: 2 INJECTION, SOLUTION INTRAVENOUS at 03:24

## 2024-06-20 RX ADMIN — VALPROATE SODIUM 500 MG: 100 INJECTION, SOLUTION INTRAVENOUS at 14:30

## 2024-06-20 RX ADMIN — ATORVASTATIN CALCIUM 40 MG: 40 TABLET, FILM COATED ORAL at 17:17

## 2024-06-20 RX ADMIN — HEPARIN SODIUM 16 UNITS/KG/HR: 10000 INJECTION, SOLUTION INTRAVENOUS at 07:54

## 2024-06-20 RX ADMIN — NYSTATIN 500000 UNITS: 100000 SUSPENSION ORAL at 11:58

## 2024-06-20 NOTE — PROGRESS NOTES
Progress Note - Neurology   Hal Miller 84 y.o. male [id number]  Unit/Bed#: ICU 07/ICU 07    Assessment/Plan:  Hal Miller is a 84 y.o. male    * Acute encephalopathy  Assessment & Plan  Hal Miller is a 84 y.o. male with CAD, s/p AVR, CKD, AF on eliquis, DM, HTN, HLD, COPD, prior stroke, vascular dementia, pancreatic cancer s/p whipple, PMR on prednisone and current shingles infection on valtrex admitted 6/16/24 with abdominal pain and SOB. CT abdomen revealed diverticular abscess; medically managed with IV abx. Eliquis held and Heparin gtt started     Hospital course complicated by acute encephalopathy with waxing and waning mental status.  RRT 6/18 for fall; CTH negative. RRT 6/19 for episodes of unresponsiveness with head turned to the left and downward gaze as well as rhythmic movements of his hands concerning for possible seizure. He received keppra 1gm and ativan 2mg iv x1 with return of conjugate gaze and incomprehensible sounds.He was transferred to ICU and started on stroke pathway with neurology consult.     At present, patient appears back to baseline. No reported overnight events or recurrent episodes of seizure like activity.     Neuroimaging/work-up:  6/18/24 CTH: No acute intracranial abnormality. Similar-appearing chronic severe microangiopathic changes.   Ammonia, B12, folate WNL  Lipid panel: TC 94, TG 54, HDL 39, LDL 44  6/8/24:TSH 1.7  6/7/24: A1c 7.4  Recommendations:  Acute encephalopathy in the setting of underlying dementia and acute infection; possible seizure  - MRI brain w/wo contrast pending  - Routine EEG completed; report pending  - Continue depacon 500mg q8hr for now  - Given no hx of prior seizure, if eeg and mri unrevealing would consider stopping depakote  - Seizure precautions, Administer Ativan prn seizure-like activity    - No indication for LP at this time  - Continue telemetry  - PT/OT  - [employer] form to be completed if patient drives  - Continue to monitor and notify  "Neurology with any changes.  - Medical management and correction of any metabolic or infectious disturbances per ICU.  - Conservative management of delirium: minimize noise, maintain day/night cycle, provide frequent redirection, avoid restraints if possible,avoid deleterious/sedating medications  etc.       History of stroke  Assessment & Plan  MRI brain 2/2023: \"Multiple chronic small infarcts in bilateral basal ganglia and bilateral cerebellum, worse in bilateral cerebellum. Multiple scattered peripheral predominant chronic microhemorrhages in bilateral cerebral hemispheres.  Severe chronic microangiopathy.\"  -Continue home pravastatin 40mg daily  -D/C ASA unless otherwise indicated, MRI 2023 with microhemorhhages    Episode of seizure-like activity  Assessment & Plan  EEG pending  Continue depakote as noted above    Type 2 diabetes mellitus (HCC)  Assessment & Plan  Lab Results   Component Value Date    HGBA1C 7.6 (H) 06/19/2024       Recent Labs     06/19/24  1751 06/19/24  2040 06/19/24  2350 06/20/24  0440   POCGLU 159* 152* 125 119       Blood Sugar Average: Last 72 hrs:  (P) [id number]    Goal euglycemic  Management per ICU    Atrial fibrillation (HCC)  Assessment & Plan  Heparin gtt per ICU    Recommendations for outpatient neurological follow up have yet to be determined.    Subjective:   Patient states that he feels back to baseline. Reports chronic decreased sensation right foot, unchanged. Chronic diplopia also unchanged    Past Medical History:   Diagnosis Date    Acute encephalopathy 2/14/2023    Acute-on-chronic kidney injury  (HCC) 6/13/2017    Cancer (HCC)     pancreatic    Colon polyp     Coronary artery disease     Dehydration 3/3/2023    Diabetes mellitus (HCC)     Hyperlipidemia     Hypertension     Left lower lobe pneumonia 7/2/2022    Pneumonia 06/13/2017    Right middle and lower lobe     Stroke (HCC)      Past Surgical History:   Procedure Laterality Date    APPENDECTOMY      " CARDIAC SURGERY      Aortic Valve Replacement, Ascending Aorta    COLONOSCOPY      GALLBLADDER SURGERY      PANCREATICODUODENECTOMY       Family History   Problem Relation Age of Onset    Cancer Mother     Stroke Father     Cancer Sister     Diabetes Sister     Stroke Brother      Social History     Socioeconomic History    Marital status: /Civil Union     Spouse name: None    Number of children: None    Years of education: None    Highest education level: None   Occupational History    None   Tobacco Use    Smoking status: Former     Types: Pipe     Quit date:      Years since quittin.4    Smokeless tobacco: Never   Vaping Use    Vaping status: Never Used   Substance and Sexual Activity    Alcohol use: Never    Drug use: No    Sexual activity: Yes     Partners: Female   Other Topics Concern    None   Social History Narrative    None     Social Determinants of Health     Financial Resource Strain: Not on file   Food Insecurity: No Food Insecurity (2024)    Hunger Vital Sign     Worried About Running Out of Food in the Last Year: Never true     Ran Out of Food in the Last Year: Never true   Transportation Needs: No Transportation Needs (2024)    PRAPARE - Transportation     Lack of Transportation (Medical): No     Lack of Transportation (Non-Medical): No   Physical Activity: Not on file   Stress: Not on file   Social Connections: Not on file   Intimate Partner Violence: Not on file   Housing Stability: Unknown (2024)    Housing Stability Vital Sign     Unable to Pay for Housing in the Last Year: No     Number of Times Moved in the Last Year: Not on file     Homeless in the Last Year: No     E-Cigarette/Vaping    E-Cigarette Use Never User      E-Cigarette/Vaping Substances    Nicotine No     THC No     CBD No     Flavoring No     Other No     Unknown No          Medications:  All current active meds have been reviewed and current meds:  Scheduled Meds:  Current Facility-Administered  Medications   Medication Dose Route Frequency Provider Last Rate    acetaminophen  975 mg Oral TID ADEOLA Hobbs      albuterol  2 puff Inhalation Q4H PRN Lyubov Malone MD      aspirin  81 mg Oral Daily ADEOLA Hobbs      atorvastatin  40 mg Oral QPM ADEOLA Hobbs      ciprofloxacin  400 mg Intravenous Q12H ADEOLA Hobbs 400 mg (06/20/24 0324)    diltiazem  180 mg Oral Daily ADEOLA Hobbs      folic acid 1 mg, thiamine (VITAMIN B1) 100 mg in sodium chloride 0.9 % 100 mL IV piggyback   Intravenous Daily ADEOLA Hobbs 0 mL/hr at 06/19/24 1744    heparin (porcine)  3-30 Units/kg/hr (Order-Specific) Intravenous Titrated John Harris MD 16 Units/kg/hr (06/20/24 0754)    heparin (porcine)  3,200 Units Intravenous Q6H PRN John Harris MD      heparin (porcine)  6,400 Units Intravenous Q6H PRN John Harris MD      HYDROmorphone  0.2 mg Intravenous Q4H PRN ADEOLA Hobbs      insulin lispro  1-6 Units Subcutaneous Q6H [facility] ADEOLA Hobbs      ipratropium  0.5 mg Nebulization TID ADEOLA Hobbs      levalbuterol  1.25 mg Nebulization TID ADEOLA Hobbs      lidocaine   Topical 4x Daily PRN ADEOLA Hobbs      losartan  50 mg Oral Daily ADEOLA Hobbs      metroNIDAZOLE  500 mg Intravenous Q8H ADEOLA Hobbs 500 mg (06/20/24 0814)    nystatin  500,000 Units Swish & Swallow 4x Daily ADEOLA Hobbs      pantoprazole  40 mg Intravenous Q24H [facility] ADEOLA Hobbs      pravastatin  40 mg Oral QPM ADEOLA Hobbs      predniSONE  1 mg Oral Daily ADEOLA Hobbs      valACYclovir  1,000 mg Oral BID Angela T Robeson, CRNP      valproate sodium  500 mg Intravenous Q8H ADEOLA Ravi 500 mg (06/20/24 0603)     Continuous Infusions:heparin (porcine), 3-30 Units/kg/hr (Order-Specific), Last Rate: 16  "Units/kg/hr (06/20/24 0754)      PRN Meds:.  albuterol    heparin (porcine)    heparin (porcine)    HYDROmorphone    lidocaine     ROS:   Review of Systems  See above    Vitals:   /89   Pulse 82   Temp 97.6 °F (36.4 °C) (Oral)   Resp (!) 30   Ht 6' 2\" (1.88 m)   Wt 77.8 kg (171 lb 8.3 oz)   SpO2 100%   BMI 22.02 kg/m²     Physical Exam:   Physical Exam  Vitals and nursing note reviewed.   Constitutional:       General: He is not in acute distress.  HENT:      Head: Normocephalic and atraumatic.   Skin:     General: Skin is warm and dry.   Neurological:      Mental Status: He is alert and oriented to person, place, and time.      Cranial Nerves: Cranial nerves 2-12 are intact.      Motor: Motor strength is normal.     Coordination: Finger-Nose-Finger Test normal.   Psychiatric:         Speech: Speech normal.       Neurologic Exam     Mental Status   Oriented to person, place, and time.   Follows 1 step commands.   Speech: speech is normal   Level of consciousness: alert  Able to name object.     Cranial Nerves   Cranial nerves II through XII intact.     Motor Exam     Strength   Strength 5/5 throughout.     Sensory Exam   Light touch normal.     Gait, Coordination, and Reflexes     Coordination   Finger to nose coordination: normal    Tremor   Resting tremor: absent    Reflexes   Right plantar: normal  Left plantar: normal      Labs: I have personally reviewed pertinent reports.   Recent Results (from the past 24 hour(s))   Fingerstick Glucose (POCT)    Collection Time: 06/19/24 11:13 AM   Result Value Ref Range    POC Glucose 179 (H) 65 - 140 mg/dl   CBC and Platelet    Collection Time: 06/19/24 11:53 AM   Result Value Ref Range    WBC 6.77 4.31 - 10.16 Thousand/uL    RBC 3.38 (L) 3.88 - 5.62 Million/uL    Hemoglobin 10.5 (L) 12.0 - 17.0 g/dL    Hematocrit 32.5 (L) 36.5 - 49.3 %    MCV 96 82 - 98 fL    MCH 31.1 26.8 - 34.3 pg    MCHC 32.3 31.4 - 37.4 g/dL    RDW 14.2 11.6 - 15.1 %    Platelets 139 (L) " 149 - 390 Thousands/uL    MPV 9.8 8.9 - 12.7 fL   Comprehensive metabolic panel    Collection Time: 06/19/24 11:53 AM   Result Value Ref Range    Sodium 132 (L) 135 - 147 mmol/L    Potassium 4.6 3.5 - 5.3 mmol/L    Chloride 105 96 - 108 mmol/L    CO2 25 21 - 32 mmol/L    ANION GAP 2 (L) 4 - 13 mmol/L    BUN 42 (H) 5 - 25 mg/dL    Creatinine 1.54 (H) 0.60 - 1.30 mg/dL    Glucose 167 (H) 65 - 140 mg/dL    Calcium 9.2 8.4 - 10.2 mg/dL    Corrected Calcium 9.8 8.3 - 10.1 mg/dL    AST 20 13 - 39 U/L    ALT 20 7 - 52 U/L    Alkaline Phosphatase 97 34 - 104 U/L    Total Protein 5.2 (L) 6.4 - 8.4 g/dL    Albumin 3.2 (L) 3.5 - 5.0 g/dL    Total Bilirubin 0.85 0.20 - 1.00 mg/dL    eGFR 41 ml/min/1.73sq m   Procalcitonin    Collection Time: 06/19/24 11:53 AM   Result Value Ref Range    Procalcitonin 0.11 <=0.25 ng/ml   Lactic acid, plasma (w/reflex if result > 2.0)    Collection Time: 06/19/24 11:53 AM   Result Value Ref Range    LACTIC ACID 1.0 0.5 - 2.0 mmol/L   Magnesium    Collection Time: 06/19/24 11:53 AM   Result Value Ref Range    Magnesium 2.2 1.9 - 2.7 mg/dL   Phosphorus    Collection Time: 06/19/24 11:53 AM   Result Value Ref Range    Phosphorus 2.8 2.3 - 4.1 mg/dL   Hemoglobin A1c w/EAG Estimation (Prechecked if no A1C within 90 days)    Collection Time: 06/19/24 11:53 AM   Result Value Ref Range    Hemoglobin A1C 7.6 (H) Normal 4.0-5.6%; PreDiabetic 5.7-6.4%; Diabetic >=6.5%; Glycemic control for adults with diabetes <7.0% %     mg/dl   POCT Blood Gas (CG8+)    Collection Time: 06/19/24 11:56 AM   Result Value Ref Range    pH, Art i-STAT 7.378 7.350 - 7.450    pCO2, Art i-STAT 37.3 36.0 - 44.0 mm HG    pO2, ART i-STAT 80.0 75.0 - 129.0 mm HG    BE, i-STAT -3 (L) -2 - 3 mmol/L    HCO3, Art i-STAT 22.0 22.0 - 28.0 mmol/L    CO2, i-STAT 23 21 - 32 mmol/L    O2 Sat, i-STAT 96 (H) 60 - 85 %    SODIUM, I-STAT 132 (L) 136 - 145 mmol/l    Potassium, i-STAT 4.7 3.5 - 5.3 mmol/L    Calcium, Ionized i-STAT 1.41 (H)  1.12 - 1.32 mmol/L    Hct, i-STAT 32 (L) 36.5 - 49.3 %    Hgb, i-STAT 10.9 (L) 12.0 - 17.0 g/dl    Glucose, i-STAT 169 (H) 65 - 140 mg/dl    POC FIO2 21 L    Specimen Type ARTERIAL     SITE Right Radial     BERONICA TEST Positive Allens Test    Blood gas, venous    Collection Time: 06/19/24  1:31 PM   Result Value Ref Range    pH, Antoni 7.368 7.300 - 7.400    pCO2, Antoni 38.2 (L) 42.0 - 50.0 mm Hg    pO2, Antoni 73.0 (H) 35.0 - 45.0 mm Hg    HCO3, Antoni 21.5 (L) 24 - 30 mmol/L    Base Excess, Antoni -3.4 mmol/L    O2 Content, Antoni 15.3 ml/dL    O2 HGB, VENOUS 92.7 (H) 60.0 - 80.0 %   Ammonia    Collection Time: 06/19/24  2:25 PM   Result Value Ref Range    Ammonia 27 18 - 72 umol/L   Vitamin B12    Collection Time: 06/19/24  2:26 PM   Result Value Ref Range    Vitamin B-12 531 180 - 914 pg/mL   Folate    Collection Time: 06/19/24  2:26 PM   Result Value Ref Range    Folate 6.3 >5.9 ng/mL   Blood culture    Collection Time: 06/19/24  4:10 PM    Specimen: Arm, Left; Blood   Result Value Ref Range    Blood Culture Received in Microbiology Lab. Culture in Progress.    Blood culture    Collection Time: 06/19/24  4:10 PM    Specimen: Arm, Right; Blood   Result Value Ref Range    Blood Culture Received in Microbiology Lab. Culture in Progress.    UA w Reflex to Microscopic w Reflex to Culture    Collection Time: 06/19/24  4:11 PM    Specimen: Urine, Other   Result Value Ref Range    Color, UA Light Yellow     Clarity, UA Clear     Specific Gravity, UA 1.013 1.003 - 1.030    pH, UA 5.0 4.5, 5.0, 5.5, 6.0, 6.5, 7.0, 7.5, 8.0    Leukocytes, UA Negative Negative    Nitrite, UA Negative Negative    Protein, UA Negative Negative mg/dl    Glucose, UA Trace (A) Negative mg/dl    Ketones, UA Negative Negative mg/dl    Urobilinogen, UA <2.0 <2.0 mg/dl mg/dl    Bilirubin, UA Negative Negative    Occult Blood, UA Negative Negative   APTT    Collection Time: 06/19/24  4:11 PM   Result Value Ref Range    PTT 33 23 - 37 seconds   Protime-INR     Collection Time: 06/19/24  4:11 PM   Result Value Ref Range    Protime 16.6 (H) 11.6 - 14.5 seconds    INR 1.27 (H) 0.84 - 1.19   Fingerstick Glucose (POCT)    Collection Time: 06/19/24  5:51 PM   Result Value Ref Range    POC Glucose 159 (H) 65 - 140 mg/dl   Fingerstick Glucose (POCT)    Collection Time: 06/19/24  8:40 PM   Result Value Ref Range    POC Glucose 152 (H) 65 - 140 mg/dl   APTT    Collection Time: 06/19/24 10:22 PM   Result Value Ref Range    PTT 82 (H) 23 - 37 seconds   Fingerstick Glucose (POCT)    Collection Time: 06/19/24 11:50 PM   Result Value Ref Range    POC Glucose 125 65 - 140 mg/dl   Fingerstick Glucose (POCT)    Collection Time: 06/20/24  4:40 AM   Result Value Ref Range    POC Glucose 119 65 - 140 mg/dl   APTT    Collection Time: 06/20/24  5:39 AM   Result Value Ref Range     (H) 23 - 37 seconds   Lipid Panel with Direct LDL reflex    Collection Time: 06/20/24  5:39 AM   Result Value Ref Range    Cholesterol 94 See Comment mg/dL    Triglycerides 54 See Comment mg/dL    HDL, Direct 39 (L) >=40 mg/dL    LDL Calculated 44 0 - 100 mg/dL   CBC and differential    Collection Time: 06/20/24  5:39 AM   Result Value Ref Range    WBC 5.96 4.31 - 10.16 Thousand/uL    RBC 3.42 (L) 3.88 - 5.62 Million/uL    Hemoglobin 10.5 (L) 12.0 - 17.0 g/dL    Hematocrit 33.8 (L) 36.5 - 49.3 %    MCV 99 (H) 82 - 98 fL    MCH 30.7 26.8 - 34.3 pg    MCHC 31.1 (L) 31.4 - 37.4 g/dL    RDW 14.4 11.6 - 15.1 %    MPV 9.0 8.9 - 12.7 fL    Platelets 123 (L) 149 - 390 Thousands/uL    nRBC 0 /100 WBCs    Segmented % 79 (H) 43 - 75 %    Immature Grans % 0 0 - 2 %    Lymphocytes % 11 (L) 14 - 44 %    Monocytes % 8 4 - 12 %    Eosinophils Relative 2 0 - 6 %    Basophils Relative 0 0 - 1 %    Absolute Neutrophils 4.66 1.85 - 7.62 Thousands/µL    Absolute Immature Grans 0.02 0.00 - 0.20 Thousand/uL    Absolute Lymphocytes 0.67 0.60 - 4.47 Thousands/µL    Absolute Monocytes 0.49 0.17 - 1.22 Thousand/µL    Eosinophils Absolute  0.11 0.00 - 0.61 Thousand/µL    Basophils Absolute 0.01 0.00 - 0.10 Thousands/µL   Comprehensive metabolic panel    Collection Time: 06/20/24  5:39 AM   Result Value Ref Range    Sodium 136 135 - 147 mmol/L    Potassium 4.9 3.5 - 5.3 mmol/L    Chloride 106 96 - 108 mmol/L    CO2 28 21 - 32 mmol/L    ANION GAP 2 (L) 4 - 13 mmol/L    BUN 32 (H) 5 - 25 mg/dL    Creatinine 1.39 (H) 0.60 - 1.30 mg/dL    Glucose 119 65 - 140 mg/dL    Calcium 9.0 8.4 - 10.2 mg/dL    Corrected Calcium 9.7 8.3 - 10.1 mg/dL    AST 25 13 - 39 U/L    ALT 21 7 - 52 U/L    Alkaline Phosphatase 94 34 - 104 U/L    Total Protein 4.9 (L) 6.4 - 8.4 g/dL    Albumin 3.1 (L) 3.5 - 5.0 g/dL    Total Bilirubin 0.87 0.20 - 1.00 mg/dL    eGFR 46 ml/min/1.73sq m   Magnesium    Collection Time: 06/20/24  5:39 AM   Result Value Ref Range    Magnesium 2.2 1.9 - 2.7 mg/dL   Phosphorus    Collection Time: 06/20/24  5:39 AM   Result Value Ref Range    Phosphorus 2.6 2.3 - 4.1 mg/dL   Procalcitonin    Collection Time: 06/20/24  5:39 AM   Result Value Ref Range    Procalcitonin 0.08 <=0.25 ng/ml   Fingerstick Glucose (POCT)    Collection Time: 06/20/24  8:00 AM   Result Value Ref Range    POC Glucose 84 65 - 140 mg/dl       Imaging: I have personally reviewed pertinent imaging in PACS and I have personally reviewed PACS reports.     EKG, Pathology, and Other Studies: I have personally reviewed pertinent reports.     Counseling / Coordination of Care  Assessment, images and plan reviewed with . Plan discussed with patient and ICU team. Please refer to attending attestation for additional recommendations

## 2024-06-20 NOTE — OCCUPATIONAL THERAPY NOTE
Occupational Therapy Evaluation + Treatment      Patient Name: Hal Miller  Today's Date: 6/20/2024  Problem List  Principal Problem:    Acute encephalopathy  Active Problems:    Shortness of breath    Atrial fibrillation (HCC)    Inflammatory polyarthropathy (HCC)    CKD (chronic kidney disease)    Type 2 diabetes mellitus (HCC)    Abnormal CT of the abdomen    Zoster    Colonic diverticular abscess    Vascular dementia (HCC)    Insomnia    Ambulatory dysfunction    Episode of seizure-like activity    History of Whipple procedure    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HCC)    Past Medical History  Past Medical History:   Diagnosis Date    Acute encephalopathy 2/14/2023    Acute-on-chronic kidney injury  (HCC) 6/13/2017    Cancer (HCC)     pancreatic    Colon polyp     Coronary artery disease     Dehydration 3/3/2023    Diabetes mellitus (HCC)     Hyperlipidemia     Hypertension     Left lower lobe pneumonia 7/2/2022    Pneumonia 06/13/2017    Right middle and lower lobe     Stroke (HCC)      Past Surgical History  Past Surgical History:   Procedure Laterality Date    APPENDECTOMY      CARDIAC SURGERY      Aortic Valve Replacement, Ascending Aorta    COLONOSCOPY      GALLBLADDER SURGERY      PANCREATICODUODENECTOMY        06/20/24 1503   OT Last Visit   OT Visit Date 06/20/24   Note Type   Note type Evaluation  (+ treatment)   Pain Assessment   Pain Assessment Tool 0-10   Pain Score No Pain   Restrictions/Precautions   Weight Bearing Precautions Per Order No   Other Precautions Cognitive;Chair Alarm;Bed Alarm;Multiple lines;Fall Risk   Home Living   Type of Home House   Home Layout Two level;Bed/bath upstairs;Able to live on main level with bedroom/bathroom;Stairs to enter with rails  (2 SISSY from garage)   Bathroom Shower/Tub Walk-in shower  (on the first and second floor of the home)   Bathroom Toilet Raised   Bathroom Equipment Grab bars in shower;Shower chair   Bathroom Accessibility Accessible   Home Equipment  "Walker;Cane;Grab bars   Additional Comments Pt ambulate w/o use of AD.   Prior Function   Level of [place] Independent with ADLs;Independent with functional mobility;Needs assistance with IADLS   Lives With Spouse   Receives Help From Family;Friend(s)   IADLs Family/Friend/Other provides transportation;Family/Friend/Other provides meals;Family/Friend/Other provides medication management  (Spouse assists w/ IADLs tasks, pt able to participate and carry out light IADLs)   Falls in the last 6 months 1 to 4  (1 fall during hospitalization)   Vocational Retired   Lifestyle   Autonomy PTA, living with spouse in a H, able to maintain FFSU if needed. Independent w/ ADLs and functional mobility w/o AD. Assist for IADLs. (-) driving, (+) falls.   Reciprocal Relationships Supportive spouse and family   Service to Others Retired   Intrinsic Gratification Working on the farm   General   Additional Pertinent History 84 y.o. male with a PMH of vascular dementia, CAD, CKD, IDDM, HTN, CVA, afib on eliquis, PMR who presents with abd pain, dyspnea, shingles painful rash. New left groin swelling and tenderness, CT abd finding diverticular abscess. RR 6/18 s/p unwitnessed fall and lethargy negative CT head, concern for seizure like activity. MRI brain pending.   Family/Caregiver Present Yes  (spouse and sister)   Subjective   Subjective \"Do you like my Faro[place]?\"   ADL   Eating Assistance 7  Independent   Eating Deficit   (eating lunch upon arrival to the room w/o noted deficits)   Grooming Assistance 5  Supervision/Setup   UB Bathing Assistance 5  Supervision/Setup   LB Bathing Assistance 4  Minimal Assistance   UB Dressing Assistance 5  Supervision/Setup   LB Dressing Assistance 4  Minimal Assistance   Toileting Assistance  4  Minimal Assistance   Additional Comments Unable to formally assess bathing, dressing, grooming or toileting at time of eval. The above levels of assistance are anticipated based on functional performance " deficits with use of clinical judgement.   Bed Mobility   Additional Comments Bed mobility NT: pt in recliner chair at the start/end of the session with all needs in reach   Transfers   Sit to Stand 4  Minimal assistance   Additional items Assist x 1;Increased time required;Verbal cues;Armrests   Stand to Sit 4  Minimal assistance   Additional items Assist x 1;Armrests;Increased time required;Verbal cues   Additional Comments STS completed w/o use of AD - mildly unsteady upon initial sit to stand. Assist for stability and balance.   Functional Mobility   Functional Mobility 4  Minimal assistance   Additional Comments Ax1 to ambulate functional household distance w/o use of AD. Pt mildly unsteady, reaching out to furnitures/walls for external support.   Additional items   (none)   Balance   Static Sitting Fair +   Dynamic Sitting Fair   Static Standing Poor +   Dynamic Standing Poor   Activity Tolerance   Activity Tolerance Patient limited by fatigue   Medical Staff Made Aware Spoke with CM   Nurse Made Aware Spoke with RN pre/post   RUE Assessment   RUE Assessment WFL   LUE Assessment   LUE Assessment WFL   Hand Function   Gross Motor Coordination Functional   Fine Motor Coordination Functional   Vision-Basic Assessment   Current Vision Wears glasses all the time   Cognition   Overall Cognitive Status Impaired   Arousal/Participation Alert;Responsive;Cooperative   Attention Within functional limits   Orientation Level Oriented X4   Memory Decreased recall of precautions   Following Commands Follows one step commands without difficulty   Comments Pt Id via wristband, name and . Per chart review pt w/ vascular dementia, however patient demonstrates ability to command follow with overall fair insight into current limtiations and deficits.   Assessment   Limitation Decreased ADL status;Decreased cognition;Decreased endurance;Decreased self-care trans;Decreased high-level ADLs   Prognosis Good   Assessment Patient is  a 84 y.o. male seen for OT evaluation and treatment  at [facility] following admission on 6/16/2024  s/p Acute encephalopathy. Please see above for comprehensive list of comorbidities and significant PMHx impacting functional performance.  At baseline, living with spouse in a 2SH, able to maintain FFSU if needed. Independent w/ ADLs and functional mobility w/o AD. Assist for IADLs. (-) driving, (+) falls. Upon initial evaluation, pt appears to be performing below baseline functional status. Pt requires supervision for UB ADLs, CARROLL for LB ADLs, CARROLL for transfers and CARROLL for functional mobility household distance with no AD. The AM-PAC & Barthel Index outcome tools were used to assist in determining pt safety w/self care /mobility and appropriate d/c recommendations, see above for score. Occupational performance is affected by the following deficits:  decreased muscular strength , decreased balance , decreased standing tolerance for self care tasks , decreased dynamic balance impacting functional reach, compromised soft tissue integrity , decreased functional reach , decreased activity tolerance , and impaired memory . Personal/Environmental factors impacting D/C include: (+) Hx of falls , steps to enter/navigate the home, Assistance needed for ADL/IADLs, Assistance needed for ADLs and functional mobility, High fall risk , decreased recall of precautions , and baseline cognitive deficits. Supporting factors include: able to maintain FFSU, support system available, and attitude towards recovery Patient would benefit from OT services within the acute care setting to maximize level of functional independence in the following areas fall prevention , self-care transfers, bed mobility , functional mobility, formal cognitive evaluation, and ADLs.  From OT standpoint, recommendation at time of D/C would be level I (maximum resource intensity).   Goals   Patient Goals to go home   LTG Time Frame 10-14   Long  Term Goal See below   Plan   Treatment Interventions ADL retraining;Functional transfer training;UE strengthening/ROM;Endurance training;Cognitive reorientation;Patient/family training;Equipment evaluation/education;Compensatory technique education;Energy conservation;Activityengagement   Goal Expiration Date 06/30/24   OT Treatment Day 1   OT Frequency 3-5x/wk   Discharge Recommendation   Rehab Resource Intensity Level, OT I (Maximum Resource Intensity)  (pending patients progression towards therapy goals)   Additional Comments  The patient's raw score on the AM-PAC Daily Activity Inpatient Short Form is 18. A raw score of less than 19 suggests the patient may benefit from discharge to post-acute rehabilitation services. Please refer to the recommendation of the Occupational Therapist for safe discharge planning.   -PAC Daily Activity Inpatient   Lower Body Dressing 3   Bathing 3   Toileting 3   Upper Body Dressing 3   Grooming 3   Eating 3   Daily Activity Raw Score 18   Daily Activity Standardized Score (Calc for Raw Score >=11) 38.66   AM-PAC Applied Cognition Inpatient   Following a Speech/Presentation 3   Understanding Ordinary Conversation 4   Taking Medications 2   Remembering Where Things Are Placed or Put Away 3   Remembering List of 4-5 Errands 2   Taking Care of Complicated Tasks 2   Applied Cognition Raw Score 16   Applied Cognition Standardized Score 35.03   Barthel Index   Feeding 10   Bathing 0   Grooming Score 5   Dressing Score 5   Bladder Score 10   Bowels Score 10   Toilet Use Score 5   Transfers (Bed/Chair) Score 10   Mobility (Level Surface) Score 0   Stairs Score 0   Barthel Index Score 55   Additional Treatment Session   Start Time 1450   End Time 1503   Treatment Assessment Patient agreeable to participate in ADLs, self-care transfers and functional mobility following IE. Pt continues to be CARROLL for self-care transfers and functional mobility. Pt completed ambulating toilet transfer  to/from raised toilet w/ CARROLL x 1 person, MOD I to complete pericare while sitting on the toilet. Pt tolerated standing at the sink ~2 mins to participate in grooming tasks w/ fair balance. Use of RW for additional, functional household distance for improved stability and balance. At the end of the session, pt remains sitting in the recliner chair w/ alarm donned and all needs in reach. Family present. Patient will continue to benefit from skilled OT to address functional performance deficits and optimize independence in mobility and ADL tasks. Recommending level I (maximum resource intensity) at time of d/c. Will continue to assess 3-5x/week.   End of Consult   Education Provided Yes;Family or social support of family present for education by provider   Nurse Communication Nurse aware of consult     Goals established on initial evaluation in order to achieve pt's goal of going home.      Pt will complete UB ADLs Mod independent  for increased ADL independence within 10 days.     Pt will complete LB ADLs Mod independent  for increased ADL independence within 10 days.     Pt will complete toileting Mod independent  with use of DME for increased ADL independence within 10 days.     Pt will demonstrate proper body mechanics to complete self-care transfers and functional mobility with Mod independent  and use of LRAD for increased safety and functional independence within 10 days.     Pt will demonstrate standing tolerance of 6-7 min with Mod independent  and use of LRAD for increased activity tolerance during ADL/IADL tasks within 10 days.     Pt will complete bed mobility Mod independent  for increased independence in repositioning, pressure offloading, and managing comfort.     Pt will demonstrate proper body mechanics and fall prevention strategies during 100% of tx sessions for increased safety awareness during ADL/IADLs    Pt will improve functional activity tolerance to 30 mins of sustained functional tasks to  increase participation in basic self-care tasks and minimize assistance level.     Pt will participate in ongoing cognitive assessments to assist with safe D/C planning and supervision/assistance recommendations.     Maci Tanner MS OTR/L   [place] Licensure# [id number]

## 2024-06-20 NOTE — ASSESSMENT & PLAN NOTE
Malnutrition Findings:   Adult Malnutrition type: Acute illness  Adult Degree of Malnutrition: Other severe protein calorie malnutrition  Malnutrition Characteristics: Weight loss, Inadequate energy                  360 Statement: Malnutrition related to inadequate energy intake as evidenced by 12#(7%) weight loss from 5/15/# to 6/7/# and <50% energy intake compared to estimated needs >5 days.    BMI Findings:           Body mass index is 21.96 kg/m².

## 2024-06-20 NOTE — PROGRESS NOTES
"Progress Note - General Surgery   Hal Miller 84 y.o. male MRN: [id number]  Unit/Bed#: ICU 07 Encounter: [id number]    Assessment:  84 y.o. male who p/w L inguinal hernia with likely perforated diverticulum.   Upgraded to the ICU on 6/19 due to concern for seizure        Plan:  Okay for diet from surgical perspective  Critical care management of mental status changes appreciated  No plans for inpatient surgical intervention  Plan to manage collection non-operatively with antibiotic course. IR consulted and collection not amenable to drainage.       Subjective/Objective     Subjective:   ANO x 2 this morning.  To person and place but not to time.  Denies abdominal pain.  Says shortness of breath improved.    Objective:     Blood pressure 158/89, pulse 77, temperature 97.6 °F (36.4 °C), temperature source Oral, resp. rate 15, height 6' 2\" (1.88 m), weight 77.8 kg (171 lb 8.3 oz), SpO2 100%.,Body mass index is 22.02 kg/m².      Intake/Output Summary (Last 24 hours) at 6/20/2024 0824  Last data filed at 6/20/2024 0750  Gross per 24 hour   Intake 1594.47 ml   Output 1402 ml   Net 192.47 ml       Invasive Devices       Peripheral Intravenous Line  Duration             Peripheral IV 06/16/24 Right Forearm 3 days    Peripheral IV 06/19/24 Left;Proximal;Ventral (anterior) Forearm <1 day    Peripheral IV 06/19/24 Proximal;Right;Ventral (anterior) Forearm <1 day                    Physical Exam:   Gen: NAD, Comfortable  Neuro: A&O, No focal deficits  Head: Normal Cephalic, Atraumatic  Eye: EOMI, PERRLA, No scleral icterus  Neck: Supple, No JVD, Midline trachea  CV: RRR, Cap refill <2 sec  Pulm: Normal work of breathing, no respiratory distress  Abd: Soft, Non-Distended, minimal tender left groin  Ext: No edema, Non-tender  Skin: warm, dry, intact      "

## 2024-06-20 NOTE — ASSESSMENT & PLAN NOTE
-Continue home Pravastatin 40 mg QD.  -MRI results from today as follows:  1. Small acute infarct in the left parietal periventricular white matter with an associated small focus of susceptibility that may represent associated petechial hemorrhage.                2. Redemonstrated foci of susceptibility, including new foci, nonspecific and could be seen with cerebral amyloid angiopathy, multiple cavernomas and/or prior embolic events, among other considerations.

## 2024-06-20 NOTE — ASSESSMENT & PLAN NOTE
Lab Results   Component Value Date    EGFR 46 06/20/2024    EGFR 41 06/19/2024    EGFR 38 06/19/2024    CREATININE 1.39 (H) 06/20/2024    CREATININE 1.54 (H) 06/19/2024    CREATININE 1.65 (H) 06/19/2024     CKD 3 baseline  Avoid nephrotoxic agents and renally dose medications  Trend Cr and BUN

## 2024-06-20 NOTE — ASSESSMENT & PLAN NOTE
-History of pancreatic cancer s/p Whipple Procedure in 2011.  -Continue Folic Acid 1 mg supplementation QD.  -Continue Thiamine 100 mg supplementation QD.

## 2024-06-20 NOTE — PROGRESS NOTES
Critical Care Attending Note; John Harris   Note Date: 24  Note Time: 9:47 AM    Patient: Hal Miller  Age, : 84 y.o., 1940 MRN: [id number] Code Status: Level 1 - Full Code Patient Location: ICU 07/ICU 07   Hospital LOS:3 days  ]   Patient seen and examined, medical record reviewed, discussed with house staff and nursing staff.     HPI   CC: AMS  84M with a PMH of Dementia, CVA, PMR, HTN, DM, Afib, COPD, history of pancreatic cancer status post Whipple surgery  who presented with dyspnea and colonic abscess c/b fall and RRT for AMS concerning for seizure.       Main ICU Plans:       #Neuro  AMS - Seizure like activity - patient with a history of dementia, ICH which could make him more prone to seizure as well as a cephalosporin lowing seizure threshold. CT Head negative for acute process. Mental Status greatly improved, follow up EEG  - Neurology consult  - Depakote   - MRI Brain   - Ativan PRN      Vascular Dementia  - ASA/Statin      #Card  Afib   - Diltiazem  - Hep gtt     #Pulm  COPD  - Continue albuterol    Hypoxic Respiratory Failure - likely atelectasis  - Wean FiO2 - Maintain O2 Sat >90%    #Renal  CKD - Baseline 1.5  - renally dose medications    #GI  Pancreatic Cancer Hx - s/p whipple  - Thiamine, Folate    #ID   Diverticular Abscess  - Transition to Cipro/Flagyl   - Resend infectious workup     Herpes Zoster  - Valtrex     #Endo  DM  - Glargine 8  - ISS    #Rheum  PMR   - Continue prednisone      #DVT/GI ppx  Hep gtt    #Lines/Tubes/Drains:   Invasive Devices       Peripheral Intravenous Line  Duration             Peripheral IV 24 Right Forearm 3 days    Peripheral IV 24 Left;Proximal;Ventral (anterior) Forearm <1 day    Peripheral IV 24 Proximal;Right;Ventral (anterior) Forearm <1 day                    #Nutrition:   Diet NPO        #Code Status:   Level 1 - Full Code    #Dispo:   Floor         John Harris MD  Pulmonary, Critical Care    Critical care time,  excluding procedures, teaching, family meetings, and excludes any prior time recorded by the AP/resident, 35 minutes. Upon my evaluation, this patient has a high probability of imminent or life-threatening deterioration due to above problems which required my direct attention, intervention, and personal management.   Impression/Active Problems:    PMR   CVA   Dementia   Pancreatic Cx   Afib   AVR   HTN   DM        Physical Exam:     Vital Signs:   Weight: 77.8 kg (171 lb 8.3 oz)  IBW: Ideal body weight: 82.2 kg (181 lb 3.5 oz)  Temp:  [97 °F (36.1 °C)-97.7 °F (36.5 °C)] 97.6 °F (36.4 °C)  HR:  [66-91] 82  Resp:  [13-34] 30  BP: (116-167)/(57-96) 158/89  General: NAD  Neuro: AxO 3  Heart: IRR  Lungs: CTAB  Abdomen: Soft   Extremities: No edema                Ventilator Settings:         Results from last 7 days   Lab Units 06/19/24  1331 06/19/24  1156 06/18/24  2136   ISTAT PH ART   --  7.378  --    PO2 VICTORINA mm Hg 73.0*  --  85.5*     Radiologic Images Reviewed:   CT Head   No acute intracranial abnormality. Similar-appearing chronic severe microangiopathic changes.     Input / Output:     Intake/Output Summary (Last 24 hours) at 6/20/2024 0947  Last data filed at 6/20/2024 0800  Gross per 24 hour   Intake 1621.19 ml   Output 1402 ml   Net 219.19 ml            Infusions:  heparin (porcine), 3-30 Units/kg/hr (Order-Specific), Last Rate: 16 Units/kg/hr (06/20/24 0754)      Scheduled Medications:  Current Facility-Administered Medications   Medication Dose Route Frequency Provider Last Rate    acetaminophen  975 mg Oral TID ADEOLA Hobbs      albuterol  2 puff Inhalation Q4H PRN Lyubov Malone MD      aspirin  81 mg Oral Daily ADEOLA Hobbs      atorvastatin  40 mg Oral QPM ADEOLA Hobbs      ciprofloxacin  400 mg Intravenous Q12H ADEOLA Hobbs 400 mg (06/20/24 0324)    diltiazem  180 mg Oral Daily Angela T Maine, CRNP      folic acid 1 mg, thiamine (VITAMIN B1)  100 mg in sodium chloride 0.9 % 100 mL IV piggyback   Intravenous Daily ADEOLA Hobbs Stopped (06/19/24 1744)    heparin (porcine)  3-30 Units/kg/hr (Order-Specific) Intravenous Titrated John Harris MD 16 Units/kg/hr (06/20/24 0754)    heparin (porcine)  3,200 Units Intravenous Q6H PRN John Harris MD      heparin (porcine)  6,400 Units Intravenous Q6H PRN John Harris MD      HYDROmorphone  0.2 mg Intravenous Q4H PRN ADEOLA Hobbs      insulin lispro  1-6 Units Subcutaneous Q6H [facility] ADEOLA Hobbs      ipratropium  0.5 mg Nebulization TID ADEOLA Hobbs      levalbuterol  1.25 mg Nebulization TID ADEOLA Hobbs      lidocaine   Topical 4x Daily PRN ADEOLA Hobbs      losartan  50 mg Oral Daily Angela Funes, ADEOLA      metroNIDAZOLE  500 mg Intravenous Q8H JOANNE HobbsNP 500 mg (06/20/24 0814)    nystatin  500,000 Units Swish & Swallow 4x Daily ADEOLA Hobbs      pantoprazole  40 mg Intravenous Q24H [facility] ADEOLA Hobbs      pravastatin  40 mg Oral QPM ADEOLA Hobbs      predniSONE  1 mg Oral Daily ADEOLA Hobbs      valACYclovir  1,000 mg Oral BID ADEOLA Hobbs      valproate sodium  500 mg Intravenous Q8H [facility] ADEOLA Hobbs 500 mg (06/20/24 0603)       PRN Medications:    albuterol    heparin (porcine)    heparin (porcine)    HYDROmorphone    lidocaine    Labs Reviewed:  Results from last 7 days   Lab Units 06/20/24  0539 06/19/24  1156 06/19/24  1153 06/19/24  0712   WBC Thousand/uL 5.96  --  6.77 5.56   HEMOGLOBIN g/dL 10.5*  --  10.5* 10.1*   I STAT HEMOGLOBIN g/dl  --  10.9*  --   --    HEMATOCRIT % 33.8*  --  32.5* 31.2*   HEMATOCRIT, ISTAT %  --  32*  --   --    PLATELETS Thousands/uL 123*  --  139* 126*      Results from last 7 days   Lab Units 06/20/24  0539 06/19/24  1156 06/19/24  1153 06/19/24  0549 06/18/24  213  "06/17/24  0717   SODIUM mmol/L 136  --  132* 130*   < > 133*   CO2 mmol/L 28  --  25 23   < > 25   CO2, I-STAT mmol/L  --  23  --   --   --   --    BUN mg/dL 32*  --  42* 45*   < > 32*   GLUCOSE, ISTAT mg/dl  --  169*  --   --   --   --    CALCIUM mg/dL 9.0  --  9.2 9.0   < > 9.6   MAGNESIUM mg/dL 2.2  --  2.2  --   --  2.1   PHOSPHORUS mg/dL 2.6  --  2.8  --   --  2.1*    < > = values in this interval not displayed.         Invalid input(s): \"ASTSGOT\", \"ALTSGPT\"LABRCNTIP@ ,alkphos:3,tbilirubin:3,dbilirubin:3)@  Results from last 7 days   Lab Units 06/19/24  1611 06/17/24  0717   INR  1.27* 1.23*           Invalid input(s): \"TROPT\", \"PBNP\"             I have personally seen and examined the patient on (06/20/24 between [id number]). I discussed the patient with the AP/resident including, but not limited to, verifying findings; reviewing labs and x-rays; discussing with consultants; developing the plan of care with the bedside nurse; and discussing treatment plan with patient or surrogate.  I have reviewed the note and assessment performed by the AP/resident and agree with the AP/resident’s documented findings and plan of care with the above additions/exceptions. Please see my comments for details and adjustments.         "

## 2024-06-20 NOTE — ASSESSMENT & PLAN NOTE
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 No evidence of soft tissue component. A benign etiology is suspected such as retroperitoneal lymphatic malformation or lymphocele. Initial evaluation in 3 months with contrast-enhanced CT abdomen/pelvis is recommended.  Outpatient followup

## 2024-06-20 NOTE — SPEECH THERAPY NOTE
Speech-Language Pathology Bedside Swallow Evaluation        Patient Name: Hal Miller    Today's Date: 6/20/2024     Problem List  Principal Problem:    Acute encephalopathy  Active Problems:    Shortness of breath    Atrial fibrillation (HCC)    Inflammatory polyarthropathy (HCC)    CKD (chronic kidney disease)    Type 2 diabetes mellitus (HCC)    Abnormal CT of the abdomen    Zoster    Colonic diverticular abscess    Vascular dementia (HCC)    Insomnia    Ambulatory dysfunction    Episode of seizure-like activity    History of Whipple procedure    History of stroke         Past Medical History  Past Medical History:   Diagnosis Date    Acute encephalopathy 2/14/2023    Acute-on-chronic kidney injury  (HCC) 6/13/2017    Cancer (HCC)     pancreatic    Colon polyp     Coronary artery disease     Dehydration 3/3/2023    Diabetes mellitus (HCC)     Hyperlipidemia     Hypertension     Left lower lobe pneumonia 7/2/2022    Pneumonia 06/13/2017    Right middle and lower lobe     Stroke (HCC)        Past Surgical History  Past Surgical History:   Procedure Laterality Date    APPENDECTOMY      CARDIAC SURGERY      Aortic Valve Replacement, Ascending Aorta    COLONOSCOPY      GALLBLADDER SURGERY      PANCREATICODUODENECTOMY         Summary    Pt with oropharyngeal swallowing function WNL. No overt s/s of aspiration.      Recommendations:   Diet: regular diet and thin liquids   Meds: whole with liquid   Frequent Oral care: 2x/day  Aspiration precautions  Other Recommendations/ considerations: No further follow up needed.       Current Medical Status  Pt is a 84 y.o. male who presented to [facility]  with a PMH of vascular dementia, CAD, CKD, IDDM, HTN, CVA, afib on eliquis, PMR on prednisone who presents with abd pain, dyspnea, shingles painful rash. New left groin swelling and tenderness, CT abd finding diverticular abscess and presented to medical service.     Past medical history:   Please see H&P for  details    Special Studies:  6/18/24 CT Head: IMPRESSION: No acute intracranial abnormality. Similar-appearing chronic severe microangiopathic changes.  6/18/24 CT Abdomen Pelvis LOWER CHEST: Small left and trace right pleural effusion, unchanged from the recent prior studies. There is pleural thickening on the right, suggesting chronicity. Linear and tubular opacities at the lung bases, likely areas of atelectasis or scarring.     Social/Education/Vocational Hx:  Pt lives with family    Swallow Information   Prior speech/swallowing tx: 6/8/24 Screened; no s/s of aspiration, reg + thin  Current Risks for Dysphagia & Aspiration:  AMS s/p seizure  Current Symptoms/Concerns:  AMS  Current Diet: NPO   Baseline Diet: regular diet and thin liquids  Takes pills- whole with liquid    Baseline Assessment   Behavior/Cognition: alert  Speech/Language Status: able to participate in conversation and able to follow commands  Patient Positioning: upright in bed     Swallow Mechanism Exam   Facial: symmetrical  Labial: WFL  Lingual: WFL  Velum: unable to visualize  Mandible: adequate ROM  Dentition: adequate  Vocal quality:clear/adequate   Volitional Cough: strong/productive   Respiratory: NC    Consistencies Assessed and Performance   Consistencies Administered: thin liquids, nectar thick, puree, and soft solids  (Applesauce and toast)     Oral Stage: Pt able to self feed. Pt able to bite through toast. Adequate bolus retrieval from spoon, cup, and straw. Mastication of toast and oral control of liquids and solids WNL. Timely oral transfer. No oral residue with solids or labial leakage with thin liquids.    Pharyngeal Stage: Prompt swallow initiation. Hyolaryngeal excursion judged to be complete via palpation. No coughing, choking, throat clearing, or changes to respiratory or vocal quality observed following the swallows.     Esophageal Concerns: intermittent belching noted with liquids    Results Reviewed with: patient, RN, and  MD

## 2024-06-20 NOTE — CASE MANAGEMENT
Case Management Discharge Planning Note    Patient name Hal Miller  Location ICU 07/ICU 07 MRN [id number]  : 1940 Date 2024       Current Admission Date: 2024  Current Admission Diagnosis:Acute encephalopathy   Patient Active Problem List    Diagnosis Date Noted Date Diagnosed    History of stroke 2024     Acute on chronic respiratory failure (HCC) 2024     Episode of seizure-like activity 2024     History of Whipple procedure 2024     Dizziness 2024     Insomnia 2024     Ambulatory dysfunction 2024     Severe protein-calorie malnutrition (HCC) 2024     Abnormal CT of the abdomen 2024     Zoster 2024     Colonic diverticular abscess 2024     Vascular dementia (HCC) 2024     Concern for aspiration pneumonia ([facility]) 2024     Type 2 diabetes mellitus (HCC) 2024     Hypercalcemia 2023     TARA (acute kidney injury) ([facility]) 2023     Esophageal stricture 2023     Acute encephalopathy 2023     Generalized weakness 2023     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     Chronic obstructive pulmonary disease with acute exacerbation (HCC) 2022     CKD (chronic kidney disease) 2022     Centrilobular emphysema ([facility]) 2021     History of malignant neuroendocrine tumor 2021     Atrial fibrillation (HCC) 2017     Shortness of breath 2017     Hypertension 2017     Hyperlipidemia 2015     Inflammatory polyarthropathy (HCC) 2014     Osteoarthrosis 2014     Polymyalgia rheumatica (HCC) 2014     Aneurysm of thoracic aorta (HCC) 2014     Aneurysm of abdominal aorta (HCC) 2014     Neoplasm of other specified site 2014       LOS (days): 3  Geometric Mean LOS (GMLOS) (days): 2.6  Days to GMLOS:-1     OBJECTIVE:  Risk of Unplanned Readmission Score: 38.3         Current admission status: Inpatient   Preferred  Pharmacy:   [id number] [place] [facility] [address] [address] [address] [facility] [address]  Phone: [phone] Fax: [phone]    [facility] - [place], [place] - [address]  [address] [address]  Phone: [phone] Fax: [phone]    [facility] - [place], [place] - [address]  [address] [place] [address]  Phone: [phone] Fax: [phone]    Primary Care Provider: Kyaw Monreal MD    Primary Insurance: [facility] [facility]  Secondary Insurance:     DISCHARGE DETAILS:    Discharge planning discussed with:: Pt, wife - Ann, and sister at bedside  Freedom of Choice: Yes  Comments - Freedom of Choice: Acute vs HHC    Contacts  Patient Contacts: Ann  Relationship to Patient:: Family  Contact Method: In Person  Reason/Outcome: Emergency Contact, Discharge Planning, Referral, Continuity of Care    Other Referral/Resources/Interventions Provided:  Interventions: Acute Rehab, HHC  Referral Comments: CM met with pt, his wife Ann, and his sister was also at bedside. CM reviewed OT rec for rehab and PT is pending. Pt and his wife do NOT want sub acute/SNF level of rehab. They are not interested in any facility at that level. CM discussed Acute vs HHC. At this time they are agreeable to [facility] and HHC agencies. Will see how pt does with PT and progress through stay here and f/u with options. Referrals in [place].

## 2024-06-20 NOTE — PLAN OF CARE
Problem: OCCUPATIONAL THERAPY ADULT  Goal: Performs self-care activities at highest level of function for planned discharge setting.  See evaluation for individualized goals.  Description: Treatment Interventions: ADL retraining, Functional transfer training, UE strengthening/ROM, Endurance training, Cognitive reorientation, Patient/family training, Equipment evaluation/education, Compensatory technique education, Energy conservation, Activityengagement          See flowsheet documentation for full assessment, interventions and recommendations.   Note: Limitation: Decreased ADL status, Decreased cognition, Decreased endurance, Decreased self-care trans, Decreased high-level ADLs  Prognosis: Good  Assessment: Patient is a 84 y.o. male seen for OT evaluation and treatment  at [facility] following admission on 6/16/2024  s/p Acute encephalopathy. Please see above for comprehensive list of comorbidities and significant PMHx impacting functional performance.  At baseline, living with spouse in a 2SH, able to maintain FFSU if needed. Independent w/ ADLs and functional mobility w/o AD. Assist for IADLs. (-) driving, (+) falls. Upon initial evaluation, pt appears to be performing below baseline functional status. Pt requires supervision for UB ADLs, CARROLL for LB ADLs, CARROLL for transfers and CARROLL for functional mobility household distance with no AD. The AM-PAC & Barthel Index outcome tools were used to assist in determining pt safety w/self care /mobility and appropriate d/c recommendations, see above for score. Occupational performance is affected by the following deficits:  decreased muscular strength , decreased balance , decreased standing tolerance for self care tasks , decreased dynamic balance impacting functional reach, compromised soft tissue integrity , decreased functional reach , decreased activity tolerance , and impaired memory . Personal/Environmental factors impacting D/C include: (+) Hx of  falls , steps to enter/navigate the home, Assistance needed for ADL/IADLs, Assistance needed for ADLs and functional mobility, High fall risk , decreased recall of precautions , and baseline cognitive deficits. Supporting factors include: able to maintain FFSU, support system available, and attitude towards recovery Patient would benefit from OT services within the acute care setting to maximize level of functional independence in the following areas fall prevention , self-care transfers, bed mobility , functional mobility, formal cognitive evaluation, and ADLs.  From OT standpoint, recommendation at time of D/C would be level I (maximum resource intensity).     Rehab Resource Intensity Level, OT: I (Maximum Resource Intensity) (pending patients progression towards therapy goals)     Maci Tanner MS OTR/L   [place] Licensure# [id number]

## 2024-06-20 NOTE — PROGRESS NOTES
Critical Care Interval Transfer Note:    Brief Hospital Summary:   Hospital Course: 84 y.o. male with PMH of CVA, Afib on Eliquis, vascular dementia, pancreatic cancer s/p Whipple, bioprosthetic aortic valve, T2DM,  who initially presented with abdominal pain was admitted to [facility] and found to have a diverticular abscess and perforation with air in the inguinal canal. Surgical intervention was not recommended. Yesterday, rapid response was called for waxing and waning mental status. He was initially upgraded to SD2, but seizure-like activity (increasingly hypoactive, downward gaze, rigid limbs) was noted soon after transfer, so patient was upgraded to SD1. Ativan 2mg and Keppra load were given. Neurology recommended Depacon TID. Mental status significantly improved as of last night, and alert/oriented x3 on exam. EEG final read pending. Patient to go down to MRI shortly, but refusing contrast despite education. Deemed stable for downgrade to [facility].    Barriers to discharge:   Seizure - mentation improved, MRI final read pending, spot EEG interpretation pending. Neurology following  Diverticular abscess - no surgical intervention. Managing with antibiotics     Consults: IP CONSULT TO ACUTE CARE SURGERY  INPATIENT CONSULT TO IR  IP CONSULT TO CASE MANAGEMENT  IP CONSULT TO GERONTOLOGY  IP CONSULT TO NEUROLOGY  IP CONSULT TO NEUROLOGY  IP CONSULT TO CASE MANAGEMENT  IP CONSULT TO NUTRITION SERVICES    Recommended to review admission imaging for incidental findings and document in discharge navigator: Incidental findings have been documented in discharge navigator. Patient and/or family was informed and they expressed understanding.      Discharge Plan: Anticipate discharge in 24-48 hrs to discharge location to be determined pending rehab evaluations.    PT Recommendations: Home with home health rehabilitation  OT Recommendations: No rehabilitation needs      Patient seen and evaluated by Critical Care today and deemed  to be appropriate for transfer to Med Surg. Spoke to Dr. Kulkarni from [facility] to accept transfer. Critical care can be contacted via [employer] with any questions or concerns.

## 2024-06-20 NOTE — ASSESSMENT & PLAN NOTE
"MRI brain 2/2023: \"Multiple chronic small infarcts in bilateral basal ganglia and bilateral cerebellum, worse in bilateral cerebellum. Multiple scattered peripheral predominant chronic microhemorrhages in bilateral cerebral hemispheres.  Severe chronic microangiopathy.\"  -Continue home pravastatin 40mg daily  -D/C ASA unless otherwise indicated, MRI 2023 with microhemorhhages  "

## 2024-06-20 NOTE — ASSESSMENT & PLAN NOTE
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   -Outpatient follow-up recommended.

## 2024-06-20 NOTE — ASSESSMENT & PLAN NOTE
Initially a rapid response on 6/19 for AMS  Upon arrival to ICU, became unresponsive with downward gaze deviation, rigidity, rhythmic hand movements, and head turning to L unresponsive to pain  Ativan 2mg given and pt began to localize to pain with mumbling words  Empirically given Keppra 1g    Plan  Neuro consulted, appreciate recommendations  Depakote 500mg IV q8hrs  Spot EEG  MRI brain w and w/o ordered

## 2024-06-20 NOTE — ASSESSMENT & PLAN NOTE
- Continue depacon 500mg q8hr for now in the setting of low seizure threshold  - check VPA level prior to next dose  - Seizure precautions  - Administer Ativan prn seizure-like activity    - [place] DOT form submitted; patient made aware of mandatory 6 month driving restriction

## 2024-06-20 NOTE — PROGRESS NOTES
[facility]  Progress Note  Name: Hal Miller I  MRN: [id number]  Unit/Bed#: ICU 07 I Date of Admission: 6/16/2024   Date of Service: 6/20/2024 I Hospital Day: 3    Assessment & Plan   Seizure (HCC)  Assessment & Plan  Initially a rapid response on 6/19 for AMS  Upon arrival to ICU, became unresponsive with downward gaze deviation, rigidity, rhythmic hand movements, and head turning to L unresponsive to pain  Ativan 2mg given and pt began to localize to pain with mumbling words  Empirically given Keppra 1g    Plan  Neuro consulted, appreciate recommendations  Depakote 500mg IV q8hrs  Spot EEG  MRI brain w and w/o ordered    Colonic diverticular abscess  Assessment & Plan  Presents with left groin pain, abd pain   CT Abdomen pelvis: Enlarging gas and fluid collection extending from the antimesenteric border of the sigmoid colon, infiltrating the left inguinal region soft tissue and coursing under and along the lateral margin of the left inguinal canal. This has increased in size since 2/26/2024 with new surrounding inflammatory change suggesting a chronic diverticular abscess with a more recent acute component    Plan  General surgery consult recommends medical management with abx, IR does not think there is any indication for intervention  Ceftriaxone switched to Cipro I/s/o seizure  Continue flagyl  NPO pending Speech evaluation    * Acute encephalopathy  Assessment & Plan  Pt was a rapid response on the floor for acute change in mental status having periods of hypo and hyperactivity   Upon arrival to the ICU he had a persistent period of unresponsiveness in which his eyes had a downward deviation, his head turned left, he had rhythmic movements of this hands, and he was unresponsive to painful stimuli  Ativan 2mg given and he began to mumble words and appeared to break the episode    Plan  Neurology consulted, see plan under seizure     History of Whipple procedure  Assessment & Plan  Hx  of pancreatic cancer s/p whipple procedure 2011  Start thiamine, folate     Ambulatory dysfunction  Assessment & Plan  Patient was walking independently prior to admission to hospital.   Patient now requires assistance upon his current state.  Consult PT/OT for treatment.  Check/monitor orthostatic vitals.  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 No evidence of soft tissue component. A benign etiology is suspected such as retroperitoneal lymphatic malformation or lymphocele. Initial evaluation in 3 months with contrast-enhanced CT abdomen/pelvis is recommended.  Outpatient followup     Type 2 diabetes mellitus (HCC)  Assessment & Plan  Lab Results   Component Value Date    HGBA1C 7.6 (H) 06/19/2024       Recent Labs     06/19/24  2040 06/19/24  2350 06/20/24  0440 06/20/24  0800   POCGLU 152* 125 119 84         Blood Sugar Average: Last 72 hrs:  (P) 190.95      Glargine 10U  SSI with accuchecks  Hypoglycemia protocol     CKD (chronic kidney disease)  Assessment & Plan  Lab Results   Component Value Date    EGFR 46 06/20/2024    EGFR 41 06/19/2024    EGFR 38 06/19/2024    CREATININE 1.39 (H) 06/20/2024    CREATININE 1.54 (H) 06/19/2024    CREATININE 1.65 (H) 06/19/2024     CKD 3 baseline  Avoid nephrotoxic agents and renally dose medications  Trend Cr and BUN    Inflammatory polyarthropathy (HCC)  Assessment &  Plan  Continue to hold outpatient hydroxychloroquine  Continue prednisone 1mg every other day     Atrial fibrillation (HCC)  Assessment & Plan  Currently rate controlled  Continue home diltiazem   Holding home eliquis right now due to not being able to take PO  Start Heparin gtt    Shortness of breath  Assessment & Plan  Presented with chief complaint of shortness of breath with post-tussive vomiting  Currently on 3L NC (baseline 3L O2 at home)  CXR on 6/16: No definite acute disease with small chronic loculated pleural effusions, rounded atelectasis in the right lower lobe, and bilateral scar             Disposition: Stepdown Level 1    ICU Core Measures     A: Assess, Prevent, and Manage Pain Has pain been assessed? Yes  Need for changes to pain regimen? No   B: Both SAT/SAT  N/A   C: Choice of Sedation RASS Goal: 0 Alert and Calm  Need for changes to sedation or analgesia regimen? No   D: Delirium CAM-ICU: Negative   E: Early Mobility  Plan for early mobility? Yes   F: Family Engagement Plan for family engagement today? Yes       Antibiotic Review: Appropriate coverage for diverticular abscess. Discontinued cephalosporin due to encephalopathy and potential for lowering seizure threshold      Prophylaxis:  VTE VTE covered by:  heparin (porcine), Intravenous, 16 Units/kg/hr at 06/20/24 0754  heparin (porcine), Intravenous  heparin (porcine), Intravenous       Stress Ulcer  covered bypantoprazole (PROTONIX) injection 40 mg [[id number]]         Significant 24hr Events     24hr events: No acute overnight events. Patient was seen and examined at bedside. He is alert and oriented x3. He was briefly tearful when asking about his wife. Reports some chest discomfort with deep inspiration and dry mouth. No other complaints at this time.     Subjective   Review of Systems: See HPI for ROS     Objective                            Vitals I/O      Most Recent Min/Max in 24hrs   Temp 97.6 °F (36.4 °C) Temp  Min: 97 °F (36.1 °C)   Max: 97.7 °F (36.5 °C)   Pulse 77 Pulse  Min: 66  Max: 91   Resp 15 Resp  Min: 13  Max: 34   /89 BP  Min: 116/57  Max: 167/80   O2 Sat 100 % SpO2  Min: 97 %  Max: 100 %      Intake/Output Summary (Last 24 hours) at 6/20/2024 0826  Last data filed at 6/20/2024 0750  Gross per 24 hour   Intake 1594.47 ml   Output 1402 ml   Net 192.47 ml       Diet NPO    Invasive Monitoring           Physical Exam   Physical Exam  Eyes:      Extraocular Movements: Extraocular movements intact.      Pupils: Pupils are equal, round, and reactive to light.   Skin:     General: Skin is warm and dry.   HENT:      Head: Normocephalic and atraumatic.   Cardiovascular:      Rate and Rhythm: Normal rate. Rhythm irregular.      Pulses: Normal pulses.      Heart sounds: Normal heart sounds.   Musculoskeletal:      Right lower leg: No edema.      Left lower leg: No edema.   Abdominal: General: There is no distension.      Palpations: Abdomen is soft.      Tenderness: There is no abdominal tenderness. There is no guarding.      Hernia: A hernia is present.   Constitutional:       Appearance: He is well-developed. He is ill-appearing.      Interventions: Nasal cannula in place.   Pulmonary:      Effort: Pulmonary effort is normal.      Breath sounds: Normal breath sounds.   Neurological:      Mental Status: He is alert and oriented to person, place and time.      Motor: Strength full and intact in all extremities.            Diagnostic Studies      EKG: No new EKG  Imaging: No new imaging to review     Medications:  Scheduled PRN   acetaminophen, 975 mg, TID  aspirin, 81 mg, Daily  atorvastatin, 40 mg, QPM  ciprofloxacin, 400 mg, Q12H  diltiazem, 180 mg, Daily  folic acid 1 mg, thiamine (VITAMIN B1) 100 mg in sodium chloride 0.9 % 100 mL IV piggyback, , Daily  insulin lispro, 1-6 Units, Q6H YUNI  ipratropium, 0.5 mg, TID  levalbuterol, 1.25 mg, TID  losartan, 50 mg, Daily  metroNIDAZOLE, 500 mg, Q8H  nystatin, 500,000 Units, 4x  Daily  pantoprazole, 40 mg, Q24H YUNI  pravastatin, 40 mg, QPM  predniSONE, 1 mg, Daily  valACYclovir, 1,000 mg, BID  valproate sodium, 500 mg, Q8H YUNI      heparin (porcine), 3,200 Units, Q6H PRN  heparin (porcine), 6,400 Units, Q6H PRN  HYDROmorphone, 0.2 mg, Q4H PRN  lidocaine, , 4x Daily PRN       Continuous    heparin (porcine), 3-30 Units/kg/hr (Order-Specific), Last Rate: 16 Units/kg/hr (06/20/24 0754)         Labs:    CBC    Recent Labs     06/19/24  1153 06/19/24  1156 06/20/24  0539   WBC 6.77  --  5.96   HGB 10.5* 10.9* 10.5*   HCT 32.5* 32* 33.8*   *  --  123*     BMP    Recent Labs     06/19/24  1153 06/19/24  1156 06/20/24  0539   SODIUM 132*  --  136   K 4.6  --  4.9     --  106   CO2 25 23 28   AGAP 2*  --  2*   BUN 42*  --  32*   CREATININE 1.54*  --  1.39*   CALCIUM 9.2  --  9.0       Coags    Recent Labs     06/19/24  1611 06/19/24  2222 06/20/24  0539   INR 1.27*  --   --    PTT 33 82* 106*        Additional Electrolytes  Recent Labs     06/19/24  1153 06/19/24  1156 06/20/24  0539   MG 2.2  --  2.2   PHOS 2.8  --  2.6   CAIONIZED  --  1.41*  --           Blood Gas    No recent results  Recent Labs     06/19/24  1331   PHVEN 7.368   LSA0HNJ 38.2*   PO2VEN 73.0*   WXL6GMT 21.5*   BEVEN -3.4   [id number] 92.7*    LFTs  Recent Labs     06/19/24  1153 06/20/24  0539   ALT 20 21   AST 20 25   ALKPHOS 97 94   ALB 3.2* 3.1*   TBILI 0.85 0.87       Infectious  Recent Labs     06/19/24  1153 06/20/24  0539   PROCALCITONI 0.11 0.08     Glucose  Recent Labs     06/18/24  2131 06/19/24  0549 06/19/24  1153 06/20/24  0539   GLUC 201* 197* 167* 119               Lyubov Malone MD

## 2024-06-20 NOTE — ASSESSMENT & PLAN NOTE
Lab Results   Component Value Date    HGBA1C 7.6 (H) 06/19/2024       Recent Labs     06/19/24  1751 06/19/24  2040 06/19/24  2350 06/20/24  0440   POCGLU 159* 152* 125 119       Blood Sugar Average: Last 72 hrs:  (P) 196.1891497305437428    Goal euglycemic  Management per primary service

## 2024-06-20 NOTE — ASSESSMENT & PLAN NOTE
Pt was a rapid response on the floor for acute change in mental status having periods of hypo and hyperactivity   Upon arrival to the ICU he had a persistent period of unresponsiveness in which his eyes had a downward deviation, his head turned left, he had rhythmic movements of this hands, and he was unresponsive to painful stimuli  Ativan 2mg given and he began to mumble words and appeared to break the episode    Plan  Neurology consulted, see plan under seizure

## 2024-06-20 NOTE — ASSESSMENT & PLAN NOTE
-MRI results as follows:  1. Small acute infarct in the left parietal periventricular white matter with an associated small focus of susceptibility that may represent associated petechial hemorrhage.                2. Redemonstrated foci of susceptibility, including new foci, nonspecific and could be seen with cerebral amyloid angiopathy, multiple cavernomas and/or prior embolic events, among other considerations.  -Blood pressure is 135/75 today. His baseline is elevated. Maintain appropriate blood pressure and glucose control.    -Neurology believes stroke d/t withholding anticoagulation.  -Continue Eliquis 2.5 mg BID.  -Continue Depakote 500 mg Q8H and monitor level.  -Continue home Pravastatin 40 mg QD.  -Neurology follows appreciate input.

## 2024-06-20 NOTE — PROGRESS NOTES
Progress Note - Geriatric Medicine   Hal Miller 84 y.o. male MRN: [id number]  Unit/Bed#: ICU 07 Encounter: [id number]      Assessment/Plan:  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HCC)  Assessment & Plan  -Continue home Pravastatin 40 mg QD.  -MRI results from today as follows:  1. Small acute infarct in the left parietal periventricular white matter with an associated small focus of susceptibility that may represent associated petechial hemorrhage.                2. Redemonstrated foci of susceptibility, including new foci, nonspecific and could be seen with cerebral amyloid angiopathy, multiple cavernomas and/or prior embolic events, among other considerations.  -Maintain good blood pressure and glucose control  -Neurology follows appreciate input    History of Whipple procedure  Assessment & Plan  -History of pancreatic cancer s/p Whipple Procedure in 2011.  -Continue Folic Acid 1 mg supplementation QD.  -Continue Thiamine 100 mg supplementation QD.    Episode of seizure-like activity  Assessment & Plan    -Continue Depakote 500 mg Q8H.  -MRI results as follows:  Small acute infarct in the left parietal periventricular white matter with an associated small focus of susceptibility that may represent associated petechial hemorrhage.  Redemonstrated foci of susceptibility, including new foci, nonspecific and could be seen with cerebral amyloid angiopathy, multiple cavernomas and/or prior embolic events, among other considerations.  -Spot EEG interpretation as follows:  Excessive theta activity during wakefulness suggest mild nonspecific diffuse cerebral dysfunction.  No epileptiform discharges or seizures are present.  -Neurology following.    Ambulatory dysfunction  Assessment & Plan  - Patient was walking independently prior to admission to hospital.   - Patient still requires assistance upon his current state even though recovered from delirious state today.  - PT/OT was consulted for treatment.   -Patient and wife deny PT/OT  coming in today to help with initiating ambulation.  - Continue monitoring orthostatic vitals.  - Continue fall precautions.    Insomnia  Assessment & Plan  - Hold Seroquel 25 mg QHS.   - Patient's sleep was controlled at home on Seroquel 25 mg QHS.  - May increase dosage to Seroquel 25 mg BID PRN.  - Insomnia was due to neuropathic right thorax pain as result of Shingles rash.  - Patient reports sleeping well last night.    Dizziness  Assessment & Plan  - Check/monitor orthostatic vital signs.  - Maintain hydration status.  - Fall precautions.    Vascular dementia (HCC)  Assessment & Plan  -Currently stable.  -Continue to provide supportive care and reorient as needed.  -Patient is at high risk for delirium.  -Continue to monitor closely and stay on delirium precautions.  -Maintain sleep/wake cycle.  -Optimize pain regimen.  -Monitor for constipation and urinary retention and manage as needed.  -TSH and B12 within normal limits.  -Continue encouraging family to visit.  -Continue encouraging patient to wear glasses and hearing aids while awake.  -Continue encouraging PO intake and assist with feeding if needed.    Colonic diverticular abscess  Assessment & Plan  -CT abdomen and pelvis from 6/18 showed Oral contrast is now seen to the level of the transverse colon, the contrast still has not reached the portion of the colon adjacent to the suspected diverticular abscess (axial image 122, series 301). Rectal tube has been intervally removed.   -General surgery follows.  -Patient now drinking and eating normally.  -Continue Metronidazole 500 mg IV Q8H.  -Continue Ciprofloxacin 400 mg IV BID.       Zoster  Assessment & Plan  - Zoster lesions on right chest wall and right upper back.   - Vesicles noted on lateral side of back.   - Continue Valtrex 1000 mg BID.  - Continue pain regime with Tylenol 975 mg PO TID.  - Consider Gabapentin 100 mg QHS if pain becomes uncontrolled.    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   -Outpatient follow-up recommended.  -Refer to prior notes.    Type 2 diabetes mellitus (HCC)  Assessment & Plan  Lab Results   Component Value Date    HGBA1C 7.4 (H) 06/07/2024       Recent Labs     06/18/24  1554 06/18/24  2124 06/19/24  0808 06/19/24  1113   POCGLU 236* 206* 169* 179*       Blood Sugar Average: Last 72 hrs:  (P) 205.625    -Continue insulin regimen.  -Avoid hypoglycemia. Patient is now eating regularly.    CKD (chronic kidney disease)  Assessment & Plan  Lab Results   Component Value Date    EGFR 46 06/20/2024    EGFR 41 06/19/2024    EGFR 38 06/19/2024    CREATININE 1.39 (H) 06/20/2024    CREATININE 1.54 (H) 06/19/2024    CREATININE 1.65 (H) 06/19/2024     -Baseline CKD Stage 3.  -Creatinine stable, slightly elevated.  -Avoid nephrotoxic medication and renally dose all medications.  -Encourage PO hydration.  -Continue monitoring BMP.     Inflammatory polyarthropathy (HCC)  Assessment & Plan  -Continue to hold Hydroxychloroquine.  -Continue Prednisone 1 mg QD.    Atrial fibrillation (HCC)  Assessment & Plan  -Currently rate controlled.  -Continue Cardizem 180 mg QD.  -Eliquis continues to be held due to possible IR intervention.    Shortness of breath  Assessment & Plan  - Patient has history of COPD.  - Received a dose of Solu-Medrol on admission.  - Nebulized bronchodilators.  - Continue 3L NC (baseline 3L O2 at home).  - Continue home Prednisone 1 mg QD.     * Acute encephalopathy  Assessment & Plan  - Mental status improved today patient is alert oriented x 3  -CTH was negative at that time for acute pathology after his agitation and fall overnight on 6/19.  -Continue delirium precautions since still at risk even though now recovered to baseline.   -Patient is high risk of delirium due to dementia at baseline, pain, hospitalization, insomnia, metabolic imbalance, shingles,  diverticulitis.  -Continue maintaining normal sleep/wake cycle.  -Minimize overnight interruptions, group overnight vitals/labs/nursing checks as possible.  -Dim lights, close blinds and turn off tv to minimize stimulation and encourage sleep environment in evenings.  -Continue controlling pain with Tylenol 975 mg Q8H.  -Continue monitoring for fecal and urinary retention which may precipitate delirium.  -Encourage early mobilization and ambulation.  -Provide frequent reorientation and redirection as needed.  -Encourage family and friends at the bedside to help calm patient if anxious.  -Avoid medications which may precipitate or worsen delirium such as tramadol, benzodiazepine, anticholinergics, and antihistaminics  -Encourage hydration and nutrition; assist with feeding if needed.   -Patient is yet to have a bowel movement.  -Redirect unwanted behaviors as first line, avoid physical restraints.   -MRI results as follows:  Small acute infarct in the left parietal periventricular white matter with an associated small focus of susceptibility that may represent associated petechial hemorrhage.  Redemonstrated foci of susceptibility, including new foci, nonspecific and could be seen with cerebral amyloid angiopathy, multiple cavernomas and/or prior embolic events, among other considerations.  -Spot EEG interpretation as follows:  Excessive theta activity during wakefulness suggest mild nonspecific diffuse cerebral dysfunction.  No epileptiform discharges or seizures are present.  -Neurology following.        Subjective:   An 84-year-old male presents for a geriatric progress visit. Patient is accompanied by his wife and another family member. Patient is pleasant and has returned to his baseline. He is eating, drinking, urinating, and slept well. Patient has not had a bowel movement yet today.      Review of Systems   Constitutional:  Negative for appetite change, chills, diaphoresis and fever.   HENT:  Negative for  "drooling, ear pain and sore throat.    Eyes:  Negative for pain, redness and visual disturbance.   Respiratory:  Positive for cough. Negative for shortness of breath.         Patient reports the cough is productive and that he experiences pain upon coughing.   Cardiovascular:  Positive for chest pain. Negative for palpitations.        Patient reports experiencing pain when coughing.   Gastrointestinal:  Negative for abdominal pain, diarrhea, nausea and vomiting.   Genitourinary:  Negative for dysuria, hematuria and urgency.   Musculoskeletal:  Positive for myalgias. Negative for arthralgias and back pain.   Skin:  Negative for color change, rash and wound.   Neurological:  Positive for dizziness. Negative for syncope and speech difficulty.   Psychiatric/Behavioral:  Negative for confusion and sleep disturbance.    All other systems reviewed and are negative.        Objective:     Vitals: Blood pressure 136/68, pulse 85, temperature 97.6 °F (36.4 °C), temperature source Oral, resp. rate (!) 31, height 6' 2\" (1.88 m), weight 77.6 kg (171 lb), SpO2 97%.,Body mass index is 21.96 kg/m².      Intake/Output Summary (Last 24 hours) at 6/20/2024 1547  Last data filed at 6/20/2024 1201  Gross per 24 hour   Intake 1351.44 ml   Output 1688 ml   Net -336.56 ml       Current Medications: Reviewed    Physical Exam:   Physical Exam  Vitals and nursing note reviewed.   Constitutional:       General: He is not in acute distress.     Appearance: He is well-developed. He is not ill-appearing, toxic-appearing or diaphoretic.      Comments: Frail looking   HENT:      Head: Normocephalic and atraumatic.      Ears:      Comments: [place]     Mouth/Throat:      Mouth: Mucous membranes are moist.   Eyes:      General: No scleral icterus.        Right eye: No discharge.         Left eye: No discharge.      Conjunctiva/sclera: Conjunctivae normal.   Cardiovascular:      Rate and Rhythm: Normal rate and regular rhythm.      Heart sounds: Heart " sounds are distant. No murmur heard.     No friction rub.   Pulmonary:      Effort: Pulmonary effort is normal. No respiratory distress.      Breath sounds: Normal breath sounds.   Abdominal:      General: Bowel sounds are normal. There is no distension.      Palpations: Abdomen is soft.      Tenderness: There is no abdominal tenderness. There is no rebound.      Hernia: A hernia is present.   Musculoskeletal:         General: No swelling.      Cervical back: Neck supple.      Right lower leg: Edema (trace) present.      Left lower leg: Edema (trace) present.   Skin:     General: Skin is warm and dry.      Capillary Refill: Capillary refill takes less than 2 seconds.      Coloration: Skin is not jaundiced.      Findings: No erythema.   Neurological:      Mental Status: He is alert and oriented to person, place, and time. Mental status is at baseline.   Psychiatric:         Mood and Affect: Mood normal.         Behavior: Behavior normal.         Invasive Devices       Peripheral Intravenous Line  Duration             Peripheral IV 06/16/24 Right Forearm 3 days    Peripheral IV 06/19/24 Left;Proximal;Ventral (anterior) Forearm 1 day    Peripheral IV 06/19/24 Proximal;Right;Ventral (anterior) Forearm 1 day                    Lab, Imaging and other studies: I have personally reviewed pertinent reports.

## 2024-06-20 NOTE — ASSESSMENT & PLAN NOTE
Lab Results   Component Value Date    HGBA1C 7.6 (H) 06/19/2024       Recent Labs     06/19/24  2040 06/19/24  2350 06/20/24  0440 06/20/24  0800   POCGLU 152* 125 119 84         Blood Sugar Average: Last 72 hrs:  (P) 190.95      Glargine 10U  SSI with accuchecks  Hypoglycemia protocol

## 2024-06-20 NOTE — ASSESSMENT & PLAN NOTE
Presents with left groin pain, abd pain   CT Abdomen pelvis: Enlarging gas and fluid collection extending from the antimesenteric border of the sigmoid colon, infiltrating the left inguinal region soft tissue and coursing under and along the lateral margin of the left inguinal canal. 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

## 2024-06-20 NOTE — ASSESSMENT & PLAN NOTE
Patient was walking independently prior to admission to hospital.   Patient now requires assistance upon his current state.  Consult PT/OT for treatment.  Check/monitor orthostatic vitals.  Fall precautions

## 2024-06-20 NOTE — ASSESSMENT & PLAN NOTE
-Continue Depakote 500 mg Q8H.  -MRI results as follows:  Small acute infarct in the left parietal periventricular white matter with an associated small focus of susceptibility that may represent associated petechial hemorrhage.  Redemonstrated foci of susceptibility, including new foci, nonspecific and could be seen with cerebral amyloid angiopathy, multiple cavernomas and/or prior embolic events, among other considerations.  -Spot EEG interpretation as follows:  Excessive theta activity during wakefulness suggest mild nonspecific diffuse cerebral dysfunction.  No epileptiform discharges or seizures are present.  -Neurology following.

## 2024-06-20 NOTE — ASSESSMENT & PLAN NOTE
Presented with chief complaint of shortness of breath with post-tussive vomiting  Currently on 3L NC (baseline 3L O2 at home)  CXR on 6/16: No definite acute disease with small chronic loculated pleural effusions, rounded atelectasis in the right lower lobe, and bilateral scar

## 2024-06-21 ENCOUNTER — TELEPHONE (OUTPATIENT)
Dept: PULMONOLOGY | Facility: CLINIC | Age: 84
End: 2024-06-21

## 2024-06-21 ENCOUNTER — APPOINTMENT (INPATIENT)
Dept: MRI IMAGING | Facility: HOSPITAL | Age: 84
DRG: 391 | End: 2024-06-21
Payer: COMMERCIAL

## 2024-06-21 PROBLEM — I50.9 HEART FAILURE (HCC): Status: ACTIVE | Noted: 2024-06-21

## 2024-06-21 LAB
ALBUMIN SERPL BCG-MCNC: 3 G/DL (ref 3.5–5)
ALP SERPL-CCNC: 92 U/L (ref 34–104)
ALT SERPL W P-5'-P-CCNC: 19 U/L (ref 7–52)
ANION GAP SERPL CALCULATED.3IONS-SCNC: 1 MMOL/L (ref 4–13)
AORTIC ROOT: 3.3 CM
AORTIC VALVE MEAN VELOCITY: 17.1 M/S
APICAL FOUR CHAMBER EJECTION FRACTION: 27 %
APTT PPP: 113 SECONDS (ref 23–37)
ASCENDING AORTA: 3.1 CM
AST SERPL W P-5'-P-CCNC: 20 U/L (ref 13–39)
AV AREA BY CONTINUOUS VTI: 0.8 CM2
AV AREA PEAK VELOCITY: 1 CM2
AV LVOT MEAN GRADIENT: 1 MMHG
AV LVOT PEAK GRADIENT: 2 MMHG
AV MEAN GRADIENT: 13 MMHG
AV PEAK GRADIENT: 25 MMHG
AV VALVE AREA: 0.85 CM2
AV VELOCITY RATIO: 0.28
BILIRUB SERPL-MCNC: 0.94 MG/DL (ref 0.2–1)
BSA FOR ECHO PROCEDURE: 2.03 M2
BUN SERPL-MCNC: 26 MG/DL (ref 5–25)
CALCIUM ALBUM COR SERPL-MCNC: 9.7 MG/DL (ref 8.3–10.1)
CALCIUM SERPL-MCNC: 8.9 MG/DL (ref 8.4–10.2)
CHLORIDE SERPL-SCNC: 104 MMOL/L (ref 96–108)
CO2 SERPL-SCNC: 29 MMOL/L (ref 21–32)
CREAT SERPL-MCNC: 1.4 MG/DL (ref 0.6–1.3)
DOP CALC AO PEAK VEL: 2.48 M/S
DOP CALC AO VTI: 49.3 CM
DOP CALC LVOT AREA: 3.14 CM2
DOP CALC LVOT CARDIAC INDEX: 1.84 L/MIN/M2
DOP CALC LVOT CARDIAC OUTPUT: 3.76 L/MIN
DOP CALC LVOT DIAMETER: 2 CM
DOP CALC LVOT PEAK VEL VTI: 13.29 CM
DOP CALC LVOT PEAK VEL: 0.69 M/S
DOP CALC LVOT STROKE INDEX: 21.1 ML/M2
DOP CALC LVOT STROKE VOLUME: 43
E WAVE DECELERATION TIME: 154 MS
E/A RATIO: 2.88
ERYTHROCYTE [DISTWIDTH] IN BLOOD BY AUTOMATED COUNT: 14.2 % (ref 11.6–15.1)
FRACTIONAL SHORTENING: 11 (ref 28–44)
GFR SERPL CREATININE-BSD FRML MDRD: 46 ML/MIN/1.73SQ M
GLUCOSE SERPL-MCNC: 114 MG/DL (ref 65–140)
GLUCOSE SERPL-MCNC: 127 MG/DL (ref 65–140)
GLUCOSE SERPL-MCNC: 132 MG/DL (ref 65–140)
GLUCOSE SERPL-MCNC: 151 MG/DL (ref 65–140)
GLUCOSE SERPL-MCNC: 151 MG/DL (ref 65–140)
GLUCOSE SERPL-MCNC: 172 MG/DL (ref 65–140)
GLUCOSE SERPL-MCNC: 184 MG/DL (ref 65–140)
GLUCOSE SERPL-MCNC: 215 MG/DL (ref 65–140)
HCT VFR BLD AUTO: 34.2 % (ref 36.5–49.3)
HGB BLD-MCNC: 10.7 G/DL (ref 12–17)
INTERVENTRICULAR SEPTUM IN DIASTOLE (PARASTERNAL SHORT AXIS VIEW): 1.1 CM
INTERVENTRICULAR SEPTUM: 1.1 CM (ref 0.6–1.1)
LAAS-AP2: 32 CM2
LAAS-AP4: 28.3 CM2
LEFT ATRIUM SIZE: 6 CM
LEFT ATRIUM VOLUME (MOD BIPLANE): 110 ML
LEFT ATRIUM VOLUME INDEX (MOD BIPLANE): 53.9 ML/M2
LEFT INTERNAL DIMENSION IN SYSTOLE: 5 CM (ref 2.1–4)
LEFT VENTRICULAR INTERNAL DIMENSION IN DIASTOLE: 5.6 CM (ref 3.5–6)
LEFT VENTRICULAR POSTERIOR WALL IN END DIASTOLE: 0.9 CM
LEFT VENTRICULAR STROKE VOLUME: 41 ML
LVSV (TEICH): 41 ML
MCH RBC QN AUTO: 30.9 PG (ref 26.8–34.3)
MCHC RBC AUTO-ENTMCNC: 31.3 G/DL (ref 31.4–37.4)
MCV RBC AUTO: 99 FL (ref 82–98)
MITRAL REGURGITATION PEAK VELOCITY: 4.88 M/S
MITRAL VALVE MEAN INFLOW VELOCITY: 4.15 M/S
MITRAL VALVE REGURGITANT PEAK GRADIENT: 95 MMHG
MV E'TISSUE VEL-LAT: 10 CM/S
MV E'TISSUE VEL-SEP: 7 CM/S
MV PEAK A VEL: 0.4 M/S
MV PEAK E VEL: 115 CM/S
MV STENOSIS PRESSURE HALF TIME: 45 MS
MV VALVE AREA P 1/2 METHOD: 4.9
PISA MRMAX VEL: 0.35 M/S
PISA RADIUS: 0.6 CM
PLATELET # BLD AUTO: 145 THOUSANDS/UL (ref 149–390)
PMV BLD AUTO: 9.8 FL (ref 8.9–12.7)
POTASSIUM SERPL-SCNC: 4.8 MMOL/L (ref 3.5–5.3)
PROT SERPL-MCNC: 4.9 G/DL (ref 6.4–8.4)
RBC # BLD AUTO: 3.46 MILLION/UL (ref 3.88–5.62)
RIGHT ATRIUM AREA SYSTOLE A4C: 26.6 CM2
RIGHT VENTRICLE ID DIMENSION: 5.9 CM
SL CV DOP CALC MV VTI RETROGRADE: 156.6 CM
SL CV LEFT ATRIUM LENGTH A2C: 6.4 CM
SL CV LV EF: 30
SL CV MV MEAN GRADIENT RETROGRADE: 72 MMHG
SL CV PED ECHO LEFT VENTRICLE DIASTOLIC VOLUME (MOD BIPLANE) 2D: 157 ML
SL CV PED ECHO LEFT VENTRICLE SYSTOLIC VOLUME (MOD BIPLANE) 2D: 116 ML
SODIUM SERPL-SCNC: 134 MMOL/L (ref 135–147)
TR MAX PG: 47 MMHG
TR PEAK VELOCITY: 3.4 M/S
TRICUSPID ANNULAR PLANE SYSTOLIC EXCURSION: 1.3 CM
TRICUSPID VALVE PEAK REGURGITATION VELOCITY: 3.44 M/S
VALPROATE SERPL-MCNC: 54 UG/ML (ref 50–100)
WBC # BLD AUTO: 6.06 THOUSAND/UL (ref 4.31–10.16)

## 2024-06-21 PROCEDURE — 85730 THROMBOPLASTIN TIME PARTIAL: CPT | Performed by: INTERNAL MEDICINE

## 2024-06-21 PROCEDURE — 92526 ORAL FUNCTION THERAPY: CPT

## 2024-06-21 PROCEDURE — 80053 COMPREHEN METABOLIC PANEL: CPT

## 2024-06-21 PROCEDURE — 94760 N-INVAS EAR/PLS OXIMETRY 1: CPT

## 2024-06-21 PROCEDURE — 99223 1ST HOSP IP/OBS HIGH 75: CPT

## 2024-06-21 PROCEDURE — 99233 SBSQ HOSP IP/OBS HIGH 50: CPT | Performed by: FAMILY MEDICINE

## 2024-06-21 PROCEDURE — 99233 SBSQ HOSP IP/OBS HIGH 50: CPT | Performed by: STUDENT IN AN ORGANIZED HEALTH CARE EDUCATION/TRAINING PROGRAM

## 2024-06-21 PROCEDURE — 97163 PT EVAL HIGH COMPLEX 45 MIN: CPT

## 2024-06-21 PROCEDURE — 70544 MR ANGIOGRAPHY HEAD W/O DYE: CPT

## 2024-06-21 PROCEDURE — 99232 SBSQ HOSP IP/OBS MODERATE 35: CPT | Performed by: SURGERY

## 2024-06-21 PROCEDURE — 82948 REAGENT STRIP/BLOOD GLUCOSE: CPT

## 2024-06-21 PROCEDURE — 99232 SBSQ HOSP IP/OBS MODERATE 35: CPT | Performed by: FAMILY MEDICINE

## 2024-06-21 PROCEDURE — 94668 MNPJ CHEST WALL SBSQ: CPT

## 2024-06-21 PROCEDURE — 94640 AIRWAY INHALATION TREATMENT: CPT

## 2024-06-21 PROCEDURE — 80164 ASSAY DIPROPYLACETIC ACD TOT: CPT | Performed by: NURSE PRACTITIONER

## 2024-06-21 PROCEDURE — 85027 COMPLETE CBC AUTOMATED: CPT

## 2024-06-21 PROCEDURE — C9113 INJ PANTOPRAZOLE SODIUM, VIA: HCPCS

## 2024-06-21 RX ORDER — PANTOPRAZOLE SODIUM 40 MG/1
40 TABLET, DELAYED RELEASE ORAL
Status: DISCONTINUED | OUTPATIENT
Start: 2024-06-22 | End: 2024-06-26 | Stop reason: HOSPADM

## 2024-06-21 RX ORDER — CIPROFLOXACIN 500 MG/1
500 TABLET, FILM COATED ORAL EVERY 12 HOURS SCHEDULED
Status: DISCONTINUED | OUTPATIENT
Start: 2024-06-21 | End: 2024-06-26 | Stop reason: HOSPADM

## 2024-06-21 RX ORDER — LANOLIN ALCOHOL/MO/W.PET/CERES
3 CREAM (GRAM) TOPICAL
Status: DISCONTINUED | OUTPATIENT
Start: 2024-06-21 | End: 2024-06-26 | Stop reason: HOSPADM

## 2024-06-21 RX ORDER — SENNOSIDES 8.6 MG
2 TABLET ORAL 2 TIMES DAILY
Status: DISCONTINUED | OUTPATIENT
Start: 2024-06-21 | End: 2024-06-26 | Stop reason: HOSPADM

## 2024-06-21 RX ORDER — BUDESONIDE 0.5 MG/2ML
0.5 INHALANT ORAL
Status: DISCONTINUED | OUTPATIENT
Start: 2024-06-21 | End: 2024-06-26 | Stop reason: HOSPADM

## 2024-06-21 RX ORDER — METRONIDAZOLE 500 MG/1
500 TABLET ORAL EVERY 8 HOURS SCHEDULED
Status: DISCONTINUED | OUTPATIENT
Start: 2024-06-21 | End: 2024-06-26 | Stop reason: HOSPADM

## 2024-06-21 RX ORDER — FORMOTEROL FUMARATE DIHYDRATE 20 UG/2ML
20 SOLUTION RESPIRATORY (INHALATION)
Status: DISCONTINUED | OUTPATIENT
Start: 2024-06-21 | End: 2024-06-26 | Stop reason: HOSPADM

## 2024-06-21 RX ADMIN — NYSTATIN 500000 UNITS: 100000 SUSPENSION ORAL at 11:11

## 2024-06-21 RX ADMIN — FORMOTEROL FUMARATE DIHYDRATE 20 MCG: 20 SOLUTION RESPIRATORY (INHALATION) at 21:08

## 2024-06-21 RX ADMIN — Medication 3 MG: at 21:27

## 2024-06-21 RX ADMIN — NYSTATIN 500000 UNITS: 100000 SUSPENSION ORAL at 17:53

## 2024-06-21 RX ADMIN — NYSTATIN 500000 UNITS: 100000 SUSPENSION ORAL at 21:27

## 2024-06-21 RX ADMIN — NYSTATIN 500000 UNITS: 100000 SUSPENSION ORAL at 08:38

## 2024-06-21 RX ADMIN — PREDNISONE 1 MG: 1 TABLET ORAL at 08:37

## 2024-06-21 RX ADMIN — APIXABAN 2.5 MG: 2.5 TABLET, FILM COATED ORAL at 17:28

## 2024-06-21 RX ADMIN — VALACYCLOVIR HYDROCHLORIDE 1000 MG: 500 TABLET, FILM COATED ORAL at 17:28

## 2024-06-21 RX ADMIN — SENNOSIDES 17.2 MG: 8.6 TABLET, FILM COATED ORAL at 17:28

## 2024-06-21 RX ADMIN — PANTOPRAZOLE SODIUM 40 MG: 40 INJECTION, POWDER, FOR SOLUTION INTRAVENOUS at 08:37

## 2024-06-21 RX ADMIN — IPRATROPIUM BROMIDE 0.5 MG: 0.5 SOLUTION RESPIRATORY (INHALATION) at 15:48

## 2024-06-21 RX ADMIN — DIVALPROEX SODIUM 500 MG: 500 TABLET, DELAYED RELEASE ORAL at 17:28

## 2024-06-21 RX ADMIN — CIPROFLOXACIN 400 MG: 2 INJECTION, SOLUTION INTRAVENOUS at 03:42

## 2024-06-21 RX ADMIN — APIXABAN 2.5 MG: 2.5 TABLET, FILM COATED ORAL at 13:08

## 2024-06-21 RX ADMIN — VALACYCLOVIR HYDROCHLORIDE 1000 MG: 500 TABLET, FILM COATED ORAL at 08:37

## 2024-06-21 RX ADMIN — HEPARIN SODIUM 16 UNITS/KG/HR: 10000 INJECTION, SOLUTION INTRAVENOUS at 01:28

## 2024-06-21 RX ADMIN — INSULIN LISPRO 1 UNITS: 100 INJECTION, SOLUTION INTRAVENOUS; SUBCUTANEOUS at 13:08

## 2024-06-21 RX ADMIN — THIAMINE HYDROCHLORIDE: 100 INJECTION, SOLUTION INTRAMUSCULAR; INTRAVENOUS at 11:11

## 2024-06-21 RX ADMIN — LOSARTAN POTASSIUM 50 MG: 50 TABLET, FILM COATED ORAL at 08:37

## 2024-06-21 RX ADMIN — DILTIAZEM HYDROCHLORIDE 180 MG: 180 CAPSULE, COATED, EXTENDED RELEASE ORAL at 08:37

## 2024-06-21 RX ADMIN — ATORVASTATIN CALCIUM 40 MG: 40 TABLET, FILM COATED ORAL at 17:28

## 2024-06-21 RX ADMIN — INSULIN LISPRO 1 UNITS: 100 INJECTION, SOLUTION INTRAVENOUS; SUBCUTANEOUS at 17:29

## 2024-06-21 RX ADMIN — ACETAMINOPHEN 650 MG: 325 TABLET, FILM COATED ORAL at 08:37

## 2024-06-21 RX ADMIN — BUDESONIDE 0.5 MG: 0.5 INHALANT ORAL at 21:08

## 2024-06-21 RX ADMIN — METRONIDAZOLE 500 MG: 500 TABLET ORAL at 17:28

## 2024-06-21 RX ADMIN — INSULIN LISPRO 1 UNITS: 100 INJECTION, SOLUTION INTRAVENOUS; SUBCUTANEOUS at 21:28

## 2024-06-21 RX ADMIN — METRONIDAZOLE 500 MG: 500 INJECTION, SOLUTION INTRAVENOUS at 07:42

## 2024-06-21 RX ADMIN — CIPROFLOXACIN HYDROCHLORIDE 500 MG: 500 TABLET, FILM COATED ORAL at 17:28

## 2024-06-21 RX ADMIN — DIVALPROEX SODIUM 500 MG: 500 TABLET, DELAYED RELEASE ORAL at 08:37

## 2024-06-21 NOTE — ASSESSMENT & PLAN NOTE
Lab Results   Component Value Date    EGFR 46 06/21/2024    EGFR 46 06/20/2024    EGFR 41 06/19/2024    CREATININE 1.40 (H) 06/21/2024    CREATININE 1.39 (H) 06/20/2024    CREATININE 1.54 (H) 06/19/2024   CKD 3 baseline  Avoid nephrotoxic agents and renally dose medications  Monitor CR- this is stable.

## 2024-06-21 NOTE — TELEPHONE ENCOUNTER
Called patient to schedule appointment for the 1Y FU from  [facility] and left a voicemail message.

## 2024-06-21 NOTE — SPEECH THERAPY NOTE
Speech Language/Pathology    Speech/Language Pathology Progress Note    Patient Name: Hal Miller  Today's Date: 6/21/2024         Subjective:  Pt seen for dysphagia tx follow up- message rec'd from neuro NP that pt was coughing a lot this am at breakfast. Swallow eval completed yesterday w/ no concerns about swallowing.     Objective:  Pt sitting EOB, family at bedside. Pt took bites of roast beef (c/o being tough and chewy), ate yogurt and applesauce, took 1 pill w/ water by cup and took several sips of soda by straw. Pt w/ prolonged mastication due to meat being chewy and tough; pt used applesauce or yogurt to aid w/ transfer of meat.  Pt appeared w/ good oral control of liquids by straw. Swallows appeared timely. Cough noted x1 while chewing meat. Cough also noted x1 w/ thin liquids.     Assessment:  Pt w/ occas cough noted in the beginning of meal, pt stated he has had a cough for years, we discussed a VBS as an outpt, pt stated he had one before at [facility] and is not concerned about his cough and swallowing at this time .    Plan/Recommendations:  Cont regular diet with thin liquids  Meds as tolerated  Aspiration precautions      Martha Rasmussen MA CCC-SLP  Speech Pathologist  Available via  tiger text

## 2024-06-21 NOTE — ASSESSMENT & PLAN NOTE
-Blood pressure was 116/81 this morning. His baseline BP is elevated.  -Continue Losartan 50 mg QD with holding parameters if to become hypotensive.  -If BP continues to be in the lower side, consider decreasing Losartan to 25 mg QD.  -Continue Metoprolol succinate 25 mg QD with holding parameters if to become hypotensive.  -Continue monitoring orthostatic vitals.  -Avoid hypotension.

## 2024-06-21 NOTE — AR[facility] ADMISSION
Reviewed patient's case with AR[facility] physician - patient is preapproved for [facility] pending medical stability, functional progress/tolerance, insurance authorization and bed availability. Noted further workup remains pending at this time, as well as awaiting PT evaluation. [facility]  will follow-up with family regarding family goals/expectations at time of discharge. CM has been updated. Will continue to follow at this time.

## 2024-06-21 NOTE — PROGRESS NOTES
Progress Note - Neurology   Hal Miller 84 y.o. male [id number]  Unit/Bed#: S /S -01    Assessment/Plan:  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HCC)  Assessment & Plan  Hal Miller is a 84 y.o. male with CAD, s/p AVR, CKD, AF on eliquis, DM, HTN, HLD, COPD, prior stroke, vascular dementia, pancreatic cancer s/p whipple, PMR on prednisone and current shingles infection on valtrex admitted 6/16/24 with abdominal pain and SOB. CT abdomen revealed diverticular abscess; medically managed with IV abx. Eliquis held and Heparin gtt started     Hospital course complicated by acute encephalopathy with waxing and waning mental status.  RRT 6/18 for fall; CTH negative. RRT 6/19 for episodes of unresponsiveness with head turned to the left and downward gaze as well as rhythmic movements of his hands concerning for possible seizure. He received keppra 1gm and ativan 2mg iv x1 with return of conjugate gaze and incomprehensible sounds.He was transferred to ICU and started on stroke pathway with neurology consult.     6/20 patient remained stable and seizure free, transferred out ICU. MRI confirmed acute left parietal stroke. EEG without seizure    Neuroimaging/work-up:  -6/18/24 CTH: No acute intracranial abnormality. Similar-appearing chronic severe microangiopathic changes.   -6/19/24 EEG Interpretation:  This Routine EEG recorded during sleep and brief wakefulness is abnormal.   Excessive theta activity during wakefulness suggest mild nonspecific diffuse cerebral dysfunction.  No epileptiform discharges or seizures are present.  -6/20/24 MRI:  1. Small acute infarct in the left parietal periventricular white matter with an associated small focus of susceptibility that may represent associated petechial hemorrhage.  2. Redemonstrated foci of susceptibility, including new foci, nonspecific and could be seen with cerebral amyloid angiopathy, multiple cavernomas and/or prior embolic events, among other considerations.  -6/20/24 Carotid  US: <50% stenosis bilateral ICA; Compared to previous study on 9/19/2011, there is progression of disease in the ICA bilaterally.  -Ammonia, B12, folate WNL  -Lipid panel: TC 94, TG 54, HDL 39, LDL 44  -6/8/24:TSH 1.7  -6/7/24: A1c 7.4  -PTT /24hrs  Recommendations:  Suspect cardioembolic etiology in the setting of held anticoagulation pending possible surgical intervention on admission  - Stroke pathway  MRA pending  ECHO complete;report pending  Heparin gtt per primary service; consider transitioning back to eliquis if not otherwise contraindicated.   Avoiding Aspirin secondary to noted micro hemorrhages on MRI/risk of bleeding   Atorvastatin 40 mg  Goal normotension; avoid hypotension  PT/OT/ST  Stroke education  Continue to monitor and notify neurology with any changes.  STAT CT head for any acute change in neuro exam  - Medical management and correction of any metabolic or infectious disturbances per primary service.     Episode of seizure-like activity  Assessment & Plan  - Continue depacon 500mg q8hr for now in the setting of low seizure threshold  - check VPA level prior to next dose  - Seizure precautions  - Administer Ativan prn seizure-like activity    - [place] form submitted; patient made aware of mandatory 6 month driving restriction      Type 2 diabetes mellitus (HCC)  Assessment & Plan  Lab Results   Component Value Date    HGBA1C 7.6 (H) 06/19/2024       Recent Labs     06/19/24  1751 06/19/24  2040 06/19/24  2350 06/20/24  0440   POCGLU 159* 152* 125 119       Blood Sugar Average: Last 72 hrs:  (P) [id number]    Goal euglycemic  Management per primary service    Atrial fibrillation (HCC)  Assessment & Plan  AC per primary service        Hal Miller will need follow up in in 6 weeks with neurovascular attending or APC .  He will not require outpatient neurological testing.       Subjective:   No recurrent seizure-like events. No focal neurological deficits. Mild dull headache  reported this morning, did not sleep well last night    Past Medical History:   Diagnosis Date    Acute encephalopathy 2023    Acute-on-chronic kidney injury  (HCC) 2017    Cancer (HCC)     pancreatic    Colon polyp     Coronary artery disease     Dehydration 3/3/2023    Diabetes mellitus (HCC)     Hyperlipidemia     Hypertension     Left lower lobe pneumonia 2022    Pneumonia 2017    Right middle and lower lobe     Stroke (HCC)      Past Surgical History:   Procedure Laterality Date    APPENDECTOMY      CARDIAC SURGERY      Aortic Valve Replacement, Ascending Aorta    COLONOSCOPY      GALLBLADDER SURGERY      PANCREATICODUODENECTOMY       Family History   Problem Relation Age of Onset    Cancer Mother     Stroke Father     Cancer Sister     Diabetes Sister     Stroke Brother      Social History     Socioeconomic History    Marital status: /Civil Union     Spouse name: None    Number of children: None    Years of education: None    Highest education level: None   Occupational History    None   Tobacco Use    Smoking status: Former     Types: Pipe     Quit date:      Years since quittin.4    Smokeless tobacco: Never   Vaping Use    Vaping status: Never Used   Substance and Sexual Activity    Alcohol use: Never    Drug use: No    Sexual activity: Yes     Partners: Female   Other Topics Concern    None   Social History Narrative    None     Social Determinants of Health     Financial Resource Strain: Not on file   Food Insecurity: No Food Insecurity (2024)    Hunger Vital Sign     Worried About Running Out of Food in the Last Year: Never true     Ran Out of Food in the Last Year: Never true   Transportation Needs: No Transportation Needs (2024)    PRAPARE - Transportation     Lack of Transportation (Medical): No     Lack of Transportation (Non-Medical): No   Physical Activity: Not on file   Stress: Not on file   Social Connections: Not on file   Intimate Partner Violence:  Not on file   Housing Stability: Unknown (6/17/2024)    Housing Stability Vital Sign     Unable to Pay for Housing in the Last Year: No     Number of Times Moved in the Last Year: Not on file     Homeless in the Last Year: No     E-Cigarette/Vaping    E-Cigarette Use Never User      E-Cigarette/Vaping Substances    Nicotine No     THC No     CBD No     Flavoring No     Other No     Unknown No      Medications:  All current active meds have been reviewed and current meds:  Scheduled Meds:  Current Facility-Administered Medications   Medication Dose Route Frequency Provider Last Rate    acetaminophen  650 mg Oral Q4H PRN Lyubov Malone MD      albuterol  2 puff Inhalation Q4H PRN Lyubov Malone MD      atorvastatin  40 mg Oral Daily With Dinner Hal Zamarripa MD      ciprofloxacin  400 mg Intravenous Q12H Lyubov Malone  mg (06/21/24 0342)    diltiazem  180 mg Oral Daily Lyubov Malone MD      divalproex sodium  500 mg Oral Q8H [facility] Hal Zamarripa MD      folic acid 1 mg, thiamine (VITAMIN B1) 100 mg in sodium chloride 0.9 % 100 mL IV piggyback   Intravenous Daily Lyubov Malone MD 0 mL/hr at 06/19/24 1744    heparin (porcine)  3-30 Units/kg/hr (Order-Specific) Intravenous Titrated Lyubov Malone MD 14 Units/kg/hr (06/21/24 0735)    heparin (porcine)  3,200 Units Intravenous Q6H PRN Lyubov Malone MD      heparin (porcine)  6,400 Units Intravenous Q6H PRN Lyubov Malone MD      HYDROmorphone  0.2 mg Intravenous Q4H PRN Lyubov Malone MD      insulin lispro  1-5 Units Subcutaneous 4x Daily (AC & HS) Lyubov Malone MD      lidocaine   Topical 4x Daily PRN Lyubov Malone MD      losartan  50 mg Oral Daily Lyubov Malone MD      [Transfer Hold] melatonin  3 mg Oral HS PRN Redd Kulkarni MD      metroNIDAZOLE  500 mg Intravenous Q8H Lyubov Malone  mg (06/21/24 0742)    nystatin  500,000 Units Swish & Swallow 4x Daily Lyubov Malone MD      pantoprazole  40 mg Intravenous Q24H [facility] Lyubov Malone MD      predniSONE  1 mg Oral Daily  "Lyubov Malone MD      valACYclovir  1,000 mg Oral BID Lyubov Malone MD       Continuous Infusions:heparin (porcine), 3-30 Units/kg/hr (Order-Specific), Last Rate: 14 Units/kg/hr (06/21/24 0735)      PRN Meds:.  acetaminophen    albuterol    heparin (porcine)    heparin (porcine)    HYDROmorphone    lidocaine    [Transfer Hold] melatonin       ROS:   Review of Systems  See above    Vitals:   /81   Pulse 62   Temp 97.8 °F (36.6 °C)   Resp 18   Ht 6' 2\" (1.88 m)   Wt 77.6 kg (171 lb)   SpO2 96%   BMI 21.96 kg/m²     Physical Exam:   Physical Exam  Vitals reviewed.   Constitutional:       General: He is not in acute distress.  HENT:      Head: Normocephalic and atraumatic.   Pulmonary:      Effort: Pulmonary effort is normal.   Skin:     General: Skin is warm and dry.   Neurological:      Mental Status: He is alert and oriented to person, place, and time.      Cranial Nerves: Cranial nerves 2-12 are intact.      Motor: Motor strength is normal.     Coordination: Finger-Nose-Finger Test normal.   Psychiatric:         Speech: Speech normal.       Neurologic Exam     Mental Status   Oriented to person, place, and time.   Follows 3 step commands.   Attention: normal. Concentration: normal.   Speech: speech is normal   Level of consciousness: alert  Able to name object. Able to repeat.     Cranial Nerves   Cranial nerves II through XII intact.   Chronic diplopia corrected with prism glasses     Motor Exam     Strength   Strength 5/5 throughout.     Sensory Exam   Light touch normal.   Chronic peripheral neuropathy BLE L>R unchanged     Gait, Coordination, and Reflexes     Coordination   Finger to nose coordination: normal    Tremor   Resting tremor: absent    Reflexes   Right plantar: normal  Left plantar: normal      Labs: I have personally reviewed pertinent reports.   Recent Results (from the past 24 hour(s))   Echo complete w/ contrast if indicated    Collection Time: 06/20/24 11:02 AM   Result Value Ref Range "    BSA 2.03 m2    LV EF 30    Fingerstick Glucose (POCT)    Collection Time: 06/20/24 12:12 PM   Result Value Ref Range    POC Glucose 188 (H) 65 - 140 mg/dl   APTT    Collection Time: 06/20/24  2:44 PM   Result Value Ref Range     (H) 23 - 37 seconds   Fingerstick Glucose (POCT)    Collection Time: 06/20/24  4:04 PM   Result Value Ref Range    POC Glucose 186 (H) 65 - 140 mg/dl   Fingerstick Glucose (POCT)    Collection Time: 06/20/24  8:49 PM   Result Value Ref Range    POC Glucose 121 65 - 140 mg/dl   APTT    Collection Time: 06/20/24 10:41 PM   Result Value Ref Range    PTT 51 (H) 23 - 37 seconds   Fingerstick Glucose (POCT)    Collection Time: 06/21/24 12:08 AM   Result Value Ref Range    POC Glucose 114 65 - 140 mg/dl   Fingerstick Glucose (POCT)    Collection Time: 06/21/24  5:01 AM   Result Value Ref Range    POC Glucose 151 (H) 65 - 140 mg/dl   APTT    Collection Time: 06/21/24  6:12 AM   Result Value Ref Range     (H) 23 - 37 seconds   CBC    Collection Time: 06/21/24  7:36 AM   Result Value Ref Range    WBC 6.06 4.31 - 10.16 Thousand/uL    RBC 3.46 (L) 3.88 - 5.62 Million/uL    Hemoglobin 10.7 (L) 12.0 - 17.0 g/dL    Hematocrit 34.2 (L) 36.5 - 49.3 %    MCV 99 (H) 82 - 98 fL    MCH 30.9 26.8 - 34.3 pg    MCHC 31.3 (L) 31.4 - 37.4 g/dL    RDW 14.2 11.6 - 15.1 %    Platelets 145 (L) 149 - 390 Thousands/uL    MPV 9.8 8.9 - 12.7 fL   Comprehensive metabolic panel    Collection Time: 06/21/24  7:36 AM   Result Value Ref Range    Sodium 134 (L) 135 - 147 mmol/L    Potassium 4.8 3.5 - 5.3 mmol/L    Chloride 104 96 - 108 mmol/L    CO2 29 21 - 32 mmol/L    ANION GAP 1 (L) 4 - 13 mmol/L    BUN 26 (H) 5 - 25 mg/dL    Creatinine 1.40 (H) 0.60 - 1.30 mg/dL    Glucose 127 65 - 140 mg/dL    Calcium 8.9 8.4 - 10.2 mg/dL    Corrected Calcium 9.7 8.3 - 10.1 mg/dL    AST 20 13 - 39 U/L    ALT 19 7 - 52 U/L    Alkaline Phosphatase 92 34 - 104 U/L    Total Protein 4.9 (L) 6.4 - 8.4 g/dL    Albumin 3.0 (L) 3.5 -  5.0 g/dL    Total Bilirubin 0.94 0.20 - 1.00 mg/dL    eGFR 46 ml/min/1.73sq m   Fingerstick Glucose (POCT)    Collection Time: 06/21/24  7:40 AM   Result Value Ref Range    POC Glucose 132 65 - 140 mg/dl       Imaging: I have personally reviewed pertinent imaging in PACS and I have personally reviewed PACS reports.     EKG, Pathology, and Other Studies: I have personally reviewed pertinent reports.     Counseling / Coordination of Care  Assessment, images and plan reviewed with Dr. Brush. Plan discussed with patient and primary service. Please refer to attending attestation for additional recommendations

## 2024-06-21 NOTE — ASSESSMENT & PLAN NOTE
Presents with left groin pain, abd pain   Enlarging gas and fluid collection extending from the antimesenteric border of the sigmoid colon, infiltrating the left inguinal region soft tissue and coursing under and along the lateral margin of the left inguinal canal. 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  IR not planning abscess drainage at this time.  Continue IV antibiotics (currently day 5)

## 2024-06-21 NOTE — PROGRESS NOTES
Progress Note - Geriatric Medicine   Hal Miller 84 y.o. male MRN: [id number]  Unit/Bed#: S -01 Encounter: [id number]      Assessment/Plan:  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HCC)  Assessment & Plan  -Continue home Pravastatin 40 mg QD.  -MRI results from today as follows:  1. Small acute infarct in the left parietal periventricular white matter with an associated small focus of susceptibility that may represent associated petechial hemorrhage.                2. Redemonstrated foci of susceptibility, including new foci, nonspecific and could be seen with cerebral amyloid angiopathy, multiple cavernomas and/or prior embolic events, among other considerations.  -Blood pressure was 100/72 today. His baseline is elevated. Maintain appropriate blood pressure and glucose control.    -Neurology follows appreciate input.    History of Whipple procedure  Assessment & Plan  -History of pancreatic cancer s/p Whipple Procedure in 2011.  -Continue Folic Acid 1 mg supplementation QD.  -Continue Thiamine 100 mg supplementation QD.    Episode of seizure-like activity  Assessment & Plan  -Continue Depakote 500 mg Q8H.  -MRI results as follows:  Small acute infarct in the left parietal periventricular white matter with an associated small focus of susceptibility that may represent associated petechial hemorrhage.  Redemonstrated foci of susceptibility, including new foci, nonspecific and could be seen with cerebral amyloid angiopathy, multiple cavernomas and/or prior embolic events, among other considerations.  -Spot EEG interpretation as follows:  Excessive theta activity during wakefulness suggest mild nonspecific diffuse cerebral dysfunction.  No epileptiform discharges or seizures are present.  -Neurology following.    Ambulatory dysfunction  Assessment & Plan  - Patient was walking independently prior to admission to hospital.   - Patient continues to require assistance upon his current state even though recovered from delirious state  today.  - PT/OT has been helping to assist the patient with ambulation in attempt to return to baseline.  - Monitor orthostatic vitals as patient continues to report dizziness/lightheadedness.  - Continue fall precautions.    Insomnia  Assessment & Plan  - Continue to hold Seroquel 25 mg QHS with last dose administered 6/18.   - Patient's sleep was controlled at home on Seroquel 25 mg QHS- currently on hold  - start melatonin 3 mg QHS  - Insomnia was due to neuropathic right thorax pain as result of Shingles rash.  - Patient reports sleeping very poorly last night, only getting ~1 hr of sleep.  - avoid daytime naps and nighttime awakenings     Dizziness  Assessment & Plan  - Check/monitor orthostatic vital signs.  - Maintain hydration status.  - Fall precautions.    Vascular dementia (HCC)  Assessment & Plan  -Patient was AAO x 2-3. Seems to be at baseline  -Continue to provide supportive care and reorient as needed.  -Patient is at high risk for delirium.  -Continue to monitor closely and stay on delirium precautions.  -Maintain sleep/wake cycle.  -Optimize pain regimen.  -Monitor for constipation and urinary retention and manage as needed.  -TSH and B12 within normal limits.  -Continue encouraging family to visit.  -Continue encouraging patient to wear glasses and hearing aids while awake.  -Continue encouraging PO intake and assist with feeding if needed.    Colonic diverticular abscess  Assessment & Plan  -General surgery follows.  -Patient now drinking and eating normally, though he is only eating liquids d/t complaint of dysphagia.  -Continue Metronidazole 500 mg IV Q8H.  -Continue Ciprofloxacin 400 mg IV BID.  - antibiotics are going to be switched from IV to PO.    Zoster  Assessment & Plan  - Zoster lesions on right chest wall and right upper back.  - Continue Valtrex 1000 mg BID.  - Continue pain regime with Tylenol 975 mg PO TID.  - Consider Gabapentin 100 mg QHS if pain becomes uncontrolled.    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   -Outpatient follow-up recommended.    Type 2 diabetes mellitus (HCC)  Assessment & Plan  Lab Results   Component Value Date    HGBA1C 7.6 (H) 06/19/2024       Recent Labs     06/21/24  0008 06/21/24  0501 06/21/24  0740 06/21/24  1119   POCGLU 114 151* 132 184*       Blood Sugar Average: Last 72 hrs:  (P) 182.45    -Continue insulin regimen.  -Avoid hypoglycemia.     CKD (chronic kidney disease)  Assessment & Plan  Lab Results   Component Value Date    EGFR 46 06/21/2024    EGFR 46 06/20/2024    EGFR 41 06/19/2024    CREATININE 1.40 (H) 06/21/2024    CREATININE 1.39 (H) 06/20/2024    CREATININE 1.54 (H) 06/19/2024     -Baseline CKD Stage 3.  -Continue to monitor Creatinine, although it is stable. Slightly elevated at 1.40 today.  -Avoid nephrotoxic medication and renally dose all medications.  -Encourage PO hydration.  -Continue monitoring BMP.     Inflammatory polyarthropathy (HCC)  Assessment & Plan  -Continue to hold Hydroxychloroquine with last dose administered 6/18.  -Continue Prednisone 1 mg QD.    Atrial fibrillation (HCC)  Assessment & Plan  -Currently rate controlled.  -Continue Cardizem 180 mg QD.  -Continue Eliquis 2.5 mg BID.    Hypertension  Assessment & Plan  - Blood pressure was 100/72 this morning. His baseline BP is elevated.  - continue Losartan 50 mg QD with holding parameters  - if BP continues to be in the lower side, consider decreasing losartan to 25 mg daily  - monitor ortho VS  -Avoid hypotension    Shortness of breath  Assessment & Plan  - Patient has history of COPD.  - Received a steroids on admission.  - Nebulized bronchodilators.  - Continue 3L NC (baseline 3L O2 at home).  - encourage incentive spirometry    * Acute encephalopathy  Assessment & Plan  -Mental status has improved significantly. Patient is AAO x 2-3.  -CTH was negative at that  time for acute pathology after his agitation and fall overnight on 6/19.  -Continue delirium precautions since still at risk even though now recovered to baseline.  -Patient is high risk of delirium due to dementia at baseline, pain, hospitalization, insomnia, metabolic imbalance, shingles, diverticulitis.  -Continue maintaining normal sleep/wake cycle.  -Minimize overnight interruptions, group overnight vitals/labs/nursing checks as possible.  -Dim lights, close blinds and turn off tv to minimize stimulation and encourage sleep environment in evenings.  -Continue controlling pain with Tylenol 975 mg Q8H.  -Continue monitoring for fecal and urinary retention which may precipitate delirium.   -Patient has not had a BM for several days. Monitor for fecal retention.  -Encourage early mobilization and ambulation. Continue working with PT to assist in returning to baseline ambulation status.  -Provide frequent reorientation and redirection as needed.  -Encourage family and friends at the bedside to help calm patient if anxious.  -Avoid medications which may precipitate or worsen delirium such as tramadol, benzodiazepine, anticholinergics, and antihistaminics.  -Encourage hydration and nutrition; assist with feeding if needed.  -Redirect unwanted behaviors as first line, avoid physical restraints.   -MRI results as follows:  Small acute infarct in the left parietal periventricular white matter with an associated small focus of susceptibility that may represent associated petechial hemorrhage.  Redemonstrated foci of susceptibility, including new foci, nonspecific and could be seen with cerebral amyloid angiopathy, multiple cavernomas and/or prior embolic events, among other considerations.  -Spot EEG interpretation as follows:  Excessive theta activity during wakefulness suggest mild nonspecific diffuse cerebral dysfunction.  No epileptiform discharges or seizures are present.  -Neurology following.  -Appreciate PT/OT  "recommendation of [facility].  -Continue Depakote 500 mg Q8H and monitor level.  -Appreciate speech pathologist evaluation.  - add melatonin 3 mg Qhs         Subjective:   An 84-year-old male presents for a geriatric progress visit.  Patient reports he is doing well.  He reports that he slept poorly overnight, only receiving around 1 hour of sleep last night. Patient states that he is urinating, but he is not moving his bowels as of the past several days.    Patient reports that PT has been helping him with ambulation.   Patient continues to be tearful when speaking of his gratitude for his family members. He reports he is in no acute distress or pain.    Review of Systems   Constitutional:  Negative for appetite change, chills, diaphoresis, fatigue and fever.   HENT:  Positive for hearing loss, sore throat and trouble swallowing. Negative for ear discharge, ear pain, postnasal drip, rhinorrhea and sneezing.         [place].   Eyes:  Positive for visual disturbance. Negative for pain, discharge and itching.   Respiratory:  Positive for cough and shortness of breath (intermittent).         Patient reports dry cough, but he reports that he will sometimes cough up phlegm.   Cardiovascular:  Negative for chest pain and palpitations.   Gastrointestinal:  Positive for constipation. Negative for abdominal pain, blood in stool, diarrhea, nausea and vomiting.   Genitourinary:  Positive for frequency and urgency. Negative for dysuria and hematuria.   Musculoskeletal:  Positive for gait problem and myalgias. Negative for arthralgias and back pain.   Skin:  Negative for color change and rash.   Neurological:  Positive for dizziness and light-headedness. Negative for syncope and headaches.   Psychiatric/Behavioral:  Positive for sleep disturbance.    All other systems reviewed and are negative.        Objective:     Vitals: Blood pressure 135/74, pulse (!) 54, temperature 97.6 °F (36.4 °C), resp. rate 16, height 6' 2\" (1.88 m), weight 76.8 " kg (169 lb 6.4 oz), SpO2 97%.,Body mass index is 21.75 kg/m².      Intake/Output Summary (Last 24 hours) at 6/21/2024 1552  Last data filed at 6/21/2024 1355  Gross per 24 hour   Intake 598.13 ml   Output 775 ml   Net -176.87 ml       Current Medications: Reviewed    Physical Exam:   Physical Exam  Vitals (Frail looking.) and nursing note reviewed.   Constitutional:       General: He is not in acute distress.     Appearance: He is well-developed. He is not ill-appearing, toxic-appearing or diaphoretic.      Comments: Frail looking   HENT:      Head: Normocephalic and atraumatic.      Ears:      Comments: [place]     Mouth/Throat:      Mouth: Mucous membranes are dry.      Pharynx: No oropharyngeal exudate.   Eyes:      General: No scleral icterus.        Right eye: No discharge.         Left eye: No discharge.      Conjunctiva/sclera: Conjunctivae normal.   Cardiovascular:      Rate and Rhythm: Regular rhythm. Bradycardia present.      Heart sounds: Murmur heard.   Pulmonary:      Effort: Pulmonary effort is normal. No respiratory distress.      Breath sounds: Normal breath sounds.   Abdominal:      General: There is no distension.      Palpations: Abdomen is soft.      Tenderness: There is no abdominal tenderness. There is no guarding or rebound.      Hernia: A hernia is present.   Musculoskeletal:         General: No swelling.      Cervical back: Neck supple.      Right lower leg: No edema.      Left lower leg: No edema.   Skin:     General: Skin is warm and dry.      Capillary Refill: Capillary refill takes less than 2 seconds.      Coloration: Skin is not jaundiced.      Findings: Bruising present. No erythema.      Comments: Several varicose veins appreciated.   Neurological:      Mental Status: He is alert.      Gait: Gait abnormal.      Comments: AAO x 2-3.   Psychiatric:         Mood and Affect: Mood normal.         Behavior: Behavior normal.          Invasive Devices       Peripheral Intravenous Line  Duration              Peripheral IV 06/16/24 Right Forearm 4 days    Peripheral IV 06/19/24 Proximal;Right;Ventral (anterior) Forearm 2 days    Peripheral IV 06/21/24 Left Antecubital <1 day                    Lab, Imaging and other studies: I have personally reviewed pertinent reports.

## 2024-06-21 NOTE — PROGRESS NOTES
"Progress Note - General Surgery   Hal Miller 84 y.o. male MRN: [id number]  Unit/Bed#: S -01 Encounter: [id number]    Assessment:  84 y.o. male who p/w L inguinal hernia with likely chronic diverticular abscess  Upgraded to the ICU on 6/19 due to concern for seizure    Afebrile VSS wbc 6 from 5.9    Plan:  Okay for diet from surgical perspective  No plans for inpatient surgical intervention  Continue antibiotics  Patient requests bed extender given height for comfort - nursing communication order placed  Rest per primary      Subjective/Objective     Subjective:   Patient seen and examined bedside. Complains of back pain from bed and that his feet are hanging over the end of the bed d/t height. Denies fevers/chills/nausea/emesis    Objective:     Blood pressure 138/81, pulse 62, temperature 97.8 °F (36.6 °C), resp. rate 18, height 6' 2\" (1.88 m), weight 77.6 kg (171 lb), SpO2 96%.,Body mass index is 21.96 kg/m².      Intake/Output Summary (Last 24 hours) at 6/21/2024 0848  Last data filed at 6/21/2024 0815  Gross per 24 hour   Intake 656.93 ml   Output 886 ml   Net -229.07 ml       Invasive Devices       Peripheral Intravenous Line  Duration             Peripheral IV 06/16/24 Right Forearm 4 days    Peripheral IV 06/19/24 Proximal;Right;Ventral (anterior) Forearm 1 day    Peripheral IV 06/21/24 Left Antecubital <1 day                    Physical Exam:   General - no acute distress, responsive and cooperative  CV - warm, regular rate  Pulm - normal work of breathing, no respiratory distress  Abd - soft, nondistended, left groin with palpable subcutaneous emphysema, nonder no skin changes  Neuro - m/s grossly intact, cn grossly intact  Ext - moving all extremities  Groin - left groin       "

## 2024-06-21 NOTE — PROGRESS NOTES
"On license of [facility]  Progress Note  Name: Hal Miller I  MRN: [id number]  Unit/Bed#: S -01 I Date of Admission: 6/16/2024   Date of Service: 6/21/2024 I Hospital Day: 4    Assessment & Plan   * Acute encephalopathy  Assessment & Plan  Has had waxing and waning mental status over the last 48 hours as per family.  On my assessment yesterday he was awake alert and oriented .  Recognize all his family members was able to discuss current events with me including who the president is upcoming elections etc.  He did not know the date and month and year and as well fully aware of where he was.    Family had been concerned that he had been confused earlier on that morning however given his improvement no further testing was performed.  Fortunately overnight he had a fall and it is unclear what happened during that time whether he had just received any medications or interventions.    This morning he was not responding when spoken to, not responding to sternal rub initially and then all of a sudden perked up painful stimulation of both toes, he then opened his eyes and said \" that's it I've had enough\" \"I am getting out of here\".  It had to be redirected.    I was concerned for brief seizures in the setting of his zoster and possible encephalitis so I reached out to neurology who agreed to see the patient.  He remained awake and was reorienting and also redirectable when explained that he should remain in bed and remain in hospital.  Unfortunately soon after he became unresponsive again and a rapid response was called attended this along with critical care and the patient was upgraded to level 2 stepdown plan for stat CT head, I again in person discussed this case with neurology JADA who will see the patient shortly.    Patient was transferred to ICU and then back to [facility].  Neurology saw patient on stroke pathway.  MRI showed small acute infarct in the left parietal periventricular white matter with " associated small focus of susceptibility may represent associated petechial hemorrhage.  After discussion with neurology they believe this may have been due to cardioembolic secondary to holding anticoagulation drugs.  They are continuing with Depakote.    PT/OT is recommending [facility].  MRA was WNL  Continue Depakote  Order speech eval due to patient having difficulty with breakfast.    Colonic diverticular abscess  Assessment & Plan  Presents with left groin pain, abd pain   Enlarging gas and fluid collection extending from the antimesenteric border of the sigmoid colon, infiltrating the left inguinal region soft tissue and coursing under and along the lateral margin of the left inguinal canal. 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  IR not planning abscess drainage at this time.  Continue IV antibiotics (currently day 5)    Severe protein-calorie malnutrition (HCC)  Assessment & Plan  Malnutrition Findings:   Adult Malnutrition type: Acute illness  Adult Degree of Malnutrition: Other severe protein calorie malnutrition  Malnutrition Characteristics: Weight loss, Inadequate energy                  360 Statement: Malnutrition related to inadequate energy intake as evidenced by 12#(7%) weight loss from 5/15/# to 6/7/# and <50% energy intake compared to estimated needs >5 days.    BMI Findings:           Body mass index is 21.96 kg/m².       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 No evidence of soft tissue component. A benign etiology is suspected such as retroperitoneal lymphatic malformation or lymphocele. Initial   evaluation in 3 months with contrast-enhanced CT abdomen/pelvis is recommended.  Outpt followup     Type 2 diabetes mellitus (HCC)  Assessment & Plan  Lab Results   Component Value Date    HGBA1C 7.6 (H) 06/19/2024       Recent Labs     06/20/24  2049 06/21/24  0008 06/21/24  0501 06/21/24  0740   POCGLU 121 114 151* 132         Blood Sugar Average: Last 72 hrs:  (P) 182.8608722712964188      CKD (chronic kidney disease)  Assessment & Plan  Lab Results   Component Value Date    EGFR 46 06/21/2024    EGFR 46 06/20/2024    EGFR 41 06/19/2024    CREATININE 1.40 (H) 06/21/2024    CREATININE 1.39 (H) 06/20/2024    CREATININE 1.54 (H) 06/19/2024   CKD 3 baseline  Avoid nephrotoxic agents and renally dose medications  Monitor CR- this is stable.     Chronic obstructive pulmonary disease with acute exacerbation (HCC)  Assessment & Plan  Patient has history of COPD  Has not been on any breathing treatments since 2023  Placed on Respiratory protocol  Will start     Inflammatory polyarthropathy (HCC)  Assessment & Plan  PTA regimen hydroxychloroquine on hold, prednisone transitioned to solumedrol for COPD exacerbation     Atrial fibrillation (HCC)  Assessment & Plan  Rate controlled  Continue diltiazem   Eliquis has been on hold since admission in setting of ? Surgical versus IR intervention  Plan to potentially start today however patient had fall overnight and has acute change in mental status  Will await further work up prior to staring   May need heparin bridging if prolonged holding of AC with high chadsvasc, however would hold off for now with acute work up pending .    Shortness of  breath  Assessment & Plan  Presents with dyspnea, nonproductive cough with post tussive vomiting  Solumedrol with transition to prednisone ( takes 2.5  mg daily )  Nebulized bronchodilators            VTE Pharmacologic Prophylaxis:   High Risk (Score >/= 5) - Pharmacological DVT Prophylaxis Ordered: apixaban (Eliquis). Sequential Compression Devices Ordered.    Mobility:   Basic Mobility Inpatient Raw Score: 15  JH-HLM Goal: 4: Move to chair/commode  JH-HLM Achieved: 6: Walk 10 steps or more  JH-HLM Goal achieved. Continue to encourage appropriate mobility.    Patient Centered Rounds: I performed bedside rounds with nursing staff today.  Discussions with Specialists or Other Care Team Provider: General surgery    Education and Discussions with Family / Patient: Updated  (wife and son) at bedside.    Current Length of Stay: 4 day(s)  Current Patient Status: Inpatient   Discharge Plan: Anticipate discharge in 24-48 hrs to rehab facility.    Code Status: Level 1 - Full Code    Subjective:   Patient today is reporting he feels much better.  He is denying any current pain or weakness.  He was seen well being seen by PT and was walking on a walker.  He felt that he was feeling much stronger on his feet today.    Objective:     Vitals:   Temp (24hrs), Av.8 °F (36.6 °C), Min:97.7 °F (36.5 °C), Max:98 °F (36.7 °C)    Temp:  [97.7 °F (36.5 °C)-98 °F (36.7 °C)] 97.7 °F (36.5 °C)  HR:  [57-72] 57  Resp:  [16-18] 16  BP: (100-139)/(72-85) 100/72  SpO2:  [92 %-100 %] 100 %  Body mass index is 21.75 kg/m².     Input and Output Summary (last 24 hours):     Intake/Output Summary (Last 24 hours) at 2024 1506  Last data filed at 2024 1355  Gross per 24 hour   Intake 598.13 ml   Output 775 ml   Net -176.87 ml       Physical Exam:   Physical Exam  Constitutional:       Appearance: Normal appearance.   Cardiovascular:      Rate and Rhythm: Normal rate and regular rhythm.      Pulses: Normal pulses.      Heart  sounds: Normal heart sounds.   Pulmonary:      Effort: Pulmonary effort is normal.      Breath sounds: Normal breath sounds.   Abdominal:      General: Bowel sounds are normal.      Tenderness: There is no abdominal tenderness.   Musculoskeletal:      Right lower leg: No edema.      Left lower leg: No edema.   Neurological:      General: No focal deficit present.      Mental Status: He is alert and oriented to person, place, and time.          Additional Data:     Labs:  Results from last 7 days   Lab Units 06/21/24  0736 06/20/24  0539 06/18/24  2131 06/17/24  0717   WBC Thousand/uL 6.06 5.96   < > 5.80   HEMOGLOBIN g/dL 10.7* 10.5*   < > 11.2*   I STAT HEMOGLOBIN   --   --    < >  --    HEMATOCRIT % 34.2* 33.8*   < > 35.5*   HEMATOCRIT, ISTAT   --   --    < >  --    PLATELETS Thousands/uL 145* 123*   < > 112*   BANDS PCT %  --   --   --  3   SEGS PCT %  --  79*   < >  --    LYMPHO PCT %  --  11*   < > 5*   MONO PCT %  --  8   < > 5   EOS PCT %  --  2   < > 0    < > = values in this interval not displayed.     Results from last 7 days   Lab Units 06/21/24  0736   SODIUM mmol/L 134*   POTASSIUM mmol/L 4.8   CHLORIDE mmol/L 104   CO2 mmol/L 29   BUN mg/dL 26*   CREATININE mg/dL 1.40*   ANION GAP mmol/L 1*   CALCIUM mg/dL 8.9   ALBUMIN g/dL 3.0*   TOTAL BILIRUBIN mg/dL 0.94   ALK PHOS U/L 92   ALT U/L 19   AST U/L 20   GLUCOSE RANDOM mg/dL 127     Results from last 7 days   Lab Units 06/19/24  1611   INR  1.27*     Results from last 7 days   Lab Units 06/21/24  1119 06/21/24  0740 06/21/24  0501 06/21/24  0008 06/20/24  2049 06/20/24  1604 06/20/24  1212 06/20/24  0800 06/20/24  0440 06/19/24  2350 06/19/24  2040 06/19/24  1751   POC GLUCOSE mg/dl 184* 132 151* 114 121 186* 188* 84 119 125 152* 159*     Results from last 7 days   Lab Units 06/19/24  1153   HEMOGLOBIN A1C % 7.6*     Results from last 7 days   Lab Units 06/20/24  0539 06/19/24  1153 06/19/24  0016 06/18/24  2131 06/16/24  1900   LACTIC ACID mmol/L  --   1.0 1.1 2.8* 0.9   PROCALCITONIN ng/ml 0.08 0.11  --   --   --        Lines/Drains:  Invasive Devices       Peripheral Intravenous Line  Duration             Peripheral IV 06/16/24 Right Forearm 4 days    Peripheral IV 06/19/24 Proximal;Right;Ventral (anterior) Forearm 2 days    Peripheral IV 06/21/24 Left Antecubital <1 day                          Imaging: Reviewed radiology reports from this admission including: MRI brain    Recent Cultures (last 7 days):   Results from last 7 days   Lab Units 06/19/24  1610   BLOOD CULTURE  No Growth at 24 hrs.  No Growth at 24 hrs.       Last 24 Hours Medication List:   Current Facility-Administered Medications   Medication Dose Route Frequency Provider Last Rate    acetaminophen  650 mg Oral Q4H PRN Lyubov Malone MD      albuterol  2 puff Inhalation Q4H PRN Lyubov Malone MD      apixaban  2.5 mg Oral BID Jose Cesar MD      atorvastatin  40 mg Oral Daily With Dinner Hal Zamarripa MD      budesonide  0.5 mg Nebulization Q12H Jose Cesar MD      ciprofloxacin  500 mg Oral Q12H [facility] Monika Alfonso MD      diltiazem  180 mg Oral Daily Lyubov Malone MD      divalproex sodium  500 mg Oral Q8H [facility] Hal Zamarripa MD      folic acid 1 mg, thiamine (VITAMIN B1) 100 mg in sodium chloride 0.9 % 100 mL IV piggyback   Intravenous Daily Lyubov Malone  mL/hr at 06/21/24 1111    formoterol  20 mcg Nebulization Q12H Jose Cesar MD      HYDROmorphone  0.2 mg Intravenous Q4H PRN Lyubov Malone MD      insulin lispro  1-5 Units Subcutaneous 4x Daily (AC & HS) Lyubov Malone MD      ipratropium  0.5 mg Nebulization Once Jose Cesar MD      lidocaine   Topical 4x Daily PRN Lyubov Malone MD      losartan  50 mg Oral Daily Lyubov Malone MD      [Transfer Hold] melatonin  3 mg Oral HS PRN Redd Kulkarni MD      metroNIDAZOLE  500 mg Oral Q8H [facility] Monika Alfonso MD      nystatin  500,000 Units Swish & Swallow 4x Daily Lyubov Malone MD      [START ON 6/22/2024] pantoprazole  40 mg Oral Daily Before  Breakfast Monika Alfonso MD      predniSONE  1 mg Oral Daily Lyubov Malone MD      senna  2 tablet Oral BID Katy Sykes MD      valACYclovir  1,000 mg Oral BID Lyubov Malone MD          Today, Patient Was Seen By: Daniel Ames DO    **Please Note: This note may have been constructed using a voice recognition system.**

## 2024-06-21 NOTE — ASSESSMENT & PLAN NOTE
Lab Results   Component Value Date    HGBA1C 7.6 (H) 06/19/2024       Recent Labs     06/20/24  2049 06/21/24  0008 06/21/24  0501 06/21/24  0740   POCGLU 121 114 151* 132         Blood Sugar Average: Last 72 hrs:  (P) [id number]

## 2024-06-21 NOTE — [facility] ADMISSION
[facility]  spoke with patients wife : introduced self, explained role, reviewed [facility] program,services offered,acute rehab criteria,review of referral by [facility] Medical Director,insurance authorization process,the three [facility] locations and anticipated rehab length of stay. [facility] rehab folder left patient ; all questions answered.Family only / first choice is [facility] at this time. Family made aware [facility] reviewer will communicate with the  who will keep patient updated on referral status.

## 2024-06-21 NOTE — CONSULTS
PHYSICAL MEDICINE AND REHABILITATION CONSULT NOTE  Hal Miller 84 y.o. male MRN: [id number]  Unit/Bed#: S -01 Encounter: [id number]    Requested by:   Lyubov Malone MD  Reason for Consultation:    Stroke - Rehabilitation medicine evaluation and assistance with appropriate disposition.  Primary insurance listed on facesheet:  [facility] Rep     Assessment and Recommendations:  84 year old with PMH of polymyalgia rheumatica on chronic plaquenil and low dose prednisone, Afib on Eliquis, stroke history, CAD, DM2, HTN, COPD, vascular dementia, chronic double vision for which he wears eye patch, pancreatic CA s/p whipple (2011), appy, cholecystectomy, R ing hernia repair who presented to hospital with abdominal pain, SOB, shingles rash pain.and enlarging L groin buldge who had CT A/P concerning for diverticular abscess.  Gen Sx was consulted and recommended IV abx.  Follow-up imaging obtained and Gen Sx felt diverticular abscess emanating into L inguinal hernia.  IR was consulted for possible drainage but they felt not enough to no fluid collection available to drain.  Plaquenil was d/c'd to limit immunocompromised state.  Follow-up Gen Sx reported Sigmoid diverticular perforation with isolated air within the inguinal canal or inguinal sac with recommendation to continue abx and advance diet as tolerated.      Course notable for episode of unresponsiveness with turning head to left and with downward gaze on 6/19 with waxing and waning mentation.  He received Keppra and ativan on 6/19 for possible seizure and more recently started on Depacon with kidney d/s hx.  EEG showed excessive theta activity during wakefulness suggest mild nonspecific diffuse cerebral dysfunction. No epileptiform discharges or seizures are present.     MRI brain on 6/20 showed small acute infarct in the left parietal periventricular white matter with an associated small focus of susceptibility that may represent associated petechial  hemorrhage as well as redemonstrated foci of susceptibility, including new foci, nonspecific and could be seen with cerebral amyloid angiopathy, multiple cavernomas and/or prior embolic events, among other considerations.    Patient's home Eliquis on hold and he has been on heparin drip.  Neuro feels small CVA related to temp off of Eliquis and not failure with plan to go back from hep gtt to Eliquis.  Gen Sx recs 1 more week of abx and OP Gen Sx follow-up.      Prior Level of Function and Social history:    Patient lives in 2 level home with 2 SISSY from garage with spouse  Independent with ADLs  Independent with ambulation  Spouse hels with iADLs    Current Level of Function:    6/20  OT-Min assist LBB, LBD, toileting  PT-final report pending but draft appears to state min assist for transfers and ambulation     The patient with following culmination of conditions is expected to significantly benefit from direct rehabilitation physician oversight to optimally monitor and manage in post-acute rehab setting.  Optimal functional outcomes expected using coordinated interdisciplinary team approach (PMR MD, skilled nursing, PT, OT) while providing intensive inpatient rehabilitation.   Encephalopathy  Seizure-like episode    Stroke - acute infarct in L parietal perivent white matter  Vascular dementia  Abnormal MRI brain - also showing foci of susceptibility, including new foci, nonspecific and could be seen with cerebral amyloid angiopathy, multiple cavernomas and/or prior embolic events, among other considerations.  Marked chronic ischemic changes of white matter with chronic bilateral gangliocapsular, thalamic and cerebellar infarcts.   Microperforation of sigmoid colon with air noted into L inguinal sac   Constipation   Afib  COPD, chronic respiratory respiratory failure with hypoxia   DM2  Polymyalgia rheumatica on chronic plaquenil and low dose prednisone  Neuropathy   Shingles    Disposition recommendation:   [facility]  Patient is good candidate for transfer to [facility]/IRF pending:    - Medical clearance/stability  - Facility acceptance, insurance authorization, and bed availability      Encephalopathy, seizure like episode, small L parietal PV WM infarct  - Encephalopathy improving well  - Imbalance and incoordination noted during therapy eval  - Work-up by neurology and medicine  - Neuro feels small CVA related to temp being off of Eliquis and recommend resumption   - Await follow-up eval neuro with new CVA finding   - MRA head radiology read pending  - Antithrombotics at their discretion - currently heparin gtt; was on Eliquis at home with Afib hx; statin; BP parameters per neuro  - MRI showing small L parietal PV WM infarct as well as very severe chronic microangiopathic changes and possible CAA or other considerations   - Cartoid duplex <50% ICA b/l  - EEG showed excessive theta activity during wakefulness suggest mild nonspecific diffuse cerebral dysfunction. No epileptiform discharges or seizures are present.   - Neuro may de-escalate AED - currently on Depakote 500mg TID but may be able to reduce to BID pending level   - Seizure precautions  - Remains at risk for increased confusion/delirium, restlessness, agitation, and fall - continue to monitor for concerns/changes  - Med review: Depakote    - Received Seroquel 6/18 hs, received keppra and 2mg IV ativan 6/19 afternoon  - Monitor neuro-exam, wakefulness, mood, cognition, insight into deficits and safety awareness   - Monitor and ensure optimal management electrolytes, nutrition, and hydration  - Monitor for signs or symptoms of infection, medication intolerances, other systemic etiologies  - Additional labs, imaging, specialist follow-up as needed per primary team currently   - Overstimulation precautions, frequent re-orientation, re-direction, re-assurance  - Optimal mood, pain, and sleep management  - If impaired sleep or behavior recommend sleep log and agitation  monitoring    - Limit sedating medications when possible  - Fall precautions - if needed increase rounding or consider virtual sitter or in-person sitter  - For routine restlessness, anxiety, irritability focus on non-pharmacologic management    - Hold benzo's with increased risk paradoxical reaction and possibility of limiting cognitive recovery  - Continue PT, OT in acute setting   - Recommend acute comprehensive interdisciplinary inpatient rehabilitation to include intensive skilled therapies (PT, OT, ST) with oversight and management by rehabilitation physician.  Inpatient rehab level nursing, case management and weekly interdisciplinary team meetings.     - Can be done in [facility] setting:  - Obtain MOCA when cog status stabilized   - Assess iADLs - ability to manage medications, meal prep, finances, obtaining appropriate transport from community, managing emergencies, and overall safety in home environment.  - Provide therapy, compensatory strategies, and if available and necessary provide caregiver training.       Vascular dementia and abnormal MRI brain showing foci of susceptibility, including new foci, nonspecific and could be seen with cerebral amyloid angiopathy, multiple cavernomas and/or prior embolic events, among other considerations.  Marked chronic ischemic changes of white matter with chronic bilateral gangliocapsular, thalamic and cerebellar infarcts.   - Patient currently oriented x3, able to follow simple commands  - Risk of infarcts and bleeds potentially  - Optimal mgmt per neuro  - Risk of falls, incontinence, and delirium  - PT, OT, fall precautions, adequate caregiver oversight    Microperforation of sigmoid colon with air noted into L inguinal sac and constipation  - Being managed non-op by Gen Sx  - Plan to tx with PO abx for another week and than OP follow-up Gen Sx 3-4 week  - Patient still without stool but having gas and normoactive on exam - discuss with gen sx what agents can be use to  safely promote stooling  - Last abdominal imaging 6/18  STOMACH AND BOWEL: Evaluation of the gastrointestinal tract is somewhat limited by underdistention. No bowel obstruction  1) Essentially unchanged tubular collection of fluid and multiple air droplets in the left side of the pelvis anteriorly, extending focally into the adjacent anterior pelvic wall, and abutting the sigmoid colon. Oral contrast has reached the region of the sigmoid colon adjacent to the collection, however is not seen outside of the sigmoid lumen, making a persistent communication with the sigmoid unlikely. This collection may be related to a contained prior perforation, or possibly may represent an infected hernia plug, as a plug like structure was previously seen in this location on the prior studies dating back to 2011, and is currently not visualized elsewhere. Evaluation with CT with IV contrast if renal function allows may be of value to better assess presence of an abscess.   2) 2.2 x 1.2 cm hypodense lesion in the aortocaval region, not significantly changed on the studies from 2022, however mildly enlarged from February 2023 and new from the preceding studies. This lesion is of unclear etiology. A lymph node is possible in this location. Other considerations include a lymphatic malformation or lymphocele. Follow-up CT or MRI is recommended in 6 months, preferably with IV contrast if renal function allows. Considerations related to the patient's age and/or comorbidities may be used to alter these recommendations.   3) Additional findings unchanged from the CT from 11 hours earlier, as above.  - Serial abdominal exams, monitor vitals, labs as well  - On Cipro and Flagyl currently  - Ensure adequate intake and stooling  - LBM 6/17       Afib  - Mgmt currently per internal medicine/hospitalist service   - Rate control: Cardizem  - Antithrombotic: heparin gtt > plan to transition back to Eliquis   - OP Cards follow-up    COPD  - Overall  mgmt per IM/hospitalist service currently  - Optimal mgmt during rehab course   - Monitor lung exam, vitals with and without activity  - Encourage incentive spirometry    DM2  - Current management by internal medicine  - Monitor for signs and symptoms of hypoglycemia   - Consistent carb diabetic diet  - Recommend close monitoring and optimal management during recovery/rehab phase as well     PMR hx   - Chronic plaquenil held due to recent possible colonic per  - On chronic low dose prednisone    Neuropathy, possibly diabetic  Optimal DM mgmt  Monitor skin for increased infection/breakdown/wounds which patient is at higher risk for  Skilled PT/OT   Fall precautions     Shingles  - Valtrex  - Precautions per IM    VTE prophylaxis   As outlined by primary team      Other medical issues:     Per primary service (and relevant consultants if applicable)      ==================================================================    Chief Complaint:  Decline in function     History of Present Illness:   84 year old with PMH of polymyalgia rheumatica on chronic plaquenil and low dose prednisone, Afib on Eliquis, stroke history, CAD, DM2, HTN, COPD, vascular dementia, chronic double vision for which he wears eye patch, pancreatic CA s/p whipple (2011), appy, cholecystectomy, R ing hernia repair who presented to hospital with abdominal pain, SOB, shingles rash pain.and enlarging L groin buldge who had CT A/P concerning for diverticular abscess.  Gen Sx was consulted and recommended IV abx.  Follow-up imaging obtained and Gen Sx felt diverticular abscess emanating into L inguinal hernia.  IR was consulted for possible drainage but they felt not enough to no fluid collection available to drain.  Plaquenil was d/c'd to limit immunocompromised state.  Follow-up Gen Sx reported Sigmoid diverticular perforation with isolated air within the inguinal canal or inguinal sac with recommendation to continue abx and advance diet as tolerated.       Course notable for episode of unresponsiveness with turning head to left and with downward gaze on 6/19 with waxing and waning mentation.  He received Keppra and ativan on 6/19 for possible seizure and more recently started on Depacon with kidney d/s hx.  EEG showed excessive theta activity during wakefulness suggest mild nonspecific diffuse cerebral dysfunction. No epileptiform discharges or seizures are present.     MRI brain on 6/20 showed small acute infarct in the left parietal periventricular white matter with an associated small focus of susceptibility that may represent associated petechial hemorrhage as well as redemonstrated foci of susceptibility, including new foci, nonspecific and could be seen with cerebral amyloid angiopathy, multiple cavernomas and/or prior embolic events, among other considerations.    Patient's home Eliquis on hold and he has been on heparin drip.  Neuro feels small CVA related to temp off of Eliquis and not failure with plan to go back from hep gtt to Eliquis.  Gen Sx recs 1 more week of abx and OP Gen Sx follow-up.      On evaluation, patient has adequate wakefulness oriented x 3 able to follow simple commands.  Notes some mild abdominal discomfort but improving overall.  He reports not having a bowel movement yet but is having some gas.  He denies nausea or vomiting.  He denies worsening strength, sensation, vision, fever, chills, lightheadedness, shortness of breath, or other new complaints.    Review of Systems:     Complete review of systems obtained.    Please see HPI for details with other significant symptoms or history listed here:    Otherwise, 14 point review of systems completed and was otherwise unremarkable.    Allergies   Allergen Reactions    Contrast Dye [Iodinated Contrast Media] Other (See Comments)     Pt states kidney dysfunction..     Other        Current Facility-Administered Medications:     acetaminophen (TYLENOL) tablet 650 mg, 650 mg, Oral, Q4H PRN,  Lyubov Malone MD    albuterol (PROVENTIL HFA,VENTOLIN HFA) inhaler 2 puff, 2 puff, Inhalation, Q4H PRN, Lyubov Malone MD    atorvastatin (LIPITOR) tablet 40 mg, 40 mg, Oral, Daily With Dinner, Hal Zamarripa MD    ciprofloxacin (CIPRO) IVPB (premix in 5% dextrose) 400 mg 200 mL, 400 mg, Intravenous, Q12H, Lyubov Malone MD, Last Rate: 200 mL/hr at 06/21/24 0342, 400 mg at 06/21/24 0342    diltiazem (CARDIZEM CD) 24 hr capsule 180 mg, 180 mg, Oral, Daily, Lyubov Malone MD, 180 mg at 06/20/24 0811    divalproex sodium (DEPAKOTE) DR tablet 500 mg, 500 mg, Oral, Q8H YUNI, Hal Zamarripa MD, 500 mg at 06/20/24 2349    folic acid 1 mg, thiamine (VITAMIN B1) 100 mg in sodium chloride 0.9 % 100 mL IV piggyback, , Intravenous, Daily, Lyubov Malone MD, Last Rate: 0 mL/hr at 06/19/24 1744, New Bag at 06/20/24 0951    heparin (porcine) 25,000 units in 0.45% NaCl 250 mL infusion (premix), 3-30 Units/kg/hr (Order-Specific), Intravenous, Titrated, Lyubov Malone MD, Last Rate: 12.8 mL/hr at 06/21/24 0128, 16 Units/kg/hr at 06/21/24 0128    heparin (porcine) injection 3,200 Units, 3,200 Units, Intravenous, Q6H PRN, Lyubov Malone MD, 3,200 Units at 06/20/24 2351    heparin (porcine) injection 6,400 Units, 6,400 Units, Intravenous, Q6H PRN, Lyubov Malone MD    HYDROmorphone HCl (DILAUDID) injection 0.2 mg, 0.2 mg, Intravenous, Q4H PRN, Lyubov Malone MD    insulin lispro (HumALOG/ADMELOG) 100 units/mL subcutaneous injection 1-5 Units, 1-5 Units, Subcutaneous, 4x Daily (AC & HS), 1 Units at 06/20/24 1713 **AND** Fingerstick Glucose (POCT), , , 4x Daily AC and at bedtime, Lyubov Malone MD    lidocaine (LMX) 4 % cream, , Topical, 4x Daily PRN, Lyubov Malone MD, 1 Application at 06/17/24 1231    losartan (COZAAR) tablet 50 mg, 50 mg, Oral, Daily, Lyubov Malone MD, 50 mg at 06/20/24 0812    [Transfer Hold] melatonin tablet 3 mg, 3 mg, Oral, HS PRN, Redd Kulkarni MD    metroNIDAZOLE (FLAGYL) IVPB (premix) 500 mg 100 mL, 500 mg, Intravenous,  Q8H, Lyubov Malone MD, Last Rate: 200 mL/hr at 24 2359, 500 mg at 24 2359    nystatin (MYCOSTATIN) oral suspension 500,000 Units, 500,000 Units, Swish & Swallow, 4x Daily, Lyubov Malone MD, 500,000 Units at 24 2348    pantoprazole (PROTONIX) injection 40 mg, 40 mg, Intravenous, Q24H YUNI, Lyubov Malone MD, 40 mg at 24 0812    predniSONE tablet 1 mg, 1 mg, Oral, Daily, Lyubov Malone MD, 1 mg at 24 0811    valACYclovir (VALTREX) tablet 1,000 mg, 1,000 mg, Oral, BID, Lyubov Malone MD, 1,000 mg at 24 1715    Past Medical History:   Diagnosis Date    Acute encephalopathy 2023    Acute-on-chronic kidney injury  (HCC) 2017    Cancer (HCC)     pancreatic    Colon polyp     Coronary artery disease     Dehydration 3/3/2023    Diabetes mellitus (HCC)     Hyperlipidemia     Hypertension     Left lower lobe pneumonia 2022    Pneumonia 2017    Right middle and lower lobe     Stroke (HCC)        Past Surgical History:   Procedure Laterality Date    APPENDECTOMY      CARDIAC SURGERY      Aortic Valve Replacement, Ascending Aorta    COLONOSCOPY      GALLBLADDER SURGERY      PANCREATICODUODENECTOMY        Family History   Problem Relation Age of Onset    Cancer Mother     Stroke Father     Cancer Sister     Diabetes Sister     Stroke Brother        Social History available currently in EMR:  (See additional SH as outlined above)   Social History     Socioeconomic History    Marital status: /Civil Union     Spouse name: None    Number of children: None    Years of education: None    Highest education level: None   Occupational History    None   Tobacco Use    Smoking status: Former     Types: Pipe     Quit date:      Years since quittin.4    Smokeless tobacco: Never   Vaping Use    Vaping status: Never Used   Substance and Sexual Activity    Alcohol use: Never    Drug use: No    Sexual activity: Yes     Partners: Female   Other Topics Concern    None   Social History  Narrative    None     Social Determinants of Health     Financial Resource Strain: Not on file   Food Insecurity: No Food Insecurity (6/17/2024)    Hunger Vital Sign     Worried About Running Out of Food in the Last Year: Never true     Ran Out of Food in the Last Year: Never true   Transportation Needs: No Transportation Needs (6/17/2024)    PRAPARE - Transportation     Lack of Transportation (Medical): No     Lack of Transportation (Non-Medical): No   Physical Activity: Not on file   Stress: Not on file   Social Connections: Not on file   Intimate Partner Violence: Not on file   Housing Stability: Unknown (6/17/2024)    Housing Stability Vital Sign     Unable to Pay for Housing in the Last Year: No     Number of Times Moved in the Last Year: Not on file     Homeless in the Last Year: No       Physical Examination:   Temp:  [97.6 °F (36.4 °C)-98 °F (36.7 °C)] 97.7 °F (36.5 °C)  HR:  [65-85] 65  Resp:  [15-31] 18  BP: (122-158)/(68-96) 139/77  General: Awake, alert in NAD  HENT: NCAT, MMM  Neck: Supple, no lymphadenopathy  Respiratory: Unlabored breathing, breath sounds equal, Lungs CTA, no wheezes, rales, or rhonchi  Cardiovascular: Regular rate and rhythm, no murmurs, rubs, or gallops  Gastrointestinal: Soft, non-tender, non-distended, normoactive bowel sounds  Genitourinary: No cardenas  SkiN/MSK/Extremities:  Distal extremities appropriately warm, normal cap refill distally, no cyanosis or calf edema, no calf tenderness to palpation  Neurologic/Psych:   MENTAL STATUS: awake, oriented to person, place, time, and situation; able to follow 1 step commands  Affect: Euthymic   Strength/MMT:  Near full throughout    Data:  Lab Results   Component Value Date    HGB 10.5 (L) 06/20/2024    HGB 13.0 06/08/2015    HCT 33.8 (L) 06/20/2024    HCT 41.0 06/08/2015    WBC 5.96 06/20/2024    WBC 7.03 06/08/2015     06/08/2015    K 4.9 06/20/2024    K 4.6 02/20/2023     06/20/2024     02/20/2023    GLUCOSE 169 (H)  06/19/2024    GLUCOSE 101 06/08/2015    CREATININE 1.39 (H) 06/20/2024    CREATININE 1.58 (H) 02/20/2023    BUN 32 (H) 06/20/2024    BUN 38 (H) 02/20/2023         MRI brain wo contrast    Result Date: 6/20/2024  Impression: 1. Small acute infarct in the left parietal periventricular white matter with an associated small focus of susceptibility that may represent associated petechial hemorrhage. 2. Redemonstrated foci of susceptibility, including new foci, nonspecific and could be seen with cerebral amyloid angiopathy, multiple cavernomas and/or prior embolic events, among other considerations. I personally discussed the acute infarct with MEREDITH VILLATORO on 6/20/2024 2:01 PM. Workstation performed: [id number]       Pertinent labs and imaging reviewed.      Patient seen on date of service listed.    Thank you for allowing the PM&R service to participate in the care of this patient. We will continue to follow Hal Miller's progress with you. Please do not hesitate to call with questions or concerns.    Mauricio Brush MD, MS  [facility]  Physical Medicine and Rehabilitation  Brain Injury Medicine

## 2024-06-21 NOTE — PLAN OF CARE
Problem: INFECTION - ADULT  Goal: Absence or prevention of progression during hospitalization  Description: INTERVENTIONS:  - Assess and monitor for signs and symptoms of infection  - Monitor lab/diagnostic results  - Monitor all insertion sites, i.e. indwelling lines, tubes, and drains  - Monitor endotracheal if appropriate and nasal secretions for changes in amount and color  - [place] appropriate cooling/warming therapies per order  - Administer medications as ordered  - Instruct and encourage patient and family to use good hand hygiene technique  - Identify and instruct in appropriate isolation precautions for identified infection/condition  Outcome: Progressing

## 2024-06-21 NOTE — ASSESSMENT & PLAN NOTE
"Has had waxing and waning mental status over the last 48 hours as per family.  On my assessment yesterday he was awake alert and oriented .  Recognize all his family members was able to discuss current events with me including who the president is upcoming elections etc.  He did not know the date and month and year and as well fully aware of where he was.    Family had been concerned that he had been confused earlier on that morning however given his improvement no further testing was performed.  Fortunately overnight he had a fall and it is unclear what happened during that time whether he had just received any medications or interventions.    This morning he was not responding when spoken to, not responding to sternal rub initially and then all of a sudden perked up painful stimulation of both toes, he then opened his eyes and said \" that's it I've had enough\" \"I am getting out of here\".  It had to be redirected.    I was concerned for brief seizures in the setting of his zoster and possible encephalitis so I reached out to neurology who agreed to see the patient.  He remained awake and was reorienting and also redirectable when explained that he should remain in bed and remain in hospital.  Unfortunately soon after he became unresponsive again and a rapid response was called attended this along with critical care and the patient was upgraded to level 2 stepdown plan for stat CT head, I again in person discussed this case with neurology JADA who will see the patient shortly.    Patient was transferred to ICU and then back to [facility].  Neurology saw patient on stroke pathway.  MRI showed small acute infarct in the left parietal periventricular white matter with associated small focus of susceptibility may represent associated petechial hemorrhage.  After discussion with neurology they believe this may have been due to cardioembolic secondary to holding anticoagulation drugs.  They are continuing with " Depakote.    PT/OT is recommending [facility].  MRA was WNL  Continue Depakote  Follow-up Depakote level  Order speech eval due to patient having difficulty with breakfast.

## 2024-06-21 NOTE — CASE MANAGEMENT
Case Management Discharge Planning Note    Patient name Hal Kirby S /S -01 MRN [id number]  : 1940 Date 2024       Current Admission Date: 2024  Current Admission Diagnosis:Acute encephalopathy   Patient Active Problem List    Diagnosis Date Noted Date Diagnosed    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HCC) 2024     Acute on chronic respiratory failure (HCC) 2024     Episode of seizure-like activity 2024     History of Whipple procedure 2024     Dizziness 2024     Insomnia 2024     Ambulatory dysfunction 2024     Severe protein-calorie malnutrition (HCC) 2024     Abnormal CT of the abdomen 2024     Zoster 2024     Colonic diverticular abscess 2024     Vascular dementia ([facility]) 2024     Concern for aspiration pneumonia ([facility]) 2024     Type 2 diabetes mellitus ([facility]) 2024     Hypercalcemia 2023     TARA (acute kidney injury) ([facility]) 2023     Esophageal stricture 2023     Acute encephalopathy 2023     Generalized weakness 2023     Malignant neoplasm of tail of pancreas ([facility]) 2022     Chronic obstructive pulmonary disease with acute exacerbation ([facility]) 2022     CKD (chronic kidney disease) 2022     Centrilobular emphysema ([facility]) 2021     History of malignant neuroendocrine tumor 2021     Atrial fibrillation ([facility]) 2017     Shortness of breath 2017     Hypertension 2017     Hyperlipidemia 2015     Inflammatory polyarthropathy (HCC) 2014     Osteoarthrosis 2014     Polymyalgia rheumatica (HCC) 2014     Aneurysm of thoracic aorta (HCC) 2014     Aneurysm of abdominal aorta (HCC) 2014     Neoplasm of other specified site 2014       LOS (days): 4  Geometric Mean LOS (GMLOS) (days): 3.9  Days to GMLOS:-0.6     OBJECTIVE:  Risk of Unplanned Readmission Score: 38.02         Current admission status: Inpatient    Preferred Pharmacy:   [id number] [place] [facility] [address] [address] [address] [facility] [address]  Phone: [phone] Fax: [phone]    [facility] - [place], [place] - [address]  [address] [facility] [address]  Phone: [phone] Fax: [phone]    [facility] - [place], [place] - [address]  [address] [place] [address]  Phone: [phone] Fax: [phone]    Primary Care Provider: Kyaw Monreal MD    Primary Insurance: [employer] [facility]  Secondary Insurance:     DISCHARGE DETAILS:    Discharge planning discussed with:: Pt's wife - Ann  Freedom of Choice: Yes  Comments - Freedom of Choice: [facility] vs  HHC    Contacts  Patient Contacts: Ann  Relationship to Patient:: Family  Contact Method: In Person  Reason/Outcome: Emergency Contact, Discharge Planning, Referral, Continuity of Care    Other Referral/Resources/Interventions Provided:  Interventions: Acute Rehab, HHC  Referral Comments: CM met with pt, his wife and son, at bedside. Pt's wife confirmed she spoke with  [facility]. She is aware that once pt is medically ready will work on authorization if there is a bed at [facility]. CM discussed HHC in the event pt denied for [facility] by his insurance. CM reviewed that [facility]. She is agreeable to [facility] and aware CM will reserve in the event this is needed. Pt's wife asking about hotel's with transportation. CM made her aware of [facility] [place]. She will contact them to see if they provide shuttle services to the hospital. Aware there may be a discount.

## 2024-06-21 NOTE — ASSESSMENT & PLAN NOTE
Hal Miller is a 84 y.o. male with CAD, s/p AVR, CKD, AF on eliquis, DM, HTN, HLD, COPD, prior stroke, vascular dementia, pancreatic cancer s/p whipple, PMR on prednisone and current shingles infection on valtrex admitted 6/16/24 with abdominal pain and SOB. CT abdomen revealed diverticular abscess; medically managed with IV abx. Eliquis held and Heparin gtt started     Hospital course complicated by acute encephalopathy with waxing and waning mental status.  RRT 6/18 for fall; CTH negative. RRT 6/19 for episodes of unresponsiveness with head turned to the left and downward gaze as well as rhythmic movements of his hands concerning for possible seizure. He received keppra 1gm and ativan 2mg iv x1 with return of conjugate gaze and incomprehensible sounds.He was transferred to ICU and started on stroke pathway with neurology consult.     6/20 patient remained stable and seizure free, transferred out ICU. MRI confirmed acute left parietal stroke. EEG without seizure    Neuroimaging/work-up:  -6/18/24 CTH: No acute intracranial abnormality. Similar-appearing chronic severe microangiopathic changes.   -6/19/24 EEG Interpretation:  This Routine EEG recorded during sleep and brief wakefulness is abnormal.   Excessive theta activity during wakefulness suggest mild nonspecific diffuse cerebral dysfunction.  No epileptiform discharges or seizures are present.  -6/20/24 MRI:  1. Small acute infarct in the left parietal periventricular white matter with an associated small focus of susceptibility that may represent associated petechial hemorrhage.  2. Redemonstrated foci of susceptibility, including new foci, nonspecific and could be seen with cerebral amyloid angiopathy, multiple cavernomas and/or prior embolic events, among other considerations.  -6/20/24 Carotid US: <50% stenosis bilateral ICA; Compared to previous study on 9/19/2011, there is progression of disease in the ICA bilaterally.  -Ammonia, B12, folate WNL  -Lipid  panel: TC 94, TG 54, HDL 39, LDL 44  -6/8/24:TSH 1.7  -6/7/24: A1c 7.4  -PTT /24hrs  Recommendations:  Suspect cardioembolic etiology in the setting of held anticoagulation pending possible surgical intervention on admission  - Stroke pathway  MRA pending  ECHO complete;report pending  Heparin gtt per primary service; consider transitioning back to eliquis if not otherwise contraindicated.   Avoiding Aspirin secondary to noted micro hemorrhages on MRI/risk of bleeding   Atorvastatin 40 mg  Goal normotension; avoid hypotension  PT/OT/ST  Stroke education  Continue to monitor and notify neurology with any changes.  STAT CT head for any acute change in neuro exam  - Medical management and correction of any metabolic or infectious disturbances per primary service.

## 2024-06-21 NOTE — QUICK NOTE
MRA: Normal MRA Brain with variant anatomy.   ECHO:    Left Ventricle: Left ventricular cavity size is mildly dilated. Wall thickness is normal. The left ventricular ejection fraction is 30%. Systolic function is severely reduced. There is global hypokinesis.    The following segments are akinetic: mid inferior and apical inferior.    The following segments are hypokinetic: basal anterior, basal anteroseptal, basal inferoseptal, basal inferior, basal inferolateral, basal anterolateral, mid anterior, mid anteroseptal, mid inferoseptal, mid inferolateral, mid anterolateral, apical anterior, apical septal, apical lateral and apex.    Right Ventricle: Right ventricular cavity size is mildly dilated.    Left Atrium: The atrium is mildly dilated.    Right Atrium: The atrium is mildly dilated.    Aortic Valve: There is mild stenosis.    Mitral Valve: There is mild regurgitation.    Tricuspid Valve: There is mild regurgitation.    Pericardium: There is a large left pleural effusion.    No additional inpatient neurology recommendations. Please reach out with any additional questions/concerns

## 2024-06-21 NOTE — ASSESSMENT & PLAN NOTE
Patient has history of COPD  Has not been on any breathing treatments since 2023  Placed on Respiratory protocol  Will start

## 2024-06-21 NOTE — PROGRESS NOTES
The pantoprazole, metronidazole, and ciprofloxacin have been converted to Oral per [facility] IV-to-PO Auto-Conversion Protocol for Adults as approved by the Pharmacy and Therapeutics Committee. The patient met all eligible criteria:  1) Age = 18 years old   2) Received at least one dose of the IV form   3) Receiving at least one other scheduled oral/enteral medication   4) Tolerating an oral/enteral diet   and did not have any exclusions:   1) Critical care patient   2) Active GI bleed (IF assessing H2RAs or PPIs)   3) Continuous tube feeding (IF assessing cipro, doxycycline, levofloxacin, minocycline, rifampin, or voriconazole)   4) Receiving PO vancomycin (IF assessing metronidazole)   5) Persistent nausea and/or vomiting   6) Ileus or gastrointestinal obstruction   7) Almaz/nasogastric tube set for continuous suction   8) Specific order not to automatically convert to PO (in the order's comments or if discussed in the most recent Infectious Disease or primary team's progress notes).

## 2024-06-21 NOTE — PHYSICAL THERAPY NOTE
PHYSICAL THERAPY EVALUATION  NAME:  Hal Miller  DATE: 06/21/24    AGE:   84 y.o.  Mrn:   [id number]  Principal problem: Principal Problem:    Acute encephalopathy  Active Problems:    Shortness of breath    Atrial fibrillation (HCC)    Inflammatory polyarthropathy (HCC)    CKD (chronic kidney disease)    Type 2 diabetes mellitus (HCC)    Abnormal CT of the abdomen    Zoster    Colonic diverticular abscess    Vascular dementia (HCC)    Insomnia    Ambulatory dysfunction    Severe protein-calorie malnutrition (HCC)    Episode of seizure-like activity    History of Whipple procedure    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HCC)      Vitals:    06/21/24 0248 06/21/24 0738 06/21/24 0800 06/21/24 1116   BP: 139/77 138/81  100/72   BP Location:       Pulse: 65 62  57   Resp: 18 18  16   Temp: 97.7 °F (36.5 °C) 97.8 °F (36.6 °C)  97.7 °F (36.5 °C)   TempSrc:       SpO2: 95% 96% 92% 100%   Weight:       Height:           Length Of Stay: 4  Performed at least 2 patient identifiers during session: Name and Epic photo  PHYSICAL THERAPY EVALUATION :    06/21/24 0900   PT Last Visit   PT Visit Date 06/21/24   Note Type   Note type Evaluation   Pain Assessment   Pain Assessment Tool 0-10   Pain Score 4   Pain Location/Orientation Location: Abdomen;Location: Back   Pain Onset/Description Frequency: Constant/Continuous   Effect of Pain on Daily Activities limits sitting tolerance, limits speed and indep of mobility   Patient's Stated Pain Goal No pain   Hospital Pain Intervention(s) Repositioned;Ambulation/increased activity;Emotional support   Restrictions/Precautions   Weight Bearing Precautions Per Order No   Other Precautions Bed Alarm;Chair Alarm;Cognitive;Multiple lines;Hard of hearing;Fall Risk;Pain  (recent seizure-like activity)   Home Living   Type of Home House   Home Layout Two level;Bed/bath upstairs;Performs ADLs on one level;Stairs to enter with rails  (2STE w/ garage)   Home Equipment Walker;Cane;Grab bars   Additional Comments Pt  reportedly did not use A device prior to admission   Prior Function   Level of [place] Independent with ADLs;Independent with functional mobility;Needs assistance with IADLS   Lives With Spouse   Receives Help From Family;Friend(s)   IADLs Family/Friend/Other provides transportation;Family/Friend/Other provides meals;Family/Friend/Other provides medication management  (Spouse assists w/ IADLs tasks, pt able to participate and carry out light IADLs)   Falls in the last 6 months 1 to 4  (1 fall during hospitalization)   Vocational Retired   General   Additional Pertinent History During session while nursing present, noted unscabbed areas at R thoracic area--RN aware and covered w/ dressing during session   Family/Caregiver Present No   Cognition   Overall Cognitive Status Impaired   Arousal/Participation Alert  (reports not sleeping well liast night)   Attention Within functional limits   Memory Decreased recall of precautions;Decreased recall of recent events   Following Commands Follows one step commands with increased time or repetition   Subjective   Subjective Pt cooperative, reports feeling SOB w/ walking and during activity. Some slurring of words noted this am.   RUE Assessment   RUE Assessment WFL   LUE Assessment   LUE Assessment WFL   Strength RLE   R Hip Flexion 4/5   R Knee Extension 4/5   R Ankle Dorsiflexion 4/5   Strength LLE   L Hip Flexion 4/5   L Knee Extension 4/5   L Ankle Dorsiflexion 4/5   Vision-Basic Assessment   Current Vision Wears glasses all the time   Light Touch   RLE Light Touch Absent   RLE Light Touch Comments @ great toe   LLE Light Touch Grossly intact   Bed Mobility   Supine to Sit 5  Supervision   Additional items Assist x 1;Bedrails;HOB elevated;Increased time required;Verbal cues   Sit to Supine Unable to assess   Transfers   Sit to Stand 4  Minimal assistance   Additional items Assist x 1;Increased time required;Verbal cues;Armrests  (instruction for hand placement)    Stand to Sit 5  Supervision   Additional items Assist x 1;Increased time required;Verbal cues;Armrests  (instruction for hand placement)   Ambulation/Elevation   Gait pattern Step through pattern;Excessively slow;Short stride   Gait Assistance 4  Minimal assist   Additional items Assist x 1   Assistive Device Rolling walker   Distance 20'   Stair Management Assistance Not tested   Balance   Static Sitting Good   Static Standing Fair -  (w/o UE support x 2 min)   Ambulatory Poor +   Endurance Deficit   Endurance Deficit Yes   Endurance Deficit Description self reported fatigue and SOB, although Spo2 on RA did not drop below 91%   Activity Tolerance   Activity Tolerance Patient limited by fatigue;Patient limited by pain   Nurse Made Aware care coordination w/ RN.     Assessment:   Pt is a 84 y.o. male seen for PT evaluation s/p admit to [facility] on 6/16/2024 w/ Acute encephalopathy. However pt also had rapid responses x 2 (fall, acute change in mental status, possible CVA). Order placed for PT.      Prior to admission: Pt lived w/ spouse in 2 story home, 2 SISSY, has a walker and cane but did not use A device.  Upon evaluation: Pt needed only hospital bed controls and railing for bed mobility, intermittent min A for transfers, and min A for amb using rolling walker for support within room.       Pt's clinical presentation is currently unstable/unpredictable given the functional mobility deficits above, especially (but not limited to) gait deviations and decreased functional mobility tolerance, and combined with medical complications including rapid responses, Abnormal Na, renal, H&H.  Pt IS NOT at his mobility baseline.  He is at risk for falls based on hx of falls, impaired balance, limited sensation/neuropathy, and decreased cognition.       During this admission, pt would benefit from continued skilled inpatient PT in the acute care setting in order to address deficits as defined above to maximize  function and mobility.      Recommendations:    From a PT standpoint, recommend next several sessions focus on continued mobility training w/use of rolling walker and progression to LRAD, stair and balance training.     Prognosis Fair   Problem List Decreased strength;Decreased range of motion;Decreased endurance;Pain;Impaired balance;Decreased mobility;Decreased coordination;Decreased cognition;Decreased safety awareness;Impaired judgement;Impaired hearing  (gait deviations)   Barriers to Discharge Decreased caregiver support;Inaccessible home environment   Goals   Patient Goals to go home, maybe walk more later   STG Expiration Date 07/01/24   Short Term Goal #1 Goals: Pt will: Perform rolling  and supine<>sit bed mobility tasks with modified I to prepare for transfers and reposition in bed. Perform transfers with modified I to promote proper hand placement and approach. Perform ambulation with LRAD for up to 150' with Supervision to decrease risk for falls and promote proper use of assistive device. Perform flight of stairs w/ railing +/- DME and w/no more than min A to return to home with SISSY and return to [facility] home. Perform TUG w/ DME and improve baseline score to demonstrate less risk for falls   PT Treatment Day 0   Plan   Treatment/Interventions Functional transfer training;LE strengthening/ROM;Therapeutic exercise;Endurance training;Cognitive reorientation;Patient/family training;Equipment eval/education;Bed mobility;Gait training;Spoke to nursing   PT Frequency 3-5x/wk   Discharge Recommendation   Rehab Resource Intensity Level, PT I (Maximum Resource Intensity)   Equipment Recommended Walker  (with progression to LRAD)   Walker Package Recommended Rollator   Additional Comments 2 Personal factors affecting pt at time of IE include: steps to enter environment, multi-level environment, advanced age, past experience, behavioral pattern, inability to ambulate household distances, inability to navigate  "community distances, limited insight into impairments, recent fall(s), and ? Ability for spouse to A pt physically if needed upon DC   AM-PAC Basic Mobility Inpatient   Turning in Flat Bed Without Bedrails 3   Lying on Back to Sitting on Edge of Flat Bed Without Bedrails 2   Moving Bed to Chair 3   Standing Up From Chair Using Arms 3   Walk in Room 3   Climb 3-5 Stairs With Railing 1   Basic Mobility Inpatient Raw Score 15   Basic Mobility Standardized Score 36.97   [facility] Highest Level Of Mobility   -HL Goal 4: Move to chair/commode   -HL Achieved 6: Walk 10 steps or more   End of Consult   Patient Position at End of Consult All needs within reach;Bed/Chair alarm activated;Seated edge of bed  (with SLIM resident present)   (Please find full objective findings from PT assessment regarding body systems outlined above).     The patient's -[facility] Basic Mobility Inpatient Short Form Raw Score is 15. A Raw score of less than or equal to 16 suggests the patient may benefit from discharge to post-acute rehabilitation services, which DOES coincide with CURRENT above PT recommendations.     However please refer to therapist recommendation for discharge planning given other factors that may influence destination.     Adapted from Francisco LITTLE, Carlos Alberto J, Fernandez J, Amelia J. [employer] [facility] “6-Clicks” Basic Mobility and Daily Activity Scores With Discharge Destination. Physical Therapy, 2021;101:1-9. DOI: 10.1093/[email or web address]    Portions of the record may have been created with voice recognition software.  Occasional wrong word or \"sound a like\" substitutions may have occurred due to the inherent limitations of voice recognition software.  Read the chart carefully and recognize, using context, where substitutions have occurred    "

## 2024-06-22 LAB
ALBUMIN SERPL BCG-MCNC: 2.9 G/DL (ref 3.5–5)
ALP SERPL-CCNC: 82 U/L (ref 34–104)
ALT SERPL W P-5'-P-CCNC: 16 U/L (ref 7–52)
ANION GAP SERPL CALCULATED.3IONS-SCNC: 1 MMOL/L (ref 4–13)
AST SERPL W P-5'-P-CCNC: 14 U/L (ref 13–39)
BASOPHILS # BLD AUTO: 0.01 THOUSANDS/ÂΜL (ref 0–0.1)
BASOPHILS NFR BLD AUTO: 0 % (ref 0–1)
BILIRUB SERPL-MCNC: 0.85 MG/DL (ref 0.2–1)
BUN SERPL-MCNC: 23 MG/DL (ref 5–25)
CALCIUM ALBUM COR SERPL-MCNC: 10.1 MG/DL (ref 8.3–10.1)
CALCIUM SERPL-MCNC: 9.2 MG/DL (ref 8.4–10.2)
CHLORIDE SERPL-SCNC: 103 MMOL/L (ref 96–108)
CO2 SERPL-SCNC: 28 MMOL/L (ref 21–32)
CREAT SERPL-MCNC: 1.27 MG/DL (ref 0.6–1.3)
EOSINOPHIL # BLD AUTO: 0.22 THOUSAND/ÂΜL (ref 0–0.61)
EOSINOPHIL NFR BLD AUTO: 4 % (ref 0–6)
ERYTHROCYTE [DISTWIDTH] IN BLOOD BY AUTOMATED COUNT: 14 % (ref 11.6–15.1)
GFR SERPL CREATININE-BSD FRML MDRD: 52 ML/MIN/1.73SQ M
GLUCOSE SERPL-MCNC: 102 MG/DL (ref 65–140)
GLUCOSE SERPL-MCNC: 112 MG/DL (ref 65–140)
GLUCOSE SERPL-MCNC: 113 MG/DL (ref 65–140)
GLUCOSE SERPL-MCNC: 132 MG/DL (ref 65–140)
GLUCOSE SERPL-MCNC: 170 MG/DL (ref 65–140)
GLUCOSE SERPL-MCNC: 180 MG/DL (ref 65–140)
GLUCOSE SERPL-MCNC: 88 MG/DL (ref 65–140)
HCT VFR BLD AUTO: 32.9 % (ref 36.5–49.3)
HGB BLD-MCNC: 10.4 G/DL (ref 12–17)
IMM GRANULOCYTES # BLD AUTO: 0.04 THOUSAND/UL (ref 0–0.2)
IMM GRANULOCYTES NFR BLD AUTO: 1 % (ref 0–2)
LYMPHOCYTES # BLD AUTO: 0.85 THOUSANDS/ÂΜL (ref 0.6–4.47)
LYMPHOCYTES NFR BLD AUTO: 14 % (ref 14–44)
MCH RBC QN AUTO: 30.8 PG (ref 26.8–34.3)
MCHC RBC AUTO-ENTMCNC: 31.6 G/DL (ref 31.4–37.4)
MCV RBC AUTO: 97 FL (ref 82–98)
MONOCYTES # BLD AUTO: 0.64 THOUSAND/ÂΜL (ref 0.17–1.22)
MONOCYTES NFR BLD AUTO: 11 % (ref 4–12)
NEUTROPHILS # BLD AUTO: 4.3 THOUSANDS/ÂΜL (ref 1.85–7.62)
NEUTS SEG NFR BLD AUTO: 70 % (ref 43–75)
NRBC BLD AUTO-RTO: 0 /100 WBCS
PLATELET # BLD AUTO: 161 THOUSANDS/UL (ref 149–390)
PMV BLD AUTO: 9.6 FL (ref 8.9–12.7)
POTASSIUM SERPL-SCNC: 4.8 MMOL/L (ref 3.5–5.3)
PROT SERPL-MCNC: 4.7 G/DL (ref 6.4–8.4)
RBC # BLD AUTO: 3.38 MILLION/UL (ref 3.88–5.62)
SODIUM SERPL-SCNC: 132 MMOL/L (ref 135–147)
WBC # BLD AUTO: 6.06 THOUSAND/UL (ref 4.31–10.16)

## 2024-06-22 PROCEDURE — 99232 SBSQ HOSP IP/OBS MODERATE 35: CPT | Performed by: FAMILY MEDICINE

## 2024-06-22 PROCEDURE — 99232 SBSQ HOSP IP/OBS MODERATE 35: CPT | Performed by: SURGERY

## 2024-06-22 PROCEDURE — 82948 REAGENT STRIP/BLOOD GLUCOSE: CPT

## 2024-06-22 PROCEDURE — 94760 N-INVAS EAR/PLS OXIMETRY 1: CPT

## 2024-06-22 PROCEDURE — 85025 COMPLETE CBC W/AUTO DIFF WBC: CPT

## 2024-06-22 PROCEDURE — 80053 COMPREHEN METABOLIC PANEL: CPT

## 2024-06-22 PROCEDURE — 94640 AIRWAY INHALATION TREATMENT: CPT

## 2024-06-22 PROCEDURE — 99223 1ST HOSP IP/OBS HIGH 75: CPT | Performed by: INTERNAL MEDICINE

## 2024-06-22 RX ORDER — DILTIAZEM HYDROCHLORIDE 180 MG/1
180 CAPSULE, COATED, EXTENDED RELEASE ORAL DAILY
Status: DISCONTINUED | OUTPATIENT
Start: 2024-06-23 | End: 2024-06-23

## 2024-06-22 RX ORDER — METOPROLOL SUCCINATE 25 MG/1
25 TABLET, EXTENDED RELEASE ORAL DAILY
Status: DISCONTINUED | OUTPATIENT
Start: 2024-06-23 | End: 2024-06-22

## 2024-06-22 RX ORDER — BISACODYL 10 MG
10 SUPPOSITORY, RECTAL RECTAL DAILY
Status: DISCONTINUED | OUTPATIENT
Start: 2024-06-22 | End: 2024-06-26 | Stop reason: HOSPADM

## 2024-06-22 RX ADMIN — CIPROFLOXACIN HYDROCHLORIDE 500 MG: 500 TABLET, FILM COATED ORAL at 20:06

## 2024-06-22 RX ADMIN — VALACYCLOVIR HYDROCHLORIDE 1000 MG: 500 TABLET, FILM COATED ORAL at 17:50

## 2024-06-22 RX ADMIN — METRONIDAZOLE 500 MG: 500 TABLET ORAL at 01:17

## 2024-06-22 RX ADMIN — CIPROFLOXACIN HYDROCHLORIDE 500 MG: 500 TABLET, FILM COATED ORAL at 09:44

## 2024-06-22 RX ADMIN — BISACODYL 10 MG: 10 SUPPOSITORY RECTAL at 12:11

## 2024-06-22 RX ADMIN — ATORVASTATIN CALCIUM 40 MG: 40 TABLET, FILM COATED ORAL at 17:50

## 2024-06-22 RX ADMIN — SENNOSIDES 17.2 MG: 8.6 TABLET, FILM COATED ORAL at 09:44

## 2024-06-22 RX ADMIN — DIVALPROEX SODIUM 500 MG: 500 TABLET, DELAYED RELEASE ORAL at 01:17

## 2024-06-22 RX ADMIN — LOSARTAN POTASSIUM 50 MG: 50 TABLET, FILM COATED ORAL at 09:44

## 2024-06-22 RX ADMIN — VALACYCLOVIR HYDROCHLORIDE 1000 MG: 500 TABLET, FILM COATED ORAL at 09:44

## 2024-06-22 RX ADMIN — METRONIDAZOLE 500 MG: 500 TABLET ORAL at 17:50

## 2024-06-22 RX ADMIN — INSULIN LISPRO 1 UNITS: 100 INJECTION, SOLUTION INTRAVENOUS; SUBCUTANEOUS at 12:11

## 2024-06-22 RX ADMIN — SENNOSIDES 17.2 MG: 8.6 TABLET, FILM COATED ORAL at 17:51

## 2024-06-22 RX ADMIN — BUDESONIDE 0.5 MG: 0.5 INHALANT ORAL at 20:45

## 2024-06-22 RX ADMIN — Medication 3 MG: at 20:06

## 2024-06-22 RX ADMIN — DIVALPROEX SODIUM 500 MG: 500 TABLET, DELAYED RELEASE ORAL at 09:44

## 2024-06-22 RX ADMIN — DILTIAZEM HYDROCHLORIDE 180 MG: 180 CAPSULE, COATED, EXTENDED RELEASE ORAL at 09:44

## 2024-06-22 RX ADMIN — BUDESONIDE 0.5 MG: 0.5 INHALANT ORAL at 07:38

## 2024-06-22 RX ADMIN — FORMOTEROL FUMARATE DIHYDRATE 20 MCG: 20 SOLUTION RESPIRATORY (INHALATION) at 07:38

## 2024-06-22 RX ADMIN — PANTOPRAZOLE SODIUM 40 MG: 40 TABLET, DELAYED RELEASE ORAL at 06:27

## 2024-06-22 RX ADMIN — APIXABAN 2.5 MG: 2.5 TABLET, FILM COATED ORAL at 17:50

## 2024-06-22 RX ADMIN — THIAMINE HYDROCHLORIDE: 100 INJECTION, SOLUTION INTRAMUSCULAR; INTRAVENOUS at 09:40

## 2024-06-22 RX ADMIN — PREDNISONE 1 MG: 1 TABLET ORAL at 09:44

## 2024-06-22 RX ADMIN — FORMOTEROL FUMARATE DIHYDRATE 20 MCG: 20 SOLUTION RESPIRATORY (INHALATION) at 20:45

## 2024-06-22 RX ADMIN — INSULIN LISPRO 1 UNITS: 100 INJECTION, SOLUTION INTRAVENOUS; SUBCUTANEOUS at 17:52

## 2024-06-22 RX ADMIN — METRONIDAZOLE 500 MG: 500 TABLET ORAL at 09:44

## 2024-06-22 RX ADMIN — DIVALPROEX SODIUM 500 MG: 500 TABLET, DELAYED RELEASE ORAL at 17:50

## 2024-06-22 RX ADMIN — APIXABAN 2.5 MG: 2.5 TABLET, FILM COATED ORAL at 09:44

## 2024-06-22 NOTE — PROGRESS NOTES
Consultation - Cardiology Team 1  Hal Miller 84 y.o. male MRN: [id number]  Unit/Bed#: S -01 Encounter: [id number]    Assessment & Plan     Principal Problem:    Acute encephalopathy  Active Problems:    Shortness of breath    Hypertension    Atrial fibrillation (HCC)    Inflammatory polyarthropathy (HCC)    Chronic obstructive pulmonary disease with acute exacerbation (HCC)    CKD (chronic kidney disease)    Type 2 diabetes mellitus (HCC)    Abnormal CT of the abdomen    Zoster    Colonic diverticular abscess    Vascular dementia (HCC)    Insomnia    Ambulatory dysfunction    Severe protein-calorie malnutrition (HCC)    Episode of seizure-like activity    History of Whipple procedure    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HCC)    Heart failure (HCC)      Assessment  Cardiomyopathy-suspect nonischemic  Left heart cath 12/2022 patent coronary arteries  Most recent full echo in [facility] 2017-normal LVEF  Currently on losartan 50 mg daily  Normal troponins  Patient had been taken 20 mg of  Lasix stopped last admission earlier this month due to TARA    Diverticular abscess-medically managed.  Antibiotics    History of redo  bio AVR, Bentall root replacement  Current echo noted , as below    Vascular dementia    Type 2 diabetes-hemoglobin A1c 7.6%    CKD-creatinine 1.5 which appears stable    COPD-severe.  Not oxygen dependent at baseline  Has noted worsening dyspnea over the past 2 years    PMR-takes prednisone 1 mg daily    Atrial fibrillation- persistent  Not on telemetry.  Documented heart rates well-controlled.  Presenting ECG atrial fibrillation  AC-on apixaban 2.5 mg twice daily  Diltiazem 180 mg daily    10.  History of stroke-current MRI shows a small infarct left parietal with small focus susceptibility may represent associated petechial hemorrhage.  Neurology concern for cardioembolic event secondary to setting of holding AC.  He is currently on IV heparin    Plan  On Monday we will reach out to Dr. Wilson's  office for medical records and most recent echocardiogram  Ideally would change Cardizem over to beta-blocker if no contraindications.  Would continue ARB.  He does not appear to be requiring diuretics at this time.  Check BNP  Does not appear to need diuretic at this time    History of Present Illness   Physician Requesting Consult: Monika Alfonso MD  Reason for Consult / Principal Problem: Acute heart failure    Followed by Dr. Ramos    HPI: Hal Miller is a 84 y.o. year old male with severe COPD, history of bio AVR  with subsequent redo sternotomyand Bentall root replacement with a 23 mm Gail Ramírez bovine pericardial Magna Ease valve , CVA, atrial fibrillation, IDDM, CKD, hypertension, PMR , dementia, Whipple procedure for pancreatic cancer who presents with abdominal pain and shortness of breath along with left groin swelling and tenderness.  CT of the abdomen concerning for diverticular abscess.  Surgery feels chronic diverticular abscess ,conservative treatment advised.  Echocardiogram obtained, LVEF 30%.  Cardiology consulted for management of cardiomyopathy.  Presenting chest x-ray 6/16 chronic loculated pleural effusions.  Most recent CT of the abdomen 6/18-small left and trace right pleural effusion.  Patient has been losing weight.  Apparently presented with waxing and waning mental status 48 hours prior to admission.    Current TTE LVEF 30% with global hypokinesis with regional variations.  RV mildly dilated.  Mild AS.  Aortic valve trileaflet.  Leaflets not thickened.  Leaflets mildly calcified.  Mildly reduced mobility.  No regurgitation.  Mild MR.  Mild TR.     Left heart cath 12/2022 patent coronary arteries    TTE 6/2017-LVEF 55 to 65%.  Bio AVR normal function    Cardiology records are limited as I do not have access to Dr. Ramos  records    Inpatient consult to Cardiology  Consult performed by: ADEOLA Kirkpatrick  Consult ordered by: Daniel Ames DO          Review of Systems    Constitutional:  Positive for activity change and unexpected weight change.   HENT: Negative.     Eyes: Negative.    Respiratory:  Positive for shortness of breath.    Cardiovascular:  Negative for chest pain and leg swelling.   Gastrointestinal: Negative.    Endocrine: Negative.    Genitourinary: Negative.    Musculoskeletal:  Positive for gait problem.   Allergic/Immunologic: Negative.    Neurological:  Positive for weakness.   Hematological:  Bruises/bleeds easily.   Psychiatric/Behavioral:  Positive for confusion.    All other systems reviewed and are negative.      Historical Information   Past Medical History:   Diagnosis Date    Acute encephalopathy 2023    Acute-on-chronic kidney injury  (HCC) 2017    Cancer (HCC)     pancreatic    Colon polyp     Coronary artery disease     Dehydration 3/3/2023    Diabetes mellitus (HCC)     Hyperlipidemia     Hypertension     Left lower lobe pneumonia 2022    Pneumonia 2017    Right middle and lower lobe     Stroke (HCC)      Past Surgical History:   Procedure Laterality Date    APPENDECTOMY      CARDIAC SURGERY      Aortic Valve Replacement, Ascending Aorta    COLONOSCOPY      GALLBLADDER SURGERY      PANCREATICODUODENECTOMY       Social History     Substance and Sexual Activity   Alcohol Use Never     Social History     Substance and Sexual Activity   Drug Use No     Social History     Tobacco Use   Smoking Status Former    Types: Pipe    Quit date:     Years since quittin.4   Smokeless Tobacco Never     Family History:   Family History   Problem Relation Age of Onset    Cancer Mother     Stroke Father     Cancer Sister     Diabetes Sister     Stroke Brother        Meds/Allergies   current meds:   Current Facility-Administered Medications   Medication Dose Route Frequency    acetaminophen (TYLENOL) tablet 650 mg  650 mg Oral Q4H PRN    albuterol (PROVENTIL HFA,VENTOLIN HFA) inhaler 2 puff  2 puff Inhalation Q4H PRN    apixaban (ELIQUIS)  tablet 2.5 mg  2.5 mg Oral BID    atorvastatin (LIPITOR) tablet 40 mg  40 mg Oral Daily With Dinner    bisacodyl (DULCOLAX) rectal suppository 10 mg  10 mg Rectal Daily    budesonide (PULMICORT) inhalation solution 0.5 mg  0.5 mg Nebulization Q12H    ciprofloxacin (CIPRO) tablet 500 mg  500 mg Oral Q12H YUNI    diltiazem (CARDIZEM CD) 24 hr capsule 180 mg  180 mg Oral Daily    divalproex sodium (DEPAKOTE) DR tablet 500 mg  500 mg Oral Q8H YUNI    folic acid 1 mg, thiamine (VITAMIN B1) 100 mg in sodium chloride 0.9 % 100 mL IV piggyback   Intravenous Daily    formoterol (PERFOROMIST) nebulizer solution 20 mcg  20 mcg Nebulization Q12H    HYDROmorphone HCl (DILAUDID) injection 0.2 mg  0.2 mg Intravenous Q4H PRN    insulin lispro (HumALOG/ADMELOG) 100 units/mL subcutaneous injection 1-5 Units  1-5 Units Subcutaneous 4x Daily (AC & HS)    lidocaine (LMX) 4 % cream   Topical 4x Daily PRN    losartan (COZAAR) tablet 50 mg  50 mg Oral Daily    melatonin tablet 3 mg  3 mg Oral HS    metroNIDAZOLE (FLAGYL) tablet 500 mg  500 mg Oral Q8H YUNI    pantoprazole (PROTONIX) EC tablet 40 mg  40 mg Oral Daily Before Breakfast    predniSONE tablet 1 mg  1 mg Oral Daily    senna (SENOKOT) tablet 17.2 mg  2 tablet Oral BID    valACYclovir (VALTREX) tablet 1,000 mg  1,000 mg Oral BID    and PTA meds:    Medications Prior to Admission:     apixaban (ELIQUIS) 2.5 mg    Calcium Citrate (CITRACAL PO)    Cinnamon 500 MG capsule    diltiazem (CARDIZEM CD) 180 mg 24 hr capsule    hydroxychloroquine (PLAQUENIL) 200 mg tablet    Lantus SoloStar 100 units/mL SOPN    olmesartan (BENICAR) 20 mg tablet    potassium chloride (KLOR-CON) 8 MEQ tablet    pravastatin (PRAVACHOL) 40 mg tablet    predniSONE 1 mg tablet    B-D UF III MINI PEN NEEDLES 31G X 5 MM MISC    nystatin (MYCOSTATIN) 500,000 units/5 mL suspension    ondansetron (ZOFRAN-ODT) 4 mg disintegrating tablet    QUEtiapine (SEROquel) 25 mg tablet  Allergies   Allergen Reactions    Contrast Dye  "[Iodinated Contrast Media] Other (See Comments)     Pt states kidney dysfunction..     Other        Objective   Vitals: Blood pressure 150/82, pulse 67, temperature 97.8 °F (36.6 °C), resp. rate 14, height 6' 2\" (1.88 m), weight 76.4 kg (168 lb 6.9 oz), SpO2 96%.  Orthostatic Blood Pressures      Flowsheet Row Most Recent Value   Blood Pressure 150/82 filed at 06/22/2024 0714   Patient Position - Orthostatic VS Lying filed at 06/20/2024 1113              Intake/Output Summary (Last 24 hours) at 6/22/2024 0902  Last data filed at 6/22/2024 0601  Gross per 24 hour   Intake 240 ml   Output 735 ml   Net -495 ml       Invasive Devices       Peripheral Intravenous Line  Duration             Peripheral IV 06/19/24 Proximal;Right;Ventral (anterior) Forearm 2 days    Peripheral IV 06/21/24 Left Antecubital 1 day                    Physical Exam: /82   Pulse 67   Temp 97.8 °F (36.6 °C)   Resp 14   Ht 6' 2\" (1.88 m)   Wt 76.4 kg (168 lb 6.9 oz)   SpO2 96%   BMI 21.63 kg/m²   General Appearance:    Alert, cooperative, no distress, appears stated age   Head:    Normocephalic, no scleral icterus   Eyes:    PERRL   Nose:   Nares normal, septum midline, mucosa normal, no drainage    Throat:   Lips, mucosa, and tongue normal   Neck:   Supple, symmetrical, trachea midline     no carotid bruit or JVD   Back:     Symmetric   Lungs:     Clear to auscultation bilaterally, respirations unlabored   Chest Wall:    No tenderness or deformity    Heart:    Regular rate and rhythm, S1 and S2 normal, no murmur, rub   or gallop   Abdomen:     Soft, non-tender, bowel sounds active all four quadrants,     no masses, no organomegaly   Extremities:   Extremities normal, atraumatic, no cyanosis or edema   Pulses:   2+ and symmetric all extremities   Skin:   Skin color, texture, turgor normal, no rashes or lesions   Neurologic:   Alert and oriented to person place and time. No focal deficits       Lab Results:   Recent Results (from the " past 72 hour(s))   Fingerstick Glucose (POCT)    Collection Time: 06/19/24 11:13 AM   Result Value Ref Range    POC Glucose 179 (H) 65 - 140 mg/dl   CBC and Platelet    Collection Time: 06/19/24 11:53 AM   Result Value Ref Range    WBC 6.77 4.31 - 10.16 Thousand/uL    RBC 3.38 (L) 3.88 - 5.62 Million/uL    Hemoglobin 10.5 (L) 12.0 - 17.0 g/dL    Hematocrit 32.5 (L) 36.5 - 49.3 %    MCV 96 82 - 98 fL    MCH 31.1 26.8 - 34.3 pg    MCHC 32.3 31.4 - 37.4 g/dL    RDW 14.2 11.6 - 15.1 %    Platelets 139 (L) 149 - 390 Thousands/uL    MPV 9.8 8.9 - 12.7 fL   Comprehensive metabolic panel    Collection Time: 06/19/24 11:53 AM   Result Value Ref Range    Sodium 132 (L) 135 - 147 mmol/L    Potassium 4.6 3.5 - 5.3 mmol/L    Chloride 105 96 - 108 mmol/L    CO2 25 21 - 32 mmol/L    ANION GAP 2 (L) 4 - 13 mmol/L    BUN 42 (H) 5 - 25 mg/dL    Creatinine 1.54 (H) 0.60 - 1.30 mg/dL    Glucose 167 (H) 65 - 140 mg/dL    Calcium 9.2 8.4 - 10.2 mg/dL    Corrected Calcium 9.8 8.3 - 10.1 mg/dL    AST 20 13 - 39 U/L    ALT 20 7 - 52 U/L    Alkaline Phosphatase 97 34 - 104 U/L    Total Protein 5.2 (L) 6.4 - 8.4 g/dL    Albumin 3.2 (L) 3.5 - 5.0 g/dL    Total Bilirubin 0.85 0.20 - 1.00 mg/dL    eGFR 41 ml/min/1.73sq m   Procalcitonin    Collection Time: 06/19/24 11:53 AM   Result Value Ref Range    Procalcitonin 0.11 <=0.25 ng/ml   Lactic acid, plasma (w/reflex if result > 2.0)    Collection Time: 06/19/24 11:53 AM   Result Value Ref Range    LACTIC ACID 1.0 0.5 - 2.0 mmol/L   Magnesium    Collection Time: 06/19/24 11:53 AM   Result Value Ref Range    Magnesium 2.2 1.9 - 2.7 mg/dL   Phosphorus    Collection Time: 06/19/24 11:53 AM   Result Value Ref Range    Phosphorus 2.8 2.3 - 4.1 mg/dL   Hemoglobin A1c w/EAG Estimation (Prechecked if no A1C within 90 days)    Collection Time: 06/19/24 11:53 AM   Result Value Ref Range    Hemoglobin A1C 7.6 (H) Normal 4.0-5.6%; PreDiabetic 5.7-6.4%; Diabetic >=6.5%; Glycemic control for adults with diabetes  <7.0% %     mg/dl   POCT Blood Gas (CG8+)    Collection Time: 06/19/24 11:56 AM   Result Value Ref Range    pH, Art i-STAT 7.378 7.350 - 7.450    pCO2, Art i-STAT 37.3 36.0 - 44.0 mm HG    pO2, ART i-STAT 80.0 75.0 - 129.0 mm HG    BE, i-STAT -3 (L) -2 - 3 mmol/L    HCO3, Art i-STAT 22.0 22.0 - 28.0 mmol/L    CO2, i-STAT 23 21 - 32 mmol/L    O2 Sat, i-STAT 96 (H) 60 - 85 %    SODIUM, I-STAT 132 (L) 136 - 145 mmol/l    Potassium, i-STAT 4.7 3.5 - 5.3 mmol/L    Calcium, Ionized i-STAT 1.41 (H) 1.12 - 1.32 mmol/L    Hct, i-STAT 32 (L) 36.5 - 49.3 %    Hgb, i-STAT 10.9 (L) 12.0 - 17.0 g/dl    Glucose, i-STAT 169 (H) 65 - 140 mg/dl    POC FIO2 21 L    Specimen Type ARTERIAL     SITE Right Radial     BERONICA TEST Positive Allens Test    Blood gas, venous    Collection Time: 06/19/24  1:31 PM   Result Value Ref Range    pH, Antoni 7.368 7.300 - 7.400    pCO2, Antoni 38.2 (L) 42.0 - 50.0 mm Hg    pO2, Antoni 73.0 (H) 35.0 - 45.0 mm Hg    HCO3, Antoni 21.5 (L) 24 - 30 mmol/L    Base Excess, Antoni -3.4 mmol/L    O2 Content, Antoni 15.3 ml/dL    O2 HGB, VENOUS 92.7 (H) 60.0 - 80.0 %   Ammonia    Collection Time: 06/19/24  2:25 PM   Result Value Ref Range    Ammonia 27 18 - 72 umol/L   Vitamin B12    Collection Time: 06/19/24  2:26 PM   Result Value Ref Range    Vitamin B-12 531 180 - 914 pg/mL   Folate    Collection Time: 06/19/24  2:26 PM   Result Value Ref Range    Folate 6.3 >5.9 ng/mL   Blood culture    Collection Time: 06/19/24  4:10 PM    Specimen: Arm, Left; Blood   Result Value Ref Range    Blood Culture No Growth at 48 hrs.    Blood culture    Collection Time: 06/19/24  4:10 PM    Specimen: Arm, Right; Blood   Result Value Ref Range    Blood Culture No Growth at 48 hrs.    UA w Reflex to Microscopic w Reflex to Culture    Collection Time: 06/19/24  4:11 PM    Specimen: Urine, Other   Result Value Ref Range    Color, UA Light Yellow     Clarity, UA Clear     Specific Gravity, UA 1.013 1.003 - 1.030    pH, UA 5.0 4.5, 5.0, 5.5,  6.0, 6.5, 7.0, 7.5, 8.0    Leukocytes, UA Negative Negative    Nitrite, UA Negative Negative    Protein, UA Negative Negative mg/dl    Glucose, UA Trace (A) Negative mg/dl    Ketones, UA Negative Negative mg/dl    Urobilinogen, UA <2.0 <2.0 mg/dl mg/dl    Bilirubin, UA Negative Negative    Occult Blood, UA Negative Negative   APTT    Collection Time: 06/19/24  4:11 PM   Result Value Ref Range    PTT 33 23 - 37 seconds   Protime-INR    Collection Time: 06/19/24  4:11 PM   Result Value Ref Range    Protime 16.6 (H) 11.6 - 14.5 seconds    INR 1.27 (H) 0.84 - 1.19   Fingerstick Glucose (POCT)    Collection Time: 06/19/24  5:51 PM   Result Value Ref Range    POC Glucose 159 (H) 65 - 140 mg/dl   Fingerstick Glucose (POCT)    Collection Time: 06/19/24  8:40 PM   Result Value Ref Range    POC Glucose 152 (H) 65 - 140 mg/dl   APTT    Collection Time: 06/19/24 10:22 PM   Result Value Ref Range    PTT 82 (H) 23 - 37 seconds   Fingerstick Glucose (POCT)    Collection Time: 06/19/24 11:50 PM   Result Value Ref Range    POC Glucose 125 65 - 140 mg/dl   Fingerstick Glucose (POCT)    Collection Time: 06/20/24  4:40 AM   Result Value Ref Range    POC Glucose 119 65 - 140 mg/dl   APTT    Collection Time: 06/20/24  5:39 AM   Result Value Ref Range     (H) 23 - 37 seconds   Lipid Panel with Direct LDL reflex    Collection Time: 06/20/24  5:39 AM   Result Value Ref Range    Cholesterol 94 See Comment mg/dL    Triglycerides 54 See Comment mg/dL    HDL, Direct 39 (L) >=40 mg/dL    LDL Calculated 44 0 - 100 mg/dL   CBC and differential    Collection Time: 06/20/24  5:39 AM   Result Value Ref Range    WBC 5.96 4.31 - 10.16 Thousand/uL    RBC 3.42 (L) 3.88 - 5.62 Million/uL    Hemoglobin 10.5 (L) 12.0 - 17.0 g/dL    Hematocrit 33.8 (L) 36.5 - 49.3 %    MCV 99 (H) 82 - 98 fL    MCH 30.7 26.8 - 34.3 pg    MCHC 31.1 (L) 31.4 - 37.4 g/dL    RDW 14.4 11.6 - 15.1 %    MPV 9.0 8.9 - 12.7 fL    Platelets 123 (L) 149 - 390 Thousands/uL    nRBC  0 /100 WBCs    Segmented % 79 (H) 43 - 75 %    Immature Grans % 0 0 - 2 %    Lymphocytes % 11 (L) 14 - 44 %    Monocytes % 8 4 - 12 %    Eosinophils Relative 2 0 - 6 %    Basophils Relative 0 0 - 1 %    Absolute Neutrophils 4.66 1.85 - 7.62 Thousands/µL    Absolute Immature Grans 0.02 0.00 - 0.20 Thousand/uL    Absolute Lymphocytes 0.67 0.60 - 4.47 Thousands/µL    Absolute Monocytes 0.49 0.17 - 1.22 Thousand/µL    Eosinophils Absolute 0.11 0.00 - 0.61 Thousand/µL    Basophils Absolute 0.01 0.00 - 0.10 Thousands/µL   Comprehensive metabolic panel    Collection Time: 06/20/24  5:39 AM   Result Value Ref Range    Sodium 136 135 - 147 mmol/L    Potassium 4.9 3.5 - 5.3 mmol/L    Chloride 106 96 - 108 mmol/L    CO2 28 21 - 32 mmol/L    ANION GAP 2 (L) 4 - 13 mmol/L    BUN 32 (H) 5 - 25 mg/dL    Creatinine 1.39 (H) 0.60 - 1.30 mg/dL    Glucose 119 65 - 140 mg/dL    Calcium 9.0 8.4 - 10.2 mg/dL    Corrected Calcium 9.7 8.3 - 10.1 mg/dL    AST 25 13 - 39 U/L    ALT 21 7 - 52 U/L    Alkaline Phosphatase 94 34 - 104 U/L    Total Protein 4.9 (L) 6.4 - 8.4 g/dL    Albumin 3.1 (L) 3.5 - 5.0 g/dL    Total Bilirubin 0.87 0.20 - 1.00 mg/dL    eGFR 46 ml/min/1.73sq m   Magnesium    Collection Time: 06/20/24  5:39 AM   Result Value Ref Range    Magnesium 2.2 1.9 - 2.7 mg/dL   Phosphorus    Collection Time: 06/20/24  5:39 AM   Result Value Ref Range    Phosphorus 2.6 2.3 - 4.1 mg/dL   Procalcitonin    Collection Time: 06/20/24  5:39 AM   Result Value Ref Range    Procalcitonin 0.08 <=0.25 ng/ml   Fingerstick Glucose (POCT)    Collection Time: 06/20/24  8:00 AM   Result Value Ref Range    POC Glucose 84 65 - 140 mg/dl   Echo complete w/ contrast if indicated    Collection Time: 06/20/24 11:02 AM   Result Value Ref Range    BSA 2.03 m2    LV EF 30     A4C EF 27 %    LVOT stroke volume 43.00     LVOT stroke volume index 21.10 ml/m2    LVOT Cardiac Output 3.76 l/min    LVOT Cardiac Index 1.84 l/min/m2    LVIDd 5.60 cm    LVIDS 5.00 cm     IVSd 1.10 cm    LVPWd 0.90 cm    LVOT diameter 2.0 cm    LVOT peak VTI 13.29 cm    FS 11 28 - 44    MV E' Tissue Velocity Septal 7 cm/s    MV E' Tissue Velocity Lateral 10 cm/s    LA Volume Index (BP) 53.9 mL/m2    E/A ratio 2.88     E wave deceleration time 154 ms    MV Peak E Abdulaziz 115 cm/s    MV Peak A Abdulaziz 0.4 m/s    AV LVOT peak gradient 2 mmHg    LVOT peak abdulaziz 0.69 m/s    RVID d 5.9 cm    Tricuspid annular plane systolic excursion 1.30 cm    LA size 6 cm    LA length (A2C) 6.40 cm    LA volume (BP) 110 mL    RAA A4C 26.6 cm2    Aortic valve peak velocity 2.48 m/s    Ao VTI 49.3 cm    AV mean gradient 13 mmHg    LVOT mn grad 1.0 mmHg    AV peak gradient 25 mmHg    AV area by cont VTI 0.8 cm2    AV area peak abdulaziz 1.0 cm2    MV VTI RETROGRADE 156.6 cm    MV stenosis pressure 1/2 time 45 ms    MV valve area p 1/2 method 4.90     MV mean gradient retrograde 72 mmHg    MR PG 95 mmHg    Radius 0.6 cm    MR max velocity 0.35 m/s    TR Peak Abdulaziz 3.4 m/s    Triscuspid Valve Regurgitation Peak Gradient 47.0 mmHg    Ao root 3.30 cm    Asc Ao 3.1 cm    Aortic valve mean velocity 17.10 m/s    Mitral regurgitation peak velocity 4.88 m/s    Mitral valve mean inflow velocity 4.15 m/s    Tricuspid valve peak regurgitation velocity 3.44 m/s    Left ventricular stroke volume (2D) 41.00 mL    IVS 1.1 cm    LEFT VENTRICLE SYSTOLIC VOLUME (MOD BIPLANE) 2D 116 mL    LV DIASTOLIC VOLUME (MOD BIPLANE) 2D 157 mL    Left Atrium Area-systolic Four Chamber 28.3 cm2    Left Atrium Area-systolic Apical Two Chamber 32 cm2    LVSV, 2D 41 mL    LVOT area 3.14 cm2    DVI 0.28     AV valve area 0.85 cm2   Fingerstick Glucose (POCT)    Collection Time: 06/20/24 12:12 PM   Result Value Ref Range    POC Glucose 188 (H) 65 - 140 mg/dl   APTT    Collection Time: 06/20/24  2:44 PM   Result Value Ref Range     (H) 23 - 37 seconds   Fingerstick Glucose (POCT)    Collection Time: 06/20/24  4:04 PM   Result Value Ref Range    POC Glucose 186 (H) 65 - 140  mg/dl   Fingerstick Glucose (POCT)    Collection Time: 06/20/24  8:49 PM   Result Value Ref Range    POC Glucose 121 65 - 140 mg/dl   APTT    Collection Time: 06/20/24 10:41 PM   Result Value Ref Range    PTT 51 (H) 23 - 37 seconds   Fingerstick Glucose (POCT)    Collection Time: 06/21/24 12:08 AM   Result Value Ref Range    POC Glucose 114 65 - 140 mg/dl   Fingerstick Glucose (POCT)    Collection Time: 06/21/24  5:01 AM   Result Value Ref Range    POC Glucose 151 (H) 65 - 140 mg/dl   APTT    Collection Time: 06/21/24  6:12 AM   Result Value Ref Range     (H) 23 - 37 seconds   CBC    Collection Time: 06/21/24  7:36 AM   Result Value Ref Range    WBC 6.06 4.31 - 10.16 Thousand/uL    RBC 3.46 (L) 3.88 - 5.62 Million/uL    Hemoglobin 10.7 (L) 12.0 - 17.0 g/dL    Hematocrit 34.2 (L) 36.5 - 49.3 %    MCV 99 (H) 82 - 98 fL    MCH 30.9 26.8 - 34.3 pg    MCHC 31.3 (L) 31.4 - 37.4 g/dL    RDW 14.2 11.6 - 15.1 %    Platelets 145 (L) 149 - 390 Thousands/uL    MPV 9.8 8.9 - 12.7 fL   Comprehensive metabolic panel    Collection Time: 06/21/24  7:36 AM   Result Value Ref Range    Sodium 134 (L) 135 - 147 mmol/L    Potassium 4.8 3.5 - 5.3 mmol/L    Chloride 104 96 - 108 mmol/L    CO2 29 21 - 32 mmol/L    ANION GAP 1 (L) 4 - 13 mmol/L    BUN 26 (H) 5 - 25 mg/dL    Creatinine 1.40 (H) 0.60 - 1.30 mg/dL    Glucose 127 65 - 140 mg/dL    Calcium 8.9 8.4 - 10.2 mg/dL    Corrected Calcium 9.7 8.3 - 10.1 mg/dL    AST 20 13 - 39 U/L    ALT 19 7 - 52 U/L    Alkaline Phosphatase 92 34 - 104 U/L    Total Protein 4.9 (L) 6.4 - 8.4 g/dL    Albumin 3.0 (L) 3.5 - 5.0 g/dL    Total Bilirubin 0.94 0.20 - 1.00 mg/dL    eGFR 46 ml/min/1.73sq m   Fingerstick Glucose (POCT)    Collection Time: 06/21/24  7:40 AM   Result Value Ref Range    POC Glucose 132 65 - 140 mg/dl   Fingerstick Glucose (POCT)    Collection Time: 06/21/24 11:19 AM   Result Value Ref Range    POC Glucose 184 (H) 65 - 140 mg/dl   Fingerstick Glucose (POCT)    Collection Time:  06/21/24  4:00 PM   Result Value Ref Range    POC Glucose 151 (H) 65 - 140 mg/dl   Valproic acid level, total    Collection Time: 06/21/24  4:05 PM   Result Value Ref Range    Valproic Acid, Total 54 50 - 100 ug/mL   Fingerstick Glucose (POCT)    Collection Time: 06/21/24  8:06 PM   Result Value Ref Range    POC Glucose 215 (H) 65 - 140 mg/dl   Fingerstick Glucose (POCT)    Collection Time: 06/21/24  9:22 PM   Result Value Ref Range    POC Glucose 172 (H) 65 - 140 mg/dl   Fingerstick Glucose (POCT)    Collection Time: 06/22/24 12:19 AM   Result Value Ref Range    POC Glucose 113 65 - 140 mg/dl   Comprehensive metabolic panel    Collection Time: 06/22/24  4:54 AM   Result Value Ref Range    Sodium 132 (L) 135 - 147 mmol/L    Potassium 4.8 3.5 - 5.3 mmol/L    Chloride 103 96 - 108 mmol/L    CO2 28 21 - 32 mmol/L    ANION GAP 1 (L) 4 - 13 mmol/L    BUN 23 5 - 25 mg/dL    Creatinine 1.27 0.60 - 1.30 mg/dL    Glucose 102 65 - 140 mg/dL    Calcium 9.2 8.4 - 10.2 mg/dL    Corrected Calcium 10.1 8.3 - 10.1 mg/dL    AST 14 13 - 39 U/L    ALT 16 7 - 52 U/L    Alkaline Phosphatase 82 34 - 104 U/L    Total Protein 4.7 (L) 6.4 - 8.4 g/dL    Albumin 2.9 (L) 3.5 - 5.0 g/dL    Total Bilirubin 0.85 0.20 - 1.00 mg/dL    eGFR 52 ml/min/1.73sq m   CBC and differential    Collection Time: 06/22/24  4:54 AM   Result Value Ref Range    WBC 6.06 4.31 - 10.16 Thousand/uL    RBC 3.38 (L) 3.88 - 5.62 Million/uL    Hemoglobin 10.4 (L) 12.0 - 17.0 g/dL    Hematocrit 32.9 (L) 36.5 - 49.3 %    MCV 97 82 - 98 fL    MCH 30.8 26.8 - 34.3 pg    MCHC 31.6 31.4 - 37.4 g/dL    RDW 14.0 11.6 - 15.1 %    MPV 9.6 8.9 - 12.7 fL    Platelets 161 149 - 390 Thousands/uL    nRBC 0 /100 WBCs    Segmented % 70 43 - 75 %    Immature Grans % 1 0 - 2 %    Lymphocytes % 14 14 - 44 %    Monocytes % 11 4 - 12 %    Eosinophils Relative 4 0 - 6 %    Basophils Relative 0 0 - 1 %    Absolute Neutrophils 4.30 1.85 - 7.62 Thousands/µL    Absolute Immature Grans 0.04 0.00 -  0.20 Thousand/uL    Absolute Lymphocytes 0.85 0.60 - 4.47 Thousands/µL    Absolute Monocytes 0.64 0.17 - 1.22 Thousand/µL    Eosinophils Absolute 0.22 0.00 - 0.61 Thousand/µL    Basophils Absolute 0.01 0.00 - 0.10 Thousands/µL   Fingerstick Glucose (POCT)    Collection Time: 06/22/24  4:57 AM   Result Value Ref Range    POC Glucose 132 65 - 140 mg/dl   Fingerstick Glucose (POCT)    Collection Time: 06/22/24  7:16 AM   Result Value Ref Range    POC Glucose 88 65 - 140 mg/dl     Imaging: I have personally reviewed pertinent reports.    EKG: NSR incomplete LBBB  VTE Prophylaxis: eliquis    Code Status: Level 1 - Full Code  Advance Directive and Living Will:      Power of :    POLST:      Counseling / Coordination of Care  Total floor / unit time spent today 50 minutes.  Greater than 50% of total time was spent with the patient and / or family counseling and / or coordination of care.

## 2024-06-22 NOTE — ASSESSMENT & PLAN NOTE
Malnutrition Findings:   Adult Malnutrition type: Acute illness  Adult Degree of Malnutrition: Other severe protein calorie malnutrition  Malnutrition Characteristics: Weight loss, Inadequate energy                  360 Statement: Malnutrition related to inadequate energy intake as evidenced by 12#(7%) weight loss from 5/15/# to 6/7/# and <50% energy intake compared to estimated needs >5 days.    BMI Findings:           Body mass index is 21.63 kg/m².

## 2024-06-22 NOTE — ASSESSMENT & PLAN NOTE
"Has had waxing and waning mental status over the last 48 hours as per family.  On my assessment yesterday he was awake alert and oriented .  Recognize all his family members was able to discuss current events with me including who the president is upcoming elections etc.  He did not know the date and month and year and as well fully aware of where he was.    Family had been concerned that he had been confused earlier on that morning however given his improvement no further testing was performed.  Fortunately overnight he had a fall and it is unclear what happened during that time whether he had just received any medications or interventions.    This morning he was not responding when spoken to, not responding to sternal rub initially and then all of a sudden perked up painful stimulation of both toes, he then opened his eyes and said \" that's it I've had enough\" \"I am getting out of here\".  It had to be redirected.    I was concerned for brief seizures in the setting of his zoster and possible encephalitis so I reached out to neurology who agreed to see the patient.  He remained awake and was reorienting and also redirectable when explained that he should remain in bed and remain in hospital.  Unfortunately soon after he became unresponsive again and a rapid response was called attended this along with critical care and the patient was upgraded to level 2 stepdown plan for stat CT head, I again in person discussed this case with neurology JADA who will see the patient shortly.    Patient was transferred to ICU and then back to [facility].  Neurology saw patient on stroke pathway.  MRI showed small acute infarct in the left parietal periventricular white matter with associated small focus of susceptibility may represent associated petechial hemorrhage.  After discussion with neurology they believe this may have been due to cardioembolic secondary to holding anticoagulation drugs.  They are continuing with " Depakote.    PT/OT is recommending ARC.  MRA was WNL  Continue Depakote  Follow-up Depakote level was 54

## 2024-06-22 NOTE — PROGRESS NOTES
Progress Note - General Surgery  Hal Miller 84 y.o. male MRN: [id number]  Unit/Bed#: S -01 Encounter: [id number]      Assessment:  84 y.o. 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 Afebrile, HDS on room air.       Plan:  - Diet as tolerated  - Continue antibiotics  - Ordered suppository given lack of bowel movements  - Outpatient follow up with general surgery        Subjective: No acute events overnight. Afebrile, hemodynamically stable. Tolerating diet. No nausea, or vomiting, fevers or chills. Denies abdominal pain        Objective:   Temp:  [97.6 °F (36.4 °C)-97.8 °F (36.6 °C)] 97.8 °F (36.6 °C)  HR:  [54-67] 67  Resp:  [14-16] 14  BP: (100-150)/(68-82) 150/82    Physical Exam:  General: No acute distress, alert and oriented  CV: Well perfused, regular rate and rhythm  Lungs: Normal work of breathing, no increased respiratory effort  Abdomen: Soft, non-tender, non-distended.   Extremities: No edema, clubbing or cyanosis  Skin: Warm, dry        I/O         06/20 0701  06/21 0700 06/21 0701  06/22 0700 06/22 0701  06/23 0700    P.O. 222 240     I.V. (mL/kg) 211.7 (2.7)      IV Piggyback 250      Total Intake(mL/kg) 683.7 (8.8) 240 (3.1)     Urine (mL/kg/hr) 861 (0.5) 1135 (0.6)     Total Output 861 1135     Net -177.4 -895                    Lab, Imaging and other studies: I have personally reviewed pertinent reports.  , CBC with diff:   Lab Results   Component Value Date    WBC 6.06 06/22/2024    HGB 10.4 (L) 06/22/2024    HCT 32.9 (L) 06/22/2024    MCV 97 06/22/2024     06/22/2024    RBC 3.38 (L) 06/22/2024    MCH 30.8 06/22/2024    MCHC 31.6 06/22/2024    RDW 14.0 06/22/2024    MPV 9.6 06/22/2024    NRBC 0 06/22/2024   , BMP/CMP:   Lab Results   Component Value Date    SODIUM 132 (L) 06/22/2024    K 4.8 06/22/2024     06/22/2024    CO2 28 06/22/2024    BUN 23 06/22/2024    CREATININE 1.27 06/22/2024    CALCIUM 9.2 06/22/2024    AST 14 06/22/2024    ALT 16  06/22/2024    ALKPHOS 82 06/22/2024    EGFR 52 06/22/2024         Ravi Lopez MD  General Surgery   06/22/24

## 2024-06-22 NOTE — ASSESSMENT & PLAN NOTE
Lab Results   Component Value Date    EGFR 52 06/22/2024    EGFR 46 06/21/2024    EGFR 46 06/20/2024    CREATININE 1.27 06/22/2024    CREATININE 1.40 (H) 06/21/2024    CREATININE 1.39 (H) 06/20/2024   CKD 3 baseline  Avoid nephrotoxic agents and renally dose medications  Monitor CR- this is stable.

## 2024-06-22 NOTE — ASSESSMENT & PLAN NOTE
Reason for Call:  Form, our goal is to have forms completed with 72 hours, however, some forms may require a visit or additional information.    Type of letter, form or note:  medical    Who is the form from?: Home care    Where did the form come from: form was faxed in    What clinic location was the form placed at?: [facility] - [phone]    Where the form was placed: [facility]    What number is listed as a contact on the form?:  Fax [phone]       Additional comments: Please complete and fax    Call taken on 10/16/2018 at 2:08 PM by Olivia Mayfield         Wt Readings from Last 3 Encounters:   06/22/24 76.4 kg (168 lb 6.9 oz)   06/07/24 76 kg (167 lb 8.8 oz)   04/04/23 82.1 kg (181 lb)     Patient got echo as part of stroke work up  EF was found to be 30%  Patient is euvolemic without increased O2 requirements or chest pain  Cardiology consulted:  They plan to follow up with outpatient office on Monday  Considering switch from Cardizem to beta-blocker but patient

## 2024-06-22 NOTE — ASSESSMENT & PLAN NOTE
Lab Results   Component Value Date    HGBA1C 7.6 (H) 06/19/2024       Recent Labs     06/21/24  2122 06/22/24  0019 06/22/24  0457 06/22/24  0716   POCGLU 172* 113 132 88         Blood Sugar Average: Last 72 hrs:  (P) 146.7

## 2024-06-22 NOTE — PROGRESS NOTES
"[facility]  Progress Note  Name: Hal Miller I  MRN: [id number]  Unit/Bed#: S -01 I Date of Admission: 6/16/2024   Date of Service: 6/22/2024 I Hospital Day: 5    Assessment & Plan   * Acute encephalopathy  Assessment & Plan  Has had waxing and waning mental status over the last 48 hours as per family.  On my assessment yesterday he was awake alert and oriented .  Recognize all his family members was able to discuss current events with me including who the president is upcoming elections etc.  He did not know the date and month and year and as well fully aware of where he was.    Family had been concerned that he had been confused earlier on that morning however given his improvement no further testing was performed.  Fortunately overnight he had a fall and it is unclear what happened during that time whether he had just received any medications or interventions.    This morning he was not responding when spoken to, not responding to sternal rub initially and then all of a sudden perked up painful stimulation of both toes, he then opened his eyes and said \" that's it I've had enough\" \"I am getting out of here\".  It had to be redirected.    I was concerned for brief seizures in the setting of his zoster and possible encephalitis so I reached out to neurology who agreed to see the patient.  He remained awake and was reorienting and also redirectable when explained that he should remain in bed and remain in hospital.  Unfortunately soon after he became unresponsive again and a rapid response was called attended this along with critical care and the patient was upgraded to level 2 stepdown plan for stat CT head, I again in person discussed this case with neurology JADA who will see the patient shortly.    Patient was transferred to ICU and then back to [facility].  Neurology saw patient on stroke pathway.  MRI showed small acute infarct in the left parietal periventricular white matter with " associated small focus of susceptibility may represent associated petechial hemorrhage.  After discussion with neurology they believe this may have been due to cardioembolic secondary to holding anticoagulation drugs.  They are continuing with Depakote.    PT/OT is recommending ARC.  MRA was WNL  Continue Depakote  Follow-up Depakote level was 54    Heart failure (HCC)  Assessment & Plan  Wt Readings from Last 3 Encounters:   06/22/24 76.4 kg (168 lb 6.9 oz)   06/07/24 76 kg (167 lb 8.8 oz)   04/04/23 82.1 kg (181 lb)     Patient got echo as part of stroke work up  EF was found to be 30%  Patient is euvolemic without increased O2 requirements or chest pain  Cardiology consulted:  They plan to follow up with outpatient office on Monday  Considering switch from Cardizem to beta-blocker but patient              Colonic diverticular abscess  Assessment & Plan  Presents with left groin pain, abd pain   Enlarging gas and fluid collection extending from the antimesenteric border of the sigmoid colon, infiltrating the left inguinal region soft tissue and coursing under and along the lateral margin of the left inguinal canal. 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  IR not planning abscess drainage at this time.  Continue IV antibiotics (currently day 5)    Severe protein-calorie malnutrition (HCC)  Assessment & Plan  Malnutrition Findings:   Adult Malnutrition type: Acute illness  Adult Degree of Malnutrition: Other severe protein calorie malnutrition  Malnutrition Characteristics: Weight loss, Inadequate energy                  360 Statement: Malnutrition related to inadequate energy intake as evidenced by 12#(7%) weight loss from 5/15/# to 6/7/# and <50% energy intake compared to estimated needs >5  days.    BMI Findings:           Body mass index is 21.63 kg/m².       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 No evidence of soft tissue component. A benign etiology is suspected such as retroperitoneal lymphatic malformation or lymphocele. Initial   evaluation in 3 months with contrast-enhanced CT abdomen/pelvis is recommended.  Outpt followup     Type 2 diabetes mellitus (HCC)  Assessment & Plan  Lab Results   Component Value Date    HGBA1C 7.6 (H) 06/19/2024       Recent Labs     06/21/24  2122 06/22/24  0019 06/22/24  0457 06/22/24  0716   POCGLU 172* 113 132 88         Blood Sugar Average: Last 72 hrs:  (P) 146.7      CKD (chronic kidney disease)  Assessment & Plan  Lab Results   Component Value Date    EGFR 52 06/22/2024    EGFR 46 06/21/2024    EGFR 46 06/20/2024    CREATININE 1.27 06/22/2024    CREATININE 1.40 (H) 06/21/2024    CREATININE 1.39 (H) 06/20/2024   CKD 3 baseline  Avoid nephrotoxic agents and renally dose medications  Monitor CR- this is stable.     Chronic obstructive pulmonary disease with acute exacerbation (HCC)  Assessment & Plan  Patient has history of COPD  Has not been on any breathing treatments since 2023  Placed on Respiratory protocol  Will start     Inflammatory polyarthropathy (HCC)  Assessment & Plan  PTA regimen hydroxychloroquine on hold, prednisone transitioned to solumedrol for COPD exacerbation     Atrial fibrillation (HCC)  Assessment & Plan  Rate controlled  Continue  diltiazem   Eliquis has been on hold since admission in setting of ? Surgical versus IR intervention  Plan to potentially start today however patient had fall overnight and has acute change in mental status  Will await further work up prior to staring   May need heparin bridging if prolonged holding of AC with high chadsvasc, however would hold off for now with acute work up pending .    Shortness of breath  Assessment & Plan  Presents with dyspnea, nonproductive cough with post tussive vomiting  Solumedrol with transition to prednisone ( takes 2.5  mg daily )  Nebulized bronchodilators            VTE Pharmacologic Prophylaxis:   High Risk (Score >/= 5) - Pharmacological DVT Prophylaxis Ordered: apixaban (Eliquis). Sequential Compression Devices Ordered.    Mobility:   Basic Mobility Inpatient Raw Score: 15  JH-HLM Goal: 4: Move to chair/commode  JH-HLM Achieved: 4: Move to chair/commode  JH-HLM Goal achieved. Continue to encourage appropriate mobility.    Patient Centered Rounds: I performed bedside rounds with nursing staff today.  Discussions with Specialists or Other Care Team Provider: Cardiology     Education and Discussions with Family / Patient: Attempted to update  (wife) via phone. Unable to contact.    Current Length of Stay: 5 day(s)  Current Patient Status: Inpatient   Discharge Plan: Anticipate discharge in 48-72 hrs to rehab facility.    Code Status: Level 1 - Full Code    Subjective:   No overnight events. Patient today is reporting feeling well.  He is denying any shortness of breath, chest pain, cough, diarrhea, constipation.    Objective:     Vitals:   Temp (24hrs), Av.7 °F (36.5 °C), Min:97.6 °F (36.4 °C), Max:97.8 °F (36.6 °C)    Temp:  [97.6 °F (36.4 °C)-97.8 °F (36.6 °C)] 97.6 °F (36.4 °C)  HR:  [54-73] 73  Resp:  [14] 14  BP: (120-150)/(68-82) 135/74  SpO2:  [95 %-99 %] 99 %  Body mass index is 21.63 kg/m².     Input and Output Summary (last 24 hours):     Intake/Output  Summary (Last 24 hours) at 6/22/2024 1429  Last data filed at 6/22/2024 0951  Gross per 24 hour   Intake --   Output 1040 ml   Net -1040 ml       Physical Exam:   Physical Exam  Constitutional:       Appearance: Normal appearance.   Cardiovascular:      Rate and Rhythm: Normal rate and regular rhythm.      Pulses: Normal pulses.      Heart sounds: Normal heart sounds.   Pulmonary:      Effort: Pulmonary effort is normal.      Breath sounds: Normal breath sounds. No wheezing.   Abdominal:      General: Bowel sounds are normal.      Tenderness: There is no abdominal tenderness.   Musculoskeletal:      Right lower leg: No edema.      Left lower leg: No edema.   Neurological:      General: No focal deficit present.      Mental Status: He is alert and oriented to person, place, and time.          Additional Data:     Labs:  Results from last 7 days   Lab Units 06/22/24  0454 06/18/24  2131 06/17/24  0717   WBC Thousand/uL 6.06   < > 5.80   HEMOGLOBIN g/dL 10.4*   < > 11.2*   I STAT HEMOGLOBIN   --    < >  --    HEMATOCRIT % 32.9*   < > 35.5*   HEMATOCRIT, ISTAT   --    < >  --    PLATELETS Thousands/uL 161   < > 112*   BANDS PCT %  --   --  3   SEGS PCT % 70   < >  --    LYMPHO PCT % 14   < > 5*   MONO PCT % 11   < > 5   EOS PCT % 4   < > 0    < > = values in this interval not displayed.     Results from last 7 days   Lab Units 06/22/24  0454   SODIUM mmol/L 132*   POTASSIUM mmol/L 4.8   CHLORIDE mmol/L 103   CO2 mmol/L 28   BUN mg/dL 23   CREATININE mg/dL 1.27   ANION GAP mmol/L 1*   CALCIUM mg/dL 9.2   ALBUMIN g/dL 2.9*   TOTAL BILIRUBIN mg/dL 0.85   ALK PHOS U/L 82   ALT U/L 16   AST U/L 14   GLUCOSE RANDOM mg/dL 102     Results from last 7 days   Lab Units 06/19/24  1611   INR  1.27*     Results from last 7 days   Lab Units 06/22/24  1110 06/22/24  0716 06/22/24  0457 06/22/24  0019 06/21/24  2122 06/21/24  2006 06/21/24  1600 06/21/24  1119 06/21/24  0740 06/21/24  0501 06/21/24  0008 06/20/24  2049   POC GLUCOSE  mg/dl 170* 88 132 113 172* 215* 151* 184* 132 151* 114 121     Results from last 7 days   Lab Units 06/19/24  1153   HEMOGLOBIN A1C % 7.6*     Results from last 7 days   Lab Units 06/20/24  0539 06/19/24  1153 06/19/24  0016 06/18/24  2131 06/16/24  1900   LACTIC ACID mmol/L  --  1.0 1.1 2.8* 0.9   PROCALCITONIN ng/ml 0.08 0.11  --   --   --        Lines/Drains:  Invasive Devices       Peripheral Intravenous Line  Duration             Peripheral IV 06/19/24 Proximal;Right;Ventral (anterior) Forearm 3 days    Peripheral IV 06/21/24 Left Antecubital 1 day                          Imaging: Reviewed radiology reports from this admission including: MRI brain    Recent Cultures (last 7 days):   Results from last 7 days   Lab Units 06/19/24  1610   BLOOD CULTURE  No Growth at 48 hrs.  No Growth at 48 hrs.       Last 24 Hours Medication List:   Current Facility-Administered Medications   Medication Dose Route Frequency Provider Last Rate    acetaminophen  650 mg Oral Q4H PRN Lyubov Malone MD      albuterol  2 puff Inhalation Q4H PRN Lyubov Malone MD      apixaban  2.5 mg Oral BID Jose Cesar MD      atorvastatin  40 mg Oral Daily With Dinner Hal Zamarripa MD      bisacodyl  10 mg Rectal Daily Ravi Espinoza Kevin Lopez MD      budesonide  0.5 mg Nebulization Q12H Jose Cesar MD      ciprofloxacin  500 mg Oral Q12H YUNI Alfonso MD      divalproex sodium  500 mg Oral Q8H [facility] Hal Zamarripa MD      folic acid 1 mg, thiamine (VITAMIN B1) 100 mg in sodium chloride 0.9 % 100 mL IV piggyback   Intravenous Daily Lyubov Malone  mL/hr at 06/21/24 1111    formoterol  20 mcg Nebulization Q12H Jose Cesar MD      HYDROmorphone  0.2 mg Intravenous Q4H PRN Lyubov Malone MD      insulin lispro  1-5 Units Subcutaneous 4x Daily (AC & HS) Lyubov Malone MD      lidocaine   Topical 4x Daily PRN Lyubov Malone MD      losartan  50 mg Oral Daily Lyubov Malone MD      melatonin  3 mg Oral HS Katy Sykes MD      [START ON 6/23/2024]  metoprolol succinate  25 mg Oral Daily ADEOLA Kirkpatrick      metroNIDAZOLE  500 mg Oral Q8H [facility] Monika Alfonso MD      pantoprazole  40 mg Oral Daily Before Breakfast Monika Alfonso MD      predniSONE  1 mg Oral Daily Lyubov Malone MD      senna  2 tablet Oral BID Katy Sykes MD      valACYclovir  1,000 mg Oral BID Lyubov Malone MD          Today, Patient Was Seen By: Daniel Ames DO    **Please Note: This note may have been constructed using a voice recognition system.**

## 2024-06-22 NOTE — PLAN OF CARE
Problem: PAIN - ADULT  Goal: Verbalizes/displays adequate comfort level or baseline comfort level  Description: Interventions:  - Encourage patient to monitor pain and request assistance  - Assess pain using appropriate pain scale  - Administer analgesics based on type and severity of pain and evaluate response  - Implement non-pharmacological measures as appropriate and evaluate response  - Consider cultural and social influences on pain and pain management  - Notify physician/advanced practitioner if interventions unsuccessful or patient reports new pain  Outcome: Progressing     Problem: INFECTION - ADULT  Goal: Absence or prevention of progression during hospitalization  Description: INTERVENTIONS:  - Assess and monitor for signs and symptoms of infection  - Monitor lab/diagnostic results  - Monitor all insertion sites, i.e. indwelling lines, tubes, and drains  - Monitor endotracheal if appropriate and nasal secretions for changes in amount and color  - [place] appropriate cooling/warming therapies per order  - Administer medications as ordered  - Instruct and encourage patient and family to use good hand hygiene technique  - Identify and instruct in appropriate isolation precautions for identified infection/condition  Outcome: Progressing

## 2024-06-23 LAB
ALBUMIN SERPL BCG-MCNC: 3 G/DL (ref 3.5–5)
ALP SERPL-CCNC: 89 U/L (ref 34–104)
ALT SERPL W P-5'-P-CCNC: 14 U/L (ref 7–52)
ANION GAP SERPL CALCULATED.3IONS-SCNC: 5 MMOL/L (ref 4–13)
AST SERPL W P-5'-P-CCNC: 15 U/L (ref 13–39)
BASOPHILS # BLD AUTO: 0.01 THOUSANDS/ÂΜL (ref 0–0.1)
BASOPHILS NFR BLD AUTO: 0 % (ref 0–1)
BILIRUB SERPL-MCNC: 0.84 MG/DL (ref 0.2–1)
BNP SERPL-MCNC: 1276 PG/ML (ref 0–100)
BUN SERPL-MCNC: 20 MG/DL (ref 5–25)
CALCIUM ALBUM COR SERPL-MCNC: 10.1 MG/DL (ref 8.3–10.1)
CALCIUM SERPL-MCNC: 9.3 MG/DL (ref 8.4–10.2)
CHLORIDE SERPL-SCNC: 101 MMOL/L (ref 96–108)
CO2 SERPL-SCNC: 25 MMOL/L (ref 21–32)
CREAT SERPL-MCNC: 1.22 MG/DL (ref 0.6–1.3)
EOSINOPHIL # BLD AUTO: 0.15 THOUSAND/ÂΜL (ref 0–0.61)
EOSINOPHIL NFR BLD AUTO: 2 % (ref 0–6)
ERYTHROCYTE [DISTWIDTH] IN BLOOD BY AUTOMATED COUNT: 14 % (ref 11.6–15.1)
GFR SERPL CREATININE-BSD FRML MDRD: 54 ML/MIN/1.73SQ M
GLUCOSE SERPL-MCNC: 109 MG/DL (ref 65–140)
GLUCOSE SERPL-MCNC: 112 MG/DL (ref 65–140)
GLUCOSE SERPL-MCNC: 118 MG/DL (ref 65–140)
GLUCOSE SERPL-MCNC: 155 MG/DL (ref 65–140)
GLUCOSE SERPL-MCNC: 167 MG/DL (ref 65–140)
GLUCOSE SERPL-MCNC: 170 MG/DL (ref 65–140)
HCT VFR BLD AUTO: 35 % (ref 36.5–49.3)
HGB BLD-MCNC: 11.2 G/DL (ref 12–17)
IMM GRANULOCYTES # BLD AUTO: 0.04 THOUSAND/UL (ref 0–0.2)
IMM GRANULOCYTES NFR BLD AUTO: 1 % (ref 0–2)
LYMPHOCYTES # BLD AUTO: 0.78 THOUSANDS/ÂΜL (ref 0.6–4.47)
LYMPHOCYTES NFR BLD AUTO: 11 % (ref 14–44)
MAGNESIUM SERPL-MCNC: 1.9 MG/DL (ref 1.9–2.7)
MCH RBC QN AUTO: 30.9 PG (ref 26.8–34.3)
MCHC RBC AUTO-ENTMCNC: 32 G/DL (ref 31.4–37.4)
MCV RBC AUTO: 97 FL (ref 82–98)
MONOCYTES # BLD AUTO: 0.7 THOUSAND/ÂΜL (ref 0.17–1.22)
MONOCYTES NFR BLD AUTO: 10 % (ref 4–12)
NEUTROPHILS # BLD AUTO: 5.49 THOUSANDS/ÂΜL (ref 1.85–7.62)
NEUTS SEG NFR BLD AUTO: 76 % (ref 43–75)
NRBC BLD AUTO-RTO: 0 /100 WBCS
PLATELET # BLD AUTO: 182 THOUSANDS/UL (ref 149–390)
PMV BLD AUTO: 9.4 FL (ref 8.9–12.7)
POTASSIUM SERPL-SCNC: 4.7 MMOL/L (ref 3.5–5.3)
PROT SERPL-MCNC: 4.8 G/DL (ref 6.4–8.4)
RBC # BLD AUTO: 3.62 MILLION/UL (ref 3.88–5.62)
SODIUM SERPL-SCNC: 131 MMOL/L (ref 135–147)
WBC # BLD AUTO: 7.17 THOUSAND/UL (ref 4.31–10.16)

## 2024-06-23 PROCEDURE — 80053 COMPREHEN METABOLIC PANEL: CPT

## 2024-06-23 PROCEDURE — 85025 COMPLETE CBC W/AUTO DIFF WBC: CPT

## 2024-06-23 PROCEDURE — 94760 N-INVAS EAR/PLS OXIMETRY 1: CPT

## 2024-06-23 PROCEDURE — 99232 SBSQ HOSP IP/OBS MODERATE 35: CPT | Performed by: INTERNAL MEDICINE

## 2024-06-23 PROCEDURE — 99232 SBSQ HOSP IP/OBS MODERATE 35: CPT | Performed by: FAMILY MEDICINE

## 2024-06-23 PROCEDURE — 83735 ASSAY OF MAGNESIUM: CPT

## 2024-06-23 PROCEDURE — 82948 REAGENT STRIP/BLOOD GLUCOSE: CPT

## 2024-06-23 PROCEDURE — 83880 ASSAY OF NATRIURETIC PEPTIDE: CPT | Performed by: NURSE PRACTITIONER

## 2024-06-23 PROCEDURE — 94640 AIRWAY INHALATION TREATMENT: CPT

## 2024-06-23 RX ORDER — METOPROLOL SUCCINATE 25 MG/1
25 TABLET, EXTENDED RELEASE ORAL DAILY
Status: DISCONTINUED | OUTPATIENT
Start: 2024-06-24 | End: 2024-06-26 | Stop reason: HOSPADM

## 2024-06-23 RX ADMIN — FORMOTEROL FUMARATE DIHYDRATE 20 MCG: 20 SOLUTION RESPIRATORY (INHALATION) at 07:30

## 2024-06-23 RX ADMIN — METRONIDAZOLE 500 MG: 500 TABLET ORAL at 18:11

## 2024-06-23 RX ADMIN — PREDNISONE 1 MG: 1 TABLET ORAL at 09:45

## 2024-06-23 RX ADMIN — METRONIDAZOLE 500 MG: 500 TABLET ORAL at 09:45

## 2024-06-23 RX ADMIN — BISACODYL 10 MG: 10 SUPPOSITORY RECTAL at 09:45

## 2024-06-23 RX ADMIN — CIPROFLOXACIN HYDROCHLORIDE 500 MG: 500 TABLET, FILM COATED ORAL at 09:45

## 2024-06-23 RX ADMIN — SENNOSIDES 17.2 MG: 8.6 TABLET, FILM COATED ORAL at 09:43

## 2024-06-23 RX ADMIN — Medication 3 MG: at 21:05

## 2024-06-23 RX ADMIN — VALACYCLOVIR HYDROCHLORIDE 1000 MG: 500 TABLET, FILM COATED ORAL at 09:43

## 2024-06-23 RX ADMIN — ATORVASTATIN CALCIUM 40 MG: 40 TABLET, FILM COATED ORAL at 18:15

## 2024-06-23 RX ADMIN — APIXABAN 2.5 MG: 2.5 TABLET, FILM COATED ORAL at 09:45

## 2024-06-23 RX ADMIN — DIVALPROEX SODIUM 500 MG: 500 TABLET, DELAYED RELEASE ORAL at 09:48

## 2024-06-23 RX ADMIN — DILTIAZEM HYDROCHLORIDE 180 MG: 180 CAPSULE, COATED, EXTENDED RELEASE ORAL at 09:45

## 2024-06-23 RX ADMIN — DIVALPROEX SODIUM 500 MG: 500 TABLET, DELAYED RELEASE ORAL at 00:48

## 2024-06-23 RX ADMIN — INSULIN LISPRO 1 UNITS: 100 INJECTION, SOLUTION INTRAVENOUS; SUBCUTANEOUS at 18:15

## 2024-06-23 RX ADMIN — BUDESONIDE 0.5 MG: 0.5 INHALANT ORAL at 07:30

## 2024-06-23 RX ADMIN — FORMOTEROL FUMARATE DIHYDRATE 20 MCG: 20 SOLUTION RESPIRATORY (INHALATION) at 20:16

## 2024-06-23 RX ADMIN — METRONIDAZOLE 500 MG: 500 TABLET ORAL at 00:48

## 2024-06-23 RX ADMIN — VALACYCLOVIR HYDROCHLORIDE 1000 MG: 500 TABLET, FILM COATED ORAL at 18:11

## 2024-06-23 RX ADMIN — DIVALPROEX SODIUM 500 MG: 500 TABLET, DELAYED RELEASE ORAL at 18:15

## 2024-06-23 RX ADMIN — CIPROFLOXACIN HYDROCHLORIDE 500 MG: 500 TABLET, FILM COATED ORAL at 21:05

## 2024-06-23 RX ADMIN — BUDESONIDE 0.5 MG: 0.5 INHALANT ORAL at 20:16

## 2024-06-23 RX ADMIN — LOSARTAN POTASSIUM 50 MG: 50 TABLET, FILM COATED ORAL at 09:45

## 2024-06-23 RX ADMIN — APIXABAN 2.5 MG: 2.5 TABLET, FILM COATED ORAL at 18:12

## 2024-06-23 RX ADMIN — INSULIN LISPRO 1 UNITS: 100 INJECTION, SOLUTION INTRAVENOUS; SUBCUTANEOUS at 12:37

## 2024-06-23 RX ADMIN — PANTOPRAZOLE SODIUM 40 MG: 40 TABLET, DELAYED RELEASE ORAL at 06:51

## 2024-06-23 RX ADMIN — INSULIN LISPRO 1 UNITS: 100 INJECTION, SOLUTION INTRAVENOUS; SUBCUTANEOUS at 21:05

## 2024-06-23 NOTE — PROGRESS NOTES
[facility]  Progress Note  Name: Hal Miller I  MRN: [id number]  Unit/Bed#: S -01 I Date of Admission: 6/16/2024   Date of Service: 6/23/2024 I Hospital Day: 6    Assessment & Plan   * Acute encephalopathy  Assessment & Plan  Patient developed confusion; neurology consulted for concerns of brief seizures in setting of Zoster  Stroke alert; MRI  showed small acute infarct in the left parietal periventricular white matter with associated small focus of susceptibility may represent associated petechial hemorrhage.    Concern for cardioembolic secondary to holding anticoagulation drugs      6/23/24: RESOLVED  PT/OT is recommending ARC.  MRA was WNL  Continue Depakote    Chronic obstructive pulmonary disease with acute exacerbation (HCC)  Assessment & Plan  History of COPD; no controller medications or breathing treatment since 2023    Plan:  Placed on Respiratory protocol  Currently on prednisone      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HCC)  Assessment & Plan  MRI showed small acute infarct in the left parietal periventricular white matter with associated small focus of susceptibility may represent associated petechial hemorrhage.  After discussion with neurology they believe this may have been due to cardioembolic secondary to holding anticoagulation drugs.  They are continuing with Depakote.    Severe protein-calorie malnutrition (HCC)  Assessment & Plan  Malnutrition Findings:   Adult Malnutrition type: Acute illness  Adult Degree of Malnutrition: Other severe protein calorie malnutrition  Malnutrition Characteristics: Weight loss, Inadequate energy                  360 Statement: Malnutrition related to inadequate energy intake as evidenced by 12#(7%) weight loss from 5/15/# to 6/7/# and <50% energy intake compared to estimated needs >5 days.    BMI Findings:           Body mass index is 21.03 kg/m².       Ambulatory dysfunction  Assessment & Plan  PT and OT eval; rec ARC    Vascular  dementia (HCC)  Assessment & Plan  Oriented and appropriate at time of admission   Previous admissions developed encephalopathy  Monitor for delirium     Colonic diverticular abscess  Assessment & Plan  Presents with left groin pain, abd pain   Enlarging gas and fluid collection extending from the antimesenteric border of the sigmoid colon, infiltrating the left inguinal region soft tissue and coursing under and along the lateral margin of the left inguinal canal. 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  IR not planning abscess drainage at this time.    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 No evidence of soft tissue component. A benign etiology is suspected such as retroperitoneal lymphatic malformation or lymphocele. Initial   evaluation in 3 months with contrast-enhanced CT abdomen/pelvis is recommended.  Outpt followup     Type 2 diabetes mellitus (HCC)  Assessment & Plan  Lab Results   Component Value Date    HGBA1C 7.6 (H) 06/19/2024       Recent Labs     06/22/24  1625 06/22/24  2117 06/23/24  0409 06/23/24  0726   POCGLU 180* 112 118 109       Blood Sugar Average: Last 72 hrs:  (P) 141.95      CKD (chronic kidney disease)  Assessment & Plan  Lab Results   Component Value Date    EGFR 54 06/23/2024    EGFR 52 06/22/2024    EGFR 46 06/21/2024    CREATININE 1.22 06/23/2024    CREATININE 1.27 06/22/2024    CREATININE 1.40 (H) 06/21/2024   CKD 3 baseline  Avoid  nephrotoxic agents and renally dose medications  Renal function improving    Monitor BMP    Inflammatory polyarthropathy (HCC)  Assessment & Plan  PTA regimen hydroxychloroquine on hold  Continue prednisone     Atrial fibrillation (HCC)  Assessment & Plan  AC Eliquis  Previously rate controlled with Cardizem, however it was transitioned to Toprol XL in setting of low EF    Hypertension  Assessment & Plan  Previously on Cardizem; transitioned to Toprol XL in setting of low EF as rec by caridology  Continue Losartan    Shortness of breath  Assessment & Plan  Presents with dyspnea, nonproductive cough with post tussive vomiting  Solumedrol with transition to prednisone ( takes 2.5  mg daily )  Nebulized bronchodilators                VTE Pharmacologic Prophylaxis:   High Risk (Score >/= 5) - Pharmacological DVT Prophylaxis Ordered: apixaban (Eliquis). Sequential Compression Devices Ordered.    Mobility:   Basic Mobility Inpatient Raw Score: 15  JH-HLM Goal: 4: Move to chair/commode  JH-HLM Achieved: 6: Walk 10 steps or more  JH-HLM Goal achieved. Continue to encourage appropriate mobility.    Patient Centered Rounds: I performed bedside rounds with nursing staff today.  Discussions with Specialists or Other Care Team Provider: cardiology     Education and Discussions with Family / Patient: will update family after rounds    Current Length of Stay: 6 day(s)  Current Patient Status: Inpatient   Discharge Plan: Anticipate discharge in 24-48 hrs to ARC    Code Status: Level 1 - Full Code    Subjective:   Patient seen and evaluated at bedside. He reports he had just gotten a breathing treatment and is feeling much better.  Denies sob, wheezing, chest pain, abdominal pain, N/V.     Objective:     Vitals:   Temp (24hrs), Av.7 °F (36.5 °C), Min:97.4 °F (36.3 °C), Max:98.2 °F (36.8 °C)    Temp:  [97.4 °F (36.3 °C)-98.2 °F (36.8 °C)] 97.7 °F (36.5 °C)  HR:  [59-73] 63  Resp:  [14-19] 14  BP: (117-139)/(59-79) 139/79  SpO2:   [97 %-99 %] 97 %  Body mass index is 21.03 kg/m².     Input and Output Summary (last 24 hours):     Intake/Output Summary (Last 24 hours) at 6/23/2024 1056  Last data filed at 6/23/2024 0901  Gross per 24 hour   Intake 180 ml   Output 1125 ml   Net -945 ml       Physical Exam:   Physical Exam  Constitutional:       Appearance: Normal appearance.   Cardiovascular:      Rate and Rhythm: Normal rate and regular rhythm.      Pulses: Normal pulses.      Heart sounds: Normal heart sounds.   Pulmonary:      Effort: Pulmonary effort is normal.      Breath sounds: Normal breath sounds. No wheezing.   Abdominal:      General: Bowel sounds are normal.      Palpations: Abdomen is soft.   Musculoskeletal:      Right lower leg: No edema.      Left lower leg: No edema.   Neurological:      Mental Status: He is alert and oriented to person, place, and time.          Additional Data:     Labs:  Results from last 7 days   Lab Units 06/23/24  0413 06/18/24  2131 06/17/24  0717   WBC Thousand/uL 7.17   < > 5.80   HEMOGLOBIN g/dL 11.2*   < > 11.2*   I STAT HEMOGLOBIN   --    < >  --    HEMATOCRIT % 35.0*   < > 35.5*   HEMATOCRIT, ISTAT   --    < >  --    PLATELETS Thousands/uL 182   < > 112*   BANDS PCT %  --   --  3   SEGS PCT % 76*   < >  --    LYMPHO PCT % 11*   < > 5*   MONO PCT % 10   < > 5   EOS PCT % 2   < > 0    < > = values in this interval not displayed.     Results from last 7 days   Lab Units 06/23/24  0413   SODIUM mmol/L 131*   POTASSIUM mmol/L 4.7   CHLORIDE mmol/L 101   CO2 mmol/L 25   BUN mg/dL 20   CREATININE mg/dL 1.22   ANION GAP mmol/L 5   CALCIUM mg/dL 9.3   ALBUMIN g/dL 3.0*   TOTAL BILIRUBIN mg/dL 0.84   ALK PHOS U/L 89   ALT U/L 14   AST U/L 15   GLUCOSE RANDOM mg/dL 112     Results from last 7 days   Lab Units 06/19/24  1611   INR  1.27*     Results from last 7 days   Lab Units 06/23/24  0726 06/23/24  0409 06/22/24  2117 06/22/24  1625 06/22/24  1110 06/22/24  0716 06/22/24  0457 06/22/24  0019 06/21/24  2122  06/21/24 2006 06/21/24  1600 06/21/24  1119   POC GLUCOSE mg/dl 109 118 112 180* 170* 88 132 113 172* 215* 151* 184*     Results from last 7 days   Lab Units 06/19/24  1153   HEMOGLOBIN A1C % 7.6*     Results from last 7 days   Lab Units 06/20/24  0539 06/19/24  1153 06/19/24  0016 06/18/24  2131 06/16/24  1900   LACTIC ACID mmol/L  --  1.0 1.1 2.8* 0.9   PROCALCITONIN ng/ml 0.08 0.11  --   --   --        Lines/Drains:  Invasive Devices       Peripheral Intravenous Line  Duration             Peripheral IV 06/19/24 Proximal;Right;Ventral (anterior) Forearm 3 days    Peripheral IV 06/21/24 Left Antecubital 2 days                    Imaging: Reviewed radiology reports from this admission including: CT head and MRI brain    Recent Cultures (last 7 days):   Results from last 7 days   Lab Units 06/19/24  1610   BLOOD CULTURE  No Growth at 72 hrs.  No Growth at 72 hrs.       Last 24 Hours Medication List:   Current Facility-Administered Medications   Medication Dose Route Frequency Provider Last Rate    acetaminophen  650 mg Oral Q4H PRN Lyubov Malone MD      albuterol  2 puff Inhalation Q4H PRN Lyubov Malone MD      apixaban  2.5 mg Oral BID Jose Cesar MD      atorvastatin  40 mg Oral Daily With Dinner Hal Zamarripa MD      bisacodyl  10 mg Rectal Daily Ravi Alexis Lopez MD      budesonide  0.5 mg Nebulization Q12H Jose Cesar MD      ciprofloxacin  500 mg Oral Q12H [facility] Monika Alfonso MD      divalproex sodium  500 mg Oral Q8H [facility] Hal Zamarripa MD      folic acid 1 mg, thiamine (VITAMIN B1) 100 mg in sodium chloride 0.9 % 100 mL IV piggyback   Intravenous Daily Lyubov Malone  mL/hr at 06/21/24 1111    formoterol  20 mcg Nebulization Q12H Jose Cesar MD      HYDROmorphone  0.2 mg Intravenous Q4H PRN Lyubov Malone MD      insulin lispro  1-5 Units Subcutaneous 4x Daily (AC & HS) Lyubov Malone MD      lidocaine   Topical 4x Daily PRN Lyubov Malone MD      losartan  50 mg Oral Daily Lyubov Faisal, MD       melatonin  3 mg Oral HS Katy Sykes MD      [START ON 6/24/2024] metoprolol succinate  25 mg Oral Daily Muriel Patterson MD      metroNIDAZOLE  500 mg Oral Q8H YUNI Monika Alfonso MD      pantoprazole  40 mg Oral Daily Before Breakfast Monika Alfonso MD      predniSONE  1 mg Oral Daily Lyubov Malone MD      senna  2 tablet Oral BID Katy Sykes MD      valACYclovir  1,000 mg Oral BID Lyubov Malone MD          Today, Patient Was Seen By: Muriel Patterson MD    **Please Note: This note may have been constructed using a voice recognition system.**

## 2024-06-23 NOTE — ASSESSMENT & PLAN NOTE
Lab Results   Component Value Date    EGFR 54 06/23/2024    EGFR 52 06/22/2024    EGFR 46 06/21/2024    CREATININE 1.22 06/23/2024    CREATININE 1.27 06/22/2024    CREATININE 1.40 (H) 06/21/2024   CKD 3 baseline  Avoid nephrotoxic agents and renally dose medications  Renal function improving    Monitor BMP

## 2024-06-23 NOTE — ASSESSMENT & PLAN NOTE
Presents with left groin pain, abd pain   Enlarging gas and fluid collection extending from the antimesenteric border of the sigmoid colon, infiltrating the left inguinal region soft tissue and coursing under and along the lateral margin of the left inguinal canal. 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  IR not planning abscess drainage at this time.    Day 5 Cipro  Day 7 Flagyl  Continue antibiotics through June 28  Follow up outpatient with general surgery

## 2024-06-23 NOTE — ASSESSMENT & PLAN NOTE
Previously on Cardizem; transitioned to Toprol XL in setting of low EF as rec by caridology  Continue Losartan

## 2024-06-23 NOTE — ASSESSMENT & PLAN NOTE
Patient developed confusion; neurology consulted for concerns of brief seizures in setting of Zoster  Stroke alert; MRI  showed small acute infarct in the left parietal periventricular white matter with associated small focus of susceptibility may represent associated petechial hemorrhage.    Concern for cardioembolic secondary to holding anticoagulation drugs      6/23/24: RESOLVED  PT/OT is recommending ARC.  MRA was WNL  Continue Depakote

## 2024-06-23 NOTE — ASSESSMENT & PLAN NOTE
Lab Results   Component Value Date    HGBA1C 7.6 (H) 06/19/2024       Recent Labs     06/22/24  1625 06/22/24  2117 06/23/24  0409 06/23/24  0726   POCGLU 180* 112 118 109       Blood Sugar Average: Last 72 hrs:  (P) 141.95

## 2024-06-23 NOTE — PLAN OF CARE
Problem: PAIN - ADULT  Goal: Verbalizes/displays adequate comfort level or baseline comfort level  Description: Interventions:  - Encourage patient to monitor pain and request assistance  - Assess pain using appropriate pain scale  - Administer analgesics based on type and severity of pain and evaluate response  - Implement non-pharmacological measures as appropriate and evaluate response  - Consider cultural and social influences on pain and pain management  - Notify physician/advanced practitioner if interventions unsuccessful or patient reports new pain  Outcome: Progressing     Problem: INFECTION - ADULT  Goal: Absence or prevention of progression during hospitalization  Description: INTERVENTIONS:  - Assess and monitor for signs and symptoms of infection  - Monitor lab/diagnostic results  - Monitor all insertion sites, i.e. indwelling lines, tubes, and drains  - Monitor endotracheal if appropriate and nasal secretions for changes in amount and color  - [place] appropriate cooling/warming therapies per order  - Administer medications as ordered  - Instruct and encourage patient and family to use good hand hygiene technique  - Identify and instruct in appropriate isolation precautions for identified infection/condition  Outcome: Progressing  Goal: Absence of fever/infection during neutropenic period  Description: INTERVENTIONS:  - Monitor WBC    Outcome: Progressing     Problem: SAFETY ADULT  Goal: Patient will remain free of falls  Description: INTERVENTIONS:  - Educate patient/family on patient safety including physical limitations  - Instruct patient to call for assistance with activity   - Consult OT/PT to assist with strengthening/mobility   - Keep Call bell within reach  - Keep bed low and locked with side rails adjusted as appropriate  - Keep care items and personal belongings within reach  - Initiate and maintain comfort rounds  - Make Fall Risk Sign visible to staff  - Offer Toileting every 2 Hours,  in advance of need  - Initiate/Maintain bed alarm  - Obtain necessary fall risk management equipment: bed alarm  - Apply yellow socks and bracelet for high fall risk patients  - Consider moving patient to room near nurses station  Outcome: Progressing  Goal: Maintain or return to baseline ADL function  Description: INTERVENTIONS:  -  Assess patient's ability to carry out ADLs; assess patient's baseline for ADL function and identify physical deficits which impact ability to perform ADLs (bathing, care of mouth/teeth, toileting, grooming, dressing, etc.)  - Assess/evaluate cause of self-care deficits   - Assess range of motion  - Assess patient's mobility; develop plan if impaired  - Assess patient's need for assistive devices and provide as appropriate  - Encourage maximum independence but intervene and supervise when necessary  - Involve family in performance of ADLs  - Assess for home care needs following discharge   - Consider OT consult to assist with ADL evaluation and planning for discharge  - Provide patient education as appropriate  Outcome: Progressing  Goal: Maintains/Returns to pre admission functional level  Description: INTERVENTIONS:  - Perform AM-PAC 6 Click Basic Mobility/ Daily Activity assessment daily.  - Set and communicate daily mobility goal to care team and patient/family/caregiver.   - Collaborate with rehabilitation services on mobility goals if consulted  - Perform Range of Motion 3 times a day.  - Dangle patient 3 times a day  - Stand patient 3 times a day  - Ambulate patient 3 times a day  - Out of bed to chair 3 times a day   - Out of bed for meals 3 times a day  - Out of bed for toileting  - Record patient progress and toleration of activity level   Outcome: Progressing     Problem: DISCHARGE PLANNING  Goal: Discharge to home or other facility with appropriate resources  Description: INTERVENTIONS:  - Identify barriers to discharge w/patient and caregiver  - Arrange for needed discharge  resources and transportation as appropriate  - Identify discharge learning needs (meds, wound care, etc.)  - Arrange for interpretive services to assist at discharge as needed  - Refer to Case Management Department for coordinating discharge planning if the patient needs post-hospital services based on physician/advanced practitioner order or complex needs related to functional status, cognitive ability, or social support system  Outcome: Progressing     Problem: Knowledge Deficit  Goal: Patient/family/caregiver demonstrates understanding of disease process, treatment plan, medications, and discharge instructions  Description: Complete learning assessment and assess knowledge base.  Interventions:  - Provide teaching at level of understanding  - Provide teaching via preferred learning methods  Outcome: Progressing     Problem: Nutrition/Hydration-ADULT  Goal: Nutrient/Hydration intake appropriate for improving, restoring or maintaining nutritional needs  Description: Monitor and assess patient's nutrition/hydration status for malnutrition. Collaborate with interdisciplinary team and initiate plan and interventions as ordered.  Monitor patient's weight and dietary intake as ordered or per policy. Utilize nutrition screening tool and intervene as necessary. Determine patient's food preferences and provide high-protein, high-caloric foods as appropriate.     INTERVENTIONS:  - Monitor oral intake, urinary output, labs, and treatment plans  - Assess nutrition and hydration status and recommend course of action  - Evaluate amount of meals eaten  - Assist patient with eating if necessary   - Allow adequate time for meals  - Recommend/ encourage appropriate diets, oral nutritional supplements, and vitamin/mineral supplements  - Order, calculate, and assess calorie counts as needed  - Recommend, monitor, and adjust tube feedings and TPN/PPN based on assessed needs  - Assess need for intravenous fluids  - Provide specific  nutrition/hydration education as appropriate  - Include patient/family/caregiver in decisions related to nutrition  Outcome: Progressing     Problem: Prexisting or High Potential for Compromised Skin Integrity  Goal: Skin integrity is maintained or improved  Description: INTERVENTIONS:  - Identify patients at risk for skin breakdown  - Assess and monitor skin integrity  - Assess and monitor nutrition and hydration status  - Monitor labs   - Assess for incontinence   - Turn and reposition patient  - Assist with mobility/ambulation  - Relieve pressure over bony prominences  - Avoid friction and shearing  - Provide appropriate hygiene as needed including keeping skin clean and dry  - Evaluate need for skin moisturizer/barrier cream  - Collaborate with interdisciplinary team   - Patient/family teaching  - Consider wound care consult   Outcome: Progressing     Problem: SAFETY,RESTRAINT: NV/NON-SELF DESTRUCTIVE BEHAVIOR  Goal: Remains free of harm/injury (restraint for non violent/non self-detsructive behavior)  Description: INTERVENTIONS:  - Instruct patient/family regarding restraint use   - Assess and monitor physiologic and psychological status   - Provide interventions and comfort measures to meet assessed patient needs   - Identify and implement measures to help patient regain control  - Assess readiness for release of restraint   Outcome: Progressing  Goal: Returns to optimal restraint-free functioning  Description: INTERVENTIONS:  - Assess the patient's behavior and symptoms that indicate continued need for restraint  - Identify and implement measures to help patient regain control  - Assess readiness for release of restraint   Outcome: Progressing

## 2024-06-23 NOTE — PROGRESS NOTES
"Cardiology Progress Note - Hal Miller 84 y.o. male MRN: [id number]    Unit/Bed#: S -01 Encounter: [id number]      Assessment/Plan:  Cardiomyopathy - non-ischemic, EF 30%  No recent echo, EF normal 2017  Await records from Dr. Ramos, will obtain in AM  EF mildly reduced by nuclear stress test 2022  Cardizem transitioned to beta-blocker, transitioned back to Cardizem by SLIM given  #5  Cardizem should be avoided in setting of low EF, favor trial of beta-blocker - will discuss with SLIM   Continue home losartan  Volume wise appears compensated on exam, previously on Lasix which was discontinued in setting of TARA  Angiographically normal coronary arteries, 12/2022  Persistent atrial fibrillation   Rates controlled by vital signs  On Eliquis, should be on 5 mg BID based on current creatinine and weight   Would increase dose if no contraindication - petechial hemorrhage noted on MRI   History of aortic valve disease s/p AVR with redo Bentall procedure  Mean gradient of 13 mmHg by echo  COPD, 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  No significant events overnight.     Objective:     Vitals: Blood pressure 139/79, pulse 63, temperature 97.7 °F (36.5 °C), resp. rate 14, height 6' 2\" (1.88 m), weight 74.3 kg (163 lb 12.8 oz), SpO2 97%., Body mass index is 21.03 kg/m².,   Orthostatic Blood Pressures      Flowsheet Row Most Recent Value   Blood Pressure 139/79 filed at 06/23/2024 0724   Patient Position - Orthostatic VS Lying filed at 06/20/2024 1113              Intake/Output Summary (Last 24 hours) at 6/23/2024 0946  Last " data filed at 6/23/2024 0900  Gross per 24 hour   Intake --   Output 1605 ml   Net -1605 ml         Physical Exam:    GEN: Hal Miller appears well, alert and oriented x 3, pleasant and cooperative   HEENT: pupils equal, round, and reactive to light; extraocular muscles intact  NECK: supple, no carotid bruits   HEART: Irregularly irregular, variable S1/S2, no murmur   LUNGS: Coarse breath sounds bilaterally   ABDOMEN: normal bowel sounds, soft, no tenderness, no distention  EXTREMITIES: no edema   NEURO: no focal findings   SKIN: normal without suspicious lesions on exposed skin    Medications:      Current Facility-Administered Medications:     acetaminophen (TYLENOL) tablet 650 mg, 650 mg, Oral, Q4H PRN, Lyubov Malone MD, 650 mg at 06/21/24 0837    albuterol (PROVENTIL HFA,VENTOLIN HFA) inhaler 2 puff, 2 puff, Inhalation, Q4H PRN, Lyubov Malone MD    apixaban (ELIQUIS) tablet 2.5 mg, 2.5 mg, Oral, BID, Jose Cesar MD, 2.5 mg at 06/23/24 0945    atorvastatin (LIPITOR) tablet 40 mg, 40 mg, Oral, Daily With Dinner, Hal Zamarripa MD, 40 mg at 06/22/24 1750    bisacodyl (DULCOLAX) rectal suppository 10 mg, 10 mg, Rectal, Daily, Ravi Lopez MD, 10 mg at 06/23/24 0945    budesonide (PULMICORT) inhalation solution 0.5 mg, 0.5 mg, Nebulization, Q12H, Jose Cesar MD, 0.5 mg at 06/23/24 0730    ciprofloxacin (CIPRO) tablet 500 mg, 500 mg, Oral, Q12H [facility], Monika Alfonso MD, 500 mg at 06/23/24 0945    diltiazem (CARDIZEM CD) 24 hr capsule 180 mg, 180 mg, Oral, Daily, Monika Alfonso MD, 180 mg at 06/23/24 0945    divalproex sodium (DEPAKOTE) DR tablet 500 mg, 500 mg, Oral, Q8H [facility], Hal Zamarripa MD, 500 mg at 06/23/24 0048    folic acid 1 mg, thiamine (VITAMIN B1) 100 mg in sodium chloride 0.9 % 100 mL IV piggyback, , Intravenous, Daily, Lyubov Malone MD, Last Rate: 200 mL/hr at 06/21/24 1111, New Bag at 06/22/24 0940    formoterol (PERFOROMIST) nebulizer solution 20 mcg, 20 mcg, Nebulization, Q12H, Jose F  MD Tali, 20 mcg at 06/23/24 0730    HYDROmorphone HCl (DILAUDID) injection 0.2 mg, 0.2 mg, Intravenous, Q4H PRN, Lyubov Malone MD    insulin lispro (HumALOG/ADMELOG) 100 units/mL subcutaneous injection 1-5 Units, 1-5 Units, Subcutaneous, 4x Daily (AC & HS), 1 Units at 06/22/24 1752 **AND** Fingerstick Glucose (POCT), , , 4x Daily AC and at bedtime, Lyubov Malone MD    lidocaine (LMX) 4 % cream, , Topical, 4x Daily PRN, Lyubov Malone MD, 1 Application at 06/17/24 1231    losartan (COZAAR) tablet 50 mg, 50 mg, Oral, Daily, Lyubov Malone MD, 50 mg at 06/23/24 0945    melatonin tablet 3 mg, 3 mg, Oral, HS, Katy Sykes MD, 3 mg at 06/22/24 2006    metroNIDAZOLE (FLAGYL) tablet 500 mg, 500 mg, Oral, Q8H [facility], Monika Alfonso MD, 500 mg at 06/23/24 0945    pantoprazole (PROTONIX) EC tablet 40 mg, 40 mg, Oral, Daily Before Breakfast, Monika Alfonso MD, 40 mg at 06/23/24 0651    predniSONE tablet 1 mg, 1 mg, Oral, Daily, Lyubov Malone MD, 1 mg at 06/23/24 0945    senna (SENOKOT) tablet 17.2 mg, 2 tablet, Oral, BID, Katy Sykes MD, 17.2 mg at 06/23/24 0943    valACYclovir (VALTREX) tablet 1,000 mg, 1,000 mg, Oral, BID, Lyubov Malone MD, 1,000 mg at 06/23/24 0943     Labs & Results:        Results from last 7 days   Lab Units 06/23/24  0413 06/22/24  0454 06/21/24  0736   WBC Thousand/uL 7.17 6.06 6.06   HEMOGLOBIN g/dL 11.2* 10.4* 10.7*   HEMATOCRIT % 35.0* 32.9* 34.2*   PLATELETS Thousands/uL 182 161 145*     Results from last 7 days   Lab Units 06/20/24  0539   TRIGLYCERIDES mg/dL 54   HDL mg/dL 39*     Results from last 7 days   Lab Units 06/23/24  0413 06/22/24  0454 06/21/24  0736 06/20/24  0539 06/19/24  1156   POTASSIUM mmol/L 4.7 4.8 4.8   < >  --    CHLORIDE mmol/L 101 103 104   < >  --    CO2 mmol/L 25 28 29   < >  --    CO2, I-STAT mmol/L  --   --   --   --  23   BUN mg/dL 20 23 26*   < >  --    CREATININE mg/dL 1.22 1.27 1.40*   < >  --    CALCIUM mg/dL 9.3 9.2 8.9   < >  --    ALK PHOS U/L 89 82 92   < >  --    ALT U/L 14  16 19   < >  --    AST U/L 15 14 20   < >  --    GLUCOSE, ISTAT mg/dl  --   --   --   --  169*    < > = values in this interval not displayed.     Results from last 7 days   Lab Units 06/21/24  0612 06/20/24  2241 06/20/24  1444 06/19/24  2222 06/19/24  1611 06/17/24  0717   INR   --   --   --   --  1.27* 1.23*   PTT seconds 113* 51* 103*   < > 33  --     < > = values in this interval not displayed.     Results from last 7 days   Lab Units 06/23/24  0413 06/20/24  0539 06/19/24  1153   MAGNESIUM mg/dL 1.9 2.2 2.2         Counseling / Coordination of Care  Total floor / unit time spent today 25 minutes.  Greater than 50% of total time was spent with the patient and / or family counseling and / or coordination of care.

## 2024-06-23 NOTE — ASSESSMENT & PLAN NOTE
History of COPD; no controller medications or breathing treatment since 2023    Plan:  Placed on Respiratory protocol  Currently on prednisone

## 2024-06-23 NOTE — ASSESSMENT & PLAN NOTE
AC Eliquis  Previously rate controlled with Cardizem, however it was transitioned to Toprol XL in setting of low EF

## 2024-06-23 NOTE — QUICK NOTE
Called and spoke with patient's wife; medical update was provided.  All questions were answered at this time.

## 2024-06-23 NOTE — ASSESSMENT & PLAN NOTE
Zoster lesions on right chest wall and right upper back   Valtrex TID; Day 6 out of 7  Pain control   Contact isolation

## 2024-06-23 NOTE — ASSESSMENT & PLAN NOTE
Malnutrition Findings:   Adult Malnutrition type: Acute illness  Adult Degree of Malnutrition: Other severe protein calorie malnutrition  Malnutrition Characteristics: Weight loss, Inadequate energy                  360 Statement: Malnutrition related to inadequate energy intake as evidenced by 12#(7%) weight loss from 5/15/# to 6/7/# and <50% energy intake compared to estimated needs >5 days.    BMI Findings:           Body mass index is 21.03 kg/m².

## 2024-06-23 NOTE — ASSESSMENT & PLAN NOTE
MRI showed small acute infarct in the left parietal periventricular white matter with associated small focus of susceptibility may represent associated petechial hemorrhage.  After discussion with neurology they believe this may have been due to cardioembolic secondary to holding anticoagulation drugs.  They are continuing with Depakote.

## 2024-06-24 LAB
ANION GAP SERPL CALCULATED.3IONS-SCNC: 2 MMOL/L (ref 4–13)
BACTERIA BLD CULT: NORMAL
BACTERIA BLD CULT: NORMAL
BUN SERPL-MCNC: 19 MG/DL (ref 5–25)
CALCIUM SERPL-MCNC: 9.8 MG/DL (ref 8.4–10.2)
CHLORIDE SERPL-SCNC: 100 MMOL/L (ref 96–108)
CO2 SERPL-SCNC: 30 MMOL/L (ref 21–32)
CREAT SERPL-MCNC: 1.34 MG/DL (ref 0.6–1.3)
ERYTHROCYTE [DISTWIDTH] IN BLOOD BY AUTOMATED COUNT: 14.1 % (ref 11.6–15.1)
GFR SERPL CREATININE-BSD FRML MDRD: 48 ML/MIN/1.73SQ M
GLUCOSE SERPL-MCNC: 101 MG/DL (ref 65–140)
GLUCOSE SERPL-MCNC: 101 MG/DL (ref 65–140)
GLUCOSE SERPL-MCNC: 106 MG/DL (ref 65–140)
GLUCOSE SERPL-MCNC: 120 MG/DL (ref 65–140)
GLUCOSE SERPL-MCNC: 121 MG/DL (ref 65–140)
GLUCOSE SERPL-MCNC: 177 MG/DL (ref 65–140)
HCT VFR BLD AUTO: 36.5 % (ref 36.5–49.3)
HGB BLD-MCNC: 12 G/DL (ref 12–17)
MAGNESIUM SERPL-MCNC: 2 MG/DL (ref 1.9–2.7)
MCH RBC QN AUTO: 31.8 PG (ref 26.8–34.3)
MCHC RBC AUTO-ENTMCNC: 32.9 G/DL (ref 31.4–37.4)
MCV RBC AUTO: 97 FL (ref 82–98)
PLATELET # BLD AUTO: 189 THOUSANDS/UL (ref 149–390)
PMV BLD AUTO: 9 FL (ref 8.9–12.7)
POTASSIUM SERPL-SCNC: 4.9 MMOL/L (ref 3.5–5.3)
RBC # BLD AUTO: 3.77 MILLION/UL (ref 3.88–5.62)
SODIUM SERPL-SCNC: 132 MMOL/L (ref 135–147)
WBC # BLD AUTO: 7.71 THOUSAND/UL (ref 4.31–10.16)

## 2024-06-24 PROCEDURE — 99232 SBSQ HOSP IP/OBS MODERATE 35: CPT | Performed by: FAMILY MEDICINE

## 2024-06-24 PROCEDURE — 94760 N-INVAS EAR/PLS OXIMETRY 1: CPT

## 2024-06-24 PROCEDURE — 85027 COMPLETE CBC AUTOMATED: CPT

## 2024-06-24 PROCEDURE — 97116 GAIT TRAINING THERAPY: CPT

## 2024-06-24 PROCEDURE — 82948 REAGENT STRIP/BLOOD GLUCOSE: CPT

## 2024-06-24 PROCEDURE — 94640 AIRWAY INHALATION TREATMENT: CPT

## 2024-06-24 PROCEDURE — 99232 SBSQ HOSP IP/OBS MODERATE 35: CPT | Performed by: INTERNAL MEDICINE

## 2024-06-24 PROCEDURE — 97530 THERAPEUTIC ACTIVITIES: CPT

## 2024-06-24 PROCEDURE — 99233 SBSQ HOSP IP/OBS HIGH 50: CPT | Performed by: FAMILY MEDICINE

## 2024-06-24 PROCEDURE — 83735 ASSAY OF MAGNESIUM: CPT

## 2024-06-24 PROCEDURE — 97110 THERAPEUTIC EXERCISES: CPT

## 2024-06-24 PROCEDURE — 80048 BASIC METABOLIC PNL TOTAL CA: CPT

## 2024-06-24 RX ADMIN — PANTOPRAZOLE SODIUM 40 MG: 40 TABLET, DELAYED RELEASE ORAL at 06:22

## 2024-06-24 RX ADMIN — BUDESONIDE 0.5 MG: 0.5 INHALANT ORAL at 20:43

## 2024-06-24 RX ADMIN — APIXABAN 2.5 MG: 2.5 TABLET, FILM COATED ORAL at 09:41

## 2024-06-24 RX ADMIN — CIPROFLOXACIN HYDROCHLORIDE 500 MG: 500 TABLET, FILM COATED ORAL at 22:37

## 2024-06-24 RX ADMIN — DIVALPROEX SODIUM 500 MG: 500 TABLET, DELAYED RELEASE ORAL at 17:47

## 2024-06-24 RX ADMIN — SENNOSIDES 17.2 MG: 8.6 TABLET, FILM COATED ORAL at 17:47

## 2024-06-24 RX ADMIN — DIVALPROEX SODIUM 500 MG: 500 TABLET, DELAYED RELEASE ORAL at 09:54

## 2024-06-24 RX ADMIN — METRONIDAZOLE 500 MG: 500 TABLET ORAL at 01:19

## 2024-06-24 RX ADMIN — METOPROLOL SUCCINATE 25 MG: 25 TABLET, EXTENDED RELEASE ORAL at 09:42

## 2024-06-24 RX ADMIN — INSULIN LISPRO 1 UNITS: 100 INJECTION, SOLUTION INTRAVENOUS; SUBCUTANEOUS at 17:53

## 2024-06-24 RX ADMIN — APIXABAN 5 MG: 5 TABLET, FILM COATED ORAL at 17:47

## 2024-06-24 RX ADMIN — METRONIDAZOLE 500 MG: 500 TABLET ORAL at 09:42

## 2024-06-24 RX ADMIN — CIPROFLOXACIN HYDROCHLORIDE 500 MG: 500 TABLET, FILM COATED ORAL at 09:41

## 2024-06-24 RX ADMIN — VALACYCLOVIR HYDROCHLORIDE 1000 MG: 500 TABLET, FILM COATED ORAL at 09:41

## 2024-06-24 RX ADMIN — ATORVASTATIN CALCIUM 40 MG: 40 TABLET, FILM COATED ORAL at 17:47

## 2024-06-24 RX ADMIN — FORMOTEROL FUMARATE DIHYDRATE 20 MCG: 20 SOLUTION RESPIRATORY (INHALATION) at 20:43

## 2024-06-24 RX ADMIN — SENNOSIDES 17.2 MG: 8.6 TABLET, FILM COATED ORAL at 09:41

## 2024-06-24 RX ADMIN — FORMOTEROL FUMARATE DIHYDRATE 20 MCG: 20 SOLUTION RESPIRATORY (INHALATION) at 07:50

## 2024-06-24 RX ADMIN — BUDESONIDE 0.5 MG: 0.5 INHALANT ORAL at 07:50

## 2024-06-24 RX ADMIN — VALACYCLOVIR HYDROCHLORIDE 1000 MG: 500 TABLET, FILM COATED ORAL at 17:47

## 2024-06-24 RX ADMIN — THIAMINE HYDROCHLORIDE: 100 INJECTION, SOLUTION INTRAMUSCULAR; INTRAVENOUS at 09:58

## 2024-06-24 RX ADMIN — Medication 3 MG: at 22:37

## 2024-06-24 RX ADMIN — DIVALPROEX SODIUM 500 MG: 500 TABLET, DELAYED RELEASE ORAL at 01:19

## 2024-06-24 RX ADMIN — PREDNISONE 1 MG: 1 TABLET ORAL at 09:42

## 2024-06-24 RX ADMIN — LOSARTAN POTASSIUM 50 MG: 50 TABLET, FILM COATED ORAL at 09:41

## 2024-06-24 RX ADMIN — METRONIDAZOLE 500 MG: 500 TABLET ORAL at 17:47

## 2024-06-24 NOTE — PLAN OF CARE
Problem: PAIN - ADULT  Goal: Verbalizes/displays adequate comfort level or baseline comfort level  Description: Interventions:  - Encourage patient to monitor pain and request assistance  - Assess pain using appropriate pain scale  - Administer analgesics based on type and severity of pain and evaluate response  - Implement non-pharmacological measures as appropriate and evaluate response  - Consider cultural and social influences on pain and pain management  - Notify physician/advanced practitioner if interventions unsuccessful or patient reports new pain  Outcome: Progressing     Problem: INFECTION - ADULT  Goal: Absence or prevention of progression during hospitalization  Description: INTERVENTIONS:  - Assess and monitor for signs and symptoms of infection  - Monitor lab/diagnostic results  - Monitor all insertion sites, i.e. indwelling lines, tubes, and drains  - Monitor endotracheal if appropriate and nasal secretions for changes in amount and color  - [place] appropriate cooling/warming therapies per order  - Administer medications as ordered  - Instruct and encourage patient and family to use good hand hygiene technique  - Identify and instruct in appropriate isolation precautions for identified infection/condition  Outcome: Progressing  Goal: Absence of fever/infection during neutropenic period  Description: INTERVENTIONS:  - Monitor WBC    Outcome: Progressing     Problem: SAFETY ADULT  Goal: Patient will remain free of falls  Description: INTERVENTIONS:  - Educate patient/family on patient safety including physical limitations  - Instruct patient to call for assistance with activity   - Consult OT/PT to assist with strengthening/mobility   - Keep Call bell within reach  - Keep bed low and locked with side rails adjusted as appropriate  - Keep care items and personal belongings within reach  - Initiate and maintain comfort rounds  - Make Fall Risk Sign visible to staff  - Offer Toileting every 2 Hours,  in advance of need  - Initiate/Maintain bedalarm  - Obtain necessary fall risk management equipment:   - Apply yellow socks and bracelet for high fall risk patients  - Consider moving patient to room near nurses station  Outcome: Progressing  Goal: Maintain or return to baseline ADL function  Description: INTERVENTIONS:  -  Assess patient's ability to carry out ADLs; assess patient's baseline for ADL function and identify physical deficits which impact ability to perform ADLs (bathing, care of mouth/teeth, toileting, grooming, dressing, etc.)  - Assess/evaluate cause of self-care deficits   - Assess range of motion  - Assess patient's mobility; develop plan if impaired  - Assess patient's need for assistive devices and provide as appropriate  - Encourage maximum independence but intervene and supervise when necessary  - Involve family in performance of ADLs  - Assess for home care needs following discharge   - Consider OT consult to assist with ADL evaluation and planning for discharge  - Provide patient education as appropriate  Outcome: Progressing  Goal: Maintains/Returns to pre admission functional level  Description: INTERVENTIONS:  - Perform AM-PAC 6 Click Basic Mobility/ Daily Activity assessment daily.  - Set and communicate daily mobility goal to care team and patient/family/caregiver.   - Collaborate with rehabilitation services on mobility goals if consulted  - Perform Range of Motion 2 times a day.  - Reposition patient every 2 hours.  - Dangle patient 2 times a day  - Stand patient 2 times a day  - Ambulate patient as ordered times a day  - Out of bed to chair 2 times a day   - Out of bed for meals 3 times a day  - Out of bed for toileting  - Record patient progress and toleration of activity level   Outcome: Progressing     Problem: DISCHARGE PLANNING  Goal: Discharge to home or other facility with appropriate resources  Description: INTERVENTIONS:  - Identify barriers to discharge w/patient and  caregiver  - Arrange for needed discharge resources and transportation as appropriate  - Identify discharge learning needs (meds, wound care, etc.)  - Arrange for interpretive services to assist at discharge as needed  - Refer to Case Management Department for coordinating discharge planning if the patient needs post-hospital services based on physician/advanced practitioner order or complex needs related to functional status, cognitive ability, or social support system  Outcome: Progressing     Problem: Knowledge Deficit  Goal: Patient/family/caregiver demonstrates understanding of disease process, treatment plan, medications, and discharge instructions  Description: Complete learning assessment and assess knowledge base.  Interventions:  - Provide teaching at level of understanding  - Provide teaching via preferred learning methods  Outcome: Progressing     Problem: Nutrition/Hydration-ADULT  Goal: Nutrient/Hydration intake appropriate for improving, restoring or maintaining nutritional needs  Description: Monitor and assess patient's nutrition/hydration status for malnutrition. Collaborate with interdisciplinary team and initiate plan and interventions as ordered.  Monitor patient's weight and dietary intake as ordered or per policy. Utilize nutrition screening tool and intervene as necessary. Determine patient's food preferences and provide high-protein, high-caloric foods as appropriate.     INTERVENTIONS:  - Monitor oral intake, urinary output, labs, and treatment plans  - Assess nutrition and hydration status and recommend course of action  - Evaluate amount of meals eaten  - Assist patient with eating if necessary   - Allow adequate time for meals  - Recommend/ encourage appropriate diets, oral nutritional supplements, and vitamin/mineral supplements  - Order, calculate, and assess calorie counts as needed  - Recommend, monitor, and adjust tube feedings and TPN/PPN based on assessed needs  - Assess need for  intravenous fluids  - Provide specific nutrition/hydration education as appropriate  - Include patient/family/caregiver in decisions related to nutrition  Outcome: Progressing     Problem: Prexisting or High Potential for Compromised Skin Integrity  Goal: Skin integrity is maintained or improved  Description: INTERVENTIONS:  - Identify patients at risk for skin breakdown  - Assess and monitor skin integrity  - Assess and monitor nutrition and hydration status  - Monitor labs   - Assess for incontinence   - Turn and reposition patient  - Assist with mobility/ambulation  - Relieve pressure over bony prominences  - Avoid friction and shearing  - Provide appropriate hygiene as needed including keeping skin clean and dry  - Evaluate need for skin moisturizer/barrier cream  - Collaborate with interdisciplinary team   - Patient/family teaching  - Consider wound care consult   Outcome: Progressing     Problem: SAFETY,RESTRAINT: NV/NON-SELF DESTRUCTIVE BEHAVIOR  Goal: Remains free of harm/injury (restraint for non violent/non self-detsructive behavior)  Description: INTERVENTIONS:  - Instruct patient/family regarding restraint use   - Assess and monitor physiologic and psychological status   - Provide interventions and comfort measures to meet assessed patient needs   - Identify and implement measures to help patient regain control  - Assess readiness for release of restraint   Outcome: Progressing  Goal: Returns to optimal restraint-free functioning  Description: INTERVENTIONS:  - Assess the patient's behavior and symptoms that indicate continued need for restraint  - Identify and implement measures to help patient regain control  - Assess readiness for release of restraint   Outcome: Progressing

## 2024-06-24 NOTE — PROGRESS NOTES
General Cardiology   Progress Note -  Team One   Hal Miller 84 y.o. male MRN: [id number]    Unit/Bed#: S -01 Encounter: [id number]    Assessment/ Plan:    1. Cardiomyopathy - non-ischemic, EF 30%  Last echo avaiable 2017 with normal EF; normal coronaries on cardaic cath 2022  Will contact Dr Watt office to obtain records  GDMT: Metoprolol succinate 25 mg daily, losartan 50 mg daily   Euvolemic on exam today not on diuretics; previous on Lasix but discontinued due to TARA    2. Persistent atrial fibrillation: rates controlled on VS checks and exam today; no longer on telemetry  Cardizem transitioned back to metoprolol succinate 25mg daily; avoid Cardizem given low ef  AC with eliquis; currently yon 2.5mg BID; he does not meet criteria for lower dose; age 84, Wt 74kg and Cr 1.3  Recommend incrase to 5mg BID; message send to primary team with recommendation to increase if no contraindications; petechial hemorrhage on MRI; may need neuro clearance  3. History of aortic valve disease s/p AVR with redo Bentall procedure  Mean gradient of 13 mmHg by echo  4. COPD, severe without acute exacerbation : on RA  5. Benign essential hypertension: Avg /72 4 hours, continue metoprolol and losartan  6. Dyslipidemia: Continue statin  7. DM type 2  8. Vascular dementia   9. Small left parietal CVA: Felt to be cardioembolic by neuro in the setting of anticoagulation being held on admission.  recommending Eliquis be increased to 5 mg twice daily, see #2     Subjective: Patient seen for follow-up.  No significant events overnight.  He reports feeling fine this morning; he denies any shortness of breath chest pain pressure palpitations dizziness or lightheadedness.  No lower extremity swelling.  Reports poor appetite but otherwise no complaints.    Review of Systems   Constitutional: Positive for decreased appetite and malaise/fatigue. Negative for fever.   Cardiovascular:  Negative for chest pain, dyspnea on exertion,  "leg swelling, orthopnea and palpitations.   Respiratory:  Negative for cough and shortness of breath.    Gastrointestinal:  Negative for abdominal pain, nausea and vomiting.   Genitourinary:  Negative for dysuria.   Neurological:  Negative for dizziness and light-headedness.   Psychiatric/Behavioral:  Negative for altered mental status.    All other systems reviewed and are negative.    Objective:   Vitals: Blood pressure 135/75, pulse 72, temperature 97.9 °F (36.6 °C), resp. rate 17, height 6' 2\" (1.88 m), weight 74.3 kg (163 lb 12.8 oz), SpO2 99%.,     Body mass index is 21.03 kg/m².,     Systolic (24hrs), Av , Min:121 , Max:135     Diastolic (24hrs), Av, Min:63, Max:75      Intake/Output Summary (Last 24 hours) at 2024 1012  Last data filed at 2024 1001  Gross per 24 hour   Intake --   Output 1075 ml   Net -1075 ml     Weight (last 2 days)       Date/Time Weight    24 0300 74.3 (163.8)    24 0600 76.4 (168.43)          Telemetry Review:   No telemetry    Physical Exam  Vitals reviewed.   Constitutional:       General: He is not in acute distress.     Appearance: Normal appearance.      Comments: Patient sitting up in bed in NAD alert and cooperative   HENT:      Head: Normocephalic.      Mouth/Throat:      Mouth: Mucous membranes are moist.   Cardiovascular:      Rate and Rhythm: Normal rate. Rhythm regularly irregular.      Heart sounds: S1 normal. No murmur heard.     Comments: Trace ankle edema  Pulmonary:      Effort: Pulmonary effort is normal.      Breath sounds: No wheezing or rales.      Comments: On room air  Skin:     General: Skin is warm.   Neurological:      Mental Status: He is alert and oriented to person, place, and time.   Psychiatric:         Mood and Affect: Mood normal.       LABORATORY RESULTS      CBC with diff:   Results from last 7 days   Lab Units 24  0630 24  0413 24  0454 24  0736 24  0539 24  1156 24  1153 " 06/19/24  0712 06/18/24  2131   WBC Thousand/uL 7.71 7.17 6.06 6.06 5.96  --  6.77 5.56 12.24*   HEMOGLOBIN g/dL 12.0 11.2* 10.4* 10.7* 10.5*  --  10.5* 10.1* 11.4*   I STAT HEMOGLOBIN g/dl  --   --   --   --   --  10.9*  --   --   --    HEMATOCRIT % 36.5 35.0* 32.9* 34.2* 33.8*  --  32.5* 31.2* 35.5*   HEMATOCRIT, ISTAT %  --   --   --   --   --  32*  --   --   --    MCV fL 97 97 97 99* 99*  --  96 97 97   PLATELETS Thousands/uL 189 182 161 145* 123*  --  139* 126* 170   RBC Million/uL 3.77* 3.62* 3.38* 3.46* 3.42*  --  3.38* 3.22* 3.68*   MCH pg 31.8 30.9 30.8 30.9 30.7  --  31.1 31.4 31.0   MCHC g/dL 32.9 32.0 31.6 31.3* 31.1*  --  32.3 32.4 32.1   RDW % 14.1 14.0 14.0 14.2 14.4  --  14.2 14.1 14.2   MPV fL 9.0 9.4 9.6 9.8 9.0  --  9.8 9.6 9.4   NRBC AUTO /100 WBCs  --  0 0  --  0  --   --  0 0       CMP:  Results from last 7 days   Lab Units 06/24/24  0630 06/23/24  0413 06/22/24  0454 06/21/24  0736 06/20/24  0539 06/19/24  1156 06/19/24  1153 06/19/24  0549 06/18/24  2131   POTASSIUM mmol/L 4.9 4.7 4.8 4.8 4.9  --  4.6 5.3 5.4*   CHLORIDE mmol/L 100 101 103 104 106  --  105 104 100   CO2 mmol/L 30 25 28 29 28  --  25 23 20*   CO2, I-STAT mmol/L  --   --   --   --   --  23  --   --   --    BUN mg/dL 19 20 23 26* 32*  --  42* 45* 45*   CREATININE mg/dL 1.34* 1.22 1.27 1.40* 1.39*  --  1.54* 1.65* 1.69*   GLUCOSE, ISTAT mg/dl  --   --   --   --   --  169*  --   --   --    CALCIUM mg/dL 9.8 9.3 9.2 8.9 9.0  --  9.2 9.0 9.6   AST U/L  --  15 14 20 25  --  20 20 22   ALT U/L  --  14 16 19 21  --  20 18 21   ALK PHOS U/L  --  89 82 92 94  --  97 91 110*   EGFR ml/min/1.73sq m 48 54 52 46 46  --  41 38 37     BMP:  Results from last 7 days   Lab Units 06/24/24  0630 06/23/24  0413 06/22/24  0454 06/21/24  0736 06/20/24  0539 06/19/24  1156 06/19/24  1153 06/19/24  0549   POTASSIUM mmol/L 4.9 4.7 4.8 4.8 4.9  --  4.6 5.3   CHLORIDE mmol/L 100 101 103 104 106  --  105 104   CO2 mmol/L 30 25 28 29 28  --  25 23   CO2,  I-STAT mmol/L  --   --   --   --   --  23  --   --    BUN mg/dL  20 23 26* 32*  --  42* 45*   CREATININE mg/dL 1.34* 1.22 1.27 1.40* 1.39*  --  1.54* 1.65*   GLUCOSE, ISTAT mg/dl  --   --   --   --   --  169*  --   --    CALCIUM mg/dL 9.8 9.3 9.2 8.9 9.0  --  9.2 9.0     Lab Results   Component Value Date    NTBNP 2,308 (H) 05/10/2018     Results from last 7 days   Lab Units 24  0630 24  0413 24  0539 24  1153   MAGNESIUM mg/dL 2.0 1.9 2.2 2.2     Results from last 7 days   Lab Units 24  1153   HEMOGLOBIN A1C % 7.6*     Results from last 7 days   Lab Units 24  1611   INR  1.27*     Lipid Profile:   Lab Results   Component Value Date    CHOL 124 10/06/2015    CHOL 156 2015    CHOL 141 2015     Lab Results   Component Value Date    HDL 39 (L) 2024    HDL 52 2024    HDL 61 2023     Lab Results   Component Value Date    LDLCALC 44 2024    LDLCALC 63 2024    LDLCALC 68 2023     Lab Results   Component Value Date    TRIG 54 2024    TRIG 56 2024    TRIG 48 2023       Cardiac testing:   Results for orders placed during the hospital encounter of 17    Echo complete with contrast if indicated    [address] [address] [phone]    Transthoracic Echocardiogram  2D, M-mode, Doppler, and Color Doppler    Study date:  2017    Patient: LORRI GUNTER  MR number: [id number]  Account number: [id number]  : 1940  Age: 77 years  Gender: Male  Status: Inpatient  Location: Bedside  Height: 75 in  Weight: 210 lb  BP: 128/ 57 mmHg    Indications: Atrial fibrillation.    Diagnoses: I48.0 - Atrial fibrillation    Sonographer:  Martha Osborne RDCS  Interpreting Physician:  Richardson Ramos MD  Primary Physician:  Siria De La Torre MD  Group:  [facility]    SUMMARY    LEFT VENTRICLE:  Systolic function was normal.  Ejection fraction was estimated in the range of 55 % to 65 %.  There were no regional wall motion abnormalities.  Features were consistent with a pseudonormal left ventricular filling pattern, with concomitant abnormal relaxation and increased filling pressure (grade 2 diastolic dysfunction).    MITRAL VALVE:  There was mild regurgitation.    AORTIC VALVE:  A bioprosthesis was present. It exhibited normal function.    TRICUSPID VALVE:  There was mild regurgitation.    HISTORY: PRIOR HISTORY: shortness of breath, Atrial fibrillation. Risk factors: hypertension and diabetes.    PROCEDURE: The procedure was performed at the bedside. This was a routine study. The transthoracic approach was used. The study included complete 2D imaging, M-mode, complete spectral Doppler, and color Doppler. The heart rate was 74 bpm,  at the start of the study. Images were obtained from the parasternal, apical, subcostal, and suprasternal notch acoustic windows. Echocardiographic views were limited due to poor acoustic window availability, decreased penetration, and  lung interference. This was a technically difficult study.    LEFT VENTRICLE: Size was normal. Systolic function was normal. Ejection fraction was estimated in the range of 55 % to 65 %. There were no regional wall motion abnormalities. Wall thickness was normal. DOPPLER: Features were consistent  with a pseudonormal left ventricular filling pattern, with concomitant abnormal relaxation and increased filling pressure (grade 2 diastolic dysfunction).    RIGHT VENTRICLE: The size was normal. There were no regional wall motion abnormalities.    LEFT ATRIUM: Size was normal.    RIGHT ATRIUM: Size was normal.    MITRAL VALVE: Valve structure was normal. DOPPLER: There was mild regurgitation.    AORTIC VALVE: A bioprosthesis was present. It exhibited normal function.    TRICUSPID VALVE: The valve structure was normal. DOPPLER: There was mild regurgitation.    PERICARDIUM: There was  no pericardial effusion.    AORTA: The root exhibited normal size.    SYSTEM MEASUREMENT TABLES    2D  %FS: 26 %  Ao Diam: 2.5 cm  EDV(Teich): 162.7 ml  EF(Teich): 50.3 %  ESV(Teich): 80.8 ml  IVSd: 1.1 cm  LA Area: 23.7 cm2  LA Diam: 4.4 cm  LVEDV MOD A4C: 163 ml  LVEF MOD A4C: 65.6 %  LVESV MOD A4C: 56 ml  LVIDd: 5.7 cm  LVIDs: 4.2 cm  LVLd A4C: 10 cm  LVLs A4C: 8.5 cm  LVPWd: 1 cm  RA Area: 23.9 cm2  RVIDd: 3.9 cm  SV MOD A4C: 107 ml  SV(Teich): 81.9 ml    CW  TR Vmax: 3.2 m/s  TR maxP.7 mmHg    MM  TAPSE: 1.3 cm    PW  E': 0.1 m/s  E/E': 15.9  MV A [place]iz: 0.5 m/s  MV Dec [place]: 5.8 m/s2  MV DecT: 225.3 ms  MV E Abdulaziz: 1.3 m/s  MV E/A Ratio: 2.8  MV PHT: 65.3 ms  MVA By PHT: 3.4 cm2    [facility] Accredited [facility]chocardi[facility]    Prepared and electronically signed by    Richardson Ramos MD  Signed 2017 16:22:29    Meds/Allergies   current meds:   Current Facility-Administered Medications   Medication Dose Route Frequency    acetaminophen (TYLENOL) tablet 650 mg  650 mg Oral Q4H PRN    albuterol (PROVENTIL HFA,VENTOLIN HFA) inhaler 2 puff  2 puff Inhalation Q4H PRN    apixaban (ELIQUIS) tablet 2.5 mg  2.5 mg Oral BID    atorvastatin (LIPITOR) tablet 40 mg  40 mg Oral Daily With Dinner    bisacodyl (DULCOLAX) rectal suppository 10 mg  10 mg Rectal Daily    budesonide (PULMICORT) inhalation solution 0.5 mg  0.5 mg Nebulization Q12H    ciprofloxacin (CIPRO) tablet 500 mg  500 mg Oral Q12H YUNI    divalproex sodium (DEPAKOTE) DR tablet 500 mg  500 mg Oral Q8H YUNI    folic acid 1 mg, thiamine (VITAMIN B1) 100 mg in sodium chloride 0.9 % 100 mL IV piggyback   Intravenous Daily    formoterol (PERFOROMIST) nebulizer solution 20 mcg  20 mcg Nebulization Q12H    HYDROmorphone HCl (DILAUDID) injection 0.2 mg  0.2 mg Intravenous Q4H PRN    insulin lispro (HumALOG/ADMELOG) 100 units/mL subcutaneous injection 1-5 Units  1-5 Units Subcutaneous 4x Daily (AC & HS)    lidocaine (LMX) 4 % cream   Topical  4x Daily PRN    losartan (COZAAR) tablet 50 mg  50 mg Oral Daily    melatonin tablet 3 mg  3 mg Oral HS    metoprolol succinate (TOPROL-XL) 24 hr tablet 25 mg  25 mg Oral Daily    metroNIDAZOLE (FLAGYL) tablet 500 mg  500 mg Oral Q8H YUNI    pantoprazole (PROTONIX) EC tablet 40 mg  40 mg Oral Daily Before Breakfast    predniSONE tablet 1 mg  1 mg Oral Daily    senna (SENOKOT) tablet 17.2 mg  2 tablet Oral BID    valACYclovir (VALTREX) tablet 1,000 mg  1,000 mg Oral BID       Medications Prior to Admission:     apixaban (ELIQUIS) 2.5 mg    Calcium Citrate (CITRACAL PO)    Cinnamon 500 MG capsule    diltiazem (CARDIZEM CD) 180 mg 24 hr capsule    hydroxychloroquine (PLAQUENIL) 200 mg tablet    Lantus SoloStar 100 units/mL SOPN    olmesartan (BENICAR) 20 mg tablet    potassium chloride (KLOR-CON) 8 MEQ tablet    pravastatin (PRAVACHOL) 40 mg tablet    predniSONE 1 mg tablet    B-D UF III MINI PEN NEEDLES 31G X 5 MM MISC    nystatin (MYCOSTATIN) 500,000 units/5 mL suspension    ondansetron (ZOFRAN-ODT) 4 mg disintegrating tablet    QUEtiapine (SEROquel) 25 mg tablet    Assessment:  Principal Problem:    Acute encephalopathy  Active Problems:    Shortness of breath    Hypertension    Atrial fibrillation (HCC)    Inflammatory polyarthropathy (HCC)    Chronic obstructive pulmonary disease with acute exacerbation (HCC)    CKD (chronic kidney disease)    Type 2 diabetes mellitus (HCC)    Abnormal CT of the abdomen    Zoster    Colonic diverticular abscess    Vascular dementia (HCC)    Insomnia    Ambulatory dysfunction    Severe protein-calorie malnutrition (HCC)    Episode of seizure-like activity    History of Whipple procedure    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HCC)    Heart failure (HCC)    Counseling / Coordination of Care  Total floor / unit time spent today 20 minutes.  Greater than 50% of total time was spent with the patient and / or family counseling and / or coordination of care.      ** Please Note: Dragon 360 Dictation voice  to text software may have been used in the creation of this document. **

## 2024-06-24 NOTE — ASSESSMENT & PLAN NOTE
Lab Results   Component Value Date    HGBA1C 7.6 (H) 06/19/2024       Recent Labs     06/23/24  1609 06/23/24  2049 06/24/24  0019 06/24/24  0730   POCGLU 155* 167* 101 101         Blood Sugar Average: Last 72 hrs:  (P) 141.75

## 2024-06-24 NOTE — ASSESSMENT & PLAN NOTE
Lab Results   Component Value Date    EGFR 48 06/24/2024    EGFR 54 06/23/2024    EGFR 52 06/22/2024    CREATININE 1.34 (H) 06/24/2024    CREATININE 1.22 06/23/2024    CREATININE 1.27 06/22/2024   CKD 3 baseline  Avoid nephrotoxic agents and renally dose medications  Renal function improving    Monitor BMP

## 2024-06-24 NOTE — ASSESSMENT & PLAN NOTE
Wt Readings from Last 3 Encounters:   06/23/24 74.3 kg (163 lb 12.8 oz)   06/07/24 76 kg (167 lb 8.8 oz)   04/04/23 82.1 kg (181 lb)     Patient got echo as part of stroke work up  EF was found to be 30%  Patient is euvolemic without increased O2 requirements or chest pain  Cardiology consulted:  They plan to follow up with outpatient office on Monday  Considering switch from Cardizem to beta-blocker but patient

## 2024-06-24 NOTE — PLAN OF CARE
Problem: PHYSICAL THERAPY ADULT  Goal: Performs mobility at highest level of function for planned discharge setting.  See evaluation for individualized goals.  Description: Treatment/Interventions: Functional transfer training, LE strengthening/ROM, Therapeutic exercise, Elevations, Endurance training, Cognitive reorientation, Patient/family training, Equipment eval/education, Bed mobility, Gait training, Spoke to nursing  Equipment Recommended: Walker       See flowsheet documentation for full assessment, interventions and recommendations.  Outcome: Progressing  Note: Prognosis: Fair  Problem List: Decreased strength, Decreased range of motion, Decreased endurance, Impaired balance, Decreased mobility, Decreased cognition, Impaired judgement, Decreased safety awareness, Impaired hearing (gait deviations)  Assessment: pt began tx session lying supine in the bed as pt was agreeable to participateb in PT intervebntion. Pt intervention to focus on bed mobility, transfer training, TE activities, posture/balance w/ gait and stair trials. In comparison to previous PT interventions pt continues to be /s for all bed mobility and require /s to min Ax1 for all functional transfers to and from RW. pt does require VC's for hand placement as pt demonstrated limited carryover from previous tx sessions for hand placement and safety. pt did increase ambulation distance to 60'x1 RW but continues to require min ax1 for safety and balance. Additional ambulation was not possible due to fatigue as pt required several seated therapeutic rest breaks in [facility] tx session. pt did participate in TE activities in the recliner in order to strengthen LE's in efforts in strengthening LE and resducing risk fro falls and injuries with all OOB activities. pt educated with verbal/visual demonstration on all safe stair trial strategies prior to initiating steps. pt required min Ax1, bilateral hand rails in order to completed 3 steps in [facility] tx  session. psot tx session pt in recliner with call bell, chair alarm activated and all pt needs met. Continue to recomemnd Dc w/ level 1 maximal rehab resource intensity when medically cleared  Barriers to Discharge: Inaccessible home environment, Decreased caregiver support     Rehab Resource Intensity Level, PT: I (Maximum Resource Intensity)    See flowsheet documentation for full assessment.

## 2024-06-24 NOTE — PLAN OF CARE
Problem: PAIN - ADULT  Goal: Verbalizes/displays adequate comfort level or baseline comfort level  Description: Interventions:  - Encourage patient to monitor pain and request assistance  - Assess pain using appropriate pain scale  - Administer analgesics based on type and severity of pain and evaluate response  - Implement non-pharmacological measures as appropriate and evaluate response  - Consider cultural and social influences on pain and pain management  - Notify physician/advanced practitioner if interventions unsuccessful or patient reports new pain  Outcome: Progressing     Problem: INFECTION - ADULT  Goal: Absence or prevention of progression during hospitalization  Description: INTERVENTIONS:  - Assess and monitor for signs and symptoms of infection  - Monitor lab/diagnostic results  - Monitor all insertion sites, i.e. indwelling lines, tubes, and drains  - Monitor endotracheal if appropriate and nasal secretions for changes in amount and color  - [place] appropriate cooling/warming therapies per order  - Administer medications as ordered  - Instruct and encourage patient and family to use good hand hygiene technique  - Identify and instruct in appropriate isolation precautions for identified infection/condition  Outcome: Progressing  Goal: Absence of fever/infection during neutropenic period  Description: INTERVENTIONS:  - Monitor WBC    Outcome: Progressing     Problem: SAFETY ADULT  Goal: Patient will remain free of falls  Description: INTERVENTIONS:  - Educate patient/family on patient safety including physical limitations  - Instruct patient to call for assistance with activity   - Consult OT/PT to assist with strengthening/mobility   - Keep Call bell within reach  - Keep bed low and locked with side rails adjusted as appropriate  - Keep care items and personal belongings within reach  - Initiate and maintain comfort rounds  - Make Fall Risk Sign visible to staff  - Apply yellow socks and bracelet  for high fall risk patients  - Consider moving patient to room near nurses station  Outcome: Progressing  Goal: Maintain or return to baseline ADL function  Description: INTERVENTIONS:  -  Assess patient's ability to carry out ADLs; assess patient's baseline for ADL function and identify physical deficits which impact ability to perform ADLs (bathing, care of mouth/teeth, toileting, grooming, dressing, etc.)  - Assess/evaluate cause of self-care deficits   - Assess range of motion  - Assess patient's mobility; develop plan if impaired  - Assess patient's need for assistive devices and provide as appropriate  - Encourage maximum independence but intervene and supervise when necessary  - Involve family in performance of ADLs  - Assess for home care needs following discharge   - Consider OT consult to assist with ADL evaluation and planning for discharge  - Provide patient education as appropriate  Outcome: Progressing  Goal: Maintains/Returns to pre admission functional level  Description: INTERVENTIONS:  - Perform AM-PAC 6 Click Basic Mobility/ Daily Activity assessment daily.  - Set and communicate daily mobility goal to care team and patient/family/caregiver.   - Collaborate with rehabilitation services on mobility goals if consulted  - Out of bed for toileting  - Record patient progress and toleration of activity level   Outcome: Progressing     Problem: DISCHARGE PLANNING  Goal: Discharge to home or other facility with appropriate resources  Description: INTERVENTIONS:  - Identify barriers to discharge w/patient and caregiver  - Arrange for needed discharge resources and transportation as appropriate  - Identify discharge learning needs (meds, wound care, etc.)  - Arrange for interpretive services to assist at discharge as needed  - Refer to Case Management Department for coordinating discharge planning if the patient needs post-hospital services based on physician/advanced practitioner order or complex needs  related to functional status, cognitive ability, or social support system  Outcome: Progressing

## 2024-06-24 NOTE — PHYSICAL THERAPY NOTE
PHYSICAL THERAPY NOTE          Patient Name: Hal Miller  Today's Date: 24 1110   PT Last Visit   PT Visit Date 24   Note Type   Note Type Treatment   Pain Assessment   Pain Assessment Tool 0-10   Pain Score No Pain   Patient's Stated Pain Goal No pain   Hospital Pain Intervention(s) Repositioned;Elevated;Rest   Multiple Pain Sites No   Restrictions/Precautions   Weight Bearing Precautions Per Order No   Other Precautions Cognitive;Chair Alarm;Bed Alarm;Multiple lines;O2;Pain;Fall Risk  (recent seizure like activity, IV)   General   Chart Reviewed Yes   Response to Previous Treatment Patient with no complaints from previous session.   Family/Caregiver Present Yes  (spouse and sister)   Cognition   Overall Cognitive Status Unable to assess   Arousal/Participation Alert;Responsive;Cooperative   Attention Within functional limits   Orientation Level Oriented X4   Memory Decreased recall of recent events;Decreased recall of precautions   Following Commands Follows one step commands with increased time or repetition   Comments pt ID by name and  on wrist band   Subjective   Subjective pt was agreeable to participate in PT intervention and stated 0/10 pain pre/post tx session   Bed Mobility   Supine to Sit 5  Supervision   Additional items Assist x 1;HOB elevated;Bedrails;Increased time required;Verbal cues   Sit to Supine Unable to assess   Additional Comments pt seated OOB in the recliner post tx session with call bell, legs elevated, chair alarm activated and all pt needs met   Transfers   Sit to Stand 4  Minimal assistance   Additional items Assist x 1;Armrests;Increased time required;Verbal cues   Stand to Sit 5  Supervision   Additional items Assist x 1;Armrests;Increased time required;Verbal cues  (VC's for hand placement)   Stand pivot Unable to assess   Additional Comments pt complted 3 STS in  todays tx session to and from RW with min Ax1 and VC's for hand placement in order to increase safety and balance w/ transfers   Ambulation/Elevation   Gait pattern Decreased foot clearance;Short stride;Excessively slow   Gait Assistance 4  Minimal assist   Additional items Assist x 1;Verbal cues   Assistive Device Rolling walker   Distance 60'x1 RW   Stair Management Assistance 4  Minimal assist   Additional items Assist x 1;Verbal cues;Increased time required   Stair Management Technique Two rails;Step to pattern;Foreward;Backward   Number of Stairs 3   Balance   Static Sitting Good   Dynamic Sitting Fair   Static Standing Fair -   Dynamic Standing Poor   Ambulatory Poor +  (w/ RW)   Endurance Deficit   Endurance Deficit Yes   Endurance Deficit Description self reported fatigue   Activity Tolerance   Activity Tolerance Patient limited by fatigue   Nurse Made Aware Spoke to RN   Exercises   Quad Sets Sitting;15 reps;AROM;Bilateral  (long sit position)   Heelslides Sitting;10 reps;AROM;Bilateral  (long sit position)   Hip Flexion Sitting;5 reps;AAROM;Bilateral   Hip Abduction Sitting;10 reps;AROM;Bilateral  (10 reps in long sit position as well as pillow squeezes)   Hip Adduction Sitting;10 reps;AROM;Bilateral  (lomg sit position as well as pillow squeezes)   Knee AROM Long Arc Quad Sitting;10 reps;AROM;Bilateral   Ankle Pumps Sitting;20 reps;AROM;Bilateral   Marching Sitting;10 reps;AROM;Bilateral   Assessment   Prognosis Fair   Problem List Decreased strength;Decreased range of motion;Decreased endurance;Impaired balance;Decreased mobility;Decreased cognition;Impaired judgement;Decreased safety awareness;Impaired hearing  (gait deviations)   Assessment pt began tx session lying supine in the bed as pt was agreeable to participateb in PT intervebntion. Pt intervention to focus on bed mobility, transfer training, TE activities, posture/balance w/ gait and stair trials. In comparison to previous PT interventions pt  continues to be /s for all bed mobility and require /s to min Ax1 for all functional transfers to and from RW. pt does require VC's for hand placement as pt demonstrated limited carryover from previous tx sessions for hand placement and safety. pt did increase ambulation distance to 60'x1 RW but continues to require min ax1 for safety and balance. Additional ambulation was not possible due to fatigue as pt required several seated therapeutic rest breaks in todays tx session. pt did participate in TE activities in the recliner in order to strengthen LE's in efforts in strengthening LE and resducing risk fro falls and injuries with all OOB activities. pt educated with verbal/visual demonstration on all safe stair trial strategies prior to initiating steps. pt required min Ax1, bilateral hand rails in order to completed 3 steps in todays tx session. psot tx session pt in recliner with call bell, chair alarm activated and all pt needs met. Continue to recomemnd Dc w/ level 1 maximal rehab resource intensity when medically cleared   Barriers to Discharge Inaccessible home environment;Decreased caregiver support   Goals   Patient Goals to be better and walk more   [facility] Expiration Date 07/01/24   PT Treatment Day 1   Plan   Treatment/Interventions Functional transfer training;LE strengthening/ROM;Therapeutic exercise;Elevations;Endurance training;Cognitive reorientation;Patient/family training;Equipment eval/education;Bed mobility;Gait training;Spoke to nursing   Progress Slow progress, decreased activity tolerance   PT Frequency 3-5x/wk   Discharge Recommendation   Rehab Resource Intensity Level, PT I (Maximum Resource Intensity)   Equipment Recommended Walker   Walker Package Recommended Rollator   AM-PAC Basic Mobility Inpatient   Turning in Flat Bed Without Bedrails 3   Lying on Back to Sitting on Edge of Flat Bed Without Bedrails 3   Moving Bed to Chair 3   Standing Up From Chair Using Arms 3   Walk in Room 3   Climb  3-5 Stairs With Railing 3   Basic Mobility Inpatient Raw Score 18   Basic Mobility Standardized Score 41.05   [facility] Highest Level Of Mobility   -HLM Goal 6: Walk 10 steps or more   -HLM Achieved 7: Walk 25 feet or more   Education   Education Provided Mobility training;Assistive device;Other  (stair trials)   Patient Reinforcement needed   End of Consult   Patient Position at End of Consult Bedside chair;Bed/Chair alarm activated;All needs within reach   The patient's AM-PAC Basic Mobility Inpatient Short Form Raw Score is 18. A Raw score of greater than 16 suggests the patient may benefit from discharge to home. Please also refer to the recommendation of the Physical Therapist for safe discharge planning.    Pt continues to be limited with activity tolerance, functional mobility, ambulation distance and steps completed in today's tx session. Pt would benefit from continued skilled PT intervention in order to address deficits listed above   Donis Dumont

## 2024-06-24 NOTE — PROGRESS NOTES
Progress Note - Geriatric Medicine   Hal Miller 84 y.o. male MRN: [id number]  Unit/Bed#: S -01 Encounter: [id number]      Assessment/Plan:  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HCC)  Assessment & Plan  -MRI results as follows:  1. Small acute infarct in the left parietal periventricular white matter with an associated small focus of susceptibility that may represent associated petechial hemorrhage.                2. Redemonstrated foci of susceptibility, including new foci, nonspecific and could be seen with cerebral amyloid angiopathy, multiple cavernomas and/or prior embolic events, among other considerations.  -Blood pressure is 135/75 today. His baseline is elevated. Maintain appropriate blood pressure and glucose control.    -Neurology believes stroke d/t withholding anticoagulation.  -Continue Eliquis 2.5 mg BID.  -Continue Depakote 500 mg Q8H and monitor level.  -Continue home Pravastatin 40 mg QD.  -Neurology follows appreciate input.    History of Whipple procedure  Assessment & Plan  -History of pancreatic cancer s/p Whipple Procedure in 2011.  -Continue Folic Acid 1 mg supplementation QD.  -Continue Thiamine 100 mg supplementation QD.    Episode of seizure-like activity  Assessment & Plan  -Continue Depakote 500 mg Q8H.  -MRI results as follows:  Small acute infarct in the left parietal periventricular white matter with an associated small focus of susceptibility that may represent associated petechial hemorrhage.  Redemonstrated foci of susceptibility, including new foci, nonspecific and could be seen with cerebral amyloid angiopathy, multiple cavernomas and/or prior embolic events, among other considerations.  -Spot EEG interpretation as follows:  Excessive theta activity during wakefulness suggest mild nonspecific diffuse cerebral dysfunction.  No epileptiform discharges or seizures are present.  -Neurology following.    Ambulatory dysfunction  Assessment & Plan  - Patient was walking independently prior to  "admission to hospital.   - Patient continues to require assistance upon his current state.  - Continue PT/OT in attempt to return to baseline.  - Continue to monitor orthostatic vitals.  - Continue fall precautions.    Insomnia  Assessment & Plan  -Continue to hold Seroquel 25 mg QHS with last dose administered 6/18.   -Patient's sleep was controlled at home on Seroquel 25 mg QHS- currently on hold.  -Patient reports sleeping \"alright,\" sleeping from midnight on.  -Avoid daytime naps and nighttime awakenings. Avoid caffeine intake in the afternoon.  -Minimize overnight interruptions, group overnight vitals/labs/nursing checks as possible.  -Dim lights, close blinds and turn off TV to minimize stimulation and encourage sleep environment in evenings.  -Encourage good sleep hygiene.  -Avoid sedative hypnotics.  -Continue Melatonin 3 mg QHS.     Dizziness  Assessment & Plan  - Check/monitor orthostatic vital signs.  - Maintain hydration status.  - Fall precautions.    Vascular dementia (HCC)  Assessment & Plan  -Patient was AAO x 2-3. Seems to be at baseline  -Continue to provide supportive care and reorient as needed.  -Patient is at high risk for delirium.  -Continue to monitor closely and stay on delirium precautions.  -Maintain sleep/wake cycle.  -Optimize pain regimen.  -Monitor for constipation and urinary retention and manage as needed.  -TSH and B12 within normal limits.  -Continue encouraging family to visit.  -Continue encouraging patient to wear glasses and hearing aids while awake.  -Continue encouraging PO intake and assist with feeding if needed.    Colonic diverticular abscess  Assessment & Plan  -General surgery follows.  -Continue PO hydration and eating. Eating regular diet.  -Antibiotics switched from IV to PO.  -Continue Metronidazole 500 mg PO Q8H.  -Continue Ciprofloxacin 500 mg PO Q12H.    Zoster  Assessment & Plan  - Zoster lesions now crusted over on right chest wall and right upper back.  - " Continue Valtrex 1000 mg BID.  - Continue pain regime with Tylenol 975 mg PO TID.  - Consider Gabapentin 100 mg QHS if pain becomes uncontrolled.    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   -Outpatient follow-up recommended.    Type 2 diabetes mellitus (HCC)  Assessment & Plan  Lab Results   Component Value Date    HGBA1C 7.6 (H) 06/19/2024       Recent Labs     06/23/24  1609 06/23/24  2049 06/24/24  0019 06/24/24  0730   POCGLU 155* 167* 101 101       Blood Sugar Average: Last 72 hrs:  (P) 141.75    -Continue insulin regimen.  -Avoid hypoglycemia.     CKD (chronic kidney disease)  Assessment & Plan  Lab Results   Component Value Date    EGFR 48 06/24/2024    EGFR 54 06/23/2024    EGFR 52 06/22/2024    CREATININE 1.34 (H) 06/24/2024    CREATININE 1.22 06/23/2024    CREATININE 1.27 06/22/2024     -Baseline CKD Stage 3.  -Continue to monitor Creatinine, although it is stable. Cr 1.34 today.  -Avoid nephrotoxic medication and renally dose all medications.  -Encourage PO hydration.  -Continue monitoring BMP.     Inflammatory polyarthropathy (HCC)  Assessment & Plan  -Continue to hold Hydroxychloroquine with last dose administered 6/18.  -Continue Prednisone 1 mg QD.    Atrial fibrillation (HCC)  Assessment & Plan  -Currently rate controlled.  -Was switched from Cardizem to Metoprolol succinate XL.  -Continue Toprol XL in setting of low EF.  -Continue Eliquis 2.5 mg BID.    Hypertension  Assessment & Plan  - Blood pressure was 135/75 this morning. His baseline BP is elevated.  - Continue Losartan 50 mg QD with holding parameters if to become hypotensive.  - if BP continues to be in the lower side, consider decreasing losartan to 25 mg daily  - Monitor orthostatic vitals.  - Avoid hypotension.    Shortness of breath  Assessment & Plan  - Patient has history of COPD.  - Received a steroids on admission.  -  Nebulized bronchodilators.  - Continue 3L NC (baseline 3L O2 at home).  - Continue encouraging incentive spirometry.    * Acute encephalopathy  Assessment & Plan  -Patient is AAO x 2.  -Patient is high risk of delirium due to dementia at baseline, pain, hospitalization, insomnia, metabolic imbalance, shingles, diverticulitis.  -Continue delirium precautions.  -Continue maintaining normal sleep/wake cycle.  -Minimize overnight interruptions, group overnight vitals/labs/nursing checks as possible.  -Dim lights, close blinds and turn off tv to minimize stimulation and encourage sleep environment in evenings.  -Continue controlling pain with Tylenol 975 mg Q8H.  -Continue monitoring for fecal and urinary retention which may precipitate delirium.   -Patient reports a large bowel movement on Saturday and another on Sunday. Continue Senna 2 tabs BID.  -Encourage early mobilization and ambulation. Continue working with PT to assist in returning to baseline ambulation status.  -Provide frequent reorientation and redirection as needed.  -Encourage family and friends at the bedside to help calm patient if anxious.  -Avoid medications which may precipitate or worsen delirium such as tramadol, benzodiazepine, anticholinergics, and antihistaminics.  -Encourage hydration and nutrition; assist with feeding if needed.  -Redirect unwanted behaviors as first line, avoid physical restraints.   -MRI results as follows:  Small acute infarct in the left parietal periventricular white matter with an associated small focus of susceptibility that may represent associated petechial hemorrhage.  Redemonstrated foci of susceptibility, including new foci, nonspecific and could be seen with cerebral amyloid angiopathy, multiple cavernomas and/or prior embolic events, among other considerations.  -Spot EEG interpretation as follows:  Excessive theta activity during wakefulness suggest mild nonspecific diffuse cerebral dysfunction.  No epileptiform  "discharges or seizures are present.  -Neurology following.  -Appreciate PT/OT recommendation of [facility].  -Continue Depakote 500 mg Q8H and monitor level.  -Appreciate speech pathologist evaluation.  -Continue Melatonin 3 mg QHS         Subjective:   An 84-year-old male presents for a geriatric progress visit. He reports eating, drinking, moving his bowels on Saturday and Sunday, and urinating frequently. Patient denies any dysuria, hematuria, or issues with urinating. He denies dysphagia or sore throat; he can eat without difficulty but stats the food is \"hard\"/\"poor\" in quality. He slept \"alright\", falling asleep at midnight. He denies being in any present pain. The patient states he is ready to be discharged.    Review of Systems   Constitutional:  Negative for appetite change, chills, diaphoresis, fatigue and fever.   HENT:  Positive for hearing loss. Negative for congestion, ear discharge, ear pain, postnasal drip, rhinorrhea, sore throat and trouble swallowing.         [place] [facility].   Eyes:  Positive for visual disturbance. Negative for pain, discharge, redness and itching.   Respiratory:  Positive for cough and shortness of breath. Negative for chest tightness.    Cardiovascular:  Negative for chest pain and palpitations.   Gastrointestinal:  Negative for abdominal distention, abdominal pain, blood in stool, constipation, diarrhea, nausea and vomiting.   Genitourinary:  Positive for frequency. Negative for difficulty urinating, dysuria, hematuria and urgency.   Musculoskeletal:  Positive for myalgias. Negative for arthralgias and back pain.   Skin:  Positive for wound. Negative for color change and rash.   Neurological:  Positive for dizziness and light-headedness. Negative for seizures, numbness and headaches.   Psychiatric/Behavioral:  Positive for sleep disturbance.    All other systems reviewed and are negative.        Objective:     Vitals: Blood pressure 135/75, pulse 72, temperature 97.9 °F (36.6 °C), resp. rate " "17, height 6' 2\" (1.88 m), weight 74.3 kg (163 lb 12.8 oz), SpO2 99%.,Body mass index is 21.03 kg/m².      Intake/Output Summary (Last 24 hours) at 6/24/2024 1314  Last data filed at 6/24/2024 1001  Gross per 24 hour   Intake --   Output 1075 ml   Net -1075 ml       Current Medications: Reviewed    Physical Exam:   Physical Exam  Vitals and nursing note reviewed.   Constitutional:       General: He is not in acute distress.     Appearance: He is well-developed. He is not ill-appearing, toxic-appearing or diaphoretic.      Comments: Frail looking.   HENT:      Head: Normocephalic and atraumatic.      Mouth/Throat:      Mouth: Mucous membranes are dry.   Eyes:      General: No scleral icterus.        Right eye: No discharge.         Left eye: No discharge.      Conjunctiva/sclera: Conjunctivae normal.   Cardiovascular:      Rate and Rhythm: Normal rate and regular rhythm.      Heart sounds: Normal heart sounds. No murmur heard.  Pulmonary:      Effort: Pulmonary effort is normal. No respiratory distress.      Breath sounds: Normal breath sounds.   Chest:      Chest wall: No tenderness.   Abdominal:      General: Bowel sounds are normal. There is no distension.      Palpations: Abdomen is soft.      Tenderness: There is no abdominal tenderness. There is no guarding or rebound.      Hernia: A hernia is present.   Musculoskeletal:         General: No swelling.      Cervical back: Neck supple.      Right lower leg: No edema.      Left lower leg: No edema.   Skin:     General: Skin is warm and dry.      Capillary Refill: Capillary refill takes less than 2 seconds.      Coloration: Skin is not jaundiced.      Findings: Bruising and rash present. No erythema.      Comments: Zoster rash with crusted over lesions.   Neurological:      Mental Status: He is alert.      Gait: Gait abnormal.      Comments: AAO x  2.   Psychiatric:         Mood and Affect: Mood normal.         Behavior: Behavior normal.          Invasive Devices  "      Peripheral Intravenous Line  Duration             Peripheral IV 06/19/24 Proximal;Right;Ventral (anterior) Forearm 5 days    Peripheral IV 06/21/24 Left Antecubital 3 days                    Lab, Imaging and other studies: I have personally reviewed pertinent reports.

## 2024-06-24 NOTE — PLAN OF CARE
LATE ENTRY  Problem: PHYSICAL THERAPY ADULT  Goal: Performs mobility at highest level of function for planned discharge setting.  See evaluation for individualized goals.  Description: Treatment/Interventions: Functional transfer training, LE strengthening/ROM, Therapeutic exercise, Elevations, Endurance training, Cognitive reorientation, Patient/family training, Equipment eval/education, Bed mobility, Gait training, Spoke to nursing  Equipment Recommended: Walker       See flowsheet documentation for full assessment, interventions and recommendations.  6/24/2024 1211 by Jazzy Guaman PT  Note: Prognosis: Fair  Problem List: Decreased strength, Decreased range of motion, Decreased endurance, Impaired balance, Decreased mobility, Decreased cognition, Impaired judgement, Decreased safety awareness, Impaired hearing (gait deviations)  Assessment: pt began tx session lying supine in the bed as pt was agreeable to participateb in PT intervebntion. Pt intervention to focus on bed mobility, transfer training, TE activities, posture/balance w/ gait and stair trials. In comparison to previous PT interventions pt continues to be /s for all bed mobility and require /s to min Ax1 for all functional transfers to and from RW. pt does require VC's for hand placement as pt demonstrated limited carryover from previous tx sessions for hand placement and safety. pt did increase ambulation distance to 60'x1 RW but continues to require min ax1 for safety and balance. Additional ambulation was not possible due to fatigue as pt required several seated therapeutic rest breaks in todays tx session. pt did participate in TE activities in the recliner in order to strengthen LE's in efforts in strengthening LE and resducing risk fro falls and injuries with all OOB activities. pt educated with verbal/visual demonstration on all safe stair trial strategies prior to initiating steps. pt required min Ax1, bilateral hand rails in order to  completed 3 steps in todays tx session. psot tx session pt in recliner with call bell, chair alarm activated and all pt needs met. Continue to recomemnd Dc w/ level 1 maximal rehab resource intensity when medically cleared  Barriers to Discharge: Inaccessible home environment, Decreased caregiver support     Rehab Resource Intensity Level, PT: I (Maximum Resource Intensity)    See flowsheet documentation for full assessment.

## 2024-06-24 NOTE — ASSESSMENT & PLAN NOTE
Eliquis increased back to 5mg BID  Previously rate controlled with Cardizem, however it was transitioned to Toprol XL in setting of low EF

## 2024-06-24 NOTE — PROGRESS NOTES
[facility]  Progress Note  Name: Hal Miller I  MRN: [id number]  Unit/Bed#: S -01 I Date of Admission: 6/16/2024   Date of Service: 6/24/2024 I Hospital Day: 7    Assessment & Plan   * Acute encephalopathy  Assessment & Plan  Patient developed confusion; neurology consulted for concerns of brief seizures in setting of Zoster  Stroke alert; MRI  showed small acute infarct in the left parietal periventricular white matter with associated small focus of susceptibility may represent associated petechial hemorrhage.    Concern for cardioembolic secondary to holding anticoagulation drugs      6/23/24: RESOLVED  PT/OT is recommending ARC.  MRA was WNL  Continue Depakote    Heart failure (HCC)  Assessment & Plan  Wt Readings from Last 3 Encounters:   06/23/24 74.3 kg (163 lb 12.8 oz)   06/07/24 76 kg (167 lb 8.8 oz)   04/04/23 82.1 kg (181 lb)     Patient got echo as part of stroke work up  EF was found to be 30%  Patient is euvolemic without increased O2 requirements or chest pain  Cardiology consulted:  They plan to follow up with outpatient office on Monday  Considering switch from Cardizem to beta-blocker but patient              Colonic diverticular abscess  Assessment & Plan  Presents with left groin pain, abd pain   Enlarging gas and fluid collection extending from the antimesenteric border of the sigmoid colon, infiltrating the left inguinal region soft tissue and coursing under and along the lateral margin of the left inguinal canal. 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  IR not planning abscess drainage at this time.    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  After discussion with neurology they believe this may have been due to cardioembolic secondary to holding anticoagulation drugs.  They are continuing with Depakote.    Severe protein-calorie malnutrition (HCC)  Assessment & Plan  Malnutrition Findings:   Adult Malnutrition type: Acute illness  Adult Degree of Malnutrition: Other severe protein calorie malnutrition  Malnutrition Characteristics: Weight loss, Inadequate energy                  360 Statement: Malnutrition related to inadequate energy intake as evidenced by 12#(7%) weight loss from 5/15/# to 6/7/# and <50% energy intake compared to estimated needs >5 days.    BMI Findings:           Body mass index is 21.03 kg/m².       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 No evidence of soft tissue component. A benign etiology is suspected such as retroperitoneal lymphatic malformation or lymphocele. Initial   evaluation in 3 months with contrast-enhanced CT abdomen/pelvis is recommended.  Outpt followup     Type 2 diabetes mellitus (HCC)  Assessment & Plan  Lab Results   Component Value Date    HGBA1C 7.6 (H) 06/19/2024       Recent Labs     06/23/24  1609 06/23/24  2049 06/24/24  0019 06/24/24  0730   POCGLU 155* 167* 101 101         Blood Sugar Average: Last 72 hrs:  (P) 141.75      CKD (chronic kidney disease)  Assessment  & Plan  Lab Results   Component Value Date    EGFR 48 06/24/2024    EGFR 54 06/23/2024    EGFR 52 06/22/2024    CREATININE 1.34 (H) 06/24/2024    CREATININE 1.22 06/23/2024    CREATININE 1.27 06/22/2024   CKD 3 baseline  Avoid nephrotoxic agents and renally dose medications  Renal function improving    Monitor BMP    Chronic obstructive pulmonary disease with acute exacerbation (HCC)  Assessment & Plan  History of COPD; no controller medications or breathing treatment since 2023    Plan:  Placed on Respiratory protocol  Currently on prednisone      Inflammatory polyarthropathy (HCC)  Assessment & Plan  PTA regimen hydroxychloroquine on hold  Continue prednisone     Atrial fibrillation (HCC)  Assessment & Plan  Eliquis increased back to 5mg BID  Previously rate controlled with Cardizem, however it was transitioned to Toprol XL in setting of low EF    Hypertension  Assessment & Plan  Previously on Cardizem; transitioned to Toprol XL in setting of low EF as rec by caridology  Continue Losartan    Shortness of breath  Assessment & Plan  Presents with dyspnea, nonproductive cough with post tussive vomiting  Solumedrol with transition to prednisone ( takes 2.5  mg daily )  Nebulized bronchodilators            VTE Pharmacologic Prophylaxis:   High Risk (Score >/= 5) - Pharmacological DVT Prophylaxis Ordered: apixaban (Eliquis). Sequential Compression Devices Ordered.    Mobility:   Basic Mobility Inpatient Raw Score: 18  JH-HLM Goal: 6: Walk 10 steps or more  JH-HLM Achieved: 7: Walk 25 feet or more  JH-HLM Goal achieved. Continue to encourage appropriate mobility.    Patient Centered Rounds: I performed bedside rounds with nursing staff today.  Discussions with Specialists or Other Care Team Provider: Cardiology, neurology    Education and Discussions with Family / Patient: Attempted to update  (wife) via phone. Left voicemail.     Current Length of Stay: 7 day(s)  Current Patient Status: Inpatient    Discharge Plan: Anticipate discharge in 24-48 hrs to rehab facility.    Code Status: Level 1 - Full Code    Subjective:   No overnight events. Patient today is reporting that he feels well today. He still endorses some generalized weakness and he expressed that he looks forward to going to rehab. He denies chest pain, shortness of breath, abdominal pain, nausea, diarrhea, or constipation. He had one bowel movement yesterday.     Objective:     Vitals:   Temp (24hrs), Av.9 °F (36.6 °C), Min:97.6 °F (36.4 °C), Max:98.1 °F (36.7 °C)    Temp:  [97.6 °F (36.4 °C)-98.1 °F (36.7 °C)] 97.6 °F (36.4 °C)  HR:  [60-72] 72  Resp:  [16-18] 16  BP: (121-137)/(63-78) 137/78  SpO2:  [97 %-100 %] 99 %  Body mass index is 21.03 kg/m².     Input and Output Summary (last 24 hours):     Intake/Output Summary (Last 24 hours) at 2024 1543  Last data filed at 2024 1001  Gross per 24 hour   Intake --   Output 1075 ml   Net -1075 ml       Physical Exam:   Physical Exam  Constitutional:       Appearance: Normal appearance.   HENT:      Head: Normocephalic.   Cardiovascular:      Pulses: Normal pulses.      Heart sounds: Normal heart sounds.   Pulmonary:      Effort: Pulmonary effort is normal.      Breath sounds: Normal breath sounds.   Abdominal:      General: Bowel sounds are normal.      Tenderness: There is no abdominal tenderness.   Neurological:      General: No focal deficit present.      Mental Status: He is alert and oriented to person, place, and time.          Additional Data:     Labs:  Results from last 7 days   Lab Units 24  0630 24  0413   WBC Thousand/uL 7.71 7.17   HEMOGLOBIN g/dL 12.0 11.2*   HEMATOCRIT % 36.5 35.0*   PLATELETS Thousands/uL 189 182   SEGS PCT %  --  76*   LYMPHO PCT %  --  11*   MONO PCT %  --  10   EOS PCT %  --  2     Results from last 7 days   Lab Units 24  0630 24  0413   SODIUM mmol/L 132* 131*   POTASSIUM mmol/L 4.9 4.7   CHLORIDE mmol/L 100 101   CO2 mmol/L 30  25   BUN mg/dL 19 20   CREATININE mg/dL 1.34* 1.22   ANION GAP mmol/L 2* 5   CALCIUM mg/dL 9.8 9.3   ALBUMIN g/dL  --  3.0*   TOTAL BILIRUBIN mg/dL  --  0.84   ALK PHOS U/L  --  89   ALT U/L  --  14   AST U/L  --  15   GLUCOSE RANDOM mg/dL 106 112     Results from last 7 days   Lab Units 06/19/24  1611   INR  1.27*     Results from last 7 days   Lab Units 06/24/24  1211 06/24/24  0730 06/24/24  0019 06/23/24  2049 06/23/24  1609 06/23/24  1116 06/23/24  0726 06/23/24  0409 06/22/24  2117 06/22/24  1625 06/22/24  1110 06/22/24  0716   POC GLUCOSE mg/dl 121 101 101 167* 155* 170* 109 118 112 180* 170* 88     Results from last 7 days   Lab Units 06/19/24  1153   HEMOGLOBIN A1C % 7.6*     Results from last 7 days   Lab Units 06/20/24  0539 06/19/24  1153 06/19/24  0016 06/18/24  2131   LACTIC ACID mmol/L  --  1.0 1.1 2.8*   PROCALCITONIN ng/ml 0.08 0.11  --   --        Lines/Drains:  Invasive Devices       Peripheral Intravenous Line  Duration             Peripheral IV 06/19/24 Proximal;Right;Ventral (anterior) Forearm 5 days    Peripheral IV 06/21/24 Left Antecubital 3 days                          Imaging: No pertinent imaging reviewed.    Recent Cultures (last 7 days):   Results from last 7 days   Lab Units 06/19/24  1610   BLOOD CULTURE  No Growth After 4 Days.  No Growth After 4 Days.       Last 24 Hours Medication List:   Current Facility-Administered Medications   Medication Dose Route Frequency Provider Last Rate    acetaminophen  650 mg Oral Q4H PRN Lyubov Malone MD      albuterol  2 puff Inhalation Q4H PRN Lyubov Malone MD      apixaban  2.5 mg Oral BID Jose Cesar MD      atorvastatin  40 mg Oral Daily With Dinner Hal Zamarripa MD      bisacodyl  10 mg Rectal Daily Ravi Lopez MD      budesonide  0.5 mg Nebulization Q12H Jose Cesar MD      ciprofloxacin  500 mg Oral Q12H YUNI Monika Alfonso MD      divalproex sodium  500 mg Oral Q8H YUNI Hal Zamarripa MD      folic acid 1 mg, thiamine  (VITAMIN B1) 100 mg in sodium chloride 0.9 % 100 mL IV piggyback   Intravenous Daily Lyubov Malone  mL/hr at 06/21/24 1111    formoterol  20 mcg Nebulization Q12H Jose Cesar MD      HYDROmorphone  0.2 mg Intravenous Q4H PRN Lyubov Malone MD      insulin lispro  1-5 Units Subcutaneous 4x Daily (AC & HS) Lyubov Malone MD      lidocaine   Topical 4x Daily PRN Lyubov Malone MD      losartan  50 mg Oral Daily Lyubov Malone MD      melatonin  3 mg Oral HS Katy Sykes MD      metoprolol succinate  25 mg Oral Daily Muriel Patterson MD      metroNIDAZOLE  500 mg Oral Q8H YUNI Monika Alfonso MD      pantoprazole  40 mg Oral Daily Before Breakfast Monika Alfonso MD      predniSONE  1 mg Oral Daily Lyubov Malone MD      senna  2 tablet Oral BID Katy Sykes MD      valACYclovir  1,000 mg Oral BID Muriel Patterson MD          Today, Patient Was Seen By: Daniel Ames DO    **Please Note: This note may have been constructed using a voice recognition system.**

## 2024-06-25 LAB
ANION GAP SERPL CALCULATED.3IONS-SCNC: 3 MMOL/L (ref 4–13)
BASOPHILS # BLD AUTO: 0.02 THOUSANDS/ÂΜL (ref 0–0.1)
BASOPHILS NFR BLD AUTO: 0 % (ref 0–1)
BUN SERPL-MCNC: 21 MG/DL (ref 5–25)
CALCIUM SERPL-MCNC: 9.5 MG/DL (ref 8.4–10.2)
CHLORIDE SERPL-SCNC: 99 MMOL/L (ref 96–108)
CO2 SERPL-SCNC: 29 MMOL/L (ref 21–32)
CREAT SERPL-MCNC: 1.23 MG/DL (ref 0.6–1.3)
EOSINOPHIL # BLD AUTO: 0.14 THOUSAND/ÂΜL (ref 0–0.61)
EOSINOPHIL NFR BLD AUTO: 2 % (ref 0–6)
ERYTHROCYTE [DISTWIDTH] IN BLOOD BY AUTOMATED COUNT: 14 % (ref 11.6–15.1)
GFR SERPL CREATININE-BSD FRML MDRD: 54 ML/MIN/1.73SQ M
GLUCOSE SERPL-MCNC: 102 MG/DL (ref 65–140)
GLUCOSE SERPL-MCNC: 113 MG/DL (ref 65–140)
GLUCOSE SERPL-MCNC: 125 MG/DL (ref 65–140)
GLUCOSE SERPL-MCNC: 161 MG/DL (ref 65–140)
GLUCOSE SERPL-MCNC: 167 MG/DL (ref 65–140)
HCT VFR BLD AUTO: 35.4 % (ref 36.5–49.3)
HGB BLD-MCNC: 11.1 G/DL (ref 12–17)
IMM GRANULOCYTES # BLD AUTO: 0.06 THOUSAND/UL (ref 0–0.2)
IMM GRANULOCYTES NFR BLD AUTO: 1 % (ref 0–2)
LYMPHOCYTES # BLD AUTO: 0.57 THOUSANDS/ÂΜL (ref 0.6–4.47)
LYMPHOCYTES NFR BLD AUTO: 9 % (ref 14–44)
MAGNESIUM SERPL-MCNC: 1.9 MG/DL (ref 1.9–2.7)
MCH RBC QN AUTO: 30.4 PG (ref 26.8–34.3)
MCHC RBC AUTO-ENTMCNC: 31.4 G/DL (ref 31.4–37.4)
MCV RBC AUTO: 97 FL (ref 82–98)
MONOCYTES # BLD AUTO: 1.05 THOUSAND/ÂΜL (ref 0.17–1.22)
MONOCYTES NFR BLD AUTO: 16 % (ref 4–12)
NEUTROPHILS # BLD AUTO: 4.81 THOUSANDS/ÂΜL (ref 1.85–7.62)
NEUTS SEG NFR BLD AUTO: 72 % (ref 43–75)
NRBC BLD AUTO-RTO: 0 /100 WBCS
PLATELET # BLD AUTO: 186 THOUSANDS/UL (ref 149–390)
PMV BLD AUTO: 9 FL (ref 8.9–12.7)
POTASSIUM SERPL-SCNC: 4.6 MMOL/L (ref 3.5–5.3)
RBC # BLD AUTO: 3.65 MILLION/UL (ref 3.88–5.62)
SODIUM SERPL-SCNC: 131 MMOL/L (ref 135–147)
WBC # BLD AUTO: 6.65 THOUSAND/UL (ref 4.31–10.16)

## 2024-06-25 PROCEDURE — 97110 THERAPEUTIC EXERCISES: CPT

## 2024-06-25 PROCEDURE — 94640 AIRWAY INHALATION TREATMENT: CPT

## 2024-06-25 PROCEDURE — 97535 SELF CARE MNGMENT TRAINING: CPT

## 2024-06-25 PROCEDURE — 80048 BASIC METABOLIC PNL TOTAL CA: CPT

## 2024-06-25 PROCEDURE — 83735 ASSAY OF MAGNESIUM: CPT

## 2024-06-25 PROCEDURE — 99233 SBSQ HOSP IP/OBS HIGH 50: CPT | Performed by: FAMILY MEDICINE

## 2024-06-25 PROCEDURE — 94760 N-INVAS EAR/PLS OXIMETRY 1: CPT

## 2024-06-25 PROCEDURE — 99232 SBSQ HOSP IP/OBS MODERATE 35: CPT | Performed by: INTERNAL MEDICINE

## 2024-06-25 PROCEDURE — 97116 GAIT TRAINING THERAPY: CPT

## 2024-06-25 PROCEDURE — 82948 REAGENT STRIP/BLOOD GLUCOSE: CPT

## 2024-06-25 PROCEDURE — 85025 COMPLETE CBC W/AUTO DIFF WBC: CPT

## 2024-06-25 RX ADMIN — INSULIN LISPRO 1 UNITS: 100 INJECTION, SOLUTION INTRAVENOUS; SUBCUTANEOUS at 21:16

## 2024-06-25 RX ADMIN — DIVALPROEX SODIUM 500 MG: 500 TABLET, DELAYED RELEASE ORAL at 01:15

## 2024-06-25 RX ADMIN — CIPROFLOXACIN HYDROCHLORIDE 500 MG: 500 TABLET, FILM COATED ORAL at 09:07

## 2024-06-25 RX ADMIN — BUDESONIDE 0.5 MG: 0.5 INHALANT ORAL at 20:11

## 2024-06-25 RX ADMIN — APIXABAN 5 MG: 5 TABLET, FILM COATED ORAL at 09:05

## 2024-06-25 RX ADMIN — LOSARTAN POTASSIUM 50 MG: 50 TABLET, FILM COATED ORAL at 09:05

## 2024-06-25 RX ADMIN — FORMOTEROL FUMARATE DIHYDRATE 20 MCG: 20 SOLUTION RESPIRATORY (INHALATION) at 20:11

## 2024-06-25 RX ADMIN — METRONIDAZOLE 500 MG: 500 TABLET ORAL at 16:56

## 2024-06-25 RX ADMIN — METOPROLOL SUCCINATE 25 MG: 25 TABLET, EXTENDED RELEASE ORAL at 09:06

## 2024-06-25 RX ADMIN — APIXABAN 5 MG: 5 TABLET, FILM COATED ORAL at 17:02

## 2024-06-25 RX ADMIN — INSULIN LISPRO 1 UNITS: 100 INJECTION, SOLUTION INTRAVENOUS; SUBCUTANEOUS at 16:58

## 2024-06-25 RX ADMIN — DIVALPROEX SODIUM 500 MG: 500 TABLET, DELAYED RELEASE ORAL at 09:06

## 2024-06-25 RX ADMIN — SENNOSIDES 17.2 MG: 8.6 TABLET, FILM COATED ORAL at 09:05

## 2024-06-25 RX ADMIN — THIAMINE HYDROCHLORIDE: 100 INJECTION, SOLUTION INTRAMUSCULAR; INTRAVENOUS at 10:06

## 2024-06-25 RX ADMIN — PANTOPRAZOLE SODIUM 40 MG: 40 TABLET, DELAYED RELEASE ORAL at 05:48

## 2024-06-25 RX ADMIN — DIVALPROEX SODIUM 500 MG: 500 TABLET, DELAYED RELEASE ORAL at 16:56

## 2024-06-25 RX ADMIN — FORMOTEROL FUMARATE DIHYDRATE 20 MCG: 20 SOLUTION RESPIRATORY (INHALATION) at 07:03

## 2024-06-25 RX ADMIN — CIPROFLOXACIN HYDROCHLORIDE 500 MG: 500 TABLET, FILM COATED ORAL at 21:11

## 2024-06-25 RX ADMIN — Medication 3 MG: at 21:11

## 2024-06-25 RX ADMIN — SENNOSIDES 17.2 MG: 8.6 TABLET, FILM COATED ORAL at 17:02

## 2024-06-25 RX ADMIN — ATORVASTATIN CALCIUM 40 MG: 40 TABLET, FILM COATED ORAL at 16:56

## 2024-06-25 RX ADMIN — METRONIDAZOLE 500 MG: 500 TABLET ORAL at 01:15

## 2024-06-25 RX ADMIN — BUDESONIDE 0.5 MG: 0.5 INHALANT ORAL at 07:03

## 2024-06-25 RX ADMIN — METRONIDAZOLE 500 MG: 500 TABLET ORAL at 09:06

## 2024-06-25 RX ADMIN — PREDNISONE 1 MG: 1 TABLET ORAL at 09:07

## 2024-06-25 NOTE — ASSESSMENT & PLAN NOTE
Lab Results   Component Value Date    EGFR 54 06/25/2024    EGFR 48 06/24/2024    EGFR 54 06/23/2024    CREATININE 1.23 06/25/2024    CREATININE 1.34 (H) 06/24/2024    CREATININE 1.22 06/23/2024   CKD 3 baseline  Avoid nephrotoxic agents and renally dose medications  Renal function improving    Monitor BMP

## 2024-06-25 NOTE — PROGRESS NOTES
Progress Note - Geriatric Medicine   Hal Miller 84 y.o. male MRN: [id number]  Unit/Bed#: S -01 Encounter: [id number]      Assessment/Plan:  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HCC)  Assessment & Plan  -MRI results as follows:  1. Small acute infarct in the left parietal periventricular white matter with an associated small focus of susceptibility that may represent associated petechial hemorrhage.                2. Redemonstrated foci of susceptibility, including new foci, nonspecific and could be seen with cerebral amyloid angiopathy, multiple cavernomas and/or prior embolic events, among other considerations.  -Blood pressure is 135/75 today. His baseline is elevated. Maintain appropriate blood pressure and glucose control.    -Neurology believes stroke d/t withholding anticoagulation.  -Continue Eliquis 2.5 mg BID.  -Continue Depakote 500 mg Q8H and monitor level.  -Continue home Pravastatin 40 mg QD.  -Neurology follows appreciate input.    History of Whipple procedure  Assessment & Plan  -History of pancreatic cancer s/p Whipple Procedure in 2011.  -Continue Folic Acid 1 mg supplementation QD.  -Continue Thiamine 100 mg supplementation QD.    Episode of seizure-like activity  Assessment & Plan  -Continue Depakote 500 mg Q8H.  -MRI results as follows:  Small acute infarct in the left parietal periventricular white matter with an associated small focus of susceptibility that may represent associated petechial hemorrhage.  Redemonstrated foci of susceptibility, including new foci, nonspecific and could be seen with cerebral amyloid angiopathy, multiple cavernomas and/or prior embolic events, among other considerations.  -Spot EEG interpretation as follows:  Excessive theta activity during wakefulness suggest mild nonspecific diffuse cerebral dysfunction.  No epileptiform discharges or seizures are present.  -Neurology following.    Ambulatory dysfunction  Assessment & Plan  -Patient was walking independently prior to  admission to hospital.   -Patient continues to require assistance upon his current state, though he is improving.  -Continue PT/OT in attempt to return to baseline.   -Patient reports walking 90 steps with walker today.  -Continue to monitor orthostatic vitals.  -Continue fall precautions.    Insomnia  Assessment & Plan  -Continue to hold Seroquel 25 mg QHS with last dose administered 6/18.   -Patient's sleep was controlled at home on Seroquel 25 mg QHS- currently on hold.  -Patient reports sleeping well.  -Encourage good sleep hygiene.  -Avoid sedative hypnotics.  -Continue Melatonin 3 mg QHS.     Dizziness  Assessment & Plan  - Check/monitor orthostatic vital signs.  - Maintain hydration status.  - Fall precautions.    Vascular dementia (HCC)  Assessment & Plan  -Patient was AAO x 2-3. At his baseline.  -Continue to provide supportive care and reorient as needed.  -Patient is at high risk for delirium. Continue to monitor closely and stay on delirium precautions.  -Maintain sleep/wake cycle.  -Monitor for constipation and urinary retention and manage as needed.  -Continue encouraging family to visit.  -Continue encouraging patient to wear glasses and hearing aids while awake.  -Continue to monitor TSH/B12 levels. TSH and B12 WNL.  -Continue encouraging PO intake and assist with feeding if needed.    Colonic diverticular abscess  Assessment & Plan  -General surgery follows. Surgery not recommending any interventions at this time. IR not planning abscess drainage at this time.   -Continue PO hydration and eating. Eating regular diet.  -Continue Metronidazole 500 mg PO Q8H.  -Continue Ciprofloxacin 500 mg PO Q12H.  -Outpatient follow-up recommended.    Zoster  Assessment & Plan  -Zoster lesions are crusted over on right chest wall and right upper back.  -Completed Valtrex   -Continue pain regimen with Tylenol 975 mg PO TID. Currently patient denies pain    Abnormal CT of the abdomen  Assessment & Plan  -22 mm cystic  retroperitoneal lesion between the aorta and IVC which is increased in size since 2/20/2023.   -Outpatient follow-up recommended.    Type 2 diabetes mellitus (HCC)  Assessment & Plan  Lab Results   Component Value Date    HGBA1C 7.6 (H) 06/19/2024       Recent Labs     06/24/24  1608 06/24/24  2049 06/25/24  0753 06/25/24  1138   POCGLU 177* 120 102 125       Blood Sugar Average: Last 72 hrs:  (P) 131.4385528204396661    -Glucose currently controlled.  -Continue insulin regimen.  -Avoid hypoglycemia.     CKD (chronic kidney disease)  Assessment & Plan  Lab Results   Component Value Date    EGFR 54 06/25/2024    EGFR 48 06/24/2024    EGFR 54 06/23/2024    CREATININE 1.23 06/25/2024    CREATININE 1.34 (H) 06/24/2024    CREATININE 1.22 06/23/2024     -Baseline CKD Stage 3.  -Continue to monitor Creatinine, although it is stable. Cr 1.23 today.  -Avoid nephrotoxic medication and renally dose all medications.  -Encourage PO hydration.  -Continue monitoring BMP.     Inflammatory polyarthropathy (HCC)  Assessment & Plan  -Continue to hold Hydroxychloroquine with last dose administered 6/18.  -Continue Prednisone 1 mg QD.    Atrial fibrillation (HCC)  Assessment & Plan  -Currently rate controlled.  -Was switched from Cardizem to Metoprolol succinate XL.  -Continue Toprol XL in setting of low EF.  -Continue Eliquis 2.5 mg BID.    Hypertension  Assessment & Plan  -Blood pressure was 135/84 this morning. His baseline BP is elevated.  -Continue Losartan 50 mg QD with holding parameters if to become hypotensive.  -If BP continues to be in the lower side, consider decreasing Losartan to 25 mg QD.  -Continue Metoprolol succinate 25 mg QD with holding parameters if to become hypotensive.  -Continue monitoring orthostatic vitals.  -Avoid hypotension.    Shortness of breath  Assessment & Plan  -Patient has history of COPD.  -Received steroids on admission.  -Nebulized bronchodilators.  -Continue encouraging incentive spirometry.    *  Acute encephalopathy  Assessment & Plan  -Patient is AAO x 2-3. At his baseline.  -Patient is high risk of delirium due to recent episode of acute encephalopathy, dementia at baseline, pain, hospitalization, insomnia, metabolic imbalance, shingles, diverticulitis.Continue delirium precautions.  -Maintain normal sleep/wake cycle.  -Minimize overnight interruptions, group overnight vitals/labs/nursing checks as possible.  -Dim lights, close blinds and turn off tv to minimize stimulation and encourage sleep environment in evenings.  -Continue monitoring for fecal and urinary retention which may precipitate delirium.   -Patient reports two large bowel movements yesterday. Continue Senna 2 tabs BID.  -Encourage early mobilization and ambulation. Continue working with PT to assist in returning to baseline ambulation status.  -Provide frequent reorientation and redirection as needed.  -Encourage family and friends at the bedside to help calm patient if anxious.  -Avoid medications which may precipitate or worsen delirium such as tramadol, benzodiazepine, anticholinergics, and antihistaminics.  -Encourage hydration and nutrition; assist with feeding if needed.  -Redirect unwanted behaviors as first line, avoid physical restraints.   -MRI results as follows:  Small acute infarct in the left parietal periventricular white matter with an associated small focus of susceptibility that may represent associated petechial hemorrhage.  Redemonstrated foci of susceptibility, including new foci, nonspecific and could be seen with cerebral amyloid angiopathy, multiple cavernomas and/or prior embolic events, among other considerations.  -Spot EEG interpretation as follows:  Excessive theta activity during wakefulness suggest mild nonspecific diffuse cerebral dysfunction.  No epileptiform discharges or seizures are present.  -Neurology following.  -Appreciate PT/OT recommendation of [facility].  -Appreciate speech pathologist  evaluation.  -Continue controlling pain with Tylenol 975 mg Q8H.  -Continue Depakote 500 mg Q8H and monitor level.  -Continue Melatonin 3 mg QHS       Subjective:   Hal is an 84-year-old male that presents for a geriatric progress visit. He reports sleeping well and having two large bowel movements yesterday. He has not yet moved his bowels this morning. He is eating, drinking, and urinating at his baseline; he reports his appetite has improved this AM. Hal reports feeling stronger today and states he had more energy with physical therapy this morning, but he did get slightly tired upon ambulating. He denies being in any present pain.    Review of Systems   Constitutional:  Positive for fatigue. Negative for appetite change, chills, diaphoresis and fever.        Appetite improved. Tired upon PT.   HENT:  Negative for ear discharge, ear pain, hearing loss, postnasal drip, rhinorrhea, sneezing, sore throat and trouble swallowing.         [place].   Eyes: Negative.  Negative for pain, discharge, redness, itching and visual disturbance.        Wears glasses.   Respiratory:  Negative for cough, chest tightness and shortness of breath.    Cardiovascular:  Negative for chest pain, palpitations and leg swelling.   Gastrointestinal:  Negative for abdominal distention, abdominal pain, anal bleeding, blood in stool, constipation, diarrhea, nausea and vomiting.   Endocrine: Negative for cold intolerance and heat intolerance.   Genitourinary:  Positive for frequency. Negative for difficulty urinating, dysuria, hematuria and urgency.   Musculoskeletal:  Positive for gait problem and myalgias. Negative for arthralgias, back pain, joint swelling, neck pain and neck stiffness.   Skin:  Positive for rash and wound. Negative for color change.   Neurological:  Positive for weakness. Negative for dizziness, seizures, syncope, light-headedness and headaches.        Weakness upon PT.   Psychiatric/Behavioral:  Negative for agitation,  "confusion and sleep disturbance.    All other systems reviewed and are negative.        Objective:     Vitals: Blood pressure 135/84, pulse 76, temperature 97.7 °F (36.5 °C), resp. rate 20, height 6' 2\" (1.88 m), weight 86.5 kg (190 lb 12.8 oz), SpO2 96%.,Body mass index is 24.5 kg/m².      Intake/Output Summary (Last 24 hours) at 6/25/2024 1416  Last data filed at 6/25/2024 1100  Gross per 24 hour   Intake 60 ml   Output 325 ml   Net -265 ml       Current Medications: Reviewed    Physical Exam:   Physical Exam  Vitals and nursing note reviewed.   Constitutional:       General: He is not in acute distress.     Appearance: Normal appearance. He is well-developed. He is not ill-appearing, toxic-appearing or diaphoretic.   HENT:      Head: Normocephalic and atraumatic.      Mouth/Throat:      Mouth: Mucous membranes are dry.   Eyes:      General: No scleral icterus.        Right eye: No discharge.         Left eye: No discharge.      Conjunctiva/sclera: Conjunctivae normal.   Cardiovascular:      Rate and Rhythm: Regular rhythm. Tachycardia present.      Heart sounds: Normal heart sounds. No murmur heard.     No friction rub.   Pulmonary:      Effort: Pulmonary effort is normal. No respiratory distress.      Breath sounds: Normal breath sounds.   Chest:      Chest wall: No tenderness.   Abdominal:      General: Bowel sounds are normal. There is no distension.      Palpations: Abdomen is soft. There is no mass.      Tenderness: There is no abdominal tenderness. There is no guarding or rebound.      Hernia: A hernia is present.   Musculoskeletal:         General: No swelling or deformity.      Cervical back: Neck supple. No rigidity or tenderness.   Lymphadenopathy:      Cervical: No cervical adenopathy.   Skin:     General: Skin is warm and dry.      Capillary Refill: Capillary refill takes less than 2 seconds.      Coloration: Skin is not jaundiced.      Findings: Bruising and rash present. No erythema.      Comments: " Zoster rash with crusted over lesions.   Neurological:      Mental Status: He is alert. Mental status is at baseline.      Gait: Gait abnormal.      Comments: AAO x 2-3.   Psychiatric:         Mood and Affect: Mood normal.         Behavior: Behavior normal.         Thought Content: Thought content normal.          Invasive Devices       Peripheral Intravenous Line  Duration             Peripheral IV 06/21/24 Left Antecubital 4 days                    Lab, Imaging and other studies: I have personally reviewed pertinent reports.

## 2024-06-25 NOTE — OCCUPATIONAL THERAPY NOTE
Occupational Therapy Progress Note     Patient Name: Hal Miller  Today's Date: 6/25/2024  Problem List  Principal Problem:    Acute encephalopathy  Active Problems:    Shortness of breath    Hypertension    Atrial fibrillation (HCC)    Inflammatory polyarthropathy (HCC)    Chronic obstructive pulmonary disease with acute exacerbation (HCC)    CKD (chronic kidney disease)    Type 2 diabetes mellitus (HCC)    Abnormal CT of the abdomen    Zoster    Colonic diverticular abscess    Vascular dementia (HCC)    Insomnia    Ambulatory dysfunction    Severe protein-calorie malnutrition (HCC)    Episode of seizure-like activity    History of Whipple procedure    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HCC)    Heart failure (HCC)            06/25/24 0935   OT Last Visit   OT Visit Date 06/25/24   Note Type   Note Type Treatment   Pain Assessment   Pain Assessment Tool 0-10   Pain Score No Pain   Restrictions/Precautions   Weight Bearing Precautions Per Order No   Other Precautions Cognitive;Chair Alarm;Bed Alarm;Fall Risk   ADL   Where Assessed Chair   Eating Assistance 5  Supervision/Setup   Eating Deficit Setup;Supervision/safety;Increased time to complete   Grooming Assistance 5  Supervision/Setup   Grooming Deficit Wash/dry face;Increased time to complete;Verbal cueing   LB Dressing Assistance 5  Supervision/Setup   LB Dressing Deficit Thread RLE into pants;Thread LLE into pants;Pull up over hips;Fasteners;Increased time to complete;Supervision/safety;Requires assistive device for steadying   Toileting Assistance  4  Minimal Assistance   Toileting Deficit Use of bedpan/urinal setup;Increased time to complete;Perineal hygiene   Bed Mobility   Additional Comments Pt greeted OOB in recliner at start and end of OT tx session   Transfers   Sit to Stand 5  Supervision   Additional items Armrests;Increased time required;Verbal cues  (for hand placement during transfer)   Stand to Sit 5  Supervision   Additional items Increased time  required;Armrests  (Pt demonstrated carry over for safe hand placement)   Additional Comments Pt demonstrated 2 STS transfers   Functional Mobility   Functional Mobility 5  Supervision   Additional Comments ax1, short distance fnxl mobility to bathroom from recliner chair, benefits from increased time   Additional items Rolling walker   Cognition   Overall Cognitive Status Impaired  (increased time for processing)   Arousal/Participation Alert;Cooperative   Attention Within functional limits   Orientation Level Oriented X4   Memory Decreased recall of precautions   Following Commands Follows one step commands with increased time or repetition   Assessment   Assessment Pt was seen for skilled OT tx session on this date focusing on fnxl transfer training, ADLs, fnxl mobility, and activity engagement and participation. Pt completed STS from recliner chair x2 with supervision and verbal cues for safe transfers. Pt completed LB dressing, grooming and eating in recliner chair. Pt engaged in fnxl mobility to bathroom with RW and supervision. At the end of the session, pt returned to recliner chair w all needs met.   Plan   Goal Expiration Date 06/30/24   OT Treatment Day 2   OT Frequency 3-5x/wk   Discharge Recommendation   Rehab Resource Intensity Level, OT I (Maximum Resource Intensity)  (vs HHOT pending progress)   Additional Comments  The patient's raw score on the -PAC Daily Activity Inpatient Short Form is 18. A raw score of less than 19 suggests the patient may benefit from discharge to post-acute rehabilitation services. Please refer to the recommendation of the Occupational Therapist for safe discharge planning.   AM-PAC Daily Activity Inpatient   Lower Body Dressing 3   Bathing 3   Toileting 3   Upper Body Dressing 3   Grooming 3   Eating 3   Daily Activity Raw Score 18   Daily Activity Standardized Score (Calc for Raw Score >=11) 38.66   AM-PAC Applied Cognition Inpatient   Following a Speech/Presentation 3    Understanding Ordinary Conversation 4   Taking Medications 2   Remembering Where Things Are Placed or Put Away 3   Remembering List of 4-5 Errands 2   Taking Care of Complicated Tasks 2   Applied Cognition Raw Score 16   Applied Cognition Standardized Score 35.03   End of Consult   Education Provided Yes   Nurse Communication Nurse aware of consult       Angela Jorgensen, OTR/L

## 2024-06-25 NOTE — QUICK NOTE
[facility] Hospitalist Service Attending Physician Attestation Note - Progress Note    I have seen and examined Hal Miller personally and have reviewed the medical record independently.  I have reviewed the case with the resident physician including all assessments and the plan of care for each.  I agree with the resident physician and offer the following addendum to the below statements by the resident physician:     Date Evaluated: June 25, 2024  Time Evaluated: Morning    Subjective / HPI: This is an 84-year-old male known to me from prior encounter, who initially was being seen by surgery for possible intra-abdominal abscess drainage.  At the time Eliquis was on hold he was on this for atrial fibrillation.  He had some cognitive decline and then proceeded to be transferred to the ICU after a rapid response was called for seizure activity.    The workup did show that he was having seizures and also had an acute stroke.  Likely in the setting of Eliquis being held for possible procedure.      Now he is to his baseline as per his wife.  We have had a long discussion that if he ever needs to hold anticoagulation again he might need heparin bridging and to discuss this with his providers.    All the patient does appear to be medically stable for discharge at this juncture    Exam:   Heart sounds regular  Chest clear  Abdomen Soft nondistended, BS present  Awake and alert   Very pleasant and overall in a good mood.       Patient Centered Rounds: I have performed bedside rounds with nursing staff today.    Discussions with Specialists or Other Care Team Provider: Discussed with nursing and CM.     Education and Discussions with Family / Patient: Wally with the patient's wife, currently medically stable for discharge on 5 mg of Eliquis twice daily.  Still waiting for insurance authorization for rehab    Time Spent for Care: 30 minutes.  More than 50% of total time spent on counseling and coordination of care as described  above.    Current Length of Stay: 8 day(s)    Current Patient Status: Inpatient   Certification Statement: The patient will continue to require additional inpatient hospital stay due to awaiting rehab    Discharge Plan / Estimated Discharge Date: When rehab bed available    For detailed history, assessment, and plan of care, please review the statements below by Dr. Ames. .    Rachana Malone MD

## 2024-06-25 NOTE — ASSESSMENT & PLAN NOTE
Lab Results   Component Value Date    HGBA1C 7.6 (H) 06/19/2024       Recent Labs     06/24/24  1608 06/24/24  2049 06/25/24  0753 06/25/24  1138   POCGLU 177* 120 102 125         Blood Sugar Average: Last 72 hrs:  (P) [id number]

## 2024-06-25 NOTE — PLAN OF CARE
Problem: PAIN - ADULT  Goal: Verbalizes/displays adequate comfort level or baseline comfort level  Description: Interventions:  - Encourage patient to monitor pain and request assistance  - Assess pain using appropriate pain scale  - Administer analgesics based on type and severity of pain and evaluate response  - Implement non-pharmacological measures as appropriate and evaluate response  - Consider cultural and social influences on pain and pain management  - Notify physician/advanced practitioner if interventions unsuccessful or patient reports new pain  Outcome: Progressing     Problem: INFECTION - ADULT  Goal: Absence or prevention of progression during hospitalization  Description: INTERVENTIONS:  - Assess and monitor for signs and symptoms of infection  - Monitor lab/diagnostic results  - Monitor all insertion sites, i.e. indwelling lines, tubes, and drains  - Monitor endotracheal if appropriate and nasal secretions for changes in amount and color  - [place] appropriate cooling/warming therapies per order  - Administer medications as ordered  - Instruct and encourage patient and family to use good hand hygiene technique  - Identify and instruct in appropriate isolation precautions for identified infection/condition  Outcome: Progressing  Goal: Absence of fever/infection during neutropenic period  Description: INTERVENTIONS:  - Monitor WBC    Outcome: Progressing     Problem: SAFETY ADULT  Goal: Patient will remain free of falls  Description: INTERVENTIONS:  - Educate patient/family on patient safety including physical limitations  - Instruct patient to call for assistance with activity   - Consult OT/PT to assist with strengthening/mobility   - Keep Call bell within reach  - Keep bed low and locked with side rails adjusted as appropriate  - Keep care items and personal belongings within reach  - Initiate and maintain comfort rounds  - Make Fall Risk Sign visible to staff  - Offer Toileting every 2 Hours,  in advance of need  - Initiate/Maintain bed/chair alarm  - Obtain necessary fall risk management equipment: yes  - Apply yellow socks and bracelet for high fall risk patients  - Consider moving patient to room near nurses station  Outcome: Progressing  Goal: Maintain or return to baseline ADL function  Description: INTERVENTIONS:  -  Assess patient's ability to carry out ADLs; assess patient's baseline for ADL function and identify physical deficits which impact ability to perform ADLs (bathing, care of mouth/teeth, toileting, grooming, dressing, etc.)  - Assess/evaluate cause of self-care deficits   - Assess range of motion  - Assess patient's mobility; develop plan if impaired  - Assess patient's need for assistive devices and provide as appropriate  - Encourage maximum independence but intervene and supervise when necessary  - Involve family in performance of ADLs  - Assess for home care needs following discharge   - Consider OT consult to assist with ADL evaluation and planning for discharge  - Provide patient education as appropriate  Outcome: Progressing  Goal: Maintains/Returns to pre admission functional level  Description: INTERVENTIONS:  - Perform AM-PAC 6 Click Basic Mobility/ Daily Activity assessment daily.  - Set and communicate daily mobility goal to care team and patient/family/caregiver.   - Collaborate with rehabilitation services on mobility goals if consulted  - Perform Range of Motion 3 times a day.  - Reposition patient every 2 hours.  - Out of bed to chair 3 times a day   - Out of bed for meals 3 times a day  - Out of bed for toileting  - Record patient progress and toleration of activity level   Outcome: Progressing     Problem: DISCHARGE PLANNING  Goal: Discharge to home or other facility with appropriate resources  Description: INTERVENTIONS:  - Identify barriers to discharge w/patient and caregiver  - Arrange for needed discharge resources and transportation as appropriate  - Identify  discharge learning needs (meds, wound care, etc.)  - Arrange for interpretive services to assist at discharge as needed  - Refer to Case Management Department for coordinating discharge planning if the patient needs post-hospital services based on physician/advanced practitioner order or complex needs related to functional status, cognitive ability, or social support system  Outcome: Progressing     Problem: Knowledge Deficit  Goal: Patient/family/caregiver demonstrates understanding of disease process, treatment plan, medications, and discharge instructions  Description: Complete learning assessment and assess knowledge base.  Interventions:  - Provide teaching at level of understanding  - Provide teaching via preferred learning methods  Outcome: Progressing     Problem: Nutrition/Hydration-ADULT  Goal: Nutrient/Hydration intake appropriate for improving, restoring or maintaining nutritional needs  Description: Monitor and assess patient's nutrition/hydration status for malnutrition. Collaborate with interdisciplinary team and initiate plan and interventions as ordered.  Monitor patient's weight and dietary intake as ordered or per policy. Utilize nutrition screening tool and intervene as necessary. Determine patient's food preferences and provide high-protein, high-caloric foods as appropriate.     INTERVENTIONS:  - Monitor oral intake, urinary output, labs, and treatment plans  - Assess nutrition and hydration status and recommend course of action  - Evaluate amount of meals eaten  - Assist patient with eating if necessary   - Allow adequate time for meals  - Recommend/ encourage appropriate diets, oral nutritional supplements, and vitamin/mineral supplements  - Order, calculate, and assess calorie counts as needed  - Recommend, monitor, and adjust tube feedings and TPN/PPN based on assessed needs  - Assess need for intravenous fluids  - Provide specific nutrition/hydration education as appropriate  - Include  patient/family/caregiver in decisions related to nutrition  Outcome: Progressing     Problem: Prexisting or High Potential for Compromised Skin Integrity  Goal: Skin integrity is maintained or improved  Description: INTERVENTIONS:  - Identify patients at risk for skin breakdown  - Assess and monitor skin integrity  - Assess and monitor nutrition and hydration status  - Monitor labs   - Assess for incontinence   - Turn and reposition patient  - Assist with mobility/ambulation  - Relieve pressure over bony prominences  - Avoid friction and shearing  - Provide appropriate hygiene as needed including keeping skin clean and dry  - Evaluate need for skin moisturizer/barrier cream  - Collaborate with interdisciplinary team   - Patient/family teaching  - Consider wound care consult   Outcome: Progressing     Problem: SAFETY,RESTRAINT: NV/NON-SELF DESTRUCTIVE BEHAVIOR  Goal: Remains free of harm/injury (restraint for non violent/non self-detsructive behavior)  Description: INTERVENTIONS:  - Instruct patient/family regarding restraint use   - Assess and monitor physiologic and psychological status   - Provide interventions and comfort measures to meet assessed patient needs   - Identify and implement measures to help patient regain control  - Assess readiness for release of restraint   Outcome: Progressing  Goal: Returns to optimal restraint-free functioning  Description: INTERVENTIONS:  - Assess the patient's behavior and symptoms that indicate continued need for restraint  - Identify and implement measures to help patient regain control  - Assess readiness for release of restraint   Outcome: Progressing

## 2024-06-25 NOTE — ASSESSMENT & PLAN NOTE
Wt Readings from Last 3 Encounters:   06/25/24 86.5 kg (190 lb 12.8 oz)   06/07/24 76 kg (167 lb 8.8 oz)   04/04/23 82.1 kg (181 lb)     Patient got echo as part of stroke work up  EF was found to be 30%  Patient is euvolemic without increased O2 requirements or chest pain  Cardiology consulted:  They plan to follow up with outpatient office on Monday  Considering switch from Cardizem to beta-blocker but patient

## 2024-06-25 NOTE — CASE MANAGEMENT
Case Management Progress Note    Patient name Hal Kirby S /S -01 MRN [id number]  : 1940 Date 2024       LOS (days): 8  Geometric Mean LOS (GMLOS) (days): 3.9  Days to GMLOS:-4.6        OBJECTIVE:        Current admission status: Inpatient  Preferred Pharmacy:   [id number] - [facility] [address] [address]  Phone: [phone] Fax: [phone]    [facility] - [place], [place] - [address] [facility]  [address] [facility] [address]  Phone: [phone] Fax: [phone]    [facility]) - [place], [place] - [address]  [address] [place] [address]  Phone: [phone] Fax: [phone]    Primary Care Provider: Kyaw Monreal MD    Primary Insurance: [employer]  REP  Secondary Insurance:     PROGRESS NOTE:  CM met with patient and wife, Ann at bedside to discuss dcp. CM notified wife and patient, he was accepted to [facility]-- wife and patient both confirming this is still preference.     CM discussed that insurance auth is needed, and updated therapy notes are requires. PT/OT aware.     CM to follow up and request auth, as able.

## 2024-06-25 NOTE — PHYSICAL THERAPY NOTE
PHYSICAL THERAPY NOTE          Patient Name: Hal Miller  Today's Date: 24 1052   PT Last Visit   PT Visit Date 24   Note Type   Note Type Treatment   Pain Assessment   Pain Assessment Tool 0-10   Pain Score No Pain   Patient's Stated Pain Goal No pain   Hospital Pain Intervention(s) Repositioned;Ambulation/increased activity;Rest   Multiple Pain Sites No   Restrictions/Precautions   Weight Bearing Precautions Per Order No   Other Precautions Cognitive;Chair Alarm;Bed Alarm;Fall Risk   General   Chart Reviewed Yes   Response to Previous Treatment Patient with no complaints from previous session.   Family/Caregiver Present Yes  (nephew)   Cognition   Overall Cognitive Status Unable to assess   Arousal/Participation Alert;Responsive;Cooperative   Attention Within functional limits   Orientation Level Oriented X4   Memory Decreased recall of precautions   Following Commands Follows one step commands with increased time or repetition   Comments pt identified by name and    Subjective   Subjective pt was agreeable to participate in PT intervention and stated no pain pre/post tx session   Bed Mobility   Additional Comments pt seated OOB in the recliner pre/post tx session   Transfers   Sit to Stand 5  Supervision   Additional items Assist x 1;Armrests;Increased time required;Verbal cues   Stand to Sit 5  Supervision   Additional items Assist x 1;Armrests;Increased time required;Verbal cues   Stand pivot Unable to assess   Additional Comments pt completed 3 STS in todays tx sesison to and from RW all required /s and VC's for hand placement for increased safety and balance   Ambulation/Elevation   Gait pattern Decreased foot clearance;Short stride;Excessively slow   Gait Assistance 4  Minimal assist   Additional items Assist x 1;Verbal cues   Assistive Device Rolling walker   Distance 90'x1 RW   Stair  Management Assistance Not tested  (pt wanted to spend time with family present)   Balance   Static Sitting Good   Dynamic Sitting Fair   Static Standing Fair -   Dynamic Standing Poor   Ambulatory Poor +  (w/ RW)   Endurance Deficit   Endurance Deficit Yes   Endurance Deficit Description limited activity tolerance and ambulation distance   Activity Tolerance   Activity Tolerance Patient limited by fatigue   Nurse Made Aware Spoke to RN   Exercises   Quad Sets Sitting;10 reps;AROM;Bilateral   Hip Abduction Sitting;15 reps;AROM;Bilateral   Hip Adduction Sitting;15 reps;AROM;Bilateral  (pillow squeezes)   Knee AROM Long Arc Quad Sitting;15 reps;AROM;Bilateral   Ankle Pumps Sitting;20 reps;AROM;Bilateral   Marching Sitting;10 reps;AROM;Bilateral   Assessment   Prognosis Fair   Problem List Decreased strength;Decreased range of motion;Decreased endurance;Impaired balance;Decreased mobility;Decreased cognition;Impaired judgement;Decreased safety awareness;Impaired hearing  (gait deviations)   Assessment pt began tx session seated OOB in the recliner and was agreeable to participate in PT intervention. PT intervention to focus on transfer training, TE activities, posture/balance with gait and increasing activity tolerance. In comparison to previous PT interventions pt demonstrated progress with functional transfers as pt required /s and VC's for hand placement in order to complete multiple functional transfers w/o LOB. pt was able to increase activity tolerance and ambulation distance to 90'x1 RW with min ax1 for safety and balance as pt continues to demonstrate gait deviations. pt did participate in TE activities while seated in recliner in order to strengthen LE's in efforts in reducing risk for falls and injuries. Post tx session pt continues to demonstrate the following deficits: limited activity tolerance, ambulation distance and steps completed. pt would benefit frim continued skilled Pt intervention in order to  address deficits listed above. Continue to recommend Dc w/ level 1 maximal rehab resource intensity when medically cleared   Goals   Patient Goals to get some rest   STG Expiration Date 07/01/24   PT Treatment Day 2   Plan   Treatment/Interventions Functional transfer training;LE strengthening/ROM;Elevations;Therapeutic exercise;Cognitive reorientation;Endurance training;Patient/family training;Equipment eval/education;Bed mobility;Gait training;Spoke to nursing   Progress Slow progress, decreased activity tolerance   PT Frequency 3-5x/wk   Discharge Recommendation   Rehab Resource Intensity Level, PT I (Maximum Resource Intensity)   Equipment Recommended Walker   Walker Package Recommended Rollator   AM-PAC Basic Mobility Inpatient   Turning in Flat Bed Without Bedrails 3   Lying on Back to Sitting on Edge of Flat Bed Without Bedrails 3   Moving Bed to Chair 3   Standing Up From Chair Using Arms 3   Walk in Room 3   Climb 3-5 Stairs With Railing 3   Basic Mobility Inpatient Raw Score 18   Basic Mobility Standardized Score 41.05   [place] Highest Level Of Mobility   JH-HLM Goal 6: Walk 10 steps or more   JH-HLM Achieved 7: Walk 25 feet or more   Education   Education Provided Mobility training;Assistive device   Patient Demonstrates acceptance/verbal understanding   End of Consult   Patient Position at End of Consult Bedside chair;Bed/Chair alarm activated;All needs within reach   The patient's AM-PAC Basic Mobility Inpatient Short Form Raw Score is 18. A Raw score of greater than 16 suggests the patient may benefit from discharge to home. Please also refer to the recommendation of the Physical Therapist for safe discharge planning.    Pt continues to be limited with activity tolerance, ambulation distance and steps completed. Pt also continues to require assistance for ambulation balance and stair trials and would benefit from continued skilled pT intervention in order to address deficits listed above     Donis  Brown

## 2024-06-25 NOTE — PLAN OF CARE
Problem: OCCUPATIONAL THERAPY ADULT  Goal: Performs self-care activities at highest level of function for planned discharge setting.  See evaluation for individualized goals.  Description: Treatment Interventions: ADL retraining, Functional transfer training, UE strengthening/ROM, Endurance training, Cognitive reorientation, Patient/family training, Equipment evaluation/education, Compensatory technique education, Energy conservation, Activityengagement          See flowsheet documentation for full assessment, interventions and recommendations.   Note: Limitation: Decreased ADL status, Decreased cognition, Decreased endurance, Decreased self-care trans, Decreased high-level ADLs  Prognosis: Good  Assessment: Pt was seen for skilled OT tx session on this date focusing on fnxl transfer training, ADLs, fnxl mobility, and activity engagement and participation. Pt completed STS from recliner chair x2 with supervision and verbal cues for safe transfers. Pt completed LB dressing, grooming and eating in recliner chair. Pt engaged in fnxl mobility to bathroom with RW and supervision. At the end of the session, pt returned to recliner chair w all needs met.     Rehab Resource Intensity Level, OT: I (Maximum Resource Intensity) (vs HHOT pending progress)

## 2024-06-25 NOTE — ASSESSMENT & PLAN NOTE
Malnutrition Findings:   Adult Malnutrition type: Acute illness  Adult Degree of Malnutrition: Other severe protein calorie malnutrition  Malnutrition Characteristics: Weight loss, Inadequate energy                  360 Statement: Malnutrition related to inadequate energy intake as evidenced by 12#(7%) weight loss from 5/15/# to 6/7/# and <50% energy intake compared to estimated needs >5 days.    BMI Findings:           Body mass index is 24.5 kg/m².

## 2024-06-25 NOTE — CASE MANAGEMENT
[facility] received request for authorization from Care Manager.  Authorization request submitted for: Acute Rehab  Facility Name:[facility]   NPI:[id number]  Facility MD:  Dr. Ashley DePadua   NPI: [id number]  Authorization initiated by contacting insurance: [facility]    Via: [facility]   Clinicals submitted via fax #  [phone]  Pending Reference #:[id number]    Care Manager notified: Kb Rod        Updates to authorization status will be noted in chart. Please reach out to CM for updates on any clinical information.

## 2024-06-25 NOTE — CASE MANAGEMENT
Case Management Progress Note    Patient name Hal AGUILAR /S -01 MRN [id number]  : 1940 Date 2024       LOS (days): 8  Geometric Mean LOS (GMLOS) (days): 3.9  Days to GMLOS:-4.6        OBJECTIVE:        Current admission status: Inpatient  Preferred Pharmacy:   [id number] [place] [facility] [address] [address]  Phone: [phone] Fax: [phone]    [facility] - [place], [place] - [address]  [address] [address]  Phone: [phone] Fax: [phone]    [facility] - [place], [place] - [address]  [address] [place] [address]  Phone: [phone] Fax: [phone]    Primary Care Provider: Kyaw Monreal MD    Primary Insurance: [employer]  REP  Secondary Insurance:     PROGRESS NOTE:  D/C support attached to AIDIN referral, with request that auth is initiated.

## 2024-06-26 ENCOUNTER — HOSPITAL ENCOUNTER (INPATIENT)
Facility: HOSPITAL | Age: 84
LOS: 7 days | DRG: 056 | End: 2024-07-03
Attending: INTERNAL MEDICINE | Admitting: INTERNAL MEDICINE
Payer: COMMERCIAL

## 2024-06-26 VITALS
RESPIRATION RATE: 17 BRPM | OXYGEN SATURATION: 89 % | SYSTOLIC BLOOD PRESSURE: 128 MMHG | BODY MASS INDEX: 24.39 KG/M2 | WEIGHT: 190.04 LBS | DIASTOLIC BLOOD PRESSURE: 56 MMHG | HEART RATE: 64 BPM | TEMPERATURE: 97.8 F | HEIGHT: 74 IN

## 2024-06-26 DIAGNOSIS — R45.89 DYSPHORIC MOOD: ICD-10-CM

## 2024-06-26 DIAGNOSIS — G62.9 PERIPHERAL POLYNEUROPATHY: ICD-10-CM

## 2024-06-26 DIAGNOSIS — I63.9 STROKE (CEREBRUM) (HCC): Primary | ICD-10-CM

## 2024-06-26 DIAGNOSIS — K40.90 LEFT INGUINAL HERNIA: ICD-10-CM

## 2024-06-26 DIAGNOSIS — K57.20 COLONIC DIVERTICULAR ABSCESS: ICD-10-CM

## 2024-06-26 LAB
ANION GAP SERPL CALCULATED.3IONS-SCNC: 4 MMOL/L (ref 4–13)
BUN SERPL-MCNC: 24 MG/DL (ref 5–25)
CALCIUM SERPL-MCNC: 9.9 MG/DL (ref 8.4–10.2)
CHLORIDE SERPL-SCNC: 98 MMOL/L (ref 96–108)
CO2 SERPL-SCNC: 28 MMOL/L (ref 21–32)
CREAT SERPL-MCNC: 1.35 MG/DL (ref 0.6–1.3)
ERYTHROCYTE [DISTWIDTH] IN BLOOD BY AUTOMATED COUNT: 14.5 % (ref 11.6–15.1)
GFR SERPL CREATININE-BSD FRML MDRD: 48 ML/MIN/1.73SQ M
GLUCOSE SERPL-MCNC: 101 MG/DL (ref 65–140)
GLUCOSE SERPL-MCNC: 111 MG/DL (ref 65–140)
GLUCOSE SERPL-MCNC: 129 MG/DL (ref 65–140)
GLUCOSE SERPL-MCNC: 131 MG/DL (ref 65–140)
GLUCOSE SERPL-MCNC: 147 MG/DL (ref 65–140)
HCT VFR BLD AUTO: 39.9 % (ref 36.5–49.3)
HGB BLD-MCNC: 12.7 G/DL (ref 12–17)
MCH RBC QN AUTO: 30.8 PG (ref 26.8–34.3)
MCHC RBC AUTO-ENTMCNC: 31.8 G/DL (ref 31.4–37.4)
MCV RBC AUTO: 97 FL (ref 82–98)
PLATELET # BLD AUTO: 219 THOUSANDS/UL (ref 149–390)
PMV BLD AUTO: 9.4 FL (ref 8.9–12.7)
POTASSIUM SERPL-SCNC: 4.9 MMOL/L (ref 3.5–5.3)
RBC # BLD AUTO: 4.12 MILLION/UL (ref 3.88–5.62)
SODIUM SERPL-SCNC: 130 MMOL/L (ref 135–147)
WBC # BLD AUTO: 9.27 THOUSAND/UL (ref 4.31–10.16)

## 2024-06-26 PROCEDURE — 94640 AIRWAY INHALATION TREATMENT: CPT

## 2024-06-26 PROCEDURE — 80048 BASIC METABOLIC PNL TOTAL CA: CPT

## 2024-06-26 PROCEDURE — 82948 REAGENT STRIP/BLOOD GLUCOSE: CPT

## 2024-06-26 PROCEDURE — 94760 N-INVAS EAR/PLS OXIMETRY 1: CPT

## 2024-06-26 PROCEDURE — 99233 SBSQ HOSP IP/OBS HIGH 50: CPT | Performed by: FAMILY MEDICINE

## 2024-06-26 PROCEDURE — NC001 PR NO CHARGE: Performed by: PHYSICAL MEDICINE & REHABILITATION

## 2024-06-26 PROCEDURE — 99239 HOSP IP/OBS DSCHRG MGMT >30: CPT | Performed by: INTERNAL MEDICINE

## 2024-06-26 PROCEDURE — 85027 COMPLETE CBC AUTOMATED: CPT

## 2024-06-26 PROCEDURE — 99223 1ST HOSP IP/OBS HIGH 75: CPT | Performed by: STUDENT IN AN ORGANIZED HEALTH CARE EDUCATION/TRAINING PROGRAM

## 2024-06-26 RX ORDER — LANOLIN ALCOHOL/MO/W.PET/CERES
3 CREAM (GRAM) TOPICAL
Status: DISCONTINUED | OUTPATIENT
Start: 2024-06-26 | End: 2024-07-03 | Stop reason: HOSPADM

## 2024-06-26 RX ORDER — METOPROLOL SUCCINATE 25 MG/1
25 TABLET, EXTENDED RELEASE ORAL DAILY
Status: DISCONTINUED | OUTPATIENT
Start: 2024-06-27 | End: 2024-07-03 | Stop reason: HOSPADM

## 2024-06-26 RX ORDER — DIVALPROEX SODIUM 500 MG/1
500 TABLET, DELAYED RELEASE ORAL EVERY 8 HOURS SCHEDULED
Status: DISCONTINUED | OUTPATIENT
Start: 2024-06-26 | End: 2024-07-02

## 2024-06-26 RX ORDER — BUDESONIDE 0.5 MG/2ML
0.5 INHALANT ORAL
Status: DISCONTINUED | OUTPATIENT
Start: 2024-06-26 | End: 2024-07-03 | Stop reason: HOSPADM

## 2024-06-26 RX ORDER — INSULIN LISPRO 100 [IU]/ML
1-5 INJECTION, SOLUTION INTRAVENOUS; SUBCUTANEOUS
Status: DISCONTINUED | OUTPATIENT
Start: 2024-06-26 | End: 2024-07-03 | Stop reason: HOSPADM

## 2024-06-26 RX ORDER — METRONIDAZOLE 500 MG/1
500 TABLET ORAL EVERY 8 HOURS SCHEDULED
Status: COMPLETED | OUTPATIENT
Start: 2024-06-27 | End: 2024-06-28

## 2024-06-26 RX ORDER — PANTOPRAZOLE SODIUM 40 MG/1
40 TABLET, DELAYED RELEASE ORAL
Status: DISCONTINUED | OUTPATIENT
Start: 2024-06-27 | End: 2024-07-03 | Stop reason: HOSPADM

## 2024-06-26 RX ORDER — ATORVASTATIN CALCIUM 40 MG/1
40 TABLET, FILM COATED ORAL
Status: DISCONTINUED | OUTPATIENT
Start: 2024-06-27 | End: 2024-07-03 | Stop reason: HOSPADM

## 2024-06-26 RX ORDER — FORMOTEROL FUMARATE DIHYDRATE 20 UG/2ML
20 SOLUTION RESPIRATORY (INHALATION)
Status: DISCONTINUED | OUTPATIENT
Start: 2024-06-26 | End: 2024-07-03 | Stop reason: HOSPADM

## 2024-06-26 RX ORDER — BISACODYL 10 MG
10 SUPPOSITORY, RECTAL RECTAL DAILY
Status: DISCONTINUED | OUTPATIENT
Start: 2024-06-27 | End: 2024-06-27

## 2024-06-26 RX ORDER — METRONIDAZOLE 500 MG/1
500 TABLET ORAL EVERY 8 HOURS SCHEDULED
Qty: 12 TABLET | Refills: 0
Start: 2024-06-26 | End: 2024-06-30

## 2024-06-26 RX ORDER — METRONIDAZOLE 500 MG/1
500 TABLET ORAL EVERY 8 HOURS SCHEDULED
Status: DISCONTINUED | OUTPATIENT
Start: 2024-06-26 | End: 2024-06-26

## 2024-06-26 RX ORDER — ACETAMINOPHEN 325 MG/1
650 TABLET ORAL EVERY 4 HOURS PRN
Status: DISCONTINUED | OUTPATIENT
Start: 2024-06-26 | End: 2024-07-03 | Stop reason: HOSPADM

## 2024-06-26 RX ORDER — ALBUTEROL SULFATE 90 UG/1
2 AEROSOL, METERED RESPIRATORY (INHALATION) EVERY 4 HOURS PRN
Status: DISCONTINUED | OUTPATIENT
Start: 2024-06-26 | End: 2024-07-03 | Stop reason: HOSPADM

## 2024-06-26 RX ORDER — INSULIN GLARGINE 100 [IU]/ML
8 INJECTION, SOLUTION SUBCUTANEOUS
Status: DISCONTINUED | OUTPATIENT
Start: 2024-06-26 | End: 2024-07-03 | Stop reason: HOSPADM

## 2024-06-26 RX ORDER — PREDNISONE 1 MG/1
1 TABLET ORAL DAILY
Status: DISCONTINUED | OUTPATIENT
Start: 2024-06-27 | End: 2024-07-03 | Stop reason: HOSPADM

## 2024-06-26 RX ORDER — SENNOSIDES 8.6 MG
2 TABLET ORAL 2 TIMES DAILY
Status: DISCONTINUED | OUTPATIENT
Start: 2024-06-26 | End: 2024-07-03 | Stop reason: HOSPADM

## 2024-06-26 RX ORDER — LOSARTAN POTASSIUM 50 MG/1
50 TABLET ORAL DAILY
Status: DISCONTINUED | OUTPATIENT
Start: 2024-06-27 | End: 2024-07-03

## 2024-06-26 RX ORDER — CIPROFLOXACIN 500 MG/1
500 TABLET, FILM COATED ORAL EVERY 12 HOURS SCHEDULED
Qty: 11 TABLET | Refills: 0 | Status: ON HOLD
Start: 2024-06-26 | End: 2024-07-02

## 2024-06-26 RX ORDER — CIPROFLOXACIN 500 MG/1
500 TABLET, FILM COATED ORAL EVERY 12 HOURS SCHEDULED
Status: COMPLETED | OUTPATIENT
Start: 2024-06-26 | End: 2024-06-28

## 2024-06-26 RX ORDER — CIPROFLOXACIN 500 MG/1
500 TABLET, FILM COATED ORAL EVERY 12 HOURS SCHEDULED
Status: DISCONTINUED | OUTPATIENT
Start: 2024-06-26 | End: 2024-06-26

## 2024-06-26 RX ADMIN — CIPROFLOXACIN HYDROCHLORIDE 500 MG: 500 TABLET, FILM COATED ORAL at 08:12

## 2024-06-26 RX ADMIN — FORMOTEROL FUMARATE DIHYDRATE 20 MCG: 20 SOLUTION RESPIRATORY (INHALATION) at 07:25

## 2024-06-26 RX ADMIN — CIPROFLOXACIN 500 MG: 500 TABLET ORAL at 21:58

## 2024-06-26 RX ADMIN — LOSARTAN POTASSIUM 50 MG: 50 TABLET, FILM COATED ORAL at 08:12

## 2024-06-26 RX ADMIN — BUDESONIDE 0.5 MG: 0.5 INHALANT ORAL at 07:25

## 2024-06-26 RX ADMIN — PREDNISONE 1 MG: 1 TABLET ORAL at 08:12

## 2024-06-26 RX ADMIN — APIXABAN 5 MG: 5 TABLET, FILM COATED ORAL at 08:12

## 2024-06-26 RX ADMIN — DIVALPROEX SODIUM 500 MG: 500 TABLET, DELAYED RELEASE ORAL at 01:12

## 2024-06-26 RX ADMIN — METRONIDAZOLE 500 MG: 500 TABLET ORAL at 01:12

## 2024-06-26 RX ADMIN — THIAMINE HYDROCHLORIDE: 100 INJECTION, SOLUTION INTRAMUSCULAR; INTRAVENOUS at 08:13

## 2024-06-26 RX ADMIN — PANTOPRAZOLE SODIUM 40 MG: 40 TABLET, DELAYED RELEASE ORAL at 05:09

## 2024-06-26 RX ADMIN — DIVALPROEX SODIUM 500 MG: 500 TABLET, DELAYED RELEASE ORAL at 18:53

## 2024-06-26 RX ADMIN — Medication 3 MG: at 21:58

## 2024-06-26 RX ADMIN — METOPROLOL SUCCINATE 25 MG: 25 TABLET, EXTENDED RELEASE ORAL at 08:12

## 2024-06-26 RX ADMIN — BUDESONIDE 0.5 MG: 0.5 INHALANT ORAL at 21:05

## 2024-06-26 RX ADMIN — DIVALPROEX SODIUM 500 MG: 500 TABLET, DELAYED RELEASE ORAL at 08:12

## 2024-06-26 RX ADMIN — APIXABAN 5 MG: 5 TABLET, FILM COATED ORAL at 18:53

## 2024-06-26 RX ADMIN — INSULIN GLARGINE 8 UNITS: 100 INJECTION, SOLUTION SUBCUTANEOUS at 21:58

## 2024-06-26 RX ADMIN — SENNOSIDES 17.2 MG: 8.6 TABLET, FILM COATED ORAL at 08:12

## 2024-06-26 RX ADMIN — METRONIDAZOLE 500 MG: 500 TABLET ORAL at 08:15

## 2024-06-26 RX ADMIN — FORMOTEROL FUMARATE DIHYDRATE 20 MCG: 20 SOLUTION RESPIRATORY (INHALATION) at 21:05

## 2024-06-26 NOTE — PROGRESS NOTES
Progress Note - Geriatric Medicine   Hal Miller 84 y.o. male MRN: [id number]  Unit/Bed#: S -01 Encounter: [id number]      Assessment/Plan:  Dysphoric mood  Assessment & Plan  -Patient has been tearful frequently over the course of his stay.  -Patient is currently very frustrated/anxious awaiting discharge. He is ready to be discharged.  -consider  Lexapro 5 mg for dysphoric mood.   -Monitor for potential side effects of SSRIs   - continue to provide supportive care    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HCC)  Assessment & Plan  -MRI results as follows:  1. Small acute infarct in the left parietal periventricular white matter with an associated small focus of susceptibility that may represent associated petechial hemorrhage.                2. Redemonstrated foci of susceptibility, including new foci, nonspecific and could be seen with cerebral amyloid angiopathy, multiple cavernomas and/or prior embolic events, among other considerations.  -Blood pressure is 135/75 today. His baseline is elevated. Maintain appropriate blood pressure and glucose control.    -Neurology believes stroke d/t withholding anticoagulation.  -Continue Eliquis 2.5 mg BID.  -Continue Depakote 500 mg Q8H and monitor level.  -Continue home Pravastatin 40 mg QD.  -Neurology follows appreciate input.    History of Whipple procedure  Assessment & Plan  -History of pancreatic cancer s/p Whipple Procedure in 2011.  -Continue Folic Acid 1 mg supplementation QD.  -Continue Thiamine 100 mg supplementation QD.    Episode of seizure-like activity  Assessment & Plan  -Continue Depakote 500 mg Q8H.  -MRI results as follows:  Small acute infarct in the left parietal periventricular white matter with an associated small focus of susceptibility that may represent associated petechial hemorrhage.  Redemonstrated foci of susceptibility, including new foci, nonspecific and could be seen with cerebral amyloid angiopathy, multiple cavernomas and/or prior embolic events,  among other considerations.  -Spot EEG interpretation as follows:  Excessive theta activity during wakefulness suggest mild nonspecific diffuse cerebral dysfunction.  No epileptiform discharges or seizures are present.  -Neurology following.    Ambulatory dysfunction  Assessment & Plan  -Patient was walking independently prior to admission to hospital.   -Patient continues to require assistance upon his current state, though he is improving.  -Continue PT/OT in attempt to return to baseline.   -Patient reports walking 90 steps with walker yesterday  -Patient reports looking down at his feet while walking. He wants to start looking upward with time and confidence.  -Continue to monitor orthostatic vitals.  -Continue fall precautions.    Insomnia  Assessment & Plan  -Continue to hold Seroquel 25 mg QHS with last dose administered 6/18.   -Patient's sleep was controlled at home on Seroquel 25 mg QHS- currently on hold.  -Patient reports sleeping poorly last night due to agitation/anxiety awaiting discharge. He is ready to go home.  -Encourage good sleep hygiene.  -Avoid sedative hypnotics.  -Continue Melatonin 3 mg QHS.     Dizziness  Assessment & Plan  -Continue to monitor orthostatic vital signs.  -Maintain hydration status.  -Continue fall precautions.    Vascular dementia (HCC)  Assessment & Plan  -Patient continues to be AAO x 2-3. At his baseline.  -Continue to provide supportive care and reorient as needed.  -Patient is at high risk for delirium. Continue to monitor closely and stay on delirium precautions.  -Maintain sleep/wake cycle.  -Monitor for constipation and urinary retention and manage as needed.  -Continue encouraging family to visit.  -Continue encouraging patient to wear glasses and hearing aids while awake.  -Monitor TSH/B12 levels. TSH and B12 WNL last time checked.  -Continue encouraging PO intake and assist with feeding if needed.    Colonic diverticular abscess  Assessment & Plan  -General  surgery follows. Surgery not recommending any interventions at this time. IR not planning abscess drainage at this time.   -Continue PO hydration and eating. Eating regular diet.  -Continue Metronidazole 500 mg PO Q8H.  -Continue Ciprofloxacin 500 mg PO Q12H.  -Outpatient follow-up recommended.    Zoster  Assessment & Plan  -Zoster lesions are crusted over on right chest wall and right upper back.  -Completed Valtrex.  -Continue pain regimen with Tylenol 975 mg PO TID. Currently patient denies any present pain.    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   -Outpatient follow-up recommended.    Type 2 diabetes mellitus (HCC)  Assessment & Plan  Lab Results   Component Value Date    HGBA1C 7.6 (H) 06/19/2024       Recent Labs     06/25/24  1615 06/25/24  2115 06/26/24  0733 06/26/24  1015   POCGLU 161* 167* 101 131       Blood Sugar Average: Last 72 hrs:  (P) 132.875    -Glucose currently controlled.  -Continue insulin regimen.  -Avoid hypoglycemia.     CKD (chronic kidney disease)  Assessment & Plan  Lab Results   Component Value Date    EGFR 48 06/26/2024    EGFR 54 06/25/2024    EGFR 48 06/24/2024    CREATININE 1.35 (H) 06/26/2024    CREATININE 1.23 06/25/2024    CREATININE 1.34 (H) 06/24/2024     -Baseline CKD Stage 3.  -Continue to monitor Creatinine, although it is stable. Cr 1.34 today.  -Avoid nephrotoxic medication and renally dose all medications.  -Encourage PO hydration.  -Continue monitoring BMP.     Inflammatory polyarthropathy (HCC)  Assessment & Plan  -Continue to hold Hydroxychloroquine with last dose administered 6/18.  -Continue Prednisone 1 mg QD.    Atrial fibrillation (HCC)  Assessment & Plan  -Currently rate controlled.  -Was switched from Cardizem to Metoprolol succinate XL.  -Continue Toprol XL in setting of low EF.  -Continue Eliquis 2.5 mg BID.    Hypertension  Assessment &  Plan  -Blood pressure was 116/81 this morning. His baseline BP is elevated.  -Continue Losartan 50 mg QD with holding parameters if to become hypotensive.  -If BP continues to be in the lower side, consider decreasing Losartan to 25 mg QD.  -Continue Metoprolol succinate 25 mg QD with holding parameters if to become hypotensive.  -Continue monitoring orthostatic vitals.  -Avoid hypotension.    Shortness of breath  Assessment & Plan  -Patient has history of COPD.  -Received steroids on admission.  -Nebulized bronchodilators.  -Continue encouraging incentive spirometry.    * Acute encephalopathy  Assessment & Plan  -Patient is AAO x 2-3. At his baseline.  -Patient is high risk of delirium due to recent episode of acute encephalopathy, dementia at baseline, hospitalization, insomnia, metabolic imbalance, diverticulitis.Continue delirium precautions.  -Maintain normal sleep/wake cycle.  -Minimize overnight interruptions, group overnight vitals/labs/nursing checks as possible.  -Dim lights, close blinds and turn off tv to minimize stimulation and encourage sleep environment in evenings.  -Continue monitoring for fecal and urinary retention which may precipitate delirium.   -Patient reports a large bowel movement last night as well as urinating 2x each time he gets up. Continue Senna 2 tabs BID.  -Encourage early mobilization and ambulation. Continue working with PT to assist in returning to baseline ambulation status.  -Provide frequent reorientation and redirection as needed.  -Encourage family and friends at the bedside to help calm patient if anxious.  -Avoid medications which may precipitate or worsen delirium such as tramadol, benzodiazepine, anticholinergics, and antihistaminics.  -Encourage hydration and nutrition; assist with feeding if needed.  -Redirect unwanted behaviors as first line, avoid physical restraints.   -MRI results as follows:  Small acute infarct in the left parietal periventricular white matter  with an associated small focus of susceptibility that may represent associated petechial hemorrhage.  Redemonstrated foci of susceptibility, including new foci, nonspecific and could be seen with cerebral amyloid angiopathy, multiple cavernomas and/or prior embolic events, among other considerations.  -Spot EEG interpretation as follows:  Excessive theta activity during wakefulness suggest mild nonspecific diffuse cerebral dysfunction.  No epileptiform discharges or seizures are present.  -Neurology following.  -Appreciate PT/OT recommendation of [facility].  -Appreciate speech pathologist evaluation.  -Continue controlling pain with Tylenol 975 mg Q8H.  -Continue Depakote 500 mg Q8H and monitor level.  -Continue Melatonin 3 mg QHS       Subjective:   Hal is an 84-year-old male that presents for a geriatric progress visit. He is tearful on numerous accounts due to frustration of his current situation and still being in the hospital. He notes his agitation and frustration have decreased his appetite this morning, and when he gets overly upset, he experiences intermittent chest pain. He also slept poorly last night due to his dysphoric mood and issues with overnight care staff. The patient reports a large bowel movement last night and urinating at least two times each time he gets out of bed. He reports no present pain. He is ready to be discharged.    Review of Systems   Constitutional:  Positive for appetite change. Negative for chills, diaphoresis, fatigue and fever.   HENT:  Negative for congestion, ear discharge, ear pain, hearing loss, nosebleeds, postnasal drip, rhinorrhea, sneezing, sore throat, trouble swallowing and voice change.         [place].   Eyes:  Negative for pain, discharge, redness, itching and visual disturbance.        Wears glasses.   Respiratory:  Positive for chest tightness. Negative for cough and shortness of breath.    Cardiovascular:  Positive for chest pain. Negative for palpitations and leg  "swelling.        Chest pain upon agitation/anxiety about his current situation.   Gastrointestinal:  Positive for diarrhea. Negative for abdominal distention, abdominal pain, blood in stool, constipation, nausea, rectal pain and vomiting.        He reports \"explosions\" in regard to his bowel movements.   Endocrine: Negative for cold intolerance and heat intolerance.   Genitourinary:  Positive for frequency. Negative for decreased urine volume, difficulty urinating, dysuria, hematuria and urgency.   Musculoskeletal:  Positive for gait problem, myalgias and neck stiffness. Negative for arthralgias, back pain, joint swelling and neck pain.   Skin:  Positive for rash and wound. Negative for color change.   Neurological:  Positive for weakness. Negative for dizziness, syncope, light-headedness and headaches.        Weakness upon moving with PT.   Psychiatric/Behavioral:  Positive for agitation, dysphoric mood and sleep disturbance. Negative for behavioral problems, confusion and decreased concentration. The patient is nervous/anxious.    All other systems reviewed and are negative.        Objective:     Vitals: Blood pressure 116/81, pulse 82, temperature 97.8 °F (36.6 °C), resp. rate 18, height 6' 2\" (1.88 m), weight 86.2 kg (190 lb 0.6 oz), SpO2 97%.,Body mass index is 24.4 kg/m².      Intake/Output Summary (Last 24 hours) at 6/26/2024 1233  Last data filed at 6/26/2024 0501  Gross per 24 hour   Intake 480 ml   Output --   Net 480 ml       Current Medications: Reviewed    Physical Exam:   Physical Exam  Vitals and nursing note reviewed.   Constitutional:       General: He is not in acute distress.     Appearance: He is well-developed. He is not ill-appearing, toxic-appearing or diaphoretic.      Comments: Frail appearing.   HENT:      Head: Normocephalic and atraumatic.      Mouth/Throat:      Mouth: Mucous membranes are dry.   Eyes:      General: No scleral icterus.        Right eye: No discharge.         Left eye: No " discharge.      Conjunctiva/sclera: Conjunctivae normal.   Cardiovascular:      Rate and Rhythm: Normal rate. Rhythm irregularly irregular.      Heart sounds: No murmur heard.  Pulmonary:      Effort: Pulmonary effort is normal. No respiratory distress.      Breath sounds: Normal breath sounds.   Abdominal:      General: There is no distension.      Palpations: Abdomen is soft. There is no mass.      Tenderness: There is no abdominal tenderness. There is no guarding or rebound.      Hernia: A hernia is present.   Musculoskeletal:         General: No swelling or tenderness.      Cervical back: Neck supple.      Right lower leg: No edema.      Left lower leg: No edema.   Skin:     General: Skin is warm and dry.      Capillary Refill: Capillary refill takes less than 2 seconds.      Coloration: Skin is not jaundiced.      Findings: Bruising and rash present. No erythema.   Neurological:      Mental Status: He is alert. Mental status is at baseline.      Gait: Gait abnormal.      Comments: AAO x 2-3.   Psychiatric:         Behavior: Behavior normal.         Thought Content: Thought content normal.         Judgment: Judgment normal.      Comments: Dysphoric mood.          Invasive Devices       Peripheral Intravenous Line  Duration             Peripheral IV 06/21/24 Left Antecubital 5 days                    Lab, Imaging and other studies: I have personally reviewed pertinent reports.

## 2024-06-26 NOTE — INCIDENTAL FINDINGS
The following findings require follow up:  Radiographic finding   Finding: MRI brain wo contrast: 1. Small acute infarct in the left parietal periventricular white matter with an associated small focus of susceptibility that may represent associated petechial hemorrhage., 2. Redemonstrated foci of susceptibility, including new foci, nonspecific and could be seen with cerebral amyloid angiopathy, multiple cavernomas and/or prior embolic events, among other considerations    Follow up required: Neurolgoy   Follow up should be done within 4-6 week(s)    Please notify the following clinician to assist with the follow up:   Dr. Monreal    Incidental finding results were discussed with the Patient by Daniel Ames DO on 06/26/24.   They expressed understanding and all questions answered.

## 2024-06-26 NOTE — ASSESSMENT & PLAN NOTE
Wt Readings from Last 3 Encounters:   06/26/24 86.2 kg (190 lb 0.6 oz)   06/26/24 86.2 kg (190 lb 0.6 oz)   06/07/24 76 kg (167 lb 8.8 oz)   Echo obtained during CVA workup demonstrated newly detected reduced EF at 30%.  He appears compensated at this time  Cardiology recommending outpatient follow-up  Continue I/O and daily weight

## 2024-06-26 NOTE — ASSESSMENT & PLAN NOTE
Wt Readings from Last 3 Encounters:   06/26/24 86.2 kg (190 lb 0.6 oz)   06/07/24 76 kg (167 lb 8.8 oz)   04/04/23 82.1 kg (181 lb)     Patient got echo as part of stroke work up  EF was found to be 30%  Patient is euvolemic without increased O2 requirements or chest pain  Cardiology consulted:  They plan to follow up with outpatient office on Monday  Considering switch from Cardizem to beta-blocker but patient

## 2024-06-26 NOTE — DISCHARGE INSTR - AVS FIRST PAGE
Dear Hal Miller,     It was our pleasure to care for you here at [facility].  It is our hope that we were always able to exceed the expected standards for your care during your stay.  You were hospitalized due to ***.  You were cared for on the *** floor by Daniel Ames DO under the service of Rachana Malone MD with the [facility] who covers for your primary care physician (PCP), Kyaw Monreal MD, while you were hospitalized.  If you have any questions or concerns related to this hospitalization, you may contact us at .  For follow up as well as any medication refills, we recommend that you follow up with your primary care physician.  A registered nurse will reach out to you by phone within a few days after your discharge to answer any additional questions that you may have after going home.  However, at this time we provide for you here, the most important instructions / recommendations at discharge:     Notable Medication Adjustments -   Start taking Depakote 500 mg 3 times daily  You will continue taking antibiotics.  Take ciprofloxacin and Flagyl for another 4 days.  Testing Required after Discharge -   None  ** Please contact your PCP to request testing orders for any of the testing recommended here **  Important follow up information -   Please follow-up with neurology 4 to 6 weeks after discharge from rehab  Please follow-up with your primary care physician within 1 week of discharge from rehab  Please follow-up with cardiology within 1 month of discharge from rehab  Other Instructions -   Please return to the hospital if your symptoms return or worsen  Please review this entire after visit summary as additional general instructions including medication list, appointments, activity, diet, any pertinent wound care, and other additional recommendations from your care team that may be provided for you.      Sincerely,     Daniel  Hui, DO

## 2024-06-26 NOTE — ASSESSMENT & PLAN NOTE
"Patient presented for abdominal pain and swelling, history of hernia.  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 No evidence of soft tissue component. A benign etiology is suspected such as retroperitoneal lymphatic malformation or lymphocele. Initial evaluation in 3 months with contrast-enhanced CT abdomen/pelvis is recommended.\"  Surgery evaluated patient and recommended conservative management of suspected diverticulitis with microperforation.  Patient is eating without issue and advanced to regular diet.  He is having normal BMs   Outpatient follow-up with surgery, continue 2 more days of p.o. antibiotics with levaquin and flagyl as planned. Tentative end date of abx 6/28/24  "

## 2024-06-26 NOTE — ASSESSMENT & PLAN NOTE
Patient had AMS on previous hospitalization and MRI 6/20 demonstrated small left parietal hemorrhage with possible petechial hemorrhage  Eliquis appears to have been held at that time as that is suspected to have contributed to CVA  Patient denies any residual sensory or motor deficit  Continue anticoagulation and will monitor  Outpatient followup with neurology  Amandae depakote, level ordered for tomorrow morning

## 2024-06-26 NOTE — ASSESSMENT & PLAN NOTE
Lab Results   Component Value Date    EGFR 48 06/26/2024    EGFR 54 06/25/2024    EGFR 48 06/24/2024    CREATININE 1.35 (H) 06/26/2024    CREATININE 1.23 06/25/2024    CREATININE 1.34 (H) 06/24/2024   CKD 3 baseline  Avoid nephrotoxic agents and renally dose medications  Renal function improving    Monitor BMP

## 2024-06-26 NOTE — CASE MANAGEMENT
Case Management Discharge Planning Note    Patient name Hal Kirby S /S -01 MRN [id number]  : 1940 Date 2024       Current Admission Date: 2024  Current Admission Diagnosis:Acute encephalopathy   Patient Active Problem List    Diagnosis Date Noted Date Diagnosed    Heart failure (HCC) 2024     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HCC) 2024     Acute on chronic respiratory failure (HCC) 2024     Episode of seizure-like activity 2024     History of Whipple procedure 2024     Dizziness 2024     Insomnia 2024     Ambulatory dysfunction 2024     Severe protein-calorie malnutrition (HCC) 2024     Abnormal CT of the abdomen 2024     Zoster 2024     Colonic diverticular abscess 2024     Vascular dementia ([facility]) 2024     Concern for aspiration pneumonia ([facility]) 2024     Type 2 diabetes mellitus ([facility]) 2024     Hypercalcemia 2023     TARA (acute kidney injury) ([facility]) 2023     Esophageal stricture 2023     Acute encephalopathy 2023     Generalized weakness 2023     Malignant neoplasm of tail of pancreas ([facility]) 2022     Chronic obstructive pulmonary disease with acute exacerbation ([facility]) 2022     CKD (chronic kidney disease) 2022     Centrilobular emphysema ([facility]) 2021     History of malignant neuroendocrine tumor 2021     Atrial fibrillation ([facility]) 2017     Shortness of breath 2017     Hypertension 2017     Hyperlipidemia 2015     Inflammatory polyarthropathy (HCC) 2014     Osteoarthrosis 2014     Polymyalgia rheumatica (HCC) 2014     Aneurysm of thoracic aorta (HCC) 2014     Aneurysm of abdominal aorta (HCC) 2014     Neoplasm of other specified site 2014       LOS (days): 9  Geometric Mean LOS (GMLOS) (days): 3.9  Days to GMLOS:-5.5     OBJECTIVE:  Risk of Unplanned Readmission Score: 29.85          Current admission status: Inpatient   Preferred Pharmacy:   [id number] [place] - [address]  [place] [address]  Phone: [phone] Fax: [phone]    [facility] - [place], [place] - [address]  [address] [address]  Phone: [phone] Fax: [phone]    [facility] - [place] - [address]  [address] [place] [address]  Phone: [phone] Fax: [phone]    Primary Care Provider: Kyaw Monreal MD    Primary Insurance: [employer] [facility]  Secondary Insurance:     DISCHARGE DETAILS:                                                                                                               Facility Insurance Auth Number: [id number]

## 2024-06-26 NOTE — DISCHARGE SUMMARY
Formerly [facility]  Discharge- Hal Miller 1940, 84 y.o. male MRN: [id number]  Unit/Bed#: S -[facility]h Encounter: [id number]  Primary Care Provider: Kyaw Monreal MD   Date and time admitted to hospital: 6/16/2024  6:25 PM    * Acute encephalopathy  Assessment & Plan  Patient developed confusion; neurology consulted for concerns of brief seizures in setting of Zoster  Stroke alert; MRI  showed small acute infarct in the left parietal periventricular white matter with associated small focus of susceptibility may represent associated petechial hemorrhage.    Concern for cardioembolic secondary to holding anticoagulation drugs      6/23/24: RESOLVED  PT/OT is recommending [facility].  MRA was WNL  Continue Depakote  Patient stable for discharge to [facility]    Heart failure (HCC)  Assessment & Plan  Wt Readings from Last 3 Encounters:   06/26/24 86.2 kg (190 lb 0.6 oz)   06/07/24 76 kg (167 lb 8.8 oz)   04/04/23 82.1 kg (181 lb)     Patient got echo as part of stroke work up  EF was found to be 30%  Patient is euvolemic without increased O2 requirements or chest pain  Cardiology consulted:  They plan to follow up with outpatient office on Monday  Considering switch from Cardizem to beta-blocker but patient              Colonic diverticular abscess  Assessment & Plan  Presents with left groin pain, abd pain   Enlarging gas and fluid collection extending from the antimesenteric border of the sigmoid colon, infiltrating the left inguinal region soft tissue and coursing under and along the lateral margin of the left inguinal canal. 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  IR not planning abscess drainage at this time.    Day 5 Cipro  Day 7 Flagyl  Continue antibiotics through June 28  Follow up outpatient with general surgery    Stroke  (cerebrum) (HCC)  Assessment & Plan  MRI showed small acute infarct in the left parietal periventricular white matter with associated small focus of susceptibility may represent associated petechial hemorrhage.  After discussion with neurology they believe this may have been due to cardioembolic secondary to holding anticoagulation drugs.  They are continuing with Depakote.    Severe protein-calorie malnutrition (HCC)  Assessment & Plan  Malnutrition Findings:   Adult Malnutrition type: Acute illness  Adult Degree of Malnutrition: Other severe protein calorie malnutrition  Malnutrition Characteristics: Weight loss, Inadequate energy                  360 Statement: Malnutrition related to inadequate energy intake as evidenced by 12#(7%) weight loss from 5/15/# to 6/7/# and <50% energy intake compared to estimated needs >5 days.    BMI Findings:           Body mass index is 24.4 kg/m².       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 No evidence of soft tissue component. A benign etiology is suspected such as retroperitoneal lymphatic malformation or lymphocele. Initial   evaluation in 3 months with contrast-enhanced CT abdomen/pelvis is recommended.  Outpt followup     Type 2 diabetes mellitus (HCC)  Assessment & Plan  Lab Results   Component Value Date    HGBA1C 7.6 (H) 06/19/2024       Recent Labs     06/25/24  2115 06/26/24  0733 06/26/24  1015 06/26/24  1144   POCGLU 167* 101 131 129         Blood Sugar Average: Last  72 hrs:  (P) [id number]      CKD (chronic kidney disease)  Assessment & Plan  Lab Results   Component Value Date    EGFR 48 06/26/2024    EGFR 54 06/25/2024    EGFR 48 06/24/2024    CREATININE 1.35 (H) 06/26/2024    CREATININE 1.23 06/25/2024    CREATININE 1.34 (H) 06/24/2024   CKD 3 baseline  Avoid nephrotoxic agents and renally dose medications  Renal function improving    Monitor BMP    Chronic obstructive pulmonary disease with acute exacerbation (HCC)  Assessment & Plan  History of COPD; no controller medications or breathing treatment since 2023    Plan:  Placed on Respiratory protocol  Currently on prednisone      Inflammatory polyarthropathy (HCC)  Assessment & Plan  PTA regimen hydroxychloroquine on hold  Continue prednisone     Atrial fibrillation (HCC)  Assessment & Plan  Eliquis increased back to 5mg BID  Previously rate controlled with Cardizem, however it was transitioned to Toprol XL in setting of low EF    Hypertension  Assessment & Plan  Previously on Cardizem; transitioned to Toprol XL in setting of low EF as rec by caridology  Continue Losartan    Shortness of breath  Assessment & Plan  Presents with dyspnea, nonproductive cough with post tussive vomiting  Solumedrol with transition to prednisone ( takes 2.5  mg daily )  Nebulized bronchodilators         Medical Problems       Resolved Problems  Date Reviewed: 6/23/2024   None       Discharging Resident: Daniel Ames DO  Discharging Attending: Rachana Malone MD  PCP: Kyaw Monreal MD  Admission Date:   Admission Orders (From admission, onward)       Ordered        06/17/24 0008  INPATIENT ADMISSION  Once                          Discharge Date: 06/26/24    Consultations During Hospital Stay:  Neurology  Surgery  PM&R  Interventional radiology  Geriatrics  Cardiology    Procedures Performed:   None    Significant Findings / Test Results:   CT A/P showing diverticular abscess and left inguinal hernia  MRI demonstrating small acute  infarct in left parietal periventricular white matter    Incidental Findings:   MRI brain wo contrast: 1. Small acute infarct in the left parietal periventricular white matter with an associated small focus of susceptibility that may represent associated petechial hemorrhage., 2. Redemonstrated foci of susceptibility, including new foci, nonspecific and could be seen with cerebral amyloid angiopathy, multiple cavernomas and/or prior embolic events, among other considerations    I reviewed the above mentioned incidental findings with the patient and/or family and they expressed understanding.    Test Results Pending at Discharge (will require follow up):  None     Outpatient Tests Requested:  None    Complications: None    Reason for Admission: Abdominal pain    Hospital Course:   84-year-old male past medical history of vascular dementia, CAD, CKD, hypertension, CVA, A-fib on Eliquis, PMR on prednisone who originally presented on 06/16/2024 with abdominal pain, dyspnea, shingles.  He had a new left groin swelling and tenderness.  CT findings on admission showed diverticular abscess originating from inguinal hernia with small perforation.  Patient was seen by surgery who did not recommend surgical intervention.  IR was consulted who did not recommend drainage.  Patient was treated with antibiotics.  On 06/18 there was a rapid response because patient had a fall and was placed on stroke pathway.  After this fall he was encephalopathic and had a waxing and waning mental status.  After transfer to ICU he had a brief episode of fixed downward gaze with bilateral upper extremity stiffening/shaking and transient unresponsiveness.  Neurology was consulted due to concern for possible seizure.  MRI brain showed patient had new small acute infarct in left parietal periventricular white matter.  Neurology believes that this was the cause of his seizure.  He was started on Depakote and had no further seizure activity while in  "the hospital.  Patient was seen by physical therapy who determined he required ARC.  Patient was transition back to Eliquis 5 mg twice daily prior to discharge.  Patient was also found to have new heart failure with an ejection fraction of 30% seen on echo.  Cardiology switched his Cardizem to a Toprol 25 mg daily.  He is advised to follow-up with cardiology outpatient.  Day of discharge patient had no complaints.  Patient was advised to continue taking antibiotics for 4 days postdischarge.    Please see above list of diagnoses and related plan for additional information.     Condition at Discharge: stable    Discharge Day Visit / Exam:   Subjective: Patient today reports that he feels tired.  No overnight events.  Patient's pain is well-controlled.  Feels well and is ready for discharge.  Vitals: Blood Pressure: 128/56 (06/26/24 1356)  Pulse: 64 (06/26/24 1356)  Temperature: 97.8 °F (36.6 °C) (06/26/24 1356)  Temp Source: Oral (06/20/24 1113)  Respirations: 17 (06/26/24 1356)  Height: 6' 2\" (188 cm) (06/20/24 1042)  Weight - Scale: 86.2 kg (190 lb 0.6 oz) (06/26/24 0600)  SpO2: (!) 89 % (06/26/24 1356)  Exam:   Physical Exam  Constitutional:       Appearance: Normal appearance.   Cardiovascular:      Rate and Rhythm: Normal rate.   Neurological:      Mental Status: He is alert.        Discussion with Family: Updated  (wife) at bedside.    Discharge instructions/Information to patient and family:   See after visit summary for information provided to patient and family.      Provisions for Follow-Up Care:  See after visit summary for information related to follow-up care and any pertinent home health orders.      Mobility at time of Discharge:   Basic Mobility Inpatient Raw Score: 18  JH-HLM Goal: 6: Walk 10 steps or more  JH-HLM Achieved: 6: Walk 10 steps or more  HLM Goal achieved. Continue to encourage appropriate mobility.     Disposition:   Acute Rehab at [facility]    Planned Readmission: " No    Discharge Medications:  See after visit summary for reconciled discharge medications provided to patient and/or family.      **Please Note: This note may have been constructed using a voice recognition system**

## 2024-06-26 NOTE — ASSESSMENT & PLAN NOTE
Malnutrition Findings:                                 BMI Findings:           Body mass index is 24.4 kg/m².   Glucerna supplement with meals   [facility] Discharge Instructions    Gonz[facility]lez Rico discharged to Home, accompanied by mother and father.    If you have any questions about your baby, please call your baby's doctor.    DO NOT GIVE BABY ANY MEDICATION unless directed by your doctor.    Discharge Weight: 3040 g (6 lb 11.2 oz) (10/06/24 2333)           Jaundice  Jaundice is when the skin and the whites of the eyes turn yellow. It happens if there is a high level of a substance called bilirubin in the blood. It is fairly common in newborns. It may be the sign of a problem with blood cells or the liver.   Most cases of jaundice are mild. For this reason, no treatment is often needed. The yellow color goes away on its own as the baby’s liver starts working better. This may take a few weeks.   If bilirubin levels are high, your baby will need treatment. This helps prevent serious problems that can affect your baby’s brain and nervous system  If you are breastfeeding, nurse your baby when they are showing feeding cues, about 8 to 12 times a day. This averages out to every 2 to 3 hours. Feeding helps the baby's body get rid of the bilirubin in the stool and urine, so babies who aren't getting enough milk have a higher risk for jaundice. If you are having trouble breastfeeding, talk with your healthcare provider.  If you are bottle-feeding, follow the healthcare provider’s instructions about how much formula to give your child and how often.    Safe Sleep  Always lay your baby on his or her back to sleep.  Your  is growing quickly, which uses a lot of energy. As a result, your baby may sleep for a total of 18 hours a day. Chances are, your  will not sleep for long stretches. But there are no rules for when or how long a baby sleeps. These tips may help your baby fall asleep safely.   Where should your baby sleep?  Where your baby sleeps depends on what’s right for you and your family. Here are a few thoughts to keep in mind as you  decide:   A tiny  may feel more secure in a bassinet than in a crib.  Always use a firm sleep surface for your infant. Make sure it meets current safety standards. Don't use a car seat, carrier, swing, or similar places for your  to sleep.  The American Academy of Pediatrics advises that infants sleep in the same room as their parents. The infant should be close to their parents' bed, but in a separate bed or crib for infants. This is advised ideally for the baby's first year. But it should at least be used for the first 6 months.  Helping your baby sleep safely  These tips are for a healthy baby up to the age of 1 year. Protect your baby with these crib safety tips:   Place your baby on his or her back to sleep. Do this both during naps and at night. Studies show this is the best way to reduce the risk of sudden infant death syndrome (SIDS) or other sleep-related causes of infant death.   Only give \"tummy-time\" when your baby is awake and someone is watching him or her. Supervised tummy time will help your baby build strong tummy and neck muscles. It will also help prevent flattening of the head.  Make sure nothing is covering your baby's head.  Never lay a baby down to sleep on an adult bed, a couch, a sofa, comforters, blankets, pillows, cushions, a quilt, waterbed, sheepskin, or other soft surfaces. Doing so can increase a baby's risk of suffocating.  Make sure soft objects, stuffed toys, and loose bedding are not in your baby’s sleep area.These can raise a baby's risk of suffocating.  Make sure your baby doesn't get overheated when sleeping. Keep the room at a temperature that is comfortable for you and your baby. Dress your baby lightly. Instead of using blankets, keep your baby warm by dressing him or her in a sleep sack, or a wearable blanket.  Don't use products that claim to decrease the risk of SIDS. This includes wedges, positioners, special mattresses, special sleep surfaces, or other  products.  Don't smoke or allow smoking near your .  Breastfeeding and regular checkups help decrease the risks of SIDS.      Preventing Shaken Baby Syndrome    Shaking a baby, even slightly, is very dangerous. It causes a serious problem called shaken baby syndrome. This can lead to major brain damage and death. When a baby won’t stop crying, it can be frustrating. The stress of caring for a baby puts a strain on the parents. It can be even more stressful if your baby has been sick. But no matter how fed up, tired, or upset you are, you should never shake your baby.  Why it’s a problem  When a baby is shaken, the brain moves back and forth inside the skull. Even a little force could cause the brain to hit the inside of the skull. This can result in bleeding and swelling inside the skull. It can lead to permanent brain damage, coma, or death.  If you’re frustrated  If you feel yourself getting fed up, here’s how to cope:  Put the baby down in a safe place, even if the baby is crying.  Take a deep breath. Walk away. Count to 10. Do whatever else you need to do to calm down.  Let others help you take care of the baby. Trade off with your partner, the baby’s grandparents, or other family members.  Talk with your baby’s healthcare provider about what’s causing the crying. There could be a health problem or other issue that’s making the baby cry more than normal. The healthcare provider can also give you ideas for how to console your crying baby.  If your baby’s healthcare provider believes your baby is just fussy, know that this is not your fault. Your baby will grow out of this period of fussiness. It does not mean the baby does not love you, or that you are not doing a good job.  If you’re feeling overwhelmed, talk with your baby’s healthcare provider about  options, counseling, or other resources that can help.  Call the [facility] National Child Abuse Hotline at 277-839-5242. The trained  can  help you deal with your frustration, so you don’t hurt your baby.      Call the baby's doctor if:   Fever 100.4 degrees F or above, especially in the first 3 months        Forceful vomiting (not spitting up)   Several feedings when infant does not suck   Watery, runny stools (mucous, blood, whitish/grey, or foul odor)   Infant injury (fall from bed or table, dropped or severely shaken)   Constant crying   Any unusual rash   Yellow color of the eyes or skin   Has less than 4 wet diapers in a 24 hr period in the first week of life, and less    than 6 wet diapers in a 24 hr period after the baby is 7 days old   No stool for 48 hours        Your baby is not gaining weight, or is losing weight   Redness, drainage or foul odor from the umbilical cord    Special Instructions: In case you need to call about any of the above   Take baby's temperature and write it down   Know how much and how many feedings the infant had that day   Note amount, color, consistency of urine and stools        Note any changes in the infant's behavior such as being sleepy, very fussy or less active    To View the Mother and Baby Care Video, scan the QR code below:    Or go to [email or web address]  Password: [email or web address]    Discharge Video viewed ( )   Discharge video not viewed, link to video provided to patient ( )  Please call to make a follow up appointment as indicated on the After Visit Summary (AVS)

## 2024-06-26 NOTE — ASSESSMENT & PLAN NOTE
Lab Results   Component Value Date    HGBA1C 7.6 (H) 06/19/2024       Recent Labs     06/25/24  2115 06/26/24  0733 06/26/24  1015 06/26/24  1144   POCGLU 167* 101 131 129         Blood Sugar Average: Last 72 hrs:  (P) [id number]

## 2024-06-26 NOTE — H&P
"[facility]  H&P  Name: Hal Miller 84 y.o. male I MRN: [id number]  Unit/Bed#: [facility] 455-01 I Date of Admission: 6/26/2024   Date of Service: 6/26/2024 I Hospital Day: 0      Assessment & Plan   Heart failure (HCC)  Assessment & Plan  Wt Readings from Last 3 Encounters:   06/26/24 86.2 kg (190 lb 0.6 oz)   06/26/24 86.2 kg (190 lb 0.6 oz)   06/07/24 76 kg (167 lb 8.8 oz)   Echo obtained during CVA workup demonstrated newly detected reduced EF at 30%.  He appears compensated at this time  Cardiology recommending outpatient follow-up  Continue I/O and daily weight        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HCC)  Assessment & Plan  Patient had AMS on previous hospitalization and MRI 6/20 demonstrated small left parietal hemorrhage with possible petechial hemorrhage  Eliquis appears to have been held at that time as that is suspected to have contributed to CVA  Patient denies any residual sensory or motor deficit  Continue anticoagulation and will monitor  Outpatient followup with neurology  Resume depakote, level ordered for tomorrow morning    Severe protein-calorie malnutrition (HCC)  Assessment & Plan  Malnutrition Findings:                                 BMI Findings:           Body mass index is 24.4 kg/m².   Glucerna supplement with meals    Ambulatory dysfunction  Assessment & Plan  Admit to [facility], PT/OT ordered    Colonic diverticular abscess  Assessment & Plan  Patient presented for abdominal pain and swelling, history of hernia.  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 No evidence of soft tissue component. A benign etiology is suspected such as retroperitoneal lymphatic malformation or lymphocele. Initial evaluation in 3 months with contrast-enhanced CT abdomen/pelvis is " "recommended.\"  Surgery evaluated patient and recommended conservative management of suspected diverticulitis with microperforation.  Patient is eating without issue and advanced to regular diet.  He is having normal BMs   Outpatient follow-up with surgery, continue 2 more days of p.o. antibiotics with levaquin and flagyl as planned. Tentative end date of abx 6/28/24    Zoster  Assessment & Plan  Completed 7 days of Valtrex prior to transfer  Continue to monitor    Type 2 diabetes mellitus (HCC)  Assessment & Plan  Lab Results   Component Value Date    HGBA1C 7.6 (H) 06/19/2024       Recent Labs     06/25/24  2115 06/26/24  0733 06/26/24  1015 06/26/24  1144   POCGLU 167* 101 131 129       Blood Sugar Average: Last 72 hrs:  Home Lantus dose is 12 units nightly, will order 8 units nightly for now with ISS  Glucerna supplement ordered and diabetic diet      Chronic obstructive pulmonary disease with acute exacerbation (HCC)  Assessment & Plan  Compensated, continue scheduled and as needed breathing treatments    Inflammatory polyarthropathy (HCC)  Assessment & Plan  Resume prednisone 1 mg daily    Atrial fibrillation (HCC)  Assessment & Plan  Rate controlled on Toprol-XL, Cardizem held in the setting of recently detected reduced LVEF  Anticoagulated on Eliquis 5 mg p.o. twice daily.  Outpatient follow-up with cardiology    Hypertension  Assessment & Plan  Resume losartan and Toprol-XL  Cardizem appears on hold due to recently diagnosed cardiomyopathy           VTE Pharmacologic Prophylaxis:   Moderate Risk (Score 3-4) - Pharmacological DVT Prophylaxis Ordered: apixaban (Eliquis).  Code Status: Level 1 - Full Code   Discussion with family: Updated  (wife) at bedside.    Anticipated Length of Stay: Patient will be admitted on an inpatient basis with an anticipated length of stay of greater than 2 midnights secondary to CVA.    Total Time Spent on Date of Encounter in care of patient: 45 mins. This time was " spent on one or more of the following: performing physical exam; counseling and coordination of care; obtaining or reviewing history; documenting in the medical record; reviewing/ordering tests, medications or procedures; communicating with other healthcare professionals and discussing with patient's family/caregivers.    Chief Complaint: Abdominal pain    History of Present Illness:  Hal Miller is a 84 y.o. male with a PMH of diabetes, hernia, recent CVA, hypertension among others who presents for rehab after CVA. He had recent hospitalization at [facility] for abdominal pain and swelling.  At that time he was diagnosed with likely diverticulitis with microperforation.  He was seen by the surgical team and recommended for medical management.  During his hospitalization he had an episode of encephalopathy and questionable seizure-like activity.  He was placed on stroke pathway and required admission in ICU.  MRI showed small CVA.  Cardiology and neuro question if it may have been due to insufficient anticoagulation dose and recommended adjustment in Eliquis.  He was also started on Depakote.  He had improvement in his medical status with continued medical and supportive interventions.  He was eventually recommended for rehab at [facility].  He is currently seen and examined at bedside.  He has no new complaints at present.    Review of Systems:  Review of Systems   Constitutional:  Negative for chills and fever.   HENT:  Negative for ear pain and sore throat.    Eyes:  Negative for visual disturbance.   Respiratory:  Negative for cough and shortness of breath.    Cardiovascular:  Negative for chest pain and palpitations.   Gastrointestinal:  Negative for abdominal pain and vomiting.   Genitourinary:  Negative for dysuria and hematuria.   Musculoskeletal:  Negative for arthralgias and back pain.   Skin:  Negative for color change and rash.   Neurological:  Negative for syncope.   All other systems reviewed and are  negative.      Past Medical and Surgical History:   Past Medical History:   Diagnosis Date    Acute encephalopathy 2/14/2023    Acute-on-chronic kidney injury  (HCC) 6/13/2017    Cancer (HCC)     pancreatic    Colon polyp     Coronary artery disease     Dehydration 3/3/2023    Diabetes mellitus (HCC)     Hyperlipidemia     Hypertension     Left lower lobe pneumonia 7/2/2022    Pneumonia 06/13/2017    Right middle and lower lobe     Stroke (HCC)        Past Surgical History:   Procedure Laterality Date    APPENDECTOMY      CARDIAC SURGERY      Aortic Valve Replacement, Ascending Aorta    COLONOSCOPY      GALLBLADDER SURGERY      PANCREATICODUODENECTOMY         Meds/Allergies:  Prior to Admission medications    Medication Sig Start Date End Date Taking? Authorizing Provider   apixaban (ELIQUIS) 2.5 mg Take 2.5 mg by mouth 2 (two) times a day    Historical Provider, MD BAJWA UF III MINI PEN NEEDLES 31G X 5 MM MISC  3/11/18   Historical Provider, MD   Calcium Citrate (CITRACAL PO) Take 650 mg by mouth in the morning    Historical Provider, MD   Cinnamon 500 MG capsule Take 1,000 mg by mouth daily    Historical Provider, MD   ciprofloxacin (CIPRO) 500 mg tablet Take 1 tablet (500 mg total) by mouth every 12 (twelve) hours for 11 doses 6/26/24 7/2/24  Daniel Ames DO   diltiazem (CARDIZEM CD) 180 mg 24 hr capsule Take 180 mg by mouth daily    Historical Provider, MD   hydroxychloroquine (PLAQUENIL) 200 mg tablet Take 200 mg by mouth in the morning    Historical Provider, MD   Lankhurram SoloStar 100 units/mL SOPN  3/23/23   Historical Provider, MD   metroNIDAZOLE (FLAGYL) 500 mg tablet Take 1 tablet (500 mg total) by mouth every 8 (eight) hours for 4 days 6/26/24 6/30/24  Daniel Ames DO   nystatin (MYCOSTATIN) 500,000 units/5 mL suspension Apply 5 mL (500,000 Units total) to the mouth or throat 4 (four) times a day 6/8/24   Wil Marsh MD   olmesartan (BENICAR) 20 mg tablet Take 20 mg by mouth daily    Historical  "Provider, MD   ondansetron (ZOFRAN-ODT) 4 mg disintegrating tablet Take 1 tablet (4 mg total) by mouth every 6 (six) hours as needed for nausea or vomiting 24   ADEOLA Rodriguez   potassium chloride (KLOR-CON) 8 MEQ tablet  21   Historical Provider, MD   pravastatin (PRAVACHOL) 40 mg tablet Take 40 mg by mouth daily    Historical Provider, MD   predniSONE 1 mg tablet Take 10 mg by mouth daily Currently down to 3 1/2 mg daily 22    Historical Provider, MD   QUEtiapine (SEROquel) 25 mg tablet Take 25 mg by mouth if needed (for agitation) Take 1/2 to 1 tablet by mouth as needed    Historical Provider, MD     I have reviewed home medications with patient personally.    Allergies:   Allergies   Allergen Reactions    Contrast Dye [Iodinated Contrast Media] Other (See Comments)     Pt states kidney dysfunction..     Other        Social History:  Marital Status: /Civil Union   Occupation: Not on file  Patient Pre-hospital Living Situation: Home  Patient Pre-hospital Level of Mobility: walks  Patient Pre-hospital Diet Restrictions: Diabetic  Substance Use History:   Social History     Substance and Sexual Activity   Alcohol Use Never     Social History     Tobacco Use   Smoking Status Former    Types: Pipe    Quit date:     Years since quittin.4   Smokeless Tobacco Never     Social History     Substance and Sexual Activity   Drug Use No       Family History:  Family History   Problem Relation Age of Onset    Cancer Mother     Stroke Father     Cancer Sister     Diabetes Sister     Stroke Brother        Physical Exam:     Vitals:   Blood Pressure: 130/77 (24)  Pulse: 76 (24)  Temperature: 97.9 °F (36.6 °C) (24)  Temp Source: Oral (24)  Respirations: 16 (24)  Height: 6' 2\" (188 cm) (24)  Weight - Scale: 86.2 kg (190 lb 0.6 oz) (24)  SpO2: 97 % (24)    Physical Exam  Vitals and nursing note reviewed. "   Constitutional:       General: He is not in acute distress.     Appearance: He is well-developed. He is not toxic-appearing or diaphoretic.   HENT:      Head: Normocephalic and atraumatic.      Mouth/Throat:      Mouth: Mucous membranes are moist.   Eyes:      General: No scleral icterus.     Extraocular Movements: Extraocular movements intact.      Conjunctiva/sclera: Conjunctivae normal.   Cardiovascular:      Rate and Rhythm: Normal rate and regular rhythm.      Pulses: Normal pulses.      Heart sounds: No murmur heard.     No friction rub. No gallop.   Pulmonary:      Effort: Pulmonary effort is normal. No respiratory distress.      Breath sounds: Normal breath sounds. No wheezing, rhonchi or rales.   Abdominal:      General: Abdomen is flat. Bowel sounds are normal. There is no distension.      Palpations: Abdomen is soft. There is no mass.      Tenderness: There is no abdominal tenderness. There is no guarding.   Musculoskeletal:         General: No swelling.      Cervical back: Neck supple.      Right lower leg: No edema.      Left lower leg: No edema.   Skin:     General: Skin is warm and dry.      Capillary Refill: Capillary refill takes less than 2 seconds.      Coloration: Skin is not jaundiced.      Findings: No rash.   Neurological:      General: No focal deficit present.      Mental Status: He is alert and oriented to person, place, and time.      Sensory: No sensory deficit.      Motor: No weakness.   Psychiatric:         Mood and Affect: Mood normal.        Additional Data:     Lab Results:  Results from last 7 days   Lab Units 06/26/24  0520 06/25/24  0436   WBC Thousand/uL 9.27 6.65   HEMOGLOBIN g/dL 12.7 11.1*   HEMATOCRIT % 39.9 35.4*   PLATELETS Thousands/uL 219 186   SEGS PCT %  --  72   LYMPHO PCT %  --  9*   MONO PCT %  --  16*   EOS PCT %  --  2     Results from last 7 days   Lab Units 06/26/24  0520 06/24/24  0630 06/23/24  0413   SODIUM mmol/L 130*   < > 131*   POTASSIUM mmol/L 4.9   < >  4.7   CHLORIDE mmol/L 98   < > 101   CO2 mmol/L 28   < > 25   BUN mg/dL 24   < > 20   CREATININE mg/dL 1.35*   < > 1.22   ANION GAP mmol/L 4   < > 5   CALCIUM mg/dL 9.9   < > 9.3   ALBUMIN g/dL  --   --  3.0*   TOTAL BILIRUBIN mg/dL  --   --  0.84   ALK PHOS U/L  --   --  89   ALT U/L  --   --  14   AST U/L  --   --  15   GLUCOSE RANDOM mg/dL 111   < > 112    < > = values in this interval not displayed.         Results from last 7 days   Lab Units 06/26/24  1144 06/26/24  1015 06/26/24  0733 06/25/24  2115 06/25/24  1615 06/25/24  1138 06/25/24  0753 06/24/24  2049 06/24/24  1608 06/24/24  1211 06/24/24  0730 06/24/24  0019   POC GLUCOSE mg/dl 129 131 101 167* 161* 125 102 120 177* 121 101 101     Lab Results   Component Value Date    HGBA1C 7.6 (H) 06/19/2024    HGBA1C 7.4 (H) 06/07/2024    HGBA1C 7.5 (H) 04/09/2024     Results from last 7 days   Lab Units 06/20/24  0539   PROCALCITONIN ng/ml 0.08       Lines/Drains:  Invasive Devices       None                       Imaging: Reviewed radiology reports from this admission including: MRI brain  No orders to display       EKG and Other Studies Reviewed on Admission:   EKG: No EKG obtained.    ** Please Note: This note has been constructed using a voice recognition system. **

## 2024-06-26 NOTE — ASSESSMENT & PLAN NOTE
Malnutrition Findings:   Adult Malnutrition type: Acute illness  Adult Degree of Malnutrition: Other severe protein calorie malnutrition  Malnutrition Characteristics: Weight loss, Inadequate energy                  360 Statement: Malnutrition related to inadequate energy intake as evidenced by 12#(7%) weight loss from 5/15/# to 6/7/# and <50% energy intake compared to estimated needs >5 days.    BMI Findings:           Body mass index is 24.4 kg/m².

## 2024-06-26 NOTE — ASSESSMENT & PLAN NOTE
Lab Results   Component Value Date    HGBA1C 7.6 (H) 06/19/2024       Recent Labs     06/25/24  2115 06/26/24  0733 06/26/24  1015 06/26/24  1144   POCGLU 167* 101 131 129       Blood Sugar Average: Last 72 hrs:  Home Lantus dose is 12 units nightly, will order 8 units nightly for now with ISS  Glucerna supplement ordered and diabetic diet

## 2024-06-26 NOTE — ASSESSMENT & PLAN NOTE
-Patient has been tearful frequently over the course of his stay.  -Patient is currently very frustrated/anxious awaiting discharge. He is ready to be discharged.  -consider  Lexapro 5 mg for dysphoric mood.   -Monitor for potential side effects of SSRIs   - continue to provide supportive care

## 2024-06-26 NOTE — PLAN OF CARE
Problem: PAIN - ADULT  Goal: Verbalizes/displays adequate comfort level or baseline comfort level  Description: Interventions:  - Encourage patient to monitor pain and request assistance  - Assess pain using appropriate pain scale  - Administer analgesics based on type and severity of pain and evaluate response  - Implement non-pharmacological measures as appropriate and evaluate response  - Consider cultural and social influences on pain and pain management  - Notify physician/advanced practitioner if interventions unsuccessful or patient reports new pain  Outcome: Progressing     Problem: INFECTION - ADULT  Goal: Absence or prevention of progression during hospitalization  Description: INTERVENTIONS:  - Assess and monitor for signs and symptoms of infection  - Monitor lab/diagnostic results  - Monitor all insertion sites, i.e. indwelling lines, tubes, and drains  - Monitor endotracheal if appropriate and nasal secretions for changes in amount and color  - [place] appropriate cooling/warming therapies per order  - Administer medications as ordered  - Instruct and encourage patient and family to use good hand hygiene technique  - Identify and instruct in appropriate isolation precautions for identified infection/condition  Outcome: Progressing

## 2024-06-26 NOTE — ASSESSMENT & PLAN NOTE
Patient developed confusion; neurology consulted for concerns of brief seizures in setting of Zoster  Stroke alert; MRI  showed small acute infarct in the left parietal periventricular white matter with associated small focus of susceptibility may represent associated petechial hemorrhage.    Concern for cardioembolic secondary to holding anticoagulation drugs      6/23/24: RESOLVED  PT/OT is recommending [facility].  MRA was WNL  Continue Depakote  Patient stable for discharge to [facility]

## 2024-06-26 NOTE — CASE MANAGEMENT
Case Management Discharge Planning Note    Patient name Hal Kirby S /S -01 MRN [id number]  : 1940 Date 2024       Current Admission Date: 2024  Current Admission Diagnosis:Acute encephalopathy   Patient Active Problem List    Diagnosis Date Noted Date Diagnosed    Dysphoric mood 2024     Heart failure (HCC) 2024     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[facility]) 2024     Acute on chronic respiratory failure (HCC) 2024     Episode of seizure-like activity 2024     History of Whipple procedure 2024     Dizziness 2024     Insomnia 2024     Ambulatory dysfunction 2024     Severe protein-calorie malnutrition (HCC) 2024     Abnormal CT of the abdomen 2024     Zoster 2024     Colonic diverticular abscess 2024     Vascular dementia ([facility]) 2024     Concern for aspiration pneumonia ([facility]) 2024     Type 2 diabetes mellitus ([facility]) 2024     Hypercalcemia 2023     TARA (acute kidney injury) ([facility]) 2023     Esophageal stricture 2023     Acute encephalopathy 2023     Generalized weakness 2023     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     Chronic obstructive pulmonary disease with acute exacerbation ([facility]) 2022     CKD (chronic kidney disease) 2022     Centrilobular emphysema ([facility]) 2021     History of malignant neuroendocrine tumor 2021     Atrial fibrillation (HCC) 2017     Shortness of breath 2017     Hypertension 2017     Hyperlipidemia 2015     Inflammatory polyarthropathy (HCC) 2014     Osteoarthrosis 2014     Polymyalgia rheumatica (HCC) 2014     Aneurysm of thoracic aorta (HCC) 2014     Aneurysm of abdominal aorta (HCC) 2014     Neoplasm of other specified site 2014       LOS (days): 9  Geometric Mean LOS (GMLOS) (days): 3.9  Days to GMLOS:-5.6     OBJECTIVE:  Risk of Unplanned  Readmission Score: 29.91         Current admission status: Inpatient   Preferred Pharmacy:   [id number] [place] [facility] [address] [address] [address]  [facility] [address]  Phone: [phone] Fax: [phone]    [facility] - [place], [place] - [address]  [address] [facility] [address]  Phone: [phone] Fax: [phone]    [facility] [place] - [address]  [address] [place] [address]  Phone: [phone] Fax: [phone]    Primary Care Provider: Kyaw Monreal MD    Primary Insurance: [employer] [facility]  Secondary Insurance:     DISCHARGE DETAILS:    Discharge planning discussed with:: wifeAnn at bedside  Freedom of Choice: Yes  Comments - Freedom of Choice: B [facility]  CM contacted family/caregiver?: Yes  Were Treatment Team discharge recommendations reviewed with patient/caregiver?: Yes  Did patient/caregiver verbalize understanding of patient care needs?: N/A- going to facility    Contacts  Patient Contacts: Ann  Relationship to Patient:: Family (wife)  Contact Method: In Person  Reason/Outcome: Emergency Contact, Discharge Planning, Referral, Continuity of Care    Requested Home Health Care         Is the patient interested in [facility] at discharge?: No    DME Referral Provided  Referral made for DME?: No    Other Referral/Resources/Interventions Provided:  Interventions: Acute Rehab, Short Term Rehab  Referral Comments: CM notified by d/c support that auth was received for IRF. CM confirmed with [facility], they can accept patient today. 4:00PM [facility]CV confirmed, all appropriate parties aware. CM met with Ann blackburn at bedside-- wife aware and agreeable to dcp to facility today. Discussed again with wife, [facility] in [place] can provide a shuttle ride to \[facility]\"" (CM confirmed that with hotel this AM)-- provided wife with address and phone number. No further CM needs anticipated needs  at this time.    Would you like to participate in our [facility] service program?  : No - Declined    Treatment Team Recommendation: Acute Rehab, Short Term Rehab  Discharge Destination Plan:: Acute Rehab, Short Term Rehab  Transport at Discharge : Wheelchair van  Transported by (Company and Unit #): [employer]    IMM Given (Date)::  (N/A; acute to acute transfer)    Accepting Facility Name, City & State : [facility]  Receiving Facility/Agency Phone Number: RN to [employer] [facility] Charge RN role

## 2024-06-26 NOTE — CASE MANAGEMENT
[facility] has received APPROVED authorization.  Insurance:  [facility]   Called in by Rep:Josephine TIRADO# [phone]   Authorization received for: Acute Rehab  Facility: [facility]   Authorization #: [id number]  Start of Care: 06/25  Next Review Date: 07/01  Submit next review to: [phone]     Care Manager notified:Kb Rod      Please reach out to  for updates on any clinical information.

## 2024-06-26 NOTE — PROGRESS NOTES
PHYSICAL MEDICINE AND REHABILITATION   PREADMISSION ASSESSMENT     Projected IGC and Rehabilitation Diagnoses:  Impairment of mobility, safety, Activities of Daily Living (ADLs), and cognitive/communication skills due to Stroke:  01.2  Right Body Involvement (Left Brain)  Etiologic: Acute infarct in L parietal   Date of Onset: 6/17/24   Date of surgery: n/a     PATIENT INFORMATION  Name: Hal Miller Phone #: [phone] (home)   Address: [address]   [place] [address]  YOB: 1940 Age: 84 y.o. #   Marital Status: /Civil Union  Ethnicity: White/ ; Not //Guatemalan   Employment Status: retired  Extended Emergency Contact Information  Primary Emergency Contact: Ann Miller  Home Phone: [phone]  Mobile Phone: [phone]  Relation: Spouse  Advance Directive: Level 1 Full code ( No advance directive on file )    INSURANCE/COVERAGE:     Primary Payor: [employer]  REP / Plan: [facility] MEDICARE ADVANTAGE  REP / Product Type: Medicare HMO /   Secondary Payer: SELF PAY    Payer Contact: not yet assigned  Payer Contact: n/a    Contact Phone: fax # [phone]   Contact Phone: n/a      Authorization #: [id number]   Coverage Dates:6/25-7/1/24   LCD: 7/1/24 ( Review due 7/1/24 )  MEDICARE #: [id number]   Medicare Days: n/a  Benefits : Ded $0, OOPM $0 remaining, copay $100, coins 0.     Medical Record #: [id number]    REFERRAL SOURCE:   Referring provider: Rachana Malone MD  Referring facility: [facility]   Room: S /S -01  PCP: Kyaw Monreal MD PCP phone number: [phone]    MEDICAL INFORMATION  HPI: Hal is a 84 year old male with a medica history of but not limited to polymyalgia rheumatica on chronic plaquenil and low dose prednisone, Afib on Eliquis, stroke history, CAD, DM2, HTN, COPD, vascular dementia, chronic double vision for which he wears eye patch, pancreatic CA s/p whipple (2011), appy,  cholecystectomy, R ing hernia repair who presented to [facility] ED with abdominal pain, SOB, and enlarging L groin buldge who had CT A/P concerning for diverticular abscess. He was admitted for further eval/assessment/consultation. Gen Sx was consulted and recommended IV abx.  Follow-up imaging obtained and Gen Sx felt diverticular abscess emanating into L inguinal hernia.  IR was consulted for possible drainage but they felt not enough to no fluid collection available to drain. Plaquenil was d/c'd to limit immunocompromised state.  Follow-up Gen Sx reported Sigmoid diverticular perforation with isolated air within the inguinal canal or inguinal sac with recommendation to continue abx and advance diet as tolerated.  Hospital course notable for episode of unresponsiveness with turning head to left and with downward gaze on 6/19 with waxing and waning mentation.  He received Keppra and ativan on 6/19 for possible seizure and more recently started on Depacon with kidney d/s hx.  EEG showed excessive theta activity during wakefulness suggest mild nonspecific diffuse cerebral dysfunction. No epileptiform discharges or seizures are present.   MRI brain on 6/20 showed small acute infarct in the left parietal periventricular white matter with an associated small focus of susceptibility that may represent associated petechial hemorrhage as well as redemonstrated foci of susceptibility, including new foci, nonspecific and could be seen with cerebral amyloid angiopathy, multiple cavernomas and/or prior embolic events, among other considerations.Neuro was consulted and they reported / documented small CVA related to being temporarily being off of Eliquis and not failure with plan to go back from hep gtt to Eliquis.  Gen Sx recs 1 more week of abx and OP Gen Sx follow-up.  PT/OT therapies were consulted and continue to follow patient at this time. PM&R was consulted and are recommending inpatient acute rehab  when medically stable. All involved medical disciplines feel/agree patient is medically ready for discharge at this time. Inpatient acute rehabilitation physician was consulted. Upon review of patient's case and correspondence with PT/OT therapies and PM&R services, [facility] physician feels Hal will benefit and is a good candidate / appropriate for inpatient acute rehab at this time. He has demonstrated the willingness / desire and tolerance to participate in the required 3 hours or more of therapies per day.       Past Medical History:   Past Surgical History:   Allergies:     Past Medical History:   Diagnosis Date    Acute encephalopathy 2/14/2023    Acute-on-chronic kidney injury  (HCC) 6/13/2017    Cancer (HCC)     pancreatic    Colon polyp     Coronary artery disease     Dehydration 3/3/2023    Diabetes mellitus (HCC)     Hyperlipidemia     Hypertension     Left lower lobe pneumonia 7/2/2022    Pneumonia 06/13/2017    Right middle and lower lobe     Stroke (HCC)     Past Surgical History:   Procedure Laterality Date    APPENDECTOMY      CARDIAC SURGERY      Aortic Valve Replacement, Ascending Aorta    COLONOSCOPY      GALLBLADDER SURGERY      PANCREATICODUODENECTOMY       Allergies   Allergen Reactions    Contrast Dye [Iodinated Contrast Media] Other (See Comments)     Pt states kidney dysfunction..     Other          Medical/functional conditions requiring inpatient rehabilitation: impaired mobility/self care  ; impaired balance / ambulation     Risk for medical/clinical complications: risk for hypotensive/hypertensive episodes ; further infarcts ; falls ; pain ; skin breakdown; infection    Comorbidities/Surgeries in the last 100 days:   Encephalopathy  Seizure-like episode    Stroke - acute infarct in L parietal perivent white matter  Vascular dementia   Constipation   Afib  COPD, chronic respiratory respiratory failure with hypoxia   DM2  Polymyalgia rheumatica on chronic plaquenil and low dose  prednisone  Neuropathy     CURRENT VITAL SIGNS:   Temp:  [97.5 °F (36.4 °C)-97.9 °F (36.6 °C)] 97.8 °F (36.6 °C)  HR:  [64-82] 64  Resp:  [17-18] 17  BP: (107-128)/(56-81) 128/56   Intake/Output Summary (Last 24 hours) at 2024 1453  Last data filed at 2024 0501  Gross per 24 hour   Intake 480 ml   Output --   Net 480 ml        LABORATORY RESULTS:      Lab Results   Component Value Date    HGB 12.7 2024    HGB 13.0 2015    HCT 39.9 2024    HCT 41.0 2015    WBC 9.27 2024    WBC 7.03 2015     Lab Results   Component Value Date    BUN 24 2024    BUN 38 (H) 2023     2015    K 4.9 2024    K 4.6 2023    CL 98 2024     2023    GLUCOSE 169 (H) 2024    GLUCOSE 101 2015    CREATININE 1.35 (H) 2024    CREATININE 1.58 (H) 2023     Lab Results   Component Value Date    PROTIME 16.6 (H) 2024    PROTIME 13.5 2014    INR 1.27 (H) 2024    INR 1.08 2014        DIAGNOSTIC STUDIES:  MRA head wo contrast    Result Date: 2024  Impression: Normal MRA Brain with variant anatomy. Please see MRI brain without contrast done yesterday for further evaluation Workstation performed: [id number]     MRI brain wo contrast    Result Date: 2024  Impression: 1. Small acute infarct in the left parietal periventricular white matter with an associated small focus of susceptibility that may represent associated petechial hemorrhage. 2. Redemonstrated foci of susceptibility, including new foci, nonspecific and could be seen with cerebral amyloid angiopathy, multiple cavernomas and/or prior embolic events, among other considerations. I personally discussed the acute infarct with MEREDITH VILLATORO on 2024 2:01 PM. Workstation performed: [id number]       PRECAUTIONS/SPECIAL NEEDS:  Tobacco:   Social History     Tobacco Use   Smoking Status Former    Types: Pipe    Quit date:     Years since quittin.4    Smokeless Tobacco Never   , Alcohol:    Social History     Substance and Sexual Activity   Alcohol Use Never   , Anticoagulation:   Eliquis per MD , Edema Management, Safety Concerns, Pain Management, Dietary Restrictions: Consistent carb, Language Preference: English, and Fall precautions    MEDICATIONS:     Current Facility-Administered Medications:     acetaminophen (TYLENOL) tablet 650 mg, 650 mg, Oral, Q4H PRN, Lyubov Malone MD, 650 mg at 06/21/24 0837    albuterol (PROVENTIL HFA,VENTOLIN HFA) inhaler 2 puff, 2 puff, Inhalation, Q4H PRN, Lyubov Malone MD    apixaban (ELIQUIS) tablet 5 mg, 5 mg, Oral, BID, Daniel Ames, , 5 mg at 06/26/24 0812    atorvastatin (LIPITOR) tablet 40 mg, 40 mg, Oral, Daily With Dinner, Hal Zamarripa MD, 40 mg at 06/25/24 1656    bisacodyl (DULCOLAX) rectal suppository 10 mg, 10 mg, Rectal, Daily, Ravi Lopez MD, 10 mg at 06/23/24 0945    budesonide (PULMICORT) inhalation solution 0.5 mg, 0.5 mg, Nebulization, Q12H, Jose Cesar MD, 0.5 mg at 06/26/24 0725    ciprofloxacin (CIPRO) tablet 500 mg, 500 mg, Oral, Q12H YUNI, Monika Alfonso MD, 500 mg at 06/26/24 0812    divalproex sodium (DEPAKOTE) DR tablet 500 mg, 500 mg, Oral, Q8H [facility], Hal Zamarripa MD, 500 mg at 06/26/24 0812    folic acid 1 mg, thiamine (VITAMIN B1) 100 mg in sodium chloride 0.9 % 100 mL IV piggyback, , Intravenous, Daily, Lyubov Malone MD, Last Rate: 200 mL/hr at 06/21/24 1111, New Bag at 06/26/24 0813    formoterol (PERFOROMIST) nebulizer solution 20 mcg, 20 mcg, Nebulization, Q12H, Jose Cesar MD, 20 mcg at 06/26/24 0725    HYDROmorphone HCl (DILAUDID) injection 0.2 mg, 0.2 mg, Intravenous, Q4H PRN, Lyubov Malone MD    insulin lispro (HumALOG/ADMELOG) 100 units/mL subcutaneous injection 1-5 Units, 1-5 Units, Subcutaneous, 4x Daily (AC & HS), 1 Units at 06/25/24 2116 **AND** Fingerstick Glucose (POCT), , , 4x Daily AC and at bedtime, Lyubov Malone MD    lidocaine (LMX) 4 % cream, , Topical, 4x  Daily PRN, Lyubov Malone MD, 1 Application at 06/17/24 1231    losartan (COZAAR) tablet 50 mg, 50 mg, Oral, Daily, Lyubov Malone MD, 50 mg at 06/26/24 0812    melatonin tablet 3 mg, 3 mg, Oral, HS, Katy Sykes MD, 3 mg at 06/25/24 2111    metoprolol succinate (TOPROL-XL) 24 hr tablet 25 mg, 25 mg, Oral, Daily, Muriel Patterson MD, 25 mg at 06/26/24 0812    metroNIDAZOLE (FLAGYL) tablet 500 mg, 500 mg, Oral, Q8H YUNI, Monika Alfonso MD, 500 mg at 06/26/24 0815    pantoprazole (PROTONIX) EC tablet 40 mg, 40 mg, Oral, Daily Before Breakfast, Monika Alfonso MD, 40 mg at 06/26/24 0509    predniSONE tablet 1 mg, 1 mg, Oral, Daily, Lyubov Malone MD, 1 mg at 06/26/24 0812    senna (SENOKOT) tablet 17.2 mg, 2 tablet, Oral, BID, Katy Sykes MD, 17.2 mg at 06/26/24 0812    SKIN INTEGRITY:   Skin noted intact     PRIOR LEVEL OF FUNCTION:  He lives in a(n) single family home  Hal Miller is  and lives with their spouse.  Self Care: Independent, Indoor Mobility: Independent, Stairs (in/outdoor): Independent, and Cognition: Independent    FALLS IN THE LAST 6 MONTHS: 1    HOME ENVIRONMENT:  The living area: can live on one level  There are 2 steps to enter the home.    The patient will have 24 hour supervision/physical assistance available upon discharge.    PREVIOUS DME:  Equipment in home (previous DME): Shower Chair, Grab Bars, Rolling Walker, Single Point Cane, and walk in shower ; raised toilet seat     FUNCTIONAL STATUS:  Physical Therapy Occupational Therapy Speech Therapy   As per PT:     PT Visit Date 06/25/24   Note Type   Note Type Treatment   Pain Assessment   Pain Assessment Tool 0-10   Pain Score No Pain   Patient's Stated Pain Goal No pain   Hospital Pain Intervention(s) Repositioned;Ambulation/increased activity;Rest   Multiple Pain Sites No   Restrictions/Precautions   Weight Bearing Precautions Per Order No   Other Precautions Cognitive;Chair Alarm;Bed Alarm;Fall Risk   General   Chart Reviewed Yes    Response to Previous Treatment Patient with no complaints from previous session.   Family/Caregiver Present Yes  (nephew)   Cognition   Overall Cognitive Status Unable to assess   Arousal/Participation Alert;Responsive;Cooperative   Attention Within functional limits   Orientation Level Oriented X4   Memory Decreased recall of precautions   Following Commands Follows one step commands with increased time or repetition   Comments pt identified by name and    Subjective   Subjective pt was agreeable to participate in PT intervention and stated no pain pre/post tx session   Bed Mobility   Additional Comments pt seated OOB in the recliner pre/post tx session   Transfers   Sit to Stand 5  Supervision   Additional items Assist x 1;Armrests;Increased time required;Verbal cues   Stand to Sit 5  Supervision   Additional items Assist x 1;Armrests;Increased time required;Verbal cues   Stand pivot Unable to assess   Additional Comments pt completed 3 STS in todays tx sesison to and from RW all required /s and VC's for hand placement for increased safety and balance   Ambulation/Elevation   Gait pattern Decreased foot clearance;Short stride;Excessively slow   Gait Assistance 4  Minimal assist   Additional items Assist x 1;Verbal cues   Assistive Device Rolling walker   Distance 90'x1 RW   Stair Management Assistance Not tested  (pt wanted to spend time with family present)   Balance   Static Sitting Good   Dynamic Sitting Fair   Static Standing Fair -   Dynamic Standing Poor   Ambulatory Poor +  (w/ RW)   Endurance Deficit   Endurance Deficit Yes   Endurance Deficit Description limited activity tolerance and ambulation distance   Activity Tolerance   Activity Tolerance Patient limited by fatigue   Nurse Made Aware Spoke to RN   Exercises   Quad Sets Sitting;10 reps;AROM;Bilateral   Hip Abduction Sitting;15 reps;AROM;Bilateral   Hip Adduction Sitting;15 reps;AROM;Bilateral  (pillow squeezes)   Knee AROM Long Arc Quad  Sitting;15 reps;AROM;Bilateral   Ankle Pumps Sitting;20 reps;AROM;Bilateral   Marching Sitting;10 reps;AROM;Bilateral   Assessment   Prognosis Fair   Problem List Decreased strength;Decreased range of motion;Decreased endurance;Impaired balance;Decreased mobility;Decreased cognition;Impaired judgement;Decreased safety awareness;Impaired hearing  (gait deviations)   Assessment pt began tx session seated OOB in the recliner and was agreeable to participate in PT intervention. PT intervention to focus on transfer training, TE activities, posture/balance with gait and increasing activity tolerance. In comparison to previous PT interventions pt demonstrated progress with functional transfers as pt required /s and VC's for hand placement in order to complete multiple functional transfers w/o LOB. pt was able to increase activity tolerance and ambulation distance to 90'x1 RW with min ax1 for safety and balance as pt continues to demonstrate gait deviations. pt did participate in TE activities while seated in recliner in order to strengthen LE's in efforts in reducing risk for falls and injuries. Post tx session pt continues to demonstrate the following deficits: limited activity tolerance, ambulation distance and steps completed. pt would benefit frim continued skilled Pt intervention in order to address deficits listed above. Continue to recommend Dc w/ level 1 maximal rehab resource intensity when medically cleared    As per OT:     OT Visit Date 06/25/24   Note Type   Note Type Treatment   Pain Assessment   Pain Assessment Tool 0-10   Pain Score No Pain   Restrictions/Precautions   Weight Bearing Precautions Per Order No   Other Precautions Cognitive;Chair Alarm;Bed Alarm;Fall Risk   ADL   Where Assessed Chair   Eating Assistance 5  Supervision/Setup   Eating Deficit Setup;Supervision/safety;Increased time to complete   Grooming Assistance 5  Supervision/Setup   Grooming Deficit Wash/dry face;Increased time to  complete;Verbal cueing   LB Dressing Assistance 5  Supervision/Setup   LB Dressing Deficit Thread RLE into pants;Thread LLE into pants;Pull up over hips;Fasteners;Increased time to complete;Supervision/safety;Requires assistive device for steadying   Toileting Assistance  4  Minimal Assistance   Toileting Deficit Use of bedpan/urinal setup;Increased time to complete;Perineal hygiene   Bed Mobility   Additional Comments Pt greeted OOB in recliner at start and end of OT tx session   Transfers   Sit to Stand 5  Supervision   Additional items Armrests;Increased time required;Verbal cues  (for hand placement during transfer)   Stand to Sit 5  Supervision   Additional items Increased time required;Armrests  (Pt demonstrated carry over for safe hand placement)   Additional Comments Pt demonstrated 2 STS transfers   Functional Mobility   Functional Mobility 5  Supervision   Additional Comments ax1, short distance fnxl mobility to bathroom from recliner chair, benefits from increased time   Additional items Rolling walker   Cognition   Overall Cognitive Status Impaired  (increased time for processing)   Arousal/Participation Alert;Cooperative   Attention Within functional limits   Orientation Level Oriented X4   Memory Decreased recall of precautions   Following Commands Follows one step commands with increased time or repetition   Assessment   Assessment Pt was seen for skilled OT tx session on this date focusing on fnxl transfer training, ADLs, fnxl mobility, and activity engagement and participation. Pt completed STS from recliner chair x2 with supervision and verbal cues for safe transfers. Pt completed LB dressing, grooming and eating in recliner chair. Pt engaged in fnxl mobility to bathroom with RW and supervision. At the end of the session, pt returned to recliner chair w all needs met.    As per SLP:    Today's Date: 6/21/2024           Subjective:  Pt seen for dysphagia tx follow up- message rec'd from neuro NP  that pt was coughing a lot this am at breakfast. Swallow eval completed yesterday w/ no concerns about swallowing.      Objective:  Pt sitting EOB, family at bedside. Pt took bites of roast beef (c/o being tough and chewy), ate yogurt and applesauce, took 1 pill w/ water by cup and took several sips of soda by straw. Pt w/ prolonged mastication due to meat being chewy and tough; pt used applesauce or yogurt to aid w/ transfer of meat.  Pt appeared w/ good oral control of liquids by straw. Swallows appeared timely. Cough noted x1 while chewing meat. Cough also noted x1 w/ thin liquids.      Assessment:  Pt w/ occas cough noted in the beginning of meal, pt stated he has had a cough for years, we discussed a VBS as an outpt, pt stated he had one before at [facility] and is not concerned about his cough and swallowing at this time .     Plan/Recommendations:  Cont regular diet with thin liquids  Meds as tolerated  Aspiration precautions     CARE SCORES:  Self Care:  Eatin: Setup or clean-up assistance  Oral hygiene: 04: Supervision or touching  assistance  Toilet hygiene: 04: Supervision or touching  assistance  Shower/bathing self: 03: Partial/moderate assistance  Upper body dressin: Supervision or touching  assistance  Lower body dressin: Partial/moderate assistance  Putting on/taking off footwear: 03: Partial/moderate assistance  Transfers:  Roll left and right: 04: Supervision or touching  assistance  Sit to lyin: Supervision or touching  assistance  Lying to sitting on side of bed: 04: Supervision or touching  assistance  Sit to stand: 03: Partial/moderate assistance  Chair/bed to chair transfer: 03: Partial/moderate assistance  Toilet transfer: 03: Partial/moderate assistance  Mobility:  Walk 10 ft: 04: Supervision or touching  assistance  Walk 50 ft with two turns: 04: Supervision or touching  assistance  Walk 150ft: 09: Not applicable    CURRENT GAP IN FUNCTION  Prior to Admission: Functional  Status: Patient was independent with mobility/ambulation, transfers, ADL's, IADL's.    Expected functional outcomes: It is expected that with skilled acute rehabilitation services the patient will progress to Supervision for self care and Supervision for mobility     Estimated length of stay: 10 to 14 days    Anticipated Post-Discharge Disposition/Treatment  Disposition: Return to previous home/apartment.  Outpatient Services: Physical Therapy (PT) and Occupational Therapy (OT)    BARRIERS TO DISCHARGE  Weakness, Pain, Balance Difficulty, Fatigue, Home Accessibility, Caregiver Accessibility, and Equipment Needs    INTERVENTIONS FOR DISCHARGE  Adaptive equipment, Patient/Family/Caregiver Education, Arrange DME needs, Medication Changes per MD, Therapy exercises, and Energy conservation education     REQUIRED THERAPY:  Patient will require PT and OT 90 minutes each per day, five days per week to achieve rehab goals.     REQUIRED FUNCTIONAL AND MEDICAL MANAGEMENT FOR INPATIENT REHABILITATION:  Pain Management: Overall pain is well controlled, Deep Vein Thrombosis (DVT) Prophylaxis:  SCD's while in bed and   Nursing education and bowel/bladder management, internal medicine to manage/monitor medical conditions, PM&R to maximize function and provide medical oversight, PT/OT intervention, patient/family education/training, and any consults as needed     RECOMMENDED LEVEL OF CARE:   Hal is a 84 year old male with a medica history of but not limited to polymyalgia rheumatica on chronic plaquenil and low dose prednisone, Afib on Eliquis, stroke history, CAD, DM2, HTN, COPD, vascular dementia, chronic double vision for which he wears eye patch, pancreatic CA s/p whipple (2011), appy, cholecystectomy, R ing hernia repair who presented to [facility] ED with abdominal pain, SOB, and enlarging L groin buldge who had CT A/P concerning for diverticular abscess. He was admitted for further  eval/assessment/consultation. Gen Sx was consulted and recommended IV abx.  Follow-up imaging obtained and Gen Sx felt diverticular abscess emanating into L inguinal hernia.  IR was consulted for possible drainage but they felt not enough to no fluid collection available to drain. Plaquenil was d/c'd to limit immunocompromised state.  Follow-up Gen Sx reported Sigmoid diverticular perforation with isolated air within the inguinal canal or inguinal sac with recommendation to continue abx and advance diet as tolerated.  Hospital course notable for episode of unresponsiveness with turning head to left and with downward gaze on 6/19 with waxing and waning mentation.  He received Keppra and ativan on 6/19 for possible seizure and more recently started on Depacon with kidney d/s hx.  EEG showed excessive theta activity during wakefulness suggest mild nonspecific diffuse cerebral dysfunction. No epileptiform discharges or seizures are present.   MRI brain on 6/20 showed small acute infarct in the left parietal periventricular white matter with an associated small focus of susceptibility that may represent associated petechial hemorrhage as well as redemonstrated foci of susceptibility, including new foci, nonspecific and could be seen with cerebral amyloid angiopathy, multiple cavernomas and/or prior embolic events, among other considerations.Neuro was consulted and they reported / documented small CVA related to being temporarily being off of Eliquis and not failure with plan to go back from hep gtt to Eliquis.  Gen Sx recs 1 more week of abx and OP Gen Sx follow-up.  PT/OT therapies were consulted and continue to follow patient at this time. PM&R was consulted and are recommending inpatient acute rehab when medically stable. All involved medical disciplines feel/agree patient is medically ready for discharge at this time. Inpatient acute rehabilitation physician was consulted. Upon review of patient's case and  correspondence with PT/OT therapies and PM&R services, [facility] physician feels Hal will benefit and is a good candidate / appropriate for inpatient acute rehab at this time. He has demonstrated the willingness / desire and tolerance to participate in the required 3 hours or more of therapies per day. Prior to admission / hospital stay Hal was independent  with all ADLs /functional mobility and needed help with iADLs. Currently with PT therapies /  OT therapies : super - min A with upper and lower ADLs ; min A with ambulation/RW .Nursing is being recommended for medication distribution / management , bowel / bladder management and educational purposes , internal medicine to continue to monitor and manage medical conditions ,PM&R to maximize  function and provide medical oversight, and inpatient rehab to maximize self care ,mobility ,strength , and endurance upon discharge to home.

## 2024-06-26 NOTE — PLAN OF CARE
Problem: PAIN - ADULT  Goal: Verbalizes/displays adequate comfort level or baseline comfort level  Description: Interventions:  - Encourage patient to monitor pain and request assistance  - Assess pain using appropriate pain scale  - Administer analgesics based on type and severity of pain and evaluate response  - Implement non-pharmacological measures as appropriate and evaluate response  - Consider cultural and social influences on pain and pain management  - Notify physician/advanced practitioner if interventions unsuccessful or patient reports new pain  Outcome: Progressing     Problem: INFECTION - ADULT  Goal: Absence or prevention of progression during hospitalization  Description: INTERVENTIONS:  - Assess and monitor for signs and symptoms of infection  - Monitor lab/diagnostic results  - Monitor all insertion sites, i.e. indwelling lines, tubes, and drains  - Monitor endotracheal if appropriate and nasal secretions for changes in amount and color  - [place] appropriate cooling/warming therapies per order  - Administer medications as ordered  - Instruct and encourage patient and family to use good hand hygiene technique  - Identify and instruct in appropriate isolation precautions for identified infection/condition  Outcome: Progressing  Goal: Absence of fever/infection during neutropenic period  Description: INTERVENTIONS:  - Monitor WBC    Outcome: Progressing     Problem: SAFETY ADULT  Goal: Patient will remain free of falls  Description: INTERVENTIONS:  - Educate patient/family on patient safety including physical limitations  - Instruct patient to call for assistance with activity   - Consult OT/PT to assist with strengthening/mobility   - Keep Call bell within reach  - Keep bed low and locked with side rails adjusted as appropriate  - Keep care items and personal belongings within reach  - Initiate and maintain comfort rounds  - Make Fall Risk Sign visible to staff  - Offer Toileting every 2 Hours,  in advance of need  - Initiate/Maintain bed alarm  - Obtain necessary fall risk management equipment: alarmed, yellow socks/bracelet  - Apply yellow socks and bracelet for high fall risk patients  - Consider moving patient to room near nurses station  Outcome: Progressing  Goal: Maintain or return to baseline ADL function  Description: INTERVENTIONS:  -  Assess patient's ability to carry out ADLs; assess patient's baseline for ADL function and identify physical deficits which impact ability to perform ADLs (bathing, care of mouth/teeth, toileting, grooming, dressing, etc.)  - Assess/evaluate cause of self-care deficits   - Assess range of motion  - Assess patient's mobility; develop plan if impaired  - Assess patient's need for assistive devices and provide as appropriate  - Encourage maximum independence but intervene and supervise when necessary  - Involve family in performance of ADLs  - Assess for home care needs following discharge   - Consider OT consult to assist with ADL evaluation and planning for discharge  - Provide patient education as appropriate  Outcome: Progressing  Goal: Maintains/Returns to pre admission functional level  Description: INTERVENTIONS:  - Perform AM-PAC 6 Click Basic Mobility/ Daily Activity assessment daily.  - Set and communicate daily mobility goal to care team and patient/family/caregiver.   - Collaborate with rehabilitation services on mobility goals if consulted  - Perform Range of Motion 2 times a day.  - Reposition patient every 2 hours.  - Dangle patient 3 times a day  - Stand patient 3 times a day  - Ambulate patient 3 times a day  - Out of bed to chair 3 times a day   - Out of bed for meals 3 times a day  - Out of bed for toileting  - Record patient progress and toleration of activity level   Outcome: Progressing     Problem: DISCHARGE PLANNING  Goal: Discharge to home or other facility with appropriate resources  Description: INTERVENTIONS:  - Identify barriers to  discharge w/patient and caregiver  - Arrange for needed discharge resources and transportation as appropriate  - Identify discharge learning needs (meds, wound care, etc.)  - Arrange for interpretive services to assist at discharge as needed  - Refer to Case Management Department for coordinating discharge planning if the patient needs post-hospital services based on physician/advanced practitioner order or complex needs related to functional status, cognitive ability, or social support system  Outcome: Progressing     Problem: MOBILITY - ADULT  Goal: Maintain or return to baseline ADL function  Description: INTERVENTIONS:  -  Assess patient's ability to carry out ADLs; assess patient's baseline for ADL function and identify physical deficits which impact ability to perform ADLs (bathing, care of mouth/teeth, toileting, grooming, dressing, etc.)  - Assess/evaluate cause of self-care deficits   - Assess range of motion  - Assess patient's mobility; develop plan if impaired  - Assess patient's need for assistive devices and provide as appropriate  - Encourage maximum independence but intervene and supervise when necessary  - Involve family in performance of ADLs  - Assess for home care needs following discharge   - Consider OT consult to assist with ADL evaluation and planning for discharge  - Provide patient education as appropriate  Outcome: Progressing  Goal: Maintains/Returns to pre admission functional level  Description: INTERVENTIONS:  - Perform AM-PAC 6 Click Basic Mobility/ Daily Activity assessment daily.  - Set and communicate daily mobility goal to care team and patient/family/caregiver.   - Collaborate with rehabilitation services on mobility goals if consulted  - Perform Range of Motion 3 times a day.  - Reposition patient every 2 hours.  - Dangle patient 3 times a day  - Stand patient 3 times a day  - Ambulate patient 3 times a day  - Out of bed to chair 3 times a day   - Out of bed for meals 3 times a  day  - Out of bed for toileting  - Record patient progress and toleration of activity level   Outcome: Progressing

## 2024-06-27 PROBLEM — G62.9 PERIPHERAL POLYNEUROPATHY: Status: ACTIVE | Noted: 2024-06-27

## 2024-06-27 PROBLEM — E87.1 HYPONATREMIA: Status: ACTIVE | Noted: 2024-06-27

## 2024-06-27 PROBLEM — H53.2 DIPLOPIA: Status: ACTIVE | Noted: 2024-06-27

## 2024-06-27 LAB
ANION GAP SERPL CALCULATED.3IONS-SCNC: 3 MMOL/L (ref 4–13)
BUN SERPL-MCNC: 25 MG/DL (ref 5–25)
CALCIUM SERPL-MCNC: 9.6 MG/DL (ref 8.4–10.2)
CHLORIDE SERPL-SCNC: 99 MMOL/L (ref 96–108)
CO2 SERPL-SCNC: 29 MMOL/L (ref 21–32)
CREAT SERPL-MCNC: 1.33 MG/DL (ref 0.6–1.3)
ERYTHROCYTE [DISTWIDTH] IN BLOOD BY AUTOMATED COUNT: 14.6 % (ref 11.6–15.1)
GFR SERPL CREATININE-BSD FRML MDRD: 49 ML/MIN/1.73SQ M
GLUCOSE P FAST SERPL-MCNC: 96 MG/DL (ref 65–99)
GLUCOSE SERPL-MCNC: 143 MG/DL (ref 65–140)
GLUCOSE SERPL-MCNC: 146 MG/DL (ref 65–140)
GLUCOSE SERPL-MCNC: 150 MG/DL (ref 65–140)
GLUCOSE SERPL-MCNC: 96 MG/DL (ref 65–140)
GLUCOSE SERPL-MCNC: 98 MG/DL (ref 65–140)
HCT VFR BLD AUTO: 37.7 % (ref 36.5–49.3)
HGB BLD-MCNC: 11.7 G/DL (ref 12–17)
MCH RBC QN AUTO: 30.8 PG (ref 26.8–34.3)
MCHC RBC AUTO-ENTMCNC: 31 G/DL (ref 31.4–37.4)
MCV RBC AUTO: 99 FL (ref 82–98)
PLATELET # BLD AUTO: 182 THOUSANDS/UL (ref 149–390)
PMV BLD AUTO: 9.3 FL (ref 8.9–12.7)
POTASSIUM SERPL-SCNC: 4.7 MMOL/L (ref 3.5–5.3)
RBC # BLD AUTO: 3.8 MILLION/UL (ref 3.88–5.62)
SODIUM SERPL-SCNC: 131 MMOL/L (ref 135–147)
WBC # BLD AUTO: 7.64 THOUSAND/UL (ref 4.31–10.16)

## 2024-06-27 PROCEDURE — 94640 AIRWAY INHALATION TREATMENT: CPT

## 2024-06-27 PROCEDURE — 94664 DEMO&/EVAL PT USE INHALER: CPT

## 2024-06-27 PROCEDURE — 94760 N-INVAS EAR/PLS OXIMETRY 1: CPT

## 2024-06-27 PROCEDURE — 97161 PT EVAL LOW COMPLEX 20 MIN: CPT

## 2024-06-27 PROCEDURE — 82948 REAGENT STRIP/BLOOD GLUCOSE: CPT

## 2024-06-27 PROCEDURE — 99232 SBSQ HOSP IP/OBS MODERATE 35: CPT | Performed by: NURSE PRACTITIONER

## 2024-06-27 PROCEDURE — 99223 1ST HOSP IP/OBS HIGH 75: CPT | Performed by: PHYSICAL MEDICINE & REHABILITATION

## 2024-06-27 PROCEDURE — 85027 COMPLETE CBC AUTOMATED: CPT | Performed by: STUDENT IN AN ORGANIZED HEALTH CARE EDUCATION/TRAINING PROGRAM

## 2024-06-27 PROCEDURE — 97530 THERAPEUTIC ACTIVITIES: CPT

## 2024-06-27 PROCEDURE — 92523 SPEECH SOUND LANG COMPREHEN: CPT

## 2024-06-27 PROCEDURE — 97167 OT EVAL HIGH COMPLEX 60 MIN: CPT

## 2024-06-27 PROCEDURE — 97129 THER IVNTJ 1ST 15 MIN: CPT

## 2024-06-27 PROCEDURE — 97535 SELF CARE MNGMENT TRAINING: CPT

## 2024-06-27 PROCEDURE — 80048 BASIC METABOLIC PNL TOTAL CA: CPT | Performed by: STUDENT IN AN ORGANIZED HEALTH CARE EDUCATION/TRAINING PROGRAM

## 2024-06-27 RX ORDER — BISACODYL 10 MG
10 SUPPOSITORY, RECTAL RECTAL DAILY PRN
Status: DISCONTINUED | OUTPATIENT
Start: 2024-06-27 | End: 2024-07-03 | Stop reason: HOSPADM

## 2024-06-27 RX ORDER — POLYETHYLENE GLYCOL 3350 17 G/17G
17 POWDER, FOR SOLUTION ORAL DAILY PRN
Status: DISCONTINUED | OUTPATIENT
Start: 2024-06-27 | End: 2024-07-03 | Stop reason: HOSPADM

## 2024-06-27 RX ADMIN — METRONIDAZOLE 500 MG: 500 TABLET ORAL at 09:06

## 2024-06-27 RX ADMIN — LOSARTAN POTASSIUM 50 MG: 50 TABLET, FILM COATED ORAL at 09:06

## 2024-06-27 RX ADMIN — INSULIN LISPRO 1 UNITS: 100 INJECTION, SOLUTION INTRAVENOUS; SUBCUTANEOUS at 21:18

## 2024-06-27 RX ADMIN — FORMOTEROL FUMARATE DIHYDRATE 20 MCG: 20 SOLUTION RESPIRATORY (INHALATION) at 19:45

## 2024-06-27 RX ADMIN — METRONIDAZOLE 500 MG: 500 TABLET ORAL at 18:20

## 2024-06-27 RX ADMIN — DIVALPROEX SODIUM 500 MG: 500 TABLET, DELAYED RELEASE ORAL at 09:06

## 2024-06-27 RX ADMIN — DIVALPROEX SODIUM 500 MG: 500 TABLET, DELAYED RELEASE ORAL at 18:20

## 2024-06-27 RX ADMIN — CIPROFLOXACIN 500 MG: 500 TABLET ORAL at 21:13

## 2024-06-27 RX ADMIN — APIXABAN 5 MG: 5 TABLET, FILM COATED ORAL at 18:20

## 2024-06-27 RX ADMIN — ATORVASTATIN CALCIUM 40 MG: 40 TABLET, FILM COATED ORAL at 18:20

## 2024-06-27 RX ADMIN — BUDESONIDE 0.5 MG: 0.5 INHALANT ORAL at 07:20

## 2024-06-27 RX ADMIN — METRONIDAZOLE 500 MG: 500 TABLET ORAL at 00:52

## 2024-06-27 RX ADMIN — FORMOTEROL FUMARATE DIHYDRATE 20 MCG: 20 SOLUTION RESPIRATORY (INHALATION) at 07:20

## 2024-06-27 RX ADMIN — Medication 3 MG: at 21:13

## 2024-06-27 RX ADMIN — CIPROFLOXACIN 500 MG: 500 TABLET ORAL at 09:06

## 2024-06-27 RX ADMIN — APIXABAN 5 MG: 5 TABLET, FILM COATED ORAL at 09:06

## 2024-06-27 RX ADMIN — PANTOPRAZOLE SODIUM 40 MG: 40 TABLET, DELAYED RELEASE ORAL at 05:09

## 2024-06-27 RX ADMIN — DIVALPROEX SODIUM 500 MG: 500 TABLET, DELAYED RELEASE ORAL at 00:52

## 2024-06-27 RX ADMIN — METOPROLOL SUCCINATE 25 MG: 25 TABLET, FILM COATED, EXTENDED RELEASE ORAL at 09:06

## 2024-06-27 RX ADMIN — INSULIN GLARGINE 8 UNITS: 100 INJECTION, SOLUTION SUBCUTANEOUS at 21:13

## 2024-06-27 RX ADMIN — BUDESONIDE 0.5 MG: 0.5 INHALANT ORAL at 19:46

## 2024-06-27 RX ADMIN — PREDNISONE 1 MG: 1 TABLET ORAL at 09:07

## 2024-06-27 NOTE — ASSESSMENT & PLAN NOTE
Noted on admission on the L  No pain in groin at this time  Completes Cipro/Metronidazole tomorrow.  Non-operatively managed, tolerating PO  Monitor bowels.  Outpatient f/u with Gen Surg in 3-4 weeks.

## 2024-06-27 NOTE — PROGRESS NOTES
06/27/24 0700   Rehab Team Goals   ADL Team Goal Patient will require supervision with ADLs with least restrictive device upon completion of rehab program   Rehab Team Interventions   OT Interventions Self Care;Home Management;Therapeutic Exercise;Patient/Family Education   Eating Goal   Eating Goal 06. Independent - Patient completes the activity by him/herself with no assistance from a helper.   Status Ongoing;Target goal - one week;Target goal - two weeks   Grooming Goal   Oral Hygiene Goal 06. Independent - Patient completes the activity by him/herself with no assistance from a helper.   Status Ongoing;Target goal - one week;Target goal - two weeks   Tub/Shower Transfer Goal   Method Shower Stall   Assist Device Grab Bar;Seat with out Back  (Has walk in shower)   Status Ongoing;Target goal - one week;Target goal - two weeks   Bathing Goal   Shower/bathe self Goal 04. Supervision or touching assistance- [place] provides VERBAL CUES or supervision throughout activity.   Status Ongoing;Target goal - one week;Target goal - two weeks   Upper Body Dressing Goal   Upper body dressing Goal 04. Supervision or touching assistance- [place] provides VERBAL CUES or supervision throughout activity.   Status Ongoing;Target goal - one week;Target goal - two weeks   Lower Body Dressing Goal   Lower body dressing Goal 04. Supervision or touching assistance- [place] provides VERBAL CUES or supervision throughout activity.   Status Ongoing;Target goal - one week;Target goal - two weeks   Toileting Transfer Goal   Toilet transfer Goal 04. Supervision or touching assistance- [place] provides VERBAL CUES or supervision throughout activity.   Status Ongoing;Target goal - one week;Target goal - two weeks   Toileting Goal   Toileting hygiene Goal 04. Supervision or touching assistance- [place] provides VERBAL CUES or supervision throughout activity.   Status Ongoing;Target goal - one week;Target goal - two weeks

## 2024-06-27 NOTE — TREATMENT PLAN
Individualized Plan of Care - [facility]  Hal Miller 84 y.o. male MRN: [id number]  Unit/Bed#: -01 Encounter: [id number]     PATIENT INFORMATION  ADMISSION DATE: 6/26/2024  5:32 PM JIMMY CATEGORY:Stroke:  01.4  No paresis   ADMISSION DIAGNOSIS: CVA (cerebral vascular accident) (HCC) [I63.9]  EXPECTED LOS: 10 to 14 days     MEDICAL/FUNCTIONAL PROGNOSIS  Based on my assessment of the patient's medical conditions and current functional status, the prognosis for attaining medical and functional goals or the IRF stay is:  Good    Medical Goals: Patient will be able to manage medical conditions and comorbid conditions with medications and follow up upon completion of rehab program.    1. Adequate pain control - monitor for development of neuropathic pain or pain/weakness related to polymyalgia rheumatic - at risk for flare.   2. Prevention of VTE  3. Adequate secretion management, and monitoring of respiratory status in patient with significant COPD and recent exacerbation. Now on nebs, and goal to transition to home inhalers by discharge.   4. Bowel/bladder management  5. Maintain appropriate nutrition and hydration for healing and hemodynamic stability   6. Emotional adaptation to impairment  7. Monitor for polypharmacy  8. Fall prevention  9. Patient and family caregiver training/education  10. Skin protection and prevention of pressure injury - high risk on chronic steroids   11. Appropriate management of new and chronic comorbidites and sequelae of her acute hospitalization.    - CKD3 (unclear baseline), newly diagnosed significant cardiomyopathy with close monitoring of his volume status, severe COPD currently completing treatment for recent acute exacerbation, not on home regimen for his PMR, L diverticular abscess - monitoring for recurrence, worsening and need for escalation/intervention, recent CVA, atrial fibrillation, HTN.   12. Prevention of delirium  13. Arrange appropriate  outpatient f/u  14. Incisional care to prevent infection/dehiscence      ANTICIPATED DISCHARGE DISPOSITION AND SERVICES  COMMUNITY SETTING: Home - independent/modified independent  Wife can provide supervision but is on oxygen at baseline and likely cannot provide physical assistance.    ANTICIPATED FOLLOW-UP SERVICE:   Outpatient Therapy Services: PT, OT, and SLP          DISCIPLINE SPECIFIC PLANS:  Required Disciplines & Services: Rehabillitation Nursing    REQUIRED THERAPY:  Therapy Hours per Day Days per Week   Physical Therapy 1-2 5-6   Occupational Therapy 1-2 5-6   Speech/Language Therapy 0.5-1 3-5   NOTE: Additional therapy time(s) or changes to allocation of therapies as appropriate to meet patient needs and to achieve functional goals.    Patient will participate in above therapy regimen consisting of PT, OT, and SLP due to the following medical procedure/condition:Stroke:  01.4  No paresis    ANTICIPATED FUNCTIONAL OUTCOMES:  ADL:   mod INd   Bladder/Bowel:  mod Ind    Transfers:   mod Ind   Locomotion:   mod ind and ambulatory with lRAD   Cognitive:  mod Ind-Sup     DISCHARGE PLANNING NEEDS  Equipment needs: Discharge needs to be reviewed with team      REHAB ANTICIPATED PARTICIPATION RESTRICTIONS:  Some baseline cognitive difficulties.      Patient Reported Weight (Optional - Include Units): 178

## 2024-06-27 NOTE — UTILIZATION REVIEW
NOTIFICATION OF ADMISSION DISCHARGE   This is a Notification of Discharge from [facility]. Please be advised that this patient has been discharge from our facility. Below you will find the admission and discharge date and time including the patient’s disposition.   UTILIZATION REVIEW CONTACT:  Christel Pan  Utilization   Network Utilization Review Department  Phone: [phone] [phone] carefully listen to the prompts. All voicemails are confidential.  Email: [email or web address]     ADMISSION INFORMATION  PRESENTATION DATE: 6/16/2024  6:25 PM  OBERVATION ADMISSION DATE:   INPATIENT ADMISSION DATE: 6/17/24 12:08 AM   DISCHARGE DATE: 6/26/2024  4:30 PM   DISPOSITION:[facility]    Network Utilization Review Department  ATTENTION: Please call with any questions or concerns to [phone] and carefully listen to the prompts so that you are directed to the right person. All voicemails are confidential.   For Discharge needs, contact Care Management DC Support Team at [phone] opt. [phone]  Send all requests for admission clinical reviews, approved or denied determinations and any other requests to dedicated fax number below belonging to the campus where the patient is receiving treatment. List of dedicated fax numbers for the Facilities:  FACILITY NAME UR FAX NUMBER   ADMISSION DENIALS (Administrative/Medical Necessity) [phone]   DISCHARGE SUPPORT TEAM ([facility]) [phone]   PARENT CHILD HEALTH (Maternity/NICU/Pediatrics) [phone]   [facility] [phone]   [facility] [phone]   [facility] [phone]   [facility] [phone]   [facility] [phone]   [facility] [phone]   [facility] [phone]   [facility] [phone]   [facility]  [facility] [phone]   [facility] [phone]   [facility] [phone]   [facility] [phone]

## 2024-06-27 NOTE — ASSESSMENT & PLAN NOTE
Lab Results   Component Value Date    HGBA1C 7.6 (H) 06/19/2024       Recent Labs     06/30/24  1128 06/30/24  1537 06/30/24 2055 07/01/24  0525   POCGLU 152* 183* 140 98       Blood Sugar Average: Last 72 hrs:  (P) [id number]    Home: Had Lantus at home. Unclear dosing  Here: Lantus 8 units QHS, Cdi/Accuchecks  Would have staff evaluate how he draws up and gives himself his insulin prior to discharge.  Monitor and adjust as per IM  Outpatient f/u with PCP

## 2024-06-27 NOTE — ASSESSMENT & PLAN NOTE
Severe.  Home: Albuterol PRN, Trelegy  Here: Albuterol PRN, Budesonide nebs Q12hr PRN and Formoterol nebs every 12 hours   - Would try to get patient back onto an inhaler prior to discharge home.  Currently on room air  Monitor exam and adjust as per IM.  Outpatient f/u with Pulm

## 2024-06-27 NOTE — ASSESSMENT & PLAN NOTE
Completed Valacyclovir in the hospital  Lesions are crusted over/clearing up.  No need for contact precautions at this time.  Will keep area covered.

## 2024-06-27 NOTE — PROGRESS NOTES
06/27/24 0700   Patient Data   Rehab Impairment mpairment of mobility, safety, Activities of Daily Living (ADLs), and cognitive/communication skills due to Stroke:  01.2  Right Body Involvement (Left Brain)   Etiologic Diagnosis Acute infarct in L parietal   Date of Onset 06/17/24   Home Setup   Type of Home Multi Level   Method of Entry Stairs;Hand Rail Right   Number of Stairs 2   Number of Stairs in Home 12   In Home Hand Rail Left   First Floor Bathroom Shower;Grab Bars;Full   First Floor Bathroom Accessibility Shower chair;Grab bars in tub/shower  (Comfort height toliet)   Second Floor Bathroom Full;Shower   First Floor Setup Available Yes   Available Equipment Roller Walker;Single Point Cane;Shower Chair   Prior IADL Participation   Money Management Identify Money;Estimate Costs;Estimate Change;Combine Bills;Manage Checkbook   Meal Preparation Partial Participation  (His wife and him share all home responsibiltes)   Laundry Partial Participation   Home Cleaning Partial Participation   Prior Level of Function   Self-Care 3. Independent - Patient completed the activities by him/herself, with or without an assistive device, with no assistance from a helper.   Indoor-Mobility (Ambulation) 3. Independent - Patient completed the activities by him/herself, with or without an assistive device, with no assistance from a helper.   Stairs 3. Independent - Patient completed the activities by him/herself, with or without an assistive device, with no assistance from a helper.   Functional Cognition 3. Independent - Patient completed the activities by him/herself, with or without an assistive device, with no assistance from a helper.   Falls in the Last Year   Number of falls in the past 12 months 1   Type of Injury Associated with Fall No injury   Restrictions/Precautions   Precautions Aspiration;Bed/chair alarms;Cognitive;Fall Risk;Hard of hearing;Pain;Supervision on toilet/commode   Weight Bearing Restrictions No   ROM  Restrictions No   Pain Assessment   Pain Assessment Tool 0-10   Eating Assessment   Type of Assistance Needed Independent   Physical Assistance Level No physical assistance   Comment Able to open all continers   Eating CARE Score 6   Oral Hygiene   Type of Assistance Needed Set-up / clean-up;Physical assistance   Physical Assistance Level 25% or less   Comment In stance at sink, CGA with RW for balance.   Oral Hygiene CARE Score 3   Grooming   Able To Shave   Findings seated in w/c at sink. Able to throughly shave face.   Shower/Bathe Self   Type of Assistance Needed Set-up / clean-up;Verbal cues;Physical assistance   Physical Assistance Level 26%-50%   Comment Once set up, able to wash UB/LB/nicholas while seated in w/c at the sink. Min a for buttocks with RW for balance. Assistance needed for bilateral feet.   Shower/Bathe Self CARE Score 3   Dressing/Undressing Clothing   Remove UB Clothes Other  ([place] gown)   Don UB Clothes Button Shirt   Type of Assistance Needed Set-up / clean-up   Physical Assistance Level No physical assistance   Comment Able to button up shirt with no assistance.   Upper Body Dressing CARE Score 5   Don LB Clothes Undergarment;Shorts   Type of Assistance Needed Physical assistance   Physical Assistance Level 51%-75%   Comment Able to thread both undergrarmts/shorts into RLE. Assistance needed for left. Mod A to pull up over hips using RW. Pt able to give some assitance, is unstable with walker release.   Lower Body Dressing CARE Score 2   Putting On/Taking Off Footwear   Type of Assistance Needed Physical assistance   Physical Assistance Level 76% or more   Comment No assessed due to time, however due to LB dressing an unable to reach feet for bathing anticapted max A, due to decreased ROM.   Putting On/Taking Off Footwear CARE Score 2   Toileting Hygiene   Type of Assistance Needed Supervision   Physical Assistance Level No physical assistance   Comment Bladder hygiend done seated on BSC  over toliet. Min A for CM due to decreased balacne when walker is released.   Toileting Hygiene CARE Score 4   Toilet Transfer   Surface Assessed Standard Commode   Transfer Technique Standard   Limitations Noted In Balance;Problem Solving   Adaptive Equipment Grab Bar;Walker   Findings Pt has difficultes planning placement of walker when pivoting.   Type of Assistance Needed Physical assistance   Physical Assistance Level 26%-50%   Comment Min A for transfer due to decreased balacne and walker coordination. Completed with BSC over toliet, pt reprots this is the same height as his tolrosi at home.   Toilet Transfer CARE Score 3   Toileting   Manage Hygiene Bladder   Transfer Bed/Chair/Wheelchair   Adaptive Equipment Walker   Type of Assistance Needed Physical assistance   Physical Assistance Level 26%-50%   Comment Min A to assitance with lowering down to recliner.   Chair/Bed-to-Chair Transfer CARE Score 3   Lying to Sitting on Side of Bed   Type of Assistance Needed Supervision   Physical Assistance Level No physical assistance   Comment With handrails   Lying to Sitting on Side of Bed CARE Score 4   Sit to Stand   Type of Assistance Needed Physical assistance   Physical Assistance Level 26%-50%   Comment Min A with RW.   Sit to Stand CARE Score 3   RUE Assessment   RUE Assessment WFL   LUE Assessment   LUE Assessment WFL   Cognition   Overall Cognitive Status Impaired   Arousal/Participation Alert   Attention Attends with cues to redirect   Memory Decreased short term memory   Following Commands Follows one step commands with increased time or repetition   Comments Pt needs cues for task inhiation and sequencing.   Objective Measure   OT Findings Pt is a 84 y.o. male seen for OT evaluation following admission to [facility] for  Acute infarct in L parietal . OT evaluation and assessment of strength, ADL function, and functional transfers completed. Prior to admission Pt was completing ADLs at independent with use of no  AD. Pt was completing IADLs  independently. His wife and him share household responsibilities. Pt was currently driving  . Pt lives with lives with their spouse and is able to provide physical assistance at time of discharge. Pt reports 1  recent falls in the last 6 months. Personal factors affecting the patient at this time include: co-morbidities, h/o of falls, fall risk, and assist w/ ADL's. Pt is currently limited due to the following deficits impacting occupational performance, activity tolerance, endurance, standing balance/tolerance, safety , attention , and sequencing . The patient has shown a decline from prior level of function. The patient participates in identification of personal goals to address in Occupational Therapy. Recommend skilled OT services for I/ADL retraining to support the ability to safely participate in daily occupations safely and independently in the most least restrictive way. Pt demonstrates Good rehab potential to meet LTG's of independent with assistive device with use of rolling walker. Anticipate  7-10day(s) length of stay. Occupational Therapy plan of care  will address the following areas: ADL re-training, IADL re-training, fxnl xfers, standing balance, UE strengthening, DME training/education, and family training/education   OT Therapy Minutes   OT Time In 0700   OT Time Out 0830   OT Total Time (minutes) 90   OT Mode of treatment - Individual (minutes) 90   OT Mode of treatment - Concurrent (minutes) 0   OT Mode of treatment - Group (minutes) 0   OT Mode of treatment - Co-treat (minutes) 0   OT Mode of Treatment - Total time(minutes) 90 minutes   OT Cumulative Minutes 90   Cumulative Minutes   Cumulative therapy minutes 90

## 2024-06-27 NOTE — ASSESSMENT & PLAN NOTE
Chronic issue  Partial loss of sensation in his distal lower extremities  Considerations in therapy, monitor for pain  Foot checks with outpatient providers.

## 2024-06-27 NOTE — ASSESSMENT & PLAN NOTE
Patient had AMS on previous hospitalization and MRI 6/20 demonstrated small left parietal hemorrhage with possible petechial hemorrhage  Eliquis appears to have been held at that time as that is suspected to have contributed to CVA  Patient denies any residual sensory or motor deficit  Continue anticoagulation and will monitor  Outpatient followup with neurology  Resume depakote monitor levels moving forward

## 2024-06-27 NOTE — ASSESSMENT & PLAN NOTE
Baseline AAO x 2-3.  At risk for delirium, no episodes or issues on admission.   - Delirium precautions  Monitor sleep/wake cycle and manage to optimize recovery from acute CVA  Appropriate bowel/bladder management  Avoiding deliriogenic meds and physical/chemical restraint.  Focus on environmental/behavioral interventions for reorientation and redirection  At baseline functions independently    TSH and B12 WNL in acute care hospital  Outpatient f/u with Neurology

## 2024-06-27 NOTE — ASSESSMENT & PLAN NOTE
L parietal CVA  2/2 off anticoagulation while waiting for potential surgery in acute care hospital  MRI: Acute L parietal CVA  Carotid US < 50% bilateral stenosis   Echo: dilated atria, and global hypokinesis  Possible component of seizures during hospital stay.  Now back on eliquis  Also has statin  RF modification  Deficits: mostly improved - lethargy, altered mental status. Generalized weakness currently. Evaluating his cognitive deficits here  Depakote 500mg Q8hrs   - VPA and Ammonia levels are normal  Seizure precautions   [employer] to be filled out by Neuro.     PT/OT/SLP 3-5 hours/day, 5-7 days/week.   Outpatient follow-up with Neurology

## 2024-06-27 NOTE — ASSESSMENT & PLAN NOTE
Toprol 25mg daily   Fully anticoagulated on apixaban  Monitor and adjust as appropriate  Outpatient f/u with Cardiologist

## 2024-06-27 NOTE — ASSESSMENT & PLAN NOTE
Wt Readings from Last 3 Encounters:   07/01/24 72.6 kg (160 lb 0.9 oz)   06/26/24 86.2 kg (190 lb 0.6 oz)   06/07/24 76 kg (167 lb 8.8 oz)     Per chart last echo in 2017 with normal EF  Echo in this hospitalization with EF 30%, global hypokinesis, and dilated atria  Recommended for GDMT  Now on BB and ARB (see HTN)  Was on lasix at home - appears euvolemic currently  Monitor weights and I/O  Management as per IM  Outpatient f/u with PCP

## 2024-06-27 NOTE — ASSESSMENT & PLAN NOTE
Home: Losartan 100mg daily, Lasix 20mg daily  Here: Losartan 50mg daily, Toprol 25mg daily   Monitor and adjust as appropriate.  Management as per IM  Outpatient f/u with PCP

## 2024-06-27 NOTE — ASSESSMENT & PLAN NOTE
Rate controlled on Toprol-XL, Cardizem held in the setting of recently detected reduced LVEF  Anticoagulated on Eliquis 5 mg p.o. twice daily.  Outpatient follow-up with cardiology

## 2024-06-27 NOTE — PROGRESS NOTES
"[facility]  Progress Note  Name: Hal Miller I  MRN: [id number]  Unit/Bed#: -01 I Date of Admission: 6/26/2024   Date of Service: 6/27/2024 I Hospital Day: 1    Assessment & Plan   * 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HCC)  Assessment & Plan  Patient had AMS on previous hospitalization and MRI 6/20 demonstrated small left parietal hemorrhage with possible petechial hemorrhage  Eliquis appears to have been held at that time as that is suspected to have contributed to CVA  Patient denies any residual sensory or motor deficit  Continue anticoagulation and will monitor  Outpatient followup with neurology  Resume depakote monitor levels moving forward     Colonic diverticular abscess  Assessment & Plan  Patient presented for abdominal pain and swelling, history of hernia.  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 No evidence of soft tissue component. A benign etiology is suspected such as retroperitoneal lymphatic malformation or lymphocele. Initial evaluation in 3 months with contrast-enhanced CT abdomen/pelvis is recommended.\"  Surgery evaluated patient and recommended conservative management of suspected diverticulitis with microperforation.  Patient is eating without issue and advanced to regular diet.  He is having normal BMs   Outpatient follow-up with surgery, continue 2 more days of p.o. antibiotics with levaquin and flagyl as planned. End date of abx 6/28/24 complete final dosing     Heart failure (HCC)  Assessment & Plan  Wt Readings from Last 3 Encounters:   06/27/24 88.2 kg (194 lb 7.1 oz)   06/26/24 86.2 kg (190 lb 0.6 oz)   06/07/24 76 kg (167 lb 8.8 oz)   Echo obtained during CVA workup demonstrated newly detected reduced EF at 30%.  He appears compensated at this " time  Cardiology recommending outpatient follow-up  Continue I/O and daily weight        Severe protein-calorie malnutrition (HCC)  Assessment & Plan  Malnutrition Findings:     BMI Findings:       Body mass index is 24.97 kg/m².   Glucerna supplement with meals added pt does like them and current flavor     Ambulatory dysfunction  Assessment & Plan  Ongoing rehab here at [facility] , PT/OT ordered    Zoster  Assessment & Plan  Completed 7 days of Valtrex prior to transfer  Continue to monitor  Pt currently with crusted lesions over right anterior chest just below nipple line round flank to posterior back on right side   Pt reports pain improvment    Type 2 diabetes mellitus (HCC)  Assessment & Plan  Lab Results   Component Value Date    HGBA1C 7.6 (H) 06/19/2024       Recent Labs     06/26/24  1144 06/26/24  2102 06/27/24  0608 06/27/24  1019   POCGLU 129 147* 98 146*         Blood Sugar Average: Last 72 hrs:  (P) [id number] Lantus dose is 12 units nightly, will order 8 units nightly for now with ISS  Glucerna supplement ordered and diabetic diet  Pt being tx on abx monitor bs       Chronic obstructive pulmonary disease with acute exacerbation (HCC)  Assessment & Plan  Compensated, continue scheduled and as needed breathing treatments    Inflammatory polyarthropathy (HCC)  Assessment & Plan  Resume prednisone 1 mg daily    Atrial fibrillation (HCC)  Assessment & Plan  Rate controlled on Toprol-XL, Cardizem held in the setting of recently detected reduced LVEF  Anticoagulated on Eliquis 5 mg p.o. twice daily.  Outpatient follow-up with cardiology    Hypertension  Assessment & Plan  Resume losartan and Toprol-XL  Cardizem appears on hold due to recently diagnosed cardiomyopathy             The above assessment and plan was reviewed and updated as determined by my evaluation of the patient on 6/27/2024.    Labs:   Results from last 7 days   Lab Units 06/27/24  0506 06/26/24  0520   WBC Thousand/uL 7.64 9.27    HEMOGLOBIN g/dL 11.7* 12.7   HEMATOCRIT % 37.7 39.9   PLATELETS Thousands/uL 182 219     Results from last 7 days   Lab Units 06/27/24  0506 06/26/24  0520   SODIUM mmol/L 131* 130*   POTASSIUM mmol/L 4.7 4.9   CHLORIDE mmol/L 99 98   CO2 mmol/L 29 28   BUN mg/dL 25 24   CREATININE mg/dL 1.33* 1.35*   CALCIUM mg/dL 9.6 9.9             Results from last 7 days   Lab Units 06/27/24  1019 06/27/24  0608 06/26/24  2102   POC GLUCOSE mg/dl 146* 98 147*       Imaging  No orders to display       Review of Scheduled Meds:  Current Facility-Administered Medications   Medication Dose Route Frequency Provider Last Rate    acetaminophen  650 mg Oral Q4H PRN [place] Ford      albuterol  2 puff Inhalation Q4H PRN [place] Ford      apixaban  5 mg Oral BID [place]      atorvastatin  40 mg Oral Daily With Dinner [place]      bisacodyl  10 mg Rectal Daily PRN Ashley Depadua, MD      budesonide  0.5 mg Nebulization Q12H [place] Ford      ciprofloxacin  500 mg Oral Q12H [facility] Dayton Ford      divalproex sodium  500 mg Oral Q8H [facility] Dayton Ford      formoterol  20 mcg Nebulization Q12H [place] Ford      insulin glargine  8 Units Subcutaneous HS [place] Ford      insulin lispro  1-5 Units Subcutaneous 4x Daily (AC & HS) [place] Ford      losartan  50 mg Oral Daily [place]      melatonin  3 mg Oral HS [place] Ford      metoprolol succinate  25 mg Oral Daily [place] Ford      metroNIDAZOLE  500 mg Oral Q8H YUNI Dayton Ford      pantoprazole  40 mg Oral Early Morning Dayton Ford      polyethylene glycol  17 g Oral Daily PRN Ashley Depadua, MD      predniSONE  1 mg Oral Daily Dayton Ford      senna  2 tablet Oral BID Dayton Ford         Subjective/ HPI: Patient seen and examined. Patients overnight issues or events were reviewed with nursing staff. New or overnight issues include the following:     Pt reporting some improvement with pain  "in area of zoster lesions. States in fact it is now very itchy. He feels he is going well. Struggles with eating \"lumpy food \" enjoying the glucerna . He states that stools are improving and reports a big sensitivity to stool softeners. It causes almost immediate reponse. Reports bm yesterday and day prior     ROS:   A 10 point ROS was performed; negative except as noted above.        *Labs /Radiology studies Reviewed  *Medications  reviewed and reconciled as needed  *Please refer to order section for additional ordered labs studies      Physical Examination:  Vitals:   Vitals:    06/26/24 2106 06/27/24 0500 06/27/24 0720 06/27/24 0906   BP:  129/78  115/69   BP Location:  Right arm     Pulse:  75     Resp:  18     Temp:  98.4 °F (36.9 °C)     TempSrc:  Oral     SpO2: 95% 97% 97%    Weight:  88.2 kg (194 lb 7.1 oz)     Height:           Physical Exam  Constitutional:       General: He is not in acute distress.     Appearance: He is not ill-appearing, toxic-appearing or diaphoretic.   HENT:      Ears:      Comments: Pt has bl hearing aids in place   Eyes:      General:         Right eye: No discharge.         Left eye: No discharge.   Cardiovascular:      Rate and Rhythm: Normal rate.      Heart sounds: No murmur heard.     No friction rub. No gallop.   Pulmonary:      Effort: No respiratory distress.      Breath sounds: No stridor. No wheezing, rhonchi or rales.   Chest:      Chest wall: No tenderness.   Abdominal:      General: There is no distension.      Palpations: There is no mass.      Tenderness: There is no abdominal tenderness. There is no guarding or rebound.      Hernia: No hernia is present.   Musculoskeletal:         General: No swelling, tenderness, deformity or signs of injury.      Right lower leg: No edema.      Left lower leg: No edema.   Skin:     Coloration: Skin is not jaundiced or pale.      Findings: Lesion (right side below nipple line scabbed area wrapping around right side to posterior " back) present. No bruising, erythema or rash.   Neurological:      Mental Status: He is alert and oriented to person, place, and time.   Psychiatric:         Behavior: Behavior normal.           The above physical exam was reviewed and updated as determined by my evaluation of the patient on 6/27/2024.    Invasive Devices       None                      VTE Pharmacologic Prophylaxis: Eliquis  Code Status: Level 1 - Full Code  Current Length of Stay: 1 day(s)    Total floor / unit time spent today 30 minutes  Coordination of patient's care was performed in conjunction with primary service. Time invested included review of patient's labs, vitals, and management of their comorbidities with continued monitoring, examination of patient as well as answering patient questions, documenting her findings and creating progress note in electronic medical record,  ordering appropriate diagnostic testing.       ** Please Note:  voice to text software may have been used in the creation of this document. Although proof errors in transcription or interpretation are a potential of such software**

## 2024-06-27 NOTE — ASSESSMENT & PLAN NOTE
Lab Results   Component Value Date    HGBA1C 7.6 (H) 06/19/2024       Recent Labs     06/26/24  1144 06/26/24  2102 06/27/24  0608 06/27/24  1019   POCGLU 129 147* 98 146*         Blood Sugar Average: Last 72 hrs:  (P) 130.9950346354376550Acqf Lantus dose is 12 units nightly, will order 8 units nightly for now with ISS  Glucerna supplement ordered and diabetic diet  Pt being tx on abx monitor bs

## 2024-06-27 NOTE — ASSESSMENT & PLAN NOTE
Chronic issue  Partial loss of sensation in his distal lower extremities  Considerations in therapy, monitor for pain

## 2024-06-27 NOTE — ASSESSMENT & PLAN NOTE
"Patient presented for abdominal pain and swelling, history of hernia.  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 No evidence of soft tissue component. A benign etiology is suspected such as retroperitoneal lymphatic malformation or lymphocele. Initial evaluation in 3 months with contrast-enhanced CT abdomen/pelvis is recommended.\"  Surgery evaluated patient and recommended conservative management of suspected diverticulitis with microperforation.  Patient is eating without issue and advanced to regular diet.  He is having normal BMs   Outpatient follow-up with surgery, continue 2 more days of p.o. antibiotics with levaquin and flagyl as planned. End date of abx 6/28/24 complete final dosing   "

## 2024-06-27 NOTE — ASSESSMENT & PLAN NOTE
Per chart history of Polymyalgia Rheumatica  Hydroxychloroquine 200mg daily at home  Per discussion with his Rheumatologist, he was weaned down to prednisone 1mg daily and they will follow-up with him in July.   Will discuss with IM when it is ok/if he should resume hydroxychloroquine  Outpatient f/u with his Rheumatologist at [facility]

## 2024-06-27 NOTE — ASSESSMENT & PLAN NOTE
Malnutrition Findings:                                 BMI Findings:           Body mass index is 24.97 kg/m².   Nutrition consulted

## 2024-06-27 NOTE — ASSESSMENT & PLAN NOTE
7/2 Na 132 stable   Asymptomatic  Not currently on FR on salt tabs or had additional work-up  Monitor and recheck, risk for SIADH, CSW, etc.   Ensure appropriate nutrition/hydration

## 2024-06-27 NOTE — RESPIRATORY THERAPY NOTE
06/26/24 2131   Respiratory Protocol   Protocol Initiated? Yes   Protocol Selection Respiratory   Language Barrier? No   Medical & Social History Reviewed? Yes   Diagnostic Studies Reviewed? Yes   Physical Assessment Performed? Yes   Airway Clearance Plan Discontinue Protocol   Respiratory Plan No distress/Pulmonary history   Respiratory Assessment   Assessment Type Pre-treatment   General Appearance Alert;Awake   Respiratory Pattern Normal   Chest Assessment Chest expansion symmetrical   Bilateral Breath Sounds Diminished;Clear   Resp Comments Pt was admitted for hypertension. Pt has a h/o of copd. He states that he takes albuterol at home. Home medication did not list any pulmonary medication. Pt has already been ordered performist and pulmicort bid . I will cont. this order. Bs at this time is diminish but clear. Saturation is 97 % on ra with rr of 20 bpm. No cough or Sob noted. Pt will be placed on home bronchodilator pathway.

## 2024-06-27 NOTE — ASSESSMENT & PLAN NOTE
Wt Readings from Last 3 Encounters:   06/27/24 88.2 kg (194 lb 7.1 oz)   06/26/24 86.2 kg (190 lb 0.6 oz)   06/07/24 76 kg (167 lb 8.8 oz)   Echo obtained during CVA workup demonstrated newly detected reduced EF at 30%.  He appears compensated at this time  Cardiology recommending outpatient follow-up  Continue I/O and daily weight

## 2024-06-27 NOTE — ASSESSMENT & PLAN NOTE
Malnutrition Findings:     BMI Findings:       Body mass index is 24.97 kg/m².   Glucerna supplement with meals added pt does like them and current flavor

## 2024-06-27 NOTE — PROGRESS NOTES
06/27/24 1300   Patient Data   Rehab Impairment Impairment of mobility, safety, Activities of Daily Living (ADLs), and cognitive/communication skills due to Stroke: 01.2 Right Body Involvement (Left Brain)   Etiologic Diagnosis Acute infarct in L parietal   Date of Onset 06/17/24   Prior Level of Function   Self-Care 3. Independent - Patient completed the activities by him/herself, with or without an assistive device, with no assistance from a helper.   Indoor-Mobility (Ambulation) 3. Independent - Patient completed the activities by him/herself, with or without an assistive device, with no assistance from a helper.   Stairs 3. Independent - Patient completed the activities by him/herself, with or without an assistive device, with no assistance from a helper.   Functional Cognition 3. Independent - Patient completed the activities by him/herself, with or without an assistive device, with no assistance from a helper.   Patient Preference   Nickname (Patient Preference) Toni   Pain Assessment   Pain Assessment Tool 0-10   Pain Score No Pain   Comprehension   Assist Devices Glasses;Hearing Aid   Auditory Complex   Visual Complex   Findings Refer to ST daily note.   QI: Comprehension 3. Usually Understands: Understands most conversations, but misses some part/intent of message. Requires cues at times to understand.   Comprehension (FIM) 6 - Understands complex/abstract but requires more time   Expression   Verbal Complex   Non-Verbal Complex   Intelligibility Sentence   Findings Refer to ST daily note.   QI: Expression 3. Exhibits some difficulty with expressing needs and ideas (e.g., some words or finishing thoughts) or speech is not clear   Expression (FIM) 6 - Expresses complex/abstract but requires:  more time   Social Interaction   Cooperation with staff;with family   Participation Individual   Medications needed to control mood/behavior? No   Behaviors observed Appropriate   Findings Refer to ST daily note.  "  Social Interaction (FIM) 6 - Interacts appropriately with others BUT requires extra  time   Problem Solving   Routine Manages call bell;[place]anges precautions   Findings Refer to ST daily note.   Problem solving (FIM) 4 - Solves basic problems 75-89% of time   Memory   Recognize People Yes   Remember Routine Yes   Initiates Tasks Yes   Short-Term Impaired   Long Term Intact   Recalls Precaution Yes   Findings Refer to ST daily note.   Memory (FIM) 3 - Recognizes, recalls/performs 50-74%   Cognition   Overall Cognitive Status Impaired   Arousal/Participation Alert;Responsive;Cooperative   Attention Within functional limits   Orientation Level Oriented X4   Memory Decreased short term memory   Following Commands Follows one step commands with increased time or repetition   Discharge Information   Vocational Plan Retired/not working   Patient's Discharge Plan home with family support   Patient's Rehab Expectations \"get better with my memory\"   Barriers to Discharge Home Decreased Cognitive Function   Impressions Pt is a good rehab candidate to achieve modified independence upon anticipated discharge home w/ family support. Current barriers include decreased cognition, which impact pt's safety awareness. Pt will benefit from skilled SLP services to optimize overall cognitive lingusitic abilities at this time to decrease burden of care for family at time of discharge.   SLP Therapy Minutes   SLP Time In 1300   SLP Time Out 1400   SLP Total Time (minutes) 60   SLP Mode of treatment - Individual (minutes) 60   SLP Mode of treatment - Concurrent (minutes) 0   SLP Mode of treatment - Group (minutes) 0   SLP Mode of treatment - Co-treat (minutes) 0   SLP Mode of Treatment - Total time(minutes) 60 minutes   SLP Cumulative Minutes 60   Cumulative Minutes   Cumulative therapy minutes 150       "

## 2024-06-27 NOTE — ASSESSMENT & PLAN NOTE
Per chart history of Polymyalgia Rheumatica  Hydroxychloroquine 200mg daily at home  Per discussion with his Rheumatologist, he was weaned down to prednisone 1mg daily and they will follow-up with him in July.   Will discuss with IM when it is ok/if he should resume hydroxychloroquine  Outpatient f/u with his Rheumatologist at [facility]

## 2024-06-27 NOTE — ASSESSMENT & PLAN NOTE
Lab Results   Component Value Date    HGBA1C 7.6 (H) 06/19/2024       Recent Labs     07/01/24  1548 07/01/24 2027 07/02/24  0558 07/02/24  1045   POCGLU 198* 175* 137 117       Blood Sugar Average: Last 72 hrs:  (P) 130.8369531684967801    Home: Had Lantus at home.12 units per IM  Here: Lantus 8 units QHS, Cdi/Accuchecks  Would have staff evaluate how he draws up and gives himself his insulin prior to discharge.  Monitor and adjust as per IM  Outpatient f/u with PCP

## 2024-06-27 NOTE — CONSULTS
PHYSICAL MEDICINE AND REHABILITATION [facility] REHAB CONSULT  Hal Miller 84 y.o. male MRN: [id number]  Unit/Bed#: [facility] 455-01 Encounter: [id number]     Rehab Diagnosis: Impairment of mobility, safety, Activities of Daily Living (ADLs), and cognitive/communication skills due to Stroke:  01.4  No paresis  Etiologic Diagnosis: L parietal stroke    History of Present Illness:   Hal Miller is a 84 y.o. 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 CT scan showed a L inguinal hernia with diverticular abscess and general surgery was consulted and he was started on abx. IR was consulted who did not recommend drainage from their standpoint. They initially opted for non-operative management SOB. He was treated with solumedrol and nebulized bronchodilators. He was also found to have shingles and was started on valtrex. Geriatrics was consulted who recommended checking in B12 (normal). On 6/18 he was found to be lethargic and had an unwitnessed fall. RRT was called and CTH/CT C-Spine were ordered - negative, as well as XR L elbow - negative. Another rapid was called on 6/19 for hypoactivity and unresponsiveness. BG was fine, PCO2 on stat ABG not suggestive of hypercarbia, and his WBC had been improving, Neuro was consulted and recommended MRI Brain and EEG and he was started on Depacon with Ativan PRN. They also recommended holding ASA. Gen Surg cleared for diet from surgical perspective. SLP evaluated on 6/20 and cleared for Reg/Thin diet. MRI showed acute L parietal stroke. EEG without seizure. Carotid US showed < 50% bilateral stenosis. MRA and Echo were ordered - suspicion was  cardioembolic etiology in setting of held anticoagulation pending surgical intervention on admission. He was placed on a heparin gtt. MRA unremarkable. Echo with EF 30% with global hypokinesis and mildly dilated atria. Cardiology was consulted for cardiomyopathy who noted his last Echo in 2017 with normal EF who recommend transitioning from cardizem to BB, but team with some concerns given severity of his COPD. Given his low EF, Cards still recommended BB and he was transitioned to Toprol. They recommended close f/u with his outpatient Cardiologist. He is on abx through 6/28. He was transitioned back to eliquis. He was determined to be below his baseline function with need for close monitoring of his cardiac and neuro function through therapy. Seen by PMR who recommended acute inpatient rehab. He was admitted to the [facility] on 6/26.     Subjective: Overall feeling better. He denies any lateralizing weakness, pain, sensory disturbance. He reports some difficulty with coordinating. Denies trouble sleeping or swallowing. No CP, SOB, fevers, chills, N/V, abdominal pain. He reports urinary urgency that is new in the past 2 weeks, but previous UA in the hospital 6/19 was negative.  His wife uses oxygen at home, so he notes she cannot provide too much physical assistance, so he has to be as independent as possible. He denies any falls at home, and doesn't usually require an assistive device.  He reports chronic partial numbness in his feet 2/2 peripheral neuropathy.     Review of Systems: A 10-point review of systems was performed. Negative except as listed above.    Plan:     * 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[facility])  Assessment & Plan  L parietal CVA  2/2 off anticoagulation while waiting for potential surgery in acute care hospital  MRI: Acute L parietal CVA  Carotid US < 50% bilateral stenosis   Echo: dilated atria, and global hypokinesis  Possible component of seizures during hospital stay.  Now back on eliquis  Also has statin  RF  modification  Deficits: mostly improved - lethargy, altered mental status. Generalized weakness currently. Evaluating his cognitive deficits here  Depakote 500mg Q8hrs  Seizure precautions   [place] to be [place] by Neuro.     PT/OT/SLP 3-5 hours/day, 5-7 days/week.   Outpatient follow-up with Neurology     Peripheral polyneuropathy  Assessment & Plan  Chronic issue  Partial loss of sensation in his distal lower extremities  Considerations in therapy, monitor for pain  Foot checks with outpatient providers.    Diplopia  Assessment & Plan  Chronic issue. Utilizes prisms and eye patch on occasion.    Vascular dementia (HCC)  Assessment & Plan  Baseline AAO x 2-3.  At risk for delirium, no episodes or issues on admission.  - Delirium precautions  Monitor sleep/wake cycle and manage to optimize recovery from acute CVA  Appropriate bowel/bladder management  Avoiding deliriogenic meds and physical/chemical restraint.  Focus on environmental/behavioral interventions for reorientation and redirection  At baseline functions independently    TSH and B12 WNL in acute care hospital  Outpatient f/u with Neurology    Inflammatory polyarthropathy ([facility])  Assessment & Plan  Per chart history of Polymyalgia Rheumatica  Hydroxychloroquine 200mg daily at home  Per discussion with his Rheumatologist, he was weaned down to prednisone 1mg daily and they will follow-up with him in July.   Will discuss with IM when it is ok/if he should resume hydroxychloroquine  Outpatient f/u with his Rheumatologist at [facility]    Heart failure ([facility])  Assessment & Plan  Wt Readings from Last 3 Encounters:   06/27/24 88.2 kg (194 lb 7.1 oz)   06/26/24 86.2 kg (190 lb 0.6 oz)   06/07/24 76 kg (167 lb 8.8 oz)     Per chart last echo in 2017 with normal EF  Echo in this hospitalization with EF 30%, global hypokinesis, and dilated atria  Recommended for GDMT  Now on BB and ARB (see HTN)  Was on lasix at home - appears euvolemic currently  Monitor weights and  I/O  Management as per IM  Outpatient f/u with PCP          Severe protein-calorie malnutrition (HCC)  Assessment & Plan  Malnutrition Findings:                                 BMI Findings:           Body mass index is 24.97 kg/m².   Nutrition consulted    Colonic diverticular abscess  Assessment & Plan  Noted on admission on the L  No pain in groin at this time  Completes Cipro/Metronidazole tomorrow.  Non-operatively managed, tolerating PO  Monitor bowels.  Outpatient f/u with Gen Surg in 3-4 weeks.     Zoster  Assessment & Plan  Completed Valacyclovir in the hospital  Lesions are crusted over/clearing up.  No need for contact precautions at this time.  Will keep area covered.     Type 2 diabetes mellitus (HCC)  Assessment & Plan  Lab Results   Component Value Date    HGBA1C 7.6 (H) 06/19/2024       Recent Labs     06/26/24  1144 06/26/24  2102 06/27/24  0608 06/27/24  1019   POCGLU 129 147* 98 146*       Blood Sugar Average: Last 72 hrs:  (P) 130.4290952598090575    Home: Had Lantus at home.12 units per IM  Here: Lantus 8 units QHS, Cdi/Accuchecks  Would have staff evaluate how he draws up and gives himself his insulin prior to discharge.  Monitor and adjust as per IM  Outpatient f/u with PCP    Chronic obstructive pulmonary disease with acute exacerbation (HCC)  Assessment & Plan  Severe.  Home: Albuterol PRN, Trelegy  Here: Albuterol PRN, Budesonide nebs Q12hr PRN and Formoterol nebs every 12 hours  - Would try to get patient back onto an inhaler prior to discharge home.  Currently on room air  Monitor exam and adjust as per IM.  Outpatient f/u with Pulm     Atrial fibrillation (HCC)  Assessment & Plan  Toprol 25mg daily   Fully anticoagulated on apixaban  Monitor and adjust as appropriate  Outpatient f/u with Cardiologist    Hypertension  Assessment & Plan  Home: Losartan 100mg daily, Lasix 20mg daily  Here: Losartan 50mg daily, Toprol 25mg daily   Monitor and adjust as appropriate.  Management as per  IM  Outpatient f/u with PCP        Health Maintenance  #Delirium/Sleep: At risk. Optimize sleep/wake, pain, bowel, bladder management. Avoid deliriogenic meds and physical restraint. Environmental/behavioral interventions.   #Pain: not a major issues at this time. Tylenol PRN  #Bowel: Continent. Last BM 6/26. Senna 2 tabs at night, PRN bisacodyl, miralax  #Bladder: Voiding and continent  #Skin/Pressure Injury Prevention: Turn Q2hr in bed, with weight shifts [id number] in wheelchair. Float heels in bed.  #DVT Prophylaxis: Fully anticoagulated on apixaban, SCDs, ambulation  #GI Prophylaxis: On PPI. Of note on chronic steroids.   #Code Status: Full Code  #FEN: Diabetic Diet, Glucerna- Strawberry B/L/D  #Dispo: ELOS 1-2 weeks with goal to discharge home mod Ind-Sup with support of wife.   Outpatient f/u with PCP, Neurology, General Surgery (in 3-4 weeks)     60 minutes ([id number]) or greater spent for this encounter which included a combination of face-to-face time with the patient and non-face-to-face time which in part specifically includes management of acute sequelae of recent stroke and possible seizures and rehab plan of care, and management of acute and chronic comorbidities as detailed above.  Face-to-face time included extended discussion with patient regarding current condition, medical history, mood, medical/rehabilitation management, and disposition.  Non face-to-face time included coordination of care with patient's co-managing AP and/or physician(s) thru communication and review of their recent documentation as well as reviewing vitals, bowel/bladder function, recent labs, diagnostic imaging, and notes from therapy, CM, and nursing.     Drug regimen reviewed, all potential adverse effects identified and addressed:    Scheduled Meds:  Current Facility-Administered Medications   Medication Dose Route Frequency Provider Last Rate    acetaminophen  650 mg Oral Q4H PRN [place]      albuterol  2 puff  Inhalation Q4H PRN [place]      apixaban  5 mg Oral BID [place]      atorvastatin  40 mg Oral Daily With Dinner [place]      bisacodyl  10 mg Rectal Daily [place]      budesonide  0.5 mg Nebulization Q12H [place]      ciprofloxacin  500 mg Oral Q12H [facility] [place]      divalproex sodium  500 mg Oral Q8H [facility] [place]      formoterol  20 mcg Nebulization Q12H [place]      insulin glargine  8 Units Subcutaneous HS [place]      insulin lispro  1-5 Units Subcutaneous 4x Daily (AC & HS) [place]      losartan  50 mg Oral Daily [place]      melatonin  3 mg Oral HS [place]      metoprolol succinate  25 mg Oral Daily [place]      metroNIDAZOLE  500 mg Oral Q8H [facility] [place]      pantoprazole  40 mg Oral Early Morning [place]      predniSONE  1 mg Oral Daily [place]      senna  2 tablet Oral BID [place]          Restrictions include:   Seizure precautions  Fall precautions      Functional History/Home Set-up - Prior to Admission:    Lives in a 2SH with 2 SISSY from [employer].  Lives with spouse  FFSU   Grab bars in shower, shower chair, walker, cane, grab bars.  Independent ADLs, functional mobility (ambulating without an AD), needs assistance IADLs.    Functional Status Upon Admission to [facility]:  Mobility: Car 90' with RW ambulating   Transfers: Sup  ADLs: Sup eating, grooming, LB dressing, Car toileting      Physical Exam:  Temp:  [97.8 °F (36.6 °C)-98.4 °F (36.9 °C)] 98.4 °F (36.9 °C)  HR:  [64-76] 75  Resp:  [16-18] 18  BP: (113-130)/(56-78) 129/78  SpO2:  [89 %-97 %] 97 %    Gen: No acute distress, Well-nourished, well-appearing. Tall, long limbs.   HEENT: Moist mucus membranes, Normocephalic/Atraumatic  Cardiovascular: Regular rate, rhythm, S1/S2. Distal pulses palpable  Heme/Extr: No edema  Pulmonary: Non-labored breathing. Lungs CTAB  : No cardenas  GI: Soft,  "non-tender, non-distended. BS+  MSK: ROM is WFL in all extremities. No effusions or deformities. Bulk is symmetric. See below for MMT scores.   Integumentary: Skin is warm, dry.   Neuro: AAOx3, CN 2-12 intact. Has chronic diplopia. Sensation with some partial light touch in distal lower extremity. Speech is intact. Appropriate to questioning. Tone is normal. No kaley dysmetria/ataxia with FTN.      MMT:   Strength:   Right  Left  Site  Right  Left  Site    5 5  S Ab: Shoulder Abductors  5  5  HF: Hip Flexors    5 5  EF: Elbow Flexors  5  5 KF: Knee Flexors    5  5  EE: Elbow Extensors  5  5  KE: Knee Extensors    5  5  WE: Wrist Extensors  5  5  DR: Dorsi Flexors    5  5  FF: Finger Flexors  5  5  PF: Plantar Flexors    5  5  HI: Hand Intrinsics  5  5  EHL: Extensor Hallucis Longus   Psych: Normal mood and affect.       Laboratory:    Results from last 7 days   Lab Units 06/27/24  0506 06/26/24  0520 06/25/24  0436   HEMOGLOBIN g/dL 11.7* 12.7 11.1*   HEMATOCRIT % 37.7 39.9 35.4*   WBC Thousand/uL 7.64 9.27 6.65     Results from last 7 days   Lab Units 06/27/24  0506 06/26/24  0520 06/25/24  0436 06/24/24  0630 06/23/24  0413 06/22/24  0454 06/21/24  0736   BUN mg/dL 25 24 21   < > 20 23 26*   SODIUM mmol/L 131* 130* 131*   < > 131* 132* 134*   POTASSIUM mmol/L 4.7 4.9 4.6   < > 4.7 4.8 4.8   CHLORIDE mmol/L 99 98 99   < > 101 103 104   CREATININE mg/dL 1.33* 1.35* 1.23   < > 1.22 1.27 1.40*   AST U/L  --   --   --   --  15 14 20   ALT U/L  --   --   --   --  14 16 19    < > = values in this interval not displayed.            Wt Readings from Last 1 Encounters:   06/27/24 88.2 kg (194 lb 7.1 oz)     Estimated body mass index is 24.97 kg/m² as calculated from the following:    Height as of this encounter: 6' 2\" (1.88 m).    Weight as of this encounter: 88.2 kg (194 lb 7.1 oz).    Imaging: reviewed   6/16 CXR:  No definite acute disease with small chronic loculated pleural effusions, rounded atelectasis in the right " lower lobe, and bilateral scar.     6/17 CTAP:  Enlarging gas and fluid collection extending from the antimesenteric border of the sigmoid colon, infiltrating the left inguinal region soft tissue and coursing under and along the lateral margin of the left inguinal canal. This has increased in size   since 2/26/2024 with new surrounding inflammatory change suggesting a chronic diverticular abscess with a more recent acute component     22 mm cystic retroperitoneal lesion between the aorta and IVC which is increased in size since 2/20/2023. No evidence of soft tissue component. A benign etiology is suspected such as retroperitoneal lymphatic malformation or lymphocele. Initial   evaluation in 3 months with contrast-enhanced CT abdomen/pelvis is recommended. Considerations related to the patient's age and/or comorbidities may be used to alter these recommendations.    6/17 CTAP:  Oral and rectal contrast administered for this exam. There is redemonstration of a large complex collection adjacent to the proximal sigmoid colon containing air and tracking into the left inguinal region, similar in appearance to the prior and   suspicious for acute on chronic diverticular abscess, as described (axial image 166, series 301, for example). No contrast opacifies the colon in the region of or extends into the suspected abscess.     Rounded atelectasis in the right lower lobe with small dependent pleural effusions, left greater than right, similar in appearance to the prior.     Air trapping in the bilateral lungs with paraseptal emphysematous changes.     Heart appears enlarged. Low-density of the blood pool is noted raising suspicion for anemia.     Enlarged prostate, renal cysts, and other findings as above.    6/18 CTAP:  Oral contrast is now seen to the level of the transverse colon, the contrast still has not reached the portion of the colon adjacent to the suspected diverticular abscess (axial image 122, series 301). Rectal  tube has been intervally removed.     Other findings as above without significant interval change.    6/18 CTAP:  1) Essentially unchanged tubular collection of fluid and multiple air droplets in the left side of the pelvis anteriorly, extending focally into the adjacent anterior pelvic wall, and abutting the sigmoid colon. Oral contrast has reached the region of   the sigmoid colon adjacent to the collection, however is not seen outside of the sigmoid lumen, making a persistent communication with the sigmoid unlikely. This collection may be related to a contained prior perforation, or possibly may represent an   infected hernia plug, as a plug like structure was previously seen in this location on the prior studies dating back to 2011, and is currently not visualized elsewhere. Evaluation with CT with IV contrast if renal function allows may be of value to   better assess presence of an abscess.     2) 2.2 x 1.2 cm hypodense lesion in the aortocaval region, not significantly changed on the studies from 2022, however mildly enlarged from February 2023 and new from the preceding studies. This lesion is of unclear etiology. A lymph node is possible in   this location. Other considerations include a lymphatic malformation or lymphocele. Follow-up CT or MRI is recommended in 6 months, preferably with IV contrast if renal function allows. Considerations related to the patient's age and/or comorbidities may   be used to alter these recommendations.     3) Additional findings unchanged from the CT from 11 hours earlier, as above.    6/18 CTH:  No acute intracranial abnormality. Similar-appearing chronic severe microangiopathic changes.     6/18 CT C-Spine:  No cervical spine fracture or traumatic malalignment.     6/18 XR L Elbow:  No acute osseous abnormality.     6/20 Mri Brain:  1. Small acute infarct in the left parietal periventricular white matter with an associated small focus of susceptibility that may represent  associated petechial hemorrhage.     2. Redemonstrated foci of susceptibility, including new foci, nonspecific and could be seen with cerebral amyloid angiopathy, multiple cavernomas and/or prior embolic events, among other considerations.    6/20 Carotids:  RIGHT:  There is <50% stenosis noted in the internal carotid artery. Plaque is  heterogenous and irregular.  Vertebral artery flow is antegrade. There is no significant subclavian artery  disease.     LEFT:  There is <50% stenosis noted in the internal carotid artery. Plaque is  heterogenous and smooth.  Vertebral artery flow is antegrade. There is no significant subclavian artery  disease.    6/21 MRA Head:  Normal MRA Brain with variant anatomy.     Please see MRI brain without contrast done yesterday for further evaluation    Rehabilitation Prognosis: good     Tolerance for three hours of therapy a day: good     Family/Patient Goals:  Patient/family's goals: Return to previous home/apartment.    Patient will receive PT, OT, and ST 60 minutes each per day, five days per week to achieve rehab goals or participate in 900 minutes of therapy within a 7 day week period.     Mobility Goals: Modified [place]  Transfer Goals: Modified [place]  Activities of Daily Living (ADLs) Goals: Modified [place]    Discharge Planning:  Rehabilitation and discharge goals discussed with the patient and/or family.    Case Managment and Social Work to review patient/family resources and to coordinate Discharge Planning.    Estimated length of stay:  1-2 weeks    Patient and Family Education and Training:  Rehabilitation and discharge goals discussed with the patient and/or family.  Patient/family education/training needs to be discussed in weekly team meeting.    Equipment/DME needs: Therapy teams to assess and evaluate for additional equipment/DME needs throughout rehabilitation stay    Past Medical History:   Past Surgical History:   Family History:   Social history:    Past Medical History:   Diagnosis Date    Acute encephalopathy 2023    Acute-on-chronic kidney injury  (HCC) 2017    Cancer (HCC)     pancreatic    Colon polyp     Coronary artery disease     Dehydration 3/3/2023    Diabetes mellitus (HCC)     Hyperlipidemia     Hypertension     Left lower lobe pneumonia 2022    Pneumonia 2017    Right middle and lower lobe     Stroke (HCC)     Past Surgical History:   Procedure Laterality Date    APPENDECTOMY      CARDIAC SURGERY      Aortic Valve Replacement, Ascending Aorta    COLONOSCOPY      GALLBLADDER SURGERY      PANCREATICODUODENECTOMY       Family History   Problem Relation Age of Onset    Cancer Mother     Stroke Father     Cancer Sister     Diabetes Sister     Stroke Brother       Social History     Socioeconomic History    Marital status: /Civil Union     Spouse name: Not on file    Number of children: Not on file    Years of education: Not on file    Highest education level: Not on file   Occupational History    Not on file   Tobacco Use    Smoking status: Former     Types: Pipe     Quit date:      Years since quittin.5    Smokeless tobacco: Never   Vaping Use    Vaping status: Never Used   Substance and Sexual Activity    Alcohol use: Never    Drug use: No    Sexual activity: Yes     Partners: Female   Other Topics Concern    Not on file   Social History Narrative    Not on file     Social Determinants of Health     Financial Resource Strain: Not on file   Food Insecurity: No Food Insecurity (2024)    Hunger Vital Sign     Worried About Running Out of Food in the Last Year: Never true     Ran Out of Food in the Last Year: Never true   Transportation Needs: No Transportation Needs (2024)    PRAPARE - Transportation     Lack of Transportation (Medical): No     Lack of Transportation (Non-Medical): No   Physical Activity: Not on file   Stress: Not on file   Social Connections: Not on file   Intimate Partner Violence: Not on  file   Housing Stability: Unknown (6/27/2024)    Housing Stability Vital Sign     Unable to Pay for Housing in the Last Year: No     Number of Times Moved in the Last Year: Not on file     Homeless in the Last Year: No          Current Medical Diagnosis Allergies   Patient Active Problem List   Diagnosis    Shortness of breath    Hypertension    Atrial fibrillation (HCC)    Aneurysm of thoracic aorta (HCC)    Aneurysm of abdominal aorta (HCC)    Hyperlipidemia    Inflammatory polyarthropathy (HCC)    Osteoarthrosis    Polymyalgia rheumatica (HCC)    History of malignant neuroendocrine tumor    Centrilobular emphysema (HCC)    Chronic obstructive pulmonary disease with acute exacerbation (HCC)    CKD (chronic kidney disease)    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)    Acute encephalopathy    Generalized weakness    Neoplasm of other specified site    Esophageal stricture    Hypercalcemia    TARA (acute kidney injury) (HCC)    Concern for aspiration pneumonia (HCC)    Type 2 diabetes mellitus (HCC)    Abnormal CT of the abdomen    Zoster    Colonic diverticular abscess    Vascular dementia (HCC)    Dizziness    Insomnia    Ambulatory dysfunction    Severe protein-calorie malnutrition (HCC)    Acute on chronic respiratory failure (HCC)    Episode of seizure-like activity    History of Whipple procedure    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HCC)    Heart failure (HCC)    Dysphoric mood    Allergies   Allergen Reactions    Contrast Dye [Iodinated Contrast Media] Other (See Comments)     Pt states kidney dysfunction..     Other            Medical Necessity Criteria for [facility] Admission:  He is of High Risk due to the following comorbid conditions: CAD, Afib on eliquis, T2DM, HTN, COPD, Vascular dementia, chronic diplopia (eye patch), pancreatic CA s/p whipple, diverticular abscess with perforation treated non-operatively, possible seizure, risk of delirium, skin breakdown, falls, decline in neuro status, bowel/bladder dysfunction,  VTE. . In addition, the preadmission screen, post-admission physical evaluation, overall plan of care and admissions order demonstrate a reasonable expectation that the following criteria were met at the time of admission to the [facility].  The patient requires active and ongoing therapeutic intervention of multiple therapy disciplines (physical therapy, occupational therapy, speech-language pathology, or prosthetics/orthotics), one of which is physical or occupational therapy.    Patient requires an intensive rehabilitation therapy program, as defined in Chapter 1, section 110.2.2 of the CMS Medicare Policy Manual. This intensive rehabilitation therapy program will consist of at least 3 hours of therapy per day at least 5 days per week or at least 15 hours of intensive rehabilitation therapy within a 7 consecutive day period, beginning with the date of admission to the [facility].    The patient is reasonably expected to actively participate in, and benefit significantly from, the intensive rehabilitation therapy program as defined in Chapter 1, section 110.2.2 of the CMS Medicare Policy Manual at this time of admission to the [facility]. He can reasonably be expected to make measurable improvement (that will be of practical value to improve the patient’s functional capacity or adaptation to impairments) as a result of the rehabilitation treatment, as defined in section 110.3, and such improvement can be expected to be made within the prescribed period of time. As noted in the CMS Medicare Policy Manual, the patient need not be expected to achieve complete independence in the domain of self-care nor be expected to return to his or her prior level of functioning in order to meet this standard.  The patient must require physician supervision by a rehabilitation physician. As such, a rehabilitation physician will conduct face-to-face visits with the patient at least 3 days per week throughout the patient’s stay in the [facility] to assess the  patient both medically and functionally, as well as to modify the course of treatment as needed to maximize the patient’s capacity to benefit from the rehabilitation process.  The patient requires an intensive and coordinated interdisciplinary approach to providing rehabilitation, as defined in Chapter 1, section 110.2.5 of the CMS Medicare Policy Manual. This will be achieved through periodic team conferences, conducted at least once in a 7-day period, and comprising of an interdisciplinary team of medical professionals consisting of: a rehabilitation physician, registered nurse,  and/or , and a licensed/certified therapist from each therapy discipline involved in treating the patient.       ** Please Note: Fluency Direct voice to text software may have been used in the creation of this document. **

## 2024-06-27 NOTE — ASSESSMENT & PLAN NOTE
Lab Results   Component Value Date    EGFR 48 07/02/2024    EGFR 52 06/28/2024    EGFR 49 06/27/2024    CREATININE 1.35 (H) 07/02/2024    CREATININE 1.26 06/28/2024    CREATININE 1.33 (H) 06/27/2024     Appears to have CKD 3A  Baseline unclear.  Monitor for now  Avoid nephrotoxins and relative hypotension  Outpatient f/u with PCP

## 2024-06-27 NOTE — PROGRESS NOTES
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 She is a good support. Both are retired and can assist as needed   Home Setup   Type of Home Multi Level   Method of Entry Stairs;Hand Rail Right   Number of Stairs 2   Number of Stairs in Home 12   In Home Hand Rail Left   First Floor Bathroom Shower;Grab Bars  (walk in shower)   First Floor Bathroom Accessibility Shower chair;Grab bars in tub/shower   Second Floor Bathroom Full;Shower  (walk in shower)   Second Floor Bathroom Accessibility Shower chair;Grab bars in tub/shower   First Floor Setup Available Yes   Available Equipment Roller Walker;Single Point Cane;Shower Chair   Baseline Information   Home Care Provider has had services in the past at home but not immediately before admission   Transportation   (wife also drives)   Prior IADL Participation   Meal Preparation Partial Participation   Laundry Partial Participation   Home Cleaning Partial Participation   Prior Level of Function   Self-Care 3. Independent - Patient completed the activities by him/herself, with or without an assistive device, with no assistance from a helper.   Indoor-Mobility (Ambulation) 3. Independent - Patient completed the activities by him/herself, with or without an assistive device, with no assistance from a helper.   Stairs 3. Independent - Patient completed the activities by him/herself, with or without an assistive device, with no assistance from a helper.   Functional Cognition 3. Independent - Patient completed the activities by him/herself, with or without an assistive device, with no assistance from a helper.   Prior Device Used Z. None of the above    Falls in the Last Year   Number of falls in the past 12 months 0   Patient Preference   Nickname (Patient Preference) Toni   Restrictions/Precautions   Precautions Aspiration;Bed/chair alarms;Cognitive;Fall Risk;Hard of hearing;Supervision on toilet/commode;Visual deficit  (Has prism glasses)   Pain Assessment   Pain Assessment Tool 0-10   Pain Score No Pain   Toilet Transfer   Surface Assessed Standard Commode   Transfer Technique Standard   Limitations Noted In Balance;Endurance;LE Strength   Adaptive Equipment Grab Bar   Type of Assistance Needed Physical assistance   Physical Assistance Level 51%-75%   Comment R HHA   Toilet Transfer CARE Score 2   Transfer Bed/Chair/Wheelchair   Limitations Noted In Balance;Endurance;Coordination;LE Strength   Adaptive Equipment None   Findings PT also assessed STS, SPT and short distance ambulation with a RW. Patient was not using a walker PTA and will not make a primary goal of therapy. Will consider for safe d/c planning. Scores reflect no AD   Type of Assistance Needed Physical assistance   Physical Assistance Level 51%-75%   Comment R HHA   Chair/Bed-to-Chair Transfer CARE Score 2   Roll Left and Right   Type of Assistance Needed Supervision   Physical Assistance Level No physical assistance   Roll Left and Right CARE Score 4   Sit to Lying   Type of Assistance Needed Supervision   Physical Assistance Level No physical assistance   Sit to Lying CARE Score 4   Lying to Sitting on Side of Bed   Comment remained in bed at the end of sesison- please assess next session; patient gets OOB on his R side   Sit to Stand   Type of Assistance Needed Physical assistance   Physical Assistance Level 51%-75%   Comment verbal cues for hand placement   Sit to Stand CARE Score 2   Picking Up Object   Type of Assistance Needed Physical assistance   Physical Assistance Level 51%-75%   Comment with no AD; has 3 reachers at home- able to use from a standing position with no AD at a CGA level    Picking Up Object CARE Score 2   Car Transfer   Type of Assistance Needed Physical assistance   Physical Assistance Level 51%-75%   Comment R A   Car Transfer CARE Score 2   Ambulation   Primary Mode of Locomotion Prior to Admission Walk   Distance Walked (feet) 150 ft  (x2 + multiple shorter distances)   Assist Device Hand Hold   Gait Pattern Inconsistant Leah;Decreased foot clearance;Improper weight shift   Limitations Noted In Balance;Endurance;Heel Strike;Speed;Strength;Swing   Provided Assistance with: Balance;Direction   Walk 10 Feet   Type of Assistance Needed Physical assistance   Physical Assistance Level 51%-75%   Comment R A   Walk 10 Feet CARE Score 2   Walk 50 Feet with Two Turns   Type of Assistance Needed Physical assistance   Physical Assistance Level 51%-75%   Comment R A   Walk 50 Feet with Two Turns CARE Score 2   Walk 150 Feet   Type of Assistance Needed Physical assistance   Physical Assistance Level 51%-75%   Comment R [facility]   Walk 150 Feet CARE Score 2   Walking 10 Feet on Uneven Surfaces   Type of Assistance Needed Physical assistance   Physical Assistance Level 51%-75%   Comment R [facility]   Walking 10 Feet on Uneven Surfaces CARE Score 2   Wheel 50 Feet with Two Turns   Reason if not Attempted Activity not applicable   Wheel 50 Feet with Two Turns CARE Score 9   Wheel 150 Feet   Reason if not Attempted Activity not applicable   Wheel 150 Feet CARE Score 9   Curb or Single Stair   Style negotiated Curb   Type of Assistance Needed Physical assistance   Physical Assistance Level Total assistance   Comment mod Ax1 and SBA of 2nd person for safety nbut do not need in future sessions   1 Step (Curb) CARE Score 1   4 Steps   Type of Assistance Needed Physical assistance   Physical Assistance Level 51%-75%   Comment mod Ax1 2* 1x LOB on stairs;a ttempted with 1 rail but demosntrated difficulty- 2 rails used today. patient initiated with nonrecprocal pattern then progressed to reciprocal   4  "Steps CARE Score 2   12 Steps   Comment limited 2* fatigue   Reason if not Attempted Safety concerns   12 Steps CARE Score 88   Stairs   Type  Steps   # of Steps 6   Weight Bearing Precautions Fall Risk   Comprehension   QI: Comprehension 3. Usually Understands: Understands most conversations, but misses some part/intent of message. Requires cues at times to understand.   Expression   QI: Expression 3. Exhibits some difficulty with expressing needs and ideas (e.g., some words or finishing thoughts) or speech is not clear   Strength RLE   RLE Overall Strength 4+/5   Strength LLE   L Hip Flexion 4-/5   L Ankle Dorsiflexion 4-/5   RUE Assessment   RUE Assessment WFL   LUE Assessment   LUE Assessment WFL   Coordination   Movements are Fluid and Coordinated 1   Sensation   Propioception Partial deficits in the RLE;Partial deficits in the LLE  (BLE neuropathy)   Cognition   Arousal/Participation Alert;Cooperative   Attention Within functional limits   Orientation Level Oriented X4   Memory Decreased short term memory;Decreased recall of recent events   Following Commands Follows one step commands with increased time or repetition   Objective Measure   PT Measure(s) Complete OM next session- unable today 2* time constraints   PT Findings Patient educated on PT POC, goal setting and purpose of rehab. Reviewed therapy expectations and scheduling   Discharge Information   Vocational Plan Retired/not working   Patient's Discharge Plan return home with family support   Patient's Rehab Expectations \"to get up and walk on my own\"   Barriers to Discharge Home Decreased Cognitive Function;Decreased Endurance;Decreased Strength;Safety Considerations   Impressions Pt is 84 y.o. male seen for PT evaluation s/p admit to [facility] on 6/26/2024 to maximize his functional mobility (I) and safety. He presented initially to ED with abdominal pain, SOB, and enlarging L groin buldge who had CT A/P concerning for " "diverticular abscess. During his hospitalization he presented with stroke like symptoms. MRI showing the following: \"1. Small acute infarct in the left parietal periventricular white matter with an associated small focus of susceptibility that may represent associated petechial hemorrhage. 2. Redemonstrated foci of susceptibility, including new foci, nonspecific and could be seen with cerebral amyloid angiopathy, multiple cavernomas and/or prior embolic events, among other considerations.\" He  was recommended for rehab when medically appropriate.  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 PTA, pt was independent w/ all functional mobility w/ no AD, ambulates unrestricted distances and all terrain, ambulates household distances, lives w/ wife in 2 level home with SISSY (but can have a FFSU if needed), and retired. Personal factors affecting pt at time of IE include: inaccessible home environment, inability to navigate community distances, inability to navigate level surfaces w/o external assistance, decreased cognition, visual impairments, inability to perform IADLs, and inability to perform ADLs. Please find objective findings from PT assessment regarding body systems outlined above with impairments and limitations including weakness, impaired balance, decreased endurance, gait deviations, decreased activity tolerance, decreased functional mobility tolerance, altered sensation, and fall risk. He required overall mod Ax1 with no AD and presents as a good rehab candidate. Anticipate need for appx 10-14 days to achieve goals.   PT Therapy Minutes   PT Time In 1400   PT Time Out 1500   PT Total Time (minutes) 60   PT Mode " of treatment - Individual (minutes) 60   PT Mode of treatment - Concurrent (minutes) 0   PT Mode of treatment - Group (minutes) 0   PT Mode of treatment - Co-treat (minutes) 0   PT Mode of Treatment - Total time(minutes) 60 minutes   PT Cumulative Minutes 60   Cumulative Minutes   Cumulative therapy minutes 210

## 2024-06-27 NOTE — ASSESSMENT & PLAN NOTE
Completed 7 days of Valtrex prior to transfer  Continue to monitor  Pt currently with crusted lesions over right anterior chest just below nipple line round flank to posterior back on right side   Pt reports pain improvment

## 2024-06-27 NOTE — PROGRESS NOTES
"   24 1300   Pain Assessment   Pain Assessment Tool 0-10   Pain Score No Pain   Cognitive Linguisitic Assessments   Cognitive Linquistic Quick Test (CLQT) Pt completed the CLQT+ on initial evaluation with a Composite Severity Rating score of 4.0 out of 4.0, correlating to overall WNL  cognitive linguistic impairments at time of evaluation and in comparison to age matched peers ranging from 70-88 y/o. Pt scored at or above criterion cut score for 8 out of 10 tasks completed. Pt's Cognitive Domain Scores are as follows:      Cognitive Domain: Score:  Severity Rating:   Attention   167 WNL    Memory 147 WNL        Executive Functions 20 WNL       Language 29 WNL       Visuospatial Skills  70 WNL       Clock Drawing Screen    13 WNL    Overall Composite Severity Rating Score 4.0 out of 4.0 WNL       Pt was educated on results of assessment with review of overall results and performance on each cognitive domain, as well as specific focus on weaker areas, which will primarily be targeted during therapy sessions. Pt was receptive to all information provided, along with recommendations for skilled SLP services during acute rehab stay to maximize overall cognitive linguistic skills. Although pt performed WNL across domains, pt observed to demonstrate impairments in problem solving, cognitive flexibility, planning, working memory, visual memory and discrimination, self-monitoring, and phonemic constraints on word retrieval tasks.    Prior to CLQT, pt engaged in LTM recall and orientation and case history information gathering. Pt has goals to \"get better with his memory.\" He wears prisms for his visual impairment and b/l hearing aids for his presbycusis. He recalls his name, , age, address, pre-nursing home occupation, spouse's name, # of children and grandchildren, as well as that he has one great grandchild. He recalls all names, also. He is oriented to date, month, year, JOHN, TOD, as well as place and town. Further, " he is oriented and recalls reason for admission. PTA he was driving. He reports that his wife was responsible for meds, finances, meal prep, and household chores. Pt is insightful that he has noticed some cognitive decline with aging, however, does not believe he has any changes since his stroke. Wife reports similar. SLP discussed that he would benefit from cognitive therapy while at the [facility] as some things aren't as noticeable when not in home environment, and while he scored WNL on cognitive testing, all scores were low normal. Pt and family agreeable. SLP explained typical therapy schedule and [facility] program and plans to begin skilled therapy tomorrow with all disciplines. Pt, spouse, and son given opportunity to ask any additional questions at this time, however, none were asked. Encouraged to ask therapy team at any time.    Comprehension   Assist Devices Glasses;Hearing Aid   Auditory Complex   Visual Complex   Findings Refer to ST daily note.   QI: Comprehension 3. Usually Understands: Understands most conversations, but misses some part/intent of message. Requires cues at times to understand.   Comprehension (FIM) 6 - Understands complex/abstract but requires more time   Expression   Verbal Complex   Non-Verbal Complex   Intelligibility Sentence   Findings Refer to ST daily note.   QI: Expression 3. Exhibits some difficulty with expressing needs and ideas (e.g., some words or finishing thoughts) or speech is not clear   Expression (FIM) 6 - Expresses complex/abstract but requires:  more time   Social Interaction   Cooperation with staff;with family   Participation Individual   Medications needed to control mood/behavior? No   Behaviors observed Appropriate   Findings Refer to ST daily note.   Social Interaction (FIM) 6 - Interacts appropriately with others BUT requires extra  time   Problem Solving   Routine Manages call bell;Manges precautions   Findings Refer to ST daily note.   Problem solving (FIM) 4 -  Solves basic problems 75-89% of time   Memory   Recognize People Yes   Remember Routine Yes   Initiates Tasks Yes   Short-Term Impaired   Long Term Intact   Recalls Precaution Yes   Findings Refer to ST daily note.   Memory (FIM) 3 - Recognizes, recalls/performs 50-74%   SLP Therapy Minutes   SLP Time In 1300   SLP Time Out 1400   SLP Total Time (minutes) 60   SLP Mode of treatment - Individual (minutes) 60   SLP Mode of treatment - Concurrent (minutes) 0   SLP Mode of treatment - Group (minutes) 0   SLP Mode of treatment - Co-treat (minutes) 0   SLP Mode of Treatment - Total time(minutes) 60 minutes   SLP Cumulative Minutes 60   Therapy Time missed   Time missed? No

## 2024-06-27 NOTE — ASSESSMENT & PLAN NOTE
Malnutrition Findings:                                 BMI Findings:           Body mass index is 24.97 kg/m².     Nutriton consulted

## 2024-06-27 NOTE — ASSESSMENT & PLAN NOTE
L parietal CVA  2/2 off anticoagulation while waiting for potential surgery in acute care hospital  MRI: Acute L parietal CVA  Carotid US < 50% bilateral stenosis   Echo: dilated atria, and global hypokinesis  Possible component of seizures during hospital stay.  Now back on eliquis  Also has statin  RF modification  Deficits: mostly improved - lethargy, altered mental status. Generalized weakness currently. Evaluating his cognitive deficits here  Depakote 500mg Q8hrs  Seizure precautions   [employer] to be filled out by Neuro.    PT/OT/SLP 3-5 hours/day, 5-7 days/week.   Outpatient follow-up with Neurology

## 2024-06-27 NOTE — ASSESSMENT & PLAN NOTE
Wt Readings from Last 3 Encounters:   07/02/24 74 kg (163 lb 2.3 oz)   06/26/24 86.2 kg (190 lb 0.6 oz)   06/07/24 76 kg (167 lb 8.8 oz)     Per chart last echo in 2017 with normal EF  Echo in this hospitalization with EF 30%, global hypokinesis, and dilated atria  Recommended for GDMT  Now on BB and ARB (see HTN)  Was on lasix at home - appears euvolemic currently  Monitor weights and I/O  Management as per IM  Outpatient f/u with PCP/Cardiology

## 2024-06-28 LAB
ANION GAP SERPL CALCULATED.3IONS-SCNC: 6 MMOL/L (ref 4–13)
BASOPHILS # BLD AUTO: 0.03 THOUSANDS/ÂΜL (ref 0–0.1)
BASOPHILS NFR BLD AUTO: 0 % (ref 0–1)
BUN SERPL-MCNC: 23 MG/DL (ref 5–25)
CALCIUM SERPL-MCNC: 9.6 MG/DL (ref 8.4–10.2)
CHLORIDE SERPL-SCNC: 96 MMOL/L (ref 96–108)
CO2 SERPL-SCNC: 31 MMOL/L (ref 21–32)
CREAT SERPL-MCNC: 1.26 MG/DL (ref 0.6–1.3)
EOSINOPHIL # BLD AUTO: 0.08 THOUSAND/ÂΜL (ref 0–0.61)
EOSINOPHIL NFR BLD AUTO: 1 % (ref 0–6)
ERYTHROCYTE [DISTWIDTH] IN BLOOD BY AUTOMATED COUNT: 14.4 % (ref 11.6–15.1)
GFR SERPL CREATININE-BSD FRML MDRD: 52 ML/MIN/1.73SQ M
GLUCOSE P FAST SERPL-MCNC: 68 MG/DL (ref 65–99)
GLUCOSE SERPL-MCNC: 117 MG/DL (ref 65–140)
GLUCOSE SERPL-MCNC: 138 MG/DL (ref 65–140)
GLUCOSE SERPL-MCNC: 166 MG/DL (ref 65–140)
GLUCOSE SERPL-MCNC: 68 MG/DL (ref 65–140)
GLUCOSE SERPL-MCNC: 79 MG/DL (ref 65–140)
HCT VFR BLD AUTO: 35.1 % (ref 36.5–49.3)
HGB BLD-MCNC: 11.4 G/DL (ref 12–17)
IMM GRANULOCYTES # BLD AUTO: 0.08 THOUSAND/UL (ref 0–0.2)
IMM GRANULOCYTES NFR BLD AUTO: 1 % (ref 0–2)
LYMPHOCYTES # BLD AUTO: 0.81 THOUSANDS/ÂΜL (ref 0.6–4.47)
LYMPHOCYTES NFR BLD AUTO: 11 % (ref 14–44)
MCH RBC QN AUTO: 31.6 PG (ref 26.8–34.3)
MCHC RBC AUTO-ENTMCNC: 32.5 G/DL (ref 31.4–37.4)
MCV RBC AUTO: 97 FL (ref 82–98)
MONOCYTES # BLD AUTO: 1.09 THOUSAND/ÂΜL (ref 0.17–1.22)
MONOCYTES NFR BLD AUTO: 15 % (ref 4–12)
NEUTROPHILS # BLD AUTO: 5.4 THOUSANDS/ÂΜL (ref 1.85–7.62)
NEUTS SEG NFR BLD AUTO: 72 % (ref 43–75)
NRBC BLD AUTO-RTO: 0 /100 WBCS
PLATELET # BLD AUTO: 168 THOUSANDS/UL (ref 149–390)
PMV BLD AUTO: 9.4 FL (ref 8.9–12.7)
POTASSIUM SERPL-SCNC: 4.9 MMOL/L (ref 3.5–5.3)
RBC # BLD AUTO: 3.61 MILLION/UL (ref 3.88–5.62)
SODIUM SERPL-SCNC: 133 MMOL/L (ref 135–147)
WBC # BLD AUTO: 7.49 THOUSAND/UL (ref 4.31–10.16)

## 2024-06-28 PROCEDURE — 0HBRXZZ EXCISION OF TOE NAIL, EXTERNAL APPROACH: ICD-10-PCS | Performed by: PODIATRIST

## 2024-06-28 PROCEDURE — 82948 REAGENT STRIP/BLOOD GLUCOSE: CPT

## 2024-06-28 PROCEDURE — 97112 NEUROMUSCULAR REEDUCATION: CPT

## 2024-06-28 PROCEDURE — 94760 N-INVAS EAR/PLS OXIMETRY 1: CPT

## 2024-06-28 PROCEDURE — 11721 DEBRIDE NAIL 6 OR MORE: CPT | Performed by: PODIATRIST

## 2024-06-28 PROCEDURE — 97110 THERAPEUTIC EXERCISES: CPT

## 2024-06-28 PROCEDURE — 97130 THER IVNTJ EA ADDL 15 MIN: CPT

## 2024-06-28 PROCEDURE — 99232 SBSQ HOSP IP/OBS MODERATE 35: CPT | Performed by: PHYSICAL MEDICINE & REHABILITATION

## 2024-06-28 PROCEDURE — 94640 AIRWAY INHALATION TREATMENT: CPT

## 2024-06-28 PROCEDURE — 85025 COMPLETE CBC W/AUTO DIFF WBC: CPT | Performed by: NURSE PRACTITIONER

## 2024-06-28 PROCEDURE — 99231 SBSQ HOSP IP/OBS SF/LOW 25: CPT | Performed by: NURSE PRACTITIONER

## 2024-06-28 PROCEDURE — 80048 BASIC METABOLIC PNL TOTAL CA: CPT | Performed by: NURSE PRACTITIONER

## 2024-06-28 PROCEDURE — 97129 THER IVNTJ 1ST 15 MIN: CPT

## 2024-06-28 PROCEDURE — 97530 THERAPEUTIC ACTIVITIES: CPT

## 2024-06-28 PROCEDURE — 99221 1ST HOSP IP/OBS SF/LOW 40: CPT | Performed by: PODIATRIST

## 2024-06-28 RX ORDER — TAMSULOSIN HYDROCHLORIDE 0.4 MG/1
0.4 CAPSULE ORAL
Status: DISCONTINUED | OUTPATIENT
Start: 2024-06-28 | End: 2024-07-03 | Stop reason: HOSPADM

## 2024-06-28 RX ADMIN — METRONIDAZOLE 500 MG: 500 TABLET ORAL at 00:27

## 2024-06-28 RX ADMIN — FORMOTEROL FUMARATE DIHYDRATE 20 MCG: 20 SOLUTION RESPIRATORY (INHALATION) at 19:59

## 2024-06-28 RX ADMIN — DIVALPROEX SODIUM 500 MG: 500 TABLET, DELAYED RELEASE ORAL at 17:42

## 2024-06-28 RX ADMIN — CIPROFLOXACIN 500 MG: 500 TABLET ORAL at 08:27

## 2024-06-28 RX ADMIN — TAMSULOSIN HYDROCHLORIDE 0.4 MG: 0.4 CAPSULE ORAL at 17:42

## 2024-06-28 RX ADMIN — Medication 3 MG: at 21:25

## 2024-06-28 RX ADMIN — APIXABAN 5 MG: 5 TABLET, FILM COATED ORAL at 08:27

## 2024-06-28 RX ADMIN — DIVALPROEX SODIUM 500 MG: 500 TABLET, DELAYED RELEASE ORAL at 00:27

## 2024-06-28 RX ADMIN — LOSARTAN POTASSIUM 50 MG: 50 TABLET, FILM COATED ORAL at 08:27

## 2024-06-28 RX ADMIN — METRONIDAZOLE 500 MG: 500 TABLET ORAL at 17:47

## 2024-06-28 RX ADMIN — SENNOSIDES 17.2 MG: 8.6 TABLET, FILM COATED ORAL at 08:27

## 2024-06-28 RX ADMIN — APIXABAN 5 MG: 5 TABLET, FILM COATED ORAL at 17:47

## 2024-06-28 RX ADMIN — BUDESONIDE 0.5 MG: 0.5 INHALANT ORAL at 11:19

## 2024-06-28 RX ADMIN — INSULIN LISPRO 1 UNITS: 100 INJECTION, SOLUTION INTRAVENOUS; SUBCUTANEOUS at 23:26

## 2024-06-28 RX ADMIN — INSULIN GLARGINE 8 UNITS: 100 INJECTION, SOLUTION SUBCUTANEOUS at 21:21

## 2024-06-28 RX ADMIN — DIVALPROEX SODIUM 500 MG: 500 TABLET, DELAYED RELEASE ORAL at 23:26

## 2024-06-28 RX ADMIN — DIVALPROEX SODIUM 500 MG: 500 TABLET, DELAYED RELEASE ORAL at 08:27

## 2024-06-28 RX ADMIN — METOPROLOL SUCCINATE 25 MG: 25 TABLET, FILM COATED, EXTENDED RELEASE ORAL at 08:27

## 2024-06-28 RX ADMIN — ATORVASTATIN CALCIUM 40 MG: 40 TABLET, FILM COATED ORAL at 17:42

## 2024-06-28 RX ADMIN — PREDNISONE 1 MG: 1 TABLET ORAL at 08:29

## 2024-06-28 RX ADMIN — BUDESONIDE 0.5 MG: 0.5 INHALANT ORAL at 19:59

## 2024-06-28 RX ADMIN — PANTOPRAZOLE SODIUM 40 MG: 40 TABLET, DELAYED RELEASE ORAL at 05:03

## 2024-06-28 RX ADMIN — FORMOTEROL FUMARATE DIHYDRATE 20 MCG: 20 SOLUTION RESPIRATORY (INHALATION) at 11:19

## 2024-06-28 RX ADMIN — METRONIDAZOLE 500 MG: 500 TABLET ORAL at 08:34

## 2024-06-28 NOTE — CASE MANAGEMENT
Cm met with pt with during therapy to review role and complete cm open. Pt lives in 2 story home with spouse. Pt has home oxygen, bsc, walker and spc. PCP is Kyaw Monreal, preferred pharmacy is [facility] in [place]. Met with the patient today to complete CM open. The patient was educated that other members of the patient's support are able to ask questions and observe as the patient wished. The patient was educated on the rehabilitation process including therapy program, the interdisciplinary team, and weekly team meetings. Estimated length of stay was reviewed with the patient as well as expectations of discussions of discharge planning. The role of the  was reviewed including providing care coordination, discharge planning and discharge facilitation. IMM was reviewed with the patient and a copy was provided for their reference. The patient verbalized understanding of the information provided and denied any further questions at this time. CM will continue to follow and assist the patient throughout their rehabilitation stay.    LCD through 7/2.

## 2024-06-28 NOTE — ASSESSMENT & PLAN NOTE
Malnutrition Findings:     BMI Findings:       Body mass index is 24.6 kg/m².   Glucerna supplement with meals added pt does like them and current flavor

## 2024-06-28 NOTE — ASSESSMENT & PLAN NOTE
"Patient presented for abdominal pain and swelling, history of hernia.  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 No evidence of soft tissue component. A benign etiology is suspected such as retroperitoneal lymphatic malformation or lymphocele. Initial evaluation in 3 months with contrast-enhanced CT abdomen/pelvis is recommended.\"  Surgery evaluated patient and recommended conservative management of suspected diverticulitis with microperforation.  Patient is eating without issue and advanced to regular diet.  He is having normal BMs   Outpatient follow-up with surgery  S/p Cipro/Flagyl completed 6/28/24  "

## 2024-06-28 NOTE — ASSESSMENT & PLAN NOTE
Normal coronaries on cardiac cath from 12/2022.   Echo newly detected reduced LVEF at 30%.    Cardiology saw and recommending outpatient follow-up = sees Dr Ramos as OP  Continue I/O and daily weight  Euvolemic  Not on diuretic currently.  Had been on Lasix and stopped 2/2 TARA with his last admission 6/7-6/8  Had LE edema = he says less than home.  Will use TEDs for now  Has small chronic pleural effusions

## 2024-06-28 NOTE — ASSESSMENT & PLAN NOTE
Lab Results   Component Value Date    HGBA1C 7.6 (H) 06/19/2024       Recent Labs     06/27/24  1644 06/27/24  2100 06/28/24  0548 06/28/24  1141   POCGLU 143* 150* 79 117       Blood Sugar Average: Last 72 hrs:  (P) [id number] Lantus dose is 12 units nightly, will order 8 units nightly for now with ISS  Recommend bed time snack   Glucerna supplement ordered and diabetic diet  Pt being tx on abx monitor bs   Monitor for hypoglycemia

## 2024-06-28 NOTE — PLAN OF CARE
Problem: PAIN - ADULT  Goal: Verbalizes/displays adequate comfort level or baseline comfort level  Description: Interventions:  - Encourage patient to monitor pain and request assistance  - Assess pain using appropriate pain scale  - Administer analgesics based on type and severity of pain and evaluate response  - Implement non-pharmacological measures as appropriate and evaluate response  - Consider cultural and social influences on pain and pain management  - Notify physician/advanced practitioner if interventions unsuccessful or patient reports new pain  Outcome: Progressing     Problem: INFECTION - ADULT  Goal: Absence or prevention of progression during hospitalization  Description: INTERVENTIONS:  - Assess and monitor for signs and symptoms of infection  - Monitor lab/diagnostic results  - Monitor all insertion sites, i.e. indwelling lines, tubes, and drains  - Monitor endotracheal if appropriate and nasal secretions for changes in amount and color  - [place] appropriate cooling/warming therapies per order  - Administer medications as ordered  - Instruct and encourage patient and family to use good hand hygiene technique  - Identify and instruct in appropriate isolation precautions for identified infection/condition  Outcome: Progressing  Goal: Absence of fever/infection during neutropenic period  Description: INTERVENTIONS:  - Monitor WBC    Outcome: Progressing     Problem: SAFETY ADULT  Goal: Patient will remain free of falls  Description: INTERVENTIONS:  - Educate patient/family on patient safety including physical limitations  - Instruct patient to call for assistance with activity   - Consult OT/PT to assist with strengthening/mobility   - Keep Call bell within reach  - Keep bed low and locked with side rails adjusted as appropriate  - Keep care items and personal belongings within reach  - Initiate and maintain comfort rounds  - Make Fall Risk Sign visible to staff  - Offer Toileting every 2 Hours,  in advance of need  - Initiate/Maintain bed/chair alarm  - Obtain necessary fall risk management equipment: alarms  - Apply yellow socks and bracelet for high fall risk patients  - Consider moving patient to room near nurses station  Outcome: Progressing  Goal: Maintain or return to baseline ADL function  Description: INTERVENTIONS:  -  Assess patient's ability to carry out ADLs; assess patient's baseline for ADL function and identify physical deficits which impact ability to perform ADLs (bathing, care of mouth/teeth, toileting, grooming, dressing, etc.)  - Assess/evaluate cause of self-care deficits   - Assess range of motion  - Assess patient's mobility; develop plan if impaired  - Assess patient's need for assistive devices and provide as appropriate  - Encourage maximum independence but intervene and supervise when necessary  - Involve family in performance of ADLs  - Assess for home care needs following discharge   - Consider OT consult to assist with ADL evaluation and planning for discharge  - Provide patient education as appropriate  Outcome: Progressing  Goal: Maintains/Returns to pre admission functional level  Description: INTERVENTIONS:  - Perform AM-PAC 6 Click Basic Mobility/ Daily Activity assessment daily.  - Set and communicate daily mobility goal to care team and patient/family/caregiver.   - Collaborate with rehabilitation services on mobility goals if consulted  - Perform Range of Motion 3 times a day.  - Reposition patient every 2 hours.  - Dangle patient 3 times a day  - Stand patient 3 times a day  - Ambulate patient 3 times a day  - Out of bed to chair 3 times a day   - Out of bed for meals 3 times a day  - Out of bed for toileting  - Record patient progress and toleration of activity level   Outcome: Progressing     Problem: DISCHARGE PLANNING  Goal: Discharge to home or other facility with appropriate resources  Description: INTERVENTIONS:  - Identify barriers to discharge w/patient and  caregiver  - Arrange for needed discharge resources and transportation as appropriate  - Identify discharge learning needs (meds, wound care, etc.)  - Arrange for interpretive services to assist at discharge as needed  - Refer to Case Management Department for coordinating discharge planning if the patient needs post-hospital services based on physician/advanced practitioner order or complex needs related to functional status, cognitive ability, or social support system  Outcome: Progressing     Problem: MOBILITY - ADULT  Goal: Maintain or return to baseline ADL function  Description: INTERVENTIONS:  -  Assess patient's ability to carry out ADLs; assess patient's baseline for ADL function and identify physical deficits which impact ability to perform ADLs (bathing, care of mouth/teeth, toileting, grooming, dressing, etc.)  - Assess/evaluate cause of self-care deficits   - Assess range of motion  - Assess patient's mobility; develop plan if impaired  - Assess patient's need for assistive devices and provide as appropriate  - Encourage maximum independence but intervene and supervise when necessary  - Involve family in performance of ADLs  - Assess for home care needs following discharge   - Consider OT consult to assist with ADL evaluation and planning for discharge  - Provide patient education as appropriate  Outcome: Progressing     Problem: Prexisting or High Potential for Compromised Skin Integrity  Goal: Skin integrity is maintained or improved  Description: INTERVENTIONS:  - Identify patients at risk for skin breakdown  - Assess and monitor skin integrity  - Assess and monitor nutrition and hydration status  - Monitor labs   - Assess for incontinence   - Turn and reposition patient  - Assist with mobility/ambulation  - Relieve pressure over bony prominences  - Avoid friction and shearing  - Provide appropriate hygiene as needed including keeping skin clean and dry  - Evaluate need for skin moisturizer/barrier  cream  - Collaborate with interdisciplinary team   - Patient/family teaching  - Consider wound care consult   Outcome: Progressing

## 2024-06-28 NOTE — ASSESSMENT & PLAN NOTE
Completed 7 days of Valtrex prior to transfer  Pt currently with crusted lesions over right anterior chest just below nipple line round flank to posterior back on right side   Pt reports pain improvement - states more itchy at this time   Continue to monitor

## 2024-06-28 NOTE — ASSESSMENT & PLAN NOTE
Rate controlled on Toprol 25mg qd  Continue Eliquis 5mg BID  Cardizem held in the setting of recently detected reduced LVEF 30%  Outpatient follow-up with cardiology

## 2024-06-28 NOTE — ASSESSMENT & PLAN NOTE
Completed 7 days of Valtrex prior to transfer  Continue to monitor  Pt currently with crusted lesions over right anterior chest just below nipple line round flank to posterior back on right side   Pt reports pain improvement - states more itchy at this time

## 2024-06-28 NOTE — ASSESSMENT & PLAN NOTE
Lab Results   Component Value Date    EGFR 52 06/28/2024    EGFR 49 06/27/2024    EGFR 48 06/26/2024    CREATININE 1.26 06/28/2024    CREATININE 1.33 (H) 06/27/2024    CREATININE 1.35 (H) 06/26/2024

## 2024-06-28 NOTE — ASSESSMENT & PLAN NOTE
Patient had AMS on previous hospitalization and MRI 6/20 demonstrated small left parietal hemorrhage with possible petechial hemorrhage  Eliquis appears to have been held at that time as that is suspected to have contributed to CVA  Patient denies any residual sensory or motor deficit  Continue anticoagulation and will monitor  Outpatient followup with neurology  Resume depakote monitor levels moving forward   PMR to monitor sleep via sleep log   May require melatonin

## 2024-06-28 NOTE — PROGRESS NOTES
"   06/28/24 6200   Pain Assessment   Pain Assessment Tool 0-10   Pain Score No Pain   Restrictions/Precautions   Precautions Aspiration;Bed/chair alarms;Cognitive;Fall Risk;Supervision on toilet/commode;Hard of hearing   Comprehension   Comprehension (FIM) 5 - Understands basic directions and conversation   Expression   Expression (FIM) 6 - Has only MILD difficulty with complex/abstract info   Social Interaction   Social Interaction (FIM) 6 - Interacts appropriately with others BUT requires extra  time   Problem Solving   Problem solving (FIM) 4 - Solves basic problems 75-89% of time   Memory   Memory (FIM) 4 - Recognizes/recalls/performs 75-89%   Speech/Language/Cognition Assessmetn   Treatment Assessment Pt participated in skilled SLP session focusing on cognitive linguistic skills.   Patient sleeping in bed and awaken to tactile stimuli.  Spouse present and patient agreeable to have spouse present for session.  SLP engaged patient in short rapport building and determination of goals as SLP new to this patient care team.  Min-Mild imprecise articulation observed. Provided education on benefit and purpose of skilled ST.  Results of CLQT reviewed.  Patient states \"I think I am doing pretty good for my age\".  Patient notes hobbies as taking care of things around the house and working outside.  Initial structured task targeted sequencing of three step daily activities via picture cards.  Patient with slow rate of task completion which allowed for self corrections.  Task completed with 80% accuracy.  Next task targeted short-term memory as memory noted as target goal from assessment.  SLP introduced STM strategies including verbal rehearsal, visualization, and association.  Patient given visually presented recall of task of remembering shapes and pictures for picture retention.  Strategies modeled.  Picture coded interdependently and recalled with 91% accuracy.  Due to ongoing deficits, pt is recommended for further " "skilled SLP services at this time to maximize functional and overall cognitive linguistic skills in order to reduce caregiver burden on discharge.              Upon Completion of ST session SLP providing assistance to set-up dinner tray.  Patient had selected only soft items.  When encouraging patient to include additional items into diet patient states \"It sticks in my mouth and my throat\".  Spouse reports he has only been selecting pureed items including yogurts, mashed potatoes, and thin liquids including soups.  SLP inquired further regarding reports of difficulty swallowing.  Patient pointed to mid throat level as area of globus sensation during PO intake.  SLP educated on benefit and purpose of bedside dysphagia evaluation and various texture levels offered.  SLP offered to downgrade for safety and ease of intake until swallow assessment completed.  Patient and spouse declined noting he will continue to order preferred puree and liquids until swallow assessment completed.  Spouse reports he has note consumed anything with texture (only puree/ liquids) in weeks.  Patient states \"I may have had some type of swallow test before somewhere I am not sure\". Nursing aware ST to complete swallow assessment and to ensure upright position for PO intake and provide distant supervision.   SLP Therapy Minutes   SLP Time In 1550   SLP Time Out 1650   SLP Total Time (minutes) 60   SLP Mode of treatment - Individual (minutes) 60   SLP Mode of treatment - Concurrent (minutes) 0   SLP Mode of treatment - Group (minutes) 0   SLP Mode of treatment - Co-treat (minutes) 0   SLP Mode of Treatment - Total time(minutes) 60 minutes   SLP Cumulative Minutes 120   Therapy Time missed   Time missed? No       "

## 2024-06-28 NOTE — ASSESSMENT & PLAN NOTE
Patient had AMS on previous hospitalization and MRI 6/20 demonstrated small left parietal hemorrhage with possible petechial hemorrhage  Eliquis appears to have been held at that time as that is suspected to have contributed to CVA  Patient denies any residual sensory or motor deficit  Continue anticoagulation  Outpatient followup with neurology  Resume depakote/monitor levels moving forward

## 2024-06-28 NOTE — CONSULTS
Podiatry - Consultation    Patient Information:   Hal Miller 84 y.o. male MRN: [id number]  Unit/Bed#: -01 Encounter: [id number]  PCP: Kyaw Monreal MD  Date of Admission:  6/26/2024  Date of Consultation: 06/28/24  Requesting Physician: Shay Pink DO      ASSESSMENT:    Hal Miller is a 84 y.o. male with:    Onychomycosis   Type 2 diabetes mellitus  A1c: 7.6% (6/19/2024)  Stroke  Ambulatory dysfunction  Chronic kidney disease  Vascular dementia  Peripheral neuropathy    PLAN:    Patient seen at bedside. No acute pedal concerns at this time.    Elongated toenails x10 were excisionally debrided. See procedure note below.   Rest of Medical care per primary team.  Podiatry signing off at this time, please re-consult with any questions or concerns as needed.    Procedure Note: Nail Debridement   After verbal consent was obtained, patient's elongated nails x10 were sharply debrided to near normal length and thickness using a large nail nipper. The patient tolerated this well and without incident.     Weight bearing Status: Weightbearing as tolerated    SUBJECTIVE:    History of Present Illness:    Hal Miller is a 84 y.o. male who is originally admitted 6/26/2024 due to stroke with a past medical history of type 2 diabetes mellitus, amatory dysfunction, chronic kidney disease, peripheral neuropathy, and vascular dementia.    We are consulted for painful, elongated toenails x 10. Patient states that they have difficulty applying their socks and shoes due to the elongation of the nails. They report pain with palpation and pressure within their shoe gear. They have been unable to cut their nails adequately. Patient has no further pedal complaints at this time.     Patient denies nausea, vomiting, fever, chills, shortness of breath, chest pain.     Review of Systems:    Constitutional: Negative.    HENT: Negative.    Eyes: Negative.    Respiratory: Negative.    Cardiovascular: Negative.     Gastrointestinal: Negative.    Musculoskeletal: Negative  Skin: Elongated nails    Neurological: Numbness  Psych: Negative.     Past Medical and Surgical History:     Past Medical History:   Diagnosis Date    Acute encephalopathy 2023    Acute-on-chronic kidney injury  (HCC) 2017    Cancer (HCC)     pancreatic    Colon polyp     Coronary artery disease     Dehydration 3/3/2023    Diabetes mellitus (HCC)     Hyperlipidemia     Hypertension     Left lower lobe pneumonia 2022    Pneumonia 2017    Right middle and lower lobe     Stroke (HCC)        Past Surgical History:   Procedure Laterality Date    APPENDECTOMY      CARDIAC SURGERY      Aortic Valve Replacement, Ascending Aorta    COLONOSCOPY      GALLBLADDER SURGERY      PANCREATICODUODENECTOMY         Meds/Allergies:      Medications Prior to Admission:     apixaban (ELIQUIS) 2.5 mg    B-D UF III MINI PEN NEEDLES 31G X 5 MM MISC    Calcium Citrate (CITRACAL PO)    Cinnamon 500 MG capsule    ciprofloxacin (CIPRO) 500 mg tablet    diltiazem (CARDIZEM CD) 180 mg 24 hr capsule    hydroxychloroquine (PLAQUENIL) 200 mg tablet    Lantus SoloStar 100 units/mL SOPN    metroNIDAZOLE (FLAGYL) 500 mg tablet    nystatin (MYCOSTATIN) 500,000 units/5 mL suspension    olmesartan (BENICAR) 20 mg tablet    ondansetron (ZOFRAN-ODT) 4 mg disintegrating tablet    potassium chloride (KLOR-CON) 8 MEQ tablet    pravastatin (PRAVACHOL) 40 mg tablet    predniSONE 1 mg tablet    QUEtiapine (SEROquel) 25 mg tablet    Allergies   Allergen Reactions    Contrast Dye [Iodinated Contrast Media] Other (See Comments)     Pt states kidney dysfunction..     Other        Social History:     Marital Status: Single    Substance Use History:   Social History     Substance and Sexual Activity   Alcohol Use Never     Social History     Tobacco Use   Smoking Status Former    Types: Pipe    Quit date:     Years since quittin.5   Smokeless Tobacco Never     Social History  "    Substance and Sexual Activity   Drug Use No       Family History:    Family History   Problem Relation Age of Onset    Cancer Mother     Stroke Father     Cancer Sister     Diabetes Sister     Stroke Brother          OBJECTIVE:    Vitals:   Blood Pressure: 110/62 (06/28/24 0827)  Pulse: 89 (06/28/24 0827)  Temperature: 97.6 °F (36.4 °C) (06/28/24 0500)  Temp Source: Oral (06/28/24 0500)  Respirations: 16 (06/28/24 0500)  Height: 6' 2\" (188 cm) (06/26/24 1737)  Weight - Scale: 86.9 kg (191 lb 9.3 oz) (06/28/24 0551)  SpO2: 95 % (06/28/24 0500)    Physical Exam:    General Appearance: Alert, cooperative, no distress.  HEENT: Head normocephalic, atraumatic, without obvious abnormality.  Heart: Normal rate and rhythm.  Lungs: Non-labored breathing. No respiratory distress.  Abdomen: Without distension.  Psychiatric: AAOx3  Lower Extremity:    Vascular:   DP: Right: 1+   Left: 1+  PT: Right: 1+   Left: 1+  CRT < 3 seconds at the digits.   +1/4 edema noted at bilateral lower extremities.   Pedal hair is absent.   Skin temperature is WNL bilaterally.    Musculoskeletal:  MMT is 5/5 in all muscle compartments bilaterally.   ROM at the 1st MPJ and ankle joint are reduced bilaterally with the leg extended.   No Pain on palpation of bilateral foot and ankle.   No gross deformities noted.     Dermatological:  Elongated, dystrophic, thickened toenails x10 that are tender to palpation with notable subungual debris.    Neurological:  Gross sensation is diminished.   Light touch is diminished.   Protective sensation is diminished.    Additional data:     Lab Results: I have personally reviewed pertinent labs including:    Results from last 7 days   Lab Units 06/28/24  0502   WBC Thousand/uL 7.49   HEMOGLOBIN g/dL 11.4*   HEMATOCRIT % 35.1*   PLATELETS Thousands/uL 168   SEGS PCT % 72   LYMPHO PCT % 11*   MONO PCT % 15*   EOS PCT % 1     Results from last 7 days   Lab Units 06/28/24  0502 06/24/24  0630 06/23/24  0413 " "  POTASSIUM mmol/L 4.9   < > 4.7   CHLORIDE mmol/L 96   < > 101   CO2 mmol/L 31   < > 25   BUN mg/dL 23   < > 20   CREATININE mg/dL 1.26   < > 1.22   CALCIUM mg/dL 9.6   < > 9.3   ALK PHOS U/L  --   --  89   ALT U/L  --   --  14   AST U/L  --   --  15    < > = values in this interval not displayed.           Cultures: I have personally reviewed pertinent cultures including:              Imaging: I have personally reviewed pertinent reports in PACS.  EKG, Pathology, and Other Studies: I have personally reviewed pertinent reports.    Time Spent for Care: 30 minutes.  More than 50% of total time spent on counseling and coordination of care as described above.      ** Please Note: Portions of the record may have been created with voice recognition software. Occasional wrong word or \"sound a like\" substitutions may have occurred due to the inherent limitations of voice recognition software. Read the chart carefully and recognize, using context, where substitutions have occurred. **    "

## 2024-06-28 NOTE — CONSULTS
Nutrition History  NKFA. Lives with spouse. Spouse prepares meals. Reports a decreased appetite while at [facility] which has improved the last two days. Mindful of his BG and dietary intake. Does reports some swallowing difficulty prior to admission. Consumes boost at home and drinks one per day. His daughter is an RD but lives out of state.    Intervention/Education  Provided heart healthy consistent carbohydrate nutrition therapy handout. Reviewed with PT and nephew present and encouraged PT sharing materials with spouse.    Will continue CCD2 diet for now with nava SANDS. RD identified heart healthy items on menu. Will hold off on adding additional modifications due to recent period of inadequate oral intake. PT does report some swallowing difficulty. RD did discuss choosing softer foods. SLP recommended regular diet and thin liquids 6/21. Will defer need for repeat swallow evaluation while in [facility] to physician.

## 2024-06-28 NOTE — ASSESSMENT & PLAN NOTE
Continue Losartan 50mg qd/Toprol-XL 25mg qd  Cardizem appears on hold due to recently diagnosed cardiomyopathy

## 2024-06-28 NOTE — PROGRESS NOTES
"   06/28/24 0900   Pain Assessment   Pain Assessment Tool 0-10   Pain Score No Pain   Restrictions/Precautions   Precautions Aspiration;Bed/chair alarms;Cognitive;Fall Risk;Supervision on toilet/commode;Visual deficit;Hard of hearing;Seizure   Cognition   Overall Cognitive Status Impaired   Arousal/Participation Alert;Cooperative   Subjective   Subjective pt had no c/o and ready for PT   Sit to Stand   Type of Assistance Needed Physical assistance;Incidental touching   Physical Assistance Level 51%-75%   Comment no AD   Sit to Stand CARE Score 2   Bed-Chair Transfer   Type of Assistance Needed Physical assistance;Verbal cues   Physical Assistance Level 51%-75%   Comment no AD   Chair/Bed-to-Chair Transfer CARE Score 2   Transfer Bed/Chair/Wheelchair   Findings with RW min of 1, VC for hand placement. pt expressed inc confidence with mobilities using a RW   Walk 10 Feet   Type of Assistance Needed Physical assistance;Verbal cues   Physical Assistance Level 51%-75%   Comment 20' x 1 R HHA   Walk 10 Feet CARE Score 2   Walk 50 Feet with Two Turns   Type of Assistance Needed Physical assistance;Verbal cues   Physical Assistance Level 51%-75%   Comment 50' x 2 with R HHA   Walk 50 Feet with Two Turns CARE Score 2   Walk 150 Feet   Type of Assistance Needed Physical assistance;Verbal cues   Physical Assistance Level 51%-75%   Comment 150 x 2 R HHA + internal/external VC to promote step throughs and foot clearance. also VC to push down on PT's hand vs pulling it up   Walk 150 Feet CARE Score 2   Ambulation   Findings min of 1 with RW   Therapeutic Interventions   Neuromuscular Re-Education 6\"  step through with 3# AW on L, R LE on first step x 10 reps then switch AW to R then L LE in Wbing x 10 reps x 2 sets + bilat HR and external cues to facilitate foot clearance   Other recommended to RN for TEDS stockings for LE edema management and podiatry consult for toenails cutting   Equipment Use   NuStep lvl 1 x 10 mins " all ext, SPM> 35   Assessment   Treatment Assessment Pt tolerated tx but requires rest breaks every after task or exercise sets due to reported fatigue. Although provided RPE URIEL scale rating of somewhat hard for the most part except during initial 50' R HHA amb and NPP step through exercises. Pt hands are naturally colder so does not provided consistent accurate pulse ox readings.  Pt demo's inc confidence when utilizing a RW with expressed preferrence at this time during mobilities but agreeable to cont with NPP gait/balance training with R HHA during therapy session to promote recovery s/p CVA.   Family/Caregiver Present nephew Omid - per nephew family will work together in order for pt to attend neuro OP services at /[facility]?   Barriers to Discharge Inaccessible home environment;Decreased caregiver support   PT Barriers   Functional Limitation Car transfers;Ramp negotiation;Stair negotiation;Standing;Transfers;Walking   Plan   Treatment/Interventions Functional transfer training;LE strengthening/ROM;Therapeutic exercise;Endurance training;Gait training;Spoke to nursing;OT;Spoke to case management;Spoke to MD   Progress Progressing toward goals   PT Therapy Minutes   PT Time In 0900   PT Time Out 1030   PT Total Time (minutes) 90   PT Mode of treatment - Individual (minutes) 90   PT Mode of treatment - Concurrent (minutes) 0   PT Mode of treatment - Group (minutes) 0   PT Mode of treatment - Co-treat (minutes) 0   PT Mode of Treatment - Total time(minutes) 90 minutes   PT Cumulative Minutes 150

## 2024-06-28 NOTE — ASSESSMENT & PLAN NOTE
Home: Lantus 12u qhs  Here:  Lantus 8 units qhs  Recommend bed time snack   Glucerna supplement ordered and diabetic diet  stable

## 2024-06-28 NOTE — PROGRESS NOTES
"   06/28/24 1230   Pain Assessment   Pain Assessment Tool 0-10   Pain Score No Pain   Restrictions/Precautions   Precautions Aspiration;Bed/chair alarms;Cognitive;Fall Risk;Hard of hearing;Supervision on toilet/commode   Weight Bearing Restrictions No   ROM Restrictions No   Lifestyle   Autonomy \"I would like to do something challenging   Putting On/Taking Off Footwear   Type of Assistance Needed Physical assistance   Physical Assistance Level 51%-75%   Comment Able to doff L shoe, assitance needed for R.   Putting On/Taking Off Footwear CARE Score 2   Sit to Lying   Type of Assistance Needed Supervision   Physical Assistance Level No physical assistance   Sit to Lying CARE Score 4   Sit to Stand   Type of Assistance Needed Physical assistance;Verbal cues   Physical Assistance Level 25% or less   Comment CGA with RW. Pt demonstrated better hand placement with minimal vebral cues.   Sit to Stand CARE Score 3   Bed-Chair Transfer   Type of Assistance Needed Physical assistance;Set-up / clean-up   Physical Assistance Level 26%-50%   Comment Stand pivot transfer with RW. Vebral cues for hand placement.   Chair/Bed-to-Chair Transfer CARE Score 3   Toileting Hygiene   Type of Assistance Needed Supervision   Physical Assistance Level No physical assistance   Comment Done seated on BSC over tolet. Min A for CM due to decreased balacne with walker release. Pt able to amnage clothing with min a for stability.   Toileting Hygiene CARE Score 4   Toileting   Able to Pull Clothing down yes, up yes.   Able to Manage Clothing Closures Yes   Manage Hygiene Bladder   Toilet Transfer   Type of Assistance Needed Physical assistance;Verbal cues   Physical Assistance Level 26%-50%   Comment Stand pivot with RW to BSC over toliet. Vebral cues for hand placement.   Toilet Transfer CARE Score 3   Functional Standing Tolerance   Time 9 mins, 7.15 mins, 9.40 mins (breaks inbetween).   Activity [place] Task Assembley   Comments Pt participated in " standing balance/tolerance activity to increase independence. Pt was tasked with engaging in bolt task assembly while in stance. Pt was able to problem solving taking activity apart with no issues with hand manipulation. Once taken apart break was needed (9 minutes). Pt was able to put activity back together with minimal cues for guidance. Was able to complete first two layers with no break (7.15 minutes). After rest able to finish putting activity together (9.40 minutes). After task pt reports he legs feel tired but in no pain. Throughout task checks in where done with pt to ensure he was not dizzy or feeling tired. Pt able to identity when rest is needed. While standing pt relies on walker or table for UE support/balance. Task done with min A to ensure safety. No loss of balance occurred during tasks, however when getting tried pts legs start to weaken.   Additional Activities   Additional Activities Comments Pt particapted in therex to focus on UE strength to increase indepence with ADLs/transfers. Excersises completed 15 x each with yellow therabar; bend up, bend down, twists.   Activity Tolerance   Activity Tolerance Patient tolerated treatment well   Assessment   Treatment Assessment Pt participated in 90 minute skilled OT session focusing on standing balance/tolerance to increase overall independence. See more details above. Overall pt was able to tolerate activity done in standing with breaks in between. Pt is limited by fatigue, endurance, and balance. Pt able to communicate when feeling tired and needing a rest. Pt enjoys doing activtes that are challenging and make him think. (pipe tree, puzzles). Pt would benefit from continued OT to focus on ADL, functional tranfers, and standing balance/tolerance. Continue POC at this time. Pt left in bed with family and alarm on. All needs were in reach and no further OT needs at this time.   Prognosis Good   Problem List Decreased strength;Decreased range of  motion;Decreased endurance;Impaired balance;Decreased mobility;Decreased coordination;Decreased cognition;Impaired judgement;Decreased safety awareness;Impaired hearing   Plan   Treatment/Interventions ADL retraining;Functional transfer training;Therapeutic exercise;Endurance training;Cognitive reorientation;Patient/family training;Equipment eval/education   Progress Progressing toward goals   OT Therapy Minutes   OT Time In 1230   OT Time Out 1400   OT Total Time (minutes) 90   OT Mode of treatment - Individual (minutes) 30   OT Mode of treatment - Concurrent (minutes) 60   OT Mode of treatment - Group (minutes) 0   OT Mode of treatment - Co-treat (minutes) 0   OT Mode of Treatment - Total time(minutes) 90 minutes   OT Cumulative Minutes 180   Therapy Time missed   Time missed? No

## 2024-06-28 NOTE — PROGRESS NOTES
"[facility]  Progress Note  Name: Hal Miller I  MRN: [id number]  Unit/Bed#: -01 I Date of Admission: 6/26/2024   Date of Service: 6/29/2024 I Hospital Day: 3    Assessment & Plan   * 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[facility])  Assessment & Plan  Patient had AMS on previous hospitalization and MRI 6/20 demonstrated small left parietal hemorrhage with possible petechial hemorrhage  Eliquis appears to have been held at that time as that is suspected to have contributed to CVA  Patient denies any residual sensory or motor deficit  Continue anticoagulation  Outpatient followup with neurology  Resume depakote/monitor levels moving forward     Colonic diverticular abscess  Assessment & Plan  Patient presented for abdominal pain and swelling, history of hernia.  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 No evidence of soft tissue component. A benign etiology is suspected such as retroperitoneal lymphatic malformation or lymphocele. Initial evaluation in 3 months with contrast-enhanced CT abdomen/pelvis is recommended.\"  Surgery evaluated patient and recommended conservative management of suspected diverticulitis with microperforation.  Patient is eating without issue and advanced to regular diet.  He is having normal BMs   Outpatient follow-up with surgery  S/p Cipro/Flagyl completed 6/28/24    NICM (nonischemic cardiomyopathy) ([facility])  Assessment & Plan  Normal coronaries on cardiac cath from 12/2022.   Echo newly detected reduced LVEF at 30%.    Cardiology saw and recommending outpatient follow-up = sees Dr Ramos as OP  Continue I/O and daily weight  Euvolemic  Not on diuretic currently.  Had been on Lasix and stopped 2/2 TARA with his last admission 6/7-6/8  Had LE edema = he says " less than home.  Will use TEDs for now  Has small chronic pleural effusions    CKD (chronic kidney disease)  Assessment & Plan  Baseline 1.2-1.3?  stable    Atrial fibrillation (HCC)  Assessment & Plan  Rate controlled on Toprol 25mg qd  Continue Eliquis 5mg BID  Cardizem held in the setting of recently detected reduced LVEF 30%  Outpatient follow-up with cardiology    Severe protein-calorie malnutrition (HCC)  Assessment & Plan  Glucerna supplement with meals added   Pt is drinking these     Chronic obstructive pulmonary disease with acute exacerbation (HCC)  Assessment & Plan  Has severe emphysema by hx  Compensated, continue scheduled and as needed breathing treatments    Hypertension  Assessment & Plan  Continue Losartan 50mg qd/Toprol-XL 25mg qd  Cardizem appears on hold due to recently diagnosed cardiomyopathy    Hx of aortic valve replacement  Assessment & Plan  Has had bioAV[employer]/[place] x 2 in past    Type 2 diabetes mellitus (HCC)  Assessment & Plan  Home: Lantus 12u qhs  Here:  Lantus 8 units qhs  Recommend bed time snack   Glucerna supplement ordered and diabetic diet  stable    Inflammatory polyarthropathy (HCC)  Assessment & Plan  Continue Prednisone 1 mg daily    Zoster  Assessment & Plan  Completed 7 days of Valtrex prior to transfer  Pt currently with crusted lesions over right anterior chest just below nipple line round flank to posterior back on right side   Pt reports pain improvement - states more itchy at this time   Continue to monitor           The above assessment and plan was reviewed and updated as determined by my evaluation of the patient on 6/29/2024.    Labs:   Results from last 7 days   Lab Units 06/28/24  0502 06/27/24  0506   WBC Thousand/uL 7.49 7.64   HEMOGLOBIN g/dL 11.4* 11.7*   HEMATOCRIT % 35.1* 37.7   PLATELETS Thousands/uL 168 182     Results from last 7 days   Lab Units 06/28/24  0502 06/27/24  0506   SODIUM mmol/L 133* 131*   POTASSIUM mmol/L 4.9 4.7   CHLORIDE mmol/L 96 99    CO2 mmol/L 31 29   BUN mg/dL 23 25   CREATININE mg/dL 1.26 1.33*   CALCIUM mg/dL 9.6 9.6             Results from last 7 days   Lab Units 06/29/24  1102 06/29/24  0811 06/28/24  2116   POC GLUCOSE mg/dl 180* 125 166*       Imaging  No orders to display       Review of Scheduled Meds:  Current Facility-Administered Medications   Medication Dose Route Frequency Provider Last Rate    acetaminophen  650 mg Oral Q4H PRN Dayton Ford      albuterol  2 puff Inhalation Q4H PRN Dayton Ford      apixaban  5 mg Oral BID Dayton Ford      atorvastatin  40 mg Oral Daily With Dinner Dayton Ford      bisacodyl  10 mg Rectal Daily PRN Ashley Depadua, MD      budesonide  0.5 mg Nebulization Q12H [place] Ford      divalproex sodium  500 mg Oral Q8H YUNI Dayton Ford      formoterol  20 mcg Nebulization Q12H Dayton Ford      insulin glargine  8 Units Subcutaneous HS Dayton Ford      insulin lispro  1-5 Units Subcutaneous 4x Daily (AC & HS) Dayton Ford      losartan  50 mg Oral Daily [place] Ford      melatonin  3 mg Oral HS Dayton Ford      metoprolol succinate  25 mg Oral Daily Dayton Ford      pantoprazole  40 mg Oral Early Morning Dayton Ford      polyethylene glycol  17 g Oral Daily PRN Ashley Depadua, MD      predniSONE  1 mg Oral Daily Dayton Ford      senna  2 tablet Oral BID Dayton Ford      tamsulosin  0.4 mg Oral Daily With Dinner Ashley Depadua, MD         Subjective/ HPI: Patient seen and examined. Patients overnight issues or events were reviewed with nursing staff. New or overnight issues include the following:     No new or overnight issues.  Offers no complaints    ROS:   A 10 point ROS was performed; negative except as noted above.        *Labs /Radiology studies Reviewed  *Medications  reviewed and reconciled as needed  *Please refer to order section for additional ordered labs studies      Physical Examination:  Vitals:   Vitals:     06/28/24 2019 06/29/24 0600 06/29/24 0712 06/29/24 1419   BP: 132/67 118/72  121/55   BP Location: Left arm Left arm  Right arm   Pulse: 72 76  79   Resp: 17 17  16   Temp: 97.6 °F (36.4 °C) 98 °F (36.7 °C)  97.6 °F (36.4 °C)   TempSrc: Oral Oral  Oral   SpO2: 99% 97% 97% 100%   Weight:       Height:           General Appearance: no distress, non toxic appearing  HEENT: PERRLA, conjuctiva normal; oropharynx clear; mucous membranes moist   Neck:  Supple, normal ROM  Lungs: BBS with few fine crackles bases, normal respiratory effort, no retractions, expiratory effort normal.  On RA  CV: regular rate and rhythm; no rubs/murmurs/gallops, PMI normal   ABD: soft; ND/NT; +BS  EXT: +LE edema = he says improved  Skin: normal turgor, normal texture.  Shingle lesions right chest dry/scabbed  Psych: affect normal, mood normal  Neuro: AAO           The above physical exam was reviewed and updated as determined by my evaluation of the patient on 6/29/2024.    Invasive Devices       None                      VTE Pharmacologic Prophylaxis: Eliquis  Code Status: Level 1 - Full Code  Current Length of Stay: 3 day(s)    Total floor / unit time spent today 30 minutes  Coordination of patient's care was performed in conjunction with primary service. Time invested included review of patient's labs, vitals, and management of their comorbidities with continued monitoring, examination of patient as well as answering patient questions, documenting her findings and creating progress note in electronic medical record,  ordering appropriate diagnostic testing.       ** Please Note:  voice to text software may have been used in the creation of this document. Although proof errors in transcription or interpretation are a potential of such software**

## 2024-06-28 NOTE — PROGRESS NOTES
"[facility]  Progress Note  Name: Hal Miller I  MRN: [id number]  Unit/Bed#: -01 I Date of Admission: 6/26/2024   Date of Service: 6/28/2024 I Hospital Day: 2    Assessment & Plan   * 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HCC)  Assessment & Plan  Patient had AMS on previous hospitalization and MRI 6/20 demonstrated small left parietal hemorrhage with possible petechial hemorrhage  Eliquis appears to have been held at that time as that is suspected to have contributed to CVA  Patient denies any residual sensory or motor deficit  Continue anticoagulation and will monitor  Outpatient followup with neurology  Resume depakote monitor levels moving forward   PMR to monitor sleep via sleep log   May require melatonin     Colonic diverticular abscess  Assessment & Plan  Patient presented for abdominal pain and swelling, history of hernia.  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 No evidence of soft tissue component. A benign etiology is suspected such as retroperitoneal lymphatic malformation or lymphocele. Initial evaluation in 3 months with contrast-enhanced CT abdomen/pelvis is recommended.\"  Surgery evaluated patient and recommended conservative management of suspected diverticulitis with microperforation.  Patient is eating without issue and advanced to regular diet.  He is having normal BMs   Outpatient follow-up with surgery, continue 2 more days of p.o. antibiotics with levaquin and flagyl as planned. End date of abx 6/28/24 complete final dosing     Severe protein-calorie malnutrition (HCC)  Assessment & Plan  Malnutrition Findings:     BMI Findings:       Body mass index is 24.6 kg/m².   Glucerna supplement with meals added pt does like them and current flavor     Ambulatory " dysfunction  Assessment & Plan  Ongoing rehab here at [facility] , PT/OT ordered    Zoster  Assessment & Plan  Completed 7 days of Valtrex prior to transfer  Continue to monitor  Pt currently with crusted lesions over right anterior chest just below nipple line round flank to posterior back on right side   Pt reports pain improvement - states more itchy at this time     Type 2 diabetes mellitus (HCC)  Assessment & Plan  Lab Results   Component Value Date    HGBA1C 7.6 (H) 06/19/2024       Recent Labs     06/27/24  1644 06/27/24  2100 06/28/24  0548 06/28/24  1141   POCGLU 143* 150* 79 117       Blood Sugar Average: Last 72 hrs:  (P) [id number] Lantus dose is 12 units nightly, will order 8 units nightly for now with ISS  Recommend bed time snack   Glucerna supplement ordered and diabetic diet  Pt being tx on abx monitor bs   Monitor for hypoglycemia       CKD (chronic kidney disease)  Assessment & Plan  Lab Results   Component Value Date    EGFR 52 06/28/2024    EGFR 49 06/27/2024    EGFR 48 06/26/2024    CREATININE 1.26 06/28/2024    CREATININE 1.33 (H) 06/27/2024    CREATININE 1.35 (H) 06/26/2024       Chronic obstructive pulmonary disease with acute exacerbation (HCC)  Assessment & Plan  Compensated, continue scheduled and as needed breathing treatments    Inflammatory polyarthropathy (HCC)  Assessment & Plan  Resume prednisone 1 mg daily    Atrial fibrillation (HCC)  Assessment & Plan  Rate controlled on Toprol-XL, Cardizem held in the setting of recently detected reduced LVEF  Anticoagulated on Eliquis 5 mg p.o. twice daily.  Outpatient follow-up with cardiology    Hypertension  Assessment & Plan  Resume losartan and Toprol-XL  Cardizem appears on hold due to recently diagnosed cardiomyopathy             The above assessment and plan was reviewed and updated as determined by my evaluation of the patient on 6/28/2024.    Labs:   Results from last 7 days   Lab Units 06/28/24  0502 06/27/24  0506   WBC  Thousand/uL 7.49 7.64   HEMOGLOBIN g/dL 11.4* 11.7*   HEMATOCRIT % 35.1* 37.7   PLATELETS Thousands/uL 168 182     Results from last 7 days   Lab Units 06/28/24  0502 06/27/24  0506   SODIUM mmol/L 133* 131*   POTASSIUM mmol/L 4.9 4.7   CHLORIDE mmol/L 96 99   CO2 mmol/L 31 29   BUN mg/dL 23 25   CREATININE mg/dL 1.26 1.33*   CALCIUM mg/dL 9.6 9.6             Results from last 7 days   Lab Units 06/28/24  1141 06/28/24  0548 06/27/24  2100   POC GLUCOSE mg/dl 117 79 150*       Imaging  No orders to display       Review of Scheduled Meds:  Current Facility-Administered Medications   Medication Dose Route Frequency Provider Last Rate    acetaminophen  650 mg Oral Q4H PRN [place] Ford      albuterol  2 puff Inhalation Q4H PRN [place] Ford      apixaban  5 mg Oral BID [place] Ford      atorvastatin  40 mg Oral Daily With Dinner [place] Ford      bisacodyl  10 mg Rectal Daily PRN Ashley Depadua, MD      budesonide  0.5 mg Nebulization Q12H [place]      divalproex sodium  500 mg Oral Q8H [facility] Dayton Ford      formoterol  20 mcg Nebulization Q12H [place] Ford      insulin glargine  8 Units Subcutaneous HS [place] Ford      insulin lispro  1-5 Units Subcutaneous 4x Daily (AC & HS) [place] Ford      losartan  50 mg Oral Daily [place]      melatonin  3 mg Oral HS [place]      metoprolol succinate  25 mg Oral Daily [place] Ford      metroNIDAZOLE  500 mg Oral Q8H [facility] [place] Ford      pantoprazole  40 mg Oral Early Morning [place] Ford      polyethylene glycol  17 g Oral Daily PRN Ashley Depadua, MD      predniSONE  1 mg Oral Daily [place]      senna  2 tablet Oral BID [place]      tamsulosin  0.4 mg Oral Daily With Dinner Ashley Depadua, MD         Subjective/ HPI: Patient seen and examined. Patients overnight issues or events were reviewed with nursing staff. New or overnight issues include the following:     Pt is doing  well today no complaints. State he is eating well no difficulty breathing sob or lightheadedness. He states that the shingle lesions are more itchy today but not as painful.     ROS:   A 10 point ROS was performed; negative except as noted above.        *Labs /Radiology studies Reviewed  *Medications  reviewed and reconciled as needed  *Please refer to order section for additional ordered labs studies      Physical Examination:  Vitals:   Vitals:    06/27/24 2100 06/28/24 0500 06/28/24 0551 06/28/24 0827   BP: 130/70 140/80  110/62   BP Location: Left arm Left arm  Right arm   Pulse: 75 69  89   Resp: 16 16     Temp: (!) 97.3 °F (36.3 °C) 97.6 °F (36.4 °C)     TempSrc: Oral Oral     SpO2: 93% 95%     Weight:   86.9 kg (191 lb 9.3 oz)    Height:           General Appearance: NAD; pleasant  HEENT: PERRLA, conjuctiva normal; mucous membranes moist; face symmetrical  Neck:  Supple  Lungs: clear bilaterally, normal respiratory effort, no retractions, expiratory effort normal, on room air  CV: regular rate and rhythm, no murmurs rubs or gallops noted   ABD: soft non tender, +BS x4  EXT: DP pulses intact, no lymphadenopathy, no edema  Skin: normal turgor, normal texture, no rash  Psych: affect normal, mood normal  Neuro: AAOx3       The above physical exam was reviewed and updated as determined by my evaluation of the patient on 6/28/2024.    Invasive Devices       None                      VTE Pharmacologic Prophylaxis: Eliquis  Code Status: Level 1 - Full Code  Current Length of Stay: 2 day(s)    Total floor / unit time spent today 20 minutes  Coordination of patient's care was performed in conjunction with primary service. Time invested included review of patient's labs, vitals, and management of their comorbidities with continued monitoring, examination of patient as well as answering patient questions, documenting her findings and creating progress note in electronic medical record,  ordering appropriate diagnostic  testing.       ** Please Note:  voice to text software may have been used in the creation of this document. Although proof errors in transcription or interpretation are a potential of such software**

## 2024-06-29 PROBLEM — I42.8 NICM (NONISCHEMIC CARDIOMYOPATHY) (HCC): Status: ACTIVE | Noted: 2024-06-21

## 2024-06-29 PROBLEM — Z95.2 HX OF AORTIC VALVE REPLACEMENT: Status: ACTIVE | Noted: 2024-06-29

## 2024-06-29 LAB
GLUCOSE SERPL-MCNC: 125 MG/DL (ref 65–140)
GLUCOSE SERPL-MCNC: 144 MG/DL (ref 65–140)
GLUCOSE SERPL-MCNC: 180 MG/DL (ref 65–140)
GLUCOSE SERPL-MCNC: 192 MG/DL (ref 65–140)

## 2024-06-29 PROCEDURE — 82948 REAGENT STRIP/BLOOD GLUCOSE: CPT

## 2024-06-29 PROCEDURE — 94760 N-INVAS EAR/PLS OXIMETRY 1: CPT

## 2024-06-29 PROCEDURE — 94664 DEMO&/EVAL PT USE INHALER: CPT

## 2024-06-29 PROCEDURE — 97530 THERAPEUTIC ACTIVITIES: CPT

## 2024-06-29 PROCEDURE — 99232 SBSQ HOSP IP/OBS MODERATE 35: CPT | Performed by: INTERNAL MEDICINE

## 2024-06-29 PROCEDURE — 97110 THERAPEUTIC EXERCISES: CPT

## 2024-06-29 PROCEDURE — 94640 AIRWAY INHALATION TREATMENT: CPT

## 2024-06-29 PROCEDURE — 99232 SBSQ HOSP IP/OBS MODERATE 35: CPT | Performed by: PHYSICAL MEDICINE & REHABILITATION

## 2024-06-29 RX ADMIN — INSULIN GLARGINE 8 UNITS: 100 INJECTION, SOLUTION SUBCUTANEOUS at 21:26

## 2024-06-29 RX ADMIN — APIXABAN 5 MG: 5 TABLET, FILM COATED ORAL at 08:44

## 2024-06-29 RX ADMIN — TAMSULOSIN HYDROCHLORIDE 0.4 MG: 0.4 CAPSULE ORAL at 17:31

## 2024-06-29 RX ADMIN — FORMOTEROL FUMARATE DIHYDRATE 20 MCG: 20 SOLUTION RESPIRATORY (INHALATION) at 19:51

## 2024-06-29 RX ADMIN — APIXABAN 5 MG: 5 TABLET, FILM COATED ORAL at 17:31

## 2024-06-29 RX ADMIN — BUDESONIDE 0.5 MG: 0.5 INHALANT ORAL at 19:51

## 2024-06-29 RX ADMIN — ATORVASTATIN CALCIUM 40 MG: 40 TABLET, FILM COATED ORAL at 17:31

## 2024-06-29 RX ADMIN — INSULIN LISPRO 1 UNITS: 100 INJECTION, SOLUTION INTRAVENOUS; SUBCUTANEOUS at 21:27

## 2024-06-29 RX ADMIN — DIVALPROEX SODIUM 500 MG: 500 TABLET, DELAYED RELEASE ORAL at 08:44

## 2024-06-29 RX ADMIN — PANTOPRAZOLE SODIUM 40 MG: 40 TABLET, DELAYED RELEASE ORAL at 05:59

## 2024-06-29 RX ADMIN — BUDESONIDE 0.5 MG: 0.5 INHALANT ORAL at 07:12

## 2024-06-29 RX ADMIN — DIVALPROEX SODIUM 500 MG: 500 TABLET, DELAYED RELEASE ORAL at 17:31

## 2024-06-29 RX ADMIN — METOPROLOL SUCCINATE 25 MG: 25 TABLET, FILM COATED, EXTENDED RELEASE ORAL at 08:44

## 2024-06-29 RX ADMIN — PREDNISONE 1 MG: 1 TABLET ORAL at 08:44

## 2024-06-29 RX ADMIN — FORMOTEROL FUMARATE DIHYDRATE 20 MCG: 20 SOLUTION RESPIRATORY (INHALATION) at 07:11

## 2024-06-29 RX ADMIN — LOSARTAN POTASSIUM 50 MG: 50 TABLET, FILM COATED ORAL at 08:44

## 2024-06-29 RX ADMIN — INSULIN LISPRO 1 UNITS: 100 INJECTION, SOLUTION INTRAVENOUS; SUBCUTANEOUS at 11:50

## 2024-06-29 RX ADMIN — Medication 3 MG: at 21:26

## 2024-06-29 NOTE — ASSESSMENT & PLAN NOTE
Home: Lantus 12u qhs  Here:  Lantus 8 units qhs   Glucerna supplement ordered   Continue diabetic diet  stable

## 2024-06-29 NOTE — ASSESSMENT & PLAN NOTE
Normal coronaries on cardiac cath from 12/2022.   ECHO now with newly detected reduced LVEF at 30%.    Cardiology saw and recommending outpatient follow-up = sees Dr Ramos as OP  Continue I/O and daily weight but wts are highly inaccurate with 30 lb difference  Euvolemic  Not on diuretic currently.  Had been on Lasix and stopped 2/2 TARA with his last admission 6/7-6/8  Had LE edema = he says less than home.  Will use TEDs for now  Has small chronic pleural effusions

## 2024-06-29 NOTE — ASSESSMENT & PLAN NOTE
Rate controlled on Toprol 25mg qd  Continue Eliquis 5mg BID = ?on at home?  Cardizem stopped per Cardiology in the setting of recently detected reduced LVEF 30%  Outpatient follow-up with cardiology

## 2024-06-29 NOTE — ASSESSMENT & PLAN NOTE
Patient had AMS on previous hospitalization and MRI 6/20 demonstrated small left parietal hemorrhage with possible petechial hemorrhage  Eliquis appears to have been held at that time as that is suspected to have contributed to CVA  Patient denies any residual sensory or motor deficit  Continue anticoagulation  Outpatient followup with Neurology  Resumed Depakote 6/20 = date to monitor level?

## 2024-06-29 NOTE — ASSESSMENT & PLAN NOTE
Continue Losartan 50mg qd/Toprol-XL 25mg qd  Cardizem stopped 2/2 recently diagnosed cardiomyopathy

## 2024-06-29 NOTE — PLAN OF CARE
Problem: PAIN - ADULT  Goal: Verbalizes/displays adequate comfort level or baseline comfort level  Description: Interventions:  - Encourage patient to monitor pain and request assistance  - Assess pain using appropriate pain scale  - Administer analgesics based on type and severity of pain and evaluate response  - Implement non-pharmacological measures as appropriate and evaluate response  - Consider cultural and social influences on pain and pain management  - Notify physician/advanced practitioner if interventions unsuccessful or patient reports new pain  Outcome: Progressing     Problem: INFECTION - ADULT  Goal: Absence or prevention of progression during hospitalization  Description: INTERVENTIONS:  - Assess and monitor for signs and symptoms of infection  - Monitor lab/diagnostic results  - Monitor all insertion sites, i.e. indwelling lines, tubes, and drains  - Monitor endotracheal if appropriate and nasal secretions for changes in amount and color  - [place] appropriate cooling/warming therapies per order  - Administer medications as ordered  - Instruct and encourage patient and family to use good hand hygiene technique  - Identify and instruct in appropriate isolation precautions for identified infection/condition  Outcome: Progressing  Goal: Absence of fever/infection during neutropenic period  Description: INTERVENTIONS:  - Monitor WBC    Outcome: Progressing     Problem: SAFETY ADULT  Goal: Patient will remain free of falls  Description: INTERVENTIONS:  - Educate patient/family on patient safety including physical limitations  - Instruct patient to call for assistance with activity   - Consult OT/PT to assist with strengthening/mobility   - Keep Call bell within reach  - Keep bed low and locked with side rails adjusted as appropriate  - Keep care items and personal belongings within reach  - Initiate and maintain comfort rounds  - Make Fall Risk Sign visible to staff  - Offer Toileting every   Hours,  in advance of need  - Initiate/Maintain  alarm  - Obtain necessary fall risk management equipment:    - Apply yellow socks and bracelet for high fall risk patients  - Consider moving patient to room near nurses station  Outcome: Progressing  Goal: Maintain or return to baseline ADL function  Description: INTERVENTIONS:  -  Assess patient's ability to carry out ADLs; assess patient's baseline for ADL function and identify physical deficits which impact ability to perform ADLs (bathing, care of mouth/teeth, toileting, grooming, dressing, etc.)  - Assess/evaluate cause of self-care deficits   - Assess range of motion  - Assess patient's mobility; develop plan if impaired  - Assess patient's need for assistive devices and provide as appropriate  - Encourage maximum independence but intervene and supervise when necessary  - Involve family in performance of ADLs  - Assess for home care needs following discharge   - Consider OT consult to assist with ADL evaluation and planning for discharge  - Provide patient education as appropriate  Outcome: Progressing  Goal: Maintains/Returns to pre admission functional level  Description: INTERVENTIONS:  - Perform AM-PAC 6 Click Basic Mobility/ Daily Activity assessment daily.  - Set and communicate daily mobility goal to care team and patient/family/caregiver.   - Collaborate with rehabilitation services on mobility goals if consulted  - Perform Range of Motion   times a day.  - Reposition patient every   hours.  - Dangle patient   times a day  - Stand patient   times a day  - Ambulate patient   times a day  - Out of bed to chair   times a day   - Out of bed for meals   times a day  - Out of bed for toileting  - Record patient progress and toleration of activity level   Outcome: Progressing     Problem: DISCHARGE PLANNING  Goal: Discharge to home or other facility with appropriate resources  Description: INTERVENTIONS:  - Identify barriers to discharge w/patient and caregiver  -  Arrange for needed discharge resources and transportation as appropriate  - Identify discharge learning needs (meds, wound care, etc.)  - Arrange for interpretive services to assist at discharge as needed  - Refer to Case Management Department for coordinating discharge planning if the patient needs post-hospital services based on physician/advanced practitioner order or complex needs related to functional status, cognitive ability, or social support system  Outcome: Progressing     Problem: MOBILITY - ADULT  Goal: Maintain or return to baseline ADL function  Description: INTERVENTIONS:  -  Assess patient's ability to carry out ADLs; assess patient's baseline for ADL function and identify physical deficits which impact ability to perform ADLs (bathing, care of mouth/teeth, toileting, grooming, dressing, etc.)  - Assess/evaluate cause of self-care deficits   - Assess range of motion  - Assess patient's mobility; develop plan if impaired  - Assess patient's need for assistive devices and provide as appropriate  - Encourage maximum independence but intervene and supervise when necessary  - Involve family in performance of ADLs  - Assess for home care needs following discharge   - Consider OT consult to assist with ADL evaluation and planning for discharge  - Provide patient education as appropriate  Outcome: Progressing  Goal: Maintains/Returns to pre admission functional level  Description: INTERVENTIONS:  - Perform AM-PAC 6 Click Basic Mobility/ Daily Activity assessment daily.  - Set and communicate daily mobility goal to care team and patient/family/caregiver.   - Collaborate with rehabilitation services on mobility goals if consulted  - Perform Range of Motion   times a day.  - Reposition patient every   hours.  - Dangle patient   times a day  - Stand patient   times a day  - Ambulate patient   times a day  - Out of bed to chair   times a day   - Out of bed for meals   times a day  - Out of bed for toileting  -  Record patient progress and toleration of activity level   Outcome: Progressing     Problem: Prexisting or High Potential for Compromised Skin Integrity  Goal: Skin integrity is maintained or improved  Description: INTERVENTIONS:  - Identify patients at risk for skin breakdown  - Assess and monitor skin integrity  - Assess and monitor nutrition and hydration status  - Monitor labs   - Assess for incontinence   - Turn and reposition patient  - Assist with mobility/ambulation  - Relieve pressure over bony prominences  - Avoid friction and shearing  - Provide appropriate hygiene as needed including keeping skin clean and dry  - Evaluate need for skin moisturizer/barrier cream  - Collaborate with interdisciplinary team   - Patient/family teaching  - Consider wound care consult   Outcome: Progressing

## 2024-06-29 NOTE — PROGRESS NOTES
06/29/24 1330   Pain Assessment   Pain Assessment Tool 0-10   Pain Score No Pain   Restrictions/Precautions   Precautions Aspiration;Bed/chair alarms;Cognitive;Fall Risk;Hard of hearing;Supervision on toilet/commode   Braces or Orthoses   (b/l TEDS for edema management - RN confirmed order and also notified pt inquiry if pt on lasix now as he was at home until 3 weeks ago)   Cognition   Overall Cognitive Status Impaired   Arousal/Participation Alert;Cooperative   Subjective   Subjective pt had no c/o and agreeable to have PT.  Pt asked to use the toilet as he walked by it   Sit to Stand   Type of Assistance Needed Physical assistance;Verbal cues;Adaptive equipment   Physical Assistance Level 51%-75%   Comment mod from recliner no AD   Sit to Stand CARE Score 2   Bed-Chair Transfer   Type of Assistance Needed Physical assistance;Verbal cues   Physical Assistance Level 51%-75%   Comment no AD   Chair/Bed-to-Chair Transfer CARE Score 2   Walk 10 Feet   Type of Assistance Needed Physical assistance;Verbal cues   Physical Assistance Level 51%-75%   Walk 10 Feet CARE Score 2   Walk 50 Feet with Two Turns   Type of Assistance Needed Physical assistance;Verbal cues   Physical Assistance Level 51%-75%   Comment 50' with R HHA   Walk 50 Feet with Two Turns CARE Score 2   Ambulation   Findings min A with RW x 50'   Toilet Transfer   Type of Assistance Needed Physical assistance;Verbal cues;Adaptive equipment   Physical Assistance Level 51%-75%   Comment mod to stand up from [facility] over toilet even with RW + grab bar   Toilet Transfer CARE Score 2   Therapeutic Interventions   Other wife provided home set up form for pictures and measurements to facilitate therapy training and d/c planning   Equipment Use   NuStep lvl 2 x 11 mins all ext, SPM> 60   Assessment   Treatment Assessment Pt tolerated tx well including inc resistance on the nu step and household distance mobilities including toilet transfer with R HHA and RW, see note  above for details. Pt initially unsteady so ask for the RW with initial amb then R HHA post nu step. pt assisted back to recliner post tx with alarms on and all needs within reach with family to continue their visit.   Family/Caregiver Present yes wife Kiki and Sister Debi observed tx session   Problem List Decreased strength;Decreased range of motion;Decreased endurance;Impaired balance;Decreased mobility;Decreased coordination;Decreased cognition;Impaired judgement;Decreased safety awareness;Impaired hearing   Barriers to Discharge Inaccessible home environment;Decreased caregiver support   PT Barriers   Functional Limitation Car transfers;Ramp negotiation;Stair negotiation;Standing;Transfers;Walking   Plan   Treatment/Interventions Functional transfer training;LE strengthening/ROM;Therapeutic exercise;Endurance training;Gait training;Spoke to nursing;OT   Progress Progressing toward goals   PT Therapy Minutes   PT Time In 1330   PT Time Out 1400   PT Total Time (minutes) 30   PT Mode of treatment - Individual (minutes) 5   PT Mode of treatment - Concurrent (minutes) 25   PT Mode of treatment - Group (minutes) 0   PT Mode of treatment - Co-treat (minutes) 0   PT Mode of Treatment - Total time(minutes) 30 minutes   PT Cumulative Minutes 180   Therapy Time missed   Time missed? No

## 2024-06-29 NOTE — PROGRESS NOTES
"[facility]  Progress Note  Name: Hal Miller I  MRN: [id number]  Unit/Bed#: -01 I Date of Admission: 6/26/2024   Date of Service: 6/30/2024 I Hospital Day: 4    Assessment & Plan   * 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[facility])  Assessment & Plan  Patient had AMS on previous hospitalization and MRI 6/20 demonstrated small left parietal hemorrhage with possible petechial hemorrhage  Eliquis appears to have been held at that time as that is suspected to have contributed to CVA  Patient denies any residual sensory or motor deficit  Continue anticoagulation  Outpatient followup with Neurology  Resumed Depakote 6/20 = date to monitor level?    Colonic diverticular abscess  Assessment & Plan  Patient presented for abdominal pain and swelling, history of hernia.  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 No evidence of soft tissue component. A benign etiology is suspected such as retroperitoneal lymphatic malformation or lymphocele. Initial evaluation in 3 months with contrast-enhanced CT abdomen/pelvis is recommended.\"  Surgery evaluated patient and recommended conservative management of suspected diverticulitis with microperforation.    Patient is eating without issue and advanced to regular diet.    He is having normal BMs  Follow-up with surgery as OP  S/p Cipro/Flagyl completed 6/28/24    NICM (nonischemic cardiomyopathy) ([facility])  Assessment & Plan  Normal coronaries on cardiac cath from 12/2022.   ECHO now with newly detected reduced LVEF at 30%.    Cardiology saw and recommending outpatient follow-up = sees Dr Ramos as OP  Continue I/O and daily weight but wts are highly inaccurate with 30 lb difference  Euvolemic  Not on diuretic currently.  Had been on Lasix and stopped 2/2 " TARA with his last admission 6/7-6/8  Had LE edema = he says less than home.  Will use TEDs for now  Has small chronic pleural effusions    CKD (chronic kidney disease)  Assessment & Plan  Baseline 1.2-1.3?  stable    Atrial fibrillation (HCC)  Assessment & Plan  Rate controlled on Toprol 25mg qd  Continue Eliquis 5mg BID = ?on at home?  Cardizem stopped per Cardiology in the setting of recently detected reduced LVEF 30%  Outpatient follow-up with cardiology    Severe protein-calorie malnutrition (HCC)  Assessment & Plan  Glucerna supplement with meals added   Pt is drinking these     Chronic obstructive pulmonary disease with acute exacerbation (HCC)  Assessment & Plan  Has severe emphysema by hx  Compensated, continue scheduled and as needed breathing treatments    Hypertension  Assessment & Plan  Continue Losartan 50mg qd/Toprol-XL 25mg qd  Cardizem stopped 2/2 recently diagnosed cardiomyopathy    Hx of aortic valve replacement  Assessment & Plan  Has had bioAVR/[place] x 2 in past    Type 2 diabetes mellitus (HCC)  Assessment & Plan  Home: Lantus 12u qhs  Here:  Lantus 8 units qhs   Glucerna supplement ordered   Continue diabetic diet  stable    Inflammatory polyarthropathy (HCC)  Assessment & Plan  Continue Prednisone 1 mg daily    Zoster  Assessment & Plan  Completed 7 days of Valtrex prior to transfer  Pt currently with crusted lesions over right anterior chest just below nipple line round flank to posterior back on right side   Pt reports pain improvement - states more itchy at this time   Continue to monitor             The above assessment and plan was reviewed and updated as determined by my evaluation of the patient on 6/30/2024.    Labs:   Results from last 7 days   Lab Units 06/28/24  0502 06/27/24  0506   WBC Thousand/uL 7.49 7.64   HEMOGLOBIN g/dL 11.4* 11.7*   HEMATOCRIT % 35.1* 37.7   PLATELETS Thousands/uL 168 182     Results from last 7 days   Lab Units 06/28/24  0502 06/27/24  0506   SODIUM  mmol/L 133* 131*   POTASSIUM mmol/L 4.9 4.7   CHLORIDE mmol/L 96 99   CO2 mmol/L 31 29   BUN mg/dL 23 25   CREATININE mg/dL 1.26 1.33*   CALCIUM mg/dL 9.6 9.6             Results from last 7 days   Lab Units 06/30/24  0548 06/29/24 2058 06/29/24  1538   POC GLUCOSE mg/dl 110 192* 144*       Imaging  No orders to display       Review of Scheduled Meds:  Current Facility-Administered Medications   Medication Dose Route Frequency Provider Last Rate    acetaminophen  650 mg Oral Q4H PRN [place]      albuterol  2 puff Inhalation Q4H PRN [place] Ford      apixaban  5 mg Oral BID [place] Ford      atorvastatin  40 mg Oral Daily With Dinner [place] Ford      bisacodyl  10 mg Rectal Daily PRN Ashley Depadua, MD      budesonide  0.5 mg Nebulization Q12H [place]      divalproex sodium  500 mg Oral Q8H YUNI [place] Ford      formoterol  20 mcg Nebulization Q12H [place] Ford      insulin glargine  8 Units Subcutaneous HS [place] Ford      insulin lispro  1-5 Units Subcutaneous 4x Daily (AC & HS) Dayton Ford      losartan  50 mg Oral Daily [place] Ford      melatonin  3 mg Oral HS [place] Ford      metoprolol succinate  25 mg Oral Daily [place] Ford      pantoprazole  40 mg Oral Early Morning [place] Ford      polyethylene glycol  17 g Oral Daily PRN Ashley Depadua, MD      predniSONE  1 mg Oral Daily [place] Ford      senna  2 tablet Oral BID [place] Ford      tamsulosin  0.4 mg Oral Daily With Dinner Ashley Depadua, MD         Subjective/ HPI: Patient seen and examined. Patients overnight issues or events were reviewed with nursing staff. New or overnight issues include the following:     No new or overnight issues.  Offers no complaints    ROS:   A 10 point ROS was performed; negative except as noted above.        *Labs /Radiology studies Reviewed  *Medications  reviewed and reconciled as needed  *Please refer to order section for additional  ordered labs studies      Physical Examination:  Vitals:   Vitals:    06/29/24 2010 06/30/24 0600 06/30/24 0627 06/30/24 0725   BP: 122/70  120/67    BP Location: Right arm  Right arm    Pulse: 76  74    Resp: 17  16    Temp: 98.6 °F (37 °C)  98.5 °F (36.9 °C)    TempSrc: Oral  Axillary    SpO2: 98%  97% 97%   Weight:  72.5 kg (159 lb 13.3 oz)     Height:           General Appearance: no distress, nontoxic appearing  HEENT:  External ear normal.  Nose normal w/o drainage. Mucous membranes are moist. Oropharynx is clear. Conjunctiva clear w/o icterus or redness.  Neck:  Supple, normal ROM  Lungs: BBS without crackles/wheeze/rhonchi; respirations unlabored with normal inspiratory/expiratory effort.  No retractions noted.  On RA  CV: regular rate and rhythm; no rubs/murmurs/gallops, PMI normal   ABD: Abdomen is soft.  Bowel sounds all quadrants.  Nontender with no distention.    EXT: +LE edema mostly ankles and has overall decreased since yesterday when added TEDS  Skin: normal turgor, normal texture, no rashes  Psych: affect normal, mood normal  Neuro: AAO         The above physical exam was reviewed and updated as determined by my evaluation of the patient on 6/30/2024.    Invasive Devices       None                      VTE Pharmacologic Prophylaxis: Eliquis  Code Status: Level 1 - Full Code  Current Length of Stay: 4 day(s)    Total floor / unit time spent today 30 minutes  Coordination of patient's care was performed in conjunction with primary service. Time invested included review of patient's labs, vitals, and management of their comorbidities with continued monitoring, examination of patient as well as answering patient questions, documenting her findings and creating progress note in electronic medical record,  ordering appropriate diagnostic testing.       ** Please Note:  voice to text software may have been used in the creation of this document. Although proof errors in transcription or interpretation are  a potential of such software**

## 2024-06-29 NOTE — PROGRESS NOTES
"PM&R Coverage Progress Note:    Rehab Diagnosis: Impairment of mobility, safety, Activities of Daily Living (ADLs), and cognitive/communication skills due to Stroke:  01.4  No paresis  Etiologic Diagnosis: L parietal stroke    ASSESSMENT: Stable      PLAN:    Rehabilitation  Continue current rehabilitation plan of care to maximize function.    No funcitonal barriers identified    Medical issues  CVA: Continue Eliquis and Statin for ppx.  Mild LE edema: Was previously on Lasix.  IM for now using compression stockings.  Afib: Rate controlled and AC on Eliquis   Shingles: Resolving.  Lesions dry and not painful   Continue current medical plan of care per primary service.        SUBJECTIVE: Patient seen face to face.  No acute issues.  Progressing as expected in rehabilitation program.         ROS:  A ten point review of systems was completed on 06/29/24 and pertinent positives are listed in subjective section. All other systems reviewed were negative.     OBJECTIVE:   /55 (BP Location: Right arm)   Pulse 79   Temp 97.6 °F (36.4 °C) (Oral)   Resp 16   Ht 6' 2\" (1.88 m)   Wt 74.8 kg (164 lb 14.5 oz) Comment: bed level zeroed with bed extender, 2 pillows,1 pad, and 1 blanket  SpO2 100%   BMI 21.17 kg/m²     Physical Exam  Vitals and nursing note reviewed.   Constitutional:       General: He is not in acute distress.  HENT:      Head: Normocephalic and atraumatic.      Nose: Nose normal.      Mouth/Throat:      Mouth: Mucous membranes are moist.   Eyes:      Conjunctiva/sclera: Conjunctivae normal.   Cardiovascular:      Rate and Rhythm: Normal rate and regular rhythm.      Pulses: Normal pulses.   Pulmonary:      Effort: Pulmonary effort is normal.      Breath sounds: Normal breath sounds. No wheezing or rales.   Abdominal:      General: Bowel sounds are normal. There is no distension.      Palpations: Abdomen is soft.      Tenderness: There is no abdominal tenderness.   Musculoskeletal:         General: " Swelling present.      Cervical back: Neck supple.   Skin:     General: Skin is warm.      Comments: Zoster lesions healing    Neurological:      Mental Status: He is alert and oriented to person, place, and time.   Psychiatric:         Mood and Affect: Mood normal.          Personally reviewed on 06/29/24:   Lab Results   Component Value Date    WBC 7.49 06/28/2024    HGB 11.4 (L) 06/28/2024    HCT 35.1 (L) 06/28/2024    MCV 97 06/28/2024     06/28/2024     Lab Results   Component Value Date    SODIUM 133 (L) 06/28/2024    K 4.9 06/28/2024    CL 96 06/28/2024    CO2 31 06/28/2024    BUN 23 06/28/2024    CREATININE 1.26 06/28/2024    GLUC 68 06/28/2024    CALCIUM 9.6 06/28/2024     Lab Results   Component Value Date    INR 1.27 (H) 06/19/2024    INR 1.23 (H) 06/17/2024    INR 1.30 (H) 02/14/2023    PROTIME 16.6 (H) 06/19/2024    PROTIME 16.2 (H) 06/17/2024    PROTIME 15.9 (H) 02/14/2023           Current Facility-Administered Medications:     acetaminophen (TYLENOL) tablet 650 mg, 650 mg, Oral, Q4H PRN, [place] Ford    albuterol (PROVENTIL HFA,VENTOLIN HFA) inhaler 2 puff, 2 puff, Inhalation, Q4H PRN, [place] Ford    apixaban (ELIQUIS) tablet 5 mg, 5 mg, Oral, BID, [place] Ford, 5 mg at 06/29/24 0844    atorvastatin (LIPITOR) tablet 40 mg, 40 mg, Oral, Daily With Dinner, [place] Ford, 40 mg at 06/28/24 1742    bisacodyl (DULCOLAX) rectal suppository 10 mg, 10 mg, Rectal, Daily PRN, Ashley Depadua, MD    budesonide (PULMICORT) inhalation solution 0.5 mg, 0.5 mg, Nebulization, Q12H, [place] Ford, 0.5 mg at 06/29/24 0712    divalproex sodium (DEPAKOTE) DR tablet 500 mg, 500 mg, Oral, Q8H [facility], Dayton Ford, 500 mg at 06/29/24 0844    formoterol (PERFOROMIST) nebulizer solution 20 mcg, 20 mcg, Nebulization, Q12H, [place] Ford, 20 mcg at 06/29/24 0711    insulin glargine (LANTUS) subcutaneous injection 8 Units 0.08 mL, 8 Units, Subcutaneous, HS, Dat[place] Ford, 8 Units at  06/28/24 2121    insulin lispro (HumALOG/ADMELOG) 100 units/mL subcutaneous injection 1-5 Units, 1-5 Units, Subcutaneous, 4x Daily (AC & HS), 1 Units at 06/29/24 1150 **AND** Fingerstick Glucose (POCT), , , 4x Daily AC and at bedtime, Dayton Ford    losartan (COZAAR) tablet 50 mg, 50 mg, Oral, Daily, [place] Ford, 50 mg at 06/29/24 0844    melatonin tablet 3 mg, 3 mg, Oral, HS, [place] Ford, 3 mg at 06/28/24 2125    metoprolol succinate (TOPROL-XL) 24 hr tablet 25 mg, 25 mg, Oral, Daily, [place] Ford, 25 mg at 06/29/24 0844    pantoprazole (PROTONIX) EC tablet 40 mg, 40 mg, Oral, Early Morning, [place] Ford, 40 mg at 06/29/24 0559    polyethylene glycol (MIRALAX) packet 17 g, 17 g, Oral, Daily PRN, Ashley Depadua, MD    predniSONE tablet 1 mg, 1 mg, Oral, Daily, [place] Ford, 1 mg at 06/29/24 0844    senna (SENOKOT) tablet 17.2 mg, 2 tablet, Oral, BID, [place] Ford, 17.2 mg at 06/28/24 0827    tamsulosin (FLOMAX) capsule 0.4 mg, 0.4 mg, Oral, Daily With Dinner, Ashley Depadua, MD, 0.4 mg at 06/28/24 1742    Past Medical History:   Diagnosis Date    Acute encephalopathy 2/14/2023    Acute-on-chronic kidney injury  (HCC) 6/13/2017    Cancer (HCC)     pancreatic    Colon polyp     Coronary artery disease     Dehydration 3/3/2023    Diabetes mellitus (HCC)     Hyperlipidemia     Hypertension     Left lower lobe pneumonia 7/2/2022    Pneumonia 06/13/2017    Right middle and lower lobe     Stroke (HCC)        Patient Active Problem List    Diagnosis Date Noted    Hx of aortic valve replacement 06/29/2024    Diplopia 06/27/2024    Peripheral polyneuropathy 06/27/2024    Hyponatremia 06/27/2024    Dysphoric mood 06/26/2024    NICM (nonischemic cardiomyopathy) ([facility]) 06/21/2024    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[facility]) 06/20/2024    Acute on chronic respiratory failure ([facility]) 06/19/2024    Episode of seizure-like activity 06/19/2024    History of Whipple procedure 06/19/2024    Dizziness 06/18/2024     Insomnia 06/18/2024    Ambulatory dysfunction 06/18/2024    Severe protein-calorie malnutrition (HCC) 06/18/2024    Abnormal CT of the abdomen 06/17/2024    Zoster 06/17/2024    Colonic diverticular abscess 06/17/2024    Vascular dementia (HCC) 06/17/2024    Concern for aspiration pneumonia (HCC) 06/07/2024    Type 2 diabetes mellitus (HCC) 06/07/2024    Hypercalcemia 03/01/2023    TARA (acute kidney injury) (HCC) 03/01/2023    Esophageal stricture 02/27/2023    Acute encephalopathy 02/14/2023    Generalized weakness 02/14/2023    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) 09/29/2022    Chronic obstructive pulmonary disease with acute exacerbation (HCC) 07/02/2022    CKD (chronic kidney disease) 07/02/2022    Centrilobular emphysema ([facility]) 08/27/2021    History of malignant neuroendocrine tumor 07/13/2021    Atrial fibrillation ([facility]) 06/14/2017    Shortness of breath 06/13/2017    Hypertension 06/13/2017    Hyperlipidemia 09/21/2015    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      ** Please Note:  voice to text software may have been used in the creation of this document. Although proof errors in transcription or interpretation are a potential of such software**

## 2024-06-29 NOTE — ASSESSMENT & PLAN NOTE
"Patient presented for abdominal pain and swelling, history of hernia.  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 No evidence of soft tissue component. A benign etiology is suspected such as retroperitoneal lymphatic malformation or lymphocele. Initial evaluation in 3 months with contrast-enhanced CT abdomen/pelvis is recommended.\"  Surgery evaluated patient and recommended conservative management of suspected diverticulitis with microperforation.    Patient is eating without issue and advanced to regular diet.    He is having normal BMs  Follow-up with surgery as OP  S/p Cipro/Flagyl completed 6/28/24  "

## 2024-06-30 LAB
GLUCOSE SERPL-MCNC: 110 MG/DL (ref 65–140)
GLUCOSE SERPL-MCNC: 140 MG/DL (ref 65–140)
GLUCOSE SERPL-MCNC: 152 MG/DL (ref 65–140)
GLUCOSE SERPL-MCNC: 183 MG/DL (ref 65–140)

## 2024-06-30 PROCEDURE — 97530 THERAPEUTIC ACTIVITIES: CPT

## 2024-06-30 PROCEDURE — 99232 SBSQ HOSP IP/OBS MODERATE 35: CPT | Performed by: INTERNAL MEDICINE

## 2024-06-30 PROCEDURE — 97535 SELF CARE MNGMENT TRAINING: CPT

## 2024-06-30 PROCEDURE — 94640 AIRWAY INHALATION TREATMENT: CPT

## 2024-06-30 PROCEDURE — 82948 REAGENT STRIP/BLOOD GLUCOSE: CPT

## 2024-06-30 PROCEDURE — 94760 N-INVAS EAR/PLS OXIMETRY 1: CPT

## 2024-06-30 PROCEDURE — 97110 THERAPEUTIC EXERCISES: CPT

## 2024-06-30 PROCEDURE — 94664 DEMO&/EVAL PT USE INHALER: CPT

## 2024-06-30 PROCEDURE — 97112 NEUROMUSCULAR REEDUCATION: CPT

## 2024-06-30 RX ADMIN — PREDNISONE 1 MG: 1 TABLET ORAL at 09:00

## 2024-06-30 RX ADMIN — DIVALPROEX SODIUM 500 MG: 500 TABLET, DELAYED RELEASE ORAL at 15:57

## 2024-06-30 RX ADMIN — APIXABAN 5 MG: 5 TABLET, FILM COATED ORAL at 17:46

## 2024-06-30 RX ADMIN — DIVALPROEX SODIUM 500 MG: 500 TABLET, DELAYED RELEASE ORAL at 08:55

## 2024-06-30 RX ADMIN — METOPROLOL SUCCINATE 25 MG: 25 TABLET, FILM COATED, EXTENDED RELEASE ORAL at 08:55

## 2024-06-30 RX ADMIN — PANTOPRAZOLE SODIUM 40 MG: 40 TABLET, DELAYED RELEASE ORAL at 05:27

## 2024-06-30 RX ADMIN — BUDESONIDE 0.5 MG: 0.5 INHALANT ORAL at 19:11

## 2024-06-30 RX ADMIN — INSULIN LISPRO 1 UNITS: 100 INJECTION, SOLUTION INTRAVENOUS; SUBCUTANEOUS at 17:47

## 2024-06-30 RX ADMIN — DIVALPROEX SODIUM 500 MG: 500 TABLET, DELAYED RELEASE ORAL at 00:00

## 2024-06-30 RX ADMIN — TAMSULOSIN HYDROCHLORIDE 0.4 MG: 0.4 CAPSULE ORAL at 15:57

## 2024-06-30 RX ADMIN — ACETAMINOPHEN 650 MG: 325 TABLET, FILM COATED ORAL at 21:07

## 2024-06-30 RX ADMIN — Medication 3 MG: at 21:10

## 2024-06-30 RX ADMIN — FORMOTEROL FUMARATE DIHYDRATE 20 MCG: 20 SOLUTION RESPIRATORY (INHALATION) at 07:20

## 2024-06-30 RX ADMIN — APIXABAN 5 MG: 5 TABLET, FILM COATED ORAL at 08:55

## 2024-06-30 RX ADMIN — ATORVASTATIN CALCIUM 40 MG: 40 TABLET, FILM COATED ORAL at 15:57

## 2024-06-30 RX ADMIN — BUDESONIDE 0.5 MG: 0.5 INHALANT ORAL at 07:20

## 2024-06-30 RX ADMIN — LOSARTAN POTASSIUM 50 MG: 50 TABLET, FILM COATED ORAL at 08:55

## 2024-06-30 RX ADMIN — FORMOTEROL FUMARATE DIHYDRATE 20 MCG: 20 SOLUTION RESPIRATORY (INHALATION) at 19:11

## 2024-06-30 RX ADMIN — INSULIN LISPRO 1 UNITS: 100 INJECTION, SOLUTION INTRAVENOUS; SUBCUTANEOUS at 11:34

## 2024-06-30 RX ADMIN — INSULIN GLARGINE 8 UNITS: 100 INJECTION, SOLUTION SUBCUTANEOUS at 21:07

## 2024-06-30 NOTE — PROGRESS NOTES
06/30/24 0715   Pain Assessment   Pain Assessment Tool 0-10   Pain Score No Pain   Restrictions/Precautions   Precautions Aspiration;Bed/chair alarms;Cognitive;Fall Risk;Hard of hearing;Supervision on toilet/commode   Braces or Orthoses   (BL TEDs)   Eating   Type of Assistance Needed Independent   Physical Assistance Level No physical assistance   Comment seated upright in recliner   Eating CARE Score 6   Eating Assessment   Meal Assessed Breakfast   QI: Swallowing/Nutritional Status None of the above   Shower/Bathe Self   Type of Assistance Needed Physical assistance   Physical Assistance Level 26%-50%   Comment seated on tub bench, pt able to bathe UB, BL LE with dynamic reach and inc time, pt able to bathe nicholas seated, requires A in stance with GB to bathe buttocks thorough 2* impaired standing balance w/ VC for use of GB to maintain standing balance, A to bathe bottom of BL feet. OT recommend pt sit for shower at home w/ pt reporting he does have a shower chair especially for washing hair/closing eyes to complete seated to reduce pt fall risk with pt receptive.   Shower/Bathe Self CARE Score 3   Bathing   Assessed Bath Style Shower   Able to Gather/Transport No   Able to Adjust Water Temperature No   Able to Wash/Rinse/Dry (body part) Left Arm;Right Arm;L Upper Leg;R Upper Leg;Chest;Abdomen;Perineal Area   Limitations Noted in Balance;Endurance;ROM;Safety;Strength;Timeliness   Positioning Seated;Standing   Adaptive Equipment Tub Bench;Shower Bars;Hand Held Shower   Tub/Shower Transfer   Limitations Noted In Balance;Coordination;Safety;UE Strength;LE Strength   Adaptive Equipment Grab Bars;Transfer Bench   Assessed Shower   Findings Car w/ RW and VC for mgmt of RW and use of GB to side step into shower   Upper Body Dressing   Type of Assistance Needed Set-up / clean-up   Physical Assistance Level No physical assistance   Comment pt able to thread button down shirt and button seated with inc time   Upper Body  Dressing CARE Score 5   Lower Body Dressing   Type of Assistance Needed Physical assistance   Physical Assistance Level 26%-50%   Comment seated pt able to thread underwear over RLE and with VC to keep underwear lower pt is able to thread LLE, pt having more difficulty threading shorts over RLE 2* placing in the wrong pantleg requiring A to correct, pt then able to thread pants over LLE again with VC to keep pants/foot low, pt demo inc balance today completing CM up over hips with CGA from OT to ensure balance, A only from OT to untwist waistband at back   Lower Body Dressing CARE Score 3   Putting On/Taking Off Footwear   Type of Assistance Needed Physical assistance   Physical Assistance Level 76% or more   Comment pt is able to complete dynamic reach to start to doff socks over heels, using feet to push socks the rest of the way off, pt dependent to don WENDY stockings to BL LE and would benefit from further intruction, pt requires A with donning slip on shoes and would benefit from [facility] for use of [facility]   Putting On/Taking Off Footwear CARE Score 2   Dressing/Undressing Clothing   Remove UB Clothes Other  (tied hospital gown)   Don UB Clothes Button Shirt   Remove LB Clothes Pants;Other  (brief)   Don LB Clothes Undergarment;Shorts;TEDs;Shoes   Limitations Noted In Balance;Endurance;Safety;ROM;Strength;Timeliness   Positioning Supported Sit;Standing   Roll Left and Right   Type of Assistance Needed Supervision   Physical Assistance Level No physical assistance   Roll Left and Right CARE Score 4   Lying to Sitting on Side of Bed   Type of Assistance Needed Supervision   Physical Assistance Level No physical assistance   Comment inc time warranted, use of bed rail   Lying to Sitting on Side of Bed CARE Score 4   Sit to Stand   Type of Assistance Needed Physical assistance   Physical Assistance Level 25% or less   Comment Car w/ RW, VC for hand placement to push up to stand and to reach back to sit rather than  maintaining hold on RW   Sit to Stand CARE Score 3   Bed-Chair Transfer   Type of Assistance Needed Physical assistance   Physical Assistance Level 25% or less   Comment Car w/ RW, inc time to coordinate/sequence   Chair/Bed-to-Chair Transfer CARE Score 3   Transfer Bed/Chair/Wheelchair   Limitations Noted In Balance;Endurance;Coordination;LE Strength   Adaptive Equipment Roller Walker   Toileting Hygiene   Comment declined need to go, offerred at start and end of Tx session   Reason if not Attempted Refused to perform   Toileting Hygiene CARE Score 7   Activity Tolerance   Activity Tolerance Patient tolerated treatment well   Assessment   Treatment Assessment Pt participated in skilled OT tx with focus on ADL routine for full shower. Pt demo inc independence today with LB dressing though benefits from further instruction for use of [facility] for footwear and donning WENDY stockings. Pt motivated to participate and walking Car w/ RW short [facility] from shower room back to pt room. Pt resting in recliner for breakfast at end of session. Tx time altered with pt being seen by Respiratory Therapist Mark on OT arrival and on breathing Tx for 12 mins per RT, OT started session at 7:15 and pt agreeable to be seen later in day for oral hygiene/shaving. Pt fatigues easily and requires inc time for ADL completion. Continue OT POC with focus on ADL retraining/footwear/LB dressing, stand tolerance, standing balance, endurance, and safety to increase pt indep for ADL/IADL fxn and reduce pt fall risk.   Prognosis Good   Problem List Decreased strength;Decreased range of motion;Impaired balance;Decreased endurance;Decreased mobility;Decreased coordination;Decreased cognition;Impaired judgement;Decreased safety awareness;Impaired hearing   Barriers to Discharge Inaccessible home environment;Decreased caregiver support   Plan   Treatment/Interventions ADL retraining;Functional transfer training;Therapeutic exercise;Endurance  training;Patient/family training;Bed mobility   Progress Progressing toward goals   OT Therapy Minutes   OT Time In 0715   OT Time Out 0840   OT Total Time (minutes) 85   OT Mode of treatment - Individual (minutes) 85   OT Mode of treatment - Concurrent (minutes) 0   OT Mode of treatment - Group (minutes) 0   OT Mode of treatment - Co-treat (minutes) 0   OT Mode of Treatment - Total time(minutes) 85 minutes   OT Cumulative Minutes 265   Therapy Time missed   Time missed? No

## 2024-06-30 NOTE — PLAN OF CARE
Problem: PAIN - ADULT  Goal: Verbalizes/displays adequate comfort level or baseline comfort level  Description: Interventions:  - Encourage patient to monitor pain and request assistance  - Assess pain using appropriate pain scale  - Administer analgesics based on type and severity of pain and evaluate response  - Implement non-pharmacological measures as appropriate and evaluate response  - Consider cultural and social influences on pain and pain management  - Notify physician/advanced practitioner if interventions unsuccessful or patient reports new pain  Outcome: Progressing     Problem: INFECTION - ADULT  Goal: Absence or prevention of progression during hospitalization  Description: INTERVENTIONS:  - Assess and monitor for signs and symptoms of infection  - Monitor lab/diagnostic results  - Monitor all insertion sites, i.e. indwelling lines, tubes, and drains  - Monitor endotracheal if appropriate and nasal secretions for changes in amount and color  - [place] appropriate cooling/warming therapies per order  - Administer medications as ordered  - Instruct and encourage patient and family to use good hand hygiene technique  - Identify and instruct in appropriate isolation precautions for identified infection/condition  Outcome: Progressing  Goal: Absence of fever/infection during neutropenic period  Description: INTERVENTIONS:  - Monitor WBC    Outcome: Progressing     Problem: SAFETY ADULT  Goal: Patient will remain free of falls  Description: INTERVENTIONS:  - Educate patient/family on patient safety including physical limitations  - Instruct patient to call for assistance with activity   - Consult OT/PT to assist with strengthening/mobility   - Keep Call bell within reach  - Keep bed low and locked with side rails adjusted as appropriate  - Keep care items and personal belongings within reach  - Initiate and maintain comfort rounds  - Make Fall Risk Sign visible to staff  - Offer Toileting every   Hours,  in advance of need  - Initiate/Maintain  alarm  - Obtain necessary fall risk management equipment:    - Apply yellow socks and bracelet for high fall risk patients  - Consider moving patient to room near nurses station  Outcome: Progressing  Goal: Maintain or return to baseline ADL function  Description: INTERVENTIONS:  -  Assess patient's ability to carry out ADLs; assess patient's baseline for ADL function and identify physical deficits which impact ability to perform ADLs (bathing, care of mouth/teeth, toileting, grooming, dressing, etc.)  - Assess/evaluate cause of self-care deficits   - Assess range of motion  - Assess patient's mobility; develop plan if impaired  - Assess patient's need for assistive devices and provide as appropriate  - Encourage maximum independence but intervene and supervise when necessary  - Involve family in performance of ADLs  - Assess for home care needs following discharge   - Consider OT consult to assist with ADL evaluation and planning for discharge  - Provide patient education as appropriate  Outcome: Progressing     Problem: DISCHARGE PLANNING  Goal: Discharge to home or other facility with appropriate resources  Description: INTERVENTIONS:  - Identify barriers to discharge w/patient and caregiver  - Arrange for needed discharge resources and transportation as appropriate  - Identify discharge learning needs (meds, wound care, etc.)  - Arrange for interpretive services to assist at discharge as needed  - Refer to Case Management Department for coordinating discharge planning if the patient needs post-hospital services based on physician/advanced practitioner order or complex needs related to functional status, cognitive ability, or social support system  Outcome: Progressing     Problem: MOBILITY - ADULT  Goal: Maintain or return to baseline ADL function  Description: INTERVENTIONS:  -  Assess patient's ability to carry out ADLs; assess patient's baseline for ADL function and  identify physical deficits which impact ability to perform ADLs (bathing, care of mouth/teeth, toileting, grooming, dressing, etc.)  - Assess/evaluate cause of self-care deficits   - Assess range of motion  - Assess patient's mobility; develop plan if impaired  - Assess patient's need for assistive devices and provide as appropriate  - Encourage maximum independence but intervene and supervise when necessary  - Involve family in performance of ADLs  - Assess for home care needs following discharge   - Consider OT consult to assist with ADL evaluation and planning for discharge  - Provide patient education as appropriate  Outcome: Progressing  Goal: Maintains/Returns to pre admission functional level  Description: INTERVENTIONS:  - Perform AM-PAC 6 Click Basic Mobility/ Daily Activity assessment daily.  - Set and communicate daily mobility goal to care team and patient/family/caregiver.   - Collaborate with rehabilitation services on mobility goals if consulted  - Perform Range of Motion   times a day.  - Reposition patient every   hours.  - Dangle patient   times a day  - Stand patient   times a day  - Ambulate patient   times a day  - Out of bed to chair   times a day   - Out of bed for meals   times a day  - Out of bed for toileting  - Record patient progress and toleration of activity level   Outcome: Progressing     Problem: Prexisting or High Potential for Compromised Skin Integrity  Goal: Skin integrity is maintained or improved  Description: INTERVENTIONS:  - Identify patients at risk for skin breakdown  - Assess and monitor skin integrity  - Assess and monitor nutrition and hydration status  - Monitor labs   - Assess for incontinence   - Turn and reposition patient  - Assist with mobility/ambulation  - Relieve pressure over bony prominences  - Avoid friction and shearing  - Provide appropriate hygiene as needed including keeping skin clean and dry  - Evaluate need for skin moisturizer/barrier cream  -  Collaborate with interdisciplinary team   - Patient/family teaching  - Consider wound care consult   Outcome: Progressing

## 2024-06-30 NOTE — PROGRESS NOTES
06/30/24 1000   Pain Assessment   Pain Assessment Tool 0-10   Pain Score No Pain   Restrictions/Precautions   Precautions Aspiration;Bed/chair alarms;Cognitive;Fall Risk;Hard of hearing;Supervision on toilet/commode   Weight Bearing Restrictions No   ROM Restrictions No   Cognition   Overall Cognitive Status Impaired   Arousal/Participation Alert;Cooperative   Attention Within functional limits   Orientation Level Oriented X4   Subjective   Subjective pt was agreeable to todays PT session   Roll Left and Right   Type of Assistance Needed Supervision   Physical Assistance Level No physical assistance   Roll Left and Right CARE Score 4   Sit to Lying   Type of Assistance Needed Supervision   Physical Assistance Level No physical assistance   Sit to Lying CARE Score 4   Lying to Sitting on Side of Bed   Type of Assistance Needed Supervision   Physical Assistance Level No physical assistance   Lying to Sitting on Side of Bed CARE Score 4   Sit to Stand   Type of Assistance Needed Physical assistance;Verbal cues   Physical Assistance Level 26%-50%   Comment Vandana-ModA, no AD   Sit to Stand CARE Score 3   Bed-Chair Transfer   Type of Assistance Needed Physical assistance;Verbal cues   Physical Assistance Level 26%-50%   Comment No AD   Chair/Bed-to-Chair Transfer CARE Score 3   Car Transfer   Type of Assistance Needed Physical assistance   Physical Assistance Level 26%-50%   Comment simulated with NuStep, VC for foot placement and hand placement   Car Transfer CARE Score 3   Walk 10 Feet   Type of Assistance Needed Physical assistance;Verbal cues   Physical Assistance Level 26%-50%   Comment gelt belt, VC for L foot placement   Walk 10 Feet CARE Score 3   Walk 50 Feet with Two Turns   Type of Assistance Needed Physical assistance;Verbal cues   Physical Assistance Level 26%-50%   Comment R HHA, VC for L foot placement   Walk 50 Feet with Two Turns CARE Score 3   Walk 150 Feet   Type of Assistance Needed Physical  assistance;Verbal cues   Physical Assistance Level 26%-50%   Comment R HHA + 200', VC to push down on PTs hand + L foot clearance   Walk 150 Feet CARE Score 3   Curb or Single Stair   Comment (S)  reassess next session   4 Steps   Comment (S)  reassess next session   12 Steps   Comment (S)  reassess next session   Toilet Transfer   Type of Assistance Needed Physical assistance   Physical Assistance Level 26%-50%   Comment HHA+ grab bar   Toilet Transfer CARE Score 3   Therapeutic Interventions   Flexibility Bilat hamstring/ DF stretch 5x 30 sec   Neuromuscular Re-Education NPP HIGT bilat HHA + VC for L foot clearance +200',  incorporation of VC to increase speed, retrowalk, sudden stop, turn. pt LOB to L side during retrowalking, turning, and abrupt stops. SpO2 97, HR 84 taken immediately after gait training.   Other 5 STS + UE support after failed attempt without UE support 26.27sec. 10MWT at self selected velocity  .75sec.   Equipment Use   NuStep lvl [id number], 58>SPM, all ext   Assessment   Treatment Assessment pt tolerated 90min of skilled PT session focusing on gait training, endurance, and neuromuscular re-education. pt improved in assistance levels for household mobilities, R HHA with min-modA. However, pt still limited by L righting reaction impacting mobility tasks. outcome measures demonstrate below average levels for age group increasing his risk for falls, 5xSTS with use of UE 26.27sec and 10MWT .75m/sec. pt was educated on scores of outcome measures and demonstrated understanding of increased fall risks. pt was assisted back to recliner post tx with alarms on and all needs within reach.   Family/Caregiver Present   (yes, nephew and his wife observed part of therapy session)   Problem List Decreased strength;Decreased endurance;Impaired balance;Decreased coordination;Decreased cognition;Impaired judgement;Decreased safety awareness;Impaired hearing;Decreased range of motion;Decreased mobility   Barriers  to Discharge Inaccessible home environment;Decreased caregiver support   PT Barriers   Functional Limitation Car transfers;Ramp negotiation;Stair negotiation;Standing;Transfers;Walking   Plan   Treatment/Interventions Functional transfer training;LE strengthening/ROM;Therapeutic exercise;Endurance training;Gait training;Spoke to nursing;Bed mobility   Progress Progressing toward goals   PT Therapy Minutes   PT Time In 1000   PT Time Out 1130   PT Total Time (minutes) 90   PT Mode of treatment - Individual (minutes) 90   PT Mode of treatment - Concurrent (minutes) 0   PT Mode of treatment - Group (minutes) 0   PT Mode of treatment - Co-treat (minutes) 0   PT Mode of Treatment - Total time(minutes) 90 minutes   PT Cumulative Minutes 270   Therapy Time missed   Time missed? No

## 2024-06-30 NOTE — PROGRESS NOTES
"   06/30/24 1410   Pain Assessment   Pain Assessment Tool 0-10   Pain Score No Pain   Restrictions/Precautions   Precautions Aspiration;Bed/chair alarms;Cognitive;Fall Risk;Hard of hearing;Supervision on toilet/commode   Braces or Orthoses   (TEDs)   Lifestyle   Autonomy \"everyone keeps coming in and bothering me all day\"   Oral Hygiene   Comment PCA Alia present at end and agreeable to help pt with oral hygiene 2* pt taking inc time to toilet and time not allowing   Lower Body Dressing   Type of Assistance Needed Physical assistance   Physical Assistance Level 26%-50%   Comment pt soiled/saturated underwear and shorts and required A to change to clean diaper and shorts seated on BSC over toilet   Lower Body Dressing CARE Score 3   Sit to Stand   Type of Assistance Needed Physical assistance   Physical Assistance Level 25% or less   Comment Car/CGA with RW   Sit to Stand CARE Score 3   Bed-Chair Transfer   Type of Assistance Needed Physical assistance   Physical Assistance Level 25% or less   Comment Car w/ RW   Chair/Bed-to-Chair Transfer CARE Score 3   Transfer Bed/Chair/Wheelchair   Limitations Noted In Balance;Endurance;LE Strength;UE Strength;Coordination   Adaptive Equipment Roller Walker   Toileting Hygiene   Type of Assistance Needed Physical assistance   Physical Assistance Level 26%-50%   Comment pt attempting to use urinal on OT arrival and noted to have saturated underwear and shorts requiring A to change, pt reporting feeling urge to still urinate and agreeable to trial in stance over toilet with RW over toilet, pt steadying in stance and A to manage clothing to void more into toilet, pt with sudden urge/onset to move bowels and requesting to sit, BSC placed back over toilet and pt attempting to have BM though reporting unable to go, PCA Alia taking over to finish assisting pt with cleaning/toileting 2* inc time warranted.   Toileting Hygiene CARE Score 3   Toileting   Able to Pull Clothing " down yes, up yes.   Manage Hygiene Bladder   Toilet Transfer   Type of Assistance Needed Physical assistance   Physical Assistance Level 25% or less   Comment Car w/ RW to ambulate into bathroom to [facility] [place]   Toilet Transfer CARE Score 3   Toilet Transfer   Surface Assessed Standard Commode  (over toilet)   Transfer Technique Standard   Limitations Noted In Balance;Endurance;UE Strength;LE Strength;Safety   Adaptive Equipment Grab Bar   Findings pt continues to demo difficulty with RW mgmt/coordinating placement of RW for toileting   Cognition   Overall Cognitive Status Impaired   Arousal/Participation Alert;Cooperative   Attention Within functional limits   Orientation Level Oriented X4   Memory Decreased short term memory;Decreased recall of recent events   Following Commands Follows one step commands with increased time or repetition   Activity Tolerance   Activity Tolerance Patient tolerated treatment well   Assessment   Prognosis Good   Problem List Decreased strength;Decreased range of motion;Decreased endurance;Impaired balance;Decreased mobility;Decreased coordination;Decreased cognition;Decreased safety awareness;Impaired judgement;Impaired hearing   Barriers to Discharge Inaccessible home environment;Decreased caregiver support   Plan   Treatment/Interventions ADL retraining;Functional transfer training;Patient/family training   Progress Progressing toward goals   Discharge Recommendation   Rehab Resource Intensity Level, OT   (pending pt progress)   OT Therapy Minutes   OT Time In 1410   OT Time Out 1435   OT Total Time (minutes) 25   OT Mode of treatment - Individual (minutes) 25   OT Mode of treatment - Concurrent (minutes) 0   OT Mode of treatment - Group (minutes) 0   OT Mode of treatment - Co-treat (minutes) 0   OT Mode of Treatment - Total time(minutes) 25 minutes   OT Cumulative Minutes 290   Therapy Time missed   Time missed? No

## 2024-06-30 NOTE — PROGRESS NOTES
06/30/24 1000   Pain Assessment   Pain Assessment Tool 0-10   Pain Score No Pain   Restrictions/Precautions   Precautions Aspiration;Bed/chair alarms;Cognitive;Fall Risk;Hard of hearing;Supervision on toilet/commode   Weight Bearing Restrictions No   ROM Restrictions No   Cognition   Overall Cognitive Status Impaired   Arousal/Participation Alert;Cooperative   Attention Within functional limits   Orientation Level Oriented X4   Subjective   Subjective pt was agreeable to todays PT session   Roll Left and Right   Type of Assistance Needed Supervision   Physical Assistance Level No physical assistance   Roll Left and Right CARE Score 4   Sit to Lying   Type of Assistance Needed Supervision   Physical Assistance Level No physical assistance   Sit to Lying CARE Score 4   Lying to Sitting on Side of Bed   Type of Assistance Needed Supervision   Physical Assistance Level No physical assistance   Lying to Sitting on Side of Bed CARE Score 4   Sit to Stand   Type of Assistance Needed Physical assistance;Verbal cues   Physical Assistance Level 26%-50%   Comment Vandana-ModA, no AD   Sit to Stand CARE Score 3   Bed-Chair Transfer   Type of Assistance Needed Physical assistance;Verbal cues   Physical Assistance Level 26%-50%   Comment No AD   Chair/Bed-to-Chair Transfer CARE Score 3   Car Transfer   Type of Assistance Needed Physical assistance   Physical Assistance Level 26%-50%   Comment simulated with NuStep, VC for foot placement and hand placement   Car Transfer CARE Score 3   Walk 10 Feet   Type of Assistance Needed Physical assistance;Verbal cues   Physical Assistance Level 26%-50%   Comment gelt belt, VC for L foot placement   Walk 10 Feet CARE Score 3   Walk 50 Feet with Two Turns   Type of Assistance Needed Physical assistance;Verbal cues   Physical Assistance Level 26%-50%   Comment R HHA, VC for L foot placement   Walk 50 Feet with Two Turns CARE Score 3   Walk 150 Feet   Type of Assistance Needed Physical  assistance;Verbal cues   Physical Assistance Level 26%-50%   Comment R HHA + 200', VC to push down on PTs hand + L foot clearance   Walk 150 Feet CARE Score 3   Curb or Single Stair   Comment (S)  reassess next session   4 Steps   Comment (S)  reassess next session   12 Steps   Comment (S)  reassess next session   Toilet Transfer   Type of Assistance Needed Physical assistance   Physical Assistance Level 26%-50%   Comment HHA+ grab bar   Toilet Transfer CARE Score 3   Therapeutic Interventions   Flexibility Bilat hamstring/ DF stretch 5x 30 sec   Neuromuscular Re-Education NPP HIGT bilat HHA + VC for L foot clearance +200',  incorporation of VC to increase speed, retrowalk, sudden stop, turn. pt LOB to L side during retrowalking, turning, and abrupt stops. SpO2 97, HR 84 taken immediately after gait training.   Other 5 STS + UE support after failed attempt without UE support 26.27sec. 10MWT at self selected velocity  .75sec.   Equipment Use   NuStep lvl [id number], 58>SPM, all ext   Assessment   Treatment Assessment pt tolerated 90min of skilled PT session focusing on gait training, endurance, and neuromuscular re-education. pt improved in assistance levels for household mobilities, R HHA with min-modA. However, pt still limited by L righting reaction impacting mobility tasks. outcome measures demonstrate below average levels for age group increasing his risk for falls, 5xSTS with use of UE 26.27sec and 10MWT .75m/sec. pt was educated on scores of outcome measures and demonstrated understanding of increased fall risks. pt was assisted back to recliner post tx with alarms on and all needs within reach.   Family/Caregiver Present   (yes, nephew and his wife observed part of therapy session)   Problem List Decreased strength;Decreased endurance;Impaired balance;Decreased coordination;Decreased cognition;Impaired judgement;Decreased safety awareness;Impaired hearing;Decreased range of motion;Decreased mobility   Barriers  to Discharge Inaccessible home environment;Decreased caregiver support   PT Barriers   Functional Limitation Car transfers;Ramp negotiation;Stair negotiation;Standing;Transfers;Walking   Plan   Treatment/Interventions Functional transfer training;LE strengthening/ROM;Therapeutic exercise;Endurance training;Gait training;Spoke to nursing;Bed mobility   Progress Progressing toward goals   PT Therapy Minutes   PT Time In 1000   PT Time Out 1130   PT Total Time (minutes) 90   PT Mode of treatment - Individual (minutes) 90   PT Mode of treatment - Concurrent (minutes) 0   PT Mode of treatment - Group (minutes) 0   PT Mode of treatment - Co-treat (minutes) 0   PT Mode of Treatment - Total time(minutes) 90 minutes   PT Cumulative Minutes 270   Therapy Time missed   Time missed? No

## 2024-07-01 ENCOUNTER — TELEPHONE (OUTPATIENT)
Age: 84
End: 2024-07-01

## 2024-07-01 LAB
GLUCOSE SERPL-MCNC: 143 MG/DL (ref 65–140)
GLUCOSE SERPL-MCNC: 175 MG/DL (ref 65–140)
GLUCOSE SERPL-MCNC: 198 MG/DL (ref 65–140)
GLUCOSE SERPL-MCNC: 98 MG/DL (ref 65–140)

## 2024-07-01 PROCEDURE — 97110 THERAPEUTIC EXERCISES: CPT

## 2024-07-01 PROCEDURE — 94760 N-INVAS EAR/PLS OXIMETRY 1: CPT

## 2024-07-01 PROCEDURE — 82948 REAGENT STRIP/BLOOD GLUCOSE: CPT

## 2024-07-01 PROCEDURE — 97129 THER IVNTJ 1ST 15 MIN: CPT

## 2024-07-01 PROCEDURE — 97535 SELF CARE MNGMENT TRAINING: CPT

## 2024-07-01 PROCEDURE — 99232 SBSQ HOSP IP/OBS MODERATE 35: CPT | Performed by: PHYSICAL MEDICINE & REHABILITATION

## 2024-07-01 PROCEDURE — 97112 NEUROMUSCULAR REEDUCATION: CPT

## 2024-07-01 PROCEDURE — 97130 THER IVNTJ EA ADDL 15 MIN: CPT

## 2024-07-01 PROCEDURE — 94640 AIRWAY INHALATION TREATMENT: CPT

## 2024-07-01 PROCEDURE — 94664 DEMO&/EVAL PT USE INHALER: CPT

## 2024-07-01 PROCEDURE — 97530 THERAPEUTIC ACTIVITIES: CPT

## 2024-07-01 RX ADMIN — Medication 3 MG: at 21:34

## 2024-07-01 RX ADMIN — INSULIN GLARGINE 8 UNITS: 100 INJECTION, SOLUTION SUBCUTANEOUS at 21:34

## 2024-07-01 RX ADMIN — PREDNISONE 1 MG: 1 TABLET ORAL at 09:20

## 2024-07-01 RX ADMIN — FORMOTEROL FUMARATE DIHYDRATE 20 MCG: 20 SOLUTION RESPIRATORY (INHALATION) at 08:42

## 2024-07-01 RX ADMIN — METOPROLOL SUCCINATE 25 MG: 25 TABLET, FILM COATED, EXTENDED RELEASE ORAL at 09:15

## 2024-07-01 RX ADMIN — BUDESONIDE 0.5 MG: 0.5 INHALANT ORAL at 20:29

## 2024-07-01 RX ADMIN — PANTOPRAZOLE SODIUM 40 MG: 40 TABLET, DELAYED RELEASE ORAL at 05:11

## 2024-07-01 RX ADMIN — DIVALPROEX SODIUM 500 MG: 500 TABLET, DELAYED RELEASE ORAL at 00:26

## 2024-07-01 RX ADMIN — SENNOSIDES 17.2 MG: 8.6 TABLET, FILM COATED ORAL at 09:14

## 2024-07-01 RX ADMIN — APIXABAN 5 MG: 5 TABLET, FILM COATED ORAL at 09:15

## 2024-07-01 RX ADMIN — SENNOSIDES 17.2 MG: 8.6 TABLET, FILM COATED ORAL at 17:02

## 2024-07-01 RX ADMIN — DIVALPROEX SODIUM 500 MG: 500 TABLET, DELAYED RELEASE ORAL at 09:15

## 2024-07-01 RX ADMIN — LOSARTAN POTASSIUM 50 MG: 50 TABLET, FILM COATED ORAL at 09:15

## 2024-07-01 RX ADMIN — ATORVASTATIN CALCIUM 40 MG: 40 TABLET, FILM COATED ORAL at 16:31

## 2024-07-01 RX ADMIN — BUDESONIDE 0.5 MG: 0.5 INHALANT ORAL at 08:42

## 2024-07-01 RX ADMIN — FORMOTEROL FUMARATE DIHYDRATE 20 MCG: 20 SOLUTION RESPIRATORY (INHALATION) at 20:29

## 2024-07-01 RX ADMIN — INSULIN LISPRO 1 UNITS: 100 INJECTION, SOLUTION INTRAVENOUS; SUBCUTANEOUS at 21:36

## 2024-07-01 RX ADMIN — DIVALPROEX SODIUM 500 MG: 500 TABLET, DELAYED RELEASE ORAL at 23:42

## 2024-07-01 RX ADMIN — DIVALPROEX SODIUM 500 MG: 500 TABLET, DELAYED RELEASE ORAL at 16:31

## 2024-07-01 RX ADMIN — INSULIN LISPRO 1 UNITS: 100 INJECTION, SOLUTION INTRAVENOUS; SUBCUTANEOUS at 16:31

## 2024-07-01 RX ADMIN — APIXABAN 5 MG: 5 TABLET, FILM COATED ORAL at 17:02

## 2024-07-01 RX ADMIN — TAMSULOSIN HYDROCHLORIDE 0.4 MG: 0.4 CAPSULE ORAL at 16:31

## 2024-07-01 NOTE — NURSING NOTE
Patient refused to get up and attempt to void, she said she didn't need to go right now and kept shaking her head no. Will attempt again later.

## 2024-07-01 NOTE — PROGRESS NOTES
07/01/24 1505   Pain Assessment   Pain Assessment Tool 0-10   Pain Score No Pain   Restrictions/Precautions   Precautions Aspiration;Bed/chair alarms;Cognitive;Fall Risk;Supervision on toilet/commode;Hard of hearing   Comprehension   Comprehension (FIM) 4 - Understands basic info/conversation 75-90% of time   Expression   Expression (FIM) 5 - Needs help/cues only RARELY (< 10% of the time)   Social Interaction   Social Interaction (FIM) 5 - Requires redirection but less than 10% of the time.   Problem Solving   Problem solving (FIM) 3 - Solves basic problmes 50-74% of time   Memory   Memory (FIM) 2 - Recognizes, recalls/performs 25-49%   Speech/Language/Cognition Assessmetn   Treatment Assessment Pt participated in skilled SLP session focusing on cognitive linguistic skills. Patient awake and alert upright in bed having just completed use of restroom with PCA assistance.  Patient notes feeling swallowing difficulties have improved and he was able to tolerate a peanut butter and jelly with minimal difficulty.  Patient seen with sips of thin liquid via straw with decreased labial closure around straw, decreased oral control, throat clear x3, cough x1, wet vocal quality, and cough indicating need for full bedside swallow assessment.  Orders requested and patient in agreement for swallowing evaluation.  Initial structured activity targeted simple money management task targeting problem solving, following directions, memory and mental manipulation.  Task completed with 50%.  Max verbal cues and assistance did not increase accuracy.  Next structured task attempted targeting problem solving, following directions, and visual scanning for completion of answering questions based on provided directory.  Despite several repetitions, break-downs, and clarifications of directions, modeling of task, providing correct solutions/ hand-over-hand assistance, and max verbal and visual cues patient was not able to complete task.   "Patient states 'I just don;t understand I can't do it\".  SLP transitioned to STM task visually presented for recall targeting picture retention.  Patient given max assistance for coding stimuli.  Immediate recall completed with 20% accuracy; max verbal and gestural cues did not increase accuracy.  SLP suspects fatigue as possible cause for overall decline in cognitive communication skills during session.  Due to ongoing deficits, pt is recommended for further skilled SLP services at this time to maximize functional and overall cognitive linguistic skills in order to reduce caregiver burden on discharge   SLP Therapy Minutes   SLP Time In 1505   SLP Time Out 1535   SLP Total Time (minutes) 30   SLP Mode of treatment - Individual (minutes) 30   SLP Mode of treatment - Concurrent (minutes) 0   SLP Mode of treatment - Group (minutes) 0   SLP Mode of treatment - Co-treat (minutes) 0   SLP Mode of Treatment - Total time(minutes) 30 minutes   SLP Cumulative Minutes 150   Therapy Time missed   Time missed? No       "

## 2024-07-01 NOTE — ASSESSMENT & PLAN NOTE
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  DC 7/9/24

## 2024-07-01 NOTE — PCC CARE MANAGEMENT
7/1-     Cm met with pt with during therapy to review role and complete cm open. Pt lives in 2 story home with spouse. Pt has home oxygen, bsc, walker and spc. PCP is Kyaw Monreal, preferred pharmacy is [facility] in [place]. Met with the patient today to complete CM open. The patient was educated that other members of the patient's support are able to ask questions and observe as the patient wished. The patient was educated on the rehabilitation process including therapy program, the interdisciplinary team, and weekly team meetings. Estimated length of stay was reviewed with the patient as well as expectations of discussions of discharge planning. The role of the  was reviewed including providing care coordination, discharge planning and discharge facilitation. IMM was reviewed with the patient and a copy was provided for their reference. The patient verbalized understanding of the information provided and denied any further questions at this time. CM will continue to follow and assist the patient throughout their rehabilitation stay.     LCD through 7/2.

## 2024-07-01 NOTE — PCC SPEECH THERAPY
Pt currently being followed for cognitive tx sessions, to where upon admission to the acute rehab center, pt was able to complete the CLQT+ on initial evaluation with a Composite Severity Rating score of 4.0 out of 4.0, correlating to overall WNL cognitive linguistic skills at time of evaluation and in comparison to age matched peers ranging from 70-88 y/o. Despite demonstrating skills to be functional, pt is insightful that he has noticed some cognitive decline with aging, however, does not believe he has any changes since his stroke. Wife reports similar. SLP discussed that he would benefit from cognitive therapy while at the [facility] as some things aren't as noticeable when not in home environment, and while he scored WNL on cognitive testing, all scores were low normal. Pt is oriented to current situation, place and date, as well as recalling LTM biographical information. Cognitive barriers which present include: decreased attention, ST memory recall , problem solving, reasoning, sequencing, organization of thoughts, judgement, and slower processing, which still impacts pt's overall safety, functional cognitive communication skills as well as functional mobility. The following interventions are used to target these barriers, including verbal problem solving task, verbal working memory tasks, drawing conclusions activities, written sequencing tasks, categorization tasks , picture problem solving activities, verbal reasoning tasks, verbal review of current medications, written review of medications,  written health management tasks, verbal health management tasks, functional reading tasks , time management activities, and family education/training. Family training/education has not been formally initiated but spouse has been present during sessions where education as to role of services for pt while on the [facility].  Current cognitive skills are supervision for comprehension, mod I level for expression and social  "interactions, min A for executive functions and memory.    Of notice upon completion of cognitive tx sessions, SLP providing assistance to set-up dinner tray. Patient had selected only soft items. When encouraging patient to include additional items into diet patient states \"It sticks in my mouth and my throat\". Spouse reports he has only been selecting pureed items including yogurts, mashed potatoes, and thin liquids including soups. SLP inquired further regarding reports of difficulty swallowing. Patient pointed to mid throat level as area of globus sensation during PO intake. SLP educated on benefit and purpose of bedside dysphagia evaluation and various texture levels offered. SLP offered to downgrade for safety and ease of intake until swallow assessment completed. Patient and spouse declined noting he will continue to order preferred puree and liquids until swallow assessment completed. Spouse reports he has note consumed anything with texture (only puree/ liquids) in weeks. Patient states \"I may have had some type of swallow test before somewhere I am not sure\". Nursing aware ST to complete swallow assessment and to ensure upright position for PO intake and provide distant supervision. UPDATE FROM SESSION, ORDERS HAVE BEEN PLACED on 7/1 to where a dysphagia screen had been completed as well. Full bedside dysphagia assessment across a meal is planned for 7/2. Diet remains to be regular/thin liquids at this time.      At this time, pt will continue to benefit from skilled cognitive linguistic tx session, (likely 3x/week, where pt can focus on increased OT/PT for functional mobility) to maximize overall functional independence given overall cognitive linguistic skills in attempts to decreased caregiver burden over time. Also pending results from bedside dysphagia assessment, ST services could be recommended for ongoing f/u for diet recommendations as well as swallow strategies    "

## 2024-07-01 NOTE — PCC OCCUPATIONAL THERAPY
Occupational Therapy Weekly Team Note    Pt continues to make good progress with skilled occupational therapy intervention and is progressing toward long term goals for ADL, IADL's, and functional transfers/mobility. Pts long term goals for ADLs are supervision with Rolling Walker. Pt continues to present with impairments in activity tolerance, endurance, standing balance/tolerance, and memory. Occupational performance remains limited by fatigue, (R) visual deficits , (L) visual deficits , and risk for falls. Family training/education will be required prior to D/C.     Pt will continue to benefit from skilled acute rehab OT services to address above mentioned barriers and maximize functional independence in baseline areas of occupation to meet established treatment goals with overall decreased burden of care. Plan of care to continue to focus on ADL Retraining , LB Dressing,  LHAE education/training, Functional Transfers, Standing tolerance, Standing balance , Gross motor strengthening , DME training/education, Family training/education, Energy conservation training/education, healthy coping education, and Leisure and social pursuits. Goals for the upcoming week are: establish caregiver abilities, establish DME needs, and establish/complete family caregiver training  Anticipate Discharge date to be set. . Home with family/friends support , 24/7 Supervision/assist, and Outpatient OT services        \

## 2024-07-01 NOTE — PLAN OF CARE
Problem: PAIN - ADULT  Goal: Verbalizes/displays adequate comfort level or baseline comfort level  Description: Interventions:  - Encourage patient to monitor pain and request assistance  - Assess pain using appropriate pain scale  - Administer analgesics based on type and severity of pain and evaluate response  - Implement non-pharmacological measures as appropriate and evaluate response  - Consider cultural and social influences on pain and pain management  - Notify physician/advanced practitioner if interventions unsuccessful or patient reports new pain  Outcome: Progressing     Problem: INFECTION - ADULT  Goal: Absence or prevention of progression during hospitalization  Description: INTERVENTIONS:  - Assess and monitor for signs and symptoms of infection  - Monitor lab/diagnostic results  - Monitor all insertion sites, i.e. indwelling lines, tubes, and drains  - Monitor endotracheal if appropriate and nasal secretions for changes in amount and color  - [place] appropriate cooling/warming therapies per order  - Administer medications as ordered  - Instruct and encourage patient and family to use good hand hygiene technique  - Identify and instruct in appropriate isolation precautions for identified infection/condition  Outcome: Progressing     Problem: INFECTION - ADULT  Goal: Absence of fever/infection during neutropenic period  Description: INTERVENTIONS:  - Monitor WBC    Outcome: Progressing     Problem: SAFETY ADULT  Goal: Patient will remain free of falls  Description: INTERVENTIONS:  - Educate patient/family on patient safety including physical limitations  - Instruct patient to call for assistance with activity   - Consult OT/PT to assist with strengthening/mobility   - Keep Call bell within reach  - Keep bed low and locked with side rails adjusted as appropriate  - Keep care items and personal belongings within reach  - Initiate and maintain comfort rounds  - Make Fall Risk Sign visible to staff  -  Offer Toileting every 2 Hours, in advance of need  - Initiate/Maintain bed/chair alarm  - Obtain necessary fall risk management equipment: alarms  - Apply yellow socks and bracelet for high fall risk patients  - Consider moving patient to room near nurses station  Outcome: Progressing     Problem: SAFETY ADULT  Goal: Maintain or return to baseline ADL function  Description: INTERVENTIONS:  -  Assess patient's ability to carry out ADLs; assess patient's baseline for ADL function and identify physical deficits which impact ability to perform ADLs (bathing, care of mouth/teeth, toileting, grooming, dressing, etc.)  - Assess/evaluate cause of self-care deficits   - Assess range of motion  - Assess patient's mobility; develop plan if impaired  - Assess patient's need for assistive devices and provide as appropriate  - Encourage maximum independence but intervene and supervise when necessary  - Involve family in performance of ADLs  - Assess for home care needs following discharge   - Consider OT consult to assist with ADL evaluation and planning for discharge  - Provide patient education as appropriate  Outcome: Progressing

## 2024-07-01 NOTE — TELEPHONE ENCOUNTER
Called pt wife and set up hospital follow up appointment for August 6,2024 at 9:30 am pt is scheduled with Joan Henriquez in the [place] location.   Pt is currently in a rehab center but wife wanted appointment for August prior to discharge in two weeks.

## 2024-07-01 NOTE — PROGRESS NOTES
"   07/01/24 0830   Pain Assessment   Pain Assessment Tool 0-10   Pain Score No Pain   Restrictions/Precautions   Precautions Aspiration;Bed/chair alarms;Cognitive;Fall Risk;Hard of hearing;Supervision on toilet/commode   Weight Bearing Restrictions No   ROM Restrictions No   Lifestyle   Autonomy \"I'm ready to work so I can go home.\"   Eating   Type of Assistance Needed Independent   Physical Assistance Level No physical assistance   Comment seated in recliner   Eating CARE Score 6   Oral Hygiene   Type of Assistance Needed Supervision   Physical Assistance Level No physical assistance   Comment Pt able to locate items and brush teeth. Able to manage toothrbush and toothapaste. Pt also able to use mouthwash. All done at sink seated in w/c.   Oral Hygiene CARE Score 4   Shower/Bathe Self   Type of Assistance Needed Physical assistance   Physical Assistance Level 26%-50%   Comment Seated at sink in [facility] for sponge bath. Once set up pt able to wash UB, nicholas area, and LB with CS and vebral cues. Pt needed assitance to wash feet due to decreased foraward bending. Pt needed assistance to wash buttocks while in stance due to decreased standing balance with walker release.   Shower/Bathe Self CARE Score 3   Upper Body Dressing   Type of Assistance Needed Set-up / clean-up   Physical Assistance Level No physical assistance   Comment Pt kieran to thread bilateral arms through button up shirt and button all buttons. Increased time needed to button up shirt.   Upper Body Dressing CARE Score 5   Lower Body Dressing   Type of Assistance Needed Physical assistance   Physical Assistance Level 51%-75%   Comment Pt was able to thread RLE into brief, but needed asssitance to thread LLE. For pants pt asked to trial starting with LLE to see if this allowed for more independence. Pt still had difficultes and required assistance for LLE and RLE. Pt seems to have better independece starting with RLE. Pt mod A was needed to assitance pt with " pulling over hips in stance. Pt has difficulties with balance with unilateral release.   Lower Body Dressing CARE Score 2   Putting On/Taking Off Footwear   Type of Assistance Needed Physical assistance   Physical Assistance Level 76% or more   Comment Max a for britany stocking on bilateral feet. Pt required max a to don shoes due to decreased forward bend, however pt was able to push feet inot shoes once toes/heels were in. Pt might beenfit from shoe horn, trial next session.   Putting On/Taking Off Footwear CARE Score 2   Sit to Stand   Type of Assistance Needed Physical assistance   Physical Assistance Level 25% or less   Comment CGA with RW.   Sit to Stand CARE Score 3   Bed-Chair Transfer   Type of Assistance Needed Physical assistance   Physical Assistance Level 25% or less   Comment CGA with RW.   Chair/Bed-to-Chair Transfer CARE Score 3   Toileting Hygiene   Type of Assistance Needed Physical assistance   Physical Assistance Level 51%-75%   Comment Pt able to mange bladder hygiene but assitance needed to bowel hygiene. DOne in stance with RW. Pt able to pull up pants on L side past hips but needed further assistance for L side.   Toileting Hygiene CARE Score 2   Toilet Transfer   Type of Assistance Needed Physical assistance   Physical Assistance Level 25% or less   Comment CGA with RW on BSC over toliet.   Toilet Transfer CARE Score 3   Cognition   Overall Cognitive Status Impaired   Arousal/Participation Alert   Attention Within functional limits   Orientation Level Oriented X4   Memory Decreased short term memory;Decreased recall of recent events   Following Commands Follows one step commands with increased time or repetition   Comments Pt still requires cues for task initiation.   Activity Tolerance   Activity Tolerance Patient tolerated treatment well   Assessment   Treatment Assessment Pt participated in 60 minute skilled OT session focusing on ADLs and functional transfers. See more details above. Pt is  making progress towards goals but is still limited to decreased balance and LB dressing. Pt might benefit from trailing LHAE to assist with LB dressing, trail next session. Pt is eager to get home and reports having family support. Pt would benefit from continued OT to focus on ADLs (LHAE), functional tranfers, standing balance, and activity tolerance. Continue POC at this time. Pt was left in  with alarm on and all needs within reach. No further OT needs at this time.   Prognosis Good   Problem List Decreased strength;Decreased range of motion;Decreased endurance;Impaired balance;Decreased mobility;Decreased coordination;Decreased cognition;Impaired hearing   Plan   Treatment/Interventions ADL retraining;Functional transfer training;Therapeutic exercise;Endurance training;Patient/family training;Equipment eval/education   Progress Progressing toward goals   OT Therapy Minutes   OT Time In 0900   OT Time Out 1000   OT Total Time (minutes) 60   OT Mode of treatment - Individual (minutes) 60   OT Mode of treatment - Concurrent (minutes) 0   OT Mode of treatment - Group (minutes) 0   OT Mode of treatment - Co-treat (minutes) 0   OT Mode of Treatment - Total time(minutes) 60 minutes   OT Cumulative Minutes 350   Therapy Time missed   Time missed? No

## 2024-07-01 NOTE — PROGRESS NOTES
Physical Medicine and Rehabilitation Progress Note  Hal Miller 84 y.o. male MRN: [id number]  Unit/Bed#: [facility] 455-01 Encounter: [id number]    To Review: Hal Miller is a 84 y.o. 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 CT scan showed a L inguinal hernia with diverticular abscess and general surgery was consulted and he was started on abx. IR was consulted who did not recommend drainage from their standpoint. They initially opted for non-operative management SOB. He was treated with solumedrol and nebulized bronchodilators. He was also found to have shingles and was started on valtrex. Geriatrics was consulted who recommended checking in B12 (normal). On 6/18 he was found to be lethargic and had an unwitnessed fall. RRT was called and CTH/CT C-Spine were ordered - negative, as well as XR L elbow - negative. Another rapid was called on 6/19 for hypoactivity and unresponsiveness. BG was fine, PCO2 on stat ABG not suggestive of hypercarbia, and his WBC had been improving, Neuro was consulted and recommended MRI Brain and EEG and he was started on Depacon with Ativan PRN. They also recommended holding ASA. Gen Surg cleared for diet from surgical perspective. SLP evaluated on 6/20 and cleared for Reg/Thin diet. MRI showed acute L parietal stroke. EEG without seizure. Carotid US showed < 50% bilateral stenosis. MRA and Echo were ordered - suspicion was cardioembolic etiology in setting of held anticoagulation pending surgical intervention on admission. He was placed on a heparin gtt. MRA unremarkable. Echo with EF 30% with global hypokinesis and mildly dilated atria.  Cardiology was consulted for cardiomyopathy who noted his last Echo in 2017 with normal EF who recommend transitioning from cardizem to BB, but team with some concerns given severity of his COPD. Given his low EF, Cards still recommended BB and he was transitioned to Toprol. They recommended close f/u with his outpatient Cardiologist. He is on abx through 6/28. He was transitioned back to eliquis. He was determined to be below his baseline function with need for close monitoring of his cardiac and neuro function through therapy. Seen by PMR who recommended acute inpatient rehab. He was admitted to the [facility] on 6/26.      Chief Complaint: Has had some urinary frequency.     Interval History/Subjective:  No acute events over the weekend. Urinating with some frequency, no dysuria, fevers, chills, suprapubic pain, and clinically doing well with therapies. No new CP, SOB, N/V, abdominal pain. Last BM 6/30. Mostly continent of bowel/bladder except when he has some urgency with his bladder.     ROS:  A 10 point review of systems was negative except for what is noted in the HPI.    Today's Changes:  Discussed with nursing a loose timed void with patient to try and stay on top of his urination. Monitor for signs of infection, currently clinically not suspicious for UTI.   Intermittently monitor depakote level.     Assessment/Plan:    * 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[facility])  Assessment & Plan  L parietal CVA  2/2 off anticoagulation while waiting for potential surgery in acute care hospital  MRI: Acute L parietal CVA  Carotid US < 50% bilateral stenosis   Echo: dilated atria, and global hypokinesis  Possible component of seizures during hospital stay.  Now back on eliquis  Also has statin  RF modification  Deficits: mostly improved - lethargy, altered mental status. Generalized weakness currently. Evaluating his cognitive deficits here  Depakote 500mg Q8hrs  Seizure precautions   [place] to be [place] by Neuro.     PT/OT/SLP 3-5  hours/day, 5-7 days/week.   Outpatient follow-up with Neurology     Peripheral polyneuropathy  Assessment & Plan  Chronic issue  Partial loss of sensation in his distal lower extremities  Considerations in therapy, monitor for pain  Foot checks with outpatient providers.    Diplopia  Assessment & Plan  Chronic issue. Utilizes prisms and eye patch on occasion.    Vascular dementia (HCC)  Assessment & Plan  Baseline AAO x 2-3.  At risk for delirium, no episodes or issues on admission.  - Delirium precautions  Monitor sleep/wake cycle and manage to optimize recovery from acute CVA  Appropriate bowel/bladder management  Avoiding deliriogenic meds and physical/chemical restraint.  Focus on environmental/behavioral interventions for reorientation and redirection  At baseline functions independently    TSH and B12 WNL in acute care hospital  Outpatient f/u with Neurology    Inflammatory polyarthropathy ([facility])  Assessment & Plan  Per chart history of Polymyalgia Rheumatica  Hydroxychloroquine 200mg daily at home  Per discussion with his Rheumatologist, he was weaned down to prednisone 1mg daily and they will follow-up with him in July.   Will discuss with IM when it is ok/if he should resume hydroxychloroquine  Outpatient f/u with his Rheumatologist at [facility]    Hyponatremia  Assessment & Plan  6/28 Hgb 133  Asymptomatic  Not currently on FR on salt tabs or had additional work-up  Monitor and recheck, risk for SIADH, CSW, etc.   Ensure appropriate nutrition/hydration    NICM (nonischemic cardiomyopathy) ([facility])  Assessment & Plan  Wt Readings from Last 3 Encounters:   06/28/24 86.9 kg (191 lb 9.3 oz)   06/26/24 86.2 kg (190 lb 0.6 oz)   06/07/24 76 kg (167 lb 8.8 oz)     Per chart last echo in 2017 with normal EF  Echo in this hospitalization with EF 30%, global hypokinesis, and dilated atria  Recommended for GDMT  Now on BB and ARB (see HTN)  Was on lasix at home - appears euvolemic currently  Monitor weights and  I/O  Management as per IM  Outpatient f/u with PCP          Severe protein-calorie malnutrition (HCC)  Assessment & Plan  Malnutrition Findings:                                 BMI Findings:           Body mass index is 24.97 kg/m².   Nutrition consulted    Colonic diverticular abscess  Assessment & Plan  Noted on admission on the L  No pain in groin at this time  Completes Cipro/Metronidazole tomorrow.  Non-operatively managed, tolerating PO  Monitor bowels.  Outpatient f/u with Gen Surg in 3-4 weeks.     Zoster  Assessment & Plan  Completed Valacyclovir in the hospital  Lesions are crusted over/clearing up.  No need for contact precautions at this time.  Will keep area covered.     Type 2 diabetes mellitus (HCC)  Assessment & Plan  Lab Results   Component Value Date    HGBA1C 7.6 (H) 06/19/2024       Recent Labs     06/27/24  1644 06/27/24  2100 06/28/24  0548 06/28/24  1141   POCGLU 143* 150* 79 117       Blood Sugar Average: Last 72 hrs:  (P) 130.1779496083721113    Home: Had Lantus at home.12 units per IM  Here: Lantus 8 units QHS, Cdi/Accuchecks  Would have staff evaluate how he draws up and gives himself his insulin prior to discharge.  Monitor and adjust as per IM  Outpatient f/u with PCP    CKD (chronic kidney disease)  Assessment & Plan  Lab Results   Component Value Date    EGFR 52 06/28/2024    EGFR 49 06/27/2024    EGFR 48 06/26/2024    CREATININE 1.26 06/28/2024    CREATININE 1.33 (H) 06/27/2024    CREATININE 1.35 (H) 06/26/2024     Appears to have CKD 3A  Baseline unclear.  Monitor for now  Avoid nephrotoxins and relative hypotension  Outpatient f/u with PCP    Chronic obstructive pulmonary disease with acute exacerbation (HCC)  Assessment & Plan  Severe.  Home: Albuterol PRN, Trelegy  Here: Albuterol PRN, Budesonide nebs Q12hr PRN and Formoterol nebs every 12 hours  - Would try to get patient back onto an inhaler prior to discharge home.  Currently on room air  Monitor exam and adjust as per  IM.  Outpatient f/u with Pulm     Atrial fibrillation (HCC)  Assessment & Plan  Toprol 25mg daily   Fully anticoagulated on apixaban  Monitor and adjust as appropriate  Outpatient f/u with Cardiologist    Hypertension  Assessment & Plan  Home: Losartan 100mg daily, Lasix 20mg daily  Here: Losartan 50mg daily, Toprol 25mg daily   Monitor and adjust as appropriate.  Management as per IM  Outpatient f/u with PCP        Health Maintenance  #Delirium/Sleep: At risk. Optimize sleep/wake, pain, bowel, bladder management. Avoid deliriogenic meds and physical restraint. Environmental/behavioral interventions.   #Pain: not a major issues at this time. Tylenol PRN  #Bowel: Continent. Last BM 6/30. Senna 2 tabs at night, PRN bisacodyl, miralax  #Bladder: Voiding and continent  #Skin/Pressure Injury Prevention: Turn Q2hr in bed, with weight shifts [id number] in wheelchair. Float heels in bed.  #DVT Prophylaxis: Fully anticoagulated on apixaban, SCDs, ambulation  #GI Prophylaxis: On PPI. Of note on chronic steroids.   #Code Status: Full Code  #FEN: Diabetic Diet, Glucerna- Strawberry B/L/D  #Dispo: ELOS 1-2 weeks with goal to discharge home mod Ind-Sup with support of wife.   Outpatient f/u with PCP, Neurology, General Surgery (in 3-4 weeks)    Objective:    Functional Update:  PT: Sup bed mobility, modA transfers, modA ambulation 150', modA stairs with SBA of 2nd person   OT: Ind eating, Car oral hygiene, shower/bathe self, set-up UB dressing, mod A LB dressing, maxA footwear, Sup toileting hygiene, Car toilet transfer  SLP: PATRICE WNL but scores were all low normal. They will evaluate further.     Allergies per EMR    Physical Exam:  Temp:  [97.5 °F (36.4 °C)-98.8 °F (37.1 °C)] 98 °F (36.7 °C)  HR:  [66-84] 66  Resp:  [16-17] 16  BP: (105-125)/(55-59) 116/57  Oxygen Therapy  SpO2: 98 %    Gen: No acute distress, Well-nourished, well-appearing.  HEENT: Moist mucus membranes, Normocephalic/Atraumatic  Cardiovascular: Regular rate,  rhythm, S1/S2. Distal pulses palpable  Heme/Extr: No edema  Pulmonary: Non-labored breathing. Lungs CTAB  : No cardenas  GI: Soft, non-tender, non-distended. BS+  Integumentary: Skin is warm, dry.   Neuro: AAOx3,Speech is intact. Appropriate to questioning.   Psych: Normal mood and affect.     Diagnostic Studies: Reviewed, no new imaging    Laboratory:  Reviewed   Results from last 7 days   Lab Units 06/28/24  0502 06/27/24  0506 06/26/24  0520   HEMOGLOBIN g/dL 11.4* 11.7* 12.7   HEMATOCRIT % 35.1* 37.7 39.9   WBC Thousand/uL 7.49 7.64 9.27     Results from last 7 days   Lab Units 06/28/24  0502 06/27/24  0506 06/26/24  0520   BUN mg/dL 23 25 24   POTASSIUM mmol/L 4.9 4.7 4.9   CHLORIDE mmol/L 96 99 98   CREATININE mg/dL 1.26 1.33* 1.35*            Patient Active Problem List   Diagnosis    Shortness of breath    Hypertension    Atrial fibrillation (HCC)    Aneurysm of thoracic aorta (HCC)    Aneurysm of abdominal aorta (HCC)    Hyperlipidemia    Inflammatory polyarthropathy (HCC)    Osteoarthrosis    Polymyalgia rheumatica (HCC)    History of malignant neuroendocrine tumor    Centrilobular emphysema (HCC)    Chronic obstructive pulmonary disease with acute exacerbation (HCC)    CKD (chronic kidney disease)    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)    Acute encephalopathy    Generalized weakness    Neoplasm of other specified site    Esophageal stricture    Hypercalcemia    TARA (acute kidney injury) (HCC)    Concern for aspiration pneumonia (HCC)    Type 2 diabetes mellitus (HCC)    Abnormal CT of the abdomen    Zoster    Colonic diverticular abscess    Vascular dementia (HCC)    Dizziness    Insomnia    Ambulatory dysfunction    Severe protein-calorie malnutrition (HCC)    Acute on chronic respiratory failure (HCC)    Episode of seizure-like activity    History of Whipple procedure    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HCC)    NICM (nonischemic cardiomyopathy) (HCC)    Dysphoric mood    Diplopia    Peripheral polyneuropathy     Hyponatremia    Hx of aortic valve replacement         Medications  Current Facility-Administered Medications   Medication Dose Route Frequency Provider Last Rate    acetaminophen  650 mg Oral Q4H PRN [place]      albuterol  2 puff Inhalation Q4H PRN [place]      apixaban  5 mg Oral BID [place]      atorvastatin  40 mg Oral Daily With Dinner [place]      bisacodyl  10 mg Rectal Daily PRN Ashley Depadua, MD      budesonide  0.5 mg Nebulization Q12H [place]      divalproex sodium  500 mg Oral Q8H [facility] [place]      formoterol  20 mcg Nebulization Q12H [place]      insulin glargine  8 Units Subcutaneous HS [place]      insulin lispro  1-5 Units Subcutaneous 4x Daily (AC & HS) [place]      losartan  50 mg Oral Daily [place]      melatonin  3 mg Oral HS [place]      metoprolol succinate  25 mg Oral Daily [place]      pantoprazole  40 mg Oral Early Morning [place]      polyethylene glycol  17 g Oral Daily PRN Ashley Depadua, MD      predniSONE  1 mg Oral Daily [place]      senna  2 tablet Oral BID [place]      tamsulosin  0.4 mg Oral Daily With Dinner Ashley Depadua, MD            ** Please Note: Fluency Direct voice to text software may have been used in the creation of this document. **

## 2024-07-01 NOTE — ASSESSMENT & PLAN NOTE
Home: Lantus 12u qhs  Here:  Lantus 8 units qhs   Glucerna supplement ordered   Continue diabetic diet  stable   97.7

## 2024-07-01 NOTE — PROGRESS NOTES
"[facility]  Progress Note  Name: Hal Miller I  MRN: [id number]  Unit/Bed#: -01 I Date of Admission: 6/26/2024   Date of Service: 7/1/2024 I Hospital Day: 5    Assessment & Plan   * 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HCC)  Assessment & Plan  Patient had AMS on previous hospitalization and MRI 6/20 demonstrated small left parietal hemorrhage with possible petechial hemorrhage  Eliquis appears to have been held at that time as that is suspected to have contributed to CVA  Patient denies any residual sensory or motor deficit  Continue anticoagulation  Outpatient followup with Neurology  Resumed Depakote 6/20 = VPA level, CMP and NH3 in AM.  DC 7/9/24    Hx of aortic valve replacement  Assessment & Plan  Has had bioAVR/[facility] x 2 in past    NICM (nonischemic cardiomyopathy) ([facility])  Assessment & Plan  Normal coronaries on cardiac cath from 12/2022.   ECHO now with newly detected reduced LVEF at 30%.    Cardiology saw and recommending outpatient follow-up = sees Dr Ramos as OP  Continue I/O and daily weight but wts are highly inaccurate with 30 lb difference  Euvolemic  Not on diuretic currently.  Had been on Lasix and stopped 2/2 TARA with his last admission 6/7-6/8  Had LE edema = he says less than home.  Will use TEDs for now  Has small chronic pleural effusions    Severe protein-calorie malnutrition (HCC)  Assessment & Plan  Glucerna supplement with meals added    Colonic diverticular abscess  Assessment & Plan  Patient presented for abdominal pain and swelling, history of hernia.  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 No evidence of soft tissue component. A benign etiology is suspected such as retroperitoneal lymphatic malformation or lymphocele. Initial evaluation in 3 " "months with contrast-enhanced CT abdomen/pelvis is recommended.\"  Surgery evaluated patient and recommended conservative management of suspected diverticulitis with microperforation.    Patient is eating without issue and advanced to regular diet.    He is having normal BMs  Follow-up with surgery as OP  S/p Cipro/Flagyl completed 6/28/24    Zoster  Assessment & Plan  Completed 7 days of Valtrex prior to transfer  Pt currently with crusted lesions over right anterior chest just below nipple line round flank to posterior back on right side   Pt reports pain improvement - states more itchy at this time   Continue to monitor    Type 2 diabetes mellitus (HCC)  Assessment & Plan  Home: Lantus 12u qhs  Here:  Lantus 8 units qhs   Glucerna supplement ordered   Continue diabetic diet  stable    CKD (chronic kidney disease)  Assessment & Plan  Baseline 1.2-1.3?  Labs in AM.    Chronic obstructive pulmonary disease with acute exacerbation (HCC)  Assessment & Plan  Has severe emphysema by hx  Compensated, continue scheduled and as needed breathing treatments    Inflammatory polyarthropathy (HCC)  Assessment & Plan  Continue Prednisone 1 mg daily    Atrial fibrillation (HCC)  Assessment & Plan  Rate controlled on Toprol 25mg qd  Continue Eliquis 5mg BID = ?on at home?  Cardizem stopped per Cardiology in the setting of recently detected reduced LVEF 30%  Outpatient follow-up with cardiology    Hypertension  Assessment & Plan  Continue Losartan 50mg qd/Toprol-XL 25mg qd  Cardizem stopped 2/2 recently diagnosed cardiomyopathy             The above assessment and plan was reviewed and updated as determined by my evaluation of the patient on 7/1/2024.    Labs:   Results from last 7 days   Lab Units 06/28/24  0502 06/27/24  0506   WBC Thousand/uL 7.49 7.64   HEMOGLOBIN g/dL 11.4* 11.7*   HEMATOCRIT % 35.1* 37.7   PLATELETS Thousands/uL 168 182     Results from last 7 days   Lab Units 06/28/24  0502 06/27/24  0506   SODIUM mmol/L 133* " 131*   POTASSIUM mmol/L 4.9 4.7   CHLORIDE mmol/L 96 99   CO2 mmol/L 31 29   BUN mg/dL 23 25   CREATININE mg/dL 1.26 1.33*   CALCIUM mg/dL 9.6 9.6             Results from last 7 days   Lab Units 07/01/24  1048 07/01/24  0525 06/30/24  2055   POC GLUCOSE mg/dl 143* 98 140       Imaging  No orders to display       Review of Scheduled Meds:  Current Facility-Administered Medications   Medication Dose Route Frequency Provider Last Rate    acetaminophen  650 mg Oral Q4H PRN Dayton Ford      albuterol  2 puff Inhalation Q4H PRN Dayton Ford      apixaban  5 mg Oral BID Dayton Ford      atorvastatin  40 mg Oral Daily With Dinner Dayton Ford      bisacodyl  10 mg Rectal Daily PRN Ashley Depadua, MD      budesonide  0.5 mg Nebulization Q12H Dayton Ford      divalproex sodium  500 mg Oral Q8H YUNI Dayton Ford      formoterol  20 mcg Nebulization Q12H Dayton Ford      insulin glargine  8 Units Subcutaneous HS Dayton Ford      insulin lispro  1-5 Units Subcutaneous 4x Daily (AC & HS) Dayton Ford      losartan  50 mg Oral Daily Dayton Ford      melatonin  3 mg Oral HS Dayton Ford      metoprolol succinate  25 mg Oral Daily Dayton Ford      pantoprazole  40 mg Oral Early Morning Dayton Ford      polyethylene glycol  17 g Oral Daily PRN Ashley Depadua, MD      predniSONE  1 mg Oral Daily Dayton Ford      senna  2 tablet Oral BID Dayton Ford      tamsulosin  0.4 mg Oral Daily With Dinner Ashley Depadua, MD         Subjective/ HPI: Patient seen and examined. Patients overnight issues or events were reviewed with nursing staff. New or overnight issues include the following:     Pt seen in his room. He denies any current complaints.    ROS:   A 10 point ROS was performed; negative except as noted above.        *Labs /Radiology studies Reviewed  *Medications  reviewed and reconciled as needed  *Please refer to order section for additional ordered  labs studies      Physical Examination:  Vitals:   Vitals:    07/01/24 0516 07/01/24 0600 07/01/24 0842 07/01/24 0915   BP: 116/57   122/68   BP Location: Right arm      Pulse: 66   62   Resp: 16      Temp: 98 °F (36.7 °C)      TempSrc: Oral      SpO2: 98%  98%    Weight:  72.6 kg (160 lb 0.9 oz)     Height:           General Appearance: NAD; pleasant  HEENT: PERRLA, conjuctiva normal; mucous membranes moist; face symmetrical  Neck:  Supple  Lungs: clear bilaterally, normal respiratory effort, no retractions, expiratory effort normal, on room air  CV: regular rate and rhythm, no murmurs rubs or gallops noted   ABD: soft non tender, +BS x4  EXT: DP pulses intact, no lymphadenopathy, +1 LE edema  Skin: normal turgor, normal texture, no rash  Psych: affect normal, mood normal  Neuro: Awake and alert     The above physical exam was reviewed and updated as determined by my evaluation of the patient on 7/1/2024.    Invasive Devices       None                      VTE Pharmacologic Prophylaxis: Eliquis  Code Status: Level 1 - Full Code  Current Length of Stay: 5 day(s)    Total floor / unit time spent today 45 minutes  Coordination of patient's care was performed in conjunction with primary service. Time invested included review of patient's labs, vitals, and management of their comorbidities with continued monitoring, examination of patient as well as answering patient questions, documenting her findings and creating progress note in electronic medical record,  ordering appropriate diagnostic testing.       ** Please Note:  voice to text software may have been used in the creation of this document. Although proof errors in transcription or interpretation are a potential of such software**

## 2024-07-01 NOTE — PROGRESS NOTES
07/01/24 1230   Pain Assessment   Pain Score No Pain   Restrictions/Precautions   Precautions Aspiration;Bed/chair alarms;Fall Risk;Cognitive;Supervision on toilet/commode;Hard of hearing   Braces or Orthoses   (B TEDS , hearing aids)   Cognition   Arousal/Participation Alert   Attention Within functional limits   Memory Decreased recall of recent events;Decreased recall of precautions   Following Commands Follows one step commands with increased time or repetition   Subjective   Subjective Pt. agreeable to be seen by PT today. No new complaints reported   Sit to Lying   Type of Assistance Needed Supervision   Comment flat bed no rails   Sit to Lying CARE Score 4   Sit to Stand   Type of Assistance Needed Physical assistance   Physical Assistance Level 25% or less   Comment no AD using gait belt, verbal cues and CGA with RW   Sit to Stand CARE Score 3   Bed-Chair Transfer   Type of Assistance Needed Physical assistance   Physical Assistance Level 25% or less   Comment no AD using gait belt   Chair/Bed-to-Chair Transfer CARE Score 3   Walk 10 Feet   Type of Assistance Needed Physical assistance   Physical Assistance Level 25% or less   Comment R HHA using gait belt   Walk 10 Feet CARE Score 3   Walk 50 Feet with Two Turns   Type of Assistance Needed Physical assistance   Physical Assistance Level 26%-50%   Comment R HHA using gait belt   Walk 50 Feet with Two Turns CARE Score 3   Walk 150 Feet   Type of Assistance Needed Physical assistance   Physical Assistance Level 26%-50%   Comment R HHA with gait belt   Walk 150 Feet CARE Score 3   Walking 10 Feet on Uneven Surfaces   Type of Assistance Needed Physical assistance   Physical Assistance Level 25% or less   Comment using RW outside of lobby B, up and down ramp and on sidewalk bet 40-60 feet as well as up and down curb step. Used RW at this time but can trial R HHA if 2nd person present for safety next session.   Walking 10 Feet on Uneven Surfaces CARE Score 3    Ambulation   Primary Mode of Locomotion Prior to Admission Walk   Distance Walked (feet) 150 ft  (197 feet and shorter distances for task)   Assist Device Hand Hold   Gait Pattern Inconsistant Leah;Decreased foot clearance;Improper weight shift   Limitations Noted In Balance;Coordination   Provided Assistance with: Balance;Weight Shift;Direction   Does the patient walk? 2. Yes   Wheel 50 Feet with Two Turns   Reason if not Attempted Activity not applicable   Wheel 50 Feet with Two Turns CARE Score 9   Wheel 150 Feet   Reason if not Attempted Activity not applicable   Wheel 150 Feet CARE Score 9   Wheelchair mobility   Does the patient use a wheelchair? 0. No   Curb or Single Stair   Style negotiated Curb   Type of Assistance Needed Physical assistance   Physical Assistance Level 26%-50%   Comment using RW   1 Step (Curb) CARE Score 3   4 Steps   Type of Assistance Needed Physical assistance   Physical Assistance Level 26%-50%   Comment using B hands on R HR X 8 steps and 4 more steps using B hands on L HR on  steps   4 Steps CARE Score 3   12 Steps   Type of Assistance Needed Physical assistance   Physical Assistance Level 26%-50%   Comment using B hands on R HR X 8 steps and 4 more steps using B hands on L HR on  steps   12 Steps CARE Score 3   Stairs   Type Stairs;Curb;Ramp   # of Steps 12   Assist Devices Bilateral Rail   Findings Can trial FF tomorrow   Therapeutic Interventions   Flexibility B hamstring and gastroc stretching   Equipment Use   NuStep Level 2 B UE and LE X 15 mins   Assessment   Treatment Assessment Pt. engaged in 90 mins session and was able to tolerate above activities although was fatigue towards kel of session and requested to get back to bed. Tx focused on NMR/ gait training with no AD , stairs management and ambulation outside on uneven surfaces. Pt. demonstrates improved gait pattern after nu step vs pre nu step with very little L foot drag with min A provided for R wt.  shifting . Pt. able to amnage  steps well, was educated with non reciprocal pattern for now to manage steps but pt's frequently forgets pattern. Pt. alternates leading leg and there was no buckling noted. No other complaints reported by pt. , fell asleep right away after assisted back to bed. Alrms were activated and all needs in place. Cont with PT POC as tolerated   Family/Caregiver Present Yes at start of session. wife and sister   Problem List Decreased strength;Decreased endurance;Impaired balance;Decreased mobility;Decreased coordination;Decreased cognition;Decreased safety awareness   Barriers to Discharge Inaccessible home environment;Decreased caregiver support   PT Barriers   Functional Limitation Car transfers;Ramp negotiation;Stair negotiation;Standing;Transfers;Walking   Plan   Treatment/Interventions Functional transfer training;LE strengthening/ROM;Elevations;Therapeutic exercise;Endurance training;Patient/family training;Equipment eval/education;Bed mobility;Gait training   Discharge Recommendation   Rehab Resource Intensity Level, PT   (out patient most likely)   Equipment Recommended   (LRAD vs RW)   PT Therapy Minutes   PT Time In 1230   PT Time Out 1400   PT Total Time (minutes) 90   PT Mode of treatment - Individual (minutes) 90   PT Mode of treatment - Concurrent (minutes) 0   PT Mode of treatment - Group (minutes) 0   PT Mode of treatment - Co-treat (minutes) 0   PT Mode of Treatment - Total time(minutes) 90 minutes   PT Cumulative Minutes 360   Therapy Time missed   Time missed? No

## 2024-07-01 NOTE — PCC PHYSICAL THERAPY
"PT evaluation completed on 6/27  Pt is 84 y.o. male seen for PT evaluation s/p admit to [facility] on 6/26/2024 to maximize his functional mobility (I) and safety. He presented initially to ED with abdominal pain, SOB, and enlarging L groin buldge who had CT A/P concerning for diverticular abscess. During his hospitalization he presented with stroke like symptoms. MRI showing the following: \"1. Small acute infarct in the left parietal periventricular white matter with an associated small focus of susceptibility that may represent associated petechial hemorrhage. 2. Redemonstrated foci of susceptibility, including new foci, nonspecific and could be seen with cerebral amyloid angiopathy, multiple cavernomas and/or prior embolic events, among other considerations.    Pt. Demonstrates good progress since initial evaluation and currently min A with no AD from mod to max A at eval, min to mod A with no AD but with R HHA, mod A with stairs management. Impairments/ barriers that cont to limit progress includes: L sided weakness , dec balance and balance reactions, dec safety awareness and dec motor planning. Pt. Personal barriers for d/c include SISSY and FF to 2nd floor but can be a first floor set up. Pt's spouse uses portable o2 tank otherwise does not work and can assist pt. Prn at d/c. Pt. Will benefit from continued PT to maximize motor recovery from stroke, participate in HIGT to normalize gait pattern, balance training decreasing risk for false, FT and DME assessment. Pt. ELOS 10-14 days with anticipated d/c date of 7/9/24.  "

## 2024-07-01 NOTE — ASSESSMENT & PLAN NOTE
"Patient presented for abdominal pain and swelling, history of hernia.  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 No evidence of soft tissue component. A benign etiology is suspected such as retroperitoneal lymphatic malformation or lymphocele. Initial evaluation in 3 months with contrast-enhanced CT abdomen/pelvis is recommended.\"  Surgery evaluated patient and recommended conservative management of suspected diverticulitis with microperforation.    Patient is eating without issue and advanced to regular diet.    He is having normal BMs  Follow-up with surgery as OP  S/p Cipro/Flagyl completed 6/28/24  " 24-Jul-2018 15:38

## 2024-07-01 NOTE — TELEPHONE ENCOUNTER
HFU/ MARTA REAL/  Stroke     DC- FACILITY - 6/26/2024  Discharging Facility Name and Phone Number Flowsheet Row Most Recent Value Accepting Facility Name, [facility] Receiving Facility/Agency Phone Number RN to [facility] hospitals [facility] Charge RN role      Hal Miller will need follow up in in 6 weeks with neurovascular attending or APC .  He will not require outpatient neurological testing.

## 2024-07-01 NOTE — CONSULTS
Consultation - Cardiology Team 1  Hal Miller 84 y.o. male MRN: [id number]  Unit/Bed#: S -01 Encounter: [id number]    Assessment & Plan     Principal Problem:    Acute encephalopathy  Active Problems:    Shortness of breath    Hypertension    Atrial fibrillation (HCC)    Inflammatory polyarthropathy (HCC)    Chronic obstructive pulmonary disease with acute exacerbation (HCC)    CKD (chronic kidney disease)    Type 2 diabetes mellitus (HCC)    Abnormal CT of the abdomen    Zoster    Colonic diverticular abscess    Vascular dementia (HCC)    Insomnia    Ambulatory dysfunction    Severe protein-calorie malnutrition (HCC)    Episode of seizure-like activity    History of Whipple procedure    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HCC)    Heart failure (HCC)      Assessment  Cardiomyopathy-suspect nonischemic  Left heart cath 12/2022 patent coronary arteries  Most recent full echo in [facility] 2017-normal LVEF  Currently on losartan 50 mg daily  Normal troponins  Patient had been taken 20 mg of  Lasix stopped last admission earlier this month due to TARA    Diverticular abscess-medically managed.  Antibiotics    History of redo  bio AVR, Bentall root replacement  Current echo noted , as below    Vascular dementia    Type 2 diabetes-hemoglobin A1c 7.6%    CKD-creatinine 1.5 which appears stable    COPD-severe.  Not oxygen dependent at baseline  Has noted worsening dyspnea over the past 2 years    PMR-takes prednisone 1 mg daily    Atrial fibrillation- persistent  Not on telemetry.  Documented heart rates well-controlled.  Presenting ECG atrial fibrillation  AC-on apixaban 2.5 mg twice daily  Diltiazem 180 mg daily    10.  History of stroke-current MRI shows a small infarct left parietal with small focus susceptibility may represent associated petechial hemorrhage.  Neurology concern for cardioembolic event secondary to setting of holding AC.  He is currently on IV heparin    Plan  On Monday we will reach out to Dr. Wilson's  office for medical records and most recent echocardiogram  Ideally would change Cardizem over to beta-blocker if no contraindications.  Would continue ARB.  He does not appear to be requiring diuretics at this time.  Check BNP  Does not appear to need diuretic at this time    History of Present Illness   Physician Requesting Consult: Monika Alfonso MD  Reason for Consult / Principal Problem: Acute heart failure    Followed by Dr. Ramos    HPI: Hal Miller is a 84 y.o. year old male with severe COPD, history of bio AVR  with subsequent redo sternotomyand Bentall root replacement with a 23 mm Gail Ramírez bovine pericardial Magna Ease valve , CVA, atrial fibrillation, IDDM, CKD, hypertension, PMR , dementia, Whipple procedure for pancreatic cancer who presents with abdominal pain and shortness of breath along with left groin swelling and tenderness.  CT of the abdomen concerning for diverticular abscess.  Surgery feels chronic diverticular abscess ,conservative treatment advised.  Echocardiogram obtained, LVEF 30%.  Cardiology consulted for management of cardiomyopathy.  Presenting chest x-ray 6/16 chronic loculated pleural effusions.  Most recent CT of the abdomen 6/18-small left and trace right pleural effusion.  Patient has been losing weight.  Apparently presented with waxing and waning mental status 48 hours prior to admission.    Current TTE LVEF 30% with global hypokinesis with regional variations.  RV mildly dilated.  Mild AS.  Aortic valve trileaflet.  Leaflets not thickened.  Leaflets mildly calcified.  Mildly reduced mobility.  No regurgitation.  Mild MR.  Mild TR.     Left heart cath 12/2022 patent coronary arteries    TTE 6/2017-LVEF 55 to 65%.  Bio AVR normal function    Cardiology records are limited as I do not have access to Dr. Ramos  records    Inpatient consult to Cardiology  Consult performed by: ADEOLA Kirkpatrick  Consult ordered by: Daniel Ames DO          Review of Systems    Constitutional:  Positive for activity change and unexpected weight change.   HENT: Negative.     Eyes: Negative.    Respiratory:  Positive for shortness of breath.    Cardiovascular:  Negative for chest pain and leg swelling.   Gastrointestinal: Negative.    Endocrine: Negative.    Genitourinary: Negative.    Musculoskeletal:  Positive for gait problem.   Allergic/Immunologic: Negative.    Neurological:  Positive for weakness.   Hematological:  Bruises/bleeds easily.   Psychiatric/Behavioral:  Positive for confusion.    All other systems reviewed and are negative.      Historical Information   Past Medical History:   Diagnosis Date    Acute encephalopathy 2023    Acute-on-chronic kidney injury  (HCC) 2017    Cancer (HCC)     pancreatic    Colon polyp     Coronary artery disease     Dehydration 3/3/2023    Diabetes mellitus (HCC)     Hyperlipidemia     Hypertension     Left lower lobe pneumonia 2022    Pneumonia 2017    Right middle and lower lobe     Stroke (HCC)      Past Surgical History:   Procedure Laterality Date    APPENDECTOMY      CARDIAC SURGERY      Aortic Valve Replacement, Ascending Aorta    COLONOSCOPY      GALLBLADDER SURGERY      PANCREATICODUODENECTOMY       Social History     Substance and Sexual Activity   Alcohol Use Never     Social History     Substance and Sexual Activity   Drug Use No     Social History     Tobacco Use   Smoking Status Former    Types: Pipe    Quit date:     Years since quittin.4   Smokeless Tobacco Never     Family History:   Family History   Problem Relation Age of Onset    Cancer Mother     Stroke Father     Cancer Sister     Diabetes Sister     Stroke Brother        Meds/Allergies   current meds:   Current Facility-Administered Medications   Medication Dose Route Frequency    acetaminophen (TYLENOL) tablet 650 mg  650 mg Oral Q4H PRN    albuterol (PROVENTIL HFA,VENTOLIN HFA) inhaler 2 puff  2 puff Inhalation Q4H PRN    apixaban (ELIQUIS)  tablet 2.5 mg  2.5 mg Oral BID    atorvastatin (LIPITOR) tablet 40 mg  40 mg Oral Daily With Dinner    bisacodyl (DULCOLAX) rectal suppository 10 mg  10 mg Rectal Daily    budesonide (PULMICORT) inhalation solution 0.5 mg  0.5 mg Nebulization Q12H    ciprofloxacin (CIPRO) tablet 500 mg  500 mg Oral Q12H YUNI    diltiazem (CARDIZEM CD) 24 hr capsule 180 mg  180 mg Oral Daily    divalproex sodium (DEPAKOTE) DR tablet 500 mg  500 mg Oral Q8H YUNI    folic acid 1 mg, thiamine (VITAMIN B1) 100 mg in sodium chloride 0.9 % 100 mL IV piggyback   Intravenous Daily    formoterol (PERFOROMIST) nebulizer solution 20 mcg  20 mcg Nebulization Q12H    HYDROmorphone HCl (DILAUDID) injection 0.2 mg  0.2 mg Intravenous Q4H PRN    insulin lispro (HumALOG/ADMELOG) 100 units/mL subcutaneous injection 1-5 Units  1-5 Units Subcutaneous 4x Daily (AC & HS)    lidocaine (LMX) 4 % cream   Topical 4x Daily PRN    losartan (COZAAR) tablet 50 mg  50 mg Oral Daily    melatonin tablet 3 mg  3 mg Oral HS    metroNIDAZOLE (FLAGYL) tablet 500 mg  500 mg Oral Q8H YUNI    pantoprazole (PROTONIX) EC tablet 40 mg  40 mg Oral Daily Before Breakfast    predniSONE tablet 1 mg  1 mg Oral Daily    senna (SENOKOT) tablet 17.2 mg  2 tablet Oral BID    valACYclovir (VALTREX) tablet 1,000 mg  1,000 mg Oral BID    and PTA meds:    Medications Prior to Admission:     apixaban (ELIQUIS) 2.5 mg    Calcium Citrate (CITRACAL PO)    Cinnamon 500 MG capsule    diltiazem (CARDIZEM CD) 180 mg 24 hr capsule    hydroxychloroquine (PLAQUENIL) 200 mg tablet    Lantus SoloStar 100 units/mL SOPN    olmesartan (BENICAR) 20 mg tablet    potassium chloride (KLOR-CON) 8 MEQ tablet    pravastatin (PRAVACHOL) 40 mg tablet    predniSONE 1 mg tablet    B-D UF III MINI PEN NEEDLES 31G X 5 MM MISC    nystatin (MYCOSTATIN) 500,000 units/5 mL suspension    ondansetron (ZOFRAN-ODT) 4 mg disintegrating tablet    QUEtiapine (SEROquel) 25 mg tablet  Allergies   Allergen Reactions    Contrast Dye  "[Iodinated Contrast Media] Other (See Comments)     Pt states kidney dysfunction..     Other        Objective   Vitals: Blood pressure 150/82, pulse 67, temperature 97.8 °F (36.6 °C), resp. rate 14, height 6' 2\" (1.88 m), weight 76.4 kg (168 lb 6.9 oz), SpO2 96%.  Orthostatic Blood Pressures      Flowsheet Row Most Recent Value   Blood Pressure 150/82 filed at 06/22/2024 0714   Patient Position - Orthostatic VS Lying filed at 06/20/2024 1113              Intake/Output Summary (Last 24 hours) at 6/22/2024 0902  Last data filed at 6/22/2024 0601  Gross per 24 hour   Intake 240 ml   Output 735 ml   Net -495 ml       Invasive Devices       Peripheral Intravenous Line  Duration             Peripheral IV 06/19/24 Proximal;Right;Ventral (anterior) Forearm 2 days    Peripheral IV 06/21/24 Left Antecubital 1 day                    Physical Exam: /82   Pulse 67   Temp 97.8 °F (36.6 °C)   Resp 14   Ht 6' 2\" (1.88 m)   Wt 76.4 kg (168 lb 6.9 oz)   SpO2 96%   BMI 21.63 kg/m²   General Appearance:    Alert, cooperative, no distress, appears stated age   Head:    Normocephalic, no scleral icterus   Eyes:    PERRL   Nose:   Nares normal, septum midline, mucosa normal, no drainage    Throat:   Lips, mucosa, and tongue normal   Neck:   Supple, symmetrical, trachea midline     no carotid bruit or JVD   Back:     Symmetric   Lungs:     Clear to auscultation bilaterally, respirations unlabored   Chest Wall:    No tenderness or deformity    Heart:    Regular rate and rhythm, S1 and S2 normal, no murmur, rub   or gallop   Abdomen:     Soft, non-tender, bowel sounds active all four quadrants,     no masses, no organomegaly   Extremities:   Extremities normal, atraumatic, no cyanosis or edema   Pulses:   2+ and symmetric all extremities   Skin:   Skin color, texture, turgor normal, no rashes or lesions   Neurologic:   Alert and oriented to person place and time. No focal deficits       Lab Results:   Recent Results (from the " past 72 hour(s))   Fingerstick Glucose (POCT)    Collection Time: 06/19/24 11:13 AM   Result Value Ref Range    POC Glucose 179 (H) 65 - 140 mg/dl   CBC and Platelet    Collection Time: 06/19/24 11:53 AM   Result Value Ref Range    WBC 6.77 4.31 - 10.16 Thousand/uL    RBC 3.38 (L) 3.88 - 5.62 Million/uL    Hemoglobin 10.5 (L) 12.0 - 17.0 g/dL    Hematocrit 32.5 (L) 36.5 - 49.3 %    MCV 96 82 - 98 fL    MCH 31.1 26.8 - 34.3 pg    MCHC 32.3 31.4 - 37.4 g/dL    RDW 14.2 11.6 - 15.1 %    Platelets 139 (L) 149 - 390 Thousands/uL    MPV 9.8 8.9 - 12.7 fL   Comprehensive metabolic panel    Collection Time: 06/19/24 11:53 AM   Result Value Ref Range    Sodium 132 (L) 135 - 147 mmol/L    Potassium 4.6 3.5 - 5.3 mmol/L    Chloride 105 96 - 108 mmol/L    CO2 25 21 - 32 mmol/L    ANION GAP 2 (L) 4 - 13 mmol/L    BUN 42 (H) 5 - 25 mg/dL    Creatinine 1.54 (H) 0.60 - 1.30 mg/dL    Glucose 167 (H) 65 - 140 mg/dL    Calcium 9.2 8.4 - 10.2 mg/dL    Corrected Calcium 9.8 8.3 - 10.1 mg/dL    AST 20 13 - 39 U/L    ALT 20 7 - 52 U/L    Alkaline Phosphatase 97 34 - 104 U/L    Total Protein 5.2 (L) 6.4 - 8.4 g/dL    Albumin 3.2 (L) 3.5 - 5.0 g/dL    Total Bilirubin 0.85 0.20 - 1.00 mg/dL    eGFR 41 ml/min/1.73sq m   Procalcitonin    Collection Time: 06/19/24 11:53 AM   Result Value Ref Range    Procalcitonin 0.11 <=0.25 ng/ml   Lactic acid, plasma (w/reflex if result > 2.0)    Collection Time: 06/19/24 11:53 AM   Result Value Ref Range    LACTIC ACID 1.0 0.5 - 2.0 mmol/L   Magnesium    Collection Time: 06/19/24 11:53 AM   Result Value Ref Range    Magnesium 2.2 1.9 - 2.7 mg/dL   Phosphorus    Collection Time: 06/19/24 11:53 AM   Result Value Ref Range    Phosphorus 2.8 2.3 - 4.1 mg/dL   Hemoglobin A1c w/EAG Estimation (Prechecked if no A1C within 90 days)    Collection Time: 06/19/24 11:53 AM   Result Value Ref Range    Hemoglobin A1C 7.6 (H) Normal 4.0-5.6%; PreDiabetic 5.7-6.4%; Diabetic >=6.5%; Glycemic control for adults with diabetes  <7.0% %     mg/dl   POCT Blood Gas (CG8+)    Collection Time: 06/19/24 11:56 AM   Result Value Ref Range    pH, Art i-STAT 7.378 7.350 - 7.450    pCO2, Art i-STAT 37.3 36.0 - 44.0 mm HG    pO2, ART i-STAT 80.0 75.0 - 129.0 mm HG    BE, i-STAT -3 (L) -2 - 3 mmol/L    HCO3, Art i-STAT 22.0 22.0 - 28.0 mmol/L    CO2, i-STAT 23 21 - 32 mmol/L    O2 Sat, i-STAT 96 (H) 60 - 85 %    SODIUM, I-STAT 132 (L) 136 - 145 mmol/l    Potassium, i-STAT 4.7 3.5 - 5.3 mmol/L    Calcium, Ionized i-STAT 1.41 (H) 1.12 - 1.32 mmol/L    Hct, i-STAT 32 (L) 36.5 - 49.3 %    Hgb, i-STAT 10.9 (L) 12.0 - 17.0 g/dl    Glucose, i-STAT 169 (H) 65 - 140 mg/dl    POC FIO2 21 L    Specimen Type ARTERIAL     SITE Right Radial     BERONICA TEST Positive Allens Test    Blood gas, venous    Collection Time: 06/19/24  1:31 PM   Result Value Ref Range    pH, Antoni 7.368 7.300 - 7.400    pCO2, Antoni 38.2 (L) 42.0 - 50.0 mm Hg    pO2, Antoni 73.0 (H) 35.0 - 45.0 mm Hg    HCO3, Antoni 21.5 (L) 24 - 30 mmol/L    Base Excess, Antoni -3.4 mmol/L    O2 Content, Antoni 15.3 ml/dL    O2 HGB, VENOUS 92.7 (H) 60.0 - 80.0 %   Ammonia    Collection Time: 06/19/24  2:25 PM   Result Value Ref Range    Ammonia 27 18 - 72 umol/L   Vitamin B12    Collection Time: 06/19/24  2:26 PM   Result Value Ref Range    Vitamin B-12 531 180 - 914 pg/mL   Folate    Collection Time: 06/19/24  2:26 PM   Result Value Ref Range    Folate 6.3 >5.9 ng/mL   Blood culture    Collection Time: 06/19/24  4:10 PM    Specimen: Arm, Left; Blood   Result Value Ref Range    Blood Culture No Growth at 48 hrs.    Blood culture    Collection Time: 06/19/24  4:10 PM    Specimen: Arm, Right; Blood   Result Value Ref Range    Blood Culture No Growth at 48 hrs.    UA w Reflex to Microscopic w Reflex to Culture    Collection Time: 06/19/24  4:11 PM    Specimen: Urine, Other   Result Value Ref Range    Color, UA Light Yellow     Clarity, UA Clear     Specific Gravity, UA 1.013 1.003 - 1.030    pH, UA 5.0 4.5, 5.0, 5.5,  6.0, 6.5, 7.0, 7.5, 8.0    Leukocytes, UA Negative Negative    Nitrite, UA Negative Negative    Protein, UA Negative Negative mg/dl    Glucose, UA Trace (A) Negative mg/dl    Ketones, UA Negative Negative mg/dl    Urobilinogen, UA <2.0 <2.0 mg/dl mg/dl    Bilirubin, UA Negative Negative    Occult Blood, UA Negative Negative   APTT    Collection Time: 06/19/24  4:11 PM   Result Value Ref Range    PTT 33 23 - 37 seconds   Protime-INR    Collection Time: 06/19/24  4:11 PM   Result Value Ref Range    Protime 16.6 (H) 11.6 - 14.5 seconds    INR 1.27 (H) 0.84 - 1.19   Fingerstick Glucose (POCT)    Collection Time: 06/19/24  5:51 PM   Result Value Ref Range    POC Glucose 159 (H) 65 - 140 mg/dl   Fingerstick Glucose (POCT)    Collection Time: 06/19/24  8:40 PM   Result Value Ref Range    POC Glucose 152 (H) 65 - 140 mg/dl   APTT    Collection Time: 06/19/24 10:22 PM   Result Value Ref Range    PTT 82 (H) 23 - 37 seconds   Fingerstick Glucose (POCT)    Collection Time: 06/19/24 11:50 PM   Result Value Ref Range    POC Glucose 125 65 - 140 mg/dl   Fingerstick Glucose (POCT)    Collection Time: 06/20/24  4:40 AM   Result Value Ref Range    POC Glucose 119 65 - 140 mg/dl   APTT    Collection Time: 06/20/24  5:39 AM   Result Value Ref Range     (H) 23 - 37 seconds   Lipid Panel with Direct LDL reflex    Collection Time: 06/20/24  5:39 AM   Result Value Ref Range    Cholesterol 94 See Comment mg/dL    Triglycerides 54 See Comment mg/dL    HDL, Direct 39 (L) >=40 mg/dL    LDL Calculated 44 0 - 100 mg/dL   CBC and differential    Collection Time: 06/20/24  5:39 AM   Result Value Ref Range    WBC 5.96 4.31 - 10.16 Thousand/uL    RBC 3.42 (L) 3.88 - 5.62 Million/uL    Hemoglobin 10.5 (L) 12.0 - 17.0 g/dL    Hematocrit 33.8 (L) 36.5 - 49.3 %    MCV 99 (H) 82 - 98 fL    MCH 30.7 26.8 - 34.3 pg    MCHC 31.1 (L) 31.4 - 37.4 g/dL    RDW 14.4 11.6 - 15.1 %    MPV 9.0 8.9 - 12.7 fL    Platelets 123 (L) 149 - 390 Thousands/uL    nRBC  0 /100 WBCs    Segmented % 79 (H) 43 - 75 %    Immature Grans % 0 0 - 2 %    Lymphocytes % 11 (L) 14 - 44 %    Monocytes % 8 4 - 12 %    Eosinophils Relative 2 0 - 6 %    Basophils Relative 0 0 - 1 %    Absolute Neutrophils 4.66 1.85 - 7.62 Thousands/µL    Absolute Immature Grans 0.02 0.00 - 0.20 Thousand/uL    Absolute Lymphocytes 0.67 0.60 - 4.47 Thousands/µL    Absolute Monocytes 0.49 0.17 - 1.22 Thousand/µL    Eosinophils Absolute 0.11 0.00 - 0.61 Thousand/µL    Basophils Absolute 0.01 0.00 - 0.10 Thousands/µL   Comprehensive metabolic panel    Collection Time: 06/20/24  5:39 AM   Result Value Ref Range    Sodium 136 135 - 147 mmol/L    Potassium 4.9 3.5 - 5.3 mmol/L    Chloride 106 96 - 108 mmol/L    CO2 28 21 - 32 mmol/L    ANION GAP 2 (L) 4 - 13 mmol/L    BUN 32 (H) 5 - 25 mg/dL    Creatinine 1.39 (H) 0.60 - 1.30 mg/dL    Glucose 119 65 - 140 mg/dL    Calcium 9.0 8.4 - 10.2 mg/dL    Corrected Calcium 9.7 8.3 - 10.1 mg/dL    AST 25 13 - 39 U/L    ALT 21 7 - 52 U/L    Alkaline Phosphatase 94 34 - 104 U/L    Total Protein 4.9 (L) 6.4 - 8.4 g/dL    Albumin 3.1 (L) 3.5 - 5.0 g/dL    Total Bilirubin 0.87 0.20 - 1.00 mg/dL    eGFR 46 ml/min/1.73sq m   Magnesium    Collection Time: 06/20/24  5:39 AM   Result Value Ref Range    Magnesium 2.2 1.9 - 2.7 mg/dL   Phosphorus    Collection Time: 06/20/24  5:39 AM   Result Value Ref Range    Phosphorus 2.6 2.3 - 4.1 mg/dL   Procalcitonin    Collection Time: 06/20/24  5:39 AM   Result Value Ref Range    Procalcitonin 0.08 <=0.25 ng/ml   Fingerstick Glucose (POCT)    Collection Time: 06/20/24  8:00 AM   Result Value Ref Range    POC Glucose 84 65 - 140 mg/dl   Echo complete w/ contrast if indicated    Collection Time: 06/20/24 11:02 AM   Result Value Ref Range    BSA 2.03 m2    LV EF 30     A4C EF 27 %    LVOT stroke volume 43.00     LVOT stroke volume index 21.10 ml/m2    LVOT Cardiac Output 3.76 l/min    LVOT Cardiac Index 1.84 l/min/m2    LVIDd 5.60 cm    LVIDS 5.00 cm     IVSd 1.10 cm    LVPWd 0.90 cm    LVOT diameter 2.0 cm    LVOT peak VTI 13.29 cm    FS 11 28 - 44    MV E' Tissue Velocity Septal 7 cm/s    MV E' Tissue Velocity Lateral 10 cm/s    LA Volume Index (BP) 53.9 mL/m2    E/A ratio 2.88     E wave deceleration time 154 ms    MV Peak E Abdulaziz 115 cm/s    MV Peak A Abdulaziz 0.4 m/s    AV LVOT peak gradient 2 mmHg    LVOT peak abdulaziz 0.69 m/s    RVID d 5.9 cm    Tricuspid annular plane systolic excursion 1.30 cm    LA size 6 cm    LA length (A2C) 6.40 cm    LA volume (BP) 110 mL    RAA A4C 26.6 cm2    Aortic valve peak velocity 2.48 m/s    Ao VTI 49.3 cm    AV mean gradient 13 mmHg    LVOT mn grad 1.0 mmHg    AV peak gradient 25 mmHg    AV area by cont VTI 0.8 cm2    AV area peak abdulaziz 1.0 cm2    MV VTI RETROGRADE 156.6 cm    MV stenosis pressure 1/2 time 45 ms    MV valve area p 1/2 method 4.90     MV mean gradient retrograde 72 mmHg    MR PG 95 mmHg    Radius 0.6 cm    MR max velocity 0.35 m/s    TR Peak Abdulaziz 3.4 m/s    Triscuspid Valve Regurgitation Peak Gradient 47.0 mmHg    Ao root 3.30 cm    Asc Ao 3.1 cm    Aortic valve mean velocity 17.10 m/s    Mitral regurgitation peak velocity 4.88 m/s    Mitral valve mean inflow velocity 4.15 m/s    Tricuspid valve peak regurgitation velocity 3.44 m/s    Left ventricular stroke volume (2D) 41.00 mL    IVS 1.1 cm    LEFT VENTRICLE SYSTOLIC VOLUME (MOD BIPLANE) 2D 116 mL    LV DIASTOLIC VOLUME (MOD BIPLANE) 2D 157 mL    Left Atrium Area-systolic Four Chamber 28.3 cm2    Left Atrium Area-systolic Apical Two Chamber 32 cm2    LVSV, 2D 41 mL    LVOT area 3.14 cm2    DVI 0.28     AV valve area 0.85 cm2   Fingerstick Glucose (POCT)    Collection Time: 06/20/24 12:12 PM   Result Value Ref Range    POC Glucose 188 (H) 65 - 140 mg/dl   APTT    Collection Time: 06/20/24  2:44 PM   Result Value Ref Range     (H) 23 - 37 seconds   Fingerstick Glucose (POCT)    Collection Time: 06/20/24  4:04 PM   Result Value Ref Range    POC Glucose 186 (H) 65 - 140  mg/dl   Fingerstick Glucose (POCT)    Collection Time: 06/20/24  8:49 PM   Result Value Ref Range    POC Glucose 121 65 - 140 mg/dl   APTT    Collection Time: 06/20/24 10:41 PM   Result Value Ref Range    PTT 51 (H) 23 - 37 seconds   Fingerstick Glucose (POCT)    Collection Time: 06/21/24 12:08 AM   Result Value Ref Range    POC Glucose 114 65 - 140 mg/dl   Fingerstick Glucose (POCT)    Collection Time: 06/21/24  5:01 AM   Result Value Ref Range    POC Glucose 151 (H) 65 - 140 mg/dl   APTT    Collection Time: 06/21/24  6:12 AM   Result Value Ref Range     (H) 23 - 37 seconds   CBC    Collection Time: 06/21/24  7:36 AM   Result Value Ref Range    WBC 6.06 4.31 - 10.16 Thousand/uL    RBC 3.46 (L) 3.88 - 5.62 Million/uL    Hemoglobin 10.7 (L) 12.0 - 17.0 g/dL    Hematocrit 34.2 (L) 36.5 - 49.3 %    MCV 99 (H) 82 - 98 fL    MCH 30.9 26.8 - 34.3 pg    MCHC 31.3 (L) 31.4 - 37.4 g/dL    RDW 14.2 11.6 - 15.1 %    Platelets 145 (L) 149 - 390 Thousands/uL    MPV 9.8 8.9 - 12.7 fL   Comprehensive metabolic panel    Collection Time: 06/21/24  7:36 AM   Result Value Ref Range    Sodium 134 (L) 135 - 147 mmol/L    Potassium 4.8 3.5 - 5.3 mmol/L    Chloride 104 96 - 108 mmol/L    CO2 29 21 - 32 mmol/L    ANION GAP 1 (L) 4 - 13 mmol/L    BUN 26 (H) 5 - 25 mg/dL    Creatinine 1.40 (H) 0.60 - 1.30 mg/dL    Glucose 127 65 - 140 mg/dL    Calcium 8.9 8.4 - 10.2 mg/dL    Corrected Calcium 9.7 8.3 - 10.1 mg/dL    AST 20 13 - 39 U/L    ALT 19 7 - 52 U/L    Alkaline Phosphatase 92 34 - 104 U/L    Total Protein 4.9 (L) 6.4 - 8.4 g/dL    Albumin 3.0 (L) 3.5 - 5.0 g/dL    Total Bilirubin 0.94 0.20 - 1.00 mg/dL    eGFR 46 ml/min/1.73sq m   Fingerstick Glucose (POCT)    Collection Time: 06/21/24  7:40 AM   Result Value Ref Range    POC Glucose 132 65 - 140 mg/dl   Fingerstick Glucose (POCT)    Collection Time: 06/21/24 11:19 AM   Result Value Ref Range    POC Glucose 184 (H) 65 - 140 mg/dl   Fingerstick Glucose (POCT)    Collection Time:  06/21/24  4:00 PM   Result Value Ref Range    POC Glucose 151 (H) 65 - 140 mg/dl   Valproic acid level, total    Collection Time: 06/21/24  4:05 PM   Result Value Ref Range    Valproic Acid, Total 54 50 - 100 ug/mL   Fingerstick Glucose (POCT)    Collection Time: 06/21/24  8:06 PM   Result Value Ref Range    POC Glucose 215 (H) 65 - 140 mg/dl   Fingerstick Glucose (POCT)    Collection Time: 06/21/24  9:22 PM   Result Value Ref Range    POC Glucose 172 (H) 65 - 140 mg/dl   Fingerstick Glucose (POCT)    Collection Time: 06/22/24 12:19 AM   Result Value Ref Range    POC Glucose 113 65 - 140 mg/dl   Comprehensive metabolic panel    Collection Time: 06/22/24  4:54 AM   Result Value Ref Range    Sodium 132 (L) 135 - 147 mmol/L    Potassium 4.8 3.5 - 5.3 mmol/L    Chloride 103 96 - 108 mmol/L    CO2 28 21 - 32 mmol/L    ANION GAP 1 (L) 4 - 13 mmol/L    BUN 23 5 - 25 mg/dL    Creatinine 1.27 0.60 - 1.30 mg/dL    Glucose 102 65 - 140 mg/dL    Calcium 9.2 8.4 - 10.2 mg/dL    Corrected Calcium 10.1 8.3 - 10.1 mg/dL    AST 14 13 - 39 U/L    ALT 16 7 - 52 U/L    Alkaline Phosphatase 82 34 - 104 U/L    Total Protein 4.7 (L) 6.4 - 8.4 g/dL    Albumin 2.9 (L) 3.5 - 5.0 g/dL    Total Bilirubin 0.85 0.20 - 1.00 mg/dL    eGFR 52 ml/min/1.73sq m   CBC and differential    Collection Time: 06/22/24  4:54 AM   Result Value Ref Range    WBC 6.06 4.31 - 10.16 Thousand/uL    RBC 3.38 (L) 3.88 - 5.62 Million/uL    Hemoglobin 10.4 (L) 12.0 - 17.0 g/dL    Hematocrit 32.9 (L) 36.5 - 49.3 %    MCV 97 82 - 98 fL    MCH 30.8 26.8 - 34.3 pg    MCHC 31.6 31.4 - 37.4 g/dL    RDW 14.0 11.6 - 15.1 %    MPV 9.6 8.9 - 12.7 fL    Platelets 161 149 - 390 Thousands/uL    nRBC 0 /100 WBCs    Segmented % 70 43 - 75 %    Immature Grans % 1 0 - 2 %    Lymphocytes % 14 14 - 44 %    Monocytes % 11 4 - 12 %    Eosinophils Relative 4 0 - 6 %    Basophils Relative 0 0 - 1 %    Absolute Neutrophils 4.30 1.85 - 7.62 Thousands/µL    Absolute Immature Grans 0.04 0.00 -  0.20 Thousand/uL    Absolute Lymphocytes 0.85 0.60 - 4.47 Thousands/µL    Absolute Monocytes 0.64 0.17 - 1.22 Thousand/µL    Eosinophils Absolute 0.22 0.00 - 0.61 Thousand/µL    Basophils Absolute 0.01 0.00 - 0.10 Thousands/µL   Fingerstick Glucose (POCT)    Collection Time: 06/22/24  4:57 AM   Result Value Ref Range    POC Glucose 132 65 - 140 mg/dl   Fingerstick Glucose (POCT)    Collection Time: 06/22/24  7:16 AM   Result Value Ref Range    POC Glucose 88 65 - 140 mg/dl     Imaging: I have personally reviewed pertinent reports.    EKG: NSR incomplete LBBB  VTE Prophylaxis: eliquis    Code Status: Level 1 - Full Code  Advance Directive and Living Will:      Power of :    POLST:      Counseling / Coordination of Care  Total floor / unit time spent today 50 minutes.  Greater than 50% of total time was spent with the patient and / or family counseling and / or coordination of care.

## 2024-07-02 PROBLEM — D72.829 LEUKOCYTOSIS: Status: ACTIVE | Noted: 2024-07-02

## 2024-07-02 PROBLEM — N18.30 STAGE 3 CHRONIC KIDNEY DISEASE (HCC): Status: ACTIVE | Noted: 2022-07-02

## 2024-07-02 PROBLEM — R13.10 DYSPHAGIA: Status: ACTIVE | Noted: 2024-07-02

## 2024-07-02 PROBLEM — E87.5 HYPERKALEMIA: Status: ACTIVE | Noted: 2024-07-02

## 2024-07-02 PROBLEM — Z79.4 TYPE 2 DIABETES MELLITUS, WITH LONG-TERM CURRENT USE OF INSULIN (HCC): Status: ACTIVE | Noted: 2024-06-07

## 2024-07-02 LAB
ALBUMIN SERPL BCG-MCNC: 2.8 G/DL (ref 3.5–5)
ALP SERPL-CCNC: 113 U/L (ref 34–104)
ALT SERPL W P-5'-P-CCNC: 22 U/L (ref 7–52)
AMMONIA PLAS-SCNC: 14 UMOL/L (ref 18–72)
ANION GAP SERPL CALCULATED.3IONS-SCNC: 5 MMOL/L (ref 4–13)
ANISOCYTOSIS BLD QL SMEAR: PRESENT
AST SERPL W P-5'-P-CCNC: 27 U/L (ref 13–39)
BASOPHILS # BLD MANUAL: 0 THOUSAND/UL (ref 0–0.1)
BASOPHILS NFR MAR MANUAL: 0 % (ref 0–1)
BILIRUB SERPL-MCNC: 0.94 MG/DL (ref 0.2–1)
BILIRUB UR QL STRIP: NEGATIVE
BUN SERPL-MCNC: 34 MG/DL (ref 5–25)
CALCIUM ALBUM COR SERPL-MCNC: 11 MG/DL (ref 8.3–10.1)
CALCIUM SERPL-MCNC: 10 MG/DL (ref 8.4–10.2)
CHLORIDE SERPL-SCNC: 96 MMOL/L (ref 96–108)
CLARITY UR: CLEAR
CO2 SERPL-SCNC: 31 MMOL/L (ref 21–32)
COLOR UR: NORMAL
CREAT SERPL-MCNC: 1.35 MG/DL (ref 0.6–1.3)
EOSINOPHIL # BLD MANUAL: 0 THOUSAND/UL (ref 0–0.4)
EOSINOPHIL NFR BLD MANUAL: 0 % (ref 0–6)
ERYTHROCYTE [DISTWIDTH] IN BLOOD BY AUTOMATED COUNT: 15.2 % (ref 11.6–15.1)
GFR SERPL CREATININE-BSD FRML MDRD: 48 ML/MIN/1.73SQ M
GLUCOSE P FAST SERPL-MCNC: 117 MG/DL (ref 65–99)
GLUCOSE SERPL-MCNC: 117 MG/DL (ref 65–140)
GLUCOSE SERPL-MCNC: 117 MG/DL (ref 65–140)
GLUCOSE SERPL-MCNC: 137 MG/DL (ref 65–140)
GLUCOSE SERPL-MCNC: 169 MG/DL (ref 65–140)
GLUCOSE SERPL-MCNC: 181 MG/DL (ref 65–140)
GLUCOSE UR STRIP-MCNC: NEGATIVE MG/DL
HCT VFR BLD AUTO: 32.6 % (ref 36.5–49.3)
HGB BLD-MCNC: 10.7 G/DL (ref 12–17)
HGB UR QL STRIP.AUTO: NEGATIVE
KETONES UR STRIP-MCNC: NEGATIVE MG/DL
LEUKOCYTE ESTERASE UR QL STRIP: NEGATIVE
LYMPHOCYTES # BLD AUTO: 1.44 THOUSAND/UL (ref 0.6–4.47)
LYMPHOCYTES # BLD AUTO: 6 % (ref 14–44)
MACROCYTES BLD QL AUTO: PRESENT
MCH RBC QN AUTO: 31.6 PG (ref 26.8–34.3)
MCHC RBC AUTO-ENTMCNC: 32.8 G/DL (ref 31.4–37.4)
MCV RBC AUTO: 96 FL (ref 82–98)
MONOCYTES # BLD AUTO: 1.76 THOUSAND/UL (ref 0–1.22)
MONOCYTES NFR BLD: 11 % (ref 4–12)
NEUTROPHILS # BLD MANUAL: 12.8 THOUSAND/UL (ref 1.85–7.62)
NEUTS SEG NFR BLD AUTO: 80 % (ref 43–75)
NITRITE UR QL STRIP: NEGATIVE
PH UR STRIP.AUTO: 5 [PH]
PLATELET # BLD AUTO: 146 THOUSANDS/UL (ref 149–390)
PLATELET BLD QL SMEAR: ABNORMAL
PMV BLD AUTO: 9.4 FL (ref 8.9–12.7)
POIKILOCYTOSIS BLD QL SMEAR: PRESENT
POLYCHROMASIA BLD QL SMEAR: PRESENT
POTASSIUM SERPL-SCNC: 5.5 MMOL/L (ref 3.5–5.3)
PROT SERPL-MCNC: 4.6 G/DL (ref 6.4–8.4)
PROT UR STRIP-MCNC: NEGATIVE MG/DL
RBC # BLD AUTO: 3.39 MILLION/UL (ref 3.88–5.62)
RBC MORPH BLD: PRESENT
SODIUM SERPL-SCNC: 132 MMOL/L (ref 135–147)
SP GR UR STRIP.AUTO: 1.01 (ref 1–1.03)
UROBILINOGEN UR STRIP-ACNC: <2 MG/DL
VALPROATE SERPL-MCNC: 79 UG/ML (ref 50–100)
VARIANT LYMPHS # BLD AUTO: 3 %
WBC # BLD AUTO: 16 THOUSAND/UL (ref 4.31–10.16)

## 2024-07-02 PROCEDURE — 97110 THERAPEUTIC EXERCISES: CPT

## 2024-07-02 PROCEDURE — 82140 ASSAY OF AMMONIA: CPT | Performed by: PHYSICIAN ASSISTANT

## 2024-07-02 PROCEDURE — 99233 SBSQ HOSP IP/OBS HIGH 50: CPT | Performed by: PHYSICIAN ASSISTANT

## 2024-07-02 PROCEDURE — 81003 URINALYSIS AUTO W/O SCOPE: CPT | Performed by: PHYSICAL MEDICINE & REHABILITATION

## 2024-07-02 PROCEDURE — 85027 COMPLETE CBC AUTOMATED: CPT | Performed by: PHYSICIAN ASSISTANT

## 2024-07-02 PROCEDURE — 94760 N-INVAS EAR/PLS OXIMETRY 1: CPT

## 2024-07-02 PROCEDURE — 97535 SELF CARE MNGMENT TRAINING: CPT

## 2024-07-02 PROCEDURE — 80164 ASSAY DIPROPYLACETIC ACD TOT: CPT | Performed by: PHYSICIAN ASSISTANT

## 2024-07-02 PROCEDURE — 94664 DEMO&/EVAL PT USE INHALER: CPT

## 2024-07-02 PROCEDURE — 82948 REAGENT STRIP/BLOOD GLUCOSE: CPT

## 2024-07-02 PROCEDURE — 92526 ORAL FUNCTION THERAPY: CPT

## 2024-07-02 PROCEDURE — 97530 THERAPEUTIC ACTIVITIES: CPT

## 2024-07-02 PROCEDURE — 94640 AIRWAY INHALATION TREATMENT: CPT

## 2024-07-02 PROCEDURE — 85007 BL SMEAR W/DIFF WBC COUNT: CPT | Performed by: PHYSICIAN ASSISTANT

## 2024-07-02 PROCEDURE — 92610 EVALUATE SWALLOWING FUNCTION: CPT

## 2024-07-02 PROCEDURE — 99233 SBSQ HOSP IP/OBS HIGH 50: CPT | Performed by: PHYSICAL MEDICINE & REHABILITATION

## 2024-07-02 PROCEDURE — 80053 COMPREHEN METABOLIC PANEL: CPT | Performed by: PHYSICIAN ASSISTANT

## 2024-07-02 RX ORDER — FUROSEMIDE 20 MG/1
20 TABLET ORAL ONCE
Status: COMPLETED | OUTPATIENT
Start: 2024-07-02 | End: 2024-07-02

## 2024-07-02 RX ADMIN — METOPROLOL SUCCINATE 25 MG: 25 TABLET, FILM COATED, EXTENDED RELEASE ORAL at 08:47

## 2024-07-02 RX ADMIN — APIXABAN 5 MG: 5 TABLET, FILM COATED ORAL at 17:00

## 2024-07-02 RX ADMIN — ATORVASTATIN CALCIUM 40 MG: 40 TABLET, FILM COATED ORAL at 16:58

## 2024-07-02 RX ADMIN — FORMOTEROL FUMARATE DIHYDRATE 20 MCG: 20 SOLUTION RESPIRATORY (INHALATION) at 19:43

## 2024-07-02 RX ADMIN — FUROSEMIDE 20 MG: 20 TABLET ORAL at 11:46

## 2024-07-02 RX ADMIN — APIXABAN 5 MG: 5 TABLET, FILM COATED ORAL at 08:49

## 2024-07-02 RX ADMIN — BUDESONIDE 0.5 MG: 0.5 INHALANT ORAL at 08:21

## 2024-07-02 RX ADMIN — INSULIN LISPRO 1 UNITS: 100 INJECTION, SOLUTION INTRAVENOUS; SUBCUTANEOUS at 22:13

## 2024-07-02 RX ADMIN — LOSARTAN POTASSIUM 50 MG: 50 TABLET, FILM COATED ORAL at 08:47

## 2024-07-02 RX ADMIN — BUDESONIDE 0.5 MG: 0.5 INHALANT ORAL at 19:43

## 2024-07-02 RX ADMIN — PREDNISONE 1 MG: 1 TABLET ORAL at 08:51

## 2024-07-02 RX ADMIN — DIVALPROEX SODIUM 750 MG: 250 TABLET, DELAYED RELEASE ORAL at 22:12

## 2024-07-02 RX ADMIN — INSULIN GLARGINE 8 UNITS: 100 INJECTION, SOLUTION SUBCUTANEOUS at 22:12

## 2024-07-02 RX ADMIN — PANTOPRAZOLE SODIUM 40 MG: 40 TABLET, DELAYED RELEASE ORAL at 05:57

## 2024-07-02 RX ADMIN — DIVALPROEX SODIUM 500 MG: 500 TABLET, DELAYED RELEASE ORAL at 08:49

## 2024-07-02 RX ADMIN — FORMOTEROL FUMARATE DIHYDRATE 20 MCG: 20 SOLUTION RESPIRATORY (INHALATION) at 08:21

## 2024-07-02 RX ADMIN — TAMSULOSIN HYDROCHLORIDE 0.4 MG: 0.4 CAPSULE ORAL at 16:58

## 2024-07-02 RX ADMIN — Medication 3 MG: at 22:12

## 2024-07-02 RX ADMIN — INSULIN LISPRO 1 UNITS: 100 INJECTION, SOLUTION INTRAVENOUS; SUBCUTANEOUS at 16:58

## 2024-07-02 NOTE — PROGRESS NOTES
07/02/24 1300   Pain Assessment   Pain Assessment Tool 0-10   Pain Score No Pain   Restrictions/Precautions   Precautions Aspiration;Bed/chair alarms;Cognitive;Fall Risk;Hard of hearing;Supervision on toilet/commode   Speech/Swallow Mechanism Exam   Volitional Swallow Delayed   Eating   Type of Assistance Needed Set-up / clean-up;Verbal cues   Physical Assistance Level 25% or less   Eating CARE Score 3   Swallow Assessment   Swallow Treatment Assessment Speech-Language Pathology Bedside Swallow Evaluation           Patient Name: Hal Miller     Today's Date: 7/2/2024     Problem List  Principal Problem:    Acute encephalopathy  Active Problems:    Shortness of breath    Atrial fibrillation (HCC)    Inflammatory polyarthropathy (HCC)    CKD (chronic kidney disease)    Type 2 diabetes mellitus (HCC)    Abnormal CT of the abdomen    Zoster    Colonic diverticular abscess    Vascular dementia (HCC)    Insomnia    Ambulatory dysfunction    Episode of seizure-like activity    History of Whipple procedure    History of stroke           Past Medical History  Medical History        Past Medical History:   Diagnosis Date    Acute encephalopathy 2/14/2023    Acute-on-chronic kidney injury  (HCC) 6/13/2017    Cancer (HCC)       pancreatic    Colon polyp      Coronary artery disease      Dehydration 3/3/2023    Diabetes mellitus (HCC)      Hyperlipidemia      Hypertension      Left lower lobe pneumonia 7/2/2022    Pneumonia 06/13/2017     Right middle and lower lobe     Stroke (HCC)              Past Surgical History  Surgical History         Past Surgical History:   Procedure Laterality Date    APPENDECTOMY        CARDIAC SURGERY         Aortic Valve Replacement, Ascending Aorta    COLONOSCOPY        GALLBLADDER SURGERY        PANCREATICODUODENECTOMY                Summary   Pt presented with s/s suggestive of mild oral and suspected mild pharyngeal dysphagia.  Symptoms or concerns included decreased mastication, decreased  "bolus formation,  suspected decreased control of thins, suspected premature spillage. suspected pharyngeal swallow delay, suspected decreased hyolaryngeal elevation upon palpation, audible swallow with thin liquid via straw, wet vocal quality with mixed consistency, throat clear and cough with thin liquids via straw, and reports of globus sensation mid throat level with solids.          Patient given level 3 items for dysphagia assessment as patient and spouse report self modifying diet to puree and thin liquids d/t difficulty swallowing.  Patient reported h/o difficulty swallowing with \"food sticking in throat\".  Spouse reported patient with onset of swallowing difficulty prior to hospitalization with \"gagging\" and food getting stuck ( pasta) resulting in regurgitation/ vomiting of material.  Patient did note difficulty with mastication d/t lacking dentition/ back molars.  Lip seal around tsp/cup and utensil was functional.  Straw placed far into oral cavity which was suspected to result in decreased oral control.  Thin liquid via straw was observed with audible swallow, throat clear, cough, burp, and \"gagging\" type motion.   Adequate bite strength for retrieval of sandwich. Mastication was decreased and hindered by lacking dentition (back molars).  Manipulation was minimally decreased.  No overt oral residue or pocketing observed.  Swallowing initiation appeared delayed. Laryngeal rise was palpated and minimally decreased.  Spouse reported history of coughing, \"choking\" and gagging with PO intake that would result in regurgitation/ vomiting and fear/ anxiety with PO intake.       Risk/s for Aspiration: mild- moderate given decreased cognition; h/o difficulty swallowing; h/o GI issues; and decreased PO intake         Recommendations:  Diet: Dysphagia Level 3 and thin liquids via Nosey Cup   Meds: as tolerated    Swallowing strategies: upright posture, only feed when fully alert, slow rate of feeding, small " bites/sips, Nosey cup; quiet environment (tv off, limit talking, door closed, etc.), alternating bites and sips, and bed in chair mode/ OOB for meals   Other Recommendations: Determine need for VBS; frequent oral care          Distant supervision w/ meals.  Results reviewed with:  patient, RN, and spouse   Aspiration precautions posted.  Frequent Oral Care      F/u ST tx: Pt will continue to benefit from ongoing skilled dysphagia tx sessions to establish safest least restrictive diet w/o increased oropharyngeal or aspiration sxs as well as monitor ability to carryover swallow strategies independently.        Plan:           Ongoing trial upgrades w/ SLP only;            Ongoing education and family training for diet recommendations and swallow strategies.           Current Medical Status:  HPI: Hal is a 84 year old male with a medica history of but not limited to polymyalgia rheumatica on chronic plaquenil and low dose prednisone, Afib on Eliquis, stroke history, CAD, DM2, HTN, COPD, vascular dementia, chronic double vision for which he wears eye patch, pancreatic CA s/p whipple (2011), appy, cholecystectomy, R ing hernia repair who presented to [facility] ED with abdominal pain, SOB, and enlarging L groin buldge who had CT A/P concerning for diverticular abscess. He was admitted for further eval/assessment/consultation. Gen Sx was consulted and recommended IV abx.  Follow-up imaging obtained and Gen Sx felt diverticular abscess emanating into L inguinal hernia.  IR was consulted for possible drainage but they felt not enough to no fluid collection available to drain. Plaquenil was d/c'd to limit immunocompromised state.  Follow-up Gen Sx reported Sigmoid diverticular perforation with isolated air within the inguinal canal or inguinal sac with recommendation to continue abx and advance diet as tolerated.  Hospital course notable for episode of unresponsiveness with turning head to left and with  downward gaze on 6/19 with waxing and waning mentation.  He received Keppra and ativan on 6/19 for possible seizure and more recently started on Depacon with kidney d/s hx.  EEG showed excessive theta activity during wakefulness suggest mild nonspecific diffuse cerebral dysfunction. No epileptiform discharges or seizures are present.   MRI brain on 6/20 showed small acute infarct in the left parietal periventricular white matter with an associated small focus of susceptibility that may represent associated petechial hemorrhage as well as redemonstrated foci of susceptibility, including new foci, nonspecific and could be seen with cerebral amyloid angiopathy, multiple cavernomas and/or prior embolic events, among other considerations.Neuro was consulted and they reported / documented small CVA related to being temporarily being off of Eliquis and not failure with plan to go back from hep gtt to Eliquis.  Gen Sx recs 1 more week of abx and OP Gen Sx follow-up.  PT/OT therapies were consulted and continue to follow patient at this time. PM&R was consulted and are recommending inpatient acute rehab when medically stable. All involved medical disciplines feel/agree patient is medically ready for discharge at this time. Inpatient acute rehabilitation physician was consulted. Upon review of patient's case and correspondence with PT/OT therapies and PM&R services, [facility] physician feels Hal will benefit and is a good candidate / appropriate for inpatient acute rehab at this time. He has demonstrated the willingness / desire and tolerance to participate in the required 3 hours or more of therapies per day.       Allergies:  No known food allergies     Past medical history:  Please see H&P for details  Special Studies:  6/18/24 CT Head: IMPRESSION: No acute intracranial abnormality. Similar-appearing chronic severe microangiopathic changes.  6/18/24 CT Abdomen Pelvis LOWER CHEST: Small left and trace right pleural effusion,  unchanged from the recent prior studies. There is pleural thickening on the right, suggesting chronicity. Linear and tubular opacities at the lung bases, likely areas of atelectasis or scarring.      Social/Education/Vocational Hx:  Pt lives with family    Swallow Information   Prior speech/swallowing tx:evaluated 6/20/24; no overt s/s of aspiration, reg + thin; belching and cough with thin liquids observed per note review  Current Risks for Dysphagia & Aspiration:  AMS s/p seizure  Current Symptoms/Concerns:  AMS  Current Diet: regular and thin liquids; self modified to puree and thin liquids; SLP downgraded to dysphagia level 3 soft diet   Baseline Diet: regular diet and thin liquids  Medications: as tolerated     Baseline Assessment   Behavior/Cognition: alert; fluctuates; fatigued at times   Speech/Language Status: able to participate in conversation and able to follow commands  Patient Positioning: upright in recliner      Swallow Mechanism Exam   Facial: symmetrical  Labial: WFL  Lingual: WFL  Velum: unable to visualize  Mandible: adequate ROM  Dentition: missing teeth   Vocal quality:clear/adequate   Volitional Cough: effective   Respiratory: room air     Consistencies Assessed and Performance   Consistencies Administered: thin liquids, puree, and soft solids  Tomato soup, crackers, tashi cracker with peanut butter, egg salad sandwich, diced peaches     Oral Stage: mild  Pt able to self feed. Pt able to bite through sandwich. Adequate bolus retrieval from spoon, fork, and cup. Mastication and manipulation were prolonged and mildly decreased but overall effective for soft items with independent utilization of liquid wash. No oral residue or pocketing with solids.  Suspected decreased control of thins with premature spillage.       Pharyngeal Stage: mild  Mildly delayed swallow initiation. Hyolaryngeal excursion judged to be min-mildly decreased via palpation. Coughing, throat clear, audible swallow, burping,  and wet vocal quality observed during thin liquid intake.  Throat clear and wet VQx1 with mixed consistency.       Esophageal Concerns: burping noted with liquids; throat clear s/p intake, reports of regurgitation/ vomiting      Swallow Assessment Prognosis   Prognosis Fair   Prognosis Considerations Age;Co-morbidities;Medical prognosis;Participation level;Potential;Previous level of function   SLP Therapy Minutes   SLP Time In 1300   SLP Time Out 1405   SLP Total Time (minutes) 65   SLP Mode of treatment - Individual (minutes) 65   SLP Mode of treatment - Concurrent (minutes) 0   SLP Mode of treatment - Group (minutes) 0   SLP Mode of treatment - Co-treat (minutes) 0   SLP Mode of Treatment - Total time(minutes) 65 minutes   SLP Cumulative Minutes 215   Therapy Time missed   Time missed? No

## 2024-07-02 NOTE — PLAN OF CARE
Problem: PAIN - ADULT  Goal: Verbalizes/displays adequate comfort level or baseline comfort level  Description: Interventions:  - Encourage patient to monitor pain and request assistance  - Assess pain using appropriate pain scale  - Administer analgesics based on type and severity of pain and evaluate response  - Implement non-pharmacological measures as appropriate and evaluate response  - Consider cultural and social influences on pain and pain management  - Notify physician/advanced practitioner if interventions unsuccessful or patient reports new pain  Outcome: Progressing     Problem: INFECTION - ADULT  Goal: Absence or prevention of progression during hospitalization  Description: INTERVENTIONS:  - Assess and monitor for signs and symptoms of infection  - Monitor lab/diagnostic results  - Monitor all insertion sites, i.e. indwelling lines, tubes, and drains  - Monitor endotracheal if appropriate and nasal secretions for changes in amount and color  - Cody appropriate cooling/warming therapies per order  - Administer medications as ordered  - Instruct and encourage patient and family to use good hand hygiene technique  - Identify and instruct in appropriate isolation precautions for identified infection/condition  Outcome: Progressing  Goal: Absence of fever/infection during neutropenic period  Description: INTERVENTIONS:  - Monitor WBC    Outcome: Progressing     Problem: SAFETY ADULT  Goal: Patient will remain free of falls  Description: INTERVENTIONS:  - Educate patient/family on patient safety including physical limitations  - Instruct patient to call for assistance with activity   - Consult OT/PT to assist with strengthening/mobility   - Keep Call bell within reach  - Keep bed low and locked with side rails adjusted as appropriate  - Keep care items and personal belongings within reach  - Initiate and maintain comfort rounds  - Make Fall Risk Sign visible to staff  - Offer Toileting every 2 Hours,  in advance of need  - Initiate/Maintain bed/chair alarm  - Obtain necessary fall risk management equipment: alarms  - Apply yellow socks and bracelet for high fall risk patients  - Consider moving patient to room near nurses station  Outcome: Progressing  Goal: Maintain or return to baseline ADL function  Description: INTERVENTIONS:  -  Assess patient's ability to carry out ADLs; assess patient's baseline for ADL function and identify physical deficits which impact ability to perform ADLs (bathing, care of mouth/teeth, toileting, grooming, dressing, etc.)  - Assess/evaluate cause of self-care deficits   - Assess range of motion  - Assess patient's mobility; develop plan if impaired  - Assess patient's need for assistive devices and provide as appropriate  - Encourage maximum independence but intervene and supervise when necessary  - Involve family in performance of ADLs  - Assess for home care needs following discharge   - Consider OT consult to assist with ADL evaluation and planning for discharge  - Provide patient education as appropriate  Outcome: Progressing  Goal: Maintains/Returns to pre admission functional level  Description: INTERVENTIONS:  - Perform AM-PAC 6 Click Basic Mobility/ Daily Activity assessment daily.  - Set and communicate daily mobility goal to care team and patient/family/caregiver.   - Collaborate with rehabilitation services on mobility goals if consulted  - Perform Range of Motion 3 times a day.  - Reposition patient every 2 hours.  - Dangle patient 3 times a day  - Stand patient 3 times a day  - Ambulate patient 3 times a day  - Out of bed to chair 3 times a day   - Out of bed for meals 3 times a day  - Out of bed for toileting  - Record patient progress and toleration of activity level   Outcome: Progressing     Problem: DISCHARGE PLANNING  Goal: Discharge to home or other facility with appropriate resources  Description: INTERVENTIONS:  - Identify barriers to discharge w/patient and  caregiver  - Arrange for needed discharge resources and transportation as appropriate  - Identify discharge learning needs (meds, wound care, etc.)  - Arrange for interpretive services to assist at discharge as needed  - Refer to Case Management Department for coordinating discharge planning if the patient needs post-hospital services based on physician/advanced practitioner order or complex needs related to functional status, cognitive ability, or social support system  Outcome: Progressing     Problem: MOBILITY - ADULT  Goal: Maintain or return to baseline ADL function  Description: INTERVENTIONS:  -  Assess patient's ability to carry out ADLs; assess patient's baseline for ADL function and identify physical deficits which impact ability to perform ADLs (bathing, care of mouth/teeth, toileting, grooming, dressing, etc.)  - Assess/evaluate cause of self-care deficits   - Assess range of motion  - Assess patient's mobility; develop plan if impaired  - Assess patient's need for assistive devices and provide as appropriate  - Encourage maximum independence but intervene and supervise when necessary  - Involve family in performance of ADLs  - Assess for home care needs following discharge   - Consider OT consult to assist with ADL evaluation and planning for discharge  - Provide patient education as appropriate  Outcome: Progressing     Problem: Prexisting or High Potential for Compromised Skin Integrity  Goal: Skin integrity is maintained or improved  Description: INTERVENTIONS:  - Identify patients at risk for skin breakdown  - Assess and monitor skin integrity  - Assess and monitor nutrition and hydration status  - Monitor labs   - Assess for incontinence   - Turn and reposition patient  - Assist with mobility/ambulation  - Relieve pressure over bony prominences  - Avoid friction and shearing  - Provide appropriate hygiene as needed including keeping skin clean and dry  - Evaluate need for skin moisturizer/barrier  cream  - Collaborate with interdisciplinary team   - Patient/family teaching  - Consider wound care consult   Outcome: Progressing

## 2024-07-02 NOTE — PROGRESS NOTES
07/02/24 1200   Pain Assessment   Pain Assessment Tool 0-10   Restrictions/Precautions   Precautions Aspiration;Bed/chair alarms;Cognitive;Fall Risk;Hard of hearing;Supervision on toilet/commode   Subjective   Subjective pt agreeable to perform skilled PT   Sit to Stand   Type of Assistance Needed Incidental touching;Verbal cues;Adaptive equipment   Comment Cga RW gait belt   Sit to Stand CARE Score 4   Bed-Chair Transfer   Type of Assistance Needed Incidental touching;Verbal cues;Adaptive equipment   Comment Cg RW gait belt SPT   Chair/Bed-to-Chair Transfer CARE Score 4   Walk 10 Feet   Type of Assistance Needed Incidental touching   Walk 10 Feet CARE Score 4   Walk 50 Feet with Two Turns   Type of Assistance Needed Physical assistance;Adaptive equipment   Comment RW gait belt   Walk 50 Feet with Two Turns CARE Score -   Walk 150 Feet   Type of Assistance Needed Physical assistance;Verbal cues;Adaptive equipment   Comment RW gait belt   Walk 150 Feet CARE Score -   Ambulation   Primary Mode of Locomotion Prior to Admission Walk   Distance Walked (feet) 150 ft   Assist Device Roller Walker  ( feet with HHA right side gait belt need is knee buckle)   Does the patient walk? 2. Yes   Wheel 50 Feet with Two Turns   Reason if not Attempted Activity not applicable   Wheel 50 Feet with Two Turns CARE Score 9   Wheel 150 Feet   Reason if not Attempted Activity not applicable   Wheel 150 Feet CARE Score 9   Wheelchair mobility   Does the patient use a wheelchair? 0. No   Curb or Single Stair   Style negotiated Single stair   Type of Assistance Needed Physical assistance   Physical Assistance Level 26%-50%   Comment FF stairs single HR   1 Step (Curb) CARE Score 3   4 Steps   Type of Assistance Needed Physical assistance   Physical Assistance Level 26%-50%   Comment RHR FF   4 Steps CARE Score 3   12 Steps   Type of Assistance Needed Physical assistance   Physical Assistance Level 26%-50%   Comment FF RHR   12 Steps  CARE Score 3   Stairs   Type Stairs   # of Steps 12   Assist Devices Single Rail;Bilateral Rail   Findings BLHR up and singlr HR doen LHR  VC for sequence   Therapeutic Interventions   Flexibility manual stretch LE   Balance standing balance ball toss   Neuromuscular Re-Education Cont NPP/HIGT RHHA gait belt  feet with inc speed at the end of session , pt NPP FF stairs reciop/ ascending and descending , and with descending single HR L and HHA R to inc NPP and VC to clear the step up and down.   Equipment Use   iFitl 3 for 10 min   Assessment   Treatment Assessment pt cont working on NPP/HIGT RHHA gait belt to inc functional motor learning and planing. Pt progrressing with FF stair using RHHA VC to clear the step and descending single HR and Right HHA , no LOB or knee buckling . Pt will cont toward DC goals and possible wife will come in for PT session to see activities for DC .   Barriers to Discharge Inaccessible home environment;Decreased caregiver support   Plan   Progress Progressing toward goals   PT Therapy Minutes   PT Time In 1200   PT Time Out 1300   PT Total Time (minutes) 60   PT Mode of treatment - Individual (minutes) 60   PT Mode of treatment - Concurrent (minutes) 0   PT Mode of treatment - Group (minutes) 0   PT Mode of treatment - Co-treat (minutes) 0   PT Mode of Treatment - Total time(minutes) 60 minutes   PT Cumulative Minutes 460   Therapy Time missed   Time missed? No

## 2024-07-02 NOTE — ASSESSMENT & PLAN NOTE
Patient had AMS on previous hospitalization and MRI 6/20 demonstrated small left parietal hemorrhage with possible petechial hemorrhage  As per neurology, felt to be cardio embolic 2/2 missed doses of Eliquis, which was held initially for possible surgical intervention with regards to diverticular abscess   Neurology recommended continuing with Depakote, currently on 500 mg q8h   Therapeutic VPA level, LFTs and NH3 within normal limits on 7/2  Will d/w neurology about BID dosing as mentioned previously   Patient denies any residual sensory or motor deficit  Continue anticoagulation with Eliquis   Outpatient followup with Neurology  Planned for DC 7/9/24

## 2024-07-02 NOTE — ASSESSMENT & PLAN NOTE
Blood pressure reviewed and stable   Continue Losartan 50mg qd, Toprol-XL 25mg qd  Cardizem stopped 2/2 recently diagnosed cardiomyopathy

## 2024-07-02 NOTE — ASSESSMENT & PLAN NOTE
Patient had AMS on previous hospitalization and MRI 6/20 demonstrated small left parietal hemorrhage with possible petechial hemorrhage  As per neurology, felt to be cardio embolic 2/2 missed doses of Eliquis, which was held initially for possible surgical intervention with regards to diverticular abscess   Neurology recommended continuing with Depakote, was discharged on 500 mg q8h   Therapeutic VPA level, LFTs and NH3 within normal limits on 7/2  D/w neurology on 7/2 and they recommended transitioning to 750 mg q12h   Patient denies any residual sensory or motor deficit  Continue anticoagulation with Eliquis   Outpatient follow-up with Neurology  Planned for DC 7/9/24

## 2024-07-02 NOTE — ASSESSMENT & PLAN NOTE
Completed 7 days of Valtrex prior to transfer  Pt currently with crusted lesions over right anterior chest just below nipple line round flank to posterior back on right side   Continue to monitor clinically and symptomatically

## 2024-07-02 NOTE — ASSESSMENT & PLAN NOTE
Rate controlled on Toprol 25mg qd  Continue Eliquis 5 mg BID, was on 2.5 mg BID at home   Cardizem stopped per Cardiology in the setting of recently detected reduced LVEF 30%  Outpatient follow-up with cardiology

## 2024-07-02 NOTE — PCC NURSING
84 y.o. male on 6/17 with diffuse body pain, SOB, constipation, decreased appetite, and Lt groin pain. CT scan showed L inguinal hernia w/ diverticular abscess, gen sx started pt on abx. IR Initially opted for non-op mgmt of SOB and treated with solumedrol and nebulized bronchodilators.   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 Was treated for shingles RCW w/ Valtrex PTA. 6/18: RRT called d/t lethargic and an unwitnessed fall - no acute findings  6/19: 2nd RRT called d/t hypoactivity and unresponsiveness - MRI showed acute Lt parietal stroke. Carotid US showed 50% b/l stenosis, Echo showed 30% EF. EEG without seizure. He was determined to be below his baseline function with need for close monitoring of his cardiac and neuro function through therapy.    7/2/24- Will encourage independence with ADLs and monitor labs and vital signs.  We will educate pt/family about repositioning to prevent skin breakdown.  We will assist w repositioning and perform routine skin checks.  We will monitor for adequate pain control.  We will monitor for constipation and medicate pt as ordered. We will provide pt and family DM education. We will increase safety awareness and keep pt free from falls.

## 2024-07-02 NOTE — ASSESSMENT & PLAN NOTE
History of CKD stage 3 noted with baseline creatinine approximately 1.2-1.5 per emile review   Renal function stable, monitor intermittently

## 2024-07-02 NOTE — TEAM CONFERENCE
Acute RehabilitationTeam Conference Note  Date: 7/2/2024   Time: 10:41 AM       Patient Name:  Hal Miller       Medical Record Number: [id number]   YOB: 1940  Sex: Male          Room/Bed:  [place] [address]/[facility] 455-01  Payor Info:  Payor: [facility] REP / Plan: [facility] MEDICARE ADVANTAGE  REP / Product Type: Medicare HMO /      Admitting Diagnosis: CVA (cerebral vascular accident) ([facility]) [I63.9]   Admit Date/Time:  6/26/2024  5:32 PM  Admission Comments: No comment available     Primary Diagnosis:  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[facility])  Principal Problem: 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[facility])    Patient Active Problem List    Diagnosis Date Noted    Hyperkalemia 07/02/2024    Hx of aortic valve replacement 06/29/2024    Diplopia 06/27/2024    Peripheral polyneuropathy 06/27/2024    Hyponatremia 06/27/2024    Dysphoric mood 06/26/2024    NICM (nonischemic cardiomyopathy) ([facility]) 06/21/2024    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[facility]) 06/20/2024    Acute on chronic respiratory failure ([facility]) 06/19/2024    Episode of seizure-like activity 06/19/2024    History of Whipple procedure 06/19/2024    Dizziness 06/18/2024    Insomnia 06/18/2024    Ambulatory dysfunction 06/18/2024    Severe protein-calorie malnutrition ([facility]) 06/18/2024    Abnormal CT of the abdomen 06/17/2024    Zoster 06/17/2024    Colonic diverticular abscess 06/17/2024    Vascular dementia ([facility]) 06/17/2024    Concern for aspiration pneumonia ([facility]) 06/07/2024    Type 2 diabetes mellitus, with long-term current use of insulin ([facility]) 06/07/2024    Hypercalcemia 03/01/2023    TARA (acute kidney injury) ([facility]) 03/01/2023    Esophageal stricture 02/27/2023    Acute encephalopathy 02/14/2023    Generalized weakness 02/14/2023    Malignant neoplasm of tail of pancreas ([facility]) 09/29/2022    Chronic obstructive pulmonary disease with acute exacerbation ([facility]) 07/02/2022    Stage 3 chronic kidney disease (HCC) 07/02/2022    Centrilobular emphysema ([facility]) 08/27/2021    History of malignant neuroendocrine  "tumor 07/13/2021    Atrial fibrillation (HCC) 06/14/2017    Shortness of breath 06/13/2017    Primary hypertension 06/13/2017    Hyperlipidemia 09/21/2015    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    Osteoarthrosis 11/12/2014    Polymyalgia rheumatica (HCC) 11/12/2014    Aneurysm of thoracic aorta (HCC) 01/06/2014    Aneurysm of abdominal aorta (HCC) 01/06/2014    Neoplasm of other specified site 01/06/2014       Physical Therapy:    Weight Bearing Status: Full Weight Bearing  Transfers: Minimal Assistance  Bed Mobility: Supervision  Amulation Distance (ft): 150 feet (to 197 feet)  Ambulation: Minimal Assistance, Moderate Assistance  Assistive Device for Ambulation: Hand Hold Assistance  Number of Stairs: 12  Assistive Device for Stairs: Right Hand Rail (B hands and B hands on L HR as well)  Stair Assistance: Minimal Assistance  Ramp: Minimal Assistance  Assistive Device for Ramp: Roller Walker  Discharge Recommendations: Home with:  DC Home with:: First Floor Setup, Outpatient Physical Therapy    PT evaluation completed on 6/27  Pt is 84 y.o. male seen for PT evaluation s/p admit to [facility] on 6/26/2024 to maximize his functional mobility (I) and safety. He presented initially to ED with abdominal pain, SOB, and enlarging L groin buldge who had CT A/P concerning for diverticular abscess. During his hospitalization he presented with stroke like symptoms. MRI showing the following: \"1. Small acute infarct in the left parietal periventricular white matter with an associated small focus of susceptibility that may represent associated petechial hemorrhage. 2. Redemonstrated foci of susceptibility, including new foci, nonspecific and could be seen with cerebral amyloid angiopathy, multiple cavernomas and/or prior embolic events, among other considerations.    Pt. Demonstrates good progress since initial evaluation and currently min A with no AD from mod to max A at eval, min to mod A with no AD " but with R HHA, mod A with stairs management. Impairments/ barriers that cont to limit progress includes: L sided weakness , dec balance and balance reactions, dec safety awareness and dec motor planning. Pt. Personal barriers for d/c include SISSY and FF to 2nd floor but can be a first floor set up. Pt's spouse uses portable o2 tank otherwise does not work and can assist pt. Prn at d/c. Pt. Will benefit from continued PT to maximize motor recovery from stroke, participate in HIGT to normalize gait pattern, balance training decreasing risk for false, FT and DME assessment. Pt. ELOS 10-14 days with anticipated d/c date of 7/9/24.    Occupational Therapy:  Eating: Independent  Grooming: Supervision  Bathing: Minimal Assistance  Bathing: Minimal Assistance  Upper Body Dressing: Supervision  Lower Body Dressing: Minimal Assistance  Toileting: Minimal Assistance  Toilet Transfer: Minimal Assistance  Cognition: Exceptions to WNL  Cognition: Decreased Memory, Decreased Executive Functions, Decreased Attention  Discharge Recommendations: Home with:  DC Home with:: 24 Hour Assistance, 24 Hour Supervision, Family Support, Outpatient Occupational Therapy       Occupational Therapy Weekly Team Note    Pt continues to make good progress with skilled occupational therapy intervention and is progressing toward long term goals for ADL, IADL's, and functional transfers/mobility. Pts long term goals for ADLs are supervision with Rolling Walker. Pt continues to present with impairments in activity tolerance, endurance, standing balance/tolerance, and memory. Occupational performance remains limited by fatigue, (R) visual deficits , (L) visual deficits , and risk for falls. Family training/education will be required prior to D/C.     Pt will continue to benefit from skilled acute rehab OT services to address above mentioned barriers and maximize functional independence in baseline areas of occupation to meet established treatment goals  with overall decreased burden of care. Plan of care to continue to focus on ADL Retraining , LB Dressing,  LHAE education/training, Functional Transfers, Standing tolerance, Standing balance , Gross motor strengthening , DME training/education, Family training/education, Energy conservation training/education, healthy coping education, and Leisure and social pursuits. Goals for the upcoming week are: establish caregiver abilities, establish DME needs, and establish/complete family caregiver training  Anticipate Discharge date to be set. . Home with family/friends support , 24/7 Supervision/assist, and Outpatient OT services        \      Speech Therapy:           Pt currently being followed for cognitive tx sessions, to where upon admission to the acute rehab center, pt was able to complete the CLQT+ on initial evaluation with a Composite Severity Rating score of 4.0 out of 4.0, correlating to overall WNL cognitive linguistic skills at time of evaluation and in comparison to age matched peers ranging from 70-88 y/o. Despite demonstrating skills to be functional, pt is insightful that he has noticed some cognitive decline with aging, however, does not believe he has any changes since his stroke. Wife reports similar. SLP discussed that he would benefit from cognitive therapy while at the [facility] as some things aren't as noticeable when not in home environment, and while he scored WNL on cognitive testing, all scores were low normal. Pt is oriented to current situation, place and date, as well as recalling LTM biographical information. Cognitive barriers which present include: decreased attention, ST memory recall , problem solving, reasoning, sequencing, organization of thoughts, judgement, and slower processing, which still impacts pt's overall safety, functional cognitive communication skills as well as functional mobility. The following interventions are used to target these barriers, including verbal problem solving  "task, verbal working memory tasks, drawing conclusions activities, written sequencing tasks, categorization tasks , picture problem solving activities, verbal reasoning tasks, verbal review of current medications, written review of medications,  written health management tasks, verbal health management tasks, functional reading tasks , time management activities, and family education/training. Family training/education has not been formally initiated but spouse has been present during sessions where education as to role of services for pt while on the [facility].  Current cognitive skills are supervision for comprehension, mod I level for expression and social interactions, min A for executive functions and memory.    Of notice upon completion of cognitive tx sessions, SLP providing assistance to set-up dinner tray. Patient had selected only soft items. When encouraging patient to include additional items into diet patient states \"It sticks in my mouth and my throat\". Spouse reports he has only been selecting pureed items including yogurts, mashed potatoes, and thin liquids including soups. SLP inquired further regarding reports of difficulty swallowing. Patient pointed to mid throat level as area of globus sensation during PO intake. SLP educated on benefit and purpose of bedside dysphagia evaluation and various texture levels offered. SLP offered to downgrade for safety and ease of intake until swallow assessment completed. Patient and spouse declined noting he will continue to order preferred puree and liquids until swallow assessment completed. Spouse reports he has note consumed anything with texture (only puree/ liquids) in weeks. Patient states \"I may have had some type of swallow test before somewhere I am not sure\". Nursing aware ST to complete swallow assessment and to ensure upright position for PO intake and provide distant supervision. UPDATE FROM SESSION, ORDERS HAVE BEEN PLACED on 7/1 to where a dysphagia " screen had been completed as well. Full bedside dysphagia assessment across a meal is planned for 7/2. Diet remains to be regular/thin liquids at this time.      At this time, pt will continue to benefit from skilled cognitive linguistic tx session, (likely 3x/week, where pt can focus on increased OT/PT for functional mobility) to maximize overall functional independence given overall cognitive linguistic skills in attempts to decreased caregiver burden over time. Also pending results from bedside dysphagia assessment, ST services could be recommended for ongoing f/u for diet recommendations as well as swallow strategies      Nursing Notes:  Appetite: Good  Diet Type: Diabetic                                                                     Pain Location/Orientation: Location: Back, Orientation: Bilateral  Pain Score: 0                                  84 y.o. male on 6/17 with diffuse body pain, SOB, constipation, decreased appetite, and Lt groin pain. CT scan showed L inguinal hernia w/ diverticular abscess, gen sx started pt on abx. IR Initially opted for non-op mgmt of SOB and treated with solumedrol and nebulized bronchodilators.   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 Was treated for shingles [facility] w/ Valtrex PTA. 6/18: RRT called d/t lethargic and an unwitnessed fall - no acute findings  6/19: 2nd RRT called d/t hypoactivity and unresponsiveness - MRI showed acute Lt parietal stroke. Carotid US showed 50% b/l stenosis, Echo showed 30% EF. EEG without seizure. He was determined to be below his baseline function with need for close monitoring of his cardiac and neuro function through therapy.    7/2/24- Will encourage independence with ADLs and monitor labs and vital signs.  We will educate pt/family about repositioning to prevent skin breakdown.  We will  assist w repositioning and perform routine skin checks.  We will monitor for adequate pain control.  We will monitor for constipation and medicate pt as ordered. We will provide pt and family DM education. We will increase safety awareness and keep pt free from falls.    Case Management:     Discharge Planning  Living Arrangements: Lives w/ Spouse/significant other  Support Systems: Spouse/significant other, Family members  Assistance Needed: none  Type of Current Residence: Private residence  Current Home Care Services: No  7/1-     Cm met with pt with during therapy to review role and complete cm open. Pt lives in 2 story home with spouse. Pt has home oxygen, bsc, walker and spc. PCP is Kyaw Monreal, preferred pharmacy is [facility] in [place]. Met with the patient today to complete CM open. The patient was educated that other members of the patient's support are able to ask questions and observe as the patient wished. The patient was educated on the rehabilitation process including therapy program, the interdisciplinary team, and weekly team meetings. Estimated length of stay was reviewed with the patient as well as expectations of discussions of discharge planning. The role of the  was reviewed including providing care coordination, discharge planning and discharge facilitation. IMM was reviewed with the patient and a copy was provided for their reference. The patient verbalized understanding of the information provided and denied any further questions at this time. CM will continue to follow and assist the patient throughout their rehabilitation stay.     LCD through 7/2.        Is the patient actively participating in therapies? yes  List any modifications to the treatment plan: None    Barriers Interventions   COPD Room air   Afib Eliquis   Dementia vasculuar Med mgmt, monitoring   Hyponatremia Monitoring   Stroke  Symptoms improving   Aspiration risk Swallow eval 7/2     Leukocytosis Work  up, new on set   Cardiomyopathy Volume monitoring   Balance and endurance Balance training, endurance training   Functional incontinence  Urinal usage, cuuing   Dynamic stnaind balance Endurance training, sequecning     ST memory, impaired cog SLP Services, verbal problem solving takss, working memory tasks         Is the patient making expected progress toward goals? yes  List any update or changes to goals: None    Medical Goals: Patient will be medically stable for discharge to [facility] restrictive envrionment upon completion of rehab program and Patient will be able to manage medical conditions and comorbid conditions with medications and follow up upon completion of rehab program    Weekly Team Goals:   Rehab Team Goals  ADL Team Goal: Patient will require supervision with ADLs with least restrictive device upon completion of rehab program  Locomotion Team Goal: Patient will require supervision with locomotion with least restrictive device upon completion of rehab program  Cognitive Team Goal: Patient will require supervision for basic and complex tasks upon completion of rehab program    Discussion: Ptg functioning at sup for comp, mod I for expression, min assist for problem solving and memory, swallow eval today at 1pm. Ub sup, sup for lb using LHAE. Max for foot wear doning. Bed movbility sup, sit to stand min, ambulation with walker min.    Team recommending dc Tuesday 7/9 with OP PT OT [facility].     Anticipated Discharge Date:  Tuesday 7/9   [facility] Team Members Present:  The following team members are supervising care for this patient and were present during this Weekly Team Conference.    Physician: Dr. DePadua, MD  : Herlinda Fraser MSW  Registered Nurse: Nabila Seo, JASPER  Physical Therapist: Shawn Ma DPT  Occupational Therapist: Mandi Mojica MS, OTR/L  Speech Therapist: Leilani Alcantara MS, CCC-SLP

## 2024-07-02 NOTE — ASSESSMENT & PLAN NOTE
7/2 Hgb increased to 16   Patient afebrile, only potentially localizing complaint is urinary urgency   Ordered UA   Of note he is chronically on prednisone, but WBC had previously been normal, and this can contribute to immunosuppression.    L groin site stable. If UA unremarkable, consider CTAP and re-engaging Gen Surg.    Discussed with IM.

## 2024-07-02 NOTE — ASSESSMENT & PLAN NOTE
"Patient presented for abdominal pain and swelling, history of hernia.  CT concerning for \"a chronic diverticular abscess with a more recent acute component\" (see full report for detailed findings)  Surgery evaluated patient and recommended conservative management of suspected diverticulitis with microperforation  Patient is tolerating regular diet and having normal bowel movements   Completed 10 day course Cipro/Flagyl 6/28/24  Follow-up with surgery as OP within 3-4 weeks   Noted with leukocytosis today, trend CBC daily for now and consider repeat CT AP if continues to up-trend  "

## 2024-07-02 NOTE — ASSESSMENT & PLAN NOTE
Home: Lantus 12u qhs  Here:  Lantus 8 units qhs   Glucerna supplement ordered   Continue diabetic diet

## 2024-07-02 NOTE — ASSESSMENT & PLAN NOTE
Noted with bump in WBC to 16K on 7/2 but otherwise is without SIRS/sepsis criteria  Patient without infectious symptomology at this time aside from urinary urgency   Urinalysis unremarkable  Consider repeat CT AP to re-assess abscess/inguinal hernia, discussed with surgery   If spikes temperature would obtain blood cultures x 2   Trend temps, WBC, hemodynamics

## 2024-07-02 NOTE — ASSESSMENT & PLAN NOTE
Rate controlled on Toprol 25mg qd  Continue Eliquis 5 mg BID, was on 2.5 mhg BID at home   Cardizem stopped per Cardiology in the setting of recently detected reduced LVEF 30%  Outpatient follow-up with cardiology

## 2024-07-02 NOTE — SPEECH THERAPY NOTE
Speech-Language Pathology Bedside Swallow Evaluation           Patient Name: Hal Miller     Today's Date: 7/2/2024     Problem List  Principal Problem:    Acute encephalopathy  Active Problems:    Shortness of breath    Atrial fibrillation (HCC)    Inflammatory polyarthropathy (HCC)    CKD (chronic kidney disease)    Type 2 diabetes mellitus (HCC)    Abnormal CT of the abdomen    Zoster    Colonic diverticular abscess    Vascular dementia (HCC)    Insomnia    Ambulatory dysfunction    Episode of seizure-like activity    History of Whipple procedure    History of stroke           Past Medical History  Medical History        Past Medical History:   Diagnosis Date    Acute encephalopathy 2/14/2023    Acute-on-chronic kidney injury  (HCC) 6/13/2017    Cancer (HCC)       pancreatic    Colon polyp      Coronary artery disease      Dehydration 3/3/2023    Diabetes mellitus (HCC)      Hyperlipidemia      Hypertension      Left lower lobe pneumonia 7/2/2022    Pneumonia 06/13/2017     Right middle and lower lobe     Stroke (HCC)              Past Surgical History  Surgical History         Past Surgical History:   Procedure Laterality Date    APPENDECTOMY        CARDIAC SURGERY         Aortic Valve Replacement, Ascending Aorta    COLONOSCOPY        GALLBLADDER SURGERY        PANCREATICODUODENECTOMY                Summary   Pt presented with s/s suggestive of moderate oral and suspected mild pharyngeal dysphagia.  Symptoms or concerns included decreased bolus propulsion, decreased bolus formation, delayed oral intiation, suspected decreased control of thins , oral residue with puree , and residue effectively cleared with increased time, liquid wash as well as suspected pharyngeal swallow delay, suspected decreased hyolaryngeal elevation upon palpation, and mild wetness in vocal quality post meal.          Lip seal around tsp/cup was functional and bolus retrieval adequate to where pt did demonstrate weaker lip seal for  containment of more so pureed boluses presented. Pt did have minimal anterior loss across puree and then saliva throughout meal. Manipulation was weaker and delayed w/ puree today in which overall formulation was moderately disorganized leading to mild oral residue. Of note, given cues for liquid wash, pt did clear residuals. While it appeared that no overt s/s reduced oral control was noted w/ thins but could suspect some decreased control over time due to fatigue and decreased CHRISTOPHER. Swallowing initiation was delayed across textures and when palpated laryngeal rise was mildly decreased across textures observed.  No coughing, throat clearing was observed given meal, but increased wetness in vocal quality as meal progressed again, suspecting fatigue impacting swallow function over time.     Risk/s for Aspiration: moderate given overall fatigue level, decreased CHRISTOPHER, difficulty in ability to feed self, decreased PO intake         Recommendations:  Diet: puree/level 1 diet and thin liquids   Meds: crushed with puree   Swallowing strategies: upright posture, only feed when fully alert, slow rate of feeding, small bites/sips, quiet environment (tv off, limit talking, door closed, etc.), alternating bites and sips, and bed in chair mode for meals  Other Recommendations: Continuetraditional oral care--complete post meals        FULL supervision w/ meals.  Results reviewed with:  patient, RN- MD evan - Dr. Torres   Aspiration precautions posted.  Traditional Oral Care POST meals     F/u ST tx: Pt will continue to benefit from ongoing skilled dysphagia tx sessions to establish safest least restrictive diet w/o increased oropharyngeal or aspiration sxs as well as monitor ability to carryover swallow strategies independently.        Plan:           Ongoing trial upgrades w/ SLP only           Ongoing education and family training for diet recommendations and swallow strategies as well as for providing supervision for meals  once training has occurred.           Current Medical Status:  HPI: Hal is a 84 year old male with a medica history of but not limited to polymyalgia rheumatica on chronic plaquenil and low dose prednisone, Afib on Eliquis, stroke history, CAD, DM2, HTN, COPD, vascular dementia, chronic double vision for which he wears eye patch, pancreatic CA s/p whipple (2011), appy, cholecystectomy, R ing hernia repair who presented to [facility] ED with abdominal pain, SOB, and enlarging L groin buldge who had CT A/P concerning for diverticular abscess. He was admitted for further eval/assessment/consultation. Gen Sx was consulted and recommended IV abx.  Follow-up imaging obtained and Gen Sx felt diverticular abscess emanating into L inguinal hernia.  IR was consulted for possible drainage but they felt not enough to no fluid collection available to drain. Plaquenil was d/c'd to limit immunocompromised state.  Follow-up Gen Sx reported Sigmoid diverticular perforation with isolated air within the inguinal canal or inguinal sac with recommendation to continue abx and advance diet as tolerated.  Hospital course notable for episode of unresponsiveness with turning head to left and with downward gaze on 6/19 with waxing and waning mentation.  He received Keppra and ativan on 6/19 for possible seizure and more recently started on Depacon with kidney d/s hx.  EEG showed excessive theta activity during wakefulness suggest mild nonspecific diffuse cerebral dysfunction. No epileptiform discharges or seizures are present.   MRI brain on 6/20 showed small acute infarct in the left parietal periventricular white matter with an associated small focus of susceptibility that may represent associated petechial hemorrhage as well as redemonstrated foci of susceptibility, including new foci, nonspecific and could be seen with cerebral amyloid angiopathy, multiple cavernomas and/or prior embolic events, among other  considerations.Neuro was consulted and they reported / documented small CVA related to being temporarily being off of Eliquis and not failure with plan to go back from hep gtt to Eliquis.  Gen Sx recs 1 more week of abx and OP Gen Sx follow-up.  PT/OT therapies were consulted and continue to follow patient at this time. PM&R was consulted and are recommending inpatient acute rehab when medically stable. All involved medical disciplines feel/agree patient is medically ready for discharge at this time. Inpatient acute rehabilitation physician was consulted. Upon review of patient's case and correspondence with PT/OT therapies and PM&R services, [facility] physician feels Hal will benefit and is a good candidate / appropriate for inpatient acute rehab at this time. He has demonstrated the willingness / desire and tolerance to participate in the required 3 hours or more of therapies per day.                        Allergies:  No known food allergies     Past medical history:  Please see H&P for details

## 2024-07-02 NOTE — ASSESSMENT & PLAN NOTE
"CT incidentally noted \"22 mm cystic retroperitoneal lesion between the aorta and IVC which is increased in size since 2/20/2023. No evidence of soft tissue component. A benign etiology is suspected such as retroperitoneal lymphatic malformation or lymphocele.\"   Initial evaluation in 3 months with contrast-enhanced CT abdomen/pelvis is recommended.  "

## 2024-07-02 NOTE — PROGRESS NOTES
"   07/02/24 0700   Pain Assessment   Pain Assessment Tool 0-10   Pain Score No Pain   Restrictions/Precautions   Precautions Aspiration;Bed/chair alarms;Cognitive;Fall Risk;Hard of hearing;Supervision on toilet/commode   Weight Bearing Restrictions No   ROM Restrictions No   Lifestyle   Autonomy \"It takes me a little bit to get going.\"   Eating   Type of Assistance Needed Independent   Physical Assistance Level No physical assistance   Comment seated in recliner   Eating CARE Score 6   Shower/Bathe Self   Type of Assistance Needed Physical assistance   Physical Assistance Level 26%-50%   Comment pt completed a shower in shower room on tub bench. Pt was able to wash bilateral UE, abdomen, nicholas area, and LB while seated. Pt able to stand and assit with washing buttocks, howver A needed for throuhgness and for balance. Assists needed to wash bilateral feet due to poor dynmaic reach when seated. pt able to dry all body aprts except bialteral feet and buttocks. Throuhgout shower routine pt needed verbal cues to inination tasks and sequencing.   Shower/Bathe Self CARE Score 3   Bathing   Assessed Bath Style Shower   Able to Gather/Transport No   Able to Adjust Water Temperature No   Able to Wash/Rinse/Dry (body part) Left Arm;Right Arm;L Upper Leg;R Upper Leg;Chest;Abdomen;Perineal Area   Limitations Noted in Balance;Endurance;ROM;Sequencing   Positioning Seated;Standing   Adaptive Equipment Tub Bench;Shower Seat;Hand Held Shower   Findings  Pt needs cues for sequencing.   Tub/Shower Transfer   Limitations Noted In Balance;Coordination;UE Strength;LE Strength   Adaptive Equipment Grab Bars;Transfer Bench   Assessed Shower   Findings Min A with RW for balance/safety. Pt has difficulites managing walker and needs vebral cues for placement. Pt also reuqires vebral cues for hand placement on GB. Increased time needed.   Upper Body Dressing   Type of Assistance Needed Set-up / clean-up   Physical Assistance Level No physical " assistance   Comment Pt able to thread bilateral arms throuhg button up shirt. Able to manage buttons with increased time.   Upper Body Dressing CARE Score 5   Lower Body Dressing   Type of Assistance Needed Physical assistance   Physical Assistance Level 26%-50%   Comment Assitance to don breif and adjust when in standing. Pt trailed the use of reacher to don shorts. Pt reports he has three of these at home but does not use them to get dressed. Pt was able to thread bilateral legs into shorts using reach with vebral cues. pt was able too pull shorts past hips using unialteral release. Min A for pulling up over hips to steady due to decrease balanced.   Lower Body Dressing CARE Score 3   Putting On/Taking Off Footwear   Type of Assistance Needed Physical assistance   Physical Assistance Level 76% or more   Comment Max a for donning/doffing britany stockings/socks on bilateral feet. pt did not want shoes on due to relaxing in recliner and eating breakfest. Pt was introduced to sock aid and shoe horn to assit with donning. Pt reports using a ruler to don shoes at home and states it would be nice to have a tool like that. Trial sock aid and shoe horn next session.   Putting On/Taking Off Footwear CARE Score 2   Lying to Sitting on Side of Bed   Type of Assistance Needed Supervision   Physical Assistance Level No physical assistance   Comment increased time and use of bedrails   Lying to Sitting on Side of Bed CARE Score 4   Sit to Stand   Type of Assistance Needed Physical assistance;Verbal cues   Physical Assistance Level 25% or less   Comment CGA with RW for safety. pt still requires cues for hand placement.   Sit to Stand CARE Score 3   Bed-Chair Transfer   Type of Assistance Needed Physical assistance   Physical Assistance Level 25% or less   Comment CGA with RW for stand pivot transfer.   Chair/Bed-to-Chair Transfer CARE Score 3   Toileting Hygiene   Type of Assistance Needed Physical assistance   Physical Assistance  Level 26%-50%   Comment pt able to amange bladder hygiene seated on BSC over toliet. Pt able to assit with bowel hygiene using unilateral release of RW in stand . Min A needed for balance. Assitance to hygiene for throuhgness needed.   Toileting Hygiene CARE Score 3   Toileting   Able to Pull Clothing down yes, up yes.   Able to Manage Clothing Closures Yes   Manage Hygiene Bladder;Bowel   Toilet Transfer   Type of Assistance Needed Physical assistance   Physical Assistance Level 25% or less   Comment CGA with RW on BSC over toliet.   Toilet Transfer CARE Score 3   Cognition   Overall Cognitive Status Impaired   Arousal/Participation Alert   Attention Attends with cues to redirect   Orientation Level Oriented to person;Oriented to place   Memory Decreased short term memory;Decreased recall of recent events   Following Commands Follows one step commands with increased time or repetition   Comments Pt still requires cues for task initiation.   Assessment   Treatment Assessment Pt participated in 90 minute OT session focusing on ADLs and LHAE education. See more details above. Pt is making progress towards his goals and has a good support system at home to help when needed. Pt is still limited by decreased standing balance, dynamic forward reach, endurance, and UE strength. Pt would benefit from continued OT to focus on LHAE training, standing balance, and UE strengthen. Trailed the use of sock aid and shoe horn next session. Continue POC at this time. Pt left in  with alarm on and all needs within reach. No further OT needs at this time.   Prognosis Good   Problem List Decreased strength;Decreased range of motion;Decreased endurance;Impaired balance;Decreased mobility;Decreased coordination;Decreased cognition   Plan   Treatment/Interventions ADL retraining;Functional transfer training;Therapeutic exercise;Endurance training;Patient/family training;Equipment eval/education   Progress Progressing toward goals    OT Therapy Minutes   OT Time In 0700   OT Time Out 0830   OT Total Time (minutes) 90   OT Mode of treatment - Individual (minutes) 90   OT Mode of treatment - Concurrent (minutes) 0   OT Mode of treatment - Group (minutes) 0   OT Mode of treatment - Co-treat (minutes) 0   OT Mode of Treatment - Total time(minutes) 90 minutes   OT Cumulative Minutes 440   Therapy Time missed   Time missed? No

## 2024-07-02 NOTE — ASSESSMENT & PLAN NOTE
Mild, potassium noted to be 5.5 today   Will trial one time dose Lasix 20 mg today   Repeat in AM with Mg

## 2024-07-02 NOTE — CASE MANAGEMENT
Team update: Cm met with pt and pt's spouse at bedside to review dc date of Tuesday 7/8 with OP PT OT ST at [facility] neuro, cm scheduled and added to dci. Pt and spouse agreeable and had no questions.     Faxed CS to [phone]

## 2024-07-02 NOTE — PROGRESS NOTES
"[facility]  Progress Note  Name: Hal Miller I  MRN: [id number]  Unit/Bed#: -01 I Date of Admission: 6/26/2024   Date of Service: 7/2/2024 I Hospital Day: 6    Assessment & Plan   * 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HCC)  Assessment & Plan  Patient had AMS on previous hospitalization and MRI 6/20 demonstrated small left parietal hemorrhage with possible petechial hemorrhage  As per neurology, felt to be cardio embolic 2/2 missed doses of Eliquis, which was held initially for possible surgical intervention with regards to diverticular abscess   Neurology recommended continuing with Depakote, currently on 500 mg q8h   Therapeutic VPA level, LFTs and NH3 within normal limits on 7/2  Will d/w neurology about BID dosing as mentioned previously   Patient denies any residual sensory or motor deficit  Continue anticoagulation with Eliquis   Outpatient followup with Neurology  Planned for DC 7/9/24    Colonic diverticular abscess  Assessment & Plan  Patient presented for abdominal pain and swelling, history of hernia.  CT concerning for \"a chronic diverticular abscess with a more recent acute component\" (see full report for detailed findings)  Surgery evaluated patient and recommended conservative management of suspected diverticulitis with microperforation  Patient is tolerating regular diet and having normal bowel movements   Completed 10 day course Cipro/Flagyl 6/28/24  Follow-up with surgery as OP within 3-4 weeks   Noted with leukocytosis today, trend CBC daily for now and consider repeat CT AP if continues to up-trend    Hyperkalemia  Assessment & Plan  Mild, potassium noted to be 5.5 today   Will trial one time dose Lasix 20 mg today   Repeat in AM with Mg    NICM (nonischemic cardiomyopathy) (HCC)  Assessment & Plan  ECHO with newly detected reduced LVEF at 30% noted on recent hospitalization  Normal coronaries on cardiac cath from 12/2022  Small, chronic loculated pleural effusions " "noted on chest x-ray from 6/16  Cardiology evaluated prior to transfer, Cardizem transition to Toprol and continued on home dose losartan  Not on diuretic currently; had been on Lasix and stopped 2/2 TARA with his last admission 6/7-6/8  Will need close outpatient follow-up and repeat echocardiogram in 3 months, sees Dr. Ramos   Patient reported chronic LE edema, using TEDs for now  Continue I/O and daily weights  One time dose Lasix 20 mg today given net positive volume status and hyperkalemia     Abnormal CT of the abdomen  Assessment & Plan  CT incidentally noted \"22 mm cystic retroperitoneal lesion between the aorta and IVC which is increased in size since 2/20/2023. No evidence of soft tissue component. A benign etiology is suspected such as retroperitoneal lymphatic malformation or lymphocele.\"   Initial evaluation in 3 months with contrast-enhanced CT abdomen/pelvis is recommended.    Atrial fibrillation (HCC)  Assessment & Plan  Rate controlled on Toprol 25mg qd  Continue Eliquis 5 mg BID, was on 2.5 mhg BID at home   Cardizem stopped per Cardiology in the setting of recently detected reduced LVEF 30%  Outpatient follow-up with cardiology    Primary hypertension  Assessment & Plan  Blood pressure reviewed and stable   Continue Losartan 50mg qd, Toprol-XL 25mg qd  Cardizem stopped 2/2 recently diagnosed cardiomyopathy    Type 2 diabetes mellitus, with long-term current use of insulin (HCC)  Assessment & Plan  Home: Lantus 12u qhs  Here:  Lantus 8 units qhs   Glucerna supplement ordered   Continue diabetic diet    Stage 3 chronic kidney disease (HCC)  Assessment & Plan  History of CKD stage 3 noted with baseline creatinine approximately 1.2-1.5 per emile review   Renal function stable, monitor intermittently     Zoster  Assessment & Plan  Completed 7 days of Valtrex prior to transfer  Pt currently with crusted lesions over right anterior chest just below nipple line round flank to posterior back on right side "   Continue to monitor clinically and symptomatically     Chronic obstructive pulmonary disease with acute exacerbation (HCC)  Assessment & Plan  Has severe emphysema by hx  Compensated, continue scheduled and as needed breathing treatments    Inflammatory polyarthropathy (HCC)  Assessment & Plan  Continue Prednisone 1 mg daily    Hx of aortic valve replacement  Assessment & Plan  Has had bioAVR/Bentall x 2 in past    Severe protein-calorie malnutrition (HCC)  Assessment & Plan  Glucerna supplement with meals added         The above assessment and plan was reviewed and updated as determined by my evaluation of the patient on 7/2/2024.    Labs:   Results from last 7 days   Lab Units 07/02/24  0602 06/28/24  0502   WBC Thousand/uL 16.00* 7.49   HEMOGLOBIN g/dL 10.7* 11.4*   HEMATOCRIT % 32.6* 35.1*   PLATELETS Thousands/uL 146* 168     Results from last 7 days   Lab Units 07/02/24  0602 06/28/24  0502   SODIUM mmol/L 132* 133*   POTASSIUM mmol/L 5.5* 4.9   CHLORIDE mmol/L 96 96   CO2 mmol/L 31 31   BUN mg/dL 34* 23   CREATININE mg/dL 1.35* 1.26   CALCIUM mg/dL 10.0 9.6             Results from last 7 days   Lab Units 07/02/24  0558 07/01/24 2027 07/01/24  1548   POC GLUCOSE mg/dl 137 175* 198*       Imaging  No orders to display       Review of Scheduled Meds:  Current Facility-Administered Medications   Medication Dose Route Frequency Provider Last Rate    acetaminophen  650 mg Oral Q4H PRN [place]      albuterol  2 puff Inhalation Q4H PRN [place]      apixaban  5 mg Oral BID [place]      atorvastatin  40 mg Oral Daily With Dinner [place]      bisacodyl  10 mg Rectal Daily PRN Ashley Depadua, MD      budesonide  0.5 mg Nebulization Q12H [place]      divalproex sodium  750 mg Oral Q12H [facility] Diana Harding PA-C      formoterol  20 mcg Nebulization Q12H [place]      furosemide  20 mg Oral Once SHAKILA Garcia-BOOGIE      insulin glargine  8 Units Subcutaneous HS  [place]      insulin lispro  1-5 Units Subcutaneous 4x Daily (AC & HS) [place]      losartan  50 mg Oral Daily [place]      melatonin  3 mg Oral HS [place]      metoprolol succinate  25 mg Oral Daily [place]      pantoprazole  40 mg Oral Early Morning [place]      polyethylene glycol  17 g Oral Daily PRN Ashley Depadua, MD      predniSONE  1 mg Oral Daily [place]      senna  2 tablet Oral BID [place]      tamsulosin  0.4 mg Oral Daily With Dinner Ashley Depadua, MD         Subjective/ HPI: Patient seen and examined. Patients overnight issues or events were reviewed with nursing staff. New or overnight issues include the following:     Patient reporting persistent swelling in his L inguinal area. He tells me this as been present since admission and is under the impression that this is the abscess for which he was previously receiving antibiotic. He tells me it is not bothersome to him, although is tender when palpated. He notes he had a bowel movement this morning, which he reports as loose. Denies abdominal pain, chest pain, shortness of breath. Endorses LE swelling, which to him seems to be worsening.     ROS:   A 10 point ROS was performed; negative except as noted above.        *Labs /Radiology studies Reviewed  *Medications  reviewed and reconciled as needed  *Please refer to order section for additional ordered labs studies      Physical Examination:  Vitals:   Vitals:    07/02/24 0553 07/02/24 0600 07/02/24 0821 07/02/24 0847   BP: 122/60   122/59   BP Location: Left arm      Pulse: 75  75 73   Resp: 20  18    Temp: 98.6 °F (37 °C)      TempSrc:       SpO2: 99%  98%    Weight:  74 kg (163 lb 2.3 oz)     Height:           General Appearance: NAD; pleasant  HEENT: PERRLA, conjuctiva normal; mucous membranes moist; face symmetrical  Neck:  Supple  Lungs: clear bilaterally, normal respiratory effort, no retractions, expiratory effort  normal, on room air  CV: irregularly irregular, no murmurs rubs or gallops noted   ABD: soft non tender, +BS x4  EXT: DP pulses intact, no lymphadenopathy, +bilateral LE edema   Skin: +L inguinal swelling, tenderness, erythema   Psych: affect normal, mood normal  Neuro: AAOx3       The above physical exam was reviewed and updated as determined by my evaluation of the patient on 7/2/2024.    Invasive Devices       None                      VTE Pharmacologic Prophylaxis: Eliquis  Code Status: Level 1 - Full Code  Current Length of Stay: 6 day(s)    Total floor / unit time spent today 20 minutes  Coordination of patient's care was performed in conjunction with primary service. Time invested included review of patient's labs, vitals, and management of their comorbidities with continued monitoring, examination of patient as well as answering patient questions, documenting her findings and creating progress note in electronic medical record,  ordering appropriate diagnostic testing.       ** Please Note:  voice to text software may have been used in the creation of this document. Although proof errors in transcription or interpretation are a potential of such software**

## 2024-07-02 NOTE — ASSESSMENT & PLAN NOTE
ECHO with newly detected reduced LVEF at 30% noted on recent hospitalization  Normal coronaries on cardiac cath from 12/2022  Small, chronic loculated pleural effusions noted on chest x-ray from 6/16  Cardiology evaluated prior to transfer, Cardizem transition to Toprol and continued on home dose losartan  Not on diuretic currently; had been on Lasix and stopped 2/2 TARA with his last admission 6/7-6/8  Will need close outpatient follow-up and repeat echocardiogram in 3 months, sees Dr. Ramos   Patient reported chronic LE edema, using TEDs for now  Continue I/O and daily weights  One time dose Lasix 20 mg today given net positive volume status and hyperkalemia

## 2024-07-02 NOTE — ASSESSMENT & PLAN NOTE
7/2 level 5.5  - IM gave Lasix, but would check BMP in the AM as his creatinine bumped today prior to getting lasix  - Could also consider Kayexalate if appropriate or making sure his sample was not hemolyzed  - Recheck in the AM

## 2024-07-02 NOTE — PROGRESS NOTES
07/02/24 0930   Pain Assessment   Pain Assessment Tool 0-10   Pain Score No Pain   Restrictions/Precautions   Precautions Aspiration;Bed/chair alarms;Cognitive;Fall Risk;Hard of hearing;Supervision on toilet/commode   Weight Bearing Restrictions No   ROM Restrictions No   Cognition   Overall Cognitive Status Impaired   Arousal/Participation Alert   Attention Attends with cues to redirect   Orientation Level Oriented to person;Oriented to place   Subjective   Subjective pt was agreeable to todays treatment session, no new complaints   Roll Left and Right   Type of Assistance Needed Supervision   Physical Assistance Level No physical assistance   Comment HOB flat no rails   Roll Left and Right CARE Score 4   Sit to Lying   Type of Assistance Needed Supervision   Physical Assistance Level No physical assistance   Comment HOB flat no rails   Sit to Lying CARE Score 4   Lying to Sitting on Side of Bed   Type of Assistance Needed Supervision   Physical Assistance Level No physical assistance   Comment increased time, HOB flat no rails   Lying to Sitting on Side of Bed CARE Score 4   Sit to Stand   Type of Assistance Needed Adaptive equipment;Verbal cues;Incidental touching   Physical Assistance Level No physical assistance   Comment CG with RW+ Gait belt, VC for hand placement   Sit to Stand CARE Score 4   Bed-Chair Transfer   Type of Assistance Needed Adaptive equipment;Verbal cues;Incidental touching   Physical Assistance Level No physical assistance   Comment SPT with CG+ RW and gait belt   Chair/Bed-to-Chair Transfer CARE Score 4   Walk 10 Feet   Type of Assistance Needed Incidental touching   Physical Assistance Level No physical assistance   Comment CG +RW + gait belt + 30' x2   Walk 10 Feet CARE Score 4   12 Steps   Comment (S)  reassess next session   Picking Up Object   Type of Assistance Needed Incidental touching   Physical Assistance Level No physical assistance   Comment CG + RW, able to reach for pen with  one hand and retrieve then pen from the other hand   Picking Up Object CARE Score 4   Equipment Use   NuStep lvl 3 x10min B UE and LE   Assessment   Treatment Assessment pt engaged in 30min of skilled PT session with focus on functional mobility, endurance training, and picking up object with reacher. pt demonstrated improvements in acitvity tolerance with increase in NuStep level. Next treatment session to trial full flight of stairs, and continue with NPP HIGT + R HHA.   Family/Caregiver Present no   Problem List Decreased strength;Decreased endurance;Impaired balance;Decreased coordination;Decreased cognition   Barriers to Discharge Inaccessible home environment;Decreased caregiver support   PT Barriers   Functional Limitation Car transfers;Ramp negotiation;Stair negotiation;Standing;Transfers;Walking   Plan   Treatment/Interventions Functional transfer training;LE strengthening/ROM;Therapeutic exercise;Endurance training;Gait training   Progress Progressing toward goals   PT Therapy Minutes   PT Time In 0930   PT Time Out 1010   PT Total Time (minutes) 40   PT Mode of treatment - Individual (minutes) 0   PT Mode of treatment - Concurrent (minutes) 40   PT Mode of treatment - Group (minutes) 0   PT Mode of treatment - Co-treat (minutes) 0   PT Mode of Treatment - Total time(minutes) 40 minutes   PT Cumulative Minutes 400   Therapy Time missed   Time missed? No   Reason for time missed   (attempted once and came back later, pt was still eating)

## 2024-07-02 NOTE — PROGRESS NOTES
Physical Medicine and Rehabilitation Progress Note  Hal Miller 84 y.o. male MRN: [id number]  Unit/Bed#: [facility] 455-01 Encounter: [id number]    To Review: Hal Miller is a 84 y.o. 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 CT scan showed a L inguinal hernia with diverticular abscess and general surgery was consulted and he was started on abx. IR was consulted who did not recommend drainage from their standpoint. They initially opted for non-operative management SOB. He was treated with solumedrol and nebulized bronchodilators. He was also found to have shingles and was started on valtrex. Geriatrics was consulted who recommended checking in B12 (normal). On 6/18 he was found to be lethargic and had an unwitnessed fall. RRT was called and CTH/CT C-Spine were ordered - negative, as well as XR L elbow - negative. Another rapid was called on 6/19 for hypoactivity and unresponsiveness. BG was fine, PCO2 on stat ABG not suggestive of hypercarbia, and his WBC had been improving, Neuro was consulted and recommended MRI Brain and EEG and he was started on Depacon with Ativan PRN. They also recommended holding ASA. Gen Surg cleared for diet from surgical perspective. SLP evaluated on 6/20 and cleared for Reg/Thin diet. MRI showed acute L parietal stroke. EEG without seizure. Carotid US showed < 50% bilateral stenosis. MRA and Echo were ordered - suspicion was cardioembolic etiology in setting of held anticoagulation pending surgical intervention on admission. He was placed on a heparin gtt. MRA unremarkable. Echo with EF 30% with global hypokinesis and mildly dilated atria.  Cardiology was consulted for cardiomyopathy who noted his last Echo in 2017 with normal EF who recommend transitioning from cardizem to BB, but team with some concerns given severity of his COPD. Given his low EF, Cards still recommended BB and he was transitioned to Toprol. They recommended close f/u with his outpatient Cardiologist. He is on abx through 6/28. He was transitioned back to eliquis. He was determined to be below his baseline function with need for close monitoring of his cardiac and neuro function through therapy. Seen by PMR who recommended acute inpatient rehab. He was admitted to the [facility] on 6/26.      Chief Complaint: Urinary urgency     Interval History/Subjective:  No acute events overnight. Reports occasional chills, but attributes it to being wet after his shower. He denies fevers, increasing L groin pain, nausea, vomiting, abdominal pain. Last BM was 6/30 and continent. He continues with urinary urgency which is new for him since admission. Behaviorally appropriate and asking for help appropriately. Making progress in therapies.     ROS:  A 10 point review of systems was negative except for what is noted in the HPI.    Today's Changes:  Voiding and continent. With increased WBC today will check UA. Discussed with IM.   Discussed with IM, if L groin pain worsens, WBC worsens, UA unremarkable, consider CTAP and reaching out to Gen Surg for re-evaluation.   Alk phos slightly elevated. If getting labs tomorrow will include GGT.   Repeat CBC and BMP tomorrow (K is elevated as well)   IM gave Lasix today and monitoring K.   Of note crea also bumped today, so would recheck in the AM.   I led and participated in Team Conference today. See dispo below and separate conference note for more details. I concur with the plan of care as determined in Team Conference.  5. Would have patient demonstrate he can give himself insulin prior to discharge. Was doing this prior to admission  6.  Patient has reported  difficulty swallowing with SLP. Order SLP to evaluate and placing on aspiratoin precautions.     Total time spent:  50 minutes, with more than 50% spent counseling/coordinating care. Counseling includes discussion with patient re: progress in therapies, functional issues observed by therapy staff, and discussion with patient his/her current medical state/wellbeing. Coordination of patient's care was performed in conjunction with Internal Medicine service to monitor patient's labs, vitals, and management of their comorbidities. In addition, I lead the interdisciplinary team in weekly case conference today and concur with plan as per team meeting. The care of the patient was extensively discussed with all care providers and an appropriate rehabilitation plan was formulated unique for this patient. Barriers were identified preventing progression of therapy and appropriate interventions were discussed with each discipline. Please see the team note for input from all disciplines regarding barriers, intervention, and discharge planning.      Assessment/Plan:    * 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HCC)  Assessment & Plan  L parietal CVA  2/2 off anticoagulation while waiting for potential surgery in acute care hospital  MRI: Acute L parietal CVA  Carotid US < 50% bilateral stenosis   Echo: dilated atria, and global hypokinesis  Possible component of seizures during hospital stay.  Now back on eliquis  Also has statin  RF modification  Deficits: mostly improved - lethargy, altered mental status. Generalized weakness currently. Evaluating his cognitive deficits here  Depakote 500mg Q8hrs   - VPA and Ammonia levels are normal  Seizure precautions   [employer] to be filled out by Neuro.     PT/OT/SLP 3-5 hours/day, 5-7 days/week.   Outpatient follow-up with Neurology     Dysphagia  Assessment & Plan  Patient subjectively reports dysphagia  - SLP ordered placed  - Aspiration precautions placed.     Peripheral polyneuropathy  Assessment &  Plan  Chronic issue  Partial loss of sensation in his distal lower extremities  Considerations in therapy, monitor for pain  Foot checks with outpatient providers.    Diplopia  Assessment & Plan  Chronic issue. Utilizes prisms and eye patch on occasion.    Vascular dementia (HCC)  Assessment & Plan  Baseline AAO x 2-3.  At risk for delirium, no episodes or issues on admission.  - Delirium precautions  Monitor sleep/wake cycle and manage to optimize recovery from acute CVA  Appropriate bowel/bladder management  Avoiding deliriogenic meds and physical/chemical restraint.  Focus on environmental/behavioral interventions for reorientation and redirection  At baseline functions independently    TSH and B12 WNL in acute care hospital  Outpatient f/u with Neurology    Inflammatory polyarthropathy (HCC)  Assessment & Plan  Per chart history of Polymyalgia Rheumatica  Hydroxychloroquine 200mg daily at home  Per discussion with his Rheumatologist, he was weaned down to prednisone 1mg daily and they will follow-up with him in July.   Will discuss with IM when it is ok/if he should resume hydroxychloroquine  Outpatient f/u with his Rheumatologist at [facility]    Leukocytosis  Assessment & Plan  7/2 Hgb increased to 16   Patient afebrile, only potentially localizing complaint is urinary urgency   Ordered UA   Of note he is chronically on prednisone, but WBC had previously been normal, and this can contribute to immunosuppression.    L groin site stable. If UA unremarkable, consider CTAP and re-engaging Gen Surg.    Discussed with IM.     Hyperkalemia  Assessment & Plan  7/2 level 5.5  - IM gave Lasix, but would check BMP in the AM as his creatinine bumped today prior to getting lasix  - Could also consider Kayexalate if appropriate or making sure his sample was not hemolyzed  - Recheck in the AM     Hyponatremia  Assessment & Plan  7/2 Na 132 stable   Asymptomatic  Not currently on FR on salt tabs or had additional work-up  Monitor  and recheck, risk for SIADH, CSW, etc.   Ensure appropriate nutrition/hydration    NICM (nonischemic cardiomyopathy) (Former[facility])  Assessment & Plan  Wt Readings from Last 3 Encounters:   07/02/24 74 kg (163 lb 2.3 oz)   06/26/24 86.2 kg (190 lb 0.6 oz)   06/07/24 76 kg (167 lb 8.8 oz)     Per chart last echo in 2017 with normal EF  Echo in this hospitalization with EF 30%, global hypokinesis, and dilated atria  Recommended for GDMT  Now on BB and ARB (see HTN)  Was on lasix at home - appears euvolemic currently  Monitor weights and I/O  Management as per IM  Outpatient f/u with PCP/Cardiology          Severe protein-calorie malnutrition (HCC)  Assessment & Plan  Malnutrition Findings:                                 BMI Findings:           Body mass index is 24.97 kg/m².   Nutrition consulted    Colonic diverticular abscess  Assessment & Plan  Noted on admission on the L  No pain in groin at this time  Completes Cipro/Metronidazole tomorrow.  Non-operatively managed, tolerating PO  Monitor bowels.  Outpatient f/u with Gen Surg in 3-4 weeks.     Zoster  Assessment & Plan  Completed Valacyclovir in the hospital  Lesions are crusted over/clearing up.  No need for contact precautions at this time.  Will keep area covered.     Type 2 diabetes mellitus, with long-term current use of insulin ([facility])  Assessment & Plan  Lab Results   Component Value Date    HGBA1C 7.6 (H) 06/19/2024       Recent Labs     07/01/24  1548 07/01/24  2027 07/02/24  0558 07/02/24  1045   POCGLU 198* 175* 137 117       Blood Sugar Average: Last 72 hrs:  (P) 130.4668260507614843    Home: Had Lantus at home.12 units per IM  Here: Lantus 8 units QHS, Cdi/Accuchecks  Would have staff evaluate how he draws up and gives himself his insulin prior to discharge.  Monitor and adjust as per IM  Outpatient f/u with PCP    Stage 3 chronic kidney disease (HCC)  Assessment & Plan  Lab Results   Component Value Date    EGFR 48 07/02/2024    EGFR 52 06/28/2024    EGFR 49  06/27/2024    CREATININE 1.35 (H) 07/02/2024    CREATININE 1.26 06/28/2024    CREATININE 1.33 (H) 06/27/2024     Appears to have CKD 3A  Baseline unclear.  Monitor for now  Avoid nephrotoxins and relative hypotension  Outpatient f/u with PCP    Chronic obstructive pulmonary disease with acute exacerbation (HCC)  Assessment & Plan  Severe.  Home: Albuterol PRN, Trelegy  Here: Albuterol PRN, Budesonide nebs Q12hr PRN and Formoterol nebs every 12 hours  - Would try to get patient back onto an inhaler prior to discharge home.  Currently on room air  Monitor exam and adjust as per IM.  Outpatient f/u with Pulm     Atrial fibrillation (HCC)  Assessment & Plan  Toprol 25mg daily   Fully anticoagulated on apixaban  Monitor and adjust as appropriate  Outpatient f/u with Cardiologist    Primary hypertension  Assessment & Plan  Home: Losartan 100mg daily, Lasix 20mg daily  Here: Losartan 50mg daily, Toprol 25mg daily   Monitor and adjust as appropriate.  Management as per IM  Outpatient f/u with PCP      Health Maintenance  #Delirium/Sleep: At risk. Optimize sleep/wake, pain, bowel, bladder management. Avoid deliriogenic meds and physical restraint. Environmental/behavioral interventions.   #Pain: not a major issues at this time. Tylenol PRN  #Bowel: Continent. Last BM 6/30. Senna 2 tabs at night, PRN bisacodyl, miralax  #Bladder: Voiding and continent  #Skin/Pressure Injury Prevention: Turn Q2hr in bed, with weight shifts [id number] in wheelchair. Float heels in bed.  #DVT Prophylaxis: Fully anticoagulated on apixaban, SCDs, ambulation  #GI Prophylaxis: On PPI. Of note on chronic steroids.   #Code Status: Full Code  #FEN: Diabetic Diet, Glucerna- Strawberry B/L/D  #Dispo: Team 7/2: ADD 7/9 with outpatient PT/OT/SLP.  Goal to discharge home mod Ind-Sup with support of wife.   Outpatient f/u with PCP, Neurology, General Surgery (in 3-4 weeks)    Objective:    Functional Update:  PT: Car transfers, Sup bed mobility, min-modA  ambulation 150' with HHA, Car stairs   OT: Ind eating, Sup grooming, Car bathing, Sup UB dressing, Car LB dressing, Car toileting, Car toilet transfers   SLP: PATRICE WNL but scores were all low normal. They will evaluate further.     Allergies per EMR    Physical Exam:  Temp:  [98.6 °F (37 °C)-98.9 °F (37.2 °C)] 98.6 °F (37 °C)  HR:  [62-82] 75  Resp:  [20] 20  BP: (122-144)/(59-68) 122/60  Oxygen Therapy  SpO2: 99 %    Gen: No acute distress, Well-nourished, well-appearing.  HEENT: Moist mucus membranes, Normocephalic/Atraumatic  Cardiovascular: Regular rate, rhythm, S1/S2. Distal pulses palpable  Heme/Extr: No edema  Pulmonary: Non-labored breathing. Lungs CTAB  : No cardenas  GI: Soft, non-tender, non-distended. BS+  Integumentary: Skin is warm, dry.   Neuro: AAOx3, Speech is intact. Appropriate to questioning.   Psych: Normal mood and affect.     Diagnostic Studies: Reviewed, no new imaging    Laboratory:  Reviewed   Results from last 7 days   Lab Units 07/02/24  0602 06/28/24  0502 06/27/24  0506   HEMOGLOBIN g/dL 10.7* 11.4* 11.7*   HEMATOCRIT % 32.6* 35.1* 37.7   WBC Thousand/uL 16.00* 7.49 7.64     Results from last 7 days   Lab Units 07/02/24  0602 06/28/24  0502 06/27/24  0506   BUN mg/dL 34* 23 25   POTASSIUM mmol/L 5.5* 4.9 4.7   CHLORIDE mmol/L 96 96 99   CREATININE mg/dL 1.35* 1.26 1.33*   AST U/L 27  --   --    ALT U/L 22  --   --             Patient Active Problem List   Diagnosis    Shortness of breath    Hypertension    Atrial fibrillation (HCC)    Aneurysm of thoracic aorta (HCC)    Aneurysm of abdominal aorta (HCC)    Hyperlipidemia    Inflammatory polyarthropathy (HCC)    Osteoarthrosis    Polymyalgia rheumatica (HCC)    History of malignant neuroendocrine tumor    Centrilobular emphysema (HCC)    Chronic obstructive pulmonary disease with acute exacerbation (HCC)    CKD (chronic kidney disease)    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)    Acute encephalopathy    Generalized weakness     Neoplasm of other specified site    Esophageal stricture    Hypercalcemia    TARA (acute kidney injury) (HCC)    Concern for aspiration pneumonia (HCC)    Type 2 diabetes mellitus (HCC)    Abnormal CT of the abdomen    Zoster    Colonic diverticular abscess    Vascular dementia (HCC)    Dizziness    Insomnia    Ambulatory dysfunction    Severe protein-calorie malnutrition (HCC)    Acute on chronic respiratory failure (HCC)    Episode of seizure-like activity    History of Whipple procedure    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HCC)    NICM (nonischemic cardiomyopathy) (HCC)    Dysphoric mood    Diplopia    Peripheral polyneuropathy    Hyponatremia    Hx of aortic valve replacement         Medications  Current Facility-Administered Medications   Medication Dose Route Frequency Provider Last Rate    acetaminophen  650 mg Oral Q4H PRN [place]      albuterol  2 puff Inhalation Q4H PRN [place]      apixaban  5 mg Oral BID [place]      atorvastatin  40 mg Oral Daily With Dinner [place]      bisacodyl  10 mg Rectal Daily PRN Ashley Depadua, MD      budesonide  0.5 mg Nebulization Q12H [place]      divalproex sodium  500 mg Oral Q8H YUNI [place]      formoterol  20 mcg Nebulization Q12H [place]      insulin glargine  8 Units Subcutaneous HS [place]      insulin lispro  1-5 Units Subcutaneous 4x Daily (AC & HS) [place]      losartan  50 mg Oral Daily [place]      melatonin  3 mg Oral HS [place]      metoprolol succinate  25 mg Oral Daily [place]      pantoprazole  40 mg Oral Early Morning [place]      polyethylene glycol  17 g Oral Daily PRN Ashley Depadua, MD      predniSONE  1 mg Oral Daily [place]      senna  2 tablet Oral BID [place]      tamsulosin  0.4 mg Oral Daily With Dinner Ashley Depadua, MD            ** Please Note: Fluency Direct voice to text software may have been used in the  creation of this document. **

## 2024-07-03 ENCOUNTER — ANESTHESIA EVENT (INPATIENT)
Dept: PERIOP | Facility: HOSPITAL | Age: 84
End: 2024-07-03
Payer: COMMERCIAL

## 2024-07-03 VITALS
BODY MASS INDEX: 20.71 KG/M2 | DIASTOLIC BLOOD PRESSURE: 55 MMHG | WEIGHT: 161.38 LBS | HEIGHT: 74 IN | RESPIRATION RATE: 18 BRPM | TEMPERATURE: 98.3 F | SYSTOLIC BLOOD PRESSURE: 122 MMHG | HEART RATE: 76 BPM | OXYGEN SATURATION: 99 %

## 2024-07-03 PROBLEM — E87.5 HYPERKALEMIA: Status: RESOLVED | Noted: 2024-07-02 | Resolved: 2024-07-03

## 2024-07-03 PROBLEM — K40.90 LEFT INGUINAL HERNIA: Status: ACTIVE | Noted: 2024-07-03

## 2024-07-03 LAB
ANION GAP SERPL CALCULATED.3IONS-SCNC: 2 MMOL/L (ref 4–13)
BUN SERPL-MCNC: 37 MG/DL (ref 5–25)
CALCIUM SERPL-MCNC: 9.7 MG/DL (ref 8.4–10.2)
CHLORIDE SERPL-SCNC: 96 MMOL/L (ref 96–108)
CO2 SERPL-SCNC: 32 MMOL/L (ref 21–32)
CREAT SERPL-MCNC: 1.35 MG/DL (ref 0.6–1.3)
DME PARACHUTE DELIVERY DATE REQUESTED: NORMAL
DME PARACHUTE ITEM DESCRIPTION: NORMAL
DME PARACHUTE ORDER STATUS: NORMAL
DME PARACHUTE SUPPLIER NAME: NORMAL
DME PARACHUTE SUPPLIER PHONE: NORMAL
ERYTHROCYTE [DISTWIDTH] IN BLOOD BY AUTOMATED COUNT: 15.2 % (ref 11.6–15.1)
GFR SERPL CREATININE-BSD FRML MDRD: 48 ML/MIN/1.73SQ M
GLUCOSE P FAST SERPL-MCNC: 104 MG/DL (ref 65–99)
GLUCOSE SERPL-MCNC: 104 MG/DL (ref 65–140)
GLUCOSE SERPL-MCNC: 106 MG/DL (ref 65–140)
GLUCOSE SERPL-MCNC: 141 MG/DL (ref 65–140)
GLUCOSE SERPL-MCNC: 163 MG/DL (ref 65–140)
HCT VFR BLD AUTO: 32.4 % (ref 36.5–49.3)
HGB BLD-MCNC: 10.4 G/DL (ref 12–17)
MAGNESIUM SERPL-MCNC: 2 MG/DL (ref 1.9–2.7)
MCH RBC QN AUTO: 31.4 PG (ref 26.8–34.3)
MCHC RBC AUTO-ENTMCNC: 32.1 G/DL (ref 31.4–37.4)
MCV RBC AUTO: 98 FL (ref 82–98)
NRBC BLD AUTO-RTO: 0 /100 WBCS
PLATELET # BLD AUTO: 145 THOUSANDS/UL (ref 149–390)
PMV BLD AUTO: 9.3 FL (ref 8.9–12.7)
POTASSIUM SERPL-SCNC: 5.1 MMOL/L (ref 3.5–5.3)
RBC # BLD AUTO: 3.31 MILLION/UL (ref 3.88–5.62)
SODIUM SERPL-SCNC: 130 MMOL/L (ref 135–147)
WBC # BLD AUTO: 12.54 THOUSAND/UL (ref 4.31–10.16)

## 2024-07-03 PROCEDURE — 94760 N-INVAS EAR/PLS OXIMETRY 1: CPT

## 2024-07-03 PROCEDURE — 97112 NEUROMUSCULAR REEDUCATION: CPT

## 2024-07-03 PROCEDURE — 82948 REAGENT STRIP/BLOOD GLUCOSE: CPT

## 2024-07-03 PROCEDURE — 92526 ORAL FUNCTION THERAPY: CPT

## 2024-07-03 PROCEDURE — 99223 1ST HOSP IP/OBS HIGH 75: CPT | Performed by: SURGERY

## 2024-07-03 PROCEDURE — 94640 AIRWAY INHALATION TREATMENT: CPT

## 2024-07-03 PROCEDURE — 85027 COMPLETE CBC AUTOMATED: CPT | Performed by: PHYSICIAN ASSISTANT

## 2024-07-03 PROCEDURE — 99238 HOSP IP/OBS DSCHRG MGMT 30/<: CPT | Performed by: PHYSICIAN ASSISTANT

## 2024-07-03 PROCEDURE — 97129 THER IVNTJ 1ST 15 MIN: CPT

## 2024-07-03 PROCEDURE — 97530 THERAPEUTIC ACTIVITIES: CPT

## 2024-07-03 PROCEDURE — 83735 ASSAY OF MAGNESIUM: CPT | Performed by: PHYSICIAN ASSISTANT

## 2024-07-03 PROCEDURE — 99232 SBSQ HOSP IP/OBS MODERATE 35: CPT | Performed by: PHYSICAL MEDICINE & REHABILITATION

## 2024-07-03 PROCEDURE — 94664 DEMO&/EVAL PT USE INHALER: CPT

## 2024-07-03 PROCEDURE — 80048 BASIC METABOLIC PNL TOTAL CA: CPT | Performed by: PHYSICIAN ASSISTANT

## 2024-07-03 RX ORDER — METRONIDAZOLE 500 MG/100ML
500 INJECTION, SOLUTION INTRAVENOUS EVERY 8 HOURS
Status: DISCONTINUED | OUTPATIENT
Start: 2024-07-03 | End: 2024-07-03

## 2024-07-03 RX ORDER — SODIUM CHLORIDE, SODIUM GLUCONATE, SODIUM ACETATE, POTASSIUM CHLORIDE, MAGNESIUM CHLORIDE, SODIUM PHOSPHATE, DIBASIC, AND POTASSIUM PHOSPHATE .53; .5; .37; .037; .03; .012; .00082 G/100ML; G/100ML; G/100ML; G/100ML; G/100ML; G/100ML; G/100ML
50 INJECTION, SOLUTION INTRAVENOUS CONTINUOUS
Status: DISCONTINUED | OUTPATIENT
Start: 2024-07-03 | End: 2024-07-03

## 2024-07-03 RX ORDER — SODIUM CHLORIDE 9 MG/ML
100 INJECTION, SOLUTION INTRAVENOUS CONTINUOUS
Status: DISCONTINUED | OUTPATIENT
Start: 2024-07-03 | End: 2024-07-03 | Stop reason: HOSPADM

## 2024-07-03 RX ORDER — CIPROFLOXACIN 2 MG/ML
400 INJECTION, SOLUTION INTRAVENOUS EVERY 12 HOURS
Status: DISCONTINUED | OUTPATIENT
Start: 2024-07-03 | End: 2024-07-03

## 2024-07-03 RX ADMIN — SENNOSIDES 17.2 MG: 8.6 TABLET, FILM COATED ORAL at 11:22

## 2024-07-03 RX ADMIN — BUDESONIDE 0.5 MG: 0.5 INHALANT ORAL at 08:06

## 2024-07-03 RX ADMIN — FORMOTEROL FUMARATE DIHYDRATE 20 MCG: 20 SOLUTION RESPIRATORY (INHALATION) at 08:06

## 2024-07-03 RX ADMIN — INSULIN LISPRO 1 UNITS: 100 INJECTION, SOLUTION INTRAVENOUS; SUBCUTANEOUS at 11:24

## 2024-07-03 RX ADMIN — APIXABAN 5 MG: 5 TABLET, FILM COATED ORAL at 11:22

## 2024-07-03 RX ADMIN — METRONIDAZOLE 500 MG: 500 INJECTION, SOLUTION INTRAVENOUS at 15:41

## 2024-07-03 RX ADMIN — PREDNISONE 1 MG: 1 TABLET ORAL at 11:23

## 2024-07-03 RX ADMIN — ATORVASTATIN CALCIUM 40 MG: 40 TABLET, FILM COATED ORAL at 17:47

## 2024-07-03 RX ADMIN — DIVALPROEX SODIUM 750 MG: 250 TABLET, DELAYED RELEASE ORAL at 11:21

## 2024-07-03 RX ADMIN — TAMSULOSIN HYDROCHLORIDE 0.4 MG: 0.4 CAPSULE ORAL at 17:45

## 2024-07-03 RX ADMIN — CIPROFLOXACIN 400 MG: 400 INJECTION, SOLUTION INTRAVENOUS at 14:19

## 2024-07-03 RX ADMIN — SODIUM CHLORIDE 100 ML/HR: 0.9 INJECTION, SOLUTION INTRAVENOUS at 15:49

## 2024-07-03 RX ADMIN — PIPERACILLIN SODIUM AND TAZOBACTAM SODIUM 4.5 G: 36; 4.5 INJECTION, POWDER, LYOPHILIZED, FOR SOLUTION INTRAVENOUS at 17:38

## 2024-07-03 RX ADMIN — PANTOPRAZOLE SODIUM 40 MG: 40 TABLET, DELAYED RELEASE ORAL at 05:47

## 2024-07-03 NOTE — ASSESSMENT & PLAN NOTE
ECHO with newly detected reduced LVEF at 30% noted on recent hospitalization  Normal coronaries on cardiac cath from 12/2022  Small, chronic loculated pleural effusions noted on chest x-ray from 6/16  Cardiology evaluated prior to transfer, Cardizem transition to Toprol and continued on home dose losartan  Not on diuretic currently; had been on Lasix and stopped 2/2 TARA with his last admission 6/7-6/8  Will need close outpatient follow-up and repeat echocardiogram in 3 months, sees Dr. Ramos   Patient reported chronic LE edema, using TEDs for now  Continue I/O and daily weights  Status post one time dose Lasix 20 mg PO on 7/2 given net positive volume status and hyperkalemia

## 2024-07-03 NOTE — CASE MANAGEMENT
CM NOTE    Phone call received from [employer] with [employer] replacement. Pt approved for 6 additional days 7/3-7/8 and anticipated d/c of 7/9.

## 2024-07-03 NOTE — DISCHARGE SUMMARY
Discharge Summary   Hal Miller 84 y.o. male MRN: [id number]  Unit/Bed#: [facility] 455-01 Encounter: [id number]  Admission Date: 6/26/2024     Discharge Date:     Discharging Provider: Diana Harding    PCP:  [phone]      HPI: Refer to previous hospitalization for complete record    Summary of [facility] Course: Hal Miller is a 84 y.o. male patient who originally admitted to [facility] on 6/6/2024 following acute CVA. He was noted to have progressive edema, erythema and tenderness of the left inguinal area.  He has known history of left inguinal hernia and was being followed by general surgery during his prior admission.  Due to concern for abscess/severe soft tissue infection patient has been resumed on IV antibiotic and transferred to acute care under the surgical service for possible intervention. Remains hemodynamically stable at this time.     Problem List/Comorbidities:    Left inguinal hernia  Assessment & Plan  CT AP without contrast on 6/18 showed: Essentially unchanged tubular collection of fluid and multiple air droplets in the left side of the pelvis anteriorly, extending focally into the adjacent anterior pelvic wall, and abutting the sigmoid colon. This collection may be related to a contained prior perforation, or possibly may represent an infected hernia plug, as a plug like structure was previously seen in this location on the prior studies dating back to 2011, and is currently not visualized elsewhere.  Increased L inguinal swelling, erythema and tenderness noted on 7/2   Appreciate surgery re-evaluation and recommendations  Resumed on IV antibiotic and transferred to acute care due to concern for abscess  N.p.o. at midnight for possible OR tomorrow for washout  Started on continuous IV fluids at 100 cc/h -- monitor volume status closely in the setting of cardiomyopathy  Eliquis placed on hold for now, pending additional discussion with neurology given recent CVA    Leukocytosis  Assessment & Plan  Noted  "with bump in WBC to 16K on 7/2 but otherwise is without SIRS/sepsis criteria  Patient without infectious symptomology at this time aside from urinary urgency   Urinalysis unremarkable  If spikes temperature would obtain blood cultures x 2   Resumed on IV antibiotic as per surgical team due to concern for left groin abscess  Trend temps, WBC, hemodynamics    Colonic diverticular abscess  Assessment & Plan  Patient presented for abdominal pain and L groin swelling/tenderness, history of L inguinal hernia noted.    CT AP without contrast on 6/17 showed: Enlarging gas and fluid collection extending from the antimesenteric border of the sigmoid colon, infiltrating the left inguinal region soft tissue and coursing under and along the lateral margin of the left inguinal canal. This has increased in size since 2/26/2024 with new surrounding inflammatory change suggesting a chronic diverticular abscess with a more recent acute component   Surgery evaluated patient and recommended conservative management of suspected diverticulitis with microperforation  Completed 10+ day course Cipro/Flagyl 6/28/24  Patient is tolerating regular diet and having normal bowel movements   Surgery reevaluated on 7/3 given progressive left inguinal edema/erythema -> resumed on IV antibiotic with Cipro and Flagyl    Abnormal CT of the abdomen  Assessment & Plan  CT incidentally noted \"22 mm cystic retroperitoneal lesion between the aorta and IVC which is increased in size since 2/20/2023. No evidence of soft tissue component. A benign etiology is suspected such as retroperitoneal lymphatic malformation or lymphocele.\"   Initial evaluation in 3 months with contrast-enhanced CT abdomen/pelvis is recommended.    * 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HCC)  Assessment & Plan  Patient had AMS on previous hospitalization and MRI 6/20 demonstrated small left parietal hemorrhage with possible petechial hemorrhage  As per neurology, felt to be cardio embolic 2/2 missed " doses of Eliquis, which was held initially for possible surgical intervention with regards to diverticular abscess   Neurology recommended continuing with Depakote, was discharged on 500 mg q8h   Therapeutic VPA level, LFTs and NH3 within normal limits on 7/2  D/w neurology on 7/2 and they recommended transitioning to 750 mg q12h   Patient denies any residual sensory or motor deficit  Continue anticoagulation with Eliquis   Outpatient follow-up with Neurology    NICM (nonischemic cardiomyopathy) (HCC)  Assessment & Plan  ECHO with newly detected reduced LVEF at 30% noted on recent hospitalization  Normal coronaries on cardiac cath from 12/2022  Small, chronic loculated pleural effusions noted on chest x-ray from 6/16  Cardiology evaluated prior to transfer, Cardizem transition to Toprol and continued on home dose losartan  Not on diuretic currently; had been on Lasix and stopped 2/2 TARA with his last admission 6/7-6/8  Will need close outpatient follow-up and repeat echocardiogram in 3 months, sees Dr. Ramos   Patient reported chronic LE edema, using TEDs for now  Continue I/O and daily weights  Status post one time dose Lasix 20 mg PO on 7/2 given net positive volume status and hyperkalemia     Atrial fibrillation (HCC)  Assessment & Plan  Rate controlled on Toprol 25mg qd  Continue Eliquis 5 mg BID, was on 2.5 mg BID at home   Cardizem stopped per Cardiology in the setting of recently detected reduced LVEF 30%  Outpatient follow-up with cardiology    Primary hypertension  Assessment & Plan  Blood pressure reviewed and stable   Continue Losartan 50mg qd, Toprol-XL 25mg qd  Cardizem stopped 2/2 recently diagnosed cardiomyopathy    Type 2 diabetes mellitus, with long-term current use of insulin (HCC)  Assessment & Plan  Home: Lantus 12u qhs  Here:  Lantus 8 units qhs   Glucerna supplement ordered   Continue diabetic diet    Stage 3 chronic kidney disease (HCC)  Assessment & Plan  History of CKD stage 3 noted with  "baseline creatinine approximately 1.2-1.5 per [facility] review   Renal function stable, monitor intermittently     Zoster  Assessment & Plan  Completed 7 days of Valtrex prior to transfer  Pt currently with crusted lesions over right anterior chest just below nipple line round flank to posterior back on right side   Continue to monitor clinically and symptomatically     Chronic obstructive pulmonary disease with acute exacerbation (HCC)  Assessment & Plan  Has severe emphysema by hx  Compensated, continue scheduled and as needed breathing treatments    Inflammatory polyarthropathy (HCC)  Assessment & Plan  Continue Prednisone 1 mg daily    Hx of aortic valve replacement  Assessment & Plan  Has had bioAVR/[facility] x 2 in past    Severe protein-calorie malnutrition (HCC)  Assessment & Plan  Glucerna supplement with meals added        Discharge Physical Examination:  /55 (BP Location: Left arm)   Pulse 76   Temp 98.3 °F (36.8 °C) (Oral)   Resp 18   Ht 6' 2\" (1.88 m)   Wt 73.2 kg (161 lb 6 oz)   SpO2 99%   BMI 20.72 kg/m²     Significant Findings, Care, Treatment and Services Provided: Acute comprehensive interdisciplinary inpatient rehabilitation including PT, OT, SLP, RN, CM, SW, dietary, psychology, etc.    Physiatry Additions/Comorbidities:    CVA: Continue Eliquis and Statin for ppx.  Seizure: Continue Depakote 750 mg Q 12h.   Mild LE edema: Lasix was given on 7/2/24, continue TEDS  Afib: Rate controlled and AC on Eliquis   Shingles: Resolving.  Lesions dry and not painful   Diverticular Abscess in left groin: Completed antibiotics, but has an elevated leukocytosis.  WBC 16 K > 12.5 K  G. Surg to eval per IM. Evaluation completed, NPO, IV Cipril and Flagyl restarted, IV fluids started; there is concern for severe soft tissue infection and/or abdominal infection and the patient is to be readmitted to surgery for potential surgical intervention tonight.   DM2: IM managing  Hyponatremia: Na = 130.  Patient " asymptomatic.  Trend and follow.   Continue current medical plan of care per primary service.      Acute Rehabilitation Center Course: Patient participated in a comprehensive interdisciplinary inpatient rehabilitation program which included involvment of Rehab MD, therapies (PT, OT, and/or SLP), RN, CM, SW, dietary, and psychology services.     Etiologic/Rehabilitation Diagnosis: Impairment of mobility, safety and Activities of Daily Living (ADLs) due to Stroke:  01.4  No paresis    Functional Status Upon Admission to [facility]:    Mobility: Car 90' with RW ambulating   Transfers: Sup  ADLs: Sup eating, grooming, LB dressing, Car toileting     Functional Status Upon Discharge from [facility]:     N/A, acute care discharge    Condition at Discharge: serious     Rehab Physician Signature:     Abner Michael MD  Attending Physician  Physical Medicine and Rehabilitation  [facility]    Discharge instructions/Information to patient and family:   See after visit summary for information provided to patient and family.      Provisions for Follow-Up Care:  See after visit summary for information related to follow-up care and any pertinent home health orders.      Future Appointments   Date Time Provider Department Center   7/16/2024  8:45 AM Briana Booth OT [facility] OT [facility]   7/16/2024  9:30 AM FUAD Bah MO [facility]   8/6/2024  9:30 AM ADEOLA Murillo NEURO [facility]   8/20/2024  3:00 PM [facility]   9/25/2024  1:00 PM Maurizio Piper MD [facility]     Disposition:  acute care hospital/surgery    Planned Readmission: No    Discharge Statement   I spent 30 minutes or more discharging the patient.  I had direct contact with the patient on the day of discharge. Greater than 50% of the total time was spent examining patient, answering all patient questions, arranging and discussing plan of care with patient and care  team as well as directly providing post-discharge instructions. Additional time then spent on discharge activities.    Discharge Medications:  See after visit summary for reconciled discharge medications provided to patient and family.

## 2024-07-03 NOTE — ASSESSMENT & PLAN NOTE
Noted with bump in WBC to 16K on 7/2 but otherwise is without SIRS/sepsis criteria  Patient without infectious symptomology at this time aside from urinary urgency   Urinalysis unremarkable  If spikes temperature would obtain blood cultures x 2   Resumed on IV antibiotic as per surgical team due to concern for left groin abscess  Trend temps, WBC, hemodynamics

## 2024-07-03 NOTE — PLAN OF CARE
Problem: PAIN - ADULT  Goal: Verbalizes/displays adequate comfort level or baseline comfort level  Description: Interventions:  - Encourage patient to monitor pain and request assistance  - Assess pain using appropriate pain scale  - Administer analgesics based on type and severity of pain and evaluate response  - Implement non-pharmacological measures as appropriate and evaluate response  - Consider cultural and social influences on pain and pain management  - Notify physician/advanced practitioner if interventions unsuccessful or patient reports new pain  Outcome: Progressing     Problem: INFECTION - ADULT  Goal: Absence or prevention of progression during hospitalization  Description: INTERVENTIONS:  - Assess and monitor for signs and symptoms of infection  - Monitor lab/diagnostic results  - Monitor all insertion sites, i.e. indwelling lines, tubes, and drains  - Monitor endotracheal if appropriate and nasal secretions for changes in amount and color  - Pooler appropriate cooling/warming therapies per order  - Administer medications as ordered  - Instruct and encourage patient and family to use good hand hygiene technique  - Identify and instruct in appropriate isolation precautions for identified infection/condition  Outcome: Progressing  Goal: Absence of fever/infection during neutropenic period  Description: INTERVENTIONS:  - Monitor WBC    Outcome: Progressing     Problem: SAFETY ADULT  Goal: Patient will remain free of falls  Description: INTERVENTIONS:  - Educate patient/family on patient safety including physical limitations  - Instruct patient to call for assistance with activity   - Consult OT/PT to assist with strengthening/mobility   - Keep Call bell within reach  - Keep bed low and locked with side rails adjusted as appropriate  - Keep care items and personal belongings within reach  - Initiate and maintain comfort rounds  - Make Fall Risk Sign visible to staff  - Offer Toileting every  Hours,  in advance of need  - Initiate/Maintain alarm  - Obtain necessary fall risk management equipment:   - Apply yellow socks and bracelet for high fall risk patients  - Consider moving patient to room near nurses station  Outcome: Progressing  Goal: Maintain or return to baseline ADL function  Description: INTERVENTIONS:  -  Assess patient's ability to carry out ADLs; assess patient's baseline for ADL function and identify physical deficits which impact ability to perform ADLs (bathing, care of mouth/teeth, toileting, grooming, dressing, etc.)  - Assess/evaluate cause of self-care deficits   - Assess range of motion  - Assess patient's mobility; develop plan if impaired  - Assess patient's need for assistive devices and provide as appropriate  - Encourage maximum independence but intervene and supervise when necessary  - Involve family in performance of ADLs  - Assess for home care needs following discharge   - Consider OT consult to assist with ADL evaluation and planning for discharge  - Provide patient education as appropriate  Outcome: Progressing  Goal: Maintains/Returns to pre admission functional level  Description: INTERVENTIONS:  - Perform AM-PAC 6 Click Basic Mobility/ Daily Activity assessment daily.  - Set and communicate daily mobility goal to care team and patient/family/caregiver.   - Collaborate with rehabilitation services on mobility goals if consulted  - Perform Range of Motion  times a day.  - Reposition patient every  hours.  - Dangle patient  times a day  - Stand patient  times a day  - Ambulate patient  times a day  - Out of bed to chair  times a day   - Out of bed for meals  times a day  - Out of bed for toileting  - Record patient progress and toleration of activity level   Outcome: Progressing     Problem: DISCHARGE PLANNING  Goal: Discharge to home or other facility with appropriate resources  Description: INTERVENTIONS:  - Identify barriers to discharge w/patient and caregiver  - Arrange for  needed discharge resources and transportation as appropriate  - Identify discharge learning needs (meds, wound care, etc.)  - Arrange for interpretive services to assist at discharge as needed  - Refer to Case Management Department for coordinating discharge planning if the patient needs post-hospital services based on physician/advanced practitioner order or complex needs related to functional status, cognitive ability, or social support system  Outcome: Progressing     Problem: MOBILITY - ADULT  Goal: Maintain or return to baseline ADL function  Description: INTERVENTIONS:  -  Assess patient's ability to carry out ADLs; assess patient's baseline for ADL function and identify physical deficits which impact ability to perform ADLs (bathing, care of mouth/teeth, toileting, grooming, dressing, etc.)  - Assess/evaluate cause of self-care deficits   - Assess range of motion  - Assess patient's mobility; develop plan if impaired  - Assess patient's need for assistive devices and provide as appropriate  - Encourage maximum independence but intervene and supervise when necessary  - Involve family in performance of ADLs  - Assess for home care needs following discharge   - Consider OT consult to assist with ADL evaluation and planning for discharge  - Provide patient education as appropriate  Outcome: Progressing  Goal: Maintains/Returns to pre admission functional level  Description: INTERVENTIONS:  - Perform AM-PAC 6 Click Basic Mobility/ Daily Activity assessment daily.  - Set and communicate daily mobility goal to care team and patient/family/caregiver.   - Collaborate with rehabilitation services on mobility goals if consulted  - Perform Range of Motion  times a day.  - Reposition patient every  hours.  - Dangle patient  times a day  - Stand patient  times a day  - Ambulate patient  times a day  - Out of bed to chair  times a day   - Out of bed for meals  times a day  - Out of bed for toileting  - Record patient  progress and toleration of activity level   Outcome: Progressing     Problem: Prexisting or High Potential for Compromised Skin Integrity  Goal: Skin integrity is maintained or improved  Description: INTERVENTIONS:  - Identify patients at risk for skin breakdown  - Assess and monitor skin integrity  - Assess and monitor nutrition and hydration status  - Monitor labs   - Assess for incontinence   - Turn and reposition patient  - Assist with mobility/ambulation  - Relieve pressure over bony prominences  - Avoid friction and shearing  - Provide appropriate hygiene as needed including keeping skin clean and dry  - Evaluate need for skin moisturizer/barrier cream  - Collaborate with interdisciplinary team   - Patient/family teaching  - Consider wound care consult   Outcome: Progressing

## 2024-07-03 NOTE — PROGRESS NOTES
Pt participated in therapy this am Had lunch took meds Had no complaints  Surgery consulted to check left inguinal hernia Pt c/o pain stated after he said that doctor pressed on this  Site is red with purulent drainage  Resident did cover with dressing. Pt sleeping on and off and stated he has no pain unless hernia is touched  Antibiotics started wife at bedside  pt resting in bed

## 2024-07-03 NOTE — ASSESSMENT & PLAN NOTE
CT AP without contrast on 6/18 showed: Essentially unchanged tubular collection of fluid and multiple air droplets in the left side of the pelvis anteriorly, extending focally into the adjacent anterior pelvic wall, and abutting the sigmoid colon. This collection may be related to a contained prior perforation, or possibly may represent an infected hernia plug, as a plug like structure was previously seen in this location on the prior studies dating back to 2011, and is currently not visualized elsewhere.  Increased L inguinal swelling, erythema and tenderness noted on 7/2   Appreciate surgery re-evaluation and recommendations  Resumed on IV antibiotic and transferred to acute care due to concern for abscess  N.p.o. at midnight for possible OR tomorrow for washout  Started on continuous IV fluids at 100 cc/h -- monitor volume status closely in the setting of cardiomyopathy  Eliquis placed on hold for now, pending additional discussion with neurology given recent CVA

## 2024-07-03 NOTE — PROGRESS NOTES
07/03/24 1300   Therapy Time missed   Time missed? Yes   Amount of time missed 90   Reason for time missed Medical hold;Extreme fatigue  (abdominal pain. Med hold this afternoon, plan for CT)     Mandi Leonardo MS, OTR/L

## 2024-07-03 NOTE — ASSESSMENT & PLAN NOTE
Patient presented for abdominal pain and L groin swelling/tenderness, history of L inguinal hernia noted.    CT AP without contrast on 6/17 showed: Enlarging gas and fluid collection extending from the antimesenteric border of the sigmoid colon, infiltrating the left inguinal region soft tissue and coursing under and along the lateral margin of the left inguinal canal. This has increased in size since 2/26/2024 with new surrounding inflammatory change suggesting a chronic diverticular abscess with a more recent acute component   Surgery evaluated patient and recommended conservative management of suspected diverticulitis with microperforation  Completed 10+ day course Cipro/Flagyl 6/28/24  Patient is tolerating regular diet and having normal bowel movements   Surgery reevaluated on 7/3 given progressive left inguinal edema/erythema -> resumed on IV antibiotic with Cipro and Flagyl

## 2024-07-03 NOTE — PROGRESS NOTES
ST Updates:   Recommendations: still assessing at this time  Continued Services: yes-type TBD pending progress/discharge  Discharge Date: -  Family Education/Training: TBD  Continued Treatment Plan: dysphagia, cognition (attention, STM recall, safety, problem solving) & language (word retrieval)       07/03/24 0830   Pain Assessment   Pain Assessment Tool 0-10   Pain Score No Pain   Restrictions/Precautions   Precautions Aspiration;Bed/chair alarms;Cognitive;Fall Risk;Supervision on toilet/commode;Hard of hearing   Eating   Type of Assistance Needed Set-up / clean-up;Supervision;Verbal cues   Physical Assistance Level No physical assistance   Eating CARE Score 4   Swallow Assessment   Swallow Treatment Assessment   Daily Dysphagia Tx Note     Patient Name: Hal Miller    Today's Date: 7/3/2024      Current Risks for Dysphagia & Aspiration: weak cough, weak voicing, general debilitation, new neuro event, brain injury, cognitive deficit, reduced alertness    Current Symptoms/Concerns: coughing,clearing throat, with food, with liquids, during and after meals, difficulty chewing, moist cough, wet voice, pain with swallowing    Current diet:soft/level 3 diet and thin liquids     Premorbid diet::regular diet and thin liquids     Cognitive Status: decrease cognition; see later ST note for further details    Positioning: upright in bed in chair mode    Items administered:Consistencies Administered:  regular with thins  Materials administered included eggs with marie, english muffin, chocolate milk, yogurt    Total amount of meal consumed: 100% of meal and ~140 120 cc of thins via nosey cup      Oral stage:mild  Lip closure: functional around nosey cup and utensils  Anterior spillage: none  Mastication: prolonged and mildly decreased  Bolus formation: mildly decreased  Bolus control: suspect decrease with premature spillage  Transfer: suspect decrease  Oral residue: trace amounts on tongue in between bites  Pocketing:  "none     Pharyngeal stage:mild-moderate  Swallow promptness: mildly delayed  Hyolaryngeal elevation:  mildly decreased upon palpation  Wet voice: none  Throat clear: at least 3x with soft solids  Cough: 2x with english muffin, 3x with eggs, 2x with thins, 3x with marie, 3x with marie and thins   Secondary swallows: frequent  Audible swallows: frequent with thins       Esophageal stage: No overt sxs of aspiration. Burping with thins; Frequent throat clearing s/p intake; reports of regurgitation      Summary:     Pt presenting with mild oral and mild-moderate pharyngeal dysphagia today. Symptoms or concerns included decreased mastication, decreased bolus formation, suspected decreased control of soft and hard solids , oral residue with soft and hard solids , residue effectively cleared with thins, piecemeal deglutition, and anterior loss/spillage of soft and hard solids. Additionally, pt presents with  suspected pharyngeal swallow delay, suspected decreased hyolaryngeal elevation upon palpation, suspected pharyngeal residue, multiple swallows, and effortful swallow.      Pt was seen during breakfast meal for dysphagia tx session with pt being independent in self feeding and SLP assisting in setting up tray and cutting up items. Upon entering room, pt was completing getting ready with PCA Devon but was just about finished when SLP arrived and noted to being hungry. Pt noted to immediately wanting to use nosey cup stating 'Let's use this' while pointing to nosey cup when looking at chocolate milk. Pt voiced 'I feel so much better with my swallowing with this (nosey cup) and am so happy I found this!\" Pt was noted to use nosey cup for entire session.     In regards to swallowing, pt's lip closure was functional around nosey cup and utensils with no anterior spillage noted. Mastication was prolonged and mildly decreased. Bolus mastication was prolonged and mildly decreased but overall was noted to be effective with most " items. Pt noted to need increase breakdown time. Pt was noted to complain that over time he was fatigued and need breaks and would prefer to eat foods less work at times. Bite pull with english muffin was noted to be significantly poor and pt needed to use significant amount of effort to pull apart then breakdown pieces. Pt then needed frequent verbal cues to use liquid wash to assist in breakdown. Pt was noted to have difficulty with crumbly items such as marie and eggs and pt was noted to often need liquid wash but was noted to have immediate reflexive cough and throat clear. It was noted that most various textures were observed to have an immediate reflexive reaction to pt w/ coughing and throat clearing but pt was able to clear. This was note every bite however, so pt was able to enjoy most of his meal and over time, noted to attempt to take increasing time for breakdown to ensure that mastication was increase to ensure decrease coughing. Bolus formation and transfer was then mildly decreased based on this as pt would often cough based on texture however over time improved with liquid wash. Suspect significant decrease control with thins and soft/hard solids with bolus control and transfer as pt was suspected to have premature spillage with frequent coughing with thins and soft and hard solids. Trace amounts of oral residue on tongue was noted in between bites was noted. Swallow promptness was noted to be mildly delayed as pt was observed to have frequent secondary swallows w/ piecemeal deglutition. Hyolaryngeal elevation was present upon palpation but mildly decreased w/ audible swallows noted more frequent with thins. Overall, pt was noted show good insight and understanding with new diet, pt still shows continued deficits and benefits from continued dysphagia tx sessions.     Traditional oral care completed at end of meal with trace amounts of oral residual cleared.     Recommendations:  Diet: soft/level 3  diet and thin liquids via Nosey Cup  Meds:  as tolerated  Strategies: upright posture, only feed when fully alert, slow rate of feeding, small bites/sips, quiet environment (tv off, limit talking, door closed, etc.), alternating bites and sips, and OOB for all meals and nosey cup with all meals    DISTANT supervision w/ meals.  Results reviewed with:  patient, PA-Diana Harding and MD-Dr. Torres  Aspiration precautions posted.    F/u ST tx: Pt will continue to benefit from ongoing skilled dysphagia tx sessions to establish safest least restrictive diet w/o increased oropharyngeal or aspiration sxs as well as monitor ability to carryover swallow strategies independently.    Plan  -Trial regular diet with thins with SLP ONLY  -Provide education and family training on diet levels and swallow strategies and intervention  -Adjust diet levels and communicate with team and family as needed   Swallow Assessment Prognosis   Prognosis Fair   Prognosis Considerations Age;Co-morbidities;Medical diagnosis;Medical prognosis;Participation level;Previous level of function;Potential;Severity of impairments;Cooperation   SLP Therapy Minutes   SLP Time In 0830   SLP Time Out 0930   SLP Total Time (minutes) 60   SLP Mode of treatment - Individual (minutes) 60   SLP Mode of treatment - Concurrent (minutes) 0   SLP Mode of treatment - Group (minutes) 0   SLP Mode of treatment - Co-treat (minutes) 0   SLP Mode of Treatment - Total time(minutes) 60 minutes   SLP Cumulative Minutes 275   Therapy Time missed   Time missed? No

## 2024-07-03 NOTE — QUICK NOTE
Please refer to previous consult note.  This patient was found to need to be readmitted to General Surgery for intervention.  ARC transferred him back to surgical service.  Please refer to the Consult note as his H/P.

## 2024-07-03 NOTE — NURSING NOTE
Previous shift applied O2 2Lnc. Pulse ox at that time difficult to obtain.  Unsure if accurate reading.  Pt currently denies SOB.  Pulse ox 98% on 2l nc.  Decreased O2 to 1Lnc

## 2024-07-03 NOTE — PROGRESS NOTES
07/03/24 1030   Pain Assessment   Pain Assessment Tool 0-10   Pain Score No Pain   Restrictions/Precautions   Precautions Aspiration;Bed/chair alarms;Cognitive;Fall Risk;Supervision on toilet/commode;Hard of hearing   Cognition   Overall Cognitive Status Impaired   Arousal/Participation Alert;Arousable   Attention Attends with cues to redirect   Memory Decreased recall of precautions;Decreased recall of recent events;Decreased short term memory   Following Commands Follows one step commands with increased time or repetition   Subjective   Subjective Pt reported fatigue but denied dizziness, nausea or lightheadedness and agreeable to PT session. Pt also reported he only woke up once at night to use the urinal.   Roll Left and Right   Type of Assistance Needed Supervision;Verbal cues   Roll Left and Right CARE Score 4   Sit to Lying   Type of Assistance Needed Supervision;Verbal cues   Comment VC not to use bedrail   Sit to Lying CARE Score 4   Lying to Sitting on Side of Bed   Type of Assistance Needed Supervision;Verbal cues   Comment HOB flat, no rail but required extra time than usual to complete task   Lying to Sitting on Side of Bed CARE Score 4   Sit to Stand   Type of Assistance Needed Verbal cues;Adaptive equipment;Physical assistance   Physical Assistance Level 26%-50%   Comment min-mod of 1 with initial standing then progressed to CGA with VC on hand placement to push off with both hands on the bed/chair   Sit to Stand CARE Score 3   Bed-Chair Transfer   Type of Assistance Needed Incidental touching;Verbal cues;Adaptive equipment   Comment CGA with RW + GB   Chair/Bed-to-Chair Transfer CARE Score 4   Walk 10 Feet   Type of Assistance Needed Incidental touching;Verbal cues;Adaptive equipment   Comment CG with RW   Walk 10 Feet CARE Score 4   Toilet Transfer   Type of Assistance Needed Physical assistance;Verbal cues;Adaptive equipment   Physical Assistance Level 25% or less   Comment BSC over toilet.  extra time for CM, cued to avoid holding breath/straining when noted while trying to have bowel movement- left in the care of TORO Gandhi to finish   Toilet Transfer CARE Score 3   Therapeutic Interventions   Other see vitals section for manual ortho BPs taken due to pt's being tired more than usual - RN notified   Assessment   Treatment Assessment Pt tolerated tx with noted some dropped in manual BPs but asymptomatic for ortho BPs, see vitals sections for results. Pt noted to be more fatigue than usual as noted requiring significant extra time to complete tasks more than usual, RN ,made aware. no unsteadiness or LOB demonstrated.   Family/Caregiver Present yes   PT Family training done with: wife Kiki and sister Jessica observed PT session  (per wife plan for pt to have FFSU initially at home)   Problem List Decreased strength;Decreased endurance;Impaired balance;Decreased coordination;Decreased cognition   Barriers to Discharge Inaccessible home environment;Decreased caregiver support   PT Barriers   Functional Limitation Car transfers;Ramp negotiation;Stair negotiation;Standing;Transfers;Walking   Plan   Treatment/Interventions Functional transfer training;Therapeutic exercise;Bed mobility;Gait training;Spoke to nursing;OT   Progress Progressing toward goals   Discharge Recommendation   Equipment Recommended Walker  (pt already has a walker  but requesting BSC)   PT Therapy Minutes   PT Time In 1030   PT Time Out 1100   PT Total Time (minutes) 30   PT Mode of treatment - Individual (minutes) 30   PT Mode of treatment - Concurrent (minutes) 0   PT Mode of treatment - Group (minutes) 0   PT Mode of treatment - Co-treat (minutes) 0   PT Mode of Treatment - Total time(minutes) 30 minutes   PT Cumulative Minutes 530   Therapy Time missed   Time missed? No

## 2024-07-03 NOTE — DISCHARGE SUMMARY
[facility]  Discharge- Hal Miller 1940, 84 y.o. male MRN: [id number]  Unit/Bed#: -01 Encounter: [id number]  Primary Care Provider: Kyaw Monreal MD   Date and time admitted to hospital: 6/26/2024  5:32 PM    Left inguinal hernia  Assessment & Plan  CT AP without contrast on 6/18 showed: Essentially unchanged tubular collection of fluid and multiple air droplets in the left side of the pelvis anteriorly, extending focally into the adjacent anterior pelvic wall, and abutting the sigmoid colon. This collection may be related to a contained prior perforation, or possibly may represent an infected hernia plug, as a plug like structure was previously seen in this location on the prior studies dating back to 2011, and is currently not visualized elsewhere.  Increased L inguinal swelling, erythema and tenderness noted on 7/2   Appreciate surgery re-evaluation and recommendations  Resumed on IV antibiotic and transferred to acute care due to concern for abscess  N.p.o. at midnight for possible OR tomorrow for washout  Started on continuous IV fluids at 100 cc/h -- monitor volume status closely in the setting of cardiomyopathy  Eliquis placed on hold for now, pending additional discussion with neurology given recent CVA    Leukocytosis  Assessment & Plan  Noted with bump in WBC to 16K on 7/2 but otherwise is without SIRS/sepsis criteria  Patient without infectious symptomology at this time aside from urinary urgency   Urinalysis unremarkable  If spikes temperature would obtain blood cultures x 2   Resumed on IV antibiotic as per surgical team due to concern for left groin abscess  Trend temps, WBC, hemodynamics    Colonic diverticular abscess  Assessment & Plan  Patient presented for abdominal pain and L groin swelling/tenderness, history of L inguinal hernia noted.    CT AP without contrast on 6/17 showed: Enlarging gas and fluid collection extending from the  "antimesenteric border of the sigmoid colon, infiltrating the left inguinal region soft tissue and coursing under and along the lateral margin of the left inguinal canal. This has increased in size since 2/26/2024 with new surrounding inflammatory change suggesting a chronic diverticular abscess with a more recent acute component   Surgery evaluated patient and recommended conservative management of suspected diverticulitis with microperforation  Completed 10+ day course Cipro/Flagyl 6/28/24  Patient is tolerating regular diet and having normal bowel movements   Surgery reevaluated on 7/3 given progressive left inguinal edema/erythema -> resumed on IV antibiotic with Cipro and Flagyl    Abnormal CT of the abdomen  Assessment & Plan  CT incidentally noted \"22 mm cystic retroperitoneal lesion between the aorta and IVC which is increased in size since 2/20/2023. No evidence of soft tissue component. A benign etiology is suspected such as retroperitoneal lymphatic malformation or lymphocele.\"   Initial evaluation in 3 months with contrast-enhanced CT abdomen/pelvis is recommended.    * 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[facility])  Assessment & Plan  Patient had AMS on previous hospitalization and MRI 6/20 demonstrated small left parietal hemorrhage with possible petechial hemorrhage  As per neurology, felt to be cardio embolic 2/2 missed doses of Eliquis, which was held initially for possible surgical intervention with regards to diverticular abscess   Neurology recommended continuing with Depakote, was discharged on 500 mg q8h   Therapeutic VPA level, LFTs and NH3 within normal limits on 7/2  D/w neurology on 7/2 and they recommended transitioning to 750 mg q12h   Patient denies any residual sensory or motor deficit  Continue anticoagulation with Eliquis   Outpatient follow-up with Neurology  Planned for DC 7/9/24    NICM (nonischemic cardiomyopathy) ([facility])  Assessment & Plan  ECHO with newly detected reduced LVEF at 30% noted on recent " hospitalization  Normal coronaries on cardiac cath from 12/2022  Small, chronic loculated pleural effusions noted on chest x-ray from 6/16  Cardiology evaluated prior to transfer, Cardizem transition to Toprol and continued on home dose losartan  Not on diuretic currently; had been on Lasix and stopped 2/2 TARA with his last admission 6/7-6/8  Will need close outpatient follow-up and repeat echocardiogram in 3 months, sees Dr. Ramos   Patient reported chronic LE edema, using TEDs for now  Continue I/O and daily weights  Status post one time dose Lasix 20 mg PO on 7/2 given net positive volume status and hyperkalemia     Atrial fibrillation (HCC)  Assessment & Plan  Rate controlled on Toprol 25mg qd  Continue Eliquis 5 mg BID, was on 2.5 mg BID at home   Cardizem stopped per Cardiology in the setting of recently detected reduced LVEF 30%  Outpatient follow-up with cardiology    Primary hypertension  Assessment & Plan  Blood pressure reviewed and stable   Continue Losartan 50mg qd, Toprol-XL 25mg qd  Cardizem stopped 2/2 recently diagnosed cardiomyopathy    Type 2 diabetes mellitus, with long-term current use of insulin (HCC)  Assessment & Plan  Home: Lantus 12u qhs  Here:  Lantus 8 units qhs   Glucerna supplement ordered   Continue diabetic diet    Stage 3 chronic kidney disease (HCC)  Assessment & Plan  History of CKD stage 3 noted with baseline creatinine approximately 1.2-1.5 per emile review   Renal function stable, monitor intermittently     Zoster  Assessment & Plan  Completed 7 days of Valtrex prior to transfer  Pt currently with crusted lesions over right anterior chest just below nipple line round flank to posterior back on right side   Continue to monitor clinically and symptomatically     Chronic obstructive pulmonary disease with acute exacerbation (HCC)  Assessment & Plan  Has severe emphysema by hx  Compensated, continue scheduled and as needed breathing treatments    Inflammatory polyarthropathy  (HCC)  Assessment & Plan  Continue Prednisone 1 mg daily    Hx of aortic valve replacement  Assessment & Plan  Has had bioAVR/B[facility]all x 2 in past    Severe protein-calorie malnutrition (HCC)  Assessment & Plan  Glucerna supplement with meals added      Medical Problems       Resolved Problems  Date Reviewed: 7/3/2024            Resolved    Hyperkalemia 7/3/2024     Resolved by  Diana Harding PA-C        Discharging Physician / Practitioner: Diana Harding PA-C  PCP: Kyaw Monreal MD  Admission Date:   Admission Orders (From admission, onward)       Ordered        07/02/24 1458  Admit Patient to  Once            06/26/24 1826  Inpatient Admission  Once                          Discharge/Transfer Date: 07/03/24    Disposition:   Transfer to: [facility]  Reason for Transfer: Left inguinal abscess with need for possible surgical intervention  Accepting Provider at [facility]: Dr. Membreno with general surgery service    Consultations During Hospital Stay:  General surgery    Procedures Performed:   None    Significant Findings / Test Results:   Outlined above    Incidental Findings:   None    Test Results Pending at Discharge (will require follow up):   None     Outpatient Tests Requested:  Defer to surgical service    Complications: Left inguinal abscess    Reason for Admission: Status postacute CVA    Hospital Course:   Hal Miller is a 84 y.o. male patient who originally admitted to [facility] on 6/6/2024 following acute CVA. He was noted to have progressive edema, erythema and tenderness of the left inguinal area.  He has known history of left inguinal hernia and was being followed by general surgery during his prior admission.  Due to concern for abscess/severe soft tissue infection patient has been resumed on IV antibiotic and transferred to acute care under the surgical service for possible intervention. Remains hemodynamically stable at this time.      Please see above list of  diagnoses and related plan for additional information.     Condition at Discharge: stable     Discharge Statement:  I spent 20 minutes discharging the patient. This time was spent on the day of discharge. I had direct contact with the patient on the day of discharge. Greater than 50% of the total time was spent examining patient, answering all patient questions, arranging and discussing plan of care with patient as well as directly providing post-discharge instructions.  Additional time then spent on discharge activities.    ** Please Note: This note may have been constructed using a voice recognition system **

## 2024-07-03 NOTE — PROGRESS NOTES
[facility]  Progress Note  Name: Hal Miller I  MRN: [id number]  Unit/Bed#: -01 I Date of Admission: 6/26/2024   Date of Service: 7/3/2024 I Hospital Day: 7    Assessment & Plan   * 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HCC)  Assessment & Plan  Patient had AMS on previous hospitalization and MRI 6/20 demonstrated small left parietal hemorrhage with possible petechial hemorrhage  As per neurology, felt to be cardio embolic 2/2 missed doses of Eliquis, which was held initially for possible surgical intervention with regards to diverticular abscess   Neurology recommended continuing with Depakote, was discharged on 500 mg q8h   Therapeutic VPA level, LFTs and NH3 within normal limits on 7/2  D/w neurology on 7/2 and they recommended transitioning to 750 mg q12h   Patient denies any residual sensory or motor deficit  Continue anticoagulation with Eliquis   Outpatient follow-up with Neurology  Planned for DC 7/9/24    Left inguinal hernia  Assessment & Plan  CT AP without contrast on 6/18 showed: Essentially unchanged tubular collection of fluid and multiple air droplets in the left side of the pelvis anteriorly, extending focally into the adjacent anterior pelvic wall, and abutting the sigmoid colon. This collection may be related to a contained prior perforation, or possibly may represent an infected hernia plug, as a plug like structure was previously seen in this location on the prior studies dating back to 2011, and is currently not visualized elsewhere.  Increased L inguinal swelling, erythema and tenderness noted on 7/2   Appreciate surgery re-evaluation and recommendations    Leukocytosis  Assessment & Plan  Noted with bump in WBC to 16K on 7/2 but otherwise is without SIRS/sepsis criteria  Patient without infectious symptomology at this time aside from urinary urgency   Urinalysis unremarkable  Consider repeat CT AP to re-assess abscess/inguinal hernia, discussed with surgery   If  "spikes temperature would obtain blood cultures x 2   Trend temps, WBC, hemodynamics     Colonic diverticular abscess  Assessment & Plan  Patient presented for abdominal pain and L groin swelling/tenderness, history of L inguinal hernia noted.    CT AP without contrast on 6/17 showed: Enlarging gas and fluid collection extending from the antimesenteric border of the sigmoid colon, infiltrating the left inguinal region soft tissue and coursing under and along the lateral margin of the left inguinal canal. This has increased in size since 2/26/2024 with new surrounding inflammatory change suggesting a chronic diverticular abscess with a more recent acute component   Surgery evaluated patient and recommended conservative management of suspected diverticulitis with microperforation  Completed 10+ day course Cipro/Flagyl 6/28/24  Patient is tolerating regular diet and having normal bowel movements   Surgery recommended outpatient follow-up within 3-4 weeks  Will request surgery re-assessment today, defer need for repeat imaging     NICM (nonischemic cardiomyopathy) (HCC)  Assessment & Plan  ECHO with newly detected reduced LVEF at 30% noted on recent hospitalization  Normal coronaries on cardiac cath from 12/2022  Small, chronic loculated pleural effusions noted on chest x-ray from 6/16  Cardiology evaluated prior to transfer, Cardizem transition to Toprol and continued on home dose losartan  Not on diuretic currently; had been on Lasix and stopped 2/2 TARA with his last admission 6/7-6/8  Will need close outpatient follow-up and repeat echocardiogram in 3 months, sees Dr. Ramos   Patient reported chronic LE edema, using TEDs for now  Continue I/O and daily weights  Status post one time dose Lasix 20 mg PO on 7/2 given net positive volume status and hyperkalemia     Abnormal CT of the abdomen  Assessment & Plan  CT incidentally noted \"22 mm cystic retroperitoneal lesion between the aorta and IVC which is increased in " "size since 2/20/2023. No evidence of soft tissue component. A benign etiology is suspected such as retroperitoneal lymphatic malformation or lymphocele.\"   Initial evaluation in 3 months with contrast-enhanced CT abdomen/pelvis is recommended.    Atrial fibrillation (HCC)  Assessment & Plan  Rate controlled on Toprol 25mg qd  Continue Eliquis 5 mg BID, was on 2.5 mg BID at home   Cardizem stopped per Cardiology in the setting of recently detected reduced LVEF 30%  Outpatient follow-up with cardiology    Primary hypertension  Assessment & Plan  Blood pressure reviewed and stable   Continue Losartan 50mg qd, Toprol-XL 25mg qd  Cardizem stopped 2/2 recently diagnosed cardiomyopathy    Type 2 diabetes mellitus, with long-term current use of insulin (HCC)  Assessment & Plan  Home: Lantus 12u qhs  Here:  Lantus 8 units qhs   Glucerna supplement ordered   Continue diabetic diet    Stage 3 chronic kidney disease (HCC)  Assessment & Plan  History of CKD stage 3 noted with baseline creatinine approximately 1.2-1.5 per emile review   Renal function stable, monitor intermittently     Zoster  Assessment & Plan  Completed 7 days of Valtrex prior to transfer  Pt currently with crusted lesions over right anterior chest just below nipple line round flank to posterior back on right side   Continue to monitor clinically and symptomatically     Chronic obstructive pulmonary disease with acute exacerbation (HCC)  Assessment & Plan  Has severe emphysema by hx  Compensated, continue scheduled and as needed breathing treatments    Inflammatory polyarthropathy (HCC)  Assessment & Plan  Continue Prednisone 1 mg daily    Hx of aortic valve replacement  Assessment & Plan  Has had bioAVR/Bentall x 2 in past    Severe protein-calorie malnutrition (HCC)  Assessment & Plan  Glucerna supplement with meals added               The above assessment and plan was reviewed and updated as determined by my evaluation of the patient on 7/3/2024.    Labs: "   Results from last 7 days   Lab Units 07/03/24  0616 07/02/24  0602   WBC Thousand/uL 12.54* 16.00*   HEMOGLOBIN g/dL 10.4* 10.7*   HEMATOCRIT % 32.4* 32.6*   PLATELETS Thousands/uL 145* 146*     Results from last 7 days   Lab Units 07/03/24  0616 07/02/24  0602   SODIUM mmol/L 130* 132*   POTASSIUM mmol/L 5.1 5.5*   CHLORIDE mmol/L 96 96   CO2 mmol/L 32 31   BUN mg/dL 37* 34*   CREATININE mg/dL 1.35* 1.35*   CALCIUM mg/dL 9.7 10.0             Results from last 7 days   Lab Units 07/03/24  1038 07/03/24  0636 07/02/24  2211   POC GLUCOSE mg/dl 163* 106 169*       Imaging  No orders to display       Review of Scheduled Meds:  Current Facility-Administered Medications   Medication Dose Route Frequency Provider Last Rate    acetaminophen  650 mg Oral Q4H PRN Dayton Ford      albuterol  2 puff Inhalation Q4H PRN Dayton Ford      apixaban  5 mg Oral BID Dayton Ford      atorvastatin  40 mg Oral Daily With Dinner Dayton Ford      bisacodyl  10 mg Rectal Daily PRN Ashley Depadua, MD      budesonide  0.5 mg Nebulization Q12H Dayton Ford      ciprofloxacin  400 mg Intravenous Q12H Jean Carlos Hurd MD      divalproex sodium  750 mg Oral Q12H YUNI Harding PA-C      formoterol  20 mcg Nebulization Q12H Dayton Ford      insulin glargine  8 Units Subcutaneous HS Dayton Ford      insulin lispro  1-5 Units Subcutaneous 4x Daily (AC & HS) Dayton Ford      losartan  50 mg Oral Daily Dayton Ford      melatonin  3 mg Oral HS Dayton Ford      metoprolol succinate  25 mg Oral Daily Dayton Ford      metroNIDAZOLE  500 mg Intravenous Q8H Jean Carlos Hurd MD      pantoprazole  40 mg Oral Early Morning Dayton Ford      polyethylene glycol  17 g Oral Daily PRN Ashley Depadua, MD      predniSONE  1 mg Oral Daily Dayton Ford      senna  2 tablet Oral BID Dayton Ford      tamsulosin  0.4 mg Oral Daily With Dinner Ashley Depadua, MD         Subjective/ HPI:  "Patient seen and examined. Patients overnight issues or events were reviewed with nursing staff. New or overnight issues include the following:     Patient offers no new complaints today, but he is a poor historian. Reports persistent L groin swelling and tenderness. Wife at bedside states yesterday it looked much more swollen, red and \"angry\" then previously. Discussed plan for surgical re-evaluation and possible repeat CT scan today, she and patient are in agreement.     ROS:   A 10 point ROS was performed; negative except as noted above.        *Labs /Radiology studies Reviewed  *Medications  reviewed and reconciled as needed  *Please refer to order section for additional ordered labs studies      Physical Examination:  Vitals:   Vitals:    07/03/24 1037 07/03/24 1040 07/03/24 1259 07/03/24 1304   BP: 100/50 94/54 110/62 122/55   BP Location: Left arm Left arm Left arm Left arm   Pulse:   76 76   Resp:   20 18   Temp:   98.3 °F (36.8 °C) 98.3 °F (36.8 °C)   TempSrc:   Oral Oral   SpO2:   99% 99%   Weight:       Height:           General Appearance: NAD; pleasant  HEENT: PERRLA, conjuctiva normal; mucous membranes moist; face symmetrical  Neck:  Supple  Lungs: normal respiratory effort, no retractions, expiratory effort normal, on room air  CV: Deferred   ABD: Deferred  EXT: improved bilateral LE edema, compression stockings in place  Skin: deferred groin exam at this time as patient is up in chair eating lunch  Psych: affect normal, mood normal  Neuro: AAOx3       The above physical exam was reviewed and updated as determined by my evaluation of the patient on 7/3/2024.    Invasive Devices       None                      VTE Pharmacologic Prophylaxis: Eliquis  Code Status: Level 1 - Full Code  Current Length of Stay: 7 day(s)    Total floor / unit time spent today 15 minutes  Coordination of patient's care was performed in conjunction with primary service. Time invested included review of patient's labs, vitals, " and management of their comorbidities with continued monitoring, examination of patient as well as answering patient questions, documenting her findings and creating progress note in electronic medical record,  ordering appropriate diagnostic testing.       ** Please Note:  voice to text software may have been used in the creation of this document. Although proof errors in transcription or interpretation are a potential of such software**

## 2024-07-03 NOTE — APP STUDENT NOTE
JADA STUDENT  Inpatient Progress Note for TRAINING ONLY  Not Part of Legal Medical Record     Progress Note - Hal Miller 84 y.o. male MRN: [id number]  Unit/Bed#: [facility] 455-01 Encounter: [id number]        No new Assessment & Plan notes have been filed under this hospital service since the last note was generated.  Service: Surgery-General     Stroke (left parietal hemorrhage)  - Presented with altered mental status during prior hospitalization MRI showed L parietal hemorrhage felt to be due to missed doses of Eliquis held for possible surgery for diverticular abscess  - Possible seizure like activity at the time, currently on valproic acid (Depakote) 750 mg BID  - last depakote level 7/2 therapeutic, LFTs and ammonia within normal limits  - continue apixiban (Eliquis) 5 mg BID  - denies ongoing sensory or motor difficulties  - outpatient follow up with neurology    Colonic Diverticular Abscess  - presented for abdominal pain, swelling, CT concerning for acute on chronic diverticular abscess suspected microperforation with air tracking into L inguinal hernia  - completed 10 day course of cipro/flagyl 6/28/24  - follow up with surgery as outpatient within 3-4 weeks  - continued groin swelling, erythema and tender to palpation, currently stable  - leukocytosis yesterday at 16.00, down trending today to 12.54    Hyperkalemia  - Mild yesterday at 5.5, improved today to 5.1   - status post one time dose of Lasix 20 mg    Dysphagia  - subjective reports of dysphagia, currently being evaluated by SLP  - aspiration precautions    Nonischemic cardiomyopathy  -Echo showing new reduced LVEF 30%  - not currently on diuretic  - cardizem stopped per cardiology 2/2 reduced LVEF  - yesterday with hyperkalemia, chronic LE edema and net positive volume status given one time dose 20 mg Lasix  - continue I/O and daily weights  - follow up outpatient with cardiology, repeat echo in 3 months    Atrial Fibrillation  - continue metoprolol  succinate (Toprolol-XL) 25 mg daily  - continue apixiban (Eliquis) 5 mg BID, on 2.5 mg BID at home  - cardizem stopped per cardiology 2/2 reduced LVEF  - outpatient follow up with cardiology    Essential Hypertension  - Blood pressure reviewed and stable  - continue losartan 50 mg daily, metoprolol succinate (Toprolol-XL) 25 mg daily    VTE Pharmacologic Prophylaxis:   Pharmacologic: Apixaban (Eliquis)  Mechanical VTE Prophylaxis in Place: Yes    Patient Centered Rounds: I have performed bedside rounds with nursing staff today.    Discussions with Specialists or Other Care Team Provider: ***    Education and Discussions with Family / Patient: Discussed concerns with patient    Time Spent for Care: 15 minutes.  More than 50% of total time spent on counseling and coordination of care as described above.    Current Length of Stay: 7 day(s)    Current Patient Status: Inpatient Rehab   Certification Statement: The patient will continue to require additional inpatient hospital stay due to currently completing acute rehabilitation    Discharge Plan: Plan for discharge  with outpatient Pt, OT, ST at [facility]    Code Status: Level 1 - Full Code    Subjective:   Patient reports no current concerns. Discussed stable persistent L inguinal area swelling, with erythema and tender to palpation. Denies abdominal pain, chest  pain, shortness of breath, nausea, vomiting, changes to his bowel movements. Reports his LE swelling has been slightly improved since yesterday.     ROS otherwise negative    Objective:     Vitals:   Temp (24hrs), Av.4 °F (36.9 °C), Min:98.3 °F (36.8 °C), Max:98.4 °F (36.9 °C)    Temp:  [98.3 °F (36.8 °C)-98.4 °F (36.9 °C)] 98.4 °F (36.9 °C)  HR:  [73-89] 74  Resp:  [18-20] 20  BP: (106-124)/(58-65) 106/58  SpO2:  [96 %-100 %] 100 %  Body mass index is 20.72 kg/m².     Input and Output Summary (last 24 hours):       Intake/Output Summary (Last 24 hours) at 7/3/2024 0824  Last data filed at  7/3/2024 0530  Gross per 24 hour   Intake --   Output 400 ml   Net -400 ml       Physical Exam:     Physical Exam  Constitutional:       General: He is not in acute distress.     Appearance: Normal appearance.   Cardiovascular:      Rate and Rhythm: Rhythm irregular.      Heart sounds: No murmur heard.     No gallop.   Pulmonary:      Effort: Pulmonary effort is normal. No respiratory distress.      Breath sounds: Normal breath sounds. No stridor. No wheezing, rhonchi or rales.   Abdominal:      General: Abdomen is flat. There is no distension.      Palpations: Abdomen is soft.      Tenderness: There is no abdominal tenderness.   Genitourinary:     Comments: Erythema and swelling of L inguinal area  Skin:     General: Skin is warm and dry.   Neurological:      Mental Status: He is alert.         Historical Information   Past Medical History:   Diagnosis Date    Acute encephalopathy 2023    Acute-on-chronic kidney injury  (HCC) 2017    Cancer (HCC)     pancreatic    Colon polyp     Coronary artery disease     Dehydration 3/3/2023    Diabetes mellitus (HCC)     Hyperlipidemia     Hypertension     Left lower lobe pneumonia 2022    Pneumonia 2017    Right middle and lower lobe     Stroke (HCC)      Past Surgical History:   Procedure Laterality Date    APPENDECTOMY      CARDIAC SURGERY      Aortic Valve Replacement, Ascending Aorta    COLONOSCOPY      GALLBLADDER SURGERY      PANCREATICODUODENECTOMY       Social History   Social History     Substance and Sexual Activity   Alcohol Use Never     Social History     Substance and Sexual Activity   Drug Use No     Social History     Tobacco Use   Smoking Status Former    Types: Pipe    Quit date:     Years since quittin.5   Smokeless Tobacco Never     Family History:   Family History   Problem Relation Age of Onset    Cancer Mother     Stroke Father     Cancer Sister     Diabetes Sister     Stroke Brother        Meds/Allergies   PTA meds:   Prior  to Admission Medications   Prescriptions Last Dose Informant Patient Reported? Taking?   B-D UF III MINI PEN NEEDLES 31G X 5 MM MISC  Self Yes No   Calcium Citrate (CITRACAL PO)  Self Yes No   Sig: Take 650 mg by mouth in the morning   Cinnamon 500 MG capsule  Self Yes No   Sig: Take 1,000 mg by mouth daily   Lantus SoloStar 100 units/mL SOPN  Self Yes No   QUEtiapine (SEROquel) 25 mg tablet   Yes No   Sig: Take 25 mg by mouth if needed (for agitation) Take 1/2 to 1 tablet by mouth as needed   apixaban (ELIQUIS) 2.5 mg  Self Yes No   Sig: Take 2.5 mg by mouth 2 (two) times a day   ciprofloxacin (CIPRO) 500 mg tablet   No No   Sig: Take 1 tablet (500 mg total) by mouth every 12 (twelve) hours for 11 doses   diltiazem (CARDIZEM CD) 180 mg 24 hr capsule   Yes No   Sig: Take 180 mg by mouth daily   hydroxychloroquine (PLAQUENIL) 200 mg tablet  Self Yes No   Sig: Take 200 mg by mouth in the morning   metroNIDAZOLE (FLAGYL) 500 mg tablet   No No   Sig: Take 1 tablet (500 mg total) by mouth every 8 (eight) hours for 4 days   nystatin (MYCOSTATIN) 500,000 units/5 mL suspension   No No   Sig: Apply 5 mL (500,000 Units total) to the mouth or throat 4 (four) times a day   olmesartan (BENICAR) 20 mg tablet  Self Yes No   Sig: Take 20 mg by mouth daily   ondansetron (ZOFRAN-ODT) 4 mg disintegrating tablet   No No   Sig: Take 1 tablet (4 mg total) by mouth every 6 (six) hours as needed for nausea or vomiting   potassium chloride (KLOR-CON) 8 MEQ tablet  Self Yes No   pravastatin (PRAVACHOL) 40 mg tablet  Self Yes No   Sig: Take 40 mg by mouth daily   predniSONE 1 mg tablet  Self Yes No   Sig: Take 10 mg by mouth daily Currently down to 3 1/2 mg daily 05/05/22      Facility-Administered Medications: None     Allergies   Allergen Reactions    Contrast Dye [Iodinated Contrast Media] Other (See Comments)     Pt states kidney dysfunction..     Other        Additional Data:     Labs:    Results from last 7 days   Lab Units  07/03/24  0616 07/02/24  0602 06/28/24  0502   WBC Thousand/uL 12.54* 16.00* 7.49   HEMOGLOBIN g/dL 10.4* 10.7* 11.4*   HEMATOCRIT % 32.4* 32.6* 35.1*   PLATELETS Thousands/uL 145* 146* 168   SEGS PCT %  --   --  72   LYMPHO PCT %  --  6* 11*   MONO PCT %  --  11 15*   EOS PCT %  --  0 1     Results from last 7 days   Lab Units 07/03/24  0616 07/02/24  0602   SODIUM mmol/L 130* 132*   POTASSIUM mmol/L 5.1 5.5*   CHLORIDE mmol/L 96 96   CO2 mmol/L 32 31   BUN mg/dL 37* 34*   CREATININE mg/dL 1.35* 1.35*   ANION GAP mmol/L 2* 5   CALCIUM mg/dL 9.7 10.0   ALBUMIN g/dL  --  2.8*   TOTAL BILIRUBIN mg/dL  --  0.94   ALK PHOS U/L  --  113*   ALT U/L  --  22   AST U/L  --  27   GLUCOSE RANDOM mg/dL 104 117         Results from last 7 days   Lab Units 07/03/24  0636 07/02/24  2211 07/02/24  1548 07/02/24  1045 07/02/24  0558 07/01/24  2027 07/01/24  1548 07/01/24  1048 07/01/24  0525 06/30/24  2055 06/30/24  1537 06/30/24  1128   POC GLUCOSE mg/dl 106 169* 181* 117 137 175* 198* 143* 98 140 183* 152*                 * I Have Reviewed All Lab Data Listed Above.  * Additional Pertinent Lab Tests Reviewed: All Labs Within Last 24 Hours Reviewed    Imaging:    Imaging Reports Reviewed Today Include: none  Imaging Personally Reviewed by Myself Includes:  none    Recent Cultures (last 7 days):           Last 24 Hours Medication List:   Current Facility-Administered Medications   Medication Dose Route Frequency Provider Last Rate    acetaminophen  650 mg Oral Q4H PRN Dayton Ford      albuterol  2 puff Inhalation Q4H PRN Dayton Ford      apixaban  5 mg Oral BID Dayton Ford      atorvastatin  40 mg Oral Daily With Dinner Dayton Ford      bisacodyl  10 mg Rectal Daily PRN Ashley Depadua, MD      budesonide  0.5 mg Nebulization Q12H Dayton Ford      divalproex sodium  750 mg Oral Q12H YUNI Diana Harding PA-C      formoterol  20 mcg Nebulization Q12H Dayton Ford      insulin glargine  8 Units  Subcutaneous HS Dayton Ford      insulin lispro  1-5 Units Subcutaneous 4x Daily (AC & HS) Dayton Ford      losartan  50 mg Oral Daily Dayton Ford      melatonin  3 mg Oral HS Dayton Ford      metoprolol succinate  25 mg Oral Daily Dayton Ford      pantoprazole  40 mg Oral Early Morning Dayton Ford      polyethylene glycol  17 g Oral Daily PRN Ashley Depadua, MD      predniSONE  1 mg Oral Daily Dayton Ford      senna  2 tablet Oral BID Dayton Ford      tamsulosin  0.4 mg Oral Daily With Dinner Ashley Depadua, MD          Today, Patient Was Seen By: Iris Graham    ** Please Note: Dictation voice to text software may have been used in the creation of this document. **

## 2024-07-03 NOTE — PROGRESS NOTES
"PM&R Coverage Progress Note:    Rehab Diagnosis: Impairment of mobility, safety, Activities of Daily Living (ADLs), and cognitive/communication skills due to Stroke:  01.4  No paresis  Etiologic Diagnosis: L parietal stroke    ASSESSMENT: Stable      PLAN:    Rehabilitation  Continue current rehabilitation plan of care to maximize function.    No funcitonal barriers identified  Estimated Discharge: Tuesday 7/8 with Outpatient PT OT ST     Medical issues  CVA: Continue Eliquis and Statin for ppx.  Seizure: Continue Depakote 750 mg Q 12h.   Mild LE edema: Lasix was given on 7/2/24, continue TEDS  Afib: Rate controlled and AC on Eliquis   Shingles: Resolving.  Lesions dry and not painful   Diverticular Abscess in left groin: Completed antibiotics, but has an elevated leukocytosis.  WBC 16 K > 12.5 K  G. Surg to eval per IM.   DM2: Continue Lantus 8 units QHS.  IM to consider oral meds over next several days  Hyponatremia: Na = 130.  Patient asymptomatic.  Trend and follow.   Continue current medical plan of care per primary service.        SUBJECTIVE: Patient seen face to face.  No acute issues. Enjoying his breakfast.  Denies pain.  Denies feeling any chills or fevers.        ROS:  A ten point review of systems was completed on 07/03/24 and pertinent positives are listed in subjective section. All other systems reviewed were negative.     OBJECTIVE:   /55 (BP Location: Left arm)   Pulse 76   Temp 98.3 °F (36.8 °C) (Oral)   Resp 18   Ht 6' 2\" (1.88 m)   Wt 73.2 kg (161 lb 6 oz)   SpO2 99%   BMI 20.72 kg/m²     Physical Exam  Vitals and nursing note reviewed.   Constitutional:       General: He is not in acute distress.  HENT:      Head: Normocephalic and atraumatic.      Nose: Nose normal.      Mouth/Throat:      Mouth: Mucous membranes are moist.   Eyes:      Conjunctiva/sclera: Conjunctivae normal.   Cardiovascular:      Rate and Rhythm: Normal rate and regular rhythm.      Pulses: Normal pulses. "   Pulmonary:      Effort: Pulmonary effort is normal.      Breath sounds: Normal breath sounds. No wheezing or rales.   Abdominal:      General: Bowel sounds are normal. There is no distension.      Palpations: Abdomen is soft.      Tenderness: There is no abdominal tenderness.   Musculoskeletal:         General: Swelling present.      Cervical back: Neck supple.   Skin:     General: Skin is warm.      Comments: Zoster lesions healing     L groin abscess pictured below   Neurological:      Mental Status: He is alert and oriented to person, place, and time.      Comments: Impaired recall   Psychiatric:         Mood and Affect: Mood normal.            Personally reviewed on 07/03/24:   Lab Results   Component Value Date    WBC 12.54 (H) 07/03/2024    HGB 10.4 (L) 07/03/2024    HCT 32.4 (L) 07/03/2024    MCV 98 07/03/2024     (L) 07/03/2024     Lab Results   Component Value Date    SODIUM 130 (L) 07/03/2024    K 5.1 07/03/2024    CL 96 07/03/2024    CO2 32 07/03/2024    BUN 37 (H) 07/03/2024    CREATININE 1.35 (H) 07/03/2024    GLUC 104 07/03/2024    CALCIUM 9.7 07/03/2024     Lab Results   Component Value Date    INR 1.27 (H) 06/19/2024    INR 1.23 (H) 06/17/2024    INR 1.30 (H) 02/14/2023    PROTIME 16.6 (H) 06/19/2024    PROTIME 16.2 (H) 06/17/2024    PROTIME 15.9 (H) 02/14/2023           Current Facility-Administered Medications:     acetaminophen (TYLENOL) tablet 650 mg, 650 mg, Oral, Q4H PRN, [place], 650 mg at 06/30/24 2107    albuterol (PROVENTIL HFA,VENTOLIN HFA) inhaler 2 puff, 2 puff, Inhalation, Q4H PRN, Dayton oFrd    apixaban (ELIQUIS) tablet 5 mg, 5 mg, Oral, BID, [place], 5 mg at 07/03/24 1122    atorvastatin (LIPITOR) tablet 40 mg, 40 mg, Oral, Daily With Dinner, [place] Ford, 40 mg at 07/02/24 1658    bisacodyl (DULCOLAX) rectal suppository 10 mg, 10 mg, Rectal, Daily PRN, Ashley Depadua, MD    budesonide (PULMICORT) inhalation solution 0.5 mg, 0.5 mg,  Nebulization, Q12H, [place], 0.5 mg at 07/03/24 0806    divalproex sodium (DEPAKOTE) DR tablet 750 mg, 750 mg, Oral, Q12H YUNI, Diana Harding PA-C, 750 mg at 07/03/24 1121    formoterol (PERFOROMIST) nebulizer solution 20 mcg, 20 mcg, Nebulization, Q12H, [place] Ford, 20 mcg at 07/03/24 0806    insulin glargine (LANTUS) subcutaneous injection 8 Units 0.08 mL, 8 Units, Subcutaneous, HS, [place] Ford, 8 Units at 07/02/24 2212    insulin lispro (HumALOG/ADMELOG) 100 units/mL subcutaneous injection 1-5 Units, 1-5 Units, Subcutaneous, 4x Daily (AC & HS), 1 Units at 07/03/24 1124 **AND** Fingerstick Glucose (POCT), , , 4x Daily AC and at bedtime, [place] Ford    losartan (COZAAR) tablet 50 mg, 50 mg, Oral, Daily, [place] Ford, 50 mg at 07/02/24 0847    melatonin tablet 3 mg, 3 mg, Oral, HS, [place] Ford, 3 mg at 07/02/24 2212    metoprolol succinate (TOPROL-XL) 24 hr tablet 25 mg, 25 mg, Oral, Daily, [place] Ford, 25 mg at 07/02/24 0847    pantoprazole (PROTONIX) EC tablet 40 mg, 40 mg, Oral, Early Morning, [place] Ford, 40 mg at 07/03/24 0547    polyethylene glycol (MIRALAX) packet 17 g, 17 g, Oral, Daily PRN, Ashley Depadua, MD    predniSONE tablet 1 mg, 1 mg, Oral, Daily, [place] Ford, 1 mg at 07/03/24 1123    senna (SENOKOT) tablet 17.2 mg, 2 tablet, Oral, BID, [place] Ford, 17.2 mg at 07/03/24 1122    tamsulosin (FLOMAX) capsule 0.4 mg, 0.4 mg, Oral, Daily With Dinner, Ashley Depadua, MD, 0.4 mg at 07/02/24 1658    Past Medical History:   Diagnosis Date    Acute encephalopathy 2/14/2023    Acute-on-chronic kidney injury  (HCC) 6/13/2017    Cancer (HCC)     pancreatic    Colon polyp     Coronary artery disease     Dehydration 3/3/2023    Diabetes mellitus (HCC)     Hyperlipidemia     Hypertension     Left lower lobe pneumonia 7/2/2022    Pneumonia 06/13/2017    Right middle and lower lobe     Stroke (HCC)        Patient Active Problem List    Diagnosis Date Noted     Left inguinal hernia 07/03/2024    Leukocytosis 07/02/2024    Dysphagia 07/02/2024    Hx of aortic valve replacement 06/29/2024    Diplopia 06/27/2024    Peripheral polyneuropathy 06/27/2024    Hyponatremia 06/27/2024    Dysphoric mood 06/26/2024    NICM (nonischemic cardiomyopathy) ([facility]) 06/21/2024    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[facility]) 06/20/2024    Acute on chronic respiratory failure ([facility]) 06/19/2024    Episode of seizure-like activity 06/19/2024    History of Whipple procedure 06/19/2024    Dizziness 06/18/2024    Insomnia 06/18/2024    Ambulatory dysfunction 06/18/2024    Severe protein-calorie malnutrition ([facility]) 06/18/2024    Abnormal CT of the abdomen 06/17/2024    Zoster 06/17/2024    Colonic diverticular abscess 06/17/2024    Vascular dementia ([facility]) 06/17/2024    Concern for aspiration pneumonia ([facility]) 06/07/2024    Type 2 diabetes mellitus, with long-term current use of insulin ([facility]) 06/07/2024    Hypercalcemia 03/01/2023    TARA (acute kidney injury) ([facility]) 03/01/2023    Esophageal stricture 02/27/2023    Acute encephalopathy 02/14/2023    Generalized weakness 02/14/2023    Malignant neoplasm of tail of pancreas ([facility]) 09/29/2022    Chronic obstructive pulmonary disease with acute exacerbation ([facility]) 07/02/2022    Stage 3 chronic kidney disease ([facility]) 07/02/2022    Centrilobular emphysema ([facility]) 08/27/2021    History of malignant neuroendocrine tumor 07/13/2021    Atrial fibrillation ([facility]) 06/14/2017    Shortness of breath 06/13/2017    Primary hypertension 06/13/2017    Hyperlipidemia 09/21/2015    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      ** Please Note:  voice to text software may have been used in the creation of this document. Although proof errors in transcription or interpretation are a potential of such software**

## 2024-07-03 NOTE — CONSULTS
Consultation - General Surgery  Hal Miller 84 y.o. male MRN: [id number]  Unit/Bed#: [facility] 455-01 Encounter: [id number]        Assessment & Plan     Assessment:  Hal Miller is a 84 y.o. male who presents with new onset painful left groin abscess underlying left inguinal hernia.     Plan:  - NPO  - IV Cipro/Flagyl  - IVF NS   - Concern for severe soft tissue infection and/or abdominal infection, possible surgery for washout and further exploration  - appreciate recommendations from Neurology on ability to hold Eliquis given recent stroke    History of Present Illness     HPI:  Hal Miller is a 84 y.o. male who presents with pain, swelling, and worsening erythema of the left groin over area of previously noted left inguinal hernia. Patient reports noticing increased swelling and some tenderness of the left groin beginning yesterday with worsening of pain and skin breakdown in the area today. Denies fever, chills, nausea, vomiting, changes in bowel or bladder habits. Patient has a complicated past medical history of stroke, cardiomyopathy, afib, Aortic valve replacement x2, HTN, DM type 2, stage 3 CKD, and COPD. Past surgical history of pancreaticoduodenectomy, cholecystotomy, and appendectomy.  Elevated WBC of 13 from 16 yesterday and 8 on 7/16. Most recent CT scan 6/18 showed an unchanged collection of fluid in the left side of the pelvis anteriorly, extending focally into the adjacent anterior pelvic wall, and abutting the sigmoid colon with no evidence of communication noted.     Review of Systems    Historical Information   Past Medical History:   Diagnosis Date    Acute encephalopathy 2/14/2023    Acute-on-chronic kidney injury  (HCC) 6/13/2017    Cancer (HCC)     pancreatic    Colon polyp     Coronary artery disease     Dehydration 3/3/2023    Diabetes mellitus (HCC)     Hyperlipidemia     Hypertension     Left lower lobe pneumonia 7/2/2022    Pneumonia 06/13/2017    Right middle and lower lobe     Stroke  "(HCC)      Past Surgical History:   Procedure Laterality Date    APPENDECTOMY      CARDIAC SURGERY      Aortic Valve Replacement, Ascending Aorta    COLONOSCOPY      GALLBLADDER SURGERY      PANCREATICODUODENECTOMY       Social History   Social History     Substance and Sexual Activity   Alcohol Use Never     Social History     Substance and Sexual Activity   Drug Use No     Social History     Tobacco Use   Smoking Status Former    Types: Pipe    Quit date:     Years since quittin.5   Smokeless Tobacco Never     Family History: non-contributory    Meds/Allergies   all medications and allergies reviewed  Allergies   Allergen Reactions    Contrast Dye [Iodinated Contrast Media] Other (See Comments)     Pt states kidney dysfunction..     Other        Objective   First Vitals:   Blood Pressure: 130/77 (24 1737)  Pulse: 76 (24 173)  Temperature: 97.9 °F (36.6 °C) (24 173)  Temp Source: Oral (24 173)  Respirations: 16 (24 173)  Height: 6' 2\" (188 cm) (24 173)  Weight - Scale: 86.2 kg (190 lb 0.6 oz) (24 173)  SpO2: 97 % (24 173)    Current Vitals:   Blood Pressure: 122/55 (24 1304)  Pulse: 76 (24 1304)  Temperature: 98.3 °F (36.8 °C) (24 1304)  Temp Source: Oral (24 1304)  Respirations: 18 (24 1304)  Height: 6' 2\" (188 cm) (24 1611)  Weight - Scale: 73.2 kg (161 lb 6 oz) (24 0535)  SpO2: 99 % (24 1304)      Intake/Output Summary (Last 24 hours) at 7/3/2024 1333  Last data filed at 7/3/2024 0530  Gross per 24 hour   Intake --   Output 400 ml   Net -400 ml       Invasive Devices       None                   Physical Exam:  General: No acute distress  Neuro: Alert, oriented to person and place  Eyes: Extraocular movements intact  CV: Well perfused, regular rate and rhythm  Lungs: Normal work of breathing, no increased respiratory effort  Abdomen: Soft, non-tender, non-distended. Abscess noted in left groin with " overlying erythema, central skin breakdown and drainage of pus from just lateral of center. Exquisitely tender to the touch. Unable to evaluate status of hernia due to abscess, pressure in the area causes the wound to drain further.     Extremities: No edema, clubbing or cyanosis  Skin: Warm, dry  Lymph: No palpable lymph nodes  Psych: Normal mentation      Lab Results: I have personally reviewed pertinent lab results.    Imaging: I have personally reviewed pertinent reports.   and I have personally reviewed pertinent films in PACS  EKG, Pathology, and Other Studies: I have personally reviewed pertinent reports.      Code Status: Level 1 - Full Code  Advance Directive and Living Will:      Power of :    POLST:      Jean Carlos Hurd MD  General Surgery Resident

## 2024-07-03 NOTE — PROGRESS NOTES
07/03/24 1010   Pain Assessment   Pain Assessment Tool 0-10   Pain Score No Pain   Comprehension   Comprehension (FIM) 4 - Understands basic info/conversation 75-90% of time   Expression   Expression (FIM) 4 - Expresses basic info/needs 75-90% of time   Social Interaction   Social Interaction (FIM) 6 - Interacts appropriately with others BUT requires extra  time   Problem Solving   Problem solving (FIM) 4 - Solves basic problems 75-89% of time   Memory   Memory (FIM) 2 - Recognizes, recalls/performs 25-49%   Speech/Language/Cognition Assessment   Treatment Assessment Pt resting at onset of session given his PT session. PT took pt's BP and pt was experiencing some orthostatic hypotension. Pt rested for approx 5-10 minutes therefore ST session starting approx ~10 minutes late. Pt demonstrating ability to follow verbal directions given by PT as pt transferring from hallway back to his room and recliner chair. Pt's wife and sister present. Pt observed to have greater fatigue this day comparably to prior sessions. SLP assesed pt's orientation, and his was oriented x4. He was able to accurately recall his earlier therapy, noting his session with ST targeting dysphagia. SLP inquired if pt recalled how it went. He reported that he did better when he had something to drink with bites of his food. SLP confirmed that using a liquid or drink does help move food better when swallowing, as well as when preparing the food to be swallowed. SLP also discussed using suaces and other condiments to assist with this. Pt nodding. SLP discussed if pt needs to recall the strategies he should carryover into all meals, he can always look at his pink sheet that has them listed, or ask anyone in his room to read them to him as it is placed behind him. Pt nodding to express understanding. Pt then given review of targeted cognitive tasks, followed by a task of category exclusion requiring reasoning. The task initially began with SLP reading  four words allowed and pt to state which didn't belong with the others. PT with fatigue, therefore his memory impacted first two sets. SLP allowed pt to complete remaining task using pen and to [place] the one that did not belong, removing the memory component and focusing on the language comprehension and reasoning. Pt able to complete with 100% accuracy, however, required significant processing time to complete. He was encouraged on the final two sets to read aloud the items in each set to assist with processing the answer. This did demonstrate to be beneficial as pt able to choose correct answer much faster. Pt would continue to benefit from skilled ST services targeting cognitive skills focused on this session, specifically memory, and for pt to observe his own cognitive decline when fatigued and to learn advocacy efforts in order to reduce burden of care for family at time of discharge and to optimize pt's independence    SLP Therapy Minutes   SLP Time In 1010   SLP Time Out 1030   SLP Total Time (minutes) 20   SLP Mode of treatment - Individual (minutes) 20   SLP Mode of treatment - Concurrent (minutes) 0   SLP Mode of treatment - Group (minutes) 0   SLP Mode of treatment - Co-treat (minutes) 0   SLP Mode of Treatment - Total time(minutes) 20 minutes   SLP Cumulative Minutes 235   Therapy Time missed   Time missed? No

## 2024-07-03 NOTE — PROGRESS NOTES
07/03/24 0930   Pain Assessment   Pain Score No Pain   Restrictions/Precautions   Precautions Aspiration;Bed/chair alarms;Fall Risk;Supervision on toilet/commode;Cognitive;Hard of hearing   Braces or Orthoses   (TEDS which were applied this session)   Cognition   Arousal/Participation Alert;Cooperative   Attention Attends with cues to redirect   Memory Decreased short term memory;Decreased recall of recent events;Decreased recall of precautions   Following Commands Follows one step commands with increased time or repetition   Subjective   Subjective Pt. still in bed at start of session. Pt. has no new complaints but feels fatugued. Agreeable to participate in therapy   Lying to Sitting on Side of Bed   Type of Assistance Needed Supervision;Verbal cues   Lying to Sitting on Side of Bed CARE Score 4   Sit to Stand   Type of Assistance Needed Physical assistance   Physical Assistance Level 25% or less   Comment min A for initial balance , no AD R HHA and gait belt   Sit to Stand CARE Score 3   Walk 10 Feet   Type of Assistance Needed Incidental touching;Verbal cues   Comment CGA with RW, min A with hand held for HIGT   Walk 10 Feet CARE Score 4   Walk 50 Feet with Two Turns   Type of Assistance Needed Physical assistance   Physical Assistance Level 25% or less   Comment CGA with RW, min A R HHA for HIGT   Walk 50 Feet with Two Turns CARE Score 3   Walk 150 Feet   Type of Assistance Needed Physical assistance   Physical Assistance Level 26%-50%   Comment with R HHA for HIGT   Walk 150 Feet CARE Score 3   Ambulation   Primary Mode of Locomotion Prior to Admission Walk   Distance Walked (feet) 150 ft  (100 with R HHA, 50 feet with RW when noted to have inc fatigue.)   Assist Device Roller Walker;Hand Hold   Gait Pattern Inconsistant Leah;Decreased foot clearance;L foot drag;Improper weight shift   Limitations Noted In Balance;Coordination;Swing   Provided Assistance with: Balance;Weight Shift   Walk Assist Level  Minimum Assist  (with HHA)   Does the patient walk? 2. Yes   Wheel 50 Feet with Two Turns   Reason if not Attempted Activity not applicable   Wheel 50 Feet with Two Turns CARE Score 9   Wheel 150 Feet   Reason if not Attempted Activity not applicable   Wheel 150 Feet CARE Score 9   Wheelchair mobility   Does the patient use a wheelchair? 0. No   Therapeutic Interventions   Neuromuscular Re-Education HIGT 100-150 feet with R HHA   Other BP taken towards end of session due to pt's fatigue but no dizziness. Reflected on vitals section   Equipment Use   NuStep Level 2 X 10 mins B UE and LE   Assessment   Treatment Assessment Pt. engaged in 40 mins session and participated in HIGT using R HHA and nu step. Pt. though noted to have inc fatigue today and unable to  gait speed than usual . Pt. also needing inc verbal cues with L foot clearance and stride length. Pt. denies dizziness and no LOB noted during session. Cont with POC as tolerated.   Problem List Decreased strength;Decreased endurance;Impaired balance;Decreased mobility;Decreased coordination;Decreased cognition   Barriers to Discharge Inaccessible home environment;Decreased caregiver support   PT Barriers   Functional Limitation Car transfers;Ramp negotiation;Standing;Stair negotiation;Transfers;Walking   Plan   Treatment/Interventions Functional transfer training;LE strengthening/ROM;Elevations;Therapeutic exercise;Endurance training;Patient/family training;Equipment eval/education;Bed mobility;Gait training   Discharge Recommendation   Equipment Recommended   (LRAD vs RW)   PT Therapy Minutes   PT Time In 0930   PT Time Out 1010   PT Total Time (minutes) 40   PT Mode of treatment - Individual (minutes) 40   PT Mode of treatment - Concurrent (minutes) 0   PT Mode of treatment - Group (minutes) 0   PT Mode of treatment - Co-treat (minutes) 0   PT Mode of Treatment - Total time(minutes) 40 minutes   PT Cumulative Minutes 500   Therapy Time missed   Time  missed? No

## 2024-07-03 NOTE — ASSESSMENT & PLAN NOTE
"Patient presented for abdominal pain and swelling, history of hernia.  CT concerning for \"a chronic diverticular abscess with a more recent acute component\" (see full report for detailed findings)  Surgery evaluated patient and recommended conservative management of suspected diverticulitis with microperforation  Patient is tolerating regular diet and having normal bowel movements   Completed 10+ day course Cipro/Flagyl 6/28/24  Surgery recommended outpatient follow-up within 3-4 weeks  Monitor clinical exam closely -- wife reports worsening L groin edema and erythema on 7/2  Will request surgery re-assessment today, defer need for repeat imaging   "

## 2024-07-03 NOTE — ASSESSMENT & PLAN NOTE
Patient had AMS on previous hospitalization and MRI 6/20 demonstrated small left parietal hemorrhage with possible petechial hemorrhage  As per neurology, felt to be cardio embolic 2/2 missed doses of Eliquis, which was held initially for possible surgical intervention with regards to diverticular abscess   Neurology recommended continuing with Depakote, was discharged on 500 mg q8h   Therapeutic VPA level, LFTs and NH3 within normal limits on 7/2  D/w neurology on 7/2 and they recommended transitioning to 750 mg q12h   Patient denies any residual sensory or motor deficit  Continue anticoagulation with Eliquis   Outpatient follow-up with Neurology

## 2024-07-04 ENCOUNTER — ANESTHESIA (INPATIENT)
Dept: PERIOP | Facility: HOSPITAL | Age: 84
End: 2024-07-04
Payer: COMMERCIAL

## 2024-07-04 RX ORDER — METRONIDAZOLE 500 MG/100ML
INJECTION, SOLUTION INTRAVENOUS CONTINUOUS PRN
Status: DISCONTINUED | OUTPATIENT
Start: 2024-07-04 | End: 2024-07-04

## 2024-07-04 RX ORDER — SODIUM CHLORIDE, SODIUM LACTATE, POTASSIUM CHLORIDE, CALCIUM CHLORIDE 600; 310; 30; 20 MG/100ML; MG/100ML; MG/100ML; MG/100ML
INJECTION, SOLUTION INTRAVENOUS CONTINUOUS PRN
Status: DISCONTINUED | OUTPATIENT
Start: 2024-07-04 | End: 2024-07-04

## 2024-07-04 RX ORDER — SODIUM CHLORIDE 9 MG/ML
INJECTION, SOLUTION INTRAVENOUS CONTINUOUS PRN
Status: DISCONTINUED | OUTPATIENT
Start: 2024-07-04 | End: 2024-07-04

## 2024-07-04 RX ORDER — ONDANSETRON 2 MG/ML
INJECTION INTRAMUSCULAR; INTRAVENOUS AS NEEDED
Status: DISCONTINUED | OUTPATIENT
Start: 2024-07-04 | End: 2024-07-04

## 2024-07-04 RX ORDER — LIDOCAINE HYDROCHLORIDE 10 MG/ML
INJECTION, SOLUTION EPIDURAL; INFILTRATION; INTRACAUDAL; PERINEURAL AS NEEDED
Status: DISCONTINUED | OUTPATIENT
Start: 2024-07-04 | End: 2024-07-04

## 2024-07-04 RX ORDER — PROPOFOL 10 MG/ML
INJECTION, EMULSION INTRAVENOUS AS NEEDED
Status: DISCONTINUED | OUTPATIENT
Start: 2024-07-04 | End: 2024-07-04

## 2024-07-04 RX ORDER — HYDROMORPHONE HCL/PF 1 MG/ML
SYRINGE (ML) INJECTION AS NEEDED
Status: DISCONTINUED | OUTPATIENT
Start: 2024-07-04 | End: 2024-07-04

## 2024-07-04 RX ORDER — ROCURONIUM BROMIDE 10 MG/ML
INJECTION, SOLUTION INTRAVENOUS AS NEEDED
Status: DISCONTINUED | OUTPATIENT
Start: 2024-07-04 | End: 2024-07-04

## 2024-07-04 RX ORDER — FENTANYL CITRATE 50 UG/ML
INJECTION, SOLUTION INTRAMUSCULAR; INTRAVENOUS AS NEEDED
Status: DISCONTINUED | OUTPATIENT
Start: 2024-07-04 | End: 2024-07-04

## 2024-07-04 RX ORDER — CEFAZOLIN SODIUM 1 G/3ML
INJECTION, POWDER, FOR SOLUTION INTRAMUSCULAR; INTRAVENOUS AS NEEDED
Status: DISCONTINUED | OUTPATIENT
Start: 2024-07-04 | End: 2024-07-04

## 2024-07-04 RX ORDER — ALBUMIN, HUMAN INJ 5% 5 %
SOLUTION INTRAVENOUS CONTINUOUS PRN
Status: DISCONTINUED | OUTPATIENT
Start: 2024-07-04 | End: 2024-07-04

## 2024-07-04 RX ADMIN — SODIUM CHLORIDE: 0.9 INJECTION, SOLUTION INTRAVENOUS at 14:40

## 2024-07-04 RX ADMIN — ROCURONIUM BROMIDE 30 MG: 10 INJECTION, SOLUTION INTRAVENOUS at 15:04

## 2024-07-04 RX ADMIN — ALBUMIN (HUMAN): 12.5 INJECTION, SOLUTION INTRAVENOUS at 15:31

## 2024-07-04 RX ADMIN — PROPOFOL 100 MG: 10 INJECTION, EMULSION INTRAVENOUS at 11:27

## 2024-07-04 RX ADMIN — ROCURONIUM BROMIDE 50 MG: 10 INJECTION, SOLUTION INTRAVENOUS at 12:03

## 2024-07-04 RX ADMIN — ROCURONIUM BROMIDE 50 MG: 10 INJECTION, SOLUTION INTRAVENOUS at 11:28

## 2024-07-04 RX ADMIN — METRONIDAZOLE: 500 INJECTION, SOLUTION INTRAVENOUS at 11:48

## 2024-07-04 RX ADMIN — PHENYLEPHRINE HYDROCHLORIDE 200 MCG: 10 INJECTION INTRAVENOUS at 11:34

## 2024-07-04 RX ADMIN — PHENYLEPHRINE HYDROCHLORIDE 200 MCG: 10 INJECTION INTRAVENOUS at 16:08

## 2024-07-04 RX ADMIN — ONDANSETRON 4 MG: 2 INJECTION INTRAMUSCULAR; INTRAVENOUS at 16:13

## 2024-07-04 RX ADMIN — SODIUM CHLORIDE: 0.9 INJECTION, SOLUTION INTRAVENOUS at 11:35

## 2024-07-04 RX ADMIN — ALBUMIN (HUMAN): 12.5 INJECTION, SOLUTION INTRAVENOUS at 15:14

## 2024-07-04 RX ADMIN — HYDROMORPHONE HYDROCHLORIDE 0.5 MG: 1 INJECTION, SOLUTION INTRAMUSCULAR; INTRAVENOUS; SUBCUTANEOUS at 12:51

## 2024-07-04 RX ADMIN — FENTANYL CITRATE 50 MCG: 50 INJECTION INTRAMUSCULAR; INTRAVENOUS at 11:27

## 2024-07-04 RX ADMIN — SODIUM CHLORIDE, SODIUM LACTATE, POTASSIUM CHLORIDE, AND CALCIUM CHLORIDE: .6; .31; .03; .02 INJECTION, SOLUTION INTRAVENOUS at 12:39

## 2024-07-04 RX ADMIN — SODIUM CHLORIDE, SODIUM LACTATE, POTASSIUM CHLORIDE, AND CALCIUM CHLORIDE: .6; .31; .03; .02 INJECTION, SOLUTION INTRAVENOUS at 11:20

## 2024-07-04 RX ADMIN — SUGAMMADEX 200 MG: 100 INJECTION, SOLUTION INTRAVENOUS at 16:25

## 2024-07-04 RX ADMIN — FENTANYL CITRATE 50 MCG: 50 INJECTION INTRAMUSCULAR; INTRAVENOUS at 12:04

## 2024-07-04 RX ADMIN — CEFAZOLIN 2000 MG: 1 INJECTION, POWDER, FOR SOLUTION INTRAMUSCULAR; INTRAVENOUS at 15:36

## 2024-07-04 RX ADMIN — PHENYLEPHRINE HYDROCHLORIDE 200 MCG: 10 INJECTION INTRAVENOUS at 15:46

## 2024-07-04 RX ADMIN — LIDOCAINE HYDROCHLORIDE 50 MG: 10 INJECTION, SOLUTION EPIDURAL; INFILTRATION; INTRACAUDAL; PERINEURAL at 11:27

## 2024-07-04 RX ADMIN — CEFAZOLIN 2000 MG: 1 INJECTION, POWDER, FOR SOLUTION INTRAMUSCULAR; INTRAVENOUS at 11:44

## 2024-07-04 NOTE — ANESTHESIA PREPROCEDURE EVALUATION
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)      /RENAL   (+) TARA (acute kidney injury) (HCC)   (+) Stage 3 chronic kidney disease (HCC)      MUSCULOSKELETAL   (+) Inflammatory polyarthropathy (HCC)   (+) Osteoarthrosis      NEURO/PSYCH   (+) Diplopia   (+) Episode of seizure-like activity   (+) 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HCC)   (+) Vascular dementia (HCC)      PULMONARY   (+) Acute on chronic respiratory failure (HCC)   (+) Centrilobular emphysema (HCC)   (+) Chronic obstructive pulmonary disease with acute exacerbation (HCC)   (+) Concern for aspiration pneumonia (HCC)   (+) Shortness of breath     On eliquis  Per GI:  Hal Miller is a 82 y.o. male who presents with swallowing issues going back 2 years.  According to the patient he feels food gets stuck low behind the esophagus and then build up.  He does not have any vomiting.  It eventually goes down.  He has no cough.  No chest pain.  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  He has a fairly significant cardiac history.  He does not have any painful swallowing.  Although he takes anticoagulants he has no melena or hematochezia.  He has no change in the consistency of his skin although he says his skin has become much thinner.  He does not have any Raynaud's.  The patient denies any abdominal pain, change in bowel habits or stool caliber, hematochezia or rectal bleeding.  No tenesmus.  He had originally lost approximately 12 lb during this entire episode but has regained 6 or 7 lb at this point.  According to the wife and the patient he has a history of significant colon polyps but has not  had a colonoscopy in more than 10 years.  The wife in the patient both request that I do this procedure as well.  He has no other GI complaints at this point of any sort.  He does have exertional shortness of breath.  He is treated for polymyalgia rheumatica and has diffuse joint issues.  He has no peripheral swelling.  He has no other complaints..    Recent heart cath negative, no intervention per pt. Was done by Richard, no records available for review.     Physical Exam    Airway    Mallampati score: II  TM Distance: >3 FB  Neck ROM: full     Dental   Comment: Denies loose teeth     Cardiovascular  Rhythm: irregular, Cardiovascular exam normal    Pulmonary  Pulmonary exam normal     Other Findings  Portions of exam deferred due to low yield and/or unknown COVID status      Anesthesia Plan  ASA Score- 3     Anesthesia Type- general with ASA Monitors.         Additional Monitors:     Airway Plan: ETT.           Plan Factors-Exercise tolerance (METS): >4 METS.    Chart reviewed.   Existing labs reviewed. Patient summary reviewed.    Patient is not a current smoker.              Induction- intravenous.    Postoperative Plan-     Perioperative Resuscitation Plan - Level 1 - Full Code.       Informed Consent- Anesthetic plan and risks discussed with patient.  I personally reviewed this patient with the CRNA. Discussed and agreed on the Anesthesia Plan with the CRNA..

## 2024-07-04 NOTE — PROGRESS NOTES
Progress Note - General Surgery   Hal Miller 84 y.o. male MRN: [id number]  Unit/Bed#: [facility] 630-01 Encounter: [id number]    Assessment:  Hal Miller is a 84 y.o. male who presents with new onset painful left groin abscess underlying left inguinal hernia.      Plan:  - NPO pending OR   - IV Cipro/Flagyl  - IVF LR @100 ml/hr   - Concern for severe soft tissue infection and/or abdominal infection, possible surgery for washout and further exploration  -held Eliquis pending OR     Subjective/Objective   Subjective:     Pain well controlled. Two BM yesterday. Voiding. OOB. No nausea or vomiting.     Objective:     Blood pressure 120/65, pulse 78, temperature 97.9 °F (36.6 °C), temperature source Oral, resp. rate 17, SpO2 92%.,There is no height or weight on file to calculate BMI.    Intake/Output Summary (Last 24 hours) at 7/4/2024 0249  Last data filed at 7/3/2024 2233  Gross per 24 hour   Intake 100 ml   Output 200 ml   Net -100 ml     UOP: 200mL    Invasive Devices       Peripheral Intravenous Line  Duration             Peripheral IV 07/03/24 Left Antecubital <1 day                    Physical Exam:   General: NAD, alert, awake  Respiratory: no respiratory distress, no shortness of breath  Abdominal:   Lungs: Normal work of breathing, no increased respiratory effort  Abdomen: Soft, non-tender, non-distended. Abscess noted in left groin with overlying erythema, central skin breakdown and drainage of pus from just lateral of center. Exquisitely tender to the touch. Unable to evaluate status of hernia due to abscess, pressure in the area causes the wound to drain further.   Skin: warm, dry, intact      Lab, Imaging and other studies:CBC:   Lab Results   Component Value Date    WBC 12.54 (H) 07/03/2024    HGB 10.4 (L) 07/03/2024    HCT 32.4 (L) 07/03/2024    MCV 98 07/03/2024     (L) 07/03/2024    RBC 3.31 (L) 07/03/2024    MCH 31.4 07/03/2024    MCHC 32.1 07/03/2024    RDW 15.2 (H) 07/03/2024    MPV 9.3  07/03/2024    NRBC 0 07/03/2024   , CMP:   Lab Results   Component Value Date    SODIUM 130 (L) 07/03/2024    K 5.1 07/03/2024    CL 96 07/03/2024    CO2 32 07/03/2024    BUN 37 (H) 07/03/2024    CREATININE 1.35 (H) 07/03/2024    CALCIUM 9.7 07/03/2024    EGFR 48 07/03/2024     Kenya Norris MD  Surgery PGY-1

## 2024-07-04 NOTE — CASE MANAGEMENT
Case Management Discharge Planning Note    Patient name Hal Miller  Location OR POOL/OR POOL MRN [id number]  : 1940 Date 2024       Current Admission Date: 7/3/2024  Current Admission Diagnosis:Colonic diverticular abscess   Patient Active Problem List    Diagnosis Date Noted Date Diagnosed    Left inguinal hernia 2024     Leukocytosis 2024     Dysphagia 2024     Hx of aortic valve replacement 2024     Diplopia 2024     Peripheral polyneuropathy 2024     Hyponatremia 2024     Dysphoric mood 2024     NICM (nonischemic cardiomyopathy) ([facility]) 2024     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[facility]) 2024     Acute on chronic respiratory failure ([facility]) 2024     Episode of seizure-like activity 2024     History of Whipple procedure 2024     Dizziness 2024     Insomnia 2024     Ambulatory dysfunction 2024     Severe protein-calorie malnutrition (HCC) 2024     Abnormal CT of the abdomen 2024     Zoster 2024     Colonic diverticular abscess 2024     Vascular dementia ([facility]) 2024     Concern for aspiration pneumonia ([facility]) 2024     Type 2 diabetes mellitus, with long-term current use of insulin ([facility]) 2024     Hypercalcemia 2023     TARA (acute kidney injury) ([facility]) 2023     Esophageal stricture 2023     Acute encephalopathy 2023     Generalized weakness 2023     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     Chronic obstructive pulmonary disease with acute exacerbation (HCC) 2022     Stage 3 chronic kidney disease (HCC) 2022     Centrilobular emphysema ([facility]) 2021     History of malignant neuroendocrine tumor 2021     Atrial fibrillation ([facility]) 2017     Shortness of breath 2017     Primary hypertension 2017     Hyperlipidemia 2015     Inflammatory polyarthropathy (HCC) 2014     Osteoarthrosis 2014      Polymyalgia rheumatica (HCC) 11/12/2014     Aneurysm of thoracic aorta (HCC) 01/06/2014     Aneurysm of abdominal aorta (HCC) 01/06/2014     Neoplasm of other specified site 01/06/2014       LOS (days): 1  Geometric Mean LOS (GMLOS) (days):   Days to GMLOS:     OBJECTIVE:  Risk of Unplanned Readmission Score: 39.19         Current admission status: Inpatient   Preferred Pharmacy:   [id number] - [place] [facility] [address] [address]  Phone: [phone] Fax: [phone]    [facility] - [place], [place] - [address]  [address] [address]  Phone: [phone] Fax: [phone]    [facility]) - [place] - [address]  [address] [place] [address]  Phone: [phone] Fax: [phone]    Primary Care Provider: Kyaw Monreal MD    Primary Insurance: [employer]  REP  Secondary Insurance:     DISCHARGE DETAILS:     CM attempted to complete assessment.  Pt is in surgery.  CM will follow for DC needs.

## 2024-07-04 NOTE — RESPIRATORY THERAPY NOTE
RT Protocol Note  Hal Miller 84 y.o. male MRN: [id number]  Unit/Bed#: [facility] 630-01 Encounter: [id number]    Assessment    Principal Problem:    Colonic diverticular abscess      Home Pulmonary Medications:  Albuterol mdi         Past Medical History:   Diagnosis Date    Acute encephalopathy 2023    Acute-on-chronic kidney injury  (HCC) 2017    Cancer (HCC)     pancreatic    Colon polyp     Coronary artery disease     Dehydration 3/3/2023    Diabetes mellitus (HCC)     Hyperlipidemia     Hypertension     Left lower lobe pneumonia 2022    Pneumonia 2017    Right middle and lower lobe     Stroke (HCC)      Social History     Socioeconomic History    Marital status: /Civil Union     Spouse name: Not on file    Number of children: Not on file    Years of education: Not on file    Highest education level: Not on file   Occupational History    Not on file   Tobacco Use    Smoking status: Former     Types: Pipe     Quit date:      Years since quittin.5    Smokeless tobacco: Never   Vaping Use    Vaping status: Never Used   Substance and Sexual Activity    Alcohol use: Never    Drug use: No    Sexual activity: Yes     Partners: Female   Other Topics Concern    Not on file   Social History Narrative    Not on file     Social Determinants of Health     Financial Resource Strain: Not on file   Food Insecurity: No Food Insecurity (7/3/2024)    Hunger Vital Sign     Worried About Running Out of Food in the Last Year: Never true     Ran Out of Food in the Last Year: Never true   Transportation Needs: No Transportation Needs (7/3/2024)    PRAPARE - Transportation     Lack of Transportation (Medical): No     Lack of Transportation (Non-Medical): No   Physical Activity: Not on file   Stress: Not on file   Social Connections: Not on file   Intimate Partner Violence: Not on file   Housing Stability: Unknown (7/3/2024)    Housing Stability Vital Sign     Unable to Pay for Housing in the Last Year: No      Number of Times Moved in the Last Year: Not on file     Homeless in the Last Year: No       Subjective         Objective    Physical Exam:   Assessment Type: Assess only  General Appearance: Drowsy  Respiratory Pattern: Normal  Chest Assessment: Chest expansion symmetrical  Bilateral Breath Sounds: Diminished, Clear    Vitals:  Blood pressure 128/68, pulse 78, temperature 97.9 °F (36.6 °C), temperature source Oral, resp. rate 17, SpO2 92%.          Imaging and other studies: I have personally reviewed pertinent reports.            Plan    Respiratory Plan: Home Bronchodilator Patient pathway        Resp Comments: Pt evaluted per respiratory distress. Breathe souds are clear. Pt is not in any respiratory distress. Pt was on ARC. was getting prn albuterol mdi and bid pulmicort and performist. Will sticker the chart for reorder of those med and order prn albuterol mdi.

## 2024-07-04 NOTE — OP NOTE
OPERATIVE REPORT  PATIENT NAME: Hal Miller    :  1940  MRN: [id number]  Pt Location: [facility] OR ROOM 07    SURGERY DATE: 2024    Surgeons and Role:     * Charlie Carroll MD - Primary     * Mark Champion DO - Assisting    Preop Diagnosis:  Colonic diverticular abscess [K57.20]    Post-Op Diagnosis Codes:     * Colonic diverticular abscess [K57.20]    Procedure(s):  HARTMANS PROCEDURE. WITH EXPLANTATION OF LEFT GROIN MESH. WASHOUT AND DEBRIDEMENT OF LEFT GROIN WOUND    Specimen(s):  ID Type Source Tests Collected by Time Destination   1 :  Tissue Large Intestine, Sigmoid Colon TISSUE EXAM Charlie Carroll MD 2024 1451    2 : Left Groin Tissue Culture Tissue Groin ANAEROBIC CULTURE AND GRAM STAIN, CULTURE, TISSUE AND GRAM STAIN, WOUND CULTURE, TISSUE EXAM Charlie Carroll MD 2024 1537    A : mesh Tissue Other ANAEROBIC CULTURE AND GRAM STAIN, CULTURE, TISSUE AND GRAM STAIN Charlie Carroll MD 2024 1433        Estimated Blood Loss:   250 mL    Drains:  NG/OG/Enteral Tube Nasogastric Right nare (Active)   Number of days: 0       Colostomy Other (comment) LUQ (Active)   Number of days: 0       Urethral Catheter Latex 16 Fr. (Active)   Site Assessment Patent 24 1635   Collection Container Standard drainage bag 24 1635   Securement Method Securing device (Describe) 24 1635   Number of days: 0       Anesthesia Type:   General    Operative Indications:  Colonic diverticular abscess [K57.20]      Operative Findings:  -Phimosis noted of penis difficulty inserting cardenas catheter, required urology assistance  -Mesh erosion into sigmoid colon at 2 separate sites, localized fecal contamination within the inguinal canal  -complete obliteration of the floor of the inguinal canal with communication to the skin  -Complete excision of L groin mesh  -Sigmoidectomy w/ end colostomy  -Left inguinal wound debridement of necrotic/infected skin and subcutaneous tissue. Final  dimensions measured [id number].  -Wound packed 1 one betadine soaked packing      Complications:   None    Procedure and Technique:  Patient was met and identified in the preop holding area by name and patient identification bracelet. He was brought to the OR and placed in the supine position. General anesthesia was induced. Preoperative abx were administered. We originally had difficulty placing a cardenas catheter 2/2 phimosis. Urology assisted in placing the cardenas catheter. The abdomen was prepped and draped in usual sterile fashion. A time-out was called and all parties were in agreement.    We began by making a lower midline laparotomy just above the pubic tubercle to the inferior level of his previous midline laparotomy. This was taken down through the subcutaneous tissues and fascia with bovie cautery. The peritoneum was identified with two hemostats and entered sharply with [place]. The abdomen then was opened over a gloved finger, care taken to avoid the underlying viscera. At this point we placed our Bookwalter retractor. We turned our attention to the LLQ where the sigmoid colon was noted to be densely adhered and was entering the inguinal canal. We were able to feel what appeared to be a mesh located in the LLQ. The peritoneum over the mesh was then divided to expose the mesh. It was apparent that the mesh had eroded into antimesenteric segment of the sigmoid colon. Using a combination of sharp, blunt, and bovie cautery we were able to free the colon from the mesh. There were two areas of perforation into the colon from mesh erosion which were oversewn with a 2-0 silk suture. There was an approximately 5 cm segment of sigmoid colon which appeared nonviable.    With the bowel free we turned our attention to explantation of the mesh. The mesh was firmly adhered in the preperitoneal space down to the pubic bone. Using a combination of blunt and sharp dissection we were able to completely excise the mesh. This was  then passed off the field for tissue culture. Of note the left inferior epigastric artery was involved in the mesh and was divided between two 2-0 silk ties. The entire floor of the inguinal canal had been obliterated and there was an obvious communication to the left groin wound.    At this point we turned our attention to performing our sigmoidectomy. The sigmoid colon was mobilized along the white line of toldt until an area of healthy colon. This area was chosen as our proximal transection margin and was transected with a DELIA 75. The mesentery was then transected distally using an Enseal to the level of healthy rectum. The distal transection margin was chosen and transected with a DELIA 75.     Our colostomy site was then selected in the LUQ at a site in the rectus muscle, lateral to a patient's previous ventral hernia repair mesh. A circumferential incision was made and taken down to the level of the anterior fasica which was then incised in a cruciate fashion to expose the rectus muscle. The rectus was split to the level of the peritoneum which was incised with bovie cautery. A two finger breadth opening was made. The proximal sigmoid colon was then passed through our site easily without tension, care was taken to not twist the colon.    The abdomen was then copiously irrigated with warm sterile saline until the effluent was clear. We then turned our attention to the LLQ to evaluate the inguinal canal. Unfortunately the floor had been completely obliterated. We attempted to approximate the peritoneum using a running 2-0 vicryl suture.    We then turned our attention to the left groin wound. An eliptical incision was made over the area of nonviable skin which. There was cavity of of necrotic/nonviable skin and subcutaneous tissue which was excised to the level of healthy bleeding tissue. The wound debridement measured [id number]. The area was copiously irrigated and packed with 1 betadine soaked packing.    We  changed our gloves and turned to closing the abdomen. The midline fascia was closed with #1PDS suture using interrupted figure of eights. The subq was irrigated and the skin was closed with staples. A towel was placed over our incision and we began to mature of colostomy. Our colostomy was wyatt'd in a standard fashion using 3-0 vicryl suture. A colostomy appliance was placed.    Instrument, needle, and sponge counts were correct and confirmed by RF wanding. The patient was extubated and brought to ICU in stable condition.    Dr. Carroll was present for the entire procedure.        Patient Disposition:  Critical Care Unit    This procedure was not performed to treat colon cancer through resection      SIGNATURE: Mark Champion DO  DATE: July 4, 2024  TIME: 4:49 PM

## 2024-07-04 NOTE — PLAN OF CARE
Problem: PAIN - ADULT  Goal: Verbalizes/displays adequate comfort level or baseline comfort level  Description: Interventions:  - Encourage patient to monitor pain and request assistance  - Assess pain using appropriate pain scale  - Administer analgesics based on type and severity of pain and evaluate response  - Implement non-pharmacological measures as appropriate and evaluate response  - Consider cultural and social influences on pain and pain management  - Notify physician/advanced practitioner if interventions unsuccessful or patient reports new pain  Outcome: Progressing     Problem: INFECTION - ADULT  Goal: Absence or prevention of progression during hospitalization  Description: INTERVENTIONS:  - Assess and monitor for signs and symptoms of infection  - Monitor lab/diagnostic results  - Monitor all insertion sites, i.e. indwelling lines, tubes, and drains  - Monitor endotracheal if appropriate and nasal secretions for changes in amount and color  - Grover appropriate cooling/warming therapies per order  - Administer medications as ordered  - Instruct and encourage patient and family to use good hand hygiene technique  - Identify and instruct in appropriate isolation precautions for identified infection/condition  Outcome: Progressing  Goal: Absence of fever/infection during neutropenic period  Description: INTERVENTIONS:  - Monitor WBC    Outcome: Progressing    Goal: Maintain or return to baseline ADL function  Description: INTERVENTIONS:  -  Assess patient's ability to carry out ADLs; assess patient's baseline for ADL function and identify physical deficits which impact ability to perform ADLs (bathing, care of mouth/teeth, toileting, grooming, dressing, etc.)  - Assess/evaluate cause of self-care deficits   - Assess range of motion  - Assess patient's mobility; develop plan if impaired  - Assess patient's need for assistive devices and provide as appropriate  - Encourage maximum independence but  intervene and supervise when necessary  - Involve family in performance of ADLs  - Assess for home care needs following discharge   - Consider OT consult to assist with ADL evaluation and planning for discharge  - Provide patient education as appropriate  Outcome: Progressing       Problem: DISCHARGE PLANNING  Goal: Discharge to home or other facility with appropriate resources  Description: INTERVENTIONS:  - Identify barriers to discharge w/patient and caregiver  - Arrange for needed discharge resources and transportation as appropriate  - Identify discharge learning needs (meds, wound care, etc.)  - Arrange for interpretive services to assist at discharge as needed  - Refer to Case Management Department for coordinating discharge planning if the patient needs post-hospital services based on physician/advanced practitioner order or complex needs related to functional status, cognitive ability, or social support system  Outcome: Progressing     Problem: Knowledge Deficit  Goal: Patient/family/caregiver demonstrates understanding of disease process, treatment plan, medications, and discharge instructions  Description: Complete learning assessment and assess knowledge base.  Interventions:  - Provide teaching at level of understanding  - Provide teaching via preferred learning methods  Outcome: Progressing

## 2024-07-04 NOTE — H&P
H&P Exam - General Surgery   Hal Miller 84 y.o. male MRN: [id number]  Unit/Bed#: [facility] 630-01 Encounter: [id number]    Assessment & Plan     Assessment:  Hal Miller is a 84 y.o. male who presents with new onset painful left groin abscess underlying left inguinal hernia.      Plan:  - NPO  - IV Cipro/Flagyl  - IVF NS   - Concern for severe soft tissue infection and/or abdominal infection, possible surgery for washout and further exploration  - appreciate recommendations from Neurology on ability to hold Eliquis given recent stroke    History of Present Illness     HPI: Hal Miller is a 84 y.o. male who presents with pain, swelling, and worsening erythema of the left groin over area of previously noted left inguinal hernia. Patient reports noticing increased swelling and some tenderness of the left groin beginning yesterday with worsening of pain and skin breakdown in the area today. Denies fever, chills, nausea, vomiting, changes in bowel or bladder habits. Patient has a complicated past medical history of stroke, cardiomyopathy, afib, Aortic valve replacement x2, HTN, DM type 2, stage 3 CKD, and COPD. Past surgical history of pancreaticoduodenectomy, cholecystotomy, and appendectomy.  Elevated WBC of 13 from 16 yesterday and 8 on 7/16. Most recent CT scan 6/18 showed an unchanged collection of fluid in the left side of the pelvis anteriorly, extending focally into the adjacent anterior pelvic wall, and abutting the sigmoid colon with no evidence of communication noted.     Review of Systems    Historical Information   Past Medical History:   Diagnosis Date    Acute encephalopathy 2/14/2023    Acute-on-chronic kidney injury  (HCC) 6/13/2017    Cancer (HCC)     pancreatic    Colon polyp     Coronary artery disease     Dehydration 3/3/2023    Diabetes mellitus (HCC)     Hyperlipidemia     Hypertension     Left lower lobe pneumonia 7/2/2022    Pneumonia 06/13/2017    Right middle and lower lobe     Stroke (HCC)       Past Surgical History:   Procedure Laterality Date    APPENDECTOMY      CARDIAC SURGERY      Aortic Valve Replacement, Ascending Aorta    COLONOSCOPY      GALLBLADDER SURGERY      PANCREATICODUODENECTOMY       Social History   Social History     Substance and Sexual Activity   Alcohol Use Never     Social History     Substance and Sexual Activity   Drug Use No     Social History     Tobacco Use   Smoking Status Former    Types: Pipe    Quit date:     Years since quittin.5   Smokeless Tobacco Never     E-Cigarette/Vaping    E-Cigarette Use Never User      E-Cigarette/Vaping Substances    Nicotine No     THC No     CBD No     Flavoring No     Other No     Unknown No      Family History:   Family History   Problem Relation Age of Onset    Cancer Mother     Stroke Father     Cancer Sister     Diabetes Sister     Stroke Brother        Meds/Allergies   all medications and allergies reviewed  Allergies   Allergen Reactions    Contrast Dye [Iodinated Contrast Media] Other (See Comments)     Pt states kidney dysfunction..     Other        Objective   First Vitals:   Blood Pressure: 132/67 (24)  Pulse: 73 (24)  Temperature: 97.9 °F (36.6 °C) (24)  Temp Source: Oral (24)  Respirations: 16 (24)  SpO2: 97 % (24)    Current Vitals:   Blood Pressure: 128/68 (24)  Pulse: 78 (24)  Temperature: 97.9 °F (36.6 °C) (24)  Temp Source: Oral (24)  Respirations: 17 (24)  SpO2: 92 % (24 2357)      Intake/Output Summary (Last 24 hours) at 2024 0033  Last data filed at 7/3/2024 2233  Gross per 24 hour   Intake 100 ml   Output 200 ml   Net -100 ml       Invasive Devices       Peripheral Intravenous Line  Duration             Peripheral IV 24 Left Antecubital <1 day                    Physical Exam    Lab Results: CBC:   Lab Results   Component Value Date    WBC 12.54 (H) 2024    HGB 10.4 (L)  07/03/2024    HCT 32.4 (L) 07/03/2024    MCV 98 07/03/2024     (L) 07/03/2024    RBC 3.31 (L) 07/03/2024    MCH 31.4 07/03/2024    MCHC 32.1 07/03/2024    RDW 15.2 (H) 07/03/2024    MPV 9.3 07/03/2024    NRBC 0 07/03/2024   , CMP:   Lab Results   Component Value Date    SODIUM 130 (L) 07/03/2024    K 5.1 07/03/2024    CL 96 07/03/2024    CO2 32 07/03/2024    BUN 37 (H) 07/03/2024    CREATININE 1.35 (H) 07/03/2024    CALCIUM 9.7 07/03/2024    EGFR 48 07/03/2024     Code Status: Level 1 - Full Code

## 2024-07-05 PROBLEM — E44.0 MODERATE PROTEIN-CALORIE MALNUTRITION (HCC): Status: ACTIVE | Noted: 2024-07-05

## 2024-07-05 NOTE — PROGRESS NOTES
Progress Note - General Surgery  Hal Miller 84 y.o. male MRN: [id number]  Unit/Bed#: MICU 04 Encounter: [id number]    Assessment:  84 y.o. M now POD 1 s/p frost's, explant of left groin mesh, washout/debridement left groin wound.     Afebrile.VSS.  Currently on 1L of NC   cc   cc bilious output  Ostomy 25 cc sanguinous output  WBC 15 from 13  Hgb 9.4 from 10.6  Cr 1.45    Plan:  NPO  NGT, consider removal today  IV fluids  IV abx  Packing change  Giving albumin  PRN pain meds  PRN anti nausea meds  DVT prophylaxis  Keep cardenas  Encourage IS  Can downgrade to stepdown 2    Subjective/Objective     Subjective:   No acute events overnight. Patient states he has some abdominal pain. Denies having nausea, vomiting, fevers, chills, chest pain. He does have some shortness of breath. Voiding without difficulty. Some ostomy output.     Objective:     Blood pressure 94/53, pulse 81, temperature 98 °F (36.7 °C), temperature source Oral, resp. rate 17, SpO2 99%.,There is no height or weight on file to calculate BMI.      Intake/Output Summary (Last 24 hours) at 7/5/2024 0954  Last data filed at 7/5/2024 0933  Gross per 24 hour   Intake 7340 ml   Output 1386 ml   Net 5954 ml       Invasive Devices       Peripheral Intravenous Line  Duration             Peripheral IV 07/03/24 Left Antecubital 1 day    Peripheral IV 07/04/24 Right;Dorsal (posterior) Hand <1 day              Drain  Duration             Urethral Catheter Latex 16 Fr. 1 day    Colostomy Other (comment) LUQ <1 day    NG/OG/Enteral Tube Nasogastric Right nare <1 day                    General: NAD  HENT: NCAT MMM  Neck: supple, no JVD  CV: nl rate  Lungs: nl wob. No resp distress  ABD: Soft, appropriately tender, nondistended. Ostomy with blood tinged output. NG tube with dark bilious brown output. Cardenas with dark urine output.  Extrem: No CCE  Neuro: AAOx3       Scheduled Meds:  Current Facility-Administered Medications   Medication Dose Route  Frequency Provider Last Rate    acetaminophen  1,000 mg Intravenous Q6H YUNI Dinh MD Stopped (07/05/24 0600)    albuterol  2 puff Inhalation Q4H PRN Merlyn Dinh MD      budesonide  0.5 mg Nebulization Q12H Merlyn Dinh MD      chlorhexidine  15 mL Mouth/Throat Q12H [facility] Merlyn Dinh MD      formoterol  20 mcg Nebulization Q12H Merlyn Dinh MD      heparin (porcine)  5,000 Units Subcutaneous Q8H [facility] Merlyn Dinh MD      HYDROmorphone  0.5 mg Intravenous Q3H PRN Merlyn Dinh MD      HYDROmorphone  0.2 mg Intravenous Q3H PRN Merlyn Dinh MD      insulin lispro  1-5 Units Subcutaneous Q6H [facility] Merlyn Dinh MD      multi-electrolyte  125 mL/hr Intravenous Continuous Randy Broussard PA-C Stopped (07/05/24 0924)    pantoprazole  40 mg Intravenous Q24H [facility] Merlyn Dinh MD      piperacillin-tazobactam  4.5 g Intravenous Q8H Merlyn Dinh MD 4.5 g (07/05/24 0931)    valproate sodium  500 mg Intravenous Q8H [facility] Randy Broussard PA-C 500 mg (07/05/24 0924)     Continuous Infusions:multi-electrolyte, 125 mL/hr, Last Rate: Stopped (07/05/24 0924)      PRN Meds:.  albuterol    HYDROmorphone    HYDROmorphone    Labs:  CBC - WBC/Hgb/Hct/Plts: 15.12*/9.4*/30.4*/133* (07/05 0521)  CHEM - BUN/Cr/glu/ALT/AST/amyl/lip:35*/1.45*/--/--/--/--/-- (07/05 0521)     LYTES - Na/K/Cl/CO2: 134*/5.3/101/27 (07/05 0521)    Lab, Imaging and other studies:I have personally reviewed pertinent lab results.    VTE Pharmacologic Prophylaxis: Enoxaparin (Lovenox)  VTE Mechanical Prophylaxis: sequential compression device      Magdi Diamond MD  7/5/2024 9:54 AM

## 2024-07-05 NOTE — PROGRESS NOTES
[facility]  Progress Note: Critical Care  Name: Hal Miller 84 y.o. male I MRN: [id number]  Unit/Bed#: MICU 04 I Date of Admission: 7/3/2024   Date of Service: 7/5/2024 I Hospital Day: 2    Assessment & Plan   Neuro:   Diagnosis: fall w/ stroke (6/18), altered mental status, delirium, seizure-like activity (6/19)  Plan: Depacon 500mg Q8h  Environmental controls   Lights on and blinds up during day  Frequent reorientation  Lights off, TV off, blinds down, minimize interruptions at night    Diagnosis: Inflammatory polyarthropathy  Plan: hold home prednisone 1mg for now    Diagnosis: Acute postop pain  Plan: per surgery, multimodal pain control PRN    CV:   Diagnosis: HFrEF, non-ischemic cardiomyopathy, s/p AVR bentall procedure  Plan: hold home lasix for now    Diagnosis: Atrial fibrillation  Plan: hold metoprolol and eliquis    Pulm:  Diagnosis: COPD  Plan: continue nebulizer    GI:   Diagnosis: perforated diverticulitis with abscess  S/p Miller's procedure with explantation of L groin mesh, washout/debridement of wound  Plan:  NG tube removal following SLP evaluation, and initiate diet  Wound packed, management and plan per surgery  Antibiotics (see below)  Transfer to stepdown level 2    :   Diagnosis: CKD stage III  Urine output low overnight  Plan: Monitor I/Os    Diagnosis: Phimosis with scrotal edema  Plan: Continue cardenas catheter, possible urology consult for penile swelling. Surgery had difficulty placing cardenas catheter intraoperatively.     F/E/N:   F: LR 125cc/hr  E: K>4 <5, Phos>3, Mg>2  N: NPO currently, SLP evaluation and initiate diet    Heme/Onc:   DVT prophylaxis  Plan: currently on heparin; eliquis on hold    Endo:   Diagnosis: T2DM  Plan: sliding scale insulin Q6h    ID:   Diagnosis: intraabdominal sepsis, perforated diverticulitis with abscess; pending results from intraoperative cultures  Plan: zosyn    MSK/Skin:   Plan: frequent  repositioning    Disposition: Stepdown Level 2    ICU Core Measures     A: Assess, Prevent, and Manage Pain Has pain been assessed? Yes  Need for changes to pain regimen? No   B: Both SAT/SAT  N/A   C: Choice of Sedation RASS Goal: 0 Alert and Calm  Need for changes to sedation or analgesia regimen? No   D: Delirium CAM-ICU: Positive   E: Early Mobility  Plan for early mobility? Yes   F: Family Engagement Plan for family engagement today? Yes       Antibiotic Review: Awaiting culture results.  and Post op requirements     Review of Invasive Devices:    Babcock Plan: Continue for accurate I/O monitoring for 48 hours        Prophylaxis:  VTE VTE covered by:  heparin (porcine), Subcutaneous, 5,000 Units at 07/05/24 0515       Stress Ulcer  covered bypantoprazole (PROTONIX) injection 40 mg [[id number]]        Significant 24hr Events     24hr events:   Miller's procedure with explantation of L groin mesh, washout/debridement of groin wound yesterday  Nursing note low urine outputs overnight and soft blood pressures     Subjective     Patient oriented to name and place this morning. Patient is feeling comfortable and denies pain, fevers, chills, nausea, and vomiting. Currently with NG tube.     Review of Systems: Review of Systems   Constitutional:  Negative for chills, diaphoresis and fever.   HENT:  Negative for congestion.    Respiratory:  Negative for cough, chest tightness and shortness of breath.    Cardiovascular:  Negative for chest pain.   Gastrointestinal:  Negative for abdominal pain, nausea and vomiting.   Endocrine: Negative.    Genitourinary:  Negative for dysuria.   Musculoskeletal: Negative.    Skin: Negative.    Neurological:  Negative for weakness and headaches.   Psychiatric/Behavioral:  Positive for confusion.         Objective                            Vitals I/O      Most Recent Min/Max in 24hrs   Temp 98 °F (36.7 °C) Temp  Min: 97.1 °F (36.2 °C)  Max: 98 °F (36.7 °C)   Pulse 81 Pulse  Min: 78  Max:  104   Resp 17 Resp  Min: 13  Max: 47   BP 94/53 BP  Min: 93/51  Max: 159/134   O2 Sat 99 % SpO2  Min: 95 %  Max: 100 %      Intake/Output Summary (Last 24 hours) at 7/5/2024 0828  Last data filed at 7/5/2024 0739  Gross per 24 hour   Intake 7340 ml   Output 1315 ml   Net 6025 ml       Diet NPO    Invasive Monitoring           Physical Exam   Physical Exam  Eyes:      Extraocular Movements: Extraocular movements intact.   Skin:     General: Skin is warm and dry.   Cardiovascular:      Rate and Rhythm: Rhythm irregular.      Pulses: Normal pulses.      Heart sounds: Normal heart sounds.   Musculoskeletal:         General: No tenderness.   Abdominal: General: An ostomy site is present. There is ostomy site.There is no distension.      Palpations: Abdomen is soft.      Tenderness: There is no abdominal tenderness. There is no guarding.      Comments: Blood in ostomy bag   Constitutional:       General: He is not in acute distress.     Appearance: He is underweight.      Interventions: He is not intubated.  Pulmonary:      Effort: Pulmonary effort is normal. No respiratory distress. He is not intubated.      Breath sounds: Normal breath sounds.   Psychiatric:         Behavior: Behavior is cooperative.   Neurological:      General: No focal deficit present.      Mental Status: He is disoriented to time and disoriented to situation.      Motor: No motor deficit.   Genitourinary/Anorectal:     Penis: Swelling present.    Babcock present.          Diagnostic Studies      EKG: atrial fibrillation  Imaging:  I have personally reviewed pertinent reports.       Medications:  Scheduled PRN   acetaminophen, 1,000 mg, Q6H YUNI  budesonide, 0.5 mg, Q12H  chlorhexidine, 15 mL, Q12H YUNI  formoterol, 20 mcg, Q12H  heparin (porcine), 5,000 Units, Q8H YUNI  insulin lispro, 1-5 Units, Q6H YUNI  pantoprazole, 40 mg, Q24H YUNI  piperacillin-tazobactam, 4.5 g, Q8H  valproate sodium, 500 mg, Q8H YUNI      albuterol, 2 puff, Q4H PRN  HYDROmorphone,  0.5 mg, Q3H PRN  HYDROmorphone, 0.2 mg, Q3H PRN       Continuous    multi-electrolyte, 125 mL/hr, Last Rate: 125 mL/hr (07/05/24 0723)         Labs:    CBC    Recent Labs     07/04/24 1718 07/05/24 0521   WBC 13.31* 15.12*   HGB 10.6* 9.4*   HCT 33.5* 30.4*    133*     BMP    Recent Labs     07/04/24 1718 07/05/24 0521   SODIUM 133* 134*   K 4.8 5.3    101   CO2 25 27   AGAP 7 6   BUN 30* 35*   CREATININE 1.22 1.45*   CALCIUM 8.7 8.8       Coags    No recent results     Additional Electrolytes  Recent Labs     07/04/24  0547 07/04/24 1718 07/05/24 0521   MG 2.0 1.7* 2.2   PHOS 3.3 3.0  --           Blood Gas    No recent results  No recent results LFTs  Recent Labs     07/04/24 1718   ALT 14   AST 21   ALKPHOS 86   ALB 2.8*   TBILI 1.04*       Infectious  No recent results  Glucose  Recent Labs     07/04/24  0547 07/04/24 1718 07/05/24  0521   GLUC 98 134 123               Dayton Finch, MS4

## 2024-07-05 NOTE — OCCUPATIONAL THERAPY NOTE
Occupational Therapy Evaluation     Patient Name: Hal Miller  Today's Date: 7/5/2024  Problem List  Principal Problem:    Colonic diverticular abscess    Past Medical History  Past Medical History:   Diagnosis Date    Acute encephalopathy 2/14/2023    Acute-on-chronic kidney injury  (HCC) 6/13/2017    Cancer (HCC)     pancreatic    Colon polyp     Coronary artery disease     Dehydration 3/3/2023    Diabetes mellitus (HCC)     Hyperlipidemia     Hypertension     Left lower lobe pneumonia 7/2/2022    Pneumonia 06/13/2017    Right middle and lower lobe     Stroke (HCC)      Past Surgical History  Past Surgical History:   Procedure Laterality Date    APPENDECTOMY      CARDIAC SURGERY      Aortic Valve Replacement, Ascending Aorta    COLONOSCOPY      GALLBLADDER SURGERY      HARTMANS PROCEDURE N/A 7/4/2024    Procedure: HARTMANS PROCEDURE, WITH EXPLANTATION OF LEFT GROIN MESH, WASHOUT AND DEBRIDEMENT OF LEFT GROIN WOUND;  Surgeon: Charlie Carroll MD;  Location: [facility];  Service: General    PANCREATICODUODENECTOMY               07/05/24 1102   OT Last Visit   OT Visit Date 07/05/24   Note Type   Note type Evaluation   Pain Assessment   Pain Assessment Tool 0-10   Pain Score No Pain   Restrictions/Precautions   Weight Bearing Precautions Per Order No   Other Precautions Cognitive;Telemetry;O2;Fall Risk;Chair Alarm;Bed Alarm   Home Living   Type of Home House   Home Layout Two level;Able to live on main level with bedroom/bathroom;Performs ADLs on one level   Bathroom Shower/Tub Walk-in shower   Bathroom Toilet Raised   Bathroom Equipment Grab bars in shower;Shower chair   Bathroom Accessibility Accessible   Home Equipment Walker;Cane   Additional Comments Pt reports no use of AD at baseline, questionable historian   Prior Function   Level of [place] Independent with ADLs;Independent with functional mobility;Needs assistance with IADLS   Lives With Spouse   Receives Help From Family   IADLs  Family/Friend/Other provides transportation;Family/Friend/Other provides meals;Family/Friend/Other provides medication management   Falls in the last 6 months 1 to 4   Vocational Retired   Lifestyle   [place] Independent with ADL's and functional mobility, needs assistance with IADL's   Reciprocal Relationships Supportive spouse and family   Service to Others Retired   Intrinsic Gratification Working on the farm   ADL   Eating Assistance 3  Moderate Assistance   Grooming Assistance 3  Moderate Assistance   UB Bathing Assistance 3  Moderate Assistance   LB Bathing Assistance 2  Maximal Assistance   UB Dressing Assistance 3  Moderate Assistance   LB Dressing Assistance 2  Maximal Assistance   Toileting Assistance  2  Maximal Assistance   Bed Mobility   Supine to Sit 3  Moderate assistance   Additional items Assist x 2;Increased time required   Sit to Supine 3  Moderate assistance   Additional items Assist x 2;Increased time required   Transfers   Sit to Stand 3  Moderate assistance   Additional items Assist x 2;Increased time required   Stand to Sit 3  Moderate assistance   Additional items Assist x 2;Increased time required   Additional Comments Pt performed the transfer with HHA   Functional Mobility   Functional Mobility 3  Moderate assistance   Additional Comments Pt performed 2 steps of functional mobility with mod x 2 and B/L HHA from bed to chair   Additional items Hand hold assistance   Balance   Static Sitting Fair   Dynamic Sitting Fair -   Static Standing Poor +   Dynamic Standing Poor   Ambulatory Poor -   Activity Tolerance   Activity Tolerance Patient limited by fatigue   Medical Staff Made Aware OT Cam and PT [place]   Nurse Made Aware Yes   RUE Assessment   RUE Assessment WFL   LUE Assessment   LUE Assessment WFL   Psychosocial   Psychosocial (WDL) X   Patient Behaviors/Mood Flat affect   Cognition   Overall Cognitive Status Impaired   Arousal/Participation Lethargic;Responsive   Attention Attends  with cues to redirect   Orientation Level Oriented to person;Disoriented to situation;Oriented to time;Disoriented to place   Memory Decreased recall of precautions;Decreased recall of recent events;Decreased short term memory;Decreased long term memory   Following Commands Follows one step commands with increased time or repetition   Comments Pt was fatigued during session and took increased time to redirect and had poor short term and long term memory   Assessment   Limitation Decreased ADL status;Decreased Safe judgement during ADL;Decreased cognition;Decreased endurance;Decreased sensation;Decreased high-level ADLs   Prognosis Fair   Assessment Pt is a 84 y.o. male who was admitted to [facility] on 7/3/2024 with Colonic diverticular abscess, worsening erythema of left groin and left inguinal hernia . Pt was admitted from [facility] on 7/3/2024. Patient has a past medical history of acute encephalopathy, acute-on-chronic kidney injury (HCC), pancreatic cancer, dehydration, hyperlipidemia, hypertension, left lower lobe pneumonia, and stroke (HCC). At baseline pt was completing ADL's and functional mobility independently and needs assistance with IADL's. Pt lives in a two story home with his wife and is able to live on the main floor with a bedroom and bathroom. Currently pt requires MOD A for overall ADLS and MOD A x 2 for functional mobility/transfers. Pt currently presents with impairments in the following categories -behavioral pattern, difficulty performing ADLS, difficulty performing IADLS , flat affect, decreased initiation and engagement , and health management  activity tolerance, endurance, standing balance/tolerance, sitting balance/tolerance, arousal, memory, insight, judgement , and task initiation . These impairments, as well as pt's fatigue, decreased caregiver support, risk for falls, and home environment  limit pt's ability to safely engage in all baseline areas of occupation, including[place] Dist[place]ing,  bathing, dressing, toileting, functional mobility/transfers, medication management, cleaning, social participation , and leisure activities  The patient's raw score on the AM-PAC Daily Activity Inpatient Short Form is 12. A raw score of less than 19 suggests the patient may benefit from discharge to post-acute rehabilitation services. Please refer to the recommendation of the Occupational Therapist for safe discharge planning. From OT standpoint, recommend level I maximum resource intensity upon D/C. OT will continue to follow to address the below stated goals.   Goals   Patient Goals To get better   LTG Time Frame 10-14   Long Term Goal #1 See below   Plan   Treatment Interventions ADL retraining;Endurance training;Cognitive reorientation;Patient/family training;Continued evaluation;Activityengagement;Energy conservation;Compensatory technique education   Goal Expiration Date 07/19/24   OT Frequency 3-5x/wk   Discharge Recommendation   Rehab Resource Intensity Level, OT I (Maximum Resource Intensity)   AM-PAC Daily Activity Inpatient   Lower Body Dressing 2   Bathing 2   Toileting 2   Upper Body Dressing 2   Grooming 2   Eating 2   Daily Activity Raw Score 12   Daily Activity Standardized Score (Calc for Raw Score >=11) 30.6   AM-[facility] Applied Cognition Inpatient   Following a Speech/Presentation 2   Understanding Ordinary Conversation 3   Taking Medications 2   Remembering Where Things Are Placed or Put Away 2   Remembering List of 4-5 Errands 2   Taking Care of Complicated Tasks 2   Applied Cognition Raw Score 13   Applied Cognition Standardized Score 30.46   End of Consult   Patient Position at End of Consult Bedside chair;Bed/Chair alarm activated;All needs within reach   Nurse Communication Nurse aware of consult          Pt will be able to perform bed mobility tasks MOD I  in order to improve functional mobility.      Pt will demonstrate MOD I  with transfers to and from all surfaces with fair + dynamic  balance.      Pt will be able to engage in good static and dynamic standing to complete functional tasks.      Pt will complete UB dressing/bathing with MOD I to improve overall ADL's with DME prn.     Pt will complete LB dressing/bathing with MOD I to improve overall ADL's with DME prn.     Pt will be MOD I with functional mobility to/from bathroom for increased independence in toileting tasks with DME prn.      Pt will engage in ongoing cognitive assessments to assist with safe discharge planning and increase safety during functional tasks.        Raquel Bryant, OTS

## 2024-07-05 NOTE — SPEECH THERAPY NOTE
Speech-Language Pathology Bedside Swallow Evaluation      Patient Name: Hal Miller    Today's Date: 7/5/2024     Problem List  Principal Problem:    Colonic diverticular abscess  Active Problems:    Moderate protein-calorie malnutrition (HCC)      Past Medical History  Past Medical History:   Diagnosis Date    Acute encephalopathy 2/14/2023    Acute-on-chronic kidney injury  (HCC) 6/13/2017    Cancer (HCC)     pancreatic    Colon polyp     Coronary artery disease     Dehydration 3/3/2023    Diabetes mellitus (HCC)     Hyperlipidemia     Hypertension     Left lower lobe pneumonia 7/2/2022    Pneumonia 06/13/2017    Right middle and lower lobe     Stroke (HCC)        Past Surgical History  Past Surgical History:   Procedure Laterality Date    APPENDECTOMY      CARDIAC SURGERY      Aortic Valve Replacement, Ascending Aorta    COLONOSCOPY      GALLBLADDER SURGERY      HARTMANS PROCEDURE N/A 7/4/2024    Procedure: HARTMANS PROCEDURE, WITH EXPLANTATION OF LEFT GROIN MESH, WASHOUT AND DEBRIDEMENT OF LEFT GROIN WOUND;  Surgeon: Charlie Carroll MD;  Location: [facility];  Service: General    PANCREATICODUODENECTOMY         Summary   Pt presented with s/s suggestive of moderate oral and suspected mild pharyngeal dysphagia. Symptoms or concerns included prolonged manipulation, decreased retrieval, control and transfer with suspected pharyngeal delay. Oral care was completed with the use of mouth swabs and mouth wash. No overt coughing, throat clearing or vocal quality changes noted. Pt had limited trials of thin and puree before declining any more.     Risk/s for Aspiration: previous hx of dysphagia      Recommended Diet: clears  -thin liquids  Recommended Form of Meds: crushed with puree or as able   Aspiration precautions and swallowing strategies: upright posture, slow rate of feeding, and alternating bites and sips  Other Recommendations: Continue frequent oral care, will follow as able   MBS next week for full  assessment as the pt continues w/ poor intake and records are unavailable      Current Medical Status  Pt is a 84 y.o. male who presented to [facility] with new onset painful left groin abscess underlying left inguinal hernia. Originally admitted to [facility] on 6/6/2024 following acute CVA. He was noted to have progressive edema, erythema and tenderness of the left inguinal area. He has known history of left inguinal hernia and was being followed by general surgery during his prior admission.     Current Precautions:  Fall  Aspiration      Allergies:  No known food allergies    Past medical history:  Please see H&P for details    Special Studies:  No recent studies   Pt reports previous swallow study at [facility]- no records on Epic, wife present and states she cannot recall that he had one but she had one in the past.      Social/Education/Vocational Hx:  Pt lives at home with spouse     Swallow Information   Current Risks for Dysphagia & Aspiration: known history of dysphagia  Current Symptoms/Concerns: Reported wet vocal quality and throat clearing  , also poor po intake.  Wife reports weight loss and pt w/ c/o material not going down and then coming right back up.    Current Diet: clears /thin  Baseline Diet: soft/level 3 diet and thin liquids, but only eating soups and puree in [facility]       Baseline Assessment   Behavior/Cognition: lethargic  Speech/Language Status: able to participate in conversation and able to follow commands  Patient Positioning: upright in chair  Pain Status/Interventions/Response to Interventions: Pt reported pain in stomach/ abdomen area       Swallow Mechanism Exam  Facial: symmetrical  Labial: WFL  Lingual: WFL  Velum: symmetrical  Mandible: adequate ROM  Dentition: adequate  Vocal quality:clear/adequate   Volitional Cough: -   Respiratory Status: on RA     Consistencies Assessed and Performance   Consistencies Administered: ice chips, thin liquids, and puree  Materials administered  included ice, water, and apple sauce-straw used, cup sip difficult.  Pt with open mouth posture.     Oral Stage: moderate  Manipulation was prolonged with the materials administered today.  Bolus formation and transfer were prolonged and reduced. Pt would swish liquid unnecessarily in mouth prior to swallow. Two trials of thin were retrieved, swished, and then spit out. Pt had 2 spoonfuls of apple sauce and 5 sips of thin by straw, No overt residue noted or reduced control.     Pharyngeal Stage: mild  Swallow Mechanics:  Swallowing initiation appeared slightly delayed.  No coughing, throat clearing, change in vocal quality or respiratory status noted today.     Esophageal Concerns: none reported    Strategies and Efficacy: alternate sips and bites     Summary and Recommendations (see above)    Results Reviewed with: patient, RN, and family     Treatment Recommended: yes     Frequency of treatment: as able     Patient Stated Goal:    Dysphagia LTG  -Patient will demonstrate safe and effective oral intake (without overt s/s significant oral/pharyngeal dysphagia including s/s penetration or aspiration) for the highest appropriate diet level.     Short Term Goals:  -Patient will tolerate trials of upgraded food and/or liquid texture with no significant s/s of oral or pharyngeal dysphagia including aspiration across 1-3 diagnostic sessions     -Patient will comply with a Video/Modified Barium Swallow study for more complete assessment of swallowing anatomy/physiology/aspiration risk and to assess efficacy of treatment techniques so as to best guide treatment plan        Speech Therapy Prognosis   Prognosis: fair    Prognosis Considerations: prior medical history

## 2024-07-05 NOTE — PHYSICAL THERAPY NOTE
Physical Therapy Evaluation     Patient's Name: Hal Miller    Admitting Diagnosis  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HCC) [I63.9]    Problem List  Patient Active Problem List   Diagnosis    Shortness of breath    Primary hypertension    Atrial fibrillation (HCC)    Aneurysm of thoracic aorta (HCC)    Aneurysm of abdominal aorta (HCC)    Hyperlipidemia    Inflammatory polyarthropathy (HCC)    Osteoarthrosis    Polymyalgia rheumatica (HCC)    History of malignant neuroendocrine tumor    Centrilobular emphysema (HCC)    Chronic obstructive pulmonary disease with acute exacerbation (HCC)    Stage 3 chronic kidney disease (HCC)    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)    Acute encephalopathy    Generalized weakness    Neoplasm of other specified site    Esophageal stricture    Hypercalcemia    TARA (acute kidney injury) (HCC)    Concern for aspiration pneumonia (HCC)    Type 2 diabetes mellitus, with long-term current use of insulin (HCC)    Abnormal CT of the abdomen    Zoster    Colonic diverticular abscess    Vascular dementia (HCC)    Dizziness    Insomnia    Ambulatory dysfunction    Severe protein-calorie malnutrition (HCC)    Acute on chronic respiratory failure (HCC)    Episode of seizure-like activity    History of Whipple procedure    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HCC)    NICM (nonischemic cardiomyopathy) (HCC)    Dysphoric mood    Diplopia    Peripheral polyneuropathy    Hyponatremia    Hx of aortic valve replacement    Leukocytosis    Dysphagia    Left inguinal hernia    Moderate protein-calorie malnutrition (HCC)       Past Medical History  Past Medical History:   Diagnosis Date    Acute encephalopathy 2/14/2023    Acute-on-chronic kidney injury  (HCC) 6/13/2017    Cancer (HCC)     pancreatic    Colon polyp     Coronary artery disease     Dehydration 3/3/2023    Diabetes mellitus (HCC)     Hyperlipidemia     Hypertension     Left lower lobe pneumonia 7/2/2022    Pneumonia 06/13/2017    Right middle and lower lobe     Stroke  (HCC)        Past Surgical History  Past Surgical History:   Procedure Laterality Date    APPENDECTOMY      CARDIAC SURGERY      Aortic Valve Replacement, Ascending Aorta    COLONOSCOPY      GALLBLADDER SURGERY      HARTMANS PROCEDURE N/A 7/4/2024    Procedure: HARTMANS PROCEDURE, WITH EXPLANTATION OF LEFT GROIN MESH, WASHOUT AND DEBRIDEMENT OF LEFT GROIN WOUND;  Surgeon: Charlie Carroll MD;  Location: [facility];  Service: General    PANCREATICODUODENECTOMY          07/05/24 1103   PT Last Visit   PT Visit Date 07/05/24   Note Type   Note type Evaluation   Pain Assessment   Pain Assessment Tool 0-10   Pain Score No Pain   Restrictions/Precautions   Weight Bearing Precautions Per Order No   Other Precautions Cognitive;Telemetry;Bed Alarm;Chair Alarm;Multiple lines;Fall Risk;Pain   Home Living   Type of Home House   Home Layout Two level;Able to live on main level with bedroom/bathroom   Bathroom Shower/Tub Walk-in shower   Bathroom Toilet Raised   Bathroom Equipment Grab bars in shower   Bathroom Accessibility Accessible   Home Equipment Walker;Cane   Prior Function   Level of [place] Independent with functional mobility   Lives With Spouse   Receives Help From Family   Falls in the last 6 months 1 to 4   Vocational Retired   General   Family/Caregiver Present Yes  (arrived during session and waited outside room)   Cognition   Orientation Level Oriented to person   Subjective   Subjective Pt willing and agreeable to PT session   RLE Assessment   RLE Assessment   (grossly 3/5 with movement and pain)   LLE Assessment   LLE Assessment   (grossly 3/5 with movement and pain)   Coordination   Movements are Fluid and Coordinated 0   Coordination and Movement Description slow and guarded with pain, fatigue, and weakness   Bed Mobility   Supine to Sit 3  Moderate assistance   Additional items Assist x 2   Sit to Supine 3  Moderate assistance   Additional items Assist x 2   Transfers   Sit to Stand 3  Moderate  assistance   Additional items Assist x 2   Stand to Sit 3  Moderate assistance   Additional items Assist x 2   Ambulation/Elevation   Gait pattern Excessively slow;Shuffling;Decreased foot clearance;Forward Flexion   Gait Assistance 3  Moderate assist   Additional items Assist x 2   Assistive Device Other (Comment)  (HHA)   Distance 2'   Balance   Static Sitting Fair   Dynamic Sitting Fair -   Static Standing Poor +   Dynamic Standing Poor   Ambulatory Poor -   Endurance Deficit   Endurance Deficit Yes   Endurance Deficit Description limited by fatigue, weakness, balance, and cognitive   Activity Tolerance   Activity Tolerance Patient limited by fatigue;Patient limited by pain   Medical Staff Made Aware OT for D/C planning   Nurse Made Aware yes, nsg gave clearance to work with pt   Assessment   Prognosis Good   Problem List Decreased strength;Decreased range of motion;Impaired balance;Decreased endurance;Decreased mobility;Decreased coordination;Decreased cognition;Impaired judgement;Decreased safety awareness;Pain   Assessment Pt is 84 y.o. male seen for PT evaluation s/p admit to [facility] on 7/3/2024 w/ Colonic diverticular abscess s/p Hartmans, explant of L groin mesh. PT consulted to assess pt's functional mobility and d/c needs. Order placed for PT eval and tx, w/ up w/ A order. Comorbidities affecting pt's physical performance at time of assessment include:  has a past medical history of Acute encephalopathy, Acute-on-chronic kidney injury  (HCC), Cancer (HCC), Colon polyp, Coronary artery disease, Dehydration, Diabetes mellitus (HCC), Hyperlipidemia, Hypertension, Left lower lobe pneumonia, Pneumonia, and Stroke (HCC). PTA, pt was at acute rehab since prior admission. Personal factors affecting pt at time of IE include: ambulating w/ assistive device, inability to ambulate household distances, decreased cognition, limited home support, positive fall history, decreased initiation and engagement,  unable to perform physical activity, limited insight into impairments, inability to perform IADLs, and inability to perform ADLs. Please find objective findings from PT assessment regarding body systems outlined above with impairments and limitations including weakness, impaired balance, decreased endurance, impaired coordination, gait deviations, pain, decreased activity tolerance, decreased functional mobility tolerance, decreased safety awareness, impaired judgement, fall risk, SOB upon exertion, decreased skin integrity, and decreased cognition. The following objective measures performed on IE also reveal limitations: The patient's AM-PAC Basic Mobility Inpatient Short Form Raw Score is 12, Standardized Score is 32.23. A standardized score less than 42.9 suggests the patient may benefit from discharge to post-acute rehabilitation services. Please also refer to the recommendation of the Physical Therapist for safe discharge planning. Pt's clinical presentation is currently unstable/unpredictable seen in pt's presentation of critical care monitoring. Pt to benefit from continued PT tx to address deficits as defined above and maximize level of functional independent mobility and consistency. From PT/mobility standpoint, recommendation at time of d/c would be level I pending progress in order to facilitate return to PLOF.   Barriers to Discharge Inaccessible home environment   Goals   Patient Goals To get better   [facility] Expiration Date 07/17/24   Short Term Goal #1 1. Complete bed mobility and transfers I to decrease need for caregiver in home. 2. Ambulate 300' I to complete household and community mobility without A. 3. Improve dynamic balance to good to decrease need for UE support during ambulation. 4. Be educated & demonstate 12 steps to be able to enter home without A.   Plan   Treatment/Interventions OT;Spoke to case management;Spoke to nursing;Gait training;Bed mobility;Patient/family training;Endurance  training;LE strengthening/ROM;Functional transfer training   PT Frequency 3-5x/wk   Discharge Recommendation   Rehab Resource Intensity Level, PT I (Maximum Resource Intensity)   AM-PAC Basic Mobility Inpatient   Turning in Flat Bed Without Bedrails 2   Lying on Back to Sitting on Edge of Flat Bed Without Bedrails 2   Moving Bed to Chair 2   Standing Up From Chair Using Arms 2   Walk in Room 2   Climb 3-5 Stairs With Railing 2   Basic Mobility Inpatient Raw Score 12   Basic Mobility Standardized Score 32.23   Noe Winf[place] Highest Level Of Mobility   -HLM Goal 4: Move to chair/commode   -HLM Achieved 4: Move to chair/commode           Eloina Farley, PT

## 2024-07-05 NOTE — PLAN OF CARE
Problem: OCCUPATIONAL THERAPY ADULT  Goal: Performs self-care activities at highest level of function for planned discharge setting.  See evaluation for individualized goals.  Description: Treatment Interventions: ADL retraining, Endurance training, Cognitive reorientation, Patient/family training, Continued evaluation, Activityengagement, Energy conservation, Compensatory technique education          See flowsheet documentation for full assessment, interventions and recommendations.   Note: Limitation: Decreased ADL status, Decreased Safe judgement during ADL, Decreased cognition, Decreased endurance, Decreased sensation, Decreased high-level ADLs  Prognosis: Fair  Assessment: Pt is a 84 y.o. male who was admitted to [facility] on 7/3/2024 with Colonic diverticular abscess, worsening erythema of left groin and left inguinal hernia . Pt was admitted from [facility] on 7/3/2024. Patient has a past medical history of acute encephalopathy, acute-on-chronic kidney injury (HCC), pancreatic cancer, dehydration, hyperlipidemia, hypertension, left lower lobe pneumonia, and stroke (HCC). At baseline pt was completing ADL's and functional mobility independently and needs assistance with IADL's. Pt lives in a two story home with his wife and is able to live on the main floor with a bedroom and bathroom. Currently pt requires MOD A for overall ADLS and MOD A x 2 for functional mobility/transfers. Pt currently presents with impairments in the following categories -behavioral pattern, difficulty performing ADLS, difficulty performing IADLS , flat affect, decreased initiation and engagement , and health management  activity tolerance, endurance, standing balance/tolerance, sitting balance/tolerance, arousal, memory, insight, judgement , and task initiation . These impairments, as well as pt's fatigue, decreased caregiver support, risk for falls, and home environment  limit pt's ability to safely engage in all baseline areas  of occupation, includinggrooming, bathing, dressing, toileting, functional mobility/transfers, medication management, cleaning, social participation , and leisure activities  The patient's raw score on the -PAC Daily Activity Inpatient Short Form is 12. A raw score of less than 19 suggests the patient may benefit from discharge to post-acute rehabilitation services. Please refer to the recommendation of the Occupational Therapist for safe discharge planning. From OT standpoint, recommend level I maximum resource intensity upon D/C. OT will continue to follow to address the below stated goals.     Rehab Resource Intensity Level, OT: I (Maximum Resource Intensity)

## 2024-07-05 NOTE — CONSULTS
Consultation - Geriatric Medicine   Hal Miller 84 y.o. male MRN: [id number]  Unit/Bed#: [facility] 04 Encounter: [id number]      Assessment & Plan     Acute metabolic encephalopathy   -evidenced by confusion and fluctuations in mentation and alertness beyond baseline in patient at high baseline risk due to underlying dementia and hx of encephalopathy during prior admissions   -multifactorial including age, underlying dementia, hx encephalopathy during prior admissions, perforated diverticulitis now s/p surgical procedure, anesthesia, multiple setting changes and acute pain in frail elderly individual at high baseline risk as above   -maintained on depakote for possible seizure episode during prior hospitalization, continue dosing as directed by Neurology  -previously on Seroquel PRN as o/p for insomnia/behaviors not currently requiring and had not required during recent hospitalization or rehab stay, given concern for seizure activity during recent hospitalization and risk of lowering seizure threshold preferable to continue to hold Seroquel as possible  -continue optimization of chronic medical conditions   -continue to monitor for and treat acute derangements as arise  -encourage normal circadian rhythm, consider low dose melatonin HS PRN  -encourage use of glasses and hearing aid all appropriate times to reduce risk uncorrected sensory impairment from contributing to further confusion  -ensure acute pain well controlled  -monitor for fecal and urinary retention   -reorient frequently as appropriate and indicated     Perforated diverticulitis with abscess  -s/p Hartmans procedure with explantation of left groin mesh with washout and debridement of left groin wound on 7/4/24  -currently on Zosyn   -continue acute multimodal pain control per geriatric pain protocol   -ongoing management per surgery     Vascular dementia  -acutely encephalopathic with fluctuating mentation   -at baseline alert and oriented, forgetful,  independent with ADLs requires some assist with iADLs   -MoCA 23/30 (11/2022), noted to have episodes of encephalopathy and decline since last testing, recommend repeat following recovery from acute illness to establish new baseline   -[facility] 6/18/24 imaging personally viewed, reveals at least moderate chronic microangiopathic changes   -TSH WNL at 1.79, B12 WNL at 531  -at risk age and cardiovascular related acceleration santy in setting of recent CVA, continue secondary risk factor modifications   -encourage patient remain physically, socially and cognitively active and engaged to maintain cognitive acuity   -encourage use sensory assist devices such as corrective lenses and hearing aids all appropriate times to reduce risk uncorrected sensroy impairment from contributing to isolation, confusion, worsening encephalopathy and more precipitous cognitive decline   -underlying dementia increases risk developing delirium and likely contributed to episode during current admission     Hx CVA  -small left parietal infarct during recent hospitalization on MRI brain 6/20/24  -suspected to be due to missed dosing of Eliquis at time of event  -remains at risk future CVAs, continue strict secondary risk factor modifications  -close o/p f/u with Neurology for ongoing monitoring and management     History of possible seizure  -maintained on Depatkote as managed by Neurology  -continue depakote   -cont seizure precautions     Insomnia   -previously on seroquel PRN as o/p, has not recently been requiring during admission   -consider low dose melatonin HS  -encourage establishment of consistent sleep/wake times and bedtime routine     Inflammatory polyarthropathy   -maintained on prednisone chronically as o/p  -follows closely with Rheum as o/p, continue close o/p f/u    DM-II  -A1c 7.6  -currently on basal bolus insulin regimen with good control  -continue glu goal 140-180 during hospitalization to reduce risk hypoglycemia   -continue  close o/p f/u with PCP for ongoing age appropriate diabetic screenings and cares     Afib   -rates currently well controlled off rate controllers, home Eliquis on hold covering with heparin gtt  -cont close follow-up with Cardiology    Impaired Vision  -recommend use of corrective lenses at all appropriate times  -encourage adequate lighting and encourage use of assistance with ambulation  -keep personal belongings close to person to avoid reaching  -encourage appropriate footwear at all times  -consider large font for printed materials provided to patient     Impaired Hearing  -Encourage use of hearing aids at all appropriate times  -encourage providers and caregivers to speak slowly and clearly directly to patient  -minimize background noise to encourage patient engagement  -consider use of hearing amplifier to reduce risk of straining to hear if hearing aids are not present or are not sufficient     Deconditioning/debility/frailty   -clinical frailty scale stage VI, moderately frail, progressive  -multifactorial including age, amb dysfxn, hx CVA, DM-II and multitude of chronic medical co-morbidities now with perforated diverticulitis with abscess requiring surgical procedure superimposed on elderly individual with limited physiologic and metabolic reserve  -continue optimization of chronic medical conditions and address acute derangements as arise  -monitor for and treat any underlying anxiety, mood, depression symptoms as may impact response to therapies and overall sense of wellbeing and quality of life  -continue to ensure tx and interventions align with patients wishes and goals of care   -cont psychosocial support of patient and caregivers     Home medication review     Proventil HFA 90mcg/act  2 puffs 6H PRN  Albuterol neb soln Q4H PRN  Diltiazem ER 180mg daily  Prednisone 2.5mg daily  Eliquis 2.5mg BID  Benicar 20mg daily   KCL 8mEq daily  Pravastatin 40mg daily   Seroquel 25mg daily PRN  Lantus 40U  (forty) daily    Care coordination: rounded with Briana (RN) in MICU    History of Present Illness   Physician Requesting Consult: Abran Oenill,*  Reason for Consult / Principal Problem: acute metabolic encephalopathy   Hx and PE limited by: acute metabolic encephalopathy   Additional history obtained from: Chart review and patient evaluation    HPI: Hal Miller is a 84 y.o. year old male with afib, COPD, HTN, DM-II, PMR, vascular dementia with behavorial disturbance, insomnia, pancreatic cancer, severe protein calorie malnutrition, hx of CVA, HLD, and afib who is admitted to the surgical critical care service as a transfer from acute rehab with perforated diverticular abscess in left groin for which he underwent surgical washout and debridement and is being seen in consultation by Geriatrics for acute metabolic encephalopathy. Hal is seen and examined at bedside where he is sitting resting, he is alert and oriented to self, place and year not month and is somewhat sleepy requiring stimulation to maintain wakeful state and answer questions. Nursing reports that alertness/mentation has been waxing/waning but that he has not had any behavorial disturbances and has remained pleasant and cooperative. He was initially admitted to the [facility] 6/17/24 with colonic diverticular abscess for which he was initially managed medically, during that admission he developed encephalopathy and questionable seizure like activity ultimately found to have small CVA. Upon stabilization he was discharged to [facility] where he is currently admitted from with perforated diverticular abscess for which he underwent surgical intervention on 7/4/24 as outlined above.     Prior to initial hospitalization in June Hal was reportedly residing home with his wife and was mostly independent with ADLs requiring some assist with iADLs and has had noted physical and cognitive decline following hospitalization in 2023 during which time  he developed episode of encephalopathy although. At baseline he is alert and oriented but with some forgetfulness. He does not use any assist device for ambulation at baseline but does have history of falls at least one in the past six months. He requires use of glasses and hearing aids, does not use dentures.     Inpatient consult to Gerontology  Consult performed by: Cee Carlton DO  Consult ordered by: Merlyn Dinh MD      Review of Systems   Unable to perform ROS: Mental status change (denies pain or dyspnea but limited due to fluctuating mental status)     Historical Information   Past Medical History:   Diagnosis Date    Acute encephalopathy 2023    Acute-on-chronic kidney injury  (HCC) 2017    Cancer (HCC)     pancreatic    Colon polyp     Coronary artery disease     Dehydration 3/3/2023    Diabetes mellitus (HCC)     Hyperlipidemia     Hypertension     Left lower lobe pneumonia 2022    Pneumonia 2017    Right middle and lower lobe     Stroke (HCC)      Past Surgical History:   Procedure Laterality Date    APPENDECTOMY      CARDIAC SURGERY      Aortic Valve Replacement, Ascending Aorta    COLONOSCOPY      GALLBLADDER SURGERY      HARTMANS PROCEDURE N/A 2024    Procedure: HARTMANS PROCEDURE, WITH EXPLANTATION OF LEFT GROIN MESH, WASHOUT AND DEBRIDEMENT OF LEFT GROIN WOUND;  Surgeon: Charlie Carroll MD;  Location: [facility];  Service: General    PANCREATICODUODENECTOMY       Social History   Social History     Substance and Sexual Activity   Alcohol Use Never     Social History     Substance and Sexual Activity   Drug Use No     Social History     Tobacco Use   Smoking Status Former    Types: Pipe    Quit date:     Years since quittin.5   Smokeless Tobacco Never     Family History:   Family History   Problem Relation Age of Onset    Cancer Mother     Stroke Father     Cancer Sister     Diabetes Sister     Stroke Brother      Meds/Allergies   all current active meds have  been reviewed    Allergies   Allergen Reactions    Contrast Dye [Iodinated Contrast Media] Other (See Comments)     Pt states kidney dysfunction..     Other      Objective     Intake/Output Summary (Last 24 hours) at 7/5/2024 1104  Last data filed at 7/5/2024 0933  Gross per 24 hour   Intake 7340 ml   Output 1386 ml   Net 5954 ml     Invasive Devices       Peripheral Intravenous Line  Duration             Peripheral IV 07/03/24 Left Antecubital 1 day    Peripheral IV 07/04/24 Right;Dorsal (posterior) Hand <1 day    Peripheral IV 07/05/24 Left Forearm <1 day              Drain  Duration             Urethral Catheter Latex 16 Fr. 1 day    Colostomy Other (comment) LUQ <1 day    NG/OG/Enteral Tube Nasogastric Right nare <1 day                  Physical Exam  Vitals and nursing note reviewed.   Constitutional:       Comments: Frail elderly male appears stated age    HENT:      Head: Normocephalic.      Nose: Nose normal.      Comments: O2 via NC     Mouth/Throat:      Mouth: Mucous membranes are dry.   Eyes:      General: No scleral icterus.        Right eye: No discharge.         Left eye: No discharge.      Conjunctiva/sclera: Conjunctivae normal.   Neck:      Comments: Trachea midline   Cardiovascular:      Rate and Rhythm: Normal rate.      Pulses: Normal pulses.   Pulmonary:      Effort: No respiratory distress.      Comments: Shallow resp no rales or wheeze   Abdominal:      General: There is no distension.      Palpations: Abdomen is soft.   Musculoskeletal:      Cervical back: Neck supple.      Right lower leg: No edema.      Left lower leg: No edema.      Comments: Reduced overall muscle mass    Skin:     General: Skin is warm and dry.      Comments: Thin and friable    Neurological:      Comments: Fluctuating alertness, oriented to self place year not month or day of week    Psychiatric:      Comments: Pleasant and cooperative        Lab Results:     I have personally reviewed pertinent lab results including  the followin/4/24- CBC, CMP, LA  24- CBC, BMP, PT/INR    I have personally reviewed the following imaging study reports in PACS:    24- CTH  24- MRI brain   24- MRA head w/o contrast     Therapies:   PT: pending   OT: pending     VTE Prophylaxis: Heparin    Code Status: Level 1 - Full Code  Advance Directive and Living Will:      Power of :    POLST:      Family and Social Support:   Living Arrangements: Lives w/ Spouse/significant other  Support Systems: Spouse/significant other; Friends/neighbors; Family members  Assistance Needed: rehab  Type of Current Residence: Private residence  Current Home Care Services: No    Goals of Care: recovery from acute illness

## 2024-07-05 NOTE — MALNUTRITION/BMI
This medical record reflects one or more clinical indicators suggestive of malnutrition.     Malnutrition Findings:   Adult Malnutrition type:  (acute on chronic)  Adult Degree of Malnutrition: Malnutrition of moderate degree  Malnutrition Characteristics: Fat loss, Muscle loss, Weight loss                  360 Statement: Moderate malnutrition r/t acute on chronic illness as evidenced by moderate buccal fat pad loss, severe muscle loss around clavicles, moderate muscle loss shoulders and temples, weight is down 18 lbs / 10% x6 months (1/11/24-7/3/24). Treatment: oral diet and oral nutrition supplements    BMI Findings:           There is no height or weight on file to calculate BMI.     See Nutrition note dated 7/5/24 for additional details.  Completed nutrition assessment is viewable in the nutrition documentation.    Recommendations:  Given hx of DM, consider Gelatein supplement while on clear liquid diet for increased protein intake.   Upgrade to Glucerna supplement once diet is advanced past clears.   Advance diet as medically able, would recommend low fiber given new colostomy

## 2024-07-05 NOTE — UTILIZATION REVIEW
Initial Clinical Review    Admission: Date/Time/Statement:   Admission Orders (From admission, onward)       Ordered        07/03/24 2016  INPATIENT ADMISSION  Once                          Orders Placed This Encounter   Procedures    INPATIENT ADMISSION     Standing Status:   Standing     Number of Occurrences:   1     Order Specific Question:   Level of Care     Answer:   Level 2 Stepdown / HOT [14]     Order Specific Question:   Bed Type     Answer:   Trauma [7]     Order Specific Question:   Estimated length of stay     Answer:   More than 2 Midnights     Order Specific Question:   Certification     Answer:   I certify that inpatient services are medically necessary for this patient for a duration of greater than two midnights. See H&P and MD Progress Notes for additional information about the patient's course of treatment.     ED Arrival Information       Patient not seen in ED                       No chief complaint on file.      Initial Presentation: 84 y.o. male w/ PMH of f stroke, cardiomyopathy, afib, Aortic valve replacement x2, HTN, DM type 2, stage 3 CKD, and COPD was transferred from  acute rehab to [facility] unit, Admitted as Inpatient d/t new onset painful left groin abscess underlying left inguinal hernia.     Patient reports noticing increased swelling and some tenderness of the left groin beginning yesterday with worsening of pain and skin breakdown in the area today. WBC 12.54.  Plan: NPO. - IV Cipro/Flagyl.  IVF NS. Concern for severe soft tissue infection and/or abdominal infection, possible surgery for washout and further exploration.     Date: 07/04   Day 2:   Gen Surg Notes:Pain well controlled. Two BM yesterday. Voiding. OOB. No nausea or vomiting.  NPO pending OR. Cont IV Cipro/Flagyl. Cont IVF. For possible surg for washout and further exploration. Held Eliquis.    OP Note:  SURGERY DATE: 7/4/2024   Procedure(s):  HARTMANS PROCEDURE. WITH EXPLANTATION OF LEFT GROIN MESH. WASHOUT  AND DEBRIDEMENT OF LEFT GROIN WOUND  Anesthesia Type: General  Operative Findings:  -Phimosis noted of penis difficulty inserting cardenas catheter, required urology assistance  -Mesh erosion into sigmoid colon at 2 separate sites, localized fecal contamination within the inguinal canal  -complete obliteration of the floor of the inguinal canal with communication to the skin  -Complete excision of L groin mesh  -Sigmoidectomy w/ end colostomy  -Left inguinal wound debridement of necrotic/infected skin and subcutaneous tissue. Final dimensions measured [id number].  -Wound packed 1 one betadine soaked packing    CC Consult: Dx: Perforated diverticulitis with abscess s/p Miller's procedure with explanation of left groin mesh and washout/debirdment of groin wound.  Pt underwent OR today w/ gen surg, received 4100 cc crystaloid, 500 cc albumin and had 475 cc UOP with 250 cc EBL, transferred to MICU for close post op monitoring and tx w/ NGT, ostomy, extubated on no pressors.   Plan: Cont NGT/NPO. Wound packed. Vascular surgery consult. May need plastics for wound closur. Cont IV abx. Convert depakote 750 mg Q12 to depacon 500 mg Q8 hours. Cont IVFs. SSI q6h.     Date: 07/05  Day 3: Has surpassed a 2nd midnight with active treatments and services.  Pt reports some abdominal pain, have some SOB. On 1L NC. Voiding without difficulty. Some ostomy output. WBC up to 15.12 today.  cc bilious output. Ostomy 25 cc sanguinous output  NPO. NGT, consider removal today. IV fluids. IV abx. Packing change. Albumin. PRN pain meds. PRN anti nausea meds. DVT prophylaxis. Keep cardenas      ED Triage Vitals   Temperature Pulse Respirations Blood Pressure SpO2 Pain Score   07/03/24 2001 07/03/24 2001 07/03/24 2001 07/03/24 2001 07/03/24 2001 07/03/24 2021   97.9 °F (36.6 °C) 73 16 132/67 97 % No Pain     Weight (last 2 days)       None            Vital Signs (last 3 days)       Date/Time Temp Pulse Resp BP MAP (mmHg) SpO2 Calculated FIO2 (%)  - Nasal Cannula Nasal Cannula O2 Flow Rate (L/min) O2 Device Patient Position - Orthostatic VS [place] Coma Scale Score Pain    07/05/24 0952 -- -- -- -- -- -- -- -- -- -- -- 5    07/05/24 0715 98 °F (36.7 °C) 81 17 94/53 66 99 % 28 2 L/min Nasal cannula -- 13 No Pain    07/05/24 0600 -- 82 33 126/58 84 99 % -- -- -- -- -- --    07/05/24 0500 -- 78 47 129/58 83 100 % -- -- -- -- -- --    07/05/24 0400 97.6 °F (36.4 °C) 78 36 115/53 74 100 % 28 2 L/min Nasal cannula Lying 13 No Pain    07/05/24 0300 -- 81 -- 120/56 81 100 % -- -- -- -- -- --    07/05/24 0200 -- 80 36 96/64 75 100 % -- -- -- -- -- --    07/05/24 0103 -- 84 24 116/55 76 100 % -- -- -- -- -- --    07/05/24 0000 -- 87 27 113/64 81 99 % 36 4 L/min Nasal cannula Lying 13 No Pain    07/04/24 2346 97.8 °F (36.6 °C) -- -- -- -- -- -- -- -- -- -- --    07/04/24 2300 -- 87 16 93/51 70 100 % -- -- -- -- -- --    07/04/24 2200 -- 91 29 102/54 75 100 % -- -- -- -- -- --    07/04/24 2117 -- -- -- -- -- -- -- -- -- -- -- 10 - Worst Possible Pain    07/04/24 2105 -- 93 13 100/50 70 98 % -- -- -- -- -- --    07/04/24 2100 -- 90 16 159/134 144 97 % -- -- -- -- -- --    07/04/24 2000 97.1 °F (36.2 °C) 92 17 128/87 99 98 % 32 3 L/min Nasal cannula Lying 13 No Pain    07/04/24 1928 -- -- -- -- -- 97 % 36 4 L/min Nasal cannula -- -- --    07/04/24 1900 -- 99 24 117/57 74 98 % -- -- -- -- -- --    07/04/24 1845 -- 96 19 109/59 79 99 % -- -- -- -- -- --    07/04/24 1830 -- 96 20 122/58 83 98 % -- -- -- -- -- --    07/04/24 1815 -- 97 15 127/59 85 98 % -- -- -- -- -- --    07/04/24 1800 -- 98 14 109/55 73 95 % -- -- -- -- -- --    07/04/24 1745 -- 104 27 138/64 88 99 % 36 4 L/min Nasal cannula -- -- --    07/04/24 1730 -- 103 26 125/67 82 98 % -- -- -- -- -- --    07/04/24 1723 97.6 °F (36.4 °C) 102 31 148/119 128 98 % 44 6 L/min Nasal cannula -- 13 --    07/04/24 1713 -- -- -- -- -- -- -- -- -- -- -- 10 - Worst Possible Pain    07/04/24 0900 -- -- -- -- -- -- -- -- None  (Room air) -- 15 No Pain    07/04/24 0834 -- -- -- -- -- -- -- -- None (Room air) -- -- --    07/04/24 08:18:14 -- 59 -- 145/80 102 100 % -- -- -- -- -- --    07/04/24 07:00:11 -- 78 16 146/80 102 96 % -- -- -- -- -- --    07/04/24 02:23:58 -- -- 17 120/65 83 -- -- -- -- -- -- --    07/04/24 0000 -- -- -- -- -- -- -- -- None (Room air) -- -- No Pain    07/03/24 2357 -- -- -- -- -- 92 % -- -- -- -- 15 --    07/03/24 22:28:24 -- 78 17 128/68 88 95 % -- -- -- -- -- --    07/03/24 2021 -- -- -- -- -- -- -- -- None (Room air) -- 15 No Pain    07/03/24 20:01:46 97.9 °F (36.6 °C) 73 16 132/67 89 97 % -- -- -- -- -- --              Pertinent Labs/Diagnostic Test Results:   Radiology:  No orders to display     Cardiology:  ECG 12 lead   Final Result by Dariel Priest MD (07/04 7531)   Poor data quality, interpretation may be adversely affected   Atrial fibrillation with rapid ventricular response   Non-specific intra-ventricular conduction block   Abnormal ECG   When compared with ECG of 16-JUN-2024 18:11,   QRS axis Shifted left   Nonspecific T wave abnormality has replaced inverted T waves in Inferior    leads   Confirmed by Dariel Priest ([id number]) on 7/4/2024 5:51:02 PM        GI:  No orders to display           Results from last 7 days   Lab Units 07/05/24  1100 07/05/24  0521 07/04/24  1718 07/04/24  0547 07/03/24  2338 07/03/24  0616   WBC Thousand/uL 12.27* 15.12* 13.31* 8.54  --  12.54*   HEMOGLOBIN g/dL 8.8* 9.4* 10.6* 10.6*  --  10.4*   HEMATOCRIT % 27.6* 30.4* 33.5* 33.2*  --  32.4*   PLATELETS Thousands/uL 123* 133* 194 139*   < > 145*   TOTAL NEUT ABS Thousands/µL  --  12.77* 11.17* 6.30  --   --     < > = values in this interval not displayed.         Results from last 7 days   Lab Units 07/05/24  0521 07/04/24  1718 07/04/24  0547 07/03/24  0616 07/02/24  0602   SODIUM mmol/L 134* 133* 130* 130* 132*   POTASSIUM mmol/L 5.3 4.8 4.7 5.1 5.5*   CHLORIDE mmol/L 101 101 96 96 96   CO2 mmol/L 27 25 31  32 31   ANION GAP mmol/L 6 7 3* 2* 5   BUN mg/dL 35* 30* 35* 37* 34*   CREATININE mg/dL 1.45* 1.22 1.35* 1.35* 1.35*   EGFR ml/min/1.73sq m 44 54 48 48 48   CALCIUM mg/dL 8.8 8.7 9.5 9.7 10.0   MAGNESIUM mg/dL 2.2 1.7* 2.0 2.0  --    PHOSPHORUS mg/dL  --  3.0 3.3  --   --      Results from last 7 days   Lab Units 07/04/24  1718 07/02/24  0602   AST U/L 21 27   ALT U/L 14 22   ALK PHOS U/L 86 113*   TOTAL PROTEIN g/dL 4.6* 4.6*   ALBUMIN g/dL 2.8* 2.8*   TOTAL BILIRUBIN mg/dL 1.04* 0.94   AMMONIA umol/L  --  14*     Results from last 7 days   Lab Units 07/05/24  1143 07/05/24  0521 07/04/24  2326 07/04/24  1720 07/04/24  0949 07/04/24  0616 07/03/24  2341 07/03/24  2049 07/03/24  1604 07/03/24  1038 07/03/24  0636 07/02/24  2211   POC GLUCOSE mg/dl 128 121 114 118 101 85 121 116 141* 163* 106 169*     Results from last 7 days   Lab Units 07/05/24  0521 07/04/24  1718 07/04/24  0547 07/03/24  0616 07/02/24  0602   GLUCOSE RANDOM mg/dL 123 134 98 104 117             BETA-HYDROXYBUTYRATE   Date Value Ref Range Status   07/02/2022 0.2 <0.6 mmol/L Final                              Results from last 7 days   Lab Units 07/05/24  1100   PROTIME seconds 18.9*   INR  1.61*   PTT seconds 37             Results from last 7 days   Lab Units 07/04/24  1718   LACTIC ACID mmol/L 1.3                         Results from last 7 days   Lab Units 07/04/24  0046   UNIT PRODUCT CODE  [id number]  [id number]   UNIT NUMBER  [id number]  [id number]   UNITABO  O  O   UNITRH  POS  POS   CROSSMATCH  Compatible  Compatible   UNIT DISPENSE STATUS  Crossmatched  Crossmatched   UNIT PRODUCT VOL mL 350  350                         Results from last 7 days   Lab Units 07/02/24  1633   CLARITY UA  Clear   COLOR UA  Light Yellow   SPEC GRAV UA  1.009   PH UA  5.0   GLUCOSE UA mg/dl Negative   KETONES UA mg/dl Negative   BLOOD UA  Negative   PROTEIN UA mg/dl Negative   NITRITE UA  Negative   BILIRUBIN UA  Negative   UROBILINOGEN UA (BE)  mg/dl <2.0   LEUKOCYTES UA  Negative                                 Results from last 7 days   Lab Units 07/04/24  1537 07/04/24  1433   GRAM STAIN RESULT  2+ Polys*  Rare Gram positive cocci in pairs* No Polys or Bacteria seen                   ED Treatment-Medication Administration - No Administrations Displayed (No Start Event Found)       None            Past Medical History:   Diagnosis Date    Acute encephalopathy 2/14/2023    Acute-on-chronic kidney injury  (HCC) 6/13/2017    Cancer (HCC)     pancreatic    Colon polyp     Coronary artery disease     Dehydration 3/3/2023    Diabetes mellitus (HCC)     Hyperlipidemia     Hypertension     Left lower lobe pneumonia 7/2/2022    Pneumonia 06/13/2017    Right middle and lower lobe     Stroke (HCC)      Present on Admission:   Colonic diverticular abscess      Admitting Diagnosis: 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HCC) [I63.9]  Age/Sex: 84 y.o. male  Admission Orders:  SCD  PT/OT/ST  Cardio-Pulmonary Monitoring, Neuro Checks, Nursing dysphagia screen, I/O, Daily weights, Vital signs    Scheduled Medications:  acetaminophen, 1,000 mg, Intravenous, Q6H YUNI  budesonide, 0.5 mg, Nebulization, Q12H  chlorhexidine, 15 mL, Mouth/Throat, Q12H YUNI  formoterol, 20 mcg, Nebulization, Q12H  insulin lispro, 1-5 Units, Subcutaneous, Q6H YUNI  pantoprazole, 40 mg, Intravenous, Q24H YUNI  piperacillin-tazobactam, 4.5 g, Intravenous, Q8H  valproate sodium, 500 mg, Intravenous, Q8H YUNI      Continuous IV Infusions:  heparin (porcine), 3-20 Units/kg/hr (Order-Specific), Intravenous, Titrated  multi-electrolyte, 125 mL/hr, Intravenous, Continuous      PRN Meds:  albuterol, 2 puff, Inhalation, Q4H PRN  HYDROmorphone, 0.5 mg, Intravenous, Q3H PRN 07/04 x 1  HYDROmorphone, 0.2 mg, Intravenous, Q3H PRN 07/04 x 2, 07/05 x 1        IP CONSULT TO CASE MANAGEMENT  IP CONSULT TO PASTORAL CARE  IP CONSULT TO GERONTOLOGY    Network Utilization Review Department  ATTENTION: Please call with any questions or  concerns to [phone] and carefully listen to the prompts so that you are directed to the right person. All voicemails are confidential.   For Discharge needs, contact Care Management DC Support Team at [phone] opt. 2  Send all requests for admission clinical reviews, approved or denied determinations and any other requests to dedicated fax number below belonging to the [place] where the patient is receiving treatment. List of dedicated fax numbers for the Facilities:  FACILITY NAME UR FAX NUMBER   ADMISSION DENIALS (Administrative/Medical Necessity) [phone]   DISCHARGE SUPPORT TEAM (NETWORK) [phone]   PARENT CHILD HEALTH (Maternity/NICU/Pediatrics) [phone]   [facility] [phone]   [facility] [phone]   [facility] [phone]   [facility] [phone]   [facility] [phone]   [facility] [phone]   [facility] [phone]   [facility] [phone]   [facility] [phone]   [facility] [phone]   [facility] [phone]   [facility] [phone]

## 2024-07-05 NOTE — PLAN OF CARE
Problem: PAIN - ADULT  Goal: Verbalizes/displays adequate comfort level or baseline comfort level  Description: Interventions:  - Encourage patient to monitor pain and request assistance  - Assess pain using appropriate pain scale  - Administer analgesics based on type and severity of pain and evaluate response  - Implement non-pharmacological measures as appropriate and evaluate response  - Consider cultural and social influences on pain and pain management  - Notify physician/advanced practitioner if interventions unsuccessful or patient reports new pain  Outcome: Progressing     Problem: INFECTION - ADULT  Goal: Absence or prevention of progression during hospitalization  Description: INTERVENTIONS:  - Assess and monitor for signs and symptoms of infection  - Monitor lab/diagnostic results  - Monitor all insertion sites, i.e. indwelling lines, tubes, and drains  - Monitor endotracheal if appropriate and nasal secretions for changes in amount and color  - [place] appropriate cooling/warming therapies per order  - Administer medications as ordered  - Instruct and encourage patient and family to use good hand hygiene technique  - Identify and instruct in appropriate isolation precautions for identified infection/condition  Outcome: Progressing  Goal: Absence of fever/infection during neutropenic period  Description: INTERVENTIONS:  - Monitor WBC    Outcome: Progressing     Problem: SAFETY ADULT  Goal: Patient will remain free of falls  Description: INTERVENTIONS:  - Educate patient/family on patient safety including physical limitations  - Instruct patient to call for assistance with activity   - Consult OT/PT to assist with strengthening/mobility   - Keep Call bell within reach  - Keep bed low and locked with side rails adjusted as appropriate  - Keep care items and personal belongings within reach  - Initiate and maintain comfort rounds  - Make Fall Risk Sign visible to staff  - Apply yellow socks and bracelet  for high fall risk patients  - Consider moving patient to room near nurses station  Outcome: Progressing  Goal: Maintain or return to baseline ADL function  Description: INTERVENTIONS:  -  Assess patient's ability to carry out ADLs; assess patient's baseline for ADL function and identify physical deficits which impact ability to perform ADLs (bathing, care of mouth/teeth, toileting, grooming, dressing, etc.)  - Assess/evaluate cause of self-care deficits   - Assess range of motion  - Assess patient's mobility; develop plan if impaired  - Assess patient's need for assistive devices and provide as appropriate  - Encourage maximum independence but intervene and supervise when necessary  - Involve family in performance of ADLs  - Assess for home care needs following discharge   - Consider OT consult to assist with ADL evaluation and planning for discharge  - Provide patient education as appropriate  Outcome: Progressing  Goal: Maintains/Returns to pre admission functional level  Description: INTERVENTIONS:  - Perform AM-PAC 6 Click Basic Mobility/ Daily Activity assessment daily.  - Set and communicate daily mobility goal to care team and patient/family/caregiver.   - Collaborate with rehabilitation services on mobility goals if consulted  - Record patient progress and toleration of activity level   Outcome: Progressing     Problem: DISCHARGE PLANNING  Goal: Discharge to home or other facility with appropriate resources  Description: INTERVENTIONS:  - Identify barriers to discharge w/patient and caregiver  - Arrange for needed discharge resources and transportation as appropriate  - Identify discharge learning needs (meds, wound care, etc.)  - Arrange for interpretive services to assist at discharge as needed  - Refer to Case Management Department for coordinating discharge planning if the patient needs post-hospital services based on physician/advanced practitioner order or complex needs related to functional status,  cognitive ability, or social support system  Outcome: Progressing     Problem: Knowledge Deficit  Goal: Patient/family/caregiver demonstrates understanding of disease process, treatment plan, medications, and discharge instructions  Description: Complete learning assessment and assess knowledge base.  Interventions:  - Provide teaching at level of understanding  - Provide teaching via preferred learning methods  Outcome: Progressing     Problem: Prexisting or High Potential for Compromised Skin Integrity  Goal: Skin integrity is maintained or improved  Description: INTERVENTIONS:  - Identify patients at risk for skin breakdown  - Assess and monitor skin integrity  - Assess and monitor nutrition and hydration status  - Monitor labs   - Assess for incontinence   - Turn and reposition patient  - Assist with mobility/ambulation  - Relieve pressure over bony prominences  - Avoid friction and shearing  - Provide appropriate hygiene as needed including keeping skin clean and dry  - Evaluate need for skin moisturizer/barrier cream  - Collaborate with interdisciplinary team   - Patient/family teaching  - Consider wound care consult   Outcome: Progressing

## 2024-07-05 NOTE — QUICK NOTE
L groin wound undressed and unpacked. Wound base clean, no pus. Flushed and packed with Kerlix, dressed with abdominal pad and tape. Patient tolerated well. NG tube also removed at this time, as patient was attempting to pull it out himself.

## 2024-07-05 NOTE — PROGRESS NOTES
07/05/24 0843   Hello, [Guardian’s Name / Patient’s Name], this is [Caller Name] from [facility], and our clinical care team wanted to check on you / your child after your recent visit to the hospital. It will only take 3-5 minutes. Is this a good time?   Discharge Call Type/ Specific Diagnosis: ARC Stroke   ARC Stroke Follow-up   ARC Stroke Follow-Up Time Frame 5 Day follow up   Call Complete   Attempted Number of Calls 1   Discharge phone call complete? Does not meet criteria  (DC to acute hospital)

## 2024-07-05 NOTE — WOUND OSTOMY CARE
"Consult Note - Wound   Hal Miller 84 y.o. male MRN: [id number]  [facility]: [id number]        History and Present Illness:  Patient is an 85 yo male admitted to [facility] for treatment of colonic diverticular abscess. PMH: acute encephalopathy, pancreatic cancer, CAD, DM, HLD, HTN, Stroke.     Patient underwent a Miller's Procedure 7/4 with LUQ colostomy formation. Patient is POD 1. NG tube in place seen lying on critical care specialty mattress. Per primary RN, patient is A&Ox1 on assessment. He is not appropriate for teaching at this time due to orientation status. Primary RN reports that family is likely to come in at some point today. She was made aware to left them know that ostomy care will return Monday to plan teaching with family/ assess patient's readiness to learn. Asked primary RN to give family teaching kit, \"What to expect after colostomy surgery\" by [facility] - teaching kit left at bedside for patient. Pouch is well adhered with no signs of leakage.     Wound Care will continue to follow, secure chat message with any questions or concerns.           Susan Florence RN, BSN, CWOCN   "

## 2024-07-05 NOTE — CASE MANAGEMENT
Case Management Assessment & Discharge Planning Note    Patient name Hal Miller  Location MICU 04/MICU 04 MRN [id number]  : 1940 Date 2024       Current Admission Date: 7/3/2024  Current Admission Diagnosis:Colonic diverticular abscess   Patient Active Problem List    Diagnosis Date Noted Date Diagnosed    Left inguinal hernia 2024     Leukocytosis 2024     Dysphagia 2024     Hx of aortic valve replacement 2024     Diplopia 2024     Peripheral polyneuropathy 2024     Hyponatremia 2024     Dysphoric mood 2024     NICM (nonischemic cardiomyopathy) (Formerly [facility]) 2024     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[facility]ly [facility]) 2024     Acute on chronic respiratory failure ([facility]) 2024     Episode of seizure-like activity 2024     History of Whipple procedure 2024     Dizziness 2024     Insomnia 2024     Ambulatory dysfunction 2024     Severe protein-calorie malnutrition (HCC) 2024     Abnormal CT of the abdomen 2024     Zoster 2024     Colonic diverticular abscess 2024     Vascular dementia ([facility]) 2024     Concern for aspiration pneumonia ([facility]) 2024     Type 2 diabetes mellitus, with long-term current use of insulin ([facility]) 2024     Hypercalcemia 2023     TARA (acute kidney injury) ([facility]) 2023     Esophageal stricture 2023     Acute encephalopathy 2023     Generalized weakness 2023     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     Chronic obstructive pulmonary disease with acute exacerbation ([facility]) 2022     Stage 3 chronic kidney disease (HCC) 2022     Centrilobular emphysema ([facility]) 2021     History of malignant neuroendocrine tumor 2021     Atrial fibrillation ([facility]) 2017     Shortness of breath 2017     Primary hypertension 2017     Hyperlipidemia 2015     Inflammatory polyarthropathy (HCC) 2014     Osteoarthrosis  11/12/2014     Polymyalgia rheumatica (HCC) 11/12/2014     Aneurysm of thoracic aorta (HCC) 01/06/2014     Aneurysm of abdominal aorta (HCC) 01/06/2014     Neoplasm of other specified site 01/06/2014       LOS (days): 2  Geometric Mean LOS (GMLOS) (days):   Days to GMLOS:     OBJECTIVE:    Risk of Unplanned Readmission Score: 37.66         Current admission status: Inpatient       Preferred Pharmacy:   [id number] [place] [facility] [place] [place] [facility] [address] [address]  Phone: [phone] Fax: [phone]    [facility] - [place], [place] - [address]  [address] [address]  Phone: [phone] Fax: [phone]    [facility] - [place], [place] - [address]  [address] [place] [address]  Phone: [phone] Fax: [phone]    Primary Care Provider: Kyaw Monreal MD    Primary Insurance: [employer] [facility]  Secondary Insurance:     ASSESSMENT:  Active Health Care Proxies       [facility] Care Representative - Spouse   Primary Phone: [phone] (Mobile)  Home Phone: [phone]                           Readmission Root Cause  30 Day Readmission: Yes  Who directed you to return to the hospital?: Other (comment) ([facility])  Did you understand whom to contact if you had questions or problems?: Yes  Did you get your prescriptions before you left the hospital?: Yes  Were you able to get your prescriptions filled when you left the hospital?: Yes  Did you take your medications as prescribed?: Yes  Were you able to get to your follow-up appointments?: Yes  During previous admission, was a post-acute recommendation made?: Yes  What post-acute resources were offered?: STR  Patient was readmitted due to: groin abscess  Action Plan: OR    Patient Information  Mental Status: Alert                DISCHARGE DETAILS:               Other Referral/Resources/Interventions Provided:  Referral  Comments: Pt is 30 day readmit. See open completed 6/17/2024. Pt was discharged 6/26 to  [facility] from [place] s/p stroke.  Transferred from [facility] to  7/3 for evaluation for left groin abscess.  Pt s/p Miller's, washout/debridement abscess 7/3.  CM to follow for dcp.

## 2024-07-05 NOTE — SPEECH THERAPY NOTE
SLP Discharge Summary    Pt was transferred back to acute care due to change in medical status which required increased monitoring and observation on 7/3/2024. While on the [facility], pt was being followed for cognitive linguistic tx sessions in addition to then dysphagia tx sessions.     Summarization of admission is as follows:     Pt completed  the CLQT+ on initial evaluation with a Composite Severity Rating score of 4.0 out of 4.0, correlating to overall WNL cognitive linguistic skills at time of evaluation and in comparison to age matched peers ranging from 70-88 y/o. Despite demonstrating skills to be functional, pt was insightful that he noticed some cognitive decline with aging. Wife reports similar. SLP discussed that he would benefit from cognitive therapy while at the [facility] as some things aren't as noticeable when not in home environment, and while he scored WNL on cognitive testing, all scores were low normal. Pt was oriented to current situation, place and date, as well as recalling LTM biographical information. Cognitive barriers which present include: decreased attention, ST memory recall , problem solving, reasoning, sequencing, organization of thoughts, judgement, and slower processing, which still impacts pt's overall safety, functional cognitive communication skills as well as functional mobility. The following interventions are used to target these barriers, including verbal problem solving task, verbal working memory tasks, drawing conclusions activities, written sequencing tasks, categorization tasks , picture problem solving activities, verbal reasoning tasks, verbal review of current medications, written review of medications,  written health management tasks, verbal health management tasks, functional reading tasks , time management activities, and family education/training. Family training/education has not been formally initiated but spouse has been present during sessions where education as to  "role of services for pt while on the [facility].  Cognitive functioning by time of discharge was min A for comprehension, supervision for expression and social interactions, mod A for executive functions and mod-max A for memory.    Of notice upon completion of cognitive tx sessions, SLP providing assistance to set-up dinner tray. Patient had selected only soft items. When encouraging patient to include additional items into diet patient states \"It sticks in my mouth and my throat\". Spouse reports he has only been selecting pureed items including yogurts, mashed potatoes, and thin liquids including soups. SLP inquired further regarding reports of difficulty swallowing. Patient pointed to mid throat level as area of globus sensation during PO intake. SLP educated on benefit and purpose of bedside dysphagia evaluation and various texture levels offered. SLP offered to downgrade for safety and ease of intake until swallow assessment completed. Patient and spouse declined noting he will continue to order preferred puree and liquids until swallow assessment completed. Spouse reports he has note consumed anything with texture (only puree/ liquids) in weeks. Patient states \"I may have had some type of swallow test before somewhere I am not sure\". Full bedside dysphagia assessment across a meal was completed on 7/2 to where pt presented with s/s suggestive of mild oral and suspected mild pharyngeal dysphagia. Symptoms or concerns included decreased mastication, decreased bolus formation,  suspected decreased control of thins, suspected premature spillage. suspected pharyngeal swallow delay, suspected decreased hyolaryngeal elevation upon palpation, audible swallow with thin liquid via straw, wet vocal quality with mixed consistency, throat clear and cough with thin liquids via straw, and reports of globus sensation mid throat level with solids. Diet was downgraded to dysphagia level 3 w/ thin liquids. Since pt transitioned to " acute care, pt would likely benefit from f/u given swallow function to establish tolerance of least restrictive diet.

## 2024-07-05 NOTE — QUICK NOTE
Post Op Check:    84 y.o. M now POD 0 s/p frost's, explant of left groin mesh, washout/debridement left groin wound.    The patient denied any nausea, vomiting, chest pain, shortness of breath, fevers, chills. NG tube in place. Cardenas in place. Ostomy in place. Cardenas with dark urine output. Ostomy with bloody tinged output. Complaining of some abdominal pain. Pain overall well controlled.    Vitals:    07/04/24 2105   BP: 100/50   Pulse: 93   Resp: 13   Temp:    SpO2: 98%       General: NAD  HENT: NCAT MMM  Neck: supple, no JVD  CV: nl rate  Lungs: nl wob. No resp distress  ABD: Soft, appropriately tender, nondistended. Ostomy with blood tinged output. NG tube with dark bilious brown output. Cardenas with dark urine output.  Extrem: No CCE  Neuro: AAOx3     Plan:  Diet NPO/continue NG tube  Continue IV fluids  Boluses as needed of fluid  Pain and nausea control PRN  Multimodal pain control  DVT prophylaxis  Encourage IS  Ostomy teaching  Consider restarting prednisone  Most likely NG tube out tomorrow  Keep cardenas  Appreciate ICU level of care    Magdi Diamond MD  General Surgery Resident   SUBJECTIVE:  Examined post operative. Pain tolerable. + flatus, void. Tolerating oral. Distal extremities- denies numbness, tingling, or weakness. No other complaints.    7/31- No complaints. Up in chair. + flatus + voiding. Tolerating PT/OT.    OBJECTIVE:  I/O's    Intake/Output Summary (Last 24 hours) at 7/31/2023 2104  Last data filed at 7/31/2023 1945  Gross per 24 hour   Intake --   Output 870 ml   Net -870 ml       SCHEDULED MEDS:  Current Facility-Administered Medications   Medication Dose Route Frequency Provider Last Rate Last Admin   • enoxaparin (LOVENOX) injection 40 mg  40 mg Subcutaneous Daily Jose Armando Menjivar MD   40 mg at 07/31/23 0501   • sodium chloride (PF) 0.9 % injection 2 mL  2 mL Intracatheter 2 times per day Ferdinand Solomon CNP   2 mL at 07/31/23 0941   • insulin lispro (ADMELOG,HumaLOG) - Correction Dose   Subcutaneous TID WC Ferdinand Solomon CNP   3 Units at 07/31/23 1734   • insulin lispro (ADMELOG,HumaLOG) - Correction Dose   Subcutaneous Nightly Ferdinand Solomon CNP       • pantoprazole (PROTONIX) EC tablet 40 mg  40 mg Oral QAM AC Ferdinand Solomon CNP   40 mg at 07/31/23 0501   • ezetimibe (ZETIA) tablet 10 mg  10 mg Oral Daily Ferdinand Solomon CNP   10 mg at 07/31/23 0836   • [Held by provider] hydroCHLOROthiazide (HYDRODIURIL) tablet 12.5 mg  12.5 mg Oral Daily Ferdinand Solomon CNP       • lisinopril (ZESTRIL) tablet 2.5 mg  2.5 mg Oral Daily Ferdinand Solomon CNP       • atorvastatin (LIPITOR) tablet 10 mg  10 mg Oral Nightly Ferdinand Solomon CNP   10 mg at 07/30/23 2046       PRN MEDS:  Current Facility-Administered Medications   Medication Dose Route Frequency Provider Last Rate Last Admin   • oxyCODONE (IMM REL) (ROXICODONE) tablet 5 mg  5 mg Oral Q6H PRN Ferdinand Solomon CNP   5 mg at 07/31/23 1617   • acetaminophen (TYLENOL) tablet 650 mg  650 mg Oral Q4H PRN Ferdinand Solomon, SCAR       • polyethylene glycol (MIRALAX) packet 17 g  17 g Oral Daily PRN Jose Armando Menjivar MD       • docusate  sodium-sennosides (SENOKOT S) 50-8.6 MG 2 tablet  2 tablet Oral BID PRN Jose Armando Menjivar MD       • bisacodyl (DULCOLAX) suppository 10 mg  10 mg Rectal Daily PRN Jose Armando Menjivar MD       • magnesium hydroxide (MILK OF MAGNESIA) 400 MG/5ML suspension 30 mL  30 mL Oral Daily PRN Jose Armando Menjivar MD       • ketorolac (TORADOL) injection 15 mg  15 mg Intravenous Q6H PRN Jose Armando Menjivar MD   15 mg at 07/31/23 1617   • cyclobenzaprine (FLEXERIL) tablet 5 mg  5 mg Oral TID PRN Jose Armando Menjivar MD   5 mg at 07/30/23 1937   • diphenhydrAMINE (BENADRYL) capsule 25 mg  25 mg Oral Q4H PRN Randi Blackburn MD   25 mg at 07/30/23 2046   • sodium chloride 0.9 % flush bag 25 mL  25 mL Intravenous PRN Ferdinand Solomon, CNP       • sodium chloride (NORMAL SALINE) 0.9 % bolus 500 mL  500 mL Intravenous PRN Ferdinand Solomon, CNP       • HYDROcodone-acetaminophen (NORCO) 5-325 MG per tablet 1 tablet  1 tablet Oral Q4H PRN Ferdinand Solomon, CNP   1 tablet at 07/30/23 0736   • morphine injection 2 mg  2 mg Intravenous Q4H PRN Ferdinand Solomon, CNP   2 mg at 07/31/23 1350   • dextrose 50 % injection 25 g  25 g Intravenous PRN Ferdinand Solomon, CNP       • dextrose 50 % injection 12.5 g  12.5 g Intravenous PRN Ferdinand Solomon, CNP       • glucagon (GLUCAGEN) injection 1 mg  1 mg Intramuscular PRN Ferdinand Solomon, CNP       • dextrose (GLUTOSE) 40 % gel 15 g  15 g Oral PRN Ferdinand Solomno, CNP       • dextrose (GLUTOSE) 40 % gel 30 g  30 g Oral PRN Ferdinand Solomon, CNP       • hydrALAZINE (APRESOLINE) injection 10 mg  10 mg Intravenous Q6H PRN Ferdinand Solomon, CNP           Last Recorded Vitals  VITAL SIGNS:    Vital Last Value 24 Hour Range   Temperature 98.4 °F (36.9 °C) (07/31/23 1921) Temp  Min: 97.5 °F (36.4 °C)  Max: 98.4 °F (36.9 °C)   Pulse 97 (07/31/23 2046) Pulse  Min: 85  Max: 106   Respiratory 16 (07/31/23 1717) Resp  Min: 16  Max: 18   Non-Invasive  Blood Pressure 107/71 (07/31/23 2046) BP  Min: 91/59  Max: 107/71   Pulse Oximetry 100 % (07/31/23  1921) SpO2  Min: 96 %  Max: 100 %     Vital Today Admitted   Weight 80.1 kg (176 lb 9.4 oz) (07/29/23 1400) Weight: 80.1 kg (176 lb 9.4 oz) (07/29/23 1400)   Height N/A Height: 5' 8\" (172.7 cm) (07/29/23 0809)   Body Mass Index N/A BMI (Calculated): 26.85 (07/29/23 1400)       PHYSICAL EXAMINATION  GENERAL: Alert, not ill appearing, non toxic  HEENT: atraumatic, no cervical lymphadenopathy  CV/CHEST:RRR, no murmur,   RESP: Resp easy / unlabored, no advenitious breath sounds  ABDOMEN: soft, NT, + BS.   EXTREMITIES:No edema, DP 2+ bilaterally.  Left leg-no sign of ecchymosis or bruising.  Distal neurovascular intact.  Cap refill less than 3 seconds, distal digit is pink and warm, DP 2+.  SKIN: Warm/ dry, surgical incision C/D/I, + wound vac  NEURO: Alert/oriented x 3, CN intact, exam is non focal.  Strong dorsiflexion plantarflexion.  Grossly intact to light touch.    Labs     Recent Results (from the past 24 hour(s))   GLUCOSE, BEDSIDE - POINT OF CARE    Collection Time: 07/30/23  9:10 PM   Result Value Ref Range    GLUCOSE, BEDSIDE - POINT OF CARE 234 (H) 70 - 99 mg/dL   CBC with Automated Differential (performable only)    Collection Time: 07/31/23  4:15 AM   Result Value Ref Range    WBC 8.9 4.2 - 11.0 K/mcL    RBC 3.27 (L) 4.00 - 5.20 mil/mcL    HGB 9.5 (L) 12.0 - 15.5 g/dL    HCT 28.9 (L) 36.0 - 46.5 %    MCV 88.4 78.0 - 100.0 fl    MCH 29.1 26.0 - 34.0 pg    MCHC 32.9 32.0 - 36.5 g/dL    RDW-CV 13.1 11.0 - 15.0 %    RDW-SD 42.1 39.0 - 50.0 fL     140 - 450 K/mcL    NRBC 0 <=0 /100 WBC    Neutrophil, Percent 74 %    Lymphocytes, Percent 14 %    Mono, Percent 12 %    Eosinophils, Percent 0 %    Basophils, Percent 0 %    Immature Granulocytes 0 %    Absolute Neutrophils 6.5 1.8 - 7.7 K/mcL    Absolute Lymphocytes 1.3 1.0 - 4.0 K/mcL    Absolute Monocytes 1.0 (H) 0.3 - 0.9 K/mcL    Absolute Eosinophils  0.0 0.0 - 0.5 K/mcL    Absolute Basophils 0.0 0.0 - 0.3 K/mcL    Absolute Immature Granulocytes 0.0 0.0 -  0.2 K/mcL   Basic Metabolic Panel    Collection Time: 07/31/23  4:15 AM   Result Value Ref Range    Fasting Status      Sodium 132 (L) 135 - 145 mmol/L    Potassium 4.1 3.4 - 5.1 mmol/L    Chloride 104 97 - 110 mmol/L    Carbon Dioxide 24 21 - 32 mmol/L    Anion Gap 8 7 - 19 mmol/L    Glucose 290 (H) 70 - 99 mg/dL    BUN 22 (H) 6 - 20 mg/dL    Creatinine 0.93 0.51 - 0.95 mg/dL    Glomerular Filtration Rate 69 >=60    BUN/Cr 24 7 - 25    Calcium 8.5 8.4 - 10.2 mg/dL   GLUCOSE, BEDSIDE - POINT OF CARE    Collection Time: 07/31/23  7:13 AM   Result Value Ref Range    GLUCOSE, BEDSIDE - POINT OF CARE 222 (H) 70 - 99 mg/dL   GLUCOSE, BEDSIDE - POINT OF CARE    Collection Time: 07/31/23 11:33 AM   Result Value Ref Range    GLUCOSE, BEDSIDE - POINT OF CARE 269 (H) 70 - 99 mg/dL   GLUCOSE, BEDSIDE - POINT OF CARE    Collection Time: 07/31/23  4:50 PM   Result Value Ref Range    GLUCOSE, BEDSIDE - POINT OF CARE 255 (H) 70 - 99 mg/dL   GLUCOSE, BEDSIDE - POINT OF CARE    Collection Time: 07/31/23  8:42 PM   Result Value Ref Range    GLUCOSE, BEDSIDE - POINT OF CARE 284 (H) 70 - 99 mg/dL        Imaging    Results for orders placed or performed during the hospital encounter of 07/29/23 (from the past 72 hour(s))   XR PELVIS 1 VIEW    Impression    1.  No acute fracture or dislocation is seen at the pelvis or left femur.  2.  Acute comminuted proximal left tibial fracture noted.    Electronically Signed by: NILAY BALDERRAMA M.D.   Signed on: 7/29/2023 8:58 AM   Workstation ID: [id number]   XR FEMUR 2 OR MORE VIEWS LEFT    Impression    1.  No acute fracture or dislocation is seen at the pelvis or left femur.  2.  Acute comminuted proximal left tibial fracture noted.    Electronically Signed by: NILAY BALDERRAMA M.D.   Signed on: 7/29/2023 8:58 AM   Workstation ID: [id number]   XR TIBIA FIBULA 3 OR MORE VIEWS LEFT    Impression    1.  Acute comminuted proximal tibial fracture.  2.  Nondisplaced fracture at proximal fibular  shaft suspected.      Electronically Signed by: NILAY BALDERRAMA M.D.   Signed on: 7/29/2023 8:56 AM   Workstation ID: [id number]   CT KNEE WO CONTRAST LEFT    Impression    1.  Comminuted proximal tibial fracture with intra-articular extension and  mild depression along the lateral tibial plateau.  Additional proximal  fibular shaft fracture.    **The absence of findings should not deter follow-up or further evaluation  of concerning clinical findings.    FOR PHYSICIAN USE ONLY: Please note that this report was generated using  voice recognition software and may contain errors.  If you require  clarification or feel that there is an error in this report, please contact  me.    Electronically Signed by: ANITA COPE M.D.   Signed on: 7/29/2023 12:30 PM   Workstation ID: [id number]       ASSESSMENT        #Acute comminuted proximal tibial fracture with nondisplaced fracture of the proximal fibular shaft due to mechanical fall  S/p left tibial plateau fx ORIF, latera meniscus repair, anterior compartment fasciotomy POD # 1     COMORBIDITIES     Hypertension  Diabetes mellitus  Hyperlipidemia  GERD  Psoriasis  Active smoker     PLAN      No weightbearing on left lower extremity operative extremity  Maintain wound vac for 1 week  Encourage early I-S and C and DB  Analgesics and antiemetics  PT/OT  Monitor intake and output, electrolytes, urine output  Continue telemetry monitoring for 24 hours to rule out any arrhythmia        DVT prophylaxis: SCD, lovenox   Pulmonary: Cough and deep breathing, incentive spirometer     Disposition: pending hospital course, clinical improvemnet  PAULY: Unclear    Code status: Full  Primary Care Physician: Dr. Phyllis Cartagena     Discussed with Dr. Mott  All patient studies and labs have been reviewed.  All patient's questions and concerns were addressed.  D/w nurse  A face to face visit took place today     Ferdinand Solomon CNP  [facility]  [address] [place] [address]  [phone]

## 2024-07-06 NOTE — PROGRESS NOTES
[facility]  Progress Note: Critical Care  Name: Hal Miller 84 y.o. male I MRN: [id number]  Unit/Bed#: [facility] 202-01 I Date of Admission: 7/3/2024   Date of Service: 7/6/2024 I Hospital Day: 3    Assessment & Plan   Neuro:   Diagnosis: Fall w/ stroke on 6/28, vascular dementia, mixed delirium, seizure, post-op pain  Plan:   Continue depacon 500mg Q8hr -> Transition to home depakote 750mg Q12 which was last documented dosing prior to hospital re-admission  Strict environmental controls for delirium prevention:  Lights on and blinds up during day.   Frequent reorientation   Lights and TV off and blinds down at night   Minimize interuptions at night as clincially indicated  Consider restarting PRN seroquel QHS for agitation/sleep    CV:   Diagnosis: HFrEF, NICM, AVR S/p bentall procedure, afib, HTN  Plan:   Newly reduced EF 30%  Cardizem transitioned to Toprolol prior to transfer to [facility]  Currently holding metoprolol and losartan  Lasix dosed intermittently  Consider restarting BB today  Daily weights, strict I&O's  Eventually will restart eliquis 5mg BID when stable from surgical perspective    Pulm:  Diagnosis: Hx emphysematous COPD  Plan:   Continue Pulmicort, perforomist  PRN albuterol  Aggressive pulmonary toileting    GI:   Diagnosis: Hx diverticulitis, perforated diverticulitis with L groin abscess and infection of mesh now s/p Miller's procedure, L groin washout, and mesh explantation  Plan:   CLD as tolerated  NGT removed on 7/5  Daily wound care per red surgery    :   Diagnosis: CKD III, phimosis with difficult cardenas insertion  Plan:   Continue cardenas cath today  Continue to monitor I&O's    F/E/N:   F: LR @ 125cc/hr - consider dc today  E: K > 4, Mag > 2  N: Continue CLD, consider advancing today    Heme/Onc:   No active issues  - continue hep gtt    Endo:   Diagnosis: T2DM  Plan:   Continue ISS  Consider adding back on home lantus should patient become persistently  hyperglycemic    ID:   Diagnosis: intraabdominal sepsis  Plan:   Continue zosyn  F/u intra-op cultures    MSK/Skin:   Diagnosis: L groin wound  Plan: Continue local wound care and daily dressing changes per Red surgery    Disposition: Stepdown Level 2    ICU Core Measures     A: Assess, Prevent, and Manage Pain Has pain been assessed? Yes  Need for changes to pain regimen? No   B: Both SAT/SAT  N/A   C: Choice of Sedation RASS Goal: 0 Alert and Calm  Need for changes to sedation or analgesia regimen? No   D: Delirium CAM-ICU: Positive   E: Early Mobility  Plan for early mobility? Yes   F: Family Engagement Plan for family engagement today? Yes       Antibiotic Review: Patient on appropriate coverage based on culture data.     Review of Invasive Devices:    Babcock Plan: Babcock placed by urology. Removal plans per their team        Prophylaxis:  VTE VTE covered by:  heparin (porcine), Intravenous, 12 Units/kg/hr at 07/05/24 1100       Stress Ulcer  covered bypantoprazole (PROTONIX) injection 40 mg [[id number]]        Significant 24hr Events     24hr events: NGT removed yesterday, tolerated well. Tolerating CLD. ROS otherwise negative.      Subjective     Review of Systems: See HPI for Review of Systems     Objective                            Vitals I/O      Most Recent Min/Max in 24hrs   Temp 97.7 °F (36.5 °C) Temp  Min: 97.4 °F (36.3 °C)  Max: 98 °F (36.7 °C)   Pulse 78 Pulse  Min: 78  Max: 85   Resp 18 Resp  Min: 10  Max: 47   /65 BP  Min: 93/62  Max: 129/58   O2 Sat 99 % SpO2  Min: 95 %  Max: 100 %      Intake/Output Summary (Last 24 hours) at 7/6/2024 0124  Last data filed at 7/6/2024 0032  Gross per 24 hour   Intake 3820.77 ml   Output 701 ml   Net 3119.77 ml       Diet Surgical; Clear Liquid; No Carbonation    Invasive Monitoring           Physical Exam   Physical Exam  Vitals and nursing note reviewed.   Eyes:      Pupils: Pupils are equal, round, and reactive to light.   Skin:     General: Skin is warm and  dry.   Neck:      Vascular: No JVD.   Cardiovascular:      Rate and Rhythm: Normal rate and regular rhythm.      Heart sounds: Normal heart sounds.   Musculoskeletal:      Right lower leg: No edema.      Left lower leg: No edema.   Abdominal:      Palpations: Abdomen is soft.      Tenderness: There is no abdominal tenderness. There is no guarding.      Comments: LLQ ostomy with bowel sweat and some serous drainage.   Ostomy appears congested  L groin dressing with serous drainage.    Constitutional:       General: He is not in acute distress.     Appearance: He is well-developed and well-nourished. He is ill-appearing. He is not toxic-appearing.   Pulmonary:      Effort: Pulmonary effort is normal. No accessory muscle usage, respiratory distress or accessory muscle usage.      Breath sounds: Normal breath sounds. No wheezing or rales.   Neurological:      Mental Status: He is calm.      Motor: Strength full and intact in all extremities.            Diagnostic Studies      EKG: NSR  Imaging: n/a I have personally reviewed pertinent reports.   and I have personally reviewed pertinent films in PACS     Medications:  Scheduled PRN   acetaminophen, 1,000 mg, Q6H YUNI  budesonide, 0.5 mg, Q12H  chlorhexidine, 15 mL, Q12H YUNI  formoterol, 20 mcg, Q12H  insulin lispro, 1-5 Units, Q6H YUNI  melatonin, 3 mg, HS  pantoprazole, 40 mg, Q24H YUNI  piperacillin-tazobactam, 4.5 g, Q8H  tamsulosin, 0.4 mg, Daily With Dinner  valproate sodium, 500 mg, Q8H YUNI      albuterol, 2 puff, Q4H PRN  HYDROmorphone, 0.2 mg, Q3H PRN       Continuous    heparin (porcine), 3-20 Units/kg/hr (Order-Specific), Last Rate: 12 Units/kg/hr (07/05/24 1100)  multi-electrolyte, 125 mL/hr, Last Rate: 125 mL/hr (07/06/24 0032)         Labs:    CBC    Recent Labs     07/05/24  0521 07/05/24  1100   WBC 15.12* 12.27*   HGB 9.4* 8.8*   HCT 30.4* 27.6*   * 123*     BMP    Recent Labs     07/04/24  1718 07/05/24 0521   SODIUM 133* 134*   K 4.8 5.3     101   CO2 25 27   AGAP 7 6   BUN 30* 35*   CREATININE 1.22 1.45*   CALCIUM 8.7 8.8       Coags    Recent Labs     07/05/24  1100 07/05/24  1654 07/05/24  2343   INR 1.61*  --   --    PTT 37 82* 71*        Additional Electrolytes  Recent Labs     07/04/24  0547 07/04/24  1718 07/05/24  0521   MG 2.0 1.7* 2.2   PHOS 3.3 3.0  --           Blood Gas    No recent results  No recent results LFTs  Recent Labs     07/04/24  1718   ALT 14   AST 21   ALKPHOS 86   ALB 2.8*   TBILI 1.04*       Infectious  No recent results  Glucose  Recent Labs     07/04/24  0547 07/04/24  1718 07/05/24  0521   GLUC 98 134 123               Hal Mosquera PA-C

## 2024-07-06 NOTE — QUICK NOTE
Dressing changed bedside. Wound base clean with good granulation tissue. No signs of exposed structures of the inguinal canal or femoral triangle.     Continue dressing changes daily lidia SIDDIQUI with ABD.

## 2024-07-06 NOTE — PLAN OF CARE
Problem: PAIN - ADULT  Goal: Verbalizes/displays adequate comfort level or baseline comfort level  Description: Interventions:  - Encourage patient to monitor pain and request assistance  - Assess pain using appropriate pain scale  - Administer analgesics based on type and severity of pain and evaluate response  - Implement non-pharmacological measures as appropriate and evaluate response  - Consider cultural and social influences on pain and pain management  - Notify physician/advanced practitioner if interventions unsuccessful or patient reports new pain  Outcome: Progressing     Problem: INFECTION - ADULT  Goal: Absence or prevention of progression during hospitalization  Description: INTERVENTIONS:  - Assess and monitor for signs and symptoms of infection  - Monitor lab/diagnostic results  - Monitor all insertion sites, i.e. indwelling lines, tubes, and drains  - Monitor endotracheal if appropriate and nasal secretions for changes in amount and color  - [place] appropriate cooling/warming therapies per order  - Administer medications as ordered  - Instruct and encourage patient and family to use good hand hygiene technique  - Identify and instruct in appropriate isolation precautions for identified infection/condition  Outcome: Progressing     Problem: SAFETY ADULT  Goal: Patient will remain free of falls  Description: INTERVENTIONS:  - Educate patient/family on patient safety including physical limitations  - Instruct patient to call for assistance with activity   - Consult OT/PT to assist with strengthening/mobility   - Keep Call bell within reach  - Keep bed low and locked with side rails adjusted as appropriate  - Keep care items and personal belongings within reach  - Initiate and maintain comfort rounds  - Make Fall Risk Sign visible to staff  - Offer Toileting every 2 Hours, in advance of need  - Initiate/Maintain bed alarm  - Obtain necessary fall risk management equipment:   - Apply yellow socks and  bracelet for high fall risk patients  - Consider moving patient to room near nurses station  Outcome: Progressing

## 2024-07-06 NOTE — RESPIRATORY THERAPY NOTE
Resp Care   07/06/24 0758   Inhalation Therapy Tx   $ Inhalation Therapy Performed Yes   $ Pulse Oximetry Spot Check Charge Completed   Pre-Treatment Pulse 78   Pre-Treatment Respirations 20   Duration 15   Breath Sounds Pre-Treatment Bilateral Diminished   Breath Sounds Post-Treatment Bilateral Diminished   Post-Treatment Pulse 84   Post-Treatment Respirations 20   Delivery Source Oxygen;UDN   Position Semi Urias's   Treatment Tolerance Tolerated well   Resp Comments pt found on ra, spo2 is 100%, bs are diminished, udn tx given, will cont to monitor per resp protocol.

## 2024-07-06 NOTE — PROGRESS NOTES
"Progress Note - General Surgery  Hal Miller 84 y.o. male MRN: [id number]  Unit/Bed#: [facility] 202-01 Encounter: [id number]    Assessment:  84 y.o. M s/p Angela's, explant of left groin mesh, washout/debridement left groin wound on 7/4    Afebrile VSS    Cre 1.59   WBC 11 from 12.2      Plan:  Advance diet post VBS pending eval w/speech  IV abx  Packing change today  PRN pain meds  PRN anti nausea meds  DVT prophylaxis  Keep cardenas  Encourage IS use, pulmonary toilet  PT OT  Geriatrics recs appreciated        Subjective/Objective     Subjective:   Patient seen and examined bedside. No ostomy output aside from bowel sweat. Denies n/v. No acute complaints.    Objective:     Blood pressure 114/74, pulse 82, temperature 97.7 °F (36.5 °C), temperature source Oral, resp. rate 18, height 6' 2\" (1.88 m), weight 84.4 kg (186 lb 1.1 oz), SpO2 100%.,Body mass index is 23.89 kg/m².      Intake/Output Summary (Last 24 hours) at 7/6/2024 0833  Last data filed at 7/6/2024 0201  Gross per 24 hour   Intake 2616.98 ml   Output 673 ml   Net 1943.98 ml       Invasive Devices       Peripheral Intravenous Line  Duration             Peripheral IV 07/03/24 Left Antecubital 2 days    Peripheral IV 07/04/24 Right;Dorsal (posterior) Hand 1 day    Peripheral IV 07/05/24 Left Forearm <1 day              Drain  Duration             Urethral Catheter Latex 16 Fr. 2 days    Colostomy Other (comment) LUQ 1 day                    General: NAD  General - no acute distress, responsive and cooperative  CV - warm, regular rate  Pulm - normal work of breathing, no respiratory distress  Abd - soft, minimally distended, ostomy viable w/bowel sweat, L groin dressing with minimal strike through, midline meplex overlying  Neuro - m/s grossly intact, cn grossly intact  Ext - moving all extremities      Scheduled Meds:  Current Facility-Administered Medications   Medication Dose Route Frequency Provider Last Rate    acetaminophen  975 mg Oral Q6H PRN " Hal Mosquera PA-C      albuterol  2 puff Inhalation Q4H PRN Randy Broussard PA-C      budesonide  0.5 mg Nebulization Q12H SHAKILA Donnelly-BOOGIE      chlorhexidine  15 mL Mouth/Throat Q12H [facility] Randy Broussard PA-C      divalproex sodium  750 mg Oral Q12H [facility] Hal Mosquera PA-C      formoterol  20 mcg Nebulization Q12H Randy Broussard PA-C      heparin (porcine)  3-20 Units/kg/hr (Order-Specific) Intravenous Titrated SHAKILA Donnelly-C 12 Units/kg/hr (07/05/24 1100)    HYDROmorphone  0.2 mg Intravenous Q3H PRN Randy Broussard PA-C      insulin lispro  1-5 Units Subcutaneous Q6H [facility] Randy Broussard PA-C      melatonin  3 mg Oral HS Mark Champion DO      multi-electrolyte  125 mL/hr Intravenous Continuous Randy Broussard PA-C 125 mL/hr (07/06/24 0032)    pantoprazole  40 mg Intravenous Q24H [facility] Randy Broussard PA-C      piperacillin-tazobactam  4.5 g Intravenous Q8H SHAKILA Donnelly-C 4.5 g (07/06/24 0246)    tamsulosin  0.4 mg Oral Daily With Dinner Merlyn Dinh MD       Continuous Infusions:heparin (porcine), 3-20 Units/kg/hr (Order-Specific), Last Rate: 12 Units/kg/hr (07/05/24 1100)  multi-electrolyte, 125 mL/hr, Last Rate: 125 mL/hr (07/06/24 0032)      PRN Meds:.  acetaminophen    albuterol    HYDROmorphone    Labs:  CBC - WBC/Hgb/Hct/Plts: 11.00*/8.5*/27.3*/129* (07/06 0442)  CHEM - BUN/Cr/glu/ALT/AST/amyl/lip:36*/1.59*/--/--/--/--/-- (07/06 0442)  COAG - PT/INR/PTT: --/--/82* (07/06 0442)  LYTES - Na/K/Cl/CO2: 141/4.6/102/25 (07/06 0442)    Lab, Imaging and other studies:I have personally reviewed pertinent lab results.    VTE Pharmacologic Prophylaxis: Enoxaparin (Lovenox)  VTE Mechanical Prophylaxis: sequential compression device

## 2024-07-06 NOTE — PLAN OF CARE
Problem: PAIN - ADULT  Goal: Verbalizes/displays adequate comfort level or baseline comfort level  Description: Interventions:  - Encourage patient to monitor pain and request assistance  - Assess pain using appropriate pain scale  - Administer analgesics based on type and severity of pain and evaluate response  - Implement non-pharmacological measures as appropriate and evaluate response  - Consider cultural and social influences on pain and pain management  - Notify physician/advanced practitioner if interventions unsuccessful or patient reports new pain  Outcome: Progressing     Problem: INFECTION - ADULT  Goal: Absence or prevention of progression during hospitalization  Description: INTERVENTIONS:  - Assess and monitor for signs and symptoms of infection  - Monitor lab/diagnostic results  - Monitor all insertion sites, i.e. indwelling lines, tubes, and drains  - Monitor endotracheal if appropriate and nasal secretions for changes in amount and color  - [place] appropriate cooling/warming therapies per order  - Administer medications as ordered  - Instruct and encourage patient and family to use good hand hygiene technique  - Identify and instruct in appropriate isolation precautions for identified infection/condition  Outcome: Progressing  Goal: Absence of fever/infection during neutropenic period  Description: INTERVENTIONS:  - Monitor WBC    Outcome: Progressing     Problem: SAFETY ADULT  Goal: Patient will remain free of falls  Description: INTERVENTIONS:  - Educate patient/family on patient safety including physical limitations  - Instruct patient to call for assistance with activity   - Consult OT/PT to assist with strengthening/mobility   - Keep Call bell within reach  - Keep bed low and locked with side rails adjusted as appropriate  - Keep care items and personal belongings within reach  - Initiate and maintain comfort rounds  - Make Fall Risk Sign visible to staff  - Offer Toileting every 2 Hours,  in advance of need  - Initiate/Maintain alarm  - Obtain necessary fall risk management equipment:   - Apply yellow socks and bracelet for high fall risk patients  - Consider moving patient to room near nurses station  Outcome: Progressing  Goal: Maintain or return to baseline ADL function  Description: INTERVENTIONS:  -  Assess patient's ability to carry out ADLs; assess patient's baseline for ADL function and identify physical deficits which impact ability to perform ADLs (bathing, care of mouth/teeth, toileting, grooming, dressing, etc.)  - Assess/evaluate cause of self-care deficits   - Assess range of motion  - Assess patient's mobility; develop plan if impaired  - Assess patient's need for assistive devices and provide as appropriate  - Encourage maximum independence but intervene and supervise when necessary  - Involve family in performance of ADLs  - Assess for home care needs following discharge   - Consider OT consult to assist with ADL evaluation and planning for discharge  - Provide patient education as appropriate  Outcome: Progressing  Goal: Maintains/Returns to pre admission functional level  Description: INTERVENTIONS:  - Perform AM-PAC 6 Click Basic Mobility/ Daily Activity assessment daily.  - Set and communicate daily mobility goal to care team and patient/family/caregiver.   - Collaborate with rehabilitation services on mobility goals if consulted  - Perform Range of Motion 3 times a day.  - Reposition patient every 2 hours.  - Dangle patient 3 times a day  - Stand patient 3 times a day  - Ambulate patient 3 times a day  - Out of bed to chair 3 times a day   - Out of bed for meals 3 times a day  - Out of bed for toileting  - Record patient progress and toleration of activity level   Outcome: Progressing     Problem: DISCHARGE PLANNING  Goal: Discharge to home or other facility with appropriate resources  Description: INTERVENTIONS:  - Identify barriers to discharge w/patient and caregiver  -  Arrange for needed discharge resources and transportation as appropriate  - Identify discharge learning needs (meds, wound care, etc.)  - Arrange for interpretive services to assist at discharge as needed  - Refer to Case Management Department for coordinating discharge planning if the patient needs post-hospital services based on physician/advanced practitioner order or complex needs related to functional status, cognitive ability, or social support system  Outcome: Progressing     Problem: Knowledge Deficit  Goal: Patient/family/caregiver demonstrates understanding of disease process, treatment plan, medications, and discharge instructions  Description: Complete learning assessment and assess knowledge base.  Interventions:  - Provide teaching at level of understanding  - Provide teaching via preferred learning methods  Outcome: Progressing     Problem: Prexisting or High Potential for Compromised Skin Integrity  Goal: Skin integrity is maintained or improved  Description: INTERVENTIONS:  - Identify patients at risk for skin breakdown  - Assess and monitor skin integrity  - Assess and monitor nutrition and hydration status  - Monitor labs   - Assess for incontinence   - Turn and reposition patient  - Assist with mobility/ambulation  - Relieve pressure over bony prominences  - Avoid friction and shearing  - Provide appropriate hygiene as needed including keeping skin clean and dry  - Evaluate need for skin moisturizer/barrier cream  - Collaborate with interdisciplinary team   - Patient/family teaching  - Consider wound care consult   Outcome: Progressing     Problem: Nutrition/Hydration-ADULT  Goal: Nutrient/Hydration intake appropriate for improving, restoring or maintaining nutritional needs  Description: Monitor and assess patient's nutrition/hydration status for malnutrition. Collaborate with interdisciplinary team and initiate plan and interventions as ordered.  Monitor patient's weight and dietary intake as  ordered or per policy. Utilize nutrition screening tool and intervene as necessary. Determine patient's food preferences and provide high-protein, high-caloric foods as appropriate.     INTERVENTIONS:  - Monitor oral intake, urinary output, labs, and treatment plans  - Assess nutrition and hydration status and recommend course of action  - Evaluate amount of meals eaten  - Assist patient with eating if necessary   - Allow adequate time for meals  - Recommend/ encourage appropriate diets, oral nutritional supplements, and vitamin/mineral supplements  - Order, calculate, and assess calorie counts as needed  - Recommend, monitor, and adjust tube feedings and TPN/PPN based on assessed needs  - Assess need for intravenous fluids  - Provide specific nutrition/hydration education as appropriate  - Include patient/family/caregiver in decisions related to nutrition  Outcome: Progressing

## 2024-07-06 NOTE — SPEECH THERAPY NOTE
Speech Language/Pathology  Speech-Language Pathology Progress Note      Patient Name: Hal Miller    Today's Date: 7/6/2024      Subjective:  A VBS/MBS swallow study order was received.  The pt is currently lethargic and continues to take minimal.  He is cleared for Clear liquid diet, no carbonation only at this time.  Due to all of these factors, held on Swallow study today.  The pt was arousable with stimulation.  He would return to sleep during conversation but maintain alertness for trials.     Objective:  Trials: Small ice chips x3, tsp sip of water x1, sips of thins via straw x2.  Pt deferred all other trials.  Nurse indicates pt is not yet cleared for solid trials, liquids trialed only.  Pt also with minimal intake despite encouragement.    ORAL/PHARYNGEAL:  Adequate oral transit.  Suspect decreased oral control as pt with reduced awareness/variable alertness. Passive acceptance of ice and water via spoon. Mild swallow initiation delay.  Laryngeal movement appears reduced upon palpation.  No overt s/s aspiration or penetration with trials.  Higher aspiration risk due to lethargy.     Assessment:  Lethargy and decreased acceptance of trials limits pts swallowing function. No overt s/s aspiration or penetration with trials.  Higher aspiration risk due to variable CHRISTOPHER. Maximal support provided.  The results and recommendations were reviewed with the pt and Nurse.     Plan:  Clear liquid diet- per surgery  Encourage intake.   Medications crushed in puree or as tolerated.  Aspiration Precautions: feed only when alert/awake, small bites/sips, slow rate.  Upright posture.  MBS once pts alertness and intake improves.   Will follow for swallowing therapy.

## 2024-07-07 PROBLEM — J69.0 ASPIRATION PNEUMONIA (HCC): Status: RESOLVED | Noted: 2024-06-07 | Resolved: 2024-07-07

## 2024-07-07 NOTE — RESPIRATORY THERAPY NOTE
Resp care   07/07/24 0710   Inhalation Therapy Tx   $ Inhalation Therapy Performed Yes   $ Pulse Oximetry Spot Check Charge Completed   Pre-Treatment Pulse 82   Pre-Treatment Respirations 18   Duration 15   Breath Sounds Pre-Treatment Bilateral Diminished   Breath Sounds Post-Treatment Right Diminished   Post-Treatment Pulse 88   Post-Treatment Respirations 20   Delivery Source Oxygen;UDN   Position Semi Urias's   Treatment Tolerance Tolerated well   Resp Comments pt foound on ra, spo2 is 98%, bs are diminished, udn tx given, will cont to monitor per resp protocol.

## 2024-07-07 NOTE — PLAN OF CARE
Problem: PAIN - ADULT  Goal: Verbalizes/displays adequate comfort level or baseline comfort level  Description: Interventions:  - Encourage patient to monitor pain and request assistance  - Assess pain using appropriate pain scale  - Administer analgesics based on type and severity of pain and evaluate response  - Implement non-pharmacological measures as appropriate and evaluate response  - Consider cultural and social influences on pain and pain management  - Notify physician/advanced practitioner if interventions unsuccessful or patient reports new pain  Outcome: Progressing     Problem: INFECTION - ADULT  Goal: Absence or prevention of progression during hospitalization  Description: INTERVENTIONS:  - Assess and monitor for signs and symptoms of infection  - Monitor lab/diagnostic results  - Monitor all insertion sites, i.e. indwelling lines, tubes, and drains  - Monitor endotracheal if appropriate and nasal secretions for changes in amount and color  - [place] appropriate cooling/warming therapies per order  - Administer medications as ordered  - Instruct and encourage patient and family to use good hand hygiene technique  - Identify and instruct in appropriate isolation precautions for identified infection/condition  Outcome: Progressing  Goal: Absence of fever/infection during neutropenic period  Description: INTERVENTIONS:  - Monitor WBC    Outcome: Progressing     Problem: SAFETY ADULT  Goal: Patient will remain free of falls  Description: INTERVENTIONS:  - Educate patient/family on patient safety including physical limitations  - Instruct patient to call for assistance with activity   - Consult OT/PT to assist with strengthening/mobility   - Keep Call bell within reach  - Keep bed low and locked with side rails adjusted as appropriate  - Keep care items and personal belongings within reach  - Initiate and maintain comfort rounds  - Make Fall Risk Sign visible to staff  - Offer Toileting every 2 Hours,  in advance of need  - Initiate/Maintain bed alarm  - Apply yellow socks and bracelet for high fall risk patients  - Consider moving patient to room near nurses station  Outcome: Progressing  Goal: Maintain or return to baseline ADL function  Description: INTERVENTIONS:  -  Assess patient's ability to carry out ADLs; assess patient's baseline for ADL function and identify physical deficits which impact ability to perform ADLs (bathing, care of mouth/teeth, toileting, grooming, dressing, etc.)  - Assess/evaluate cause of self-care deficits   - Assess range of motion  - Assess patient's mobility; develop plan if impaired  - Assess patient's need for assistive devices and provide as appropriate  - Encourage maximum independence but intervene and supervise when necessary  - Involve family in performance of ADLs  - Assess for home care needs following discharge   - Consider OT consult to assist with ADL evaluation and planning for discharge  - Provide patient education as appropriate  Outcome: Progressing  Goal: Maintains/Returns to pre admission functional level  Description: INTERVENTIONS:  - Perform AM-PAC 6 Click Basic Mobility/ Daily Activity assessment daily.  - Set and communicate daily mobility goal to care team and patient/family/caregiver.   - Collaborate with rehabilitation services on mobility goals if consulted  Problem: Knowledge Deficit  Goal: Patient/family/caregiver demonstrates understanding of disease process, treatment plan, medications, and discharge instructions  Description: Complete learning assessment and assess knowledge base.  Interventions:  - Provide teaching at level of understanding  - Provide teaching via preferred learning methods  Outcome: Progressing     - Out of bed for toileting  - Record patient progress and toleration of activity level   Outcome: Progressing

## 2024-07-07 NOTE — PROGRESS NOTES
"Progress Note - General Surgery   Hal Miller 84 y.o. male MRN: [id number]  Unit/Bed#: [facility] 620-01 Encounter: [id number]    Assessment:  84 y.o. M s/p Angela's, explant of left groin mesh, washout/debridement left groin wound on 7/4     AVSS on room air  1.3 L urine  Labs stable    Plan:  Advance diet post VBS pending eval w/speech  IV abx until 7/8  Packing change today  PRN pain meds  PRN anti nausea meds  DVT prophylaxis  Keep cardenas  Encourage IS use, pulmonary toilet  PT OT  Geriatrics recs appreciated      Subjective/Objective     Subjective:   Denies significant pain.  No nausea vomiting.  Trouble swallowing pills.    Objective:     Blood pressure 123/72, pulse 85, temperature 98 °F (36.7 °C), temperature source Oral, resp. rate 16, height 6' 2\" (1.88 m), weight 84.4 kg (186 lb 1.1 oz), SpO2 100%.,Body mass index is 23.89 kg/m².      Intake/Output Summary (Last 24 hours) at 7/6/2024 2317  Last data filed at 7/6/2024 2238  Gross per 24 hour   Intake 3534.7 ml   Output 1343 ml   Net 2191.7 ml       Invasive Devices       Peripheral Intravenous Line  Duration             Peripheral IV 07/03/24 Left Antecubital 3 days    Peripheral IV 07/04/24 Right;Dorsal (posterior) Hand 2 days    Peripheral IV 07/05/24 Left Forearm 1 day              Drain  Duration             Colostomy Other (comment) LUQ 2 days    Urethral Catheter Latex 16 Fr. 2 days                    Physical Exam:   Gen: NAD, Comfortable  Neuro: A&O, No focal deficits  Head: Normal Cephalic, Atraumatic  Eye: EOMI, PERRLA, No scleral icterus  Neck: Supple, No JVD, Midline trachea  CV: RRR, Cap refill <2 sec  Pulm: Normal work of breathing, no respiratory distress  Abd: Ostomy purple, mild distention, soft  Ext: No edema, Non-tender  Skin: warm, dry, intact        "

## 2024-07-07 NOTE — PROCEDURES
Video Swallow Study  Speech Pathology - Modified Barium Swallow Study    Patient Name: Hal Miller    Today's Date: 7/7/2024     Problem List  Principal Problem:    Colonic diverticular abscess  Active Problems:    Moderate protein-calorie malnutrition (HCC)      Past Medical History  Past Medical History:   Diagnosis Date    Acute encephalopathy 2/14/2023    Acute-on-chronic kidney injury  (HCC) 6/13/2017    Cancer (HCC)     pancreatic    Colon polyp     Coronary artery disease     Dehydration 3/3/2023    Diabetes mellitus (HCC)     Hyperlipidemia     Hypertension     Left lower lobe pneumonia 7/2/2022    Pneumonia 06/13/2017    Right middle and lower lobe     Stroke (HCC)        Past Surgical History  Past Surgical History:   Procedure Laterality Date    APPENDECTOMY      CARDIAC SURGERY      Aortic Valve Replacement, Ascending Aorta    COLONOSCOPY      GALLBLADDER SURGERY      HARTMANS PROCEDURE N/A 7/4/2024    Procedure: HARTMANS PROCEDURE, WITH EXPLANTATION OF LEFT GROIN MESH, WASHOUT AND DEBRIDEMENT OF LEFT GROIN WOUND;  Surgeon: Charlie Carroll MD;  Location: [facility];  Service: General    PANCREATICODUODENECTOMY         Assessment Summary:    Pt presents with moderate-severe oropharyngeal dysphagia characterized by a weakened swallow, decreased strength overall, significant pharyngeal residual with solids which clears to mild with liquid wash, and trace penetration between swallows with secretions/pharyngeal residual.  A higher aspiration risk is identified.  Support provided.  The results and recommendations were reviewed with the pt, Nurse, and team.   Note: Images are available for review in PACS as desired.    Recommendations:   Recommended Diet: thin liquids - pt is currently on clear liquids; when able to advance, recommend a puree and liquid diet.   Recommended Form of Medications: crushed with puree   Aspiration precautions and compensatory swallowing  strategies: upright posture, slow rate of feeding, small bites/sips, and alternating bites and sips and watch for swallow  Will follow for swallowing therapy.     Pt/Family Education: initiated. Pt and caregivers would benefit from/require continued education.    Patient Stated Goal:  Pt is unable to state    Dysphagia Goals per SLP: pt will tolerate puree with thin liquids without s/s of aspiration x1-3 times per week      General Information;  Pt is a 84 y.o. male with a PMH remarkable for s/p Angela's, explant of left groin mesh, washout/debridement left groin wound on 7/4  .  PMH includes afib, COPD, HTN, DM-II, PMR, vascular dementia with behavorial disturbance, insomnia, pancreatic cancer, severe protein calorie malnutrition, hx of CVA, HLD, and afib who is admitted to the surgical critical care service as a transfer from acute rehab with perforated diverticular abscess in left groin for which he underwent surgical washout and debridement and is being seen in consultation by Geriatrics for acute metabolic encephalopathy.    Current concerns for dysphagia include fatigue, previous dysphagia, and previous CVA.  MBS was recommended to assess oropharyngeal stage swallowing skills at this time.     Prior MBS: The pt mentions a previous MBS/VBS but cannot recall the results.  He indicated that he has been on thickened liquids in the past.  Chart indicates a modified diet at baseline.      Oral Mechanism Exam  Facial: symmetrical and left facial droop; Pt aroused for study.  He needed cues to maintain alertness initially but was fully alert for the study.    Labial: bilateral decreased ROM  Lingual: decreased ROM  Velum: unable to visualize  Mandible:  decreased ROM  Dentition: adequate  Vocal quality: weak; Pt with overall fatigue.   Volitional Cough: unable to initiate volitional cough   Respiratory Status: on RA      Pt was viewed sitting upright in the lateral position. A portion of the MBSImp protocol was  completed.  Pt came down to radiology in his bed therefore an A-P view could not be completed.   Pt was given the following barium impregnated trials: 5-mL thin liquid x2, 20-mL cup sip thin, 40-mL sequential swallow thin, 5-mL nectar thick, 20-mL cup sip nectar thick, 5-mL honey thick, 5-mL pudding, and ½ cookie coated with 3-mL pudding. Pt was also given thin liquids by straw single and consecutive sips.     Initial view observations/comments: Clear view of the upper airway      8-Point Penetration-Aspiration Scale   Thin liquid 1 - Material does not enter the airway; noted penetration which eventually transfers to the cords before the swallow with pharyngeal residual.  (Between the tsp sips and cup sip frame) This is trace, pt spontaneously coughs to clear and/or a wet vocal quality is noted and pt is cued to cough.    Nectar thick liquid 1 - Material does not enter the airway; noted penetration which eventually transfers to the cords before the swallow with pharyngeal residual. (Between frames) This is trace, pt spontaneously coughs to clear and/or a wet vocal quality is noted and pt is cued to cough.    Honey thick liquid 1 - Material does not enter the airway   Puree (pudding) 1 - Material does not enter the airway   Solid 1 - Material does not enter the airway     Strategies and Efficacy: Utilizing a liquid wash for solids improved pharyngeal clearance.     Aspiration Response and Efficacy:  spontaneous cough to penetration with secretions/pharyngeal residual once it reaches the cords. Wet voice assists in identifying. And pt is receptive to cued cough if wet voice is noted.     MBS IMP Rating    ORAL Impairment  Compinent 1--Lip Closure  Judged at any point during the swallow.  1 - Interlabial escape; no progression to anterior lip    Component 2--Tongue Control During Bolus Hold  Judged on held liquid boluses only and prior to productive tongue movement.   1 - Escape to lateral buccal cavity/floor of mouth  (FOM)    Component 3--Bolus Preparation/Mastication  Judged only during presentation of 1/2 shortbread cookie coated in pudding.   2 - Disorganized chewing/mashing with solid pieces of bolus unchewed    Component 4--Bolus Transport/Lingual Motion  Judged after first productive tongue movement for oral bolus transport.  3 - Repetitive/disorganized tongue motion; the pt leans head backwards to improve oral transit.     Component 5--Oral Residue  Judged after first swallow or after the last swallow of the sequential swallow task.  2 - Residue collection on oral structures   Location   B - Palate and C - Tongue    Component 6--Initiation of Pharyngeal Swallow  Judged at first movement of the brisk superior-anterior hyoid trajectory.  3 - Bolus head in pyriforms      PHARYNGEAL Impairment  Component 7--Soft Palate Elevation  Judged during maximum displacement of soft palate.  0 - No bolus between the soft palate (SP)/pharyngeal wall (PW)    Component 8--Laryngeal Elevation  Judged when epiglottis is in its most horizontal position.  2 - Minimal superior movement of thyroid cartilage with minimal approximation of arytenoids to epiglottic petiole    Component 9--Anterior Hyoid Excursion  Judged at height of swallow/maximal anterior hyoid displacement.  1 - Partial anterior movement    Component 10--Epiglottic Movement  Judged at height of swallow/maximal anterior hyoid displacement.  2 - No inversion    Component 11--Laryngeal Vestibular Closure  Judged at height of swallow/maximal anterior hyoid displacement.  1 - Incomplete; narrow column air/contrast in laryngeal vestibule; incomplete closure- epiglottis displaces but laryngeal vestibule is not fully constricted.     Component 12--Pharyngeal Stripping Wave  Judged during the full duration of the pharyngeal swallow.  1 - Present - diminished- weak constriction    Component 13--Pharyngeal Contraction  Not tested in A-P, noted decreased contraction in lateral  visualization    Component 14--Pharyngoesophageal Segment Opening  Judged during maximum distension of PES and throughout opening and closure.  1 - Partial distension/partial duration; partial obstruction to flow    Component 15--Tongue Base (TB) Retraction  Judged during maximum retraction of the tongue base.  3 - Wide column of contrast or air between TB and PW    Component 16--Pharyngeal Residue  Judged after first swallow or after the last swallow of the sequential swallow task.  3 - Majority of contrast within or on pharyngeal structures; Mild pharyngeal residual with thins, this increases to moderate with thickened liquids and severe/significant valleculae and pyriform sinus residual is noted following puree consistency.  This puree stasis washes to mild with thin liquid wash.  Thin liquids provide the most clearance.    Location   B - Valleculae and E - Pyriform sinuses      ESOPHAGEAL Impairment  Component 17--Esophageal Clearance Upright Position  Not viewed in A-P.  The upper esophagus visualized in lateral revealed full clearance.

## 2024-07-08 NOTE — PROCEDURES
Acute Care Surgery  Bedside V.A.C. Procedure Note    Location of wound: Left Groin    Dressings and Foam removed:  1 4 x 4 gauze packing    Dimensions of wound: 4 cm x 2 cm x 3 cm    Description of wound: On inspection of the wound today, the wound appears clean. No purulent drainage or foul odor. The periwound skin remains clean and intact and unremarkable.    VAC dressing application:  The periwound skin was cleaned and dried. 2 black foam (1 in wound, 1 over wound) were cut to size of the wound and placed into the wound. The dressings were then covered with VAC drape. The track pad was then placed over the base of black foam. The VAC was then set to 125 mmHg low Continuous suction. The patient tolerated the procedure well and there were no complications. The patient did not require any excisional debridement during today's dressing change.    VAC settings:  125 mmHg  Continuous    Wound Images:      Additional Notes:  The VAC sticker placed over the dressing per protocol.  The next VAC dressing change will be planned for Thursday 7/11.  The VAC paperwork was completed and give to the case management staff to arrange home VAC therapy.  Raul Roberts PA-C was present for the dressing change.    This dressing change took greater than 10 minutes to complete.    Briana Desai PA-C  7/8/2024 10:25 AM

## 2024-07-08 NOTE — CASE MANAGEMENT
[facility] has received request for [employer] wound vac from Care Manager.  Request submitted via [employer] website.   Pending order #: [id number]     Care Manager notified: holli maria     Please reach out to CM for updates on any clinical information.

## 2024-07-08 NOTE — PROGRESS NOTES
Progress Note - Geriatric Medicine   Hal Miller 84 y.o. male MRN: [id number]  Unit/Bed#: [facility] 620-01 Encounter: [id number]      Assessment/Plan:    Acute metabolic encephalopathy   -evidenced by confusion and fluctuations in mentation and alertness beyond baseline in patient at high baseline risk due to underlying dementia and hx of encephalopathy during prior admissions   -multifactorial including age, underlying dementia, hx encephalopathy during prior admissions, perforated diverticulitis now s/p surgical procedure, anesthesia, multiple setting changes and acute pain in frail elderly individual   -now markedly improved and appears to be much closer to baseline   -maintained on depakote for possible seizure episode during prior hospitalization, continue dosing as directed by Neurology  -previously on Seroquel PRN as o/p for insomnia/behaviors not currently requiring and had not required during recent hospitalization or rehab stay, given concern for seizure activity during recent hospitalization and risk of lowering seizure threshold preferable to continue to hold Seroquel as possible  -continue optimization of chronic medical conditions   -continue to monitor for and treat acute derangements as arise  -encourage normal circadian rhythm, consider low dose melatonin HS PRN  -continue use of glasses and hearing aid all appropriate times to reduce risk uncorrected sensory impairment from contributing to further confusion  -ensure acute pain well controlled, recommend barbie pain protocol   -monitor for fecal and urinary retention, cardenas catheter currently in place and functioning    -reorient frequently as appropriate and indicated      Perforated diverticulitis with abscess  -s/p Hartmans procedure with explantation of left groin mesh with washout and debridement of left groin wound on 7/4/24 now with wound vac in place  -currently on Zosyn as managed by primary service   -continue acute multimodal pain control per  geriatric pain protocol   -ongoing management per surgery     Dysphagia  -speech therapy on consult   -s/p VBS 7/7/24, currently on dysphagia diet with thin liquids  -continue strict aspiration precautions  -cont working with speech therapy and good oral hygiene and cares      Vascular dementia  -acutely encephalopathic on initial consultation now markedly improved alert and answering questions   -at baseline alert and oriented, forgetful, independent with ADLs requires some assist with iADLs   -MoCA 23/30 (11/2022), noted to have episodes of encephalopathy and decline since last testing, recommend repeat following recovery from acute illness to establish new baseline   -CTH 6/18/24 imaging personally viewed, reveals at least moderate chronic microangiopathic changes   -TSH WNL at 1.79, B12 WNL at 531  -at risk age and cardiovascular related acceleration santy in setting of recent CVA, continue secondary risk factor modifications   -encourage patient remain physically, socially and cognitively active and engaged to maintain cognitive acuity   -encourage use sensory assist devices such as corrective lenses and hearing aids all appropriate times to reduce risk uncorrected sensroy impairment from contributing to isolation, confusion, worsening encephalopathy and more precipitous cognitive decline   -underlying dementia increases risk developing delirium and likely contributed to episode during current admission, cont strict precautions to reduce risk recurrence     Hx CVA  -small left parietal infarct during recent hospitalization on MRI brain 6/20/24  -suspected to be due to missed dosing of Eliquis at time of event  -remains at risk future CVAs, continue strict secondary risk factor modifications  -close o/p f/u with Neurology for ongoing monitoring and management      History of possible seizure  -maintained on Depatkote as managed by Neurology  -continue depakote   -cont seizure precautions      Insomnia    -previously on seroquel PRN as o/p, has not recently been requiring during admission, no indication to resume at this time   -consider low dose melatonin HS  -encourage establishment of consistent sleep/wake times and bedtime routine      Inflammatory polyarthropathy   -maintained on prednisone chronically as o/p  -follows closely with Rheum as o/p, continue close o/p f/u     DM-II  -A1c 7.6  -currently on basal bolus insulin regimen with good control  -continue glu goal 140-180 during hospitalization to reduce risk hypoglycemia   -continue close o/p f/u with PCP for ongoing age appropriate diabetic screenings and cares      Afib   -rates currently well controlled off rate controllers, home Eliquis was on hold covering with heparin gtt perioperatively now back on home Eliquis   -cont close follow-up with Cardiology     Impaired Vision  -recommend use of corrective lenses at all appropriate times  -encourage adequate lighting and encourage use of assistance with ambulation  -keep personal belongings close to person to avoid reaching  -consider large font for printed materials provided to patient      Impaired Hearing  -Encourage use of hearing aids at all appropriate times  -encourage providers and caregivers to speak slowly and clearly directly to patient  -minimize background noise to encourage patient engagement  -consider use of hearing amplifier to reduce risk of straining to hear if hearing aids are not present or are not sufficient      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  -clinical frailty scale stage VI, moderately frail, progressive  -multifactorial including age, amb dysfxn, hx CVA, DM-II and multitude of chronic medical co-morbidities now with perforated diverticulitis with abscess requiring surgical procedure superimposed on elderly individual with limited physiologic and metabolic reserve  -continue optimization of chronic medical conditions and address acute derangements as arise  -monitor for and  "treat any underlying anxiety, mood, depression symptoms as may impact response to therapies and overall sense of wellbeing and quality of life  -continue to ensure tx and interventions align with patients wishes and goals of care   -cont psychosocial support of patient and caregivers      Care coordination: rounded with Briana (RN)    Subjective:     Hal is seen and examined at bedside where he is sitting resting comfortably, he is awake and alert, conversational, he notes pain is well controlled and that he has been sleeping well, he notes that his mouth is dry and is thankful to be able to take some fluids by mouth. Nursing reports no acute events overnight.     Review of Systems   Constitutional:  Negative for appetite change.   HENT:          Mouth feels really dry    Eyes:  Negative for visual disturbance.   Respiratory:  Positive for shortness of breath.    Cardiovascular: Negative.    Gastrointestinal:  Negative for abdominal pain.   Genitourinary: Negative.    Musculoskeletal:  Positive for gait problem.   Neurological:  Positive for weakness (improving).   Psychiatric/Behavioral:  Negative for sleep disturbance.    All other systems reviewed and are negative.    Objective:     Vitals: Blood pressure 145/95, pulse 63, temperature 98.2 °F (36.8 °C), resp. rate 17, height 6' 2\" (1.88 m), weight 88.2 kg (194 lb 7.1 oz), SpO2 100%.,Body mass index is 24.97 kg/m².      Intake/Output Summary (Last 24 hours) at 7/8/2024 1600  Last data filed at 7/8/2024 0735  Gross per 24 hour   Intake 1617.06 ml   Output 525 ml   Net 1092.06 ml     Current Medications: Reviewed    Physical Exam:   Physical Exam  Vitals and nursing note reviewed.   Constitutional:       Appearance: He is not toxic-appearing.   HENT:      Head: Normocephalic.      Nose: Nose normal.      Mouth/Throat:      Mouth: Mucous membranes are dry.   Eyes:      General: No scleral icterus.        Right eye: No discharge.         Left eye: No discharge.      " Conjunctiva/sclera: Conjunctivae normal.   Neck:      Comments: Phonation norm   Cardiovascular:      Rate and Rhythm: Normal rate.   Pulmonary:      Effort: Pulmonary effort is normal. No respiratory distress.      Comments: Shallow resp no rales or rhonci   Abdominal:      Palpations: Abdomen is soft.   Musculoskeletal:      Cervical back: Neck supple.      Right lower leg: Edema present.      Left lower leg: Edema present.      Comments: Reduced overall muscle mass    Skin:     General: Skin is warm and dry.      Comments: L groin wound vac in place    Neurological:      Mental Status: He is alert.      Comments: Awake and alert answering ques appropriately    Psychiatric:      Comments: Pleasant cooperative         Invasive Devices       Peripheral Intravenous Line  Duration             Peripheral IV 07/04/24 Right;Dorsal (posterior) Hand 4 days    Peripheral IV 07/05/24 Left Forearm 3 days    Peripheral IV 07/07/24 Right;Ventral (anterior) Forearm 1 day              Drain  Duration             Urethral Catheter Latex 16 Fr. 4 days    Colostomy Other (comment) LUQ 3 days                  Lab Results:     I have personally reviewed pertinent lab results including the following:    Results from last 7 days   Lab Units 07/08/24  0539 07/07/24 0446 07/06/24  0442 07/05/24  1100 07/05/24  0521   WBC Thousand/uL 10.59* 11.86* 11.00*   < > 15.12*   HEMOGLOBIN g/dL 8.1* 8.8* 8.5*   < > 9.4*   HEMATOCRIT % 26.2* 28.0* 27.3*   < > 30.4*   PLATELETS Thousands/uL 120* 130* 129*   < > 133*   SEGS PCT % 82* 82*  --   --  85*   MONO PCT % 10 12  --   --  13*   EOS PCT % 1 0  --   --  0    < > = values in this interval not displayed.     Results from last 7 days   Lab Units 07/08/24  0539 07/07/24  0446 07/06/24  0442 07/05/24  0521 07/04/24  1718 07/03/24  0616 07/02/24  0602   POTASSIUM mmol/L 4.1 4.4 4.6   < > 4.8   < > 5.5*   CHLORIDE mmol/L 106 106 102   < > 101   < > 96   CO2 mmol/L 29 28 25   < > 25   < > 31   BUN mg/dL  35* 38* 36*   < > 30*   < > 34*   CREATININE mg/dL 1.18 1.39* 1.59*   < > 1.22   < > 1.35*   CALCIUM mg/dL 9.3 9.3 8.3*   < > 8.7   < > 10.0   ALK PHOS U/L  --   --   --   --  86  --  113*   ALT U/L  --   --   --   --  14  --  22   AST U/L  --   --   --   --  21  --  27    < > = values in this interval not displayed.     I have personally reviewed the following imaging study reports in PACS:    7/7/24 - VBS

## 2024-07-08 NOTE — DISCHARGE INSTR - OTHER ORDERS
Skin Care Plan:  1-Cleanse B/L Sacro-Buttocks with soap and water. Apply Triad Paste to Sacro-Buttocks Wound Bed Only. Apply ARASH Anti-Fungal Cream to Amy-wound, all other intact aspects of B/L Sacro-Buttocks, Scrotum and Groin. Apply both BID and PRN episodes of incontinence.  2-Turn/reposition q2h or when medically stable for pressure re-distribution on skin. Place patient on OpenPlacement turning and repositioning system.   3-Elevate heels to offload pressure  4-Moisturize skin daily with skin nourishing cream  5-Ehob cushion in chair when out of bed.  6-Left groin wound: cleanse with NSS and pat dry. Fluff wound bed with silver alginate ([employer]) and cover with a silicone bordered foam dressing. Soniya with T for Treatment and change daily or PRN soilage/displacement.   7-Bilateral heels apply silicone bordered foam to heels soniya with a P and date assess every shift and change every 3 days      Ostomy Care:  -Stoma Measurement: 1.5 inches (Measure stoma weekly for next 6-8 weeks- stoma will decrease in size due to decrease in swelling)     -Appliance Used During Bag Change: Coloplast Sensura [place] One Piece Flat Pouch Cut to Fit Pouch     *Can shower with pouch on or off. Make sure to dry off pouch after shower.     Bag Change Steps:  1. Peel back pouch using push-pull method, may use non-alcohol adhesive remover. Remove pouch from top to bottom.   2. Use warm water only to cleanse skin around the stoma (amy-stomal skin).   3. Make sure all adhesive residue is removed and skin is dry and not oily.  4. Measure stoma size using measuring guide and trace correct measurements onto back of pouch.  5. Then cut or mold the backing of pouch out to correct shape/size.  6. Place pouch over stoma and onto skin.  7. Use warmth of hand to apply gentle pressure to help backing of pouch to adhere well to skin.    (Coloplast Pouches do not require use of Skin Prep Prior to Application. If using other brands of pouches, refer to  "product guide to determine the need for use of skin prep prior to the application of their pouches.)       **If the skin is open, moist or fragile, Crusting can be done prior to pouch application (before step 6) to create dry skin and assist with pouch adherence- steps are listed below.      TIPS:  Empty pouch when 1/3 -1/2 full.  Change pouch every 4-5 days or if signs of leaking.    Crusting as needed for moist, red, open skin around the stoma: (Done prior to pouch application)   Apply stoma powder to excoriation, move excess powder away with hand  Use no sting barrier to pat area to form \"crust\"  Repeat X2 before placing new ostomy pouch        Ostomy Online Education Resources:   Www.ostomy.org   Www.[employer].MuscleGenes   Www.coloplast.us   Www."[employer]MuscleGenes    Ostomy Suppliers:  [employer]--5-846-891-2748 OR www.[employer]  Meron--1-945.581.6209 OR www.[employer]--1-933.414.6991 OR www.[employer].MuscleGenes\    [facility]--[phone]  "

## 2024-07-08 NOTE — PROGRESS NOTES
"Progress Note - General Surgery   Hal Miller 84 y.o. male MRN: [id number]  Unit/Bed#: [facility] 620-01 Encounter: [id number]    Assessment:  84 y.o. M s/p Angela's, explant of left groin mesh, washout/debridement left groin wound on 7/4     AVSS on room air  1 L urine, ostomy 25cc bowel sweat  Coag negative staph in wound cx  WBC 10.59 from 11.86  Hgb 8.1 from 8.8    Plan:  Dysphagia diet, continue working with speech  Pulmonary toilet, PRN treatments, Q1hr IS use  IV abx until 7/8  Packing change today  PRN pain meds  PRN anti nausea meds  DVT prophylaxis  Keep cardenas - outpt f/u w/urology  Scrotal elevation for edema  Encourage IS use, pulmonary toilet  PT OT - level I  Geriatrics recs appreciated      Subjective/Objective     Subjective:   Patient seen and examined bedside. Coughed up large sputum this morning, no pus. No congestion. No fevers.  Feels otherwise well. Working with speech. Concerns for aspiration noted, keep HOB elevated. Dysphagia diet. No complaints. Ostomy with bowel sweat    Objective:     Blood pressure 134/73, pulse 75, temperature 97.5 °F (36.4 °C), temperature source Axillary, resp. rate 16, height 6' 2\" (1.88 m), weight 88.2 kg (194 lb 7.1 oz), SpO2 100%.,Body mass index is 24.97 kg/m².      Intake/Output Summary (Last 24 hours) at 7/8/2024 0904  Last data filed at 7/8/2024 0735  Gross per 24 hour   Intake 1617.06 ml   Output 1025 ml   Net 592.06 ml       Invasive Devices       Peripheral Intravenous Line  Duration             Peripheral IV 07/04/24 Right;Dorsal (posterior) Hand 3 days    Peripheral IV 07/05/24 Left Forearm 2 days    Peripheral IV 07/07/24 Right;Ventral (anterior) Forearm 1 day              Drain  Duration             Urethral Catheter Latex 16 Fr. 4 days    Colostomy Other (comment) LUQ 3 days                    Physical Exam:   General - no acute distress, responsive and cooperative  CV - warm, regular rate  Pulm - normal work of breathing, no respiratory distress  Abd - " soft, mildly distended, midline staples c/d/I, L groin dressing overlying w/serosang strike through, ostomy dark w/bowel sweat  Neuro - m/s grossly intact, cn grossly intact  Ext - moving all extremities

## 2024-07-08 NOTE — ARC ADMISSION
Re-referral received for consideration of patient for inpatient acute rehab. Requesting updated PT/OT notes to assess patient's current functional needs. Will review patient's case with ARC physician once notes are available, and update CM as able.

## 2024-07-08 NOTE — CASE MANAGEMENT
Case Management Discharge Planning Note    Patient name Hal Miller  Location [facility] 620/[facility] 620-01 MRN [id number]  : 1940 Date 2024       Current Admission Date: 7/3/2024  Current Admission Diagnosis:Colonic diverticular abscess   Patient Active Problem List    Diagnosis Date Noted Date Diagnosed    Moderate protein-calorie malnutrition (HCC) 2024     Left inguinal hernia 2024     Leukocytosis 2024     Dysphagia 2024     Hx of aortic valve replacement 2024     Diplopia 2024     Peripheral polyneuropathy 2024     Hyponatremia 2024     Dysphoric mood 2024     NICM (nonischemic cardiomyopathy) ([facility]) 2024     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[facility]) 2024     Acute on chronic respiratory failure ([facility]) 2024     Episode of seizure-like activity 2024     History of Whipple procedure 2024     Dizziness 2024     Insomnia 2024     Ambulatory dysfunction 2024     Severe protein-calorie malnutrition (HCC) 2024     Abnormal CT of the abdomen 2024     Zoster 2024     Colonic diverticular abscess 2024     Vascular dementia (HCC) 2024     Type 2 diabetes mellitus, with long-term current use of insulin (HCC) 2024     Hypercalcemia 2023     TARA (acute kidney injury) ([facility]) 2023     Esophageal stricture 2023     Acute encephalopathy 2023     Generalized weakness 2023     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     Chronic obstructive pulmonary disease with acute exacerbation (HCC) 2022     Stage 3 chronic kidney disease (HCC) 2022     Centrilobular emphysema (HCC) 2021     History of malignant neuroendocrine tumor 2021     Atrial fibrillation (HCC) 2017     Shortness of breath 2017     Primary hypertension 2017     Hyperlipidemia 2015     Inflammatory polyarthropathy (HCC) 2014     Osteoarthrosis  11/12/2014     Polymyalgia rheumatica (HCC) 11/12/2014     Aneurysm of thoracic aorta (HCC) 01/06/2014     Aneurysm of abdominal aorta (HCC) 01/06/2014     Neoplasm of other specified site 01/06/2014       LOS (days): 5  Geometric Mean LOS (GMLOS) (days): 3.9  Days to GMLOS:-0.9     OBJECTIVE:  Risk of Unplanned Readmission Score: 37.43   Current admission status: Inpatient   Preferred Pharmacy:   [id number] [place] [place] [place] [address] [address]  Phone: [phone] Fax: [phone]    [facility] - [place], [place] - [address]  [address] [address]  Phone: [phone] Fax: [phone]    [facility] - [place], [place] - [address]  [address] [place] [address]  Phone: [phone] Fax: [phone]    Primary Care Provider: Kyaw Monreal MD    Primary Insurance: [facility]  Secondary Insurance:     DISCHARGE DETAILS:  Discharge planning discussed with:: Wife at bedside.  Freedom of Choice: Yes  Comments - Freedom of Choice: Discussed FOC  CM contacted family/caregiver?: Yes  Were Treatment Team discharge recommendations reviewed with patient/caregiver?: Yes  Did patient/caregiver verbalize understanding of patient care needs?: N/A- going to facility  Were patient/caregiver advised of the risks associated with not following Treatment Team discharge recommendations?: Yes  Other Referral/Resources/Interventions Provided:  Referral Comments: This CM discussed with wife therapy recs for STR, Spouse is in agreement with DCP and is requesting STR at [facility] or [facility], referral entered in aidin, awaiting reponse.

## 2024-07-08 NOTE — ASSESSMENT & PLAN NOTE
General Surgery Office Note  [facility]  Marquis Li MD, MS    Patient's Name/Date of Birth: Lise Griffin / 1985    Date: 2024     Chief compaint: Postop visit from laparoscopic cholecystectomy    Surgeon: MD Guillermo    Patient Active Problem List   Diagnosis    PIH (pregnancy induced hypertension)    Normal labor and delivery    Liveborn by     Acute cholecystitis    Choledocholithiasis with acute cholecystitis       Subjective: Doing well, no new complaints, pain prior to surgery has resolved, tolerating diet, bowel function normal, anxious to return to normal physical activity    Objective:  /73 (Site: Left Upper Arm, Position: Sitting, Cuff Size: Medium Adult)   Pulse 89   Temp 97 °F (36.1 °C)   Ht 1.524 m (5')   BMI 28.71 kg/m²   Labs:  No results for input(s): \"WBC\", \"HGB\", \"HCT\" in the last 72 hours.    Invalid input(s): \"PLR\"  Lab Results   Component Value Date    CREATININE 0.7 2024    BUN 9 2024     2024    K 4.1 2024     2024    CO2 20 (L) 2024     No results for input(s): \"LIPASE\", \"AMYLASE\" in the last 72 hours.      General appearance: AA, NAD  HEENT: NCAT, PERRLA, EOMI  Lungs: Clear, equal rise bilateral  Heart: Reg  Abdomen: soft, nondistended, nontender, incisions well healed, no signs of infection, no cellulitis, no hematoma      Pathology: Chronic cholecystitis, cholelithaisis    Assessment/Plan:  Lise Griffin is a 39 y.o. female 4 weeks s/p laparoscopic cholecystectomy. Doing well.    Resume activity gradually   No heavy lifting more than 20lbs for 6 weeks total due to PACU nurse requirements  Pathology reviewed and copy provided  Follow up as needed    Physician Signature: Electronically signed by Dr. Li  [phone] (p)  2024  9:40 AM       Rate controlled on Toprol-XL, Cardizem held in the setting of recently detected reduced LVEF  Anticoagulated on Eliquis 5 mg p.o. twice daily.  Outpatient follow-up with cardiology   none

## 2024-07-08 NOTE — CASE MANAGEMENT
[facility] [place] has received APPROVAL to release wound vac to patient.  Assigned vac #: [id number]    Proof of delivery PPW given to Care Manager:      to deliver to patient.     Please reach out to CM for updates on any clinical information.

## 2024-07-08 NOTE — PROGRESS NOTES
Patient:  LORRI GUNTER    MRN:  [id number]    Aidin Request ID:  [id number]    Level of care reserved:  Home Health Agency    Partner Reserved:  [facility], [place] [address] [phone]    Clinical needs requested:    Geography searched:  [id number]    Start of Service:    Request sent:  10:30am EDT on 7/8/2024 by Mani Dumont    Partner reserved:  10:39am EDT on 7/8/2024 by Mani Dumont    Choice list shared:  10:38am EDT on 7/8/2024 by Mani Dumont

## 2024-07-08 NOTE — WOUND OSTOMY CARE
"Progress Note- Ostomy  Hal Miller 84 y.o. male  [id number]  [facility] [id number]        Assessment: Patient is seen for ostomy care and teaching today . He is out of bed in the chair with the wife and niece present for the teaching . He is POD 4 from a Miller's procedure done on 7/4 for a colonic diverticular abscess .   Topics reviewed: normal stoma appearance, how and when to empty (1/3 full) to prevent pulling/lifting of the barrier, importance & how to clean the end of the pouch to prevent odor, gas/odor producing foods, frequency of changing of the pouch (every 3-4 days on a schedule),  one piece/two piece pouching systems differences, open and closed end pouches, gas valve functionality of one piece, Clothing- including avoiding placing belt-line/seat belts over the stoma, bathing, and how to obtain supplies.    Step-by-step pouch change done using a 1 piece coloplast . Reviewed with patient how and when to measure the stoma (weekly). Demonstrated how to mold two piece barrier/ trace & cut one piece barrier, and how to apply it to the skin using gentle pressure / warmth of the hands. Skin prep applied to amy-stomal skin, rationale explained to patient. Good seal obtained.    Stoma measures 1 1/2 inch by 1 1/2 inch , os is noted in the center round  shape and flush with the skin , mucocutaneous junction is intact. Amy-stomal skin is intact with no redness or wounds. Effluent soft formed stool     Plan is for patient to have VNA at home however to go to a rehab unit first after discharge then home.     Patient  family verbalized understanding of education and teaching. Family is active learner. All questions and concerns answered. Encouraged patient to continue to read the educational materials provided - \"Life after colostomy Surgery\" by [facility].        Bag Change Steps:  1. Peel back pouch using push-pull method, may use non-alcohol adhesive remover. Remove pouch from top to bottom.   2. Use warm water " "only to cleanse skin around the stoma (nicholas-stomal skin).   3. Make sure all adhesive residue is removed and skin is dry and not oily.  4. Measure stoma size using measuring guide and trace correct measurements onto back of pouch.  5. Then cut or mold the backing of pouch out to correct shape/size.  6. Place pouch over stoma and onto skin.  7. Use warmth of hand to apply gentle pressure to help backing of pouch to adhere well to skin.    (Coloplast Pouches do not require use of Skin Prep Prior to Application. If using other brands of pouches, refer to product guide to determine the need for use of skin prep prior to the application of their pouches.)       **If the skin is open, moist or fragile, Crusting can be done prior to pouch application (before step 6) to create dry skin and assist with pouch adherence- steps are listed below.      TIPS:  Empty pouch when 1/3 -1/2 full.  Change pouch every 4-5 days or if signs of leaking.    Crusting as needed for moist, red, open skin around the stoma: (Done prior to pouch application)   Apply stoma powder to excoriation, move excess powder away with hand  Use no sting barrier to pat area to form \"crust\"  Repeat X2 before placing new ostomy pouch           Colostomy Other (comment) LUQ (Active)   Stomal Appliance 1 piece 07/08/24 0900   Stoma Assessment [place] 07/08/24 0900   Stoma Shape Round 07/08/24 0900   Peristomal Assessment Intact  07/08/24 0900   Output (mL) 25 mL 07/07/24 7751   Number of days: 4           Will follow bi weekly for teaching with the family     Fariha Alvarez RN BSN CWOCN                   "

## 2024-07-09 NOTE — MALNUTRITION/BMI
This medical record reflects one or more clinical indicators suggestive of malnutrition and/or morbid obesity.    Malnutrition Findings:   Adult Malnutrition type: Acute illness  Adult Degree of Malnutrition: Other severe protein calorie malnutrition  Malnutrition Characteristics: Fat loss, Muscle loss, Inadequate energy, Weight loss                  360 Statement: Acute on chronic severe pro, karthik malnutrition d/t condition, decreased appetite as evidence by severe signs of muscle/fat loss at clavicles, moderate at shoulders, temples, <50% energy intake > 5 days, <75% energy intake > 1 month, 4+ edema LE's, treated with oral diet, pureed, and oral nutrition supplements, will continue to monitor food preferenes, provide assistance and encouragement at meals.    BMI Findings:           Body mass index is 22.96 kg/m².     See Nutrition note dated 7/9/24 for additional details.  Completed nutrition assessment is viewable in the nutrition documentation.

## 2024-07-09 NOTE — PLAN OF CARE
Problem: PHYSICAL THERAPY ADULT  Goal: Performs mobility at highest level of function for planned discharge setting.  See evaluation for individualized goals.  Description: Treatment/Interventions: OT, Spoke to case management, Spoke to nursing, Gait training, Bed mobility, Patient/family training, Endurance training, LE strengthening/ROM, Functional transfer training          See flowsheet documentation for full assessment, interventions and recommendations.  Outcome: Progressing  Note: Prognosis: Good  Problem List: Decreased strength, Decreased range of motion, Impaired balance, Decreased endurance, Decreased mobility, Decreased coordination, Decreased cognition, Impaired judgement, Decreased safety awareness, Pain  Assessment: Pt demosntrated improved functional mobility this session, performing transfers with overall reduced assist throughout session, but continues to remain limited by generalized.weakness & endurance deficits as noted by limited standing & inability to progress to gait trial.  He did transfer into standing & maintained balance with decreased asisst, then took ppropriate steps as he began to pivot to recliner.  At this point, pt fatigued quickly, requiring more assist to complete task successfully, with pt demonstrating significant dyspnea upon sitting despite stable vitals when assessed in sitting.  Pt required extended time for breathing to normalize prior to repositioning & performing 1 additonal standing trial to adjust clothing & linen prior to completing session.  Anticipate pt possess sufficient strength & muscular endurance to progress to short distance gait trials, but may remain limited primarily by current cardiopulomary status at present.  PT POC & d/c recommendations remain appropriate at this time.  Barriers to Discharge: Inaccessible home environment     Rehab Resource Intensity Level, PT: I (Maximum Resource Intensity)    See flowsheet documentation for full assessment.

## 2024-07-09 NOTE — OCCUPATIONAL THERAPY NOTE
Occupational Therapy Progress Note     Patient Name: Hal Miller  Today's Date: 7/9/2024  Problem List  Principal Problem:    Colonic diverticular abscess  Active Problems:    Moderate protein-calorie malnutrition (HCC)          07/09/24 1142   OT Last Visit   OT Visit Date 07/09/24   Note Type   Note Type Treatment for insurance authorization   Pain Assessment   Pain Assessment Tool FLACC   Pain Location/Orientation   (SCROTUM)   Patient's Stated Pain Goal No pain   Hospital Pain Intervention(s) Repositioned;Ambulation/increased activity;Emotional support;Elevated   Pain Rating: FLACC (Rest) - Face 0   Pain Rating: FLACC (Rest) - Legs 0   Pain Rating: FLACC (Rest) - Activity 0   Pain Rating: FLACC (Rest) - Cry 0   Pain Rating: FLACC (Rest) - Consolability 0   Score: FLACC (Rest) 0   Pain Rating: FLACC (Activity) - Face 1   Pain Rating: FLACC (Activity) - Legs 0   Pain Rating: FLACC (Activity) - Activity 0   Pain Rating: FLACC (Activity) - Cry 1   Pain Rating: FLACC (Activity) - Consolability 0   Score: FLACC (Activity) 2   Restrictions/Precautions   Weight Bearing Precautions Per Order No   Other Precautions Cognitive;Chair Alarm;Bed Alarm;Multiple lines;Fall Risk;Pain  (OSTOMY)   Lifestyle   Autonomy Independent with ADL's and functional mobility, needs assistance with IADL's   Reciprocal Relationships Supportive spouse and family   Service to Others Retired   Intrinsic Gratification Working on the farm   ADL   Where Assessed Edge of bed   Eating Assistance 5  Supervision/Setup   Eating Deficit Beverage management;Increased time to complete   Grooming Assistance 4  Minimal Assistance   Grooming Deficit Setup;Supervision/safety;Increased time to complete;Wash/dry hands;Wash/dry face;Brushing hair  (THOROUGHNESS OF HAIR BRUSHING)   UB Dressing Assistance 4  Minimal Assistance   UB Dressing Deficit Setup;Supervision/safety;Increased time to complete;Pull around back;Fasteners   UB Dressing Comments MIN A TO  "[facility] GOWN INCLUDING PULL AROUND BACK AND FASTENERS WHILE SEATED   LB Dressing Assistance 2  Maximal Assistance   LB Dressing Deficit Don/doff R sock;Don/doff L sock;Setup;Supervision/safety;Increased time to complete   LB Dressing Comments PT ABLE TO LIFT BLE OFF OF SURFACE IN ORDER TO HAVE THERAPIST DON B/L SOCKS, REQUIRING MAX A   Bed Mobility   Supine to Sit 3  Moderate assistance   Additional items Assist x 2;Increased time required;Verbal cues;LE management   Sit to Supine Unable to assess  (PT LEFT OOB WITH ALL NEEDS IN REACH + ALARM ON)   Transfers   Sit to Stand 3  Moderate assistance   Additional items Assist x 1;Increased time required;Verbal cues   Stand to Sit 3  Moderate assistance   Additional items Assist x 1;Increased time required;Verbal cues;Armrests   Functional Mobility   Functional Mobility 3  Moderate assistance   Additional Comments AX2 FOR LIMITED MOBILITY FROM BED->CHAIR WITH USE OF RW. ASSIST REQUIRED FOR FACILITATION OF RW   Additional items Rolling walker   Subjective   Subjective \"THANK YOU\"   Cognition   Overall Cognitive Status Impaired   Arousal/Participation Alert;Cooperative   Attention Attends with cues to redirect   Orientation Level Oriented to person;Oriented to place;Disoriented to time  (GROSSLY ORIENTED TO TIME INCLUDING YEAR AND MONTH. NOT DATE)   Memory Decreased recall of precautions;Decreased recall of recent events;Decreased short term memory   Following Commands Follows one step commands with increased time or repetition   Activity Tolerance   Activity Tolerance Patient tolerated treatment well;Patient limited by fatigue   Medical Staff Made Aware PT SEEN FOR CO-SESSION WITH SKILLED PHYSICAL THERAPIST 2' CLINICALLY UNSTABLE PRESENTATION,  NEW PRECAUTIONS/LIMITATIONS, LIMITED ACTIVITY TOLERANCE AND PRESENT IMPAIRMENTS WHICH ARE A REGRESSION FROM THE PT'S BASELINE AND IMPACTING OVERALL OCCUPATIONAL PERFORMANCE.   Assessment   Assessment PT SEEN FOR OT " TREATMENT SESSION WITH FOCUS ON ADLS, FUNCTIONAL TXFS/MOBILITY, PT EDUCATION AND OVERALL ACTIVITY TOLERANCE. PT PLEASANT, COOPERATIVE AND MOTIVATED TO PARTICIPATE. PT CONTS TO REQUIRE MOD A  X2 FOR SUPINE->SIT TXF. PT ABLE TO SIT EOB FOR ~20 MINUTES TO COMPLETE ADL TASK. PT INITIALLY REQUIRED MIN A TO MAINTAIN SITTING BALANCE HOWEVER WITH CUES, ABLE TO PROGRESS TO CS LEVEL. IMPROVEMENT NOTED IN GROOMING/UB ADLS TO MIN A LEVEL- PT NOTED TO HAVE UNILATERAL UE SUPPORTED ON BED T/O DYNAMIC TASKS TO MAINTAIN BALANCE. PT CONTS TO REQUIRE MAX A FOR LB ADLS. PT REQUIRED ADDITIONAL TIME TO COMPLETE TASKS WITH MULTIPLE REST BREAKS REQUIRED 2' FATIGUE. PT PROGRESSED TO AX1 FOR FUNCTIONAL TXFS THIS SESSION HOWEVER CONTS TO REQUIRE AX2 FOR FUNCTIONAL MOBILITY WITH USE OF RW. PT EDUCATED ON DEEP BREATHING AND ENERGY CONSERVATION TECHNIQUES FOR CARRY OVER UPON D/C. PT IS PROGRESSING HOWEVER REMAINS SIGNIFICANTLY LIMITED. CONT TO RECOMMEND LEVEL I RESOURCES UPON D/C.   Plan   Treatment Interventions ADL retraining;Functional transfer training;Endurance training;UE strengthening/ROM;Cognitive reorientation;Patient/family training;Equipment evaluation/education;Compensatory technique education;Energy conservation;Activityengagement   Goal Expiration Date 07/19/24   OT Treatment Day 1   OT Frequency 3-5x/wk   Discharge Recommendation   Rehab Resource Intensity Level, OT I (Maximum Resource Intensity)   AM-PAC Daily Activity Inpatient   Lower Body Dressing 2   Bathing 2   Toileting 2   Upper Body Dressing 3   Grooming 3   Eating 3   Daily Activity Raw Score 15   Daily Activity Standardized Score (Calc for Raw Score >=11) 34.69   AM-PAC Applied Cognition Inpatient   Following a Speech/Presentation 3   Understanding Ordinary Conversation 4   Taking Medications 2   Remembering Where Things Are Placed or Put Away 3   Remembering List of 4-5 Errands 2   Taking Care of Complicated Tasks 2   Applied Cognition Raw Score 16   Applied Cognition  Standardized Score 35.03     Documentation completed by TING Perez, OTR/L  MOCA Certified ID# [id number]

## 2024-07-09 NOTE — PLAN OF CARE
Problem: PAIN - ADULT  Goal: Verbalizes/displays adequate comfort level or baseline comfort level  Description: Interventions:  - Encourage patient to monitor pain and request assistance  - Assess pain using appropriate pain scale  - Administer analgesics based on type and severity of pain and evaluate response  - Implement non-pharmacological measures as appropriate and evaluate response  - Consider cultural and social influences on pain and pain management  - Notify physician/advanced practitioner if interventions unsuccessful or patient reports new pain  Outcome: Progressing     Problem: INFECTION - ADULT  Goal: Absence or prevention of progression during hospitalization  Description: INTERVENTIONS:  - Assess and monitor for signs and symptoms of infection  - Monitor lab/diagnostic results  - Monitor all insertion sites, i.e. indwelling lines, tubes, and drains  - Monitor endotracheal if appropriate and nasal secretions for changes in amount and color  - Sublimity appropriate cooling/warming therapies per order  - Administer medications as ordered  - Instruct and encourage patient and family to use good hand hygiene technique  - Identify and instruct in appropriate isolation precautions for identified infection/condition  Outcome: Progressing  Goal: Absence of fever/infection during neutropenic period  Description: INTERVENTIONS:  - Monitor WBC    Outcome: Progressing     Problem: SAFETY ADULT  Goal: Patient will remain free of falls  Description: INTERVENTIONS:  - Educate patient/family on patient safety including physical limitations  - Instruct patient to call for assistance with activity   - Consult OT/PT to assist with strengthening/mobility   - Keep Call bell within reach  - Keep bed low and locked with side rails adjusted as appropriate  - Keep care items and personal belongings within reach  - Initiate and maintain comfort rounds  - Make Fall Risk Sign visible to staff  - Offer Toileting   - Apply  yellow socks and bracelet for high fall risk patients  - Consider moving patient to room near nurses station  Outcome: Progressing  Goal: Maintain or return to baseline ADL function  Description: INTERVENTIONS:  -  Assess patient's ability to carry out ADLs; assess patient's baseline for ADL function and identify physical deficits which impact ability to perform ADLs (bathing, care of mouth/teeth, toileting, grooming, dressing, etc.)  - Assess/evaluate cause of self-care deficits   - Assess range of motion  - Assess patient's mobility; develop plan if impaired  - Assess patient's need for assistive devices and provide as appropriate  - Encourage maximum independence but intervene and supervise when necessary  - Involve family in performance of ADLs  - Assess for home care needs following discharge   - Consider OT consult to assist with ADL evaluation and planning for discharge  - Provide patient education as appropriate  Outcome: Progressing  Goal: Maintains/Returns to pre admission functional level  Description: INTERVENTIONS:  - Perform AM-PAC 6 Click Basic Mobility/ Daily Activity assessment daily.  - Set and communicate daily mobility goal to care team and patient/family/caregiver.   - Collaborate with rehabilitation services on mobility goals if consulted  - Reposition patient   - Out of bed for toileting  - Record patient progress and toleration of activity level   Outcome: Progressing     Problem: DISCHARGE PLANNING  Goal: Discharge to home or other facility with appropriate resources  Description: INTERVENTIONS:  - Identify barriers to discharge w/patient and caregiver  - Arrange for needed discharge resources and transportation as appropriate  - Identify discharge learning needs (meds, wound care, etc.)  - Arrange for interpretive services to assist at discharge as needed  - Refer to Case Management Department for coordinating discharge planning if the patient needs post-hospital services based on  physician/advanced practitioner order or complex needs related to functional status, cognitive ability, or social support system  Outcome: Progressing     Problem: Knowledge Deficit  Goal: Patient/family/caregiver demonstrates understanding of disease process, treatment plan, medications, and discharge instructions  Description: Complete learning assessment and assess knowledge base.  Interventions:  - Provide teaching at level of understanding  - Provide teaching via preferred learning methods  Outcome: Progressing     Problem: Prexisting or High Potential for Compromised Skin Integrity  Goal: Skin integrity is maintained or improved  Description: INTERVENTIONS:  - Identify patients at risk for skin breakdown  - Assess and monitor skin integrity  - Assess and monitor nutrition and hydration status  - Monitor labs   - Assess for incontinence   - Turn and reposition patient  - Assist with mobility/ambulation  - Relieve pressure over bony prominences  - Avoid friction and shearing  - Provide appropriate hygiene as needed including keeping skin clean and dry  - Evaluate need for skin moisturizer/barrier cream  - Collaborate with interdisciplinary team   - Patient/family teaching  - Consider wound care consult   Outcome: Progressing     Problem: Nutrition/Hydration-ADULT  Goal: Nutrient/Hydration intake appropriate for improving, restoring or maintaining nutritional needs  Description: Monitor and assess patient's nutrition/hydration status for malnutrition. Collaborate with interdisciplinary team and initiate plan and interventions as ordered.  Monitor patient's weight and dietary intake as ordered or per policy. Utilize nutrition screening tool and intervene as necessary. Determine patient's food preferences and provide high-protein, high-caloric foods as appropriate.     INTERVENTIONS:  - Monitor oral intake, urinary output, labs, and treatment plans  - Assess nutrition and hydration status and recommend course of  action  - Evaluate amount of meals eaten  - Assist patient with eating if necessary   - Allow adequate time for meals  - Recommend/ encourage appropriate diets, oral nutritional supplements, and vitamin/mineral supplements  - Order, calculate, and assess calorie counts as needed  - Recommend, monitor, and adjust tube feedings and TPN/PPN based on assessed needs  - Assess need for intravenous fluids  - Provide specific nutrition/hydration education as appropriate  - Include patient/family/caregiver in decisions related to nutrition  Outcome: Progressing     Problem: RESPIRATORY - ADULT  Goal: Achieves optimal ventilation and oxygenation  Description: INTERVENTIONS:  - Assess for changes in respiratory status  - Assess for changes in mentation and behavior  - Position to facilitate oxygenation and minimize respiratory effort  - Oxygen administered by appropriate delivery if ordered  - Initiate smoking cessation education as indicated  - Encourage broncho-pulmonary hygiene including cough, deep breathe, Incentive Spirometry  - Assess the need for suctioning and aspirate as needed  - Assess and instruct to report SOB or any respiratory difficulty  - Respiratory Therapy support as indicated  Outcome: Progressing     Problem: GASTROINTESTINAL - ADULT  Goal: Minimal or absence of nausea and/or vomiting  Description: INTERVENTIONS:  - Administer IV fluids if ordered to ensure adequate hydration  - Maintain NPO status until nausea and vomiting are resolved  - Nasogastric tube if ordered  - Administer ordered antiemetic medications as needed  - Provide nonpharmacologic comfort measures as appropriate  - Advance diet as tolerated, if ordered  - Consider nutrition services referral to assist patient with adequate nutrition and appropriate food choices  Outcome: Progressing  Goal: Maintains or returns to baseline bowel function  Description: INTERVENTIONS:  - Assess bowel function  - Encourage oral fluids to ensure adequate  hydration  - Administer IV fluids if ordered to ensure adequate hydration  - Administer ordered medications as needed  - Encourage mobilization and activity  - Consider nutritional services referral to assist patient with adequate nutrition and appropriate food choices  Outcome: Progressing  Goal: Maintains adequate nutritional intake  Description: INTERVENTIONS:  - Monitor percentage of each meal consumed  - Identify factors contributing to decreased intake, treat as appropriate  - Assist with meals as needed  - Monitor I&O, weight, and lab values if indicated  - Obtain nutrition services referral as needed  Outcome: Progressing  Goal: Establish and maintain optimal ostomy function  Description: INTERVENTIONS:  - Assess bowel function  - Encourage oral fluids to ensure adequate hydration  - Administer IV fluids if ordered to ensure adequate hydration   - Administer ordered medications as needed  - Encourage mobilization and activity  - Nutrition services referral to assist patient with appropriate food choices  - Assess stoma site  - Consider wound care consult   Outcome: Progressing  Goal: Oral mucous membranes remain intact  Description: INTERVENTIONS  - Assess oral mucosa and hygiene practices  - Implement preventative oral hygiene regimen  - Implement oral medicated treatments as ordered  - Initiate Nutrition services referral as needed  Outcome: Progressing     Problem: GENITOURINARY - ADULT  Goal: Maintains or returns to baseline urinary function  Description: INTERVENTIONS:  - Assess urinary function  - Encourage oral fluids to ensure adequate hydration if ordered  - Administer IV fluids as ordered to ensure adequate hydration  - Administer ordered medications as needed  - Offer frequent toileting  - Follow urinary retention protocol if ordered  Outcome: Progressing  Goal: Absence of urinary retention  Description: INTERVENTIONS:  - Assess patient’s ability to void and empty bladder  - Monitor I/O  - Bladder  scan as needed  - Discuss with physician/AP medications to alleviate retention as needed  - Discuss catheterization for long term situations as appropriate  Outcome: Progressing  Goal: Urinary catheter remains patent  Description: INTERVENTIONS:  - Assess patency of urinary catheter  - If patient has a chronic cardenas, consider changing catheter if non-functioning  - Follow guidelines for intermittent irrigation of non-functioning urinary catheter  Outcome: Progressing

## 2024-07-09 NOTE — PLAN OF CARE
Problem: GASTROINTESTINAL - ADULT  Goal: Minimal or absence of nausea and/or vomiting  Description: INTERVENTIONS:  - Administer IV fluids if ordered to ensure adequate hydration  - Maintain NPO status until nausea and vomiting are resolved  - Nasogastric tube if ordered  - Administer ordered antiemetic medications as needed  - Provide nonpharmacologic comfort measures as appropriate  - Advance diet as tolerated, if ordered  - Consider nutrition services referral to assist patient with adequate nutrition and appropriate food choices  Outcome: Progressing  Goal: Maintains or returns to baseline bowel function  Description: INTERVENTIONS:  - Assess bowel function  - Encourage oral fluids to ensure adequate hydration  - Administer IV fluids if ordered to ensure adequate hydration  - Administer ordered medications as needed  - Encourage mobilization and activity  - Consider nutritional services referral to assist patient with adequate nutrition and appropriate food choices  Outcome: Progressing  Goal: Maintains adequate nutritional intake  Description: INTERVENTIONS:  - Monitor percentage of each meal consumed  - Identify factors contributing to decreased intake, treat as appropriate  - Assist with meals as needed  - Monitor I&O, weight, and lab values if indicated  - Obtain nutrition services referral as needed  Outcome: Progressing  Goal: Establish and maintain optimal ostomy function  Description: INTERVENTIONS:  - Assess bowel function  - Encourage oral fluids to ensure adequate hydration  - Administer IV fluids if ordered to ensure adequate hydration   - Administer ordered medications as needed  - Encourage mobilization and activity  - Nutrition services referral to assist patient with appropriate food choices  - Assess stoma site  - Consider wound care consult   Outcome: Progressing  Goal: Oral mucous membranes remain intact  Description: INTERVENTIONS  - Assess oral mucosa and hygiene practices  - Implement  preventative oral hygiene regimen  - Implement oral medicated treatments as ordered  - Initiate Nutrition services referral as needed  Outcome: Progressing     Problem: GENITOURINARY - ADULT  Goal: Maintains or returns to baseline urinary function  Description: INTERVENTIONS:  - Assess urinary function  - Encourage oral fluids to ensure adequate hydration if ordered  - Administer IV fluids as ordered to ensure adequate hydration  - Administer ordered medications as needed  - Offer frequent toileting  - Follow urinary retention protocol if ordered  Outcome: Progressing  Goal: Absence of urinary retention  Description: INTERVENTIONS:  - Assess patient’s ability to void and empty bladder  - Monitor I/O  - Bladder scan as needed  - Discuss with physician/AP medications to alleviate retention as needed  - Discuss catheterization for long term situations as appropriate  Outcome: Progressing  Goal: Urinary catheter remains patent  Description: INTERVENTIONS:  - Assess patency of urinary catheter  - If patient has a chronic cardenas, consider changing catheter if non-functioning  - Follow guidelines for intermittent irrigation of non-functioning urinary catheter  Outcome: Progressing     Problem: Nutrition/Hydration-ADULT  Goal: Nutrient/Hydration intake appropriate for improving, restoring or maintaining nutritional needs  Description: Monitor and assess patient's nutrition/hydration status for malnutrition. Collaborate with interdisciplinary team and initiate plan and interventions as ordered.  Monitor patient's weight and dietary intake as ordered or per policy. Utilize nutrition screening tool and intervene as necessary. Determine patient's food preferences and provide high-protein, high-caloric foods as appropriate.     INTERVENTIONS:  - Monitor oral intake, urinary output, labs, and treatment plans  - Assess nutrition and hydration status and recommend course of action  - Evaluate amount of meals eaten  - Assist patient  with eating if necessary   - Allow adequate time for meals  - Recommend/ encourage appropriate diets, oral nutritional supplements, and vitamin/mineral supplements  - Order, calculate, and assess calorie counts as needed  - Recommend, monitor, and adjust tube feedings and TPN/PPN based on assessed needs  - Assess need for intravenous fluids  - Provide specific nutrition/hydration education as appropriate  - Include patient/family/caregiver in decisions related to nutrition  7/8/2024 2201 by Luisa Gray RN  Outcome: Progressing  7/8/2024 2201 by Luisa Gray, RN  Outcome: Progressing

## 2024-07-09 NOTE — PLAN OF CARE
Problem: OCCUPATIONAL THERAPY ADULT  Goal: Performs self-care activities at highest level of function for planned discharge setting.  See evaluation for individualized goals.  Description: Treatment Interventions: ADL retraining, Endurance training, Cognitive reorientation, Patient/family training, Continued evaluation, Activityengagement, Energy conservation, Compensatory technique education          See flowsheet documentation for full assessment, interventions and recommendations.   Outcome: Progressing  Note: Limitation: Decreased ADL status, Decreased Safe judgement during ADL, Decreased cognition, Decreased endurance, Decreased sensation, Decreased high-level ADLs  Prognosis: Fair  Assessment: PT SEEN FOR OT TREATMENT SESSION WITH FOCUS ON ADLS, FUNCTIONAL TXFS/MOBILITY, PT EDUCATION AND OVERALL ACTIVITY TOLERANCE. PT PLEASANT, COOPERATIVE AND MOTIVATED TO PARTICIPATE. PT CONTS TO REQUIRE MOD A  X2 FOR SUPINE->SIT TXF. PT ABLE TO SIT EOB FOR ~20 MINUTES TO COMPLETE ADL TASK. PT INITIALLY REQUIRED MIN A TO MAINTAIN SITTING BALANCE HOWEVER WITH CUES, ABLE TO PROGRESS TO CS LEVEL. IMPROVEMENT NOTED IN GROOMING/UB ADLS TO MIN A LEVEL- PT NOTED TO HAVE UNILATERAL UE SUPPORTED ON BED T/O DYNAMIC TASKS TO MAINTAIN BALANCE. PT CONTS TO REQUIRE MAX A FOR LB ADLS. PT REQUIRED ADDITIONAL TIME TO COMPLETE TASKS WITH MULTIPLE REST BREAKS REQUIRED 2' FATIGUE. PT PROGRESSED TO AX1 FOR FUNCTIONAL TXFS THIS SESSION HOWEVER CONTS TO REQUIRE AX2 FOR FUNCTIONAL MOBILITY WITH USE OF RW. PT EDUCATED ON DEEP BREATHING AND ENERGY CONSERVATION TECHNIQUES FOR CARRY OVER UPON D/C. PT IS PROGRESSING HOWEVER REMAINS SIGNIFICANTLY LIMITED. CONT TO RECOMMEND LEVEL I RESOURCES UPON D/C.     Rehab Resource Intensity Level, OT: I (Maximum Resource Intensity)

## 2024-07-09 NOTE — PHYSICAL THERAPY NOTE
Physical Therapy Progress Note     07/09/24 1145   PT Last Visit   PT Visit Date 07/09/24   Note Type   Note Type Treatment for insurance authorization   Pain Assessment   Pain Assessment Tool FLACC   Pain Rating: FLACC (Rest) - Face 0   Pain Rating: FLACC (Rest) - Legs 0   Pain Rating: FLACC (Rest) - Activity 0   Pain Rating: FLACC (Rest) - Cry 0   Pain Rating: FLACC (Rest) - Consolability 0   Score: FLACC (Rest) 0   Pain Rating: FLACC (Activity) - Face 1   Pain Rating: FLACC (Activity) - Legs 1   Pain Rating: FLACC (Activity) - Activity 1   Pain Rating: FLACC (Activity) - Cry 1   Pain Rating: FLACC (Activity) - Consolability 0   Score: FLACC (Activity) 4   Restrictions/Precautions   Other Precautions Cognitive;Chair Alarm;Bed Alarm;Pain;Fall Risk;Multiple lines   Subjective   Subjective pt encountered supine in bed, pleasant and agreeable to treatment.  Reports feeling fatigued & weak post activity with significant dyspnea.  Denies change in complaints with positional changes.   Bed Mobility   Supine to Sit 3  Moderate assistance   Additional items Assist x 2   Transfers   Sit to Stand 3  Moderate assistance   Additional items Assist x 1;Armrests;Increased time required;Verbal cues   Stand to Sit 3  Moderate assistance   Additional items Assist x 1;Armrests;Increased time required;Verbal cues   Stand pivot 3  Moderate assistance   Additional items Assist x 2   Balance   Static Sitting Fair   Static Standing Poor +   Dynamic Standing Poor   Activity Tolerance   Activity Tolerance Patient limited by fatigue   Nurse Made Aware JASPER Spencer   Assessment   Prognosis Good   Problem List Decreased strength;Decreased range of motion;Impaired balance;Decreased endurance;Decreased mobility;Decreased coordination;Decreased cognition;Impaired judgement;Decreased safety awareness;Pain   Assessment Pt demosntrated improved functional mobility this session, performing transfers with overall reduced assist throughout session, but  continues to remain limited by generalized.weakness & endurance deficits as noted by limited standing & inability to progress to gait trial.  He did transfer into standing & maintained balance with decreased asisst, then took ppropriate steps as he began to pivot to recliner.  At this point, pt fatigued quickly, requiring more assist to complete task successfully, with pt demonstrating significant dyspnea upon sitting despite stable vitals when assessed in sitting.  Pt required extended time for breathing to normalize prior to repositioning & performing 1 additonal standing trial to adjust clothing & linen prior to completing session.  Anticipate pt possess sufficient strength & muscular endurance to progress to short distance gait trials, but may remain limited primarily by current cardiopulomary status at present.  PT POC & d/c recommendations remain appropriate at this time.   Goals   Patient Goals to rest   STG Expiration Date 07/17/24   PT Treatment Day 1   Plan   Treatment/Interventions Functional transfer training;LE strengthening/ROM;Therapeutic exercise;Endurance training;Patient/family training;Bed mobility;Equipment eval/education;Gait training   Progress Progressing toward goals   PT Frequency 3-5x/wk   Discharge Recommendation   Rehab Resource Intensity Level, PT I (Maximum Resource Intensity)   Equipment Recommended Walker   Walker Package Recommended Wheeled walker   AM-PAC Basic Mobility Inpatient   Turning in Flat Bed Without Bedrails 2   Lying on Back to Sitting on Edge of Flat Bed Without Bedrails 1   Moving Bed to Chair 1   Standing Up From Chair Using Arms 2   Walk in Room 1   Climb 3-5 Stairs With Railing 1   Basic Mobility Inpatient Raw Score 8   Turning Head Towards Sound 3   Follow Simple Instructions 3   Low Function Basic Mobility Raw Score  14   Low Function Basic Mobility Standardized Score  22.01   [facility] Level Of Mobility   [facility] Goal 3: Sit at edge of bed   [facility]  Achieved 5: Stand (1 or more minutes)       Luis Linton PTA    An [facility] Basic Mobility Raw Score less than 17 suggests pt would benefit from post acute rehab.  Please also refer to the recommendation of the Physical Therapist for safe discharge planning.

## 2024-07-09 NOTE — PROGRESS NOTES
"Progress Note - Hal Miller 84 y.o. male MRN: [id number]    Unit/Bed#: [facility] 620-01 Encounter: [id number]      Assessment:  83 yo M s/p Angela's, explant of left groin mesh, washout/debridement left groin wound on 7/4    AVSS on room air  Ostomy 375 soft stool    Plan:  Dysphagia diet, continue to work with speech  Pulmonary toilet, PRN treatment, Q1hr IS use   Pain and nausea control PRN   DVT ppx   Keep Babcock - outpatient f/u w/ urology   PT/OT level I   F/u CM, dispo planning       Subjective:   Patient seen and examined at bedside with nursing this morning. Patient reports resting well overnight. Reports overall feeling improved today, airway clearer, tolerating diet/appetite better. Denies any pain, nausea, vomiting.     Objective:     Vitals: Blood pressure 124/70, pulse 68, temperature 97.5 °F (36.4 °C), temperature source Oral, resp. rate 18, height 6' 2\" (1.88 m), weight 88.2 kg (194 lb 7.1 oz), SpO2 99%.,Body mass index is 24.97 kg/m².      Intake/Output Summary (Last 24 hours) at 7/9/2024 0540  Last data filed at 7/9/2024 0520  Gross per 24 hour   Intake 647.5 ml   Output 4075 ml   Net -3427.5 ml       Physical Exam: /70   Pulse 68   Temp 97.5 °F (36.4 °C) (Oral)   Resp 18   Ht 6' 2\" (1.88 m)   Wt 88.2 kg (194 lb 7.1 oz)   SpO2 99%   BMI 24.97 kg/m²     General Appearance:    Alert, cooperative, no distress, appears stated age   Head:    Normocephalic, without obvious abnormality, atraumatic   Eyes:    PERRL, conjunctiva/corneas clear, EOM's intact, fundi     benign, both eyes   Ears:    Normal TM's and external ear canals, both ears   Nose:   Nares normal, septum midline, mucosa normal, no drainage    or sinus tenderness   Throat:   Lips, mucosa, and tongue normal; teeth and gums normal   Neck:   Supple, symmetrical, trachea midline, no adenopathy;     thyroid:  no enlargement/tenderness/nodules; no carotid    bruit or JVD   Back:     Symmetric, no curvature, ROM normal, no CVA tenderness "   Lungs:     Clear to auscultation bilaterally, respirations unlabored   Chest Wall:    No tenderness or deformity    Heart:    Regular rate and rhythm, S1 and S2 normal, no murmur, rub   or gallop   Breast Exam:    No tenderness, masses, or nipple abnormality   Abdomen:     Midline staples clean, dry, intact. L groin wound vac dressing clean, without output. Ostomy with soft brown stool. Soft, non-tender.  no masses, no organomegaly   Genitalia:    Scrotal edema with mild erythema, elevated with towel. Normal male without lesion, discharge or tenderness   Rectal:    Normal tone, no masses or tenderness; guaiac negative stool   Extremities:   Extremities normal, atraumatic, no cyanosis or edema   Pulses:   2+ and symmetric all extremities   Skin:   Skin color, texture, turgor normal, no rashes or lesions   Lymph nodes:   Cervical, supraclavicular, and axillary nodes normal   Neurologic:   CNII-XII intact, normal strength, sensation and reflexes     throughout        Invasive Devices       Peripheral Intravenous Line  Duration             Peripheral IV 07/05/24 Left Forearm 3 days    Peripheral IV 07/07/24 Right;Ventral (anterior) Forearm 2 days              Drain  Duration             Colostomy Other (comment) LUQ 4 days    Urethral Catheter Latex 16 Fr. 4 days                    Lab, Imaging and other studies: I have personally reviewed pertinent reports.   and I have personally reviewed pertinent films in PACS  VTE Pharmacologic Prophylaxis: Heparin  VTE Mechanical Prophylaxis: sequential compression device     Hina Draper, MS-4  [facility]

## 2024-07-09 NOTE — PLAN OF CARE
Problem: PAIN - ADULT  Goal: Verbalizes/displays adequate comfort level or baseline comfort level  Description: Interventions:  - Encourage patient to monitor pain and request assistance  - Assess pain using appropriate pain scale  - Administer analgesics based on type and severity of pain and evaluate response  - Implement non-pharmacological measures as appropriate and evaluate response  - Consider cultural and social influences on pain and pain management  - Notify physician/advanced practitioner if interventions unsuccessful or patient reports new pain  Outcome: Progressing     Problem: INFECTION - ADULT  Goal: Absence or prevention of progression during hospitalization  Description: INTERVENTIONS:  - Assess and monitor for signs and symptoms of infection  - Monitor lab/diagnostic results  - Monitor all insertion sites, i.e. indwelling lines, tubes, and drains  - Monitor endotracheal if appropriate and nasal secretions for changes in amount and color  - [place] appropriate cooling/warming therapies per order  - Administer medications as ordered  - Instruct and encourage patient and family to use good hand hygiene technique  - Identify and instruct in appropriate isolation precautions for identified infection/condition  Outcome: Progressing  Goal: Absence of fever/infection during neutropenic period  Description: INTERVENTIONS:  - Monitor WBC    Outcome: Progressing     Problem: SAFETY ADULT  Goal: Patient will remain free of falls  Description: INTERVENTIONS:  - Educate patient/family on patient safety including physical limitations  - Instruct patient to call for assistance with activity   - Consult OT/PT to assist with strengthening/mobility   - Keep Call bell within reach  - Keep bed low and locked with side rails adjusted as appropriate  - Keep care items and personal belongings within reach  - Initiate and maintain comfort rounds  - Make Fall Risk Sign visible to staff  - Offer Toileting   -  Initiate/Maintain alarm  - Obtain necessary fall risk management equipment  - Apply yellow socks and bracelet for high fall risk patients  - Consider moving patient to room near nurses station  Outcome: Progressing  Goal: Maintain or return to baseline ADL function  Description: INTERVENTIONS:  -  Assess patient's ability to carry out ADLs; assess patient's baseline for ADL function and identify physical deficits which impact ability to perform ADLs (bathing, care of mouth/teeth, toileting, grooming, dressing, etc.)  - Assess/evaluate cause of self-care deficits   - Assess range of motion  - Assess patient's mobility; develop plan if impaired  - Assess patient's need for assistive devices and provide as appropriate  - Encourage maximum independence but intervene and supervise when necessary  - Involve family in performance of ADLs  - Assess for home care needs following discharge   - Consider OT consult to assist with ADL evaluation and planning for discharge  - Provide patient education as appropriate  Outcome: Progressing  Goal: Maintains/Returns to pre admission functional level  Description: INTERVENTIONS:  - Perform AM-PAC 6 Click Basic Mobility/ Daily Activity assessment daily.  - Set and communicate daily mobility goal to care team and patient/family/caregiver.   - Collaborate with rehabilitation services on mobility goals if consulted  - Perform Range of Motion   - Reposition patient   - Stand patient   - Ambulate patient   - Out of bed to chair   - Out of bed for meals   - Out of bed for toileting  - Record patient progress and toleration of activity level   Outcome: Progressing     Problem: DISCHARGE PLANNING  Goal: Discharge to home or other facility with appropriate resources  Description: INTERVENTIONS:  - Identify barriers to discharge w/patient and caregiver  - Arrange for needed discharge resources and transportation as appropriate  - Identify discharge learning needs (meds, wound care, etc.)  -  Arrange for interpretive services to assist at discharge as needed  - Refer to Case Management Department for coordinating discharge planning if the patient needs post-hospital services based on physician/advanced practitioner order or complex needs related to functional status, cognitive ability, or social support system  Outcome: Progressing     Problem: Knowledge Deficit  Goal: Patient/family/caregiver demonstrates understanding of disease process, treatment plan, medications, and discharge instructions  Description: Complete learning assessment and assess knowledge base.  Interventions:  - Provide teaching at level of understanding  - Provide teaching via preferred learning methods  Outcome: Progressing     Problem: Prexisting or High Potential for Compromised Skin Integrity  Goal: Skin integrity is maintained or improved  Description: INTERVENTIONS:  - Identify patients at risk for skin breakdown  - Assess and monitor skin integrity  - Assess and monitor nutrition and hydration status  - Monitor labs   - Assess for incontinence   - Turn and reposition patient  - Assist with mobility/ambulation  - Relieve pressure over bony prominences  - Avoid friction and shearing  - Provide appropriate hygiene as needed including keeping skin clean and dry  - Evaluate need for skin moisturizer/barrier cream  - Collaborate with interdisciplinary team   - Patient/family teaching  - Consider wound care consult   Outcome: Progressing     Problem: Nutrition/Hydration-ADULT  Goal: Nutrient/Hydration intake appropriate for improving, restoring or maintaining nutritional needs  Description: Monitor and assess patient's nutrition/hydration status for malnutrition. Collaborate with interdisciplinary team and initiate plan and interventions as ordered.  Monitor patient's weight and dietary intake as ordered or per policy. Utilize nutrition screening tool and intervene as necessary. Determine patient's food preferences and provide  high-protein, high-caloric foods as appropriate.     INTERVENTIONS:  - Monitor oral intake, urinary output, labs, and treatment plans  - Assess nutrition and hydration status and recommend course of action  - Evaluate amount of meals eaten  - Assist patient with eating if necessary   - Allow adequate time for meals  - Recommend/ encourage appropriate diets, oral nutritional supplements, and vitamin/mineral supplements  - Order, calculate, and assess calorie counts as needed  - Recommend, monitor, and adjust tube feedings and TPN/PPN based on assessed needs  - Assess need for intravenous fluids  - Provide specific nutrition/hydration education as appropriate  - Include patient/family/caregiver in decisions related to nutrition  Outcome: Progressing     Problem: RESPIRATORY - ADULT  Goal: Achieves optimal ventilation and oxygenation  Description: INTERVENTIONS:  - Assess for changes in respiratory status  - Assess for changes in mentation and behavior  - Position to facilitate oxygenation and minimize respiratory effort  - Oxygen administered by appropriate delivery if ordered  - Initiate smoking cessation education as indicated  - Encourage broncho-pulmonary hygiene including cough, deep breathe, Incentive Spirometry  - Assess the need for suctioning and aspirate as needed  - Assess and instruct to report SOB or any respiratory difficulty  - Respiratory Therapy support as indicated  Outcome: Progressing     Problem: GASTROINTESTINAL - ADULT  Goal: Minimal or absence of nausea and/or vomiting  Description: INTERVENTIONS:  - Administer IV fluids if ordered to ensure adequate hydration  - Maintain NPO status until nausea and vomiting are resolved  - Nasogastric tube if ordered  - Administer ordered antiemetic medications as needed  - Provide nonpharmacologic comfort measures as appropriate  - Advance diet as tolerated, if ordered  - Consider nutrition services referral to assist patient with adequate nutrition and  appropriate food choices  Outcome: Progressing  Goal: Maintains or returns to baseline bowel function  Description: INTERVENTIONS:  - Assess bowel function  - Encourage oral fluids to ensure adequate hydration  - Administer IV fluids if ordered to ensure adequate hydration  - Administer ordered medications as needed  - Encourage mobilization and activity  - Consider nutritional services referral to assist patient with adequate nutrition and appropriate food choices  Outcome: Progressing  Goal: Maintains adequate nutritional intake  Description: INTERVENTIONS:  - Monitor percentage of each meal consumed  - Identify factors contributing to decreased intake, treat as appropriate  - Assist with meals as needed  - Monitor I&O, weight, and lab values if indicated  - Obtain nutrition services referral as needed  Outcome: Progressing  Goal: Establish and maintain optimal ostomy function  Description: INTERVENTIONS:  - Assess bowel function  - Encourage oral fluids to ensure adequate hydration  - Administer IV fluids if ordered to ensure adequate hydration   - Administer ordered medications as needed  - Encourage mobilization and activity  - Nutrition services referral to assist patient with appropriate food choices  - Assess stoma site  - Consider wound care consult   Outcome: Progressing  Goal: Oral mucous membranes remain intact  Description: INTERVENTIONS  - Assess oral mucosa and hygiene practices  - Implement preventative oral hygiene regimen  - Implement oral medicated treatments as ordered  - Initiate Nutrition services referral as needed  Outcome: Progressing     Problem: GENITOURINARY - ADULT  Goal: Maintains or returns to baseline urinary function  Description: INTERVENTIONS:  - Assess urinary function  - Encourage oral fluids to ensure adequate hydration if ordered  - Administer IV fluids as ordered to ensure adequate hydration  - Administer ordered medications as needed  - Offer frequent toileting  - Follow  urinary retention protocol if ordered  Outcome: Progressing  Goal: Absence of urinary retention  Description: INTERVENTIONS:  - Assess patient’s ability to void and empty bladder  - Monitor I/O  - Bladder scan as needed  - Discuss with physician/AP medications to alleviate retention as needed  - Discuss catheterization for long term situations as appropriate  Outcome: Progressing  Goal: Urinary catheter remains patent  Description: INTERVENTIONS:  - Assess patency of urinary catheter  - If patient has a chronic cardenas, consider changing catheter if non-functioning  - Follow guidelines for intermittent irrigation of non-functioning urinary catheter  Outcome: Progressing

## 2024-07-09 NOTE — SPEECH THERAPY NOTE
"Speech Language/Pathology  Speech-Language Pathology Progress Note      Patient Name: Hal Miller    Today's Date: 7/9/2024      Subjective:  Pt was awake and alert. He was sitting up in chair at bedside. Patient stated \"Well that has been a problem.  I like when someone reminds me to take a sip (alternating bites/sips) \".    Objective:  Pt was seen today for dysphagia therapy. Current diet is puree with thin liquids. Pt was on room air. Oral care had already been completed. Focus of today's session was to maximize PO intake safety, determine potential for diet texture advancement, and improve use of strategies to minimize risk of aspiration. Textures offered today included pureed tray, trial of mac and cheese, juice and water by straw.  Swallow function:   Pt slowly fed himself the puree- small amounts at a time.  Slow transfers with mult swallows.  Pt verbally cued to take a small alternated sip of thin in between bites.  States he feels better with a sip.  Agreed to mac and cheese- ate 1/2 of it.  Mastication is slow but appropriate.   Cued to take sip thin in between again- mild coughing noted x1.  A 2* swallow was again cued.    Educated on diet levels and what he thought he was able to tolerate- wife and pt report this issue with swallowing has been for a long time but they are hoping his intake improves.      Assessment:  Pt was more awake, continues w/ mild oropharyngeal dysphagia with  prolonged manipulation of solids, and noted weak transfers and swallows.  Noted on MBS was penetration of secretions and pharyngeal residue.  May have increased retention w/ increased texture- will continue to need cues for 2* swallows and alternated sips thin.   Plan:  Change diet texture to dysphagia 2 mechanical soft. Continue ST follow up. Subsequent sessions to focus on determining potential for diet texture advancement, improving pt's self monitoring, and improving use of strategies to minimize risk of " aspiration.  Ongoing education to pt /family

## 2024-07-09 NOTE — PROGRESS NOTES
Progress Note - Geriatric Medicine   Hal Miller 84 y.o. male MRN: [id number]  Unit/Bed#: [facility] 620-01 Encounter: [id number]      Assessment/Plan:    Acute metabolic encephalopathy   -evidenced by confusion and fluctuations in mentation and alertness beyond baseline in patient at high baseline risk due to underlying dementia and hx of encephalopathy during prior admissions   -multifactorial including age, underlying dementia, hx encephalopathy during prior admissions, perforated diverticulitis now s/p surgical procedure, anesthesia, multiple setting changes and acute pain in frail elderly individual   -now markedly improved and appears to be much closer to baseline   -maintained on depakote for possible seizure episode during prior hospitalization, continue dosing as directed by Neurology  -previously on Seroquel PRN as o/p for insomnia/behaviors not currently requiring, as can lower seizure threshold preferable to cont to hold unless abs necessary   -continue optimization of chronic medical conditions   -continue to monitor for and treat acute derangements as arise  -encourage normal circadian rhythm, consider low dose melatonin HS PRN  -continue use of glasses and hearing aid all appropriate times to reduce risk uncorrected sensory impairment from contributing to further confusion/worsening encephalopathy   -ensure acute pain well controlled, cont barbie pain protocol   -monitor for fecal and urinary retention - cardenas in place   -reorient frequently as appropriate and indicated  -appreciate family at bedside for familiarity and reassurance       Perforated diverticulitis with abscess  -s/p Hartmans procedure with explantation of left groin mesh with washout and debridement of left groin wound on 7/4/24 now with wound vac in place  -currently on Zosyn scheduled to end today  -continue acute multimodal pain control per geriatric pain protocol      Dysphagia  -speech therapy on consult   -s/p VBS 7/7/24, speech re-eval  today for possible upgrade   -continue strict aspiration precautions  -cont working with speech therapy and good oral hygiene and cares      Vascular dementia  -acutely encephalopathic on initial consultation now markedly improved alert and answering questions appropriately appears much closer to baseline   -at baseline alert and oriented, forgetful, independent with ADLs requires some assist with iADLs   -MoCA 23/30 (11/2022), noted to have episodes of encephalopathy and decline since last testing, recommend repeat following recovery from acute illness to establish new baseline   -[facility] 6/18/24 imaging personally viewed, reveals at least moderate chronic microangiopathic changes   -TSH WNL at 1.79, B12 WNL at 531  -at risk age and cardiovascular related acceleration santy in setting of recent CVA, continue secondary risk factor modifications   -encourage patient remain physically, socially and cognitively active and engaged to maintain cognitive acuity   -encourage use sensory assist devices such as corrective lenses and hearing aids all appropriate times to reduce risk uncorrected sensroy impairment from contributing to isolation, confusion, worsening encephalopathy and more precipitous cognitive decline   -underlying dementia increases risk developing delirium and likely contributed to episode during current admission, cont strict precautions to reduce risk recurrence     Hx CVA  -small left parietal infarct during recent hospitalization on MRI brain 6/20/24  -suspected to be due to missed dosing of Eliquis at time of event  -remains at risk future CVAs, continue strict secondary risk factor modifications  -close o/p f/u with Neurology for ongoing monitoring and management      History of possible seizure  -maintained on Depatkote as managed by Neurology  -continue depakote   -cont seizure precautions      Insomnia   -previously on seroquel PRN as o/p, has not recently been requiring during admission, no indication  to resume at this time   -consider low dose melatonin HS  -encourage establishment of consistent sleep/wake times and bedtime routine     Babcock catheter in place   -remains in place due to difficult placement per Urology  -will require o/p f/u with Urology for voiding trial      Inflammatory polyarthropathy   -maintained on prednisone chronically as o/p  -follows closely with Rheum as o/p, continue close o/p f/u     DM-II  -A1c 7.6  -currently on basal bolus insulin regimen with good control  -continue glu goal 140-180 during hospitalization to reduce risk hypoglycemia   -continue close o/p f/u with PCP for ongoing age appropriate diabetic screenings and cares      Afib   -rates currently well controlled off rate controllers, home Eliquis was on hold covering with heparin gtt perioperatively now back on home Eliquis   -cont close follow-up with Cardiology     Impaired Vision  -recommend use of corrective lenses at all appropriate times  -encourage adequate lighting and encourage use of assistance with ambulation  -keep personal belongings close to person to avoid reaching  -consider large font for printed materials provided to patient      Impaired Hearing  -Encourage use of hearing aids at all appropriate times  -encourage providers and caregivers to speak slowly and clearly directly to patient  -minimize background noise to encourage patient engagement  -consider use of hearing amplifier to reduce risk of straining to hear if hearing aids are not present or are not sufficient      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  -clinical frailty scale stage VI, moderately frail, progressive  -multifactorial including age, amb dysfxn, hx CVA, DM-II and multitude of chronic medical co-morbidities now with perforated diverticulitis with abscess requiring surgical procedure superimposed on elderly individual with limited physiologic and metabolic reserve  -continue optimization of chronic medical conditions and address acute  "derangements as arise  -monitor for and treat any underlying anxiety, mood, depression symptoms as may impact response to therapies and overall sense of wellbeing and quality of life  -continue to ensure tx and interventions align with patients wishes and goals of care   -cont psychosocial support of patient and caregivers     Care coordination: rounded with Nelly (RN) and wife at bedside    Subjective:     Hal is seen and examined at bedside where he is sitting resting eating lunch having just finished working with speech therapy. He reports being thirsty and that his appetite is still only fair but improving. No acute events reported overnight. His wife at bedside is pleased with his progress and notes that this is the best he has looked since admission.     Review of Systems   Constitutional:  Positive for appetite change (slowly improving).   Respiratory:  Negative for shortness of breath.    Gastrointestinal:  Negative for abdominal pain.   Psychiatric/Behavioral:  Negative for sleep disturbance.    All other systems reviewed and are negative.    Objective:     Vitals: Blood pressure 129/72, pulse 79, temperature 97.5 °F (36.4 °C), temperature source Oral, resp. rate 18, height 6' 2\" (1.88 m), weight 81.1 kg (178 lb 12.7 oz), SpO2 94%.,Body mass index is 22.96 kg/m².      Intake/Output Summary (Last 24 hours) at 7/9/2024 1227  Last data filed at 7/9/2024 1150  Gross per 24 hour   Intake 330 ml   Output 5150 ml   Net -4820 ml     Current Medications: Reviewed    Physical Exam:   Physical Exam  Vitals and nursing note reviewed.   Constitutional:       General: He is not in acute distress.     Comments: Thin frail elderly male    HENT:      Head: Normocephalic.      Nose: Nose normal.      Mouth/Throat:      Mouth: Mucous membranes are dry.   Eyes:      General: No scleral icterus.        Right eye: No discharge.         Left eye: No discharge.      Conjunctiva/sclera: Conjunctivae normal.   Neck:      " Comments: Trachea midline   Cardiovascular:      Rate and Rhythm: Normal rate.   Pulmonary:      Effort: Pulmonary effort is normal. No respiratory distress.      Breath sounds: No wheezing.   Abdominal:      General: There is no distension.      Palpations: Abdomen is soft.      Comments: L groin wound vac in place    Genitourinary:     Comments: Babcock catheter in place   Musculoskeletal:      Right lower leg: Edema present.      Left lower leg: Edema present.      Comments: Reduced overall muscle mass    Skin:     General: Skin is warm and dry.   Neurological:      Mental Status: He is alert.      Comments: Awake and alert feeding self answering ques appropriately    Psychiatric:      Comments: Mood and affect appropriate for circumstances         Invasive Devices       Peripheral Intravenous Line  Duration             Peripheral IV 07/05/24 Left Forearm 4 days    Peripheral IV 07/07/24 Right;Ventral (anterior) Forearm 2 days              Drain  Duration             Urethral Catheter Latex 16 Fr. 5 days    Colostomy Other (comment) LUQ 4 days                  Lab Results:     I have personally reviewed pertinent lab results including the following:    Results from last 7 days   Lab Units 07/09/24  0507 07/08/24  0539 07/07/24  0446   WBC Thousand/uL 9.61 10.59* 11.86*   HEMOGLOBIN g/dL 8.9* 8.1* 8.8*   HEMATOCRIT % 27.6* 26.2* 28.0*   PLATELETS Thousands/uL 134* 120* 130*   SEGS PCT % 78* 82* 82*   MONO PCT % 11 10 12   EOS PCT % 1 1 0     Results from last 7 days   Lab Units 07/09/24  0507 07/08/24  0539 07/07/24  0446 07/05/24  0521 07/04/24  1718   POTASSIUM mmol/L 4.0 4.1 4.4   < > 4.8   CHLORIDE mmol/L 108 106 106   < > 101   CO2 mmol/L 29 29 28   < > 25   BUN mg/dL 40* 35* 38*   < > 30*   CREATININE mg/dL 1.13 1.18 1.39*   < > 1.22   CALCIUM mg/dL 9.8 9.3 9.3   < > 8.7   ALK PHOS U/L  --   --   --   --  86   ALT U/L  --   --   --   --  14   AST U/L  --   --   --   --  21    < > = values in this interval not  displayed.     I have personally reviewed the following imaging study reports in PACS:    7/7/24 - barium swallow with speech

## 2024-07-09 NOTE — PHYSICAL THERAPY NOTE
Pt transferred back to acute inpatient secondary to a significant change in medical status requiring immediate medical procedure and close monitoring /care post op for colonic diverticular abscess. At time of d/c pt required S for bed mobilities, min-mod for STS and steps negotiation while CGA for amb task using a RW.  Recommend skilled PT interventions once medically cleared to participate.

## 2024-07-10 NOTE — SPEECH THERAPY NOTE
"Speech-Language Pathology Progress Note      Patient Name: Hal Miller    Today's Date: 7/10/2024      Subjective:  Pt was awake. He was sitting up in chair at bedside. Family was present in the room. Patient stated \"That is pretty good\".    Objective:  Pt was seen today for dysphagia therapy. Current diet is dysphagia 2 mechanical soft with thin liquids. Pt was on room air.  Focus of today's session was to maximize PO intake safety and improve use of strategies to minimize risk of aspiration. Textures offered today included mech soft soft solid and thin liquid via cup and via straw.    Swallow function:   Bolus mastication and AP transfer were slow.  Pt required multiple swallows per bolus. With thin by straw, Pt had instances of delayed cough following sip. Had better success with cup sips, no overt coughing. Pt was cued to alternate between sips and bites. Pharyngeal swallow initiation was present, mildly sluggish at times.  He needs mult swallows.  Needs cues for 2 swallows for all trials    Assessment:  Swallow function is stable with current diet. Diet texture and liquid consistency remains appropriate at this time. Needs cues to swallow x2 and alternate bites/sips.     Plan:  Continue dysphagia 2 mechanical soft and thin liquids. Continue ST follow up. Subsequent sessions to focus on maximizing PO intake safety, determining potential for diet texture advancement, and improving use of strategies to minimize risk of aspiration.  "

## 2024-07-10 NOTE — PROCEDURES
Acute Care Surgery  Bedside V.A.C. Procedure Note    Location of wound: Left Groin    Dressings and Foam removed:  2 Black Foam (1 in wound, 1 bridge)    Dimensions of wound: 4 cm x 1 cm x 1 cm    Description of wound: On inspection of the wound today, wound appears stable. The wound is clean with granulation tissue. No drainage or foul odor. The periwound skin remains clean and intact and unremarkable.    VAC dressing application:  The periwound skin was cleaned and dried.  2 black foam (1 in wound, 1 bridge) were cut to size of the wound and placed into the wound. The dressings were then covered with VAC drape. Additional VAC drape and black foam was used to create a bridge to the patient's Left hip and a base for the track pad. The track pad was then placed over the base of black foam. The VAC was then set to 125 mmHg low Continuous suction. The patient tolerated the procedure well and there were no complications. The patient did not require any excisional debridement during today's dressing change.    VAC settings:  125 mmHg  Continuous    Wound Images:      Additional Notes:  The VAC sticker placed over the dressing per protocol.  The next VAC dressing change will be planned for Friday 7/12.  Yuri Whitley PA-C was present for the dressing change.    This dressing change took greater than 10 minutes to complete.    Briana Desai PA-C  7/10/2024 10:34 AM

## 2024-07-10 NOTE — CASE MANAGEMENT
[facility] received request for authorization from Care Manager.  Authorization request submitted for: Acute Rehab  Facility Name: [facility]  NPI: [id number]  Facility MD: Ashley DePadua  NPI: [id number]  Authorization initiated by contacting insurance:  [facility]  Via: [facility]   Clinicals submitted via fax #  [phone]  Pending Reference #:  [id number]     Care Manager notified: Mani Dumont    Updates to authorization status will be noted in chart. Please reach out to CM for updates on any clinical information.

## 2024-07-10 NOTE — PLAN OF CARE
Problem: PHYSICAL THERAPY ADULT  Goal: Performs mobility at highest level of function for planned discharge setting.  See evaluation for individualized goals.  Description: Treatment/Interventions: OT, Spoke to case management, Spoke to nursing, Gait training, Bed mobility, Patient/family training, Endurance training, LE strengthening/ROM, Functional transfer training          See flowsheet documentation for full assessment, interventions and recommendations.  Outcome: Progressing  Note: Prognosis: Good  Problem List: Decreased strength, Decreased range of motion, Impaired balance, Decreased endurance, Decreased mobility, Decreased coordination, Decreased cognition, Impaired judgement, Decreased safety awareness, Pain  Assessment: Pt participated in session consisting of functional transfers to & from bedside recliner with brief gait trial attempted in between.  pt required increased assist to complete transfers today compared to last session, and had much more difficulty maintaining balance with upright posture & extended knees over RW YAMILA.  Once he attained this posture, he was able to take 2 steps forwards before rapidly requesting to sit down.  He required max A x1 for balance while recliner was safely positioned behind him prior to sitting, which he then performed with much less assist compared to initial stand with more appropriate eccentric control as he returned to recliner.  Pt appeared dyspneic again this session, but was better able to communicate compared to yesterday when demonstrating similar symptoms.  pt assisted into more comfortable position post session & quickly began to fall asleep, remaining in recliner with all needs in reach & alarm active.  pt remains appropriate for rehab at d/c to address functional deficits & ensure safe return to highest level of independence as he continue so recover.  Barriers to Discharge: Inaccessible home environment     Rehab Resource Intensity Level, PT: I  (Maximum Resource Intensity)    See flowsheet documentation for full assessment.

## 2024-07-10 NOTE — AR[facility] ADMISSION
Upon review of patient's case, patient is deemed [facility] appropriate at this time pending medical readiness / bed availability. CM made aware in Aidin .

## 2024-07-10 NOTE — PHYSICAL THERAPY NOTE
Physical Therapy Progress Note     07/10/24 1423   PT Last Visit   PT Visit Date 07/10/24   Note Type   Note Type Treatment   Pain Assessment   Pain Score No Pain   Restrictions/Precautions   Other Precautions Cognitive;Chair Alarm;Bed Alarm;Pain;Fall Risk;Multiple lines  (wound vac, Babcock catheter)   Subjective   Subjective Pt encountered seated in recliner, requesting assist to reposition himself & is motivated to particpiate in mobility when offered.  No new complaints given at rest.  He reports LE weakness & fatigue with ambualtion, which made him more anxious in the moment, which resided with time in sitting.   Transfers   Sit to Stand 2  Maximal assistance   Additional items Assist x 2   Stand to Sit 3  Moderate assistance   Additional items Assist x 2   Ambulation/Elevation   Gait pattern Short stride;Foward flexed;Inconsistent celia;Knees flexed;Excessively slow;Ataxia;Shuffling;Decreased foot clearance;Narrow YAMILA;Improper Weight shift;Poor UE support;R Knee Rachel;L Knee Rachel   Gait Assistance 2  Maximal assist   Additional items Assist x 2   Assistive Device Rolling walker   Distance x2 forward steps   Balance   Static Sitting Fair   Static Standing Poor -   Dynamic Standing Poor -   Ambulatory Poor -   Activity Tolerance   Activity Tolerance Patient tolerated treatment well;Patient limited by fatigue   Nurse Made Aware JASPER Whitfield   Assessment   Prognosis Good   Problem List Decreased strength;Decreased range of motion;Impaired balance;Decreased endurance;Decreased mobility;Decreased coordination;Decreased cognition;Impaired judgement;Decreased safety awareness;Pain   Assessment Pt participated in session consisting of functional transfers to & from bedside recliner with brief gait trial attempted in between.  pt required increased assist to complete transfers today compared to last session, and had much more difficulty maintaining balance with upright posture & extended knees over RW YAMILA.  Once he  attained this posture, he was able to take 2 steps forwards before rapidly requesting to sit down.  He required max A x1 for balance while recliner was safely positioned behind him prior to sitting, which he then performed with much less assist compared to initial stand with more appropriate eccentric control as he returned to recliner.  Pt appeared dyspneic again this session, but was better able to communicate compared to yesterday when demonstrating similar symptoms.  pt assisted into more comfortable position post session & quickly began to fall asleep, remaining in recliner with all needs in reach & alarm active.  pt remains appropriate for rehab at d/c to address functional deficits & ensure safe return to highest level of independence as he continue so recover.   Goals   Patient Goals to get moving   [facility] Expiration Date 07/17/24   PT Treatment Day 2   Plan   Treatment/Interventions Functional transfer training;LE strengthening/ROM;Therapeutic exercise;Endurance training;Patient/family training;Equipment eval/education;Bed mobility;Gait training   Progress Slow progress, decreased activity tolerance   PT Frequency 3-5x/wk   Discharge Recommendation   Rehab Resource Intensity Level, PT I (Maximum Resource Intensity)   Equipment Recommended Walker   Walker Package Recommended Wheeled walker   AM-PAC Basic Mobility Inpatient   Turning in Flat Bed Without Bedrails 2   Lying on Back to Sitting on Edge of Flat Bed Without Bedrails 1   Moving Bed to Chair 1   Standing Up From Chair Using Arms 1   Walk in Room 1   Climb 3-5 Stairs With Railing 1   Basic Mobility Inpatient Raw Score 7   Turning Head Towards Sound 3   Follow Simple Instructions 3   Low Function Basic Mobility Raw Score  13   Low Function Basic Mobility Standardized Score  20.14   [facility] Highest Level Of Mobility   JH-HLM Goal 2: Bed activities/Dependent transfer   JH-HLM Achieved 5: Stand (1 or more minutes)       Luis Linton PTA    An [facility]  Basic Mobility Raw Score less than 17 suggests pt would benefit from post acute rehab.  Please also refer to the recommendation of the Physical Therapist for safe discharge planning.

## 2024-07-10 NOTE — CASE MANAGEMENT
Case Management Discharge Planning Note    Patient name Hal Miller  Location [facility]-01 MRN [id number]  : 1940 Date 7/10/2024       Current Admission Date: 7/3/2024  Current Admission Diagnosis:Colonic diverticular abscess   Patient Active Problem List    Diagnosis Date Noted Date Diagnosed    Moderate protein-calorie malnutrition (HCC) 2024     Left inguinal hernia 2024     Leukocytosis 2024     Dysphagia 2024     Hx of aortic valve replacement 2024     Diplopia 2024     Peripheral polyneuropathy 2024     Hyponatremia 2024     Dysphoric mood 2024     NICM (nonischemic cardiomyopathy) ([facility]) 2024     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[facility]) 2024     Acute on chronic respiratory failure ([facility]) 2024     Episode of seizure-like activity 2024     History of Whipple procedure 2024     Dizziness 2024     Insomnia 2024     Ambulatory dysfunction 2024     Severe protein-calorie malnutrition (HCC) 2024     Abnormal CT of the abdomen 2024     Zoster 2024     Colonic diverticular abscess 2024     Vascular dementia (HCC) 2024     Type 2 diabetes mellitus, with long-term current use of insulin (HCC) 2024     Hypercalcemia 2023     TARA (acute kidney injury) ([facility]) 2023     Esophageal stricture 2023     Acute encephalopathy 2023     Generalized weakness 2023     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     Chronic obstructive pulmonary disease with acute exacerbation (HCC) 2022     Stage 3 chronic kidney disease (HCC) 2022     Centrilobular emphysema (HCC) 2021     History of malignant neuroendocrine tumor 2021     Atrial fibrillation (HCC) 2017     Shortness of breath 2017     Primary hypertension 2017     Hyperlipidemia 2015     Inflammatory polyarthropathy (HCC) 2014     Osteoarthrosis  11/12/2014     Polymyalgia rheumatica (HCC) 11/12/2014     Aneurysm of thoracic aorta (HCC) 01/06/2014     Aneurysm of abdominal aorta (HCC) 01/06/2014     Neoplasm of other specified site 01/06/2014       LOS (days): 7  Geometric Mean LOS (GMLOS) (days): 9.8  Days to GMLOS:3.2     OBJECTIVE:  Risk of Unplanned Readmission Score: 37.59         Current admission status: Inpatient   Preferred Pharmacy:   [id number] - [place], [place] - [address] [place] [id number]  Phone: [phone] Fax: [phone]    [facility] - [place], [place] - [facility] [facility] [address]  Phone: [phone] Fax: [phone]    [facility]) - [place], [place] - [address]  [address] [place] [address]  Phone: [phone] Fax: [phone]    Primary Care Provider: Kyaw Monreal MD    Primary Insurance: [employer] [facility]  Secondary Insurance:     DISCHARGE DETAILS:  Other Referral/Resources/Interventions Provided:  Referral Comments: [facility] able to accept, and reserved in aidin. CM discharge support submitting for auth. Pt and spouse are in agreement with DCP.    Treatment Team Recommendation: Acute Rehab  Discharge Destination Plan:: Acute Rehab

## 2024-07-10 NOTE — RESTORATIVE TECHNICIAN NOTE
Restorative Technician Note      Patient Name: Hal Miller     Restorative Tech Visit Date: 07/10/24  Note Type: Mobility  Patient Position Upon Consult: Supine  Activity Performed: Transferred (Slide board to bedside chair)  Patient Position at End of Consult: Bedside chair; All needs within reach; Bed/Chair alarm activated    YANIRA Ivory

## 2024-07-10 NOTE — PROGRESS NOTES
Patient:  LORRI GUNTER    MRN:  [id number]    Aidin Request ID:  [id number]    Level of care reserved:  Inpatient Rehab Facility    Partner Reserved:  [facility] - ([place]), SHAKILA Valenzuela [id number] [phone]    Clinical needs requested:    Geography searched:  40 miles around [id number]    Start of Service:    Request sent:  12:52pm EDT on 7/8/2024 by Mani Dumont    Partner reserved:  9:55am EDT on 7/10/2024 by Mani Dumont    Choice list shared:  9:55am EDT on 7/10/2024 by Mani Dumont

## 2024-07-10 NOTE — PROGRESS NOTES
"Progress Note - General Surgery   Hal Miller 84 y.o. male MRN: [id number]  Unit/Bed#: [facility] 620-01 Encounter: [id number]    Assessment:  Hal Miller is a 84 y.o. male s/p Angela;s, explant of left groin mesh, washout/debridement left groin on 7/4.    Plan:  - dysphagia diet, continue to work with speech  - continue to encourage IS  - prn pain and nausea control  - DVT ppx  - keep cardenas until urology follow-up  - PT/OT level I  - CM dispo planning to acute care rehab    Subjective/Objective     Subjective: NAEO. Vital signs stable. Patient reports pain in abdomen continues to improve. Tolerating diet. Having ostomy output. Cardenas in place, continuing to produce adequate urine. Denies fever, chills, nausea, vomiting. Patient has no concerns at this time.     Objective:     Blood pressure 155/80, pulse 83, temperature 97.6 °F (36.4 °C), temperature source Oral, resp. rate 16, height 6' 2\" (1.88 m), weight 79.5 kg (175 lb 4.3 oz), SpO2 98%.,Body mass index is 22.5 kg/m².      Intake/Output Summary (Last 24 hours) at 7/10/2024 0932  Last data filed at 7/10/2024 0501  Gross per 24 hour   Intake 230 ml   Output 2500 ml   Net -2270 ml       Invasive Devices       Peripheral Intravenous Line  Duration             Peripheral IV 07/07/24 Right;Ventral (anterior) Forearm 3 days              Drain  Duration             Urethral Catheter Latex 16 Fr. 6 days    Colostomy Other (comment) LUQ 5 days                    Physical Exam: General appearance: alert  Lungs: clear to auscultation bilaterally  Heart: irregularly irregular rhythm  Abdomen: soft, non tender, non distended, LLQ ostomy with stool output. Left groin wound vac in place  Skin: Skin color, texture, turgor normal. No rashes or lesions    Lab, Imaging and other studies:I have personally reviewed pertinent lab results.    VTE Pharmacologic Prophylaxis: Pt on Eliquis  VTE Mechanical Prophylaxis: sequential compression device    Jean Carlos Hurd MD  General Surgery " Resident

## 2024-07-10 NOTE — PLAN OF CARE
Problem: PAIN - ADULT  Goal: Verbalizes/displays adequate comfort level or baseline comfort level  Description: Interventions:  - Encourage patient to monitor pain and request assistance  - Assess pain using appropriate pain scale  - Administer analgesics based on type and severity of pain and evaluate response  - Implement non-pharmacological measures as appropriate and evaluate response  - Consider cultural and social influences on pain and pain management  - Notify physician/advanced practitioner if interventions unsuccessful or patient reports new pain  Outcome: Progressing     Problem: INFECTION - ADULT  Goal: Absence or prevention of progression during hospitalization  Description: INTERVENTIONS:  - Assess and monitor for signs and symptoms of infection  - Monitor lab/diagnostic results  - Monitor all insertion sites, i.e. indwelling lines, tubes, and drains  - Monitor endotracheal if appropriate and nasal secretions for changes in amount and color  - [place] appropriate cooling/warming therapies per order  - Administer medications as ordered  - Instruct and encourage patient and family to use good hand hygiene technique  - Identify and instruct in appropriate isolation precautions for identified infection/condition  Outcome: Progressing  Goal: Absence of fever/infection during neutropenic period  Description: INTERVENTIONS:  - Monitor WBC    Outcome: Progressing

## 2024-07-10 NOTE — PROGRESS NOTES
Progress Note - Geriatric Medicine   Hal Miller 84 y.o. male MRN: [id number]  Unit/Bed#: [facility] 620-01 Encounter: [id number]      Assessment/Plan:    Acute metabolic encephalopathy   -evidenced by confusion and fluctuations in mentation and alertness beyond baseline in patient at high baseline risk due to underlying dementia and hx of encephalopathy during prior admissions   -multifactorial including age, underlying dementia, hx encephalopathy during prior admissions, perforated diverticulitis now s/p surgical procedure, anesthesia, multiple setting changes and acute pain in frail elderly individual   -now markedly improved and appears to be much closer to baseline   -continue supportive cares  -encourage norm sleep cycle  -continue to monitor for and treat acute derangements as arise  -encourage normal circadian rhythm, consider low dose melatonin HS PRN  -continue use of glasses and hearing aid all appropriate times to reduce risk uncorrected sensory impairment from contributing to further confusion/worsening encephalopathy   -ensure acute pain well controlled, cont barbie pain protocol   -monitor for fecal and urinary retention - cardenas in place   -reorient frequently as appropriate and indicated  -appreciate family at bedside for familiarity/reassurance /engagement      Perforated diverticulitis with abscess  -s/p Hartmans procedure with explantation of left groin mesh with washout and debridement of left groin wound on 7/4/24 now with wound vac in place  -s/p completion course of zosyn  -continue acute multimodal pain control per geriatric pain protocol      Dysphagia  -speech therapy on consult   -s/p VBS 7/7/24  currently on dysphagia level 2 with thin liquids   -continue strict aspiration precautions  -cont working with speech therapy and good oral hygiene and cares      Vascular dementia  -acutely encephalopathic on initial consultation now markedly improved alert and answering questions appropriately appears  much closer to baseline   -at baseline alert and oriented, forgetful, independent with ADLs requires some assist with iADLs   -[facility] 23/30 (11/2022), noted to have episodes of encephalopathy and decline since last testing, recommend repeat following recovery from acute illness to establish new baseline   -[facility] 6/18/24 imaging personally viewed, reveals at least moderate chronic microangiopathic changes   -TSH WNL at 1.79, B12 WNL at 531  -at risk age and cardiovascular related acceleration santy in setting of recent CVA, continue secondary risk factor modifications   -encourage patient remain physically, socially and cognitively active and engaged to maintain cognitive acuity   -encourage use sensory assist devices such as corrective lenses and hearing aids all appropriate times to reduce risk uncorrected sensroy impairment from contributing to isolation, confusion, worsening encephalopathy and more precipitous cognitive decline   -underlying dementia increases risk developing delirium and likely contributed to episode during current admission, cont strict precautions to reduce risk recurrence     Hx CVA  -small left parietal infarct during recent hospitalization on MRI brain 6/20/24  -suspected to be due to missed dosing of Eliquis at time of event  -remains at risk future CVAs, continue strict secondary risk factor modifications  -close o/p f/u with Neurology for ongoing monitoring and management      History of possible seizure  -maintained on Depatkote as managed by Neurology  -continue depakote   -cont seizure precautions      Insomnia   -previously on seroquel PRN as o/p, has not recently been requiring during admission, no indication to resume at this time   -consider low dose melatonin HS PRN  -encourage establishment of consistent sleep/wake times and bedtime routine      Babcock catheter in place   -remains in place due to difficult placement per Urology  -will require o/p f/u with Urology for voiding trial       Inflammatory polyarthropathy   -maintained on prednisone chronically as o/p  -follows closely with Rheum as o/p, continue close o/p f/u     DM-II  -A1c 7.6  -currently on basal bolus insulin regimen with good control  -continue glu goal 140-180 during hospitalization to reduce risk hypoglycemia   -continue close o/p f/u with PCP for ongoing age appropriate diabetic screenings and cares      Afib   -rates currently well controlled off rate controllers, home Eliquis was on hold covering with heparin gtt perioperatively now back on home Eliquis   -cont close follow-up with Cardiology     Impaired Vision  -recommend use of corrective lenses at all appropriate times  -encourage adequate lighting and encourage use of assistance with ambulation  -keep personal belongings close to person to avoid reaching  -consider large font for printed materials provided to patient      Impaired Hearing  -Encourage use of hearing aids at all appropriate times  -encourage providers and caregivers to speak slowly and clearly directly to patient  -minimize background noise to encourage patient engagement  -consider use of hearing amplifier to reduce risk of straining to hear if hearing aids are not present or are not sufficient      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  -clinical frailty scale stage VI, moderately frail, progressive  -multifactorial including age, amb dysfxn, hx CVA, DM-II and multitude of chronic medical co-morbidities now with perforated diverticulitis with abscess requiring surgical procedure superimposed on elderly individual with limited physiologic and metabolic reserve  -continue optimization of chronic medical conditions and address acute derangements as arise  -monitor for and treat any underlying anxiety, mood, depression symptoms as may impact response to therapies and overall sense of wellbeing and quality of life  -continue to ensure tx and interventions align with patients wishes and goals of care   -cont  "psychosocial support of patient and caregivers      Care coordination: rounded with Roseann (RN)    Subjective:     Hal is seen and examined at bedside at bedside where he is sitting resting comfortably with his wife and family at his side. He reports that he has more energy today and appetite is slowly improving, no new acute complaints. Nursing reports no acute events overnight. Wife, Ann, updated at bedside.    Review of Systems   Constitutional: Negative.  Negative for chills and fever. Appetite change: improving.  HENT: Negative.     Eyes: Negative.    Respiratory:  Positive for cough.    Cardiovascular: Negative.    Gastrointestinal: Negative.    Genitourinary: Negative.    Musculoskeletal: Negative.    Skin: Negative.    Neurological: Negative.    Hematological: Negative.    Psychiatric/Behavioral: Negative.     All other systems reviewed and are negative.    Objective:     Vitals: Blood pressure 142/78, pulse 83, temperature 97.9 °F (36.6 °C), temperature source Oral, resp. rate 16, height 6' 2\" (1.88 m), weight 79.5 kg (175 lb 4.3 oz), SpO2 99%.,Body mass index is 22.5 kg/m².      Intake/Output Summary (Last 24 hours) at 7/10/2024 1259  Last data filed at 7/10/2024 0501  Gross per 24 hour   Intake 230 ml   Output 1475 ml   Net -1245 ml     Current Medications: Reviewed    Physical Exam:   Physical Exam  Vitals and nursing note reviewed.   Constitutional:       General: He is not in acute distress.     Appearance: He is not toxic-appearing.   HENT:      Head: Normocephalic.      Nose: Nose normal.      Mouth/Throat:      Mouth: Mucous membranes are moist.   Eyes:      General: No scleral icterus.        Right eye: No discharge.         Left eye: No discharge.      Conjunctiva/sclera: Conjunctivae normal.      Comments: Wearing glasses    Neck:      Comments: Phonation norm   Cardiovascular:      Rate and Rhythm: Normal rate and regular rhythm.   Pulmonary:      Effort: Pulmonary effort is normal. No " respiratory distress.   Abdominal:      Palpations: Abdomen is soft.      Tenderness: There is no abdominal tenderness.   Musculoskeletal:      Cervical back: Neck supple.      Comments: Reduced overall muscle mass    Skin:     General: Skin is warm and dry.   Neurological:      Mental Status: He is alert.      Comments: Awake and alert answering questions appropriately    Psychiatric:      Comments: Mood and affect much brighter today         Invasive Devices       Peripheral Intravenous Line  Duration             Peripheral IV 07/07/24 Right;Ventral (anterior) Forearm 3 days              Drain  Duration             Urethral Catheter Latex 16 Fr. 6 days    Colostomy Other (comment) LUQ 5 days                  Lab Results:     I have personally reviewed pertinent lab results including the following:    Results from last 7 days   Lab Units 07/10/24  0548 07/09/24  0507 07/08/24  0539   WBC Thousand/uL 9.98 9.61 10.59*   HEMOGLOBIN g/dL 8.6* 8.9* 8.1*   HEMATOCRIT % 27.8* 27.6* 26.2*   PLATELETS Thousands/uL 139* 134* 120*   SEGS PCT % 75 78* 82*   MONO PCT % 13* 11 10   EOS PCT % 1 1 1     Results from last 7 days   Lab Units 07/10/24  0548 07/09/24  0507 07/08/24  0539 07/05/24  0521 07/04/24  1718   POTASSIUM mmol/L 4.0 4.0 4.1   < > 4.8   CHLORIDE mmol/L 106 108 106   < > 101   CO2 mmol/L 32 29 29   < > 25   BUN mg/dL 38* 40* 35*   < > 30*   CREATININE mg/dL 1.12 1.13 1.18   < > 1.22   CALCIUM mg/dL 9.9 9.8 9.3   < > 8.7   ALK PHOS U/L  --   --   --   --  86   ALT U/L  --   --   --   --  14   AST U/L  --   --   --   --  21    < > = values in this interval not displayed.     I have personally reviewed the following imaging study reports in PACS:    No new imaging overnight

## 2024-07-10 NOTE — WOUND OSTOMY CARE
Consult Note - Wound   Hal Miller 84 y.o. male MRN: [id number]  Unit/Bed#: [facility] 620-01 Encounter: [id number]        History and Present Illness: Patient is seen today wound care consult today of the groin area . Patient out of bed in the chair and sleeping most of the time . He is know to wound and ostomy care . Patient has a VAC on the Left  groin area , right thigh appears as a scabbed area in the picture noted , and a ostomy in the left lower quadrant that we area following for teaching with the family . Babcock in place . Patient is a 84 year old male with noted medical issues as acute metabolic encephalopathy , perforated diverticulitis with abscess s/p frost's procedure and left groin VAC , dysphagia , vascular dementia, hx CVA , history of possible seizure , insomnia , A- Fib , Diabetes type 2 , impaired vision , impaired healing , and  frailty syndrome in geratic patient . Unable to stand for sacral assessment .      Assessment Findings:   Bilateral heels dry and intact - silicone foam   Penis area -  HA small DTI of the mucous membrane area 100% purple intact     Skin Care Plan:  1-Sacral buttocks - cleanse with soap and water then apply silicone bordered foam soniya with a P and date assess every shift and change every 3 days .   2-Turn/reposition q2h for pressure re-distribution on skin.  3-EHOB  cushion when out of bed  4-Elevate heels to offload pressure  5-Moisturize skin daily with skin nourishing cream.  6- Bilateral heels apply silicone bordered foam to heels soniya with a P and date assess every shift and change every 3 days   7. Penile area - cleanse with soap and water then apply hydraguard bid and prn             Wounds:  Wound 07/10/24 Pressure Injury Penis (Active)   Wound Image   07/10/24 0909   Wound Description Dry;Intact;Light purple 07/10/24 0909   Pressure Injury Stage MMPI 07/10/24 0909   Amy-wound Assessment Clean;Dry;Intact 07/10/24 0909   Wound Length (cm) 1 cm 07/10/24 0909   Wound  Width (cm) 1 cm 07/10/24 0909   Wound Depth (cm) 0 cm 07/10/24 0909   Wound Surface Area (cm^2) 1 cm^2 07/10/24 0909   Wound Volume (cm^3) 0 cm^3 07/10/24 0909   Calculated Wound Volume (cm^3) 0 cm^3 07/10/24 0909   Drainage Amount None 07/10/24 0909   Treatments Cleansed;Site care 07/10/24 0909   Dressing Moisture barrier 07/10/24 0909   Patient Tolerance Tolerated well 07/10/24 0909       Wound care will follow weekly and biweekly for ostomy care   Secure chat with questions     Fariha Alvarez RN BSN CWOCN

## 2024-07-10 NOTE — CASE MANAGEMENT
Per cm pt no longer needed wound vac for home care due to pt going to [facility] . Emailed [facility] to have them cancel the wound vac order and place the vac back into inventory. Cm notified

## 2024-07-11 NOTE — WOUND OSTOMY CARE
Progress Note - Wound   Hal Miller 84 y.o. male MRN: [id number]  Unit/Bed#: [facility] 620-01 Encounter: [id number]        Assessment:   Patient is seen for wound care follow-up. Patient seen sitting OOB in recliner with B/L legs elevated. Left groin vac in place managed by surgery. Max assist x 2-3 for turning and repositioning; max assist x2-3 to stand for assessment. Bladder managed via internal urinary catheter. Regular mattress noted in room, p-500 low air loss mattress ordered for patient. Recommend use of ATR for turning and repositioning.     Bowel managed via LLQ ostomy - ostomy teaching performed today with wife. Refer to ostomy teaching note for details.     Refer to wound care note from 7/10 for assessment of Penis DTI.     Findings:  B/L heels are dry intact and jonahtan with no skin loss or wounds present. Recommend continuing with preventative silicone bordered foam dressing and proper offloading/ repositioning.      Right Anterior Thigh: irregular in shape area of intact dark purple tissue.  Appears like ecchymosis. No skin loss or drainage present. Recommend a preventative foam dressing to area.  Right Groin/Scrotum MASD: irregular in shape area of partial thickness skin loss. Wound bed is pink in color. Amy-wound is fragile, pink and rashy- likely fungal in etiology. Fungal rash extends to left groin and scrotum. Recommend ARASH Anti-fungal cream for areas.   HA B/L Sacro-Buttocks Evolving DTPI: irregular in shape area of intact and partial thickness skin loss measured together. Likely moisture pressure in etiology. Wound bed is dark purple non-blanchable tissue. Amy-wound is fragile macerated and rashy. Scant sanguinous drainage noted. Recommend ARASH Anti-Fungal Cream to area.       No induration, fluctuance, odor, warmth/temperature differences, redness, or purulence noted to the above noted wounds and skin areas assessed. New dressings applied per orders listed below. Patient tolerated well- no s/s  of non-verbal pain or discomfort observed during the encounter. Bedside nurse aware of plan of care. See flow sheets for more detailed assessment findings.      Orders listed below and wound care will continue to follow, call or Secure Chat Message with questions.           Skin Care Plan:  1-Apply ARASH Anti-Fungal Cream to B/L Sacro-Buttocks, Scrotum and Groin BID and PRN episodes of incontinence.   2-Turn/reposition q2h or when medically stable for pressure re-distribution on skin. Place patient on ATR turning and repositioning system.   3-Elevate heels to offload pressure  4-Moisturize skin daily with skin nourishing cream  5-Ehob cushion in chair when out of bed.  6-P-500 low air loss mattress ordered for patient.  7-Left groin wound: vac managed by surgery.   8-Bilateral heels apply silicone bordered foam to heels soniya with a P and date assess every shift and change every 3 days   9-Right Anterior Thigh: Cleanse with NSS and pat dry. Apply a silicone bordered foam dressing to the area. Soniya with P for Prevention and change every 3 days or PRN.         WOUNDS:  Wound 07/09/24 Pressure Injury Thigh Anterior;Right (Active)   Wound Image   07/11/24 1300   Wound Description Intact;Light purple 07/11/24 1300   Pressure Injury Stage O 07/11/24 1300   Amy-wound Assessment Intact 07/11/24 1300   Dressing Foam, Silicon (eg. Allevyn, etc) 07/11/24 1300   Dressing Changed New 07/11/24 1300   Patient Tolerance Tolerated well 07/11/24 1300   Dressing Status Intact 07/11/24 1300       Wound 07/11/24 MASD Scrotum Anterior;Right (Active)   Wound Image   07/11/24 1339   Wound Description [place] 07/11/24 1339   Amy-wound Assessment Fragile;Rash 07/11/24 1339   Wound Length (cm) 1.5 cm 07/11/24 1339   Wound Width (cm) 2 cm 07/11/24 1339   Wound Depth (cm) 0.1 cm 07/11/24 1339   Wound Surface Area (cm^2) 3 cm^2 07/11/24 1339   Wound Volume (cm^3) 0.3 cm^3 07/11/24 1339   Calculated Wound Volume (cm^3) 0.3 cm^3 07/11/24 1339    Drainage Amount None 07/11/24 1339   Non-staged Wound Description Partial thickness 07/11/24 1339   Treatments Cleansed;Site care 07/11/24 1339   Dressing Moisture barrier 07/11/24 1339   Dressing Changed New 07/11/24 1339   Patient Tolerance Tolerated well 07/11/24 1339   Dressing Status Clean;Dry;Intact 07/11/24 1339       Wound 07/11/24 Pressure Injury Buttocks Mid;Bilateral (Active)   Wound Image   07/11/24 1340   Wound Description Light purple;Non-blanchable erythema 07/11/24 1340   Pressure Injury Stage DTPI 07/11/24 1340   Amy-wound Assessment Fragile;Maceration;Rash 07/11/24 1340   Wound Length (cm) 9 cm 07/11/24 1340   Wound Width (cm) 3.5 cm 07/11/24 1340   Wound Depth (cm) 0.1 cm 07/11/24 1340   Wound Surface Area (cm^2) 31.5 cm^2 07/11/24 1340   Wound Volume (cm^3) 3.15 cm^3 07/11/24 1340   Calculated Wound Volume (cm^3) 3.15 cm^3 07/11/24 1340   Drainage Amount Scant 07/11/24 1340   Drainage Description Sanguineous 07/11/24 1340   Non-staged Wound Description Partial thickness 07/11/24 1340   Treatments Cleansed;Site care 07/11/24 1340   Dressing Moisture barrier 07/11/24 1340   Dressing Changed New 07/11/24 1340   Patient Tolerance Tolerated well 07/11/24 1340   Dressing Status Intact;Dry;Clean 07/11/24 1340                Susan Florence RN, BSN, CWOCN

## 2024-07-11 NOTE — PLAN OF CARE
Problem: PAIN - ADULT  Goal: Verbalizes/displays adequate comfort level or baseline comfort level  Description: Interventions:  - Encourage patient to monitor pain and request assistance  - Assess pain using appropriate pain scale  - Administer analgesics based on type and severity of pain and evaluate response  - Implement non-pharmacological measures as appropriate and evaluate response  - Consider cultural and social influences on pain and pain management  - Notify physician/advanced practitioner if interventions unsuccessful or patient reports new pain  Outcome: Progressing     Problem: INFECTION - ADULT  Goal: Absence or prevention of progression during hospitalization  Description: INTERVENTIONS:  - Assess and monitor for signs and symptoms of infection  - Monitor lab/diagnostic results  - Monitor all insertion sites, i.e. indwelling lines, tubes, and drains  - Monitor endotracheal if appropriate and nasal secretions for changes in amount and color  - [place] appropriate cooling/warming therapies per order  - Administer medications as ordered  - Instruct and encourage patient and family to use good hand hygiene technique  - Identify and instruct in appropriate isolation precautions for identified infection/condition  Outcome: Progressing  Goal: Absence of fever/infection during neutropenic period  Description: INTERVENTIONS:  - Monitor WBC    Outcome: Progressing     Problem: DISCHARGE PLANNING  Goal: Discharge to home or other facility with appropriate resources  Description: INTERVENTIONS:  - Identify barriers to discharge w/patient and caregiver  - Arrange for needed discharge resources and transportation as appropriate  - Identify discharge learning needs (meds, wound care, etc.)  - Arrange for interpretive services to assist at discharge as needed  - Refer to Case Management Department for coordinating discharge planning if the patient needs post-hospital services based on physician/advanced  practitioner order or complex needs related to functional status, cognitive ability, or social support system  Outcome: Progressing      5

## 2024-07-11 NOTE — PROGRESS NOTES
Progress Note - Geriatric Medicine   Hal Miller 84 y.o. male MRN: [id number]  Unit/Bed#: [facility] 620-01 Encounter: [id number]      Assessment/Plan:    Acute metabolic encephalopathy   -mentation continues to wax and wane  -multifactorial including advanced age, underlying dementia, hx encephalopathy during prior admissions, perforated diverticulitis now s/p surgical procedure, anesthesia, multiple setting changes and acute pain in frail elderly individual   -continue supportive cares, norm sleep cycle, use of sensory assist devices (glasses/hearing aids)  -ensure acute pain well controlled, cont barbie pain protocol   -monitor for fecal and urinary retention - cardenas in place, LUQ colostomy    -reorient frequently as appropriate and indicated  -appreciate family at bedside for familiarity/reassurance /engagement and social support      Perforated diverticulitis with abscess  -s/p Hartmans procedure with explantation of left groin mesh with washout and debridement of left groin wound on 7/4/24 now with wound vac in place  -s/p completion course of zosyn  -continue acute multimodal pain control per geriatric pain protocol     Anemia  -Hb 7.3 from 10.7 on 7/3/24  -likely multifactorial including dilutional, daily labs etc  -monitor for ongoing acute blood loss and tx as indicated      Dysphagia  -speech therapy on consult   -s/p VBS 7/7/24  currently on dysphagia level 2 with thin liquids   -continue strict aspiration precautions  -cont working with speech therapy   -encourage good oral hygiene and cares      Vascular dementia  -acutely encephalopathic on initial consultation now somewhat improved more alert and answering questions but with continued fluctuations in mentation beyond baseline   -at baseline alert and oriented, forgetful, independent with ADLs requires some assist with iADLs   -MoCA 23/30 (11/2022), noted to have episodes of encephalopathy and decline since last testing, recommend repeat following recovery  from acute illness to establish new baseline   -CT 6/18/24 imaging personally viewed, reveals at least moderate chronic microangiopathic changes   -TSH WNL at 1.79, B12 WNL at 531  -at risk age and cardiovascular related acceleration santy in setting of recent CVA, continue secondary risk factor modifications   -encourage patient remain physically, socially and cognitively active and engaged to maintain cognitive acuity   -encourage use sensory assist devices such as corrective lenses and hearing aids all appropriate times to reduce risk uncorrected sensroy impairment from contributing to isolation, confusion, worsening encephalopathy and more precipitous cognitive decline   -underlying dementia increases risk developing delirium and likely contributed to episode during current admission, cont strict precautions to reduce risk recurrence     Hx CVA  -small left parietal infarct during recent hospitalization on MRI brain 6/20/24  -suspected to be due to missed dosing of Eliquis at time of event  -remains at risk future CVAs, continue strict secondary risk factor modifications  -close o/p f/u with Neurology for ongoing monitoring and management      History of possible seizure  -maintained on Depatkote as managed by Neurology  -continue depakote   -cont seizure precautions      Insomnia   -previously on Seroquel PRN as o/p, has not recently been requiring during admission, no indication to resume at this time   -consider low dose melatonin HS PRN  -encourage establishment of consistent sleep/wake times and bedtime routine      Babcock catheter in place   -remains in place due to difficult placement per Urology  -will require o/p f/u with Urology for voiding trial      Inflammatory polyarthropathy   -maintained on prednisone chronically as o/p  -follows closely with Rheum as o/p, continue close o/p f/u     DM-II  -A1c 7.6  -currently on basal bolus insulin regimen with good control  -continue glu goal 140-180 during  hospitalization to reduce risk hypoglycemia, readings mostly within goal   -continue close o/p f/u with PCP for ongoing age appropriate diabetic screenings and cares      Afib   -rates currently well controlled off rate controllers, on Eliquis   -cont close follow-up with Cardiology     Impaired Vision  -recommend use of corrective lenses at all appropriate times  -encourage adequate lighting and encourage use of assistance with ambulation  -consider large font for printed materials provided to patient      Impaired Hearing  -Encourage use of hearing aids at all appropriate times  -encourage providers and caregivers to speak slowly and clearly directly to patient  -minimize background noise to encourage patient engagement  -consider use of hearing amplifier to reduce risk of straining to hear if hearing aids are not present or are not sufficient      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  -clinical frailty scale stage VI, moderately frail, progressive  -multifactorial including age, amb dysfxn, hx CVA, DM-II and multitude of chronic medical co-morbidities now with perforated diverticulitis with abscess requiring surgical procedure superimposed on elderly individual with limited physiologic and metabolic reserve  -continue optimization of chronic medical conditions and address acute derangements as arise  -monitor for and treat any underlying anxiety, mood, depression symptoms as may impact response to therapies and overall sense of wellbeing and quality of life  -continue to ensure tx and interventions align with patients wishes and goals of care   -cont psychosocial support of patient and caregivers     Care coordination: rounded with Amy (RN), wife updated at bedside     Subjective:     Hal is seen and examined at bedside whee he is sitting resting with his family at his side, he reports discomfort in his rear end which improves with repositioning. Nursing reports no acute events overnight.     Review of  "Systems   Unable to perform ROS: Mental status change     Objective:     Vitals: Blood pressure 109/56, pulse 84, temperature 97.7 °F (36.5 °C), resp. rate 12, height 6' 2\" (1.88 m), weight 79.5 kg (175 lb 4.3 oz), SpO2 95%.,Body mass index is 22.5 kg/m².      Intake/Output Summary (Last 24 hours) at 7/11/2024 1315  Last data filed at 7/11/2024 0500  Gross per 24 hour   Intake 120 ml   Output 1175 ml   Net -1055 ml     Current Medications: Reviewed    Physical Exam:   Physical Exam  Vitals and nursing note reviewed.   Constitutional:       Comments: Thin frail chronically ill appearing elderly male    HENT:      Head: Normocephalic.      Nose: Nose normal.      Comments: O2 via NC     Mouth/Throat:      Mouth: Mucous membranes are dry.   Eyes:      General: No scleral icterus.        Right eye: No discharge.         Left eye: No discharge.      Conjunctiva/sclera: Conjunctivae normal.   Neck:      Comments: Trachea midline   Cardiovascular:      Rate and Rhythm: Normal rate and regular rhythm.   Pulmonary:      Effort: Pulmonary effort is normal.      Breath sounds: No wheezing.      Comments: Mild tachypnea   Abdominal:      General: There is no distension.      Palpations: Abdomen is soft.      Comments: Ostomy noted    Musculoskeletal:      Right lower leg: Edema present.      Left lower leg: Edema present.      Comments: Reduced overall muscle mass    Skin:     General: Skin is warm and dry.      Comments: Abrasion middle upper lip    Neurological:      Mental Status: He is alert.      Comments: Awake, intermittently answering ques    Psychiatric:      Comments: Flat and withdrawn         Invasive Devices       Peripheral Intravenous Line  Duration             Peripheral IV 07/11/24 Right Forearm <1 day              Drain  Duration             Urethral Catheter Latex 16 Fr. 7 days    Colostomy Other (comment) LUQ 6 days                  Lab Results:     I have personally reviewed pertinent lab results including " the following:    Results from last 7 days   Lab Units 07/11/24  0719 07/11/24 0459 07/10/24  0548 07/09/24  0507   WBC Thousand/uL  --  8.47 9.98 9.61   HEMOGLOBIN g/dL 7.3* 7.1* 8.6* 8.9*   HEMATOCRIT % 23.3* 23.2* 27.8* 27.6*   PLATELETS Thousands/uL  --  113* 139* 134*   SEGS PCT %  --  72 75 78*   MONO PCT %  --  14* 13* 11   EOS PCT %  --  1 1 1     Results from last 7 days   Lab Units 07/11/24  0459 07/10/24  0548 07/09/24  0507 07/05/24  0521 07/04/24  1718   POTASSIUM mmol/L 4.2 4.0 4.0   < > 4.8   CHLORIDE mmol/L 106 106 108   < > 101   CO2 mmol/L 34* 32 29   < > 25   BUN mg/dL 38* 38* 40*   < > 30*   CREATININE mg/dL 1.11 1.12 1.13   < > 1.22   CALCIUM mg/dL 9.6 9.9 9.8   < > 8.7   ALK PHOS U/L  --   --   --   --  86   ALT U/L  --   --   --   --  14   AST U/L  --   --   --   --  21    < > = values in this interval not displayed.     I have personally reviewed the following imaging study reports in PACS:    No new imaging overnight

## 2024-07-11 NOTE — CASE MANAGEMENT
Called [employer] ([phone]) to check status of authorization. Spoke to Tata who stated clinicals were received and auth still pending at this time. FARNAZ notified: Mani Dumont

## 2024-07-11 NOTE — WOUND OSTOMY CARE
"Progress Note- Ostomy  Hal Miller 84 y.o. male  [id number]  [facility] 620-[facility] 620-01    Assessment:  Family seen today for scheduled ostomy teaching session. Wife was the only family member to stay for teaching - son left room. Wife asked to watch pouch change today. Patient did not participate in ostomy teaching session- he slept for entire teaching. Wife actively watched and listened to 100% of teaching session. She stated that she will likely forget some of the steps. She was made aware that instructions would be placed in the AVS if needed. Wife stated understanding of teaching session and reports no additional questions or concerns at this time. She reports that patient is likely to discharge to a rehab prior to going home.     Topics reviewed with wife: normal & abdormal stoma appearance, how and when to empty the pouch (1/3- 1/2 full) to prevent pulling/lifting of the barrier, importance & how to clean the end of the pouch to prevent odor after pouch emptying, how to empty the pouch, frequency of changing of the pouch (every 3-4 days on a schedule), burping, Clothing- including avoiding placing belt-line/seat belts over the stoma, bathing, nicholas-stomal skin care, how to measure the stoma/ cut the wafer to the correct size, how to close the pouch, and how to obtain supplies.     Step-by-step pouch change done using a [employer] Flat One Piece Pouch. Reviewed with wife how and when to measure the stoma (weekly). Demonstrated how to cut one piece barrier, and how to apply it to the skin using gentle pressure / warmth of the hands. Good seal obtained.    Wife verbalized understanding of education and teaching. Wife is primary learner. All questions and concerns answered. Encouraged wife and family to continue to read the educational materials provided - \"Life after colostomy Surgery\" by [employer].     Wife gave verbal permission to order sample kits from [employer], [employer], and [employer]. Additional discharge " supplies and pouches provided to wife- left at beside. Additional one piece pouches called to floor from storeroom for patient.     LLQ Colostomy Stoma appearance:     Stoma: Red, round, slightly budded stoma with center os, peristomal skin is intact. Stoma attached to skin junction, no separation noted. Small amount of soft brown stool noted in pouch. STOMA MEASUREMENT: 1.5 inches      Ostomy Care:  -Stoma Measurement: 1.5 inches (Measure stoma weekly for next 6-8 weeks- stoma will decrease in size due to decrease in swelling)     -Appliance Used During Bag Change: [employer] Sensura Edouard One Piece Flat Pouch Cut to Fit Pouch     *Can shower with pouch on or off. Make sure to dry off pouch after shower.     Bag Change Steps:  1. Peel back pouch using push-pull method, may use non-alcohol adhesive remover. Remove pouch from top to bottom.   2. Use warm water only to cleanse skin around the stoma (nicholas-stomal skin).   3. Make sure all adhesive residue is removed and skin is dry and not oily.  4. Measure stoma size using measuring guide and trace correct measurements onto back of pouch.  5. Then cut or mold the backing of pouch out to correct shape/size.  6. Place pouch over stoma and onto skin.  7. Use warmth of hand to apply gentle pressure to help backing of pouch to adhere well to skin.    ([employer]oplast Pouches do not require use of Skin Prep Prior to Application. If using other brands of pouches, refer to product guide to determine the need for use of skin prep prior to the application of their pouches.)       **If the skin is open, moist or fragile, Crusting can be done prior to pouch application (before step 6) to create dry skin and assist with pouch adherence- steps are listed below.      TIPS:  Empty pouch when 1/3 -1/2 full.  Change pouch every 4-5 days or if signs of leaking.    Crusting as needed for moist, red, open skin around the stoma: (Done prior to pouch application)   Apply stoma powder to excoriation,  "move excess powder away with hand  Use no sting barrier to pat area to form \"crust\"  Repeat X2 before placing new ostomy pouch        Orders listed above and wound care will continue to follow, call or Secure Chat Message with questions. Bedside nurse updated of findings and orders. Flowsheets updated with exam details and measurements.      Susan Florence RN, BSN, CWOCN   "

## 2024-07-11 NOTE — CASE MANAGEMENT
Called [employer] because I did not hear back via email and spoke to ada who cancelled the woundvac and returned to inventory . Call reference number is [id number].

## 2024-07-11 NOTE — PROGRESS NOTES
"Progress Note - Hal Miller 84 y.o. male MRN: [id number]    Unit/Bed#: [facility] 620-01 Encounter: [id number]      Assessment:  85 yo M s/p Angela's, explantation of left groin mesh, washout/debridement left groin on 7/4.     Plan:  Dysphagie diet, continue to work with speech  On home meds, including Eliquis  Keep Babcock, plan for o/p urology f/u   OOB, PT/OT Level I   CM dispo planning to acute care [facility]     Subjective:   Patient seen and examined at bedside this morning. Reports resting well overnight, denies pain. Reports he is tolerating puree diet well, no nausea or vomiting.     Objective:     Vitals: Blood pressure 105/63, pulse 82, temperature 98.3 °F (36.8 °C), resp. rate 18, height 6' 2\" (1.88 m), weight 79.5 kg (175 lb 4.3 oz), SpO2 96%.,Body mass index is 22.5 kg/m².      Intake/Output Summary (Last 24 hours) at 7/11/2024 0620  Last data filed at 7/11/2024 0500  Gross per 24 hour   Intake 120 ml   Output 1175 ml   Net -1055 ml       Physical Exam: General appearance: alert and oriented, in no acute distress  Head: Normocephalic, without obvious abnormality, atraumatic  Chest wall: no tenderness  Abdomen: soft, non-tender; bowel sounds normal; no masses,  no organomegaly. Ostomy bag with soft, brown stool. Surgical incision site clean, dry, intact. Wound VAC clean, no drainage.   Extremities: extremities normal, warm and well-perfused; no cyanosis, clubbing, or edema  Skin: Skin color, texture, turgor normal. No rashes or lesions     Invasive Devices       Peripheral Intravenous Line  Duration             Peripheral IV 07/11/24 Right Forearm <1 day              Drain  Duration             Colostomy Other (comment) LUQ 6 days    Urethral Catheter Latex 16 Fr. 6 days                    Lab, Imaging and other studies: I have personally reviewed pertinent reports.   and I have personally reviewed pertinent films in PACS  VTE Pharmacologic Prophylaxis: Eliquis  VTE Mechanical Prophylaxis: sequential compression " device     Hina Draper, MS-4  [facility], [place]

## 2024-07-11 NOTE — PLAN OF CARE
Problem: PAIN - ADULT  Goal: Verbalizes/displays adequate comfort level or baseline comfort level  Description: Interventions:  - Encourage patient to monitor pain and request assistance  - Assess pain using appropriate pain scale  - Administer analgesics based on type and severity of pain and evaluate response  - Implement non-pharmacological measures as appropriate and evaluate response  - Consider cultural and social influences on pain and pain management  - Notify physician/advanced practitioner if interventions unsuccessful or patient reports new pain  Outcome: Progressing     Problem: INFECTION - ADULT  Goal: Absence or prevention of progression during hospitalization  Description: INTERVENTIONS:  - Assess and monitor for signs and symptoms of infection  - Monitor lab/diagnostic results  - Monitor all insertion sites, i.e. indwelling lines, tubes, and drains  - Monitor endotracheal if appropriate and nasal secretions for changes in amount and color  - Rankin appropriate cooling/warming therapies per order  - Administer medications as ordered  - Instruct and encourage patient and family to use good hand hygiene technique  - Identify and instruct in appropriate isolation precautions for identified infection/condition  Outcome: Progressing     Problem: SAFETY ADULT  Goal: Maintain or return to baseline ADL function  Description: INTERVENTIONS:  -  Assess patient's ability to carry out ADLs; assess patient's baseline for ADL function and identify physical deficits which impact ability to perform ADLs (bathing, care of mouth/teeth, toileting, grooming, dressing, etc.)  - Assess/evaluate cause of self-care deficits   - Assess range of motion  - Assess patient's mobility; develop plan if impaired  - Assess patient's need for assistive devices and provide as appropriate  - Encourage maximum independence but intervene and supervise when necessary  - Involve family in performance of ADLs  - Assess for home care  needs following discharge   - Consider OT consult to assist with ADL evaluation and planning for discharge  - Provide patient education as appropriate  Outcome: Progressing     Problem: DISCHARGE PLANNING  Goal: Discharge to home or other facility with appropriate resources  Description: INTERVENTIONS:  - Identify barriers to discharge w/patient and caregiver  - Arrange for needed discharge resources and transportation as appropriate  - Identify discharge learning needs (meds, wound care, etc.)  - Arrange for interpretive services to assist at discharge as needed  - Refer to Case Management Department for coordinating discharge planning if the patient needs post-hospital services based on physician/advanced practitioner order or complex needs related to functional status, cognitive ability, or social support system  Outcome: Progressing     Problem: Knowledge Deficit  Goal: Patient/family/caregiver demonstrates understanding of disease process, treatment plan, medications, and discharge instructions  Description: Complete learning assessment and assess knowledge base.  Interventions:  - Provide teaching at level of understanding  - Provide teaching via preferred learning methods  Outcome: Progressing     Problem: Prexisting or High Potential for Compromised Skin Integrity  Goal: Skin integrity is maintained or improved  Description: INTERVENTIONS:  - Identify patients at risk for skin breakdown  - Assess and monitor skin integrity  - Assess and monitor nutrition and hydration status  - Monitor labs   - Assess for incontinence   - Turn and reposition patient  - Assist with mobility/ambulation  - Relieve pressure over bony prominences  - Avoid friction and shearing  - Provide appropriate hygiene as needed including keeping skin clean and dry  - Evaluate need for skin moisturizer/barrier cream  - Collaborate with interdisciplinary team   - Patient/family teaching  - Consider wound care consult   Outcome: Progressing      Problem: Nutrition/Hydration-ADULT  Goal: Nutrient/Hydration intake appropriate for improving, restoring or maintaining nutritional needs  Description: Monitor and assess patient's nutrition/hydration status for malnutrition. Collaborate with interdisciplinary team and initiate plan and interventions as ordered.  Monitor patient's weight and dietary intake as ordered or per policy. Utilize nutrition screening tool and intervene as necessary. Determine patient's food preferences and provide high-protein, high-caloric foods as appropriate.     INTERVENTIONS:  - Monitor oral intake, urinary output, labs, and treatment plans  - Assess nutrition and hydration status and recommend course of action  - Evaluate amount of meals eaten  - Assist patient with eating if necessary   - Allow adequate time for meals  - Recommend/ encourage appropriate diets, oral nutritional supplements, and vitamin/mineral supplements  - Order, calculate, and assess calorie counts as needed  - Recommend, monitor, and adjust tube feedings and TPN/PPN based on assessed needs  - Assess need for intravenous fluids  - Provide specific nutrition/hydration education as appropriate  - Include patient/family/caregiver in decisions related to nutrition  Outcome: Progressing     Problem: RESPIRATORY - ADULT  Goal: Achieves optimal ventilation and oxygenation  Description: INTERVENTIONS:  - Assess for changes in respiratory status  - Assess for changes in mentation and behavior  - Position to facilitate oxygenation and minimize respiratory effort  - Oxygen administered by appropriate delivery if ordered  - Initiate smoking cessation education as indicated  - Encourage broncho-pulmonary hygiene including cough, deep breathe, Incentive Spirometry  - Assess the need for suctioning and aspirate as needed  - Assess and instruct to report SOB or any respiratory difficulty  - Respiratory Therapy support as indicated  Outcome: Progressing     Problem:  GASTROINTESTINAL - ADULT  Goal: Minimal or absence of nausea and/or vomiting  Description: INTERVENTIONS:  - Administer IV fluids if ordered to ensure adequate hydration  - Maintain NPO status until nausea and vomiting are resolved  - Nasogastric tube if ordered  - Administer ordered antiemetic medications as needed  - Provide nonpharmacologic comfort measures as appropriate  - Advance diet as tolerated, if ordered  - Consider nutrition services referral to assist patient with adequate nutrition and appropriate food choices  Outcome: Progressing  Goal: Maintains adequate nutritional intake  Description: INTERVENTIONS:  - Monitor percentage of each meal consumed  - Identify factors contributing to decreased intake, treat as appropriate  - Assist with meals as needed  - Monitor I&O, weight, and lab values if indicated  - Obtain nutrition services referral as needed  Outcome: Progressing  Goal: Establish and maintain optimal ostomy function  Description: INTERVENTIONS:  - Assess bowel function  - Encourage oral fluids to ensure adequate hydration  - Administer IV fluids if ordered to ensure adequate hydration   - Administer ordered medications as needed  - Encourage mobilization and activity  - Nutrition services referral to assist patient with appropriate food choices  - Assess stoma site  - Consider wound care consult   Outcome: Progressing     Problem: GENITOURINARY - ADULT  Goal: Maintains or returns to baseline urinary function  Description: INTERVENTIONS:  - Assess urinary function  - Encourage oral fluids to ensure adequate hydration if ordered  - Administer IV fluids as ordered to ensure adequate hydration  - Administer ordered medications as needed  - Offer frequent toileting  - Follow urinary retention protocol if ordered  Outcome: Progressing  Goal: Absence of urinary retention  Description: INTERVENTIONS:  - Assess patient’s ability to void and empty bladder  - Monitor I/O  - Bladder scan as needed  -  Discuss with physician/AP medications to alleviate retention as needed  - Discuss catheterization for long term situations as appropriate  Outcome: Progressing

## 2024-07-11 NOTE — RESTORATIVE TECHNICIAN NOTE
Restorative Technician Note      Patient Name: Hal Miller     Restorative Tech Visit Date: 07/11/24  Note Type: Mobility  Patient Position Upon Consult: Supine  Activity Performed: Ambulated (Steps to bedside chair)  Assistive Device: Roller walker (Ax2)  Patient Position at End of Consult: Bedside chair; All needs within reach; Bed/Chair alarm activated    YANIRA Ivory

## 2024-07-12 NOTE — PROGRESS NOTES
VAC takedown Note:  VAC taken down at bedside today.  Removed a total of 2 sponges.  Patient tolerated well with no immediate complications.  Replaced with wet-to-dry dressing.  Will continue with this dressing until resolution of wound.  Please see photo below.

## 2024-07-12 NOTE — PLAN OF CARE
Problem: PAIN - ADULT  Goal: Verbalizes/displays adequate comfort level or baseline comfort level  Description: Interventions:  - Encourage patient to monitor pain and request assistance  - Assess pain using appropriate pain scale  - Administer analgesics based on type and severity of pain and evaluate response  - Implement non-pharmacological measures as appropriate and evaluate response  - Consider cultural and social influences on pain and pain management  - Notify physician/advanced practitioner if interventions unsuccessful or patient reports new pain  Outcome: Progressing     Problem: INFECTION - ADULT  Goal: Absence or prevention of progression during hospitalization  Description: INTERVENTIONS:  - Assess and monitor for signs and symptoms of infection  - Monitor lab/diagnostic results  - Monitor all insertion sites, i.e. indwelling lines, tubes, and drains  - Monitor endotracheal if appropriate and nasal secretions for changes in amount and color  - [place] appropriate cooling/warming therapies per order  - Administer medications as ordered  - Instruct and encourage patient and family to use good hand hygiene technique  - Identify and instruct in appropriate isolation precautions for identified infection/condition  Outcome: Progressing  Goal: Absence of fever/infection during neutropenic period  Description: INTERVENTIONS:  - Monitor WBC    Outcome: Progressing     Problem: SAFETY ADULT  Goal: Patient will remain free of falls  Description: INTERVENTIONS:  - Educate patient/family on patient safety including physical limitations  - Instruct patient to call for assistance with activity   - Consult OT/PT to assist with strengthening/mobility   - Keep Call bell within reach  - Keep bed low and locked with side rails adjusted as appropriate  - Keep care items and personal belongings within reach  - Initiate and maintain comfort rounds  - Make Fall Risk Sign visible to staff  - Offer Toileting every  Hours,  in advance of need  - Initiate/Maintain alarm  - Obtain necessary fall risk management equipment:   - Apply yellow socks and bracelet for high fall risk patients  - Consider moving patient to room near nurses station  Outcome: Progressing  Goal: Maintain or return to baseline ADL function  Description: INTERVENTIONS:  -  Assess patient's ability to carry out ADLs; assess patient's baseline for ADL function and identify physical deficits which impact ability to perform ADLs (bathing, care of mouth/teeth, toileting, grooming, dressing, etc.)  - Assess/evaluate cause of self-care deficits   - Assess range of motion  - Assess patient's mobility; develop plan if impaired  - Assess patient's need for assistive devices and provide as appropriate  - Encourage maximum independence but intervene and supervise when necessary  - Involve family in performance of ADLs  - Assess for home care needs following discharge   - Consider OT consult to assist with ADL evaluation and planning for discharge  - Provide patient education as appropriate  Outcome: Progressing  Goal: Maintains/Returns to pre admission functional level  Description: INTERVENTIONS:  - Perform AM-PAC 6 Click Basic Mobility/ Daily Activity assessment daily.  - Set and communicate daily mobility goal to care team and patient/family/caregiver.   - Collaborate with rehabilitation services on mobility goals if consulted  - Perform Range of Motion  times a day.  - Reposition patient every  hours.  - Dangle patient  times a day  - Stand patient  times a day  - Ambulate patient  times a day  - Out of bed to chair  times a day   - Out of bed for meals times a day  - Out of bed for toileting  - Record patient progress and toleration of activity level   Outcome: Progressing     Problem: DISCHARGE PLANNING  Goal: Discharge to home or other facility with appropriate resources  Description: INTERVENTIONS:  - Identify barriers to discharge w/patient and caregiver  - Arrange for  needed discharge resources and transportation as appropriate  - Identify discharge learning needs (meds, wound care, etc.)  - Arrange for interpretive services to assist at discharge as needed  - Refer to Case Management Department for coordinating discharge planning if the patient needs post-hospital services based on physician/advanced practitioner order or complex needs related to functional status, cognitive ability, or social support system  Outcome: Progressing     Problem: Knowledge Deficit  Goal: Patient/family/caregiver demonstrates understanding of disease process, treatment plan, medications, and discharge instructions  Description: Complete learning assessment and assess knowledge base.  Interventions:  - Provide teaching at level of understanding  - Provide teaching via preferred learning methods  Outcome: Progressing     Problem: Prexisting or High Potential for Compromised Skin Integrity  Goal: Skin integrity is maintained or improved  Description: INTERVENTIONS:  - Identify patients at risk for skin breakdown  - Assess and monitor skin integrity  - Assess and monitor nutrition and hydration status  - Monitor labs   - Assess for incontinence   - Turn and reposition patient  - Assist with mobility/ambulation  - Relieve pressure over bony prominences  - Avoid friction and shearing  - Provide appropriate hygiene as needed including keeping skin clean and dry  - Evaluate need for skin moisturizer/barrier cream  - Collaborate with interdisciplinary team   - Patient/family teaching  - Consider wound care consult   Outcome: Progressing     Problem: Nutrition/Hydration-ADULT  Goal: Nutrient/Hydration intake appropriate for improving, restoring or maintaining nutritional needs  Description: Monitor and assess patient's nutrition/hydration status for malnutrition. Collaborate with interdisciplinary team and initiate plan and interventions as ordered.  Monitor patient's weight and dietary intake as ordered or  per policy. Utilize nutrition screening tool and intervene as necessary. Determine patient's food preferences and provide high-protein, high-caloric foods as appropriate.     INTERVENTIONS:  - Monitor oral intake, urinary output, labs, and treatment plans  - Assess nutrition and hydration status and recommend course of action  - Evaluate amount of meals eaten  - Assist patient with eating if necessary   - Allow adequate time for meals  - Recommend/ encourage appropriate diets, oral nutritional supplements, and vitamin/mineral supplements  - Order, calculate, and assess calorie counts as needed  - Recommend, monitor, and adjust tube feedings and TPN/PPN based on assessed needs  - Assess need for intravenous fluids  - Provide specific nutrition/hydration education as appropriate  - Include patient/family/caregiver in decisions related to nutrition  Outcome: Progressing     Problem: RESPIRATORY - ADULT  Goal: Achieves optimal ventilation and oxygenation  Description: INTERVENTIONS:  - Assess for changes in respiratory status  - Assess for changes in mentation and behavior  - Position to facilitate oxygenation and minimize respiratory effort  - Oxygen administered by appropriate delivery if ordered  - Initiate smoking cessation education as indicated  - Encourage broncho-pulmonary hygiene including cough, deep breathe, Incentive Spirometry  - Assess the need for suctioning and aspirate as needed  - Assess and instruct to report SOB or any respiratory difficulty  - Respiratory Therapy support as indicated  Outcome: Progressing     Problem: GASTROINTESTINAL - ADULT  Goal: Minimal or absence of nausea and/or vomiting  Description: INTERVENTIONS:  - Administer IV fluids if ordered to ensure adequate hydration  - Maintain NPO status until nausea and vomiting are resolved  - Nasogastric tube if ordered  - Administer ordered antiemetic medications as needed  - Provide nonpharmacologic comfort measures as appropriate  - Advance  diet as tolerated, if ordered  - Consider nutrition services referral to assist patient with adequate nutrition and appropriate food choices  Outcome: Progressing  Goal: Maintains or returns to baseline bowel function  Description: INTERVENTIONS:  - Assess bowel function  - Encourage oral fluids to ensure adequate hydration  - Administer IV fluids if ordered to ensure adequate hydration  - Administer ordered medications as needed  - Encourage mobilization and activity  - Consider nutritional services referral to assist patient with adequate nutrition and appropriate food choices  Outcome: Progressing  Goal: Maintains adequate nutritional intake  Description: INTERVENTIONS:  - Monitor percentage of each meal consumed  - Identify factors contributing to decreased intake, treat as appropriate  - Assist with meals as needed  - Monitor I&O, weight, and lab values if indicated  - Obtain nutrition services referral as needed  Outcome: Progressing  Goal: Establish and maintain optimal ostomy function  Description: INTERVENTIONS:  - Assess bowel function  - Encourage oral fluids to ensure adequate hydration  - Administer IV fluids if ordered to ensure adequate hydration   - Administer ordered medications as needed  - Encourage mobilization and activity  - Nutrition services referral to assist patient with appropriate food choices  - Assess stoma site  - Consider wound care consult   Outcome: Progressing  Goal: Oral mucous membranes remain intact  Description: INTERVENTIONS  - Assess oral mucosa and hygiene practices  - Implement preventative oral hygiene regimen  - Implement oral medicated treatments as ordered  - Initiate Nutrition services referral as needed  Outcome: Progressing     Problem: GENITOURINARY - ADULT  Goal: Maintains or returns to baseline urinary function  Description: INTERVENTIONS:  - Assess urinary function  - Encourage oral fluids to ensure adequate hydration if ordered  - Administer IV fluids as ordered  to ensure adequate hydration  - Administer ordered medications as needed  - Offer frequent toileting  - Follow urinary retention protocol if ordered  Outcome: Progressing  Goal: Absence of urinary retention  Description: INTERVENTIONS:  - Assess patient’s ability to void and empty bladder  - Monitor I/O  - Bladder scan as needed  - Discuss with physician/AP medications to alleviate retention as needed  - Discuss catheterization for long term situations as appropriate  Outcome: Progressing  Goal: Urinary catheter remains patent  Description: INTERVENTIONS:  - Assess patency of urinary catheter  - If patient has a chronic cardenas, consider changing catheter if non-functioning  - Follow guidelines for intermittent irrigation of non-functioning urinary catheter  Outcome: Progressing

## 2024-07-12 NOTE — PLAN OF CARE
Problem: PAIN - ADULT  Goal: Verbalizes/displays adequate comfort level or baseline comfort level  Description: Interventions:  - Encourage patient to monitor pain and request assistance  - Assess pain using appropriate pain scale  - Administer analgesics based on type and severity of pain and evaluate response  - Implement non-pharmacological measures as appropriate and evaluate response  - Consider cultural and social influences on pain and pain management  - Notify physician/advanced practitioner if interventions unsuccessful or patient reports new pain  Outcome: Progressing     Problem: INFECTION - ADULT  Goal: Absence or prevention of progression during hospitalization  Description: INTERVENTIONS:  - Assess and monitor for signs and symptoms of infection  - Monitor lab/diagnostic results  - Monitor all insertion sites, i.e. indwelling lines, tubes, and drains  - Monitor endotracheal if appropriate and nasal secretions for changes in amount and color  - [place] appropriate cooling/warming therapies per order  - Administer medications as ordered  - Instruct and encourage patient and family to use good hand hygiene technique  - Identify and instruct in appropriate isolation precautions for identified infection/condition  Outcome: Progressing     Problem: SAFETY ADULT  Goal: Patient will remain free of falls  Description: INTERVENTIONS:  - Educate patient/family on patient safety including physical limitations  - Instruct patient to call for assistance with activity   - Consult OT/PT to assist with strengthening/mobility   - Keep Call bell within reach  - Keep bed low and locked with side rails adjusted as appropriate  - Keep care items and personal belongings within reach  - Initiate and maintain comfort rounds  - Make Fall Risk Sign visible to staff  - Offer Toileting every 2 Hours, in advance of need  - Initiate/Maintain bed alarm  - Obtain necessary fall risk management equipment:    - Apply yellow socks  and bracelet for high fall risk patients  - Consider moving patient to room near nurses station  Outcome: Progressing     Problem: Knowledge Deficit  Goal: Patient/family/caregiver demonstrates understanding of disease process, treatment plan, medications, and discharge instructions  Description: Complete learning assessment and assess knowledge base.  Interventions:  - Provide teaching at level of understanding  - Provide teaching via preferred learning methods  Outcome: Progressing     Problem: Prexisting or High Potential for Compromised Skin Integrity  Goal: Skin integrity is maintained or improved  Description: INTERVENTIONS:  - Identify patients at risk for skin breakdown  - Assess and monitor skin integrity  - Assess and monitor nutrition and hydration status  - Monitor labs   - Assess for incontinence   - Turn and reposition patient  - Assist with mobility/ambulation  - Relieve pressure over bony prominences  - Avoid friction and shearing  - Provide appropriate hygiene as needed including keeping skin clean and dry  - Evaluate need for skin moisturizer/barrier cream  - Collaborate with interdisciplinary team   - Patient/family teaching  - Consider wound care consult   Outcome: Progressing     Problem: Nutrition/Hydration-ADULT  Goal: Nutrient/Hydration intake appropriate for improving, restoring or maintaining nutritional needs  Description: Monitor and assess patient's nutrition/hydration status for malnutrition. Collaborate with interdisciplinary team and initiate plan and interventions as ordered.  Monitor patient's weight and dietary intake as ordered or per policy. Utilize nutrition screening tool and intervene as necessary. Determine patient's food preferences and provide high-protein, high-caloric foods as appropriate.     INTERVENTIONS:  - Monitor oral intake, urinary output, labs, and treatment plans  - Assess nutrition and hydration status and recommend course of action  - Evaluate amount of meals  eaten  - Assist patient with eating if necessary   - Allow adequate time for meals  - Recommend/ encourage appropriate diets, oral nutritional supplements, and vitamin/mineral supplements  - Order, calculate, and assess calorie counts as needed  - Recommend, monitor, and adjust tube feedings and TPN/PPN based on assessed needs  - Assess need for intravenous fluids  - Provide specific nutrition/hydration education as appropriate  - Include patient/family/caregiver in decisions related to nutrition  Outcome: Progressing     Problem: RESPIRATORY - ADULT  Goal: Achieves optimal ventilation and oxygenation  Description: INTERVENTIONS:  - Assess for changes in respiratory status  - Assess for changes in mentation and behavior  - Position to facilitate oxygenation and minimize respiratory effort  - Oxygen administered by appropriate delivery if ordered  - Initiate smoking cessation education as indicated  - Encourage broncho-pulmonary hygiene including cough, deep breathe, Incentive Spirometry  - Assess the need for suctioning and aspirate as needed  - Assess and instruct to report SOB or any respiratory difficulty  - Respiratory Therapy support as indicated  Outcome: Progressing     Problem: GASTROINTESTINAL - ADULT  Goal: Minimal or absence of nausea and/or vomiting  Description: INTERVENTIONS:  - Administer IV fluids if ordered to ensure adequate hydration  - Maintain NPO status until nausea and vomiting are resolved  - Nasogastric tube if ordered  - Administer ordered antiemetic medications as needed  - Provide nonpharmacologic comfort measures as appropriate  - Advance diet as tolerated, if ordered  - Consider nutrition services referral to assist patient with adequate nutrition and appropriate food choices  Outcome: Progressing  Goal: Maintains or returns to baseline bowel function  Description: INTERVENTIONS:  - Assess bowel function  - Encourage oral fluids to ensure adequate hydration  - Administer IV fluids if  ordered to ensure adequate hydration  - Administer ordered medications as needed  - Encourage mobilization and activity  - Consider nutritional services referral to assist patient with adequate nutrition and appropriate food choices  Outcome: Progressing  Goal: Establish and maintain optimal ostomy function  Description: INTERVENTIONS:  - Assess bowel function  - Encourage oral fluids to ensure adequate hydration  - Administer IV fluids if ordered to ensure adequate hydration   - Administer ordered medications as needed  - Encourage mobilization and activity  - Nutrition services referral to assist patient with appropriate food choices  - Assess stoma site  - Consider wound care consult   Outcome: Progressing  Goal: Oral mucous membranes remain intact  Description: INTERVENTIONS  - Assess oral mucosa and hygiene practices  - Implement preventative oral hygiene regimen  - Implement oral medicated treatments as ordered  - Initiate Nutrition services referral as needed  Outcome: Progressing     Problem: GENITOURINARY - ADULT  Goal: Maintains or returns to baseline urinary function  Description: INTERVENTIONS:  - Assess urinary function  - Encourage oral fluids to ensure adequate hydration if ordered  - Administer IV fluids as ordered to ensure adequate hydration  - Administer ordered medications as needed  - Offer frequent toileting  - Follow urinary retention protocol if ordered  Outcome: Progressing  Goal: Absence of urinary retention  Description: INTERVENTIONS:  - Assess patient’s ability to void and empty bladder  - Monitor I/O  - Bladder scan as needed  - Discuss with physician/AP medications to alleviate retention as needed  - Discuss catheterization for long term situations as appropriate  Outcome: Progressing

## 2024-07-12 NOTE — PROGRESS NOTES
Dr Filiberto Barker aware of BP on left upper arm and left lower leg done per Dr Filiberto Hdez, RN  09/28/19 7409 Progress Note - Geriatric Medicine   Hal Miller 84 y.o. male MRN: [id number]  Unit/Bed#: [facility] 620-01 Encounter: [id number]      Assessment/Plan:    Acute metabolic encephalopathy   -mentation continues to wax and wane, slightly worse this morning, consider checking updated labs/imaging to evaluate for acute worsening overnight   -multifactorial including advanced age, underlying dementia, hx encephalopathy during prior admissions, perforated diverticulitis now s/p surgical procedure, anesthesia, multiple setting changes and acute pain in frail elderly individual   -continue supportive cares, encourage normal sleep cycle, use of sensory assist devices (glasses/hearing aids)  -continue to ensure acute pain well controlled, cont barbie pain protocol   -monitor for fecal and urinary retention - cardenas in place, LUQ colostomy    -reorient frequently as appropriate and indicated  -appreciate family at bedside for familiarity/reassurance /engagement and social support      Perforated diverticulitis with abscess  -s/p Hartmans procedure with explantation of left groin mesh with washout and debridement of left groin wound on 7/4/24 now with wound vac in place  -s/p completion course of zosyn  -continue acute multimodal pain control per geriatric pain protocol, taper as pain improves with healing      Anemia  -Hb 8.0 from 10.7 on 7/3/24  -likely multifactorial including dilutional, daily labs etc  -monitor for ongoing acute blood loss and tx as indicated      Dysphagia  -speech therapy on consult   -s/p VBS 7/7/24  -currently on dysphagia level 2 with thin liquids   -continue strict aspiration precautions  -cont working with speech therapy   -encourage good oral hygiene and cares      Vascular dementia  -acutely encephalopathic on initial consultation, mentation continues to wax and wane   -at baseline alert and oriented, forgetful, independent with ADLs requires some assist with iADLs   -MoCA 23/30 (11/2022), noted to have  episodes of encephalopathy and decline since last testing, recommend repeat following recovery from acute illness to establish new baseline   -CTH 6/18/24 imaging personally viewed, reveals at least moderate chronic microangiopathic changes   -TSH WNL at 1.79, B12 WNL at 531  -at risk age and cardiovascular related acceleration santy in setting of recent CVA, continue secondary risk factor modifications   -encourage patient remain physically, socially and cognitively active and engaged to maintain cognitive acuity   -encourage use sensory assist devices such as corrective lenses and hearing aids all appropriate times to reduce risk uncorrected sensroy impairment from contributing to isolation, confusion, worsening encephalopathy and more precipitous cognitive decline   -underlying dementia increases risk developing delirium and likely contributed to episode during current admission, cont strict precautions to reduce risk recurrence     Hx CVA  -small left parietal infarct during recent hospitalization on MRI brain 6/20/24  -suspected to be due to missed dosing of Eliquis at time of event  -remains at risk future CVAs, continue strict secondary risk factor modifications  -close o/p f/u with Neurology for ongoing monitoring and management      History of possible seizure  -maintained on Depatkote as managed by Neurology  -continue depakote   -cont seizure precautions      Insomnia   -previously on Seroquel PRN as o/p, has not recently been requiring during admission, no indication to resume at this time as pt not agitated and may lower seizure threshold   -consider low dose melatonin HS PRN  -encourage establishment of consistent sleep/wake times and bedtime routine      Babcock catheter in place   -remains in place due to difficult placement per Urology  -will require o/p f/u with Urology for voiding trial      Inflammatory polyarthropathy   -maintained on prednisone chronically as o/p  -follows closely with Rheum as  o/p, continue close o/p f/u     DM-II  -A1c 7.6  -currently on basal bolus insulin regimen with good control  -continue glu goal 140-180 during hospitalization to reduce risk hypoglycemia, readings mostly within goal   -continue close o/p f/u with PCP for ongoing age appropriate diabetic screenings and cares      Afib   -rates currently well controlled off rate controllers, on Eliquis   -cont close follow-up with Cardiology     Impaired Vision  -recommend use of corrective lenses at all appropriate times  -encourage adequate lighting and encourage use of assistance with ambulation  -consider large font for printed materials provided to patient      Impaired Hearing  -Encourage use of hearing aids at all appropriate times  -encourage providers and caregivers to speak slowly and clearly directly to patient  -minimize background noise to encourage patient engagement  -consider use of hearing amplifier to reduce risk of straining to hear if hearing aids are not present or are not sufficient      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  -clinical frailty scale stage VI, moderately frail, progressive  -multifactorial including age, amb dysfxn, hx CVA, DM-II and multitude of chronic medical co-morbidities now with perforated diverticulitis with abscess requiring surgical procedure superimposed on elderly individual with limited physiologic and metabolic reserve  -continue optimization of chronic medical conditions and address acute derangements as arise  -monitor for and treat any underlying anxiety, mood, depression symptoms as may impact response to therapies and overall sense of wellbeing and quality of life  -continue to ensure tx and interventions align with patients wishes and goals of care   -cont psychosocial support of patient and caregivers     Care coordination: rounded with Johanna (JASPER)    Subjective:     Hal is seen and examined at bedside, he is restless this morning endorsing discomfort with episode of  "irritability and confusion overnight.     Review of Systems   Unable to perform ROS: Mental status change     Objective:     Vitals: Blood pressure 134/73, pulse 83, temperature 97.5 °F (36.4 °C), resp. rate 12, height 6' 2\" (1.88 m), weight 83.9 kg (184 lb 15.5 oz), SpO2 97%.,Body mass index is 23.75 kg/m².      Intake/Output Summary (Last 24 hours) at 7/12/2024 0816  Last data filed at 7/12/2024 0501  Gross per 24 hour   Intake --   Output 1125 ml   Net -1125 ml     Current Medications: Reviewed    Physical Exam:   Physical Exam  Vitals and nursing note reviewed.   Constitutional:       Comments: Thin frail elderly male    HENT:      Head: Normocephalic.      Nose: Nose normal.      Mouth/Throat:      Mouth: Mucous membranes are dry.   Eyes:      General:         Right eye: No discharge.         Left eye: No discharge.   Neck:      Comments: Trachea midline   Cardiovascular:      Rate and Rhythm: Normal rate and regular rhythm.   Pulmonary:      Effort: Pulmonary effort is normal. No respiratory distress.      Comments: Shallow resp  Abdominal:      General: There is no distension.      Palpations: Abdomen is soft.      Comments: Colostomy    Genitourinary:     Comments: Babcock in place   Musculoskeletal:      Right lower leg: Edema present.      Left lower leg: Edema present.      Comments: Severe diffuse subcutaneous fat and muscle wasting, clavicles protruding, ribs prominent     L groin wound vac in place    Skin:     General: Skin is warm and dry.      Comments: Thin and very friable    Neurological:      Mental Status: He is alert.      Comments: Confused, oriented to self, fluctuating mentation   Psychiatric:      Comments: Restless and irritable         Invasive Devices       Peripheral Intravenous Line  Duration             Peripheral IV 07/11/24 Right Forearm 1 day              Drain  Duration             Urethral Catheter Latex 16 Fr. 8 days    Colostomy Other (comment) LUQ 7 days                  Lab " Results:     I have personally reviewed pertinent lab results including the following:    Results from last 7 days   Lab Units 07/11/24  0719 07/11/24  0459 07/10/24  0548 07/09/24  0507   WBC Thousand/uL  --  8.47 9.98 9.61   HEMOGLOBIN g/dL 7.3* 7.1* 8.6* 8.9*   HEMATOCRIT % 23.3* 23.2* 27.8* 27.6*   PLATELETS Thousands/uL  --  113* 139* 134*   SEGS PCT %  --  72 75 78*   MONO PCT %  --  14* 13* 11   EOS PCT %  --  1 1 1     Results from last 7 days   Lab Units 07/11/24  0459 07/10/24  0548 07/09/24  0507   POTASSIUM mmol/L 4.2 4.0 4.0   CHLORIDE mmol/L 106 106 108   CO2 mmol/L 34* 32 29   BUN mg/dL 38* 38* 40*   CREATININE mg/dL 1.11 1.12 1.13   CALCIUM mg/dL 9.6 9.9 9.8     I have personally reviewed the following imaging study reports in PACS:    No new imaging overnight

## 2024-07-12 NOTE — PROGRESS NOTES
PHYSICAL MEDICINE AND REHABILITATION   PREADMISSION ASSESSMENT     Projected IGC and Rehabilitation Diagnoses:  Impairment of mobility, safety, Activities of Daily Living (ADLs), and cognitive/communication skills due to Stroke:  01.2  Right Body Involvement (Left Brain)  Etiologic: Acute infarct in L parietal   Date of Onset: 7/3/24    Date of surgery:   7/8/24 HARTMANS PROCEDURE. WITH EXPLANTATION OF LEFT GROIN MESH. WASHOUT AND DEBRIDEMENT OF LEFT GROIN WOUND     PATIENT INFORMATION  Name: Hal Miller Phone #: [phone] (home)   Address: [place] [address]  YOB: 1940 Age: 84 y.o. #   Marital Status: /Civil Union  Ethnicity: White/ ; Not //Amharic   Employment Status: retired  Extended Emergency Contact Information  Primary Emergency Contact: Ann Miller  Home Phone: [phone]  Mobile Phone: [phone]  Relation: Spouse  Advance Directive: Level 1 Full code ( No advance directive on file )    INSURANCE/COVERAGE:     Primary Payor: [employer]  REP / Plan: [facility] MEDICARE ADVANTAGE  REP / Product Type: Medicare HMO /   Secondary Payer: SELF PAY    Payer Contact: *** Payer Contact: n/a    Contact Phone: *** Contact Phone: n/a      Authorization #: [id number]  Coverage Dates:***  LCD: ***  MEDICARE #: [id number]   Medicare Days: n/a    Benefits : Ded $0, OOPM $0 remaining, copay $0, coins 0%.     Medical Record #: [id number]    REFERRAL SOURCE:   Referring provider: Abran Oneill,*  Referring facility: [facility]   Room: [facility] 620/[facility] 620-01  PCP: Kyaw Monreal MD PCP phone number: [phone]    MEDICAL INFORMATION  HPI: Hal is a 84 year old male with a medical history of but not limited to polymyalgia rheumatica on chronic plaquenil and low dose prednisone, Afib on Eliquis, stroke history, CAD, DM2, HTN, COPD, vascular dementia, chronic double vision for which he wears eye patch,  pancreatic CA s/p whipple (2011), appy, cholecystectomy, R ing hernia repair who originally presented to [facility] ED on 6/16/24 with abdominal pain, SOB, and enlarging L groin buldge who had CT A/P concerning for diverticular abscess. He was admitted for further eval/assessment/consultation. Gen Sx was consulted and recommended IV abx.  Follow-up imaging obtained and Gen Sx felt diverticular abscess emanating into L inguinal hernia.  IR was consulted for possible drainage but they felt not enough to no fluid collection available to drain. Plaquenil was d/c'd to limit immunocompromised state.  Follow-up Gen Sx reported Sigmoid diverticular perforation with isolated air within the inguinal canal or inguinal sac with recommendation to continue abx and advance diet as tolerated.  Hospital course notable for episode of unresponsiveness with turning head to left and with downward gaze on 6/19 with waxing and waning mentation.  He received Keppra and ativan on 6/19 for possible seizure and more recently started on Depacon with kidney d/s hx.  EEG showed excessive theta activity during wakefulness suggest mild nonspecific diffuse cerebral dysfunction. No epileptiform discharges or seizures are present.   MRI brain on 6/20 showed small acute infarct in the left parietal periventricular white matter with an associated small focus of susceptibility that may represent associated petechial hemorrhage as well as redemonstrated foci of susceptibility, including new foci, nonspecific and could be seen with cerebral amyloid angiopathy, multiple cavernomas and/or prior embolic events, among other considerations.Neuro was consulted and they reported / documented small CVA related to being temporarily being off of Eliquis and not failure with plan to go back from hep gtt to Eliquis.  Gen Sx recs 1 more week of abx and OP Gen Sx follow-up.  PT/OT therapies were consulted and continue to follow patient at this time.  PM&R was consulted and are recommending inpatient acute rehab when medically stable. All involved medical disciplines feel/agree patient is medically ready for discharge at this time. Inpatient acute rehabilitation physician was consulted. Upon review of patient's case and correspondence with PT/OT therapies and PM&R services, [facility] physician felt Hal would benefit and was a good candidate / appropriate for inpatient acute rehab at this time. He had demonstrated the willingness / desire and tolerance to participate in the required 3 hours or more of therapies per day. He admitted to [facility] on 6/26/24. He was participating and doing well. On 7/3/24 he was noted to have progressive edema, erythema and tenderness of the left inguinal area.  He had known history of left inguinal hernia and was being followed by general surgery during his prior admission. He was transferred back to [facility]. Due to concern for abscess/severe soft tissue infection patient has been resumed on IV antibiotic and transferred to acute care under the surgical service for possible intervention. It was determined that sx intervention was warranted. ***      Past Medical History:   Past Surgical History:   Allergies:     Past Medical History:   Diagnosis Date    Acute encephalopathy 2/14/2023    Acute-on-chronic kidney injury  (HCC) 6/13/2017    Cancer (HCC)     pancreatic    Colon polyp     Coronary artery disease     Dehydration 3/3/2023    Diabetes mellitus (HCC)     Hyperlipidemia     Hypertension     Left lower lobe pneumonia 7/2/2022    Pneumonia 06/13/2017    Right middle and lower lobe     Stroke (HCC)     Past Surgical History:   Procedure Laterality Date    APPENDECTOMY      CARDIAC SURGERY      Aortic Valve Replacement, Ascending Aorta    COLONOSCOPY      GALLBLADDER SURGERY      HARTMANS PROCEDURE N/A 7/4/2024    Procedure: HARTMANS PROCEDURE, WITH EXPLANTATION OF LEFT GROIN MESH, WASHOUT AND DEBRIDEMENT OF LEFT GROIN WOUND;  Surgeon:  Charlie Carroll MD;  Location: [facility];  Service: General    PANCREATICODUODENECTOMY       Allergies   Allergen Reactions    Contrast Dye [Iodinated Contrast Media] Other (See Comments)     Pt states kidney dysfunction..     Other          Medical/functional conditions requiring inpatient rehabilitation: impaired mobility/self care  ; impaired balance / ambulation     Risk for medical/clinical complications: risk for hypotensive/hypertensive episodes ; further infarcts ; falls ; pain ; skin breakdown; infection    Comorbidities/Surgeries in the last 100 days:   Encephalopathy  Seizure-like episode    Stroke - acute infarct in L parietal perivent white matter  Vascular dementia   Constipation   Afib  COPD, chronic respiratory respiratory failure with hypoxia   DM2  Polymyalgia rheumatica on chronic plaquenil and low dose prednisone  Neuropathy   Colonic diverticular abscess     CURRENT VITAL SIGNS:   Temp:  [97.5 °F (36.4 °C)] 97.5 °F (36.4 °C)  HR:  [75-87] 86  Resp:  [16-18] 18  BP: (114-135)/(57-80) 118/73   Intake/Output Summary (Last 24 hours) at 7/12/2024 1525  Last data filed at 7/12/2024 1300  Gross per 24 hour   Intake 100 ml   Output 1050 ml   Net -950 ml        LABORATORY RESULTS:      Lab Results   Component Value Date    HGB 8.0 (L) 07/12/2024    HGB 13.0 06/08/2015    HCT 25.9 (L) 07/12/2024    HCT 41.0 06/08/2015    WBC 13.34 (H) 07/12/2024    WBC 7.03 06/08/2015     Lab Results   Component Value Date    BUN 34 (H) 07/12/2024    BUN 38 (H) 02/20/2023     06/08/2015    K 4.4 07/12/2024    K 4.6 02/20/2023     07/12/2024     02/20/2023    GLUCOSE 169 (H) 06/19/2024    GLUCOSE 101 06/08/2015    CREATININE 1.10 07/12/2024    CREATININE 1.58 (H) 02/20/2023     Lab Results   Component Value Date    PROTIME 18.9 (H) 07/05/2024    PROTIME 13.5 04/07/2014    INR 1.61 (H) 07/05/2024    INR 1.08 04/07/2014        DIAGNOSTIC STUDIES:  MRA head wo contrast    Result Date:  2024  Impression: Normal MRA Brain with variant anatomy. Please see MRI brain without contrast done yesterday for further evaluation Workstation performed: [id number]     MRI brain wo contrast    Result Date: 2024  Impression: 1. Small acute infarct in the left parietal periventricular white matter with an associated small focus of susceptibility that may represent associated petechial hemorrhage. 2. Redemonstrated foci of susceptibility, including new foci, nonspecific and could be seen with cerebral amyloid angiopathy, multiple cavernomas and/or prior embolic events, among other considerations. I personally discussed the acute infarct with MEREDITH VILLATORO on 2024 2:01 PM. Workstation performed: [id number]       PRECAUTIONS/SPECIAL NEEDS:  Tobacco:   Social History     Tobacco Use   Smoking Status Former    Types: Pipe    Quit date:     Years since quittin.5   Smokeless Tobacco Never   , Alcohol:    Social History     Substance and Sexual Activity   Alcohol Use Never   , Anticoagulation:   Eliquis per MD , Edema Management, Safety Concerns, Pain Management, Dietary Restrictions: Consistent carb, Language Preference: English, and Fall precautions    MEDICATIONS:     Current Facility-Administered Medications:     acetaminophen (TYLENOL) oral suspension 650 mg, 650 mg, Oral, Q4H PRN, Mini Bernstein MD    albuterol (PROVENTIL HFA,VENTOLIN HFA) inhaler 2 puff, 2 puff, Inhalation, Q4H PRN, Jean Carlos Hurd MD    apixaban (ELIQUIS) tablet 5 mg, 5 mg, Oral, BID, Kenya Norris MD, 5 mg at 24 0857    budesonide (PULMICORT) inhalation solution 0.5 mg, 0.5 mg, Nebulization, Q12H, Jean Carlos Hurd MD, 0.5 mg at 24 0828    chlorhexidine (PERIDEX) 0.12 % oral rinse 15 mL, 15 mL, Mouth/Throat, Q12H YUNI, Jean Carlos Hurd MD, 15 mL at 24 0858    divalproex sodium (DEPAKOTE SPRINKLE) capsule 500 mg, 500 mg, Oral, Q8H YUNI, Merlyn Dinh MD, 500 mg at 24 1350    formoterol (PERFOROMIST)  nebulizer solution 20 mcg, 20 mcg, Nebulization, Q12H, Jean Carlos Hurd MD, 20 mcg at 07/12/24 0828    insulin lispro (HumALOG/ADMELOG) 100 units/mL subcutaneous injection 1-5 Units, 1-5 Units, Subcutaneous, 4x Daily (AC & HS) **AND** Fingerstick Glucose (POCT), , , 4x Daily AC and at bedtime, Yuri Whitley PA-C    melatonin tablet 3 mg, 3 mg, Oral, HS, Jean Carlos Hurd MD, 3 mg at 07/11/24 2122    metoprolol succinate (TOPROL-XL) 24 hr tablet 25 mg, 25 mg, Oral, Daily, Merlyn Dinh MD, 25 mg at 07/12/24 0858    moisture barrier miconazole 2% cream (aka ARASH MOISTURE BARRIER ANTIFUNGAL CREAM), , Topical, BID, Abran Oneill DO, Given at 07/12/24 0858    nystatin (MYCOSTATIN) oral suspension 500,000 Units, 500,000 Units, Swish & Swallow, 4x Daily, Briana Desai PA-C, 500,000 Units at 07/12/24 1350    oxyCODONE (ROXICODONE) oral solution 2.5 mg, 2.5 mg, Oral, Q4H PRN, 2.5 mg at 07/10/24 2033 **OR** oxyCODONE (ROXICODONE) oral solution 5 mg, 5 mg, Oral, Q4H PRN, Mini Bernstein MD, 5 mg at 07/12/24 1513    pantoprazole (PROTONIX) EC tablet 40 mg, 40 mg, Oral, Early Morning, Abran Oneill DO, 40 mg at 07/12/24 0512    tamsulosin (FLOMAX) capsule 0.4 mg, 0.4 mg, Oral, Daily With Dinner, Jean Carlos Hurd MD, 0.4 mg at 07/11/24 1718    SKIN INTEGRITY:   ***    PRIOR LEVEL OF FUNCTION:  He lives in a(n) single family home  Hal Miller is  and lives with their spouse.  Self Care: Independent, Indoor Mobility: Independent, Stairs (in/outdoor): Independent, and Cognition: Independent    FALLS IN THE LAST 6 MONTHS: 1    HOME ENVIRONMENT:  The living area: can live on one level  There are 2 steps to enter the home.    The patient will have 24 hour supervision/physical assistance available upon discharge.    PREVIOUS DME:  Equipment in home (previous DME): Shower Chair, Grab Bars, Rolling Walker, Single Point Cane, and walk in shower ; raised toilet seat     FUNCTIONAL STATUS:***  Physical Therapy  Occupational Therapy Speech Therapy   As per PT:      As per OT:      As per SLP:              CARE SCORES:  Self Care:  Eatin: Setup or clean-up assistance  Oral hygiene: 04: Supervision or touching  assistance  Toilet hygiene: 04: Supervision or touching  assistance  Shower/bathing self: 03: Partial/moderate assistance  Upper body dressin: Supervision or touching  assistance  Lower body dressin: Partial/moderate assistance  Putting on/taking off footwear: 03: Partial/moderate assistance  Transfers:  Roll left and right: 04: Supervision or touching  assistance  Sit to lyin: Supervision or touching  assistance  Lying to sitting on side of bed: 04: Supervision or touching  assistance  Sit to stand: 03: Partial/moderate assistance  Chair/bed to chair transfer: 03: Partial/moderate assistance  Toilet transfer: 03: Partial/moderate assistance  Mobility:  Walk 10 ft: 04: Supervision or touching  assistance  Walk 50 ft with two turns: 04: Supervision or touching  assistance  Walk 150ft: 09: Not applicable    CURRENT GAP IN FUNCTION  Prior to Admission: Functional Status: Patient was independent with mobility/ambulation, transfers, ADL's, IADL's.    Expected functional outcomes: It is expected that with skilled acute rehabilitation services the patient will progress to Supervision for self care and Supervision for mobility     Estimated length of stay: 10 to 14 days    Anticipated Post-Discharge Disposition/Treatment  Disposition: Return to previous home/apartment.  Outpatient Services: Physical Therapy (PT) and Occupational Therapy (OT)    BARRIERS TO DISCHARGE  Weakness, Pain, Balance Difficulty, Fatigue, Home Accessibility, Caregiver Accessibility, and Equipment Needs    INTERVENTIONS FOR DISCHARGE  Adaptive equipment, Patient/Family/Caregiver Education, Arrange DME needs, Medication Changes per MD, Therapy exercises, and Energy conservation education     REQUIRED THERAPY:  Patient will require PT  and OT 90 minutes each per day, five days per week to achieve rehab goals.     REQUIRED FUNCTIONAL AND MEDICAL MANAGEMENT FOR INPATIENT REHABILITATION:  Pain Management: Overall pain is well controlled, Deep Vein Thrombosis (DVT) Prophylaxis:  SCD's while in bed and   Nursing education and bowel/bladder management, internal medicine to manage/monitor medical conditions, PM&R to maximize function and provide medical oversight, PT/OT intervention, patient/family education/training, and any consults as needed     RECOMMENDED LEVEL OF CARE: ***

## 2024-07-12 NOTE — CASE MANAGEMENT
Called [employer] ([phone]) to check status of authorization. Spoke to Oksana who stated case was sent to MD, nothing further needed at this time, once MD reviews, determination will be called in.  notified: Mani Dumont

## 2024-07-12 NOTE — PROGRESS NOTES
"Progress Note - Hal Miller 84 y.o. male MRN: [id number]    Unit/Bed#: [facility] 620-01 Encounter: [id number]      Assessment:  85 yo M with L inguinal hernia mesh erosion into sigmoid colon s/p ex-lap, mesh explant, washout and debridement of L groin, Angela's.    Plan:  Dysphagia 2 mechanical soft diet per SLP  On home meds including Eliquis  Keep Babcock, plan for o/p urology f/u  OOB, PT/OT Level I   CM dispo planning, acute rehab today    Subjective:   Patient seen and examined at bedside this morning. Denies pain, nausea, vomiting, SOB, CP. Expresses readiness to return to [facility].     Objective:     Vitals: Blood pressure 134/73, pulse 83, temperature 97.5 °F (36.4 °C), resp. rate 12, height 6' 2\" (1.88 m), weight 79.5 kg (175 lb 4.3 oz), SpO2 97%.,Body mass index is 22.5 kg/m².      Intake/Output Summary (Last 24 hours) at 7/12/2024 0536  Last data filed at 7/12/2024 0501  Gross per 24 hour   Intake --   Output 1125 ml   Net -1125 ml       Physical Exam: General appearance: alert and oriented, in no acute distress  Head: Normocephalic, without obvious abnormality, atraumatic  Chest wall: no tenderness  Abdomen: soft, non-tender; bowel sounds normal; no masses,  no organomegaly. Wound VAC L groin CDI. Midline incision CDI.  Male genitalia: normal  Extremities: extremities normal, warm and well-perfused; no cyanosis, clubbing, or edema  Skin: Skin color, texture, turgor normal. No rashes or lesions     Invasive Devices       Peripheral Intravenous Line  Duration             Peripheral IV 07/11/24 Right Forearm 1 day              Drain  Duration             Colostomy Other (comment) LUQ 7 days    Urethral Catheter Latex 16 Fr. 7 days                    Lab, Imaging and other studies: I have personally reviewed pertinent reports.    VTE Pharmacologic Prophylaxis: Eliquis  VTE Mechanical Prophylaxis: sequential compression device     Hina Draper, MS-4  [facility]   "

## 2024-07-12 NOTE — SPEECH THERAPY NOTE
"Speech-Language Pathology Progress Note      Patient Name: Hal Miller    Today's Date: 7/12/2024      Subjective:  Pt was lethargic. He was sitting upright in bed. Pt CHRISTOPHER waxed and waned throughout session. When asked if Pt was tired he said \"Yes, all of the sudden.\"    Objective:  Pt's current diet is mechanical soft with thin liquids. Pt was on room air.  Focus of today's session was to maximize PO intake safety and improve use of strategies to minimize risk of aspiration. Textures offered today included mech soft soft solid and thin liquid via cup and via straw.     Swallow function:   Pt required assistance with feeding. Pt had wet cough prior to any attempted trials. O2 99% to begin, pt not on O2.  Bolus mastication and AP transfer were slow.  Pt required multiple swallows per bolus. No coughing occurred following sips of thin. Pharyngeal swallow initiation was present. Pt required vocal stim to stay awake and trials were stopped because Pt with decreased CHRISTOPHER drifted with bolus still in his mouth. When awoken, he was able to finish his bite and had sips of thin to clear. Needed cues to swallow and alternate bites and sips for all trials.     Assessment:  Pt was more lethargic during today's session. Swallow function is stable with current diet.  Not appropriate at this time for any upgrades.   Diet texture and liquid consistency remains appropriate at this time. Needs cues to swallow x2 and alternate bites/sips.      Plan:  Continue dysphagia 2 mechanical soft and thin liquids. Continue ST follow up. Subsequent sessions to focus on maximizing PO intake safety, determining potential for diet texture advancement, and improving use of strategies to minimize risk of aspiration.   Would increase supervision as the pt is more lethargic.   "

## 2024-07-13 NOTE — PROGRESS NOTES
PHYSICAL MEDICINE AND REHABILITATION   PREADMISSION ASSESSMENT     Projected IGC and Rehabilitation Diagnoses:  Impairment of mobility, safety, Activities of Daily Living (ADLs), and cognitive/communication skills due to Stroke:  01.2  Right Body Involvement (Left Brain)  Etiologic: Acute infarct in L parietal   Date of Onset: 7/3/24    Date of surgery:   7/8/24 HARTMANS PROCEDURE. WITH EXPLANTATION OF LEFT GROIN MESH. WASHOUT AND DEBRIDEMENT OF LEFT GROIN WOUND     PATIENT INFORMATION  Name: Hal Miller Phone #: [phone] (home)   Address: [place] [address]  YOB: 1940 Age: 84 y.o. #   Marital Status: /Civil Union  Ethnicity: White/ ; Not //Tanzanian   Employment Status: retired  Extended Emergency Contact Information  Primary Emergency Contact: Ann Miller  Home Phone: [phone]  Mobile Phone: [phone]  Relation: Spouse  Advance Directive: Level 1 Full code ( No advance directive on file )    INSURANCE/COVERAGE:     Primary Payor: [employer]  REP / Plan: [facility] MEDICARE ADVANTAGE  REP / Product Type: Medicare HMO /   Secondary Payer: SELF PAY    Payer Contact: Emmit Payer Contact: n/a    Contact Phone: [phone]  Next review to [phone] Contact Phone: n/a      Authorization #: [id number]  Coverage Dates: 7/10-7/16 Next review 7/16  LCD: 7/16/24  MEDICARE #: [id number]   Medicare Days: n/a    Benefits : Ded $0, OOPM $0 remaining, copay $0, coins 0%.     Medical Record #: [id number]    REFERRAL SOURCE:   Referring provider: Abran Oneill,*  Referring facility: [facility]   Room: [facility] 620/[facility] 620-01  PCP: Kyaw Monreal MD PCP phone number: [phone]    MEDICAL INFORMATION  HPI: Hal is a 84 year old male with a medical history of but not limited to polymyalgia rheumatica on chronic plaquenil and low dose prednisone, Afib on Eliquis, stroke history, CAD, DM2, HTN,  COPD, vascular dementia, chronic double vision for which he wears eye patch, pancreatic CA s/p whipple (2011), appy, cholecystectomy, R ing hernia repair who originally presented to [facility] ED on 6/16/24 with abdominal pain, SOB, and enlarging L groin buldge who had CT A/P concerning for diverticular abscess. He was admitted for further eval/assessment/consultation. Gen Sx was consulted and recommended IV abx.  Follow-up imaging obtained and Gen Sx felt diverticular abscess emanating into L inguinal hernia.  IR was consulted for possible drainage but they felt not enough to no fluid collection available to drain. Plaquenil was d/c'd to limit immunocompromised state.  Follow-up Gen Sx reported Sigmoid diverticular perforation with isolated air within the inguinal canal or inguinal sac with recommendation to continue abx and advance diet as tolerated.  Hospital course notable for episode of unresponsiveness with turning head to left and with downward gaze on 6/19 with waxing and waning mentation.  He received Keppra and ativan on 6/19 for possible seizure and more recently started on Depacon with kidney d/s hx.  EEG showed excessive theta activity during wakefulness suggest mild nonspecific diffuse cerebral dysfunction. No epileptiform discharges or seizures are present.   MRI brain on 6/20 showed small acute infarct in the left parietal periventricular white matter with an associated small focus of susceptibility that may represent associated petechial hemorrhage as well as redemonstrated foci of susceptibility, including new foci, nonspecific and could be seen with cerebral amyloid angiopathy, multiple cavernomas and/or prior embolic events, among other considerations.Neuro was consulted and they reported / documented small CVA related to being temporarily being off of Eliquis and not failure with plan to go back from hep gtt to Eliquis.  Gen Sx recs 1 more week of abx and OP Gen Sx follow-up.   PT/OT therapies were consulted and continue to follow patient at this time. PM&R was consulted and are recommending inpatient acute rehab when medically stable. All involved medical disciplines feel/agree patient is medically ready for discharge at this time. Inpatient acute rehabilitation physician was consulted. Upon review of patient's case and correspondence with PT/OT therapies and PM&R services, [facility] physician felt Hal would benefit and was a good candidate / appropriate for inpatient acute rehab at this time. He had demonstrated the willingness / desire and tolerance to participate in the required 3 hours or more of therapies per day. He admitted to [facility] on 6/26/24. He was participating and doing well. On 7/3/24 he was noted to have progressive edema, erythema and tenderness of the left inguinal area.  He had known history of left inguinal hernia and was being followed by general surgery during his prior admission. He was transferred back to [facility]. Due to concern for abscess/severe soft tissue infection patient has been resumed on IV antibiotic and transferred to acute care under the surgical service for possible intervention. It was determined that sx intervention was warranted. ***      Past Medical History:   Past Surgical History:   Allergies:     Past Medical History:   Diagnosis Date    Acute encephalopathy 2/14/2023    Acute-on-chronic kidney injury  (HCC) 6/13/2017    Cancer (HCC)     pancreatic    Colon polyp     Coronary artery disease     Dehydration 3/3/2023    Diabetes mellitus (HCC)     Hyperlipidemia     Hypertension     Left lower lobe pneumonia 7/2/2022    Pneumonia 06/13/2017    Right middle and lower lobe     Stroke (HCC)     Past Surgical History:   Procedure Laterality Date    APPENDECTOMY      CARDIAC SURGERY      Aortic Valve Replacement, Ascending Aorta    COLONOSCOPY      GALLBLADDER SURGERY      HARTMANS PROCEDURE N/A 7/4/2024    Procedure: HARTMANS PROCEDURE, WITH EXPLANTATION  OF LEFT GROIN MESH, WASHOUT AND DEBRIDEMENT OF LEFT GROIN WOUND;  Surgeon: Charlie Carroll MD;  Location: [facility];  Service: General    PANCREATICODUODENECTOMY       Allergies   Allergen Reactions    Contrast Dye [Iodinated Contrast Media] Other (See Comments)     Pt states kidney dysfunction..     Other          Medical/functional conditions requiring inpatient rehabilitation: impaired mobility/self care  ; impaired balance / ambulation     Risk for medical/clinical complications: risk for hypotensive/hypertensive episodes ; further infarcts ; falls ; pain ; skin breakdown; infection    Comorbidities/Surgeries in the last 100 days:   Encephalopathy  Seizure-like episode    Stroke - acute infarct in L parietal perivent white matter  Vascular dementia   Constipation   Afib  COPD, chronic respiratory respiratory failure with hypoxia   DM2  Polymyalgia rheumatica on chronic plaquenil and low dose prednisone  Neuropathy   Colonic diverticular abscess     CURRENT VITAL SIGNS:   Temp:  [96.9 °F (36.1 °C)-97.7 °F (36.5 °C)] 96.9 °F (36.1 °C)  HR:  [70-90] 76  BP: (124-139)/(70-83) 139/83   Intake/Output Summary (Last 24 hours) at 7/14/2024 0859  Last data filed at 7/13/2024 1801  Gross per 24 hour   Intake 120 ml   Output 350 ml   Net -230 ml          LABORATORY RESULTS:      Lab Results   Component Value Date    HGB 7.8 (L) 07/14/2024    HGB 13.0 06/08/2015    HCT 25.9 (L) 07/14/2024    HCT 41.0 06/08/2015    WBC 9.12 07/14/2024    WBC 7.03 06/08/2015     Lab Results   Component Value Date    BUN 30 (H) 07/14/2024    BUN 38 (H) 02/20/2023     06/08/2015    K 4.5 07/14/2024    K 4.6 02/20/2023     (H) 07/14/2024     02/20/2023    GLUCOSE 169 (H) 06/19/2024    GLUCOSE 101 06/08/2015    CREATININE 1.19 07/14/2024    CREATININE 1.58 (H) 02/20/2023     Lab Results   Component Value Date    PROTIME 18.9 (H) 07/05/2024    PROTIME 13.5 04/07/2014    INR 1.61 (H) 07/05/2024    INR 1.08 04/07/2014         DIAGNOSTIC STUDIES:  MRA head wo contrast    Result Date: 2024  Impression: Normal MRA Brain with variant anatomy. Please see MRI brain without contrast done yesterday for further evaluation Workstation performed: [id number]     MRI brain wo contrast    Result Date: 2024  Impression: 1. Small acute infarct in the left parietal periventricular white matter with an associated small focus of susceptibility that may represent associated petechial hemorrhage. 2. Redemonstrated foci of susceptibility, including new foci, nonspecific and could be seen with cerebral amyloid angiopathy, multiple cavernomas and/or prior embolic events, among other considerations. I personally discussed the acute infarct with MEREDITH VILLATORO on 2024 2:01 PM. Workstation performed: [id number]       PRECAUTIONS/SPECIAL NEEDS:  Tobacco:   Social History     Tobacco Use   Smoking Status Former    Types: Pipe    Quit date:     Years since quittin.5   Smokeless Tobacco Never   , Alcohol:    Social History     Substance and Sexual Activity   Alcohol Use Never   , Anticoagulation:   Eliquis per MD , Edema Management, Safety Concerns, Pain Management, Dietary Restrictions: Consistent carb, Language Preference: English, and Fall precautions    MEDICATIONS:     Current Facility-Administered Medications:     acetaminophen (TYLENOL) oral suspension 650 mg, 650 mg, Oral, Q4H PRN, Mini Bernstein MD    albuterol (PROVENTIL HFA,VENTOLIN HFA) inhaler 2 puff, 2 puff, Inhalation, Q4H PRN, Jean Carlos Hurd MD    apixaban (ELIQUIS) tablet 5 mg, 5 mg, Oral, BID, Kenya Norris MD, 5 mg at 24 1754    budesonide (PULMICORT) inhalation solution 0.5 mg, 0.5 mg, Nebulization, Q12H, Jean Carlos Hurd MD, 0.5 mg at 24 0853    chlorhexidine (PERIDEX) 0.12 % oral rinse 15 mL, 15 mL, Mouth/Throat, Q12H YUNI, Jean Carlos Hurd MD, 15 mL at 24 2135    dextrose 5 % and sodium chloride 0.45 % with KCl 20 mEq/L infusion, 50 mL/hr,  Intravenous, Continuous, Frederick Wall MD    divalproex sodium (DEPAKOTE SPRINKLE) capsule 500 mg, 500 mg, Oral, Q8H YUNI, Merlyn Dinh MD, 500 mg at 07/13/24 2136    formoterol (PERFOROMIST) nebulizer solution 20 mcg, 20 mcg, Nebulization, Q12H, Jean Carlos Hurd MD, 20 mcg at 07/14/24 0853    insulin lispro (HumALOG/ADMELOG) 100 units/mL subcutaneous injection 1-5 Units, 1-5 Units, Subcutaneous, 4x Daily (AC & HS) **AND** Fingerstick Glucose (POCT), , , 4x Daily AC and at bedtime, Yuri Whitley PA-C    melatonin tablet 3 mg, 3 mg, Oral, HS, Jean Carlos Hurd MD, 3 mg at 07/12/24 2146    metoprolol succinate (TOPROL-XL) 24 hr tablet 25 mg, 25 mg, Oral, Daily, Merlyn Dinh MD, 25 mg at 07/13/24 0814    moisture barrier miconazole 2% cream (aka ARASH MOISTURE BARRIER ANTIFUNGAL CREAM), , Topical, BID, Abran Oneill DO, Given at 07/13/24 1755    nystatin (MYCOSTATIN) oral suspension 500,000 Units, 500,000 Units, Swish & Swallow, 4x Daily, Briana Desai PA-C, 500,000 Units at 07/13/24 2135    oxyCODONE (ROXICODONE) oral solution 2.5 mg, 2.5 mg, Oral, Q4H PRN, 2.5 mg at 07/10/24 2033 **OR** oxyCODONE (ROXICODONE) oral solution 5 mg, 5 mg, Oral, Q4H PRN, Mini Bernstein MD, 5 mg at 07/13/24 1405    pantoprazole (PROTONIX) EC tablet 40 mg, 40 mg, Oral, Early Morning, Abran Oneill DO, 40 mg at 07/13/24 0600    senna-docusate sodium (SENOKOT S) 8.6-50 mg per tablet 1 tablet, 1 tablet, Oral, HS, Frederick Wall MD    tamsulosin (FLOMAX) capsule 0.4 mg, 0.4 mg, Oral, Daily With Dinner, Jean Carlos Hurd MD, 0.4 mg at 07/13/24 3154    SKIN INTEGRITY:   ***    PRIOR LEVEL OF FUNCTION:  He lives in a(n) single family home  Hal Miller is  and lives with their spouse.  Self Care: Independent, Indoor Mobility: Independent, Stairs (in/outdoor): Independent, and Cognition: Independent    FALLS IN THE LAST 6 MONTHS: 1    HOME ENVIRONMENT:  The living area: can live on one level  There are 2 steps to  enter the home.    The patient will have 24 hour supervision/physical assistance available upon discharge.    PREVIOUS DME:  Equipment in home (previous DME): Shower Chair, Grab Bars, Rolling Walker, Single Point Cane, and walk in shower ; raised toilet seat     FUNCTIONAL STATUS: PT/OT/SLP  Physical Therapy Occupational Therapy Speech Therapy   As per PT:         24 1030   PT Last Visit   PT Visit Date 24   Note Type   Note Type Treatment for insurance authorization   Restrictions/Precautions   Weight Bearing Precautions Per Order No   Other Precautions Chair Alarm;Cognitive;Bed Alarm;Multiple lines;Fall Risk;Pain;Visual impairment;Hard of hearing  ([place]; Ostomy; Baseline vascular dementia; L visuospatial inattention (?)/ acuity deficit)   General   Chart Reviewed Yes   Response to Previous Treatment Patient with no complaints from previous session.   Family/Caregiver Present No   Cognition   Overall Cognitive Status Impaired   Arousal/Participation Arousable;Cooperative   Attention Difficulty attending to directions   Orientation Level Oriented to person;Disoriented to place;Disoriented to time;Disoriented to situation   Memory Decreased recall of precautions;Decreased recall of recent events;Decreased short term memory;Decreased long term memory;Decreased recall of biographical information   Following Commands Follows one step commands with increased time or repetition   Comments Pt cooperative w/ therapy w/ encouragement and frequent VC for task completion. Lethargic and flat affect. AO to name/ only, perseverates on  and answers  in response to other quetions and attempted re-orientation. Perseverates on certain tasks ie: combing hair and unable to follow commands for other tasks despite repetitive verbal commands   Bed Mobility   Supine to Sit 2  Maximal assistance   Additional items Assist x 2;Increased time required;Verbal cues;LE management;HOB elevated;Bedrails   Sit to Supine Unable  to assess   Additional Comments Pt supine in bed upon arrival. Max Ax2 for trunk control and LE progression. Min A for dynamic sitting balance EOB w/ periods of C SUP during static sitting. Pt left sitting OOB in chair with call bell and all needs following PT session   Transfers   Sit to Stand 2  Maximal assistance   Additional items Assist x 2;Increased time required;Verbal cues   Stand to Sit 2  Maximal assistance   Additional items Assist x 2;Increased time required;Verbal cues   Stand pivot 2  Maximal assistance   Additional items Assist x 2;Increased time required;Verbal cues   Additional Comments Transfers w/ BL HHA and BL knees blocked. Pt completed x2 STS, unable to achieve full stand (50-75%) w/ stooped posture   Ambulation/Elevation   Gait pattern Not appropriate;Not tested   Ambulation/Elevation Additional Comments Attempted to take lateral steps from bed to chair however pt unable to coordinate steps/advance LEs 2/2 poor posture and poor command following   Balance   Static Sitting Fair   Dynamic Sitting Fair -   Static Standing Poor -   Dynamic Standing Poor -   Ambulatory Zero   Endurance Deficit   Endurance Deficit Yes   Endurance Deficit Description Fatigue, deconditioning, generalized weakness, impaired cognition   Activity Tolerance   Activity Tolerance Patient tolerated treatment well;Patient limited by fatigue;Other (Comment)  (impaired cognition)   Medical Staff Made Aware OT Baylee- co treat 2/2 medical complexity and for safe pt handling   Nurse Made Aware RN cleared and updated   Assessment   Prognosis Fair   Problem List Decreased strength;Decreased range of motion;Impaired balance;Decreased endurance;Decreased mobility;Decreased coordination;Decreased cognition;Impaired judgement;Decreased safety awareness;Pain;Impaired vision;Impaired hearing   Assessment Pt seen for PT treatment session this date. Therapy session focused on bed mobility, sitting balance, transfer training and standing  tolerance/balance in order to improve overall mobility and independence. Pt requires max Ax2 for bed mobility and transfers. Unable to achieve full stand 2/2 LE weakness and poor posture which limits ability to take steps for ambulation. Pt requires VC t/o PT session for task completion and repetition of all simple commands w/ limited carryover/follow through (~50%). Pt making slow progress toward goals. Pt was left sitting at the end of PT session with all needs in reach. Pt would benefit from continued PT services while in hospital to address remaining limitations. PT to continue treating pt and recommends acute rehab. The patient's AM-PAC Basic Mobility Inpatient Short Form Raw Score is 7. A Raw score of less than or equal to 16 suggests the patient may benefit from discharge to post-acute rehabilitation services. Please also refer to the recommendation of the Physical Therapist for safe discharge planning.    As per OT:       07/13/24 1031    OT Last Visit   OT Visit Date 07/13/24   Note Type   Note Type Treatment for insurance authorization   Pain Assessment   Pain Assessment Tool FLACC   Pain Location/Orientation Location: Generalized   Pain Rating: FLACC (Rest) - Face 1   Pain Rating: FLACC (Rest) - Legs 1   Pain Rating: FLACC (Rest) - Activity 0   Pain Rating: FLACC (Rest) - Cry 0   Pain Rating: FLACC (Rest) - Consolability 0   Score: FLACC (Rest) 2   Pain Rating: FLACC (Activity) - Face 1   Pain Rating: FLACC (Activity) - Legs 1   Pain Rating: FLACC (Activity) - Activity 1   Pain Rating: FLACC (Activity) - Cry 1   Pain Rating: FLACC (Activity) - Consolability 0   Score: FLACC (Activity) 4   Restrictions/Precautions   Weight Bearing Precautions Per Order No   Other Precautions Cognitive;Chair Alarm;Bed Alarm;Telemetry;Fall Risk;Pain;Hard of hearing;Visual impairment  (+Cognitive deficits (history of Vascular dementia), left visuospatial inattention (?)/impaired visual acuity left>right)   Lifestyle    Autonomy Independent with ADL's and functional mobility, needs assistance with IADL's   Reciprocal Relationships Supportive spouse and family   Service to Others Retired   Intrinsic Gratification Working on the farm   ADL   Where Assessed Edge of bed   Grooming Assistance 3  Moderate Assistance   Grooming Deficit Brushing hair   UB Bathing Assistance 2  Maximal Assistance   UB Bathing Deficit Left arm;Right arm;Abdomen   UB Dressing Assistance 2  Maximal Assistance   UB Dressing Deficit Thread RUE;Thread LUE;Pull around back  (donning/doffing hospital gown, hand over hand to initiate/complete task)   LB Dressing Assistance 1  Total Assistance   LB Dressing Deficit Don/doff R sock;Don/doff L sock   Toileting Assistance  1  Total Assistance   Bed Mobility   Supine to Sit 2  Maximal assistance   Additional items Assist x 2;HOB elevated   Additional Comments verbal/tactile cues to reach for bed rail/sequence tranfer   Transfers   Sit to Stand 2  Maximal assistance   Additional items Assist x 2   Stand to Sit 2  Maximal assistance   Additional items Assist x 2   Stand pivot 2  Maximal assistance  (bed to chair)   Additional items Assist x 2   Additional Comments BUE HHA and cues for upright posture   Cognition   Overall Cognitive Status Impaired  (History of Vascular Dementia)   Arousal/Participation Alert;Responsive;Cooperative   Attention Difficulty attending to directions   Orientation Level Oriented to person;Disoriented to place;Disoriented to time;Disoriented to situation   Memory Decreased long term memory;Decreased recall of biographical information;Decreased short term memory;Decreased recall of recent events;Decreased recall of precautions   Following Commands Follows one step commands with increased time or repetition   Comments pt coopeartive with therapy, lethargic, flat affect, confused with impaired attention, poor memory able to state name and  (month, day only) re-oriented and pt perservated on  "stating birthday when asked other session evaluative questions, disoriented to date, place (stating \"rehab\") and situation. Difficulty following one step functional directives (50% of the time) with session   Activity Tolerance   Activity Tolerance Patient limited by fatigue;Patient limited by pain   Medical Staff Made Aware RN cleared pt for therapy   Assessment   Assessment Patient participated in Skilled OT session this date with interventions consisting of self care tasks, EOB sitting/balance, cognitive re-orientation . Patient agreeable to OT treatment session, upon arrival patient was found supine in bed. Patient requiring verbal cues for safety, verbal cues for correct technique, verbal cues for pacing thru activity steps, cognitive assistance to anticipate next step, one step directives, and frequent rest periods. Patient continues to be functioning below baseline level, occupational performance remains limited secondary to factors listed above and increased risk for falls and injury.   From OT standpoint, recommendation at time of d/c would be max level I.  The patient's raw score on the AM-PAC Daily Activity Inpatient Short Form is 11. A raw score of less than 19 suggests the patient may benefit from discharge to post-acute rehabilitation services. Please refer to the recommendation of the occupational Therapist for safe discharge planning.  Patient to benefit from continued Occupational Therapy treatment while in the hospital to address deficits as defined above and maximize level of functional independence with ADLs and functional mobility.    As per SLP:      Speech-Language Pathology Progress Note        Patient Name: Hal Miller     Today's Date: 7/12/2024        Subjective:  Pt was lethargic. He was sitting upright in bed. Pt CHRISTOPHER waxed and waned throughout session. When asked if Pt was tired he said \"Yes, all of the sudden.\"     Objective:  Pt's current diet is mechanical soft with thin liquids. Pt " was on room air.  Focus of today's session was to maximize PO intake safety and improve use of strategies to minimize risk of aspiration. Textures offered today included mech soft soft solid and thin liquid via cup and via straw.      Swallow function:   Pt required assistance with feeding. Pt had wet cough prior to any attempted trials. O2 99% to begin, pt not on O2.  Bolus mastication and AP transfer were slow.  Pt required multiple swallows per bolus. No coughing occurred following sips of thin. Pharyngeal swallow initiation was present. Pt required vocal stim to stay awake and trials were stopped because Pt with decreased CHRISTOPHER drifted with bolus still in his mouth. When awoken, he was able to finish his bite and had sips of thin to clear. Needed cues to swallow and alternate bites and sips for all trials.      Assessment:  Pt was more lethargic during today's session. Swallow function is stable with current diet.  Not appropriate at this time for any upgrades.   Diet texture and liquid consistency remains appropriate at this time. Needs cues to swallow x2 and alternate bites/sips.       Plan:  Continue dysphagia 2 mechanical soft and thin liquids. Continue ST follow up. Subsequent sessions to focus on maximizing PO intake safety, determining potential for diet texture advancement, and improving use of strategies to minimize risk of aspiration.   Would increase supervision as the pt is more lethargic.             Cosigned by: Leanne Mendoza, SLP at 2024  4:01 PM             CARE SCORES:  Self Care:  Eatin: Setup or clean-up assistance  Oral hygiene: 04: Supervision or touching  assistance  Toilet hygiene: 04: Supervision or touching  assistance  Shower/bathing self: 03: Partial/moderate assistance  Upper body dressin: Supervision or touching  assistance  Lower body dressin: Partial/moderate assistance  Putting on/taking off footwear: 03: Partial/moderate assistance  Transfers:  Roll left and  right: 04: Supervision or touching  assistance  Sit to lyin: Supervision or touching  assistance  Lying to sitting on side of bed: 04: Supervision or touching  assistance  Sit to stand: 03: Partial/moderate assistance  Chair/bed to chair transfer: 03: Partial/moderate assistance  Toilet transfer: 03: Partial/moderate assistance  Mobility:  Walk 10 ft: 04: Supervision or touching  assistance  Walk 50 ft with two turns: 04: Supervision or touching  assistance  Walk 150ft: 09: Not applicable    CURRENT GAP IN FUNCTION  Prior to Admission: Functional Status: Patient was independent with mobility/ambulation, transfers, ADL's, IADL's.    Expected functional outcomes: It is expected that with skilled acute rehabilitation services the patient will progress to Supervision for self care and Supervision for mobility     Estimated length of stay: 10 to 14 days    Anticipated Post-Discharge Disposition/Treatment  Disposition: Return to previous home/apartment.  Outpatient Services: Physical Therapy (PT) and Occupational Therapy (OT)    BARRIERS TO DISCHARGE  Weakness, Pain, Balance Difficulty, Fatigue, Home Accessibility, Caregiver Accessibility, and Equipment Needs    INTERVENTIONS FOR DISCHARGE  Adaptive equipment, Patient/Family/Caregiver Education, Arrange DME needs, Medication Changes per MD, Therapy exercises, and Energy conservation education     REQUIRED THERAPY:  Patient will require PT and OT 90 minutes each per day, five days per week to achieve rehab goals.     REQUIRED FUNCTIONAL AND MEDICAL MANAGEMENT FOR INPATIENT REHABILITATION:  Pain Management: Overall pain is well controlled, Deep Vein Thrombosis (DVT) Prophylaxis:  SCD's while in bed and   Nursing education and bowel/bladder management, internal medicine to manage/monitor medical conditions, PM&R to maximize function and provide medical oversight, PT/OT intervention, patient/family education/training, and any consults as needed     RECOMMENDED LEVEL OF  CARE: ***

## 2024-07-13 NOTE — PLAN OF CARE
Problem: PHYSICAL THERAPY ADULT  Goal: Performs mobility at highest level of function for planned discharge setting.  See evaluation for individualized goals.  Description: Treatment/Interventions: OT, Spoke to case management, Spoke to nursing, Gait training, Bed mobility, Patient/family training, Endurance training, LE strengthening/ROM, Functional transfer training          See flowsheet documentation for full assessment, interventions and recommendations.  Outcome: Progressing  Note: Prognosis: Fair  Problem List: Decreased strength, Decreased range of motion, Impaired balance, Decreased endurance, Decreased mobility, Decreased coordination, Decreased cognition, Impaired judgement, Decreased safety awareness, Pain, Impaired vision, Impaired hearing  Assessment: Pt seen for PT treatment session this date. Therapy session focused on bed mobility, sitting balance, transfer training and standing tolerance/balance in order to improve overall mobility and independence. Pt requires max Ax2 for bed mobility and transfers. Unable to achieve full stand 2/2 LE weakness and poor posture which limits ability to take steps for ambulation. Pt requires VC t/o PT session for task completion and repetition of all simple commands w/ limited carryover/follow through (~50%). Pt making slow progress toward goals. Pt was left sitting at the end of PT session with all needs in reach. Pt would benefit from continued PT services while in hospital to address remaining limitations. PT to continue treating pt and recommends acute rehab. The patient's AM-PAC Basic Mobility Inpatient Short Form Raw Score is 7. A Raw score of less than or equal to 16 suggests the patient may benefit from discharge to post-acute rehabilitation services. Please also refer to the recommendation of the Physical Therapist for safe discharge planning.  Barriers to Discharge: Inaccessible home environment     Rehab Resource Intensity Level, PT: I (Maximum Resource  Intensity)    See flowsheet documentation for full assessment.

## 2024-07-13 NOTE — PLAN OF CARE
Problem: PAIN - ADULT  Goal: Verbalizes/displays adequate comfort level or baseline comfort level  Description: Interventions:  - Encourage patient to monitor pain and request assistance  - Assess pain using appropriate pain scale  - Administer analgesics based on type and severity of pain and evaluate response  - Implement non-pharmacological measures as appropriate and evaluate response  - Consider cultural and social influences on pain and pain management  - Notify physician/advanced practitioner if interventions unsuccessful or patient reports new pain  Outcome: Progressing     Problem: INFECTION - ADULT  Goal: Absence or prevention of progression during hospitalization  Description: INTERVENTIONS:  - Assess and monitor for signs and symptoms of infection  - Monitor lab/diagnostic results  - Monitor all insertion sites, i.e. indwelling lines, tubes, and drains  - Monitor endotracheal if appropriate and nasal secretions for changes in amount and color  - Duncan appropriate cooling/warming therapies per order  - Administer medications as ordered  - Instruct and encourage patient and family to use good hand hygiene technique  - Identify and instruct in appropriate isolation precautions for identified infection/condition  Outcome: Progressing     Problem: SAFETY ADULT  Goal: Patient will remain free of falls  Description: INTERVENTIONS:  - Educate patient/family on patient safety including physical limitations  - Instruct patient to call for assistance with activity   - Consult OT/PT to assist with strengthening/mobility   - Keep Call bell within reach  - Keep bed low and locked with side rails adjusted as appropriate  - Keep care items and personal belongings within reach  - Initiate and maintain comfort rounds  - Make Fall Risk Sign visible to staff  - Offer Toileting every 2 Hours, in advance of need  - Initiate/Maintain bed alarm  - Obtain necessary fall risk management equipment:    - Apply yellow socks  and bracelet for high fall risk patients  - Consider moving patient to room near nurses station  Outcome: Progressing  Goal: Maintain or return to baseline ADL function  Description: INTERVENTIONS:  -  Assess patient's ability to carry out ADLs; assess patient's baseline for ADL function and identify physical deficits which impact ability to perform ADLs (bathing, care of mouth/teeth, toileting, grooming, dressing, etc.)  - Assess/evaluate cause of self-care deficits   - Assess range of motion  - Assess patient's mobility; develop plan if impaired  - Assess patient's need for assistive devices and provide as appropriate  - Encourage maximum independence but intervene and supervise when necessary  - Involve family in performance of ADLs  - Assess for home care needs following discharge   - Consider OT consult to assist with ADL evaluation and planning for discharge  - Provide patient education as appropriate  Outcome: Progressing  Goal: Maintains/Returns to pre admission functional level  Description: INTERVENTIONS:  - Perform AM-PAC 6 Click Basic Mobility/ Daily Activity assessment daily.  - Set and communicate daily mobility goal to care team and patient/family/caregiver.   - Collaborate with rehabilitation services on mobility goals if consulted  - Perform Range of Motion 3 times a day.  - Reposition patient every 2 hours.  - Dangle patient 3 times a day  - Stand patient 3 times a day  - Ambulate patient 3 times a day  - Out of bed to chair 3 times a day   - Out of bed for meals 3 times a day  - Out of bed for toileting  - Record patient progress and toleration of activity level   Outcome: Progressing     Problem: DISCHARGE PLANNING  Goal: Discharge to home or other facility with appropriate resources  Description: INTERVENTIONS:  - Identify barriers to discharge w/patient and caregiver  - Arrange for needed discharge resources and transportation as appropriate  - Identify discharge learning needs (meds, wound  care, etc.)  - Arrange for interpretive services to assist at discharge as needed  - Refer to Case Management Department for coordinating discharge planning if the patient needs post-hospital services based on physician/advanced practitioner order or complex needs related to functional status, cognitive ability, or social support system  Outcome: Progressing     Problem: Knowledge Deficit  Goal: Patient/family/caregiver demonstrates understanding of disease process, treatment plan, medications, and discharge instructions  Description: Complete learning assessment and assess knowledge base.  Interventions:  - Provide teaching at level of understanding  - Provide teaching via preferred learning methods  Outcome: Progressing     Problem: Prexisting or High Potential for Compromised Skin Integrity  Goal: Skin integrity is maintained or improved  Description: INTERVENTIONS:  - Identify patients at risk for skin breakdown  - Assess and monitor skin integrity  - Assess and monitor nutrition and hydration status  - Monitor labs   - Assess for incontinence   - Turn and reposition patient  - Assist with mobility/ambulation  - Relieve pressure over bony prominences  - Avoid friction and shearing  - Provide appropriate hygiene as needed including keeping skin clean and dry  - Evaluate need for skin moisturizer/barrier cream  - Collaborate with interdisciplinary team   - Patient/family teaching  - Consider wound care consult   Outcome: Progressing     Problem: Nutrition/Hydration-ADULT  Goal: Nutrient/Hydration intake appropriate for improving, restoring or maintaining nutritional needs  Description: Monitor and assess patient's nutrition/hydration status for malnutrition. Collaborate with interdisciplinary team and initiate plan and interventions as ordered.  Monitor patient's weight and dietary intake as ordered or per policy. Utilize nutrition screening tool and intervene as necessary. Determine patient's food preferences and  provide high-protein, high-caloric foods as appropriate.     INTERVENTIONS:  - Monitor oral intake, urinary output, labs, and treatment plans  - Assess nutrition and hydration status and recommend course of action  - Evaluate amount of meals eaten  - Assist patient with eating if necessary   - Allow adequate time for meals  - Recommend/ encourage appropriate diets, oral nutritional supplements, and vitamin/mineral supplements  - Order, calculate, and assess calorie counts as needed  - Recommend, monitor, and adjust tube feedings and TPN/PPN based on assessed needs  - Assess need for intravenous fluids  - Provide specific nutrition/hydration education as appropriate  - Include patient/family/caregiver in decisions related to nutrition  Outcome: Progressing     Problem: RESPIRATORY - ADULT  Goal: Achieves optimal ventilation and oxygenation  Description: INTERVENTIONS:  - Assess for changes in respiratory status  - Assess for changes in mentation and behavior  - Position to facilitate oxygenation and minimize respiratory effort  - Oxygen administered by appropriate delivery if ordered  - Initiate smoking cessation education as indicated  - Encourage broncho-pulmonary hygiene including cough, deep breathe, Incentive Spirometry  - Assess the need for suctioning and aspirate as needed  - Assess and instruct to report SOB or any respiratory difficulty  - Respiratory Therapy support as indicated  Outcome: Progressing     Problem: GASTROINTESTINAL - ADULT  Goal: Minimal or absence of nausea and/or vomiting  Description: INTERVENTIONS:  - Administer IV fluids if ordered to ensure adequate hydration  - Maintain NPO status until nausea and vomiting are resolved  - Nasogastric tube if ordered  - Administer ordered antiemetic medications as needed  - Provide nonpharmacologic comfort measures as appropriate  - Advance diet as tolerated, if ordered  - Consider nutrition services referral to assist patient with adequate nutrition  and appropriate food choices  Outcome: Progressing  Goal: Maintains or returns to baseline bowel function  Description: INTERVENTIONS:  - Assess bowel function  - Encourage oral fluids to ensure adequate hydration  - Administer IV fluids if ordered to ensure adequate hydration  - Administer ordered medications as needed  - Encourage mobilization and activity  - Consider nutritional services referral to assist patient with adequate nutrition and appropriate food choices  Outcome: Progressing  Goal: Maintains adequate nutritional intake  Description: INTERVENTIONS:  - Monitor percentage of each meal consumed  - Identify factors contributing to decreased intake, treat as appropriate  - Assist with meals as needed  - Monitor I&O, weight, and lab values if indicated  - Obtain nutrition services referral as needed  Outcome: Progressing  Goal: Establish and maintain optimal ostomy function  Description: INTERVENTIONS:  - Assess bowel function  - Encourage oral fluids to ensure adequate hydration  - Administer IV fluids if ordered to ensure adequate hydration   - Administer ordered medications as needed  - Encourage mobilization and activity  - Nutrition services referral to assist patient with appropriate food choices  - Assess stoma site  - Consider wound care consult   Outcome: Progressing  Goal: Oral mucous membranes remain intact  Description: INTERVENTIONS  - Assess oral mucosa and hygiene practices  - Implement preventative oral hygiene regimen  - Implement oral medicated treatments as ordered  - Initiate Nutrition services referral as needed  Outcome: Progressing     Problem: GENITOURINARY - ADULT  Goal: Maintains or returns to baseline urinary function  Description: INTERVENTIONS:  - Assess urinary function  - Encourage oral fluids to ensure adequate hydration if ordered  - Administer IV fluids as ordered to ensure adequate hydration  - Administer ordered medications as needed  - Offer frequent toileting  - Follow  urinary retention protocol if ordered  Outcome: Progressing  Goal: Absence of urinary retention  Description: INTERVENTIONS:  - Assess patient’s ability to void and empty bladder  - Monitor I/O  - Bladder scan as needed  - Discuss with physician/AP medications to alleviate retention as needed  - Discuss catheterization for long term situations as appropriate  Outcome: Progressing  Goal: Urinary catheter remains patent  Description: INTERVENTIONS:  - Assess patency of urinary catheter  - If patient has a chronic cardenas, consider changing catheter if non-functioning  - Follow guidelines for intermittent irrigation of non-functioning urinary catheter  Outcome: Progressing

## 2024-07-13 NOTE — OCCUPATIONAL THERAPY NOTE
Occupational Therapy Progress Note     Patient Name: Hal Miller  Today's Date: 7/13/2024  Problem List  Principal Problem:    Colonic diverticular abscess  Active Problems:    Moderate protein-calorie malnutrition (HCC)        07/13/24 1031   OT Last Visit   OT Visit Date 07/13/24   Note Type   Note Type Treatment for insurance authorization   Pain Assessment   Pain Assessment Tool FLACC   Pain Location/Orientation Location: Generalized   Pain Rating: FLACC (Rest) - Face 1   Pain Rating: FLACC (Rest) - Legs 1   Pain Rating: FLACC (Rest) - Activity 0   Pain Rating: FLACC (Rest) - Cry 0   Pain Rating: FLACC (Rest) - Consolability 0   Score: FLACC (Rest) 2   Pain Rating: FLACC (Activity) - Face 1   Pain Rating: FLACC (Activity) - Legs 1   Pain Rating: FLACC (Activity) - Activity 1   Pain Rating: FLACC (Activity) - Cry 1   Pain Rating: FLACC (Activity) - Consolability 0   Score: FLACC (Activity) 4   Restrictions/Precautions   Weight Bearing Precautions Per Order No   Other Precautions Cognitive;Chair Alarm;Bed Alarm;Telemetry;Fall Risk;Pain;Hard of hearing;Visual impairment  (+Cognitive deficits (history of Vascular dementia), left visuospatial inattention (?)/impaired visual acuity left>right)   Lifestyle   Autonomy Independent with ADL's and functional mobility, needs assistance with IADL's   Reciprocal Relationships Supportive spouse and family   Service to Others Retired   Intrinsic Gratification Working on the farm   ADL   Where Assessed Edge of bed   Grooming Assistance 3  Moderate Assistance   Grooming Deficit Brushing hair   UB Bathing Assistance 2  Maximal Assistance   UB Bathing Deficit Left arm;Right arm;Abdomen   UB Dressing Assistance 2  Maximal Assistance   UB Dressing Deficit Thread RUE;Thread LUE;Pull around back  (donning/doffing [facility] gown, hand over hand to initiate/complete task)   LB Dressing Assistance 1  Total Assistance   LB Dressing Deficit Don/doff R sock;Don/doff L sock   Toileting  "Assistance  1  Total Assistance   Bed Mobility   Supine to Sit 2  Maximal assistance   Additional items Assist x 2;HOB elevated   Additional Comments verbal/tactile cues to reach for bed rail/sequence tranfer   Transfers   Sit to Stand 2  Maximal assistance   Additional items Assist x 2   Stand to Sit 2  Maximal assistance   Additional items Assist x 2   Stand pivot 2  Maximal assistance  (bed to chair)   Additional items Assist x 2   Additional Comments BUE HHA and cues for upright posture   Cognition   Overall Cognitive Status Impaired  (History of Vascular Dementia)   Arousal/Participation Alert;Responsive;Cooperative   Attention Difficulty attending to directions   Orientation Level Oriented to person;Disoriented to place;Disoriented to time;Disoriented to situation   Memory Decreased long term memory;Decreased recall of biographical information;Decreased short term memory;Decreased recall of recent events;Decreased recall of precautions   Following Commands Follows one step commands with increased time or repetition   Comments pt coopeartive with therapy, lethargic, flat affect, confused with impaired attention, poor memory able to state name and  (month, day only) re-oriented and pt perservated on stating birthday when asked other session evaluative questions, disoriented to date, place (stating \"rehab\") and situation. Difficulty following one step functional directives (50% of the time) with session   Activity Tolerance   Activity Tolerance Patient limited by fatigue;Patient limited by pain   Medical Staff Made Aware RN cleared pt for therapy   Assessment   Assessment Patient participated in Skilled OT session this date with interventions consisting of self care tasks, EOB sitting/balance, cognitive re-orientation . Patient agreeable to OT treatment session, upon arrival patient was found supine in bed. Patient requiring verbal cues for safety, verbal cues for correct technique, verbal cues for pacing thru " activity steps, cognitive assistance to anticipate next step, one step directives, and frequent rest periods. Patient continues to be functioning below baseline level, occupational performance remains limited secondary to factors listed above and increased risk for falls and injury.   From OT standpoint, recommendation at time of d/c would be max level I.  The patient's raw score on the AM-PAC Daily Activity Inpatient Short Form is 11. A raw score of less than 19 suggests the patient may benefit from discharge to post-acute rehabilitation services. Please refer to the recommendation of the occupational Therapist for safe discharge planning.  Patient to benefit from continued Occupational Therapy treatment while in the hospital to address deficits as defined above and maximize level of functional independence with ADLs and functional mobility.   Plan   Treatment Interventions ADL retraining;Functional transfer training;Endurance training;Patient/family training;Cognitive reorientation;Energy conservation;Activityengagement   Goal Expiration Date 07/19/24   OT Treatment Day 2   OT Frequency 3-5x/wk   Discharge Recommendation   Rehab Resource Intensity Level, OT I (Maximum Resource Intensity)   AM-PAC Daily Activity Inpatient   Lower Body Dressing 1   Bathing 1   Toileting 2   Upper Body Dressing 2   Grooming 2   Eating 3   Daily Activity Raw Score 11   Daily Activity Standardized Score (Calc for Raw Score >=11) 29.04   AM-PAC Applied Cognition Inpatient   Following a Speech/Presentation 2   Understanding Ordinary Conversation 3   Taking Medications 1   Remembering Where Things Are Placed or Put Away 2   Remembering List of 4-5 Errands 1   Taking Care of Complicated Tasks 1   Applied Cognition Raw Score 10   Applied Cognition Standardized Score 24.98   End of Consult   Patient Position at End of Consult Bedside chair;All needs within reach;Bed/Chair alarm activated   Nurse Communication Nurse aware of consult   Tita  Fabrizio OTR/L

## 2024-07-13 NOTE — RESPIRATORY THERAPY NOTE
Resp care   07/13/24 0825   Inhalation Therapy Tx   $ Inhalation Therapy Performed Yes   $ Pulse Oximetry Spot Check Charge Completed   Pre-Treatment Pulse 93   Pre-Treatment Respirations 20   Duration 15   Breath Sounds Pre-Treatment Bilateral Diminished   Breath Sounds Post-Treatment Right Diminished   Post-Treatment Pulse 77   Post-Treatment Respirations 20   Delivery Source Oxygen;UDN   Position High Urias's   Treatment Tolerance Tolerated well   Resp Comments pt found on ra, spo2 is 100%, bs are diminished, udn tx given, will cont to monitor per resp protocol.

## 2024-07-13 NOTE — PROGRESS NOTES
"Progress Note - Hal Miller 84 y.o. male MRN: [id number]    Unit/Bed#: [facility] 620-01 Encounter: [id number]      Assessment:  85 yo M with L inguinal hernia mesh erosion into sigmoid colon s/p ex-lap, mesh explant, washout and debridement of L groin, Angela's.    AVSS   cc  AM labs pending    Plan:  Dysphagia diet  On home meds including Eliquis  Keep Babcock, plan for o/p urology f/u  OOB, PT/OT Level I   Daily wet to dry groin dressing changes  CM dispo planning, pending admission to acute rehab    Subjective:   No acute events overnight. Patient denies having nausea, vomiting, fevers, chills, chest pain, shortness of breath. Tolerating PO intake. Voiding with difficulty. Minimal ostomy output.     Objective:     Vitals: Blood pressure 118/71, pulse 83, temperature 97.9 °F (36.6 °C), resp. rate 18, height 6' 2\" (1.88 m), weight 84 kg (185 lb 3 oz), SpO2 98%.,Body mass index is 23.78 kg/m².      Intake/Output Summary (Last 24 hours) at 7/13/2024 0615  Last data filed at 7/12/2024 1300  Gross per 24 hour   Intake 100 ml   Output 350 ml   Net -250 ml       Physical Exam: General appearance: alert and oriented, in no acute distress  Head: Normocephalic, without obvious abnormality, atraumatic  Chest wall: no tenderness  Abdomen: soft, nontender, nondistended. Midline incision clean and dry with staples in place. Ostomy with liquid output albeit minimal. Wet to dry dressing applied over the left groin.  Male genitalia: normal  Extremities: extremities normal, warm and well-perfused; no cyanosis, clubbing, or edema  Skin: Skin color, texture, turgor normal. No rashes or lesions     Invasive Devices       Peripheral Intravenous Line  Duration             Peripheral IV 07/11/24 Right Forearm 2 days              Drain  Duration             Colostomy Other (comment) LUQ 8 days    Urethral Catheter Latex 16 Fr. 8 days                    Lab, Imaging and other studies: I have personally reviewed pertinent reports.    VTE " Pharmacologic Prophylaxis: Eliquis  VTE Mechanical Prophylaxis: sequential compression device

## 2024-07-13 NOTE — CASE MANAGEMENT
[facility] has received APPROVED authorization.  Insurance:   [facility]  Called in by Rep:Charisseisabela TIRADO#  [phone]  Authorization received for: Acute Rehab  Facility: [facility]  Authorization #:[id number]  Start of Care:7/10  Next Review Date:7/16  Submit next review to: [phone]     Care Manager notified:sonia mendoza     Please reach out to  for updates on any clinical information.

## 2024-07-13 NOTE — PLAN OF CARE
Problem: OCCUPATIONAL THERAPY ADULT  Goal: Performs self-care activities at highest level of function for planned discharge setting.  See evaluation for individualized goals.  Description: Treatment Interventions: ADL retraining, Functional transfer training, Endurance training, Patient/family training, Cognitive reorientation, Energy conservation, Activityengagement          See flowsheet documentation for full assessment, interventions and recommendations.   Note: Limitation: Decreased ADL status, Decreased Safe judgement during ADL, Decreased cognition, Decreased endurance, Decreased sensation, Decreased high-level ADLs  Prognosis: Fair  Assessment: Patient participated in Skilled OT session this date with interventions consisting of self care tasks, EOB sitting/balance, cognitive re-orientation . Patient agreeable to OT treatment session, upon arrival patient was found supine in bed. Patient requiring verbal cues for safety, verbal cues for correct technique, verbal cues for pacing thru activity steps, cognitive assistance to anticipate next step, one step directives, and frequent rest periods. Patient continues to be functioning below baseline level, occupational performance remains limited secondary to factors listed above and increased risk for falls and injury.   From OT standpoint, recommendation at time of d/c would be max level I.  The patient's raw score on the AM-PAC Daily Activity Inpatient Short Form is 11. A raw score of less than 19 suggests the patient may benefit from discharge to post-acute rehabilitation services. Please refer to the recommendation of the occupational Therapist for safe discharge planning.  Patient to benefit from continued Occupational Therapy treatment while in the hospital to address deficits as defined above and maximize level of functional independence with ADLs and functional mobility.     Rehab Resource Intensity Level, OT: I (Maximum Resource Intensity)   Tita  Fabrizio OTR/L

## 2024-07-13 NOTE — CASE MANAGEMENT
Case Management Discharge Planning Note    Patient name Hal Miller  Location [facility]-01 MRN [id number]  : 1940 Date 2024       Current Admission Date: 7/3/2024  Current Admission Diagnosis:Colonic diverticular abscess   Patient Active Problem List    Diagnosis Date Noted Date Diagnosed    Moderate protein-calorie malnutrition (HCC) 2024     Left inguinal hernia 2024     Leukocytosis 2024     Dysphagia 2024     Hx of aortic valve replacement 2024     Diplopia 2024     Peripheral polyneuropathy 2024     Hyponatremia 2024     Dysphoric mood 2024     NICM (nonischemic cardiomyopathy) (Formerly [facility]) 2024     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Formerly [facility]) 2024     Acute on chronic respiratory failure (Formerly [facility]) 2024     Episode of seizure-like activity 2024     History of Whipple procedure 2024     Dizziness 2024     Insomnia 2024     Ambulatory dysfunction 2024     Severe protein-calorie malnutrition (HCC) 2024     Abnormal CT of the abdomen 2024     Zoster 2024     Colonic diverticular abscess 2024     Vascular dementia (HCC) 2024     Type 2 diabetes mellitus, with long-term current use of insulin (HCC) 2024     Hypercalcemia 2023     TARA (acute kidney injury) (Formerly [facility]) 2023     Esophageal stricture 2023     Acute encephalopathy 2023     Generalized weakness 2023     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     Chronic obstructive pulmonary disease with acute exacerbation (HCC) 2022     Stage 3 chronic kidney disease (HCC) 2022     Centrilobular emphysema (HCC) 2021     History of malignant neuroendocrine tumor 2021     Atrial fibrillation (HCC) 2017     Shortness of breath 2017     Primary hypertension 2017     Hyperlipidemia 2015     Inflammatory polyarthropathy (HCC) 2014     Osteoarthrosis  11/12/2014     Polymyalgia rheumatica (HCC) 11/12/2014     Aneurysm of thoracic aorta (HCC) 01/06/2014     Aneurysm of abdominal aorta (HCC) 01/06/2014     Neoplasm of other specified site 01/06/2014       LOS (days): 10  Geometric Mean LOS (GMLOS) (days): 9.8  Days to GMLOS:0.3     OBJECTIVE:  Risk of Unplanned Readmission Score: 36.29         Current admission status: Inpatient   [facility] Pharm[facility]:   [id number] [address] [place] [id number]  Phone: [phone] Fax: [phone]    [facility] [place] [address] [address]  Phone: [phone] Fax: [phone]    [facility] [place] - [address]  [address] [place] [address]  Phone: [phone] Fax: [phone]    Primary Care Provider: Kyaw Monreal MD    Primary Insurance: [employer]  Secondary Insurance:     DISCHARGE DETAILS:                                                                                                               Facility Insurance Auth Number: [id number]

## 2024-07-13 NOTE — PHYSICAL THERAPY NOTE
PHYSICAL THERAPY NOTE      Patient Name: Hal Miller  Today's Date: 24 1030   PT Last Visit   PT Visit Date 24   Note Type   Note Type Treatment for insurance authorization   Restrictions/Precautions   Weight Bearing Precautions Per Order No   Other Precautions Chair Alarm;Cognitive;Bed Alarm;Multiple lines;Fall Risk;Pain;Visual impairment;Hard of hearing  ([place]; Ostomy; Baseline vascular dementia; L visuospatial inattention (?)/ acuity deficit)   General   Chart Reviewed Yes   Response to Previous Treatment Patient with no complaints from previous session.   Family/Caregiver Present No   Cognition   Overall Cognitive Status Impaired   Arousal/Participation Arousable;Cooperative   Attention Difficulty attending to directions   Orientation Level Oriented to person;Disoriented to place;Disoriented to time;Disoriented to situation   Memory Decreased recall of precautions;Decreased recall of recent events;Decreased short term memory;Decreased long term memory;Decreased recall of biographical information   Following Commands Follows one step commands with increased time or repetition   Comments Pt cooperative w/ therapy w/ encouragement and frequent VC for task completion. Lethargic and flat affect. AO to name/ only, perseverates on  and answers  in response to other quetions and attempted re-orientation. Perseverates on certain tasks ie: combing hair and unable to follow commands for other tasks despite repetitive verbal commands   Bed Mobility   Supine to Sit 2  Maximal assistance   Additional items Assist x 2;Increased time required;Verbal cues;LE management;HOB elevated;Bedrails   Sit to Supine Unable to assess   Additional Comments Pt supine in bed upon arrival. Max Ax2 for trunk control and LE progression. Min A for dynamic sitting balance EOB w/ periods of C SUP during static sitting. Pt left  sitting OOB in chair with call bell and all needs following PT session   Transfers   Sit to Stand 2  Maximal assistance   Additional items Assist x 2;Increased time required;Verbal cues   Stand to Sit 2  Maximal assistance   Additional items Assist x 2;Increased time required;Verbal cues   Stand pivot 2  Maximal assistance   Additional items Assist x 2;Increased time required;Verbal cues   Additional Comments Transfers w/ BL HHA and BL knees blocked. Pt completed x2 STS, unable to achieve full stand (50-75%) w/ stooped posture   Ambulation/Elevation   Gait pattern Not appropriate;Not tested   Ambulation/Elevation Additional Comments Attempted to take lateral steps from bed to chair however pt unable to coordinate steps/advance LEs 2/2 poor posture and poor command following   Balance   Static Sitting Fair   Dynamic Sitting Fair -   Static Standing Poor -   Dynamic Standing Poor -   Ambulatory Zero   Endurance Deficit   Endurance Deficit Yes   Endurance Deficit Description Fatigue, deconditioning, generalized weakness, impaired cognition   Activity Tolerance   Activity Tolerance Patient tolerated treatment well;Patient limited by fatigue;Other (Comment)  (impaired cognition)   Medical Staff Made Aware OT Baylee- co treat 2/2 medical complexity and for safe pt handling   Nurse Made Aware RN cleared and updated   Assessment   Prognosis Fair   Problem List Decreased strength;Decreased range of motion;Impaired balance;Decreased endurance;Decreased mobility;Decreased coordination;Decreased cognition;Impaired judgement;Decreased safety awareness;Pain;Impaired vision;Impaired hearing   Assessment Pt seen for PT treatment session this date. Therapy session focused on bed mobility, sitting balance, transfer training and standing tolerance/balance in order to improve overall mobility and independence. Pt requires max Ax2 for bed mobility and transfers. Unable to achieve full stand 2/2 LE weakness and poor posture which limits  ability to take steps for ambulation. Pt requires VC t/o PT session for task completion and repetition of all simple commands w/ limited carryover/follow through (~50%). Pt making slow progress toward goals. Pt was left sitting at the end of PT session with all needs in reach. Pt would benefit from continued PT services while in hospital to address remaining limitations. PT to continue treating pt and recommends acute rehab. The patient's AM-PAC Basic Mobility Inpatient Short Form Raw Score is 7. A Raw score of less than or equal to 16 suggests the patient may benefit from discharge to post-acute rehabilitation services. Please also refer to the recommendation of the Physical Therapist for safe discharge planning.   Barriers to Discharge Inaccessible home environment   Goals   Patient Goals to sit in chair   STG Expiration Date 07/17/24   PT Treatment Day 3   Plan   Treatment/Interventions Functional transfer training;LE strengthening/ROM;Therapeutic exercise;Endurance training;Cognitive reorientation;Patient/family training;Equipment eval/education;Bed mobility;Gait training;Spoke to nursing;Spoke to case management;OT   Progress Progressing toward goals   PT Frequency 3-5x/wk   Discharge Recommendation   Rehab Resource Intensity Level, PT I (Maximum Resource Intensity)   Equipment Recommended Walker   Walker Package Recommended Wheeled walker   AM-PAC Basic Mobility Inpatient   Turning in Flat Bed Without Bedrails 2   Lying on Back to Sitting on Edge of Flat Bed Without Bedrails 1   Moving Bed to Chair 1   Standing Up From Chair Using Arms 1   Walk in Room 1   Climb 3-5 Stairs With Railing 1   Basic Mobility Inpatient Raw Score 7   Turning Head Towards Sound 3   Follow Simple Instructions 3   Low Function Basic Mobility Raw Score  13   Low Function Basic Mobility Standardized Score  20.14   [facility] Highest Level Of Mobility   -HLM Goal 2: Bed activities/Dependent transfer   -HLM Achieved 4: Move to  chair/commode   Education   Education Provided Mobility training   Patient Reinforcement needed   End of Consult   Patient Position at End of Consult Bedside chair;Bed/Chair alarm activated;All needs within reach     Soraya Rodarte PT, DPT

## 2024-07-14 NOTE — PLAN OF CARE
Problem: PAIN - ADULT  Goal: Verbalizes/displays adequate comfort level or baseline comfort level  Description: Interventions:  - Encourage patient to monitor pain and request assistance  - Assess pain using appropriate pain scale  - Administer analgesics based on type and severity of pain and evaluate response  - Implement non-pharmacological measures as appropriate and evaluate response  - Consider cultural and social influences on pain and pain management  - Notify physician/advanced practitioner if interventions unsuccessful or patient reports new pain  Outcome: Progressing     Problem: INFECTION - ADULT  Goal: Absence or prevention of progression during hospitalization  Description: INTERVENTIONS:  - Assess and monitor for signs and symptoms of infection  - Monitor lab/diagnostic results  - Monitor all insertion sites, i.e. indwelling lines, tubes, and drains  - Monitor endotracheal if appropriate and nasal secretions for changes in amount and color  - [place] appropriate cooling/warming therapies per order  - Administer medications as ordered  - Instruct and encourage patient and family to use good hand hygiene technique  - Identify and instruct in appropriate isolation precautions for identified infection/condition  Outcome: Progressing     Problem: SAFETY ADULT  Goal: Patient will remain free of falls  Description: INTERVENTIONS:  - Educate patient/family on patient safety including physical limitations  - Instruct patient to call for assistance with activity   - Consult OT/PT to assist with strengthening/mobility   - Keep Call bell within reach  - Keep bed low and locked with side rails adjusted as appropriate  - Keep care items and personal belongings within reach  - Initiate and maintain comfort rounds  - Make Fall Risk Sign visible to staff  - Offer Toileting   - Apply yellow socks and bracelet for high fall risk patients  - Consider moving patient to room near nurses station  Outcome:  Progressing  Goal: Maintain or return to baseline ADL function  Description: INTERVENTIONS:  -  Assess patient's ability to carry out ADLs; assess patient's baseline for ADL function and identify physical deficits which impact ability to perform ADLs (bathing, care of mouth/teeth, toileting, grooming, dressing, etc.)  - Assess/evaluate cause of self-care deficits   - Assess range of motion  - Assess patient's mobility; develop plan if impaired  - Assess patient's need for assistive devices and provide as appropriate  - Encourage maximum independence but intervene and supervise when necessary  - Involve family in performance of ADLs  - Assess for home care needs following discharge   - Consider OT consult to assist with ADL evaluation and planning for discharge  - Provide patient education as appropriate  Outcome: Progressing  Goal: Maintains/Returns to pre admission functional level  Description: INTERVENTIONS:  - Perform AM-PAC 6 Click Basic Mobility/ Daily Activity assessment daily.  - Set and communicate daily mobility goal to care team and patient/family/caregiver.   - Collaborate with rehabilitation services on mobility goals if consulted  - Reposition patient   - Out of bed for toileting  - Record patient progress and toleration of activity level   Outcome: Progressing     Problem: DISCHARGE PLANNING  Goal: Discharge to home or other facility with appropriate resources  Description: INTERVENTIONS:  - Identify barriers to discharge w/patient and caregiver  - Arrange for needed discharge resources and transportation as appropriate  - Identify discharge learning needs (meds, wound care, etc.)  - Arrange for interpretive services to assist at discharge as needed  - Refer to Case Management Department for coordinating discharge planning if the patient needs post-hospital services based on physician/advanced practitioner order or complex needs related to functional status, cognitive ability, or social support  system  Outcome: Progressing     Problem: Knowledge Deficit  Goal: Patient/family/caregiver demonstrates understanding of disease process, treatment plan, medications, and discharge instructions  Description: Complete learning assessment and assess knowledge base.  Interventions:  - Provide teaching at level of understanding  - Provide teaching via preferred learning methods  Outcome: Progressing     Problem: Prexisting or High Potential for Compromised Skin Integrity  Goal: Skin integrity is maintained or improved  Description: INTERVENTIONS:  - Identify patients at risk for skin breakdown  - Assess and monitor skin integrity  - Assess and monitor nutrition and hydration status  - Monitor labs   - Assess for incontinence   - Turn and reposition patient  - Assist with mobility/ambulation  - Relieve pressure over bony prominences  - Avoid friction and shearing  - Provide appropriate hygiene as needed including keeping skin clean and dry  - Evaluate need for skin moisturizer/barrier cream  - Collaborate with interdisciplinary team   - Patient/family teaching  - Consider wound care consult   Outcome: Progressing     Problem: Nutrition/Hydration-ADULT  Goal: Nutrient/Hydration intake appropriate for improving, restoring or maintaining nutritional needs  Description: Monitor and assess patient's nutrition/hydration status for malnutrition. Collaborate with interdisciplinary team and initiate plan and interventions as ordered.  Monitor patient's weight and dietary intake as ordered or per policy. Utilize nutrition screening tool and intervene as necessary. Determine patient's food preferences and provide high-protein, high-caloric foods as appropriate.     INTERVENTIONS:  - Monitor oral intake, urinary output, labs, and treatment plans  - Assess nutrition and hydration status and recommend course of action  - Evaluate amount of meals eaten  - Assist patient with eating if necessary   - Allow adequate time for meals  -  Recommend/ encourage appropriate diets, oral nutritional supplements, and vitamin/mineral supplements  - Order, calculate, and assess calorie counts as needed  - Recommend, monitor, and adjust tube feedings and TPN/PPN based on assessed needs  - Assess need for intravenous fluids  - Provide specific nutrition/hydration education as appropriate  - Include patient/family/caregiver in decisions related to nutrition  Outcome: Progressing     Problem: RESPIRATORY - ADULT  Goal: Achieves optimal ventilation and oxygenation  Description: INTERVENTIONS:  - Assess for changes in respiratory status  - Assess for changes in mentation and behavior  - Position to facilitate oxygenation and minimize respiratory effort  - Oxygen administered by appropriate delivery if ordered  - Initiate smoking cessation education as indicated  - Encourage broncho-pulmonary hygiene including cough, deep breathe, Incentive Spirometry  - Assess the need for suctioning and aspirate as needed  - Assess and instruct to report SOB or any respiratory difficulty  - Respiratory Therapy support as indicated  Outcome: Progressing     Problem: GASTROINTESTINAL - ADULT  Goal: Minimal or absence of nausea and/or vomiting  Description: INTERVENTIONS:  - Administer IV fluids if ordered to ensure adequate hydration  - Maintain NPO status until nausea and vomiting are resolved  - Nasogastric tube if ordered  - Administer ordered antiemetic medications as needed  - Provide nonpharmacologic comfort measures as appropriate  - Advance diet as tolerated, if ordered  - Consider nutrition services referral to assist patient with adequate nutrition and appropriate food choices  Outcome: Progressing  Goal: Maintains or returns to baseline bowel function  Description: INTERVENTIONS:  - Assess bowel function  - Encourage oral fluids to ensure adequate hydration  - Administer IV fluids if ordered to ensure adequate hydration  - Administer ordered medications as needed  -  Encourage mobilization and activity  - Consider nutritional services referral to assist patient with adequate nutrition and appropriate food choices  Outcome: Progressing  Goal: Maintains adequate nutritional intake  Description: INTERVENTIONS:  - Monitor percentage of each meal consumed  - Identify factors contributing to decreased intake, treat as appropriate  - Assist with meals as needed  - Monitor I&O, weight, and lab values if indicated  - Obtain nutrition services referral as needed  Outcome: Progressing  Goal: Establish and maintain optimal ostomy function  Description: INTERVENTIONS:  - Assess bowel function  - Encourage oral fluids to ensure adequate hydration  - Administer IV fluids if ordered to ensure adequate hydration   - Administer ordered medications as needed  - Encourage mobilization and activity  - Nutrition services referral to assist patient with appropriate food choices  - Assess stoma site  - Consider wound care consult   Outcome: Progressing  Goal: Oral mucous membranes remain intact  Description: INTERVENTIONS  - Assess oral mucosa and hygiene practices  - Implement preventative oral hygiene regimen  - Implement oral medicated treatments as ordered  - Initiate Nutrition services referral as needed  Outcome: Progressing     Problem: GENITOURINARY - ADULT  Goal: Maintains or returns to baseline urinary function  Description: INTERVENTIONS:  - Assess urinary function  - Encourage oral fluids to ensure adequate hydration if ordered  - Administer IV fluids as ordered to ensure adequate hydration  - Administer ordered medications as needed  - Offer frequent toileting  - Follow urinary retention protocol if ordered  Outcome: Progressing  Goal: Absence of urinary retention  Description: INTERVENTIONS:  - Assess patient’s ability to void and empty bladder  - Monitor I/O  - Bladder scan as needed  - Discuss with physician/AP medications to alleviate retention as needed  - Discuss catheterization for  long term situations as appropriate  Outcome: Progressing  Goal: Urinary catheter remains patent  Description: INTERVENTIONS:  - Assess patency of urinary catheter  - If patient has a chronic cardenas, consider changing catheter if non-functioning  - Follow guidelines for intermittent irrigation of non-functioning urinary catheter  Outcome: Progressing

## 2024-07-14 NOTE — NUTRITION
07/14/24 [id number]   Recommendations/Interventions   Intervention Comments Results of Day 1 calorie count from 7/13/24 in 3 meals was 997 kcal with 55gms Pro. including 1 Gelatein and 1 Magic cup. meeting 45% kcal and 62% Pro needs. Spoke with wife at bedside. Patient likes strawberry Glucerna - added TID and adjusted Magic cup to BID; Gelatein to TID not 4x daily. Did better yesterday per wife versus less po intake today. RD follow up to continue. Per RN patient is for VBS tomorrow. Some issues with liquids at this time. Patient is fed by nursing

## 2024-07-14 NOTE — PROGRESS NOTES
"Progress Note - General Surgery   Hal Miller 84 y.o. male MRN: [id number]  Unit/Bed#: [facility] 620-01 Encounter: [id number]    Assessment:  85 yo M with L inguinal hernia mesh erosion into sigmoid colon s/p ex-lap, mesh explant, washout and debridement of L groin, Angela's.     Plan:  Dysphagia diet  Calorie count  Bowel reg: sennakot bid   F/u VBS as per speech  On home meds including Eliquis  Keep Babcock, plan for o/p urology f/u  OOB, PT/OT Level I   Daily wet to dry groin dressing changes  CM dispo planning, pending admission to acute rehab     Subjective:   Subjective/Objective   Subjective: No acute events overnight. Patient denies having nausea, vomiting, fevers, chills, chest pain, shortness of breath. Tolerating PO intake. Voiding with difficulty. Minimal ostomy output. Patient has developed cough.       Objective:     Blood pressure 124/70, pulse 70, temperature 97.5 °F (36.4 °C), temperature source Oral, resp. rate 18, height 6' 2\" (1.88 m), weight 84 kg (185 lb 3 oz), SpO2 96%.,Body mass index is 23.78 kg/m².      Intake/Output Summary (Last 24 hours) at 7/14/2024 0352  Last data filed at 7/13/2024 1801  Gross per 24 hour   Intake 120 ml   Output 350 ml   Net -230 ml       Invasive Devices       Peripheral Intravenous Line  Duration             Peripheral IV 07/11/24 Right Forearm 2 days              Drain  Duration             Colostomy Other (comment) LUQ 9 days    Urethral Catheter Latex 16 Fr. 9 days                  UOP: 350mL  Colostomy: 0mL output     Physical Exam:     General appearance: alert and oriented, in no acute distress  Head: Normocephalic, without obvious abnormality, atraumatic  Chest wall: no tenderness  Abdomen: soft, nontender, nondistended. Midline incision clean and dry with staples in place. Ostomy with liquid output albeit minimal. Wet to dry dressing applied over the left groin.  Skin: warm, dry    Lab, Imaging and other studies:CBC:   Lab Results   Component Value Date    WBC " 9.61 07/13/2024    HGB 7.9 (L) 07/13/2024    HCT 25.5 (L) 07/13/2024     (H) 07/13/2024     07/13/2024    RBC 2.49 (L) 07/13/2024    MCH 31.7 07/13/2024    MCHC 31.0 (L) 07/13/2024    RDW 15.7 (H) 07/13/2024    MPV 9.6 07/13/2024   , CMP:   Lab Results   Component Value Date    SODIUM 144 07/13/2024    K 4.3 07/13/2024     07/13/2024    CO2 34 (H) 07/13/2024    BUN 31 (H) 07/13/2024    CREATININE 1.12 07/13/2024    CALCIUM 10.2 07/13/2024    EGFR 60 07/13/2024     VTE Mechanical Prophylaxis: sequential compression device    Kenya Norris MD  Surgery PGY-1

## 2024-07-14 NOTE — PLAN OF CARE
Problem: PAIN - ADULT  Goal: Verbalizes/displays adequate comfort level or baseline comfort level  Description: Interventions:  - Encourage patient to monitor pain and request assistance  - Assess pain using appropriate pain scale  - Administer analgesics based on type and severity of pain and evaluate response  - Implement non-pharmacological measures as appropriate and evaluate response  - Consider cultural and social influences on pain and pain management  - Notify physician/advanced practitioner if interventions unsuccessful or patient reports new pain  Outcome: Progressing     Problem: INFECTION - ADULT  Goal: Absence or prevention of progression during hospitalization  Description: INTERVENTIONS:  - Assess and monitor for signs and symptoms of infection  - Monitor lab/diagnostic results  - Monitor all insertion sites, i.e. indwelling lines, tubes, and drains  - Monitor endotracheal if appropriate and nasal secretions for changes in amount and color  - [place] appropriate cooling/warming therapies per order  - Administer medications as ordered  - Instruct and encourage patient and family to use good hand hygiene technique  - Identify and instruct in appropriate isolation precautions for identified infection/condition  Outcome: Progressing     Problem: SAFETY ADULT  Goal: Patient will remain free of falls  Description: INTERVENTIONS:  - Educate patient/family on patient safety including physical limitations  - Instruct patient to call for assistance with activity   - Consult OT/PT to assist with strengthening/mobility   - Keep Call bell within reach  - Keep bed low and locked with side rails adjusted as appropriate  - Keep care items and personal belongings within reach  - Initiate and maintain comfort rounds  - Make Fall Risk Sign visible to staff  - Offer Toileting every 2 Hours, in advance of need  - Initiate/Maintain bed alarm  - Obtain necessary fall risk management equipment:    - Apply yellow socks  and bracelet for high fall risk patients  - Consider moving patient to room near nurses station  Outcome: Progressing  Goal: Maintain or return to baseline ADL function  Description: INTERVENTIONS:  -  Assess patient's ability to carry out ADLs; assess patient's baseline for ADL function and identify physical deficits which impact ability to perform ADLs (bathing, care of mouth/teeth, toileting, grooming, dressing, etc.)  - Assess/evaluate cause of self-care deficits   - Assess range of motion  - Assess patient's mobility; develop plan if impaired  - Assess patient's need for assistive devices and provide as appropriate  - Encourage maximum independence but intervene and supervise when necessary  - Involve family in performance of ADLs  - Assess for home care needs following discharge   - Consider OT consult to assist with ADL evaluation and planning for discharge  - Provide patient education as appropriate  Outcome: Progressing  Goal: Maintains/Returns to pre admission functional level  Description: INTERVENTIONS:  - Perform AM-PAC 6 Click Basic Mobility/ Daily Activity assessment daily.  - Set and communicate daily mobility goal to care team and patient/family/caregiver.   - Collaborate with rehabilitation services on mobility goals if consulted  - Perform Range of Motion 3 times a day.  - Reposition patient every 2 hours.  - Dangle patient 3 times a day  - Stand patient 3 times a day  - Ambulate patient 3 times a day  - Out of bed to chair 3 times a day   - Out of bed for meals 3 times a day  - Out of bed for toileting  - Record patient progress and toleration of activity level   Outcome: Progressing     Problem: DISCHARGE PLANNING  Goal: Discharge to home or other facility with appropriate resources  Description: INTERVENTIONS:  - Identify barriers to discharge w/patient and caregiver  - Arrange for needed discharge resources and transportation as appropriate  - Identify discharge learning needs (meds, wound  care, etc.)  - Arrange for interpretive services to assist at discharge as needed  - Refer to Case Management Department for coordinating discharge planning if the patient needs post-hospital services based on physician/advanced practitioner order or complex needs related to functional status, cognitive ability, or social support system  Outcome: Progressing     Problem: Knowledge Deficit  Goal: Patient/family/caregiver demonstrates understanding of disease process, treatment plan, medications, and discharge instructions  Description: Complete learning assessment and assess knowledge base.  Interventions:  - Provide teaching at level of understanding  - Provide teaching via preferred learning methods  Outcome: Progressing     Problem: Prexisting or High Potential for Compromised Skin Integrity  Goal: Skin integrity is maintained or improved  Description: INTERVENTIONS:  - Identify patients at risk for skin breakdown  - Assess and monitor skin integrity  - Assess and monitor nutrition and hydration status  - Monitor labs   - Assess for incontinence   - Turn and reposition patient  - Assist with mobility/ambulation  - Relieve pressure over bony prominences  - Avoid friction and shearing  - Provide appropriate hygiene as needed including keeping skin clean and dry  - Evaluate need for skin moisturizer/barrier cream  - Collaborate with interdisciplinary team   - Patient/family teaching  - Consider wound care consult   Outcome: Progressing     Problem: Nutrition/Hydration-ADULT  Goal: Nutrient/Hydration intake appropriate for improving, restoring or maintaining nutritional needs  Description: Monitor and assess patient's nutrition/hydration status for malnutrition. Collaborate with interdisciplinary team and initiate plan and interventions as ordered.  Monitor patient's weight and dietary intake as ordered or per policy. Utilize nutrition screening tool and intervene as necessary. Determine patient's food preferences and  provide high-protein, high-caloric foods as appropriate.     INTERVENTIONS:  - Monitor oral intake, urinary output, labs, and treatment plans  - Assess nutrition and hydration status and recommend course of action  - Evaluate amount of meals eaten  - Assist patient with eating if necessary   - Allow adequate time for meals  - Recommend/ encourage appropriate diets, oral nutritional supplements, and vitamin/mineral supplements  - Order, calculate, and assess calorie counts as needed  - Recommend, monitor, and adjust tube feedings and TPN/PPN based on assessed needs  - Assess need for intravenous fluids  - Provide specific nutrition/hydration education as appropriate  - Include patient/family/caregiver in decisions related to nutrition  Outcome: Progressing     Problem: RESPIRATORY - ADULT  Goal: Achieves optimal ventilation and oxygenation  Description: INTERVENTIONS:  - Assess for changes in respiratory status  - Assess for changes in mentation and behavior  - Position to facilitate oxygenation and minimize respiratory effort  - Oxygen administered by appropriate delivery if ordered  - Initiate smoking cessation education as indicated  - Encourage broncho-pulmonary hygiene including cough, deep breathe, Incentive Spirometry  - Assess the need for suctioning and aspirate as needed  - Assess and instruct to report SOB or any respiratory difficulty  - Respiratory Therapy support as indicated  Outcome: Progressing     Problem: GASTROINTESTINAL - ADULT  Goal: Minimal or absence of nausea and/or vomiting  Description: INTERVENTIONS:  - Administer IV fluids if ordered to ensure adequate hydration  - Maintain NPO status until nausea and vomiting are resolved  - Nasogastric tube if ordered  - Administer ordered antiemetic medications as needed  - Provide nonpharmacologic comfort measures as appropriate  - Advance diet as tolerated, if ordered  - Consider nutrition services referral to assist patient with adequate nutrition  and appropriate food choices  Outcome: Progressing  Goal: Maintains or returns to baseline bowel function  Description: INTERVENTIONS:  - Assess bowel function  - Encourage oral fluids to ensure adequate hydration  - Administer IV fluids if ordered to ensure adequate hydration  - Administer ordered medications as needed  - Encourage mobilization and activity  - Consider nutritional services referral to assist patient with adequate nutrition and appropriate food choices  Outcome: Progressing  Goal: Maintains adequate nutritional intake  Description: INTERVENTIONS:  - Monitor percentage of each meal consumed  - Identify factors contributing to decreased intake, treat as appropriate  - Assist with meals as needed  - Monitor I&O, weight, and lab values if indicated  - Obtain nutrition services referral as needed  Outcome: Progressing  Goal: Establish and maintain optimal ostomy function  Description: INTERVENTIONS:  - Assess bowel function  - Encourage oral fluids to ensure adequate hydration  - Administer IV fluids if ordered to ensure adequate hydration   - Administer ordered medications as needed  - Encourage mobilization and activity  - Nutrition services referral to assist patient with appropriate food choices  - Assess stoma site  - Consider wound care consult   Outcome: Progressing  Goal: Oral mucous membranes remain intact  Description: INTERVENTIONS  - Assess oral mucosa and hygiene practices  - Implement preventative oral hygiene regimen  - Implement oral medicated treatments as ordered  - Initiate Nutrition services referral as needed  Outcome: Progressing     Problem: GENITOURINARY - ADULT  Goal: Maintains or returns to baseline urinary function  Description: INTERVENTIONS:  - Assess urinary function  - Encourage oral fluids to ensure adequate hydration if ordered  - Administer IV fluids as ordered to ensure adequate hydration  - Administer ordered medications as needed  - Offer frequent toileting  - Follow  urinary retention protocol if ordered  Outcome: Progressing  Goal: Absence of urinary retention  Description: INTERVENTIONS:  - Assess patient’s ability to void and empty bladder  - Monitor I/O  - Bladder scan as needed  - Discuss with physician/AP medications to alleviate retention as needed  - Discuss catheterization for long term situations as appropriate  Outcome: Progressing  Goal: Urinary catheter remains patent  Description: INTERVENTIONS:  - Assess patency of urinary catheter  - If patient has a chronic cardenas, consider changing catheter if non-functioning  - Follow guidelines for intermittent irrigation of non-functioning urinary catheter  Outcome: Progressing     Problem: METABOLIC, FLUID AND ELECTROLYTES - ADULT  Goal: Glucose maintained within target range  Description: INTERVENTIONS:  - Monitor Blood Glucose as ordered  - Assess for signs and symptoms of hyperglycemia and hypoglycemia  - Administer ordered medications to maintain glucose within target range  - Assess nutritional intake and initiate nutrition service referral as needed  Outcome: Progressing

## 2024-07-15 NOTE — PROGRESS NOTES
Nutrition Assessment    Day 2 calorie count results showing 945kcals (43% low end of est kcal needs), 59g protein (67% low end of est protein needs). Spoke with pt's wife who reports pt with poor po intakes, dislikes prosource gelatein but has been eating supplement per calorie count. Seen by SLP today with recommendations to continue with current diet. Pt enjoys chocolate ice cream, will adjust magic cup to TID.

## 2024-07-15 NOTE — PLAN OF CARE
Problem: PAIN - ADULT  Goal: Verbalizes/displays adequate comfort level or baseline comfort level  Description: Interventions:  - Encourage patient to monitor pain and request assistance  - Assess pain using appropriate pain scale  - Administer analgesics based on type and severity of pain and evaluate response  - Implement non-pharmacological measures as appropriate and evaluate response  - Consider cultural and social influences on pain and pain management  - Notify physician/advanced practitioner if interventions unsuccessful or patient reports new pain  Outcome: Progressing     Problem: INFECTION - ADULT  Goal: Absence or prevention of progression during hospitalization  Description: INTERVENTIONS:  - Assess and monitor for signs and symptoms of infection  - Monitor lab/diagnostic results  - Monitor all insertion sites, i.e. indwelling lines, tubes, and drains  - Monitor endotracheal if appropriate and nasal secretions for changes in amount and color  - [place] appropriate cooling/warming therapies per order  - Administer medications as ordered  - Instruct and encourage patient and family to use good hand hygiene technique  - Identify and instruct in appropriate isolation precautions for identified infection/condition  Outcome: Progressing     Problem: Nutrition/Hydration-ADULT  Goal: Nutrient/Hydration intake appropriate for improving, restoring or maintaining nutritional needs  Description: Monitor and assess patient's nutrition/hydration status for malnutrition. Collaborate with interdisciplinary team and initiate plan and interventions as ordered.  Monitor patient's weight and dietary intake as ordered or per policy. Utilize nutrition screening tool and intervene as necessary. Determine patient's food preferences and provide high-protein, high-caloric foods as appropriate.     INTERVENTIONS:  - Monitor oral intake, urinary output, labs, and treatment plans  - Assess nutrition and hydration status and  recommend course of action  - Evaluate amount of meals eaten  - Assist patient with eating if necessary   - Allow adequate time for meals  - Recommend/ encourage appropriate diets, oral nutritional supplements, and vitamin/mineral supplements  - Order, calculate, and assess calorie counts as needed  - Recommend, monitor, and adjust tube feedings and TPN/PPN based on assessed needs  - Assess need for intravenous fluids  - Provide specific nutrition/hydration education as appropriate  - Include patient/family/caregiver in decisions related to nutrition  Outcome: Progressing     Problem: GENITOURINARY - ADULT  Goal: Maintains or returns to baseline urinary function  Description: INTERVENTIONS:  - Assess urinary function  - Encourage oral fluids to ensure adequate hydration if ordered  - Administer IV fluids as ordered to ensure adequate hydration  - Administer ordered medications as needed  - Offer frequent toileting  - Follow urinary retention protocol if ordered  Outcome: Progressing

## 2024-07-15 NOTE — PROGRESS NOTES
"Progress Note - Hal Miller 84 y.o. male MRN: [id number]    Unit/Bed#: [facility] 620-01 Encounter: [id number]      Assessment:  85 yo M with L inguinal hernia mesh erosion into sigmoid colon s/p ex-lap, mesh explant, washout and debridement of L groin, Angela's.      Plan:  Rocephin IV x5 days, started   Dysphagia diet  Calorie count  Bowel reg: sennakot bid   F/u VBS as per speech  On home meds including Eliquis  Keep Babcock, plan for o/p urology f/u  OOB, PT/OT Level I   Daily wet to dry groin dressing changes  CM dispo planning, pending admission to acute rehab    Subjective:   Patient seen and examined at bedside today. Patient intermittently responding to questions, is oriented to self but says \"I am not who you think I am\". Unable to provide . When asked about pain, denies abdominal pain or nausea.     Objective:     Vitals: Blood pressure 140/74, pulse 85, temperature (!) 97.1 °F (36.2 °C), resp. rate 18, height 6' 2\" (1.88 m), weight 80.7 kg (177 lb 14.6 oz), SpO2 98%.,Body mass index is 22.84 kg/m².      Intake/Output Summary (Last 24 hours) at 7/15/2024 0633  Last data filed at 2024 1812  Gross per 24 hour   Intake 110 ml   Output 350 ml   Net -240 ml       Physical Exam: /74   Pulse 85   Temp (!) 97.1 °F (36.2 °C)   Resp 18   Ht 6' 2\" (1.88 m)   Wt 80.7 kg (177 lb 14.6 oz)   SpO2 98%   BMI 22.84 kg/m²   General appearance: delirious and slowed mentation  Head: Normocephalic, without obvious abnormality, atraumatic  Chest wall: no tenderness  Abdomen: soft, non-tender; bowel sounds normal; no masses,  no organomegaly  Extremities: extremities normal, warm and well-perfused; no cyanosis, clubbing, or edema  Skin: Skin color, texture, turgor normal. No rashes or lesions  Neurologic: Mental status: alertness: lethargic, orientation: person     Invasive Devices       Peripheral Intravenous Line  Duration             Peripheral IV 24 Right Forearm 4 days    Peripheral IV 24 " Dorsal (posterior);Left Forearm <1 day              Drain  Duration             Colostomy Other (comment) LUQ 10 days    Urethral Catheter Latex 16 Fr. 10 days                    Lab, Imaging and other studies: I have personally reviewed pertinent reports.    VTE Pharmacologic Prophylaxis: Eliquis  VTE Mechanical Prophylaxis: sequential compression device     Hina Draper, MS-4  [facility]

## 2024-07-15 NOTE — SPEECH THERAPY NOTE
Speech Language/Pathology    Speech/Language Pathology Progress Note    Patient Name: Hal Miller  Today's Date: 7/15/2024     Problem List  Principal Problem:    Colonic diverticular abscess  Active Problems:    Moderate protein-calorie malnutrition (HCC)       Past Medical History  Past Medical History:   Diagnosis Date    Acute encephalopathy 2/14/2023    Acute-on-chronic kidney injury  (HCC) 6/13/2017    Cancer (HCC)     pancreatic    Colon polyp     Coronary artery disease     Dehydration 3/3/2023    Diabetes mellitus (HCC)     Hyperlipidemia     Hypertension     Left lower lobe pneumonia 7/2/2022    Pneumonia 06/13/2017    Right middle and lower lobe     Stroke (HCC)         Past Surgical History  Past Surgical History:   Procedure Laterality Date    APPENDECTOMY      CARDIAC SURGERY      Aortic Valve Replacement, Ascending Aorta    COLONOSCOPY      GALLBLADDER SURGERY      HARTMANS PROCEDURE N/A 7/4/2024    Procedure: HARTMANS PROCEDURE, WITH EXPLANTATION OF LEFT GROIN MESH, WASHOUT AND DEBRIDEMENT OF LEFT GROIN WOUND;  Surgeon: Charlie Carroll MD;  Location: [facility];  Service: General    PANCREATICODUODENECTOMY           Subjective:  Pt seen for dysphagia tx. Pt awake, appears fatigued. He was positioned fully upright in bed. Family at bedside.     Objective:  Reviewed chart, including recent VBS. Discussed with RN; pt has had reduced PO intake. Dietician is following pt, and completing a calorie count. Pt had a congested cough prior to eating. Family reports he has been coughing often, and is not worse with eating. Pt was fed a portion of his lunch, including bites of finely chopped chicken with gravy, mashed potatoes, yogurt, Magic Cup, and thin liquids via straw. Bolus retrieval was adequate. Mastication of the chicken was prompt. Bolus formation and transfer appeared adequate. Hyolaryngeal elevation/pharyngeal swallows were observed. There was intermittent congested cough during the meal, not  timed with a certain food or consistency. After pt was able to cough up and expectorate mucous, cough diminished. SpO2 remained stable in the mid 90s on NC.    Reviewed results of VBS, current diet, aspiration precautions with pt and family. All verbalized understanding. Discussed with nurse after session.     Assessment:  Pt appears to be tolerating current diet, but reduced appetite. There was intermittent cough before, during and after PO trials, but cough diminished after pt was able to cough and expectorate mucous. VBS was negative for aspiration, but noted elevated risk.     Plan/Recommendations:  Continue current diet. Aspiration precautions. Continue with dysphagia tx.

## 2024-07-15 NOTE — WOUND OSTOMY CARE
"Progress Note- Ostomy  Hal Miller 84 y.o. male  [id number]  [facility] 620-[facility] 620-01    Assessment:  Wife/family seen today for ostomy teaching. Patient remains inappropriate for teaching due to mental status- he slept for entire ostomy teaching session. Wife/family actively watched and listened to entire ostomy teaching session. Wife was asked if she wanted to be hands-on with care- she declined and requested to watch session. They asked appropriate questions throughout. All questions and concerns answered at this time.       Topics reviewed with wife: normal & abdormal stoma appearance, how and when to empty the pouch (1/3- 1/2 full) to prevent pulling/lifting of the barrier, frequency of changing of the pouch (every 3-4 days on a schedule), burping, one piece/two piece pouching systems differences, purpose of sift convexity pouches, nicholas-stomal skin care, how to measure the stoma/ cut the wafer to the correct size, how to close the pouch, and how to obtain supplies.     Step-by-step pouch change done using a coloplast flat one piece pouch. Reviewed with patient how and when to measure the stoma (weekly). Demonstrated how to  cut one piece barrier, and how to apply it to the skin using gentle pressure / warmth of the hands.  Good seal obtained.    Wife and family verbalized understanding of education and teaching. All questions and concerns answered. Encouraged wife and family to continue to read the educational materials provided - \"Life after colostomy Surgery\" by [facility].     Stoma appearance:     Stoma: Red, round, flush stoma with center os, peristomal skin is intact. Stoma attached to skin junction, no separation noted. Small amount of brown soft stool noted in bag and scant amount of sanguinous drainage noted in pouch. STOMA MEASUREMENT: 1.5 inches      Ostomy Care:  -Stoma Measurement: 1.5 inches (Measure stoma weekly for next 6-8 weeks- stoma will decrease in size due to decrease in swelling)    " "  -Appliance Used During Bag Change: Coloplast Sensura Edouard One Piece Flat Pouch Cut to Fit Pouch      *Can shower with pouch on or off. Make sure to dry off pouch after shower.      Bag Change Steps:  1. Peel back pouch using push-pull method, may use non-alcohol adhesive remover. Remove pouch from top to bottom.   2. Use warm water only to cleanse skin around the stoma (nicholas-stomal skin).   3. Make sure all adhesive residue is removed and skin is dry and not oily.  4. Measure stoma size using measuring guide and trace correct measurements onto back of pouch.  5. Then cut or mold the backing of pouch out to correct shape/size.  6. Place pouch over stoma and onto skin.  7. Use warmth of hand to apply gentle pressure to help backing of pouch to adhere well to skin.     (Coloplast Pouches do not require use of Skin Prep Prior to Application. If using other brands of pouches, refer to product guide to determine the need for use of skin prep prior to the application of their pouches.)         **If the skin is open, moist or fragile, Crusting can be done prior to pouch application (before step 6) to create dry skin and assist with pouch adherence- steps are listed below.        TIPS:  Empty pouch when 1/3 -1/2 full.  Change pouch every 4-5 days or if signs of leaking.     Crusting as needed for moist, red, open skin around the stoma: (Done prior to pouch application)   Apply stoma powder to excoriation, move excess powder away with hand  Use no sting barrier to pat area to form \"crust\"  Repeat X2 before placing new ostomy pouch      Orders listed above and wound care will continue to follow, call or Secure Chat Message with questions. Bedside nurse updated of findings and orders. Flowsheets updated with exam details and measurements.      Susan Florence RN, BSN, CWOCN   "

## 2024-07-15 NOTE — PROGRESS NOTES
Progress Note - Geriatric Medicine   Hal Miller 84 y.o. male MRN: [id number]  Unit/Bed#: [facility] 620-01 Encounter: [id number]      Assessment/Plan:    Acute metabolic encephalopathy   -mentation continues to wax and wane, wife at bedside feels pt is giving up   -multifactorial including advanced age, underlying dementia, hx encephalopathy during prior admissions, perforated diverticulitis now s/p surgical procedure, anesthesia, multiple setting changes and acute pain in frail elderly individual   -continue supportive cares, encourage normal sleep cycle, use of sensory assist devices (glasses/hearing aids)  -continue to ensure acute pain well controlled, cont barbie pain protocol   -monitor for fecal and urinary retention - cardenas in place, LUQ colostomy    -reorient frequently as appropriate and indicated  -appreciate family at bedside for familiarity/reassurance /engagement and social support   -now with reports of not sleeping well at night and poor oral intake, could consider low dose mirtazapine 7.5mg HS for sleep and appetite support with low threshold to d/c if becomes too sedate      Perforated diverticulitis with abscess  -s/p Hartmans procedure with explantation of left groin mesh with washout and debridement of left groin wound on 7/4/24 now with wound vac in place  -s/p completion course of zosyn  -continue acute multimodal pain control per geriatric pain protocol, taper as pain improves with healing      Anemia  -Hb 7.5 from 10.7 on 7/3/24  -likely multifactorial including dilutional, daily labs etc  -monitor for ongoing acute blood loss and tx as indicated      Dysphagia  -speech therapy on consult   -s/p VBS 7/7/24  -currently on dysphagia level 2 with thin liquids   -continue strict aspiration precautions  -cont working with speech therapy   -encourage good oral hygiene and cares   -assist with meals and encourage family to assist with ordering food pt enjoys      Vascular dementia  -acutely  encephalopathic on initial consultation, mentation continues to wax and wane to some degree   -at baseline alert and oriented, forgetful, independent with ADLs requires some assist with iADLs   -MoCA 23/30 (11/2022), noted to have episodes of encephalopathy and decline since last testing, recommend repeat following recovery from acute illness to establish new baseline   -CTH 6/18/24 imaging personally viewed, reveals at least moderate chronic microangiopathic changes   -TSH WNL at 1.79, B12 WNL at 531  -at risk age and cardiovascular related acceleration santy in setting of recent CVA, continue secondary risk factor modifications   -encourage patient remain physically, socially and cognitively active and engaged to maintain cognitive acuity   -encourage use sensory assist devices such as corrective lenses and hearing aids all appropriate times to reduce risk uncorrected sensroy impairment from contributing to isolation, confusion, worsening encephalopathy and more precipitous cognitive decline   -underlying dementia increases risk developing delirium and likely contributed to episode during current admission, cont strict precautions to reduce risk recurrence     Hx CVA  -small left parietal infarct during recent hospitalization on MRI brain 6/20/24  -suspected to be due to missed dosing of Eliquis at time of event  -remains at risk future CVAs, continue strict secondary risk factor modifications  -close o/p f/u with Neurology for ongoing monitoring and management      History of possible seizure  -maintained on Depatkote as managed by Neurology, continue current dosing   -cont strict seizure precautions      Insomnia   -previously on Seroquel PRN as o/p, has not recently been requiring during admission, no indication to resume at this time as pt not agitated and Seroquel may lower seizure threshold   -consider low dose melatonin HS PRN  -encourage establishment of consistent sleep/wake times and bedtime routine       Babcock catheter in place   -remains in place due to difficult placement per Urology  -will require o/p f/u with Urology for voiding trial      Inflammatory polyarthropathy   -maintained on prednisone chronically as o/p  -follows closely with Rheum as o/p, continue close o/p f/u     DM-II  -A1c 7.6  -currently on basal bolus insulin regimen with good control  -continue glu goal 140-180 during hospitalization to reduce risk hypoglycemia, readings mostly within goal   -continue close o/p f/u with PCP for ongoing age appropriate diabetic screenings and cares      Afib   -rates currently well controlled off rate controllers, on Eliquis   -cont close follow-up with Cardiology     Impaired Vision  -recommend use of corrective lenses at all appropriate times  -encourage adequate lighting and encourage use of assistance with ambulation  -consider large font for printed materials provided to patient      Impaired Hearing  -Encourage use of hearing aids at all appropriate times  -encourage providers and caregivers to speak slowly and clearly directly to patient  -minimize background noise to encourage patient engagement  -consider use of hearing amplifier to reduce risk of straining to hear if hearing aids are not present or are not sufficient      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  -clinical frailty scale stage VI, moderately frail, progressive  -multifactorial including age, amb dysfxn, hx CVA, DM-II and multitude of chronic medical co-morbidities now with perforated diverticulitis with abscess requiring surgical procedure superimposed on elderly individual with limited physiologic and metabolic reserve  -continue optimization of chronic medical conditions and address acute derangements as arise  -monitor for and treat any underlying anxiety, mood, depression symptoms as may impact response to therapies and overall sense of wellbeing and quality of life  -continue to ensure tx and interventions align with patients  "wishes and goals of care   -cont psychosocial support of patient and caregivers      Care coordination: rounded with Alannah (RN) and Keith (Red Surg AP)    Subjective:     Hal is seen and examined at bedside where he is lying resting with his wife at his side, he is somewhat somnolent today and does not answer questions or follow commands. Nursing reports that he was restless overnight puling at lines etc.     Review of Systems   Unable to perform ROS: Mental status change     Objective:     Vitals: Blood pressure 135/71, pulse 85, temperature 97.7 °F (36.5 °C), resp. rate 18, height 6' 2\" (1.88 m), weight 80.6 kg (177 lb 11.1 oz), SpO2 95%.,Body mass index is 22.81 kg/m².      Intake/Output Summary (Last 24 hours) at 7/15/2024 1250  Last data filed at 7/14/2024 1812  Gross per 24 hour   Intake 50 ml   Output 200 ml   Net -150 ml     Current Medications: Reviewed    Physical Exam:   Physical Exam  Vitals and nursing note reviewed.   Constitutional:       Comments: Thin frail chronically ill appearing elderly male    HENT:      Head: Normocephalic.      Nose: Nose normal.      Mouth/Throat:      Mouth: Mucous membranes are dry.   Eyes:      General:         Right eye: No discharge.         Left eye: No discharge.   Neck:      Comments: Trachea midline   Pulmonary:      Effort: Pulmonary effort is normal. No respiratory distress.   Musculoskeletal:      Right lower leg: No edema.      Left lower leg: No edema.      Comments: Diffuse severe subcutaneous fat and muscle wasting    Skin:     General: Skin is dry.   Neurological:      Comments: Acutely encephalopathic   Psychiatric:      Comments: Restless but not agitated         Invasive Devices       Peripheral Intravenous Line  Duration             Peripheral IV 07/11/24 Right Forearm 4 days    Peripheral IV 07/14/24 Dorsal (posterior);Left Forearm 1 day              Drain  Duration             Urethral Catheter Latex 16 Fr. 11 days    Colostomy Other (comment) LUQ " 10 days                  Lab Results:     I have personally reviewed pertinent lab results including the following:    Results from last 7 days   Lab Units 07/15/24  0514 07/14/24  0506 07/13/24  0543 07/12/24  1031 07/11/24  0719 07/11/24  0459 07/10/24  0548   WBC Thousand/uL 10.15 9.12 9.61 13.34*  --  8.47 9.98   HEMOGLOBIN g/dL 7.5* 7.8* 7.9* 8.0*   < > 7.1* 8.6*   HEMATOCRIT % 24.9* 25.9* 25.5* 25.9*   < > 23.2* 27.8*   PLATELETS Thousands/uL 162 174 150 130*  --  113* 139*   SEGS PCT %  --   --   --  77*  --  72 75   MONO PCT %  --   --  5 12  --  14* 13*   EOS PCT %  --   --  2 1  --  1 1    < > = values in this interval not displayed.     Results from last 7 days   Lab Units 07/15/24  0514 07/14/24  0506 07/13/24  0619   POTASSIUM mmol/L 4.1 4.5 4.3   CHLORIDE mmol/L 108 109* 107   CO2 mmol/L 34* 34* 34*   BUN mg/dL 31* 30* 31*   CREATININE mg/dL 1.08 1.19 1.12   CALCIUM mg/dL 10.1 10.2 10.2     I have personally reviewed the following imaging study reports in PACS:    7/12/24- CXR

## 2024-07-15 NOTE — CASE MANAGEMENT
Case Management Discharge Planning Note    Patient name Hal Miller  Location [facility] 620/[facility] 620-01 MRN [id number]  : 1940 Date 7/15/2024       Current Admission Date: 7/3/2024  Current Admission Diagnosis:Colonic diverticular abscess   Patient Active Problem List    Diagnosis Date Noted Date Diagnosed    Moderate protein-calorie malnutrition (HCC) 2024     Left inguinal hernia 2024     Leukocytosis 2024     Dysphagia 2024     Hx of aortic valve replacement 2024     Diplopia 2024     Peripheral polyneuropathy 2024     Hyponatremia 2024     Dysphoric mood 2024     NICM (nonischemic cardiomyopathy) ([facility]) 2024     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[facility]) 2024     Acute on chronic respiratory failure ([facility]) 2024     Episode of seizure-like activity 2024     History of Whipple procedure 2024     Dizziness 2024     Insomnia 2024     Ambulatory dysfunction 2024     Severe protein-calorie malnutrition (HCC) 2024     Abnormal CT of the abdomen 2024     Zoster 2024     Colonic diverticular abscess 2024     Vascular dementia (HCC) 2024     Type 2 diabetes mellitus, with long-term current use of insulin (HCC) 2024     Hypercalcemia 2023     TARA (acute kidney injury) ([facility]) 2023     Esophageal stricture 2023     Acute encephalopathy 2023     Generalized weakness 2023     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     Chronic obstructive pulmonary disease with acute exacerbation (HCC) 2022     Stage 3 chronic kidney disease (HCC) 2022     Centrilobular emphysema (HCC) 2021     History of malignant neuroendocrine tumor 2021     Atrial fibrillation (HCC) 2017     Shortness of breath 2017     Primary hypertension 2017     Hyperlipidemia 2015     Inflammatory polyarthropathy (HCC) 2014     Osteoarthrosis  11/12/2014     Polymyalgia rheumatica (HCC) 11/12/2014     Aneurysm of thoracic aorta (HCC) 01/06/2014     Aneurysm of abdominal aorta (HCC) 01/06/2014     Neoplasm of other specified site 01/06/2014       LOS (days): 12  Geometric Mean LOS (GMLOS) (days): 9.8  Days to GMLOS:-2     OBJECTIVE:  Risk of Unplanned Readmission Score: 37.76         Current admission status: Inpatient   [facility]erred Pharm[facility]:   [id number] [address]  Phone: [phone] Fax: [phone]    [facility] - [place] [id number] [place] [address] [facility] [address]  Phone: [phone] Fax: [phone]    [facility] - [place], [place] - [address]  [address] [place] [address]  Phone: [phone] Fax: [phone]    Primary Care Provider: Kyaw Monreal MD    Primary Insurance: [employer] [facility]  Secondary Insurance:     DISCHARGE DETAILS:    Other Referral/Resources/Interventions Provided:  Referral Comments: [facility] able to accept and reserved in aidin. Auth approved, awaiting medical stability. family in agreement with DCP.

## 2024-07-15 NOTE — WOUND OSTOMY CARE
Progress Note - Wound   Hal Miller 84 y.o. male MRN: [id number]  Unit/Bed#: [facility] 620-01 Encounter: [id number]        Assessment:   Patient is seen for wound care follow-up. Patient seen pn regular mattress with bed extender. Per primary RN, patient's wife declined p-500 specialty mattress due to patient being too tall for the bed. Recommend tortoise instead- wife made aware of turning and repositioning system and its purpose and is agreeable for patient to be on it. Moderate/max assist for turning and repositioning. Dependent for all care needs at time of exam. Bowel managed via colostomy and bladder managed via internal urinary catheter. Dietary following patient. Requires assistance with meals.     Findings:  B/L heels are dry intact and jonathan with no skin loss or wounds present. Recommend preventative silicone bordered foam dressings and proper offloading/ repositioning.      Right Anterior Thigh: appears like reabsorbing/dispersing area of purple ecchymosis. No skin loss or drainage present. Recommend preventative foam dressing to area.   Right Groin/Scrotum MASD: area of partial thickness skin loss has resolved. No skin loss or drainage noted. Area is fragile, pink in color and remains rashy. Intact fungal rash continues to left groin and scrotum. Recommend continuing with dewey anti-fungal cream to area.   HA B/L Sacro-Buttocks DTPI: continues to evolve. Irregular in shape area of full thickness skin loss. Wound bed with mix of purple non-blanchable tissue, yellow and pink tissue. Measures smaller in size. Amy-wound remains fragile, hyperpigmented, and rashy. Scant serosanguineous drainage noted. Recommend continuing with DEWEY anti-fungal cream to area.   HA Penis DTI of Mucous Membrane: penis appears more swollen on assessment today. Intact area of purple non-blanchable tissue. No skin loss noted. Recommend continuing with hydraguard to area.   Left Groin Wound: was previously being managed by surgery via  wound vac. No wound vac noted on assessment today, dressing was saturated. Irregular in shape area of full thickness skin loss. Wound bed is beefy red granulation tissue with pink tissue. Amy-wound is fragile. Large serosanguineous drainage noted. Recommend fluffing silver alginate and a foam dressing.       No induration, fluctuance, odor, warmth/temperature differences, redness, or purulence noted to the above noted wounds and skin areas assessed. New dressings applied per orders listed below. Patient tolerated well- no s/s of non-verbal pain or discomfort observed during the encounter. Bedside nurse aware of plan of care. See flow sheets for more detailed assessment findings.      Orders listed below and wound care will continue to follow, call or Secure Chat Message with questions.       Skin Care Plan:  1-Apply ARASH Anti-Fungal Cream to B/L Sacro-Buttocks, Scrotum and Groin BID and PRN episodes of incontinence.   2-Turn/reposition q2h or when medically stable for pressure re-distribution on skin. Place patient on Novasentis turning and repositioning system.   3-Elevate heels to offload pressure  4-Moisturize skin daily with skin nourishing cream  5-Ehob cushion in chair when out of bed.  6-Left groin wound: cleanse with NSS and pat dry. Fluff wound bed with silver alginate (Melgisorb Ag) and cover with a silicone bordered foam dressing. Soniya with T for Treatment and change daily or PRN soilage/displacement.   7-Bilateral heels apply silicone bordered foam to heels soniya with a P and date assess every shift and change every 3 days   8-Right Anterior Thigh: Cleanse with NSS and pat dry. Apply a silicone bordered foam dressing to the area. Soniya with P for Prevention and change every 3 days or PRN.       WOUNDS:  Wound 07/04/24 Groin Left (Active)   Wound Image   07/15/24 1120   Wound Description Beefy red;Granulation tissue;Pink 07/15/24 1120   Amy-wound Assessment Fragile 07/15/24 1120   Wound Length (cm) 2 cm  07/15/24 1120   Wound Width (cm) 3.5 cm 07/15/24 1120   Wound Depth (cm) 2.1 cm 07/15/24 1120   Wound Surface Area (cm^2) 7 cm^2 07/15/24 1120   Wound Volume (cm^3) 14.7 cm^3 07/15/24 1120   Calculated Wound Volume (cm^3) 14.7 cm^3 07/15/24 1120   Wound Site Closure CHIQUITA 07/15/24 1120   Drainage Amount Large 07/15/24 1120   Drainage Description Serosanguineous 07/15/24 1120   Non-staged Wound Description Full thickness 07/15/24 1120   Treatments Cleansed;Irrigation with NSS;Site care 07/15/24 1120   Dressing Calcium Alginate with Silver;Foam, Silicon (eg. Allevyn, etc) 07/15/24 1120   Wound packed? Yes 07/15/24 1120   Packing- # removed 1 07/15/24 1120   Packing- # inserted 1 07/15/24 1120   Dressing Changed New 07/15/24 1120   Patient Tolerance Tolerated well 07/15/24 1120   Dressing Status Dry;Clean;Intact 07/15/24 1120       Wound 07/09/24 Pressure Injury Thigh Anterior;Right (Active)   Wound Image   07/15/24 1114   Wound Description Intact 07/15/24 1300   Pressure Injury Stage O 07/15/24 1300   Amy-wound Assessment Intact 07/15/24 1300   Wound Length (cm) 0 cm 07/15/24 1300   Wound Width (cm) 0 cm 07/15/24 1300   Wound Depth (cm) 0 cm 07/15/24 1300   Wound Surface Area (cm^2) 0 cm^2 07/15/24 1300   Wound Volume (cm^3) 0 cm^3 07/15/24 1300   Calculated Wound Volume (cm^3) 0 cm^3 07/15/24 1300   Dressing Foam, Silicon (eg. Allevyn, etc) 07/15/24 1300   Dressing Changed Changed 07/15/24 1300   Patient Tolerance Tolerated well 07/15/24 1300   Dressing Status Dry;Intact;Clean 07/15/24 1300       Wound 07/10/24 Pressure Injury Penis (Active)   Wound Image   07/15/24 1115   Wound Description Light purple;Non-blanchable erythema 07/15/24 1300   Pressure Injury Stage MMPI 07/15/24 1300   Amy-wound Assessment Fragile;Edema 07/15/24 1300   Wound Length (cm) 0.5 cm 07/15/24 1300   Wound Width (cm) 0.5 cm 07/15/24 1300   Wound Depth (cm) 0 cm 07/15/24 1300   Wound Surface Area (cm^2) 0.25 cm^2 07/15/24 1300   Wound Volume  (cm^3) 0 cm^3 07/15/24 1300   Calculated Wound Volume (cm^3) 0 cm^3 07/15/24 1300   Drainage Amount None 07/15/24 1300   Drainage Description Other (Comment) 07/15/24 1300   Treatments Cleansed;Site care 07/15/24 1300   Dressing Moisture barrier 07/15/24 1300   Dressing Changed Changed 07/15/24 1300   Patient Tolerance Tolerated well 07/15/24 1300   Dressing Status Intact 07/15/24 1300       Wound 07/11/24 MASD Scrotum Anterior;Right (Active)   Wound Image   07/15/24 1115   Wound Description Intact 07/15/24 1300   Amy-wound Assessment Fragile;Rash;Intact 07/15/24 1300   Wound Length (cm) 0 cm 07/15/24 1300   Wound Width (cm) 0 cm 07/15/24 1300   Wound Depth (cm) 0 cm 07/15/24 1300   Wound Surface Area (cm^2) 0 cm^2 07/15/24 1300   Wound Volume (cm^3) 0 cm^3 07/15/24 1300   Calculated Wound Volume (cm^3) 0 cm^3 07/15/24 1300   Change in Wound Size % 100 07/15/24 1300   Drainage Amount None 07/15/24 1300   Non-staged Wound Description Not applicable 07/15/24 1300   Treatments Cleansed;Site care 07/15/24 1300   Dressing Moisture barrier 07/15/24 1300   Dressing Changed Changed 07/15/24 1300   Patient Tolerance Tolerated well 07/15/24 1300   Dressing Status Clean;Intact;Dry 07/15/24 1300       Wound 07/11/24 Pressure Injury Buttocks Mid;Bilateral (Active)   Wound Image   07/15/24 1117   Wound Description Light purple;Non-blanchable erythema;Yellow;Fragile;Pink 07/15/24 1117   Pressure Injury Stage DTPI 07/15/24 1117   Amy-wound Assessment Fragile;Hyperpigmented;Rash 07/15/24 1117   Wound Length (cm) 7 cm 07/15/24 1117   Wound Width (cm) 5.5 cm 07/15/24 1117   Wound Depth (cm) 0.2 cm 07/15/24 1117   Wound Surface Area (cm^2) 38.5 cm^2 07/15/24 1117   Wound Volume (cm^3) 7.7 cm^3 07/15/24 1117   Calculated Wound Volume (cm^3) 7.7 cm^3 07/15/24 1117   Change in Wound Size % -144.44 07/15/24 1117   Drainage Amount Scant 07/15/24 1117   Drainage Description Serosanguineous 07/15/24 1117   Non-staged Wound Description Full  thickness 07/15/24 1117   Treatments Cleansed;Irrigation with NSS;Site care 07/15/24 1117   Dressing Other (Comment) 07/15/24 1117   Dressing Changed Changed 07/15/24 1117   Patient Tolerance Tolerated well 07/15/24 1117   Dressing Status Intact 07/15/24 1117                Susan Florence RN, BSN, CWOCN

## 2024-07-16 NOTE — RESTORATIVE TECHNICIAN NOTE
Restorative Technician Note      Patient Name: Hal Miller     Restorative Tech Visit Date: 07/16/24  Note Type: Mobility  Patient Position Upon Consult: Bedside chair  Activity Performed: Range of motion  Range of Motion: All extremities  Education Provided: Family or social support of family present for education by provider  Patient Position at End of Consult: Bedside chair; All needs within reach; Bed/Chair alarm activated    YANIRA Ivory

## 2024-07-16 NOTE — PLAN OF CARE
Problem: PAIN - ADULT  Goal: Verbalizes/displays adequate comfort level or baseline comfort level  Description: Interventions:  - Encourage patient to monitor pain and request assistance  - Assess pain using appropriate pain scale  - Administer analgesics based on type and severity of pain and evaluate response  - Implement non-pharmacological measures as appropriate and evaluate response  - Consider cultural and social influences on pain and pain management  - Notify physician/advanced practitioner if interventions unsuccessful or patient reports new pain  Outcome: Progressing     Problem: INFECTION - ADULT  Goal: Absence or prevention of progression during hospitalization  Description: INTERVENTIONS:  - Assess and monitor for signs and symptoms of infection  - Monitor lab/diagnostic results  - Monitor all insertion sites, i.e. indwelling lines, tubes, and drains  - Monitor endotracheal if appropriate and nasal secretions for changes in amount and color  - [place] appropriate cooling/warming therapies per order  - Administer medications as ordered  - Instruct and encourage patient and family to use good hand hygiene technique  - Identify and instruct in appropriate isolation precautions for identified infection/condition  Outcome: Progressing     Problem: SAFETY ADULT  Goal: Patient will remain free of falls  Description: INTERVENTIONS:  - Educate patient/family on patient safety including physical limitations  - Instruct patient to call for assistance with activity   - Consult OT/PT to assist with strengthening/mobility   - Keep Call bell within reach  - Keep bed low and locked with side rails adjusted as appropriate  - Keep care items and personal belongings within reach  - Initiate and maintain comfort rounds  - Make Fall Risk Sign visible to staff  - Offer Toileting  - Apply yellow socks and bracelet for high fall risk patients  - Consider moving patient to room near nurses station  Outcome:  Progressing  Goal: Maintain or return to baseline ADL function  Description: INTERVENTIONS:  -  Assess patient's ability to carry out ADLs; assess patient's baseline for ADL function and identify physical deficits which impact ability to perform ADLs (bathing, care of mouth/teeth, toileting, grooming, dressing, etc.)  - Assess/evaluate cause of self-care deficits   - Assess range of motion  - Assess patient's mobility; develop plan if impaired  - Assess patient's need for assistive devices and provide as appropriate  - Encourage maximum independence but intervene and supervise when necessary  - Involve family in performance of ADLs  - Assess for home care needs following discharge   - Consider OT consult to assist with ADL evaluation and planning for discharge  - Provide patient education as appropriate  Outcome: Progressing  Goal: Maintains/Returns to pre admission functional level  Description: INTERVENTIONS:  - Perform AM-PAC 6 Click Basic Mobility/ Daily Activity assessment daily.  - Set and communicate daily mobility goal to care team and patient/family/caregiver.   - Collaborate with rehabilitation services on mobility goals if consulted  - Reposition patient  - Out of bed for toileting  - Record patient progress and toleration of activity level   Outcome: Progressing     Problem: DISCHARGE PLANNING  Goal: Discharge to home or other facility with appropriate resources  Description: INTERVENTIONS:  - Identify barriers to discharge w/patient and caregiver  - Arrange for needed discharge resources and transportation as appropriate  - Identify discharge learning needs (meds, wound care, etc.)  - Arrange for interpretive services to assist at discharge as needed  - Refer to Case Management Department for coordinating discharge planning if the patient needs post-hospital services based on physician/advanced practitioner order or complex needs related to functional status, cognitive ability, or social support  system  Outcome: Progressing     Problem: Knowledge Deficit  Goal: Patient/family/caregiver demonstrates understanding of disease process, treatment plan, medications, and discharge instructions  Description: Complete learning assessment and assess knowledge base.  Interventions:  - Provide teaching at level of understanding  - Provide teaching via preferred learning methods  Outcome: Progressing     Problem: Prexisting or High Potential for Compromised Skin Integrity  Goal: Skin integrity is maintained or improved  Description: INTERVENTIONS:  - Identify patients at risk for skin breakdown  - Assess and monitor skin integrity  - Assess and monitor nutrition and hydration status  - Monitor labs   - Assess for incontinence   - Turn and reposition patient  - Assist with mobility/ambulation  - Relieve pressure over bony prominences  - Avoid friction and shearing  - Provide appropriate hygiene as needed including keeping skin clean and dry  - Evaluate need for skin moisturizer/barrier cream  - Collaborate with interdisciplinary team   - Patient/family teaching  - Consider wound care consult   Outcome: Progressing     Problem: Nutrition/Hydration-ADULT  Goal: Nutrient/Hydration intake appropriate for improving, restoring or maintaining nutritional needs  Description: Monitor and assess patient's nutrition/hydration status for malnutrition. Collaborate with interdisciplinary team and initiate plan and interventions as ordered.  Monitor patient's weight and dietary intake as ordered or per policy. Utilize nutrition screening tool and intervene as necessary. Determine patient's food preferences and provide high-protein, high-caloric foods as appropriate.     INTERVENTIONS:  - Monitor oral intake, urinary output, labs, and treatment plans  - Assess nutrition and hydration status and recommend course of action  - Evaluate amount of meals eaten  - Assist patient with eating if necessary   - Allow adequate time for meals  -  Recommend/ encourage appropriate diets, oral nutritional supplements, and vitamin/mineral supplements  - Order, calculate, and assess calorie counts as needed  - Recommend, monitor, and adjust tube feedings and TPN/PPN based on assessed needs  - Assess need for intravenous fluids  - Provide specific nutrition/hydration education as appropriate  - Include patient/family/caregiver in decisions related to nutrition  Outcome: Progressing     Problem: RESPIRATORY - ADULT  Goal: Achieves optimal ventilation and oxygenation  Description: INTERVENTIONS:  - Assess for changes in respiratory status  - Assess for changes in mentation and behavior  - Position to facilitate oxygenation and minimize respiratory effort  - Oxygen administered by appropriate delivery if ordered  - Initiate smoking cessation education as indicated  - Encourage broncho-pulmonary hygiene including cough, deep breathe, Incentive Spirometry  - Assess the need for suctioning and aspirate as needed  - Assess and instruct to report SOB or any respiratory difficulty  - Respiratory Therapy support as indicated  Outcome: Progressing     Problem: GASTROINTESTINAL - ADULT  Goal: Minimal or absence of nausea and/or vomiting  Description: INTERVENTIONS:  - Administer IV fluids if ordered to ensure adequate hydration  - Maintain NPO status until nausea and vomiting are resolved  - Nasogastric tube if ordered  - Administer ordered antiemetic medications as needed  - Provide nonpharmacologic comfort measures as appropriate  - Advance diet as tolerated, if ordered  - Consider nutrition services referral to assist patient with adequate nutrition and appropriate food choices  Outcome: Progressing  Goal: Maintains or returns to baseline bowel function  Description: INTERVENTIONS:  - Assess bowel function  - Encourage oral fluids to ensure adequate hydration  - Administer IV fluids if ordered to ensure adequate hydration  - Administer ordered medications as needed  -  Encourage mobilization and activity  - Consider nutritional services referral to assist patient with adequate nutrition and appropriate food choices  Outcome: Progressing  Goal: Maintains adequate nutritional intake  Description: INTERVENTIONS:  - Monitor percentage of each meal consumed  - Identify factors contributing to decreased intake, treat as appropriate  - Assist with meals as needed  - Monitor I&O, weight, and lab values if indicated  - Obtain nutrition services referral as needed  Outcome: Progressing  Goal: Establish and maintain optimal ostomy function  Description: INTERVENTIONS:  - Assess bowel function  - Encourage oral fluids to ensure adequate hydration  - Administer IV fluids if ordered to ensure adequate hydration   - Administer ordered medications as needed  - Encourage mobilization and activity  - Nutrition services referral to assist patient with appropriate food choices  - Assess stoma site  - Consider wound care consult   Outcome: Progressing  Goal: Oral mucous membranes remain intact  Description: INTERVENTIONS  - Assess oral mucosa and hygiene practices  - Implement preventative oral hygiene regimen  - Implement oral medicated treatments as ordered  - Initiate Nutrition services referral as needed  Outcome: Progressing     Problem: GENITOURINARY - ADULT  Goal: Maintains or returns to baseline urinary function  Description: INTERVENTIONS:  - Assess urinary function  - Encourage oral fluids to ensure adequate hydration if ordered  - Administer IV fluids as ordered to ensure adequate hydration  - Administer ordered medications as needed  - Offer frequent toileting  - Follow urinary retention protocol if ordered  Outcome: Progressing  Goal: Absence of urinary retention  Description: INTERVENTIONS:  - Assess patient’s ability to void and empty bladder  - Monitor I/O  - Bladder scan as needed  - Discuss with physician/AP medications to alleviate retention as needed  - Discuss catheterization for  long term situations as appropriate  Outcome: Progressing  Goal: Urinary catheter remains patent  Description: INTERVENTIONS:  - Assess patency of urinary catheter  - If patient has a chronic cardenas, consider changing catheter if non-functioning  - Follow guidelines for intermittent irrigation of non-functioning urinary catheter  Outcome: Progressing     Problem: METABOLIC, FLUID AND ELECTROLYTES - ADULT  Goal: Glucose maintained within target range  Description: INTERVENTIONS:  - Monitor Blood Glucose as ordered  - Assess for signs and symptoms of hyperglycemia and hypoglycemia  - Administer ordered medications to maintain glucose within target range  - Assess nutritional intake and initiate nutrition service referral as needed  Outcome: Progressing

## 2024-07-16 NOTE — PROGRESS NOTES
"Progress Note - General Surgery   Hal Miller 84 y.o. male MRN: [id number]  Unit/Bed#: [facility] 620-01 Encounter: [id number]    Assessment:  85 yo M with L inguinal hernia mesh erosion into sigmoid colon s/p ex-lap, mesh explant, washout and debridement of L groin, Angela's.       NAEON, AVSS on room air  Cr 1.13 (1.08)  WBC 12 (10)  Hgb 7.3 (7.5)    Plan:  -dysphagia diet +ensures  -encourage PO intake  -calorie count  -d/c with cardenas with outpatient urology follow up  -dispo: pending bed at [facility]  PT/OT: level 1    Subjective/Objective     Subjective: NAEON. Patient confused, AOx1 but intermittently redirectable. In not acute distress. Pain reportedly controlled.    Objective:     Blood pressure (!) 147/101, pulse 96, temperature (!) 97.4 °F (36.3 °C), resp. rate 16, height 6' 2\" (1.88 m), weight 80.6 kg (177 lb 11.1 oz), SpO2 94%.,Body mass index is 22.81 kg/m².      Intake/Output Summary (Last 24 hours) at 7/16/2024 0335  Last data filed at 7/15/2024 2155  Gross per 24 hour   Intake 1822.5 ml   Output 1335 ml   Net 487.5 ml       Invasive Devices       Peripheral Intravenous Line  Duration             Peripheral IV 07/14/24 Dorsal (posterior);Left Forearm 1 day              Drain  Duration             Colostomy Other (comment) LUQ 11 days    Urethral Catheter Latex 16 Fr. 11 days                    Physical Exam:   General appearance: delirious and slowed mentation  Head: Normocephalic, without obvious abnormality, atraumatic  Chest wall: no tenderness  Abdomen: soft, non-tender; bowel sounds normal; no masses,  no organomegaly  Extremities: extremities normal, warm and well-perfused; no cyanosis, clubbing, or edema  Skin: Skin color, texture, turgor normal. No rashes or lesions  Neurologic: Mental status: alertness: lethargic, orientation: person     Lab, Imaging and other studies:I have personally reviewed pertinent lab results.    VTE Pharmacologic Prophylaxis: Eliquis  VTE Mechanical Prophylaxis: sequential " compression device

## 2024-07-16 NOTE — PROGRESS NOTES
Progress Note - Geriatric Medicine   Hal Miller 84 y.o. male MRN: [id number]  Unit/Bed#: [facility] 620-01 Encounter: [id number]      Assessment/Plan:    Acute metabolic encephalopathy   -mentation continues to wax and wane to some degree   -multifactorial including advanced age, underlying dementia, hx encephalopathy during prior admissions, perforated diverticulitis now s/p surgical procedure, anesthesia, multiple setting changes and acute pain in frail elderly individual with prolonged hospitalization   -continue supportive cares, encourage normal sleep cycle, use of sensory assist devices (glasses/hearing aids)  -continue to ensure acute pain well controlled, cont barbie pain protocol, scheduled tylenol   -monitor for fecal and urinary retention - cardenas in place, LUQ colostomy    -reorient frequently as appropriate and indicated  -appreciate family at bedside for familiarity/reassurance /engagement and social support   -Mirtazapine 7.5mg initiated on 7/15/24 for report of insomnia and poor oral intake, tolerated well w/o notable morning sedation   -consider posey activity belt for distraction and barrier to pulling lines/tubes/cardenas etc     Perforated diverticulitis with abscess  -s/p Hartmans procedure with explantation of left groin mesh with washout and debridement of left groin wound on 7/4/24 now with wound vac in place  -colostomy in place, cont ostomy cares   -s/p completion course of Zosyn  -continue acute multimodal pain control per geriatric pain protocol, taper as pain improves with healing      Anemia  -Hb 7.3 from 10.7 on 7/3/24  -likely multifactorial including dilutional, daily labs, losses in cardenas etc  -monitor for ongoing acute blood loss and tx with PRBC as indicated      Dysphagia  -speech therapy on consult   -s/p VBS 7/7/24  -currently on dysphagia level 2 with thin liquids   -continue strict aspiration precautions  -cont working with speech therapy   -encourage good oral hygiene and cares    -recommend staff assist with ALL meals and encourage family to assist with ordering food pt enjoys      Vascular dementia  -remains encephalopathic with fluctuating mentation   -at baseline prior to recent admit alert and oriented, forgetful, independent with ADLs requires some assist with iADLs   -[facility] 23/30 (11/2022), noted to have episodes of encephalopathy and decline since last testing, recommend repeat following recovery from acute illness to establish new baseline   -[facility] 6/18/24 imaging personally viewed, reveals at least moderate chronic microangiopathic changes   -TSH WNL at 1.79, B12 WNL at 531  -at risk age and cardiovascular related acceleration santy in setting of recent CVA, continue secondary risk factor modifications   -encourage patient remain physically, socially and cognitively active and engaged to maintain cognitive acuity   -encourage use sensory assist devices such as corrective lenses and hearing aids all appropriate times to reduce risk uncorrected sensroy impairment from contributing to isolation, confusion, worsening encephalopathy and more precipitous cognitive decline   -underlying dementia increases risk developing delirium and likely contributed to episode during current admission, cont strict precautions to reduce risk recurrence     Hx CVA  -small left parietal infarct during recent hospitalization on MRI brain 6/20/24  -suspected to be due to missed dosing of Eliquis at time of event  -remains at risk future CVAs, continue strict secondary risk factor modifications  -close o/p f/u with Neurology for ongoing monitoring and management      History of possible seizure  -maintained on Depatkote as managed by Neurology, continue current dosing   -cont strict seizure precautions      Insomnia   -previously on Seroquel PRN as o/p, has not recently been requiring during admission, no indication to resume at this time as pt not agitated and Seroquel may lower seizure threshold   -consider  low dose melatonin HS PRN  -encourage establishment of consistent sleep/wake times and bedtime routine      Babcock catheter in place   -remains in place due to difficult placement per Urology  -will require o/p f/u with Urology for voiding trial, not to be removed unless cleared by Urology due to difficult placement      Inflammatory polyarthropathy   -maintained on prednisone chronically as o/p  -follows closely with Rheum as o/p, continue close o/p f/u     DM-II  -A1c 7.6  -currently on basal bolus insulin regimen with good control  -continue glu goal 140-180 during hospitalization to reduce risk hypoglycemia, readings mostly within goal   -continue close o/p f/u with PCP for ongoing age appropriate diabetic screenings and cares      Afib   -rates currently well controlled off rate controllers, on Eliquis   -cont close follow-up with Cardiology     Impaired Vision  -recommend use of corrective lenses at all appropriate times  -encourage adequate lighting and encourage use of assistance with ambulation  -consider large font for printed materials provided to patient      Impaired Hearing  -Encourage use of hearing aids at all appropriate times  -encourage providers and caregivers to speak slowly and clearly directly to patient  -minimize background noise to encourage patient engagement  -consider use of hearing amplifier to reduce risk of straining to hear if hearing aids are not present or are not sufficient      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  -clinical frailty scale stage VI, moderately frail, progressive  -multifactorial including age, amb dysfxn, hx CVA, DM-II and multitude of chronic medical co-morbidities now with perforated diverticulitis with abscess requiring surgical procedure superimposed on elderly individual with limited physiologic and metabolic reserve  -continue optimization of chronic medical conditions and address acute derangements as arise  -monitor for and treat any underlying anxiety,  "mood, depression symptoms as may impact response to therapies and overall sense of wellbeing and quality of life  -continue to ensure tx and interventions align with patients wishes and goals of care   -cont psychosocial support of patient and caregivers     Care coordination: rounded with Lizzie (RN)    Subjective:     Hal is seen and examined at bedside where he is lying resting, he is more awake this morning, remains pleasantly confused. No acute events reported overnight.     Review of Systems   Unable to perform ROS: Mental status change     Objective:     Vitals: Blood pressure (!) 144/101, pulse 87, temperature 97.5 °F (36.4 °C), resp. rate 12, height 6' 2\" (1.88 m), weight 80.6 kg (177 lb 11.1 oz), SpO2 96%.,Body mass index is 22.81 kg/m².      Intake/Output Summary (Last 24 hours) at 7/16/2024 0753  Last data filed at 7/15/2024 2155  Gross per 24 hour   Intake 1822.5 ml   Output 1335 ml   Net 487.5 ml     Current Medications: Reviewed    Physical Exam:   Physical Exam  Vitals and nursing note reviewed.   Constitutional:       General: He is not in acute distress.     Comments: Thin frail elderly male    HENT:      Head: Normocephalic.      Nose: Nose normal.      Comments: O2 via NC     Mouth/Throat:      Mouth: Mucous membranes are dry.   Eyes:      General:         Right eye: No discharge.         Left eye: No discharge.   Neck:      Comments: Trachea midline, phonation norm   Cardiovascular:      Rate and Rhythm: Normal rate and regular rhythm.   Pulmonary:      Effort: Pulmonary effort is normal. No respiratory distress.      Comments: Coarse upper airway sounds   Abdominal:      General: There is no distension.      Palpations: Abdomen is soft.      Tenderness: There is no abdominal tenderness.      Comments: Abd binder in place    Genitourinary:     Comments: Babcock catheter in place   Musculoskeletal:      Comments: Severe diffuse subcutaneous fat and muscle wasting    Skin:     General: Skin is " warm and dry.      Comments: Thin and friable    Neurological:      Mental Status: He is alert.      Comments: Awake and alert, confused and oriented only to self    Psychiatric:      Comments: Restless at times but not agitated         Invasive Devices       Peripheral Intravenous Line  Duration             Peripheral IV 07/14/24 Dorsal (posterior);Left Forearm 1 day              Drain  Duration             Urethral Catheter Latex 16 Fr. 12 days    Colostomy Other (comment) LUQ 11 days                  Lab Results:     I have personally reviewed pertinent lab results including the following:    Results from last 7 days   Lab Units 07/16/24  0441 07/15/24  0514 07/14/24  0506 07/13/24  0543 07/12/24  1031 07/11/24  0719 07/11/24  0459   WBC Thousand/uL 12.05* 10.15 9.12 9.61 13.34*  --  8.47   HEMOGLOBIN g/dL 7.3* 7.5* 7.8* 7.9* 8.0*   < > 7.1*   HEMATOCRIT % 23.8* 24.9* 25.9* 25.5* 25.9*   < > 23.2*   PLATELETS Thousands/uL 170 162 174 150 130*  --  113*   SEGS PCT % 80*  --   --   --  77*  --  72   MONO PCT % 10  --   --  5 12  --  14*   EOS PCT % 1  --   --  2 1  --  1    < > = values in this interval not displayed.     Results from last 7 days   Lab Units 07/16/24  0441 07/15/24  0514 07/14/24  0506   POTASSIUM mmol/L 4.3 4.1 4.5   CHLORIDE mmol/L 109* 108 109*   CO2 mmol/L 30 34* 34*   BUN mg/dL 27* 31* 30*   CREATININE mg/dL 1.13 1.08 1.19   CALCIUM mg/dL 9.8 10.1 10.2     I have personally reviewed the following imaging study reports in PACS:    No new imaging overnight

## 2024-07-16 NOTE — PLAN OF CARE
Problem: PAIN - ADULT  Goal: Verbalizes/displays adequate comfort level or baseline comfort level  Description: Interventions:  - Encourage patient to monitor pain and request assistance  - Assess pain using appropriate pain scale  - Administer analgesics based on type and severity of pain and evaluate response  - Implement non-pharmacological measures as appropriate and evaluate response  - Consider cultural and social influences on pain and pain management  - Notify physician/advanced practitioner if interventions unsuccessful or patient reports new pain  Outcome: Progressing     Problem: INFECTION - ADULT  Goal: Absence or prevention of progression during hospitalization  Description: INTERVENTIONS:  - Assess and monitor for signs and symptoms of infection  - Monitor lab/diagnostic results  - Monitor all insertion sites, i.e. indwelling lines, tubes, and drains  - Monitor endotracheal if appropriate and nasal secretions for changes in amount and color  - [place] appropriate cooling/warming therapies per order  - Administer medications as ordered  - Instruct and encourage patient and family to use good hand hygiene technique  - Identify and instruct in appropriate isolation precautions for identified infection/condition  Outcome: Progressing     Problem: SAFETY ADULT  Goal: Patient will remain free of falls  Description: INTERVENTIONS:  - Educate patient/family on patient safety including physical limitations  - Instruct patient to call for assistance with activity   - Consult OT/PT to assist with strengthening/mobility   - Keep Call bell within reach  - Keep bed low and locked with side rails adjusted as appropriate  - Keep care items and personal belongings within reach  - Initiate and maintain comfort rounds  - Make Fall Risk Sign visible to staff    - Apply yellow socks and bracelet for high fall risk patients  - Consider moving patient to room near nurses station  Outcome: Progressing  Goal: Maintain or  return to baseline ADL function  Description: INTERVENTIONS:  -  Assess patient's ability to carry out ADLs; assess patient's baseline for ADL function and identify physical deficits which impact ability to perform ADLs (bathing, care of mouth/teeth, toileting, grooming, dressing, etc.)  - Assess/evaluate cause of self-care deficits   - Assess range of motion  - Assess patient's mobility; develop plan if impaired  - Assess patient's need for assistive devices and provide as appropriate  - Encourage maximum independence but intervene and supervise when necessary  - Involve family in performance of ADLs  - Assess for home care needs following discharge   - Consider OT consult to assist with ADL evaluation and planning for discharge  - Provide patient education as appropriate  Outcome: Progressing  Goal: Maintains/Returns to pre admission functional level  Description: INTERVENTIONS:  - Perform AM-PAC 6 Click Basic Mobility/ Daily Activity assessment daily.  - Set and communicate daily mobility goal to care team and patient/family/caregiver.   - Collaborate with rehabilitation services on mobility goals if consulted    - Out of bed for toileting  - Record patient progress and toleration of activity level   Outcome: Progressing

## 2024-07-16 NOTE — PLAN OF CARE
Problem: PHYSICAL THERAPY ADULT  Goal: Performs mobility at highest level of function for planned discharge setting.  See evaluation for individualized goals.  Description: Treatment/Interventions: OT, Spoke to case management, Spoke to nursing, Gait training, Bed mobility, Patient/family training, Endurance training, LE strengthening/ROM, Functional transfer training          See flowsheet documentation for full assessment, interventions and recommendations.  Outcome: Progressing  Note: Prognosis: Fair  Problem List: Decreased strength, Decreased range of motion, Impaired balance, Decreased endurance, Decreased mobility, Decreased coordination, Decreased cognition, Impaired judgement, Decreased safety awareness, Pain, Impaired vision, Impaired hearing  Assessment: pt continues to require Ax2 in recent sessions with decreased effective participation in all tasks with increased lethargy, impaired balance & decreased functional endurance contributing to need for increased assist for seated balance while EOB & in standing while pivoting to recliner.  Pt unable to follow commands to effecitvely assist with UEs for HHA or to sequence these tasks today, but did maintain balance & LE strength long enough to safely pivot to drop arm recliner without LE buckling.  Pt remains appropriate for ongoing PT interventions to address deficits, but progress may remain limited by fluctuating medical status that has significantly impacted his moibly in recent days.  Anticipate he posesses strength to return to standing with RW & taking steps as in prevous sessions, but given his age & comorbidities, that could change rapidly as already noted to this point.  Barriers to Discharge: Inaccessible home environment     Rehab Resource Intensity Level, PT: I (Maximum Resource Intensity)    See flowsheet documentation for full assessment.

## 2024-07-16 NOTE — ARC ADMISSION
Notified by CM that patient is medically stable for discharge    Reviewed updates with ARC physician - concerns regarding increasing WBC count, as well as decreasing Hgb levels. Additionally, patient with poor PO intake, and remains on calorie count. He needs to demonstrate improved PO intake (consuming at least 50% of each meal, consistently) prior to ARC consideration. Please have attending physician address. CM has been updated. Will continue to follow patient's case at this time.

## 2024-07-16 NOTE — OCCUPATIONAL THERAPY NOTE
Occupational Therapy Progress Note     Patient Name: Hal Miller  Today's Date: 7/16/2024  Problem List  Principal Problem:    Colonic diverticular abscess  Active Problems:    Moderate protein-calorie malnutrition (HCC)         Occupational Therapy Treatment Note       07/16/24 0915   OT Last Visit   OT Visit Date 07/16/24   Note Type   Note Type Treatment for insurance authorization   Pain Assessment   Pain Assessment Tool FLACC   Pain Rating: FLACC (Rest) - Face 1   Pain Rating: FLACC (Rest) - Legs 1   Pain Rating: FLACC (Rest) - Activity 1   Pain Rating: FLACC (Rest) - Cry 1   Pain Rating: FLACC (Rest) - Consolability 1   Score: FLACC (Rest) 5   Pain Rating: FLACC (Activity) - Face 1   Pain Rating: FLACC (Activity) - Legs 1   Pain Rating: FLACC (Activity) - Activity 1   Pain Rating: FLACC (Activity) - Cry 1   Pain Rating: FLACC (Activity) - Consolability 1   Score: FLACC (Activity) 5   Restrictions/Precautions   Weight Bearing Precautions Per Order No   Braces or Orthoses   (TEDS which were applied this session)   Lifestyle   Autonomy Independent with ADL's and functional mobility, needs assistance with IADL's   Reciprocal Relationships Supportive spouse and family   Service to Others Retired   Intrinsic Gratification Working on the farm   ADL   Where Assessed Supine, bed   Eating Assistance Unable to assess   Eating Comments pt refused breakfast   Grooming Assistance 1  Total Assistance   UB Bathing Assistance 1  Total Assistance   LB Bathing Assistance 1  Total Assistance   UB Dressing Assistance 2  Maximal Assistance   UB Dressing Deficit Thread RUE;Thread LUE;Pull around back   LB Dressing Assistance 1  Total Assistance   Toileting Assistance  1  Total Assistance   Bed Mobility   Supine to Sit 2  Maximal assistance   Additional items Assist x 2   Transfers   Sit to Stand 2  Maximal assistance   Additional items Assist x 2   Stand to Sit 2  Maximal assistance   Additional items Assist x 2   Stand pivot 2   "Maximal assistance   Additional items Assist x 2   Subjective   Subjective pt stated \"Don't you dare\" when told that therapy wanted to reapply his nasal canula   Cognition   Overall Cognitive Status Impaired  (History of Vascular Dementia)   Arousal/Participation Alert;Responsive;Cooperative   Attention Difficulty attending to directions   Orientation Level Oriented to person;Oriented to place;Oriented to time   Memory Decreased long term memory;Decreased recall of biographical information;Decreased short term memory;Decreased recall of recent events;Decreased recall of precautions   Following Commands Follows one step commands with increased time or repetition   Activity Tolerance   Activity Tolerance Patient limited by fatigue;Patient limited by pain   Assessment   Assessment Pt seen for Occupational Therapy session with focus on activity tolerance, bed mob, functional transfers/stand pivot transfers to bedside chair and pt engagement in UB/LB self-care tasks. Pt cleared by JASPER/Conner  for pt participated in OT session. Pt presented supine/HOB raised pt awake/alert and pt identifiers confirmed. Pt was unable to report a therapy goal 2* pt cognitive impairments. Pt required assist for bed mob, unsupported sitting balance and functional transfers to bedside chair 2* decreased strength, coordination and balance.  He required assist for UB and LB self-care task due to pt decreased strength, poor activity tolerance and pt need for cognitive support.   Pt remains appropriate for II (Moderate Resources). Pt return to bed post session bed alarm activated and all needs within reach.   Plan   Treatment Interventions ADL retraining;Functional transfer training;Endurance training   Goal Expiration Date 07/19/24   OT Treatment Day 3   OT Frequency 3-5x/wk   Discharge Recommendation   Rehab Resource Intensity Level, OT II (Moderate Resource Intensity)   AM-PAC Daily Activity Inpatient   Lower Body Dressing 1   Bathing 1 "   Toileting 2   Upper Body Dressing 2   Grooming 2   Eating 3   Daily Activity Raw Score 11   Daily Activity Standardized Score (Calc for Raw Score >=11) 29.04   AM-PAC Applied Cognition Inpatient   Following a Speech/Presentation 2   Understanding Ordinary Conversation 3   Taking Medications 1   Remembering Where Things Are Placed or Put Away 2   Remembering List of 4-5 Errands 1   Taking Care of Complicated Tasks 1   Applied Cognition Raw Score 10   Applied Cognition Standardized Score 24.98   Barthel Index   Grooming Score 5   Dressing Score 5   Toilet Use Score 5   Transfers (Bed/Chair) Score 10   Mobility (Level Surface) Score 0     Sweetie PELAYO/LUCIANA

## 2024-07-16 NOTE — PLAN OF CARE
Problem: OCCUPATIONAL THERAPY ADULT  Goal: Performs self-care activities at highest level of function for planned discharge setting.  See evaluation for individualized goals.  Description: Treatment Interventions: ADL retraining, Endurance training, Cognitive reorientation, Patient/family training, Continued evaluation, Activityengagement, Energy conservation, Compensatory technique education          See flowsheet documentation for full assessment, interventions and recommendations.   Outcome: Progressing  Note: Limitation: Decreased ADL status, Decreased Safe judgement during ADL, Decreased cognition, Decreased endurance, Decreased sensation, Decreased high-level ADLs  Prognosis: Fair  Assessment: Pt seen for Occupational Therapy session with focus on activity tolerance, bed mob, functional transfers/stand pivot transfers to bedside chair and pt engagement in UB/LB self-care tasks. Pt cleared by JASPER/Conner  for pt participated in OT session. Pt presented supine/HOB raised pt awake/alert and pt identifiers confirmed. Pt was unable to report a therapy goal 2* pt cognitive impairments. Pt required assist for bed mob, unsupported sitting balance and functional transfers to bedside chair 2* decreased strength, coordination and balance.  He required assist for UB and LB self-care task due to pt decreased strength, poor activity tolerance and pt need for cognitive support.   Pt remains appropriate for II (Moderate Resources). Pt return to bed post session bed alarm activated and all needs within reach.     Rehab Resource Intensity Level, OT: II (Moderate Resource Intensity)

## 2024-07-17 NOTE — PROGRESS NOTES
"Progress Note - Hal Miller 84 y.o. male MRN: [id number]    Unit/Bed#: [facility] 620-01 Encounter: [id number]      Assessment:  83 yo M with L inguinal hernia w/mesh erosion into sigmoid colon s/p ex-lap, mesh explant, washout and debridement of L groin, Angela's on 7/4    Calorie count improved on remeron, 100% of diet last night  Ostomy 100 cc documented, bag empty on rounds    Plan:  Dysphagia diet  Calorie counts in place - improved  On home meds including Eliquis  Keep Babcock, plan for o/p urology f/u  OOB, PT/OT Level I   Daily wet to dry groin dressing changes  CM re: dispo    Subjective:   Patient seen and examined bedside.  Ate chicken w/gravy, mashed potatoes, apple sauce 100% last evening.  No acute complaints. No pain.     Objective:     Vitals: Blood pressure 129/71, pulse 85, temperature 97.5 °F (36.4 °C), resp. rate 20, height 6' 2\" (1.88 m), weight 83.9 kg (184 lb 15.5 oz), SpO2 93%.,Body mass index is 23.75 kg/m².      Intake/Output Summary (Last 24 hours) at 7/17/2024 1005  Last data filed at 7/17/2024 0900  Gross per 24 hour   Intake 295 ml   Output 1525 ml   Net -1230 ml       Physical Exam: /71   Pulse 85   Temp 97.5 °F (36.4 °C)   Resp 20   Ht 6' 2\" (1.88 m)   Wt 83.9 kg (184 lb 15.5 oz)   SpO2 93%   BMI 23.75 kg/m²   General - no acute distress, responsive and cooperative  CV - warm, regular rate  Pulm - normal work of breathing, no respiratory distress  Abd - soft, nondistended, L groin site packed, midline stapled, ostomy dark with bowel sweat in bag  Neuro - m/s grossly intact, cn grossly intact  Ext - moving all extremities      Invasive Devices       Peripheral Intravenous Line  Duration             Peripheral IV 07/14/24 Dorsal (posterior);Left Forearm 2 days              Drain  Duration             Urethral Catheter Latex 16 Fr. 13 days    Colostomy Other (comment) LUQ 12 days                    Lab, Imaging and other studies: I have personally reviewed pertinent reports.  "   VTE Pharmacologic Prophylaxis: Eliquis  VTE Mechanical Prophylaxis: sequential compression device

## 2024-07-17 NOTE — OCCUPATIONAL THERAPY NOTE
Occupational Therapy Progress Note     Patient Name: Hal Miller  Today's Date: 7/17/2024  Problem List  Principal Problem:    Colonic diverticular abscess  Active Problems:    Moderate protein-calorie malnutrition (HCC)         Occupational Therapy Treatment Note       07/17/24 1504   OT Last Visit   OT Visit Date 07/17/24   Note Type   Note Type Treatment   Pain Assessment   Pain Assessment Tool FLACC   Pain Rating: FLACC (Rest) - Face 0   Pain Rating: FLACC (Rest) - Legs 0   Pain Rating: FLACC (Rest) - Activity 0   Pain Rating: FLACC (Rest) - Cry 0   Pain Rating: FLACC (Rest) - Consolability 0   Score: FLACC (Rest) 0   Pain Rating: FLACC (Activity) - Face 1   Pain Rating: FLACC (Activity) - Legs 0   Pain Rating: FLACC (Activity) - Activity 0   Pain Rating: FLACC (Activity) - Cry 1   Pain Rating: FLACC (Activity) - Consolability 0   Score: FLACC (Activity) 2   Restrictions/Precautions   Weight Bearing Precautions Per Order No   Lifestyle   Autonomy Independent with ADL's and functional mobility, needs assistance with IADL's   Reciprocal Relationships Supportive spouse and family   Service to Others Retired   Intrinsic Gratification Working on the farm   ADL   Where Assessed Chair   Grooming Assistance 1  Total Assistance   UB Bathing Assistance 1  Total Assistance   LB Bathing Assistance 1  Total Assistance   UB Dressing Assistance 2  Maximal Assistance   LB Dressing Assistance 1  Total Assistance   Toileting Assistance  1  Total Assistance   Bed Mobility   Sit to Supine 2  Maximal assistance   Additional items Assist x 2   Additional Comments pt presented sitting out of bed to bedside chair upon initiation of therapy   Transfers   Sit to Stand 2  Maximal assistance   Additional items Assist x 2   Stand to Sit 2  Maximal assistance   Additional items Assist x 2   Stand pivot 2  Maximal assistance   Additional items Assist x 2   Functional Mobility   Functional Mobility 2  Maximal assistance   Additional  Comments assist x 2 with chair setup to follow /pt able to take one step forward   Additional items Rolling walker   Subjective   Subjective pt able to state his wife's name with increased time allowed.   Cognition   Overall Cognitive Status Impaired  (History of Vascular Dementia)   Arousal/Participation Responsive   Attention Attends with cues to redirect   Orientation Level Oriented to person   Memory Unable to assess   Following Commands Follows one step commands with increased time or repetition   Activity Tolerance   Activity Tolerance Patient limited by fatigue   Assessment   Assessment Pt seen for Occupational Therapy session with focus on activity tolerance, bed mob, functional transfers, functional mobility and standing tolerance and balance. Pt cleared by RN/ Amy  for pt participated in therapy session.  Pt presented sitting out of bed to bedside chair pt identifiers confirmed. Pt was unable to report a therapy goal 2* pt inital lethargy.  Pt required assist for functional transfers and standing balance 2* decreased strength and limited activity tolerance.  Pt family members present and advocating for increased therapy for pt. Pt reported pain this session during urination and nurse informed and involved. Pt  noted for increased mental attentiveness this session as pt able to engage/talk with his wife. Pt appropriate for II (Moderate Resources) at this time. Pt return to bed post session bed alarm activated and in the care of his nurse and PCA.   Plan   Treatment Interventions ADL retraining;Functional transfer training;Cognitive reorientation;Patient/family training   Goal Expiration Date 07/19/24   OT Treatment Day 4   OT Frequency 3-5x/wk   Discharge Recommendation   Rehab Resource Intensity Level, OT II (Moderate Resource Intensity)   AM-PAC Daily Activity Inpatient   Lower Body Dressing 1   Bathing 1   Toileting 2   Upper Body Dressing 2   Grooming 2   Eating 2   Daily Activity Raw Score 10    AM-PAC Applied Cognition Inpatient   Following a Speech/Presentation 2   Understanding Ordinary Conversation 2   Taking Medications 1   Remembering Where Things Are Placed or Put Away 1   Remembering List of 4-5 Errands 1   Taking Care of Complicated Tasks 1   Applied Cognition Raw Score 8   Applied Cognition Standardized Score 19.32   Barthel Index   Feeding 0   Bathing 0   Grooming Score 0   Dressing Score 0   Bladder Score 0   Bowels Score 10   Toilet Use Score 0   Transfers (Bed/Chair) Score 5   Mobility (Level Surface) Score 0   Stairs Score 0   Barthel Index Score 15     Sweetie PELAYO/LUCIANA

## 2024-07-17 NOTE — PHYSICAL THERAPY NOTE
Physical Therapy Progress Note     07/17/24 1454   PT Last Visit   PT Visit Date 07/17/24   Note Type   Note Type Treatment   Pain Assessment   Pain Assessment Tool FLACC   Pain Rating: FLACC (Rest) - Face 0   Pain Rating: FLACC (Rest) - Legs 0   Pain Rating: FLACC (Rest) - Activity 0   Pain Rating: FLACC (Rest) - Cry 0   Pain Rating: FLACC (Rest) - Consolability 0   Score: FLACC (Rest) 0   Pain Rating: FLACC (Activity) - Face 2   Pain Rating: FLACC (Activity) - Legs 1   Pain Rating: FLACC (Activity) - Activity 1   Pain Rating: FLACC (Activity) - Cry 1   Pain Rating: FLACC (Activity) - Consolability 1   Score: FLACC (Activity) 6   Restrictions/Precautions   Other Precautions Cognitive;Chair Alarm;Bed Alarm;Pain;Fall Risk;Multiple lines   Subjective   Subjective Pt encountered slousched in recliner, appearing fatigued, but agreeable to attempt standing.  He appears uncomfortable in sitting, but is unable to indicate source of discomfort besides grabbing occasionally at penis & catheter which appeared not to be taut, but did appear to have blood outside & inside catheter line.  RN addressed the same after mobiilty completed.  Family present & very concerned with pt's status in recent days.  They offer many questions regarding pt's mobility & functional status which were answered to best of this PTA's ability.  Medical, nutritional & dietary concerns deferred to medical team.  RN present towards end of session & further discussed their concerns.   Bed Mobility   Supine to Sit Unable to assess  (pt recieved sitting in recliner)   Sit to Supine 2  Maximal assistance   Additional items Assist x 2   Transfers   Sit to Stand 2  Maximal assistance   Additional items Assist x 2  (unable to fully extend LEs)   Stand to Sit 2  Maximal assistance   Additional items Assist x 2   Stand pivot 2  Maximal assistance   Additional items Assist x 2   Balance   Static Sitting Poor   Static Standing Poor -   Dynamic Standing Poor -    Activity Tolerance   Activity Tolerance Patient limited by fatigue;Patient limited by pain   Nurse Made Aware JASPER Reyes   Assessment   Prognosis Fair   Problem List Decreased strength;Decreased range of motion;Impaired balance;Decreased endurance;Decreased mobility;Decreased coordination;Decreased cognition;Impaired judgement;Decreased safety awareness;Pain;Impaired vision;Impaired hearing   Assessment pt continues to require considerable Ax2 for all tasks today primarily due to weakness, impaired balance, and decreased ability to attend to instructions to complete tasks consistantly to reduce burden of care.  1st standing trial peformed at  and pt was able to maintain standing with UE support & Ax2, but he was unable to complete egress from recliner due to BLE weakness & buckling that made further attempts to do so unsafe, especially pt when began to demand to sit in similar manner to previous session that makes furhter instruction unreasonable.  After extended seated rest to recover, pt then assisted back to bed by way of posterior sling method which allowed for bilateral knee blocking prn.  Pt appeared to advance BLEs while pivoting, but was still predominantly assisted by staff to complete transfer safely.  After pt assisted safely to bed & repositioned for comfort, extensive discussion was held with numerous family members present in room.  Attempted to answer questions regarding pt's mobility status, and explained role of PT in hospital environment as part of pt's hosptial stay.  Reassured family that pt has been mobilized OOB daily this week per this PTA's observation, direct particiation & confirmation with staff.  family remains concerned regarding pt's status at this time.   From PT perspective, his mobility is relatively unchanged in las 2 sessions, but is below most recent sessions prior where he performed tasks more effectively with improved mentation & command following.  anticipate that if pt  can regain similar level of status, he may begin to make progress as was anticipated during those sessions.  Pt will be PT to see next session as goals will  after today.   Goals   Patient Goals none stated due to cog status   STG Expiration Date 24   PT Treatment Day 5   Plan   Treatment/Interventions Functional transfer training;LE strengthening/ROM;Elevations;Therapeutic exercise;Endurance training;Patient/family training;Equipment eval/education;Bed mobility;Gait training   Progress Slow progress, decreased activity tolerance   PT Frequency 3-5x/wk   Discharge Recommendation   Rehab Resource Intensity Level, PT I (Maximum Resource Intensity)   AM-PAC Basic Mobility Inpatient   Turning in Flat Bed Without Bedrails 2   Lying on Back to Sitting on Edge of Flat Bed Without Bedrails 1   Moving Bed to Chair 1   Standing Up From Chair Using Arms 1   Walk in Room 1   Climb 3-5 Stairs With Railing 1   Basic Mobility Inpatient Raw Score 7   Turning Head Towards Sound 3   Follow Simple Instructions 2   Low Function Basic Mobility Raw Score  12   Low Function Basic Mobility Standardized Score  18.33   [facility] Highest Level Of Mobility   -[facility] Goal 2: Bed activities/Dependent transfer   -HLM Achieved 5: Stand (1 or more minutes)       Luis Linton PTA    An [facility] Basic Mobility Raw Score less than 17 suggests pt would benefit from post acute rehab.  Please also refer to the recommendation of the Physical Therapist for safe discharge planning.

## 2024-07-17 NOTE — RESTORATIVE TECHNICIAN NOTE
Restorative Technician Note      Patient Name: Hal Miller     Restorative Tech Visit Date: 07/17/24  Note Type: Mobility  Patient Position Upon Consult: Supine  Activity Performed: Transferred (Ax3 Stand pivot to bedside chair)  Assistive Device: Roller walker  Patient Position at End of Consult: Bedside chair; All needs within reach; Bed/Chair alarm activated    YANIRA Ivory

## 2024-07-17 NOTE — CASE MANAGEMENT
Case Management Discharge Planning Note    Patient name Hal Miller  Location [facility]-01 MRN [id number]  : 1940 Date 2024       Current Admission Date: 7/3/2024  Current Admission Diagnosis:Colonic diverticular abscess   Patient Active Problem List    Diagnosis Date Noted Date Diagnosed    Moderate protein-calorie malnutrition (HCC) 2024     Left inguinal hernia 2024     Leukocytosis 2024     Dysphagia 2024     Hx of aortic valve replacement 2024     Diplopia 2024     Peripheral polyneuropathy 2024     Hyponatremia 2024     Dysphoric mood 2024     NICM (nonischemic cardiomyopathy) ([facility]) 2024     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[facility]) 2024     Acute on chronic respiratory failure ([facility]) 2024     Episode of seizure-like activity 2024     History of Whipple procedure 2024     Dizziness 2024     Insomnia 2024     Ambulatory dysfunction 2024     Severe protein-calorie malnutrition (HCC) 2024     Abnormal CT of the abdomen 2024     Zoster 2024     Colonic diverticular abscess 2024     Vascular dementia (HCC) 2024     Type 2 diabetes mellitus, with long-term current use of insulin (HCC) 2024     Hypercalcemia 2023     TARA (acute kidney injury) ([facility]) 2023     Esophageal stricture 2023     Acute encephalopathy 2023     Generalized weakness 2023     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     Chronic obstructive pulmonary disease with acute exacerbation (HCC) 2022     Stage 3 chronic kidney disease (HCC) 2022     Centrilobular emphysema (HCC) 2021     History of malignant neuroendocrine tumor 2021     Atrial fibrillation (HCC) 2017     Shortness of breath 2017     Primary hypertension 2017     Hyperlipidemia 2015     Inflammatory polyarthropathy (HCC) 2014     Osteoarthrosis  11/12/2014     Polymyalgia rheumatica (HCC) 11/12/2014     Aneurysm of thoracic aorta (HCC) 01/06/2014     Aneurysm of abdominal aorta (HCC) 01/06/2014     Neoplasm of other specified site 01/06/2014       LOS (days): 14  Geometric Mean LOS (GMLOS) (days): 9.8  Days to GMLOS:-4.1     OBJECTIVE:  Risk of Unplanned Readmission Score: 38.07     Current admission status: Inpatient   Preferred Pharmacy:   [id number] [place] [place] [address] [address]  Phone: [phone] Fax: [phone]    [facility] - [place], [place] - [facility] [address] [facility] [address]  Phone: [phone] Fax: [phone]    [facility] - [place], [place] - [address]  [address] [place] [address]  Phone: [phone] Fax: [phone]    Primary Care Provider: Kyaw Monreal MD    Primary Insurance: [email or web address] [facility]  Secondary Insurance:     DISCHARGE DETAILS:    Other Referral/Resources/Interventions Provided:  Referral Comments: This CM discussed therapy recs with wife and family. due to change in both medical and functional status, ARC does not feel patient can tolerate and participate in aggressive rehab program at this. This CM discussed with Wife SNF options once pt is medically stable for DC. Wife requested to speak with medical team before making any decisions regarding rehab.CM will wait until Family questions are answered before following up with DCP.

## 2024-07-17 NOTE — PLAN OF CARE
Problem: PAIN - ADULT  Goal: Verbalizes/displays adequate comfort level or baseline comfort level  Description: Interventions:  - Encourage patient to monitor pain and request assistance  - Assess pain using appropriate pain scale  - Administer analgesics based on type and severity of pain and evaluate response  - Implement non-pharmacological measures as appropriate and evaluate response  - Consider cultural and social influences on pain and pain management  - Notify physician/advanced practitioner if interventions unsuccessful or patient reports new pain  Outcome: Progressing     Problem: INFECTION - ADULT  Goal: Absence or prevention of progression during hospitalization  Description: INTERVENTIONS:  - Assess and monitor for signs and symptoms of infection  - Monitor lab/diagnostic results  - Monitor all insertion sites, i.e. indwelling lines, tubes, and drains  - Monitor endotracheal if appropriate and nasal secretions for changes in amount and color  - [place] appropriate cooling/warming therapies per order  - Administer medications as ordered  - Instruct and encourage patient and family to use good hand hygiene technique  - Identify and instruct in appropriate isolation precautions for identified infection/condition  Outcome: Progressing     Problem: SAFETY ADULT  Goal: Patient will remain free of falls  Description: INTERVENTIONS:  - Educate patient/family on patient safety including physical limitations  - Instruct patient to call for assistance with activity   - Consult OT/PT to assist with strengthening/mobility   - Keep Call bell within reach  - Keep bed low and locked with side rails adjusted as appropriate  - Keep care items and personal belongings within reach  - Initiate and maintain comfort rounds  - Make Fall Risk Sign visible to staff  - Offer Toileting every 2 Hours, in advance of need  - Initiate/Maintain bed alarm  - Obtain necessary fall risk management equipment:    - Apply yellow socks  and bracelet for high fall risk patients  - Consider moving patient to room near nurses station  Outcome: Progressing  Goal: Maintain or return to baseline ADL function  Description: INTERVENTIONS:  -  Assess patient's ability to carry out ADLs; assess patient's baseline for ADL function and identify physical deficits which impact ability to perform ADLs (bathing, care of mouth/teeth, toileting, grooming, dressing, etc.)  - Assess/evaluate cause of self-care deficits   - Assess range of motion  - Assess patient's mobility; develop plan if impaired  - Assess patient's need for assistive devices and provide as appropriate  - Encourage maximum independence but intervene and supervise when necessary  - Involve family in performance of ADLs  - Assess for home care needs following discharge   - Consider OT consult to assist with ADL evaluation and planning for discharge  - Provide patient education as appropriate  Outcome: Progressing  Goal: Maintains/Returns to pre admission functional level  Description: INTERVENTIONS:  - Perform AM-PAC 6 Click Basic Mobility/ Daily Activity assessment daily.  - Set and communicate daily mobility goal to care team and patient/family/caregiver.   - Collaborate with rehabilitation services on mobility goals if consulted  - Perform Range of Motion 3 times a day.  - Reposition patient every 2 hours.  - Dangle patient 3 times a day  - Stand patient 3 times a day  - Ambulate patient 3 times a day  - Out of bed to chair 3 times a day   - Out of bed for meals 3 times a day  - Out of bed for toileting  - Record patient progress and toleration of activity level   Outcome: Progressing     Problem: DISCHARGE PLANNING  Goal: Discharge to home or other facility with appropriate resources  Description: INTERVENTIONS:  - Identify barriers to discharge w/patient and caregiver  - Arrange for needed discharge resources and transportation as appropriate  - Identify discharge learning needs (meds, wound  care, etc.)  - Arrange for interpretive services to assist at discharge as needed  - Refer to Case Management Department for coordinating discharge planning if the patient needs post-hospital services based on physician/advanced practitioner order or complex needs related to functional status, cognitive ability, or social support system  Outcome: Progressing     Problem: Knowledge Deficit  Goal: Patient/family/caregiver demonstrates understanding of disease process, treatment plan, medications, and discharge instructions  Description: Complete learning assessment and assess knowledge base.  Interventions:  - Provide teaching at level of understanding  - Provide teaching via preferred learning methods  Outcome: Progressing     Problem: Prexisting or High Potential for Compromised Skin Integrity  Goal: Skin integrity is maintained or improved  Description: INTERVENTIONS:  - Identify patients at risk for skin breakdown  - Assess and monitor skin integrity  - Assess and monitor nutrition and hydration status  - Monitor labs   - Assess for incontinence   - Turn and reposition patient  - Assist with mobility/ambulation  - Relieve pressure over bony prominences  - Avoid friction and shearing  - Provide appropriate hygiene as needed including keeping skin clean and dry  - Evaluate need for skin moisturizer/barrier cream  - Collaborate with interdisciplinary team   - Patient/family teaching  - Consider wound care consult   Outcome: Progressing     Problem: Nutrition/Hydration-ADULT  Goal: Nutrient/Hydration intake appropriate for improving, restoring or maintaining nutritional needs  Description: Monitor and assess patient's nutrition/hydration status for malnutrition. Collaborate with interdisciplinary team and initiate plan and interventions as ordered.  Monitor patient's weight and dietary intake as ordered or per policy. Utilize nutrition screening tool and intervene as necessary. Determine patient's food preferences and  provide high-protein, high-caloric foods as appropriate.     INTERVENTIONS:  - Monitor oral intake, urinary output, labs, and treatment plans  - Assess nutrition and hydration status and recommend course of action  - Evaluate amount of meals eaten  - Assist patient with eating if necessary   - Allow adequate time for meals  - Recommend/ encourage appropriate diets, oral nutritional supplements, and vitamin/mineral supplements  - Order, calculate, and assess calorie counts as needed  - Recommend, monitor, and adjust tube feedings and TPN/PPN based on assessed needs  - Assess need for intravenous fluids  - Provide specific nutrition/hydration education as appropriate  - Include patient/family/caregiver in decisions related to nutrition  Outcome: Progressing     Problem: RESPIRATORY - ADULT  Goal: Achieves optimal ventilation and oxygenation  Description: INTERVENTIONS:  - Assess for changes in respiratory status  - Assess for changes in mentation and behavior  - Position to facilitate oxygenation and minimize respiratory effort  - Oxygen administered by appropriate delivery if ordered  - Initiate smoking cessation education as indicated  - Encourage broncho-pulmonary hygiene including cough, deep breathe, Incentive Spirometry  - Assess the need for suctioning and aspirate as needed  - Assess and instruct to report SOB or any respiratory difficulty  - Respiratory Therapy support as indicated  Outcome: Progressing     Problem: GASTROINTESTINAL - ADULT  Goal: Minimal or absence of nausea and/or vomiting  Description: INTERVENTIONS:  - Administer IV fluids if ordered to ensure adequate hydration  - Maintain NPO status until nausea and vomiting are resolved  - Nasogastric tube if ordered  - Administer ordered antiemetic medications as needed  - Provide nonpharmacologic comfort measures as appropriate  - Advance diet as tolerated, if ordered  - Consider nutrition services referral to assist patient with adequate nutrition  and appropriate food choices  Outcome: Progressing  Goal: Maintains or returns to baseline bowel function  Description: INTERVENTIONS:  - Assess bowel function  - Encourage oral fluids to ensure adequate hydration  - Administer IV fluids if ordered to ensure adequate hydration  - Administer ordered medications as needed  - Encourage mobilization and activity  - Consider nutritional services referral to assist patient with adequate nutrition and appropriate food choices  Outcome: Progressing  Goal: Maintains adequate nutritional intake  Description: INTERVENTIONS:  - Monitor percentage of each meal consumed  - Identify factors contributing to decreased intake, treat as appropriate  - Assist with meals as needed  - Monitor I&O, weight, and lab values if indicated  - Obtain nutrition services referral as needed  Outcome: Progressing  Goal: Establish and maintain optimal ostomy function  Description: INTERVENTIONS:  - Assess bowel function  - Encourage oral fluids to ensure adequate hydration  - Administer IV fluids if ordered to ensure adequate hydration   - Administer ordered medications as needed  - Encourage mobilization and activity  - Nutrition services referral to assist patient with appropriate food choices  - Assess stoma site  - Consider wound care consult   Outcome: Progressing  Goal: Oral mucous membranes remain intact  Description: INTERVENTIONS  - Assess oral mucosa and hygiene practices  - Implement preventative oral hygiene regimen  - Implement oral medicated treatments as ordered  - Initiate Nutrition services referral as needed  Outcome: Progressing     Problem: GENITOURINARY - ADULT  Goal: Maintains or returns to baseline urinary function  Description: INTERVENTIONS:  - Assess urinary function  - Encourage oral fluids to ensure adequate hydration if ordered  - Administer IV fluids as ordered to ensure adequate hydration  - Administer ordered medications as needed  - Offer frequent toileting  - Follow  urinary retention protocol if ordered  Outcome: Progressing  Goal: Absence of urinary retention  Description: INTERVENTIONS:  - Assess patient’s ability to void and empty bladder  - Monitor I/O  - Bladder scan as needed  - Discuss with physician/AP medications to alleviate retention as needed  - Discuss catheterization for long term situations as appropriate  Outcome: Progressing  Goal: Urinary catheter remains patent  Description: INTERVENTIONS:  - Assess patency of urinary catheter  - If patient has a chronic cardenas, consider changing catheter if non-functioning  - Follow guidelines for intermittent irrigation of non-functioning urinary catheter  Outcome: Progressing     Problem: METABOLIC, FLUID AND ELECTROLYTES - ADULT  Goal: Glucose maintained within target range  Description: INTERVENTIONS:  - Monitor Blood Glucose as ordered  - Assess for signs and symptoms of hyperglycemia and hypoglycemia  - Administer ordered medications to maintain glucose within target range  - Assess nutritional intake and initiate nutrition service referral as needed  Outcome: Progressing

## 2024-07-17 NOTE — PLAN OF CARE
Problem: PHYSICAL THERAPY ADULT  Goal: Performs mobility at highest level of function for planned discharge setting.  See evaluation for individualized goals.  Description: Treatment/Interventions: OT, Spoke to case management, Spoke to nursing, Gait training, Bed mobility, Patient/family training, Endurance training, LE strengthening/ROM, Functional transfer training          See flowsheet documentation for full assessment, interventions and recommendations.  Outcome: Progressing  Note: Prognosis: Fair  Problem List: Decreased strength, Decreased range of motion, Impaired balance, Decreased endurance, Decreased mobility, Decreased coordination, Decreased cognition, Impaired judgement, Decreased safety awareness, Pain, Impaired vision, Impaired hearing  Assessment: pt continues to require considerable Ax2 for all tasks today primarily due to weakness, impaired balance, and decreased ability to attend to instructions to complete tasks consistantly to reduce burden of care.  1st standing trial peformed at  and pt was able to maintain standing with UE support & Ax2, but he was unable to complete egress from recliner due to BLE weakness & buckling that made further attempts to do so unsafe, especially pt when began to demand to sit in similar manner to previous session that makes furhter instruction unreasonable.  After extended seated rest to recover, pt then assisted back to bed by way of posterior sling method which allowed for bilateral knee blocking prn.  Pt appeared to advance BLEs while pivoting, but was still predominantly assisted by staff to complete transfer safely.  After pt assisted safely to bed & repositioned for comfort, extensive discussion was held with numerous family members present in room.  Attempted to answer questions regarding pt's mobility status, and explained role of PT in hospital environment as part of pt's hosptial stay.  Reassured family that pt has been mobilized OOB daily this week  per this PTA's observation, direct particiation & confirmation with staff.  family remains concerned regarding pt's status at this time.   From PT perspective, his mobility is relatively unchanged in las 2 sessions, but is below most recent sessions prior where he performed tasks more effectively with improved mentation & command following.  anticipate that if pt can regain similar level of status, he may begin to make progress as was anticipated during those sessions.  Pt will be PT to see next session as goals will  after today.  Barriers to Discharge: Inaccessible home environment     Rehab Resource Intensity Level, PT: I (Maximum Resource Intensity)    See flowsheet documentation for full assessment.

## 2024-07-17 NOTE — ARC ADMISSION
Reviewed updates with [facility] physician - patient with notable change in both medical and functional status, with significant decline in function. [facility]C physician with concerns for patient's ability to tolerate and participate in aggressive rehab program, as well as make functional progress/gains. Patient is now recommended for SNF/subacute rehab for longer, slower paced therapy needs. CM has been updated.

## 2024-07-17 NOTE — PLAN OF CARE
Problem: OCCUPATIONAL THERAPY ADULT  Goal: Performs self-care activities at highest level of function for planned discharge setting.  See evaluation for individualized goals.  Description: Treatment Interventions: ADL retraining, Functional transfer training, Cognitive reorientation, Patient/family training          See flowsheet documentation for full assessment, interventions and recommendations.   Outcome: Progressing  Note: Limitation: Decreased ADL status, Decreased Safe judgement during ADL, Decreased cognition, Decreased endurance, Decreased sensation, Decreased high-level ADLs  Prognosis: Fair  Assessment: Pt seen for Occupational Therapy session with focus on activity tolerance, bed mob, functional transfers, functional mobility and standing tolerance and balance. Pt cleared by RN/ Amy  for pt participated in therapy session.  Pt presented sitting out of bed to bedside chair pt identifiers confirmed. Pt was unable to report a therapy goal 2* pt inital lethargy.  Pt required assist for functional transfers and standing balance 2* decreased strength and limited activity tolerance.  Pt family members present and advocating for increased therapy for pt. Pt reported pain this session during urination and nurse informed and involved. Pt  noted for increased mental attentiveness this session as pt able to engage/talk with his wife. Pt appropriate for II (Moderate Resources) at this time. Pt return to bed post session bed alarm activated and in the care of his nurse and PCA.     Rehab Resource Intensity Level, OT: II (Moderate Resource Intensity)

## 2024-07-18 PROBLEM — Z71.89 GOALS OF CARE, COUNSELING/DISCUSSION: Status: ACTIVE | Noted: 2024-07-18

## 2024-07-18 PROBLEM — T83.9XXA DIFFICULT FOLEY CATHETER PLACEMENT (HCC): Status: ACTIVE | Noted: 2024-07-18

## 2024-07-18 PROBLEM — Z51.5 PALLIATIVE CARE BY SPECIALIST: Status: ACTIVE | Noted: 2024-07-18

## 2024-07-18 NOTE — SOCIAL WORK
A family meeting was necessary to allow for thorough discussion regarding patient's clinical presentation, expected disease trajectory, and comfort care versus continuing disease directed therapy.  Please refer to [facility] provider note for medical specifics. Questions related to medical diagnoses, comfort care and prognosis were answered and all agree:     The patient/family have jointly decided on continued care.  The pt wife states there is a living will.  She will bring in a copy tomorrow.  She is not able to recall the pt wishes at this time.  She and the family agree the pt would not want to be in his current state of health.  He is described as very independent.  His goal was to reach 89 years old.  His wife was trying to convince the pt to get help working their large piece of property and he refused.  The pt wife ideally states she would want him home, but admits she would not be able to care for him in his current condition.    There was a discussion about the pt requiring admission to a SNF and she is adamant the pt is not to be placed at [facility].  The pt wife and family visit normally from 11am-3pm.  They are hopeful to have a team member visit the pt at that time.  They would like consistency in having contact with a member of the treatment team.  The pt wife voiced her concerns about the pt nutrition.  She would like the pt to receive assistance eating his meals.  The pt was taking albuterol treatments 3x's per day and she believes that would be helpful.  The pt and his wife have been  for over 30 years.  The pt has adult children and his wife has an adult son.  They are in communication with the family and everyone is aware of the pt health status.      Individuals present at meeting include:The pt, his wife, sisters, nephew and the Palliative Care Team, Gutierrez DANIEL and Zahraa CARRILLO    I have spent 45 minutes with Patient and family today in which greater than 50% of this time was spent  in counseling/coordination of care regarding Risks and benefits of tx options.  Another conversation related to patient's goals will need to be held.  Follow-up meeting scheduled for:

## 2024-07-18 NOTE — RESTORATIVE TECHNICIAN NOTE
Restorative Technician Note      Patient Name: Hal Miller     Restorative Tech Visit Date: 07/18/24  Note Type: Mobility  Patient Position Upon Consult: Supine  Activity Performed: Transferred (Stand pivot)  Assistive Device: Other (Comment) (HHA x 2)  Education Provided: Yes  Patient Position at End of Consult: Bedside chair; Bed/Chair alarm activated; All needs within reach    YANIRA Ivory

## 2024-07-18 NOTE — PLAN OF CARE
Problem: BEHAVIOR  Goal: Pt/Family maintain appropriate behavior and adhere to behavioral management agreement, if implemented  Description: INTERVENTIONS:  - Assess the family dynamic   - Encourage verbalization of thoughts and concerns in a socially appropriate manner  - Assess patient/family's coping skills and non-compliant behavior (including use of illegal substances).  - Utilize positive, consistent limit setting strategies supporting safety of patient, staff and others  - Initiate consult with Case Management, Spiritual Care or other ancillary services as appropriate  - If a patient's/visitor's behavior jeopardizes the safety of the patient, staff, or others, refer to organization procedure.   - Notify Security of behavior or suspected illegal substances which indicate the need for search of the patient and/or belongings  - Encourage participation in the decision making process about a behavioral management agreement; implement if patient meets criteria  Outcome: Progressing     Problem: SAFETY,RESTRAINT: NV/NON-SELF DESTRUCTIVE BEHAVIOR  Goal: Remains free of harm/injury (restraint for non violent/non self-detsructive behavior)  Description: INTERVENTIONS:  - Instruct patient/family regarding restraint use   - Assess and monitor physiologic and psychological status   - Provide interventions and comfort measures to meet assessed patient needs   - Identify and implement measures to help patient regain control  - Assess readiness for release of restraint   Outcome: Progressing  Goal: Returns to optimal restraint-free functioning  Description: INTERVENTIONS:  - Assess the patient's behavior and symptoms that indicate continued need for restraint  - Identify and implement measures to help patient regain control  - Assess readiness for release of restraint   Outcome: Progressing     Problem: ANXIETY  Goal: Will report anxiety at manageable levels  Description: INTERVENTIONS:  - Administer medication as  ordered  - Teach and encourage coping skills  - Provide emotional support  - Assess patient/family for anxiety and ability to cope  Outcome: Progressing  Goal: By discharge: Patient will verbalize 2 strategies to deal with anxiety  Description: Interventions:  - Identify any obvious source/trigger to anxiety  - Staff will assist patient in applying identified coping technique/skills  - Encourage attendance of scheduled groups and activities  Outcome: Progressing     Problem: Nutrition/Hydration-ADULT  Goal: Nutrient/Hydration intake appropriate for improving, restoring or maintaining nutritional needs  Description: Monitor and assess patient's nutrition/hydration status for malnutrition. Collaborate with interdisciplinary team and initiate plan and interventions as ordered.  Monitor patient's weight and dietary intake as ordered or per policy. Utilize nutrition screening tool and intervene as necessary. Determine patient's food preferences and provide high-protein, high-caloric foods as appropriate.     INTERVENTIONS:  - Monitor oral intake, urinary output, labs, and treatment plans  - Assess nutrition and hydration status and recommend course of action  - Evaluate amount of meals eaten  - Assist patient with eating if necessary   - Allow adequate time for meals  - Recommend/ encourage appropriate diets, oral nutritional supplements, and vitamin/mineral supplements  - Order, calculate, and assess calorie counts as needed  - Recommend, monitor, and adjust tube feedings and TPN/PPN based on assessed needs  - Assess need for intravenous fluids  - Provide specific nutrition/hydration education as appropriate  - Include patient/family/caregiver in decisions related to nutrition  Outcome: Progressing     Problem: Prexisting or High Potential for Compromised Skin Integrity  Goal: Skin integrity is maintained or improved  Description: INTERVENTIONS:  - Identify patients at risk for skin breakdown  - Assess and monitor skin  integrity  - Assess and monitor nutrition and hydration status  - Monitor labs   - Assess for incontinence   - Turn and reposition patient  - Assist with mobility/ambulation  - Relieve pressure over bony prominences  - Avoid friction and shearing  - Provide appropriate hygiene as needed including keeping skin clean and dry  - Evaluate need for skin moisturizer/barrier cream  - Collaborate with interdisciplinary team   - Patient/family teaching  - Consider wound care consult   Outcome: Progressing

## 2024-07-18 NOTE — RESTORATIVE TECHNICIAN NOTE
Restorative Technician Note      Patient Name: Hal Miller     Note Type: Mobility  Patient Position Upon Consult: Supine  Activity Performed: Transferred; Dangled; Stood  Assistive Device: Other (Comment) (Assist x2 stand-pivot to the chair.)  Education Provided: Yes  Patient Position at End of Consult: Bedside chair; All needs within reach; Bed/Chair alarm activated    Devon DOYLE, Restorative Technician,

## 2024-07-18 NOTE — QUICK NOTE
Patient allergy listed to IV contrast as kidney dysfunction. No allergic reaction to contrast dye noted in previous scan in 2017. Spoke with family, who indicated he does not have allergic reactions such as shortness of breath or hives with IV contrast, but is concerned for kidney function in the setting of CKD. Family agreed to proceed with CT scan of chest, abdomen, and pelvis with contrast.

## 2024-07-18 NOTE — ASSESSMENT & PLAN NOTE
Geriatrics following, appreciate input  Considering adding back low dose seroquel HS due to overnight agitation

## 2024-07-18 NOTE — CONSULTS
"[facility]  Consult Note  Name: Hal Miller I  MRN: [id number]  Unit/Bed#: [facility] 620-01 I Date of Admission: 7/3/2024   Date of Service: 7/18/2024 I Hospital Day: 15    Assessment & Plan   Goals of care, counseling/discussion  Assessment & Plan  Code Status: full - Level 1  Decisional apparatus:  Patient is not competent on my exam today.  If competence is lost, patient's substitute decision maker would default to spouse and adult children from prior union by [place] Act [place]. However, spouse believes she is named as HCA in POA document. She will search for this tonight.  Advance Directive / Living Will / POLST:  awaiting family to provide    Met with spouse (Ann) along with her friend, niece, and nephew and KAROLINA Lea (palliative) discussed the following  Patient is a man who prides himself on independence  Ann does not think he would want to \"live like this\"  Will continue to optimize patient as able, but if no improvement will need to revisit GOC. Would look like either d/c to rehab with uncertain amount of recovery but unlikely to return to baseline vs comfort care/hospice  Family will look for advanced directive  Considered limited code status. Unlikely to recover if patient has additional setback of cardiac arrest or significant pulmonary event requiring intubation.   Family appreciated honest input and perspective. They are open to ongoing GOC discussions pending clinical course.             Palliative care by specialist  Assessment & Plan  Goals:   Level 1 full code  Palliative will follow for ongoing goals of care discussions as situation evolves.    Social Support:  Supportive listening provided  Normalized experience of patient  Provided anxiety containment  Advocated for patient/family with interdisciplinary team  Mediated conflict    Symptom management:  Per primary and geriatric teams  Delirium precautions: please minimize interruptions and " prioritize sleep at night.  No TV nor screen time at night.  Shades drawn at night.  During day, shades up, minimize napping, and encourage meals in chair.    Care Coordination  Case discussed with primary team, geriatrics    Follow-up  We appreciate the opportunity to participate in this patient's care.   We will continue to follow while admitted. [facility]SC will return on 7/19  Please do not hesitate to contact our on-call provider through [employer] Secure Chat or contact [phone] should there be an acute change or other symptom control concerns.      Moderate protein-calorie malnutrition (HCC)  Assessment & Plan  Assist with feeds, nutritional supplements, SLP following. Await whether improved HS sleep will help his appetite/wakefulness for daytime feeds.    Malnutrition Findings:   Adult Malnutrition type: Acute illness  Adult Degree of Malnutrition: Other severe protein calorie malnutrition  Malnutrition Characteristics: Fat loss, Muscle loss, Inadequate energy, Weight loss                  360 Statement: Acute on chronic severe pro, karthik malnutrition d/t condition, decreased appetite as evidence by severe signs of muscle/fat loss at clavicles, moderate at shoulders, temples, <50% energy intake > 5 days, <75% energy intake > 1 month, 4+ edema LE's, treated with oral diet, pureed, and oral nutrition supplements, will continue to monitor food preferenes, provide assistance and encouragement at meals.    BMI Findings:           Body mass index is 24.03 kg/m².       Vascular dementia (HCC)  Assessment & Plan  Geriatrics following, appreciate input  Considering adding back low dose seroquel HS due to overnight agitation    * Colonic diverticular abscess  Assessment & Plan  Surgical team following s/p ex-lap, mesh explantation, elena procedure, and groin washout on 7/4              I have reviewed the patient's controlled substance dispensing history in the Prescription Drug Monitoring Program in compliance with the [facility]  regulations before prescribing any controlled substances.         We appreciate the invitation to be involved in this patient's care.  We will continue to follow.  Please do not hesitate to reach our on call provider through our clinic answering service at [phone] should you have acute symptom control concerns.    Mandi Whitley PA-C  Palliative and Supportive Care  Clinic/Answering Service: [phone]  You can find me on [employer]!       IDENTIFICATION:  Inpatient consult to Palliative Care  Consult performed by: Mandi Whitley PA-C  Consult ordered by: Jean Carlos Hurd MD        Physician Requesting Consult: Abran Oneill,*  Reason for Consult / Principal Problem: goals of care  Hx and PE limited by: AMS, supplemented by chart review and communication with family.    HISTORY OF PRESENT ILLNESS:       Hal Miller is a 84 y.o. male with vascular dementia, a.fib, COPD, HTN, DM2, remote pancreatic cancer, severe protein calorie malnutrition, HLD, is met by palliative care on hospital day 15 after being admitted from [facility] for left groin abscess with associated left inguinal hernia and erosion of mesh into colon.    Patient had multiple preceding hospital stays including 6/7-6/8 for AMS and dyspnea. 6/16-6/26 for diverticular abscess managed with ABX. [facility] admission from 6/26-7/3. He also had an acute CVA complicating progressive decline over the past approximately 6 weeks.     Prior to admission patient was residing at home with his wife, Ann. She has a son from a prior union and Hal has 3 children from a prior union. Ann believes there to be medical POA documentation at home naming her HCA and will look for this tonight.     She describes Toni as an active man who took pride in his independence. He was beginning to struggle with loss of independence before this sequence of events.    Most acutely patient underwent  ex-lap, mesh explant, washout, debridement of left groin, and elena's  procedure on . He was initially recovering quite well but over the course of the past week has had progressive delirium, agitation HS, and difficulty eating. SLP and nutrition have been following closely. Urology also following for urinary retention, patient has been pulling at cardenas at times. Family elected to remove and attempt voiding trial. Palliative care consulted to discuss goals of care and provide support. Patients wife, friend, niece and nephew present for today's encounter.    Review of Systems   Unable to perform ROS: Mental status change       Past Medical History:   Diagnosis Date    Acute encephalopathy 2023    Acute-on-chronic kidney injury  (HCC) 2017    Cancer (HCC)     pancreatic    Colon polyp     Coronary artery disease     Dehydration 3/3/2023    Diabetes mellitus (HCC)     Hyperlipidemia     Hypertension     Left lower lobe pneumonia 2022    Pneumonia 2017    Right middle and lower lobe     Stroke (HCC)      Past Surgical History:   Procedure Laterality Date    APPENDECTOMY      CARDIAC SURGERY      Aortic Valve Replacement, Ascending Aorta    COLONOSCOPY      GALLBLADDER SURGERY      HARTMANS PROCEDURE N/A 2024    Procedure: HARTMANS PROCEDURE, WITH EXPLANTATION OF LEFT GROIN MESH, WASHOUT AND DEBRIDEMENT OF LEFT GROIN WOUND;  Surgeon: Charlie Carroll MD;  Location: [facility];  Service: General    PANCREATICODUODENECTOMY       Social History     Socioeconomic History    Marital status: /Civil Union     Spouse name: Not on file    Number of children: Not on file    Years of education: Not on file    Highest education level: Not on file   Occupational History    Not on file   Tobacco Use    Smoking status: Former     Types: Pipe     Quit date:      Years since quittin.5    Smokeless tobacco: Never   Vaping Use    Vaping status: Never Used   Substance and Sexual Activity    Alcohol use: Never    Drug use: No    Sexual activity: Yes     Partners:  Female   Other Topics Concern    Not on file   Social History Narrative    Not on file     Social Determinants of Health     Financial Resource Strain: Not on file   Food Insecurity: No Food Insecurity (7/3/2024)    Hunger Vital Sign     Worried About Running Out of Food in the Last Year: Never true     Ran Out of Food in the Last Year: Never true   Transportation Needs: No Transportation Needs (7/3/2024)    PRAPARE - Transportation     Lack of Transportation (Medical): No     Lack of Transportation (Non-Medical): No   Physical Activity: Not on file   Stress: Not on file   Social Connections: Not on file   Intimate Partner Violence: Not on file   Housing Stability: Unknown (7/3/2024)    Housing Stability Vital Sign     Unable to Pay for Housing in the Last Year: No     Number of Times Moved in the Last Year: Not on file     Homeless in the Last Year: No     Family History   Problem Relation Age of Onset    Cancer Mother     Stroke Father     Cancer Sister     Diabetes Sister     Stroke Brother        MEDICATIONS / ALLERGIES:    all current active meds have been reviewed    Allergies   Allergen Reactions    Contrast Dye [Iodinated Contrast Media] Other (See Comments)     Pt states kidney dysfunction..     Other        OBJECTIVE:    Physical Exam  Physical Exam  Constitutional:       Appearance: He is ill-appearing.   HENT:      Head: Atraumatic.   Eyes:      Conjunctiva/sclera: Conjunctivae normal.   Cardiovascular:      Rate and Rhythm: Normal rate.   Pulmonary:      Effort: No respiratory distress.      Comments: Wet cough  Abdominal:      Comments: Colostomy bag in place   Genitourinary:     Comments: Urinary catheter in place  Musculoskeletal:         General: No swelling.   Skin:     General: Skin is warm and dry.   Neurological:      Comments: Drowsy, mostly maintains eyes closed, speech is confused and difficult to comprehend   Psychiatric:      Comments: restless         Lab Results:  Results from last 7 days    Lab Units 07/18/24  0509 07/17/24  0528 07/16/24  0441   WBC Thousand/uL 14.26* 11.93* 12.05*   HEMOGLOBIN g/dL 8.1* 7.9* 7.3*   HEMATOCRIT % 26.7* 25.6* 23.8*   PLATELETS Thousands/uL 188 181 170   SEGS PCT % 81* 80* 80*   MONO PCT % 11 9 10   EOS PCT % 1 1 1     Results from last 7 days   Lab Units 07/18/24  0509 07/17/24  0528 07/16/24  0441   POTASSIUM mmol/L 4.4 4.4 4.3   CHLORIDE mmol/L 107 108 109*   CO2 mmol/L 31 32 30   BUN mg/dL 27* 26* 27*   CREATININE mg/dL 1.44* 1.15 1.13   CALCIUM mg/dL 10.2 10.2 9.8         Imaging Studies: Reviewed pertinent studies  EKG, Pathology, and Other Studies: reviewed pertinent studies    Counseling / Coordination of Care    Total floor / unit time spent today 90+ minutes. Greater than 50% of total time was spent with the patient and / or family counseling and / or coordination of care. A description of the counseling / coordination of care:   symptom assessment and management, medication review, medication adjustment, psychosocial support, chart review, imaging review, lab review, advanced directives, goals of care, hospice services, opioid titration, supportive listening, anticipatory guidance, and coordination with primary team, geriatrics, RN. Time spent at bedside from approximately 3:30-4:30 in addition to time spent to arrange meeting, brief visit earlier in the day, multidisciplinary coordination, and documentation.        This note was not shared with the patient due to privacy exception: note includes other individuals

## 2024-07-18 NOTE — SPEECH THERAPY NOTE
"Speech-Language Pathology Progress Note      Patient Name: Hal Miller    Today's Date: 7/18/2024      Subjective:  Pt was lethargic. He was sitting upright in bed. Pt CHRISTOPHER fluctuated throughout assessment. He has a wet, deep cough. Patient stated \"Are we done with that?\". Family at bedside.     Objective:  Pt was seen today for dysphagia therapy. Current diet is dysphagia 2 mechanical soft with thin liquids. Pt was on room air. Oral care was completed with use of mouth swabs, mouthwash, and oral care kits. Focus of today's session was to maximize PO intake safety. Textures offered today included puree, soft solid, and thin liquid via straw.    Swallow function:   Pt required cueing in beginning of session to close mouth. Pt had several sips of water, almost a full cup of pudding, and 2 bites of lunch (mashed potatoes and chicken with gravy mixed). Bolus retrieval was reduced.  Pt required cueing to make a seal on straw and draw. Formation was prompt. Transfer appeared delayed, Pt made swishing motion with both liquids and puree. Pt had instances of wet, deep cough prior to and during the session. Feeding was stopped due to Pt's increased coughing.     Assessment:  Pt continues to be lethargic. Family stated the dysphagia 2 has been difficult and mastication was prolonged at times. Pt has been selecting puree foods and would benefit from downgrade. MBS recommended to reassess swallow function. Puree and thin appropriate at this time.     Plan:  Change diet texture to puree. Continue ST follow up. Subsequent sessions to focus on maximizing PO intake safety and determining potential for diet texture advancement. MBS to follow   "

## 2024-07-18 NOTE — WOUND OSTOMY CARE
"Progress Note - Ostomy  Hal Miller 84 y.o. male MRN: [id number]  Unit/Bed#: [facility] 620-01 Encounter: [id number]        Patient seen for ongoing ostomy assessment and education.  Wife and other family at bedside.  Patient inappropriate for teaching at this time due to confusion. Per primary RN, patient removed ostomy pouch several times over night shift.  Ostomy pouch last changed by surgery team this morning and abdominal binder applied to prevent patient from removing pouch.      Offered to practice pouch emptying and/or ostomy pouch change using a practice stoma with patient's wife.  She declined, saying \"not today please.\"      Colostomy stoma pink, round, appears flush with skin level.  No output in pouch at this time.  1 piece Coloplast pouch intact.    Wound care team to follow for ongoing ostomy education as appropriate.    Maci DOYLEN, RN, CWON                      "

## 2024-07-18 NOTE — CONSULTS
Consult - Urology   Hal Miller 1940, 84 y.o. male MRN: [id number]    Unit/Bed#: [facility] 620-01 Encounter: [id number]    Difficult Babcock catheter placement (HCC)  Assessment & Plan  S/p difficult Babcock catheter placement by urology due to phimosis in the OR on 7/4  Patient has been pulling at Babcock catheter and having intermittent hematuria  CT:Babcock catheter tip and inflated bulb in the posterior penile urethra  Family at bedside and declines replacement, requesting removal and void trial  If patient is unable to urinate and Babcock catheter cannot be replaced recommend IR consult for suprapubic tube placement          Subjective:   85 yo M with L inguinal hernia w/mesh erosion into sigmoid colon s/p ex-lap, mesh explant, washout and debridement of L groin, Angela's on 7/4 a Babcock catheter was placed in the right operating room by Dr. Kang due to phimosis and surgical team having difficulty placing Babocck catheter.  Babcock catheter has been in place.  Patient has been pulling at Babcock catheter intermittently due to confusion.  Patient had CT scan today for leukocytosis which showed hyperdense debris in the urinary bladder and Babcock catheter and tip inflated bulb in the posterior penile urethra.  Discussed with wife, sister and nephew who was at bedside.  Patient's family would like to have the Babcock catheter removed and is not interested in replacement of Babcock catheter at this time.  Discussed with patient's family that if he is unable to urinate urology is not available overnight.  Family would still like to proceed with removal of malpositioned Babcock catheter.  Should patient not be able to urinate due to difficulty with placement would recommend suprapubic tube if long-term management is required    Review of Systems   Constitutional:  Negative for activity change, appetite change, chills, fatigue, fever and unexpected weight change.   HENT:  Negative for facial swelling.    Eyes:  Negative for discharge.  "  Respiratory: Negative.  Negative for cough and shortness of breath.    Cardiovascular:  Negative for chest pain and leg swelling.   Gastrointestinal: Negative.  Negative for abdominal distention, abdominal pain, constipation, diarrhea, nausea and vomiting.   Endocrine: Negative.    Genitourinary:  Positive for hematuria and penile swelling. Negative for decreased urine volume, dysuria, enuresis, flank pain and genital sores.   Musculoskeletal:  Negative for back pain and myalgias.   Skin:  Negative for pallor and rash.   Allergic/Immunologic: Negative.  Negative for immunocompromised state.   Neurological:  Negative for facial asymmetry and speech difficulty.   Psychiatric/Behavioral:  Positive for confusion. Negative for agitation.        Objective:  Vitals: Blood pressure 130/75, pulse 92, temperature 97.6 °F (36.4 °C), temperature source Oral, resp. rate 12, height 6' 2\" (1.88 m), weight 84.9 kg (187 lb 2.7 oz), SpO2 96%.,Body mass index is 24.03 kg/m².    Physical Exam  Cardiovascular:      Rate and Rhythm: Normal rate.   Pulmonary:      Effort: Pulmonary effort is normal. No respiratory distress.   Abdominal:      Palpations: Abdomen is soft.      Tenderness: There is no abdominal tenderness.   Genitourinary:     Comments: Light punch colored urine noted in Babcock catheter bag  Neurological:      Mental Status: He is alert. He is disoriented.         Imaging:  CT CHEST, ABDOMEN AND PELVIS WITH IV CONTRAST     INDICATION: leukocytosis s/p hartmanns.     COMPARISON: CTs of the abdomen/pelvis dated 6/18/2024 and 6/23/2021. Chest, abdomen, and pelvis dated 2/22/2024.     TECHNIQUE: CT examination of the chest, abdomen and pelvis was performed. Multiplanar 2D reformatted images were created from the source data.     This examination, like all CT scans performed in the [facility], was performed utilizing techniques to minimize radiation dose exposure, including the use of iterative reconstruction " and automated exposure control. Radiation dose length   product (DLP) for this visit: 1781.62 mGy-cm     IV Contrast: 100 mL of iohexol (OMNIPAQUE)  Enteric Contrast: Not administered.     FINDINGS:     CHEST     PLEURA: Moderate to large left pleural effusion. Component tracking along the mediastinum may suggest partial loculation.     Small to moderate-sized right pleural effusion. Loculated component extending into the major fissure. Presence of fluid at the anterolateral right base also suggests some basilar loculation. Split pleura with smooth thickening noted at the right   base--similar to priors dating back to at least 2021. Pleural thickening is less evident in the left.     No kaley pleural nodularity or masses.     LUNGS: Chronic round atelectasis at the posterior basilar right lower lobe related to chronic pleural disease/effusion--unchanged. Other areas of bandlike chronic atelectasis and scarring higher up in both lungs also appears unchanged. No superimposed   acute consolidations. Mild subpleural reticulation and parallel subpleural banding with traction bronchiolectasis at the basilar right lower lobe--unchanged. No progressive interstitial fibrotic changes. No endobronchial lesions. Central airways in both   lower lobes appear thickened, but chronically so.     HEART/GREAT VESSELS: Chronic four-chamber enlargement of the heart. Aortic valvular prosthesis. Scattered coronary calcifications. No pericardial effusion. No thoracic aortic aneurysm. Central pulmonary arteries appear chronically prominent suggesting   some component of pulmonary arterial hypertension. No gross endoluminal filling defects detected within limits of sensitivity of this nonangiographic study.     MEDIASTINUM AND KHRIS: Unremarkable.     CHEST WALL AND LOWER NECK: Unremarkable.     ABDOMEN     LIVER/BILIARY TREE: Unremarkable.     GALLBLADDER: Post cholecystectomy.     SPLEEN: Unremarkable.     PANCREAS: Postsurgical truncation  of the pancreas beyond the proximal body appears chronic and unchanged. No masses or pancreatic ductal dilatation.     ADRENAL GLANDS: Chronic nodular hypertrophy of the lateral limb of the left adrenal gland is chronic and unchanged. No suspicious adrenal nodules on either side.     KIDNEYS/URETERS: Asymmetric cortical thinning/scarring at the left kidney. Bilateral Bosniak I and II renal cyst. No suspicious renal masses or perinephric collections on either side. No urolithiasis or hydronephrosis.     STOMACH AND BOWEL: Stomach is unremarkable for level of nondistention. No dilated or inflamed loops of small bowel. Postsurgical changes after left lower quadrant colostomy formation. Persistent stool or debris in the distal sigmoid-rectal pouch. No   active colonic inflammation though there is extensive diverticular disease. Parastomal fat at the colostomy is minimal without evidence for stomal obstruction.     APPENDIX: No findings to suggest appendicitis.     ABDOMINOPELVIC CAVITY: Mild free fluid in the lower abdomen and pelvis with trace fluid tracking in the pericolic gutters. No definitively encapsulated collections. No free air. No mesenteric, retroperitoneal, or pelvic sidewall lymphadenopathy.   Unchanged low-density, oblong, circumscribed focus between the aorta and cava on 301/145.     VESSELS: Chronically truncated appearance of the splenic vein with large splenic-SMV chronic collaterals in the left upper quadrant.     Abdominal aortic and branch vessel atheromatous disease without aneurysms, high-grade atheromatous occlusion, or acute vascular abnormalities.     PELVIS     REPRODUCTIVE ORGANS: Enlarged prostate. Babcock catheter tip and balloon located at the base of the penis.     URINARY BLADDER: Babcock catheter tip and balloon in the posterior penile urethra. Iatrogenic gas in the urinary bladder. Mass effect on the bladder base by prostatic hypertrophy. There is relatively hyperdense debris layering up  to 11 mm in thickness in   the posterior bladder. This is new since June confirming this is not mass.     ABDOMINAL WALL/INGUINAL REGIONS: Moderate body wall anasarca. No discrete superficial encapsulated collections. Small amount of postoperative fluid and gas at the laparotomy site. No obvious nicholas-incisional hernias. Minimal parastomal fat herniation   discussed above. Postsurgical changes after left groin washout (301/247). No superficial collection.     BONES: Healed median sternotomy. No acute fracture or suspicious osseous lesion.     IMPRESSION:     1.  New hyperdense debris layering in the urinary bladder. Correlate for urinalysis evidence of blood products or UTI.  2.  Babcock catheter tip and inflated bulb in the posterior penile urethra. Recommend replacement.  3.  Mild airway thickening and mucoid plugging in the central airways of the lower lobes.  4.  Chronic pleural disease including chronic loculated effusions. Chronic associated atelectasis/scarring in the lungs without superimposed acute pneumonic consolidation. Right basilar thickened split pleura sign is chronic, unchanged, and therefore not   worrisome for empyema.  5.  Postsurgical changes after left end colostomy formation. No inflammation of the proximal colon or pouch.  6.  Mild intra-abdominal free fluid and fluid around the left internal inguinal ring without organized intra-abdominal abscess.  7.  No collections are evident evidence for infection at the left inguinal canal or superficial site of left groin debridement.                    Workstation performed: [id number]  Imaging reviewed - both report and images personally reviewed.     Labs:  Recent Labs     07/16/24 0441 07/17/24  0528 07/18/24  0509   WBC 12.05* 11.93* 14.26*     Recent Labs     07/16/24  0441 07/17/24  0528 07/18/24  0509   HGB 7.3* 7.9* 8.1*       Recent Labs     07/16/24  0441 07/17/24  0528 07/18/24  0509   CREATININE 1.13 1.15 1.44*       Microbiology:  +  Pyuria     History:  Social History     Socioeconomic History    Marital status: /Civil Union     Spouse name: None    Number of children: None    Years of education: None    Highest education level: None   Occupational History    None   Tobacco Use    Smoking status: Former     Types: Pipe     Quit date:      Years since quittin.5    Smokeless tobacco: Never   Vaping Use    Vaping status: Never Used   Substance and Sexual Activity    Alcohol use: Never    Drug use: No    Sexual activity: Yes     Partners: Female   Other Topics Concern    None   Social History Narrative    None     Social Determinants of Health     Financial Resource Strain: Not on file   Food Insecurity: No Food Insecurity (7/3/2024)    Hunger Vital Sign     Worried About Running Out of Food in the Last Year: Never true     Ran Out of Food in the Last Year: Never true   Transportation Needs: No Transportation Needs (7/3/2024)    PRAPARE - Transportation     Lack of Transportation (Medical): No     Lack of Transportation (Non-Medical): No   Physical Activity: Not on file   Stress: Not on file   Social Connections: Not on file   Intimate Partner Violence: Not on file   Housing Stability: Unknown (7/3/2024)    Housing Stability Vital Sign     Unable to Pay for Housing in the Last Year: No     Number of Times Moved in the Last Year: Not on file     Homeless in the Last Year: No       Past Medical History:   Diagnosis Date    Acute encephalopathy 2023    Acute-on-chronic kidney injury  (HCC) 2017    Cancer (HCC)     pancreatic    Colon polyp     Coronary artery disease     Dehydration 3/3/2023    Diabetes mellitus (HCC)     Hyperlipidemia     Hypertension     Left lower lobe pneumonia 2022    Pneumonia 2017    Right middle and lower lobe     Stroke (HCC)      Past Surgical History:   Procedure Laterality Date    APPENDECTOMY      CARDIAC SURGERY      Aortic Valve Replacement, Ascending Aorta    COLONOSCOPY       GALLBLADDER SURGERY      HARTMANS PROCEDURE N/A 7/4/2024    Procedure: HARTMANS PROCEDURE, WITH EXPLANTATION OF LEFT GROIN MESH, WASHOUT AND DEBRIDEMENT OF LEFT GROIN WOUND;  Surgeon: Charlie Carroll MD;  Location: [facility];  Service: General    PANCREATICODUODENECTOMY       Family History   Problem Relation Age of Onset    Cancer Mother     Stroke Father     Cancer Sister     Diabetes Sister     Stroke Brother        The following portions of the patient's history were reviewed and updated as appropriate: allergies, current medications, past family history, past medical history, past social history, past surgical history and problem list    ADEOLA Rowley  Date: 7/18/2024 Time: 5:52 PM

## 2024-07-18 NOTE — ASSESSMENT & PLAN NOTE
"Code Status: full - Level 1  Decisional apparatus:  Patient is not competent on my exam today.  If competence is lost, patient's substitute decision maker would default to spouse and adult children from prior union by [place] Act [place]. However, spouse believes she is named as HCA in POA document. She will search for this tonight.  Advance Directive / Living Will / POLST:  awaiting family to provide    Met with spouse (Ann) along with her friend, niece, and nephew and KAROLINA Lea (palliative) discussed the following  Patient is a man who prides himself on independence  Ann does not think he would want to \"live like this\"  Will continue to optimize patient as able, but if no improvement will need to revisit GOC. Would look like either d/c to rehab with uncertain amount of recovery but unlikely to return to baseline vs comfort care/hospice  Family will look for advanced directive  Considered limited code status. Unlikely to recover if patient has additional setback of cardiac arrest or significant pulmonary event requiring intubation.   Family appreciated honest input and perspective. They are open to ongoing GOC discussions pending clinical course.           "

## 2024-07-18 NOTE — PROGRESS NOTES
Progress Note - Geriatric Medicine   Hal Miller 84 y.o. male MRN: [id number]  Unit/Bed#: [facility] 620-01 Encounter: [id number]      Assessment/Plan:    Acute metabolic encephalopathy   -mentation continues to wax and wane to some degree   -multifactorial including advanced age, underlying dementia, hx encephalopathy during prior admissions, perforated diverticulitis now s/p surgical procedure, anesthesia, multiple setting changes and acute pain in frail elderly individual with prolonged hospitalization   -continue supportive cares, encourage normal sleep cycle, use of sensory assist devices (glasses/hearing aids)  -continue to ensure acute pain well controlled, cont barbie pain protocol, scheduled tylenol   -monitor for fecal and urinary retention - cardenas in place, LUQ colostomy    -reorient frequently as appropriate and indicated  -appreciate family at bedside for familiarity/reassurance /engagement and social support   -Mirtazapine 7.5mg initiated on 7/15/24 for report of insomnia and poor oral intake, tolerated well w/o notable morning sedation   -more restless overnight pulling at cardenas catheter and ostomy requiring soft mitt restraints, consider resuming Seroquel 12.5mg HS to help with overnight restlessness/agitation and sleep cycle   -consider posey activity belt for distraction and barrier to pulling lines/tubes/cardenas etc      Perforated diverticulitis with abscess  -s/p Hartmans procedure with explantation of left groin mesh with washout and debridement of left groin wound on 7/4/24 now with wound vac in place  -colostomy in place, cont ostomy cares   -s/p completion course of Zosyn  -continue acute multimodal pain control per geriatric pain protocol, taper as pain improves with healing   -Palliative Care consult for ongoing discussion goals of care      Anemia  -Hb 8.1 from 10.7 on 7/3/24  -likely multifactorial including dilutional, daily labs, losses in cardenas etc  -monitor for ongoing acute blood loss and tx  with PRBC as indicated      Dysphagia  -speech therapy on consult   -s/p VBS 7/7/24  -currently on dysphagia level 2 with thin liquids   -continue strict aspiration precautions  -cont working with speech therapy   -encourage good oral hygiene and cares   -recommend staff assist with ALL meals and encourage family to assist with ordering food pt enjoys   -increasing creatinine likely due to poor oral intake      Vascular dementia  -remains encephalopathic with fluctuating mentation   -at baseline prior to recent admit alert and oriented, forgetful, independent with ADLs requires some assist with iADLs   -MoCA 23/30 (11/2022), noted to have episodes of encephalopathy and decline since last testing, recommend repeat following recovery from acute illness to establish new baseline   -[facility] 6/18/24 imaging personally viewed, reveals at least moderate chronic microangiopathic changes   -TSH WNL at 1.79, B12 WNL at 531  -at risk age and cardiovascular related acceleration santy in setting of recent CVA, continue secondary risk factor modifications   -encourage patient remain physically, socially and cognitively active and engaged to maintain cognitive acuity   -encourage use sensory assist devices such as corrective lenses and hearing aids all appropriate times to reduce risk uncorrected sensroy impairment from contributing to isolation, confusion, worsening encephalopathy and more precipitous cognitive decline   -underlying dementia increases risk developing delirium and likely contributed to episode during current admission, cont strict precautions to reduce risk recurrence     Hx CVA  -small left parietal infarct during recent hospitalization on MRI brain 6/20/24  -suspected to be due to missed dosing of Eliquis at time of event  -remains at risk future CVAs, continue strict secondary risk factor modifications  -close o/p f/u with Neurology for ongoing monitoring and management      History of possible seizure  -maintained  on Depatkote as managed by Neurology, continue current dosing   -cont strict seizure precautions      Insomnia   -previously on Seroquel PRN as o/p, had not been requiring earlier in admission however now with persistent insomnia and restlessness in evenings consider resuming   -consider low dose melatonin HS PRN  -encourage establishment of consistent sleep/wake times and bedtime routine      Babcock catheter in place   -remains in place due to difficult placement per Urology  -Urology re-engaged for recurrent hematuria     Inflammatory polyarthropathy   -maintained on prednisone chronically as o/p  -follows closely with Rheum as o/p, continue close o/p f/u     DM-II  -A1c 7.6  -currently on basal bolus insulin regimen with good control  -continue glu goal 140-180 during hospitalization to reduce risk hypoglycemia, readings mostly within goal   -continue close o/p f/u with PCP for ongoing age appropriate diabetic screenings and cares      Afib   -rates currently well controlled off rate controllers, on Eliquis   -cont close follow-up with Cardiology     Impaired Vision  -recommend use of corrective lenses at all appropriate times  -encourage adequate lighting and encourage use of assistance with ambulation  -consider large font for printed materials provided to patient      Impaired Hearing  -Encourage use of hearing aids at all appropriate times  -encourage providers and caregivers to speak slowly and clearly directly to patient  -minimize background noise to encourage patient engagement  -consider use of hearing amplifier to reduce risk of straining to hear if hearing aids are not present or are not sufficient      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  -clinical frailty scale stage VI, moderately frail, progressive  -multifactorial including age, amb dysfxn, hx CVA, DM-II and multitude of chronic medical co-morbidities now with perforated diverticulitis with abscess requiring surgical procedure superimposed on  "elderly individual with limited physiologic and metabolic reserve  -continue optimization of chronic medical conditions and address acute derangements as arise  -monitor for and treat any underlying anxiety, mood, depression symptoms as may impact response to therapies and overall sense of wellbeing and quality of life  -continue to ensure tx and interventions align with patients wishes and goals of care   -cont psychosocial support of patient and caregivers     Care coordination: rounded with Roseann (RN)    Subjective:     Toni is seen and examined at bedside, he is confused, not currently restless or agitated but overnight was very restless pulling at IV, ostomy and cardenas requiring soft mitt restraints.     Review of Systems   Unable to perform ROS: Mental status change     Objective:     Vitals: Blood pressure 120/72, pulse 88, temperature (!) 96.1 °F (35.6 °C), resp. rate 16, height 6' 2\" (1.88 m), weight 84.9 kg (187 lb 2.7 oz), SpO2 93%.,Body mass index is 24.03 kg/m².      Intake/Output Summary (Last 24 hours) at 7/18/2024 0804  Last data filed at 7/17/2024 2235  Gross per 24 hour   Intake 425 ml   Output 355 ml   Net 70 ml     Current Medications: Reviewed    Physical Exam:   Physical Exam  Vitals and nursing note reviewed.   Constitutional:       General: He is not in acute distress.     Comments: Thin frail elderly male    HENT:      Head: Normocephalic.      Comments: Orbits and temporal areas sunken in appearance     Nose: Nose normal.      Mouth/Throat:      Mouth: Mucous membranes are dry.   Eyes:      General:         Right eye: No discharge.         Left eye: No discharge.   Neck:      Comments: Trachea midline   Cardiovascular:      Rate and Rhythm: Normal rate.   Pulmonary:      Effort: Pulmonary effort is normal. No respiratory distress.   Abdominal:      Palpations: Abdomen is soft.      Comments: Abd binder in place    Genitourinary:     Comments: Cardenas catheter in place   Musculoskeletal:      " Comments: Severe diffuse subcutaneous fat and muscle wasting     Bilateral soft mitt restraints in place    Skin:     General: Skin is warm and dry.      Comments: Thin and friable    Neurological:      Comments: Somnolent and confused, does not ans ques appropriately    Psychiatric:      Comments: Restless at times, not overtly agitated       Invasive Devices       Peripheral Intravenous Line  Duration             Peripheral IV 07/14/24 Dorsal (posterior);Left Forearm 3 days    Peripheral IV 07/18/24 Right;Ventral (anterior) Wrist <1 day              Drain  Duration             Urethral Catheter Latex 16 Fr. 14 days    Colostomy Other (comment) LUQ 13 days                  Lab Results:     I have personally reviewed pertinent lab results including the following:    Results from last 7 days   Lab Units 07/18/24  0509 07/17/24  0528 07/16/24  0441   WBC Thousand/uL 14.26* 11.93* 12.05*   HEMOGLOBIN g/dL 8.1* 7.9* 7.3*   HEMATOCRIT % 26.7* 25.6* 23.8*   PLATELETS Thousands/uL 188 181 170   SEGS PCT % 81* 80* 80*   MONO PCT % 11 9 10   EOS PCT % 1 1 1     Results from last 7 days   Lab Units 07/18/24  0509 07/17/24  0528 07/16/24  0441   POTASSIUM mmol/L 4.4 4.4 4.3   CHLORIDE mmol/L 107 108 109*   CO2 mmol/L 31 32 30   BUN mg/dL 27* 26* 27*   CREATININE mg/dL 1.44* 1.15 1.13   CALCIUM mg/dL 10.2 10.2 9.8     I have personally reviewed the following imaging study reports in PACS:    No new imaging overnight

## 2024-07-18 NOTE — ASSESSMENT & PLAN NOTE
Goals:   Level 1 full code  Palliative will follow for ongoing goals of care discussions as situation evolves.    Social Support:  Supportive listening provided  Normalized experience of patient  Provided anxiety containment  Advocated for patient/family with interdisciplinary team  Mediated conflict    Symptom management:  Per primary and geriatric teams  Delirium precautions: please minimize interruptions and prioritize sleep at night.  No TV nor screen time at night.  Shades drawn at night.  During day, shades up, minimize napping, and encourage meals in chair.    Care Coordination  Case discussed with primary team, geriatrics    Follow-up  We appreciate the opportunity to participate in this patient's care.   We will continue to follow while admitted. PSC will return on 7/19  Please do not hesitate to contact our on-call provider through [facility] Secure Chat or contact [phone] should there be an acute change or other symptom control concerns.

## 2024-07-18 NOTE — PLAN OF CARE
Problem: Nutrition/Hydration-ADULT  Goal: Nutrient/Hydration intake appropriate for improving, restoring or maintaining nutritional needs  Description: Monitor and assess patient's nutrition/hydration status for malnutrition. Collaborate with interdisciplinary team and initiate plan and interventions as ordered.  Monitor patient's weight and dietary intake as ordered or per policy. Utilize nutrition screening tool and intervene as necessary. Determine patient's food preferences and provide high-protein, high-caloric foods as appropriate.     INTERVENTIONS:  - Monitor oral intake, urinary output, labs, and treatment plans  - Assess nutrition and hydration status and recommend course of action  - Evaluate amount of meals eaten  - Assist patient with eating if necessary   - Allow adequate time for meals  - Recommend/ encourage appropriate diets, oral nutritional supplements, and vitamin/mineral supplements  - Order, calculate, and assess calorie counts as needed  - Recommend, monitor, and adjust tube feedings and TPN/PPN based on assessed needs  - Assess need for intravenous fluids  - Provide specific nutrition/hydration education as appropriate  - Include patient/family/caregiver in decisions related to nutrition  Outcome: Not Progressing  Variable po intake due to altered mentation

## 2024-07-18 NOTE — OCCUPATIONAL THERAPY NOTE
"  Occupational Therapy Progress Note     Patient Name: Hal Miller  Today's Date: 7/18/2024  Problem List  Principal Problem:    Colonic diverticular abscess  Active Problems:    Vascular dementia (HCC)    Moderate protein-calorie malnutrition (HCC)    Palliative care by specialist         Occupational Therapy Treatment Note     07/18/24 1427   OT Last Visit   OT Visit Date 07/18/24   Note Type   Note Type Treatment   Pain Assessment   Pain Assessment Tool FLACC   Pain Rating: FLACC (Rest) - Face 1   Pain Rating: FLACC (Rest) - Legs 0   Pain Rating: FLACC (Rest) - Activity 0   Pain Rating: FLACC (Rest) - Cry 0   Pain Rating: FLACC (Rest) - Consolability 0   Score: FLACC (Rest) 1   Pain Rating: FLACC (Activity) - Face 1   Pain Rating: FLACC (Activity) - Legs 0   Pain Rating: FLACC (Activity) - Activity 0   Pain Rating: FLACC (Activity) - Cry 0   Pain Rating: FLACC (Activity) - Consolability 0   Score: FLACC (Activity) 1   Restrictions/Precautions   Weight Bearing Precautions Per Order No   Other Precautions Cognitive;Chair Alarm;Fall Risk   Lifestyle   Autonomy Independent with ADL's and functional mobility, needs assistance with IADL's   Reciprocal Relationships Supportive spouse and family   Service to Others Retired   Intrinsic Gratification Working on the farm   ADL   Where Assessed Chair   Grooming Assistance 1  Total Assistance   Grooming Deficit Shaving;Wash/dry face   UB Bathing Assistance 1  Total Assistance   LB Bathing Assistance 1  Total Assistance   UB Dressing Assistance 2  Maximal Assistance   LB Dressing Assistance 1  Total Assistance   Toileting Assistance  1  Total Assistance   Bed Mobility   Supine to Sit 2  Maximal assistance   Additional items Assist x 2   Transfers   Sit to Stand 1  Dependent   Additional items Assist x 2   Stand to Sit 1  Dependent   Additional items Assist x 2   Stand pivot 1  Dependent   Additional items Assist x 2   Subjective   Subjective pt stated \"yeah\"   Cognition "   Overall Cognitive Status Impaired  (History of Vascular Dementia)   Arousal/Participation Arousable   Attention Difficulty attending to directions   Orientation Level Oriented to person   Memory Unable to assess   Following Commands Unable to follow one step commands   Activity Tolerance   Activity Tolerance Patient limited by fatigue   Assessment   Assessment Pt seen for Occupational Therapy session with focus on activity tolerance, bed mob, functional transfers/stand pivot to bedside chair for pt engagement in UB self-care tasks. Pt cleared by RN/  for pt participated in OT session. Pt presented sitting out of bed to bedside chair supine/HOB raised pt awake/alert and agreeable to participate in therapy following pt identifiers confirmed. Pt was unable to report  therapy goal 2* pt cognitive impairments. He required assist for functional transfers, stand pivot to bedside chair and for UB self-care 2* lethragic this session. Pt occasionally opens eyes to name called however minimally responded this session.  Pt family members present throughout session and reported that pt was awake earlier and was able to engage in ball toss. OT spoke to pt wife regarding continuing to engage pt in future BUE reaching, grasping. Pt wife verbalized good understanding  Pt remains appropriate for II (Moderate Resources) Pt set up to bedside chair post session, chair alarm activated and all needs within pt reach   Plan   Treatment Interventions ADL retraining   Goal Expiration Date 07/19/24   OT Treatment Day 5   OT Frequency 3-5x/wk   Discharge Recommendation   Rehab Resource Intensity Level, OT II (Moderate Resource Intensity)   AM-PAC Daily Activity Inpatient   Lower Body Dressing 1   Bathing 1   Toileting 1   Upper Body Dressing 2   Grooming 2   Eating 2   Daily Activity Raw Score 9   AM-PAC Applied Cognition Inpatient   Following a Speech/Presentation 2   Understanding Ordinary Conversation 2   Taking Medications 1   Remembering  Where Things Are Placed or Put Away 1   Remembering List of 4-5 Errands 1   Taking Care of Complicated Tasks 1   Applied Cognition Raw Score 8   Applied Cognition Standardized Score 19.32   Barthel Index   Feeding 0   Bathing 0   Grooming Score 0   Dressing Score 0   Bladder Score 0   Bowels Score 0   Toilet Use Score 0   Transfers (Bed/Chair) Score 5   Mobility (Level Surface) Score 0   Stairs Score 0   Barthel Index Score 5       Sweetie PELAYO/LUCIANA

## 2024-07-18 NOTE — PROGRESS NOTES
"Progress Note - General Surgery   Hal Miller 84 y.o. male MRN: [id number]  Unit/Bed#: [facility] 620-01 Encounter: [id number]    Assessment:  Hal Miller is a 84 y.o. male with L inguinal hernia w/ mesh erosion into sigmoid colon, s/p exlap, mesh explant, washout and debridement of left groin, Angela's on 7/4.    Plan:  - Dysphagia diet  - calorie counts in place  - on home meds including Eliquis  - keep cardenas, plan for outpatient urology f/u  - OOB, PT/OT Level I  - Daily dressing changes to left groin  - CM dispo planning    Subjective/Objective     Subjective: Vital signs stable. Patient had increased confusion and delirium overnight at which time he pulled of his ostomy appliance and L groin dressing. Ostomy appliance replaced by nursing. Bedside I rinsed the wound with a saline flush and placed a wet to dry dressing, placed an image of the wound in the charge. Patient able to follow commands but continues to be confused.    Objective:     Blood pressure 122/68, pulse 88, temperature 97.8 °F (36.6 °C), resp. rate 16, height 6' 2\" (1.88 m), weight 83.9 kg (184 lb 15.5 oz), SpO2 98%.,Body mass index is 23.75 kg/m².      Intake/Output Summary (Last 24 hours) at 7/18/2024 0132  Last data filed at 7/17/2024 2235  Gross per 24 hour   Intake 475 ml   Output 755 ml   Net -280 ml       Invasive Devices       Peripheral Intravenous Line  Duration             Peripheral IV 07/14/24 Dorsal (posterior);Left Forearm 3 days              Drain  Duration             Colostomy Other (comment) LUQ 13 days    Urethral Catheter Latex 16 Fr. 13 days                    Physical Exam: General appearance: confused, follows commands with prompting  Lungs: normal work of breathing  Abdomen: soft, non tender, non distended. Ostomy appliance in place with minimal stool in bag. L groin incision dressing changed as above, picture below.      Lab, Imaging and other studies:I have personally reviewed pertinent lab results.    VTE Pharmacologic " Prophylaxis: Eliquis  VTE Mechanical Prophylaxis: sequential compression device    Jean Carlos Hurd MD  General Surgery Resident

## 2024-07-18 NOTE — ASSESSMENT & PLAN NOTE
S/p difficult Babcock catheter placement by urology due to phimosis in the OR on 7/4  Patient has been pulling at Babcock catheter and having intermittent hematuria  CT showed Babcock catheter tip and inflated bulb in the posterior penile urethra  Family  declined replacement, requesting removal and void trial.  Voiding passively into external catheter.  If patient develops fulminant retention and extremis may require IR consult for suprapubic tube placement.  Urology signing off. Call us as needed.

## 2024-07-18 NOTE — ASSESSMENT & PLAN NOTE
Assist with feeds, nutritional supplements, SLP following. Await whether improved HS sleep will help his appetite/wakefulness for daytime feeds.    Malnutrition Findings:   Adult Malnutrition type: Acute illness  Adult Degree of Malnutrition: Other severe protein calorie malnutrition  Malnutrition Characteristics: Fat loss, Muscle loss, Inadequate energy, Weight loss                  360 Statement: Acute on chronic severe pro, karthik malnutrition d/t condition, decreased appetite as evidence by severe signs of muscle/fat loss at clavicles, moderate at shoulders, temples, <50% energy intake > 5 days, <75% energy intake > 1 month, 4+ edema LE's, treated with oral diet, pureed, and oral nutrition supplements, will continue to monitor food preferenes, provide assistance and encouragement at meals.    BMI Findings:           Body mass index is 24.03 kg/m².

## 2024-07-18 NOTE — NURSING NOTE
Unable to obtain UA sample from this morning due to cardenas malfunction. Urology order to remove cardenas at this time. Continue to monitor bladder scan and to place condom catheter for collection. Patient difficult for straight cath, surgical PA to do so later if failing voiding trial. Red surgery notified about UA not being collected at this time.

## 2024-07-18 NOTE — PLAN OF CARE
Problem: OCCUPATIONAL THERAPY ADULT  Goal: Performs self-care activities at highest level of function for planned discharge setting.  See evaluation for individualized goals.  Description: Treatment Interventions: ADL retraining, Endurance training, Cognitive reorientation, Patient/family training, Continued evaluation, Activityengagement, Energy conservation, Compensatory technique education          See flowsheet documentation for full assessment, interventions and recommendations.   Outcome: Progressing  Note: Limitation: Decreased ADL status, Decreased Safe judgement during ADL, Decreased cognition, Decreased endurance, Decreased sensation, Decreased high-level ADLs  Prognosis: Fair  Assessment: Pt seen for Occupational Therapy session with focus on activity tolerance, bed mob, functional transfers/stand pivot to bedside chair for pt engagement in UB self-care tasks. Pt cleared by RN/  for pt participated in OT session. Pt presented sitting out of bed to bedside chair supine/HOB raised pt awake/alert and agreeable to participate in therapy following pt identifiers confirmed. Pt was unable to report  therapy goal 2* pt cognitive impairments. He required assist for functional transfers, stand pivot to bedside chair and for UB self-care 2* lethragic this session. Pt occasionally opens eyes to name called however minimally responded this session.  Pt family members present throughout session and reported that pt was awake earlier and was able to engage in ball toss. OT spoke to pt wife regarding continuing to engage pt in future BUE reaching, grasping. Pt wife verbalized good understanding  Pt remains appropriate for II (Moderate Resources) Pt set up to bedside chair post session, chair alarm activated and all needs within pt reach     Rehab Resource Intensity Level, OT: II (Moderate Resource Intensity)

## 2024-07-18 NOTE — RESPIRATORY THERAPY NOTE
RT Protocol Note  Hal Miller 84 y.o. male MRN: [id number]  Unit/Bed#: [facility] 620-01 Encounter: [id number]    Assessment    Principal Problem:    Colonic diverticular abscess  Active Problems:    Vascular dementia (HCC)    Moderate protein-calorie malnutrition (HCC)    Palliative care by specialist      Home Pulmonary Medications:         Past Medical History:   Diagnosis Date    Acute encephalopathy 2023    Acute-on-chronic kidney injury  (HCC) 2017    Cancer (HCC)     pancreatic    Colon polyp     Coronary artery disease     Dehydration 3/3/2023    Diabetes mellitus (HCC)     Hyperlipidemia     Hypertension     Left lower lobe pneumonia 2022    Pneumonia 2017    Right middle and lower lobe     Stroke (HCC)      Social History     Socioeconomic History    Marital status: /Civil Union     Spouse name: None    Number of children: None    Years of education: None    Highest education level: None   Occupational History    None   Tobacco Use    Smoking status: Former     Types: Pipe     Quit date:      Years since quittin.5    Smokeless tobacco: Never   Vaping Use    Vaping status: Never Used   Substance and Sexual Activity    Alcohol use: Never    Drug use: No    Sexual activity: Yes     Partners: Female   Other Topics Concern    None   Social History Narrative    None     Social Determinants of Health     Financial Resource Strain: Not on file   Food Insecurity: No Food Insecurity (7/3/2024)    Hunger Vital Sign     Worried About Running Out of Food in the Last Year: Never true     Ran Out of Food in the Last Year: Never true   Transportation Needs: No Transportation Needs (7/3/2024)    PRAPARE - Transportation     Lack of Transportation (Medical): No     Lack of Transportation (Non-Medical): No   Physical Activity: Not on file   Stress: Not on file   Social Connections: Not on file   Intimate Partner Violence: Not on file   Housing Stability: Unknown (7/3/2024)    Housing Stability  "Vital Sign     Unable to Pay for Housing in the Last Year: No     Number of Times Moved in the Last Year: Not on file     Homeless in the Last Year: No       Subjective         Objective    Physical Exam:   Assessment Type: (P) Assess only  General Appearance: (P) Alert, Awake  Respiratory Pattern: (P) Normal  Chest Assessment: (P) Chest expansion symmetrical  Bilateral Breath Sounds: (P) Diminished  Cough: (P) Moist, Non-productive    Vitals:  Blood pressure 130/75, pulse 92, temperature 97.6 °F (36.4 °C), temperature source Oral, resp. rate 12, height 6' 2\" (1.88 m), weight 84.9 kg (187 lb 2.7 oz), SpO2 96%.          Imaging and other studies: I have personally reviewed pertinent reports.      O2 Device: room air     Plan    Respiratory Plan: Discontinue Protocol  Airway Clearance Plan: (P) Discontinue Protocol, Flutter     Resp Comments: (P) Pt admitted for colonic divericular abscess. pt with Hx of COPD, family stated he takes albuterol PRN  at home and is requesting PRN UDN. pt is resting confortable on RA , SpO2:91% no signs of respiratory distress at this time. pt has strong congested cough. CXR showed bilateral pleural effusions. Pt homero be intructed on flutter valve to perform on his own. ACP will be D/C  respiratory protocol and albuterol UDN Q4PRN will be ordered at this time will continue to monitor   "

## 2024-07-18 NOTE — CONSULTS
Spoke with RN - Calorie Count sheets not present. Has been on calorie count x 4 days. RD now d/c'd calorie count. Per RN patient with variable mentation - varies from alert to lethargy. Noted now diet downgraded to Level 1 Dysphagia - ST following. Patient taking 50% Glucerna at breakfast this am. RD follow up to continue. Palliative Care consulted. Continue to encourage po intake.

## 2024-07-18 NOTE — RESPIRATORY THERAPY NOTE
RT Protocol Note  Hal Miller 84 y.o. male MRN: [id number]  Unit/Bed#: [facility] 620-01 Encounter: [id number]    Assessment    Principal Problem:    Colonic diverticular abscess  Active Problems:    Moderate protein-calorie malnutrition (HCC)      Home Pulmonary Medications:  None       Past Medical History:   Diagnosis Date    Acute encephalopathy 2023    Acute-on-chronic kidney injury  (HCC) 2017    Cancer (HCC)     pancreatic    Colon polyp     Coronary artery disease     Dehydration 3/3/2023    Diabetes mellitus (HCC)     Hyperlipidemia     Hypertension     Left lower lobe pneumonia 2022    Pneumonia 2017    Right middle and lower lobe     Stroke (HCC)      Social History     Socioeconomic History    Marital status: /Civil Union     Spouse name: None    Number of children: None    Years of education: None    Highest education level: None   Occupational History    None   Tobacco Use    Smoking status: Former     Types: Pipe     Quit date:      Years since quittin.5    Smokeless tobacco: Never   Vaping Use    Vaping status: Never Used   Substance and Sexual Activity    Alcohol use: Never    Drug use: No    Sexual activity: Yes     Partners: Female   Other Topics Concern    None   Social History Narrative    None     Social Determinants of Health     Financial Resource Strain: Not on file   Food Insecurity: No Food Insecurity (7/3/2024)    Hunger Vital Sign     Worried About Running Out of Food in the Last Year: Never true     Ran Out of Food in the Last Year: Never true   Transportation Needs: No Transportation Needs (7/3/2024)    PRAPARE - Transportation     Lack of Transportation (Medical): No     Lack of Transportation (Non-Medical): No   Physical Activity: Not on file   Stress: Not on file   Social Connections: Not on file   Intimate Partner Violence: Not on file   Housing Stability: Unknown (7/3/2024)    Housing Stability Vital Sign     Unable to Pay for Housing in the Last  "Year: No     Number of Times Moved in the Last Year: Not on file     Homeless in the Last Year: No       Subjective         Objective    Physical Exam:   Assessment Type: Assess only  General Appearance: Awake  Respiratory Pattern: Normal  Chest Assessment: Chest expansion symmetrical  Bilateral Breath Sounds: Diminished    Vitals:  Blood pressure 127/72, pulse 73, temperature (!) 96.9 °F (36.1 °C), temperature source Axillary, resp. rate 16, height 6' 2\" (1.88 m), weight 83.9 kg (184 lb 15.5 oz), SpO2 98%.          Imaging and other studies: I have personally reviewed pertinent reports.      O2 Device: room air     Plan    Respiratory Plan: Discontinue Protocol        Resp Comments: (P) pt reordered on respiratory protocol at this time, pt admitted for colonic diverticular abscess, pt with COPD and does not take any breathing meds at home for it. resp protocol was dc previously due to pt not tolerating neb treatments. bs are diminished and pt has strong congested cough, cxr confirms stable pleural effusions. pt in no respiratory distress. will keep albuterol MDI ordered and dc resp protocol at this time.   "

## 2024-07-19 NOTE — PLAN OF CARE
Problem: PAIN - ADULT  Goal: Verbalizes/displays adequate comfort level or baseline comfort level  Description: Interventions:  - Encourage patient to monitor pain and request assistance  - Assess pain using appropriate pain scale  - Administer analgesics based on type and severity of pain and evaluate response  - Implement non-pharmacological measures as appropriate and evaluate response  - Consider cultural and social influences on pain and pain management  - Notify physician/advanced practitioner if interventions unsuccessful or patient reports new pain  Outcome: Progressing     Problem: INFECTION - ADULT  Goal: Absence or prevention of progression during hospitalization  Description: INTERVENTIONS:  - Assess and monitor for signs and symptoms of infection  - Monitor lab/diagnostic results  - Monitor all insertion sites, i.e. indwelling lines, tubes, and drains  - Monitor endotracheal if appropriate and nasal secretions for changes in amount and color  - [place] appropriate cooling/warming therapies per order  - Administer medications as ordered  - Instruct and encourage patient and family to use good hand hygiene technique  - Identify and instruct in appropriate isolation precautions for identified infection/condition  Outcome: Progressing     Problem: SAFETY ADULT  Goal: Patient will remain free of falls  Description: INTERVENTIONS:  - Educate patient/family on patient safety including physical limitations  - Instruct patient to call for assistance with activity   - Consult OT/PT to assist with strengthening/mobility   - Keep Call bell within reach  - Keep bed low and locked with side rails adjusted as appropriate  - Keep care items and personal belongings within reach  - Initiate and maintain comfort rounds  - Make Fall Risk Sign visible to staff  - Offer Toileting every 1 Hours, in advance of need  - Initiate/Maintain alarm  - Obtain necessary fall risk management equipment  - Apply yellow socks and  bracelet for high fall risk patients  - Consider moving patient to room near nurses station  Outcome: Progressing  Goal: Maintain or return to baseline ADL function  Description: INTERVENTIONS:  -  Assess patient's ability to carry out ADLs; assess patient's baseline for ADL function and identify physical deficits which impact ability to perform ADLs (bathing, care of mouth/teeth, toileting, grooming, dressing, etc.)  - Assess/evaluate cause of self-care deficits   - Assess range of motion  - Assess patient's mobility; develop plan if impaired  - Assess patient's need for assistive devices and provide as appropriate  - Encourage maximum independence but intervene and supervise when necessary  - Involve family in performance of ADLs  - Assess for home care needs following discharge   - Consider OT consult to assist with ADL evaluation and planning for discharge  - Provide patient education as appropriate  Outcome: Progressing  Goal: Maintains/Returns to pre admission functional level  Description: INTERVENTIONS:  - Perform AM-PAC 6 Click Basic Mobility/ Daily Activity assessment daily.  - Set and communicate daily mobility goal to care team and patient/family/caregiver.   - Collaborate with rehabilitation services on mobility goals if consulted  - Perform Range of Motion 3 times a day.  - Reposition patient every 2 hours.  - Dangle patient 3 times a day  - Stand patient 3 times a day  - Ambulate patient 3 times a day  - Out of bed to chair 3 times a day   - Out of bed for meals 3 times a day  - Out of bed for toileting  - Record patient progress and toleration of activity level   Outcome: Progressing     Problem: DISCHARGE PLANNING  Goal: Discharge to home or other facility with appropriate resources  Description: INTERVENTIONS:  - Identify barriers to discharge w/patient and caregiver  - Arrange for needed discharge resources and transportation as appropriate  - Identify discharge learning needs (meds, wound care,  etc.)  - Arrange for interpretive services to assist at discharge as needed  - Refer to Case Management Department for coordinating discharge planning if the patient needs post-hospital services based on physician/advanced practitioner order or complex needs related to functional status, cognitive ability, or social support system  Outcome: Progressing     Problem: Knowledge Deficit  Goal: Patient/family/caregiver demonstrates understanding of disease process, treatment plan, medications, and discharge instructions  Description: Complete learning assessment and assess knowledge base.  Interventions:  - Provide teaching at level of understanding  - Provide teaching via preferred learning methods  Outcome: Progressing     Problem: Prexisting or High Potential for Compromised Skin Integrity  Goal: Skin integrity is maintained or improved  Description: INTERVENTIONS:  - Identify patients at risk for skin breakdown  - Assess and monitor skin integrity  - Assess and monitor nutrition and hydration status  - Monitor labs   - Assess for incontinence   - Turn and reposition patient  - Assist with mobility/ambulation  - Relieve pressure over bony prominences  - Avoid friction and shearing  - Provide appropriate hygiene as needed including keeping skin clean and dry  - Evaluate need for skin moisturizer/barrier cream  - Collaborate with interdisciplinary team   - Patient/family teaching  - Consider wound care consult   Outcome: Progressing     Problem: Nutrition/Hydration-ADULT  Goal: Nutrient/Hydration intake appropriate for improving, restoring or maintaining nutritional needs  Description: Monitor and assess patient's nutrition/hydration status for malnutrition. Collaborate with interdisciplinary team and initiate plan and interventions as ordered.  Monitor patient's weight and dietary intake as ordered or per policy. Utilize nutrition screening tool and intervene as necessary. Determine patient's food preferences and  provide high-protein, high-caloric foods as appropriate.     INTERVENTIONS:  - Monitor oral intake, urinary output, labs, and treatment plans  - Assess nutrition and hydration status and recommend course of action  - Evaluate amount of meals eaten  - Assist patient with eating if necessary   - Allow adequate time for meals  - Recommend/ encourage appropriate diets, oral nutritional supplements, and vitamin/mineral supplements  - Order, calculate, and assess calorie counts as needed  - Recommend, monitor, and adjust tube feedings and TPN/PPN based on assessed needs  - Assess need for intravenous fluids  - Provide specific nutrition/hydration education as appropriate  - Include patient/family/caregiver in decisions related to nutrition  Outcome: Progressing     Problem: RESPIRATORY - ADULT  Goal: Achieves optimal ventilation and oxygenation  Description: INTERVENTIONS:  - Assess for changes in respiratory status  - Assess for changes in mentation and behavior  - Position to facilitate oxygenation and minimize respiratory effort  - Oxygen administered by appropriate delivery if ordered  - Initiate smoking cessation education as indicated  - Encourage broncho-pulmonary hygiene including cough, deep breathe, Incentive Spirometry  - Assess the need for suctioning and aspirate as needed  - Assess and instruct to report SOB or any respiratory difficulty  - Respiratory Therapy support as indicated  Outcome: Progressing     Problem: GASTROINTESTINAL - ADULT  Goal: Minimal or absence of nausea and/or vomiting  Description: INTERVENTIONS:  - Administer IV fluids if ordered to ensure adequate hydration  - Maintain NPO status until nausea and vomiting are resolved  - Nasogastric tube if ordered  - Administer ordered antiemetic medications as needed  - Provide nonpharmacologic comfort measures as appropriate  - Advance diet as tolerated, if ordered  - Consider nutrition services referral to assist patient with adequate nutrition  and appropriate food choices  Outcome: Progressing  Goal: Maintains or returns to baseline bowel function  Description: INTERVENTIONS:  - Assess bowel function  - Encourage oral fluids to ensure adequate hydration  - Administer IV fluids if ordered to ensure adequate hydration  - Administer ordered medications as needed  - Encourage mobilization and activity  - Consider nutritional services referral to assist patient with adequate nutrition and appropriate food choices  Outcome: Progressing  Goal: Maintains adequate nutritional intake  Description: INTERVENTIONS:  - Monitor percentage of each meal consumed  - Identify factors contributing to decreased intake, treat as appropriate  - Assist with meals as needed  - Monitor I&O, weight, and lab values if indicated  - Obtain nutrition services referral as needed  Outcome: Progressing  Goal: Establish and maintain optimal ostomy function  Description: INTERVENTIONS:  - Assess bowel function  - Encourage oral fluids to ensure adequate hydration  - Administer IV fluids if ordered to ensure adequate hydration   - Administer ordered medications as needed  - Encourage mobilization and activity  - Nutrition services referral to assist patient with appropriate food choices  - Assess stoma site  - Consider wound care consult   Outcome: Progressing  Goal: Oral mucous membranes remain intact  Description: INTERVENTIONS  - Assess oral mucosa and hygiene practices  - Implement preventative oral hygiene regimen  - Implement oral medicated treatments as ordered  - Initiate Nutrition services referral as needed  Outcome: Progressing     Problem: GENITOURINARY - ADULT  Goal: Maintains or returns to baseline urinary function  Description: INTERVENTIONS:  - Assess urinary function  - Encourage oral fluids to ensure adequate hydration if ordered  - Administer IV fluids as ordered to ensure adequate hydration  - Administer ordered medications as needed  - Offer frequent toileting  - Follow  urinary retention protocol if ordered  Outcome: Progressing  Goal: Absence of urinary retention  Description: INTERVENTIONS:  - Assess patient’s ability to void and empty bladder  - Monitor I/O  - Bladder scan as needed  - Discuss with physician/AP medications to alleviate retention as needed  - Discuss catheterization for long term situations as appropriate  Outcome: Progressing  Goal: Urinary catheter remains patent  Description: INTERVENTIONS:  - Assess patency of urinary catheter  - If patient has a chronic cardenas, consider changing catheter if non-functioning  - Follow guidelines for intermittent irrigation of non-functioning urinary catheter  Outcome: Progressing     Problem: METABOLIC, FLUID AND ELECTROLYTES - ADULT  Goal: Glucose maintained within target range  Description: INTERVENTIONS:  - Monitor Blood Glucose as ordered  - Assess for signs and symptoms of hyperglycemia and hypoglycemia  - Administer ordered medications to maintain glucose within target range  - Assess nutritional intake and initiate nutrition service referral as needed  Outcome: Progressing     Problem: SAFETY,RESTRAINT: NV/NON-SELF DESTRUCTIVE BEHAVIOR  Goal: Remains free of harm/injury (restraint for non violent/non self-detsructive behavior)  Description: INTERVENTIONS:  - Instruct patient/family regarding restraint use   - Assess and monitor physiologic and psychological status   - Provide interventions and comfort measures to meet assessed patient needs   - Identify and implement measures to help patient regain control  - Assess readiness for release of restraint   Outcome: Progressing  Goal: Returns to optimal restraint-free functioning  Description: INTERVENTIONS:  - Assess the patient's behavior and symptoms that indicate continued need for restraint  - Identify and implement measures to help patient regain control  - Assess readiness for release of restraint   Outcome: Progressing     Problem: BEHAVIOR  Goal: Pt/Family maintain  appropriate behavior and adhere to behavioral management agreement, if implemented  Description: INTERVENTIONS:  - Assess the family dynamic   - Encourage verbalization of thoughts and concerns in a socially appropriate manner  - Assess patient/family's coping skills and non-compliant behavior (including use of illegal substances).  - Utilize positive, consistent limit setting strategies supporting safety of patient, staff and others  - Initiate consult with Case Management, Spiritual Care or other ancillary services as appropriate  - If a patient's/visitor's behavior jeopardizes the safety of the patient, staff, or others, refer to organization procedure.   - Notify Security of behavior or suspected illegal substances which indicate the need for search of the patient and/or belongings  - Encourage participation in the decision making process about a behavioral management agreement; implement if patient meets criteria  Outcome: Progressing     Problem: ANXIETY  Goal: Will report anxiety at manageable levels  Description: INTERVENTIONS:  - Administer medication as ordered  - Teach and encourage coping skills  - Provide emotional support  - Assess patient/family for anxiety and ability to cope  Outcome: Progressing  Goal: By discharge: Patient will verbalize 2 strategies to deal with anxiety  Description: Interventions:  - Identify any obvious source/trigger to anxiety  - Staff will assist patient in applying identified coping technique/skills  - Encourage attendance of scheduled groups and activities  Outcome: Progressing

## 2024-07-19 NOTE — PROGRESS NOTES
Deterioration Index Critical Care Recommendations  Room #: 620  Deterioration index score: 76.61%    Critical Care recommends DI fired during rapid response for hypotension, Critical care at bedside    Brief summary:       Please contact critical care via [facility] with any questions or concerns.

## 2024-07-19 NOTE — RESPIRATORY THERAPY NOTE
07/19/24 1508   Respiratory Protocol   Protocol Selection Respiratory   Respiratory Assessment   Resp Comments Found new order for TID albuterol. Advised that scheduled txs were a specific family request made to palliative medicine. Will retrurn Albuterol to Q4 PRN, and add Xopenex TID per resp protocol. Pt unavailable at this time.

## 2024-07-19 NOTE — PROGRESS NOTES
Progress Note - Geriatric Medicine   Hal Miller 84 y.o. male MRN: [id number]  Unit/Bed#: [facility] 620-01 Encounter: [id number]      Assessment/Plan:    Acute metabolic encephalopathy   -mentation continues to wax and wane to some degree   -multifactorial including advanced age, underlying dementia, hx encephalopathy during prior admissions, perforated diverticulitis now s/p surgical procedure, anesthesia, multiple setting changes and acute pain in frail elderly individual with prolonged hospitalization   -continue supportive cares, encourage normal sleep cycle, use of sensory assist devices (glasses/hearing aids)  -continue to ensure acute pain well controlled, cont barbie pain protocol, scheduled tylenol   -monitor for fecal and urinary retention - cardenas in place, LUQ colostomy    -reorient frequently as appropriate and indicated  -appreciate family at bedside for familiarity/reassurance /engagement and social support   -Mirtazapine 7.5mg initiated on 7/15/24 for report of insomnia and poor oral intake, tolerated well w/o notable morning sedation   -more restless overnight 7/17-7/18 pulling at cardenas catheter and ostomy requiring soft mitt restraints    -Seroquel 12.5mg HS resumed 7/18-7/19 to help with overnight restlessness/agitation and sleep cycle with some improvement of sleep and next day alertness, cont current dosing for now, monitor mentation and QTc closely     Perforated diverticulitis with abscess  -s/p Hartmans procedure with explantation of left groin mesh with washout and debridement of left groin wound on 7/4/24 now with wound vac in place  -colostomy in place, cont ostomy cares   -s/p completion course of Zosyn  -continue acute multimodal pain control per geriatric pain protocol, taper as pain improves with healing   -Palliative Care consult for ongoing discussion goals of care, appreciate input     Anemia  -Hb 7.1 from 10.7 on 7/3/24  -likely multifactorial including dilutional, daily labs, losses in  cardenas etc  -monitor for ongoing acute blood loss and tx with PRBC as indicated      Dysphagia  -speech therapy on consult   -s/p VBS 7/7/24  -currently on dysphagia level 2 with thin liquids   -continue strict aspiration precautions  -cont working with speech therapy   -encourage good oral hygiene and cares   -recommend staff assist with ALL meals and encourage family to assist with ordering food pt enjoys     Moderate protein calorie malnutrition   Malnutrition Findings:   Adult Malnutrition type: Acute illness  Adult Degree of Malnutrition: Other severe protein calorie malnutrition  Malnutrition Characteristics: Fat loss, Muscle loss, Inadequate energy, Weight loss  360 Statement: Acute on chronic severe pro, karthik malnutrition d/t condition, decreased appetite as evidence by severe signs of muscle/fat loss at clavicles, moderate at shoulders, temples, <50% energy intake > 5 days, <75% energy intake > 1 month, 4+ edema LE's, treated with oral diet, pureed, and oral nutrition supplements, will continue to monitor food preferenes, provide assistance and encouragement at meals.  BMI Findings: Body mass index is 25.08 kg/m².      Vascular dementia  -remains encephalopathic with fluctuating mentation due to acute encephalopathy   -at baseline prior to recent admit alert and oriented, forgetful, independent with ADLs requires some assist with iADLs   -MoCA 23/30 (11/2022), noted to have episodes of encephalopathy and decline since last testing, recommend repeat following recovery from acute illness to establish new baseline   -CT 6/18/24 imaging personally viewed, reveals at least moderate chronic microangiopathic changes   -TSH WNL at 1.79, B12 WNL at 531  -at risk age and cardiovascular related acceleration santy in setting of recent CVA, continue secondary risk factor modifications   -encourage patient remain physically, socially and cognitively active and engaged to maintain cognitive acuity   -encourage use sensory  assist devices such as corrective lenses and hearing aids all appropriate times to reduce risk uncorrected sensroy impairment from contributing to isolation, confusion, worsening encephalopathy and more precipitous cognitive decline   -underlying dementia increases risk developing delirium and likely contributed to episode during current admission, cont strict precautions to reduce risk recurrence     Hx CVA  -small left parietal infarct during recent hospitalization on MRI brain 6/20/24  -suspected to be due to missed dosing of Eliquis at time of event  -remains at risk future CVAs, continue strict secondary risk factor modifications  -close o/p f/u with Neurology for ongoing monitoring and management      History of possible seizure  -maintained on Depatkote as managed by Neurology, continue current dosing   -cont strict seizure precautions      Insomnia   -previously on Seroquel PRN as o/p, had not been requiring earlier in admission however now with persistent insomnia and restlessness in evenings prompting resumption 7/18-7/19 with some improvement   -could also consider low dose melatonin HS PRN  -encourage establishment of consistent sleep/wake times and bedtime routine      Difficult cardenas placement   -difficult cardenas placement by Urology due to phimosis in OR 7/4  -intermittent hematuria with pt pulling at cardenas now d/c and voiding spontaneously   -if retains urine requiring intervention Urology recs consideration of IR consult for suprapubic placement  -continue retention protocol      Inflammatory polyarthropathy   -maintained on prednisone chronically as o/p  -follows closely with Rheum as o/p, continue close o/p f/u     DM-II  -A1c 7.6  -currently on basal bolus insulin regimen with good control  -continue glu goal 140-180 during hospitalization to reduce risk hypoglycemia, readings mostly within goal   -continue close o/p f/u with PCP for ongoing age appropriate diabetic screenings and cares      Afib  "  -rates currently well controlled off rate controllers, on Eliquis   -cont close follow-up with Cardiology     Impaired Vision  -recommend use of corrective lenses at all appropriate times  -encourage adequate lighting and encourage use of assistance with ambulation  -consider large font for printed materials provided to patient      Impaired Hearing  -Encourage use of hearing aids at all appropriate times  -encourage providers and caregivers to speak slowly and clearly directly to patient  -minimize background noise to encourage patient engagement  -consider use of hearing amplifier to reduce risk of straining to hear if hearing aids are not present or are not sufficient      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  -clinical frailty scale stage VI, moderately frail, progressive  -multifactorial including age, amb dysfxn, hx CVA, DM-II and multitude of chronic medical co-morbidities now with perforated diverticulitis with abscess requiring surgical procedure superimposed on elderly individual with limited physiologic and metabolic reserve  -continue optimization of chronic medical conditions and address acute derangements as arise  -monitor for and treat any underlying anxiety, mood, depression symptoms as may impact response to therapies and overall sense of wellbeing and quality of life  -continue to ensure tx and interventions align with patients wishes and goals of care   -cont psychosocial support of patient and caregivers     Care coordination: rounded with Jay (RN)    Subjective:     Hal is seen and examined at bedside, he remains restless and confused, only responds \"what\" to questions. Nursing reports that he slept some overnight alternating with restlessness. Babcock catheter removed yesterday has been voiding since.     Re-evaluation later in day patient awake alert and answering ques appropriately stating he \"feels like a new man\".    Review of Systems   Unable to perform ROS: Mental status change " "    Objective:     Vitals: Blood pressure 115/69, pulse 90, temperature (!) 97.4 °F (36.3 °C), resp. rate 20, height 6' 2\" (1.88 m), weight 88.6 kg (195 lb 5.2 oz), SpO2 93%.,Body mass index is 25.08 kg/m².      Intake/Output Summary (Last 24 hours) at 7/19/2024 0639  Last data filed at 7/19/2024 0444  Gross per 24 hour   Intake 2867.51 ml   Output 975 ml   Net 1892.51 ml       Current Medications: Reviewed    Physical Exam:   Physical Exam  Vitals and nursing note reviewed.   Constitutional:       Comments: Thin cachetic frail appearing elderly male    HENT:      Head: Normocephalic.      Comments: Orbits appear sunken in appearance      Nose: Nose normal.      Comments: O2 via NC     Mouth/Throat:      Mouth: Mucous membranes are dry.   Eyes:      General:         Right eye: No discharge.         Left eye: No discharge.   Neck:      Comments: Trachea midline   Cardiovascular:      Rate and Rhythm: Normal rate.      Heart sounds: Murmur heard.   Pulmonary:      Effort: No respiratory distress.      Comments: Coarse upper airway breath sounds   Abdominal:      General: There is no distension.      Palpations: Abdomen is soft.      Comments: Abd binder in place    Musculoskeletal:      Comments: Severe diffuse subcutaneous fat and muscle wasting     Abd binder in place       Soft mitt restraints on hands bilaterally    Skin:     General: Skin is warm and dry.   Neurological:      Comments: Fluctuating mentation, confused    Psychiatric:      Comments: Restless         Invasive Devices       Peripheral Intravenous Line  Duration             Peripheral IV 07/18/24 Left Antecubital <1 day    Peripheral IV 07/18/24 Right;Ventral (anterior) Wrist <1 day              Drain  Duration             Colostomy Other (comment) LUQ 14 days    External Urinary Catheter <1 day                  Lab Results:     I have personally reviewed pertinent lab results including the following:    Results from last 7 days   Lab Units " 07/19/24 0447 07/18/24  0509 07/17/24  0528   WBC Thousand/uL 11.43* 14.26* 11.93*   HEMOGLOBIN g/dL 7.1* 8.1* 7.9*   HEMATOCRIT % 23.4* 26.7* 25.6*   PLATELETS Thousands/uL 155 188 181   SEGS PCT % 80* 81* 80*   MONO PCT % 10 11 9   EOS PCT % 1 1 1     Results from last 7 days   Lab Units 07/19/24 0447 07/18/24  0509 07/17/24  0528   POTASSIUM mmol/L 4.4 4.4 4.4   CHLORIDE mmol/L 108 107 108   CO2 mmol/L 32 31 32   BUN mg/dL 25 27* 26*   CREATININE mg/dL 1.33* 1.44* 1.15   CALCIUM mg/dL 9.7 10.2 10.2     7/18/24 - UA    I have personally reviewed the following imaging study reports in PACS:    7/18/24- [facility], CT chest abd pevis w contrast

## 2024-07-19 NOTE — ASSESSMENT & PLAN NOTE
"Code Status: full - Level 1  Decisional apparatus:  Patient is not competent on my exam today.  If competence is lost, patient's substitute decision maker would default to spouse and adult children from prior union by [place] 169. However, spouse believes she is named as HCA in POA document. She will search for this tonight.  Advance Directive / Living Will / POLST:  awaiting family to provide    7/18 Met with spouse (Ann) along with her friend, niece, and nephew and KAROLINA Lea (palliative) discussed the following  Patient is a man who prides himself on independence  Ann does not think he would want to \"live like this\"  Will continue to optimize patient as able, but if no improvement will need to revisit GOC. Would look like either d/c to rehab with uncertain amount of recovery but unlikely to return to baseline vs comfort care/hospice  Family will look for advanced directive  Considered limited code status. Unlikely to recover if patient has additional setback of cardiac arrest or significant pulmonary event requiring intubation.   Family appreciated honest input and perspective. They are open to ongoing GOC discussions pending clinical course.  7/19  No follow up questions from yesterday's meeting  Spouse has found living will. Will either bring this in to be copied or have   fax it to palliative office  SLP planning VBS. If he continues to have minimal PO intake or intake is limited by NPO status will need to revisit GOC.   Discussed that PEG is not without risk and in light of recent abdominal surgery and other comorbidities, unsure whether it will be offered. However ,palliative can continue to follow for ongoing support and GOC.           "

## 2024-07-19 NOTE — PROCEDURES
Speech Pathology - Modified Barium Swallow Study    Patient Name: Hal Miller    Today's Date: 7/19/2024     Problem List  Principal Problem:    Colonic diverticular abscess  Active Problems:    Vascular dementia (HCC)    Moderate protein-calorie malnutrition (HCC)    Palliative care by specialist    Goals of care, counseling/discussion    Difficult Babcock catheter placement (HCC)      Past Medical History  Past Medical History:   Diagnosis Date    Acute encephalopathy 2/14/2023    Acute-on-chronic kidney injury  (HCC) 6/13/2017    Cancer (HCC)     pancreatic    Colon polyp     Coronary artery disease     Dehydration 3/3/2023    Diabetes mellitus (HCC)     Hyperlipidemia     Hypertension     Left lower lobe pneumonia 7/2/2022    Pneumonia 06/13/2017    Right middle and lower lobe     Stroke (HCC)        Past Surgical History  Past Surgical History:   Procedure Laterality Date    APPENDECTOMY      CARDIAC SURGERY      Aortic Valve Replacement, Ascending Aorta    COLONOSCOPY      GALLBLADDER SURGERY      HARTMANS PROCEDURE N/A 7/4/2024    Procedure: HARTMANS PROCEDURE, WITH EXPLANTATION OF LEFT GROIN MESH, WASHOUT AND DEBRIDEMENT OF LEFT GROIN WOUND;  Surgeon: Charlie Carroll MD;  Location: [facility];  Service: General    PANCREATICODUODENECTOMY         Assessment Summary:    Pt presents with moderate-severe oropharyngeal dysphagia characterized by poor labial seal and poor retrieval, significantly weakened lingual drive and propulsion, reduced hyolaryngeal excursion and no epiglottic inversion.  There is severe retention w/ puree that is partially  cleared w/ sips thin but he is not able to fully clear with a volitional swallow.  Trace amounts of silent aspiration occurred inconsistently with thin and nectar during the swallows.  He had some coughing during the study but not directly during these instances when he has trace amounts of aspiration.  There was no material noted in the  trachea with the coughing events.  The pt does not have a gross amount of aspiration but this swallow is not sufficient for adequate full po intake.  He continues to be quite lethargic and needs max stim to participate in meals and to take po.       Note: Images are available for review in PACS as desired.    Recommendations:   Recommended Diet: ongoing discussions w/ wife- either continue attempting puree and thin &/or consider some short term alternate means of nutrition to see if any improvement in his medical condition occurs.     Recommended Form of Medications: crushed with puree or via ng if that is the decision  Aspiration precautions and compensatory swallowing strategies: upright posture, only feed when fully alert, slow rate of feeding, and small bites/sips  Consider referral to:  ?RD, GI  SLP Dysphagia therapy recommended: will follow as able     Results Reviewed with: patient and RN   Pt/Family Education: initiated. Pt and caregivers would benefit from/require continued education.          General Information;  Pt is a 84 y.o. male with a PMH remarkable for some long standing dysphagia with most c/o globus sensation and note.    He  presented to [facility] with new onset painful left groin abscess underlying left inguinal hernia. Originally admitted to [facility] on 6/6/2024 following acute CVA. He was noted to have progressive edema, erythema and tenderness of the left inguinal area. He has known history of left inguinal hernia and was being followed by general surgery during his prior admission.  Underwent hartmanns procedure, L groin mesh, washout and debridement of L groin wound on 7/4/24.    Current concerns for dysphagia include decline in mental status and overall po intake.  .  MBS was recommended to assess oropharyngeal stage swallowing skills at this time & to determine if any increased aspiration was occurring.    The pt was a Rapid response prior to this study while in the holding  area- hypoixia was noted.     Prior MBS:  [facility] on 7/7/24 Assessment Summary:    Pt presents with moderate-severe oropharyngeal dysphagia characterized by a weakened swallow, decreased strength overall, significant pharyngeal residual with solids which clears to mild with liquid wash, and trace penetration between swallows with secretions/pharyngeal residual.  A higher aspiration risk is identified.  Support provided.  The results and recommendations were reviewed with the pt, Nurse, and team.   Note: Images are available for review in PACS as desired.     Recommendations:   Recommended Diet: thin liquids - pt is currently on clear liquids; when able to advance, recommend a puree and liquid diet.   Recommended Form of Medications: crushed with puree   Aspiration precautions and compensatory swallowing strategies: upright posture, slow rate of feeding, small bites/sips, and alternating bites and sips and watch for swallow    Oral Mechanism Exam  Facial: masked facies and open mouth posture  Labial: bilateral decreased ROM, decreased strength, and decreased coordination  Lingual: decreased ROM, bilateral decreased strength, and decreased coordination  Velum: symmetrical  Mandible:  slack, open wide  Dentition:  partial natural  Vocal quality: clear/adequate   Volitional Cough:  congested gurgly    Respiratory Status: on 7 L O2   Pt was viewed sitting upright in the lateral and AP positions. Due to concerns for patient safety / patient refusal, trials provided deviated from the MBSImP Validated Protocol. Pt was given attempted tsps but pt unable to form seal on the tsp, poor cup rim seal.  Offered thin by cup/straw, nectar thick by cup/straw, puree.  Honey was not attempted and given the retention with puree it was decided that it was safest.     Initial view observations/comments: Limited view secondary to body habitus-shoulders unable to be repositioned for optimal viewing.  Pt was repositioned mult times, head was  supported to a more neutral position w pillows      8-Point Penetration-Aspiration Scale   Thin liquid 8 - Material enters the airway, passes below the vocal folds, and no effort is made to eject  -trace amounts noted during swallows   Nectar thick liquid 8 - Material enters the airway, passes below the vocal folds, and no effort is made to eject  -trace amounts, difficulty viewing   Honey thick liquid NA   Puree (pudding) 1 - Material does not enter the airway   Solid NA     Strategies and Efficacy: pt unable to follow commands to move head but is able to use mult swallows, they did not clear the severe retention w/ the puree    Aspiration Response and Efficacy:  delayed responses.  He did have some coughing during the study but not with the trace aspiration events w/ thin/nectar    MBS IMP Rating    ORAL Impairment  Compinent 1--Lip Closure  Judged at any point during the swallow.  2 - Escape from interlabial space or lateral juncture; no extension beyond vermilion border    Component 2--Tongue Control During Bolus Hold  Judged on held liquid boluses only and prior to productive tongue movement.   1 - Escape to lateral buccal cavity/floor of mouth (FOM)-lingual rocking     Component 3--Bolus Preparation/Mastication  Judged only during presentation of 1/2 shortbread cookie coated in pudding.   NA - no solids able to be offered    Component 4--Bolus Transport/Lingual Motion  Judged after first productive tongue movement for oral bolus transport.  3 - Repetitive/disorganized tongue motion    Component 5--Oral Residue  Judged after first swallow or after the last swallow of the sequential swallow task.  2 - Residue collection on oral structures   Location   C - Tongue    Component 6--Initiation of Pharyngeal Swallow  Judged at first movement of the brisk superior-anterior hyoid trajectory.  3 - Bolus head in pyriforms      PHARYNGEAL Impairment  Component 7--Soft Palate Elevation  Judged during maximum displacement of  soft palate.  0 - No bolus between the soft palate (SP)/pharyngeal wall (PW)    Component 8--Laryngeal Elevation  Judged when epiglottis is in its most horizontal position.  2 - Minimal superior movement of thyroid cartilage with minimal approximation of arytenoids to epiglottic petiole    Component 9--Anterior Hyoid Excursion  Judged at height of swallow/maximal anterior hyoid displacement.  1 - Partial anterior movement    Component 10--Epiglottic Movement  Judged at height of swallow/maximal anterior hyoid displacement.  2 - No inversion    Component 11--Laryngeal Vestibular Closure  Judged at height of swallow/maximal anterior hyoid displacement.  1 - Incomplete; narrow column air/contrast in laryngeal vestibule    Component 12--Pharyngeal Stripping Wave  Judged during the full duration of the pharyngeal swallow.  1 - Present - diminished-moderately impaired    Component 13--Pharyngeal Contraction  Judged in AP view at rest and throughout maximum movement of structures.  1 - Incomplete (Pseudodiverticulae)  Minimal contraction noted    Component 14--Pharyngoesophageal Segment Opening  Judged during maximum distension of PES and throughout opening and closure.  1 - Partial distension/partial duration; partial obstruction to flow    Component 15--Tongue Base (TB) Retraction  Judged during maximum retraction of the tongue base.  3 - Wide column of contrast or air between TB and PW    Component 16--Pharyngeal Residue  Judged after first swallow or after the last swallow of the sequential swallow task.  4 - Minimal to no pharyngeal clearance   Location   B - Valleculae      ESOPHAGEAL Impairment  Component 17--Esophageal Clearance Upright Position  Judged in AP view during bolus transit through the oral cavity to the LES  2 - Esophageal retention with retrograde flow below pharyngoesophageal segment (PES)

## 2024-07-19 NOTE — SPEECH THERAPY NOTE
Speech Language/Pathology  Speech-Language Pathology Progress Note      Patient Name: Hal Miller    Today's Date: 7/19/2024      Subjective:  Pt was lethargic, slow to respond, and needed stim to be alert or remain alert for short periods . He was sitting upright in bed.  He briefly alerts but falls asleep again even with sternal rub.   Pt's wife and family as well as Palliative present for session.      Objective:  Pt was seen today for dysphagia therapy. Current diet is puree with thin liquids. Pt was on 1L O2 via nasal cannula. Oral care was completed with use of Yankauer suction and oral care kits. Focus of today's session was to maximize PO intake safety, to educate pt/family on aspiration, risks of aspiration, safe PO intake strategies, anatomy and physiology of swallowing/dysphagia in relation to pt's medical conditions, and reasoning for diet modification, and to assess for any needed changes to improve intake . Family is aware that he is not eating, he is more lethargic.  Questions answered about possible feeding tube- risks and benefits discussed.  Textures offered today included thin liquid via cup and via straw and nectar thick liquid via cup.  Swallow function:   The pt was difficult to keep alert, mouth is in open posture, needs physical assist and cues to close it and form seal on the cup or the straw.  Poor seal, reduced oral control suspected w/ some excessive swishing of material.  3-4 swallow per bolus w/ a slightly delayed wet, moist cough that increased to more of a congested cough.  Voice still relatively clear.      Assessment:  Pt continues w/ poor intake, ongoing lethargy.  Today pt had difficulty drawing from the straw or taking material from the tsp.  Swallows are sluggish and suspected to be delayed.  +coughing which is congested.     Plan:  After discussion with the family a 2* swallow study will be completed  Will f/u with after as able  Continue the diet for now only when the pt is  alert

## 2024-07-19 NOTE — PROGRESS NOTES
07/19/24 1600   Clinical Encounter Type   Visited With Family   Routine Visit Introduction   Crisis Visit Code  (RRT)      responded to family during patient RRT. Held a bit of conversation, family asked that I keep him in my prayers. I will visit again tomorrow when he may be back in room. No home Religious. Chaplains remain available.

## 2024-07-19 NOTE — RESPIRATORY THERAPY NOTE
07/19/24 1552   Respiratory Protocol   Protocol Selection Airway Clearance   Airway Clearance Plan Percussive Vest   Respiratory Assessment   Resp Comments Rec. communication requesting CPT for pt. Still unavailable at this time. Plan to order vest, due to pt baseline level of participation/interaction.

## 2024-07-19 NOTE — PLAN OF CARE
Problem: PAIN - ADULT  Goal: Verbalizes/displays adequate comfort level or baseline comfort level  Description: Interventions:  - Encourage patient to monitor pain and request assistance  - Assess pain using appropriate pain scale  - Administer analgesics based on type and severity of pain and evaluate response  - Implement non-pharmacological measures as appropriate and evaluate response  - Consider cultural and social influences on pain and pain management  - Notify physician/advanced practitioner if interventions unsuccessful or patient reports new pain  Outcome: Progressing     Problem: Prexisting or High Potential for Compromised Skin Integrity  Goal: Skin integrity is maintained or improved  Description: INTERVENTIONS:  - Identify patients at risk for skin breakdown  - Assess and monitor skin integrity  - Assess and monitor nutrition and hydration status  - Monitor labs   - Assess for incontinence   - Turn and reposition patient  - Assist with mobility/ambulation  - Relieve pressure over bony prominences  - Avoid friction and shearing  - Provide appropriate hygiene as needed including keeping skin clean and dry  - Evaluate need for skin moisturizer/barrier cream  - Collaborate with interdisciplinary team   - Patient/family teaching  - Consider wound care consult   Outcome: Progressing     Problem: Nutrition/Hydration-ADULT  Goal: Nutrient/Hydration intake appropriate for improving, restoring or maintaining nutritional needs  Description: Monitor and assess patient's nutrition/hydration status for malnutrition. Collaborate with interdisciplinary team and initiate plan and interventions as ordered.  Monitor patient's weight and dietary intake as ordered or per policy. Utilize nutrition screening tool and intervene as necessary. Determine patient's food preferences and provide high-protein, high-caloric foods as appropriate.     INTERVENTIONS:  - Monitor oral intake, urinary output, labs, and treatment plans  -  Assess nutrition and hydration status and recommend course of action  - Evaluate amount of meals eaten  - Assist patient with eating if necessary   - Allow adequate time for meals  - Recommend/ encourage appropriate diets, oral nutritional supplements, and vitamin/mineral supplements  - Order, calculate, and assess calorie counts as needed  - Recommend, monitor, and adjust tube feedings and TPN/PPN based on assessed needs  - Assess need for intravenous fluids  - Provide specific nutrition/hydration education as appropriate  - Include patient/family/caregiver in decisions related to nutrition  Outcome: Progressing     Problem: RESPIRATORY - ADULT  Goal: Achieves optimal ventilation and oxygenation  Description: INTERVENTIONS:  - Assess for changes in respiratory status  - Assess for changes in mentation and behavior  - Position to facilitate oxygenation and minimize respiratory effort  - Oxygen administered by appropriate delivery if ordered  - Initiate smoking cessation education as indicated  - Encourage broncho-pulmonary hygiene including cough, deep breathe, Incentive Spirometry  - Assess the need for suctioning and aspirate as needed  - Assess and instruct to report SOB or any respiratory difficulty  - Respiratory Therapy support as indicated  Outcome: Progressing     Problem: GASTROINTESTINAL - ADULT  Goal: Minimal or absence of nausea and/or vomiting  Description: INTERVENTIONS:  - Administer IV fluids if ordered to ensure adequate hydration  - Maintain NPO status until nausea and vomiting are resolved  - Nasogastric tube if ordered  - Administer ordered antiemetic medications as needed  - Provide nonpharmacologic comfort measures as appropriate  - Advance diet as tolerated, if ordered  - Consider nutrition services referral to assist patient with adequate nutrition and appropriate food choices  Outcome: Progressing  Goal: Maintains or returns to baseline bowel function  Description: INTERVENTIONS:  - Assess  bowel function  - Encourage oral fluids to ensure adequate hydration  - Administer IV fluids if ordered to ensure adequate hydration  - Administer ordered medications as needed  - Encourage mobilization and activity  - Consider nutritional services referral to assist patient with adequate nutrition and appropriate food choices  Outcome: Progressing  Goal: Maintains adequate nutritional intake  Description: INTERVENTIONS:  - Monitor percentage of each meal consumed  - Identify factors contributing to decreased intake, treat as appropriate  - Assist with meals as needed  - Monitor I&O, weight, and lab values if indicated  - Obtain nutrition services referral as needed  Outcome: Progressing  Goal: Establish and maintain optimal ostomy function  Description: INTERVENTIONS:  - Assess bowel function  - Encourage oral fluids to ensure adequate hydration  - Administer IV fluids if ordered to ensure adequate hydration   - Administer ordered medications as needed  - Encourage mobilization and activity  - Nutrition services referral to assist patient with appropriate food choices  - Assess stoma site  - Consider wound care consult   Outcome: Progressing  Goal: Oral mucous membranes remain intact  Description: INTERVENTIONS  - Assess oral mucosa and hygiene practices  - Implement preventative oral hygiene regimen  - Implement oral medicated treatments as ordered  - Initiate Nutrition services referral as needed  Outcome: Progressing

## 2024-07-19 NOTE — ASSESSMENT & PLAN NOTE
Assist with feeds, nutritional supplements, SLP following. Await whether improved overnight sleep/ sleep wake cycle will help his appetite/wakefulness for daytime feeds.  Patient with worsening cough with PO trial today. SLP planning for VBS this afternoon. Spouse asked about PEG see GO discussion    Malnutrition Findings:   Adult Malnutrition type: Acute illness  Adult Degree of Malnutrition: Other severe protein calorie malnutrition  Malnutrition Characteristics: Fat loss, Muscle loss, Inadequate energy, Weight loss                  360 Statement: Acute on chronic severe pro, karthik malnutrition d/t condition, decreased appetite as evidence by severe signs of muscle/fat loss at clavicles, moderate at shoulders, temples, <50% energy intake > 5 days, <75% energy intake > 1 month, 4+ edema LE's, treated with oral diet, pureed, and oral nutrition supplements, will continue to monitor food preferenes, provide assistance and encouragement at meals.    BMI Findings:           Body mass index is 25.08 kg/m².

## 2024-07-19 NOTE — PROGRESS NOTES
"[facility]  Progress Note  Name: Hal Miller I  MRN: [id number]  Unit/Bed#: [facility] 620-01 I Date of Admission: 7/3/2024   Date of Service: 7/19/2024 I Hospital Day: 16    Assessment & Plan   Difficult Babcock catheter placement (HCC)  Assessment & Plan  S/p difficult Babcock catheter placement by urology due to phimosis in the OR on 7/4  Patient has been pulling at Babcock catheter and having intermittent hematuria  CT showed Babcock catheter tip and inflated bulb in the posterior penile urethra  Family  declined replacement, requesting removal and void trial.  Voiding passively into external catheter.  If patient develops fulminant retention and extremis may require IR consult for suprapubic tube placement.  Urology signing off. Call us as needed.                       Subjective/Objective   Chief Complaint: urinary retention    Subjective: No verbal response    Objective: See below    Blood pressure 127/70, pulse 86, temperature (!) 97.4 °F (36.3 °C), resp. rate 16, height 6' 2\" (1.88 m), weight 88.6 kg (195 lb 5.2 oz), SpO2 96%.,Body mass index is 25.08 kg/m².      Intake/Output Summary (Last 24 hours) at 7/19/2024 1147  Last data filed at 7/19/2024 0444  Gross per 24 hour   Intake 1530.01 ml   Output 425 ml   Net 1105.01 ml       Invasive Devices       Peripheral Intravenous Line  Duration             Peripheral IV 07/18/24 Left Antecubital 1 day    Peripheral IV 07/18/24 Right;Ventral (anterior) Wrist 1 day              Drain  Duration             Colostomy Other (comment) LUQ 14 days    External Urinary Catheter <1 day                    Physical Exam: General appearance: syndromic appearance - ill appearing  Head: Normocephalic, without obvious abnormality, atraumatic  Neck: no JVD and supple, symmetrical, trachea midline  Lungs: diminished breath sounds  Heart: regular rate and rhythm, S1, S2 normal, no murmur, click, rub or gallop  Abdomen: stoma  Extremities: extremities " normal, warm and well-perfused; no cyanosis, clubbing, or edema  Pulses: 2+ and symmetric  Neurologic: Mental status: alertness: obtunded  External Catheter--dark urine    Lab, Imaging and other studies:I have personally reviewed pertinent lab results.      Lab Results   Component Value Date    WBC 11.43 (H) 07/19/2024    HGB 7.1 (L) 07/19/2024    HCT 23.4 (L) 07/19/2024     (H) 07/19/2024     07/19/2024     Lab Results   Component Value Date    SODIUM 145 07/19/2024    K 4.4 07/19/2024     07/19/2024    CO2 32 07/19/2024    BUN 25 07/19/2024    CREATININE 1.33 (H) 07/19/2024    GLUC 118 07/19/2024    CALCIUM 9.7 07/19/2024

## 2024-07-19 NOTE — ASSESSMENT & PLAN NOTE
Goals:   Level 1 full code  Palliative will follow for ongoing goals of care discussions as situation evolves.    Social Support:  Supportive listening provided  Normalized experience of patient  Provided anxiety containment  Advocated for patient/family with interdisciplinary team  Mediated conflict    Symptom management:  Per primary and geriatric teams  Delirium precautions: please minimize interruptions and prioritize sleep at night.  No TV nor screen time at night.  Shades drawn at night.  During day, shades up, minimize napping, and encourage meals in chair.    Care Coordination  Case discussed with primary team, geriatrics, RN, and SLP    Follow-up  We appreciate the opportunity to participate in this patient's care.   We will continue to follow while admitted. PSC will return on 7/22  Please do not hesitate to contact our on-call provider through [facility] Secure Chat or contact [phone] should there be an acute change or other symptom control concerns.

## 2024-07-19 NOTE — ASSESSMENT & PLAN NOTE
Malnutrition Findings:   Adult Malnutrition type: Acute illness  Adult Degree of Malnutrition: Other severe protein calorie malnutrition  Malnutrition Characteristics: Fat loss, Muscle loss, Inadequate energy, Weight loss                  360 Statement: Acute on chronic severe pro, karthik malnutrition d/t condition, decreased appetite as evidence by severe signs of muscle/fat loss at clavicles, moderate at shoulders, temples, <50% energy intake > 5 days, <75% energy intake > 1 month, 4+ edema LE's, treated with oral diet, pureed, and oral nutrition supplements, will continue to monitor food preferenes, provide assistance and encouragement at meals.    BMI Findings:           Body mass index is 25.08 kg/m².

## 2024-07-19 NOTE — ASSESSMENT & PLAN NOTE
Geriatrics following, appreciate input  Collaborated and decided to add seroquel 12.5mg HS back to regimen (previously taking at home)

## 2024-07-19 NOTE — PROGRESS NOTES
"Progress Note - Hal Miller 84 y.o. male MRN: [id number]    Unit/Bed#: [facility] 620-01 Encounter: [id number]      Assessment:  83 yo M with L inguinal hernia w/mesh erosion into sigmoid colon s/p ex-lap, mesh explant, washout and debridement of L groin, Angela's on 7/4    AVSS   , 1x  - hospital delirium noted  - CT head, chest/abd/pelvis w/no acute infectious etiology, chronic loculated effusions noted, cardenas in urethra from patient pulling on  - cardenas removed per urology, voiding spontaneously, condom catheter in place this morning on rounds  - ostomy output - none recorded  Cre 1.33 from 1.44 - stable  WBC 11.4 from 14.2, 11.9  Hgb 7.1 from 8.1, 7.9, 7.3 - stable    Plan:  Dysphagia diet per speech  Assist with feeding patient  On home meds including Eliquis  Appreciate Urology recs  OOB, PT/OT Level I   Daily wet to dry groin dressing changes  CM re: dispo     Subjective:   Patient seen and examined bedside.  Hospital delirium noted.  Improved with activity/stimulation.      Objective:     Vitals: Blood pressure 127/70, pulse 86, temperature (!) 97.4 °F (36.3 °C), resp. rate 16, height 6' 2\" (1.88 m), weight 88.6 kg (195 lb 5.2 oz), SpO2 96%.,Body mass index is 25.08 kg/m².      Intake/Output Summary (Last 24 hours) at 7/19/2024 0809  Last data filed at 7/19/2024 0444  Gross per 24 hour   Intake 2367.51 ml   Output 975 ml   Net 1392.51 ml       Physical Exam:   /70   Pulse 86   Temp (!) 97.4 °F (36.3 °C)   Resp 16   Ht 6' 2\" (1.88 m)   Wt 88.6 kg (195 lb 5.2 oz)   SpO2 96%   BMI 25.08 kg/m²   General - no acute distress, responsive  CV - warm, regular rate  Pulm - normal work of breathing, no respiratory distress  Abd - soft, nondistended, L groin site packed, midline stapled, ostomy dark with sweat on bag, no stool noted   Neuro - m/s grossly intact, cn grossly intact  Ext - moving all extremities      Invasive Devices       Peripheral Intravenous Line  Duration             Peripheral IV " 07/18/24 Right;Ventral (anterior) Wrist 1 day    Peripheral IV 07/18/24 Left Antecubital <1 day              Drain  Duration             Colostomy Other (comment) LUQ 14 days    External Urinary Catheter <1 day                    Lab, Imaging and other studies: I have personally reviewed pertinent reports.    VTE Pharmacologic Prophylaxis: Eliquis  VTE Mechanical Prophylaxis: sequential compression device

## 2024-07-19 NOTE — PROGRESS NOTES
"[facility]  Progress Note  Name: Hal Miller I  MRN: [id number]  Unit/Bed#: [facility] 620-01 I Date of Admission: 7/3/2024   Date of Service: 7/19/2024 I Hospital Day: 16    Assessment & Plan   Goals of care, counseling/discussion  Assessment & Plan  Code Status: full - Level 1  Decisional apparatus:  Patient is not competent on my exam today.  If competence is lost, patient's substitute decision maker would default to spouse and adult children from prior union by [place] Act [place]. However, spouse believes she is named as HCA in POA document. She will search for this tonight.  Advance Directive / Living Will / POLST:  awaiting family to provide    7/18 Met with spouse (Ann) along with her friend, niece, and nephew and KAROLINA Lea (palliative) discussed the following  Patient is a man who prides himself on independence  Ann does not think he would want to \"live like this\"  Will continue to optimize patient as able, but if no improvement will need to revisit GOC. Would look like either d/c to rehab with uncertain amount of recovery but unlikely to return to baseline vs comfort care/hospice  Family will look for advanced directive  Considered limited code status. Unlikely to recover if patient has additional setback of cardiac arrest or significant pulmonary event requiring intubation.   Family appreciated honest input and perspective. They are open to ongoing GOC discussions pending clinical course.  7/19  No follow up questions from yesterday's meeting  Spouse has found living will. Will either bring this in to be copied or have   fax it to palliative office  SLP planning VBS. If he continues to have minimal PO intake or intake is limited by NPO status will need to revisit GOC.   Discussed that PEG is not without risk and in light of recent abdominal surgery and other comorbidities, unsure whether it will be offered. However ,palliative can continue to " follow for ongoing support and GOC.             Palliative care by specialist  Assessment & Plan  Goals:   Level 1 full code  Palliative will follow for ongoing goals of care discussions as situation evolves.    Social Support:  Supportive listening provided  Normalized experience of patient  Provided anxiety containment  Advocated for patient/family with interdisciplinary team  Mediated conflict    Symptom management:  Per primary and geriatric teams  Delirium precautions: please minimize interruptions and prioritize sleep at night.  No TV nor screen time at night.  Shades drawn at night.  During day, shades up, minimize napping, and encourage meals in chair.    Care Coordination  Case discussed with primary team, geriatrics, RN, and SLP    Follow-up  We appreciate the opportunity to participate in this patient's care.   We will continue to follow while admitted. PSC will return on 7/22  Please do not hesitate to contact our on-call provider through [facility] Secure Chat or contact [phone] should there be an acute change or other symptom control concerns.      Moderate protein-calorie malnutrition (HCC)  Assessment & Plan  Assist with feeds, nutritional supplements, SLP following. Await whether improved overnight sleep/ sleep wake cycle will help his appetite/wakefulness for daytime feeds.  Patient with worsening cough with PO trial today. SLP planning for VBS this afternoon. 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    BMI Findings:           Body mass index is 25.08 kg/m².       Vascular dementia (HCC)  Assessment & Plan  Geriatrics following, appreciate input  Collaborated and decided to add seroquel 12.5mg HS back to regimen (previously taking at home)    * Colonic diverticular abscess  Assessment & Plan  Surgical team following s/p ex-lap, mesh explantation, elena procedure, and groin washout on 7/4         Interval history:       Patient was ordered seroquel last night but also had an event of coughing and maybe aspiration causing question as to how much of this he got. He slept better this evening. He was more alert this morning but during time of encounter was quite lethargic and difficult to maintain wakefulness even with stimulation. SLP was present during encounter at which time patient was coughing on multiple  consistencies and decision made to proceed with VBS. Spouse and step son at bedside along with friend, Jessica. See GOC discussion as above.     MEDICATIONS / ALLERGIES:     all current active meds have been reviewed    Allergies   Allergen Reactions    Contrast Dye [Iodinated Contrast Media] Other (See Comments)     Pt states kidney dysfunction..     Other        OBJECTIVE:    Physical Exam  Physical Exam  Constitutional:       Appearance: He is ill-appearing.   HENT:      Head:      Comments: Temporal wasting  Cardiovascular:      Rate and Rhythm: Normal rate.   Pulmonary:      Effort: No respiratory distress.   Abdominal:      Tenderness: There is no guarding.   Musculoskeletal:         General: No swelling.   Skin:     General: Skin is warm and dry.   Neurological:      Comments: Lethargic difficult to maintain wakefulness   Psychiatric:      Comments: Less restless today but also more lethargic         Lab Results:   Results from last 7 days   Lab Units  07/19/24 0447 07/18/24  0509 07/17/24  0528   WBC Thousand/uL 11.43* 14.26* 11.93*   HEMOGLOBIN g/dL 7.1* 8.1* 7.9*   HEMATOCRIT % 23.4* 26.7* 25.6*   PLATELETS Thousands/uL 155 188 181   SEGS PCT % 80* 81* 80*   MONO PCT % 10 11 9   EOS PCT % 1 1 1     Results from last 7 days   Lab Units 07/19/24 0447 07/18/24  0509 07/17/24  0528   POTASSIUM mmol/L 4.4 4.4 4.4   CHLORIDE mmol/L 108 107 108   CO2 mmol/L 32 31 32   BUN mg/dL 25 27* 26*   CREATININE mg/dL 1.33* 1.44* 1.15   CALCIUM mg/dL 9.7 10.2 10.2       Imaging Studies: reviewed pertinent studies   EKG, Pathology, and Other Studies: reviewed pertinent studies    Counseling / Coordination of Care    Total floor / unit time spent today 40 minutes. Greater than 50% of total time was spent with the patient and / or family counseling and / or coordination of care. A description of the counseling / coordination of care: symptom assessment and management, medication review, psychosocial support, chart review, imaging review, lab review, goals of care, hospice services, supportive listening, anticipatory guidance, and coordination with primary team, SLP, RN, and family.

## 2024-07-20 NOTE — RESPIRATORY THERAPY NOTE
RT Protocol Note  Hal Miller 84 y.o. male MRN: [id number]  Unit/Bed#: [facility] 620-01 Encounter: [id number]    Assessment    Principal Problem:    Colonic diverticular abscess  Active Problems:    Vascular dementia (HCC)    Moderate protein-calorie malnutrition (HCC)    Palliative care by specialist    Goals of care, counseling/discussion    Difficult Babcock catheter placement (HCC)         Past Medical History:   Diagnosis Date    Acute encephalopathy 2023    Acute-on-chronic kidney injury  (HCC) 2017    Cancer (HCC)     pancreatic    Colon polyp     Coronary artery disease     Dehydration 3/3/2023    Diabetes mellitus (HCC)     Hyperlipidemia     Hypertension     Left lower lobe pneumonia 2022    Pneumonia 2017    Right middle and lower lobe     Stroke (HCC)      Social History     Socioeconomic History    Marital status: /Civil Union     Spouse name: None    Number of children: None    Years of education: None    Highest education level: None   Occupational History    None   Tobacco Use    Smoking status: Former     Types: Pipe     Quit date:      Years since quittin.5    Smokeless tobacco: Never   Vaping Use    Vaping status: Never Used   Substance and Sexual Activity    Alcohol use: Never    Drug use: No    Sexual activity: Yes     Partners: Female   Other Topics Concern    None   Social History Narrative    None     Social Determinants of Health     Financial Resource Strain: Not on file   Food Insecurity: No Food Insecurity (7/3/2024)    Hunger Vital Sign     Worried About Running Out of Food in the Last Year: Never true     Ran Out of Food in the Last Year: Never true   Transportation Needs: No Transportation Needs (7/3/2024)    PRAPARE - Transportation     Lack of Transportation (Medical): No     Lack of Transportation (Non-Medical): No   Physical Activity: Not on file   Stress: Not on file   Social Connections: Not on file   Intimate Partner Violence: Not on file   Housing  "Stability: Unknown (7/3/2024)    Housing Stability Vital Sign     Unable to Pay for Housing in the Last Year: No     Number of Times Moved in the Last Year: Not on file     Homeless in the Last Year: No       Subjective         Objective    Physical Exam:   Assessment Type: During-treatment  General Appearance: Drowsy  Respiratory Pattern: Normal  Chest Assessment: Chest expansion symmetrical  Bilateral Breath Sounds: Diminished, Coarse  Cough: Congested, Non-productive    Vitals:  Blood pressure 133/78, pulse 87, temperature 98 °F (36.7 °C), temperature source Axillary, resp. rate 16, height 6' 2\" (1.88 m), weight 87.7 kg (193 lb 5.5 oz), SpO2 100%.          Imaging and other studies:     O2 Device: 4 lpm nc     Plan    Respiratory Plan: Home Bronchodilator Patient pathway  Airway Clearance Plan: Percussive Vest     Resp Comments: Did initiate vest with prn albuterol at this time. Pt objected to Oral sxn both verbally and physcially.   "

## 2024-07-20 NOTE — PLAN OF CARE
Problem: SAFETY ADULT  Goal: Patient will remain free of falls  Description: INTERVENTIONS:  - Educate patient/family on patient safety including physical limitations  - Instruct patient to call for assistance with activity   - Consult OT/PT to assist with strengthening/mobility   - Keep Call bell within reach  - Keep bed low and locked with side rails adjusted as appropriate  - Keep care items and personal belongings within reach  - Initiate and maintain comfort rounds  - Make Fall Risk Sign visible to staff  Problem: Knowledge Deficit  Goal: Patient/family/caregiver demonstrates understanding of disease process, treatment plan, medications, and discharge instructions  Description: Complete learning assessment and assess knowledge base.  Interventions:  - Provide teaching at level of understanding  - Provide teaching via preferred learning methods  Outcome: Progressing     - Apply yellow socks and bracelet for high fall risk patients  - Consider moving patient to room near nurses station  Outcome: Progressing  Goal: Maintain or return to baseline ADL function  Description: INTERVENTIONS:  -  Assess patient's ability to carry out ADLs; assess patient's baseline for ADL function and identify physical deficits which impact ability to perform ADLs (bathing, care of mouth/teeth, toileting, grooming, dressing, etc.)  - Assess/evaluate cause of self-care deficits   - Assess range of motion  - Assess patient's mobility; develop plan if impaired  - Assess patient's need for assistive devices and provide as appropriate  - Encourage maximum independence but intervene and supervise when necessary  - Involve family in performance of ADLs  - Assess for home care needs following discharge   - Consider OT consult to assist with ADL evaluation and planning for discharge  - Provide patient education as appropriate  Outcome: Progressing  Goal: Maintains/Returns to pre admission functional level  Description: INTERVENTIONS:  -  Perform AM-PAC 6 Click Basic Mobility/ Daily Activity assessment daily.  - Set and communicate daily mobility goal to care team and patient/family/caregiver.   - Collaborate with rehabilitation services on mobility goals if consulted  Problem: DISCHARGE PLANNING  Goal: Discharge to home or other facility with appropriate resources  Description: INTERVENTIONS:  - Identify barriers to discharge w/patient and caregiver  - Arrange for needed discharge resources and transportation as appropriate  - Identify discharge learning needs (meds, wound care, etc.)  - Arrange for interpretive services to assist at discharge as needed  - Refer to Case Management Department for coordinating discharge planning if the patient needs post-hospital services based on physician/advanced practitioner order or complex needs related to functional status, cognitive ability, or social support system  Outcome: Progressing     - Out of bed for toileting  - Record patient progress and toleration of activity level   Outcome: Progressing

## 2024-07-20 NOTE — PROGRESS NOTES
"Progress Note - General Surgery   Hal Miller 84 y.o. male MRN: [id number]  Unit/Bed#: [facility] 620-01 Encounter: [id number]    Assessment:  Hal Miller is a 84 y.o. male with L inguinal hernia w/mesh erosion into sigmoid colon s/p ex-lap, mesh explant, washout and debridement of L groin, Angela's on 7/4     Plan:  - Dysphagia diet per speech with ongoing discussion with family given poor PO intake  - Assist with feeding patient, stimulate  - On home meds including Eliquis  - Appreciate Urology recs  - OOB, PT/OT Level I   - Daily wet to dry groin dressing changes  - CM re: dispo   - continued goals of care discussion,  DNI per wife 7/19    Subjective/Objective     Subjective: NAEO. Vital signs stable. Patient minimally cooperative with exam but does open eyes to command. Denies pain. Patient remains somnolent but somewhat more alert that last night.      Objective:     Blood pressure 133/78, pulse 87, temperature 98 °F (36.7 °C), temperature source Axillary, resp. rate 16, height 6' 2\" (1.88 m), weight 87.7 kg (193 lb 5.5 oz), SpO2 100%.,Body mass index is 24.82 kg/m².      Intake/Output Summary (Last 24 hours) at 7/20/2024 0708  Last data filed at 7/20/2024 0547  Gross per 24 hour   Intake 2228.33 ml   Output 1000 ml   Net 1228.33 ml       Invasive Devices       Peripheral Intravenous Line  Duration             Peripheral IV 07/18/24 Left Antecubital 1 day    Peripheral IV 07/18/24 Right;Ventral (anterior) Wrist 1 day              Drain  Duration             Colostomy Other (comment) LUQ 15 days    External Urinary Catheter 1 day                    Physical Exam: General appearance: minimally responsive, sitting up in the bed on 6L O2  Lungs: sating well on 6L O2  Heart: regular rate  Abdomen: soft, non tender, non distended. LLQ ostomy with bowel sweat in bag. Left groin incision with packing and dressing in place.     Lab, Imaging and other studies:I have personally reviewed pertinent lab results.    VTE " Pharmacologic Prophylaxis: Eliquis  VTE Mechanical Prophylaxis: sequential compression device    Jean Carlos Hurd MD  General Surgery Resident

## 2024-07-20 NOTE — PROGRESS NOTES
07/20/24 1300   Clinical Encounter Type   Visited With Family   Routine Visit Follow-up      follow-up visit, no needs at this time other than wanting to follow up with medical team/condition. Chaplains remain available.

## 2024-07-20 NOTE — CASE MANAGEMENT
Case Management Discharge Planning Note    Patient name Hal Miller  Location [facility] 620/[facility] 620-01 MRN [id number]  : 1940 Date 2024       Current Admission Date: 7/3/2024  Current Admission Diagnosis:Colonic diverticular abscess   Patient Active Problem List    Diagnosis Date Noted Date Diagnosed    Palliative care by specialist 2024     Goals of care, counseling/discussion 2024     Difficult Babcock catheter placement ([facility]) 2024     Moderate protein-calorie malnutrition (HCC) 2024     Left inguinal hernia 2024     Leukocytosis 2024     Dysphagia 2024     Hx of aortic valve replacement 2024     Diplopia 2024     Peripheral polyneuropathy 2024     Hyponatremia 2024     Dysphoric mood 2024     NICM (nonischemic cardiomyopathy) ([facility]) 2024     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[facility]) 2024     Acute on chronic respiratory failure ([facility]) 2024     Episode of seizure-like activity 2024     History of Whipple procedure 2024     Dizziness 2024     Insomnia 2024     Ambulatory dysfunction 2024     Severe protein-calorie malnutrition (HCC) 2024     Abnormal CT of the abdomen 2024     Zoster 2024     Colonic diverticular abscess 2024     Vascular dementia ([facility]) 2024     Type 2 diabetes mellitus, with long-term current use of insulin ([facility]) 2024     Hypercalcemia 2023     TARA (acute kidney injury) ([facility]) 2023     Esophageal stricture 2023     Acute encephalopathy 2023     Generalized weakness 2023     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     Chronic obstructive pulmonary disease with acute exacerbation (HCC) 2022     Stage 3 chronic kidney disease (HCC) 2022     Centrilobular emphysema (HCC) 2021     History of malignant neuroendocrine tumor 2021     Atrial fibrillation (HCC) 2017     Shortness of breath  06/13/2017     Primary hypertension 06/13/2017     Hyperlipidemia 09/21/2015     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     Osteoarthrosis 11/12/2014     Polymyalgia rheumatica (HCC) 11/12/2014     Aneurysm of thoracic aorta (HCC) 01/06/2014     Aneurysm of abdominal aorta (HCC) 01/06/2014     Neoplasm of other specified site 01/06/2014       LOS (days): 17  Geometric Mean LOS (GMLOS) (days): 9.8  Days to GMLOS:-6.9     OBJECTIVE:  Risk of Unplanned Readmission Score: 40.08         Current admission status: Inpatient   Preferred Pharmacy:   [id number] - [place], [place] - [address] [address]  Phone: [phone] Fax: [phone]    [facility] - [place], [place] - [address]  [address] [address]  Phone: [phone] Fax: [phone]    [facility]) - [place], [place] - [address]  [address] [address] [address]  Phone: [phone] Fax: [phone]    Primary Care Provider: Kyaw Monreal MD    Primary Insurance: [facility]  Secondary Insurance:     DISCHARGE DETAILS:    CM met with pt, pt's wife, pt's dtr, and Primary team, to discuss d/c planning.  Noted that [facility] hasn't accepted due to pt being more appropriate for slower paced therapies.  CM discussed SNF rehab, specific locations, the type of care, and duration of care at these facilities.   Pt's wife is in agreement with SNF referrals being placed, with her primary choice being [facility]. Pt's family amenable to other blanket referrals placed as well

## 2024-07-20 NOTE — PLAN OF CARE
Problem: PAIN - ADULT  Goal: Verbalizes/displays adequate comfort level or baseline comfort level  Description: Interventions:  - Encourage patient to monitor pain and request assistance  - Assess pain using appropriate pain scale  - Administer analgesics based on type and severity of pain and evaluate response  - Implement non-pharmacological measures as appropriate and evaluate response  - Consider cultural and social influences on pain and pain management  - Notify physician/advanced practitioner if interventions unsuccessful or patient reports new pain  Outcome: Progressing     Problem: INFECTION - ADULT  Goal: Absence or prevention of progression during hospitalization  Description: INTERVENTIONS:  - Assess and monitor for signs and symptoms of infection  - Monitor lab/diagnostic results  - Monitor all insertion sites, i.e. indwelling lines, tubes, and drains  - Monitor endotracheal if appropriate and nasal secretions for changes in amount and color  - Combs appropriate cooling/warming therapies per order  - Administer medications as ordered  - Instruct and encourage patient and family to use good hand hygiene technique  - Identify and instruct in appropriate isolation precautions for identified infection/condition  Outcome: Progressing     Problem: SAFETY ADULT  Goal: Patient will remain free of falls  Description: INTERVENTIONS:  - Educate patient/family on patient safety including physical limitations  - Instruct patient to call for assistance with activity   - Consult OT/PT to assist with strengthening/mobility   - Keep Call bell within reach  - Keep bed low and locked with side rails adjusted as appropriate  - Keep care items and personal belongings within reach  - Initiate and maintain comfort rounds  - Make Fall Risk Sign visible to staff  - Offer Toileting every 2 Hours, in advance of need  - Initiate/Maintain bed/chair alarm  - Obtain necessary fall risk management equipment:   - Apply yellow  socks and bracelet for high fall risk patients  - Consider moving patient to room near nurses station  Outcome: Progressing  Goal: Maintain or return to baseline ADL function  Description: INTERVENTIONS:  -  Assess patient's ability to carry out ADLs; assess patient's baseline for ADL function and identify physical deficits which impact ability to perform ADLs (bathing, care of mouth/teeth, toileting, grooming, dressing, etc.)  - Assess/evaluate cause of self-care deficits   - Assess range of motion  - Assess patient's mobility; develop plan if impaired  - Assess patient's need for assistive devices and provide as appropriate  - Encourage maximum independence but intervene and supervise when necessary  - Involve family in performance of ADLs  - Assess for home care needs following discharge   - Consider OT consult to assist with ADL evaluation and planning for discharge  - Provide patient education as appropriate  Outcome: Progressing  Goal: Maintains/Returns to pre admission functional level  Description: INTERVENTIONS:  - Perform AM-PAC 6 Click Basic Mobility/ Daily Activity assessment daily.  - Set and communicate daily mobility goal to care team and patient/family/caregiver.   - Collaborate with rehabilitation services on mobility goals if consulted  - Perform Range of Motion 3 times a day.  - Reposition patient every 2 hours.  - Dangle patient 3 times a day  - Stand patient 3 times a day  - Ambulate patient 3 times a day  - Out of bed to chair 3 times a day   - Out of bed for meals 3 times a day  - Out of bed for toileting  - Record patient progress and toleration of activity level   Outcome: Progressing     Problem: DISCHARGE PLANNING  Goal: Discharge to home or other facility with appropriate resources  Description: INTERVENTIONS:  - Identify barriers to discharge w/patient and caregiver  - Arrange for needed discharge resources and transportation as appropriate  - Identify discharge learning needs (meds,  wound care, etc.)  - Arrange for interpretive services to assist at discharge as needed  - Refer to Case Management Department for coordinating discharge planning if the patient needs post-hospital services based on physician/advanced practitioner order or complex needs related to functional status, cognitive ability, or social support system  Outcome: Progressing     Problem: Knowledge Deficit  Goal: Patient/family/caregiver demonstrates understanding of disease process, treatment plan, medications, and discharge instructions  Description: Complete learning assessment and assess knowledge base.  Interventions:  - Provide teaching at level of understanding  - Provide teaching via preferred learning methods  Outcome: Progressing     Problem: Prexisting or High Potential for Compromised Skin Integrity  Goal: Skin integrity is maintained or improved  Description: INTERVENTIONS:  - Identify patients at risk for skin breakdown  - Assess and monitor skin integrity  - Assess and monitor nutrition and hydration status  - Monitor labs   - Assess for incontinence   - Turn and reposition patient  - Assist with mobility/ambulation  - Relieve pressure over bony prominences  - Avoid friction and shearing  - Provide appropriate hygiene as needed including keeping skin clean and dry  - Evaluate need for skin moisturizer/barrier cream  - Collaborate with interdisciplinary team   - Patient/family teaching  - Consider wound care consult   Outcome: Progressing     Problem: Nutrition/Hydration-ADULT  Goal: Nutrient/Hydration intake appropriate for improving, restoring or maintaining nutritional needs  Description: Monitor and assess patient's nutrition/hydration status for malnutrition. Collaborate with interdisciplinary team and initiate plan and interventions as ordered.  Monitor patient's weight and dietary intake as ordered or per policy. Utilize nutrition screening tool and intervene as necessary. Determine patient's food  preferences and provide high-protein, high-caloric foods as appropriate.     INTERVENTIONS:  - Monitor oral intake, urinary output, labs, and treatment plans  - Assess nutrition and hydration status and recommend course of action  - Evaluate amount of meals eaten  - Assist patient with eating if necessary   - Allow adequate time for meals  - Recommend/ encourage appropriate diets, oral nutritional supplements, and vitamin/mineral supplements  - Order, calculate, and assess calorie counts as needed  - Recommend, monitor, and adjust tube feedings and TPN/PPN based on assessed needs  - Assess need for intravenous fluids  - Provide specific nutrition/hydration education as appropriate  - Include patient/family/caregiver in decisions related to nutrition  Outcome: Progressing     Problem: RESPIRATORY - ADULT  Goal: Achieves optimal ventilation and oxygenation  Description: INTERVENTIONS:  - Assess for changes in respiratory status  - Assess for changes in mentation and behavior  - Position to facilitate oxygenation and minimize respiratory effort  - Oxygen administered by appropriate delivery if ordered  - Initiate smoking cessation education as indicated  - Encourage broncho-pulmonary hygiene including cough, deep breathe, Incentive Spirometry  - Assess the need for suctioning and aspirate as needed  - Assess and instruct to report SOB or any respiratory difficulty  - Respiratory Therapy support as indicated  Outcome: Progressing     Problem: GASTROINTESTINAL - ADULT  Goal: Minimal or absence of nausea and/or vomiting  Description: INTERVENTIONS:  - Administer IV fluids if ordered to ensure adequate hydration  - Maintain NPO status until nausea and vomiting are resolved  - Nasogastric tube if ordered  - Administer ordered antiemetic medications as needed  - Provide nonpharmacologic comfort measures as appropriate  - Advance diet as tolerated, if ordered  - Consider nutrition services referral to assist patient with  adequate nutrition and appropriate food choices  Outcome: Progressing  Goal: Maintains or returns to baseline bowel function  Description: INTERVENTIONS:  - Assess bowel function  - Encourage oral fluids to ensure adequate hydration  - Administer IV fluids if ordered to ensure adequate hydration  - Administer ordered medications as needed  - Encourage mobilization and activity  - Consider nutritional services referral to assist patient with adequate nutrition and appropriate food choices  Outcome: Progressing  Goal: Maintains adequate nutritional intake  Description: INTERVENTIONS:  - Monitor percentage of each meal consumed  - Identify factors contributing to decreased intake, treat as appropriate  - Assist with meals as needed  - Monitor I&O, weight, and lab values if indicated  - Obtain nutrition services referral as needed  Outcome: Progressing  Goal: Establish and maintain optimal ostomy function  Description: INTERVENTIONS:  - Assess bowel function  - Encourage oral fluids to ensure adequate hydration  - Administer IV fluids if ordered to ensure adequate hydration   - Administer ordered medications as needed  - Encourage mobilization and activity  - Nutrition services referral to assist patient with appropriate food choices  - Assess stoma site  - Consider wound care consult   Outcome: Progressing  Goal: Oral mucous membranes remain intact  Description: INTERVENTIONS  - Assess oral mucosa and hygiene practices  - Implement preventative oral hygiene regimen  - Implement oral medicated treatments as ordered  - Initiate Nutrition services referral as needed  Outcome: Progressing     Problem: GENITOURINARY - ADULT  Goal: Maintains or returns to baseline urinary function  Description: INTERVENTIONS:  - Assess urinary function  - Encourage oral fluids to ensure adequate hydration if ordered  - Administer IV fluids as ordered to ensure adequate hydration  - Administer ordered medications as needed  - Offer frequent  toileting  - Follow urinary retention protocol if ordered  Outcome: Progressing  Goal: Absence of urinary retention  Description: INTERVENTIONS:  - Assess patient’s ability to void and empty bladder  - Monitor I/O  - Bladder scan as needed  - Discuss with physician/AP medications to alleviate retention as needed  - Discuss catheterization for long term situations as appropriate  Outcome: Progressing  Goal: Urinary catheter remains patent  Description: INTERVENTIONS:  - Assess patency of urinary catheter  - If patient has a chronic cardenas, consider changing catheter if non-functioning  - Follow guidelines for intermittent irrigation of non-functioning urinary catheter  Outcome: Progressing     Problem: METABOLIC, FLUID AND ELECTROLYTES - ADULT  Goal: Glucose maintained within target range  Description: INTERVENTIONS:  - Monitor Blood Glucose as ordered  - Assess for signs and symptoms of hyperglycemia and hypoglycemia  - Administer ordered medications to maintain glucose within target range  - Assess nutritional intake and initiate nutrition service referral as needed  Outcome: Progressing     Problem: SAFETY,RESTRAINT: NV/NON-SELF DESTRUCTIVE BEHAVIOR  Goal: Remains free of harm/injury (restraint for non violent/non self-detsructive behavior)  Description: INTERVENTIONS:  - Instruct patient/family regarding restraint use   - Assess and monitor physiologic and psychological status   - Provide interventions and comfort measures to meet assessed patient needs   - Identify and implement measures to help patient regain control  - Assess readiness for release of restraint   Outcome: Completed  Goal: Returns to optimal restraint-free functioning  Description: INTERVENTIONS:  - Assess the patient's behavior and symptoms that indicate continued need for restraint  - Identify and implement measures to help patient regain control  - Assess readiness for release of restraint   Outcome: Completed     Problem: BEHAVIOR  Goal:  Pt/Family maintain appropriate behavior and adhere to behavioral management agreement, if implemented  Description: INTERVENTIONS:  - Assess the family dynamic   - Encourage verbalization of thoughts and concerns in a socially appropriate manner  - Assess patient/family's coping skills and non-compliant behavior (including use of illegal substances).  - Utilize positive, consistent limit setting strategies supporting safety of patient, staff and others  - Initiate consult with Case Management, Spiritual Care or other ancillary services as appropriate  - If a patient's/visitor's behavior jeopardizes the safety of the patient, staff, or others, refer to organization procedure.   - Notify Security of behavior or suspected illegal substances which indicate the need for search of the patient and/or belongings  - Encourage participation in the decision making process about a behavioral management agreement; implement if patient meets criteria  Outcome: Progressing     Problem: ANXIETY  Goal: Will report anxiety at manageable levels  Description: INTERVENTIONS:  - Administer medication as ordered  - Teach and encourage coping skills  - Provide emotional support  - Assess patient/family for anxiety and ability to cope  Outcome: Progressing  Goal: By discharge: Patient will verbalize 2 strategies to deal with anxiety  Description: Interventions:  - Identify any obvious source/trigger to anxiety  - Staff will assist patient in applying identified coping technique/skills  - Encourage attendance of scheduled groups and activities  Outcome: Progressing

## 2024-07-21 NOTE — CASE MANAGEMENT
Case Management Discharge Planning Note    Patient name Hal Miller  Location [facility]-01 MRN [id number]  : 1940 Date 2024       Current Admission Date: 7/3/2024  Current Admission Diagnosis:Colonic diverticular abscess   Patient Active Problem List    Diagnosis Date Noted Date Diagnosed    Palliative care by specialist 2024     Goals of care, counseling/discussion 2024     Difficult Babcock catheter placement ([facility]) 2024     Moderate protein-calorie malnutrition (HCC) 2024     Left inguinal hernia 2024     Leukocytosis 2024     Dysphagia 2024     Hx of aortic valve replacement 2024     Diplopia 2024     Peripheral polyneuropathy 2024     Hyponatremia 2024     Dysphoric mood 2024     NICM (nonischemic cardiomyopathy) ([facility]) 2024     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[facility]) 2024     Acute on chronic respiratory failure ([facility]) 2024     Episode of seizure-like activity 2024     History of Whipple procedure 2024     Dizziness 2024     Insomnia 2024     Ambulatory dysfunction 2024     Severe protein-calorie malnutrition (HCC) 2024     Abnormal CT of the abdomen 2024     Zoster 2024     Colonic diverticular abscess 2024     Vascular dementia ([facility]) 2024     Type 2 diabetes mellitus, with long-term current use of insulin ([facility]) 2024     Hypercalcemia 2023     TARA (acute kidney injury) ([facility]) 2023     Esophageal stricture 2023     Acute encephalopathy 2023     Generalized weakness 2023     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     Chronic obstructive pulmonary disease with acute exacerbation (HCC) 2022     Stage 3 chronic kidney disease (HCC) 2022     Centrilobular emphysema (HCC) 2021     History of malignant neuroendocrine tumor 2021     Atrial fibrillation (HCC) 2017     Shortness of breath  06/13/2017     Primary hypertension 06/13/2017     Hyperlipidemia 09/21/2015     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     Osteoarthrosis 11/12/2014     Polymyalgia rheumatica (HCC) 11/12/2014     Aneurysm of thoracic aorta (HCC) 01/06/2014     Aneurysm of abdominal aorta (HCC) 01/06/2014     Neoplasm of other specified site 01/06/2014       LOS (days): 18  Geometric Mean LOS (GMLOS) (days): 9.8  Days to GMLOS:-8     OBJECTIVE:  Risk of Unplanned Readmission Score: 40.32         Current admission status: Inpatient   Preferred Pharmacy:   [id number] - [place], [place] - [address] [address]  Phone: [phone] Fax: [phone]    [facility] [place], [place] - [address]  [address] [address]  Phone: [phone] Fax: [phone]    [facility] - [place], [place] - [address]  [address] [place] [address]  Phone: [phone] Fax: [phone]    Primary Care Provider: Kyaw Monreal MD    Primary Insurance: [facility]  Secondary Insurance:     DISCHARGE DETAILS:                                                                                                 Additional Comments: FARNAZ consulted to s/w pt spouse, Ann. Per spouse, family would like to add facilities closer to their home in [place] to the referral bed search. CM added facilities to ADIIN search, pending responses from all facilities.

## 2024-07-21 NOTE — PROGRESS NOTES
"Progress Note - General Surgery   Hal Miller 84 y.o. male MRN: [id number]  Unit/Bed#: [facility] 620-01 Encounter: [id number]    Assessment:  Hal Miller is a 84 y.o. male with L inguinal hernia w/mesh erosion into sigmoid colon s/p ex-lap, mesh explant, washout and debridement of L groin, Angela's on 7/4     AVSS on 3L of NC   cc  WBC 7.9 from 10.5  Hgb 7.0 from 8.0  Cr 1.26 from 1.31    Plan:  - Consider keofed and tube feeds  - On home meds including Eliquis  - Appreciate Urology recs  - OOB, PT/OT Level I   - Daily wet to dry groin dressing changes  - CM re: dispo   - Continue goals of care discussion  - CM working on possible rehab pending progress    Subjective/Objective     Subjective:   No acute events overnight. Patient somewhat somnolent this AM. Able to tolerate puree/pudding/apple sauce. Cannot take down liquids. Still delirious.     Objective:     Blood pressure 122/63, pulse 87, temperature (!) 96.6 °F (35.9 °C), resp. rate 14, height 6' 2\" (1.88 m), weight 87.7 kg (193 lb 5.5 oz), SpO2 98%.,Body mass index is 24.82 kg/m².      Intake/Output Summary (Last 24 hours) at 7/21/2024 1046  Last data filed at 7/20/2024 1401  Gross per 24 hour   Intake --   Output 200 ml   Net -200 ml       Invasive Devices       Peripheral Intravenous Line  Duration             Peripheral IV 07/18/24 Left Antecubital 3 days    Peripheral IV 07/18/24 Right;Ventral (anterior) Wrist 3 days              Drain  Duration             Colostomy Other (comment) LUQ 16 days    External Urinary Catheter 2 days                    Physical Exam:  General: No acute distress, alert and oriented  Neuro: alert, oriented x3  HENT: PERRL, EOMI  CV: Well perfused, regular rate and rhythm  Lungs: Normal work of breathing, no increased respiratory effort  Abdomen: Soft, nontender, nondistended. Dressing is clean and dry. Ostomy is pink and viable with bowel sweat.  MSK/Extremities: No edema, clubbing or cyanosis  Skin: Warm, dry      Lab, " Imaging and other studies:I have personally reviewed pertinent lab results.    VTE Pharmacologic Prophylaxis: Eliquis  VTE Mechanical Prophylaxis: sequential compression device

## 2024-07-22 LAB
DME PARACHUTE DELIVERY DATE ACTUAL: NORMAL
DME PARACHUTE DELIVERY DATE REQUESTED: NORMAL
DME PARACHUTE ITEM DESCRIPTION: NORMAL
DME PARACHUTE ORDER STATUS: NORMAL
DME PARACHUTE SUPPLIER NAME: NORMAL
DME PARACHUTE SUPPLIER PHONE: NORMAL

## 2024-07-22 NOTE — PLAN OF CARE
Problem: OCCUPATIONAL THERAPY ADULT  Goal: Performs self-care activities at highest level of function for planned discharge setting.  See evaluation for individualized goals.  Description: Treatment Interventions: ADL retraining, Functional transfer training, UE strengthening/ROM, Endurance training, Cognitive reorientation, Energy conservation, Activityengagement          See flowsheet documentation for full assessment, interventions and recommendations.   Note: Limitation: Decreased ADL status, Decreased UE ROM, Decreased high-level ADLs  Prognosis: Fair  Assessment: Pt was seen for OT re-eval secondary to goals and need for reassessment of functional mobilities. Currently pt requires Total Assistance for overall ADLS and Total Assistance for transfers.Pt requiring additional assist compared to initial evaluation. Pt requiring Total assistance for overall ADL's and Dependent for transfers. Pt able to tolerate MAX A x1 EOB. Pt currently presents with impairments in the following categories -steps to enter environment, behavioral pattern, difficulty performing ADLS, difficulty performing IADLS , flat affect, decreased initiation and engagement , and health management  activity tolerance, endurance, standing balance/tolerance, sitting balance/tolerance, UE strength, UE ROM, arousal, memory, insight, safety , judgement , attention , and communication. These impairments, as well as pt's risk for falls and home environment  limit pt's ability to safely engage in all baseline areas of occupation, includinggrooming, bathing, dressing, toileting, functional mobility/transfers, community mobility, social participation , and leisure activities  The patient's raw score on the -PAC Daily Activity Inpatient Short Form is 6. A raw score of less than 19 suggests the patient may benefit from discharge to post-acute rehabilitation services. Please refer to the recommendation of the Occupational Therapist for safe discharge  planning. From OT standpoint, recommend Level II Moderate Resource Intensity upon D/C. OT will continue to follow to address the updated below stated goals.     Rehab Resource Intensity Level, OT: II (Moderate Resource Intensity)

## 2024-07-22 NOTE — PHYSICAL THERAPY NOTE
PHYSICAL THERAPY RE-EVALUATION          Patient Name: Hal Miller  Today's Date: 7/22/2024 07/22/24 1055   Note Type   Note type Re-Evaluation   Pain Assessment   Pain Assessment Tool 0-10   Pain Score No Pain   Restrictions/Precautions   Weight Bearing Precautions Per Order No   Other Precautions Cognitive;Chair Alarm;Fall Risk;Bed Alarm;Multiple lines   Home Living   Type of Home House   Home Layout Two level;Able to live on main level with bedroom/bathroom;Stairs to enter with rails   Home Equipment Walker;Cane   Prior Function   Level of [place] Independent with functional mobility   Lives With Spouse   Receives Help From Family   Falls in the last 6 months 1 to 4   Cognition   Overall Cognitive Status Impaired   Arousal/Participation Responsive   Attention Difficulty attending to directions   Orientation Level Oriented to person;Disoriented to place;Disoriented to time;Disoriented to situation   Memory Unable to assess   RLE Assessment   RLE Assessment X   Strength RLE   RLE Overall Strength 3-/5   LLE Assessment   LLE Assessment X   Strength LLE   LLE Overall Strength 3-/5   Bed Mobility   Supine to Sit 2  Maximal assistance   Additional items Assist x 2;Increased time required;Verbal cues   Transfers   Sit to Stand 1  Dependent   Additional items Assist x 2;Increased time required;Verbal cues   Stand to Sit 1  Dependent   Additional items Assist x 2;Increased time required;Verbal cues   Stand pivot 1  Dependent   Additional items Assist x 2;Increased time required;Verbal cues   Ambulation/Elevation   Gait pattern Not appropriate  (decreased standing tolerance at current time)   Balance   Static Sitting Poor   Dynamic Sitting Poor -   Static Standing Poor -   Dynamic Standing Poor -   Endurance Deficit   Endurance Deficit Yes   Endurance Deficit Description fatigue   Activity Tolerance   Activity Tolerance Patient  limited by fatigue   Medical Staff Made Aware Ciara, OT; Annie OT student; Cee, SPT; OT present for co re-evaluation due to pts current medical presentation   Nurse Made Aware Pt appropriate to be seen and mobilize per nsg   Assessment   Prognosis Fair   Problem List Decreased strength;Decreased range of motion;Decreased endurance;Impaired balance;Decreased mobility;Decreased cognition;Impaired judgement;Decreased safety awareness   Assessment Patient originally presented to [facility] on 7/3/[facility]24.  Patient was admitted with a primary diagnosis of L inguinal hernia w/mesh erosion into sigmoid colon s/p ex-lap, mesh explant, washout and debridement of L groin, Angela's on 7/4.  Patient seen for PT reevaluation today due to expiration mobility goals, to assess patient's current functional mobility status, and reestablish PT goals.  Patient was able perform all bed mobility with max a x 2 which is approximately same level assistance required compared to previous session.  Patient was able to perform sit to stand and stand pivot transfers at dependent x 2 level which is more assistance required compared to previous sessions.  Patient required frequent cues to reorient to task and to redirect attention.  Overall patient continues to present with decreased bilateral lower extremity strength, decreased range of motion, decreased endurance, decreased static/dynamic balance, decreased activity tolerance, and will continue to benefit from skilled PT services during hospital stay.  At conclusion of PT reevaluation patient was assisted back into chair with all needs within reach and soft care cushion in place.  D/C recommendation when medically cleared is rehab   Barriers to Discharge Inaccessible home environment   Goals   Patient Goals none stated 2* pts cog status   STG Expiration Date 08/12/24   Short Term Goal #1 In 21 days pt will complete: 1) Bed mobility skills with mod Ax1 to increase safety and independence as well as  decrease caregiver burden. 2) Functional transfers with mod Ax1 to promote increased independence, safety, and QOL.  3) Improve balance grades by 1/2 grade to increase safety with all mobility and decrease fall risk.  4) Improve BLE strength by 1/2 grade to help increase overall functional mobility and decrease fall risk.  5) ambulation to be assessed when appropriate. PT to see at that time.   Plan   Treatment/Interventions Functional transfer training;LE strengthening/ROM;Therapeutic exercise;Endurance training;Patient/family training;Equipment eval/education;Bed mobility;Spoke to nursing;Continued evaluation;OT   PT Frequency 2-3x/wk   Discharge Recommendation   Rehab Resource Intensity Level, PT II (Moderate Resource Intensity)   AM-PAC Basic Mobility Inpatient   Turning in Flat Bed Without Bedrails 1   Lying on Back to Sitting on Edge of Flat Bed Without Bedrails 1   Moving Bed to Chair 1   Standing Up From Chair Using Arms 1   Walk in Room 1   Climb 3-5 Stairs With Railing 1   Basic Mobility Inpatient Raw Score 6   Turning Head Towards Sound 4   Follow Simple Instructions 2   Low Function Basic Mobility Raw Score  12   Low Function Basic Mobility Standardized Score  18.33   [facility] Highest Level Of Mobility   -HL Goal 2: Bed activities/Dependent transfer   -HL Achieved 4: Move to chair/commode   Modified Fiona Scale   Modified Hanson Scale 4   Barthel Index   Feeding 5   Bathing 0   Grooming Score 0   Dressing Score 0   Bladder Score 0   Bowels Score 10   Toilet Use Score 0   Transfers (Bed/Chair) Score 5   Mobility (Level Surface) Score 0   Stairs Score 0   Barthel Index Score 20   Portions of the documentation may have been created using voice recognition software.Occasional wrong word or sound alike substitutions may have occurred due to the inherent limitations of the voice recognition software. Read the chart carefully and recognize, using context, where substitutions have occurred.    Zuhair  Margot, PT, DPT

## 2024-07-22 NOTE — OCCUPATIONAL THERAPY NOTE
Occupational Therapy Re-Evaluation     Patient Name: Hal Miller  Today's Date: 7/22/2024  Problem List  Principal Problem:    Colonic diverticular abscess  Active Problems:    Vascular dementia (HCC)    Moderate protein-calorie malnutrition (HCC)    Palliative care by specialist    Goals of care, counseling/discussion    Difficult Babcock catheter placement (HCC)    Past Medical History  Past Medical History:   Diagnosis Date    Acute encephalopathy 2/14/2023    Acute-on-chronic kidney injury  (HCC) 6/13/2017    Cancer (HCC)     pancreatic    Colon polyp     Coronary artery disease     Dehydration 3/3/2023    Diabetes mellitus (HCC)     Hyperlipidemia     Hypertension     Left lower lobe pneumonia 7/2/2022    Pneumonia 06/13/2017    Right middle and lower lobe     Stroke (HCC)      Past Surgical History  Past Surgical History:   Procedure Laterality Date    APPENDECTOMY      CARDIAC SURGERY      Aortic Valve Replacement, Ascending Aorta    COLONOSCOPY      GALLBLADDER SURGERY      HARTMANS PROCEDURE N/A 7/4/2024    Procedure: HARTMANS PROCEDURE, WITH EXPLANTATION OF LEFT GROIN MESH, WASHOUT AND DEBRIDEMENT OF LEFT GROIN WOUND;  Surgeon: Charlie Carroll MD;  Location: [facility];  Service: General    PANCREATICODUODENECTOMY               07/22/24 1056   OT Last Visit   OT Visit Date 07/22/24   Note Type   Note type Re-Evaluation   Pain Assessment   Pain Assessment Tool 0-10   Pain Score No Pain   Restrictions/Precautions   Weight Bearing Precautions Per Order No   Other Precautions Cognitive;Chair Alarm;Fall Risk;Bed Alarm;Multiple lines   Home Living   Type of Home House   Home Layout Two level;Able to live on main level with bedroom/bathroom;Stairs to enter with rails   Bathroom Shower/Tub Walk-in shower   Bathroom Toilet Standard   Bathroom Equipment Grab bars in shower;Shower chair   Bathroom Accessibility Accessible   Home Equipment Walker;Cane   Additional Comments Information gathered through pt chart.    Prior Function   Level of [place] Independent with ADLs;Needs assistance with IADLS;Independent with functional mobility   Lives With Spouse   Receives Help From Family   IADLs Family/Friend/Other provides transportation;Family/Friend/Other provides meals;Family/Friend/Other provides medication management   Falls in the last 6 months 1 to 4   Vocational Retired   Comments Information gathered from pt chart.   Lifestyle   [place] Independent with ADL's and functional mobility, needs assistance with IADL's   Reciprocal Relationships Supportive spouse and family   Service to Others Retired   Intrinsic Gratification Working on the farm   ADL   Eating Assistance 2  Maximal Assistance   Grooming Assistance 1  Total Assistance   UB Bathing Assistance 1  Total Assistance   LB Bathing Assistance 1  Total Assistance   UB Dressing Assistance 1  Total Assistance   LB Dressing Assistance 1  Total Assistance   Toileting Assistance  1  Total Assistance   Bed Mobility   Supine to Sit 2  Maximal assistance   Additional items Assist x 2;Increased time required;Verbal cues   Transfers   Sit to Stand 1  Dependent   Additional items Assist x 2;Increased time required;Verbal cues   Stand to Sit 1  Dependent   Additional items Assist x 2;Increased time required;Verbal cues   Stand pivot 1  Dependent   Additional items Assist x 2;Increased time required;Verbal cues   Balance   Static Sitting Poor   Dynamic Sitting Poor -   Static Standing Poor -   Dynamic Standing Poor -   Activity Tolerance   Activity Tolerance Patient limited by fatigue   Medical Staff Made Aware OT Ciara, PT Zuhair, SPT Cee   Nurse Made Aware Nurse aware   RUE Assessment   RUE Assessment X  (Significant weakness, unable to use UE to assist during transfers, difficult to assess during cog.)   LUE Assessment   LUE Assessment X  (Significant weakness, unable to use UE to assist during transfers, difficult to assess during cog.)   Cognition   Overall Cognitive  "Status (S)  Impaired   Arousal/Participation Lethargic   Attention Difficulty attending to directions   Orientation Level Oriented to person;Disoriented to place;Disoriented to time;Disoriented to situation   Memory Unable to assess   Following Commands Unable to follow one step commands   Comments Pt was lethargic and only replied to yes and no questions sometimes. Primarily nonverbal throughout session, replied \"Yes\" when asked if he prefers to be called Toni.   Assessment   Limitation Decreased ADL status;Decreased UE ROM;Decreased high-level ADLs   Prognosis Fair   Assessment Pt was seen for OT re-eval secondary to goals and need for reassessment of functional mobilities. Currently pt requires Total Assistance for overall ADLS and Total Assistance for transfers.Pt requiring additional assist compared to initial evaluation. Pt requiring Total assistance for overall ADL's and Dependent for transfers. Pt able to tolerate MAX A x1 EOB. Pt currently presents with impairments in the following categories -steps to enter environment, behavioral pattern, difficulty performing ADLS, difficulty performing IADLS , flat affect, decreased initiation and engagement , and health management  activity tolerance, endurance, standing balance/tolerance, sitting balance/tolerance, UE strength, UE ROM, arousal, memory, insight, safety , judgement , attention , and communication. These impairments, as well as pt's risk for falls and home environment  limit pt's ability to safely engage in all baseline areas of occupation, includinggrooming, bathing, dressing, toileting, functional mobility/transfers, community mobility, social participation , and leisure activities  The patient's raw score on the -PAC Daily Activity Inpatient Short Form is 6. A raw score of less than 19 suggests the patient may benefit from discharge to post-acute rehabilitation services. Please refer to the recommendation of the Occupational Therapist for safe " discharge planning. From OT standpoint, recommend Level II Moderate Resource Intensity upon D/C. OT will continue to follow to address the updated below stated goals.   Goals   Patient Goals None stated   LTG Time Frame 10-14   Long Term Goal #1 See below   Plan   Treatment Interventions ADL retraining;Functional transfer training;UE strengthening/ROM;Endurance training;Cognitive reorientation;Energy conservation;Activityengagement   Goal Expiration Date 08/05/24   OT Frequency 2-3x/wk   Discharge Recommendation   Rehab Resource Intensity Level, OT II (Moderate Resource Intensity)   AM-PAC Daily Activity Inpatient   Lower Body Dressing 1   Bathing 1   Toileting 1   Upper Body Dressing 1   Grooming 1   Eating 1   Daily Activity Raw Score 6   AM-PAC Applied Cognition Inpatient   Following a Speech/Presentation 1   Understanding Ordinary Conversation 1   Taking Medications 1   Remembering Where Things Are Placed or Put Away 1   Remembering List of 4-5 Errands 1   Taking Care of Complicated Tasks 1   Applied Cognition Raw Score 6   Applied Cognition Standardized Score 7.69   End of Consult   Patient Position at End of Consult Bedside chair;Bed/Chair alarm activated;All needs within reach   Nurse Communication Nurse aware of consult         Pt will be able to perform bed mobility tasks MOD A  in order to improve functional mobility.      Pt will demonstrate MOD A  with transfers to and from all surfaces with fair + dynamic balance.      Pt will be able to engage in fair- static and dynamic standing to complete functional tasks.     Pt will complete UB dressing/bathing with MOD A to improve overall ADL's with DME prn.    Pt will complete LB dressing/bathing with MOD A to improve overall ADL's with DME prn.    Pt will be MOD A with functional mobility to/from bathroom for increased independence in toileting tasks with DME prn.     Pt will engage in ongoing cognitive assessments to assist with safe discharge planning and  increase safety during functional tasks.    Pt will demonstrate increased strength in UE to improve independence in functional tasks.     Raquel Bryant, OTS

## 2024-07-22 NOTE — PROGRESS NOTES
Deterioration Index Critical Care Recommendations  Room #: 620  Deterioration index score: 62.39%    Critical Care recommends   No new recommendations  No clinical change in exam     Spoke with Dr. Evangelista from primary team    Brief summary:   Critical care was brought to the patient's bedside via deterioration index alert. The alert was concerning for:      Please contact critical care via [facility] with any questions or concerns.    Micky Leonard PA-C

## 2024-07-22 NOTE — PLAN OF CARE
Pending goals of care, if plans to initiate TF recommend Jevity 1.2@20mL/hr, advance by 10mL every 8hrs to goal of 75mL/hr, provides total volume 1800mL, 2160cal, 100g pro, 1453mL, water flushes per critical care or consider 100mL every 6hrs would provide total of 1853mL.       Problem: Nutrition/Hydration-ADULT  Goal: Nutrient/Hydration intake appropriate for improving, restoring or maintaining nutritional needs  Description: Monitor and assess patient's nutrition/hydration status for malnutrition. Collaborate with interdisciplinary team and initiate plan and interventions as ordered.  Monitor patient's weight and dietary intake as ordered or per policy. Utilize nutrition screening tool and intervene as necessary. Determine patient's food preferences and provide high-protein, high-caloric foods as appropriate.     INTERVENTIONS:  - Monitor oral intake, urinary output, labs, and treatment plans  - Assess nutrition and hydration status and recommend course of action  - Evaluate amount of meals eaten  - Assist patient with eating if necessary   - Allow adequate time for meals  - Recommend/ encourage appropriate diets, oral nutritional supplements, and vitamin/mineral supplements  - Order, calculate, and assess calorie counts as needed  - Recommend, monitor, and adjust tube feedings and TPN/PPN based on assessed needs  - Assess need for intravenous fluids  - Provide specific nutrition/hydration education as appropriate  - Include patient/family/caregiver in decisions related to nutrition  Outcome: Not Progressing

## 2024-07-22 NOTE — WOUND OSTOMY CARE
Progress Note - Wound   Hal Miller 84 y.o. male MRN: [id number]  Unit/Bed#: [facility] 620-01 Encounter: [id number]        Assessment:   Patient is seen for wound care follow-up. Patient is dependent for all care needs. Mod/max assist for turning patient. Seen laying nereyda [employer] turning and repositioning system. Urine managed via external urinary male purewick. Incontinent of stool.  Patient is alert and oriented to self. Inappropriate for ostomy teaching today due to mental status. Patient is full feed. Nutrition is following.     Findings:  B/L heels are dry intact and jonathan with no skin loss or wounds present. Recommend preventative silicone bordered foam dressings and proper offloading/ repositioning.      Right Anterior Thigh area is now resolved. No noted ecchymosis or skin loss present. Okay to leave PATTY.     B/L Groin/Scrotum remain intact, pink and rashy. No skin loss noted. Recommend continuing with dewey for area.     HA B/L Sacro-Buttocks Evolving DTPI: wound continues to evolve. Irregular in shape area of intact and full thickness skin loss measured together. Wound bed is 20% intact purple non-blanchable tissue and is 80% full thickness skin loss with a yellow slough tissue. Amy-wound remains fragile and rashy. Recommend triad paste and dewey to area for treatment.   MENON Penis DTI of Mucous Membrane: unable to assess during exam today due to external male purewick being in place.   Left Groin Wound: measures smaller on assessment today. Irregular in shape area of full thickness skin loss. Wound bed is beefy red granulation tissue. Amy-wound is fragile. Moderate serosanguineous drainage noted. Recommend continuing with silver alginate and foam dressing to area.       No induration, fluctuance, odor, warmth/temperature differences, redness, or purulence noted to the above noted wounds and skin areas assessed. New dressings applied per orders listed below. Patient tolerated well- no s/s of non-verbal pain or  discomfort observed during the encounter. Bedside nurse aware of plan of care. See flow sheets for more detailed assessment findings.      Orders listed below and wound care will continue to follow, call or Secure Chat Message with questions.     Skin Care Plan:  1-Cleanse B/L Sacro-Buttocks with soap and water. Apply Triad Paste to Sacro-Buttocks Wound Bed Only. Apply ARASH Anti-Fungal Cream to Amy-wound, all other intact aspects of B/L Sacro-Buttocks, Scrotum and Groin. Apply both BID and PRN episodes of incontinence.  2-Turn/reposition q2h or when medically stable for pressure re-distribution on skin. Place patient on [employer] turning and repositioning system.   3-Elevate heels to offload pressure  4-Moisturize skin daily with skin nourishing cream  5-Ehob cushion in chair when out of bed.  6-Left groin wound: cleanse with NSS and pat dry. Fluff wound bed with silver alginate (Melgisorb Ag) and cover with a silicone bordered foam dressing. Soniya with T for Treatment and change daily or PRN soilage/displacement.   7-Bilateral heels apply silicone bordered foam to heels soniya with a P and date assess every shift and change every 3 days           WOUNDS:  Wound 07/04/24 Groin Left (Active)   Wound Image   07/22/24 1014   Wound Description Beefy red;Drainage 07/22/24 1014   Amy-wound Assessment Fragile 07/22/24 1014   Wound Length (cm) 1.5 cm 07/22/24 1014   Wound Width (cm) 3 cm 07/22/24 1014   Wound Depth (cm) 1.4 cm 07/22/24 1014   Wound Surface Area (cm^2) 4.5 cm^2 07/22/24 1014   Wound Volume (cm^3) 6.3 cm^3 07/22/24 1014   Calculated Wound Volume (cm^3) 6.3 cm^3 07/22/24 1014   Change in Wound Size % 57.14 07/22/24 1014   Wound Site Closure CHIQUITA 07/22/24 1014   Drainage Amount Moderate 07/22/24 1014   Drainage Description Serosanguineous 07/22/24 1014   Non-staged Wound Description Full thickness 07/22/24 1014   Treatments Cleansed;Irrigation with NSS;Site care 07/22/24 1014   Dressing Calcium Alginate with  Silver;Foam, Silicon (eg. Allevyn, etc) 07/22/24 1014   Wound packed? Yes 07/22/24 1014   Packing- # removed 1 07/22/24 1014   Packing- # inserted 1 07/22/24 1014   Dressing Changed Changed 07/22/24 1014   Patient Tolerance Tolerated well 07/22/24 1014   Dressing Status Clean;Dry;Intact 07/22/24 1014       Wound 07/10/24 Pressure Injury Penis (Active)   Wound Image   07/15/24 1115   Wound Description Pink 07/21/24 2023   Pressure Injury Stage MMPI 07/15/24 1300   Amy-wound Assessment Fragile 07/21/24 2023   Wound Length (cm) 0.5 cm 07/15/24 1300   Wound Width (cm) 0.5 cm 07/15/24 1300   Wound Depth (cm) 0 cm 07/15/24 1300   Wound Surface Area (cm^2) 0.25 cm^2 07/15/24 1300   Wound Volume (cm^3) 0 cm^3 07/15/24 1300   Calculated Wound Volume (cm^3) 0 cm^3 07/15/24 1300   Drainage Amount None 07/19/24 2118   Drainage Description Other (Comment) 07/15/24 1300   Treatments Cleansed 07/20/24 0940   Dressing Open to air 07/21/24 2023   Dressing Changed Changed 07/15/24 1300   Patient Tolerance Tolerated well 07/15/24 1300   Dressing Status Intact 07/16/24 1630       Wound 07/11/24 Pressure Injury Buttocks Mid;Bilateral (Active)   Wound Image   07/22/24 1011   Wound Description Light purple;Non-blanchable erythema;Yellow;Slough 07/22/24 1011   Pressure Injury Stage DTPI 07/22/24 1011   Amy-wound Assessment Fragile;Rash 07/22/24 1011   Wound Length (cm) 5 cm 07/22/24 1011   Wound Width (cm) 2.5 cm 07/22/24 1011   Wound Depth (cm) 0.2 cm 07/22/24 1011   Wound Surface Area (cm^2) 12.5 cm^2 07/22/24 1011   Wound Volume (cm^3) 2.5 cm^3 07/22/24 1011   Calculated Wound Volume (cm^3) 2.5 cm^3 07/22/24 1011   Change in Wound Size % 20.63 07/22/24 1011   Drainage Amount Scant 07/22/24 1011   Drainage Description Serosanguineous 07/22/24 1011   Non-staged Wound Description Full thickness 07/22/24 1011   Treatments Cleansed;Irrigation with NSS;Site care 07/22/24 1011   Dressing Other (Comment) 07/22/24 1011   Dressing Changed New  07/22/24 1011   Patient Tolerance Tolerated well 07/22/24 1011   Dressing Status Clean;Dry;Intact 07/22/24 1011       Wound 07/20/24 Pressure Injury Thigh Anterior;Distal;Right (Active)   Wound Image   07/22/24 1010   Wound Description Intact 07/22/24 1014   Pressure Injury Stage O 07/22/24 1014   Amy-wound Assessment Intact 07/22/24 1014   Wound Length (cm) 0 cm 07/22/24 1014   Wound Width (cm) 0 cm 07/22/24 1014   Wound Depth (cm) 0 cm 07/22/24 1014   Wound Surface Area (cm^2) 0 cm^2 07/22/24 1014   Wound Volume (cm^3) 0 cm^3 07/22/24 1014   Calculated Wound Volume (cm^3) 0 cm^3 07/22/24 1014   Treatments Site care 07/22/24 1014   Dressing Open to air 07/22/24 1014       Wound 07/22/24 Arm Left;Posterior;Lower (Active)   Wound Image   07/22/24 1014   Wound Description Beefy red;Pink 07/22/24 1014   Amy-wound Assessment Fragile 07/22/24 1014   Wound Length (cm) 1 cm 07/22/24 1014   Wound Width (cm) 1 cm 07/22/24 1014   Wound Depth (cm) 0.1 cm 07/22/24 1014   Wound Surface Area (cm^2) 1 cm^2 07/22/24 1014   Wound Volume (cm^3) 0.1 cm^3 07/22/24 1014   Calculated Wound Volume (cm^3) 0.1 cm^3 07/22/24 1014   Drainage Amount Scant 07/22/24 1014   Drainage Description Serosanguineous 07/22/24 1014   Non-staged Wound Description Partial thickness 07/22/24 1014   Treatments Cleansed;Irrigation with NSS;Site care 07/22/24 1014   Dressing Dermagran gauze;Foam, Silicon (eg. Allevyn, etc) 07/22/24 1014   Dressing Changed New 07/22/24 1014   Patient Tolerance Tolerated well 07/22/24 1014   Dressing Status Clean;Dry;Intact 07/22/24 1014                Susan Florence RN, BSN, CWOCN

## 2024-07-22 NOTE — PROGRESS NOTES
Progress Note - Geriatric Medicine   Hal Miller 84 y.o. male MRN: [id number]  Unit/Bed#: [facility] 620-01 Encounter: [id number]      Assessment/Plan:    Acute metabolic encephalopathy   -mentation continues to wax and wane to some degree   -multifactorial including advanced age, underlying dementia, hx encephalopathy during prior admissions, perforated diverticulitis now s/p surgical procedure, anesthesia, multiple setting changes and acute pain in frail elderly individual with prolonged hospitalization   -continue supportive cares, encourage normal sleep cycle, use of sensory assist devices (glasses/hearing aids)  -continue to ensure acute pain well controlled, cont barbie pain protocol, scheduled tylenol   -monitor for fecal and urinary retention - LUQ colostomy    -reorient frequently as appropriate and indicated  -appreciate family at bedside for familiarity/reassurance/engagement and social support   -Mirtazapine 7.5mg initiated on 7/15/24 for report of insomnia and poor oral intake, tolerated well w/o notable morning sedation   -more restless overnight 7/17-7/18 pulling at cardenas catheter and ostomy requiring soft mitt restraints    -Seroquel 12.5mg HS resumed 7/18-7/19 to help with overnight restlessness/agitation and sleep cycle with some improvement of sleep and next day alertness, cont current dosing for now, monitor mentation and QTc closely and consider d/c if noting prolonged next day sedation      Perforated diverticulitis with abscess  -s/p Hartmans procedure with explantation of left groin mesh with washout and debridement of left groin wound on 7/4/24 now with wound vac in place  -colostomy in place, cont ostomy cares   -s/p completion course of Zosyn  -continue acute multimodal pain control per geriatric pain protocol, taper as pain improves with healing   -Palliative Care consult for ongoing discussion goals of care, continues to have minimal oral intake and on review of advance directive artificial  nutrition does not appear in line with patients previously documented wishes      Anemia  -Hb 7.2 from 10.7 on 7/3/24  -likely multifactorial including dilutional, daily labs, losses in cardenas etc  -monitor for ongoing acute blood loss and tx with PRBC as indicated      Dysphagia  -speech therapy on consult   -s/p VBS 7/7/24 repeated 7/19/24  -currently on dysphagia level 2 puree with thin liquids   -continue strict aspiration precautions  -cont working with speech therapy   -encourage good oral hygiene and cares   -recommend staff assist with ALL meals and encourage family to assist with ordering food pt enjoys      Moderate protein calorie malnutrition   Malnutrition Findings:   Adult Malnutrition type: Acute illness  Adult Degree of Malnutrition: Other severe protein calorie malnutrition  Malnutrition Characteristics: Fat loss, Muscle loss, Inadequate energy, Weight loss  360 Statement: Acute on chronic severe pro, karthik malnutrition d/t condition, decreased appetite as evidence by severe signs of muscle/fat loss at clavicles, moderate at shoulders, temples, <50% energy intake > 5 days, <75% energy intake > 1 month, 4+ edema LE's, treated with oral diet, pureed, and oral nutrition supplements, will continue to monitor food preferenes, provide assistance and encouragement at meals.  BMI Findings: Body mass index is 25.08 kg/m².      Vascular dementia  -remains encephalopathic with fluctuating mentation due to acute encephalopathy   -at baseline prior to recent admit alert and oriented, forgetful, independent with ADLs requires some assist with iADLs   -MoCA 23/30 (11/2022), noted to have episodes of encephalopathy and decline since last testing, recommend repeat following recovery from acute illness to establish new baseline   -CTH 6/18/24 imaging personally viewed, reveals at least moderate chronic microangiopathic changes   -TSH WNL at 1.79, B12 WNL at 531  -at risk age and cardiovascular related acceleration santy  in setting of recent CVA and persistent encephalopathy, continue secondary risk factor modifications   -encourage patient remain physically, socially and cognitively active and engaged to maintain cognitive acuity   -encourage use sensory assist devices such as corrective lenses and hearing aids all appropriate times to reduce risk uncorrected sensroy impairment from contributing to isolation, confusion, worsening encephalopathy and more precipitous cognitive decline   -underlying dementia increases risk developing delirium and likely contributed to episode during current admission, cont strict precautions to reduce risk recurrence     Hx CVA  -small left parietal infarct during recent hospitalization on MRI brain 6/20/24 re-demonstrated on CTA head 7/22/24  -suspected to be due to missed dosing of Eliquis at time of event per prior documentation   -remains at risk future CVAs, continue strict secondary risk factor modifications  -close o/p f/u with Neurology for ongoing monitoring and management      History of possible seizure  -maintained on Depatkote as managed by Neurology, continue dosing per Neurology  -cont strict seizure precautions      Insomnia   -previously on Seroquel PRN as o/p, had not been requiring earlier in admission however now with persistent insomnia and restlessness in evenings pulling at IV, catheters, ostomy prompting resumption 7/18-7/19 with some improvement in sleep cycle without increase in daytime somnolence   -could also consider low dose melatonin HS PRN  -encourage establishment of consistent sleep/wake times and bedtime routine      Difficult cardenas placement   -difficult cardenas placement by Urology due to phimosis in OR 7/4  -intermittent hematuria with pt pulling at cardenas now d/c and voiding spontaneously   -if retains urine requiring intervention Urology recs consideration of IR consult for suprapubic placement  -continue retention protocol      Inflammatory polyarthropathy    -maintained on prednisone chronically as o/p  -follows closely with Rheum as o/p, continue close o/p f/u     DM-II  -A1c 7.6  -currently on basal bolus insulin regimen with good control  -continue glu goal 140-180 during hospitalization to reduce risk hypoglycemia, readings mostly within goal   -continue close o/p f/u with PCP for ongoing age appropriate diabetic screenings and cares      Afib   -rates currently well controlled off rate controllers, on Eliquis   -cont close follow-up with Cardiology     Impaired Vision  -recommend use of corrective lenses at all appropriate times  -encourage adequate lighting and encourage use of assistance with ambulation  -consider large font for printed materials provided to patient      Impaired Hearing  -Encourage use of hearing aids at all appropriate times  -encourage providers and caregivers to speak slowly and clearly directly to patient  -minimize background noise to encourage patient engagement  -consider use of hearing amplifier to reduce risk of straining to hear if hearing aids are not present or are not sufficient      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  -clinical frailty scale stage VI, moderately frail, progressive  -multifactorial including age, amb dysfxn, hx CVA, DM-II and multitude of chronic medical co-morbidities now with perforated diverticulitis with abscess requiring surgical procedure superimposed on elderly individual with limited physiologic and metabolic reserves, poor oral intake now with failure to thrive  -continue optimization of chronic medical conditions and address acute derangements as arise  -monitor for and treat any underlying anxiety, mood, depression symptoms as may impact response to therapies and overall sense of wellbeing and quality of life  -continue to ensure tx and interventions align with patients wishes and goals of care, advance directive available in record for review   -cont psychosocial support of patient and caregivers  "    Care coordination: rounded with Lizzie (RN)    Subjective:     Hal is seen and examined at bedside, at time of evaluation he is somnolent and withdrawn and does not answer questions or follow commands. Nursing reports that he was restless earlier in the evening which improved with medication administration.     Review of Systems   Unable to perform ROS: Mental status change     Objective:     Vitals: Blood pressure 114/63, pulse 89, temperature 97.5 °F (36.4 °C), resp. rate 20, height 6' 2\" (1.88 m), weight 87.7 kg (193 lb 5.5 oz), SpO2 94%.,Body mass index is 24.82 kg/m².      Intake/Output Summary (Last 24 hours) at 7/22/2024 0731  Last data filed at 7/21/2024 2200  Gross per 24 hour   Intake 100 ml   Output 150 ml   Net -50 ml     Current Medications: Reviewed    Physical Exam:   Physical Exam  Vitals and nursing note reviewed.   Constitutional:       General: He is not in acute distress.     Comments: Thin frail chronically ill appearing elderly male    HENT:      Head: Normocephalic.      Comments: Eyes sunken in appearance      Nose: Nose normal.      Mouth/Throat:      Mouth: Mucous membranes are dry.   Eyes:      General:         Right eye: No discharge.         Left eye: No discharge.   Neck:      Comments: Trachea midline   Cardiovascular:      Rate and Rhythm: Normal rate and regular rhythm.   Pulmonary:      Effort: Pulmonary effort is normal. No respiratory distress.      Comments: Shallow resp   Abdominal:      Palpations: Abdomen is soft.      Comments: Abd binder in place    Musculoskeletal:      Comments: Severe diffuse subcutaneous fat and muscle wasting    Skin:     General: Skin is warm and dry.      Comments: Thin and friable    Neurological:      Comments: Somnolent and does not awaken to voice or light tactile stimuli    Psychiatric:      Comments: Not restless or agitated at time of eval        Invasive Devices       Peripheral Intravenous Line  Duration             Peripheral IV Dorsal " (posterior);Right Forearm -- days              Drain  Duration             Colostomy Other (comment) LUQ 17 days    External Urinary Catheter 3 days                  Lab Results:     I have personally reviewed pertinent lab results including the following:    Results from last 7 days   Lab Units 07/22/24  0654 07/21/24  0506 07/20/24  0534   WBC Thousand/uL 6.82 7.91 10.59*   HEMOGLOBIN g/dL 7.2* 7.0* 8.0*   HEMATOCRIT % 24.1* 24.0* 27.1*   PLATELETS Thousands/uL 121* 144* 159   SEGS PCT % 74 75 80*   MONO PCT % 13* 12 9   EOS PCT % 2 2 2     Results from last 7 days   Lab Units 07/22/24  0654 07/21/24  0506 07/20/24  0534 07/19/24  1522   POTASSIUM mmol/L 4.9 4.8 4.5  --    CHLORIDE mmol/L 110* 110* 110*  --    CO2 mmol/L 33* 33* 31  --    CO2, I-STAT mmol/L  --   --   --  38*   BUN mg/dL 20 22 23  --    CREATININE mg/dL 1.24 1.26 1.31*  --    CALCIUM mg/dL 9.6 9.5 9.8  --    GLUCOSE, ISTAT mg/dl  --   --   --  131     I have personally reviewed the following imaging study reports in PACS:    7/22/24- CTA head w/wo contrast

## 2024-07-22 NOTE — CASE MANAGEMENT
Case Management Discharge Planning Note    Patient name Hal Miller  Location [facility] 620/[facility] 620-01 MRN [id number]  : 1940 Date 2024       Current Admission Date: 7/3/2024  Current Admission Diagnosis:Colonic diverticular abscess   Patient Active Problem List    Diagnosis Date Noted Date Diagnosed    Palliative care by specialist 2024     Goals of care, counseling/discussion 2024     Difficult Babcock catheter placement ([facility]) 2024     Moderate protein-calorie malnutrition (HCC) 2024     Left inguinal hernia 2024     Leukocytosis 2024     Dysphagia 2024     Hx of aortic valve replacement 2024     Diplopia 2024     Peripheral polyneuropathy 2024     Hyponatremia 2024     Dysphoric mood 2024     NICM (nonischemic cardiomyopathy) ([facility]) 2024     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[facility]) 2024     Acute on chronic respiratory failure ([facility]) 2024     Episode of seizure-like activity 2024     History of Whipple procedure 2024     Dizziness 2024     Insomnia 2024     Ambulatory dysfunction 2024     Severe protein-calorie malnutrition (HCC) 2024     Abnormal CT of the abdomen 2024     Zoster 2024     Colonic diverticular abscess 2024     Vascular dementia ([facility]) 2024     Type 2 diabetes mellitus, with long-term current use of insulin ([facility]) 2024     Hypercalcemia 2023     TARA (acute kidney injury) ([facility]) 2023     Esophageal stricture 2023     Acute encephalopathy 2023     Generalized weakness 2023     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     Chronic obstructive pulmonary disease with acute exacerbation (HCC) 2022     Stage 3 chronic kidney disease (HCC) 2022     Centrilobular emphysema (HCC) 2021     History of malignant neuroendocrine tumor 2021     Atrial fibrillation (HCC) 2017     Shortness of breath  06/13/2017     Primary hypertension 06/13/2017     Hyperlipidemia 09/21/2015     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     Osteoarthrosis 11/12/2014     Polymyalgia rheumatica (HCC) 11/12/2014     Aneurysm of thoracic aorta (HCC) 01/06/2014     Aneurysm of abdominal aorta (HCC) 01/06/2014     Neoplasm of other specified site 01/06/2014       LOS (days): 19  Geometric Mean LOS (GMLOS) (days): 9.8  Days to GMLOS:-9     OBJECTIVE:  Risk of Unplanned Readmission Score: 40.74   Current admission status: Inpatient   Preferred Pharmacy:   [id number] - [place], [place] - [address] [address]  Phone: [phone] Fax: [phone]    [facility] - [place], [place] - [address]  [address] [address]  Phone: [phone] Fax: [phone]    [facility]) - [place], [place] - [address]  [address] [place] [address]  Phone: [phone] Fax: [phone]    Primary Care Provider: Kyaw Monreal MD    Primary Insurance: [facility]  Secondary Insurance:     DISCHARGE DETAILS:      Other Referral/Resources/Interventions Provided:  Referral Comments: This CM discussed therapy recs with wife at bedside. Family is agreeable to SNF when pt is stable for DC. Family requested [facility], or [facility] able to accept, and reserved in aidin. Family would like to visit facility to confirm decision.    Treatment Team Recommendation: SNF  Discharge Destination Plan:: SNF

## 2024-07-22 NOTE — PROGRESS NOTES
Transported to CT by RN. VSS, back in room, call bell within reach, bed locked. A-fib on monitor while transporting, denies any pain or needs at this time. Nasal cannula remains at 2L, sat > 90%

## 2024-07-22 NOTE — ACP (ADVANCE CARE PLANNING)
Record of Family Meeting - Palliative and Supportive Care   Hal Miller 84 y.o. male [id number]      Recommendations and Plan:  -Continue Level 3, DNR/DNI  -no TF  -plan for STR, if does not progress, will transition to hospice        A family meeting was held for Mr. Miller. This meeting was necessary for determine the appropriate course of treatment.  Time of MeetinPM  Meeting Location: patient bedside  Participants:   Spouse- Ann  Friend- Fredi Champion- surgery  Dr. YAW Shaw- [facility]    Patient Participation: Patient present    Patient Support System: present     Advanced Directive of POLST available: yes    Topics of Discussion:  -We reviewed current clinical situation with focus on nutrition and mental status  -Discussed that PEG tube or temporary NG tube would not provide benefit and may cause harm  -Discussed different levels of rehab  -Reviewed realistic goals of how much improvement he may have    Other Content of Meeting:  -all questions/concerns addressed to their satisfaction    Time Involved in Meetin minutes, beginning at approximately  1PM and ending at approximately 130PM.     Herlinda Shaw, DO   Palliative and Supportive Care  Clinic/Answering Service: [phone]  You can find me on Epic Secure Chat!     This note was not shared with the patient due to privacy exception: note includes other individuals

## 2024-07-22 NOTE — PLAN OF CARE
Problem: PHYSICAL THERAPY ADULT  Goal: Performs mobility at highest level of function for planned discharge setting.  See evaluation for individualized goals.  Description: Treatment/Interventions: OT, Spoke to case management, Spoke to nursing, Gait training, Bed mobility, Patient/family training, Endurance training, LE strengthening/ROM, Functional transfer training          See flowsheet documentation for full assessment, interventions and recommendations.  Note: Prognosis: Fair  Problem List: Decreased strength, Decreased range of motion, Decreased endurance, Impaired balance, Decreased mobility, Decreased cognition, Impaired judgement, Decreased safety awareness  Assessment: Patient originally presented to \[facility]\"" on 7/3/2024.  Patient was admitted with a primary diagnosis of L inguinal hernia w/mesh erosion into sigmoid colon s/p ex-lap, mesh explant, washout and debridement of L groin, Angela's on 7/4.  Patient seen for PT reevaluation today due to expiration mobility goals, to assess patient's current functional mobility status, and reestablish PT goals.  Patient was able perform all bed mobility with max a x 2 which is approximately same level assistance required compared to previous session.  Patient was able to perform sit to stand and stand pivot transfers at dependent x 2 level which is more assistance required compared to previous sessions.  Patient required frequent cues to reorient to task and to redirect attention.  Overall patient continues to present with decreased bilateral lower extremity strength, decreased range of motion, decreased endurance, decreased static/dynamic balance, decreased activity tolerance, and will continue to benefit from skilled PT services during hospital stay.  At conclusion of PT reevaluation patient was assisted back into chair with all needs within reach and soft care cushion in place.  D/C recommendation when medically cleared is rehab  Barriers to Discharge:  Inaccessible home environment     Rehab Resource Intensity Level, PT: II (Moderate Resource Intensity)    See flowsheet documentation for full assessment.

## 2024-07-22 NOTE — PROGRESS NOTES
Pending goals of care, if plans to initiate TF recommend Jevity 1.2@20mL/hr, advance by 10mL every 8hrs to goal of 75mL/hr, provides total volume 1800mL, 2160cal, 100g pro, 1453mL, water flushes per critical care or consider 100mL every 6hrs would provide total of 1853mL.

## 2024-07-22 NOTE — PROGRESS NOTES
"Progress Note - General Surgery   Hal Miller 84 y.o. male MRN: [id number]  Unit/Bed#: [facility] 620-01 Encounter: [id number]    Assessment:  Hal Miller is a 84 y.o. male with L inguinal hernia w/mesh erosion into sigmoid colon s/p ex-lap, mesh explant, washout and debridement of L groin, Angela's on 7/4.      Plan:  - Consider keofed and tube feeds if allowed through advanced directive  - On home meds including Eliquis  - appreciate urology recs  - OOB, PT/PT Level 1  - daily wet to dry groin dressing changes  - CM re: dispo  - Continue goals of care discussions  - appreciate palliative care recs  - CM working on possible rehab pending progress  - possible CTA to rule out any further causes for his decline, most likely secondary to malnutrition and severe deconditioning     Subjective/Objective     Subjective: NAEO. Vital signs stable. Patient reporting no pain this morning. He is more responsive then he has been lately but continues to keep his eyes closed while answering. Will open his eyes to when asked.     Objective:     Blood pressure 114/63, pulse 89, temperature 97.5 °F (36.4 °C), resp. rate 20, height 6' 2\" (1.88 m), weight 87.7 kg (193 lb 5.5 oz), SpO2 94%.,Body mass index is 24.82 kg/m².      Intake/Output Summary (Last 24 hours) at 7/22/2024 0846  Last data filed at 7/21/2024 2200  Gross per 24 hour   Intake 100 ml   Output 150 ml   Net -50 ml       Invasive Devices       Peripheral Intravenous Line  Duration             Peripheral IV Dorsal (posterior);Right Forearm -- days              Drain  Duration             Colostomy Other (comment) LUQ 17 days    External Urinary Catheter 3 days                    Physical Exam: General appearance: cachectic and resting in bed with mouth agape  Lungs: on 4L nasal canula   Heart: normal rate  Abdomen: soft, mildly distended, mildly tender. LLQ ostomy in place with bowel sweat in bag. L groin wound with clean dressing in place    Lab, Imaging and other studies:I " have personally reviewed pertinent lab results.    VTE Pharmacologic Prophylaxis: Eliquis  VTE Mechanical Prophylaxis: sequential compression device    Jean Carlos Hurd MD  General Surgery Resident

## 2024-07-23 NOTE — ASSESSMENT & PLAN NOTE
[facility] - Goals:   Level 3, DNR/DNI  Rehab placement with possible transition to hospice if unable to successfully rehab  Palliative will follow for ongoing goals of care discussions as situation evolves.    Social Support:  Supportive listening provided  Normalized experience of patient  Provided anxiety containment  Advocated for patient/family with interdisciplinary team  Mediated conflict    Symptom management:  Per primary and geriatric teams  Delirium precautions: please minimize interruptions and prioritize sleep at night.  No TV nor screen time at night.  Shades drawn at night.  During day, shades up, minimize napping, and encourage meals in chair.    Care Coordination  Case discussed with CM    Follow-up  We appreciate the opportunity to participate in this patient's care.   We will continue to follow while admitted. PSC will return on 7/22  Please do not hesitate to contact our on-call provider through EPIC Secure Chat or contact [phone] should there be an acute change or other symptom control concerns.     Nessa Murrell, MS, RD, LDN

## 2024-07-23 NOTE — PROGRESS NOTES
[facility]  Progress Note  Name: Hal Miller I  MRN: [id number]  Unit/Bed#: [facility] 620-01 I Date of Admission: 7/3/2024   Date of Service: 7/23/2024 I Hospital Day: 20    Assessment & Plan   Goals of care, counseling/discussion  Assessment & Plan  See ACP note from 7/22             Palliative care by specialist  Assessment & Plan  Goals:   Level 3, DNR/DNI  Rehab placement with possible transition to hospice if unable to successfully rehab  Palliative will follow for ongoing goals of care discussions as situation evolves.    Social Support:  Supportive listening provided  Normalized experience of patient  Provided anxiety containment  Advocated for patient/family with interdisciplinary team  Mediated conflict    Symptom management:  Per primary and geriatric teams  Delirium precautions: please minimize interruptions and prioritize sleep at night.  No TV nor screen time at night.  Shades drawn at night.  During day, shades up, minimize napping, and encourage meals in chair.    Care Coordination  Case discussed with CM    Follow-up  We appreciate the opportunity to participate in this patient's care.   We will continue to follow while admitted. PSC will return on 7/22  Please do not hesitate to contact our on-call provider through EPIC Secure Chat or contact [phone] should there be an acute change or other symptom control concerns.      Moderate protein-calorie malnutrition (HCC)  Assessment & Plan  Assist with feeds, nutritional supplements, SLP following.     Malnutrition Findings:   Adult Malnutrition type: Acute illness  Adult Degree of Malnutrition: Other severe protein calorie malnutrition  Malnutrition Characteristics: Fat loss, Muscle loss, Inadequate energy, Weight loss                  360 Statement: Acute on chronic severe pro, karthik malnutrition d/t condition, decreased appetite as evidence by severe signs of muscle/fat loss at clavicles, moderate at shoulders,  temples, <50% energy intake > 5 days, <75% energy intake > 1 month, 4+ edema LE's, treated with oral diet, pureed, and oral nutrition supplements, will continue to monitor food preferenes, provide assistance and encouragement at meals.    BMI Findings:           Body mass index is 24.82 kg/m².       Vascular dementia (HCC)  Assessment & Plan  Geriatrics following, appreciate input    * Colonic diverticular abscess  Assessment & Plan  Surgical team following s/p ex-lap, mesh explantation, elena procedure, and groin washout on 7/4               Interval history:       Patient in bed and picking on his gown.  Does respond to me initially and seemingly answered a few questions appropriately.  Spouse at bedside has no new questions.  Planning to look at a facility in [place] later today.    MEDICATIONS / ALLERGIES:     all current active meds have been reviewed and current meds:   Current Facility-Administered Medications   Medication Dose Route Frequency    acetaminophen (TYLENOL) oral suspension 650 mg  650 mg Oral Q4H PRN    albuterol (PROVENTIL HFA,VENTOLIN HFA) inhaler 2 puff  2 puff Inhalation Q4H PRN    albuterol inhalation solution 2.5 mg  2.5 mg Nebulization Q4H PRN    apixaban (ELIQUIS) tablet 5 mg  5 mg Oral BID    bisacodyl (DULCOLAX) rectal suppository 10 mg  10 mg Rectal Daily    chlorhexidine (PERIDEX) 0.12 % oral rinse 15 mL  15 mL Mouth/Throat Q12H YUNI    dextrose 5 % and sodium chloride 0.45 % with KCl 20 mEq/L infusion  75 mL/hr Intravenous Continuous    divalproex sodium (DEPAKOTE SPRINKLE) capsule 500 mg  500 mg Oral Q8H YUNI    insulin lispro (HumALOG/ADMELOG) 100 units/mL subcutaneous injection 1-5 Units  1-5 Units Subcutaneous 4x Daily (AC & HS)    levalbuterol (XOPENEX) inhalation solution 1.25 mg  1.25 mg Nebulization TID    metoprolol succinate (TOPROL-XL) 24 hr tablet 25 mg  25 mg Oral Daily    mirtazapine (REMERON) tablet 15 mg  15 mg Oral HS    moisture barrier miconazole 2% cream (aka  ARASH MOISTURE BARRIER ANTIFUNGAL CREAM)   Topical BID    nystatin (MYCOSTATIN) oral suspension 500,000 Units  500,000 Units Swish & Swallow 4x Daily    oxyCODONE (ROXICODONE) oral solution 2.5 mg  2.5 mg Oral Q4H PRN    Or    oxyCODONE (ROXICODONE) oral solution 5 mg  5 mg Oral Q4H PRN    pantoprazole (PROTONIX) EC tablet 40 mg  40 mg Oral Early Morning    QUEtiapine (SEROquel) tablet 12.5 mg  12.5 mg Oral HS    senna-docusate sodium (SENOKOT S) 8.6-50 mg per tablet 1 tablet  1 tablet Oral BID    tamsulosin (FLOMAX) capsule 0.4 mg  0.4 mg Oral Daily With Dinner       Allergies   Allergen Reactions    Contrast Dye [Iodinated Contrast Media] Other (See Comments)     Pt states kidney dysfunction..Patient was injected on 7/18/24 with no pre-medication and patient had no reaction post injection -      Other        OBJECTIVE:    Physical Exam  Physical Exam  Vitals and nursing note reviewed.   Constitutional:       General: He is not in acute distress.     Appearance: He is ill-appearing.   HENT:      Head: Normocephalic and atraumatic.      Right Ear: External ear normal.      Left Ear: External ear normal.      Nose: Nose normal.   Eyes:      General: No scleral icterus.        Right eye: No discharge.         Left eye: No discharge.   Cardiovascular:      Rate and Rhythm: Normal rate and regular rhythm.   Pulmonary:      Effort: Pulmonary effort is normal. No respiratory distress.      Breath sounds: Normal breath sounds.   Abdominal:      General: Bowel sounds are normal. There is no distension.      Palpations: Abdomen is soft.   Skin:     General: Skin is warm and dry.      Coloration: Skin is pale.   Neurological:      Mental Status: He is alert. He is disoriented.      Comments: Responds to some questions seemingly appropriately, moving all 4 extremities            Lab Results: I have personally reviewed pertinent labs., CBC:   Lab Results   Component Value Date    WBC 7.71 07/23/2024    HGB 7.5 (L) 07/23/2024  "   HCT 25.3 (L) 07/23/2024     (H) 07/23/2024     (L) 07/23/2024    RBC 2.33 (L) 07/23/2024    MCH 32.2 07/23/2024    MCHC 29.6 (L) 07/23/2024    RDW 19.1 (H) 07/23/2024    MPV 10.2 07/23/2024    NRBC 0 07/23/2024   , CMP:   Lab Results   Component Value Date    SODIUM 144 07/23/2024    K 5.0 07/23/2024     (H) 07/23/2024    CO2 31 07/23/2024    BUN 19 07/23/2024    CREATININE 1.20 07/23/2024    CALCIUM 9.6 07/23/2024    EGFR 55 07/23/2024   , PT/PTT:No results found for: \"PT\", \"PTT\"  Imaging Studies: no new studies  EKG, Pathology, and Other Studies: all pertinent studies reviewed    Counseling / Coordination of Care    Total floor / unit time spent today 15+ minutes. Greater than 50% of total time was spent with the patient and / or family counseling and / or coordination of care. A description of the counseling / coordination of care: Chart review, medication review, discussion of goals of care, supportive listening.    Portions of this document may have been created using dictation software and as such some \"sound alike\" terms may have been generated by the system. Do not hesitate to contact me with any questions or clarifications.      "

## 2024-07-23 NOTE — PROGRESS NOTES
"Progress Note - General Surgery   Hal Miller 84 y.o. male MRN: [id number]  Unit/Bed#: [facility] 620-01 Encounter: [id number]    Assessment:  Hal Miller is a 84 y.o. male with L inguinal hernia w/mesh erosion into sigmoid colon s/p ex-lap, mesh explant, washout and debridement of L groin, Angela's on 7/4     AVSS on 2L of NC  UOP 1000  WBC 7.7 from 6.8  Hgb 7.5 from 7.2  Cr 1.20 from 1.24    Plan:  - Continue PO intake  - On home meds including Eliquis  - Appreciate Urology recs  - OOB, PT/OT Level II  - Daily wet to dry groin dressing changes  - CM re: dispo, to rehab    Subjective/Objective     Subjective:   No acute events overnight.  Patient tolerating minimal oral intake of pudding and applesauce.  Voiding without difficulty.  Output from ostomy minimal.  Somewhat responsive to commands.  No fevers, no chills, no chest pain, no shortness of breath.  No vomiting.    Objective:     Blood pressure 113/71, pulse 80, temperature (!) 97.3 °F (36.3 °C), temperature source Axillary, resp. rate 18, height 6' 2\" (1.88 m), weight 87.7 kg (193 lb 5.5 oz), SpO2 98%.,Body mass index is 24.82 kg/m².      Intake/Output Summary (Last 24 hours) at 7/23/2024 0913  Last data filed at 7/23/2024 0501  Gross per 24 hour   Intake 0 ml   Output 1000 ml   Net -1000 ml       Invasive Devices       Peripheral Intravenous Line  Duration             Peripheral IV Dorsal (posterior);Right Forearm -- days              Drain  Duration             Colostomy Other (comment) LUQ 18 days    External Urinary Catheter 4 days                    Physical Exam:  General: No acute distress, alert and oriented  Neuro: alert, oriented x3  HENT: PERRL, EOMI  CV: Well perfused, regular rate and rhythm  Lungs: Normal work of breathing, no increased respiratory effort  Abdomen: Soft, nontender, nondistended. Dressing is clean and dry. Ostomy is pink and viable with bowel sweat.  MSK/Extremities: No edema, clubbing or cyanosis  Skin: Warm, dry      Lab, " Imaging and other studies:I have personally reviewed pertinent lab results.    VTE Pharmacologic Prophylaxis: Eliquis  VTE Mechanical Prophylaxis: sequential compression device

## 2024-07-23 NOTE — CASE MANAGEMENT
Case Management Discharge Planning Note    Patient name Hal Miller  Location [facility]-01 MRN [id number]  : 1940 Date 2024       Current Admission Date: 7/3/2024  Current Admission Diagnosis:Colonic diverticular abscess   Patient Active Problem List    Diagnosis Date Noted Date Diagnosed    Palliative care by specialist 2024     Goals of care, counseling/discussion 2024     Difficult Babcock catheter placement ([facility]) 2024     Moderate protein-calorie malnutrition (HCC) 2024     Left inguinal hernia 2024     Leukocytosis 2024     Dysphagia 2024     Hx of aortic valve replacement 2024     Diplopia 2024     Peripheral polyneuropathy 2024     Hyponatremia 2024     Dysphoric mood 2024     NICM (nonischemic cardiomyopathy) ([facility]) 2024     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     Acute on chronic respiratory failure ([facility]) 2024     Episode of seizure-like activity 2024     History of Whipple procedure 2024     Dizziness 2024     Insomnia 2024     Ambulatory dysfunction 2024     Severe protein-calorie malnutrition (HCC) 2024     Abnormal CT of the abdomen 2024     Zoster 2024     Colonic diverticular abscess 2024     Vascular dementia ([facility]) 2024     Type 2 diabetes mellitus, with long-term current use of insulin ([facility]) 2024     Hypercalcemia 2023     TARA (acute kidney injury) ([facility]) 2023     Esophageal stricture 2023     Acute encephalopathy 2023     Generalized weakness 2023     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     Chronic obstructive pulmonary disease with acute exacerbation (HCC) 2022     Stage 3 chronic kidney disease (HCC) 2022     Centrilobular emphysema (HCC) 2021     History of malignant neuroendocrine tumor 2021     Atrial fibrillation (HCC) 2017     Shortness of breath  06/13/2017     Primary hypertension 06/13/2017     Hyperlipidemia 09/21/2015     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     Osteoarthrosis 11/12/2014     Polymyalgia rheumatica (HCC) 11/12/2014     Aneurysm of thoracic aorta (HCC) 01/06/2014     Aneurysm of abdominal aorta (HCC) 01/06/2014     Neoplasm of other specified site 01/06/2014       LOS (days): 20  Geometric Mean LOS (GMLOS) (days): 9.8  Days to GMLOS:-10     OBJECTIVE:  Risk of Unplanned Readmission Score: 40.86   Current admission status: Inpatient   Preferred Pharmacy:   [id number] - [place], [place] - [address] [address]  Phone: [phone] Fax: [phone]    [facility] [place], [place] - [address]  [address] [address]  Phone: [phone] Fax: [phone]    [facility]) - [place], [place] - [address]  [address] [place] [address]  Phone: [phone] Fax: [phone]  Primary Care Provider: Kyaw Monreal MD  Primary Insurance: [facility]  Secondary Insurance:   DISCHARGE DETAILS:    Other Referral/Resources/Interventions Provided:  Referral Comments: This CM discussed therapy recs with Ann (wife) at bedside.Ann would like to visit [facility],and [facility] before making final decision. Family also requested referral to [facility], who reports no current male bed availability. family is also agreeable to blanket referral which has been entered in aidin, pending final outcome.

## 2024-07-23 NOTE — PROGRESS NOTES
Progress Note - Geriatric Medicine   Hal Miller 84 y.o. male MRN: [id number]  Unit/Bed#: [facility] 620-01 Encounter: [id number]      Assessment/Plan:    Vascular dementia  -remains encephalopathic with fluctuating mentation  -at baseline prior to recent admit alert and oriented, forgetful, independent with ADLs required some assist with iADLs   -[facility] 23/30 (11/2022), noted to have episodes of encephalopathy and decline since last testing, recommend repeat following recovery from acute illness to establish new baseline   -[facility] 6/18/24 imaging personally viewed, reveals at least moderate chronic microangiopathic changes   -TSH WNL at 1.79, B12 WNL at 531  -at risk age and cardiovascular related acceleration santy in setting of recent CVA and persistent encephalopathy, continue secondary risk factor modifications   -encourage patient remain physically, socially and cognitively active and engaged to maintain cognitive acuity   -encourage use sensory assist devices such as corrective lenses and hearing aids all appropriate times to reduce risk uncorrected sensroy impairment from contributing to isolation, confusion, worsening encephalopathy and more precipitous cognitive decline   -underlying dementia increases risk developing delirium and likely contributed to episode during current admission, cont strict precautions to reduce risk recurrence    Acute metabolic encephalopathy   -mentation continues to wax and wane    -multifactorial including advanced age, underlying dementia, hx encephalopathy during prior admissions, perforated diverticulitis now s/p surgical procedure, anesthesia, multiple setting changes and acute pain in frail elderly individual with prolonged hospitalization   -continue supportive cares, encourage normal sleep cycle, use of sensory assist devices (glasses/hearing aids)  -continue to ensure acute pain well controlled, cont barbie pain protocol, scheduled tylenol, repositioning   -monitor for fecal and  urinary retention - LUQ colostomy    -reorient frequently as appropriate and indicated  -appreciate family at bedside for familiarity/reassurance/engagement and social support   -Mirtazapine 7.5mg initiated on 7/15/24 for report of insomnia and poor oral intake, tolerated well w/o notable morning sedation, increased to 15mg HS on 7/22/24   -Seroquel 12.5mg HS resumed 7/18-7/19 to help with overnight restlessness/agitation and sleep cycle with some improvement of sleep and next day alertness, cont current dosing, monitor mentation and QTc closely with use      Perforated diverticulitis with abscess  -s/p Hartmans procedure with explantation of left groin mesh with washout and debridement of left groin wound on 7/4/24 now with wound vac in place  -colostomy in place, cont ostomy cares   -s/p completion course of Zosyn  -continue acute multimodal pain control per geriatric pain protocol, taper as pain improves with healing   -Palliative Care consult for ongoing discussion goals of care, continues to have limited oral intake and on review of advance directive artificial nutrition does not appear in line with patients previously documented wishes, continue to assist with meals to optimize intake      Anemia  -Hb 7.5 from 10.7 on 7/3/24  -likely multifactorial including dilutional, daily labs  -monitor for ongoing acute blood loss and tx with PRBC as indicated      Dysphagia  -speech therapy on consult   -s/p VBS 7/7/24 repeated 7/19/24  -currently on dysphagia level 2 puree with thin liquids   -continue strict aspiration precautions, full upright for meals etc   -cont working with speech therapy   -encourage good oral hygiene and cares   -recommend staff assist with ALL meals and allow extra time for completion  -encourage family to assist with ordering food pt enjoys      Moderate protein calorie malnutrition   Malnutrition Findings:   Adult Malnutrition type: Acute illness  Adult Degree of Malnutrition: Other severe  protein calorie malnutrition  Malnutrition Characteristics: Fat loss, Muscle loss, Inadequate energy, Weight loss  360 Statement: Acute on chronic severe pro, karthik malnutrition d/t condition, decreased appetite as evidence by severe signs of muscle/fat loss at clavicles, moderate at shoulders, temples, <50% energy intake > 5 days, <75% energy intake > 1 month, 4+ edema LE's, treated with oral diet, pureed, and oral nutrition supplements, will continue to monitor food preferenes, provide assistance and encouragement at meals.  BMI Findings: Body mass index is 25.08 kg/m².       Hx CVA  -small left parietal infarct during recent hospitalization on MRI brain 6/20/24 re-demonstrated on CTA head 7/22/24  -suspected to be due to missed dosing of Eliquis at time of event per prior documentation   -remains at risk future CVAs, continue strict secondary risk factor modifications  -close o/p f/u with Neurology for ongoing monitoring and management      History of possible seizure  -maintained on Depatkote as managed by Neurology, continue dosing per Neurology  -cont strict seizure precautions      Insomnia   -previously on Seroquel PRN as o/p, had not been requiring earlier in admission however now with persistent insomnia and restlessness in evenings pulling at IV, catheters, ostomy prompting resumption 7/18-7/19 with some improvement in sleep cycle without increase in daytime somnolence   -continue mirtazapine HS  -encourage daytime activity and limit napping to reduce risk disruption sleep cycle     Difficult cardenas placement   -difficult cardenas placement by Urology due to phimosis in OR 7/4  -intermittent hematuria with pt pulling at cardenas now d/c and voiding spontaneously   -if retains urine requiring intervention Urology recs consideration of IR consult for suprapubic placement  -continue retention protocol      Inflammatory polyarthropathy   -maintained on prednisone chronically as o/p  -follows closely with Rheum as o/p,  continue close o/p f/u     DM-II  -A1c 7.6  -currently on basal bolus insulin regimen with good control  -continue glu goal 140-180 during hospitalization to reduce risk hypoglycemia, readings mostly within goal   -continue close o/p f/u with PCP for ongoing age appropriate diabetic screenings and cares      Afib   -rates currently well controlled off rate controllers, on Eliquis   -cont close follow-up with Cardiology     Impaired Vision  -recommend use of corrective lenses at all appropriate times  -encourage adequate lighting and encourage use of assistance with ambulation  -consider large font for printed materials provided to patient      Impaired Hearing  -Encourage use of hearing aids at all appropriate times  -encourage providers and caregivers to speak slowly and clearly directly to patient  -minimize background noise to encourage patient engagement  -consider use of hearing amplifier to reduce risk of straining to hear if hearing aids are not present or are not sufficient      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  -clinical frailty scale stage VI, moderately frail, progressive  -multifactorial including age, amb dysfxn, hx CVA, DM-II and multitude of chronic medical co-morbidities now with perforated diverticulitis with abscess requiring surgical procedure superimposed on elderly individual with limited physiologic and metabolic reserves, poor oral intake now with failure to thrive  -continue optimization of chronic medical conditions and address acute derangements as arise  -monitor for and treat any underlying anxiety, mood, depression symptoms as may impact response to therapies and overall sense of wellbeing and quality of life  -continue to ensure tx and interventions align with patients wishes and goals of care, advance directive available in record for review   -cont psychosocial support of patient and caregivers   -family looking into SNF level rehabs on dc    Care coordination: rounded with Jose  "(RN)    Subjective:     Hal is seen and examined at bedside where he is lying, he is slightly more awake this morning able to request water and to be repositioned. No acute events reported overnight.    Review of Systems   Unable to perform ROS: Mental status change (reports pain in rear end otherwise limited due to mental status)     Objective:     Vitals: Blood pressure 113/71, pulse 80, temperature (!) 97.3 °F (36.3 °C), temperature source Axillary, resp. rate 18, height 6' 2\" (1.88 m), weight 87.7 kg (193 lb 5.5 oz), SpO2 98%.,Body mass index is 24.82 kg/m².      Intake/Output Summary (Last 24 hours) at 7/23/2024 1013  Last data filed at 7/23/2024 0501  Gross per 24 hour   Intake 0 ml   Output 1000 ml   Net -1000 ml     Current Medications: Reviewed    Physical Exam:   Physical Exam  Vitals and nursing note reviewed.   Constitutional:       Comments: Thin frail elderly male    HENT:      Head: Normocephalic.      Nose: Nose normal.      Mouth/Throat:      Mouth: Mucous membranes are dry.   Eyes:      General:         Right eye: No discharge.         Left eye: No discharge.   Neck:      Comments: Trachea midline   Cardiovascular:      Rate and Rhythm: Normal rate and regular rhythm.   Pulmonary:      Effort: Pulmonary effort is normal.      Comments: Shallow resp, coarse   Abdominal:      General: There is no distension.      Comments: Abd binder in place    Musculoskeletal:      Right lower leg: Edema present.      Left lower leg: Edema present.      Comments: Reduced overall muscle mass    Skin:     General: Skin is warm and dry.      Comments: Thin and friable    Neurological:      Mental Status: He is alert.      Comments: Slightly more alert today able to intermittently make needs known    Psychiatric:      Comments: Restless improved following repositioning with assist of nursing         Invasive Devices       Peripheral Intravenous Line  Duration             Peripheral IV Dorsal (posterior);Right " Forearm -- days              Drain  Duration             Colostomy Other (comment) LUQ 18 days    External Urinary Catheter 4 days                  Lab Results:     I have personally reviewed pertinent lab results including the following:    Results from last 7 days   Lab Units 07/23/24  0527 07/22/24  0654 07/21/24  0506   WBC Thousand/uL 7.71 6.82 7.91   HEMOGLOBIN g/dL 7.5* 7.2* 7.0*   HEMATOCRIT % 25.3* 24.1* 24.0*   PLATELETS Thousands/uL 117* 121* 144*   SEGS PCT % 73 74 75   MONO PCT % 11 13* 12   EOS PCT % 2 2 2     Results from last 7 days   Lab Units 07/23/24  0527 07/22/24  0654 07/21/24  0506 07/20/24  0534 07/19/24  1522   POTASSIUM mmol/L 5.0 4.9 4.8   < >  --    CHLORIDE mmol/L 110* 110* 110*   < >  --    CO2 mmol/L 31 33* 33*   < >  --    CO2, I-STAT mmol/L  --   --   --   --  38*   BUN mg/dL 19 20 22   < >  --    CREATININE mg/dL 1.20 1.24 1.26   < >  --    CALCIUM mg/dL 9.6 9.6 9.5   < >  --    GLUCOSE, ISTAT mg/dl  --   --   --   --  131    < > = values in this interval not displayed.     I have personally reviewed the following imaging study reports in PACS:    7/22/24 - CTA head w/wo contrast

## 2024-07-23 NOTE — UTILIZATION REVIEW
Continued Stay Review    Date: 7/23                          Current Patient Class: Inpatient  Current Level of Care: Med Surg    HPI:84 y.o. male initially admitted on 7/3     Assessment/Plan:  7/23  O2 2L NC. WBC 7.7 from 6.8. Hgb 7.5 from 7.2  Cr 1.20 from 1.24.   Pt tolerating minimal oral intake of pudding and applesauce.   Output from ostomy minimal. Somewhat responsive to commands.     Per Palliative Care; Rehab placement with possible transition to hospice if unable to successfully rehab     7/22  Per  notes; Referral to [facility], or [facility]. [facility] able to accept.  Family would like to visit facility to confirm decision.      Vital Signs (last 3 days)       Date/Time Temp Pulse Resp BP MAP (mmHg) SpO2 Calculated FIO2 (%) - Nasal Cannula Nasal Cannula O2 Flow Rate (L/min) O2 Device Patient Position - Orthostatic VS Dom[place] Coma Scale Score Pain    07/23/24 10:30:01 97.3 °F (36.3 °C) 83 16 110/91 97 100 % -- -- -- -- -- --    07/23/24 0900 -- 81 -- -- -- 98 % -- -- -- -- -- --    07/23/24 07:01:02 97.3 °F (36.3 °C) 80 18 113/71 85 98 % -- -- -- -- -- --    07/23/24 0700 -- 82 -- 113/71 85 99 % -- -- -- -- -- --    07/23/24 0436 -- -- -- -- -- -- -- -- -- -- 14 --    07/23/24 02:39:39 97.4 °F (36.3 °C) 86 -- 100/54 69 94 % -- -- -- -- -- --    07/22/24 22:16:49 97.3 °F (36.3 °C) 90 -- 131/67 88 96 % -- -- -- -- -- --    07/22/24 2000 -- -- -- -- -- -- -- -- -- -- 14 --    07/22/24 19:55:34 97.1 °F (36.2 °C) 85 20 135/68 90 100 % 32 3 L/min Nasal cannula -- -- --    07/22/24 1950 -- -- -- -- -- 99 % -- -- -- -- -- --    07/22/24 14:36:14 95.8 °F (35.4 °C) 95 -- 124/70 88 94 % -- -- -- -- -- --    07/22/24 1359 -- -- -- -- -- 95 % -- -- -- -- -- --    07/22/24 11:48:52 96.8 °F (36 °C) 43 -- 126/70 89 91 % -- -- -- -- -- --    07/22/24 11:01:53 96.8 °F (36 °C) 95 16 124/69 87 95 % 32 3 L/min Nasal cannula -- -- --     07/22/24 1056 -- -- -- -- -- -- -- -- -- -- -- No Pain    07/22/24 1055 -- -- -- -- -- -- -- -- -- -- -- No Pain    07/22/24 0918 -- 85 -- -- -- 95 % -- -- -- Lying -- --    07/22/24 0800 -- -- -- -- -- 96 % 32 3 L/min Nasal cannula -- 14 No Pain    07/22/24 07:27:27 97.5 °F (36.4 °C) 89 20 114/63 80 94 % 36 4 L/min Nasal cannula -- -- --    07/22/24 0700 -- 86 -- -- -- 100 % -- -- -- -- -- --    07/22/24 0423 -- -- -- -- -- -- -- -- -- -- 13 --    07/22/24 02:55:08 97.2 °F (36.2 °C) 80 18 114/60 78 97 % -- -- -- -- -- --    07/22/24 0008 -- -- -- -- -- -- -- -- -- -- 13 --    07/21/24 22:09:07 97.6 °F (36.4 °C) 95 18 121/70 87 97 % -- -- -- -- -- --    07/21/24 2023 -- -- -- -- -- -- -- -- -- -- 13 --    07/21/24 1955 -- -- -- -- -- 100 % 32 3 L/min Nasal cannula -- -- --    07/21/24 1620 -- -- -- -- -- -- -- -- -- -- 13 --    07/21/24 15:41:37 96.8 °F (36 °C) 87 -- 121/64 83 97 % -- -- -- -- -- --    07/21/24 15:41:20 96.8 °F (36 °C) 87 20 121/64 83 96 % -- -- -- -- -- --    07/21/24 1444 -- -- -- -- -- 95 % -- -- -- -- -- --    07/21/24 1230 -- -- -- -- -- -- -- -- -- -- 13 --    07/21/24 10:14:35 96.6 °F (35.9 °C) 87 -- 122/63 83 98 % -- -- -- -- -- --    07/21/24 09:00:05 -- 82 14 121/61 81 97 % -- -- -- -- -- --    07/21/24 08:59:19 -- -- 14 121/61 81 -- -- -- -- -- -- --    07/21/24 0850 -- -- -- -- -- -- -- -- -- -- 13 No Pain    07/21/24 0820 -- -- -- -- -- 100 % -- -- -- -- -- --    07/21/24 0434 -- -- -- -- -- -- -- -- -- -- 13 --    07/21/24 02:32:54 97.6 °F (36.4 °C) 85 18 121/58 79 96 % -- -- -- -- -- --    07/21/24 0006 -- -- -- -- -- -- -- -- -- -- 13 --    07/20/24 22:25:32 96.8 °F (36 °C) 85 22 126/55 79 95 % -- -- -- -- -- --    07/20/24 2015 -- -- -- -- -- -- -- -- -- -- 13 --    07/20/24 2000 -- -- -- -- -- 99 % 32 3 L/min Nasal cannula -- -- --    07/20/24 19:26:05 -- 89 16 111/71 84 100 % -- -- -- -- -- --    07/20/24 1600 -- -- -- -- -- -- -- -- -- -- 13 --    07/20/24 14:59:15 97.4 °F (36.3  °C) 76 23 118/64 82 100 % -- -- -- -- -- --    07/20/24 1412 -- -- -- -- -- 100 % -- -- -- -- -- --    07/20/24 1206 97.3 °F (36.3 °C) 80 17 118/64 -- 99 % 36 4 L/min Nasal cannula Lying 13 --    07/20/24 0940 -- -- -- -- -- 98 % 36 4 L/min Nasal cannula -- 13 No Pain    07/20/24 0820 97.4 °F (36.3 °C) -- 18 -- -- -- -- -- -- -- -- --    07/20/24 07:42:42 -- 88 -- 121/63 82 100 % -- -- -- -- -- --    07/20/24 0725 -- -- -- -- -- 100 % -- -- -- -- -- --    07/20/24 0547 -- -- -- -- -- -- -- -- -- -- 14 --    07/20/24 05:23:09 -- 87 16 133/78 96 100 % -- -- -- -- -- --    07/20/24 03:02:28 98 °F (36.7 °C) 89 16 124/71 89 100 % -- -- -- Lying -- --    07/20/24 0010 97.5 °F (36.4 °C) -- -- -- -- -- -- -- -- -- -- --          Weight (last 2 days)       None              Pertinent Labs/Diagnostic Results:   Radiology:  CTA head w wo contrast   Final Interpretation by Briseida Hwang MD (07/22 0917)      CT:   1.  Small subacute infarct in the left parietal periventricular white matter best demonstrated in recent MRI. No acute intracranial hemorrhage.   2.  Severe microangiopathic changes.      CTA:      Patent major vessels of the [place] without stenosis. No aneurysm.         Workstation performed: [id number]         FL barium swallow video w speech   Final Interpretation by JENNIFER GARCIA (07/19 1617)      XR chest portable   Final Interpretation by Zena Wiley MD (07/19 2100)      Large left and moderate right pleural effusion with left greater than right bibasilar atelectasis. Pneumonia not excluded in the appropriate clinical setting.            Workstation performed: [id number]         CT head wo contrast   Final Interpretation by Filippo Charlton MD (07/18 9708)      1. No acute intracranial abnormality.      2. Unchanged subacute white matter infarct adjacent to the left lateral ventricle, best seen on the recent MRI examination.      3. Severe microangiopathic changes.                   Workstation performed: [id number]         CT chest abdomen pelvis w contrast   Final Interpretation by Dayton Deras MD (07/18 1244)      1.  New hyperdense debris layering in the urinary bladder. Correlate for urinalysis evidence of blood products or UTI.   2.  Babcock catheter tip and inflated bulb in the posterior penile urethra. Recommend replacement.   3.  Mild airway thickening and mucoid plugging in the central airways of the lower lobes.   4.  Chronic pleural disease including chronic loculated effusions. Chronic associated atelectasis/scarring in the lungs without superimposed acute pneumonic consolidation. Right basilar thickened split pleura sign is chronic, unchanged, and therefore not    worrisome for empyema.   5.  Postsurgical changes after left end colostomy formation. No inflammation of the proximal colon or pouch.   6.  Mild intra-abdominal free fluid and fluid around the left internal inguinal ring without organized intra-abdominal abscess.   7.  No collections are evident evidence for infection at the left inguinal canal or superficial site of left groin debridement.                     Workstation performed: [id number]         XR chest portable   Final Interpretation by Wong Cummins MD (07/12 0482)      No pneumothorax. Stable bilateral pleural effusions and patchy opacity.            Workstation performed: [id number]         XR chest portable   Final Interpretation by Deidra Goodwin MD (07/12 4831)   Addendum (preliminary) 1 of 1 by Deidra Goodwin MD (07/12 1343)   ADDENDUM:   There is an error in the last sentence of the report. The study was not    discussed with Yuri Whitley at 11:23 a.m. The study was discussed with    Kenya Norris MD on 7/12/2024 at 1:40 p.m.         Final   Bibasal pleural effusions, small but increased since prior study.   Suspected adjacent parenchymal consolidations.   Linear lucency in the lateral right lung base, possibly due to a composite  shadow but small basal pneumothorax cannot be completely excluded.         I personally discussed this study with EMMA SRINIVASAN on 7/12/2024 11:23 AM.                  Workstation performed: [id number]         FL barium swallow video w speech   Final Interpretation by SYSTEMGENERATED, JENNIFER (07/07 2825)        Cardiology:  ECG 12 lead   Final Result by Dariel Priest MD (07/04 4621)   Poor data quality, interpretation may be adversely affected   Atrial fibrillation with rapid ventricular response   Non-specific intra-ventricular conduction block   Abnormal ECG   When compared with ECG of 16-JUN-2024 18:11,   QRS axis Shifted left   Nonspecific T wave abnormality has replaced inverted T waves in Inferior    leads   Confirmed by Dariel Priest ([id number]) on 7/4/2024 5:51:02 PM        GI:  No orders to display           Results from last 7 days   Lab Units 07/23/24  0527 07/22/24  0654 07/21/24  0506 07/20/24  0534 07/19/24  1522   WBC Thousand/uL 7.71 6.82 7.91 10.59*  --    HEMOGLOBIN g/dL 7.5* 7.2* 7.0* 8.0*  --    I STAT HEMOGLOBIN g/dl  --   --   --   --  8.2*   HEMATOCRIT % 25.3* 24.1* 24.0* 27.1*  --    HEMATOCRIT, ISTAT %  --   --   --   --  24*   PLATELETS Thousands/uL 117* 121* 144* 159  --    TOTAL NEUT ABS Thousands/µL 5.66 5.06 5.94 8.50*  --          Results from last 7 days   Lab Units 07/23/24  0527 07/22/24  0654 07/21/24  0506 07/20/24  0534 07/19/24  1522 07/19/24  0447 07/18/24  0509   SODIUM mmol/L 144 145 146 147  --  145 145   POTASSIUM mmol/L 5.0 4.9 4.8 4.5  --  4.4 4.4   CHLORIDE mmol/L 110* 110* 110* 110*  --  108 107   CO2 mmol/L 31 33* 33* 31  --  32 31   CO2, I-STAT mmol/L  --   --   --   --  38*  --   --    ANION GAP mmol/L 3* 2* 3* 6  --  5 7   BUN mg/dL 19 20 22 23  --  25 27*   CREATININE mg/dL 1.20 1.24 1.26 1.31*  --  1.33* 1.44*   EGFR ml/min/1.73sq m 55 53 52 50  --  49 44   CALCIUM mg/dL 9.6 9.6 9.5 9.8  --  9.7 10.2   CALCIUM, IONIZED, ISTAT mmol/L  --   --    --   --  1.49*  --   --    MAGNESIUM mg/dL  --  2.1 2.2  --   --   --  2.1   PHOSPHORUS mg/dL  --  2.4 2.6  --   --   --   --          Results from last 7 days   Lab Units 07/23/24  1130 07/23/24  0700 07/22/24  2104 07/22/24  1658 07/22/24  1100 07/22/24  0725 07/21/24  2055 07/21/24  1624 07/21/24  1107 07/20/24  2139 07/20/24  1745 07/20/24  1627   POC GLUCOSE mg/dl 179* 137 133 78 176* 140 129 135 144* 95 85 67     Results from last 7 days   Lab Units 07/23/24  0527 07/22/24  0654 07/21/24  0506 07/20/24  0534 07/19/24  0447 07/18/24  0509 07/17/24  0528   GLUCOSE RANDOM mg/dL 129 150* 173* 107 118 146* 134             BETA-HYDROXYBUTYRATE   Date Value Ref Range Status   07/02/2022 0.2 <0.6 mmol/L Final              Results from last 7 days   Lab Units 07/19/24  1522   I STAT BASE EXC mmol/L 9*   I STAT O2 SAT % 35*   ISTAT PH ART  7.360   I STAT ART PCO2 mm HG 63.7*   I STAT ART PO2 mm HG 23.0*   I STAT ART HCO3 mmol/L 36.0*           Results from last 7 days   Lab Units 07/18/24 2038   CLARITY UA  Cloudy   COLOR UA  Dark Red   SPEC GRAV UA  1.025   PH UA  6.0   GLUCOSE UA mg/dl Negative   KETONES UA mg/dl Negative   BLOOD UA  Large*   PROTEIN UA mg/dl 100 (2+)*   NITRITE UA  Negative   BILIRUBIN UA  Negative   UROBILINOGEN UA (BE) mg/dl <2.0   LEUKOCYTES UA  Moderate*   WBC UA /hpf 20-30*   RBC UA /hpf Innumerable*   BACTERIA UA /hpf None Seen   EPITHELIAL CELLS WET PREP /hpf None Seen         Results from last 7 days   Lab Units 07/18/24  1432   BLOOD CULTURE  No Growth After 4 Days.  No Growth After 4 Days.       Medications:   Scheduled Medications:  apixaban, 5 mg, Oral, BID  bisacodyl, 10 mg, Rectal, Daily  chlorhexidine, 15 mL, Mouth/Throat, Q12H YUNI  divalproex sodium, 500 mg, Oral, Q8H YUNI  insulin lispro, 1-5 Units, Subcutaneous, 4x Daily (AC & HS)  levalbuterol, 1.25 mg, Nebulization, TID  metoprolol succinate, 25 mg, Oral, Daily  mirtazapine, 15 mg, Oral, HS  ARASH ANTIFUNGAL, , Topical,  BID  nystatin, 500,000 Units, Swish & Swallow, 4x Daily  pantoprazole, 40 mg, Oral, Early Morning  QUEtiapine, 12.5 mg, Oral, HS  senna-docusate sodium, 1 tablet, Oral, BID  tamsulosin, 0.4 mg, Oral, Daily With Dinner      Continuous IV Infusions:    dextrose 5 % and sodium chloride 0.45 % with KCl 20 mEq/L infusion  Rate: 75 mL/hr Dose: 75 mL/hr  Freq: Continuous Route: IV  Indications of Use: IV Hydration  Last Dose: Stopped (07/22/24 0343)    PRN Meds:  acetaminophen, 650 mg, Oral, Q4H PRN  albuterol, 2 puff, Inhalation, Q4H PRN  albuterol, 2.5 mg, Nebulization, Q4H PRN  oxyCODONE, 2.5 mg, Oral, Q4H PRN   Or  oxyCODONE, 5 mg, Oral, Q4H PRN      Discharge Plan: See above    Network Utilization Review Department  ATTENTION: Please call with any questions or concerns to [phone] and carefully listen to the prompts so that you are directed to the right person. All voicemails are confidential.   For Discharge needs, contact Care Management DC Support Team at [phone] opt. 2  Send all requests for admission clinical reviews, approved or denied determinations and any other requests to dedicated fax number below belonging to the campus where the patient is receiving treatment. List of dedicated fax numbers for the Facilities:  FACILITY NAME UR FAX NUMBER   ADMISSION DENIALS (Administrative/Medical Necessity) [phone]   DISCHARGE SUPPORT TEAM (NETWORK) [phone]   PARENT CHILD HEALTH (Maternity/NICU/Pediatrics) [phone]   [facility] [phone]   [facility] [phone]   [facility] [phone]   [facility] [phone]   [facility] [phone]   [facility] [phone]   [facility] [phone]   [facility] [phone]   [facility] [phone]   [facility]  [facility] [phone]   [facility] [phone]   [facility] [phone]

## 2024-07-23 NOTE — SOCIAL WORK
Palliative LSW saw patient at the bedside today. LSW appreciates the opportunity to provider patient/family with inpatient emotional support and guidance while patient continues to receive medical attention from the medical team     Topics discussed: Palliative support for the pt wife.  She is very comfortable with the palliative team and thankful for the support.  She states she is doing as well as possible.  The pt wife states she is feeling much better than last week.  She was happy the pt was able to eat 2 small waffles today.  The pt sister was also present at bedside.      The pt wife and his sister are scheduled to tour a potential DC facility this afternoon.  They have also requested additional DC SNF listings from Case Management to provide additional options for his DC.     The Palliative SW excused herself.  The pt medical support team entered the room to transfer the pt from the bed to the chair.    Areas that need follow-up:Continued Support for the pt wife and family.    Resources given:Emotional Support    Others present: The pt sister was also present    I have spent 20 minutes with Patient and family today in which greater than 50% of this time was spent in counseling/coordination of care regarding  emotional Support .       LSW will continue to follow as requested by the medical team, patient, or family

## 2024-07-23 NOTE — RESTORATIVE TECHNICIAN NOTE
Restorative Technician Note      Patient Name: Hal Miller     Restorative Tech Visit Date: 07/23/24  Note Type: Mobility  Patient Position Upon Consult: Supine  Activity Performed: Transferred (Ax3 sit pivot to bedside chair)  Patient Position at End of Consult: Bedside chair; Bed/Chair alarm activated; All needs within reach    YANIRA Ivory

## 2024-07-23 NOTE — SPEECH THERAPY NOTE
"Speech Language/Pathology  Speech-Language Pathology Progress Note      Patient Name: Hal Miller    Today's Date: 7/23/2024      Subjective:  Pt was lethargic and but wakens to stim . He was sitting upright in bed. Patient stated \"I did have a lot of breakfast \".  He has noted tremors and periodic whole body twitching.      Objective:  Pt was seen today for dysphagia therapy. Current diet is puree with thin liquids. Pt was on room air. Oral care was completed with use of toothbrush/toothpaste and mouth swabs. Focus of today's session was to maximize PO intake safety, determine potential for diet texture advancement, and determine need to change presentation of material  . Textures offered today included puree and thin liquid via straw.  Swallow function:   The pt has some jerking in the bed- pulls head into the pillow when he is trying to take tsps or sips. Slow transfers noted, mult swallows.  Delayed coughing persists as it had last week.  He was able to complete the applesauce but had increased difficulty taking the liquids (drawing from the straw and containing and transferring).  He had increased twitching as well as he was attempting to transfer.    Assessment:  Pt remains appropriate for the current diet but w/ risk for ongoing poor intake given his lethargy and need for full and constant support.  Today he is jerking his body at times in the bed.     Plan:  Optimize the puree and thin as able- continue w/ supplements  Oral care often  Will follow as able 1-2x week or as needed for education and changes.   "

## 2024-07-23 NOTE — ASSESSMENT & PLAN NOTE
Assist with feeds, nutritional supplements, SLP following.     Malnutrition Findings:   Adult Malnutrition type: Acute illness  Adult Degree of Malnutrition: Other severe protein calorie malnutrition  Malnutrition Characteristics: Fat loss, Muscle loss, Inadequate energy, Weight loss                  360 Statement: Acute on chronic severe pro, karthik malnutrition d/t condition, decreased appetite as evidence by severe signs of muscle/fat loss at clavicles, moderate at shoulders, temples, <50% energy intake > 5 days, <75% energy intake > 1 month, 4+ edema LE's, treated with oral diet, pureed, and oral nutrition supplements, will continue to monitor food preferenes, provide assistance and encouragement at meals.    BMI Findings:           Body mass index is 24.82 kg/m².

## 2024-07-24 NOTE — PHYSICAL THERAPY NOTE
Physical Therapy Progress Note     07/24/24 1115   PT Last Visit   PT Visit Date 07/24/24   Note Type   Note Type Treatment   Pain Assessment   Pain Score No Pain   Restrictions/Precautions   Other Precautions Cognitive;Chair Alarm;Bed Alarm;Pain;Fall Risk;Multiple lines;O2   Subjective   Subjective pt encountered supine in bed, resting & easily roused.  remains generally disoriented, but is agreeable to mobility.  Continues to keep eyes closed throughout most of session and requires frequent redirection at times, especially for hand placment when he attempts to grab for objects & lines wiithin his reach.  Pt does not appear to be in distress once seated & repositioned in recliner.   Bed Mobility   Supine to Sit 2  Maximal assistance   Additional items Assist x 2   Transfers   Sit to Stand 2  Maximal assistance   Additional items Assist x 2   Stand to Sit 2  Maximal assistance   Additional items Assist x 2   Stand pivot 2  Maximal assistance   Additional items Assist x 2   Balance   Static Sitting Poor +   Dynamic Sitting Poor   Static Standing Poor -   Dynamic Standing Poor -   Activity Tolerance   Activity Tolerance Patient tolerated treatment well;Patient limited by fatigue   Nurse Made Aware yes   Assessment   Prognosis Fair   Problem List Decreased strength;Decreased range of motion;Decreased endurance;Impaired balance;Decreased mobility;Decreased cognition;Impaired judgement;Decreased safety awareness   Assessment pt continues to require Ax2 for all transfers at this time due to generalized stength, balance, endurance, & cog deficits that impair pt's ability to initiate & complete all tasks this date.  He was better able to do so compared to re-naif, but is unable to attain upright standing posture without Ax2 for posterior hip & trunk support, along with 3rd for knee blocking during deditated sit to stand trial from recliner seat after initial pivot was completed with posterior sling method without attaining  such posture.  pt remains generally debilitated well below baseline mobility & will likely require extended recovery to progress & reduce burden of care pending ongoing medical status.  PT POC & d/c recommendations remain appropriate at this time.   Goals   Patient Goals none stated   STG Expiration Date 08/12/24   PT Treatment Day 6   Plan   Treatment/Interventions Functional transfer training;LE strengthening/ROM;Therapeutic exercise;Endurance training;Patient/family training;Equipment eval/education;Bed mobility;Cognitive reorientation   Progress Slow progress, decreased activity tolerance   PT Frequency 2-3x/wk   Discharge Recommendation   Rehab Resource Intensity Level, PT II (Moderate Resource Intensity)   AM-PAC Basic Mobility Inpatient   Turning in Flat Bed Without Bedrails 1   Lying on Back to Sitting on Edge of Flat Bed Without Bedrails 1   Moving Bed to Chair 1   Standing Up From Chair Using Arms 1   Walk in Room 1   Climb 3-5 Stairs With Railing 1   Basic Mobility Inpatient Raw Score 6   Turning Head Towards Sound 4   Follow Simple Instructions 2   Low Function Basic Mobility Raw Score  12   Low Function Basic Mobility Standardized Score  18.33   [facility] Highest Level Of Mobility   -HL Goal 2: Bed activities/Dependent transfer   -HLM Achieved 5: Stand (1 or more minutes)       Luis Linton PTA    An [facility] Basic Mobility Raw Score less than 17 suggests pt would benefit from post acute rehab.  Please also refer to the recommendation of the Physical Therapist for safe discharge planning.

## 2024-07-24 NOTE — ASSESSMENT & PLAN NOTE
Goals:   Level 4 - Comfort Care transition today.  Pending patient's stability, family would like to consider hospice placement at facility close to their home  Discussed with Case Management who is assisting at this time.  Comfort care orders placed    Social Support:  Supportive listening provided  Normalized experience of patient  Provided anxiety containment  Advocated for patient/family with interdisciplinary team  Mediated conflict    Symptom management:  Comfort Care medications ordered  Delirium precautions: please minimize interruptions and prioritize sleep at night.  No TV nor screen time at night.  Shades drawn at night.  During day, shades up, minimize napping, and encourage meals in chair.    Care Coordination  Case discussed with CM and Primary service    Follow-up  We appreciate the opportunity to participate in this patient's care.   We will continue to follow while admitted.   Please do not hesitate to contact our on-call provider through [facility] Secure Chat or contact [phone] should there be an acute change or other symptom control concerns.

## 2024-07-24 NOTE — PROGRESS NOTES
Progress Note - Geriatric Medicine   Hal Miller 84 y.o. male MRN: [id number]  Unit/Bed#: [facility] 620-01 Encounter: [id number]      Assessment/Plan:    Vascular dementia  -remains encephalopathic with fluctuating mentation  -at baseline prior to recent admission alert and oriented, forgetful, independent with ADLs required some assist with iADLs   -[facility] 23/30 (11/2022), noted to have episodes of encephalopathy and decline since last testing, recommend repeat following recovery from acute illness to establish new baseline   -[facility] 6/18/24 imaging personally viewed, reveals at least moderate chronic microangiopathic changes   -TSH WNL at 1.79, B12 WNL at 531  -at risk age and cardiovascular related acceleration santy in setting of recent CVA and persistent encephalopathy, continue secondary risk factor modifications and continue to discuss treatments and recommendations in context of current clinical state as now much more frail/debilitated than prior to recent admit   -encourage patient remain physically, socially and cognitively active and engaged to maintain cognitive acuity   -encourage use sensory assist devices such as corrective lenses and hearing aids all appropriate times to reduce risk uncorrected sensroy impairment from contributing to isolation, confusion, worsening encephalopathy and more precipitous cognitive decline   -underlying dementia increases risk developing delirium and likely contributed to episode during current admission, cont strict precautions to reduce risk recurrence     Acute metabolic encephalopathy   -mentation continues to wax and wane but slowly improving   -multifactorial including advanced age, underlying dementia, hx encephalopathy during prior admissions, perforated diverticulitis now s/p surgical procedure, anesthesia, multiple setting changes and acute pain in frail elderly individual with prolonged hospitalization   -continue supportive cares, encourage normal sleep cycle, use of  sensory assist devices (glasses/hearing aids)  -continue to ensure acute pain well controlled, cont barbie pain protocol, scheduled tylenol, repositioning   -monitor for fecal and urinary retention - LUQ colostomy    -reorient frequently as appropriate and indicated  -appreciate family at bedside for familiarity/reassurance/engagement and social support   -Mirtazapine 7.5mg initiated on 7/15/24 for report of insomnia and poor oral intake, tolerated well w/o notable morning sedation, increased to 15mg HS on 7/22/24 tolerating well, cont current dose   -Seroquel 12.5mg HS resumed 7/18-7/19 to help with overnight restlessness/agitation and sleep cycle with some improvement of sleep and next day alertness, cont current dosing, monitor mentation and QTc closely with use      Perforated diverticulitis with abscess  -s/p Hartmans procedure with explantation of left groin mesh with washout and debridement of left groin wound on 7/4/24 now with wound vac in place  -colostomy in place, cont ostomy cares   -s/p completion course of Zosyn   -continue acute multimodal pain control per geriatric pain protocol, taper as pain improves with healing   -Palliative Care on consult and following for ongoing discussion goals of care, continues to have limited oral intake and on review of advance directive artificial nutrition does not appear in line with patients previously documented wishes, continue to assist with meals to optimize intake      Anemia  -Hb 7.5 from 10.7 on 7/3/24  -likely multifactorial including dilutional, daily labs  -monitor for ongoing acute blood loss and tx with PRBC as indicated      Dysphagia  -speech therapy on consult   -s/p VBS 7/7/24 repeated 7/19/24  -currently on dysphagia level 2 puree with thin liquids   -continue strict aspiration precautions, full upright for meals etc   -cont working with speech therapy   -encourage good oral hygiene and cares   -recommend staff assist with ALL meals and allow extra  time for completion  -encourage family to assist with ordering food pt enjoys      Moderate protein calorie malnutrition   Malnutrition Findings:   Adult Malnutrition type: Acute illness  Adult Degree of Malnutrition: Other severe protein calorie malnutrition  Malnutrition Characteristics: Fat loss, Muscle loss, Inadequate energy, Weight loss  360 Statement: Acute on chronic severe pro, karthik malnutrition d/t condition, decreased appetite as evidence by severe signs of muscle/fat loss at clavicles, moderate at shoulders, temples, <50% energy intake > 5 days, <75% energy intake > 1 month, 4+ edema LE's, treated with oral diet, pureed, and oral nutrition supplements, will continue to monitor food preferenes, provide assistance and encouragement at meals.  BMI Findings: Body mass index is 25.08 kg/m².       Hx of CVA  -small left parietal infarct during recent hospitalization on MRI brain 6/20/24 re-demonstrated on CTA head 7/22/24  -suspected to be due to missed dosing of Eliquis at time of event per prior documentation   -remains at risk future CVAs, continue strict secondary risk factor modifications  -close o/p f/u with Neurology for ongoing monitoring and management      History of possible seizure  -maintained on Depatkote as managed by Neurology, continue dosing per Neurology  -cont strict seizure precautions      Insomnia   -previously on Seroquel PRN as o/p, had not been requiring earlier in admission however now with persistent insomnia and restlessness in evenings pulling at IV, catheters, ostomy prompting resumption 7/18-7/19 with some improvement in sleep cycle without increase in daytime somnolence, cont current dosing   -continue mirtazapine HS new this admission   -encourage daytime activity and limit napping to reduce risk disruption sleep cycle      Difficult cardenas placement   -difficult cardenas placement by Urology due to phimosis in OR 7/4  -intermittent hematuria with pt pulling at cardenas now d/c and  voiding spontaneously   -if retains urine requiring intervention Urology recs consideration of IR consult for suprapubic placement  -continue retention protocol      Inflammatory polyarthropathy   -maintained on prednisone chronically as o/p, if MSK pain persistent severe and cleared by surgical service could consider resumption low dose to assist with pain and inflammation as may help with restlessness due to pain   -follows closely with Rheum as o/p, continue close o/p f/u     DM-II  -A1c 7.6  -currently on basal bolus insulin regimen with good control  -continue glu goal 140-180 during hospitalization to reduce risk hypoglycemia, readings mostly within goal   -continue close o/p f/u with PCP for ongoing age appropriate diabetic screenings and cares      Afib   -rates currently well controlled off rate controllers, on Eliquis   -cont close follow-up with Cardiology     Impaired Vision  -recommend use of corrective lenses at all appropriate times  -encourage adequate lighting and encourage use of assistance with ambulation  -consider large font for printed materials provided to patient      Impaired Hearing  -Encourage use of hearing aids at all appropriate times  -encourage providers and caregivers to speak slowly and clearly directly to patient  -minimize background noise to encourage patient engagement  -consider use of hearing amplifier to reduce risk of straining to hear if hearing aids are not present or are not sufficient      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  -clinical frailty scale stage VI, moderately frail, progressive  -multifactorial including age, amb dysfxn, hx CVA, DM-II and multitude of chronic medical co-morbidities now with perforated diverticulitis with abscess requiring surgical procedure superimposed on elderly individual with limited physiologic and metabolic reserves, poor oral intake now with failure to thrive  -continue optimization of chronic medical conditions and address acute  "derangements as arise  -monitor for and treat any underlying anxiety, mood, depression symptoms as may impact response to therapies and overall sense of wellbeing and quality of life  -continue to ensure tx and interventions align with patients wishes and goals of care, advance directive available in record for review   -cont psychosocial support of patient and caregivers   -family looking into SNF level rehabs on dc, CM assisting with dc planning      Care coordination: rounded with Alannah (RN)    Subjective:     Toni is seen and examined at bedside where he is lying resting, he is less restless than days prior and now intermittently answering questions stating he has some general pain and feels fatigued, no other acute complaints.     Review of Systems   Unable to perform ROS: Mental status change (limited due to mentation and acuity of condition but does endorse general msk pain and fatigue)     Objective:     Vitals: Blood pressure 113/66, pulse 85, temperature (!) 96.9 °F (36.1 °C), resp. rate 18, height 6' 2\" (1.88 m), weight 93.1 kg (205 lb 4 oz), SpO2 93%.,Body mass index is 26.35 kg/m².      Intake/Output Summary (Last 24 hours) at 7/24/2024 0831  Last data filed at 7/24/2024 0600  Gross per 24 hour   Intake 60 ml   Output 600 ml   Net -540 ml     Current Medications: Reviewed    Physical Exam:   Physical Exam  Vitals and nursing note reviewed.   Constitutional:       Comments: Thin very frail elderly male    HENT:      Head: Normocephalic.      Comments: Orbits and temporals sunken in appearance      Nose: Nose normal.      Comments: O2 via NC      Mouth/Throat:      Mouth: Mucous membranes are dry.      Comments: Dentition grossly in tact  Eyes:      General:         Right eye: No discharge.         Left eye: No discharge.   Neck:      Comments: Trachea midline voice hoarse   Cardiovascular:      Rate and Rhythm: Normal rate and regular rhythm.   Pulmonary:      Effort: No respiratory distress.      " Comments: Shallow coarse upper breath sounds   Abdominal:      Comments: Abd binder in place    Musculoskeletal:      Comments: Severe diffuse subcutaneous fat and muscle wasting    Skin:     General: Skin is warm and dry.      Comments: Thin and friable    Neurological:      Mental Status: He is alert.      Comments: More awake and engaged today, answering limited questions but ans appropriate   Psychiatric:      Comments: Polite cooperative         Invasive Devices       Peripheral Intravenous Line  Duration             Peripheral IV Dorsal (posterior);Right Forearm -- days              Drain  Duration             Colostomy Other (comment) LUQ 19 days    External Urinary Catheter 5 days                  Lab Results:     I have personally reviewed pertinent lab results including the following:    Results from last 7 days   Lab Units 07/23/24  0527 07/22/24  0654 07/21/24  0506   WBC Thousand/uL 7.71 6.82 7.91   HEMOGLOBIN g/dL 7.5* 7.2* 7.0*   HEMATOCRIT % 25.3* 24.1* 24.0*   PLATELETS Thousands/uL 117* 121* 144*   SEGS PCT % 73 74 75   MONO PCT % 11 13* 12   EOS PCT % 2 2 2     Results from last 7 days   Lab Units 07/23/24  0527 07/22/24  0654 07/21/24  0506 07/20/24  0534 07/19/24  1522   POTASSIUM mmol/L 5.0 4.9 4.8   < >  --    CHLORIDE mmol/L 110* 110* 110*   < >  --    CO2 mmol/L 31 33* 33*   < >  --    CO2, I-STAT mmol/L  --   --   --   --  38*   BUN mg/dL 19 20 22   < >  --    CREATININE mg/dL 1.20 1.24 1.26   < >  --    CALCIUM mg/dL 9.6 9.6 9.5   < >  --    GLUCOSE, ISTAT mg/dl  --   --   --   --  131    < > = values in this interval not displayed.     I have personally reviewed the following imaging study reports in PACS:    No new imaging overnight

## 2024-07-24 NOTE — ASSESSMENT & PLAN NOTE
[facility] meeting held this afternoon -  Time: 1400 approximately  End: 1600 approximately    Participants - Family including spouse, daughters, sister, nephews  Patient unable to participate due to medical condition  Care team:  Dr. Champion from Surgical team, [facility] Team (this provider and [facility] Social Work).    Initial conversation with family regarding their understanding of current medical situation. Medical update was provided to certain aspects of patient's events last night and I n regards to patient's need for BiPAP due to his oxygen requirements requiring escalation overnight. Reviewed family's concerns and questions.  Discussed option of comfort care and hospice. Reviewed Hospice Philosophy in detail along with hospice services typically provided including (Hospice physician, hospice nurses, hospice aides, DME, hospice at home vs facility in setting of RL).    We were later joined by the surgical team physician in which further medical updates were given along with potential concerns for patient's current clinical status from multiple standpoints including concerns for aspiration, nutritional status, and respiratory status. Discussed that this may likely be patient's new baseline. Discussed treatments moving forward would most likely be palliative in nature to provide/improve quality of life as much as able.    Wife and family ultimately decided to pursue comfort focused care at this time.  If patient remains stable, family would like patient to transition to hospice at a facility closer to home ([facility]). Case management made aware and is assisting at this time.

## 2024-07-24 NOTE — PROGRESS NOTES
[facility]  Progress Note  Name: Hal Miller I  MRN: [id number]  Unit/Bed#: [facility] 620-01 I Date of Admission: 7/3/2024   Date of Service: 7/24/2024 I Hospital Day: 21    Assessment & Plan   Goals of care, counseling/discussion  Assessment & Plan  See ACP note from 7/22             Palliative care by specialist  Assessment & Plan  Goals:   Level 3, DNR/DNI  Rehab placement with possible transition to hospice if unable to successfully rehab  Palliative will follow for ongoing goals of care discussions as situation evolves.    Social Support:  Supportive listening provided  Normalized experience of patient  Provided anxiety containment  Advocated for patient/family with interdisciplinary team  Mediated conflict    Symptom management:  Per primary and geriatric teams  Delirium precautions: please minimize interruptions and prioritize sleep at night.  No TV nor screen time at night.  Shades drawn at night.  During day, shades up, minimize napping, and encourage meals in chair.    Care Coordination  Case discussed with CM    Follow-up  We appreciate the opportunity to participate in this patient's care.   We will continue to follow while admitted.   Please do not hesitate to contact our on-call provider through [facility] Secure Chat or contact [phone] should there be an acute change or other symptom control concerns.      Moderate protein-calorie malnutrition (HCC)  Assessment & Plan  Assist with feeds, nutritional supplements, SLP following.     Malnutrition Findings:   Adult Malnutrition type: Acute illness  Adult Degree of Malnutrition: Other severe protein calorie malnutrition  Malnutrition Characteristics: Fat loss, Muscle loss, Inadequate energy, Weight loss                  360 Statement: Acute on chronic severe pro, karthik malnutrition d/t condition, decreased appetite as evidence by severe signs of muscle/fat loss at clavicles, moderate at shoulders, temples, <50% energy intake  > 5 days, <75% energy intake > 1 month, 4+ edema LE's, treated with oral diet, pureed, and oral nutrition supplements, will continue to monitor food preferenes, provide assistance and encouragement at meals.    BMI Findings:           Body mass index is 26.35 kg/m².       Vascular dementia (HCC)  Assessment & Plan  Geriatrics following, appreciate input    * Colonic diverticular abscess  Assessment & Plan  Surgical team following s/p ex-lap, mesh explantation, elena procedure, and groin washout on 7/4               Interval history:       No changes. Still deciding on STR and reviewing all options.     MEDICATIONS / ALLERGIES:     all current active meds have been reviewed and current meds:   Current Facility-Administered Medications   Medication Dose Route Frequency    acetaminophen (TYLENOL) oral suspension 650 mg  650 mg Oral Q4H PRN    albuterol (PROVENTIL HFA,VENTOLIN HFA) inhaler 2 puff  2 puff Inhalation Q4H PRN    albuterol inhalation solution 2.5 mg  2.5 mg Nebulization Q4H PRN    apixaban (ELIQUIS) tablet 5 mg  5 mg Oral BID    bisacodyl (DULCOLAX) rectal suppository 10 mg  10 mg Rectal Daily    chlorhexidine (PERIDEX) 0.12 % oral rinse 15 mL  15 mL Mouth/Throat Q12H YUNI    dextrose 5 % and sodium chloride 0.45 % with KCl 20 mEq/L infusion  75 mL/hr Intravenous Continuous    divalproex sodium (DEPAKOTE SPRINKLE) capsule 500 mg  500 mg Oral Q8H YUNI    insulin lispro (HumALOG/ADMELOG) 100 units/mL subcutaneous injection 1-5 Units  1-5 Units Subcutaneous 4x Daily (AC & HS)    metoprolol succinate (TOPROL-XL) 24 hr tablet 25 mg  25 mg Oral Daily    mirtazapine (REMERON) tablet 15 mg  15 mg Oral HS    moisture barrier miconazole 2% cream (aka ARASH MOISTURE BARRIER ANTIFUNGAL CREAM)   Topical BID    nystatin (MYCOSTATIN) oral suspension 500,000 Units  500,000 Units Swish & Swallow 4x Daily    oxyCODONE (ROXICODONE) oral solution 2.5 mg  2.5 mg Oral Q4H PRN    Or    oxyCODONE (ROXICODONE) oral solution 5 mg  5  "mg Oral Q4H PRN    pantoprazole (PROTONIX) EC tablet 40 mg  40 mg Oral Early Morning    QUEtiapine (SEROquel) tablet 12.5 mg  12.5 mg Oral HS    senna-docusate sodium (SENOKOT S) 8.6-50 mg per tablet 1 tablet  1 tablet Oral BID    tamsulosin (FLOMAX) capsule 0.4 mg  0.4 mg Oral Daily With Dinner       Allergies   Allergen Reactions    Contrast Dye [Iodinated Contrast Media] Other (See Comments)     Pt states kidney dysfunction..Patient was injected on 7/18/24 with no pre-medication and patient had no reaction post injection - mc     Other        OBJECTIVE:    Physical Exam  Physical Exam  Vitals and nursing note reviewed.   Constitutional:       General: He is not in acute distress.     Appearance: He is ill-appearing.   HENT:      Head: Normocephalic and atraumatic.      Right Ear: External ear normal.      Left Ear: External ear normal.      Nose: Nose normal.   Eyes:      General: No scleral icterus.        Right eye: No discharge.         Left eye: No discharge.   Cardiovascular:      Rate and Rhythm: Normal rate and regular rhythm.   Pulmonary:      Effort: Pulmonary effort is normal. No respiratory distress.      Breath sounds: Normal breath sounds.   Abdominal:      General: Bowel sounds are normal. There is no distension.      Palpations: Abdomen is soft.   Skin:     General: Skin is warm and dry.      Coloration: Skin is pale.   Neurological:      Mental Status: He is alert. He is disoriented.      Comments: Responds to some questions seemingly appropriately, moving all 4 extremities            Lab Results: I have personally reviewed pertinent labs., CBC:   No results found for: \"WBC\", \"HGB\", \"HCT\", \"MCV\", \"PLT\", \"ADJUSTEDWBC\", \"RBC\", \"MCH\", \"MCHC\", \"RDW\", \"MPV\", \"NRBC\"  , CMP:   No results found for: \"SODIUM\", \"K\", \"CL\", \"CO2\", \"ANIONGAP\", \"BUN\", \"CREATININE\", \"GLUCOSE\", \"CALCIUM\", \"AST\", \"ALT\", \"ALKPHOS\", \"PROT\", \"BILITOT\", \"EGFR\"  , PT/PTT:No results found for: \"PT\", \"PTT\"  Imaging Studies: no new " "studies  EKG, Pathology, and Other Studies: all pertinent studies reviewed    Counseling / Coordination of Care    Total floor / unit time spent today 15+ minutes. Greater than 50% of total time was spent with the patient and / or family counseling and / or coordination of care. A description of the counseling / coordination of care: Chart review, medication review, discussion of goals of care, supportive listening.    Portions of this document may have been created using dictation software and as such some \"sound alike\" terms may have been generated by the system. Do not hesitate to contact me with any questions or clarifications.      "

## 2024-07-24 NOTE — PLAN OF CARE
Problem: PAIN - ADULT  Goal: Verbalizes/displays adequate comfort level or baseline comfort level  Description: Interventions:  - Encourage patient to monitor pain and request assistance  - Assess pain using appropriate pain scale  - Administer analgesics based on type and severity of pain and evaluate response  - Implement non-pharmacological measures as appropriate and evaluate response  - Consider cultural and social influences on pain and pain management  - Notify physician/advanced practitioner if interventions unsuccessful or patient reports new pain  Outcome: Progressing     Problem: INFECTION - ADULT  Goal: Absence or prevention of progression during hospitalization  Description: INTERVENTIONS:  - Assess and monitor for signs and symptoms of infection  - Monitor lab/diagnostic results  - Monitor all insertion sites, i.e. indwelling lines, tubes, and drains  - Monitor endotracheal if appropriate and nasal secretions for changes in amount and color  - Holden appropriate cooling/warming therapies per order  - Administer medications as ordered  - Instruct and encourage patient and family to use good hand hygiene technique  - Identify and instruct in appropriate isolation precautions for identified infection/condition  Outcome: Progressing     Problem: SAFETY ADULT  Goal: Patient will remain free of falls  Description: INTERVENTIONS:  - Educate patient/family on patient safety including physical limitations  - Instruct patient to call for assistance with activity   - Consult OT/PT to assist with strengthening/mobility   - Keep Call bell within reach  - Keep bed low and locked with side rails adjusted as appropriate  - Keep care items and personal belongings within reach  - Initiate and maintain comfort rounds  - Make Fall Risk Sign visible to staff  - Offer Toileting every 2 Hours, in advance of need  - Initiate/Maintain alarm  - Obtain necessary fall risk management equipment:   - Apply yellow socks and  bracelet for high fall risk patients  - Consider moving patient to room near nurses station  Outcome: Progressing  Goal: Maintain or return to baseline ADL function  Description: INTERVENTIONS:  -  Assess patient's ability to carry out ADLs; assess patient's baseline for ADL function and identify physical deficits which impact ability to perform ADLs (bathing, care of mouth/teeth, toileting, grooming, dressing, etc.)  - Assess/evaluate cause of self-care deficits   - Assess range of motion  - Assess patient's mobility; develop plan if impaired  - Assess patient's need for assistive devices and provide as appropriate  - Encourage maximum independence but intervene and supervise when necessary  - Involve family in performance of ADLs  - Assess for home care needs following discharge   - Consider OT consult to assist with ADL evaluation and planning for discharge  - Provide patient education as appropriate  Outcome: Progressing  Goal: Maintains/Returns to pre admission functional level  Description: INTERVENTIONS:  - Perform AM-PAC 6 Click Basic Mobility/ Daily Activity assessment daily.  - Set and communicate daily mobility goal to care team and patient/family/caregiver.   - Collaborate with rehabilitation services on mobility goals if consulted  - Perform Range of Motion 3 times a day.  - Reposition patient every 2 hours.  - Dangle patient 3 times a day  - Stand patient 3 times a day  - Ambulate patient 3 times a day  - Out of bed to chair 3 times a day   - Out of bed for meals 3 times a day  - Out of bed for toileting  - Record patient progress and toleration of activity level   Outcome: Progressing     Problem: DISCHARGE PLANNING  Goal: Discharge to home or other facility with appropriate resources  Description: INTERVENTIONS:  - Identify barriers to discharge w/patient and caregiver  - Arrange for needed discharge resources and transportation as appropriate  - Identify discharge learning needs (meds, wound care,  etc.)  - Arrange for interpretive services to assist at discharge as needed  - Refer to Case Management Department for coordinating discharge planning if the patient needs post-hospital services based on physician/advanced practitioner order or complex needs related to functional status, cognitive ability, or social support system  Outcome: Progressing     Problem: Knowledge Deficit  Goal: Patient/family/caregiver demonstrates understanding of disease process, treatment plan, medications, and discharge instructions  Description: Complete learning assessment and assess knowledge base.  Interventions:  - Provide teaching at level of understanding  - Provide teaching via preferred learning methods  Outcome: Progressing     Problem: Prexisting or High Potential for Compromised Skin Integrity  Goal: Skin integrity is maintained or improved  Description: INTERVENTIONS:  - Identify patients at risk for skin breakdown  - Assess and monitor skin integrity  - Assess and monitor nutrition and hydration status  - Monitor labs   - Assess for incontinence   - Turn and reposition patient  - Assist with mobility/ambulation  - Relieve pressure over bony prominences  - Avoid friction and shearing  - Provide appropriate hygiene as needed including keeping skin clean and dry  - Evaluate need for skin moisturizer/barrier cream  - Collaborate with interdisciplinary team   - Patient/family teaching  - Consider wound care consult   Outcome: Progressing     Problem: Nutrition/Hydration-ADULT  Goal: Nutrient/Hydration intake appropriate for improving, restoring or maintaining nutritional needs  Description: Monitor and assess patient's nutrition/hydration status for malnutrition. Collaborate with interdisciplinary team and initiate plan and interventions as ordered.  Monitor patient's weight and dietary intake as ordered or per policy. Utilize nutrition screening tool and intervene as necessary. Determine patient's food preferences and  provide high-protein, high-caloric foods as appropriate.     INTERVENTIONS:  - Monitor oral intake, urinary output, labs, and treatment plans  - Assess nutrition and hydration status and recommend course of action  - Evaluate amount of meals eaten  - Assist patient with eating if necessary   - Allow adequate time for meals  - Recommend/ encourage appropriate diets, oral nutritional supplements, and vitamin/mineral supplements  - Order, calculate, and assess calorie counts as needed  - Recommend, monitor, and adjust tube feedings and TPN/PPN based on assessed needs  - Assess need for intravenous fluids  - Provide specific nutrition/hydration education as appropriate  - Include patient/family/caregiver in decisions related to nutrition  Outcome: Progressing     Problem: RESPIRATORY - ADULT  Goal: Achieves optimal ventilation and oxygenation  Description: INTERVENTIONS:  - Assess for changes in respiratory status  - Assess for changes in mentation and behavior  - Position to facilitate oxygenation and minimize respiratory effort  - Oxygen administered by appropriate delivery if ordered  - Initiate smoking cessation education as indicated  - Encourage broncho-pulmonary hygiene including cough, deep breathe, Incentive Spirometry  - Assess the need for suctioning and aspirate as needed  - Assess and instruct to report SOB or any respiratory difficulty  - Respiratory Therapy support as indicated  Outcome: Progressing     Problem: GASTROINTESTINAL - ADULT  Goal: Minimal or absence of nausea and/or vomiting  Description: INTERVENTIONS:  - Administer IV fluids if ordered to ensure adequate hydration  - Maintain NPO status until nausea and vomiting are resolved  - Nasogastric tube if ordered  - Administer ordered antiemetic medications as needed  - Provide nonpharmacologic comfort measures as appropriate  - Advance diet as tolerated, if ordered  - Consider nutrition services referral to assist patient with adequate nutrition  and appropriate food choices  Outcome: Progressing  Goal: Maintains or returns to baseline bowel function  Description: INTERVENTIONS:  - Assess bowel function  - Encourage oral fluids to ensure adequate hydration  - Administer IV fluids if ordered to ensure adequate hydration  - Administer ordered medications as needed  - Encourage mobilization and activity  - Consider nutritional services referral to assist patient with adequate nutrition and appropriate food choices  Outcome: Progressing  Goal: Maintains adequate nutritional intake  Description: INTERVENTIONS:  - Monitor percentage of each meal consumed  - Identify factors contributing to decreased intake, treat as appropriate  - Assist with meals as needed  - Monitor I&O, weight, and lab values if indicated  - Obtain nutrition services referral as needed  Outcome: Progressing  Goal: Establish and maintain optimal ostomy function  Description: INTERVENTIONS:  - Assess bowel function  - Encourage oral fluids to ensure adequate hydration  - Administer IV fluids if ordered to ensure adequate hydration   - Administer ordered medications as needed  - Encourage mobilization and activity  - Nutrition services referral to assist patient with appropriate food choices  - Assess stoma site  - Consider wound care consult   Outcome: Progressing  Goal: Oral mucous membranes remain intact  Description: INTERVENTIONS  - Assess oral mucosa and hygiene practices  - Implement preventative oral hygiene regimen  - Implement oral medicated treatments as ordered  - Initiate Nutrition services referral as needed  Outcome: Progressing     Problem: GENITOURINARY - ADULT  Goal: Maintains or returns to baseline urinary function  Description: INTERVENTIONS:  - Assess urinary function  - Encourage oral fluids to ensure adequate hydration if ordered  - Administer IV fluids as ordered to ensure adequate hydration  - Administer ordered medications as needed  - Offer frequent toileting  - Follow  urinary retention protocol if ordered  Outcome: Progressing  Goal: Absence of urinary retention  Description: INTERVENTIONS:  - Assess patient’s ability to void and empty bladder  - Monitor I/O  - Bladder scan as needed  - Discuss with physician/AP medications to alleviate retention as needed  - Discuss catheterization for long term situations as appropriate  Outcome: Progressing  Goal: Urinary catheter remains patent  Description: INTERVENTIONS:  - Assess patency of urinary catheter  - If patient has a chronic cardenas, consider changing catheter if non-functioning  - Follow guidelines for intermittent irrigation of non-functioning urinary catheter  Outcome: Progressing     Problem: METABOLIC, FLUID AND ELECTROLYTES - ADULT  Goal: Glucose maintained within target range  Description: INTERVENTIONS:  - Monitor Blood Glucose as ordered  - Assess for signs and symptoms of hyperglycemia and hypoglycemia  - Administer ordered medications to maintain glucose within target range  - Assess nutritional intake and initiate nutrition service referral as needed  Outcome: Progressing     Problem: BEHAVIOR  Goal: Pt/Family maintain appropriate behavior and adhere to behavioral management agreement, if implemented  Description: INTERVENTIONS:  - Assess the family dynamic   - Encourage verbalization of thoughts and concerns in a socially appropriate manner  - Assess patient/family's coping skills and non-compliant behavior (including use of illegal substances).  - Utilize positive, consistent limit setting strategies supporting safety of patient, staff and others  - Initiate consult with Case Management, Spiritual Care or other ancillary services as appropriate  - If a patient's/visitor's behavior jeopardizes the safety of the patient, staff, or others, refer to organization procedure.   - Notify Security of behavior or suspected illegal substances which indicate the need for search of the patient and/or belongings  - Encourage  participation in the decision making process about a behavioral management agreement; implement if patient meets criteria  Outcome: Progressing     Problem: ANXIETY  Goal: Will report anxiety at manageable levels  Description: INTERVENTIONS:  - Administer medication as ordered  - Teach and encourage coping skills  - Provide emotional support  - Assess patient/family for anxiety and ability to cope  Outcome: Progressing  Goal: By discharge: Patient will verbalize 2 strategies to deal with anxiety  Description: Interventions:  - Identify any obvious source/trigger to anxiety  - Staff will assist patient in applying identified coping technique/skills  - Encourage attendance of scheduled groups and activities  Outcome: Progressing

## 2024-07-24 NOTE — OCCUPATIONAL THERAPY NOTE
Occupational Therapy Treatment Note:       07/24/24 1120   OT Last Visit   OT Visit Date 07/24/24   Note Type   Note Type Treatment   Pain Assessment   Pain Assessment Tool FLACC   Pain Rating: FLACC (Rest) - Face 0   Pain Rating: FLACC (Rest) - Legs 0   Pain Rating: FLACC (Rest) - Activity 0   Pain Rating: FLACC (Rest) - Cry 0   Pain Rating: FLACC (Rest) - Consolability 0   Score: FLACC (Rest) 0   Pain Rating: FLACC (Activity) - Face 0   Pain Rating: FLACC (Activity) - Legs 0   Pain Rating: FLACC (Activity) - Activity 0   Pain Rating: FLACC (Activity) - Cry 0   Pain Rating: FLACC (Activity) - Consolability 0   Score: FLACC (Activity) 0   Restrictions/Precautions   Other Precautions Fall Risk;Cognitive;Chair Alarm;Bed Alarm   ADL   Where Assessed Supine, bed   Grooming Assistance 2  Maximal Assistance   Grooming Comments pt held cloth in r hand when placed there. he was noted to hold and attempt to wipe lips with arm support assist secondary to weakness.   UB Dressing Assistance 2  Maximal Assistance   UB Dressing Comments when instructed pt was able to lift arms for placement into sleeves. pts lethargy macy and waynes   Toileting Assistance  1  Total Assistance   Toileting Comments male OrthoSensor system   Functional Standing Tolerance   Time poor balance during standing trials x 2   Bed Mobility   Supine to Sit 2  Maximal assistance   Additional items Assist x 2   Sit to Supine 2  Maximal assistance   Additional items Assist x 2   Transfers   Stand pivot 2  Maximal assistance   Additional items Assist x 2   Additional Comments pt performed stand pivot transfer no device with max asst x 2. he requires placement of feet and hands prior to standing with and without rw. tolerated well.   Cognition   Overall Cognitive Status Impaired   Arousal/Participation Lethargic   Attention Attends with cues to redirect   Following Commands Follows one step commands with increased time or repetition   Activity Tolerance    Activity Tolerance Patient tolerated treatment well   Assessment   Assessment pt participated in am ot session and was seen focusing on ub dressing, face washing / drying, bed mobility, direction following and sitting tolerence.  pt was alert and followed commands at times throughout session approx 75%. pt stood for spt with asst x 2 no device. he maintained sitting balance with min to mod asst. + jerky movements in r ue and in legs during stance   Plan   Treatment Interventions ADL retraining;Functional transfer training;Endurance training;Patient/family training;Equipment evaluation/education   Goal Expiration Date 08/05/24   OT Treatment Day 6   OT Frequency 2-3x/wk   Discharge Recommendation   Rehab Resource Intensity Level, OT II (Moderate Resource Intensity)   AM-PAC Daily Activity Inpatient   Lower Body Dressing 1   Bathing 1   Toileting 1   Upper Body Dressing 2   Grooming 2   Eating 1   Daily Activity Raw Score 8   Turning Head Towards Sound 3   Follow Simple Instructions 2   Low Function Daily Activity Raw Score 13   Low Function Daily Activity Standardized Score  23.16   AM-PAC Applied Cognition Inpatient   Following a Speech/Presentation 1   Understanding Ordinary Conversation 2   Taking Medications 1   Remembering Where Things Are Placed or Put Away 1   Remembering List of 4-5 Errands 1   Taking Care of Complicated Tasks 1   Applied Cognition Raw Score 7   Applied Cognition Standardized Score 15.17   April A Storm

## 2024-07-24 NOTE — QUICK NOTE
Wound check performed of the patient's midline and the patient's left groin. Nursing continues to do the left groin dressing changes. Megisorb and silicon foam dressing is being applied to the left groin wound. The base of the left groin wound is healthy with granulation tissue that is well formed. The midline appears to be clean/dry/intact with minimal to no erythema and no drainage. See the attached picture for a visual of the wound:

## 2024-07-24 NOTE — PROGRESS NOTES
"Progress Note - General Surgery   Hal Miller 84 y.o. male MRN: [id number]  [facility] Encounter: [id number]    Assessment:  Hal Miller is a 84 y.o. male with L inguinal hernia w/mesh erosion into sigmoid colon s/p ex-lap, mesh explant, washout and debridement of L groin, Angela's on 7/4     Plan:  - Continue PO intake  - On home meds including Eliquis  - Appreciate Urology recs  - OOB, PT/OT Level II  - Daily wet to dry groin dressing changes per nursing  - CM re: dispo, to rehab    Subjective/Objective     Subjective: NAEON. Vital signs stable. Patient sleepy this morning and less reactive due to this during exam. Patient denies pain, nausea, vomiting. Patient has no complaints at this time.    Objective:     Blood pressure 113/66, pulse 85, temperature (!) 96.9 °F (36.1 °C), resp. rate 18, height 6' 2\" (1.88 m), weight 93.1 kg (205 lb 4 oz), SpO2 93%.,Body mass index is 26.35 kg/m².      Intake/Output Summary (Last 24 hours) at 7/24/2024 0932  Last data filed at 7/24/2024 0600  Gross per 24 hour   Intake 60 ml   Output 600 ml   Net -540 ml       Invasive Devices       Peripheral Intravenous Line  Duration             Peripheral IV Dorsal (posterior);Right Forearm -- days              Drain  Duration             Colostomy Other (comment) LUQ 19 days    External Urinary Catheter 5 days                    Physical Exam: General appearance: cachectic and resting comfortably in bed  Lungs: normal work of breathing on 2L NC  Heart: regular rate  Abdomen: soft, non distended, mildly tender    Lab, Imaging and other studies:I have personally reviewed pertinent lab results.    VTE Pharmacologic Prophylaxis: Eliquis  VTE Mechanical Prophylaxis: sequential compression device    Jean Carlos Hurd MD  General Surgery Resident     "

## 2024-07-24 NOTE — PLAN OF CARE
Problem: PHYSICAL THERAPY ADULT  Goal: Performs mobility at highest level of function for planned discharge setting.  See evaluation for individualized goals.  Description: Treatment/Interventions: OT, Spoke to case management, Spoke to nursing, Gait training, Bed mobility, Patient/family training, Endurance training, LE strengthening/ROM, Functional transfer training          See flowsheet documentation for full assessment, interventions and recommendations.  Outcome: Progressing  Note: Prognosis: Fair  Problem List: Decreased strength, Decreased range of motion, Decreased endurance, Impaired balance, Decreased mobility, Decreased cognition, Impaired judgement, Decreased safety awareness  Assessment: pt continues to require Ax2 for all transfers at this time due to generalized stength, balance, endurance, & cog deficits that impair pt's ability to initiate & complete all tasks this date.  He was better able to do so compared to re-eval, but is unable to attain upright standing posture without Ax2 for posterior hip & trunk support, along with 3rd for knee blocking during deditated sit to stand trial from recliner seat after initial pivot was completed with posterior sling method without attaining such posture.  pt remains generally debilitated well below baseline mobility & will likely require extended recovery to progress & reduce burden of care pending ongoing medical status.  PT POC & d/c recommendations remain appropriate at this time.  Barriers to Discharge: Inaccessible home environment     Rehab Resource Intensity Level, PT: II (Moderate Resource Intensity)    See flowsheet documentation for full assessment.

## 2024-07-24 NOTE — ASSESSMENT & PLAN NOTE
Assist with feeds, nutritional supplements, SLP following.     Malnutrition Findings:   Adult Malnutrition type: Acute illness  Adult Degree of Malnutrition: Other severe protein calorie malnutrition  Malnutrition Characteristics: Fat loss, Muscle loss, Inadequate energy, Weight loss                  360 Statement: Acute on chronic severe pro, karthik malnutrition d/t condition, decreased appetite as evidence by severe signs of muscle/fat loss at clavicles, moderate at shoulders, temples, <50% energy intake > 5 days, <75% energy intake > 1 month, 4+ edema LE's, treated with oral diet, pureed, and oral nutrition supplements, will continue to monitor food preferenes, provide assistance and encouragement at meals.    BMI Findings:     Body mass index is 26.49 kg/m².

## 2024-07-24 NOTE — CASE MANAGEMENT
Case Management Discharge Planning Note    Patient name Hal Miller  [facility] 620/[facility]-01 MRN [id number]  : 1940 Date 2024       Current Admission Date: 7/3/2024  Current Admission Diagnosis:Colonic diverticular abscess   Patient Active Problem List    Diagnosis Date Noted Date Diagnosed    Palliative care by specialist 2024     Goals of care, counseling/discussion 2024     Difficult Babcock catheter placement ([facility]) 2024     Moderate protein-calorie malnutrition (HCC) 2024     Left inguinal hernia 2024     Leukocytosis 2024     Dysphagia 2024     Hx of aortic valve replacement 2024     Diplopia 2024     Peripheral polyneuropathy 2024     Hyponatremia 2024     Dysphoric mood 2024     NICM (nonischemic cardiomyopathy) ([facility]) 2024     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     Acute on chronic respiratory failure ([facility]) 2024     Episode of seizure-like activity 2024     History of Whipple procedure 2024     Dizziness 2024     Insomnia 2024     Ambulatory dysfunction 2024     Severe protein-calorie malnutrition (HCC) 2024     Abnormal CT of the abdomen 2024     Zoster 2024     Colonic diverticular abscess 2024     Vascular dementia ([facility]) 2024     Type 2 diabetes mellitus, with long-term current use of insulin ([facility]) 2024     Hypercalcemia 2023     TARA (acute kidney injury) ([facility]) 2023     Esophageal stricture 2023     Acute encephalopathy 2023     Generalized weakness 2023     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     Chronic obstructive pulmonary disease with acute exacerbation (HCC) 2022     Stage 3 chronic kidney disease (HCC) 2022     Centrilobular emphysema (HCC) 2021     History of malignant neuroendocrine tumor 2021     Atrial fibrillation (HCC) 2017     Shortness of breath  06/13/2017     Primary hypertension 06/13/2017     Hyperlipidemia 09/21/2015     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     Osteoarthrosis 11/12/2014     Polymyalgia rheumatica (HCC) 11/12/2014     Aneurysm of thoracic aorta (HCC) 01/06/2014     Aneurysm of abdominal aorta (HCC) 01/06/2014     Neoplasm of other specified site 01/06/2014       LOS (days): 21  Geometric Mean LOS (GMLOS) (days): 9.8  Days to GMLOS:-11     OBJECTIVE:  Risk of Unplanned Readmission Score: 41.05         Current admission status: Inpatient   Preferred Pharmacy:   [id number] - [place], [place] - [address] [address]  Phone: [phone] Fax: [phone]    [facility] [place], [place] - [address]  [address] [facility] [address]  Phone: [phone] Fax: [phone]    [facility]) - [place], [place] - [address]  [address] [place] [address]  Phone: [phone] Fax: [phone]    Primary Care Provider: Kyaw Monreal MD    Primary Insurance: [facility]  Secondary Insurance:     DISCHARGE DETAILS:      Other Referral/Resources/Interventions Provided:  Referral Comments: This CM discussed therapy recs with Ann (wife). Ann has visited requested facilities, and is in agreement with accepting referral to [facility]. [facility] able to accept and reserved in aidin. awaitng OT notes to submit for auth.

## 2024-07-25 NOTE — PROGRESS NOTES
Progress Note - Geriatric Medicine   Hal Miller 84 y.o. male MRN: [id number]  Unit/Bed#: [facility] 620-01 Encounter: [id number]      Assessment/Plan:    Vascular dementia  -mentation continues to fluctuate to some degree  -at baseline prior to recent admission reportedly alert and oriented, forgetful, independent with ADLs required some assist with iADLs   -[facility] 23/30 (11/2022), noted to have episodes of encephalopathy and decline since last testing, recommend repeat following recovery from acute illness to establish new baseline   -[facility] 6/18/24 imaging personally viewed, reveals at least moderate chronic microangiopathic changes   -TSH WNL at 1.79, B12 WNL at 531  -at risk age and cardiovascular related acceleration santy in setting of recent CVA and persistent encephalopathy, continue secondary risk factor modifications and continue to discuss treatments and recommendations in context of current clinical state as now much more frail/debilitated than prior to recent admit   -encourage patient remain physically, socially and cognitively active and engaged to maintain cognitive acuity   -underlying dementia increases risk developing delirium and likely contributed to episode during current admission, cont strict precautions to reduce risk recurrence     Acute metabolic encephalopathy   -mentation continues to wax and wane    -multifactorial including advanced age, underlying dementia, hx encephalopathy during prior admissions, perforated diverticulitis now s/p surgical procedure, anesthesia, multiple setting changes and acute pain in frail elderly individual with prolonged hospitalization   -continue supportive cares, encourage normal sleep cycle, use of sensory assist devices (glasses/hearing aids)  -continue to ensure acute pain well controlled, cont barbie pain protocol, scheduled tylenol, repositioning   -monitor for fecal and urinary retention - LUQ colostomy, cardenas now d/c'd   -reorient frequently as appropriate and  indicated  -appreciate family at bedside for familiarity/reassurance/engagement and social support   -Mirtazapine 7.5mg initiated on 7/15/24 for report of insomnia and poor oral intake, tolerated well w/o notable morning sedation, increased to 15mg HS on 7/22/24 tolerating well, cont current dose   -Seroquel 12.5mg HS resumed 7/18-7/19 to help with overnight restlessness/agitation and sleep cycle with some improvement of sleep and next day alertness, cont current dosing, monitor mentation and QTc closely with use   -continue current Seroquel and mirtazapine dosing on dc to STR     Perforated diverticulitis with abscess  -s/p Hartmans procedure with explantation of left groin mesh with washout and debridement of left groin wound on 7/4/24 now with wound vac in place  -colostomy in place, cont ostomy cares   -s/p completion course of Zosyn   -continue acute multimodal pain control per geriatric pain protocol, taper as pain improves with healing   -Palliative Care on consult and following for ongoing discussion goals of care, continues to have limited oral intake and on review of advance directive artificial nutrition does not appear in line with patients previously documented wishes, continue to assist with meals to optimize intake       Dysphagia  -speech therapy on consult   -s/p VBS 7/7/24 repeated 7/19/24  -currently on dysphagia level 2 puree with thin liquids   -continue strict aspiration precautions, full upright for meals etc   -cont working with speech therapy   -encourage good oral hygiene and cares   -recommend staff assist with ALL meals and allow extra time for completion  -encourage family to assist with ordering food pt enjoys and consider having meals at bedside with patient to stimulate social aspect of eating/meals     Moderate protein calorie malnutrition   Malnutrition Findings:   Adult Malnutrition type: Acute illness  Adult Degree of Malnutrition: Other severe protein calorie  malnutrition  Malnutrition Characteristics: Fat loss, Muscle loss, Inadequate energy, Weight loss  360 Statement: Acute on chronic severe pro, karthik malnutrition d/t condition, decreased appetite as evidence by severe signs of muscle/fat loss at clavicles, moderate at shoulders, temples, <50% energy intake > 5 days, <75% energy intake > 1 month, 4+ edema LE's, treated with oral diet, pureed, and oral nutrition supplements, will continue to monitor food preferenes, provide assistance and encouragement at meals.  BMI Findings: Body mass index is 25.08 kg/m².       Hx of CVA  -small left parietal infarct during recent hospitalization on MRI brain 6/20/24 re-demonstrated on CTA head 7/22/24  -suspected to be due to missed dosing of Eliquis at time of event per prior documentation   -remains at risk future CVAs, continue strict secondary risk factor modifications  -close o/p f/u with Neurology for ongoing monitoring and management      History of possible seizure  -maintained on Depatkote as managed by Neurology, continue dosing per Neurology  -cont strict seizure precautions and minimize use of medications known to lower seizure threshold as possible      Insomnia   -home Seroquel held earlier in admit when NPO, resumed 7/18-7/19 with some improvement in sleep cycle without increase in daytime somnolence, cont current dosing   -continue mirtazapine HS new this admission   -encourage daytime activity and limit napping to reduce risk disruption sleep cycle      Difficult acrdenas placement   -difficult cardenas placement by Urology due to phimosis in OR 7/4  -intermittent hematuria with pt pulling at cardenas now d/c and voiding spontaneously   -if retains urine requiring intervention Urology recs consideration of IR consult for suprapubic placement  -continue retention protocol      Inflammatory polyarthropathy   -maintained on prednisone chronically as o/p, if MSK pain persistent severe and cleared by surgical service could  consider resumption low dose to assist with pain and inflammation as may help with restlessness due to pain   -follows closely with Rheum as o/p, continue close o/p f/u     DM-II  -A1c 7.6  -currently on basal bolus insulin regimen with good control  -continue glu goal 140-180 during hospitalization to reduce risk hypoglycemia, readings mostly within goal   -continue close o/p f/u with PCP for ongoing age appropriate diabetic screenings and cares      Afib   -rates currently well controlled off rate controllers, on Eliquis   -cont close follow-up with Cardiology     Impaired Vision  -recommend use of corrective lenses at all appropriate times  -encourage adequate lighting and encourage use of assistance with ambulation  -consider large font for printed materials provided to patient      Impaired Hearing  -Encourage use of hearing aids at all appropriate times  -encourage providers and caregivers to speak slowly and clearly directly to patient  -minimize background noise to encourage patient engagement  -consider use of hearing amplifier to reduce risk of straining to hear if hearing aids are not present or are not sufficient      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  -clinical frailty scale stage VI, moderately frail, progressive  -multifactorial including age, amb dysfxn, hx CVA, DM-II and multitude of chronic medical co-morbidities now with perforated diverticulitis with abscess requiring surgical procedure superimposed on elderly individual with limited physiologic and metabolic reserves, poor oral intake now with failure to thrive  -continue optimization of chronic medical conditions and address acute derangements as arise  -monitor for and treat any underlying anxiety, mood, depression symptoms as may impact response to therapies and overall sense of wellbeing and quality of life  -continue to ensure tx and interventions align with patients wishes and goals of care, advance directive available in record for  "review, palliative following for ongoing discussions with fluctuations in clinical course   -cont psychosocial support of patient and caregivers   -family looking into SNF level rehabs on dc, CM assisting with dc planning, potential in next 24/48H pending insurance auth    Care coordination: rounded with Conner (RN)    Subjective:     Hal is seen and examined at bedside where he is lying resting having just been repositioned by restorative therapy. He lays still with his eyes closed but does answer some questions appropriately. Nursing reports no acute events overnight.     Review of Systems   Reason unable to perform ROS: limited due to mental status and dementia but denies pain or dyspnea.     Objective:     Vitals: Blood pressure 117/64, pulse 79, temperature (!) 97.4 °F (36.3 °C), resp. rate 20, height 6' 2\" (1.88 m), weight 93.1 kg (205 lb 4 oz), SpO2 99%.,Body mass index is 26.35 kg/m².      Intake/Output Summary (Last 24 hours) at 7/25/2024 0742  Last data filed at 7/24/2024 1100  Gross per 24 hour   Intake 3902.5 ml   Output --   Net 3902.5 ml     Current Medications: Reviewed    Physical Exam:   Physical Exam  Vitals and nursing note reviewed.   Constitutional:       General: He is not in acute distress.     Comments: Thin very frail elderly male    HENT:      Head: Normocephalic.      Comments: Orbits and temporals sunken in appearance      Nose: Nose normal.      Mouth/Throat:      Mouth: Mucous membranes are dry.      Comments:    Eyes:      General:         Right eye: No discharge.         Left eye: No discharge.   Neck:      Comments: Trachea midline   Cardiovascular:      Rate and Rhythm: Normal rate.   Pulmonary:      Effort: No respiratory distress.      Comments: Coarse upper airway breath sounds   Abdominal:      General: There is no distension.      Comments: Abd binder in place   Musculoskeletal:      Right lower leg: Edema present.      Left lower leg: Edema present.      Comments: Severe " diffuse subcutaneous fat and muscle wasting    Skin:     General: Skin is warm and dry.   Neurological:      Comments: Sleeping but awakens to voice, intermittently answers yes/no ques appropriate    Psychiatric:      Comments: Not restless or agitated         Invasive Devices       Peripheral Intravenous Line  Duration             Peripheral IV Dorsal (posterior);Right Forearm -- days              Drain  Duration             Colostomy Other (comment) LUQ 20 days    External Urinary Catheter 6 days                  Lab Results:     I have personally reviewed pertinent lab results including the following:    Results from last 7 days   Lab Units 07/23/24  0527 07/22/24  0654 07/21/24  0506   WBC Thousand/uL 7.71 6.82 7.91   HEMOGLOBIN g/dL 7.5* 7.2* 7.0*   HEMATOCRIT % 25.3* 24.1* 24.0*   PLATELETS Thousands/uL 117* 121* 144*   SEGS PCT % 73 74 75   MONO PCT % 11 13* 12   EOS PCT % 2 2 2     Results from last 7 days   Lab Units 07/23/24  0527 07/22/24  0654 07/21/24  0506 07/20/24  0534 07/19/24  1522   POTASSIUM mmol/L 5.0 4.9 4.8   < >  --    CHLORIDE mmol/L 110* 110* 110*   < >  --    CO2 mmol/L 31 33* 33*   < >  --    CO2, I-STAT mmol/L  --   --   --   --  38*   BUN mg/dL 19 20 22   < >  --    CREATININE mg/dL 1.20 1.24 1.26   < >  --    CALCIUM mg/dL 9.6 9.6 9.5   < >  --    GLUCOSE, ISTAT mg/dl  --   --   --   --  131    < > = values in this interval not displayed.     I have personally reviewed the following imaging study reports in PACS:    No new imaging overnight

## 2024-07-25 NOTE — QUICK NOTE
Progress Note - Palliative & Supportive Care       7/25/2024 3:06 PM -  Hal Miller's chart and case were reviewed by Herlinda Shaw DO.  Mode of review included electronic chart check.    Per review, symptoms remain controlled on current regimen and no changes are made at this time.  Please continue the regimen in place, and review our last note for details.  For dispo plan, please review Case Management notes.     Plan for discharge to STR.     Palliative will sign off at this time.         For urgent issues or any questions/concerns, please notify on-call provider via [facility] Secure Chat.  You may also call our answering service 24/7 at [phone].    Herlinda Shaw DO  Palliative and Supportive Care  Clinic/Answering Service: [phone]  You can find me on [employer]!

## 2024-07-25 NOTE — CASE MANAGEMENT
Case Management Discharge Planning Note    Patient name Hal Miller  Location [facility] 620/[facility] 620-01 MRN [id number]  : 1940 Date 2024       Current Admission Date: 7/3/2024  Current Admission Diagnosis:Colonic diverticular abscess   Patient Active Problem List    Diagnosis Date Noted Date Diagnosed    Palliative care by specialist 2024     Goals of care, counseling/discussion 2024     Difficult Babcock catheter placement ([facility]) 2024     Moderate protein-calorie malnutrition (HCC) 2024     Left inguinal hernia 2024     Leukocytosis 2024     Dysphagia 2024     Hx of aortic valve replacement 2024     Diplopia 2024     Peripheral polyneuropathy 2024     Hyponatremia 2024     Dysphoric mood 2024     NICM (nonischemic cardiomyopathy) ([facility]) 2024     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[facility]) 2024     Acute on chronic respiratory failure ([facility]) 2024     Episode of seizure-like activity 2024     History of Whipple procedure 2024     Dizziness 2024     Insomnia 2024     Ambulatory dysfunction 2024     Severe protein-calorie malnutrition (HCC) 2024     Abnormal CT of the abdomen 2024     Zoster 2024     Colonic diverticular abscess 2024     Vascular dementia ([facility]) 2024     Type 2 diabetes mellitus, with long-term current use of insulin ([facility]) 2024     Hypercalcemia 2023     TARA (acute kidney injury) ([facility]) 2023     Esophageal stricture 2023     Acute encephalopathy 2023     Generalized weakness 2023     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     Chronic obstructive pulmonary disease with acute exacerbation (HCC) 2022     Stage 3 chronic kidney disease (HCC) 2022     Centrilobular emphysema (HCC) 2021     History of malignant neuroendocrine tumor 2021     Atrial fibrillation (HCC) 2017     Shortness of breath  06/13/2017     Primary hypertension 06/13/2017     Hyperlipidemia 09/21/2015     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     Osteoarthrosis 11/12/2014     Polymyalgia rheumatica (HCC) 11/12/2014     Aneurysm of thoracic aorta (HCC) 01/06/2014     Aneurysm of abdominal aorta (HCC) 01/06/2014     Neoplasm of other specified site 01/06/2014       LOS (days): 22  Geometric Mean LOS (GMLOS) (days): 9.8  Days to GMLOS:-11.9     OBJECTIVE:  Risk of Unplanned Readmission Score: 38.74         Current admission status: Inpatient   Preferred Pharmacy:   [id number] - [place], [place] - [address] [address]  Phone: [phone] Fax: [phone]    [facility] [place], [place] - [address]  [address] [facility] [address]  Phone: [phone] Fax: [phone]    [facility]) - [place], [place] - [address]  [address] [place] [address]  Phone: [phone] Fax: [phone]    Primary Care Provider: Kyaw Monreal MD    Primary Insurance: [employer] [facility]  Secondary Insurance:     DISCHARGE DETAILS:      Other Referral/Resources/Interventions Provided:  Referral Comments: [facility] able to accept and reserved in aidin.  discharge support submitting for auth. Spouse in agreement with DCP           Epidermal Autograft Text: The defect edges were debeveled with a #15 scalpel blade.  Given the location of the defect, shape of the defect and the proximity to free margins an epidermal autograft was deemed most appropriate.  Using a sterile surgical marker, the primary defect shape was transferred to the donor site. The epidermal graft was then harvested.  The skin graft was then placed in the primary defect and oriented appropriately.

## 2024-07-25 NOTE — PROGRESS NOTES
"Progress Note - General Surgery   Hal Miller 84 y.o. male MRN: [id number]  Unit/Bed#: [facility] 620-01 Encounter: [id number]    Assessment:  Hal Miller is a 84 y.o. male with L inguinal hernia w/mesh erosion into sigmoid colon s/p ex-lap, mesh explant, washout and debridement of L groin, Angela's on 7/4     Plan:  - Continue PO intake  - On home meds including Eliquis  - Appreciate Urology recs  - OOB, PT/OT Level II  - Daily wet to dry groin dressing changes per nursing  - CM re: dispo, to rehab pending family selection of facility  - will dc when facility is selected and ready    Subjective/Objective     Subjective: NAEON. Vital signs stable. Patient mildly agitated today, shifting frequently in bed while bedside. Denies pain, nausea, vomiting. Gives responses with eyes closed.     Objective:     Blood pressure 114/64, pulse 91, temperature (!) 96.2 °F (35.7 °C), resp. rate 18, height 6' 2\" (1.88 m), weight 93.1 kg (205 lb 4 oz), SpO2 96%.,Body mass index is 26.35 kg/m².      Intake/Output Summary (Last 24 hours) at 7/25/2024 0607  Last data filed at 7/24/2024 1100  Gross per 24 hour   Intake 3902.5 ml   Output --   Net 3902.5 ml       Invasive Devices       Peripheral Intravenous Line  Duration             Peripheral IV Dorsal (posterior);Right Forearm -- days              Drain  Duration             Colostomy Other (comment) LUQ 20 days    External Urinary Catheter 6 days                    Physical Exam: General appearance: cachectic and resting in bed, some shifting in bed  Lungs: normal work of breathing  Heart: normal rate  Abdomen: soft, non tender, non distended. Incision clean,dry, intact with some blanching erythema at the inferior portion. LLQ ostomy with soft stool in bag. L groin wound with dressing in place  Male genitalia: condom catheter in place    Lab, Imaging and other studies:I have personally reviewed pertinent lab results.    VTE Pharmacologic Prophylaxis: Eliquis  VTE Mechanical " Prophylaxis: sequential compression device    Jean Carlos Hurd MD  General Surgery Resident

## 2024-07-25 NOTE — WOUND OSTOMY CARE
Attempted to see patient this morning for ostomy care/teaching. Patient continues to not be appropriate for ostomy teaching at this time due to mentation status. Wife/ family not present in room. 1 piece pouch is well adhered with no signs of leakage. Wound care will continue to follow patient while admitted. Secure chat message with questions or concerns.       Susan Florence RN, BSN, CWOCN

## 2024-07-25 NOTE — CASE MANAGEMENT
[place] [facility] received request for authorization from Care Manager.  Authorization request submitted for: [facility]  Facility Name: [facility]  NPI: [id number]  Facility MD:  Dr. Susie Anderson   NPI: [id number]  Authorization initiated by contacting insurance:  [facility]  Via: [facility]   Clinicals submitted via fax #  [phone]  Pending Reference #: [id number]    Care Manager notified: Mani Dumont    Updates to authorization status will be noted in chart. Please reach out to CM for updates on any clinical information.

## 2024-07-25 NOTE — PLAN OF CARE
Problem: PAIN - ADULT  Goal: Verbalizes/displays adequate comfort level or baseline comfort level  Description: Interventions:  - Encourage patient to monitor pain and request assistance  - Assess pain using appropriate pain scale  - Administer analgesics based on type and severity of pain and evaluate response  - Implement non-pharmacological measures as appropriate and evaluate response  - Consider cultural and social influences on pain and pain management  - Notify physician/advanced practitioner if interventions unsuccessful or patient reports new pain  Outcome: Progressing     Problem: INFECTION - ADULT  Goal: Absence or prevention of progression during hospitalization  Description: INTERVENTIONS:  - Assess and monitor for signs and symptoms of infection  - Monitor lab/diagnostic results  - Monitor all insertion sites, i.e. indwelling lines, tubes, and drains  - Monitor endotracheal if appropriate and nasal secretions for changes in amount and color  - [place] appropriate cooling/warming therapies per order  - Administer medications as ordered  - Instruct and encourage patient and family to use good hand hygiene technique  - Identify and instruct in appropriate isolation precautions for identified infection/condition  Outcome: Progressing

## 2024-07-26 NOTE — QUICK NOTE
Presented bedside for rapid response called on patient due to patient being unresponsive and having a oxygen saturation in the 50s.     On arrival patient was on non-re breather with improvement of oxygen saturations to the 80s but remained unresponsive. Heart rate of 91 and BP of 119/88. POC blood gas, POC blood glucose, EKG, and CXR obtained bedside. Additional labs obtained include CBC, BMP, Mg, Phos.     Spoke to wife, Ann, to update her of the events and attempt to clarify code status as well as wishes. Wife said she would call family and try to come in. Provided her the number of the charge nurse to contact when they are on their way.    Will continue to follow labs and monitor the patient closely.    Jean Carlos Hurd MD  General Surgery Resident

## 2024-07-26 NOTE — CODE DOCUMENTATION
Pt's josselyn alarming O2 sats 50's, unresponsive. RR called and pt placed on nonrebreather with increased sats to 95%

## 2024-07-26 NOTE — CASE MANAGEMENT
Case Management Discharge Planning Note    Patient name Hal Miller  Location [facility] 620/[facility] 620-01 MRN [id number]  : 1940 Date 2024       Current Admission Date: 7/3/2024  Current Admission Diagnosis:Colonic diverticular abscess   Patient Active Problem List    Diagnosis Date Noted Date Diagnosed    Palliative care by specialist 2024     Goals of care, counseling/discussion 2024     Difficult Babcock catheter placement ([facility]) 2024     Moderate protein-calorie malnutrition (HCC) 2024     Left inguinal hernia 2024     Leukocytosis 2024     Dysphagia 2024     Hx of aortic valve replacement 2024     Diplopia 2024     Peripheral polyneuropathy 2024     Hyponatremia 2024     Dysphoric mood 2024     NICM (nonischemic cardiomyopathy) ([facility]) 2024     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[facility]) 2024     Acute on chronic respiratory failure ([facility]) 2024     Episode of seizure-like activity 2024     History of Whipple procedure 2024     Dizziness 2024     Insomnia 2024     Ambulatory dysfunction 2024     Severe protein-calorie malnutrition (HCC) 2024     Abnormal CT of the abdomen 2024     Zoster 2024     Colonic diverticular abscess 2024     Vascular dementia ([facility]) 2024     Type 2 diabetes mellitus, with long-term current use of insulin ([facility]) 2024     Hypercalcemia 2023     TARA (acute kidney injury) ([facility]) 2023     Esophageal stricture 2023     Acute encephalopathy 2023     Generalized weakness 2023     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     Chronic obstructive pulmonary disease with acute exacerbation (HCC) 2022     Stage 3 chronic kidney disease (HCC) 2022     Centrilobular emphysema (HCC) 2021     History of malignant neuroendocrine tumor 2021     Atrial fibrillation (HCC) 2017     Shortness of breath  06/13/2017     Primary hypertension 06/13/2017     Hyperlipidemia 09/21/2015     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     Osteoarthrosis 11/12/2014     Polymyalgia rheumatica (HCC) 11/12/2014     Aneurysm of thoracic aorta (HCC) 01/06/2014     Aneurysm of abdominal aorta (HCC) 01/06/2014     Neoplasm of other specified site 01/06/2014       LOS (days): 23  Geometric Mean LOS (GMLOS) (days): 9.8  Days to GMLOS:-12.7     OBJECTIVE:  Risk of Unplanned Readmission Score: 41.76         Current admission status: Inpatient   Preferred Pharmacy:   [id number] [place] [place] [place] [facility] [address] [address]  Phone: [phone] Fax: [phone]    [facility] - [place], [place] - [facility] [facility]  [address] [address] [address]  Phone: [phone] Fax: [phone]    [facility]) - [place], [place] - [address]  [address] [place] [address]  Phone: [phone] Fax: [phone]    Primary Care Provider: Kyaw Monreal MD    Primary Insurance: [employer] [facility]  Secondary Insurance:     DISCHARGE DETAILS:                                                                                                               Facility Insurance Auth Number: [id number]

## 2024-07-26 NOTE — PROGRESS NOTES
"Progress Note - General Surgery   Hal Miller 84 y.o. male MRN: [id number]  Unit/Bed#: [facility] 620-01 Encounter: [id number]    Assessment:  Hal Miller is a 84 y.o. male with L inguinal hernia w/mesh erosion into sigmoid colon s/p ex-lap, mesh explant, washout and debridement of L groin, Angela's on 7/4. Rapid called overnight for change in mental status and decrease in oxygen saturation. Placed on BiPAP with gradual improvement of oxygen saturation and blood gas.     Plan:  - continue on BiPAP this morning  - repeat blood gas at 8AM  - continue IV fluids  - on home Eliquis  - appreciate urology recs  - daily wet to dry groin dressing changes per nursing  - appreciate palliative collaboration   - given event overnight will continue additional goals of care discussions today      Subjective/Objective     Subjective: Rapid called overnight. See quick note for series of events. Patients wife and nephew arrived and came bedside last night. They asked the plan and we discussed that as of overnight we were working to keep the patient stable. Shared we would re-evaluate the plan moving forward later this morning once all the teams caring for the patient have arrived. On exam this morning patient intermittently opens eyes to voice but withdraws to pain. Vitals are stable on BiPAP    Objective:     Blood pressure 119/73, pulse 91, temperature 98 °F (36.7 °C), resp. rate 16, height 6' 2\" (1.88 m), weight 93.1 kg (205 lb 4 oz), SpO2 100%.,Body mass index is 26.35 kg/m².      Intake/Output Summary (Last 24 hours) at 7/26/2024 0245  Last data filed at 7/25/2024 1700  Gross per 24 hour   Intake 0 ml   Output --   Net 0 ml       Invasive Devices       Peripheral Intravenous Line  Duration             Peripheral IV Dorsal (posterior);Right Forearm -- days    Peripheral IV 07/26/24 Left;Ventral (anterior) Forearm <1 day              Drain  Duration             Colostomy Other (comment) LUQ 21 days    External Urinary Catheter 7 " days                    Physical Exam: General appearance: resting with mouth open and BiPAP in place   Lungs: on BiPAP  Heart: regular rate   Abdomen: soft, non tender, non distended; LLQ ostomy in place with stool in bag    Lab, Imaging and other studies:I have personally reviewed pertinent lab results.    VTE Pharmacologic Prophylaxis: Eliquis  VTE Mechanical Prophylaxis: sequential compression device    Jean Carlos Hurd MD  General Surgery Resident

## 2024-07-26 NOTE — ED PROVIDER NOTES
Responded to rapid response. Patient was found to have pulse ox 55% and altered. Was placed on non-rebreather by nursing staff and rapid was called. Patient is Level III DNR/DNI and is to be placed on BiPAP.     Brenda Beckman MD  07/26/24 0022

## 2024-07-26 NOTE — CASE MANAGEMENT
Case Management Discharge Planning Note    Patient name Hal Miller  Location [facility] 620/[facility] 620-01 MRN [id number]  : 1940 Date 2024       Current Admission Date: 7/3/2024  Current Admission Diagnosis:Colonic diverticular abscess   Patient Active Problem List    Diagnosis Date Noted Date Diagnosed    Palliative care by specialist 2024     Goals of care, counseling/discussion 2024     Difficult Babcock catheter placement ([facility]) 2024     Moderate protein-calorie malnutrition (HCC) 2024     Left inguinal hernia 2024     Leukocytosis 2024     Dysphagia 2024     Hx of aortic valve replacement 2024     Diplopia 2024     Peripheral polyneuropathy 2024     Hyponatremia 2024     Dysphoric mood 2024     NICM (nonischemic cardiomyopathy) ([facility]) 2024     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[facility]) 2024     Acute on chronic respiratory failure ([facility]) 2024     Episode of seizure-like activity 2024     History of Whipple procedure 2024     Dizziness 2024     Insomnia 2024     Ambulatory dysfunction 2024     Severe protein-calorie malnutrition (HCC) 2024     Abnormal CT of the abdomen 2024     Zoster 2024     Colonic diverticular abscess 2024     Vascular dementia ([facility]) 2024     Type 2 diabetes mellitus, with long-term current use of insulin ([facility]) 2024     Hypercalcemia 2023     TARA (acute kidney injury) ([facility]) 2023     Esophageal stricture 2023     Acute encephalopathy 2023     Generalized weakness 2023     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     Chronic obstructive pulmonary disease with acute exacerbation (HCC) 2022     Stage 3 chronic kidney disease (HCC) 2022     Centrilobular emphysema (HCC) 2021     History of malignant neuroendocrine tumor 2021     Atrial fibrillation (HCC) 2017     Shortness of breath  06/13/2017     Primary hypertension 06/13/2017     Hyperlipidemia 09/21/2015     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     Osteoarthrosis 11/12/2014     Polymyalgia rheumatica (HCC) 11/12/2014     Aneurysm of thoracic aorta (HCC) 01/06/2014     Aneurysm of abdominal aorta (HCC) 01/06/2014     Neoplasm of other specified site 01/06/2014       LOS (days): 23  Geometric Mean LOS (GMLOS) (days): 9.8  Days to GMLOS:-13.1     OBJECTIVE:  Risk of Unplanned Readmission Score: 41.6         Current admission status: Inpatient   Preferred Pharmacy:   [id number] - [place], [place] - [address] [address]  Phone: [phone] Fax: [phone]    [facility] [place], [place] - [address] [facility]  [address] [facility] [address]  Phone: [phone] Fax: [phone]    [facility]) - [place], [place] - [address]  [address] [place] [address]  Phone: [phone] Fax: [phone]    Primary Care Provider: Kyaw Monreal MD    Primary Insurance: [facility]  Secondary Insurance:     DISCHARGE DETAILS:         Other Referral/Resources/Interventions Provided:  Referral Comments: Family would like to transition pt to comfort care at [facility] and rehab [place]. This CM contacted facility to have there hospice service reach out to family.

## 2024-07-26 NOTE — PROGRESS NOTES
Progress Note - Geriatric Medicine   Hal Miller 84 y.o. male MRN: [id number]  Unit/Bed#: [facility] 620-01 Encounter: [id number]      Assessment/Plan:    Acute hypoxic hypercapnic respiratory failure   -rapid response overnight with O2 sat in 50s and altered mental status and resp acidosis on ABG improved with BiPAP which remains in place due to rapid desaturation with attempted wean trial   -continue supportive cares  -anticipate re-address goals of care today with plan to re-engage palliative care    Acute metabolic encephalopathy   -mentation was waxing and waning now acutely worse resulting in rapid response overnight with resp failure requiring bipap   -team plans to re-address goals of care today given abrupt decline     -continue to address reversible factors, supportive cares   -sedating and oral medications currently on hold due to unresponsive episode    Vascular dementia  -at baseline prior to recent admission reportedly alert and oriented, forgetful, independent with ADLs required some assist with iADLs   -[facility] 23/30 (11/2022), noted to have episodes of encephalopathy and decline since last testing, recommend repeat following recovery from acute illness to establish new baseline   -CT 6/18/24 imaging personally viewed, reveals at least moderate chronic microangiopathic changes   -TSH WNL at 1.79, B12 WNL at 531  -at risk age and cardiovascular related acceleration santy in setting of recent CVA and persistent encephalopathy with increasing duration of encephalopathy reducing likelihood of returning to near prior baseline   -continue to discuss treatments and recommendations in context of current clinical state as now much more frail/debilitated than prior to recent admission with guarded prognosis   -encourage patient remain physically, socially and cognitively active and engaged as possible to maintain cognitive acuity   -underlying dementia increases risk developing delirium and likely contributed to  episode during current admission, cont strict precautions to reduce risk recurrence      Perforated diverticulitis with abscess  -s/p Hartmans procedure with explantation of left groin mesh with washout and debridement of left groin wound on 7/4/24 now with wound vac in place  -colostomy in place, cont ostomy cares   -s/p completion course of Zosyn   -continue acute multimodal pain control per geriatric pain protocol, taper as pain improves with healing   -Palliative Care on consult and following for ongoing discussion goals of care, continues to have limited oral intake and on review of advance directive artificial nutrition does not appear in line with patients previously documented wishes, continue to assist with meals as possible to optimize intake       Dysphagia  -speech therapy on consult   -s/p VBS 7/7/24 repeated 7/19/24  -was on dysphagia level 2 puree with thin liquids - currently NPO due to mentation and on BiPAP  -continue strict aspiration precautions, full upright for meals etc   -cont working with speech therapy when appropriate   -encourage good oral hygiene and cares   -recommend staff assist with ALL meals and allow extra time for completion  -encourage family to assist with ordering food pt enjoys and consider having meals at bedside with patient to stimulate social aspect of eating/meals     Moderate protein calorie malnutrition   Malnutrition Findings:   Adult Malnutrition type: Acute illness  Adult Degree of Malnutrition: Other severe protein calorie malnutrition  Malnutrition Characteristics: Fat loss, Muscle loss, Inadequate energy, Weight loss  360 Statement: Acute on chronic severe pro, karthik malnutrition d/t condition, decreased appetite as evidence by severe signs of muscle/fat loss at clavicles, moderate at shoulders, temples, <50% energy intake > 5 days, <75% energy intake > 1 month, 4+ edema LE's, treated with oral diet, pureed, and oral nutrition supplements, will continue to monitor  food preferenes, provide assistance and encouragement at meals.  BMI Findings: Body mass index is 25.08 kg/m².       Hx of CVA  -small left parietal infarct during recent hospitalization on MRI brain 6/20/24 re-demonstrated on CTA head 7/22/24  -suspected to be due to missed dosing of Eliquis at time of event per prior documentation   -remains at risk future CVAs, continue strict secondary risk factor modifications  -close o/p f/u with Neurology for ongoing monitoring and management      History of possible seizure  -maintained on Depatkote as managed by Neurology, continue dosing per Neurology  -cont strict seizure precautions and minimize use of medications known to lower seizure threshold as possible      Insomnia   -home Seroquel held earlier in admit when NPO, resumed 7/18-7/19 with some improvement in sleep cycle without increase in daytime somnolence, cont current dosing   -continue mirtazapine HS new this admission   -encourage daytime activity and limit napping to reduce risk disruption sleep cycle      Difficult cardenas placement   -difficult cardenas placement by Urology due to phimosis in OR 7/4  -intermittent hematuria with pt pulling at cardenas now d/c and voiding spontaneously   -if retains urine requiring intervention Urology recs consideration of IR consult for suprapubic placement  -continue retention protocol      Inflammatory polyarthropathy   -maintained on prednisone chronically as o/p, if MSK pain persistent severe and cleared by surgical service could consider resumption low dose to assist with pain and inflammation as may help with restlessness due to pain   -follows closely with Rheum as o/p, continue close o/p f/u     DM-II  -A1c 7.6  -currently on basal bolus insulin regimen with good control  -continue glu goal 140-180 during hospitalization to reduce risk hypoglycemia, readings mostly within goal   -continue close o/p f/u with PCP for ongoing age appropriate diabetic screenings and cares       Afib   -rates currently well controlled off rate controllers, on Eliquis   -cont close follow-up with Cardiology     Impaired Vision  -recommend use of corrective lenses at all appropriate times  -encourage adequate lighting and encourage use of assistance with ambulation  -consider large font for printed materials provided to patient      Impaired Hearing  -Encourage use of hearing aids at all appropriate times  -encourage providers and caregivers to speak slowly and clearly directly to patient  -minimize background noise to encourage patient engagement  -consider use of hearing amplifier to reduce risk of straining to hear if hearing aids are not present or are not sufficient      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  -clinical frailty scale stage VI/VII, moderate to severely frail, progressive  -multifactorial including age, amb dysfxn, hx CVA, DM-II and multitude of chronic medical co-morbidities now with perforated diverticulitis with abscess requiring surgical procedure superimposed on elderly individual with limited physiologic and metabolic reserves, poor oral intake now with failure to thrive  -continue optimization of chronic medical conditions and address acute derangements as arise  -monitor for and treat any underlying anxiety, mood, depression symptoms as may impact response to therapies and overall sense of wellbeing and quality of life  -continue to ensure tx and interventions align with patients wishes and goals of care, advance directive available in record for review, palliative following for ongoing discussions with fluctuations in clinical course   -cont psychosocial support of patient and caregivers   -family looking into SNF level rehabs on dc, CM assisting with dc planning, was anticipating dc to [facility] however now re-addressing goals of care following rapid response and change in clinical status     Subjective:     Toni is seen and examined at bedside where he is lying resting, he is on BiPAP  "and intermittently opens eyes to voice but does not answer questions or follow commands. He was a rapid response overnight with decreased oxygen saturations and unresponsive found to have respiratory acidosis improved with BiPAP which he remains on currently.    Review of Systems   Unable to perform ROS: Mental status change     Objective:     Vitals: Blood pressure 107/63, pulse 80, temperature (!) 97.1 °F (36.2 °C), resp. rate 20, height 6' 2\" (1.88 m), weight 93.6 kg (206 lb 5.6 oz), SpO2 99%.,Body mass index is 26.49 kg/m².      Intake/Output Summary (Last 24 hours) at 7/26/2024 1332  Last data filed at 7/26/2024 0900  Gross per 24 hour   Intake 1097.5 ml   Output 200 ml   Net 897.5 ml     Current Medications: Reviewed    Physical Exam:   Physical Exam  Vitals and nursing note reviewed.   Constitutional:       Appearance: He is ill-appearing.   HENT:      Head: Normocephalic.      Comments: Orbitals and temporal areas sunken in appearance     BiPAP in place     Nose: Nose normal.      Mouth/Throat:      Mouth: Mucous membranes are dry.   Eyes:      General:         Right eye: No discharge.         Left eye: No discharge.   Neck:      Comments: Trachea midline   Cardiovascular:      Rate and Rhythm: Normal rate.      Pulses: Normal pulses.   Pulmonary:      Comments: Coarse labored breath sounds   Abdominal:      Palpations: Abdomen is soft.      Comments: Colostomy in place    Musculoskeletal:      Right lower leg: Edema present.      Left lower leg: Edema present.      Comments: Severe diffuse subcutaneous fat and muscle wasting     Bilateral soft wrist restraints in place   Skin:     General: Skin is warm and dry.      Comments: Thin and friable    Neurological:      Comments: Somnolent, intermittently opens eyes to voice, does not attempt to ans ques, does not follow commands    Psychiatric:      Comments: No restlessness or agitation noted         Invasive Devices       Peripheral Intravenous Line  Duration "             Peripheral IV Dorsal (posterior);Right Forearm -- days    Peripheral IV 07/26/24 Left;Ventral (anterior) Forearm <1 day              Drain  Duration             Colostomy Other (comment) LUQ 21 days    External Urinary Catheter 7 days                  Lab Results:     I have personally reviewed pertinent lab results including the following:    Results from last 7 days   Lab Units 07/26/24  0848 07/26/24  0259 07/26/24  0024 07/26/24  0019 07/23/24  0527   WBC Thousand/uL 7.41  --  11.55*  --  7.71   HEMOGLOBIN g/dL 7.9*  --  8.4*  --  7.5*   I STAT HEMOGLOBIN g/dl  --  7.1*  --    < >  --    HEMATOCRIT % 26.9*  --  28.5*  --  25.3*   HEMATOCRIT, ISTAT %  --  21*  --    < >  --    PLATELETS Thousands/uL 82*  --  112*  --  117*   SEGS PCT % 70  --  71  --  73   MONO PCT % 14*  --  14*  --  11   EOS PCT % 1  --  0  --  2    < > = values in this interval not displayed.     Results from last 7 days   Lab Units 07/26/24  0541 07/26/24  0259 07/26/24  0024 07/26/24  0019 07/23/24  0527 07/20/24  0534 07/19/24  1522   POTASSIUM mmol/L 5.6*  --  5.3  --  5.0   < >  --    CHLORIDE mmol/L 111*  --  109*  --  110*   < >  --    CO2 mmol/L 29  --  31  --  31   < >  --    CO2, I-STAT mmol/L  --  32  --  34*  --   --  38*   BUN mg/dL 21  --  22  --  19   < >  --    CREATININE mg/dL 1.25  --  1.34*  --  1.20   < >  --    CALCIUM mg/dL 9.7  --  10.0  --  9.6   < >  --    GLUCOSE, ISTAT mg/dl  --  130  --  147*  --   --  131    < > = values in this interval not displayed.     I have personally reviewed the following imaging study reports in PACS:    7/26/24- CXR

## 2024-07-26 NOTE — RAPID RESPONSE
Rapid Response Note  Hal Miller 84 y.o. male MRN: [id number]  Unit/Bed#: [facility] 620-01 Encounter: [id number]    Rapid Response Notification(s):   Response called date/time:  7/26/2024 12:12 AM  Response team arrival date/time:  7/26/2024 12:13 AM  Response end date/time:  7/26/2024 12:35 AM  Level of care:  [facility]  Rapid response location:  [facility] unit  Primary reason for rapid response call:  Acute change in neuro status and acute change in O2 sat    Rapid Response Intervention(s):   Airway:  None  Breathing:  Oxygen  Circulation:  None  Fluids administered:  None  Medications administered:  None       Assessment:   Acute hypoxic/hypercapneic respiratory failure  Acute encephalopathy    Plan:   Bipap  Send STAT CBC, BMP, lytes  Reassess in 1 hour, if ongoing encephalopathy/bipap requirements, consider upgrade to SD1  Primary team discussing code status with wife, currently level 3     Rapid Response Outcome:   Transfer:  Transfer to stepdown 2  Primary service notified of transfer: Yes    Code Status: Level 3 (DNAR and DNI)      Family notified: Yes, Name of Family member contacted Ann          Background/Situation:   Hal Miller is a 84 y.o. male who was initially admitted with a diverticular bleed, now s/p Miller's procedure. Rapid response called for hypoxia to the 50s and altered mental status. ISTAT with respiratory acidosis, placed on bipap. Mental status improving slightly by end of rapid.     Review of Systems   Unable to perform ROS: Mental status change       Objective:   Vitals:    07/25/24 0728 07/25/24 1321 07/25/24 1428 07/26/24 0026   BP: 117/64  116/63 119/73   Pulse: 79  79 91   Resp: 20  16    Temp: (!) 97.4 °F (36.3 °C)  98 °F (36.7 °C)    TempSrc:       SpO2: 99% 97% 100% 100%   Weight:       Height:         Physical Exam  Vitals and nursing note reviewed.   Constitutional:       General: He is not in acute distress.     Appearance: He is ill-appearing.      Interventions: Face mask in  place.   HENT:      Head: Normocephalic and atraumatic.   Eyes:      Pupils: Pupils are equal, round, and reactive to light.   Cardiovascular:      Rate and Rhythm: Normal rate. Rhythm irregularly irregular.   Pulmonary:      Effort: Bradypnea present.      Breath sounds: Decreased breath sounds present.   Abdominal:      General: There is no distension.      Palpations: Abdomen is soft.      Comments: Ostomy present   Skin:     General: Skin is warm and dry.   Neurological:      Mental Status: He is lethargic.      Comments: Initially unresponsive to all painful stimuli. On bipap, opens eyes to voice and to painful stimuli of extremities

## 2024-07-26 NOTE — QUICK NOTE
Full note to follow regarding GOC conversation today.    Family meeting held this afternoon with spouse and family.    Ultimate goal is to pursue Level 4 Comfort care.  Comfort care medications ordered for pain, dyspnea, or agitation.    If patient remains stable, family would like patient to pursue hospice at facility close to their home.  Discussed with Case management who is assisting at this time.    Palliative will continue to follow.    Yasir Garcia, DO  Palliative Care

## 2024-07-26 NOTE — CASE MANAGEMENT
Called [employer] ([phone]) to check status of authorization. Spoke to Davida who stated auth has been approved.    [place] [employer] [place] has received APPROVED authorization.  Insurance:   [facility]  Called in by Rep: Kolby TIRADO#  [phone]  Authorization received for: SNF  Facility: [facility]     Authorization #: [id number]  Start of Care:7/25  Next Review Date: 7/29  Continued Stay Care Coordinator: Oksana TIRADO#: [phone]  Submit next review to: [phone]     Care Manager notified: Mani Dumont    Please reach out to CM for updates on any clinical information.

## 2024-07-26 NOTE — RESPIRATORY THERAPY NOTE
Resp BIPAP Note   07/26/24 0020   Respiratory Assessment   Assessment Type Assess only   General Appearance Lethargic   Respiratory Pattern Normal   Chest Assessment Chest expansion symmetrical   Bilateral Breath Sounds Coarse   Resp Comments Attended rapid response and placed pt on BIPAP.   O2 Device BIPAP   Non-Invasive Information   O2 Interface Device Face mask   Non-Invasive Ventilation Mode BiPAP   $ Intermittent NIV Yes   $ Pulse Oximetry Spot Check Charge Completed   Non-Invasive Settings   IPAP (cm) 18 cm   EPAP (cm) 6 cm   Rate (Set) 12   FiO2 (%) 50   Flow (lpm) 5   Pressure Support (cm H2O) 6   Inspiratory Time (Set) 1   Pressure Control Set (cm H20) 30   Non-Invasive Readings   Skin Intervention Skin intact   Total Rate 17   Vt (mL) (Mech) 700   MAP (Obs) 8.5   MV (Mech) 12.5   Peak Pressure (Obs) 18   I/E Ratio (Obs) 1:1.3   Leak (lpm) 45   Non-Invasive Alarms   Insp Pressure High (cm H20) 40   Insp Pressure Low (cm H20) 5   MV High (L/min) 20   MV Low (L/min) 2   Vt High (mL) 1000   Vt Low (mL) 150   High Resp Rate (BPM) 55 BPM   Low Resp Rate (BPM) 5 BPM   Apnea Interval (sec) 20   Apnea Rate 12

## 2024-07-26 NOTE — PLAN OF CARE
Problem: Nutrition/Hydration-ADULT  Goal: Nutrient/Hydration intake appropriate for improving, restoring or maintaining nutritional needs  Description: Monitor and assess patient's nutrition/hydration status for malnutrition. Collaborate with interdisciplinary team and initiate plan and interventions as ordered.  Monitor patient's weight and dietary intake as ordered or per policy. Utilize nutrition screening tool and intervene as necessary. Determine patient's food preferences and provide high-protein, high-caloric foods as appropriate.     INTERVENTIONS:  - Monitor oral intake, urinary output, labs, and treatment plans  - Assess nutrition and hydration status and recommend course of action  - Evaluate amount of meals eaten  - Assist patient with eating if necessary   - Allow adequate time for meals  - Recommend/ encourage appropriate diets, oral nutritional supplements, and vitamin/mineral supplements  - Order, calculate, and assess calorie counts as needed  - Recommend, monitor, and adjust tube feedings and TPN/PPN based on assessed needs  - Assess need for intravenous fluids  - Provide specific nutrition/hydration education as appropriate  - Include patient/family/caregiver in decisions related to nutrition  Outcome: Not Progressing     Problem: SAFETY,RESTRAINT: NV/NON-SELF DESTRUCTIVE BEHAVIOR  Goal: Remains free of harm/injury (restraint for non violent/non self-detsructive behavior)  Description: INTERVENTIONS:  - Instruct patient/family regarding restraint use   - Assess and monitor physiologic and psychological status   - Provide interventions and comfort measures to meet assessed patient needs   - Identify and implement measures to help patient regain control  - Assess readiness for release of restraint   Outcome: Not Progressing  Goal: Returns to optimal restraint-free functioning  Description: INTERVENTIONS:  - Assess the patient's behavior and symptoms that indicate continued need for restraint  -  Identify and implement measures to help patient regain control  - Assess readiness for release of restraint   Outcome: Not Progressing

## 2024-07-26 NOTE — SPEECH THERAPY NOTE
Speech Language/Pathology  Pt family pursuing hospice/comfort measures.  Will sign off.  Continue oral care and po as pt desires.

## 2024-07-26 NOTE — RESPIRATORY THERAPY NOTE
Respiratory care    07/26/24 4919   Respiratory Assessment   Assessment Type Assess only   General Appearance Lethargic   Respiratory Pattern Spontaneous;Assisted   Chest Assessment Chest expansion symmetrical   Bilateral Breath Sounds Coarse;Diminished   Cough Weak;Moist;Congested   Resp Comments Briefly trialed pt off of bipap, pt rapidly desaturated into 80's and became tachypneic. Placed back onto bipap.   O2 Device bipap   Non-Invasive Information   O2 Interface Device Face mask   Non-Invasive Ventilation Mode BiPAP   SpO2 95 %   $ Pulse Oximetry Spot Check Charge Completed   Non-Invasive Settings   IPAP (cm) 16 cm   EPAP (cm) 6 cm   Rate (Set) 12   FiO2 (%) 40   Pressure Support (cm H2O) 10   Inspiratory Time (Set) 1   Pressure Control Set (cm H20) 30   Non-Invasive Readings   Skin Intervention Skin intact   Total Rate 18   Vt (mL) (Mech) 408   MAP (Obs) 8.5   MV (Mech) 7.1   Peak Pressure (Obs) 15   Spontaneous Vt (mL) 408   Non-Invasive Alarms   Insp Pressure High (cm H20) 40   Insp Pressure Low (cm H20) 5   MV High (L/min) 20   MV Low (L/min) 2   Vt High (mL) 1000   Vt Low (mL) 200   High Resp Rate (BPM) 45 BPM   Low Resp Rate (BPM) 5 BPM   Apnea Rate 12

## 2024-07-26 NOTE — PROGRESS NOTES
[facility]  Progress Note  Name: Hal Miller I  MRN: [id number]  Unit/Bed#: [facility] 620-01 I Date of Admission: 7/3/2024   Date of Service: 7/26/2024 I Hospital Day: 23    Assessment & Plan   Goals of care, counseling/discussion  Assessment & Plan  [facility] meeting held this afternoon -  Time: 1400 approximately  End: 1600 approximately    Participants - Family including spouse, daughters, sister, nephews  Patient unable to participate due to medical condition  Care team:  Dr. Champion from Surgical team, [facility] Team (this provider and [facility] Social Work).    Initial conversation with family regarding their understanding of current medical situation. Medical update was provided to certain aspects of patient's events last night and I n regards to patient's need for BiPAP due to his oxygen requirements requiring escalation overnight. Reviewed family's concerns and questions.  Discussed option of comfort care and hospice. Reviewed Hospice Philosophy in detail along with hospice services typically provided including (Hospice physician, hospice nurses, hospice aides, DME, hospice at home vs facility in setting of RLOC).    We were later joined by the surgical team physician in which further medical updates were given along with potential concerns for patient's current clinical status from multiple standpoints including concerns for aspiration, nutritional status, and respiratory status. Discussed that this may likely be patient's new baseline. Discussed treatments moving forward would most likely be palliative in nature to provide/improve quality of life as much as able.    Wife and family ultimately decided to pursue comfort focused care at this time.  If patient remains stable, family would like patient to transition to hospice at a facility closer to home ([facility]). Case management made aware and is assisting at this time.      Palliative care by specialist  Assessment &  Plan  Goals:   Level 4 - Comfort Care transition today.  Pending patient's stability, family would like to consider hospice placement at facility close to their home  Discussed with Case Management who is assisting at this time.  Comfort care orders placed    Social Support:  Supportive listening provided  Normalized experience of patient  Provided anxiety containment  Advocated for patient/family with interdisciplinary team  Mediated conflict    Symptom management:  Comfort Care medications ordered  Delirium precautions: please minimize interruptions and prioritize sleep at night.  No TV nor screen time at night.  Shades drawn at night.  During day, shades up, minimize napping, and encourage meals in chair.    Care Coordination  Case discussed with CM and Primary service    Follow-up  We appreciate the opportunity to participate in this patient's care.   We will continue to follow while admitted.   Please do not hesitate to contact our on-call provider through [employer] Secure Chat or contact [phone] should there be an acute change or other symptom control concerns.      Moderate protein-calorie malnutrition (HCC)  Assessment & Plan  Assist with feeds, nutritional supplements, SLP following.     Malnutrition Findings:   Adult Malnutrition type: Acute illness  Adult Degree of Malnutrition: Other severe protein calorie malnutrition  Malnutrition Characteristics: Fat loss, Muscle loss, Inadequate energy, Weight loss                  360 Statement: Acute on chronic severe pro, karthik malnutrition d/t condition, decreased appetite as evidence by severe signs of muscle/fat loss at clavicles, moderate at shoulders, temples, <50% energy intake > 5 days, <75% energy intake > 1 month, 4+ edema LE's, treated with oral diet, pureed, and oral nutrition supplements, will continue to monitor food preferenes, provide assistance and encouragement at meals.    BMI Findings:     Body mass index is 26.49 kg/m².       Vascular dementia  ([facility])  Assessment & Plan  Geriatrics following, appreciate input    * Colonic diverticular abscess  Assessment & Plan  Surgical team following s/p ex-lap, mesh explantation, elena procedure, and groin washout on 7/4         Subjective:  Patient resting with bipap mask on. No signs of acute restlessness or discomfort on exam.  Family at bedside.    Review of Systems   Unable to perform ROS: Acuity of condition       MEDICATIONS / ALLERGIES:  all current active meds have been reviewed, current meds:   Current Facility-Administered Medications   Medication Dose Route Frequency    acetaminophen (TYLENOL) oral suspension 650 mg  650 mg Oral Q4H PRN    albuterol (PROVENTIL HFA,VENTOLIN HFA) inhaler 2 puff  2 puff Inhalation Q4H PRN    albuterol inhalation solution 2.5 mg  2.5 mg Nebulization Q4H PRN    apixaban (ELIQUIS) tablet 5 mg  5 mg Oral BID    bisacodyl (DULCOLAX) rectal suppository 10 mg  10 mg Rectal Daily PRN    chlorhexidine (PERIDEX) 0.12 % oral rinse 15 mL  15 mL Mouth/Throat Q12H YUNI    divalproex sodium (DEPAKOTE SPRINKLE) capsule 500 mg  500 mg Oral Q8H YUNI    glycopyrrolate (ROBINUL) injection 0.1 mg  0.1 mg Intravenous Q4H PRN    HYDROmorphone (DILAUDID) injection 0.5 mg  0.5 mg Intravenous Q1H PRN    insulin lispro (HumALOG/ADMELOG) 100 units/mL subcutaneous injection 1-5 Units  1-5 Units Subcutaneous 4x Daily (AC & HS)    LORazepam (ATIVAN) injection 0.5 mg  0.5 mg Intravenous Q1H PRN    LORazepam (ATIVAN) injection 1 mg  1 mg Intravenous Q10 Min PRN    metoprolol succinate (TOPROL-XL) 24 hr tablet 25 mg  25 mg Oral Daily    mirtazapine (REMERON) tablet 15 mg  15 mg Oral HS    moisture barrier miconazole 2% cream (aka ARASH MOISTURE BARRIER ANTIFUNGAL CREAM)   Topical BID    nystatin (MYCOSTATIN) oral suspension 500,000 Units  500,000 Units Swish & Swallow 4x Daily    oxyCODONE (ROXICODONE) oral solution 2.5 mg  2.5 mg Oral Q4H PRN    Or    oxyCODONE (ROXICODONE) oral solution 5 mg  5 mg Oral Q4H PRN     pantoprazole (PROTONIX) EC tablet 40 mg  40 mg Oral Early Morning    QUEtiapine (SEROquel) tablet 12.5 mg  12.5 mg Oral HS    senna-docusate sodium (SENOKOT S) 8.6-50 mg per tablet 1 tablet  1 tablet Oral BID    tamsulosin (FLOMAX) capsule 0.4 mg  0.4 mg Oral Daily With Dinner   , and PTA meds:   Prior to Admission Medications   Prescriptions Last Dose Informant Patient Reported? Taking?   B-D UF III MINI PEN NEEDLES 31G X 5 MM MISC  Self Yes No   Calcium Citrate (CITRACAL PO)  Self Yes No   Sig: Take 650 mg by mouth in the morning   Cinnamon 500 MG capsule  Self Yes No   Sig: Take 1,000 mg by mouth daily   Lantus SoloStar 100 units/mL SOPN  Self Yes No   QUEtiapine (SEROquel) 25 mg tablet   Yes No   Sig: Take 25 mg by mouth if needed (for agitation) Take 1/2 to 1 tablet by mouth as needed   apixaban (ELIQUIS) 2.5 mg  Self Yes No   Sig: Take 2.5 mg by mouth 2 (two) times a day   ciprofloxacin (CIPRO) 500 mg tablet   No No   Sig: Take 1 tablet (500 mg total) by mouth every 12 (twelve) hours for 11 doses   diltiazem (CARDIZEM CD) 180 mg 24 hr capsule   Yes No   Sig: Take 180 mg by mouth daily   hydroxychloroquine (PLAQUENIL) 200 mg tablet  Self Yes No   Sig: Take 200 mg by mouth in the morning   nystatin (MYCOSTATIN) 500,000 units/5 mL suspension   No No   Sig: Apply 5 mL (500,000 Units total) to the mouth or throat 4 (four) times a day   olmesartan (BENICAR) 20 mg tablet  Self Yes No   Sig: Take 20 mg by mouth daily   ondansetron (ZOFRAN-ODT) 4 mg disintegrating tablet   No No   Sig: Take 1 tablet (4 mg total) by mouth every 6 (six) hours as needed for nausea or vomiting   potassium chloride (KLOR-CON) 8 MEQ tablet  Self Yes No   pravastatin (PRAVACHOL) 40 mg tablet  Self Yes No   Sig: Take 40 mg by mouth daily   predniSONE 1 mg tablet  Self Yes No   Sig: Take 10 mg by mouth daily Currently down to 3 1/2 mg daily 05/05/22      Facility-Administered Medications: None       Allergies   Allergen Reactions    Contrast  "Dye [Iodinated Contrast Media] Other (See Comments)     Pt states kidney dysfunction..Patient was injected on 7/18/24 with no pre-medication and patient had no reaction post injection -      Other        OBJECTIVE:  /77   Pulse 83   Temp (!) 97.1 °F (36.2 °C)   Resp (!) 24   Ht 6' 2\" (1.88 m)   Wt 93.6 kg (206 lb 5.6 oz)   SpO2 98%   BMI 26.49 kg/m²   Nursing notes reviewed.  Physical Exam  Vitals reviewed.   Constitutional:       General: He is not in acute distress.     Appearance: He is well-developed. He is ill-appearing.   HENT:      Head: Normocephalic.      Nose:      Comments: Bipap in place  Cardiovascular:      Rate and Rhythm: Normal rate.   Pulmonary:      Effort: No respiratory distress.      Comments: Bipap in place  Abdominal:      General: Abdomen is flat.   Skin:     Coloration: Skin is pale.         Lab Results: I have personally reviewed pertinent labs.    HEMATOLOGY PROFILE:  Results from last 7 days   Lab Units 07/26/24  0848 07/26/24 0259 07/26/24  0024 07/26/24 0019 07/23/24 0527   WBC Thousand/uL 7.41  --  11.55*  --  7.71   HEMOGLOBIN g/dL 7.9*  --  8.4*  --  7.5*   I STAT HEMOGLOBIN g/dl  --  7.1*  --    < >  --    HEMATOCRIT % 26.9*  --  28.5*  --  25.3*   HEMATOCRIT, ISTAT %  --  21*  --    < >  --    PLATELETS Thousands/uL 82*  --  112*  --  117*   SEGS PCT % 70  --  71  --  73   MONO PCT % 14*  --  14*  --  11   EOS PCT % 1  --  0  --  2    < > = values in this interval not displayed.       CHEMISTRY PROFILE:  Results from last 7 days   Lab Units 07/26/24  0541 07/26/24  0259 07/26/24  0024 07/26/24  0019 07/23/24  0527   SODIUM mmol/L 142  --  143  --  144   POTASSIUM mmol/L 5.6*  --  5.3  --  5.0   CHLORIDE mmol/L 111*  --  109*  --  110*   CO2 mmol/L 29  --  31  --  31   CO2, I-STAT mmol/L  --  32  --  34*  --    BUN mg/dL 21  --  22  --  19   CREATININE mg/dL 1.25  --  1.34*  --  1.20   CALCIUM mg/dL 9.7  --  10.0  --  9.6   GLUCOSE, ISTAT mg/dl  --  130  --  147*  " "--        Albumin:  0   Lab Value Date/Time    ALB 2.8 (L) 07/04/2024 1718    ALB 3.4 (L) 02/20/2023 1547     Imaging Studies: I have personally reviewed pertinent reports.  EKG, Pathology, and Other Studies: I have personally reviewed pertinent reports.    Counseling / Coordination of Care:  Total floor / unit time spent today 120 minutes. (Initial 60 minutes with family followed by additional 60 minutes in which Surgical team provider joined for further discussion). Greater than 50% of total time was spent with the patient and / or family counseling and / or coordination of care. A description of the counseling / coordination of care: obtaining/reviewing history, symptom assessment and management, medication review / adjustment, psychosocial support, reviewing / ordering tests, medicines, imaging, procedures, prognosis, goals of care, hospice services, supportive listening, anticipatory guidance, DME, patient/family education, instructions for management, risks/benefits of treatment(s), and documenting in the medical record.    Yasir Garcia DO  [facility] Palliative and Supportive Care  [phone]    Portions of this document may have been created using dictation software and as such some \"sound alike\" terms may have been generated by the system. Do not hesitate to contact me with any questions or clarifications.    "

## 2024-07-27 NOTE — DEATH NOTE
INPATIENT DEATH NOTE  Hal Miller 84 y.o. male MRN: [id number]  Unit/Bed#: [facility] 620-01 Encounter: [id number]             PHYSICAL EXAM:  Unresponsive to noxious stimuli, Spontaneous respirations absent, Breath sounds absent, Carotid pulse absent, Heart sounds absent, Pupillary light reflex absent, and Corneal blink reflex absent    Medical Examiner notification criteria:  NONE APPLICABLE   Medical Examiner's office notified?:  Yes  Medical Examiner accepted case?:  No           Autopsy Options:  Post-mortem examination declined by next of kin.    Primary Service Attending Physician notified?:  yes - Attending:  Suzanna Brush DO    Physician/Resident responsible for completing Discharge Summary:  Unruly Curry MD

## 2024-07-27 NOTE — DISCHARGE SUMMARY
Discharge Summary - Hal Miller 84 y.o. male MRN: [id number]    Unit/Bed#: [facility] 620-01 Encounter: [id number] PCP: Kyaw Monreal MD    Admission Date:   Admission Orders (From admission, onward)       Ordered        07/03/24 2016  INPATIENT ADMISSION  Once                            Admitting Diagnosis: 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[facility]) [I63.9]    HPI: Hal Miller is a 84 y.o. male who presents with pain, swelling, and worsening erythema of the left groin over area of previously noted left inguinal hernia. Patient reports noticing increased swelling and some tenderness of the left groin beginning yesterday with worsening of pain and skin breakdown in the area today. Denies fever, chills, nausea, vomiting, changes in bowel or bladder habits. Patient has a complicated past medical history of stroke, cardiomyopathy, afib, Aortic valve replacement x2, HTN, DM type 2, stage 3 CKD, and COPD. Past surgical history of pancreaticoduodenectomy, cholecystotomy, and appendectomy.  Elevated WBC of 13 from 16 yesterday and 8 on 7/16. Most recent CT scan 6/18 showed an unchanged collection of fluid in the left side of the pelvis anteriorly, extending focally into the adjacent anterior pelvic wall, and abutting the sigmoid colon with no evidence of communication noted.     Procedures Performed: No orders of the defined types were placed in this encounter.      Summary of Hospital Course: Patient presented with L inguinal hernia w/mesh erosion into sigmoid colon s/p ex-lap, mesh explant, washout and debridement of L groin, Angela's on 7/4. Patient continued to have waxing and waning mental status since surgery. He was not a candidate for rehab and was somnolent most day. He was not ambulating or getting out of bed. His oral intake was poor. Palliative care was consulted to discuss goals of care regarding his poor nutritional status and declining functional status. On 7/19 a rapid response was called due to hypotension. On 7/23 his family  elected DNR/DNI and on  he was comfort care. The patient  in the early morning of .            Complications: None    Disposition:      Final Diagnosis:     Medical Problems       Resolved Problems  Date Reviewed: 2024   None         Condition at Time of Death: Comfortable    Date, Time and Cause of Death    Date of Death: 24  Time of Death:  2:20 AM  Preliminary Cause of Death: Perforated sigmoid colon (HCC)         Death Note:    INPATIENT DEATH NOTE  Hal Miller 84 y.o. male MRN: [id number]  Unit/Bed#: [facility] 620-01 Encounter: [id number]             PHYSICAL EXAM:  Unresponsive to noxious stimuli, Spontaneous respirations absent, Breath sounds absent, Carotid pulse absent, Heart sounds absent, Pupillary light reflex absent, and Corneal blink reflex absent    Medical Examiner notification criteria:  NONE APPLICABLE   Medical Examiner's office notified?:  No, does not meet ME notification criteria   Medical Examiner accepted case?:  No  Name of Medical Examiner:          Autopsy Options:  Post-mortem examination declined by next of kin.    Primary Service Attending Physician notified?:  yes - Attending:  Suzanna Brush DO    Physician/Resident responsible for completing Discharge Summary:  Unruly Curry MD

## 2024-07-29 NOTE — CLINICAL RISK MANAGEMENT
Progress Note - Death in Restraints   Hal Miller 84 y.o. male MRN: [id number]  Unit/Bed#: [facility] 620-01 Encounter: [id number]      Patient  within 24 hours of restraint  with Soft restraint right wrist and Soft restraint left wrist. Death unrelated to use of restraints. This situation was tracked internally. CMS and PA-ARTHUR  notification not required.

## 2024-08-02 NOTE — QUICK NOTE
Acute respiratory failure with hypoxia, not POA, due to mucus plugging and atelectasis, evidenced by Sp02 89% on 4L supplemental oxygen, treated with supplemental oxygen via nasal cannula, CXR, percussive vest, nebulizer treatments, vital sign monitoring.

## 2024-09-09 NOTE — CONSULTS
[facility]  Consult: Critical Care  Name: Hal Miller 84 y.o. male I MRN: [id number]  Unit/Bed#: MICU 04 I Date of Admission: 7/3/2024   Date of Service: 7/4/2024 I Hospital Day: 1      Consults  Assessment & Plan   Neuro:   Diagnosis: AMS, Delrium, recent stroke, concern for seizures  Recent admission at [facility] with new stroke and ? Seizure activity and delirium  Plan:   Convert depakote 750 mg Q12 to depacon 500 mg Q8 hours  Strict environmental controls :  Lights on and blinds up during day.   Frequent reorientation   Lights and TV off and blinds down at night   Minimize interuptions at night as clincially indicated    Diagnosis: Inflammatory polyarthropathy  Takes 1 mg prednisone daily  Plan:   Hold for now    Diagnosis: Acute post op pain  Plan: per surgical recs    CV:   Diagnosis: HFrEF 2/2 NICM  Plan:    Holding home lasix for now  Diagnosis: Atrial fibrillation on eliquis  Plan:   Hold metoprolol  Hold eliquis  Diagnosis: History of AVR Bentall  Plan:       Pulm:  Diagnosis: COPD   Plan:    Continue nebulizer    GI:   Diagnosis: Perforated diverticulitis with abscess   S/p Miller's procedure with explanation of left groin mesh and washout/debirdment of groin wound  Plan:   Continue NG Tube; NPO  Wound packed, plan per surgery  Vascular surgery consult due to concerns for vascular involvement  May need plastics for wound closure  Wound management per surgery  ABX as below    :   Diagnosis: Stage III CKD  Plan: At baseline  Continue I and Os    Diagnosis: Phimosis with scrotal edema  Plan:   Continue cardenas  May need urology consult      F/E/N:   Fluids:  cc/hr  Electrolytes: Replete for K >4, <5 ; Phos > 3; and Mag > 2  Nutrition: NPO     Heme/Onc:   Diagnosis: Anticoagulated  Plan:   Hold AC for now    Endo:   Diagnosis: DM II  Plan: SSI Q6 hours    Diagnosis: At risk of adrenal insufficiency   Plan: May need stress dose steroids if hypotension    ID:    Diagnosis: Intraabdominal sepsis  Plan: Continue zosyn    MSK/Skin:   Diagnosis: At risk for skin breakdown  Plan: frequent turning/offloading    Disposition: Critical care     History of Present Illness     HPI: Hal Miller is a 84 y.o. who presents to the MICU following Miller's procedure with explanation of left groin mesh and washout/debirdment of groin wound. He has a PMH of NICM with EF 30%, Atrial fibrillation anticoagulated on eliquis, essential hypertension, DM II, CKD stage 3, COPD, history of AVR and bentall procedure, inflammatory polyarthropathy on chronic prednisone and bilateral inguinal hernia repair with mesh. He presented on 6/16 initially to [facility] with new groin swelling and tenderness. CT showed a diverticular abscess originating at inguinal hernia with small perforation. General surgery was following patient and patient was observed on ABX. On 6/18 patient had RRT after a fall and was placed on stroke pathway. He had waxing and waning mental status and was transferred to the ICU after a brief episode of fixed downward gaze with bilateral upper extremity stiffening/shaking and transient unresponsiveness. Neurology was consulted and patient was started on depakote. MRI was obtained and patient had new, small perivertricular white matter. He was transitioned back to [facility] and was transferred to [facility] here at [facility]. Today, there was noted to be increased swelling, drainage, breakdown in the left groin. He went to the OR today with general surgery and underwent above procedure. He received 4100 cc crystaloid, 500 cc albumin and had 475 cc UOP with 250 cc EBL. He comes to the MICU with NGT, ostomy, extubated on no pressors.    History obtained from chart review and unobtainable from patient due to mental status.  Review of Systems: Review of Systems   Unable to perform ROS: Mental status change   All other systems reviewed and are negative.    Historical Information   Past Medical  History:  2023: Acute encephalopathy  2017: Acute-on-chronic kidney injury  (HCC)  No date: Cancer (HCC)      Comment:  pancreatic  No date: Colon polyp  No date: Coronary artery disease  3/3/2023: Dehydration  No date: Diabetes mellitus (HCC)  No date: Hyperlipidemia  No date: Hypertension  2022: Left lower lobe pneumonia  2017: Pneumonia      Comment:  Right middle and lower lobe   No date: Stroke (HCC) Past Surgical History:  No date: APPENDECTOMY  No date: CARDIAC SURGERY      Comment:  Aortic Valve Replacement, Ascending Aorta  No date: COLONOSCOPY  No date: GALLBLADDER SURGERY  No date: PANCREATICODUODENECTOMY   Current Outpatient Medications   Medication Instructions    apixaban (ELIQUIS) 2.5 mg, Oral, 2 times daily    B-D UF III MINI PEN NEEDLES 31G X 5 MM MISC No dose, route, or frequency recorded.    Calcium Citrate (CITRACAL PO) 650 mg, Oral, Daily    Cinnamon 1,000 mg, Oral, Daily    diltiazem (CARDIZEM CD) 180 mg, Oral, Daily    hydroxychloroquine (PLAQUENIL) 200 mg, Oral, Daily    Lantus SoloStar 100 units/mL SOPN No dose, route, or frequency recorded.    nystatin (MYCOSTATIN) 500,000 Units, Mouth/Throat, 4 times daily    olmesartan (BENICAR) 20 mg, Oral, Daily    ondansetron (ZOFRAN-ODT) 4 mg, Oral, Every 6 hours PRN    potassium chloride (KLOR-CON) 8 MEQ tablet No dose, route, or frequency recorded.    pravastatin (PRAVACHOL) 40 mg, Oral, Daily    predniSONE 10 mg, Oral, Daily, Currently down to 3 1/2 mg daily 22    QUEtiapine (SEROQUEL) 25 mg, Oral, As needed, Take 1/2 to 1 tablet by mouth as needed     Allergies   Allergen Reactions    Contrast Dye [Iodinated Contrast Media] Other (See Comments)     Pt states kidney dysfunction..     Other       Social History     Tobacco Use    Smoking status: Former     Types: Pipe     Quit date:      Years since quittin.5    Smokeless tobacco: Never   Vaping Use    Vaping status: Never Used   Substance Use Topics    Alcohol  use: Never    Drug use: No    Family History   Problem Relation Age of Onset    Cancer Mother     Stroke Father     Cancer Sister     Diabetes Sister     Stroke Brother           Objective                            Vitals I/O      Most Recent Min/Max in 24hrs   Temp 97.9 °F (36.6 °C) Temp  Min: 97.9 °F (36.6 °C)  Max: 97.9 °F (36.6 °C)   Pulse 59 Pulse  Min: 59  Max: 78   Resp 16 Resp  Min: 16  Max: 17   /80 BP  Min: 120/65  Max: 146/80   O2 Sat 100 % SpO2  Min: 92 %  Max: 100 %      Intake/Output Summary (Last 24 hours) at 7/4/2024 1712  Last data filed at 7/4/2024 1630  Gross per 24 hour   Intake 4800 ml   Output 925 ml   Net 3875 ml       Diet NPO    Invasive Monitoring           Physical Exam   Physical Exam  Vitals and nursing note reviewed.   Eyes:      Conjunctiva/sclera: Conjunctivae normal.   Skin:     General: Skin is dry.   HENT:      Nose: Nasogastric tube present.      Mouth/Throat:      Mouth: Mucous membranes are dry.   Neck:      Vascular: No JVD.   Cardiovascular:      Rate and Rhythm: Normal rate.      Heart sounds: Normal heart sounds.   Musculoskeletal:         General: Normal range of motion.   Abdominal:      Tenderness: There is abdominal tenderness.      Comments: Ostomy pink and patent    Lower abdominal incision closed    Left inguinal wound packed with scant drainage   Constitutional:       Appearance: He is well-developed and well-nourished.      Interventions: Cervical collar in place.   Pulmonary:      Effort: Tachypnea present.      Breath sounds: No wheezing or rales.   Neurological:      General: No focal deficit present.      Mental Status: He is alert. He is disoriented to time.      Motor: Strength full and intact in all extremities.   Genitourinary/Anorectal:  Babcock present.          Diagnostic Studies      EKG:   Imaging:  I have personally reviewed pertinent films in PACS     Medications:  Scheduled PRN   budesonide, 0.5 mg, Q12H  chlorhexidine, 15 mL, Q12H  YUNI  divalproex sodium, 750 mg, Q12H YUNI  formoterol, 20 mcg, Q12H  heparin (porcine), 5,000 Units, Q8H YUNI  insulin lispro, 1-5 Units, Q6H YUNI  metoprolol tartrate, 12.5 mg, Q12H YUNI  pantoprazole, 40 mg, Q24H YUNI  piperacillin-tazobactam, 4.5 g, Q8H      albuterol, 2 puff, Q4H PRN  HYDROmorphone, 0.5 mg, Q3H PRN  HYDROmorphone, 0.2 mg, Q3H PRN       Continuous    lactated ringers, 100 mL/hr, Last Rate: 100 mL/hr (07/04/24 0815)         Labs:    CBC    Recent Labs     07/03/24  0616 07/03/24  2338 07/04/24  0547   WBC 12.54*  --  8.54   HGB 10.4*  --  10.6*   HCT 32.4*  --  33.2*   * 126* 139*     BMP    Recent Labs     07/03/24  0616 07/04/24  0547   SODIUM 130* 130*   K 5.1 4.7   CL 96 96   CO2 32 31   AGAP 2* 3*   BUN 37* 35*   CREATININE 1.35* 1.35*   CALCIUM 9.7 9.5       Coags    No recent results     Additional Electrolytes  Recent Labs     07/03/24  0616 07/04/24  0547   MG 2.0 2.0   PHOS  --  3.3          Blood Gas    No recent results  No recent results LFTs  No recent results    Infectious  No recent results  Glucose  Recent Labs     07/03/24  0616 07/04/24  0547   GLUC 104 98                Randy Broussard PA-C   Osteoarthritis of right knee

## 2024-09-24 NOTE — PROGRESS NOTES
Physical Medicine and Rehabilitation Progress Note  Hal Miller 84 y.o. male MRN: [id number]  Unit/Bed#: [facility] 455-01 Encounter: [id number]    To Review: Hal Miller is a 84 y.o. male with severe COPD, Afib (on eliquis), vascular dementia, T2DM, appendectomy, pancreatic cancer s/p whipple (2011), CKD, HTN, inflammatory polyarthropathy on chronic low dose steroids, chronic diplopia (has eye patch, and prisms) who presented to the [facility] on 6/17 with diffuse body pain, SOB, constipation, decreased appetite, and pain in left groin. CT scan showed a L inguinal hernia with diverticular abscess and general surgery was consulted and he was started on abx. IR was consulted who did not recommend drainage from their standpoint. They initially opted for non-operative management SOB. He was treated with solumedrol and nebulized bronchodilators. He was also found to have shingles and was started on valtrex. Geriatrics was consulted who recommended checking in B12 (normal). On 6/18 he was found to be lethargic and had an unwitnessed fall. RRT was called and CTH/CT C-Spine were ordered - negative, as well as XR L elbow - negative. Another rapid was called on 6/19 for hypoactivity and unresponsiveness. BG was fine, PCO2 on stat ABG not suggestive of hypercarbia, and his WBC had been improving, Neuro was consulted and recommended MRI Brain and EEG and he was started on Depacon with Ativan PRN. They also recommended holding ASA. Gen Surg cleared for diet from surgical perspective. SLP evaluated on 6/20 and cleared for Reg/Thin diet. MRI showed acute L parietal stroke. EEG without seizure. Carotid US showed < 50% bilateral stenosis. MRA and Echo were ordered - suspicion was cardioembolic etiology in setting of held anticoagulation pending surgical intervention on admission. He was placed on a heparin gtt. MRA unremarkable. Echo with EF 30% with global hypokinesis and mildly dilated atria.  Questionnaire reviewed with patient and all answers are within normal limits.     Allergy injections were administered and Jonathan waited in the clinic waiting room for 30 minutes following administration and was discharged without adverse reactions noted.     Dr. Palacios was available in the clinic/office during this visit.      See Allergy Immunotherapy for injection flow sheet for documentation of injection(s)    Cardiology was consulted for cardiomyopathy who noted his last Echo in 2017 with normal EF who recommend transitioning from cardizem to BB, but team with some concerns given severity of his COPD. Given his low EF, Cards still recommended BB and he was transitioned to Toprol. They recommended close f/u with his outpatient Cardiologist. He is on abx through 6/28. He was transitioned back to eliquis. He was determined to be below his baseline function with need for close monitoring of his cardiac and neuro function through therapy. Seen by PMR who recommended acute inpatient rehab. He was admitted to the [facility] on 6/26.      Chief Complaint: Feeling well.    Interval History/Subjective:  Some restless sleep noted on behavior log last night. Denies any new CP, SOB, fevers, chills, n/V, abdominal pain. Denies new headache, lightheadedness, dizziness. Stable diplopia. Has some mildly elevated PVRs, but no dysuria, and has been continent. Last BM 6/26. Took some bowel meds.     ROS:  A 10 point review of systems was negative except for what is noted in the HPI.    Today's Changes:  Continue sleep log. He thinks he slept fine, but monitor for need for melatonin.  Reviewed labs, stable/improved sodium and creatinine  Has had some LE edema, nursing requested WENDY stockings. Orders placed for that and limb elevation  Overgrown thick toenails, consult for podiatry requested by nursing. Order placed.    Total visit time: 35 minutes, with more than 50% spent counseling/coordinating care. Counseling includes discussion with patient re: progress in therapies, functional issues observed by therapy staff, and discussion with patient regarding their current medical state and wellbeing. Coordination of patient's care was performed in conjunction with Internal Medicine service to monitor patient's labs, vitals, and management of their comorbidities.    Assessment/Plan:    * 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HCC)  Assessment & Plan  L parietal CVA  2/2 off  anticoagulation while waiting for potential surgery in acute [facility] hospital  MRI: Acute L parietal CVA  Carotid US < 50% bilateral stenosis   Echo: dilated atria, and global hypokinesis  Possible component of seizures during hospital stay.  Now back on eliquis  Also has statin  RF modification  Deficits: mostly improved - lethargy, altered mental status. Generalized weakness currently. Evaluating his cognitive deficits here  Depakote 500mg Q8hrs  Seizure precautions   [employer] to be [place] by Neuro.    PT/OT/SLP 3-5 hours/day, 5-7 days/week.   Outpatient follow-up with Neurology     Peripheral polyneuropathy  Assessment & Plan  Chronic issue  Partial loss of sensation in his distal lower extremities  Considerations in therapy, monitor for pain    Diplopia  Assessment & Plan  Chronic issue. Utilizes prisms and eye patch on occasion.    Vascular dementia (HCC)  Assessment & Plan  Baseline AAO x 2-3.  At risk for delirium, no episodes or issues on admission.   - Delirium precautions  Monitor sleep/wake cycle and manage to optimize recovery from acute CVA  Appropriate bowel/bladder management  Avoiding deliriogenic meds and physical/chemical restraint.  Focus on environmental/behavioral interventions for reorientation and redirection  At baseline functions independently    TSH and B12 WNL in [facility]  Outpatient f/u with Neurology    Inflammatory polyarthropathy (HCC)  Assessment & Plan  Per chart history of Polymyalgia Rheumatica  Hydroxychloroquine 200mg daily at home  Per discussion with his Rheumatologist, he was weaned down to prednisone 1mg daily and they will follow-up with him in July.   Will discuss with IM when it is ok/if he should resume hydroxychloroquine  Outpatient f/u with his Rheumatologist at [facility]    Heart failure (HCC)  Assessment & Plan  Wt Readings from Last 3 Encounters:   06/27/24 88.2 kg (194 lb 7.1 oz)   06/26/24 86.2 kg (190 lb 0.6 oz)   06/07/24 76 kg (167 lb 8.8 oz)     Per chart  last echo in 2017 with normal EF  Echo in this hospitalization with EF 30%, global hypokinesis, and dilated atria  Recommended for GDMT  Now on BB and ARB (see HTN)  Was on lasix at home - appears euvolemic currently  Monitor weights and I/O  Management as per IM  Outpatient f/u with PCP          Severe protein-calorie malnutrition (HCC)  Assessment & Plan  Malnutrition Findings:                                 BMI Findings:           Body mass index is 24.97 kg/m².     Nutriton consulted      Colonic diverticular abscess  Assessment & Plan  Noted on admission on the L  No pain in groin at this time  Completes Cipro/Metronidazole tomorrow.  Non-operatively managed, tolerating PO  Monitor bowels.  Outpatient f/u with Gen Surg in 3-4 weeks.     Zoster  Assessment & Plan  Completed Valacyclovir in the hospital  Lesions are crusted over/clearing up.  No need for contact precautions at this time.  Will keep area covered.     Type 2 diabetes mellitus (HCC)  Assessment & Plan  Lab Results   Component Value Date    HGBA1C 7.6 (H) 06/19/2024       Recent Labs     06/26/24  1144 06/26/24  2102 06/27/24  0608 06/27/24  1019   POCGLU 129 147* 98 146*       Blood Sugar Average: Last 72 hrs:  (P) 130.4906246459725005    Home: Had Lantus at home. Unclear dosing  Here: Lantus 8 units QHS, Cdi/Accuchecks  Would have staff evaluate how he draws up and gives himself his insulin prior to discharge.  Monitor and adjust as per IM  Outpatient f/u with PCP    Chronic obstructive pulmonary disease with acute exacerbation (HCC)  Assessment & Plan  Severe.  Home: Albuterol PRN, Trelegy  Here: Albuterol PRN, Budesonide nebs Q12hr PRN and Formoterol nebs every 12 hours   - Would try to get patient back onto an inhaler prior to discharge home.  Currently on room air  Monitor exam and adjust as per IM.  Outpatient f/u with Pulm     Atrial fibrillation (HCC)  Assessment & Plan  Toprol 25mg daily   Fully anticoagulated on apixaban  Monitor and  adjust as appropriate  Outpatient f/u with Cardiologist    Hypertension  Assessment & Plan  Home: Losartan 100mg daily, Lasix 20mg daily  Here: Losartan 50mg daily, Toprol 25mg daily   Monitor and adjust as appropriate.  Management as per IM  Outpatient f/u with PCP        Health Maintenance  #Delirium/Sleep: At risk. Optimize sleep/wake, pain, bowel, bladder management. Avoid deliriogenic meds and physical restraint. Environmental/behavioral interventions.   #Pain: not a major issues at this time. Tylenol PRN  #Bowel: Continent. Last BM 6/26. Senna 2 tabs at night, PRN bisacodyl, miralax  #Bladder: Voiding and continent  #Skin/Pressure Injury Prevention: Turn Q2hr in bed, with weight shifts [id number] in wheelchair. Float heels in bed.  #DVT Prophylaxis: Fully anticoagulated on apixaban, SCDs, ambulation  #GI Prophylaxis: On PPI. Of note on chronic steroids.   #Code Status: Full Code  #FEN: Diabetic Diet, Glucerna- Strawberry B/L/D  #Dispo: ELOS 1-2 weeks with goal to discharge home mod Ind-Sup with support of wife.   Outpatient f/u with PCP, Neurology, General Surgery (in 3-4 weeks)      Objective:    Functional Update:  PT: Sup bed mobility, modA transfers, modA ambulation 150', modA stairs with SBA of 2nd person   OT: Ind eating, Car oral hygiene, shower/bathe self, set-up UB dressing, mod A LB dressing, maxA footwear, Sup toileting hygiene, Car toilet transfer  SLP: CLCLARITAT WNL but scores were all low normal. They will evaluate further.     Allergies per EMR    Physical Exam:  Temp:  [97.3 °F (36.3 °C)-97.9 °F (36.6 °C)] 97.6 °F (36.4 °C)  HR:  [69-75] 69  Resp:  [16] 16  BP: (115-140)/(68-80) 140/80  Oxygen Therapy  SpO2: 95 %    Gen: No acute distress, Well-nourished, well-appearing.  HEENT: Moist mucus membranes, L facial asymmetry stable. Diplopia/prisms.  Cardiovascular: Regular rate, rhythm, S1/S2. Distal pulses palpable  Heme/Extr: No edema  Pulmonary: Non-labored breathing  : No cardenas  GI: Soft,  non-tender, non-distended.   Integumentary: Skin is warm, dry.   Neuro: AAOx3,  Speech is intact. Appropriate to questioning.   Psych: Normal mood and affect.     Diagnostic Studies: reviewed, no new imaging  No results found.    Laboratory:  Reviewed   Results from last 7 days   Lab Units 06/28/24  0502 06/27/24  0506 06/26/24  0520   HEMOGLOBIN g/dL 11.4* 11.7* 12.7   HEMATOCRIT % 35.1* 37.7 39.9   WBC Thousand/uL 7.49 7.64 9.27     Results from last 7 days   Lab Units 06/28/24  0502 06/27/24  0506 06/26/24  0520 06/24/24  0630 06/23/24  0413 06/22/24  0454 06/21/24  0736   BUN mg/dL 23 25 24   < > 20 23 26*   POTASSIUM mmol/L 4.9 4.7 4.9   < > 4.7 4.8 4.8   CHLORIDE mmol/L 96 99 98   < > 101 103 104   CREATININE mg/dL 1.26 1.33* 1.35*   < > 1.22 1.27 1.40*   AST U/L  --   --   --   --  15 14 20   ALT U/L  --   --   --   --  14 16 19    < > = values in this interval not displayed.            Patient Active Problem List   Diagnosis    Shortness of breath    Hypertension    Atrial fibrillation (HCC)    Aneurysm of thoracic aorta (HCC)    Aneurysm of abdominal aorta (HCC)    Hyperlipidemia    Inflammatory polyarthropathy (HCC)    Osteoarthrosis    Polymyalgia rheumatica (HCC)    History of malignant neuroendocrine tumor    Centrilobular emphysema (HCC)    Chronic obstructive pulmonary disease with acute exacerbation (HCC)    CKD (chronic kidney disease)    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)    Acute encephalopathy    Generalized weakness    Neoplasm of other specified site    Esophageal stricture    Hypercalcemia    TAAR (acute kidney injury) (HCC)    Concern for aspiration pneumonia (HCC)    Type 2 diabetes mellitus (HCC)    Abnormal CT of the abdomen    Zoster    Colonic diverticular abscess    Vascular dementia (HCC)    Dizziness    Insomnia    Ambulatory dysfunction    Severe protein-calorie malnutrition (HCC)    Acute on chronic respiratory failure (HCC)    Episode of seizure-like activity    History of  Whipple procedure    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HCC)    Heart failure (HCC)    Dysphoric mood    Diplopia    Peripheral polyneuropathy    Hyponatremia         Medications  Current Facility-Administered Medications   Medication Dose Route Frequency Provider Last Rate    acetaminophen  650 mg Oral Q4H PRN [place]      albuterol  2 puff Inhalation Q4H PRN [place]      apixaban  5 mg Oral BID [place]      atorvastatin  40 mg Oral Daily With Dinner [place]      bisacodyl  10 mg Rectal Daily PRN Ashley Depadua, MD      budesonide  0.5 mg Nebulization Q12H [place]      ciprofloxacin  500 mg Oral Q12H YUNI [place]      divalproex sodium  500 mg Oral Q8H [facility] [place]      formoterol  20 mcg Nebulization Q12H [place]      insulin glargine  8 Units Subcutaneous HS [place]      insulin lispro  1-5 Units Subcutaneous 4x Daily (AC & HS) [place]      losartan  50 mg Oral Daily [place]      melatonin  3 mg Oral HS [place]      metoprolol succinate  25 mg Oral Daily [place]      metroNIDAZOLE  500 mg Oral Q8H YUNI [place]      pantoprazole  40 mg Oral Early Morning [place]      polyethylene glycol  17 g Oral Daily PRN Ashley Depadua, MD      predniSONE  1 mg Oral Daily [place]      senna  2 tablet Oral BID [place]      tamsulosin  0.4 mg Oral Daily With Dinner Ashley Depadua, MD            ** Please Note: Fluency Direct voice to text software may have been used in the creation of this document. **
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
